# Patient Record
Sex: MALE | Race: WHITE | Employment: OTHER | URBAN - METROPOLITAN AREA
[De-identification: names, ages, dates, MRNs, and addresses within clinical notes are randomized per-mention and may not be internally consistent; named-entity substitution may affect disease eponyms.]

---

## 2017-01-03 ENCOUNTER — OFFICE VISIT (OUTPATIENT)
Dept: INTERNAL MEDICINE CLINIC | Age: 62
End: 2017-01-03

## 2017-01-03 VITALS
WEIGHT: 315 LBS | DIASTOLIC BLOOD PRESSURE: 70 MMHG | BODY MASS INDEX: 37.72 KG/M2 | OXYGEN SATURATION: 95 % | SYSTOLIC BLOOD PRESSURE: 118 MMHG | RESPIRATION RATE: 18 BRPM | HEART RATE: 77 BPM

## 2017-01-03 DIAGNOSIS — H35.3190 MACULAR DEGENERATION, DRY: ICD-10-CM

## 2017-01-03 DIAGNOSIS — J44.1 COPD EXACERBATION (HCC): Primary | ICD-10-CM

## 2017-01-03 PROCEDURE — 99213 OFFICE O/P EST LOW 20 MIN: CPT | Performed by: INTERNAL MEDICINE

## 2017-01-03 RX ORDER — PREDNISONE 10 MG/1
TABLET ORAL
Qty: 20 TABLET | Refills: 0 | Status: SHIPPED | OUTPATIENT
Start: 2017-01-03 | End: 2017-01-13

## 2017-01-03 RX ORDER — FLUTICASONE PROPIONATE 50 MCG
2 SPRAY, SUSPENSION (ML) NASAL DAILY
Qty: 1 BOTTLE | Refills: 5 | Status: SHIPPED | OUTPATIENT
Start: 2017-01-03 | End: 2017-06-13

## 2017-01-03 RX ORDER — AZITHROMYCIN 250 MG/1
TABLET, FILM COATED ORAL
Qty: 6 TABLET | Refills: 0 | Status: SHIPPED | OUTPATIENT
Start: 2017-01-03 | End: 2017-02-03

## 2017-01-06 ENCOUNTER — INITIAL CONSULT (OUTPATIENT)
Dept: CARDIOLOGY CLINIC | Age: 62
End: 2017-01-06

## 2017-01-06 ENCOUNTER — PROCEDURE VISIT (OUTPATIENT)
Dept: CARDIOLOGY CLINIC | Age: 62
End: 2017-01-06

## 2017-01-06 VITALS
DIASTOLIC BLOOD PRESSURE: 72 MMHG | SYSTOLIC BLOOD PRESSURE: 114 MMHG | HEIGHT: 78 IN | WEIGHT: 315 LBS | BODY MASS INDEX: 36.45 KG/M2 | HEART RATE: 80 BPM

## 2017-01-06 DIAGNOSIS — I10 ESSENTIAL HYPERTENSION: ICD-10-CM

## 2017-01-06 DIAGNOSIS — Z01.810 PRE-OPERATIVE CARDIOVASCULAR EXAMINATION: Primary | ICD-10-CM

## 2017-01-06 DIAGNOSIS — I10 ESSENTIAL HYPERTENSION: Primary | ICD-10-CM

## 2017-01-06 PROCEDURE — 93306 TTE W/DOPPLER COMPLETE: CPT | Performed by: INTERNAL MEDICINE

## 2017-01-06 PROCEDURE — 99203 OFFICE O/P NEW LOW 30 MIN: CPT | Performed by: INTERNAL MEDICINE

## 2017-01-06 PROCEDURE — 93000 ELECTROCARDIOGRAM COMPLETE: CPT | Performed by: INTERNAL MEDICINE

## 2017-01-10 ENCOUNTER — TELEPHONE (OUTPATIENT)
Dept: CARDIOLOGY CLINIC | Age: 62
End: 2017-01-10

## 2017-01-10 LAB
LEFT VENTRICULAR EJECTION FRACTION HIGH VALUE: NORMAL %
LEFT VENTRICULAR EJECTION FRACTION MODE: NORMAL
LV EF: NORMAL %

## 2017-01-11 ENCOUNTER — HOSPITAL ENCOUNTER (OUTPATIENT)
Dept: GENERAL RADIOLOGY | Age: 62
Discharge: HOME | End: 2017-01-11
Attending: INTERNAL MEDICINE | Admitting: INTERNAL MEDICINE

## 2017-01-11 ENCOUNTER — TELEPHONE (OUTPATIENT)
Dept: INTERNAL MEDICINE CLINIC | Age: 62
End: 2017-01-11

## 2017-01-11 ENCOUNTER — HOSPITAL ENCOUNTER (OUTPATIENT)
Dept: GENERAL RADIOLOGY | Age: 62
Discharge: HOME | End: 2017-01-11

## 2017-01-11 DIAGNOSIS — M25.532 WRIST PAIN, ACUTE, LEFT: Primary | ICD-10-CM

## 2017-01-11 DIAGNOSIS — M25.532 WRIST PAIN, ACUTE, LEFT: ICD-10-CM

## 2017-01-11 DIAGNOSIS — Z01.811 PRE-OP CHEST EXAM: ICD-10-CM

## 2017-01-11 LAB
ANION GAP SERPL CALCULATED.3IONS-SCNC: 14 MMOL/L (ref 4–16)
APTT: 29.2 SECONDS (ref 21.2–33)
BUN BLDV-MCNC: 12 MG/DL (ref 6–23)
CALCIUM SERPL-MCNC: 9.2 MG/DL (ref 8.3–10.6)
CHLORIDE BLD-SCNC: 93 MMOL/L (ref 99–110)
CO2: 29 MMOL/L (ref 21–32)
CREAT SERPL-MCNC: 0.6 MG/DL (ref 0.9–1.3)
GFR AFRICAN AMERICAN: >60 ML/MIN/1.73M2
GFR NON-AFRICAN AMERICAN: >60 ML/MIN/1.73M2
GLUCOSE BLD-MCNC: 99 MG/DL (ref 70–140)
HCT VFR BLD CALC: 54.6 % (ref 42–52)
HEMOGLOBIN: 18.9 GM/DL (ref 13.5–18)
INR BLD: 0.99 INDEX
MCH RBC QN AUTO: 32.9 PG (ref 27–31)
MCHC RBC AUTO-ENTMCNC: 34.6 % (ref 32–36)
MCV RBC AUTO: 95 FL (ref 78–100)
PDW BLD-RTO: 13.2 % (ref 11.7–14.9)
PLATELET # BLD: 218 K/CU MM (ref 140–440)
PMV BLD AUTO: 10.4 FL (ref 7.5–11.1)
POTASSIUM SERPL-SCNC: 4.8 MMOL/L (ref 3.5–5.1)
PROTHROMBIN TIME: 11.3 SECONDS (ref 9.12–12.5)
RBC # BLD: 5.75 M/CU MM (ref 4.6–6.2)
SODIUM BLD-SCNC: 136 MMOL/L (ref 135–145)
WBC # BLD: 17.2 K/CU MM (ref 4–10.5)

## 2017-01-13 DIAGNOSIS — M25.532 LEFT WRIST PAIN: Primary | ICD-10-CM

## 2017-01-13 PROBLEM — S63.502A LEFT WRIST SPRAIN: Status: ACTIVE | Noted: 2017-01-13

## 2017-01-25 ENCOUNTER — TELEPHONE (OUTPATIENT)
Dept: ORTHOPEDIC SURGERY | Age: 62
End: 2017-01-25

## 2017-01-30 ENCOUNTER — OFFICE VISIT (OUTPATIENT)
Dept: ORTHOPEDIC SURGERY | Age: 62
End: 2017-01-30

## 2017-01-30 VITALS — WEIGHT: 315 LBS | BODY MASS INDEX: 36.45 KG/M2 | HEIGHT: 78 IN | RESPIRATION RATE: 18 BRPM

## 2017-01-30 DIAGNOSIS — S63.502A WRIST SPRAIN, LEFT, INITIAL ENCOUNTER: ICD-10-CM

## 2017-01-30 DIAGNOSIS — S62.002A: ICD-10-CM

## 2017-01-30 DIAGNOSIS — M19.032 PRIMARY OSTEOARTHRITIS OF LEFT WRIST: Primary | ICD-10-CM

## 2017-01-30 PROCEDURE — 99244 OFF/OP CNSLTJ NEW/EST MOD 40: CPT | Performed by: ORTHOPAEDIC SURGERY

## 2017-01-30 ASSESSMENT — ENCOUNTER SYMPTOMS
COUGH: 1
GASTROINTESTINAL NEGATIVE: 1
EYE REDNESS: 1
BLURRED VISION: 1
WHEEZING: 1
SHORTNESS OF BREATH: 1
SPUTUM PRODUCTION: 1

## 2017-02-03 ENCOUNTER — OFFICE VISIT (OUTPATIENT)
Dept: CARDIOLOGY CLINIC | Age: 62
End: 2017-02-03

## 2017-02-03 VITALS
HEIGHT: 78 IN | BODY MASS INDEX: 36.45 KG/M2 | DIASTOLIC BLOOD PRESSURE: 60 MMHG | HEART RATE: 64 BPM | WEIGHT: 315 LBS | SYSTOLIC BLOOD PRESSURE: 104 MMHG

## 2017-02-03 DIAGNOSIS — I73.9 PVD (PERIPHERAL VASCULAR DISEASE) (HCC): Primary | ICD-10-CM

## 2017-02-03 PROCEDURE — 99214 OFFICE O/P EST MOD 30 MIN: CPT | Performed by: INTERNAL MEDICINE

## 2017-02-03 RX ORDER — CILOSTAZOL 50 MG/1
50 TABLET ORAL 2 TIMES DAILY
Qty: 180 TABLET | Refills: 3 | Status: SHIPPED | OUTPATIENT
Start: 2017-02-03 | End: 2018-11-28 | Stop reason: SDUPTHER

## 2017-02-08 ENCOUNTER — HOSPITAL ENCOUNTER (OUTPATIENT)
Dept: MRI IMAGING | Age: 62
Discharge: HOME | End: 2017-02-08
Attending: ORTHOPAEDIC SURGERY | Admitting: ORTHOPAEDIC SURGERY

## 2017-02-08 DIAGNOSIS — S62.002A: ICD-10-CM

## 2017-02-08 DIAGNOSIS — S62.002A CLOSED FRACTURE OF NAVICULAR BONE OF LEFT WRIST: ICD-10-CM

## 2017-02-13 ENCOUNTER — OFFICE VISIT (OUTPATIENT)
Dept: INTERNAL MEDICINE CLINIC | Age: 62
End: 2017-02-13

## 2017-02-13 VITALS
HEART RATE: 80 BPM | DIASTOLIC BLOOD PRESSURE: 78 MMHG | SYSTOLIC BLOOD PRESSURE: 122 MMHG | WEIGHT: 315 LBS | RESPIRATION RATE: 18 BRPM | BODY MASS INDEX: 38.37 KG/M2

## 2017-02-13 DIAGNOSIS — M25.562 CHRONIC PAIN OF BOTH KNEES: ICD-10-CM

## 2017-02-13 DIAGNOSIS — G89.29 HIP PAIN, CHRONIC, RIGHT: ICD-10-CM

## 2017-02-13 DIAGNOSIS — M25.50 ARTHRALGIA, UNSPECIFIED JOINT: ICD-10-CM

## 2017-02-13 DIAGNOSIS — M25.551 HIP PAIN, CHRONIC, RIGHT: ICD-10-CM

## 2017-02-13 DIAGNOSIS — M25.50 ARTHRALGIA, UNSPECIFIED JOINT: Primary | ICD-10-CM

## 2017-02-13 DIAGNOSIS — G89.29 CHRONIC PAIN OF BOTH KNEES: ICD-10-CM

## 2017-02-13 DIAGNOSIS — M25.552 CHRONIC HIP PAIN, LEFT: ICD-10-CM

## 2017-02-13 DIAGNOSIS — M25.511 ACUTE PAIN OF RIGHT SHOULDER: ICD-10-CM

## 2017-02-13 DIAGNOSIS — G89.29 CHRONIC HIP PAIN, LEFT: ICD-10-CM

## 2017-02-13 DIAGNOSIS — M25.561 CHRONIC PAIN OF BOTH KNEES: ICD-10-CM

## 2017-02-13 PROCEDURE — 20610 DRAIN/INJ JOINT/BURSA W/O US: CPT | Performed by: INTERNAL MEDICINE

## 2017-02-13 PROCEDURE — 99213 OFFICE O/P EST LOW 20 MIN: CPT | Performed by: INTERNAL MEDICINE

## 2017-02-13 RX ORDER — METHYLPREDNISOLONE ACETATE 80 MG/ML
80 INJECTION, SUSPENSION INTRA-ARTICULAR; INTRALESIONAL; INTRAMUSCULAR; SOFT TISSUE ONCE
Status: COMPLETED | OUTPATIENT
Start: 2017-02-13 | End: 2017-02-13

## 2017-02-13 RX ADMIN — METHYLPREDNISOLONE ACETATE 80 MG: 80 INJECTION, SUSPENSION INTRA-ARTICULAR; INTRALESIONAL; INTRAMUSCULAR; SOFT TISSUE at 11:26

## 2017-02-13 ASSESSMENT — PATIENT HEALTH QUESTIONNAIRE - PHQ9
2. FEELING DOWN, DEPRESSED OR HOPELESS: 0
1. LITTLE INTEREST OR PLEASURE IN DOING THINGS: 0
SUM OF ALL RESPONSES TO PHQ QUESTIONS 1-9: 0
SUM OF ALL RESPONSES TO PHQ9 QUESTIONS 1 & 2: 0

## 2017-02-14 LAB
ANA INTERPRETATION: NORMAL
ANTI-NUCLEAR ANTIBODY (ANA): NEGATIVE
C-REACTIVE PROTEIN: 18 MG/L (ref 0–5.1)
RHEUMATOID FACTOR: <10 IU/ML
SEDIMENTATION RATE, ERYTHROCYTE: 16 MM/HR (ref 0–20)

## 2017-03-10 ENCOUNTER — OFFICE VISIT (OUTPATIENT)
Dept: INTERNAL MEDICINE CLINIC | Age: 62
End: 2017-03-10

## 2017-03-10 VITALS
SYSTOLIC BLOOD PRESSURE: 118 MMHG | HEART RATE: 86 BPM | OXYGEN SATURATION: 94 % | DIASTOLIC BLOOD PRESSURE: 72 MMHG | RESPIRATION RATE: 16 BRPM

## 2017-03-10 DIAGNOSIS — J41.0 SIMPLE CHRONIC BRONCHITIS (HCC): ICD-10-CM

## 2017-03-10 DIAGNOSIS — M25.551 PAIN OF BOTH HIP JOINTS: Primary | ICD-10-CM

## 2017-03-10 DIAGNOSIS — M25.561 CHRONIC PAIN OF BOTH KNEES: ICD-10-CM

## 2017-03-10 DIAGNOSIS — M25.552 PAIN OF BOTH HIP JOINTS: Primary | ICD-10-CM

## 2017-03-10 DIAGNOSIS — M25.562 CHRONIC PAIN OF BOTH KNEES: ICD-10-CM

## 2017-03-10 DIAGNOSIS — G89.29 CHRONIC PAIN OF BOTH KNEES: ICD-10-CM

## 2017-03-10 PROCEDURE — 99213 OFFICE O/P EST LOW 20 MIN: CPT | Performed by: INTERNAL MEDICINE

## 2017-03-10 RX ORDER — DULOXETIN HYDROCHLORIDE 30 MG/1
30 CAPSULE, DELAYED RELEASE ORAL DAILY
Qty: 30 CAPSULE | Refills: 5 | Status: SHIPPED | OUTPATIENT
Start: 2017-03-10 | End: 2018-04-04 | Stop reason: ALTCHOICE

## 2017-03-10 RX ORDER — BUDESONIDE AND FORMOTEROL FUMARATE DIHYDRATE 160; 4.5 UG/1; UG/1
2 AEROSOL RESPIRATORY (INHALATION) 2 TIMES DAILY
Qty: 3 INHALER | Refills: 3 | Status: CANCELLED | OUTPATIENT
Start: 2017-03-10

## 2017-03-10 RX ORDER — ALBUTEROL SULFATE 90 UG/1
2 AEROSOL, METERED RESPIRATORY (INHALATION) EVERY 6 HOURS PRN
Qty: 3 INHALER | Refills: 3 | Status: SHIPPED | OUTPATIENT
Start: 2017-03-10 | End: 2017-05-08 | Stop reason: SDUPTHER

## 2017-05-08 ENCOUNTER — OFFICE VISIT (OUTPATIENT)
Dept: CARDIOLOGY CLINIC | Age: 62
End: 2017-05-08

## 2017-05-08 VITALS
BODY MASS INDEX: 36.45 KG/M2 | SYSTOLIC BLOOD PRESSURE: 118 MMHG | DIASTOLIC BLOOD PRESSURE: 62 MMHG | HEIGHT: 78 IN | WEIGHT: 315 LBS | HEART RATE: 82 BPM

## 2017-05-08 DIAGNOSIS — I73.9 PVD (PERIPHERAL VASCULAR DISEASE) (HCC): Primary | ICD-10-CM

## 2017-05-08 DIAGNOSIS — J41.0 SIMPLE CHRONIC BRONCHITIS (HCC): ICD-10-CM

## 2017-05-08 PROCEDURE — 99214 OFFICE O/P EST MOD 30 MIN: CPT | Performed by: INTERNAL MEDICINE

## 2017-05-08 RX ORDER — ALBUTEROL SULFATE 90 UG/1
2 AEROSOL, METERED RESPIRATORY (INHALATION) EVERY 6 HOURS PRN
Qty: 3 INHALER | Refills: 3 | Status: SHIPPED | OUTPATIENT
Start: 2017-05-08 | End: 2018-07-17 | Stop reason: SDUPTHER

## 2017-05-08 RX ORDER — BUDESONIDE AND FORMOTEROL FUMARATE DIHYDRATE 160; 4.5 UG/1; UG/1
2 AEROSOL RESPIRATORY (INHALATION) 2 TIMES DAILY
Qty: 3 INHALER | Refills: 3 | Status: SHIPPED | OUTPATIENT
Start: 2017-05-08 | End: 2018-04-16

## 2017-06-13 ENCOUNTER — OFFICE VISIT (OUTPATIENT)
Dept: INTERNAL MEDICINE CLINIC | Age: 62
End: 2017-06-13

## 2017-06-13 VITALS
BODY MASS INDEX: 37.3 KG/M2 | OXYGEN SATURATION: 91 % | SYSTOLIC BLOOD PRESSURE: 100 MMHG | HEART RATE: 66 BPM | DIASTOLIC BLOOD PRESSURE: 58 MMHG | RESPIRATION RATE: 18 BRPM | WEIGHT: 315 LBS

## 2017-06-13 DIAGNOSIS — I10 BENIGN ESSENTIAL HTN: ICD-10-CM

## 2017-06-13 DIAGNOSIS — Z72.0 TOBACCO USE: ICD-10-CM

## 2017-06-13 DIAGNOSIS — J41.0 SIMPLE CHRONIC BRONCHITIS (HCC): ICD-10-CM

## 2017-06-13 DIAGNOSIS — E78.00 HYPERCHOLESTEREMIA: ICD-10-CM

## 2017-06-13 PROCEDURE — 99214 OFFICE O/P EST MOD 30 MIN: CPT | Performed by: INTERNAL MEDICINE

## 2017-06-13 RX ORDER — ALBUTEROL SULFATE 2.5 MG/3ML
2.5 SOLUTION RESPIRATORY (INHALATION) EVERY 4 HOURS PRN
Qty: 120 VIAL | Refills: 5 | Status: SHIPPED | OUTPATIENT
Start: 2017-06-13 | End: 2018-04-16 | Stop reason: SDUPTHER

## 2017-07-28 ENCOUNTER — HOSPITAL ENCOUNTER (OUTPATIENT)
Dept: PULMONOLOGY | Age: 62
Discharge: HOME | End: 2017-07-28
Attending: INTERNAL MEDICINE | Admitting: INTERNAL MEDICINE

## 2017-09-12 ENCOUNTER — OFFICE VISIT (OUTPATIENT)
Dept: INTERNAL MEDICINE CLINIC | Age: 62
End: 2017-09-12

## 2017-09-12 VITALS
BODY MASS INDEX: 36.75 KG/M2 | HEART RATE: 70 BPM | SYSTOLIC BLOOD PRESSURE: 110 MMHG | DIASTOLIC BLOOD PRESSURE: 78 MMHG | WEIGHT: 315 LBS | RESPIRATION RATE: 16 BRPM

## 2017-09-12 DIAGNOSIS — E78.00 HYPERCHOLESTEREMIA: ICD-10-CM

## 2017-09-12 DIAGNOSIS — I10 BENIGN ESSENTIAL HTN: ICD-10-CM

## 2017-09-12 DIAGNOSIS — R42 DISEQUILIBRIUM: Primary | ICD-10-CM

## 2017-09-12 DIAGNOSIS — J41.0 SIMPLE CHRONIC BRONCHITIS (HCC): ICD-10-CM

## 2017-09-12 PROCEDURE — 99214 OFFICE O/P EST MOD 30 MIN: CPT | Performed by: INTERNAL MEDICINE

## 2017-09-12 PROCEDURE — 90688 IIV4 VACCINE SPLT 0.5 ML IM: CPT | Performed by: INTERNAL MEDICINE

## 2017-09-12 PROCEDURE — 90471 IMMUNIZATION ADMIN: CPT | Performed by: INTERNAL MEDICINE

## 2018-01-01 PROBLEM — J18.9 PNEUMONIA: Status: ACTIVE | Noted: 2018-01-01

## 2018-01-02 PROBLEM — J44.1 COPD EXACERBATION (HCC): Status: ACTIVE | Noted: 2018-01-02

## 2018-01-02 PROBLEM — J11.1 INFLUENZA: Status: ACTIVE | Noted: 2018-01-02

## 2018-01-03 PROBLEM — J96.02 ACUTE RESPIRATORY FAILURE WITH HYPERCAPNIA (HCC): Status: ACTIVE | Noted: 2018-01-03

## 2018-01-10 ENCOUNTER — TELEPHONE (OUTPATIENT)
Dept: INTERNAL MEDICINE CLINIC | Age: 63
End: 2018-01-10

## 2018-01-10 NOTE — TELEPHONE ENCOUNTER
Transitional Care Management Service-  Initial Post-discharge Communication    Date of discharge: 1/8/2018  Facility: [address] [place] [address]  Non-face-to-face services provided:  · Patient scheduled to see Dr. Ayaan Belcher 1/12/18 at 3pm. Patient does not need any medication, equipment or anything else at this time.

## 2018-01-16 ENCOUNTER — OFFICE VISIT (OUTPATIENT)
Dept: INTERNAL MEDICINE CLINIC | Age: 63
End: 2018-01-16

## 2018-01-16 VITALS
BODY MASS INDEX: 36.29 KG/M2 | DIASTOLIC BLOOD PRESSURE: 78 MMHG | WEIGHT: 314 LBS | SYSTOLIC BLOOD PRESSURE: 118 MMHG | HEART RATE: 80 BPM | RESPIRATION RATE: 20 BRPM | OXYGEN SATURATION: 96 %

## 2018-01-16 DIAGNOSIS — I48.19 PERSISTENT ATRIAL FIBRILLATION (HCC): ICD-10-CM

## 2018-01-16 DIAGNOSIS — J18.9 PNEUMONIA DUE TO INFECTIOUS ORGANISM, UNSPECIFIED LATERALITY, UNSPECIFIED PART OF LUNG: ICD-10-CM

## 2018-01-16 DIAGNOSIS — J96.02 ACUTE RESPIRATORY FAILURE WITH HYPERCAPNIA (HCC): Primary | ICD-10-CM

## 2018-01-16 DIAGNOSIS — J44.1 COPD EXACERBATION (HCC): ICD-10-CM

## 2018-01-16 DIAGNOSIS — J11.1 INFLUENZA: ICD-10-CM

## 2018-01-16 PROCEDURE — 99214 OFFICE O/P EST MOD 30 MIN: CPT | Performed by: INTERNAL MEDICINE

## 2018-01-16 NOTE — PROGRESS NOTES
follow up appointment on 1/10. The 30th day from the day of discharge is 2/7. I have reviewed all radiology and laboratory tests, as well as consult notes, progress notes, history and physical, and discharge summary. I reviewed appropriate follow up appointments with the patient. Medical decision making was moderately complex. We have reviewed all of the patient's medications today. Diagnoses and all orders for this visit:    Acute respiratory failure with hypercapnia ([facility] - much improved from the influenza/pneuymonia/resp failure. He is now on home O2. He did have a fib in the hospital and this has persisted. Pt is on anticoagulation and will follow with Dr Shweta Cohen next month - may benefit from Bipin Carroll then. Type 2 diabetes, uncontrolled, with neuropathy (HCC) - decrease metformin  -     metFORMIN (GLUCOPHAGE) 500 MG tablet;  Take 1 tablet by mouth daily (with breakfast)    Influenza    Pneumonia due to infectious organism, unspecified laterality, unspecified part of lung    COPD exacerbation ([facility]

## 2018-02-16 ENCOUNTER — OFFICE VISIT (OUTPATIENT)
Dept: CARDIOLOGY CLINIC | Age: 63
End: 2018-02-16

## 2018-02-16 VITALS
HEART RATE: 80 BPM | HEIGHT: 78 IN | WEIGHT: 315 LBS | DIASTOLIC BLOOD PRESSURE: 52 MMHG | BODY MASS INDEX: 36.45 KG/M2 | SYSTOLIC BLOOD PRESSURE: 80 MMHG

## 2018-02-16 DIAGNOSIS — I10 BENIGN ESSENTIAL HTN: ICD-10-CM

## 2018-02-16 DIAGNOSIS — I10 ESSENTIAL HYPERTENSION: ICD-10-CM

## 2018-02-16 DIAGNOSIS — E78.00 HYPERCHOLESTEREMIA: ICD-10-CM

## 2018-02-16 DIAGNOSIS — M79.89 SWELLING OF LOWER EXTREMITY: ICD-10-CM

## 2018-02-16 DIAGNOSIS — I73.9 PVD (PERIPHERAL VASCULAR DISEASE) (HCC): Primary | ICD-10-CM

## 2018-02-16 PROCEDURE — 3017F COLORECTAL CA SCREEN DOC REV: CPT | Performed by: INTERNAL MEDICINE

## 2018-02-16 PROCEDURE — 93000 ELECTROCARDIOGRAM COMPLETE: CPT | Performed by: INTERNAL MEDICINE

## 2018-02-16 PROCEDURE — G8484 FLU IMMUNIZE NO ADMIN: HCPCS | Performed by: INTERNAL MEDICINE

## 2018-02-16 PROCEDURE — 99214 OFFICE O/P EST MOD 30 MIN: CPT | Performed by: INTERNAL MEDICINE

## 2018-02-16 PROCEDURE — 4004F PT TOBACCO SCREEN RCVD TLK: CPT | Performed by: INTERNAL MEDICINE

## 2018-02-16 PROCEDURE — G8417 CALC BMI ABV UP PARAM F/U: HCPCS | Performed by: INTERNAL MEDICINE

## 2018-02-16 PROCEDURE — G8427 DOCREV CUR MEDS BY ELIG CLIN: HCPCS | Performed by: INTERNAL MEDICINE

## 2018-02-16 RX ORDER — ATORVASTATIN CALCIUM 40 MG/1
40 TABLET, FILM COATED ORAL DAILY
Qty: 90 TABLET | Refills: 3 | Status: SHIPPED | OUTPATIENT
Start: 2018-02-16 | End: 2018-11-28 | Stop reason: SDUPTHER

## 2018-02-16 RX ORDER — LISINOPRIL 10 MG/1
5 TABLET ORAL DAILY
Qty: 90 TABLET | Refills: 3 | Status: SHIPPED
Start: 2018-02-16 | End: 2018-10-23

## 2018-02-16 RX ORDER — LISINOPRIL 10 MG/1
10 TABLET ORAL DAILY
Qty: 90 TABLET | Refills: 3 | Status: SHIPPED | OUTPATIENT
Start: 2018-02-16 | End: 2018-02-16 | Stop reason: DRUGHIGH

## 2018-02-16 NOTE — PROGRESS NOTES
CARDIOLOGY NOTE      2/16/2018    RE: Corrina Lehman  (1955)                               TO:  Dr. Warner Meckel, MD      Thank you for involving me in taking care of your  patient Corrina Lehman, who is a  63y.o. year old      male with past medical  history of  DM, PVD, HTN  is  seen today Patient  during this  visit has mild SOB, and Swelling LLE. .  Was in hospital with pneumonia, had A Fib with RVR.     Vitals:    02/16/18 0946   BP: (!) 80/52   Pulse: 80       Current Outpatient Prescriptions   Medication Sig Dispense Refill    OXYGEN Inhale 3 L into the lungs continuous      metFORMIN (GLUCOPHAGE) 500 MG tablet Take 1 tablet by mouth daily (with breakfast) 90 tablet 3    apixaban (ELIQUIS) 5 MG TABS tablet Take 1 tablet by mouth 2 times daily 60 tablet 3    diltiazem (CARDIZEM CD) 120 MG extended release capsule Take 1 capsule by mouth daily 30 capsule 3    albuterol (PROVENTIL) (2.5 MG/3ML) 0.083% nebulizer solution Take 3 mLs by nebulization every 4 hours as needed for Wheezing 120 vial 3    Nebulizers (AIRIAL COMPRESS PED NEBULIZER) MISC Use 4 times daily 1 each 0    Nebulizers (COMPRESSOR/NEBULIZER) MISC Use qid 1 each 3    albuterol (PROVENTIL) (2.5 MG/3ML) 0.083% nebulizer solution Take 3 mLs by nebulization every 4 hours as needed for Wheezing 120 vial 5    ipratropium (ATROVENT) 0.02 % nebulizer solution Take 2.5 mLs by nebulization every 4 hours as needed for Wheezing 300 mL 5    albuterol sulfate  (90 BASE) MCG/ACT inhaler Inhale 2 puffs into the lungs every 6 hours as needed for Wheezing 3 Inhaler 3    cilostazol (PLETAL) 50 MG tablet Take 1 tablet by mouth 2 times daily 180 tablet 3    atorvastatin (LIPITOR) 40 MG tablet Take 1 tablet by mouth daily 90 tablet 3    lisinopril (PRINIVIL;ZESTRIL) 10 MG tablet Take 1 tablet by mouth daily 90 tablet 3    aspirin 81 MG EC tablet Take 1 tablet by mouth daily 90 tablet 3    fluocinonide (LIDEX) 0.05 % cream Apply topically 2 times daily. 60 g 1    budesonide-formoterol (SYMBICORT) 160-4.5 MCG/ACT AERO Inhale 2 puffs into the lungs 2 times daily 3 Inhaler 3    DULoxetine (CYMBALTA) 30 MG extended release capsule Take 1 capsule by mouth daily 30 capsule 5     No current facility-administered medications for this visit. Allergies: Metoprolol  Past Medical History:   Diagnosis Date    COPD (chronic obstructive pulmonary disease) [facility]     Erectile dysfunction     H/O echocardiogram 01/06/2016    EF60% mild MR, TR, pulm HTN    Hx of colonoscopy     7/11 C-scope - benign polyp x1    Hypertension     Obesity     PAD [facility]peripheral artery disease) [facility])     10/10 FANI mild PAD left superficial femoral s/p left fem-pop bypass    Secondary erythrocytosis     Type II or unspecified type diabetes mellitus without mention of complication, not stated as uncontrolled      Past Surgical History:   Procedure Laterality Date    APPENDECTOMY  2003    HERNIA REPAIR  1849    umbilical    IR FEMORAL POPLITEAL BYPASS GRAFT  01/2011    [place]     Family History   Problem Relation Age of Onset    Other Father      suicide    Other Mother      murder     Social History   Substance Use Topics    Smoking status: Current Some Day Smoker     Packs/day: 1.00     Years: 25.00     Types: Cigarettes    Smokeless tobacco: Never Used    Alcohol use No          Review of systems:  HEENT: Neg  Card:SOB   GI;Neg  : Neg  Neuro: Neg  Psych: Neg  Derm: Neg  MS; Neg  All: Documented  Constitutional: Neg    Objective:      Physical Exam:  BP (!) 80/52   Pulse 80   Ht 6' 6\" (1.981 m)   Wt (!) 324 lb (147 kg)   BMI 37.44 kg/m²   Wt Readings from Last 3 Encounters:   02/16/18 (!) 324 lb (147 kg)   01/16/18 (!) 314 lb (142.4 kg)   01/08/18 (!) 359 lb 0.3 oz (162.8 kg)     Body mass index is 37.44 kg/m². GENERAL - Alert, oriented, pleasant, in no apparent distress.     Head unremarkable  Eyes  Not injected conjunctiva  ENT  normal mucosa  Neck - Supple. No jugular venous distention noted. No carotid bruits. Cardiovascular  Normal S1 and S2 without obvious murmur or gallop. Extremities - No cyanosis, clubbing,  +1 LLE edema. Pulmonary  No respiratory distress. No wheezes or rales. Pulses: Bilateral radial and pedal pulses normal  Abdomen  no tenderness  Musculoskeletal  normal strength  Neurologic    There are  no gross focal neurologic abnormalities. Skin-  No rash  Affect; normal mood    DATA:  Lab Results   Component Value Date    CKTOTAL 15 01/03/2018     BNP:  No results found for: BNP  PT/INR:  No results found for: PTINR  Lab Results   Component Value Date    LABA1C 5.2 01/03/2018    LABA1C 6.0 09/02/2016     Lab Results   Component Value Date    CHOL 179 09/02/2016    TRIG 189 (H) 09/02/2016    HDL 24 (L) 09/02/2016    LDLCALC 117 (H) 09/02/2016     Lab Results   Component Value Date    ALT 62 (H) 01/08/2018    AST 25 01/08/2018     TSH:  No results found for: TSH      QUALITY MEASURES:  CAD:  No   CHOL LOWERING:  No- if No Why  ANTIPLATELET:  No - if No why  BETA BLOCKER    no  IF  NO WHY  SMOKING HISTORY no COUNSELLED no  ATRIAL FIBRILLATIONyes,  ANTICOAG: yes,     Assessment/ Plan:     Patient seen , interviewed and examined      - A fib on anticoag,  DEUCE and DCCV next week    -   DIABETES MELLITUS: Available pertinent lab data reviewed   and  patient was given dietary advice . Advised to check blood glucose level on a regular basis.       -   Changes  in medicines made: No           - COPD  stable    - PVD  Check LLE venous doppler  - Hypotension decrease ACE I

## 2018-02-16 NOTE — LETTER
Giovanna Interiano     Transesophageal Echocardiogram with Cardioversion       Patient Name: Jorge Alberto Yanez   : 1955   MRN# [id number]        Date of Procedure: 2018 Time: 8 AM Arrival Time: 6 AM       Hospital: [facility])       Call to [facility] at [phone] 2 days before your procedure        X Please do not have anything by mouth after midnight prior to or 8 hours before the procedure. X You may take your medication with a sip of water unless advised otherwise below. If having a Cardioversion: Hold digoxin (Lanoxin) the day prior to the procedure and the day of the procedure. X Please continue to take Eliquis (apixaban) as directed. If you are taking Lasix (furosemide) please do not take it the morning before your procedure. If you take insulin take ½ the dose on the morning of the procedure or the night before. Do not take regular insulin dose the morning of the procedure. Patient Signature:____________________________         Staff Signature: Linda Muhammad  What is cardioversion? Cardioversion helps your heart return to a normal rhythm. It treats problems like atrial fibrillation. It is also sometimes used in emergencies. It can correct a fast heartbeat that causes low blood pressure, chest pain, or heart failure. Your doctor may ask you to take medicines before the treatment. These help prevent blood clots. Your doctor will watch you closely to make sure that there are no problems. Electrical cardioversion: The electrical procedure is done in a hospital. You will get medicine to help you relax and control the pain. Your doctor will put paddles or patches on your chest and back. These send an electric current to your heart. This resets your heart rhythm.  The electrical part ? Use of pathology  ? I authorize [facility] ([facility]) to dispose of tissues, specimens or organs when pathology is complete. ? Use of radiology  ? A contrast agent may be required for radiology procedures. My practitioner has advised me of the risks of using, risks of not using, benefits, side effects, and alternatives. ? Observers or use of photography, video/audio recording, or televising of the procedure(s). This is for medical, scientific, or educational purposes. This includes appropriate portions of my body. My identity will not be revealed. ? I consent to release of my social security number and other identifying information to [employer] (FDA), and the supplier/, if I receive tissue, a device, or implant. This is to track the tissue, device, or implant for defect, recall, infection, etc.     ? Use of blood and/or blood products, if needed, through my hospital stay. My practitioner has advised me of the risks of using, risks of not using, benefits, side effects, and alternatives. ___ I do NOT want Blood or Blood products given. (Complete separate  refusal form)            Code Status (barney one):    ___ I do NOT HAVE a DNR order. I am a Full-code.   I will receive CPR, intubation,  chest compressions, medications, and/or other life saving measures if I have a  cardiac or respiratory arrest.    ___ I have a Do Not Resuscitate (DNR)order.   (barney one below)  ___  I rescind my DNR for surgery and immediate post-operative period through Phase 2 recovery. This means, for that time period, I will be a Full-code and receive CPR, intubation, chest compressions, medications, and/or other life saving measures, if I have a cardiac or respiratory arrest.    ___ I WANT to keep my DNR in effect during my procedure(s) and immediate post-operative recovery period through Phase 2 recovery.   (Complete separate refusal form) This form has been fully explained to me. I understand its contents. Patients Signature: ___________________________Date: ________  Time: ________    If patient unable to sign, has engaged the [address] [place] [address], is a minor, or has a court-appointed Guardian:  [facility] Representative Name (Print):  ____________________________________      Relationship ([place] one):    Guardian   Parent    Spouse    HCPOA   Child   Sibling  Next-of-Kin Friend    Patients Representative Signature: _______________________________________              Date: ______________  Time: __________    An  was used.  name/ID: _________________________________      [facility] ([facility] Witness________________________  Date: ________   Time: _________    Physician/Practitioner _______________________  Date: ________   Time: _________           Revision 2017                                        Rosario Campbell       PROCEDURE TO SCHEDULE:    Transesophageal Echocardiogram with Cardioversion    Patient Name: Refsuraj Lehman   : 1955   MRN# [id number]    Home Phone Number: [phone]   Weight:    Wt Readings from Last 3 Encounters:   18 (!) 324 lb (147 kg)   18 (!) 314 lb (142.4 kg)   18 (!) 359 lb 0.3 oz (162.8 kg)        Insurance: Payor: Lupe Kelley / Plan: MEDICARE PART A AND B / Product Type: *No Product type* /     Date of Procedure: 2018 Time: 8 AM Arrival Time: 6 AM    Diagnosis:  Atrial Fibrillation  Allergies:    Allergies   Allergen Reactions    Metoprolol Other (See Comments)     Chest pain and burning in the chest           1) Call [facility] scheduling ([phone]) or [address] [facility]     PHONE OR   INSTANT MESSAGE  2) PREAUTHORIZATION NUMBER:    Spoke to:      From date:     expiration date:          Arti Rose

## 2018-02-20 ENCOUNTER — PROCEDURE VISIT (OUTPATIENT)
Dept: CARDIOLOGY CLINIC | Age: 63
End: 2018-02-20

## 2018-02-20 DIAGNOSIS — E78.00 HYPERCHOLESTEREMIA: ICD-10-CM

## 2018-02-20 DIAGNOSIS — I10 BENIGN ESSENTIAL HTN: ICD-10-CM

## 2018-02-20 DIAGNOSIS — I10 ESSENTIAL HYPERTENSION: ICD-10-CM

## 2018-02-20 DIAGNOSIS — I73.9 PVD (PERIPHERAL VASCULAR DISEASE) (HCC): ICD-10-CM

## 2018-02-20 DIAGNOSIS — M79.89 SWELLING OF LOWER EXTREMITY: Primary | ICD-10-CM

## 2018-02-20 PROCEDURE — 93971 EXTREMITY STUDY: CPT | Performed by: INTERNAL MEDICINE

## 2018-02-21 ENCOUNTER — TELEPHONE (OUTPATIENT)
Dept: CARDIOLOGY CLINIC | Age: 63
End: 2018-02-21

## 2018-02-22 ENCOUNTER — HOSPITAL ENCOUNTER (OUTPATIENT)
Dept: CARDIOLOGY | Age: 63
Discharge: HOME | End: 2018-02-22
Attending: INTERNAL MEDICINE | Admitting: INTERNAL MEDICINE

## 2018-02-22 VITALS
SYSTOLIC BLOOD PRESSURE: 110 MMHG | OXYGEN SATURATION: 97 % | RESPIRATION RATE: 15 BRPM | DIASTOLIC BLOOD PRESSURE: 78 MMHG | HEART RATE: 81 BPM

## 2018-02-22 LAB
ANION GAP SERPL CALCULATED.3IONS-SCNC: 9 MMOL/L (ref 4–16)
BUN BLDV-MCNC: 22 MG/DL (ref 6–23)
CALCIUM SERPL-MCNC: 8.9 MG/DL (ref 8.3–10.6)
CHLORIDE BLD-SCNC: 98 MMOL/L (ref 99–110)
CO2: 31 MMOL/L (ref 21–32)
CREAT SERPL-MCNC: 0.6 MG/DL (ref 0.9–1.3)
EKG ATRIAL RATE: 214 BPM
EKG ATRIAL RATE: 75 BPM
EKG DIAGNOSIS: NORMAL
EKG DIAGNOSIS: NORMAL
EKG P AXIS: 92 DEGREES
EKG P-R INTERVAL: 208 MS
EKG Q-T INTERVAL: 360 MS
EKG Q-T INTERVAL: 376 MS
EKG QRS DURATION: 82 MS
EKG QRS DURATION: 82 MS
EKG QTC CALCULATION (BAZETT): 419 MS
EKG QTC CALCULATION (BAZETT): 423 MS
EKG R AXIS: -54 DEGREES
EKG R AXIS: -76 DEGREES
EKG T AXIS: 46 DEGREES
EKG T AXIS: 60 DEGREES
EKG VENTRICULAR RATE: 75 BPM
EKG VENTRICULAR RATE: 83 BPM
GFR AFRICAN AMERICAN: >60 ML/MIN/1.73M2
GFR NON-AFRICAN AMERICAN: >60 ML/MIN/1.73M2
GLUCOSE BLD-MCNC: 115 MG/DL (ref 70–99)
INR BLD: 1.08 INDEX
LV EF: 53 %
LVEF MODALITY: NORMAL
POTASSIUM SERPL-SCNC: 4.6 MMOL/L (ref 3.5–5.1)
PROTHROMBIN TIME: 12.5 SECONDS (ref 9.12–12.5)
SODIUM BLD-SCNC: 138 MMOL/L (ref 135–145)

## 2018-02-22 PROCEDURE — 92960 CARDIOVERSION ELECTRIC EXT: CPT | Performed by: INTERNAL MEDICINE

## 2018-02-22 PROCEDURE — 93312 ECHO TRANSESOPHAGEAL: CPT | Performed by: INTERNAL MEDICINE

## 2018-02-22 PROCEDURE — 93325 DOPPLER ECHO COLOR FLOW MAPG: CPT | Performed by: INTERNAL MEDICINE

## 2018-02-22 RX ORDER — ALBUTEROL SULFATE 2.5 MG/3ML
2.5 SOLUTION RESPIRATORY (INHALATION) ONCE
Status: COMPLETED | OUTPATIENT
Start: 2018-02-22 | End: 2018-02-22

## 2018-02-22 RX ORDER — DILTIAZEM HYDROCHLORIDE 120 MG/1
240 CAPSULE, COATED, EXTENDED RELEASE ORAL DAILY
Qty: 30 CAPSULE | Refills: 3 | Status: SHIPPED | OUTPATIENT
Start: 2018-02-22 | End: 2018-05-14 | Stop reason: ALTCHOICE

## 2018-02-22 RX ADMIN — ALBUTEROL SULFATE 2.5 MG: 2.5 SOLUTION RESPIRATORY (INHALATION) at 07:44

## 2018-02-22 ASSESSMENT — COPD QUESTIONNAIRES: CAT_SEVERITY: MODERATE

## 2018-02-22 NOTE — ANESTHESIA PRE-OP
(CYMBALTA) 30 MG extended release capsule Take 1 capsule by mouth daily 3/10/17   Alison Orozco MD   cilostazol (PLETAL) 50 MG tablet Take 1 tablet by mouth 2 times daily 2/3/17   Stella Padilla MD   aspirin 81 MG EC tablet Take 1 tablet by mouth daily 12/9/16   Alison Orozco MD   fluocinonide (LIDEX) 0.05 % cream Apply topically 2 times daily.  12/9/16   Alison Orozco MD       Current medications:    Current Outpatient Prescriptions   Medication Sig Dispense Refill    atorvastatin (LIPITOR) 40 MG tablet Take 1 tablet by mouth daily 90 tablet 3    lisinopril (PRINIVIL;ZESTRIL) 10 MG tablet Take 0.5 tablets by mouth daily 90 tablet 3    OXYGEN Inhale 3 L into the lungs continuous      metFORMIN (GLUCOPHAGE) 500 MG tablet Take 1 tablet by mouth daily (with breakfast) 90 tablet 3    apixaban (ELIQUIS) 5 MG TABS tablet Take 1 tablet by mouth 2 times daily 60 tablet 3    diltiazem (CARDIZEM CD) 120 MG extended release capsule Take 1 capsule by mouth daily 30 capsule 3    albuterol (PROVENTIL) (2.5 MG/3ML) 0.083% nebulizer solution Take 3 mLs by nebulization every 4 hours as needed for Wheezing 120 vial 3    Nebulizers (AIRIAL COMPRESS PED NEBULIZER) MISC Use 4 times daily 1 each 0    Nebulizers (COMPRESSOR/NEBULIZER) MISC Use qid 1 each 3    albuterol (PROVENTIL) (2.5 MG/3ML) 0.083% nebulizer solution Take 3 mLs by nebulization every 4 hours as needed for Wheezing 120 vial 5    ipratropium (ATROVENT) 0.02 % nebulizer solution Take 2.5 mLs by nebulization every 4 hours as needed for Wheezing 300 mL 5    albuterol sulfate  (90 BASE) MCG/ACT inhaler Inhale 2 puffs into the lungs every 6 hours as needed for Wheezing 3 Inhaler 3    budesonide-formoterol (SYMBICORT) 160-4.5 MCG/ACT AERO Inhale 2 puffs into the lungs 2 times daily 3 Inhaler 3    DULoxetine (CYMBALTA) 30 MG extended release capsule Take 1 capsule by mouth daily 30 capsule 5    cilostazol (PLETAL) 50 MG tablet Take 1 tablet by mouth 2 times daily 180 tablet 3    aspirin 81 MG EC tablet Take 1 tablet by mouth daily 90 tablet 3    fluocinonide (LIDEX) 0.05 % cream Apply topically 2 times daily. 60 g 1     No current facility-administered medications for this encounter. Allergies: Allergies   Allergen Reactions    Metoprolol Other (See Comments)     Chest pain and burning in the chest        Problem List:    Patient Active Problem List   Diagnosis Code    Hypertension I10    Hypercholesteremia E78.00    Tobacco use Z72.0    Anxiety F41.9    Type 2 diabetes, uncontrolled, with neuropathy ([facility] E11.40, E11.65    Benign essential HTN I10    Simple chronic bronchitis ([facility]) J41.0    Macular degeneration, dry H35.3190    PVD (peripheral vascular disease) ([facility] I73.9    VT (ventricular tachycardia) ([facility]) I47.2    Left wrist sprain S63.502A    Hypoxia R09.02    PAD (peripheral artery disease) ([facility]) I73.9    Class 3 obesity due to excess calories without serious comorbidity with body mass index (BMI) of 40.0 to 44.9 in adult ([facility] E66.09, Z68.41    Persistent atrial fibrillation ([facility]) I48.1    Swelling of lower extremity M79.89       Past Medical History:        Diagnosis Date    COPD (chronic obstructive pulmonary disease) ([facility]     Erectile dysfunction     H/O echocardiogram 01/06/2016    EF60% mild MR, TR, pulm HTN    Hx of colonoscopy     7/11 C-scope - benign polyp x1    Hx of Doppler ultrasound 02/20/2018    Lower extremity doppler  Normal study.     Hypertension     Obesity     PAD (peripheral artery disease) ([facility])     10/10 FANI mild PAD left superficial femoral s/p left fem-pop bypass    Secondary erythrocytosis     Type II or unspecified type diabetes mellitus without mention of complication, not stated as uncontrolled        Past Surgical History:        Procedure Laterality Date    APPENDECTOMY  2003    HERNIA REPAIR  1247    umbilical    IR FEMORAL POPLITEAL BYPASS GRAFT  01/2011

## 2018-04-04 ENCOUNTER — OFFICE VISIT (OUTPATIENT)
Dept: CARDIOLOGY CLINIC | Age: 63
End: 2018-04-04

## 2018-04-04 VITALS
BODY MASS INDEX: 36.45 KG/M2 | WEIGHT: 315 LBS | SYSTOLIC BLOOD PRESSURE: 118 MMHG | HEIGHT: 78 IN | HEART RATE: 78 BPM | DIASTOLIC BLOOD PRESSURE: 68 MMHG

## 2018-04-04 DIAGNOSIS — I10 ESSENTIAL HYPERTENSION: ICD-10-CM

## 2018-04-04 DIAGNOSIS — I48.19 PERSISTENT ATRIAL FIBRILLATION (HCC): Primary | ICD-10-CM

## 2018-04-04 PROCEDURE — 99214 OFFICE O/P EST MOD 30 MIN: CPT | Performed by: INTERNAL MEDICINE

## 2018-04-04 PROCEDURE — 93000 ELECTROCARDIOGRAM COMPLETE: CPT | Performed by: INTERNAL MEDICINE

## 2018-04-04 PROCEDURE — G8417 CALC BMI ABV UP PARAM F/U: HCPCS | Performed by: INTERNAL MEDICINE

## 2018-04-04 PROCEDURE — G8427 DOCREV CUR MEDS BY ELIG CLIN: HCPCS | Performed by: INTERNAL MEDICINE

## 2018-04-04 PROCEDURE — 3017F COLORECTAL CA SCREEN DOC REV: CPT | Performed by: INTERNAL MEDICINE

## 2018-04-04 PROCEDURE — 4004F PT TOBACCO SCREEN RCVD TLK: CPT | Performed by: INTERNAL MEDICINE

## 2018-04-04 RX ORDER — CILOSTAZOL 50 MG/1
50 TABLET ORAL 2 TIMES DAILY
Qty: 180 TABLET | Refills: 3 | Status: CANCELLED | OUTPATIENT
Start: 2018-04-04

## 2018-04-16 ENCOUNTER — OFFICE VISIT (OUTPATIENT)
Dept: INTERNAL MEDICINE CLINIC | Age: 63
End: 2018-04-16

## 2018-04-16 VITALS
WEIGHT: 315 LBS | OXYGEN SATURATION: 86 % | DIASTOLIC BLOOD PRESSURE: 68 MMHG | BODY MASS INDEX: 38.18 KG/M2 | SYSTOLIC BLOOD PRESSURE: 126 MMHG | RESPIRATION RATE: 18 BRPM | HEART RATE: 88 BPM

## 2018-04-16 DIAGNOSIS — J41.0 SIMPLE CHRONIC BRONCHITIS (HCC): Primary | ICD-10-CM

## 2018-04-16 DIAGNOSIS — E78.00 HYPERCHOLESTEREMIA: ICD-10-CM

## 2018-04-16 DIAGNOSIS — I48.19 PERSISTENT ATRIAL FIBRILLATION (HCC): ICD-10-CM

## 2018-04-16 DIAGNOSIS — I10 BENIGN ESSENTIAL HTN: ICD-10-CM

## 2018-04-16 PROCEDURE — 3017F COLORECTAL CA SCREEN DOC REV: CPT | Performed by: INTERNAL MEDICINE

## 2018-04-16 PROCEDURE — 2022F DILAT RTA XM EVC RTNOPTHY: CPT | Performed by: INTERNAL MEDICINE

## 2018-04-16 PROCEDURE — 3023F SPIROM DOC REV: CPT | Performed by: INTERNAL MEDICINE

## 2018-04-16 PROCEDURE — 3044F HG A1C LEVEL LT 7.0%: CPT | Performed by: INTERNAL MEDICINE

## 2018-04-16 PROCEDURE — G8926 SPIRO NO PERF OR DOC: HCPCS | Performed by: INTERNAL MEDICINE

## 2018-04-16 PROCEDURE — G8417 CALC BMI ABV UP PARAM F/U: HCPCS | Performed by: INTERNAL MEDICINE

## 2018-04-16 PROCEDURE — G8427 DOCREV CUR MEDS BY ELIG CLIN: HCPCS | Performed by: INTERNAL MEDICINE

## 2018-04-16 PROCEDURE — 99214 OFFICE O/P EST MOD 30 MIN: CPT | Performed by: INTERNAL MEDICINE

## 2018-04-16 PROCEDURE — 4004F PT TOBACCO SCREEN RCVD TLK: CPT | Performed by: INTERNAL MEDICINE

## 2018-04-16 RX ORDER — ALBUTEROL SULFATE 2.5 MG/3ML
2.5 SOLUTION RESPIRATORY (INHALATION) EVERY 4 HOURS PRN
Qty: 120 VIAL | Refills: 5 | Status: ON HOLD | OUTPATIENT
Start: 2018-04-16 | End: 2020-04-21 | Stop reason: SDUPTHER

## 2018-04-16 ASSESSMENT — PATIENT HEALTH QUESTIONNAIRE - PHQ9
2. FEELING DOWN, DEPRESSED OR HOPELESS: 1
SUM OF ALL RESPONSES TO PHQ9 QUESTIONS 1 & 2: 2
1. LITTLE INTEREST OR PLEASURE IN DOING THINGS: 1
SUM OF ALL RESPONSES TO PHQ QUESTIONS 1-9: 2

## 2018-04-28 ENCOUNTER — OFFICE VISIT (OUTPATIENT)
Dept: CARDIOLOGY CLINIC | Age: 63
End: 2018-04-28

## 2018-04-28 VITALS
HEIGHT: 78 IN | DIASTOLIC BLOOD PRESSURE: 70 MMHG | RESPIRATION RATE: 15 BRPM | SYSTOLIC BLOOD PRESSURE: 120 MMHG | WEIGHT: 315 LBS | BODY MASS INDEX: 36.45 KG/M2 | HEART RATE: 86 BPM | TEMPERATURE: 97.6 F | OXYGEN SATURATION: 96 %

## 2018-04-28 DIAGNOSIS — I48.19 PERSISTENT ATRIAL FIBRILLATION (HCC): Primary | ICD-10-CM

## 2018-04-28 PROCEDURE — 3017F COLORECTAL CA SCREEN DOC REV: CPT | Performed by: INTERNAL MEDICINE

## 2018-04-28 PROCEDURE — 99354 PR PROLONGED SVC OUTPATIENT SETTING 1ST HOUR: CPT | Performed by: INTERNAL MEDICINE

## 2018-04-28 PROCEDURE — G8428 CUR MEDS NOT DOCUMENT: HCPCS | Performed by: INTERNAL MEDICINE

## 2018-04-28 PROCEDURE — G8417 CALC BMI ABV UP PARAM F/U: HCPCS | Performed by: INTERNAL MEDICINE

## 2018-04-28 PROCEDURE — 99204 OFFICE O/P NEW MOD 45 MIN: CPT | Performed by: INTERNAL MEDICINE

## 2018-05-07 ASSESSMENT — ENCOUNTER SYMPTOMS
VOMITING: 0
NAUSEA: 0
COUGH: 0
PHOTOPHOBIA: 0
COLOR CHANGE: 0
CONSTIPATION: 0
BACK PAIN: 0
SHORTNESS OF BREATH: 1
ABDOMINAL PAIN: 0
WHEEZING: 0
DIARRHEA: 0
CHEST TIGHTNESS: 0
BLOOD IN STOOL: 0
EYE PAIN: 0

## 2018-05-09 DIAGNOSIS — I10 BENIGN ESSENTIAL HTN: ICD-10-CM

## 2018-05-09 RX ORDER — ASPIRIN 81 MG
TABLET, DELAYED RELEASE (ENTERIC COATED) ORAL
Qty: 90 TABLET | Refills: 3 | Status: ON HOLD | OUTPATIENT
Start: 2018-05-09 | End: 2020-04-21 | Stop reason: SDUPTHER

## 2018-05-14 ENCOUNTER — TELEPHONE (OUTPATIENT)
Dept: CARDIOLOGY CLINIC | Age: 63
End: 2018-05-14

## 2018-05-14 DIAGNOSIS — I48.19 PERSISTENT ATRIAL FIBRILLATION (HCC): Primary | ICD-10-CM

## 2018-05-14 RX ORDER — DILTIAZEM HYDROCHLORIDE 240 MG/1
240 CAPSULE, COATED, EXTENDED RELEASE ORAL DAILY
COMMUNITY
End: 2018-11-16 | Stop reason: SDUPTHER

## 2018-05-16 ENCOUNTER — HOSPITAL ENCOUNTER (OUTPATIENT)
Dept: CARDIOLOGY | Age: 63
Discharge: HOME | End: 2018-05-16
Attending: INTERNAL MEDICINE | Admitting: INTERNAL MEDICINE

## 2018-05-16 DIAGNOSIS — I48.19 PERSISTENT ATRIAL FIBRILLATION (HCC): ICD-10-CM

## 2018-05-16 LAB
GFR AFRICAN AMERICAN: >60 ML/MIN/1.73M2
GFR NON-AFRICAN AMERICAN: >60 ML/MIN/1.73M2
POC CREATININE: 0.9 MG/DL (ref 0.9–1.3)

## 2018-05-16 RX ORDER — 0.9 % SODIUM CHLORIDE 0.9 %
10 VIAL (ML) INJECTION
Status: COMPLETED | OUTPATIENT
Start: 2018-05-16 | End: 2018-05-16

## 2018-05-16 RX ADMIN — Medication 10 ML: at 08:37

## 2018-05-21 ENCOUNTER — HOSPITAL ENCOUNTER (OUTPATIENT)
Dept: GENERAL RADIOLOGY | Age: 63
Discharge: HOME | End: 2018-05-21
Attending: INTERNAL MEDICINE | Admitting: INTERNAL MEDICINE

## 2018-05-21 DIAGNOSIS — Z01.811 PRE-OP CHEST EXAM: ICD-10-CM

## 2018-05-21 LAB
ANION GAP SERPL CALCULATED.3IONS-SCNC: 11 MMOL/L (ref 4–16)
APTT: 36.3 SECONDS (ref 21.2–33)
BUN BLDV-MCNC: 9 MG/DL (ref 6–23)
CALCIUM SERPL-MCNC: 9.4 MG/DL (ref 8.3–10.6)
CHLORIDE BLD-SCNC: 95 MMOL/L (ref 99–110)
CO2: 36 MMOL/L (ref 21–32)
CREAT SERPL-MCNC: 0.8 MG/DL (ref 0.9–1.3)
GFR AFRICAN AMERICAN: >60 ML/MIN/1.73M2
GFR NON-AFRICAN AMERICAN: >60 ML/MIN/1.73M2
GLUCOSE BLD-MCNC: 86 MG/DL (ref 70–99)
HCT VFR BLD CALC: 55.2 % (ref 42–52)
HEMOGLOBIN: 18.4 GM/DL (ref 13.5–18)
INR BLD: 1.16 INDEX
MAGNESIUM: 2.1 MG/DL (ref 1.8–2.4)
MCH RBC QN AUTO: 32.2 PG (ref 27–31)
MCHC RBC AUTO-ENTMCNC: 33.3 % (ref 32–36)
MCV RBC AUTO: 96.5 FL (ref 78–100)
PDW BLD-RTO: 12.9 % (ref 11.7–14.9)
PHOSPHORUS: 4.8 MG/DL (ref 2.5–4.9)
PLATELET # BLD: 156 K/CU MM (ref 140–440)
PMV BLD AUTO: 10.3 FL (ref 7.5–11.1)
POTASSIUM SERPL-SCNC: 4.6 MMOL/L (ref 3.5–5.1)
PROTHROMBIN TIME: 13.4 SECONDS (ref 9.12–12.5)
RBC # BLD: 5.72 M/CU MM (ref 4.6–6.2)
SODIUM BLD-SCNC: 142 MMOL/L (ref 135–145)
WBC # BLD: 10.5 K/CU MM (ref 4–10.5)

## 2018-05-22 ENCOUNTER — TELEPHONE (OUTPATIENT)
Dept: CARDIOLOGY CLINIC | Age: 63
End: 2018-05-22

## 2018-05-23 PROBLEM — Z98.890 S/P ABLATION OF ATRIAL FIBRILLATION: Status: ACTIVE | Noted: 2018-05-23

## 2018-05-23 PROBLEM — Z86.79 S/P ABLATION OF ATRIAL FIBRILLATION: Status: ACTIVE | Noted: 2018-05-23

## 2018-05-25 ENCOUNTER — TELEPHONE (OUTPATIENT)
Dept: INTERNAL MEDICINE CLINIC | Age: 63
End: 2018-05-25

## 2018-05-25 ENCOUNTER — CARE COORDINATION (OUTPATIENT)
Dept: CASE MANAGEMENT | Age: 63
End: 2018-05-25

## 2018-05-30 ENCOUNTER — OFFICE VISIT (OUTPATIENT)
Dept: INTERNAL MEDICINE CLINIC | Age: 63
End: 2018-05-30

## 2018-05-30 VITALS
RESPIRATION RATE: 18 BRPM | SYSTOLIC BLOOD PRESSURE: 114 MMHG | HEART RATE: 85 BPM | OXYGEN SATURATION: 91 % | WEIGHT: 315 LBS | DIASTOLIC BLOOD PRESSURE: 68 MMHG | BODY MASS INDEX: 38.02 KG/M2

## 2018-05-30 DIAGNOSIS — J41.0 SIMPLE CHRONIC BRONCHITIS (HCC): ICD-10-CM

## 2018-05-30 DIAGNOSIS — I48.19 PERSISTENT ATRIAL FIBRILLATION (HCC): Primary | ICD-10-CM

## 2018-05-30 DIAGNOSIS — J34.89 RHINORRHEA: ICD-10-CM

## 2018-05-30 DIAGNOSIS — Z98.890 S/P ABLATION OF ATRIAL FIBRILLATION: ICD-10-CM

## 2018-05-30 DIAGNOSIS — Z86.79 S/P ABLATION OF ATRIAL FIBRILLATION: ICD-10-CM

## 2018-05-30 PROCEDURE — 2022F DILAT RTA XM EVC RTNOPTHY: CPT | Performed by: INTERNAL MEDICINE

## 2018-05-30 PROCEDURE — G8427 DOCREV CUR MEDS BY ELIG CLIN: HCPCS | Performed by: INTERNAL MEDICINE

## 2018-05-30 PROCEDURE — 99214 OFFICE O/P EST MOD 30 MIN: CPT | Performed by: INTERNAL MEDICINE

## 2018-05-30 PROCEDURE — 3023F SPIROM DOC REV: CPT | Performed by: INTERNAL MEDICINE

## 2018-05-30 PROCEDURE — 4004F PT TOBACCO SCREEN RCVD TLK: CPT | Performed by: INTERNAL MEDICINE

## 2018-05-30 PROCEDURE — G8926 SPIRO NO PERF OR DOC: HCPCS | Performed by: INTERNAL MEDICINE

## 2018-05-30 PROCEDURE — G8417 CALC BMI ABV UP PARAM F/U: HCPCS | Performed by: INTERNAL MEDICINE

## 2018-05-30 PROCEDURE — 3017F COLORECTAL CA SCREEN DOC REV: CPT | Performed by: INTERNAL MEDICINE

## 2018-05-30 PROCEDURE — 3044F HG A1C LEVEL LT 7.0%: CPT | Performed by: INTERNAL MEDICINE

## 2018-05-30 RX ORDER — IPRATROPIUM BROMIDE 21 UG/1
2 SPRAY, METERED NASAL 3 TIMES DAILY
Qty: 1 BOTTLE | Refills: 3 | Status: ON HOLD | OUTPATIENT
Start: 2018-05-30 | End: 2020-04-21

## 2018-06-05 ENCOUNTER — CARE COORDINATION (OUTPATIENT)
Dept: CASE MANAGEMENT | Age: 63
End: 2018-06-05

## 2018-06-13 ENCOUNTER — CARE COORDINATION (OUTPATIENT)
Dept: CASE MANAGEMENT | Age: 63
End: 2018-06-13

## 2018-06-19 ENCOUNTER — CARE COORDINATION (OUTPATIENT)
Dept: CASE MANAGEMENT | Age: 63
End: 2018-06-19

## 2018-07-17 ENCOUNTER — OFFICE VISIT (OUTPATIENT)
Dept: INTERNAL MEDICINE CLINIC | Age: 63
End: 2018-07-17

## 2018-07-17 VITALS
HEART RATE: 90 BPM | SYSTOLIC BLOOD PRESSURE: 112 MMHG | BODY MASS INDEX: 36.45 KG/M2 | WEIGHT: 315 LBS | DIASTOLIC BLOOD PRESSURE: 68 MMHG | HEIGHT: 78 IN | OXYGEN SATURATION: 92 % | RESPIRATION RATE: 20 BRPM

## 2018-07-17 DIAGNOSIS — I10 BENIGN ESSENTIAL HTN: ICD-10-CM

## 2018-07-17 DIAGNOSIS — I48.19 PERSISTENT ATRIAL FIBRILLATION (HCC): ICD-10-CM

## 2018-07-17 DIAGNOSIS — E78.00 HYPERCHOLESTEREMIA: ICD-10-CM

## 2018-07-17 DIAGNOSIS — Z23 NEED FOR DIPHTHERIA-TETANUS-PERTUSSIS (TDAP) VACCINE: ICD-10-CM

## 2018-07-17 DIAGNOSIS — I10 BENIGN ESSENTIAL HTN: Primary | ICD-10-CM

## 2018-07-17 DIAGNOSIS — J41.0 SIMPLE CHRONIC BRONCHITIS (HCC): ICD-10-CM

## 2018-07-17 LAB
A/G RATIO: 1.6 (ref 1.1–2.2)
ALBUMIN SERPL-MCNC: 4.1 G/DL (ref 3.4–5)
ALP BLD-CCNC: 94 U/L (ref 40–129)
ALT SERPL-CCNC: 18 U/L (ref 10–40)
ANION GAP SERPL CALCULATED.3IONS-SCNC: 13 MMOL/L (ref 3–16)
AST SERPL-CCNC: 13 U/L (ref 15–37)
BASOPHILS ABSOLUTE: 0 K/UL (ref 0–0.2)
BASOPHILS RELATIVE PERCENT: 0.5 %
BILIRUB SERPL-MCNC: 0.5 MG/DL (ref 0–1)
BUN BLDV-MCNC: 10 MG/DL (ref 7–20)
CALCIUM SERPL-MCNC: 9.3 MG/DL (ref 8.3–10.6)
CHLORIDE BLD-SCNC: 94 MMOL/L (ref 99–110)
CHOLESTEROL, TOTAL: 112 MG/DL (ref 0–199)
CO2: 35 MMOL/L (ref 21–32)
CREAT SERPL-MCNC: 0.6 MG/DL (ref 0.8–1.3)
EOSINOPHILS ABSOLUTE: 0.3 K/UL (ref 0–0.6)
EOSINOPHILS RELATIVE PERCENT: 2.7 %
GFR AFRICAN AMERICAN: >60
GFR NON-AFRICAN AMERICAN: >60
GLOBULIN: 2.5 G/DL
GLUCOSE BLD-MCNC: 92 MG/DL (ref 70–99)
HCT VFR BLD CALC: 48.8 % (ref 40.5–52.5)
HDLC SERPL-MCNC: 38 MG/DL (ref 40–60)
HEMOGLOBIN: 17.1 G/DL (ref 13.5–17.5)
LDL CHOLESTEROL CALCULATED: 55 MG/DL
LYMPHOCYTES ABSOLUTE: 1.9 K/UL (ref 1–5.1)
LYMPHOCYTES RELATIVE PERCENT: 18 %
MCH RBC QN AUTO: 33.7 PG (ref 26–34)
MCHC RBC AUTO-ENTMCNC: 35.1 G/DL (ref 31–36)
MCV RBC AUTO: 96 FL (ref 80–100)
MONOCYTES ABSOLUTE: 0.8 K/UL (ref 0–1.3)
MONOCYTES RELATIVE PERCENT: 7.6 %
NEUTROPHILS ABSOLUTE: 7.6 K/UL (ref 1.7–7.7)
NEUTROPHILS RELATIVE PERCENT: 71.2 %
PDW BLD-RTO: 14.5 % (ref 12.4–15.4)
PLATELET # BLD: 159 K/UL (ref 135–450)
PMV BLD AUTO: 8.4 FL (ref 5–10.5)
POTASSIUM SERPL-SCNC: 4.7 MMOL/L (ref 3.5–5.1)
RBC # BLD: 5.08 M/UL (ref 4.2–5.9)
SODIUM BLD-SCNC: 142 MMOL/L (ref 136–145)
TOTAL PROTEIN: 6.6 G/DL (ref 6.4–8.2)
TRIGL SERPL-MCNC: 97 MG/DL (ref 0–150)
TSH REFLEX: 2.33 UIU/ML (ref 0.27–4.2)
VLDLC SERPL CALC-MCNC: 19 MG/DL
WBC # BLD: 10.6 K/UL (ref 4–11)

## 2018-07-17 PROCEDURE — 2022F DILAT RTA XM EVC RTNOPTHY: CPT | Performed by: INTERNAL MEDICINE

## 2018-07-17 PROCEDURE — G8926 SPIRO NO PERF OR DOC: HCPCS | Performed by: INTERNAL MEDICINE

## 2018-07-17 PROCEDURE — 99214 OFFICE O/P EST MOD 30 MIN: CPT | Performed by: INTERNAL MEDICINE

## 2018-07-17 PROCEDURE — G8427 DOCREV CUR MEDS BY ELIG CLIN: HCPCS | Performed by: INTERNAL MEDICINE

## 2018-07-17 PROCEDURE — 90471 IMMUNIZATION ADMIN: CPT | Performed by: INTERNAL MEDICINE

## 2018-07-17 PROCEDURE — 4004F PT TOBACCO SCREEN RCVD TLK: CPT | Performed by: INTERNAL MEDICINE

## 2018-07-17 PROCEDURE — 3044F HG A1C LEVEL LT 7.0%: CPT | Performed by: INTERNAL MEDICINE

## 2018-07-17 PROCEDURE — G8417 CALC BMI ABV UP PARAM F/U: HCPCS | Performed by: INTERNAL MEDICINE

## 2018-07-17 PROCEDURE — 3017F COLORECTAL CA SCREEN DOC REV: CPT | Performed by: INTERNAL MEDICINE

## 2018-07-17 PROCEDURE — 90715 TDAP VACCINE 7 YRS/> IM: CPT | Performed by: INTERNAL MEDICINE

## 2018-07-17 PROCEDURE — 3023F SPIROM DOC REV: CPT | Performed by: INTERNAL MEDICINE

## 2018-07-17 RX ORDER — ALBUTEROL SULFATE 90 UG/1
2 AEROSOL, METERED RESPIRATORY (INHALATION) EVERY 6 HOURS PRN
Qty: 3 INHALER | Refills: 3 | Status: ON HOLD | OUTPATIENT
Start: 2018-07-17 | End: 2020-04-21

## 2018-07-17 NOTE — PROGRESS NOTES
Divine Beltrán  1955 07/17/18    SUBJECTIVE:    Pt has not yet followed with cardiology for the a fib. Pt intermittently has had tremors, mildred on days when he is not feeling well. These tend to occur when he is holding an object. These do not occur at rest.    Patient compliant with medications for diabetes. Patient has had no episodes of significant hypoglycemia. Patient denies any chest pain, myalgias. Patient is tolerating cholesterol medications without difficulty. OBJECTIVE:    /68   Pulse 90   Resp 20   Ht 6' 6\" (1.981 m)   Wt (!) 337 lb (152.9 kg)   SpO2 92%   BMI 38.94 kg/m²     Physical Exam   Constitutional: He is oriented to person, place, and time. He appears well-developed and well-nourished. Eyes: Conjunctivae are normal. No scleral icterus. Neck: Neck supple. No JVD present. No tracheal deviation present. No thyromegaly present. Cardiovascular: Normal rate, regular rhythm, normal heart sounds and intact distal pulses. Pulses:       Dorsalis pedis pulses are 1+ on the right side, and 1+ on the left side. Posterior tibial pulses are 1+ on the right side, and 1+ on the left side. Pulmonary/Chest: Effort normal. No respiratory distress. He has rhonchi (scattered). Abdominal: Soft. He exhibits no distension and no mass. There is no hepatosplenomegaly. There is no tenderness. There is no rebound, no guarding and no CVA tenderness. Musculoskeletal: He exhibits no edema. Lymphadenopathy:     He has no cervical adenopathy. Neurological: He is alert and oriented to person, place, and time. No sensory deficit (to monofilament). Nonfocal   Skin: Skin is warm, dry and intact. No cyanosis. Nails show no clubbing. No foot ulcerations   Psychiatric: He has a normal mood and affect. His behavior is normal. Judgment normal.       ASSESSMENT:    1. Benign essential HTN    2. Simple chronic bronchitis ([address]    3.  Type 2 diabetes, uncontrolled, with neuropathy

## 2018-07-18 LAB
ESTIMATED AVERAGE GLUCOSE: 119.8 MG/DL
HBA1C MFR BLD: 5.8 %

## 2018-08-15 ENCOUNTER — TELEPHONE (OUTPATIENT)
Dept: CARDIOLOGY CLINIC | Age: 63
End: 2018-08-15

## 2018-08-17 ENCOUNTER — OFFICE VISIT (OUTPATIENT)
Dept: CARDIOLOGY CLINIC | Age: 63
End: 2018-08-17

## 2018-08-17 VITALS
SYSTOLIC BLOOD PRESSURE: 100 MMHG | WEIGHT: 315 LBS | BODY MASS INDEX: 36.45 KG/M2 | HEIGHT: 78 IN | HEART RATE: 87 BPM | DIASTOLIC BLOOD PRESSURE: 80 MMHG

## 2018-08-17 DIAGNOSIS — I48.19 PERSISTENT ATRIAL FIBRILLATION (HCC): ICD-10-CM

## 2018-08-17 DIAGNOSIS — Z98.890 S/P ABLATION OF ATRIAL FIBRILLATION: Primary | ICD-10-CM

## 2018-08-17 DIAGNOSIS — Z86.79 S/P ABLATION OF ATRIAL FIBRILLATION: Primary | ICD-10-CM

## 2018-08-17 PROCEDURE — G8427 DOCREV CUR MEDS BY ELIG CLIN: HCPCS | Performed by: INTERNAL MEDICINE

## 2018-08-17 PROCEDURE — 93000 ELECTROCARDIOGRAM COMPLETE: CPT | Performed by: INTERNAL MEDICINE

## 2018-08-17 PROCEDURE — 4004F PT TOBACCO SCREEN RCVD TLK: CPT | Performed by: INTERNAL MEDICINE

## 2018-08-17 PROCEDURE — G8417 CALC BMI ABV UP PARAM F/U: HCPCS | Performed by: INTERNAL MEDICINE

## 2018-08-17 PROCEDURE — 3017F COLORECTAL CA SCREEN DOC REV: CPT | Performed by: INTERNAL MEDICINE

## 2018-08-17 PROCEDURE — 99212 OFFICE O/P EST SF 10 MIN: CPT | Performed by: INTERNAL MEDICINE

## 2018-08-17 ASSESSMENT — ENCOUNTER SYMPTOMS
BLOOD IN STOOL: 0
NAUSEA: 0
CHEST TIGHTNESS: 0
ABDOMINAL PAIN: 0
EYE PAIN: 0
PHOTOPHOBIA: 0
COUGH: 0
VOMITING: 0
COLOR CHANGE: 0
WHEEZING: 0
DIARRHEA: 0
BACK PAIN: 0
SHORTNESS OF BREATH: 1
CONSTIPATION: 0

## 2018-08-22 ENCOUNTER — OFFICE VISIT (OUTPATIENT)
Dept: CARDIOLOGY CLINIC | Age: 63
End: 2018-08-22

## 2018-08-22 VITALS
DIASTOLIC BLOOD PRESSURE: 80 MMHG | WEIGHT: 315 LBS | SYSTOLIC BLOOD PRESSURE: 110 MMHG | HEART RATE: 90 BPM | HEIGHT: 78 IN | BODY MASS INDEX: 36.45 KG/M2

## 2018-08-22 DIAGNOSIS — I73.9 PVD (PERIPHERAL VASCULAR DISEASE) (HCC): Primary | ICD-10-CM

## 2018-08-22 PROCEDURE — G8417 CALC BMI ABV UP PARAM F/U: HCPCS | Performed by: INTERNAL MEDICINE

## 2018-08-22 PROCEDURE — 99214 OFFICE O/P EST MOD 30 MIN: CPT | Performed by: INTERNAL MEDICINE

## 2018-08-22 PROCEDURE — G8427 DOCREV CUR MEDS BY ELIG CLIN: HCPCS | Performed by: INTERNAL MEDICINE

## 2018-08-22 PROCEDURE — 3017F COLORECTAL CA SCREEN DOC REV: CPT | Performed by: INTERNAL MEDICINE

## 2018-08-22 PROCEDURE — 4004F PT TOBACCO SCREEN RCVD TLK: CPT | Performed by: INTERNAL MEDICINE

## 2018-08-22 NOTE — PROGRESS NOTES
[id number] Score for Atrial Fibrillation Stroke Risk   Risk   Factors  Component Value   C CHF No 0   H HTN Yes 1   A2 Age >= 76 No,  (59 y.o.) 0   D DM Yes 1   S2 Prior Stroke/TIA No 0   V Vascular Disease No 0   A Age 74-69 No,  (59 y.o.) 0   Sc Sex male 0    [id number]  Score  2   Score last updated 3/78/29 9:28 PM    Click here for a link to the UpToDate guideline \"Atrial Fibrillation: Anticoagulation therapy to prevent embolization    Disclaimer: Risk Score calculation is dependent on accuracy of patient problem list and past encounter diagnosis.

## 2018-08-22 NOTE — PROGRESS NOTES
CARDIOLOGY NOTE      8/22/2018    RE: Quinn Kitchen  (1955)                               TO:  Dr. Jess Maxwell MD      Thank you for involving me in taking care of your  patient Quinn Kitchen, who is a  63y.o. year old      male with past medical  history of  A Fib,  SP  DCCV, HTN, DM, PVD , hyperlipidimea is  seen today Patient  during this  visit SP ablation feels better  C/o LLE pain 0n walking getting worse    Vitals:    08/22/18 1506   BP: 110/80   Pulse: 90       Current Outpatient Prescriptions   Medication Sig Dispense Refill    albuterol sulfate  (90 Base) MCG/ACT inhaler Inhale 2 puffs into the lungs every 6 hours as needed for Wheezing 3 Inhaler 3    UNABLE TO FIND Please administer two SHINGRIX vaccines 2-6 months apart 2 Units 0    ipratropium (ATROVENT) 0.03 % nasal spray 2 sprays by Nasal route 3 times daily 1 Bottle 3    amiodarone (CORDARONE) 200 MG tablet 200mg bid for 10 days and then 200mg once daily 45 tablet 1    diltiazem (CARTIA XT) 240 MG extended release capsule Take 240 mg by mouth daily      ASPIRIN LOW DOSE 81 MG EC tablet take 1 tablet by mouth once daily 90 tablet 3    albuterol (PROVENTIL) (2.5 MG/3ML) 0.083% nebulizer solution Take 3 mLs by nebulization every 4 hours as needed for Wheezing 120 vial 5    ipratropium (ATROVENT) 0.02 % nebulizer solution Take 2.5 mLs by nebulization every 4 hours as needed for Wheezing 300 mL 5    apixaban (ELIQUIS) 5 MG TABS tablet Take 1 tablet by mouth 2 times daily 56 tablet 0    atorvastatin (LIPITOR) 40 MG tablet Take 1 tablet by mouth daily 90 tablet 3    lisinopril (PRINIVIL;ZESTRIL) 10 MG tablet Take 0.5 tablets by mouth daily 90 tablet 3    OXYGEN Inhale 2 L into the lungs continuous       metFORMIN (GLUCOPHAGE) 500 MG tablet Take 1 tablet by mouth daily (with breakfast) 90 tablet 3    Nebulizers (COMPRESSOR/NEBULIZER) MISC Use qid 1 each 3    cilostazol (PLETAL) 50 MG tablet Take 1

## 2018-09-05 ENCOUNTER — PROCEDURE VISIT (OUTPATIENT)
Dept: CARDIOLOGY CLINIC | Age: 63
End: 2018-09-05

## 2018-09-05 DIAGNOSIS — M79.605 PAIN OF LEFT LOWER EXTREMITY: Primary | ICD-10-CM

## 2018-09-05 DIAGNOSIS — I73.9 PVD (PERIPHERAL VASCULAR DISEASE) (HCC): ICD-10-CM

## 2018-09-05 PROCEDURE — 93926 LOWER EXTREMITY STUDY: CPT | Performed by: INTERNAL MEDICINE

## 2018-09-05 PROCEDURE — 93922 UPR/L XTREMITY ART 2 LEVELS: CPT | Performed by: INTERNAL MEDICINE

## 2018-09-07 ENCOUNTER — TELEPHONE (OUTPATIENT)
Dept: CARDIOLOGY CLINIC | Age: 63
End: 2018-09-07

## 2018-09-07 NOTE — TELEPHONE ENCOUNTER
The left femoral-popliteal bypass is non visualized.    The left mid and distal Femoral artery is occluded with monophasic waveforms    throughout the remainder of the left lower extremity.    The Left FANI shows Mild to moderate peripheral arterial disease.        Recommendations        OV TO DISCUSS RESULTS     Results to patient, ov scheduled

## 2018-09-11 ENCOUNTER — TELEPHONE (OUTPATIENT)
Dept: CARDIOLOGY CLINIC | Age: 63
End: 2018-09-11

## 2018-09-11 ENCOUNTER — OFFICE VISIT (OUTPATIENT)
Dept: CARDIOLOGY CLINIC | Age: 63
End: 2018-09-11

## 2018-09-11 VITALS
WEIGHT: 315 LBS | BODY MASS INDEX: 36.45 KG/M2 | HEART RATE: 86 BPM | HEIGHT: 78 IN | DIASTOLIC BLOOD PRESSURE: 72 MMHG | SYSTOLIC BLOOD PRESSURE: 112 MMHG

## 2018-09-11 DIAGNOSIS — I73.9 PVD (PERIPHERAL VASCULAR DISEASE) WITH CLAUDICATION (HCC): ICD-10-CM

## 2018-09-11 DIAGNOSIS — I70.413 ATHEROSCLEROSIS OF AUTOLOGOUS VEIN BYPASS GRAFT OF BOTH LOWER EXTREMITIES WITH INTERMITTENT CLAUDICATION (HCC): ICD-10-CM

## 2018-09-11 DIAGNOSIS — I73.9 PVD (PERIPHERAL VASCULAR DISEASE) (HCC): Primary | ICD-10-CM

## 2018-09-11 DIAGNOSIS — I48.19 PERSISTENT ATRIAL FIBRILLATION (HCC): ICD-10-CM

## 2018-09-11 DIAGNOSIS — I10 HYPERTENSION, UNSPECIFIED TYPE: Primary | ICD-10-CM

## 2018-09-11 PROCEDURE — G8417 CALC BMI ABV UP PARAM F/U: HCPCS | Performed by: INTERNAL MEDICINE

## 2018-09-11 PROCEDURE — 4004F PT TOBACCO SCREEN RCVD TLK: CPT | Performed by: INTERNAL MEDICINE

## 2018-09-11 PROCEDURE — 3017F COLORECTAL CA SCREEN DOC REV: CPT | Performed by: INTERNAL MEDICINE

## 2018-09-11 PROCEDURE — G8598 ASA/ANTIPLAT THER USED: HCPCS | Performed by: INTERNAL MEDICINE

## 2018-09-11 PROCEDURE — G8427 DOCREV CUR MEDS BY ELIG CLIN: HCPCS | Performed by: INTERNAL MEDICINE

## 2018-09-11 PROCEDURE — 99213 OFFICE O/P EST LOW 20 MIN: CPT | Performed by: INTERNAL MEDICINE

## 2018-09-11 NOTE — PROGRESS NOTES
CARDIOLOGY NOTE      9/11/2018    RE: Jaclyn Marie  (1955)                               TO:  Dr. Mary Baker MD      Thank you for involving me in taking care of your  patient Jaclyn Marie, who is a  66y.o. year old      male with past medical  history of  A Fib,  SP  DCCV, HTN, DM, PVD , hyperlipidimea is  seen today Patient   Here to FU on art doppler has LLE numbness and occasional pain for sometime.   Has concerns about wt gain  Vitals:    09/11/18 1543   BP: 112/72   Pulse: 86       Current Outpatient Prescriptions   Medication Sig Dispense Refill    albuterol sulfate  (90 Base) MCG/ACT inhaler Inhale 2 puffs into the lungs every 6 hours as needed for Wheezing 3 Inhaler 3    UNABLE TO FIND Please administer two SHINGRIX vaccines 2-6 months apart 2 Units 0    ipratropium (ATROVENT) 0.03 % nasal spray 2 sprays by Nasal route 3 times daily 1 Bottle 3    amiodarone (CORDARONE) 200 MG tablet 200mg bid for 10 days and then 200mg once daily 45 tablet 1    diltiazem (CARTIA XT) 240 MG extended release capsule Take 240 mg by mouth daily      ASPIRIN LOW DOSE 81 MG EC tablet take 1 tablet by mouth once daily 90 tablet 3    albuterol (PROVENTIL) (2.5 MG/3ML) 0.083% nebulizer solution Take 3 mLs by nebulization every 4 hours as needed for Wheezing 120 vial 5    ipratropium (ATROVENT) 0.02 % nebulizer solution Take 2.5 mLs by nebulization every 4 hours as needed for Wheezing 300 mL 5    apixaban (ELIQUIS) 5 MG TABS tablet Take 1 tablet by mouth 2 times daily 56 tablet 0    atorvastatin (LIPITOR) 40 MG tablet Take 1 tablet by mouth daily 90 tablet 3    lisinopril (PRINIVIL;ZESTRIL) 10 MG tablet Take 0.5 tablets by mouth daily 90 tablet 3    OXYGEN Inhale 2 L into the lungs continuous       metFORMIN (GLUCOPHAGE) 500 MG tablet Take 1 tablet by mouth daily (with breakfast) 90 tablet 3    Nebulizers (COMPRESSOR/NEBULIZER) MISC Use qid 1 each 3    cilostazol (PLETAL) 50

## 2018-10-23 ENCOUNTER — OFFICE VISIT (OUTPATIENT)
Dept: INTERNAL MEDICINE CLINIC | Age: 63
End: 2018-10-23
Payer: MEDICARE

## 2018-10-23 ENCOUNTER — TELEPHONE (OUTPATIENT)
Dept: CARDIOLOGY CLINIC | Age: 63
End: 2018-10-23

## 2018-10-23 VITALS
WEIGHT: 315 LBS | OXYGEN SATURATION: 86 % | SYSTOLIC BLOOD PRESSURE: 100 MMHG | BODY MASS INDEX: 38.67 KG/M2 | DIASTOLIC BLOOD PRESSURE: 68 MMHG | HEART RATE: 90 BPM | RESPIRATION RATE: 20 BRPM

## 2018-10-23 DIAGNOSIS — J41.0 SIMPLE CHRONIC BRONCHITIS (HCC): ICD-10-CM

## 2018-10-23 DIAGNOSIS — I10 ESSENTIAL HYPERTENSION: ICD-10-CM

## 2018-10-23 DIAGNOSIS — E78.00 HYPERCHOLESTEREMIA: ICD-10-CM

## 2018-10-23 DIAGNOSIS — I73.9 PVD (PERIPHERAL VASCULAR DISEASE) (HCC): ICD-10-CM

## 2018-10-23 LAB
A/G RATIO: 1.7 (ref 1.1–2.2)
ALBUMIN SERPL-MCNC: 4.4 G/DL (ref 3.4–5)
ALP BLD-CCNC: 96 U/L (ref 40–129)
ALT SERPL-CCNC: 20 U/L (ref 10–40)
ANION GAP SERPL CALCULATED.3IONS-SCNC: 10 MMOL/L (ref 3–16)
AST SERPL-CCNC: 14 U/L (ref 15–37)
BASOPHILS ABSOLUTE: 0 K/UL (ref 0–0.2)
BASOPHILS RELATIVE PERCENT: 0.5 %
BILIRUB SERPL-MCNC: 0.5 MG/DL (ref 0–1)
BUN BLDV-MCNC: 12 MG/DL (ref 7–20)
CALCIUM SERPL-MCNC: 9.4 MG/DL (ref 8.3–10.6)
CHLORIDE BLD-SCNC: 93 MMOL/L (ref 99–110)
CO2: 38 MMOL/L (ref 21–32)
CREAT SERPL-MCNC: 0.6 MG/DL (ref 0.8–1.3)
EOSINOPHILS ABSOLUTE: 0.3 K/UL (ref 0–0.6)
EOSINOPHILS RELATIVE PERCENT: 3.4 %
GFR AFRICAN AMERICAN: >60
GFR NON-AFRICAN AMERICAN: >60
GLOBULIN: 2.6 G/DL
GLUCOSE BLD-MCNC: 97 MG/DL (ref 70–99)
HCT VFR BLD CALC: 52.6 % (ref 40.5–52.5)
HEMOGLOBIN: 17.7 G/DL (ref 13.5–17.5)
LYMPHOCYTES ABSOLUTE: 1.9 K/UL (ref 1–5.1)
LYMPHOCYTES RELATIVE PERCENT: 19.5 %
MCH RBC QN AUTO: 32.4 PG (ref 26–34)
MCHC RBC AUTO-ENTMCNC: 33.6 G/DL (ref 31–36)
MCV RBC AUTO: 96.5 FL (ref 80–100)
MONOCYTES ABSOLUTE: 0.7 K/UL (ref 0–1.3)
MONOCYTES RELATIVE PERCENT: 6.9 %
NEUTROPHILS ABSOLUTE: 7 K/UL (ref 1.7–7.7)
NEUTROPHILS RELATIVE PERCENT: 69.7 %
PDW BLD-RTO: 13.6 % (ref 12.4–15.4)
PLATELET # BLD: 168 K/UL (ref 135–450)
PMV BLD AUTO: 9.1 FL (ref 5–10.5)
POTASSIUM SERPL-SCNC: 5 MMOL/L (ref 3.5–5.1)
RBC # BLD: 5.45 M/UL (ref 4.2–5.9)
SODIUM BLD-SCNC: 141 MMOL/L (ref 136–145)
TOTAL PROTEIN: 7 G/DL (ref 6.4–8.2)
WBC # BLD: 10 K/UL (ref 4–11)

## 2018-10-23 PROCEDURE — 90688 IIV4 VACCINE SPLT 0.5 ML IM: CPT | Performed by: INTERNAL MEDICINE

## 2018-10-23 PROCEDURE — 3017F COLORECTAL CA SCREEN DOC REV: CPT | Performed by: INTERNAL MEDICINE

## 2018-10-23 PROCEDURE — G8482 FLU IMMUNIZE ORDER/ADMIN: HCPCS | Performed by: INTERNAL MEDICINE

## 2018-10-23 PROCEDURE — 99214 OFFICE O/P EST MOD 30 MIN: CPT | Performed by: INTERNAL MEDICINE

## 2018-10-23 PROCEDURE — 3023F SPIROM DOC REV: CPT | Performed by: INTERNAL MEDICINE

## 2018-10-23 PROCEDURE — 2022F DILAT RTA XM EVC RTNOPTHY: CPT | Performed by: INTERNAL MEDICINE

## 2018-10-23 PROCEDURE — G8427 DOCREV CUR MEDS BY ELIG CLIN: HCPCS | Performed by: INTERNAL MEDICINE

## 2018-10-23 PROCEDURE — 3044F HG A1C LEVEL LT 7.0%: CPT | Performed by: INTERNAL MEDICINE

## 2018-10-23 PROCEDURE — G8926 SPIRO NO PERF OR DOC: HCPCS | Performed by: INTERNAL MEDICINE

## 2018-10-23 PROCEDURE — G8417 CALC BMI ABV UP PARAM F/U: HCPCS | Performed by: INTERNAL MEDICINE

## 2018-10-23 PROCEDURE — G0008 ADMIN INFLUENZA VIRUS VAC: HCPCS | Performed by: INTERNAL MEDICINE

## 2018-10-23 PROCEDURE — G8598 ASA/ANTIPLAT THER USED: HCPCS | Performed by: INTERNAL MEDICINE

## 2018-10-23 PROCEDURE — 4004F PT TOBACCO SCREEN RCVD TLK: CPT | Performed by: INTERNAL MEDICINE

## 2018-10-23 NOTE — PROGRESS NOTES
Ginny Mancia  1955  10/23/18    SUBJECTIVE:    Pt is having more difficulties with the fem-pop bypass on the left leg. He has been having  Numbness in the leg with ambulation of 40-50 steps. This worsened fairly suddenly. He does continue to smoke. Patient compliant with medications for diabetes. Patient has had no episodes of significant hypoglycemia. The patient is taking hypertensive medications compliantly without side effects. Denies chest pain, dyspnea, edema, or TIA's. Patient denies any chest pain, shortness of breath, myalgias, Patient is tolerating cholesterol medications without difficulty. COPD symptoms are doing OK except with high and low temp, and high humidity. He is wearing O2     OBJECTIVE:    /68   Pulse 90   Resp 20   Wt (!) 334 lb 9.6 oz (151.8 kg)   SpO2 (!) 86%   BMI 38.67 kg/m²     Physical Exam   Constitutional: He is oriented to person, place, and time. He appears well-developed and well-nourished. Eyes: Conjunctivae are normal. No scleral icterus. Neck: Neck supple. No JVD present. No tracheal deviation present. No thyromegaly present. Cardiovascular: Normal rate, regular rhythm, normal heart sounds and intact distal pulses. Pulmonary/Chest: Effort normal and breath sounds normal. No respiratory distress. Abdominal: Soft. He exhibits no distension and no mass. There is no hepatosplenomegaly. There is no tenderness. There is no rebound, no guarding and no CVA tenderness. Musculoskeletal: He exhibits no edema. Lymphadenopathy:     He has no cervical adenopathy. Neurological: He is alert and oriented to person, place, and time. Nonfocal   Skin: No cyanosis. Nails show no clubbing. Psychiatric: He has a normal mood and affect. His behavior is normal. Judgment normal.       ASSESSMENT:    1. Type 2 diabetes, uncontrolled, with neuropathy ([address]    2. PVD (peripheral vascular disease) ([address]    3. Simple chronic bronchitis ([address]    4.

## 2018-10-24 ENCOUNTER — HOSPITAL ENCOUNTER (OUTPATIENT)
Dept: CT IMAGING | Age: 63
Discharge: HOME | End: 2018-10-24
Payer: MEDICARE

## 2018-10-24 DIAGNOSIS — I70.413 ATHEROSCLEROSIS OF AUTOLOGOUS VEIN BYPASS GRAFT OF BOTH LOWER EXTREMITIES WITH INTERMITTENT CLAUDICATION (HCC): ICD-10-CM

## 2018-10-24 DIAGNOSIS — I73.9 PVD (PERIPHERAL VASCULAR DISEASE) WITH CLAUDICATION (HCC): ICD-10-CM

## 2018-10-24 DIAGNOSIS — I73.9 PVD (PERIPHERAL VASCULAR DISEASE) (HCC): ICD-10-CM

## 2018-10-24 LAB
ESTIMATED AVERAGE GLUCOSE: 99.7 MG/DL
HBA1C MFR BLD: 5.1 %

## 2018-10-24 PROCEDURE — 6360000004 HC RX CONTRAST MEDICATION: Performed by: INTERNAL MEDICINE

## 2018-10-24 PROCEDURE — 75635 CT ANGIO ABDOMINAL ARTERIES: CPT

## 2018-10-24 RX ADMIN — IOPAMIDOL 100 ML: 755 INJECTION, SOLUTION INTRAVENOUS at 12:27

## 2018-10-25 ENCOUNTER — TELEPHONE (OUTPATIENT)
Dept: CARDIOLOGY CLINIC | Age: 63
End: 2018-10-25

## 2018-11-05 ENCOUNTER — HOSPITAL ENCOUNTER (OUTPATIENT)
Age: 63
Discharge: HOME | DRG: 987 | End: 2018-11-05
Payer: MEDICARE

## 2018-11-05 ENCOUNTER — TELEPHONE (OUTPATIENT)
Dept: CARDIOLOGY CLINIC | Age: 63
End: 2018-11-05

## 2018-11-05 ENCOUNTER — HOSPITAL ENCOUNTER (OUTPATIENT)
Dept: GENERAL RADIOLOGY | Age: 63
Discharge: HOME | DRG: 987 | End: 2018-11-05
Payer: MEDICARE

## 2018-11-05 DIAGNOSIS — Z01.818 PRE-PROCEDURAL EXAMINATION: ICD-10-CM

## 2018-11-05 LAB
ANION GAP SERPL CALCULATED.3IONS-SCNC: 13 MMOL/L (ref 4–16)
APTT: 33.6 SECONDS (ref 21.2–33)
BASOPHILS ABSOLUTE: 0.1 K/CU MM
BASOPHILS RELATIVE PERCENT: 0.7 % (ref 0–1)
BUN BLDV-MCNC: 12 MG/DL (ref 6–23)
CALCIUM SERPL-MCNC: 9.1 MG/DL (ref 8.3–10.6)
CHLORIDE BLD-SCNC: 91 MMOL/L (ref 99–110)
CO2: 32 MMOL/L (ref 21–32)
CREAT SERPL-MCNC: 0.6 MG/DL (ref 0.9–1.3)
DIFFERENTIAL TYPE: ABNORMAL
EOSINOPHILS ABSOLUTE: 0.3 K/CU MM
EOSINOPHILS RELATIVE PERCENT: 2.7 % (ref 0–3)
GFR AFRICAN AMERICAN: >60 ML/MIN/1.73M2
GFR NON-AFRICAN AMERICAN: >60 ML/MIN/1.73M2
GLUCOSE BLD-MCNC: 77 MG/DL (ref 70–99)
HCT VFR BLD CALC: 53.2 % (ref 42–52)
HEMOGLOBIN: 17.7 GM/DL (ref 13.5–18)
IMMATURE NEUTROPHIL %: 0.3 % (ref 0–0.43)
INR BLD: 1.04 INDEX
LYMPHOCYTES ABSOLUTE: 2 K/CU MM
LYMPHOCYTES RELATIVE PERCENT: 18.2 % (ref 24–44)
MCH RBC QN AUTO: 32.9 PG (ref 27–31)
MCHC RBC AUTO-ENTMCNC: 33.3 % (ref 32–36)
MCV RBC AUTO: 98.9 FL (ref 78–100)
MONOCYTES ABSOLUTE: 0.7 K/CU MM
MONOCYTES RELATIVE PERCENT: 6.3 % (ref 0–4)
NUCLEATED RBC %: 0 %
PDW BLD-RTO: 13.2 % (ref 11.7–14.9)
PLATELET # BLD: 190 K/CU MM (ref 140–440)
PMV BLD AUTO: 10.4 FL (ref 7.5–11.1)
POTASSIUM SERPL-SCNC: 4.9 MMOL/L (ref 3.5–5.1)
PROTHROMBIN TIME: 11.8 SECONDS (ref 9.12–12.5)
RBC # BLD: 5.38 M/CU MM (ref 4.6–6.2)
SEGMENTED NEUTROPHILS ABSOLUTE COUNT: 7.7 K/CU MM
SEGMENTED NEUTROPHILS RELATIVE PERCENT: 71.8 % (ref 36–66)
SODIUM BLD-SCNC: 136 MMOL/L (ref 135–145)
TOTAL IMMATURE NEUTOROPHIL: 0.03 K/CU MM
TOTAL NUCLEATED RBC: 0 K/CU MM
WBC # BLD: 10.7 K/CU MM (ref 4–10.5)

## 2018-11-05 PROCEDURE — 86901 BLOOD TYPING SEROLOGIC RH(D): CPT

## 2018-11-05 PROCEDURE — 85025 COMPLETE CBC W/AUTO DIFF WBC: CPT

## 2018-11-05 PROCEDURE — 85610 PROTHROMBIN TIME: CPT

## 2018-11-05 PROCEDURE — 80048 BASIC METABOLIC PNL TOTAL CA: CPT

## 2018-11-05 PROCEDURE — 36415 COLL VENOUS BLD VENIPUNCTURE: CPT

## 2018-11-05 PROCEDURE — 86900 BLOOD TYPING SEROLOGIC ABO: CPT

## 2018-11-05 PROCEDURE — 85730 THROMBOPLASTIN TIME PARTIAL: CPT

## 2018-11-05 PROCEDURE — 71046 X-RAY EXAM CHEST 2 VIEWS: CPT

## 2018-11-05 PROCEDURE — 86850 RBC ANTIBODY SCREEN: CPT

## 2018-11-06 ENCOUNTER — APPOINTMENT (OUTPATIENT)
Dept: ULTRASOUND IMAGING | Age: 63
DRG: 987 | End: 2018-11-06
Payer: MEDICARE

## 2018-11-06 ENCOUNTER — APPOINTMENT (OUTPATIENT)
Dept: CT IMAGING | Age: 63
DRG: 987 | End: 2018-11-06
Payer: MEDICARE

## 2018-11-06 ENCOUNTER — HOSPITAL ENCOUNTER (INPATIENT)
Age: 63
LOS: 6 days | Discharge: HOME | DRG: 987 | End: 2018-11-13
Attending: EMERGENCY MEDICINE | Admitting: INTERNAL MEDICINE
Payer: MEDICARE

## 2018-11-06 DIAGNOSIS — K81.9 CHOLECYSTITIS: Primary | ICD-10-CM

## 2018-11-06 DIAGNOSIS — R11.2 NON-INTRACTABLE VOMITING WITH NAUSEA, UNSPECIFIED VOMITING TYPE: ICD-10-CM

## 2018-11-06 DIAGNOSIS — R10.9 LEFT SIDED ABDOMINAL PAIN: ICD-10-CM

## 2018-11-06 DIAGNOSIS — R00.0 TACHYCARDIA: ICD-10-CM

## 2018-11-06 LAB
ALBUMIN SERPL-MCNC: 4.1 GM/DL (ref 3.4–5)
ALP BLD-CCNC: 144 IU/L (ref 40–129)
ALT SERPL-CCNC: 65 U/L (ref 10–40)
ANION GAP SERPL CALCULATED.3IONS-SCNC: 10 MMOL/L (ref 4–16)
APTT: 32.1 SECONDS (ref 21.2–33)
AST SERPL-CCNC: 93 IU/L (ref 15–37)
BACTERIA: NEGATIVE /HPF
BASE EXCESS MIXED: 6.1 (ref 0–1.2)
BASE EXCESS MIXED: 6.3 (ref 0–1.2)
BASOPHILS ABSOLUTE: 0.1 K/CU MM
BASOPHILS RELATIVE PERCENT: 0.6 % (ref 0–1)
BILIRUB SERPL-MCNC: 1.2 MG/DL (ref 0–1)
BILIRUBIN URINE: NEGATIVE MG/DL
BLOOD, URINE: NEGATIVE
BUN BLDV-MCNC: 13 MG/DL (ref 6–23)
CALCIUM SERPL-MCNC: 9.2 MG/DL (ref 8.3–10.6)
CARBON MONOXIDE, BLOOD: 5.6 % (ref 0–5)
CARBON MONOXIDE, BLOOD: 6.3 % (ref 0–5)
CHLORIDE BLD-SCNC: 91 MMOL/L (ref 99–110)
CLARITY: CLEAR
CO2 CONTENT: 37.9 MMOL/L (ref 19–24)
CO2 CONTENT: 40 MMOL/L (ref 19–24)
CO2: 36 MMOL/L (ref 21–32)
COLOR: ABNORMAL
CREAT SERPL-MCNC: 0.6 MG/DL (ref 0.9–1.3)
DIFFERENTIAL TYPE: ABNORMAL
EKG ATRIAL RATE: 109 BPM
EKG ATRIAL RATE: 65 BPM
EKG DIAGNOSIS: NORMAL
EKG DIAGNOSIS: NORMAL
EKG P AXIS: 72 DEGREES
EKG P-R INTERVAL: 186 MS
EKG Q-T INTERVAL: 320 MS
EKG Q-T INTERVAL: 346 MS
EKG QRS DURATION: 82 MS
EKG QRS DURATION: 88 MS
EKG QTC CALCULATION (BAZETT): 446 MS
EKG QTC CALCULATION (BAZETT): 465 MS
EKG R AXIS: -66 DEGREES
EKG R AXIS: -74 DEGREES
EKG T AXIS: 44 DEGREES
EKG T AXIS: 63 DEGREES
EKG VENTRICULAR RATE: 109 BPM
EKG VENTRICULAR RATE: 117 BPM
EOSINOPHILS ABSOLUTE: 0.3 K/CU MM
EOSINOPHILS RELATIVE PERCENT: 2.5 % (ref 0–3)
ESTIMATED AVERAGE GLUCOSE: 105 MG/DL
GFR AFRICAN AMERICAN: >60 ML/MIN/1.73M2
GFR NON-AFRICAN AMERICAN: >60 ML/MIN/1.73M2
GLUCOSE BLD-MCNC: 107 MG/DL (ref 70–99)
GLUCOSE BLD-MCNC: 123 MG/DL (ref 70–99)
GLUCOSE BLD-MCNC: 152 MG/DL (ref 70–99)
GLUCOSE BLD-MCNC: 170 MG/DL (ref 70–99)
GLUCOSE BLD-MCNC: 184 MG/DL (ref 70–99)
GLUCOSE, URINE: NEGATIVE MG/DL
HBA1C MFR BLD: 5.3 % (ref 4.2–6.3)
HCO3 ARTERIAL: 35.6 MMOL/L (ref 18–23)
HCO3 ARTERIAL: 37.2 MMOL/L (ref 18–23)
HCT VFR BLD CALC: 54.3 % (ref 42–52)
HEMOGLOBIN: 18.2 GM/DL (ref 13.5–18)
HYALINE CASTS: 2 /LPF
IMMATURE NEUTROPHIL %: 0.2 % (ref 0–0.43)
INR BLD: 1 INDEX
KETONES, URINE: NEGATIVE MG/DL
LACTATE: 1.6 MMOL/L (ref 0.4–2)
LEUKOCYTE ESTERASE, URINE: NEGATIVE
LIPASE: 33 IU/L (ref 13–60)
LYMPHOCYTES ABSOLUTE: 1.3 K/CU MM
LYMPHOCYTES RELATIVE PERCENT: 10.8 % (ref 24–44)
MCH RBC QN AUTO: 32.7 PG (ref 27–31)
MCHC RBC AUTO-ENTMCNC: 33.5 % (ref 32–36)
MCV RBC AUTO: 97.7 FL (ref 78–100)
METHEMOGLOBIN ARTERIAL: 1.1 %
METHEMOGLOBIN ARTERIAL: 1.3 %
MONOCYTES ABSOLUTE: 0.5 K/CU MM
MONOCYTES RELATIVE PERCENT: 3.7 % (ref 0–4)
NITRITE URINE, QUANTITATIVE: NEGATIVE
NUCLEATED RBC %: 0 %
O2 SATURATION: 88.9 % (ref 96–97)
O2 SATURATION: 90.4 % (ref 96–97)
PCO2 ARTERIAL: 74 MMHG (ref 32–45)
PCO2 ARTERIAL: 91 MMHG (ref 32–45)
PDW BLD-RTO: 12.8 % (ref 11.7–14.9)
PH BLOOD: 7.22 (ref 7.34–7.45)
PH BLOOD: 7.29 (ref 7.34–7.45)
PH, URINE: 5 (ref 5–8)
PLATELET # BLD: 163 K/CU MM (ref 140–440)
PMV BLD AUTO: 9.9 FL (ref 7.5–11.1)
PO2 ARTERIAL: 71 MMHG (ref 75–100)
PO2 ARTERIAL: 73 MMHG (ref 75–100)
POTASSIUM SERPL-SCNC: 4.2 MMOL/L (ref 3.5–5.1)
PROTEIN UA: NEGATIVE MG/DL
PROTHROMBIN TIME: 11.4 SECONDS (ref 9.12–12.5)
RBC # BLD: 5.56 M/CU MM (ref 4.6–6.2)
RBC URINE: ABNORMAL /HPF (ref 0–3)
SEGMENTED NEUTROPHILS ABSOLUTE COUNT: 9.9 K/CU MM
SEGMENTED NEUTROPHILS RELATIVE PERCENT: 82.2 % (ref 36–66)
SODIUM BLD-SCNC: 137 MMOL/L (ref 135–145)
SPECIFIC GRAVITY UA: 1.04 (ref 1–1.03)
SQUAMOUS EPITHELIAL: <1 /HPF
TOTAL IMMATURE NEUTOROPHIL: 0.02 K/CU MM
TOTAL NUCLEATED RBC: 0 K/CU MM
TOTAL PROTEIN: 7.4 GM/DL (ref 6.4–8.2)
TOTAL RETICULOCYTE COUNT: 0.09 K/CU MM
TRICHOMONAS: ABNORMAL /HPF
TROPONIN T: <0.01 NG/ML
UROBILINOGEN, URINE: 2 MG/DL (ref 0.2–1)
WBC # BLD: 12.1 K/CU MM (ref 4–10.5)
WBC UA: ABNORMAL /HPF (ref 0–2)

## 2018-11-06 PROCEDURE — 86900 BLOOD TYPING SEROLOGIC ABO: CPT

## 2018-11-06 PROCEDURE — 94761 N-INVAS EAR/PLS OXIMETRY MLT: CPT

## 2018-11-06 PROCEDURE — 96375 TX/PRO/DX INJ NEW DRUG ADDON: CPT

## 2018-11-06 PROCEDURE — 2580000003 HC RX 258: Performed by: INTERNAL MEDICINE

## 2018-11-06 PROCEDURE — 6370000000 HC RX 637 (ALT 250 FOR IP): Performed by: INTERNAL MEDICINE

## 2018-11-06 PROCEDURE — 6360000002 HC RX W HCPCS: Performed by: EMERGENCY MEDICINE

## 2018-11-06 PROCEDURE — 83690 ASSAY OF LIPASE: CPT

## 2018-11-06 PROCEDURE — 76705 ECHO EXAM OF ABDOMEN: CPT

## 2018-11-06 PROCEDURE — 2700000000 HC OXYGEN THERAPY PER DAY

## 2018-11-06 PROCEDURE — 85730 THROMBOPLASTIN TIME PARTIAL: CPT

## 2018-11-06 PROCEDURE — 94660 CPAP INITIATION&MGMT: CPT

## 2018-11-06 PROCEDURE — 5A09457 ASSISTANCE WITH RESPIRATORY VENTILATION, 24-96 CONSECUTIVE HOURS, CONTINUOUS POSITIVE AIRWAY PRESSURE: ICD-10-PCS | Performed by: INTERNAL MEDICINE

## 2018-11-06 PROCEDURE — 85610 PROTHROMBIN TIME: CPT

## 2018-11-06 PROCEDURE — 2500000003 HC RX 250 WO HCPCS: Performed by: INTERNAL MEDICINE

## 2018-11-06 PROCEDURE — 84484 ASSAY OF TROPONIN QUANT: CPT

## 2018-11-06 PROCEDURE — 96372 THER/PROPH/DIAG INJ SC/IM: CPT

## 2018-11-06 PROCEDURE — 74177 CT ABD & PELVIS W/CONTRAST: CPT

## 2018-11-06 PROCEDURE — 96365 THER/PROPH/DIAG IV INF INIT: CPT

## 2018-11-06 PROCEDURE — 83605 ASSAY OF LACTIC ACID: CPT

## 2018-11-06 PROCEDURE — G0378 HOSPITAL OBSERVATION PER HR: HCPCS

## 2018-11-06 PROCEDURE — 82962 GLUCOSE BLOOD TEST: CPT

## 2018-11-06 PROCEDURE — 86850 RBC ANTIBODY SCREEN: CPT

## 2018-11-06 PROCEDURE — 96374 THER/PROPH/DIAG INJ IV PUSH: CPT

## 2018-11-06 PROCEDURE — 80053 COMPREHEN METABOLIC PANEL: CPT

## 2018-11-06 PROCEDURE — 96376 TX/PRO/DX INJ SAME DRUG ADON: CPT

## 2018-11-06 PROCEDURE — 99221 1ST HOSP IP/OBS SF/LOW 40: CPT | Performed by: SURGERY

## 2018-11-06 PROCEDURE — 93005 ELECTROCARDIOGRAM TRACING: CPT | Performed by: EMERGENCY MEDICINE

## 2018-11-06 PROCEDURE — 2580000003 HC RX 258: Performed by: EMERGENCY MEDICINE

## 2018-11-06 PROCEDURE — 86901 BLOOD TYPING SEROLOGIC RH(D): CPT

## 2018-11-06 PROCEDURE — 96366 THER/PROPH/DIAG IV INF ADDON: CPT

## 2018-11-06 PROCEDURE — 83036 HEMOGLOBIN GLYCOSYLATED A1C: CPT

## 2018-11-06 PROCEDURE — 82803 BLOOD GASES ANY COMBINATION: CPT

## 2018-11-06 PROCEDURE — 85025 COMPLETE CBC W/AUTO DIFF WBC: CPT

## 2018-11-06 PROCEDURE — 6360000004 HC RX CONTRAST MEDICATION: Performed by: EMERGENCY MEDICINE

## 2018-11-06 PROCEDURE — 93010 ELECTROCARDIOGRAM REPORT: CPT | Performed by: INTERNAL MEDICINE

## 2018-11-06 PROCEDURE — 81001 URINALYSIS AUTO W/SCOPE: CPT

## 2018-11-06 PROCEDURE — 99285 EMERGENCY DEPT VISIT HI MDM: CPT

## 2018-11-06 PROCEDURE — 36415 COLL VENOUS BLD VENIPUNCTURE: CPT

## 2018-11-06 PROCEDURE — 96361 HYDRATE IV INFUSION ADD-ON: CPT

## 2018-11-06 PROCEDURE — 36600 WITHDRAWAL OF ARTERIAL BLOOD: CPT

## 2018-11-06 PROCEDURE — 2060000000 HC ICU INTERMEDIATE R&B

## 2018-11-06 PROCEDURE — 94640 AIRWAY INHALATION TREATMENT: CPT

## 2018-11-06 PROCEDURE — 6370000000 HC RX 637 (ALT 250 FOR IP): Performed by: EMERGENCY MEDICINE

## 2018-11-06 PROCEDURE — 96367 TX/PROPH/DG ADDL SEQ IV INF: CPT

## 2018-11-06 PROCEDURE — 6360000002 HC RX W HCPCS: Performed by: INTERNAL MEDICINE

## 2018-11-06 PROCEDURE — 99223 1ST HOSP IP/OBS HIGH 75: CPT | Performed by: INTERNAL MEDICINE

## 2018-11-06 RX ORDER — NICOTINE POLACRILEX 4 MG
15 LOZENGE BUCCAL PRN
Status: DISCONTINUED | OUTPATIENT
Start: 2018-11-06 | End: 2018-11-13 | Stop reason: HOSPADM

## 2018-11-06 RX ORDER — ONDANSETRON 2 MG/ML
4 INJECTION INTRAMUSCULAR; INTRAVENOUS EVERY 6 HOURS PRN
Status: DISCONTINUED | OUTPATIENT
Start: 2018-11-06 | End: 2018-11-13 | Stop reason: HOSPADM

## 2018-11-06 RX ORDER — AMIODARONE HYDROCHLORIDE 200 MG/1
200 TABLET ORAL DAILY
Status: DISCONTINUED | OUTPATIENT
Start: 2018-11-06 | End: 2018-11-13 | Stop reason: HOSPADM

## 2018-11-06 RX ORDER — SODIUM CHLORIDE 9 MG/ML
INJECTION, SOLUTION INTRAVENOUS CONTINUOUS
Status: DISCONTINUED | OUTPATIENT
Start: 2018-11-06 | End: 2018-11-13

## 2018-11-06 RX ORDER — 0.9 % SODIUM CHLORIDE 0.9 %
1000 INTRAVENOUS SOLUTION INTRAVENOUS ONCE
Status: COMPLETED | OUTPATIENT
Start: 2018-11-06 | End: 2018-11-06

## 2018-11-06 RX ORDER — IPRATROPIUM BROMIDE AND ALBUTEROL SULFATE 2.5; .5 MG/3ML; MG/3ML
1 SOLUTION RESPIRATORY (INHALATION)
Status: DISCONTINUED | OUTPATIENT
Start: 2018-11-06 | End: 2018-11-10

## 2018-11-06 RX ORDER — ATORVASTATIN CALCIUM 40 MG/1
40 TABLET, FILM COATED ORAL DAILY
Status: DISCONTINUED | OUTPATIENT
Start: 2018-11-06 | End: 2018-11-13 | Stop reason: HOSPADM

## 2018-11-06 RX ORDER — DEXTROSE MONOHYDRATE 25 G/50ML
12.5 INJECTION, SOLUTION INTRAVENOUS PRN
Status: DISCONTINUED | OUTPATIENT
Start: 2018-11-06 | End: 2018-11-13 | Stop reason: HOSPADM

## 2018-11-06 RX ORDER — SODIUM CHLORIDE 9 MG/ML
INJECTION, SOLUTION INTRAVENOUS CONTINUOUS
Status: DISCONTINUED | OUTPATIENT
Start: 2018-11-06 | End: 2018-11-06 | Stop reason: ALTCHOICE

## 2018-11-06 RX ORDER — CIPROFLOXACIN 2 MG/ML
400 INJECTION, SOLUTION INTRAVENOUS EVERY 12 HOURS
Status: DISCONTINUED | OUTPATIENT
Start: 2018-11-06 | End: 2018-11-13 | Stop reason: HOSPADM

## 2018-11-06 RX ORDER — SODIUM CHLORIDE 0.9 % (FLUSH) 0.9 %
10 SYRINGE (ML) INJECTION EVERY 12 HOURS SCHEDULED
Status: DISCONTINUED | OUTPATIENT
Start: 2018-11-06 | End: 2018-11-13 | Stop reason: HOSPADM

## 2018-11-06 RX ORDER — MORPHINE SULFATE 4 MG/ML
4 INJECTION, SOLUTION INTRAMUSCULAR; INTRAVENOUS ONCE
Status: COMPLETED | OUTPATIENT
Start: 2018-11-06 | End: 2018-11-06

## 2018-11-06 RX ORDER — CILOSTAZOL 50 MG/1
50 TABLET ORAL 2 TIMES DAILY
Status: DISCONTINUED | OUTPATIENT
Start: 2018-11-06 | End: 2018-11-13 | Stop reason: HOSPADM

## 2018-11-06 RX ORDER — SODIUM CHLORIDE 0.9 % (FLUSH) 0.9 %
10 SYRINGE (ML) INJECTION PRN
Status: DISCONTINUED | OUTPATIENT
Start: 2018-11-06 | End: 2018-11-13 | Stop reason: HOSPADM

## 2018-11-06 RX ORDER — AMIODARONE HYDROCHLORIDE 200 MG/1
200 TABLET ORAL ONCE
Status: COMPLETED | OUTPATIENT
Start: 2018-11-06 | End: 2018-11-06

## 2018-11-06 RX ORDER — ALBUTEROL SULFATE 2.5 MG/3ML
2.5 SOLUTION RESPIRATORY (INHALATION) EVERY 4 HOURS PRN
Status: DISCONTINUED | OUTPATIENT
Start: 2018-11-06 | End: 2018-11-13 | Stop reason: HOSPADM

## 2018-11-06 RX ORDER — DEXTROSE MONOHYDRATE 50 MG/ML
100 INJECTION, SOLUTION INTRAVENOUS PRN
Status: DISCONTINUED | OUTPATIENT
Start: 2018-11-06 | End: 2018-11-13 | Stop reason: HOSPADM

## 2018-11-06 RX ORDER — DILTIAZEM HYDROCHLORIDE 120 MG/1
240 CAPSULE, COATED, EXTENDED RELEASE ORAL DAILY
Status: DISCONTINUED | OUTPATIENT
Start: 2018-11-06 | End: 2018-11-13 | Stop reason: HOSPADM

## 2018-11-06 RX ORDER — ONDANSETRON 2 MG/ML
4 INJECTION INTRAMUSCULAR; INTRAVENOUS EVERY 6 HOURS PRN
Status: DISCONTINUED | OUTPATIENT
Start: 2018-11-06 | End: 2018-11-06 | Stop reason: SDUPTHER

## 2018-11-06 RX ORDER — DILTIAZEM HYDROCHLORIDE 240 MG/1
240 CAPSULE, COATED, EXTENDED RELEASE ORAL DAILY
Status: DISCONTINUED | OUTPATIENT
Start: 2018-11-06 | End: 2018-11-06 | Stop reason: SDUPTHER

## 2018-11-06 RX ORDER — ASPIRIN 81 MG/1
81 TABLET ORAL DAILY
Status: DISCONTINUED | OUTPATIENT
Start: 2018-11-06 | End: 2018-11-13 | Stop reason: HOSPADM

## 2018-11-06 RX ADMIN — SODIUM CHLORIDE: 900 INJECTION INTRAVENOUS at 02:36

## 2018-11-06 RX ADMIN — MORPHINE SULFATE 4 MG: 4 INJECTION INTRAVENOUS at 00:26

## 2018-11-06 RX ADMIN — SODIUM CHLORIDE 1000 ML: 900 INJECTION INTRAVENOUS at 00:26

## 2018-11-06 RX ADMIN — ENOXAPARIN SODIUM 40 MG: 40 INJECTION SUBCUTANEOUS at 11:50

## 2018-11-06 RX ADMIN — METRONIDAZOLE 500 MG: 500 INJECTION, SOLUTION INTRAVENOUS at 17:48

## 2018-11-06 RX ADMIN — SODIUM CHLORIDE, PRESERVATIVE FREE 10 ML: 5 INJECTION INTRAVENOUS at 11:42

## 2018-11-06 RX ADMIN — CIPROFLOXACIN 400 MG: 2 INJECTION, SOLUTION INTRAVENOUS at 09:15

## 2018-11-06 RX ADMIN — SODIUM CHLORIDE: 9 INJECTION, SOLUTION INTRAVENOUS at 08:07

## 2018-11-06 RX ADMIN — ONDANSETRON 4 MG: 2 INJECTION, SOLUTION INTRAMUSCULAR; INTRAVENOUS at 00:26

## 2018-11-06 RX ADMIN — CIPROFLOXACIN 400 MG: 2 INJECTION, SOLUTION INTRAVENOUS at 20:59

## 2018-11-06 RX ADMIN — MORPHINE SULFATE 4 MG: 4 INJECTION INTRAVENOUS at 01:07

## 2018-11-06 RX ADMIN — METRONIDAZOLE 500 MG: 500 INJECTION, SOLUTION INTRAVENOUS at 11:42

## 2018-11-06 RX ADMIN — AMIODARONE HYDROCHLORIDE 200 MG: 200 TABLET ORAL at 03:07

## 2018-11-06 RX ADMIN — SODIUM CHLORIDE: 9 INJECTION, SOLUTION INTRAVENOUS at 17:57

## 2018-11-06 RX ADMIN — DILTIAZEM HYDROCHLORIDE 240 MG: 240 CAPSULE, COATED, EXTENDED RELEASE ORAL at 03:07

## 2018-11-06 RX ADMIN — IPRATROPIUM BROMIDE AND ALBUTEROL SULFATE 1 AMPULE: .5; 3 SOLUTION RESPIRATORY (INHALATION) at 22:48

## 2018-11-06 RX ADMIN — SODIUM CHLORIDE: 9 INJECTION, SOLUTION INTRAVENOUS at 04:09

## 2018-11-06 RX ADMIN — CILOSTAZOL 50 MG: 50 TABLET ORAL at 21:01

## 2018-11-06 RX ADMIN — SODIUM CHLORIDE, PRESERVATIVE FREE 10 ML: 5 INJECTION INTRAVENOUS at 21:05

## 2018-11-06 RX ADMIN — IOPAMIDOL 75 ML: 755 INJECTION, SOLUTION INTRAVENOUS at 01:21

## 2018-11-06 ASSESSMENT — PAIN SCALES - GENERAL
PAINLEVEL_OUTOF10: 10
PAINLEVEL_OUTOF10: 0
PAINLEVEL_OUTOF10: 8
PAINLEVEL_OUTOF10: 10
PAINLEVEL_OUTOF10: 1
PAINLEVEL_OUTOF10: 0
PAINLEVEL_OUTOF10: 0
PAINLEVEL_OUTOF10: 8
PAINLEVEL_OUTOF10: 0
PAINLEVEL_OUTOF10: 0

## 2018-11-06 ASSESSMENT — PAIN DESCRIPTION - ONSET
ONSET: ON-GOING
ONSET: ON-GOING

## 2018-11-06 ASSESSMENT — PAIN DESCRIPTION - FREQUENCY
FREQUENCY: CONTINUOUS
FREQUENCY: CONTINUOUS

## 2018-11-06 ASSESSMENT — PAIN DESCRIPTION - PROGRESSION
CLINICAL_PROGRESSION: GRADUALLY IMPROVING
CLINICAL_PROGRESSION: NOT CHANGED

## 2018-11-06 ASSESSMENT — PAIN DESCRIPTION - PAIN TYPE
TYPE: ACUTE PAIN

## 2018-11-06 ASSESSMENT — PAIN DESCRIPTION - LOCATION
LOCATION: ABDOMEN

## 2018-11-06 ASSESSMENT — PAIN DESCRIPTION - DESCRIPTORS
DESCRIPTORS: NAGGING
DESCRIPTORS: NAGGING

## 2018-11-06 ASSESSMENT — PAIN DESCRIPTION - ORIENTATION
ORIENTATION: LEFT
ORIENTATION: LEFT

## 2018-11-06 NOTE — PROGRESS NOTES
Patient transferred to room 2024 in bed, accompanied by Shelley PONCE. Vitals and assessment complete. Patient oriented but very lethargic. Dr Danna faye served with blood gases. Requested orders for Bipap and possible ICU transfer.

## 2018-11-06 NOTE — CONSULTS
Mela Vergara MD   Nebulizers (COMPRESSOR/NEBULIZER) MISC Use qid 6/13/17   Linda Esteves MD   cilostazol (PLETAL) 50 MG tablet Take 1 tablet by mouth 2 times daily 2/3/17   Deneen Ji MD        [facility]:    Current Facility-Administered Medications   Medication Dose Route Frequency Provider Last Rate Last Dose    albuterol (PROVENTIL) nebulizer solution 2.5 mg  2.5 mg Nebulization Q4H PRN Linda Esteves MD        amiodarone (CORDARONE) tablet 200 mg  200 mg Oral Daily Linda Esteves MD        aspirin EC tablet 81 mg  81 mg Oral Daily Linda Esteves MD        atorvastatin (LIPITOR) tablet 40 mg  40 mg Oral Daily Linda Esteves MD        cilostazol (PLETAL) tablet 50 mg  50 mg Oral BID Linda Esteves MD        diltiazem (CARDIZEM CD) extended release capsule 240 mg  240 mg Oral Daily Linda Esteves MD        ipratropium (ATROVENT) 0.02 % nebulizer solution 0.5 mg  0.5 mg Nebulization Q4H PRN Linda Esteves MD        0.9 % sodium chloride infusion   Intravenous Continuous Linda Esteves  mL/hr at 11/06/18 8118      sodium chloride flush 0.9 % injection 10 mL  10 mL Intravenous 2 times per day Linda Esteves MD        sodium chloride flush 0.9 % injection 10 mL  10 mL Intravenous PRN Linda Esteves MD        ondansetron [facility]) injection 4 mg  4 mg Intravenous Q6H PRN Linda Esteves MD        enoxaparin (LOVENOX) injection 40 mg  40 mg Subcutaneous Daily Linda Esteves MD        glucose (GLUTOSE) 40 % oral gel 15 g  15 g Oral PRN Linda Esteves MD        dextrose 50 % solution 12.5 g  12.5 g Intravenous PRN Linda Esteves MD        glucagon (rDNA) injection 1 mg  1 mg Intramuscular PRN Linda Esteves MD        dextrose 5 % solution  100 mL/hr Intravenous PRN Linda Esteves MD        insulin lispro (HUMALOG) injection vial 0-6 Units  0-6 Units Subcutaneous TID WC Royal Mobley MD        insulin lispro (HUMALOG) injection vial 0-3 Units  0-3 Units Subcutaneous Nightly Royal Mobley MD        ciprofloxacin (CIPRO) IVPB 400 mg  400 mg Intravenous Q12H Royal Mobley MD        metronidazole (FLAGYL) 500 mg in NaCl 100 mL IVPB premix  500 mg Intravenous Q8H Royal Mobley MD          sodium chloride 125 mL/hr at 11/06/18 0807    dextrose         Social History / Family History:     Social History     Social History    Marital status:      Spouse name: N/A    Number of children: N/A    Years of education: N/A     Occupational History          Social History Main Topics    Smoking status: Current Some Day Smoker     Packs/day: 1.00     Years: 25.00     Types: Cigars    Smokeless tobacco: Never Used    Alcohol use No    Drug use: No    Sexual activity: No     Other Topics Concern    None     Social History Narrative    Diet unrestricted    Exercise none    Seat belt always              Family History   Problem Relation Age of Onset    Other Father         suicide    Other Mother         murder    otherwise irrelevant to this surgical issue. Review of Systems:  Constitutional: Negative for fever, chills, diaphoresis, appetite change and fatigue. HENT: Negative for sore throat, trouble swallowing and voice change. Respiratory: Negative for cough, positive for shortness of breath no wheezing. Cardiovascular: Negative for chest pain positive for SOB with one flight of stairs exertion, no pitting LE edema. Gastrointestinal: Positive apparently for Nausea and vomiting yesterday, NO abdominal distention, no constipation, positive diarrhea yesterday, no blood in stool, anal bleeding or rectal pain. Musculoskeletal: Negative for joint swelling and arthralgias. Skin: Warm and dry, well perfused. Neurological: Negative for seizures, syncope, speech difficulty and weakness.  BUT 44.9 in adult    Persistent atrial fibrillation (HCC)    Swelling of lower extremity    S/P ablation of atrial fibrillation    PVD (peripheral vascular disease) with claudication (HCC)    Abdominal pain       Assessment & plan:  Daniel Henson is a very pleasant 66 y.o. male presenting with   tenderness in the RUQ is concerning, doubt though that it is an ischemia, AND lactic acid WAS NOT elevated, will follow closely. Gall bladder pathology not completely excluded, but NOT highly suspected at this point, will follow closely. Has a large incarcerated umbilical hernia, and bilateral inguinal hernias, NOT acute, chronic. Continue NPO/Bowel rest, IV Fluids, Pain control, Nausea control, treat the lung compromise. Thank you doctor Jeovanny Maldonado MD very much for your consultation and for the opportunity to take care of Mr Daniel Henson with you, I'll follow along with you and I'll update you on any new events in his care.  ____________________________________________    Leandro Carlson MD, FACS, [id number]    11/6/2018  8:08 AM      Patient was seen with total face to face time of 45 minutes. More than 50% of this visit was counseling and education as above in my assessment and plan section of my note.

## 2018-11-06 NOTE — ED NOTES
Patient removed upper and lower dentures. Vomiting into emesis bag. Clear, yellow vomitus. Patient reports left sided abdominal pain. Dr. Pamela Pereira at bedside.      Jessica Francisco RN  11/06/18 6159

## 2018-11-06 NOTE — ED PROVIDER NOTES
Current Outpatient Prescriptions   Medication Sig Dispense Refill    albuterol sulfate  (90 Base) MCG/ACT inhaler Inhale 2 puffs into the lungs every 6 hours as needed for Wheezing 3 Inhaler 3    UNABLE TO FIND Please administer two SHINGRIX vaccines 2-6 months apart 2 Units 0    ipratropium (ATROVENT) 0.03 % nasal spray 2 sprays by Nasal route 3 times daily 1 Bottle 3    amiodarone (CORDARONE) 200 MG tablet 200mg bid for 10 days and then 200mg once daily 45 tablet 1    diltiazem (CARTIA XT) 240 MG extended release capsule Take 240 mg by mouth daily      ASPIRIN LOW DOSE 81 MG EC tablet take 1 tablet by mouth once daily 90 tablet 3    albuterol (PROVENTIL) (2.5 MG/3ML) 0.083% nebulizer solution Take 3 mLs by nebulization every 4 hours as needed for Wheezing 120 vial 5    ipratropium (ATROVENT) 0.02 % nebulizer solution Take 2.5 mLs by nebulization every 4 hours as needed for Wheezing 300 mL 5    apixaban (ELIQUIS) 5 MG TABS tablet Take 1 tablet by mouth 2 times daily 56 tablet 0    atorvastatin (LIPITOR) 40 MG tablet Take 1 tablet by mouth daily 90 tablet 3    OXYGEN Inhale 2 L into the lungs continuous       metFORMIN (GLUCOPHAGE) 500 MG tablet Take 1 tablet by mouth daily (with breakfast) 90 tablet 3    Nebulizers (COMPRESSOR/NEBULIZER) MISC Use qid 1 each 3    cilostazol (PLETAL) 50 MG tablet Take 1 tablet by mouth 2 times daily 180 tablet 3     Allergies   Allergen Reactions    Metoprolol Other (See Comments)     Chest pain and burning in the chest        Nursing Notes Reviewed    Physical Exam:  ED Triage Vitals [11/06/18 0003]   Enc Vitals Group      BP (!) 143/80      Pulse 117      Resp 22      Temp 98 °F (36.7 °C)      Temp Source Oral      SpO2 90 %      Weight (!) 329 lb (149.2 kg)      Height 6' 6\" (1.981 m)      Head Circumference       Peak Flow       Pain Score       Pain Loc       Pain Edu? Excl. in [address]?         My pulse ox interpretation is - normal    General appearance: Moderate acute distress. Intermittently vomiting. Very pleasant. Skin:  Warm. Dry. Eye:  Extraocular movements intact. Ears, nose, mouth and throat:  Oral mucosa moist   Neck:  Trachea midline. Extremity:  No swelling. Normal ROM     Heart:  Slightly tachycardic but regular, normal S1 & S2, no extra heart sounds. Perfusion:  intact  Respiratory:  Lungs clear to auscultation bilaterally. Respirations nonlabored. Speaking clearly in full sentences. Abdominal:  Normal bowel sounds. Soft. Moderate left sided abdominal tenderness with localized guarding. Markedly elevated BMI limiting exam. Umbilical hernia that is soft. Back:  No CVA tenderness to palpation     Neurological:  Alert and oriented times 3. No focal neuro deficits.              Psychiatric:  Appropriate    I have reviewed and interpreted all of the currently available lab results from this visit (if applicable):  Results for orders placed or performed during the hospital encounter of 11/06/18   CBC auto diff   Result Value Ref Range    WBC 12.1 (H) 4.0 - 10.5 K/CU MM    RBC 5.56 4.6 - 6.2 M/CU MM    Hemoglobin 18.2 (H) 13.5 - 18.0 GM/DL    Hematocrit 54.3 (H) 42 - 52 %    MCV 97.7 78 - 100 FL    MCH 32.7 (H) 27 - 31 PG    MCHC 33.5 32.0 - 36.0 %    RDW 12.8 11.7 - 14.9 %    Platelets 498 394 - 189 K/CU MM    MPV 9.9 7.5 - 11.1 FL    Differential Type AUTOMATED DIFFERENTIAL     Segs Relative 82.2 (H) 36 - 66 %    Lymphocytes % 10.8 (L) 24 - 44 %    Monocytes % 3.7 0 - 4 %    Eosinophils % 2.5 0 - 3 %    Basophils % 0.6 0 - 1 %    Segs Absolute 9.9 K/CU MM    Lymphocytes # 1.3 K/CU MM    Monocytes # 0.5 K/CU MM    Eosinophils # 0.3 K/CU MM    Basophils # 0.1 K/CU MM    Nucleated RBC % 0.0 %    Total Nucleated RBC 0.0 K/CU MM    TRC 0.0940 K/CU MM    Total Immature Neutrophil 0.02 K/CU MM    Immature Neutrophil % 0.2 0 - 0.43 %   CMP   Result Value Ref Range    Sodium 137 135 - 145 MMOL/L    Potassium 4.2 3.5 - 5.1 MMOL/L autologous vein bypass graft of both lower extremities with intermittent claudication (HCC) FINDINGS: Vasculature: Moderate atherosclerosis in the abdominal aorta. The visceral and renal arteries appear patent, although visualization is limited. Moderate diffuse atherosclerosis in the iliac arteries, without focal stenosis. Moderate stenosis in the mid right superficial femoral artery on axial image 239. A high-grade stenosis is present in the distal right superficial femoral artery such as on axial image 290. Patent right popliteal artery, with three-vessel runoff to the right foot. Occluded left superficial femoral artery. Thready flow reconstitutes proximally in the left popliteal artery, with two-vessel runoff to the left foot via the peroneal and posterior tibial arteries. Remainder of exam: The lung bases are without acute process. The liver, kidneys, adrenals, pancreas, bile ducts, stomach, and spleen are without acute process. Subcentimeter renal and hepatic hypodensities are too small to characterize. The ureters are nondilated. The bladder is decompressed. No acute abnormality in the bowel where visualized. The appendix is not visualized. Unremarkable prostate. No lymphadenopathy. No free air or free fluid. Fat containing periumbilical hernia. No acute abnormality in the bones or soft tissues. 1. Focal high-grade stenosis in the distal right superficial femoral artery. Focal moderate stenosis in the mid right superficial femoral artery. 2. Three-vessel runoff to the right foot. 3. Occluded native left superficial femoral artery. Known left fem-pop bypass graft is not well visualized, consistent with chronic occlusion. Collaterals reconstitute flow in the proximal left popliteal artery, with two-vessel runoff to the left foot via the peroneal and posterior tibial arteries. MDM:  Pt presents as above. Emergent conditions considered.   Presentation prompted initial immediate to persistent tachycardia and concern for possible mesenteric ischemia. Telemetry monitoring, multiple reassessments, multiple rounds of IV narcotic analgesics, and extensive chart review including recent vascular workup was performed to address this. Total critical care time is AT LEAST 45 minutes. This includes vital sign monitoring, pulse oximetry monitoring, telemetry monitoring, clinical response to the IV medications, reviewing the nursing notes, consultation time, dictation/documentation time, and interpretation of the lab work. This time excludes time spent performing procedures and separately billable procedures and family discussion time. Clinical Impression:  1. Left sided abdominal pain    2. Non-intractable vomiting with nausea, unspecified vomiting type    3. Tachycardia      Disposition referral (if applicable):  No follow-up provider specified. Disposition medications (if applicable):  New Prescriptions    No medications on file       Comment: Please note this report has been produced using speech recognition software and may contain errors related to that system including errors in grammar, punctuation, and spelling, as well as words and phrases that may be inappropriate. If there are any questions or concerns please feel free to contact the dictating provider for clarification.        Ramy Woodson MD  11/06/18 6790       Ramy Woodson MD  11/16/18 3314

## 2018-11-07 ENCOUNTER — APPOINTMENT (OUTPATIENT)
Dept: NUCLEAR MEDICINE | Age: 63
DRG: 987 | End: 2018-11-07
Payer: MEDICARE

## 2018-11-07 ENCOUNTER — APPOINTMENT (OUTPATIENT)
Dept: MRI IMAGING | Age: 63
DRG: 987 | End: 2018-11-07
Payer: MEDICARE

## 2018-11-07 ENCOUNTER — APPOINTMENT (OUTPATIENT)
Dept: ULTRASOUND IMAGING | Age: 63
DRG: 987 | End: 2018-11-07
Payer: MEDICARE

## 2018-11-07 PROBLEM — R10.11 RIGHT UPPER QUADRANT ABDOMINAL PAIN: Status: ACTIVE | Noted: 2018-11-07

## 2018-11-07 PROBLEM — R79.89 LFT ELEVATION: Status: ACTIVE | Noted: 2018-11-07

## 2018-11-07 LAB
ALBUMIN SERPL-MCNC: 3.2 GM/DL (ref 3.4–5)
ALBUMIN SERPL-MCNC: 3.3 GM/DL (ref 3.4–5)
ALP BLD-CCNC: 125 IU/L (ref 40–129)
ALP BLD-CCNC: 136 IU/L (ref 40–128)
ALT SERPL-CCNC: 199 U/L (ref 10–40)
ALT SERPL-CCNC: 256 U/L (ref 10–40)
AMMONIA: 40 UMOL/L (ref 16–60)
ANION GAP SERPL CALCULATED.3IONS-SCNC: 7 MMOL/L (ref 4–16)
AST SERPL-CCNC: 167 IU/L (ref 15–37)
AST SERPL-CCNC: 96 IU/L (ref 15–37)
BACTERIA: NORMAL /HPF
BANDED NEUTROPHILS ABSOLUTE COUNT: 0.58 K/CU MM
BANDED NEUTROPHILS RELATIVE PERCENT: 4 % (ref 5–11)
BASE EXCESS MIXED: 8.3 (ref 0–1.2)
BILIRUB SERPL-MCNC: 5.9 MG/DL (ref 0–1)
BILIRUB SERPL-MCNC: 7.7 MG/DL (ref 0–1)
BILIRUBIN DIRECT: 5.5 MG/DL (ref 0–0.3)
BILIRUBIN URINE: NORMAL MG/DL
BILIRUBIN, INDIRECT: 0.4 MG/DL (ref 0–0.7)
BLOOD, URINE: NEGATIVE
BUN BLDV-MCNC: 16 MG/DL (ref 6–23)
CALCIUM SERPL-MCNC: 8.1 MG/DL (ref 8.3–10.6)
CARBON MONOXIDE, BLOOD: 3.6 % (ref 0–5)
CHLORIDE BLD-SCNC: 95 MMOL/L (ref 99–110)
CLARITY: CLEAR
CO2 CONTENT: 38.8 MMOL/L (ref 19–24)
CO2: 36 MMOL/L (ref 21–32)
COLOR: NORMAL
CREAT SERPL-MCNC: 0.8 MG/DL (ref 0.9–1.3)
DIFFERENTIAL TYPE: ABNORMAL
FERRITIN: 3698 NG/ML (ref 30–400)
GFR AFRICAN AMERICAN: >60 ML/MIN/1.73M2
GFR NON-AFRICAN AMERICAN: >60 ML/MIN/1.73M2
GLUCOSE BLD-MCNC: 100 MG/DL (ref 70–99)
GLUCOSE BLD-MCNC: 117 MG/DL (ref 70–99)
GLUCOSE BLD-MCNC: 85 MG/DL (ref 70–99)
GLUCOSE BLD-MCNC: 91 MG/DL (ref 70–99)
GLUCOSE, URINE: NEGATIVE MG/DL
HAV IGM SER IA-ACNC: NON REACTIVE
HCO3 ARTERIAL: 36.7 MMOL/L (ref 18–23)
HCT VFR BLD CALC: 47.6 % (ref 42–52)
HEMOGLOBIN: 15.3 GM/DL (ref 13.5–18)
HEPATITIS B CORE IGM ANTIBODY: NON REACTIVE
HEPATITIS B SURFACE ANTIGEN: NON REACTIVE
HEPATITIS C ANTIBODY: NON REACTIVE
ICTOTEST: POSITIVE
IGG,SERUM: 642 MG/DL (ref 723–1685)
INR BLD: 1.6 INDEX
KETONES, URINE: NEGATIVE MG/DL
LACTATE: 1.5 MMOL/L (ref 0.4–2)
LEUKOCYTE ESTERASE, URINE: NEGATIVE
LYMPHOCYTES ABSOLUTE: 0.1 K/CU MM
LYMPHOCYTES RELATIVE PERCENT: 1 % (ref 24–44)
MCH RBC QN AUTO: 32.7 PG (ref 27–31)
MCHC RBC AUTO-ENTMCNC: 32.1 % (ref 32–36)
MCV RBC AUTO: 101.7 FL (ref 78–100)
METHEMOGLOBIN ARTERIAL: 1.3 %
MONOCYTES ABSOLUTE: 0.3 K/CU MM
MONOCYTES RELATIVE PERCENT: 2 % (ref 0–4)
MUCUS: NORMAL HPF
NITRITE URINE, QUANTITATIVE: NEGATIVE
O2 SATURATION: 77.3 % (ref 96–97)
PCO2 ARTERIAL: 68 MMHG (ref 32–45)
PDW BLD-RTO: 13.2 % (ref 11.7–14.9)
PH BLOOD: 7.34 (ref 7.34–7.45)
PH, URINE: 7
PLATELET # BLD: 154 K/CU MM (ref 140–440)
PMV BLD AUTO: 10.4 FL (ref 7.5–11.1)
PO2 ARTERIAL: 43 MMHG (ref 75–100)
POTASSIUM SERPL-SCNC: 4.3 MMOL/L (ref 3.5–5.1)
PROTEIN UA: 30 MG/DL
PROTHROMBIN TIME: 18.2 SECONDS (ref 9.12–12.5)
RBC # BLD: 4.68 M/CU MM (ref 4.6–6.2)
RBC URINE: 1 /HPF
SEGMENTED NEUTROPHILS ABSOLUTE COUNT: 13.6 K/CU MM
SEGMENTED NEUTROPHILS RELATIVE PERCENT: 93 % (ref 36–66)
SODIUM BLD-SCNC: 138 MMOL/L (ref 135–145)
SPECIFIC GRAVITY UA: 1.02
SQUAMOUS EPITHELIAL: <1 /HPF
TOTAL PROTEIN: 5 GM/DL (ref 6.4–8.2)
TOTAL PROTEIN: 5.1 GM/DL (ref 6.4–8.2)
TRICHOMONAS: NORMAL /HPF
UROBILINOGEN, URINE: 2 MG/DL
WBC # BLD: 14.6 K/CU MM (ref 4–10.5)
WBC UA: 4 /HPF

## 2018-11-07 PROCEDURE — 81001 URINALYSIS AUTO W/SCOPE: CPT

## 2018-11-07 PROCEDURE — 87040 BLOOD CULTURE FOR BACTERIA: CPT

## 2018-11-07 PROCEDURE — 82784 ASSAY IGA/IGD/IGG/IGM EACH: CPT

## 2018-11-07 PROCEDURE — 82390 ASSAY OF CERULOPLASMIN: CPT

## 2018-11-07 PROCEDURE — 6360000002 HC RX W HCPCS: Performed by: INTERNAL MEDICINE

## 2018-11-07 PROCEDURE — 86663 EPSTEIN-BARR ANTIBODY: CPT

## 2018-11-07 PROCEDURE — 82140 ASSAY OF AMMONIA: CPT

## 2018-11-07 PROCEDURE — 82962 GLUCOSE BLOOD TEST: CPT

## 2018-11-07 PROCEDURE — 99233 SBSQ HOSP IP/OBS HIGH 50: CPT | Performed by: INTERNAL MEDICINE

## 2018-11-07 PROCEDURE — 94761 N-INVAS EAR/PLS OXIMETRY MLT: CPT

## 2018-11-07 PROCEDURE — 74181 MRI ABDOMEN W/O CONTRAST: CPT

## 2018-11-07 PROCEDURE — 80076 HEPATIC FUNCTION PANEL: CPT

## 2018-11-07 PROCEDURE — 86664 EPSTEIN-BARR NUCLEAR ANTIGEN: CPT

## 2018-11-07 PROCEDURE — 83516 IMMUNOASSAY NONANTIBODY: CPT

## 2018-11-07 PROCEDURE — 87798 DETECT AGENT NOS DNA AMP: CPT

## 2018-11-07 PROCEDURE — 2060000000 HC ICU INTERMEDIATE R&B

## 2018-11-07 PROCEDURE — 93975 VASCULAR STUDY: CPT

## 2018-11-07 PROCEDURE — 2580000003 HC RX 258: Performed by: INTERNAL MEDICINE

## 2018-11-07 PROCEDURE — 86665 EPSTEIN-BARR CAPSID VCA: CPT

## 2018-11-07 PROCEDURE — 86790 VIRUS ANTIBODY NOS: CPT

## 2018-11-07 PROCEDURE — 96366 THER/PROPH/DIAG IV INF ADDON: CPT

## 2018-11-07 PROCEDURE — 86704 HEP B CORE ANTIBODY TOTAL: CPT

## 2018-11-07 PROCEDURE — 2500000003 HC RX 250 WO HCPCS: Performed by: INTERNAL MEDICINE

## 2018-11-07 PROCEDURE — 87496 CYTOMEG DNA AMP PROBE: CPT

## 2018-11-07 PROCEDURE — 86694 HERPES SIMPLEX NES ANTBDY: CPT

## 2018-11-07 PROCEDURE — 36415 COLL VENOUS BLD VENIPUNCTURE: CPT

## 2018-11-07 PROCEDURE — 86038 ANTINUCLEAR ANTIBODIES: CPT

## 2018-11-07 PROCEDURE — 94660 CPAP INITIATION&MGMT: CPT

## 2018-11-07 PROCEDURE — 85027 COMPLETE CBC AUTOMATED: CPT

## 2018-11-07 PROCEDURE — 80074 ACUTE HEPATITIS PANEL: CPT

## 2018-11-07 PROCEDURE — 80053 COMPREHEN METABOLIC PANEL: CPT

## 2018-11-07 PROCEDURE — 2700000000 HC OXYGEN THERAPY PER DAY

## 2018-11-07 PROCEDURE — 85610 PROTHROMBIN TIME: CPT

## 2018-11-07 PROCEDURE — A9537 TC99M MEBROFENIN: HCPCS | Performed by: SURGERY

## 2018-11-07 PROCEDURE — 3430000000 HC RX DIAGNOSTIC RADIOPHARMACEUTICAL: Performed by: SURGERY

## 2018-11-07 PROCEDURE — 85007 BL SMEAR W/DIFF WBC COUNT: CPT

## 2018-11-07 PROCEDURE — 6370000000 HC RX 637 (ALT 250 FOR IP): Performed by: INTERNAL MEDICINE

## 2018-11-07 PROCEDURE — 94640 AIRWAY INHALATION TREATMENT: CPT

## 2018-11-07 PROCEDURE — 99233 SBSQ HOSP IP/OBS HIGH 50: CPT | Performed by: SURGERY

## 2018-11-07 PROCEDURE — 82803 BLOOD GASES ANY COMBINATION: CPT

## 2018-11-07 PROCEDURE — 82728 ASSAY OF FERRITIN: CPT

## 2018-11-07 PROCEDURE — 83605 ASSAY OF LACTIC ACID: CPT

## 2018-11-07 PROCEDURE — 99222 1ST HOSP IP/OBS MODERATE 55: CPT | Performed by: INTERNAL MEDICINE

## 2018-11-07 RX ADMIN — IPRATROPIUM BROMIDE AND ALBUTEROL SULFATE 1 AMPULE: .5; 3 SOLUTION RESPIRATORY (INHALATION) at 07:29

## 2018-11-07 RX ADMIN — SODIUM CHLORIDE, PRESERVATIVE FREE 10 ML: 5 INJECTION INTRAVENOUS at 20:33

## 2018-11-07 RX ADMIN — IPRATROPIUM BROMIDE AND ALBUTEROL SULFATE 1 AMPULE: .5; 3 SOLUTION RESPIRATORY (INHALATION) at 15:27

## 2018-11-07 RX ADMIN — SODIUM CHLORIDE: 9 INJECTION, SOLUTION INTRAVENOUS at 17:30

## 2018-11-07 RX ADMIN — IPRATROPIUM BROMIDE AND ALBUTEROL SULFATE 1 AMPULE: .5; 3 SOLUTION RESPIRATORY (INHALATION) at 12:33

## 2018-11-07 RX ADMIN — METRONIDAZOLE 500 MG: 500 INJECTION, SOLUTION INTRAVENOUS at 21:48

## 2018-11-07 RX ADMIN — METRONIDAZOLE 500 MG: 500 INJECTION, SOLUTION INTRAVENOUS at 00:24

## 2018-11-07 RX ADMIN — CIPROFLOXACIN 400 MG: 2 INJECTION, SOLUTION INTRAVENOUS at 20:33

## 2018-11-07 RX ADMIN — METRONIDAZOLE 500 MG: 500 INJECTION, SOLUTION INTRAVENOUS at 13:19

## 2018-11-07 RX ADMIN — CIPROFLOXACIN 400 MG: 2 INJECTION, SOLUTION INTRAVENOUS at 12:18

## 2018-11-07 RX ADMIN — SODIUM CHLORIDE: 9 INJECTION, SOLUTION INTRAVENOUS at 05:15

## 2018-11-07 RX ADMIN — MEBROFENIN 6 MILLICURIE: 45 INJECTION, POWDER, LYOPHILIZED, FOR SOLUTION INTRAVENOUS at 10:25

## 2018-11-07 ASSESSMENT — ENCOUNTER SYMPTOMS
ABDOMINAL PAIN: 1
STRIDOR: 0
APNEA: 0
RECTAL PAIN: 0
SORE THROAT: 0
EYE ITCHING: 0
COLOR CHANGE: 0
PHOTOPHOBIA: 0
EYE REDNESS: 0
CONSTIPATION: 0
CHOKING: 0
ANAL BLEEDING: 0
BACK PAIN: 0

## 2018-11-07 ASSESSMENT — PAIN SCALES - GENERAL
PAINLEVEL_OUTOF10: 0
PAINLEVEL_OUTOF10: 0

## 2018-11-07 NOTE — PROGRESS NOTES
resolved hospital problems.  *      Plan:     abd pain, elevated LFTS - still with pain today, but LFTs markedly elevated - cholecystitis (now less likely) vs cholangitis vs hep A (can explain abd pain, fevers, lfts, diarrhea)   - check acute hepatitis panel   - check HIDA   - consult GI   - cont abx for now    Acute hypercapneic resp failure - on bipap    DM - very mild, no change in [place] MD

## 2018-11-07 NOTE — CONSULTS
completed with acute hepatitis picture, therefore I would check a test to rule out autoimmune hepatitis and Shar disease. 2.  Mental status change had improved. It might be related to COPD as the patient has hypoxia. The patient right now is on BiPAP. The patient had been followed with a pulmonologist.    3.  Leukocytosis. We first want to rule out ascending cholangitis, therefore I ordered MRCP. The patient right now is on Cipro and Flagyl. I will continue this two antibiotics temporarily. I will also order the blood culture. 4. Nausea and vomiting might be due to gallbladder problems or hepatitis. We will treat him symptomatically.      Thank you very much for referring this interesting case. Tigist Storm MD PhD [facility]  [address] [facility] [address]  0ffice: [phone]  Fax: [phone]

## 2018-11-07 NOTE — PROGRESS NOTES
11/07/18 0323   NIV Type   Equipment Type v60   Mode BIPAP   Mask Type Full face mask   Mask Size Medium   Bonnet size Medium   Settings/Measurements   Comfort Level Good   Using Accessory Muscles No   IPAP 15 cmH20   EPAP 5 cmH2O   Rate Ordered 12   Resp 22   SpO2 92   FiO2  30 %   I Time/ I Time % 1.25 s   Vt Exhaled 676 mL   Mask Leak (lpm) 0 lpm   Patient Observation   Observations pt sleeping   Alarm Settings   Alarms On Y   Press Low Alarm 3 cmH2O   High Pressure Alarm 25 cmH2O   Delay Alarm 20 sec(s)   Apnea (secs) 20 secs   Resp Rate Low Alarm 13   High Respiratory Rate 40 br/min   Oxygen Therapy/Pulse Ox   SpO2 92 %

## 2018-11-07 NOTE — PROGRESS NOTES
pulmonary      SUBJECTIVE: he is more alert     OBJECTIVE    VITALS:  /64   Pulse 105   Temp 98.1 °F (36.7 °C) (Axillary)   Resp 12   Ht 6' 6\" (1.981 m)   Wt (!) 313 lb 15 oz (142.4 kg)   SpO2 93%   BMI 36.28 kg/m²   HEAD AND FACE EXAM:  No throat injection, no active exudate,no thrush  NECK EXAM;No JVD, no masses, symmetrical  CHEST EXAM; Expansion equal and symmetrical, no masses  LUNG EXAM; Good breath sounds bilaterally. There are expiratory wheezes both lungs, there are crackles at both lung bases  CARDIOVASCULAR EXAM: Positive S1 and S2, no S3 or S4, no clicks ,no murmurs  RIGHT AND LEFT LOWER EXTRIMITY EXAM: No edema, no swelling, no inflamation  CNS EXAM: Alert and oriented X3          LABS   Lab Results   Component Value Date    WBC 14.6 (H) 11/07/2018    HGB 15.3 11/07/2018    HCT 47.6 11/07/2018    .7 (H) 11/07/2018     11/07/2018     Lab Results   Component Value Date    CREATININE 0.8 (L) 11/07/2018    BUN 16 11/07/2018     11/07/2018    K 4.3 11/07/2018    CL 95 (L) 11/07/2018    CO2 36 (H) 11/07/2018     Lab Results   Component Value Date    INR 1.00 11/06/2018    PROTIME 11.4 11/06/2018          Lab Results   Component Value Date    PHOS 5.2 05/24/2018    PHOS 4.8 05/21/2018        Recent Labs      11/06/18   0730  11/06/18   1045  11/07/18   0800   PH  7.22*  7.29*  7.34   PO2ART  71*  73*  43*   [id number]  91.0*  74.0*  68.0*   O2SAT  88.9*  90.4*  77.3*         Wt Readings from Last 3 Encounters:   11/07/18 (!) 313 lb 15 oz (142.4 kg)   10/23/18 (!) 334 lb 9.6 oz (151.8 kg)   09/11/18 (!) 339 lb (153.8 kg)               ASSESMENT  Ac on ch hypercap and hypoxic resp failure  Ac copd        PLAN  1. He is more alert today. I have advised him to fu with me as outpt  2. He probably will need bipap at home before dc  3.  Cont bd rx    11/7/2018  Risa Correa M.D.

## 2018-11-07 NOTE — CONSULTS
or Afluria 5 yrs and older) 09/09/2016, 09/12/2017, 10/23/2018    Pneumococcal Polysaccharide ([id number]) 09/12/2011    Tdap (Boostrix, Adacel) 07/17/2018         Objective:   PHYSICAL EXAM:      VITALS:    Vitals:    11/07/18 0515 11/07/18 0813 11/07/18 0816 11/07/18 1209   BP:   124/79 112/64   Pulse: 108 100 99 105   Resp: 23  25 12   Temp:   98.1 °F (36.7 °C) 98.1 °F (36.7 °C)   TempSrc:   Oral Axillary   SpO2: 91%  96% 93%   Weight:       Height:             NECK:  Supple, symmetrical, trachea midline, no adenopathy, thyroid symmetric, not enlarged and no tenderness  CHEST: Chest expansion equal and symmetrical, no intercostal retraction. LUNGS:  no increased work of breathing, has expiratory wheezes both lungs, no crackles. CARDIOVASCULAR: S1 and S2, no edema and no JVD  ABDOMEN:  normal bowel sounds, non-distended and no masses palpated, and no tenderness to palpation. No hepatospleenomegaly  LYMPHADENOPATHY:  no axillary or supraclavicular adenopathy. No cervical adnenopathy    MUSCULOSKELETAL: No obvious misalignment or effusion of the joints. No clubbing, cyanosis of the digits. RIGHT AND LEFT LOWER EXTREMITIES: No edema, no inflammation, no tenderness. SKIN:  normal skin color, texture, turgor and no redness, warmth, or swelling. No palpable nodules    DATA:           EXAMINATION:   TWO VIEWS OF THE CHEST       11/5/2018 3:07 pm       COMPARISON:   05/23/2018.       HISTORY:   ORDERING SYSTEM PROVIDED HISTORY: Pre-procedural examination   TECHNOLOGIST PROVIDED HISTORY:   Ordering Physician Provided Reason for Exam: Periperal angiogram with   possible percutaneous transluminal angioplasty. Low oxygen intake.  Patient   became very short of breath and had difficulty breathing with standing for   exam       FINDINGS:   The cardiomediastinal silhouette is stable.  Prominent calcified nodes in the   left hilar region consistent with old granulomatous disease.  No acute   infiltrate, pleural fluid or

## 2018-11-08 ENCOUNTER — APPOINTMENT (OUTPATIENT)
Dept: NUCLEAR MEDICINE | Age: 63
DRG: 987 | End: 2018-11-08
Payer: MEDICARE

## 2018-11-08 LAB
ALBUMIN SERPL-MCNC: 3 GM/DL (ref 3.4–5)
ALBUMIN SERPL-MCNC: 3 GM/DL (ref 3.4–5)
ALP BLD-CCNC: 113 IU/L (ref 40–128)
ALP BLD-CCNC: 113 IU/L (ref 40–129)
ALT SERPL-CCNC: 150 U/L (ref 10–40)
ALT SERPL-CCNC: 150 U/L (ref 10–40)
ANION GAP SERPL CALCULATED.3IONS-SCNC: 7 MMOL/L (ref 4–16)
AST SERPL-CCNC: 62 IU/L (ref 15–37)
AST SERPL-CCNC: 62 IU/L (ref 15–37)
BILIRUB SERPL-MCNC: 3.8 MG/DL (ref 0–1)
BILIRUB SERPL-MCNC: 3.8 MG/DL (ref 0–1)
BILIRUBIN DIRECT: 3.4 MG/DL (ref 0–0.3)
BILIRUBIN, INDIRECT: 0.4 MG/DL (ref 0–0.7)
BUN BLDV-MCNC: 12 MG/DL (ref 6–23)
CALCIUM SERPL-MCNC: 8 MG/DL (ref 8.3–10.6)
CHLORIDE BLD-SCNC: 95 MMOL/L (ref 99–110)
CO2: 33 MMOL/L (ref 21–32)
CREAT SERPL-MCNC: 0.8 MG/DL (ref 0.9–1.3)
GFR AFRICAN AMERICAN: >60 ML/MIN/1.73M2
GFR NON-AFRICAN AMERICAN: >60 ML/MIN/1.73M2
GLUCOSE BLD-MCNC: 132 MG/DL (ref 70–99)
GLUCOSE BLD-MCNC: 133 MG/DL (ref 70–99)
GLUCOSE BLD-MCNC: 161 MG/DL (ref 70–99)
GLUCOSE BLD-MCNC: 84 MG/DL (ref 70–99)
GLUCOSE BLD-MCNC: 88 MG/DL (ref 70–99)
HCT VFR BLD CALC: 46.1 % (ref 42–52)
HEMOGLOBIN: 14.8 GM/DL (ref 13.5–18)
INR BLD: 1.45 INDEX
MCH RBC QN AUTO: 32.2 PG (ref 27–31)
MCHC RBC AUTO-ENTMCNC: 32.1 % (ref 32–36)
MCV RBC AUTO: 100.4 FL (ref 78–100)
PDW BLD-RTO: 13.4 % (ref 11.7–14.9)
PLATELET # BLD: 140 K/CU MM (ref 140–440)
PLATELET # BLD: ABNORMAL 10*3/UL
PMV BLD AUTO: 10.4 FL (ref 7.5–11.1)
POTASSIUM SERPL-SCNC: 3.7 MMOL/L (ref 3.5–5.1)
PROTHROMBIN TIME: 16.5 SECONDS (ref 9.12–12.5)
RBC # BLD: 4.59 M/CU MM (ref 4.6–6.2)
SODIUM BLD-SCNC: 135 MMOL/L (ref 135–145)
TOTAL PROTEIN: 4.6 GM/DL (ref 6.4–8.2)
TOTAL PROTEIN: 4.6 GM/DL (ref 6.4–8.2)
WBC # BLD: 8.4 K/CU MM (ref 4–10.5)

## 2018-11-08 PROCEDURE — 6370000000 HC RX 637 (ALT 250 FOR IP): Performed by: INTERNAL MEDICINE

## 2018-11-08 PROCEDURE — 80053 COMPREHEN METABOLIC PANEL: CPT

## 2018-11-08 PROCEDURE — 94761 N-INVAS EAR/PLS OXIMETRY MLT: CPT

## 2018-11-08 PROCEDURE — 82962 GLUCOSE BLOOD TEST: CPT

## 2018-11-08 PROCEDURE — 99233 SBSQ HOSP IP/OBS HIGH 50: CPT | Performed by: INTERNAL MEDICINE

## 2018-11-08 PROCEDURE — 2580000003 HC RX 258: Performed by: INTERNAL MEDICINE

## 2018-11-08 PROCEDURE — 85610 PROTHROMBIN TIME: CPT

## 2018-11-08 PROCEDURE — 78226 HEPATOBILIARY SYSTEM IMAGING: CPT

## 2018-11-08 PROCEDURE — 94640 AIRWAY INHALATION TREATMENT: CPT

## 2018-11-08 PROCEDURE — 6370000000 HC RX 637 (ALT 250 FOR IP): Performed by: HOSPITALIST

## 2018-11-08 PROCEDURE — 2500000003 HC RX 250 WO HCPCS: Performed by: INTERNAL MEDICINE

## 2018-11-08 PROCEDURE — 94660 CPAP INITIATION&MGMT: CPT

## 2018-11-08 PROCEDURE — 2060000000 HC ICU INTERMEDIATE R&B

## 2018-11-08 PROCEDURE — 82248 BILIRUBIN DIRECT: CPT

## 2018-11-08 PROCEDURE — 99233 SBSQ HOSP IP/OBS HIGH 50: CPT | Performed by: SURGERY

## 2018-11-08 PROCEDURE — 6360000002 HC RX W HCPCS: Performed by: INTERNAL MEDICINE

## 2018-11-08 PROCEDURE — 36415 COLL VENOUS BLD VENIPUNCTURE: CPT

## 2018-11-08 PROCEDURE — 2700000000 HC OXYGEN THERAPY PER DAY

## 2018-11-08 PROCEDURE — 85027 COMPLETE CBC AUTOMATED: CPT

## 2018-11-08 RX ORDER — IBUPROFEN 400 MG/1
400 TABLET ORAL EVERY 6 HOURS PRN
Status: DISCONTINUED | OUTPATIENT
Start: 2018-11-08 | End: 2018-11-13 | Stop reason: HOSPADM

## 2018-11-08 RX ADMIN — CIPROFLOXACIN 400 MG: 2 INJECTION, SOLUTION INTRAVENOUS at 20:00

## 2018-11-08 RX ADMIN — SODIUM CHLORIDE: 9 INJECTION, SOLUTION INTRAVENOUS at 16:37

## 2018-11-08 RX ADMIN — METRONIDAZOLE 500 MG: 500 INJECTION, SOLUTION INTRAVENOUS at 04:40

## 2018-11-08 RX ADMIN — IPRATROPIUM BROMIDE AND ALBUTEROL SULFATE 1 AMPULE: .5; 3 SOLUTION RESPIRATORY (INHALATION) at 16:42

## 2018-11-08 RX ADMIN — IPRATROPIUM BROMIDE AND ALBUTEROL SULFATE 1 AMPULE: .5; 3 SOLUTION RESPIRATORY (INHALATION) at 00:08

## 2018-11-08 RX ADMIN — AMIODARONE HYDROCHLORIDE 200 MG: 200 TABLET ORAL at 08:16

## 2018-11-08 RX ADMIN — ASPIRIN 81 MG: 81 TABLET, COATED ORAL at 08:16

## 2018-11-08 RX ADMIN — ONDANSETRON HYDROCHLORIDE 4 MG: 2 INJECTION, SOLUTION INTRAMUSCULAR; INTRAVENOUS at 16:38

## 2018-11-08 RX ADMIN — SODIUM CHLORIDE: 9 INJECTION, SOLUTION INTRAVENOUS at 04:08

## 2018-11-08 RX ADMIN — DILTIAZEM HYDROCHLORIDE 240 MG: 120 CAPSULE, COATED, EXTENDED RELEASE ORAL at 08:15

## 2018-11-08 RX ADMIN — CILOSTAZOL 50 MG: 50 TABLET ORAL at 20:05

## 2018-11-08 RX ADMIN — METRONIDAZOLE 500 MG: 500 INJECTION, SOLUTION INTRAVENOUS at 21:03

## 2018-11-08 RX ADMIN — CILOSTAZOL 50 MG: 50 TABLET ORAL at 08:15

## 2018-11-08 RX ADMIN — IBUPROFEN 400 MG: 400 TABLET ORAL at 22:10

## 2018-11-08 RX ADMIN — METRONIDAZOLE 500 MG: 500 INJECTION, SOLUTION INTRAVENOUS at 13:24

## 2018-11-08 RX ADMIN — INSULIN LISPRO 1 UNITS: 100 INJECTION, SOLUTION INTRAVENOUS; SUBCUTANEOUS at 21:06

## 2018-11-08 RX ADMIN — CIPROFLOXACIN 400 MG: 2 INJECTION, SOLUTION INTRAVENOUS at 08:15

## 2018-11-08 RX ADMIN — ATORVASTATIN CALCIUM 40 MG: 40 TABLET, FILM COATED ORAL at 08:16

## 2018-11-08 RX ADMIN — SODIUM CHLORIDE, PRESERVATIVE FREE 10 ML: 5 INJECTION INTRAVENOUS at 21:00

## 2018-11-08 RX ADMIN — IPRATROPIUM BROMIDE AND ALBUTEROL SULFATE 1 AMPULE: .5; 3 SOLUTION RESPIRATORY (INHALATION) at 21:09

## 2018-11-08 ASSESSMENT — ENCOUNTER SYMPTOMS
BACK PAIN: 0
ANAL BLEEDING: 0
STRIDOR: 0
CONSTIPATION: 0
SORE THROAT: 0
ABDOMINAL PAIN: 1
APNEA: 0
CHOKING: 0
COLOR CHANGE: 0
EYE ITCHING: 0
EYE REDNESS: 0
RECTAL PAIN: 0
PHOTOPHOBIA: 0

## 2018-11-08 ASSESSMENT — PAIN SCALES - GENERAL
PAINLEVEL_OUTOF10: 0
PAINLEVEL_OUTOF10: 6
PAINLEVEL_OUTOF10: 4

## 2018-11-08 ASSESSMENT — PAIN DESCRIPTION - PAIN TYPE: TYPE: ACUTE PAIN

## 2018-11-08 NOTE — PROGRESS NOTES
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 Favor granuloma. Can not exclude malignancy given smoking history   · Dyspnea, cough and wheezes   · Pulmonary emphysema   · 42 pack years smoking       Plan:       · PFTs and 6MW  · CT chest without contrast in 6 months   · Albuterol 2 puffs Q4-6 hrs PRN  · Smoking cessation counseling. Patient was advised to visit [email or web address] go[employer] and call [employer] for assistance.  Will refer to [id number] [employer] smoking cessation program.        Reason for Visit: Elevation of transaminase, total bilirubin and right upper quadrant abdominal pain for three days. HPI: Mr. Rolf Grande is a 66 y.o. male who was seen and evaluated.  No fever or chills.  His pain in his right upper quadrant has improved.  His is currently having pain in his left upper quadrant.  He describes his pain as a 3/10 dull ache with no radiation to his back.  No nausea or vomiting.  No heartburn or acid reflux.  No dysphagia.  His appetite is good and weight is stable.  He is on clear liquids.  No excess belching or flatulence.  His last bowel movement was prior to admission.  No diarrhea.  No blood in his stools or melena.  His labs this morning revealed CMP with Sodium 135, Potassium 3.7, Chloride 95, BUN 12, Creatinine 0.8, Total Protein 4.6, Total Bilirubin 3.8, , AST 62, Alkaline Phophatase 113 and GFR >60.  CBC with WBC 8.4, RBC 4.59, Hgb 14.8, Hct 46.1, and Platelets 128.  His icotest in urine is positive. His HIDA scan revealed very delayed excretion of radiotracer from the liver suggesting underlying hepatic dysfunction, activity in the bowel establishes patency of the common bile duct, no activity seen within the gallbladder can be seen with cholecystitis.         PMH:     Past Medical History:   Diagnosis Date    COPD (chronic obstructive pulmonary disease) ([facility] [address]    Erectile dysfunction    H/O Doppler ultrasound 09/05/2018   LE arterial - left mid and distal femoral artery occluded, lt FANI shows mild-mod PVD    H/O echocardiogram 01/06/2016   EF60% mild MR, TR, pulm HTN    Hx of colonoscopy   7/11 C-scope - benign polyp x1    Hx of Doppler ultrasound 02/20/2018   Lower extremity doppler  Normal study.     Hypertension    Obesity    PAD (peripheral artery disease) (HCC)   10/10 FANI mild PAD left superficial femoral s/p left fem-pop bypass    Secondary erythrocytosis    Type II or unspecified type diabetes mellitus without mention of complication, not stated as were warm and dry, no clubbing, cyanosis, edema. Neuro: CN II-XII were intact grossly. Sensation was normal on all extremities and the muscle strength was normal and symmetry. Rectum:  There was no fistular, fissure, external hemorrhoid, tenderness, abscess, erythema or discharge     Labs:   CBC   WBC   Date Value Ref Range Status   11/08/2018 8.4 4.0 - 10.5 K/CU MM Final     Hemoglobin   Date Value Ref Range Status   11/08/2018 14.8 13.5 - 18.0 GM/DL Final     Hematocrit   Date Value Ref Range Status   11/08/2018 46.1 42 - 52 % Final     MCV   Date Value Ref Range Status   11/08/2018 100.4 (H) 78 - 100 FL Final         Glucose   Date Value Ref Range Status   11/08/2018 84 70 - 99 MG/DL Final     CO2   Date Value Ref Range Status   11/08/2018 33 (H) 21 - 32 MMOL/L Final     BUN   Date Value Ref Range Status   11/08/2018 12 6 - 23 MG/DL Final     Lab Results   Component Value Date    11/08/2018    11/08/2018   AST 62 11/08/2018   AST 62 11/08/2018     No results found for: AMYLASE   Lab Results   Component Value Date   LIPASE 33 11/06/2018     No results found for: ESR   No components found for: CREACTIVEPR   Lab Results   Component Value Date   CB Negative 02/13/2017    No components found for: Darrin    No results found for: CEA   No components found for: Cherry Magallanes, OCCULTBLOODS, OCCBLD1, OCCBLD2, OCCBLD3, OCCULTBLOOD   Lab Results   Component Value Date   FERRITIN 3,698 11/07/2018     No results found for: HAV     Assessment and Plan:     1.  Abdominal pain has moved from right upper quadrant to left upper quadrant associated with elevation of transaminase and total bilirubin likely was due to cholecystitis and passing stones.  No fever or chills.  The patient's mental status is normal and he is hemodynamically stable.  His MRI revealed fluid or inflammatory stranding adjacent to the moderately distended gallbladder, which contains biliary sludge without gallstones,

## 2018-11-08 NOTE — PROGRESS NOTES
dextrose      Labs/Imaging Results:   Lab Results   Component Value Date    WBC 8.4 11/08/2018    HGB 14.8 11/08/2018    HCT 46.1 11/08/2018    .4 (H) 11/08/2018    PLT  11/08/2018     CORRECTED ON 11/08 AT 0555: PREVIOUSLY REPORTED AS: 96     11/08/2018     Lab Results   Component Value Date     11/08/2018    K 3.7 11/08/2018    CL 95 (L) 11/08/2018    CO2 33 (H) 11/08/2018    BUN 12 11/08/2018    CREATININE 0.8 (L) 11/08/2018    GLUCOSE 84 11/08/2018    CALCIUM 8.0 (L) 11/08/2018    PROT 4.6 (L) 11/08/2018    PROT 4.6 (L) 11/08/2018    LABALBU 3.0 (L) 11/08/2018    LABALBU 3.0 (L) 11/08/2018    BILITOT 3.8 (H) 11/08/2018    BILITOT 3.8 (H) 11/08/2018    ALKPHOS 113 11/08/2018    ALKPHOS 113 11/08/2018    AST 62 (H) 11/08/2018    AST 62 (H) 11/08/2018     (H) 11/08/2018     (H) 11/08/2018    LABGLOM >60 11/08/2018    GFRAA >60 11/08/2018    AGRATIO 1.7 10/23/2018    GLOB 2.6 10/23/2018       Assessment:     Patient Active Problem List:     Hypertension     Hypercholesteremia     Tobacco use     Anxiety     Type 2 diabetes, uncontrolled, with neuropathy (HCC)     Benign essential HTN     Simple chronic bronchitis (HCC)     Macular degeneration, dry     PVD (peripheral vascular disease) (HCC)     VT (ventricular tachycardia) (HCC)     Left wrist sprain     Hypoxia     PAD (peripheral artery disease) (HCC)     Class 3 obesity due to excess calories without serious comorbidity with body mass index (BMI) of 40.0 to 44.9 in adult     Persistent atrial fibrillation (HCC)     Swelling of lower extremity     S/P ablation of atrial fibrillation     PVD (peripheral vascular disease) with claudication (HCC)     Abdominal pain     Left sided abdominal pain     LFT elevation     Right upper quadrant abdominal pain    Plan:     Pt is feeling better  LFT are improving  Will allow diet and cont supportive care  cmv and EBV are still pending    Roland Castle MD

## 2018-11-08 NOTE — PLAN OF CARE
Problem: Pain:  Goal: Control of acute pain  Control of acute pain   Outcome: Ongoing    Goal: Control of chronic pain  Control of chronic pain   Outcome: Ongoing      Problem: Breathing Pattern - Ineffective:  Goal: Ability to achieve and maintain a regular respiratory rate will improve  Ability to achieve and maintain a regular respiratory rate will improve   Outcome: Ongoing      Problem: Falls - Risk of:  Goal: Will remain free from falls  Will remain free from falls   Outcome: Ongoing    Goal: Absence of physical injury  Absence of physical injury   Outcome: Ongoing

## 2018-11-08 NOTE — PROGRESS NOTES
11/8/2018 12:15 PM  Patient Room #: 7397/1272-Z  Patient Name: Luz Maria Hoyt    (Step 1 Done by RN if possible otherwise call Pulmonary Diagnostics)  1. Place patient on room air at rest for at least 30 minutes. If patient falls below 88% before 30 minutes then you can record the level and stop. Record room air saturation level _93___ %. If patient is at 88% or below, they will qualify for home oxygen and you can stop. If level does not fall below 88%, fill in level above. If indicated continue to Step 2. Signature:__Ora Renee RN_____________________ Date: __11/8/2018__________  (Step 2&3 Done by KORI)  2. Ambulate patient on room air until saturation falls below 89%. Record level of room air saturation with ambulation____ %. Next, place patient back on ______lpm oxygen and ambulate, record level _____%. (Note:  this level must show improvement from room air level done with ambulation.)  If patients saturation on room air with ambulation is 88% or below AND patient shows improvement with oxygen during ambulation, they will qualify for home oxygen and you can stop. If patient does not drop below 89%, then patient should have an overnight oximetry trending on room air to see if level falls below 88%. Complete level in Step 3 below. 3. Room air overnight oximetry level 88 % for__________  cumulative minutes. If patients room air oxygen level is < 89% for at least 5 cumulative minutes, patient will qualify for home oxygen and you can stop. (Attach Night Trending Report)    Complete order below: Diagnosis:______________________________________  Home oxygen at:  Length of Need: ? Lifetime ?  3 Months     _____lpm or _____%   via  [] nasal cannula  []mask  [] other:________________         []continuous []  with activity  []  Nocturnal   [] Portable Tanks []  Concentrator        Therapist Signature:  _____________________________     Date:  _______________  Physician

## 2018-11-09 LAB
ALBUMIN SERPL-MCNC: 2.7 GM/DL (ref 3.4–5)
ALBUMIN SERPL-MCNC: 2.7 GM/DL (ref 3.4–5)
ALP BLD-CCNC: 109 IU/L (ref 40–129)
ALP BLD-CCNC: 109 IU/L (ref 40–129)
ALT SERPL-CCNC: 100 U/L (ref 10–40)
ALT SERPL-CCNC: 100 U/L (ref 10–40)
ANION GAP SERPL CALCULATED.3IONS-SCNC: 7 MMOL/L (ref 4–16)
AST SERPL-CCNC: 39 IU/L (ref 15–37)
AST SERPL-CCNC: 39 IU/L (ref 15–37)
BILIRUB SERPL-MCNC: 1.7 MG/DL (ref 0–1)
BILIRUB SERPL-MCNC: 1.7 MG/DL (ref 0–1)
BILIRUBIN DIRECT: 0.7 MG/DL (ref 0–0.3)
BILIRUBIN, INDIRECT: 1 MG/DL (ref 0–0.7)
BUN BLDV-MCNC: 8 MG/DL (ref 6–23)
CALCIUM SERPL-MCNC: 7.6 MG/DL (ref 8.3–10.6)
CERULOPLASMIN: 25
CHLORIDE BLD-SCNC: 99 MMOL/L (ref 99–110)
CO2: 30 MMOL/L (ref 21–32)
CREAT SERPL-MCNC: 0.5 MG/DL (ref 0.9–1.3)
F-ACTIN AB, IGG: 31
GFR AFRICAN AMERICAN: >60 ML/MIN/1.73M2
GFR NON-AFRICAN AMERICAN: >60 ML/MIN/1.73M2
GLUCOSE BLD-MCNC: 122 MG/DL (ref 70–99)
GLUCOSE BLD-MCNC: 138 MG/DL (ref 70–99)
GLUCOSE BLD-MCNC: 140 MG/DL (ref 70–99)
GLUCOSE BLD-MCNC: 86 MG/DL (ref 70–99)
GLUCOSE BLD-MCNC: 97 MG/DL (ref 70–99)
HCT VFR BLD CALC: 45 % (ref 42–52)
HEMOGLOBIN: 14.4 GM/DL (ref 13.5–18)
HEPATITIS B CORE TOTAL ANTIBODY: NEGATIVE
INR BLD: 1.17 INDEX
MCH RBC QN AUTO: 32 PG (ref 27–31)
MCHC RBC AUTO-ENTMCNC: 32 % (ref 32–36)
MCV RBC AUTO: 100 FL (ref 78–100)
PDW BLD-RTO: 13.3 % (ref 11.7–14.9)
PLATELET # BLD: 111 K/CU MM (ref 140–440)
PMV BLD AUTO: 11.5 FL (ref 7.5–11.1)
POTASSIUM SERPL-SCNC: 4.1 MMOL/L (ref 3.5–5.1)
PROTHROMBIN TIME: 13.5 SECONDS (ref 9.12–12.5)
RBC # BLD: 4.5 M/CU MM (ref 4.6–6.2)
SODIUM BLD-SCNC: 136 MMOL/L (ref 135–145)
TOTAL PROTEIN: 4.9 GM/DL (ref 6.4–8.2)
TOTAL PROTEIN: 4.9 GM/DL (ref 6.4–8.2)
WBC # BLD: 5.3 K/CU MM (ref 4–10.5)

## 2018-11-09 PROCEDURE — 1200000000 HC SEMI PRIVATE

## 2018-11-09 PROCEDURE — 6360000002 HC RX W HCPCS: Performed by: INTERNAL MEDICINE

## 2018-11-09 PROCEDURE — 6370000000 HC RX 637 (ALT 250 FOR IP): Performed by: INTERNAL MEDICINE

## 2018-11-09 PROCEDURE — 85610 PROTHROMBIN TIME: CPT

## 2018-11-09 PROCEDURE — 36415 COLL VENOUS BLD VENIPUNCTURE: CPT

## 2018-11-09 PROCEDURE — 2500000003 HC RX 250 WO HCPCS: Performed by: INTERNAL MEDICINE

## 2018-11-09 PROCEDURE — 85027 COMPLETE CBC AUTOMATED: CPT

## 2018-11-09 PROCEDURE — 82248 BILIRUBIN DIRECT: CPT

## 2018-11-09 PROCEDURE — 2580000003 HC RX 258: Performed by: INTERNAL MEDICINE

## 2018-11-09 PROCEDURE — 94761 N-INVAS EAR/PLS OXIMETRY MLT: CPT

## 2018-11-09 PROCEDURE — 2700000000 HC OXYGEN THERAPY PER DAY

## 2018-11-09 PROCEDURE — 82962 GLUCOSE BLOOD TEST: CPT

## 2018-11-09 PROCEDURE — 94762 N-INVAS EAR/PLS OXIMTRY CONT: CPT

## 2018-11-09 PROCEDURE — 99233 SBSQ HOSP IP/OBS HIGH 50: CPT | Performed by: INTERNAL MEDICINE

## 2018-11-09 PROCEDURE — 94680 O2 UPTK RST&XERS DIR SIMPLE: CPT

## 2018-11-09 PROCEDURE — 94660 CPAP INITIATION&MGMT: CPT

## 2018-11-09 PROCEDURE — 99232 SBSQ HOSP IP/OBS MODERATE 35: CPT | Performed by: SURGERY

## 2018-11-09 PROCEDURE — 94640 AIRWAY INHALATION TREATMENT: CPT

## 2018-11-09 PROCEDURE — 80053 COMPREHEN METABOLIC PANEL: CPT

## 2018-11-09 RX ADMIN — SODIUM CHLORIDE: 9 INJECTION, SOLUTION INTRAVENOUS at 18:21

## 2018-11-09 RX ADMIN — CIPROFLOXACIN 400 MG: 2 INJECTION, SOLUTION INTRAVENOUS at 20:37

## 2018-11-09 RX ADMIN — IPRATROPIUM BROMIDE AND ALBUTEROL SULFATE 1 AMPULE: .5; 3 SOLUTION RESPIRATORY (INHALATION) at 16:20

## 2018-11-09 RX ADMIN — SODIUM CHLORIDE, PRESERVATIVE FREE 10 ML: 5 INJECTION INTRAVENOUS at 20:37

## 2018-11-09 RX ADMIN — ENOXAPARIN SODIUM 40 MG: 40 INJECTION SUBCUTANEOUS at 09:21

## 2018-11-09 RX ADMIN — SODIUM CHLORIDE, PRESERVATIVE FREE 10 ML: 5 INJECTION INTRAVENOUS at 20:36

## 2018-11-09 RX ADMIN — AMIODARONE HYDROCHLORIDE 200 MG: 200 TABLET ORAL at 09:24

## 2018-11-09 RX ADMIN — DILTIAZEM HYDROCHLORIDE 240 MG: 120 CAPSULE, COATED, EXTENDED RELEASE ORAL at 09:20

## 2018-11-09 RX ADMIN — ATORVASTATIN CALCIUM 40 MG: 40 TABLET, FILM COATED ORAL at 09:20

## 2018-11-09 RX ADMIN — CILOSTAZOL 50 MG: 50 TABLET ORAL at 09:21

## 2018-11-09 RX ADMIN — ASPIRIN 81 MG: 81 TABLET, COATED ORAL at 09:20

## 2018-11-09 RX ADMIN — SODIUM CHLORIDE: 9 INJECTION, SOLUTION INTRAVENOUS at 02:40

## 2018-11-09 RX ADMIN — METRONIDAZOLE 500 MG: 500 INJECTION, SOLUTION INTRAVENOUS at 21:48

## 2018-11-09 RX ADMIN — IPRATROPIUM BROMIDE AND ALBUTEROL SULFATE 1 AMPULE: .5; 3 SOLUTION RESPIRATORY (INHALATION) at 21:55

## 2018-11-09 RX ADMIN — CIPROFLOXACIN 400 MG: 2 INJECTION, SOLUTION INTRAVENOUS at 09:20

## 2018-11-09 RX ADMIN — INSULIN LISPRO 1 UNITS: 100 INJECTION, SOLUTION INTRAVENOUS; SUBCUTANEOUS at 21:40

## 2018-11-09 RX ADMIN — IPRATROPIUM BROMIDE AND ALBUTEROL SULFATE 1 AMPULE: .5; 3 SOLUTION RESPIRATORY (INHALATION) at 08:10

## 2018-11-09 RX ADMIN — SODIUM CHLORIDE, PRESERVATIVE FREE 10 ML: 5 INJECTION INTRAVENOUS at 09:22

## 2018-11-09 RX ADMIN — CILOSTAZOL 50 MG: 50 TABLET ORAL at 21:39

## 2018-11-09 RX ADMIN — METRONIDAZOLE 500 MG: 500 INJECTION, SOLUTION INTRAVENOUS at 05:20

## 2018-11-09 RX ADMIN — IPRATROPIUM BROMIDE AND ALBUTEROL SULFATE 1 AMPULE: .5; 3 SOLUTION RESPIRATORY (INHALATION) at 11:45

## 2018-11-09 RX ADMIN — METRONIDAZOLE 500 MG: 500 INJECTION, SOLUTION INTRAVENOUS at 13:34

## 2018-11-09 ASSESSMENT — PAIN DESCRIPTION - PROGRESSION
CLINICAL_PROGRESSION: NOT CHANGED

## 2018-11-09 ASSESSMENT — PAIN DESCRIPTION - PAIN TYPE
TYPE: ACUTE PAIN
TYPE: ACUTE PAIN

## 2018-11-09 ASSESSMENT — PAIN DESCRIPTION - LOCATION
LOCATION: ABDOMEN
LOCATION: ABDOMEN

## 2018-11-09 ASSESSMENT — PAIN DESCRIPTION - DESCRIPTORS
DESCRIPTORS: DULL;ACHING
DESCRIPTORS: TENDER;NAGGING

## 2018-11-09 ASSESSMENT — PAIN SCALES - GENERAL
PAINLEVEL_OUTOF10: 0
PAINLEVEL_OUTOF10: 3
PAINLEVEL_OUTOF10: 0
PAINLEVEL_OUTOF10: 4
PAINLEVEL_OUTOF10: 0

## 2018-11-09 ASSESSMENT — PAIN DESCRIPTION - ORIENTATION: ORIENTATION: RIGHT

## 2018-11-09 ASSESSMENT — PAIN DESCRIPTION - FREQUENCY: FREQUENCY: CONTINUOUS

## 2018-11-09 ASSESSMENT — PAIN DESCRIPTION - ONSET: ONSET: ON-GOING

## 2018-11-09 NOTE — PROGRESS NOTES
The software license for the profox oximeter has . I am unable to restart the license. I can't run the home oxygen on profox tonight.

## 2018-11-09 NOTE — PROGRESS NOTES
PROGRESS NOTE  MD Sheila Alvarezprosper Griffinelif    Admit Date: 11/6/2018      Subjective:     Chief Complaint: abd pain    Started on BiPAP on 11/6 after CO2 narcosis - cognition much improved; now on O2 and breathing comfortably    Abd pain improved but still present    Denies diarrhea; F/C      Scheduled Meds:   amiodarone  200 mg Oral Daily    aspirin  81 mg Oral Daily    atorvastatin  40 mg Oral Daily    cilostazol  50 mg Oral BID    diltiazem  240 mg Oral Daily    sodium chloride flush  10 mL Intravenous 2 times per day    enoxaparin  40 mg Subcutaneous Daily    insulin lispro  0-6 Units Subcutaneous TID WC    insulin lispro  0-3 Units Subcutaneous Nightly    ciprofloxacin  400 mg Intravenous Q12H    metroNIDAZOLE  500 mg Intravenous Q8H    ipratropium-albuterol  1 ampule Inhalation Q4H WA     Continuous Infusions:   sodium chloride 50 mL/hr at 11/09/18 0919    dextrose       PRN Meds:ibuprofen, albuterol, ipratropium, sodium chloride flush, ondansetron, glucose, dextrose, glucagon (rDNA), dextrose      Objective:     Patient Vitals for the past 8 hrs:   BP Temp Temp src Pulse Resp SpO2   11/09/18 1620 - - - - 20 93 %   11/09/18 1359 (!) 100/55 97.5 °F (36.4 °C) Oral 82 19 92 %   11/09/18 1131 132/80 98.5 °F (36.9 °C) Oral 81 23 95 %   11/09/18 0854 135/85 98.5 °F (36.9 °C) Oral 74 19 93 %     I/O last 3 completed shifts: In: 0580 [P.O.:480; I.V.:2811]  Out: 2850 [Urine:2850]  No intake/output data recorded. GENERAL: alert and oriented times three; no apparent distress  HEENT: (+) scleral icterus; conjunctiva pink  PULM: Clear to auscultation bilaterally  CARDIAC: regular rate and rhythm, no murmurs  ABDOMEN: soft, non-distended. Diffusely tender, greatest RUQ. Bowel sounds normo-active. EXT: no cyanosis or edema  NEURO: alert and oriented.       Assessment:     Active Problems:    Abdominal pain    Left sided abdominal pain    LFT elevation    Right upper quadrant abdominal

## 2018-11-09 NOTE — PROGRESS NOTES
Reason for Visit: Elevation of transaminase, total bilirubin and right upper quadrant pain     HPI: Mr. Barbara Green is a 66 y.o. male who was seen and evaluated.  His abdominal pain is 3-4/10 to his right upper quadrant.  He describes as a dull ache.  No radiation to his back.  He has mild nausea this morning.  No emesis.  He had squirt of liquid brown bowel movement yesterday afternoon.  No blood in his stools or melena.  No fever or chills.  No heartburn or acid reflux.  He is tolerating clear liquids.  No excess belching or flatulence. His labs revealed CBC:  WBC 5.3, RBC 4.50, Hgb 14.4, Hct 45.0 and Platelets 129.  His liver panel Albumin 2.7, Total Bilirubin 1.7, Bilirubin direct 0.7, Indirect Bilirubin 1.0, Alkaline phosphatase 109, AST 39,  and Total Protein 4.9.  PT 13.5 and INR 1. 16.  His CMV and Navjot Barr labs are pending.  His second blood culture revealed no aerobic growth.       PMH:     Past Medical History:   Diagnosis Date    COPD [facility]chronic obstructive pulmonary disease) ([facility] [address]    Erectile dysfunction    H/O Doppler ultrasound 09/05/2018   LE arterial - left mid and distal femoral artery occluded, lt FANI shows mild-mod PVD    H/O echocardiogram 01/06/2016   EF60% mild MR, TR, pulm HTN    Hx of colonoscopy   7/11 C-scope - benign polyp x1    Hx of Doppler ultrasound 02/20/2018   Lower extremity doppler  Normal study.     Hypertension    Obesity    [facility]AD [facility]peripheral [facility] disease) ([facility]   10/10 FANI mild PAD left superficial femoral s/p left fem-pop bypass    Secondary erythrocytosis    Type II or unspecified type diabetes mellitus without mention of complication, not stated as uncontrolled       PSH:     Past Surgical History:   Procedure Laterality Date    APPENDECTOMY 2003    ATRIAL ABLATION SURGERY 05/23/2018   A-fib ablation    HERNIA REPAIR 5206   umbilical    IR FEMORAL POPLITEAL BYPASS GRAFT 01/2011   [place]       Medications:     Current Facility-Administered Medications icterus or jaundice. Skin: No rashes. Musculoskeletal: No trouble walking or standing, no joint pain, no muscle pain. Allergy/Immune System: No allergies, no frequent infections. Neurological: No memory difficulties, no temporary blindness, no difficulty speaking, no headaches, no numbness. Psychiatric: No depression, no suicidal ideation, no auditory hallucinations. Hematological/Lymphatic: No lymphadenopathy, no frequent nose bleeds, no easy bruising. Genitourinary: No penile discharge, no pain with urination, no trouble starting urinary stream, no hematuria. Objective     Physical Examination   Vital Signs: /80   Pulse 72   Temp 97.8 °F (36.6 °C) (Oral)   Resp 17   Ht 6' 6\" (1.981 m)   Wt (!) 343 lb 0.6 oz (155.6 kg)   SpO2 96%   BMI 39.64 kg/m²  Body mass index is 39.64 kg/m². General: The patient is a 66 y.o. male in no acute distress. EYE: EOMI, Gross visual field was normal. Pupils reactive, The conjunctive was normal, with no erythema. ENT: no lymphadenopathy, oropharynx is without erythema, edema, or exudates, and moist mucus membranes. The nasal mucosa, septum and turbinates were normal without inflammation or edema. Neck: There was no mass on palpitation, tracheal position was in the middle of the neck and there was no enlarged thyroid.  There was no JVD. Lungs: The respiratory was not in labor and the patient did not use accessory muscle. Clear to auscultation bilaterally, no wheeze/crackles. Cardiovascular: Regular rate and rhythm, normal S1 & S2, no murmurs, rubs or gallops appreciated. Peripheral pulses were normal and no tenderness. Abdomen: Soft, +tender, no rebound or guarding or peritoneal features, no masses, no hepatosplenomegaly. Extremities: upper and lower extremities were warm and dry, no clubbing, cyanosis, edema. Neuro: CN II-XII were intact grossly. Sensation was normal on all extremities and the muscle strength was normal and symmetry. Rectum: There was no fistular, fissure, external hemorrhoid, tenderness, abscess, erythema or discharge     Labs:   CBC   WBC   Date Value Ref Range Status   11/09/2018 5.3 4.0 - 10.5 K/CU MM Final     Hemoglobin   Date Value Ref Range Status   11/09/2018 14.4 13.5 - 18.0 GM/DL Final     Hematocrit   Date Value Ref Range Status   11/09/2018 45.0 42 - 52 % Final     MCV   Date Value Ref Range Status   11/09/2018 100.0 78 - 100 FL Final         Glucose   Date Value Ref Range Status   11/09/2018 86 70 - 99 MG/DL Final     CO2   Date Value Ref Range Status   11/09/2018 30 21 - 32 MMOL/L Final     BUN   Date Value Ref Range Status   11/09/2018 8 6 - 23 MG/DL Final     Lab Results   Component Value Date    11/09/2018    11/09/2018   AST 39 11/09/2018   AST 39 11/09/2018     No results found for: AMYLASE   Lab Results   Component Value Date   LIPASE 33 11/06/2018     No results found for: ESR   No components found for: CREACTIVEPR   Lab Results   Component Value Date   CB Negative 02/13/2017    No components found for: Areta Orts   No results found for: CEA   No components found for: Hammer Catrachita, OCCULTBLOODS, OCCBLD1, OCCBLD2, OCCBLD3, OCCULTBLOOD   Lab Results   Component Value Date   FERRITIN 3,698 11/07/2018     No results found for: HAV     Assessment and Plan:     1.  Abdominal pain to right upper quadrant associated with elevation of transaminase and total bilirubin likely due to cholecystitis and passing gallstones.  No fever or chills.  Surgeon has been consulted and will wait for the test results of the EBV, CMV and HSV before considering surgery.  Lab work is pending for Shar's disease and autoimmune hepatitis. I did not think that he had infectious hepatitis or other acute hepatitis because his liver chemistry has gone down very fast which does not fit to the pictures of hepatitis particular infectious hepatitis.   I though that his symptoms and abnormal liver chemistry were very likely due to cholecystitis and passing gallstones. Therefore, I highly recommended considering cholecystectomy before discharge. In addition, the MRCP does not show  Biliary duct obstruction or dilation and his bilirubin quickly went down to 1.7, I did not think that at this time she had biliary obstruction and likely does not need ERCP    2.  Altered mental status.  This has improved and might have been related to hypoxia from COPD.  Continue to follow with Pulmonologist.     3.  Leukocytosis improved.  He is on Cipro and Flagyl.  Blood cultures with no anaerobic growth.  Please continue to monitor CBC and continue supportive care. 4.  Nausea and vomiting.  Mild nausea improved with antiemetics might be secondary to gallbladder problems or hepatitis.  Please continue supportive care, IV fluids and treat symptomatically with antiemetics. Amrita Castellanos MD PhD [facility]   [address] [facility] [address]   0ffice: [phone]   Fax: [phone]

## 2018-11-10 LAB
ALBUMIN SERPL-MCNC: 3 GM/DL (ref 3.4–5)
ALBUMIN SERPL-MCNC: 3 GM/DL (ref 3.4–5)
ALP BLD-CCNC: 102 IU/L (ref 40–128)
ALP BLD-CCNC: 102 IU/L (ref 40–129)
ALT SERPL-CCNC: 77 U/L (ref 10–40)
ALT SERPL-CCNC: 77 U/L (ref 10–40)
ANION GAP SERPL CALCULATED.3IONS-SCNC: 7 MMOL/L (ref 4–16)
ANTI-NUCLEAR ANTIBODY (ANA): NORMAL
AST SERPL-CCNC: 34 IU/L (ref 15–37)
AST SERPL-CCNC: 34 IU/L (ref 15–37)
BILIRUB SERPL-MCNC: 1.2 MG/DL (ref 0–1)
BILIRUB SERPL-MCNC: 1.2 MG/DL (ref 0–1)
BILIRUBIN DIRECT: 0.8 MG/DL (ref 0–0.3)
BILIRUBIN, INDIRECT: 0.4 MG/DL (ref 0–0.7)
BUN BLDV-MCNC: 8 MG/DL (ref 6–23)
CALCIUM SERPL-MCNC: 7.5 MG/DL (ref 8.3–10.6)
CHLORIDE BLD-SCNC: 97 MMOL/L (ref 99–110)
CO2: 33 MMOL/L (ref 21–32)
CREAT SERPL-MCNC: 0.5 MG/DL (ref 0.9–1.3)
EBV EARLY ANTIGEN AB, IGG: 60.6
EBV NUCLEAR AG AB: 435
EPSTEIN-BARR VCA IGG: 562
EPSTEIN-BARR VCA IGM: <10
GFR AFRICAN AMERICAN: >60 ML/MIN/1.73M2
GFR NON-AFRICAN AMERICAN: >60 ML/MIN/1.73M2
GLUCOSE BLD-MCNC: 100 MG/DL (ref 70–99)
GLUCOSE BLD-MCNC: 102 MG/DL (ref 70–99)
GLUCOSE BLD-MCNC: 177 MG/DL (ref 70–99)
GLUCOSE BLD-MCNC: 84 MG/DL (ref 70–99)
GLUCOSE BLD-MCNC: 92 MG/DL (ref 70–99)
GLUCOSE BLD-MCNC: 97 MG/DL (ref 70–99)
HCT VFR BLD CALC: 45.6 % (ref 42–52)
HEMOGLOBIN: 14.9 GM/DL (ref 13.5–18)
HEPATITIS E IGM AB: NEGATIVE
HSVI/II COMB AB IGM: 0.29
INR BLD: 1.12 INDEX
MCH RBC QN AUTO: 32 PG (ref 27–31)
MCHC RBC AUTO-ENTMCNC: 32.7 % (ref 32–36)
MCV RBC AUTO: 98.1 FL (ref 78–100)
PDW BLD-RTO: 13.2 % (ref 11.7–14.9)
PLATELET # BLD: 126 K/CU MM (ref 140–440)
PMV BLD AUTO: 11 FL (ref 7.5–11.1)
POTASSIUM SERPL-SCNC: 3.7 MMOL/L (ref 3.5–5.1)
PROTHROMBIN TIME: 12.8 SECONDS (ref 9.12–12.5)
RBC # BLD: 4.65 M/CU MM (ref 4.6–6.2)
SMOOTH MUSCLE AB IGG TITER: NORMAL
SODIUM BLD-SCNC: 137 MMOL/L (ref 135–145)
TOTAL PROTEIN: 4.8 GM/DL (ref 6.4–8.2)
TOTAL PROTEIN: 4.8 GM/DL (ref 6.4–8.2)
WBC # BLD: 4.7 K/CU MM (ref 4–10.5)

## 2018-11-10 PROCEDURE — 2500000003 HC RX 250 WO HCPCS: Performed by: INTERNAL MEDICINE

## 2018-11-10 PROCEDURE — 99233 SBSQ HOSP IP/OBS HIGH 50: CPT | Performed by: INTERNAL MEDICINE

## 2018-11-10 PROCEDURE — 85027 COMPLETE CBC AUTOMATED: CPT

## 2018-11-10 PROCEDURE — 1200000000 HC SEMI PRIVATE

## 2018-11-10 PROCEDURE — 6360000002 HC RX W HCPCS: Performed by: INTERNAL MEDICINE

## 2018-11-10 PROCEDURE — 82248 BILIRUBIN DIRECT: CPT

## 2018-11-10 PROCEDURE — 6370000000 HC RX 637 (ALT 250 FOR IP): Performed by: HOSPITALIST

## 2018-11-10 PROCEDURE — 94761 N-INVAS EAR/PLS OXIMETRY MLT: CPT

## 2018-11-10 PROCEDURE — 80053 COMPREHEN METABOLIC PANEL: CPT

## 2018-11-10 PROCEDURE — 99232 SBSQ HOSP IP/OBS MODERATE 35: CPT | Performed by: INTERNAL MEDICINE

## 2018-11-10 PROCEDURE — 2580000003 HC RX 258: Performed by: INTERNAL MEDICINE

## 2018-11-10 PROCEDURE — 94150 VITAL CAPACITY TEST: CPT

## 2018-11-10 PROCEDURE — 2700000000 HC OXYGEN THERAPY PER DAY

## 2018-11-10 PROCEDURE — 85610 PROTHROMBIN TIME: CPT

## 2018-11-10 PROCEDURE — 6370000000 HC RX 637 (ALT 250 FOR IP): Performed by: INTERNAL MEDICINE

## 2018-11-10 PROCEDURE — 99232 SBSQ HOSP IP/OBS MODERATE 35: CPT | Performed by: SURGERY

## 2018-11-10 PROCEDURE — 36415 COLL VENOUS BLD VENIPUNCTURE: CPT

## 2018-11-10 PROCEDURE — 82962 GLUCOSE BLOOD TEST: CPT

## 2018-11-10 RX ORDER — IPRATROPIUM BROMIDE AND ALBUTEROL SULFATE 2.5; .5 MG/3ML; MG/3ML
1 SOLUTION RESPIRATORY (INHALATION) EVERY 4 HOURS PRN
Status: DISCONTINUED | OUTPATIENT
Start: 2018-11-10 | End: 2018-11-13 | Stop reason: HOSPADM

## 2018-11-10 RX ADMIN — INSULIN LISPRO 1 UNITS: 100 INJECTION, SOLUTION INTRAVENOUS; SUBCUTANEOUS at 13:48

## 2018-11-10 RX ADMIN — ASPIRIN 81 MG: 81 TABLET, COATED ORAL at 10:48

## 2018-11-10 RX ADMIN — METRONIDAZOLE 500 MG: 500 INJECTION, SOLUTION INTRAVENOUS at 12:25

## 2018-11-10 RX ADMIN — CILOSTAZOL 50 MG: 50 TABLET ORAL at 10:48

## 2018-11-10 RX ADMIN — CIPROFLOXACIN 400 MG: 2 INJECTION, SOLUTION INTRAVENOUS at 20:20

## 2018-11-10 RX ADMIN — ENOXAPARIN SODIUM 40 MG: 40 INJECTION SUBCUTANEOUS at 10:49

## 2018-11-10 RX ADMIN — AMIODARONE HYDROCHLORIDE 200 MG: 200 TABLET ORAL at 10:49

## 2018-11-10 RX ADMIN — ATORVASTATIN CALCIUM 40 MG: 40 TABLET, FILM COATED ORAL at 10:48

## 2018-11-10 RX ADMIN — IBUPROFEN 400 MG: 400 TABLET ORAL at 05:05

## 2018-11-10 RX ADMIN — CILOSTAZOL 50 MG: 50 TABLET ORAL at 20:20

## 2018-11-10 RX ADMIN — METRONIDAZOLE 500 MG: 500 INJECTION, SOLUTION INTRAVENOUS at 22:11

## 2018-11-10 RX ADMIN — SODIUM CHLORIDE: 9 INJECTION, SOLUTION INTRAVENOUS at 19:46

## 2018-11-10 RX ADMIN — CIPROFLOXACIN 400 MG: 2 INJECTION, SOLUTION INTRAVENOUS at 11:10

## 2018-11-10 RX ADMIN — METRONIDAZOLE 500 MG: 500 INJECTION, SOLUTION INTRAVENOUS at 05:02

## 2018-11-10 RX ADMIN — DILTIAZEM HYDROCHLORIDE 240 MG: 120 CAPSULE, COATED, EXTENDED RELEASE ORAL at 10:47

## 2018-11-10 ASSESSMENT — PAIN SCALES - GENERAL
PAINLEVEL_OUTOF10: 3

## 2018-11-10 ASSESSMENT — PAIN DESCRIPTION - FREQUENCY
FREQUENCY: CONTINUOUS
FREQUENCY: INTERMITTENT
FREQUENCY: CONTINUOUS

## 2018-11-10 ASSESSMENT — ENCOUNTER SYMPTOMS
APNEA: 0
BACK PAIN: 0
PHOTOPHOBIA: 0
CHOKING: 0
SORE THROAT: 0
EYE ITCHING: 0
ANAL BLEEDING: 0
RECTAL PAIN: 0
STRIDOR: 0
EYE REDNESS: 0
CONSTIPATION: 0
ABDOMINAL PAIN: 1
COLOR CHANGE: 0

## 2018-11-10 ASSESSMENT — PAIN DESCRIPTION - LOCATION
LOCATION: BACK;HIP
LOCATION: ABDOMEN
LOCATION: ABDOMEN

## 2018-11-10 ASSESSMENT — PAIN DESCRIPTION - ORIENTATION
ORIENTATION: MID
ORIENTATION: RIGHT

## 2018-11-10 ASSESSMENT — PAIN DESCRIPTION - ONSET: ONSET: GRADUAL

## 2018-11-10 ASSESSMENT — PAIN DESCRIPTION - PAIN TYPE
TYPE: ACUTE PAIN
TYPE: ACUTE PAIN

## 2018-11-10 ASSESSMENT — PAIN DESCRIPTION - DESCRIPTORS
DESCRIPTORS: ACHING
DESCRIPTORS: ACHING;CONSTANT
DESCRIPTORS: ACHING

## 2018-11-10 NOTE — PROGRESS NOTES
Reason for Visit: He had concerns including Abdominal Pain. HPI: He still complained constant RUQ abd pain (3/10) and he tolreate with regular diet. He had passed gas and denied vomiting. PMH:    Past Medical History:   Diagnosis Date    COPD [facility]chronic obstructive pulmonary disease) ([facility] [address]     Erectile dysfunction     H/O Doppler ultrasound 09/05/2018    LE arterial - left mid and distal femoral artery occluded, lt FANI shows mild-mod PVD    H/O echocardiogram 01/06/2016    EF60% mild MR, TR, pulm HTN    Hx of colonoscopy     7/11 C-scope - benign polyp x1    Hx of Doppler ultrasound 02/20/2018    Lower extremity doppler  Normal study.     Hypertension     Obesity     PAD [facility]peripheral artery disease) ([facility])     10/10 FANI mild PAD left superficial femoral s/p left fem-pop bypass    Secondary erythrocytosis     Type II or unspecified type diabetes mellitus without mention of complication, not stated as uncontrolled        PSH:    Past Surgical History:   Procedure Laterality Date    APPENDECTOMY  2003    ATRIAL ABLATION SURGERY  05/23/2018    A-fib ablation     HERNIA REPAIR  8907    umbilical    IR FEMORAL POPLITEAL BYPASS GRAFT  01/2011    [place]       Medications:    Current Facility-Administered Medications   Medication Dose Route Frequency Provider Last Rate Last Dose    ipratropium-albuterol (DUONEB) nebulizer solution 1 ampule  1 ampule Inhalation Q4H PRN Ruby Ireland MD        ibuprofen (ADVIL;MOTRIN) tablet 400 mg  400 mg Oral Q6H PRN Yaneth Hernandez MD   400 mg at 11/10/18 0505    albuterol (PROVENTIL) nebulizer solution 2.5 mg  2.5 mg Nebulization Q4H PRN Patrick Murphy MD        amiodarone (CORDARONE) tablet 200 mg  200 mg Oral Daily Patrick Murphy MD   200 mg at 11/10/18 1049    aspirin EC tablet 81 mg  81 mg Oral Daily Patrick Murphy MD   81 mg at 11/10/18 1048    atorvastatin (LIPITOR) tablet 40 mg  40 mg Oral Daily Patrick Murphy MD   40 ALT 77 11/10/2018    ALT 77 11/10/2018    AST 34 11/10/2018    AST 34 11/10/2018     No results found for: AMYLASE  Lab Results   Component Value Date    LIPASE 33 11/06/2018     No results found for: ESR  No components found for: CREACTIVEPR  Lab Results   Component Value Date    CB None Detected 11/07/2018    No components found for: ANTISMA, ANTIMITO  No results found for: CEA  No components found for: Reyes Spry, OCCBLD1, OCCBLD2, OCCBLD3, OCCULTBLOOD  Lab Results   Component Value Date    FERRITIN 3,698 11/07/2018     No results found for: HAV    Assessment     Assessment and Plan:      1.  Abdominal pain to right upper quadrant associated with elevation of transaminase and total bilirubin likely due to cholecystitis and passing gallstones. I recommended a gallbladder surgery before he is discharged form the hospital     I did not think that he had infectious hepatitis or other acute hepatitis because his liver chemistry has gone down very fast which does not fit to the pictures of hepatitis particular infectious hepatitis. I though that his symptoms and abnormal liver chemistry were very likely due to cholecystitis and passing gallstones. Therefore, I highly recommended considering cholecystectomy before discharge. In addition, the MRCP does not show  Biliary duct obstruction or dilation and his bilirubin quickly went down to 1.2, I did not think that at this time she had biliary obstruction and likely does not need ERCP    2.  Altered mental status.  This has improved and might have been related to hypoxia from COPD.  Continue to follow with Pulmonologist.     3. Leukocytosis has resolved.  He is on Cipro and Flagyl.  Blood cultures with no anaerobic growth.  Please continue to monitor CBC and continue supportive care.      4.  Nausea and vomiting.  Mild nausea improved with antiemetics might be secondary to gallbladder problems or hepatitis.  Please continue supportive

## 2018-11-10 NOTE — PROGRESS NOTES
Patient Room #: 9358/6073-D  Patient Name: Maria Negrete NIV Evaluation / Orders  1. Notify Pulmonary Diagnostics of order to evaluate for home NIV. 2. Perform the following tests at any point before discharge. [x] Perform an Overnight Oximetry while the patient is on at least                  2 lpm of oxygen. The saturation level must be <  88% for > 5                   cumulative minutes to qualify. [x] Arterial puncture (ABG) for blood gas analysis done while awake. ·    Qualifying result is a PaCO2 >   52 mmHg    3. [x]  I have documented in a progress note that LINA is not the primary cause of hypercapnia in this patient. CPAP will not effectively treat this patient hypercapnia. BIPA Therapy will benefit and more effectively treat the hypercapnia. Results Documentation    (Attach a copy of Reports)  1. ABG Results       Date 11/7/2018   PaCO2 _68  mmHg on 2 Lnc  lpm oxygen    2. Oximetry Level _88 % for 4.48 minutes on _2 lpm oxygen    3. [] Patient Qualifies      [x] Patient Does NOT qualify      Complete order below:  Diagnosis _________________________________________           Length of Need: [] Lifetime  Home NIV () at:      Inspiratory Setting __________ cm H2O         Expiratory Setting _________ cm H2O      Therapist Signature: __________________________________Date: _____________      Physician Signature:   Electronically Signed in EMR_______  Date: _____________      Physician Printed Name: ______________________________ NPI #: _________[id number]

## 2018-11-10 NOTE — PROGRESS NOTES
Component Value Date    WBC 4.7 11/10/2018    HGB 14.9 11/10/2018    HCT 45.6 11/10/2018    MCV 98.1 11/10/2018     (L) 11/10/2018     Lab Results   Component Value Date     11/10/2018    K 3.7 11/10/2018    CL 97 (L) 11/10/2018    CO2 33 (H) 11/10/2018    BUN 8 11/10/2018    CREATININE 0.5 (L) 11/10/2018    GLUCOSE 102 (H) 11/10/2018    CALCIUM 7.5 (L) 11/10/2018    PROT 4.8 (L) 11/10/2018    PROT 4.8 (L) 11/10/2018    LABALBU 3.0 (L) 11/10/2018    LABALBU 3.0 (L) 11/10/2018    BILITOT 1.2 (H) 11/10/2018    BILITOT 1.2 (H) 11/10/2018    ALKPHOS 102 11/10/2018    ALKPHOS 102 11/10/2018    AST 34 11/10/2018    AST 34 11/10/2018    ALT 77 (H) 11/10/2018    ALT 77 (H) 11/10/2018    LABGLOM >60 11/10/2018    GFRAA >60 11/10/2018    AGRATIO 1.7 10/23/2018    GLOB 2.6 10/23/2018       Assessment:     Patient Active Problem List:     Hypertension     Hypercholesteremia     Tobacco use     Anxiety     Type 2 diabetes, uncontrolled, with neuropathy (HCC)     Benign essential HTN     Simple chronic bronchitis (HCC)     Macular degeneration, dry     PVD (peripheral vascular disease) (HCC)     VT (ventricular tachycardia) (HCC)     Left wrist sprain     Hypoxia     PAD (peripheral artery disease) (HCC)     Class 3 obesity due to excess calories without serious comorbidity with body mass index (BMI) of 40.0 to 44.9 in adult     Persistent atrial fibrillation (HCC)     Swelling of lower extremity     S/P ablation of atrial fibrillation     PVD (peripheral vascular disease) with claudication (HCC)     Abdominal pain     Left sided abdominal pain     LFT elevation     Right upper quadrant abdominal pain    Plan:     Pt is feeling better  LFT cont to improve. Respiratory status is also better  Care discussed with Dr Santana Amanda and plan for lap delmer on this admission.   Will wait for EBV and CMV to result    Rosemary Singh MD

## 2018-11-11 LAB
ALBUMIN SERPL-MCNC: 3.1 GM/DL (ref 3.4–5)
ALBUMIN SERPL-MCNC: 3.1 GM/DL (ref 3.4–5)
ALP BLD-CCNC: 101 IU/L (ref 40–128)
ALP BLD-CCNC: 101 IU/L (ref 40–129)
ALT SERPL-CCNC: 70 U/L (ref 10–40)
ALT SERPL-CCNC: 70 U/L (ref 10–40)
ANION GAP SERPL CALCULATED.3IONS-SCNC: 7 MMOL/L (ref 4–16)
AST SERPL-CCNC: 40 IU/L (ref 15–37)
AST SERPL-CCNC: 40 IU/L (ref 15–37)
BILIRUB SERPL-MCNC: 1.1 MG/DL (ref 0–1)
BILIRUB SERPL-MCNC: 1.1 MG/DL (ref 0–1)
BILIRUBIN DIRECT: 0.5 MG/DL (ref 0–0.3)
BILIRUBIN, INDIRECT: 0.6 MG/DL (ref 0–0.7)
BUN BLDV-MCNC: 7 MG/DL (ref 6–23)
CALCIUM SERPL-MCNC: 7.8 MG/DL (ref 8.3–10.6)
CHLORIDE BLD-SCNC: 96 MMOL/L (ref 99–110)
CO2: 35 MMOL/L (ref 21–32)
CREAT SERPL-MCNC: 0.5 MG/DL (ref 0.9–1.3)
GFR AFRICAN AMERICAN: >60 ML/MIN/1.73M2
GFR NON-AFRICAN AMERICAN: >60 ML/MIN/1.73M2
GLUCOSE BLD-MCNC: 105 MG/DL (ref 70–99)
GLUCOSE BLD-MCNC: 110 MG/DL (ref 70–99)
GLUCOSE BLD-MCNC: 125 MG/DL (ref 70–99)
HCT VFR BLD CALC: 46.7 % (ref 42–52)
HEMOGLOBIN: 15 GM/DL (ref 13.5–18)
INR BLD: 1.1 INDEX
MCH RBC QN AUTO: 31.8 PG (ref 27–31)
MCHC RBC AUTO-ENTMCNC: 32.1 % (ref 32–36)
MCV RBC AUTO: 98.9 FL (ref 78–100)
PDW BLD-RTO: 13.2 % (ref 11.7–14.9)
PLATELET # BLD: 169 K/CU MM (ref 140–440)
PMV BLD AUTO: 10.9 FL (ref 7.5–11.1)
POTASSIUM SERPL-SCNC: 3.7 MMOL/L (ref 3.5–5.1)
PROTHROMBIN TIME: 12.8 SECONDS (ref 9.12–12.5)
RBC # BLD: 4.72 M/CU MM (ref 4.6–6.2)
SODIUM BLD-SCNC: 138 MMOL/L (ref 135–145)
TOTAL PROTEIN: 5.1 GM/DL (ref 6.4–8.2)
TOTAL PROTEIN: 5.1 GM/DL (ref 6.4–8.2)
WBC # BLD: 6.7 K/CU MM (ref 4–10.5)

## 2018-11-11 PROCEDURE — 82962 GLUCOSE BLOOD TEST: CPT

## 2018-11-11 PROCEDURE — 85610 PROTHROMBIN TIME: CPT

## 2018-11-11 PROCEDURE — 6360000002 HC RX W HCPCS: Performed by: INTERNAL MEDICINE

## 2018-11-11 PROCEDURE — 36415 COLL VENOUS BLD VENIPUNCTURE: CPT

## 2018-11-11 PROCEDURE — 80053 COMPREHEN METABOLIC PANEL: CPT

## 2018-11-11 PROCEDURE — 2500000003 HC RX 250 WO HCPCS: Performed by: INTERNAL MEDICINE

## 2018-11-11 PROCEDURE — 6370000000 HC RX 637 (ALT 250 FOR IP): Performed by: INTERNAL MEDICINE

## 2018-11-11 PROCEDURE — 1200000000 HC SEMI PRIVATE

## 2018-11-11 PROCEDURE — 99232 SBSQ HOSP IP/OBS MODERATE 35: CPT | Performed by: INTERNAL MEDICINE

## 2018-11-11 PROCEDURE — 2580000003 HC RX 258: Performed by: INTERNAL MEDICINE

## 2018-11-11 PROCEDURE — 82248 BILIRUBIN DIRECT: CPT

## 2018-11-11 PROCEDURE — 85027 COMPLETE CBC AUTOMATED: CPT

## 2018-11-11 PROCEDURE — 99233 SBSQ HOSP IP/OBS HIGH 50: CPT | Performed by: INTERNAL MEDICINE

## 2018-11-11 PROCEDURE — 99232 SBSQ HOSP IP/OBS MODERATE 35: CPT | Performed by: SURGERY

## 2018-11-11 RX ADMIN — CILOSTAZOL 50 MG: 50 TABLET ORAL at 20:12

## 2018-11-11 RX ADMIN — ENOXAPARIN SODIUM 40 MG: 40 INJECTION SUBCUTANEOUS at 09:59

## 2018-11-11 RX ADMIN — AMIODARONE HYDROCHLORIDE 200 MG: 200 TABLET ORAL at 09:56

## 2018-11-11 RX ADMIN — ONDANSETRON HYDROCHLORIDE 4 MG: 2 INJECTION, SOLUTION INTRAMUSCULAR; INTRAVENOUS at 01:57

## 2018-11-11 RX ADMIN — CIPROFLOXACIN 400 MG: 2 INJECTION, SOLUTION INTRAVENOUS at 10:05

## 2018-11-11 RX ADMIN — METRONIDAZOLE 500 MG: 500 INJECTION, SOLUTION INTRAVENOUS at 04:59

## 2018-11-11 RX ADMIN — CIPROFLOXACIN 400 MG: 2 INJECTION, SOLUTION INTRAVENOUS at 20:12

## 2018-11-11 RX ADMIN — DILTIAZEM HYDROCHLORIDE 240 MG: 120 CAPSULE, COATED, EXTENDED RELEASE ORAL at 09:53

## 2018-11-11 RX ADMIN — CILOSTAZOL 50 MG: 50 TABLET ORAL at 09:56

## 2018-11-11 RX ADMIN — SODIUM CHLORIDE: 9 INJECTION, SOLUTION INTRAVENOUS at 19:24

## 2018-11-11 RX ADMIN — METRONIDAZOLE 500 MG: 500 INJECTION, SOLUTION INTRAVENOUS at 13:06

## 2018-11-11 RX ADMIN — ATORVASTATIN CALCIUM 40 MG: 40 TABLET, FILM COATED ORAL at 09:54

## 2018-11-11 RX ADMIN — METRONIDAZOLE 500 MG: 500 INJECTION, SOLUTION INTRAVENOUS at 22:30

## 2018-11-11 RX ADMIN — ASPIRIN 81 MG: 81 TABLET, COATED ORAL at 09:52

## 2018-11-11 ASSESSMENT — PAIN DESCRIPTION - DESCRIPTORS
DESCRIPTORS: ACHING;CONSTANT;DULL
DESCRIPTORS: ACHING;CONSTANT
DESCRIPTORS: ACHING;CONSTANT;DULL

## 2018-11-11 ASSESSMENT — PAIN DESCRIPTION - ORIENTATION
ORIENTATION: RIGHT

## 2018-11-11 ASSESSMENT — PAIN DESCRIPTION - FREQUENCY
FREQUENCY: INTERMITTENT
FREQUENCY: CONTINUOUS

## 2018-11-11 ASSESSMENT — PAIN SCALES - GENERAL
PAINLEVEL_OUTOF10: 2
PAINLEVEL_OUTOF10: 3
PAINLEVEL_OUTOF10: 2
PAINLEVEL_OUTOF10: 2
PAINLEVEL_OUTOF10: 0
PAINLEVEL_OUTOF10: 0
PAINLEVEL_OUTOF10: 2
PAINLEVEL_OUTOF10: 2

## 2018-11-11 ASSESSMENT — ENCOUNTER SYMPTOMS
EYE REDNESS: 0
ANAL BLEEDING: 0
STRIDOR: 0
COLOR CHANGE: 0
APNEA: 0
CONSTIPATION: 0
EYE ITCHING: 0
BACK PAIN: 0
ABDOMINAL PAIN: 1
SORE THROAT: 0
PHOTOPHOBIA: 0
RECTAL PAIN: 0
CHOKING: 0

## 2018-11-11 ASSESSMENT — PAIN DESCRIPTION - LOCATION
LOCATION: ABDOMEN

## 2018-11-11 NOTE — PROGRESS NOTES
pain improving slightly to same, but still RUQ pain   -Respiratory status improved  -Continue low fat diet. NPO at midnight. -CMV and EBV PCR labs still pending today.            Ilan Hylton MD

## 2018-11-11 NOTE — PROGRESS NOTES
ALT 70 11/11/2018    ALT 70 11/11/2018    AST 40 11/11/2018    AST 40 11/11/2018     No results found for: AMYLASE  Lab Results   Component Value Date    LIPASE 33 11/06/2018     No results found for: ESR  No components found for: CREACTIVEPR  Lab Results   Component Value Date    CB None Detected 11/07/2018    No components found for: ANTISMA, ANTIMITO  No results found for: CEA  No components found for: Julee Olp, OCCBLD1, OCCBLD2, OCCBLD3, OCCULTBLOOD  Lab Results   Component Value Date    FERRITIN 3,698 11/07/2018     No results found for: HAV    Assessment     Assessment and Plan:    1.  Abdominal pain to right upper quadrant associated with elevation of transaminase and total bilirubin likely due to cholecystitis and passing gallstones. He will have a gallbladder surgery tomorrow       2.  Altered mental status.  This has improved and might have been related to hypoxia from COPD.  Continue to follow with Pulmonologist.     3. Leukocytosis has resolved.  He is on Cipro and Flagyl.  Blood cultures with no anaerobic growth.  Please continue to monitor CBC and continue supportive care. 4.  Nausea and vomiting.  Mild nausea improved with antiemetics might be secondary to gallbladder problems or hepatitis.  Please continue supportive care, IV fluids and treat symptomatically with antiemetics. 5. Elevation of LFT was likely due to pass gallstones and cholecystitis. His LFT continued improving. He estevan lhave a surgery tomorrow. We are going to sing off the case    Winnie Marquez MD PhD [facility]  [address] [place] [address] [address]  0ffice: [phone]  Fax: [phone]

## 2018-11-12 ENCOUNTER — ANESTHESIA EVENT (OUTPATIENT)
Dept: OPERATING ROOM | Age: 63
DRG: 987 | End: 2018-11-12
Payer: MEDICARE

## 2018-11-12 ENCOUNTER — ANESTHESIA (OUTPATIENT)
Dept: OPERATING ROOM | Age: 63
DRG: 987 | End: 2018-11-12
Payer: MEDICARE

## 2018-11-12 VITALS
SYSTOLIC BLOOD PRESSURE: 158 MMHG | OXYGEN SATURATION: 94 % | TEMPERATURE: 96.1 F | DIASTOLIC BLOOD PRESSURE: 77 MMHG | RESPIRATION RATE: 1 BRPM

## 2018-11-12 LAB
ALBUMIN SERPL-MCNC: 3.1 GM/DL (ref 3.4–5)
ALBUMIN SERPL-MCNC: 3.1 GM/DL (ref 3.4–5)
ALP BLD-CCNC: 90 IU/L (ref 40–128)
ALP BLD-CCNC: 90 IU/L (ref 40–129)
ALT SERPL-CCNC: 59 U/L (ref 10–40)
ALT SERPL-CCNC: 59 U/L (ref 10–40)
ANION GAP SERPL CALCULATED.3IONS-SCNC: 7 MMOL/L (ref 4–16)
AST SERPL-CCNC: 34 IU/L (ref 15–37)
AST SERPL-CCNC: 34 IU/L (ref 15–37)
BILIRUB SERPL-MCNC: 0.9 MG/DL (ref 0–1)
BILIRUB SERPL-MCNC: 0.9 MG/DL (ref 0–1)
BILIRUBIN DIRECT: 0.5 MG/DL (ref 0–0.3)
BILIRUBIN, INDIRECT: 0.4 MG/DL (ref 0–0.7)
BUN BLDV-MCNC: 12 MG/DL (ref 6–23)
CALCIUM SERPL-MCNC: 7.9 MG/DL (ref 8.3–10.6)
CHLORIDE BLD-SCNC: 99 MMOL/L (ref 99–110)
CMV SOURCE: NORMAL
CO2: 35 MMOL/L (ref 21–32)
CREAT SERPL-MCNC: 0.6 MG/DL (ref 0.9–1.3)
CULTURE: NORMAL
CULTURE: NORMAL
CYTOMEGALOVIRUS PCR DETECTION: NOT DETECTED
GFR AFRICAN AMERICAN: >60 ML/MIN/1.73M2
GFR NON-AFRICAN AMERICAN: >60 ML/MIN/1.73M2
GLUCOSE BLD-MCNC: 111 MG/DL (ref 70–99)
GLUCOSE BLD-MCNC: 269 MG/DL (ref 70–99)
GLUCOSE BLD-MCNC: 85 MG/DL (ref 70–99)
GLUCOSE BLD-MCNC: 87 MG/DL (ref 70–99)
GLUCOSE BLD-MCNC: 88 MG/DL (ref 70–99)
GLUCOSE BLD-MCNC: 93 MG/DL (ref 70–99)
HCT VFR BLD CALC: 45 % (ref 42–52)
HEMOGLOBIN: 14.6 GM/DL (ref 13.5–18)
INR BLD: 1.07 INDEX
Lab: NORMAL
Lab: NORMAL
MCH RBC QN AUTO: 31.9 PG (ref 27–31)
MCHC RBC AUTO-ENTMCNC: 32.4 % (ref 32–36)
MCV RBC AUTO: 98.5 FL (ref 78–100)
PDW BLD-RTO: 13.1 % (ref 11.7–14.9)
PLATELET # BLD: 169 K/CU MM (ref 140–440)
PMV BLD AUTO: 10.4 FL (ref 7.5–11.1)
POTASSIUM SERPL-SCNC: 4 MMOL/L (ref 3.5–5.1)
PROTHROMBIN TIME: 12.4 SECONDS (ref 9.12–12.5)
RBC # BLD: 4.57 M/CU MM (ref 4.6–6.2)
REPORT STATUS: NORMAL
REPORT STATUS: NORMAL
SODIUM BLD-SCNC: 141 MMOL/L (ref 135–145)
SPECIMEN: NORMAL
SPECIMEN: NORMAL
TOTAL PROTEIN: 5.1 GM/DL (ref 6.4–8.2)
TOTAL PROTEIN: 5.1 GM/DL (ref 6.4–8.2)
WBC # BLD: 7.6 K/CU MM (ref 4–10.5)

## 2018-11-12 PROCEDURE — 2500000003 HC RX 250 WO HCPCS: Performed by: NURSE ANESTHETIST, CERTIFIED REGISTERED

## 2018-11-12 PROCEDURE — 3600000004 HC SURGERY LEVEL 4 BASE: Performed by: SURGERY

## 2018-11-12 PROCEDURE — 1200000000 HC SEMI PRIVATE

## 2018-11-12 PROCEDURE — 6360000002 HC RX W HCPCS: Performed by: NURSE ANESTHETIST, CERTIFIED REGISTERED

## 2018-11-12 PROCEDURE — 85610 PROTHROMBIN TIME: CPT

## 2018-11-12 PROCEDURE — 85027 COMPLETE CBC AUTOMATED: CPT

## 2018-11-12 PROCEDURE — 2500000003 HC RX 250 WO HCPCS: Performed by: SURGERY

## 2018-11-12 PROCEDURE — 36415 COLL VENOUS BLD VENIPUNCTURE: CPT

## 2018-11-12 PROCEDURE — 2580000003 HC RX 258: Performed by: NURSE ANESTHETIST, CERTIFIED REGISTERED

## 2018-11-12 PROCEDURE — 47562 LAPAROSCOPIC CHOLECYSTECTOMY: CPT | Performed by: SURGERY

## 2018-11-12 PROCEDURE — 6370000000 HC RX 637 (ALT 250 FOR IP): Performed by: INTERNAL MEDICINE

## 2018-11-12 PROCEDURE — 0FT44ZZ RESECTION OF GALLBLADDER, PERCUTANEOUS ENDOSCOPIC APPROACH: ICD-10-PCS | Performed by: SURGERY

## 2018-11-12 PROCEDURE — 94761 N-INVAS EAR/PLS OXIMETRY MLT: CPT

## 2018-11-12 PROCEDURE — 2720000010 HC SURG SUPPLY STERILE: Performed by: SURGERY

## 2018-11-12 PROCEDURE — 82248 BILIRUBIN DIRECT: CPT

## 2018-11-12 PROCEDURE — C1773 RET DEV, INSERTABLE: HCPCS | Performed by: SURGERY

## 2018-11-12 PROCEDURE — 94640 AIRWAY INHALATION TREATMENT: CPT

## 2018-11-12 PROCEDURE — 3700000001 HC ADD 15 MINUTES (ANESTHESIA): Performed by: SURGERY

## 2018-11-12 PROCEDURE — 94660 CPAP INITIATION&MGMT: CPT

## 2018-11-12 PROCEDURE — 80053 COMPREHEN METABOLIC PANEL: CPT

## 2018-11-12 PROCEDURE — 6360000002 HC RX W HCPCS: Performed by: INTERNAL MEDICINE

## 2018-11-12 PROCEDURE — 2500000003 HC RX 250 WO HCPCS: Performed by: INTERNAL MEDICINE

## 2018-11-12 PROCEDURE — 2700000000 HC OXYGEN THERAPY PER DAY

## 2018-11-12 PROCEDURE — 3700000000 HC ANESTHESIA ATTENDED CARE: Performed by: SURGERY

## 2018-11-12 PROCEDURE — 7100000000 HC PACU RECOVERY - FIRST 15 MIN: Performed by: SURGERY

## 2018-11-12 PROCEDURE — 94150 VITAL CAPACITY TEST: CPT

## 2018-11-12 PROCEDURE — 2709999900 HC NON-CHARGEABLE SUPPLY: Performed by: SURGERY

## 2018-11-12 PROCEDURE — 7100000001 HC PACU RECOVERY - ADDTL 15 MIN: Performed by: SURGERY

## 2018-11-12 PROCEDURE — 6360000002 HC RX W HCPCS: Performed by: ANESTHESIOLOGY

## 2018-11-12 PROCEDURE — 99232 SBSQ HOSP IP/OBS MODERATE 35: CPT | Performed by: INTERNAL MEDICINE

## 2018-11-12 PROCEDURE — 6370000000 HC RX 637 (ALT 250 FOR IP): Performed by: SURGERY

## 2018-11-12 PROCEDURE — 82962 GLUCOSE BLOOD TEST: CPT

## 2018-11-12 PROCEDURE — 3600000014 HC SURGERY LEVEL 4 ADDTL 15MIN: Performed by: SURGERY

## 2018-11-12 PROCEDURE — 88304 TISSUE EXAM BY PATHOLOGIST: CPT

## 2018-11-12 RX ORDER — BUPIVACAINE HYDROCHLORIDE 5 MG/ML
INJECTION, SOLUTION EPIDURAL; INTRACAUDAL
Status: COMPLETED | OUTPATIENT
Start: 2018-11-12 | End: 2018-11-12

## 2018-11-12 RX ORDER — PROPOFOL 10 MG/ML
INJECTION, EMULSION INTRAVENOUS PRN
Status: DISCONTINUED | OUTPATIENT
Start: 2018-11-12 | End: 2018-11-12 | Stop reason: SDUPTHER

## 2018-11-12 RX ORDER — SODIUM CHLORIDE 9 MG/ML
INJECTION, SOLUTION INTRAVENOUS CONTINUOUS PRN
Status: DISCONTINUED | OUTPATIENT
Start: 2018-11-12 | End: 2018-11-12 | Stop reason: SDUPTHER

## 2018-11-12 RX ORDER — HYDROMORPHONE HCL 110MG/55ML
PATIENT CONTROLLED ANALGESIA SYRINGE INTRAVENOUS PRN
Status: DISCONTINUED | OUTPATIENT
Start: 2018-11-12 | End: 2018-11-12 | Stop reason: SDUPTHER

## 2018-11-12 RX ORDER — IPRATROPIUM BROMIDE AND ALBUTEROL SULFATE 2.5; .5 MG/3ML; MG/3ML
1 SOLUTION RESPIRATORY (INHALATION)
Status: DISCONTINUED | OUTPATIENT
Start: 2018-11-12 | End: 2018-11-13 | Stop reason: HOSPADM

## 2018-11-12 RX ORDER — DEXAMETHASONE SODIUM PHOSPHATE 4 MG/ML
INJECTION, SOLUTION INTRA-ARTICULAR; INTRALESIONAL; INTRAMUSCULAR; INTRAVENOUS; SOFT TISSUE PRN
Status: DISCONTINUED | OUTPATIENT
Start: 2018-11-12 | End: 2018-11-12 | Stop reason: SDUPTHER

## 2018-11-12 RX ORDER — FENTANYL CITRATE 50 UG/ML
25 INJECTION, SOLUTION INTRAMUSCULAR; INTRAVENOUS EVERY 5 MIN PRN
Status: DISCONTINUED | OUTPATIENT
Start: 2018-11-12 | End: 2018-11-12 | Stop reason: HOSPADM

## 2018-11-12 RX ORDER — ACETAMINOPHEN 10 MG/ML
1000 INJECTION, SOLUTION INTRAVENOUS ONCE
Status: COMPLETED | OUTPATIENT
Start: 2018-11-12 | End: 2018-11-12

## 2018-11-12 RX ORDER — FENTANYL CITRATE 50 UG/ML
INJECTION, SOLUTION INTRAMUSCULAR; INTRAVENOUS PRN
Status: DISCONTINUED | OUTPATIENT
Start: 2018-11-12 | End: 2018-11-12 | Stop reason: SDUPTHER

## 2018-11-12 RX ORDER — ROCURONIUM BROMIDE 10 MG/ML
INJECTION, SOLUTION INTRAVENOUS PRN
Status: DISCONTINUED | OUTPATIENT
Start: 2018-11-12 | End: 2018-11-12 | Stop reason: SDUPTHER

## 2018-11-12 RX ORDER — HYDROCODONE BITARTRATE AND ACETAMINOPHEN 7.5; 325 MG/1; MG/1
1 TABLET ORAL EVERY 6 HOURS PRN
Status: DISCONTINUED | OUTPATIENT
Start: 2018-11-12 | End: 2018-11-13 | Stop reason: HOSPADM

## 2018-11-12 RX ADMIN — CIPROFLOXACIN 400 MG: 2 INJECTION, SOLUTION INTRAVENOUS at 20:22

## 2018-11-12 RX ADMIN — PROPOFOL 200 MG: 10 INJECTION, EMULSION INTRAVENOUS at 16:35

## 2018-11-12 RX ADMIN — FENTANYL CITRATE 50 MCG: 50 INJECTION INTRAMUSCULAR; INTRAVENOUS at 17:09

## 2018-11-12 RX ADMIN — ATORVASTATIN CALCIUM 40 MG: 40 TABLET, FILM COATED ORAL at 08:59

## 2018-11-12 RX ADMIN — HYDROMORPHONE HYDROCHLORIDE 1 MG: 2 INJECTION INTRAMUSCULAR; INTRAVENOUS; SUBCUTANEOUS at 17:55

## 2018-11-12 RX ADMIN — METRONIDAZOLE 500 MG: 500 INJECTION, SOLUTION INTRAVENOUS at 11:43

## 2018-11-12 RX ADMIN — SODIUM CHLORIDE: 9 INJECTION, SOLUTION INTRAVENOUS at 16:30

## 2018-11-12 RX ADMIN — ROCURONIUM BROMIDE 40 MG: 50 INJECTION, SOLUTION INTRAVENOUS at 16:37

## 2018-11-12 RX ADMIN — CILOSTAZOL 50 MG: 50 TABLET ORAL at 20:23

## 2018-11-12 RX ADMIN — FENTANYL CITRATE 50 MCG: 50 INJECTION INTRAMUSCULAR; INTRAVENOUS at 17:25

## 2018-11-12 RX ADMIN — ONDANSETRON HYDROCHLORIDE 4 MG: 2 INJECTION, SOLUTION INTRAMUSCULAR; INTRAVENOUS at 16:40

## 2018-11-12 RX ADMIN — METRONIDAZOLE 500 MG: 500 INJECTION, SOLUTION INTRAVENOUS at 22:32

## 2018-11-12 RX ADMIN — ROCURONIUM BROMIDE 20 MG: 50 INJECTION, SOLUTION INTRAVENOUS at 17:18

## 2018-11-12 RX ADMIN — HYDROCODONE BITARTRATE AND ACETAMINOPHEN 1 TABLET: 7.5; 325 TABLET ORAL at 19:13

## 2018-11-12 RX ADMIN — LIDOCAINE HYDROCHLORIDE 250 MG: 20 INJECTION, SOLUTION INTRAVENOUS at 17:50

## 2018-11-12 RX ADMIN — LIDOCAINE HYDROCHLORIDE 50 MG: 20 INJECTION, SOLUTION INTRAVENOUS at 16:35

## 2018-11-12 RX ADMIN — FENTANYL CITRATE 25 MCG: 50 INJECTION, SOLUTION INTRAMUSCULAR; INTRAVENOUS at 18:30

## 2018-11-12 RX ADMIN — ONDANSETRON HYDROCHLORIDE 4 MG: 2 INJECTION, SOLUTION INTRAMUSCULAR; INTRAVENOUS at 19:13

## 2018-11-12 RX ADMIN — METRONIDAZOLE 500 MG: 500 INJECTION, SOLUTION INTRAVENOUS at 05:45

## 2018-11-12 RX ADMIN — IPRATROPIUM BROMIDE AND ALBUTEROL SULFATE 1 AMPULE: .5; 3 SOLUTION RESPIRATORY (INHALATION) at 21:22

## 2018-11-12 RX ADMIN — AMIODARONE HYDROCHLORIDE 200 MG: 200 TABLET ORAL at 08:59

## 2018-11-12 RX ADMIN — CIPROFLOXACIN 400 MG: 2 INJECTION, SOLUTION INTRAVENOUS at 08:29

## 2018-11-12 RX ADMIN — DILTIAZEM HYDROCHLORIDE 240 MG: 120 CAPSULE, COATED, EXTENDED RELEASE ORAL at 08:59

## 2018-11-12 RX ADMIN — FENTANYL CITRATE 100 MCG: 50 INJECTION INTRAMUSCULAR; INTRAVENOUS at 16:35

## 2018-11-12 RX ADMIN — ACETAMINOPHEN 1000 MG: 10 INJECTION, SOLUTION INTRAVENOUS at 17:01

## 2018-11-12 RX ADMIN — HYDROMORPHONE HYDROCHLORIDE 1 MG: 2 INJECTION INTRAMUSCULAR; INTRAVENOUS; SUBCUTANEOUS at 17:45

## 2018-11-12 RX ADMIN — DEXAMETHASONE SODIUM PHOSPHATE 8 MG: 4 INJECTION, SOLUTION INTRA-ARTICULAR; INTRALESIONAL; INTRAMUSCULAR; INTRAVENOUS; SOFT TISSUE at 16:40

## 2018-11-12 RX ADMIN — SUGAMMADEX 200 MG: 100 INJECTION, SOLUTION INTRAVENOUS at 17:49

## 2018-11-12 RX ADMIN — INSULIN LISPRO 2 UNITS: 100 INJECTION, SOLUTION INTRAVENOUS; SUBCUTANEOUS at 22:32

## 2018-11-12 ASSESSMENT — PULMONARY FUNCTION TESTS
PIF_VALUE: 3
PIF_VALUE: 29
PIF_VALUE: 30
PIF_VALUE: 0
PIF_VALUE: 30
PIF_VALUE: 33
PIF_VALUE: 30
PIF_VALUE: 28
PIF_VALUE: 30
PIF_VALUE: 30
PIF_VALUE: 19
PIF_VALUE: 30
PIF_VALUE: 2
PIF_VALUE: 30
PIF_VALUE: 30
PIF_VALUE: 2
PIF_VALUE: 30
PIF_VALUE: 4
PIF_VALUE: 30
PIF_VALUE: 1
PIF_VALUE: 19
PIF_VALUE: 30
PIF_VALUE: 1
PIF_VALUE: 33
PIF_VALUE: 30
PIF_VALUE: 30
PIF_VALUE: 31
PIF_VALUE: 1
PIF_VALUE: 35
PIF_VALUE: 3
PIF_VALUE: 30
PIF_VALUE: 30
PIF_VALUE: 2
PIF_VALUE: 30
PIF_VALUE: 19
PIF_VALUE: 19
PIF_VALUE: 0
PIF_VALUE: 34
PIF_VALUE: 30
PIF_VALUE: 19
PIF_VALUE: 0
PIF_VALUE: 30
PIF_VALUE: 32
PIF_VALUE: 30
PIF_VALUE: 30
PIF_VALUE: 0
PIF_VALUE: 19
PIF_VALUE: 30
PIF_VALUE: 30
PIF_VALUE: 19
PIF_VALUE: 31
PIF_VALUE: 30
PIF_VALUE: 19
PIF_VALUE: 30
PIF_VALUE: 1
PIF_VALUE: 30
PIF_VALUE: 29
PIF_VALUE: 4
PIF_VALUE: 30
PIF_VALUE: 30
PIF_VALUE: 19
PIF_VALUE: 30
PIF_VALUE: 1
PIF_VALUE: 2
PIF_VALUE: 30
PIF_VALUE: 33
PIF_VALUE: 29
PIF_VALUE: 5
PIF_VALUE: 1
PIF_VALUE: 30
PIF_VALUE: 30
PIF_VALUE: 19
PIF_VALUE: 2
PIF_VALUE: 29

## 2018-11-12 ASSESSMENT — PAIN SCALES - GENERAL
PAINLEVEL_OUTOF10: 2
PAINLEVEL_OUTOF10: 7
PAINLEVEL_OUTOF10: 10
PAINLEVEL_OUTOF10: 7

## 2018-11-12 ASSESSMENT — PAIN DESCRIPTION - PAIN TYPE: TYPE: ACUTE PAIN

## 2018-11-12 ASSESSMENT — PAIN DESCRIPTION - LOCATION
LOCATION: ABDOMEN
LOCATION: ABDOMEN

## 2018-11-12 ASSESSMENT — LIFESTYLE VARIABLES: SMOKING_STATUS: 1

## 2018-11-12 ASSESSMENT — PAIN DESCRIPTION - FREQUENCY: FREQUENCY: CONTINUOUS

## 2018-11-12 NOTE — PROGRESS NOTES
cholecystectomy today    Acute hypercapneic resp failure - was on bipap    DM - very mild, no change in mgmt    Dispo - likely home tomorrow      Josy Davey MD

## 2018-11-12 NOTE — PROGRESS NOTES
1805- Pt arrived from OR and placed on all PACU monitoring devices. Pt awakens to name and follows simple commands before easily returning to sleep. Abdominal dressings dry and intact. Abdomen soft. VSS    1845- Report called to Garrison Verdin RN, preparing pt for transfer.      200- PT transferred to [address] [place] [address] at bedside to obtain VS. RN notified of arrival. Call light in reach

## 2018-11-12 NOTE — ANESTHESIA PRE PROCEDURE
Department of Anesthesiology  Preprocedure Note       Name:  Nathanael Garcia   Age:  66 y.o.  :  1955                                          MRN:  [id number]         Date:  2018      Surgeon: Simone Joe):  Hilda Rivera MD    Procedure: CHOLECYSTECTOMY LAPAROSCOPIC (N/A Abdomen)    Medications prior to admission:   Prior to Admission medications    Medication Sig Start Date End Date Taking?  Authorizing Provider   albuterol sulfate  (90 Base) MCG/ACT inhaler Inhale 2 puffs into the lungs every 6 hours as needed for Wheezing 18  Allison Colon MD   UNABLE TO FIND Please administer two [address] vaccines 2-6 months apart 18   Allison Colon MD   ipratropium (ATROVENT) 0.03 % nasal spray 2 sprays by Nasal route 3 times daily 18  Allison Colon MD   amiodarone (CORDARONE) 200 MG tablet 200mg bid for 10 days and then 200mg once daily 18   Nilson Arreola MD   diltiazem (CARTIA XT) 240 MG extended release capsule Take 240 mg by mouth daily    Historical Provider, MD   ASPIRIN LOW DOSE 81 MG EC tablet take 1 tablet by mouth once daily 18   Allison Colon MD   albuterol (PROVENTIL) (2.5 MG/3ML) 0.083% nebulizer solution Take 3 mLs by nebulization every 4 hours as needed for Wheezing 18   Allison Colon MD   ipratropium (ATROVENT) 0.02 % nebulizer solution Take 2.5 mLs by nebulization every 4 hours as needed for Wheezing 18   [place], MD   apixaban (ELIQUIS) 5 MG TABS tablet Take 1 tablet by mouth 2 times daily 18   Jean-Paul Oleary MD   atorvastatin (LIPITOR) 40 MG tablet Take 1 tablet by mouth daily 18   Jean-Paul Oleary MD   OXYGEN Inhale 2 L into the lungs continuous     Historical Provider, MD   metFORMIN (GLUCOPHAGE) 500 MG tablet Take 1 tablet by mouth daily (with breakfast) 18   [place], MD   Nebulizers (COMPRESSOR/NEBULIZER) MISC Use qid 17   Nancy Gramajo MD Ryley   cilostazol (PLETAL) 50 MG tablet Take 1 tablet by mouth 2 times daily 2/3/17   Basia Flores MD       Current medications:    Current Facility-Administered Medications   Medication Dose Route Frequency Provider Last Rate Last Dose    ipratropium-albuterol (DUONEB) nebulizer solution 1 ampule  1 ampule Inhalation Q4H PRN Kelly Goddard MD        ibuprofen (ADVIL;MOTRIN) tablet 400 mg  400 mg Oral Q6H PRN Lelia Villarreal MD   400 mg at 11/10/18 0505    albuterol (PROVENTIL) nebulizer solution 2.5 mg  2.5 mg Nebulization Q4H PRN Radu Hastings MD        amiodarone (CORDARONE) tablet 200 mg  200 mg Oral Daily Radu Hastings MD   200 mg at 11/11/18 0956    aspirin EC tablet 81 mg  81 mg Oral Daily Radu Hastings MD   81 mg at 11/11/18 4070    atorvastatin (LIPITOR) tablet 40 mg  40 mg Oral Daily Radu Hastings MD   40 mg at 11/11/18 1598    cilostazol (PLETAL) tablet 50 mg  50 mg Oral BID Radu Hastings MD   50 mg at 11/11/18 2012    diltiazem (CARDIZEM CD) extended release capsule 240 mg  240 mg Oral Daily Radu Hastings MD   240 mg at 11/11/18 0953    0.9 % sodium chloride infusion   Intravenous Continuous Radu Hastings MD 50 mL/hr at 11/11/18 1924      sodium chloride flush 0.9 % injection 10 mL  10 mL Intravenous 2 times per day Radu Hastings MD   Stopped at 11/10/18 1108    sodium chloride flush 0.9 % injection 10 mL  10 mL Intravenous PRN Radu Hastings MD   10 mL at 11/09/18 2036    ondansetron (ZOFRAN) injection 4 mg  4 mg Intravenous Q6H PRN Radu Hastings MD   4 mg at 11/11/18 0157    enoxaparin (LOVENOX) injection 40 mg  40 mg Subcutaneous Daily Radu Hastings MD   40 mg at 11/11/18 0959    glucose (GLUTOSE) 40 % oral gel 15 g  15 g Oral PRN Radu Hastings MD        dextrose 50 % solution 12.5 g  12.5 g Intravenous PRN Radu Hastings MD        glucagon (rDNA) injection 1 mg  1 mg Intramuscular PRN Royal Mobley MD        dextrose 5 % solution  100 mL/hr Intravenous PRN Royal Mobley MD        insulin lispro (HUMALOG) injection vial 0-6 Units  0-6 Units Subcutaneous TID WC Royal Mobley MD   1 Units at 11/10/18 1348    insulin lispro (HUMALOG) injection vial 0-3 Units  0-3 Units Subcutaneous Nightly Royal Mobley MD   1 Units at 11/09/18 2140    ciprofloxacin (CIPRO) IVPB 400 mg  400 mg Intravenous Q12H Royal Mobley  mL/hr at 11/12/18 0829 400 mg at 11/12/18 0829    metronidazole (FLAGYL) 500 mg in NaCl 100 mL IVPB premix  500 mg Intravenous Q8H Royal Mobley MD   Stopped at 11/12/18 8074       Allergies:     Allergies   Allergen Reactions    Metoprolol Other (See Comments)     Chest pain and burning in the chest        Problem List:    Patient Active Problem List   Diagnosis Code    Hypertension I10    Hypercholesteremia E78.00    Tobacco use Z72.0    Anxiety F41.9    Type 2 diabetes, uncontrolled, with neuropathy ([facility] E11.40, E11.65    Benign essential HTN I10    Simple chronic bronchitis ([facility]) J41.0    Macular degeneration, dry H35.3190    [facility] I73.9    VT (ventricular tachycardia) ([facility]) I47.2    Left wrist sprain S63.502A    Hypoxia R09.02    PAD (peripheral artery disease) ([facility]) I73.9    Class 3 obesity due to excess calories without serious comorbidity with body mass index (BMI) of 40.0 to 44.9 in adult [id number]    Persistent atrial fibrillation ([facility]) I48.1    Swelling of lower extremity M79.89    S/P ablation of atrial fibrillation Z98.890, Z86.79    PVD (peripheral vascular disease) with claudication ([facility]) I73.9    Abdominal pain R10.9    Left sided abdominal pain R10.9    LFT elevation R94.5    Right upper quadrant abdominal pain R10.11       Past Medical History:        Diagnosis Date    COPD (chronic obstructive pulmonary disease) [facility])     Erectile dysfunction     H/O Doppler ultrasound 09/05/2018    LE arterial - left mid and distal femoral artery occluded, lt FANI shows mild-mod PVD    H/O echocardiogram 01/06/2016    EF60% mild MR, TR, pulm HTN    Hx of colonoscopy     7/11 C-scope - benign polyp x1    Hx of Doppler ultrasound 02/20/2018    Lower extremity doppler  Normal study.     Hypertension     Obesity     PAD (peripheral artery disease) (HCC)     10/10 FANI mild PAD left superficial femoral s/p left fem-pop bypass    Secondary erythrocytosis     Type II or unspecified type diabetes mellitus without mention of complication, not stated as uncontrolled        Past Surgical History:        Procedure Laterality Date    APPENDECTOMY  2003    ATRIAL ABLATION SURGERY  05/23/2018    A-fib ablation     HERNIA REPAIR  5631    umbilical    IR FEMORAL POPLITEAL BYPASS GRAFT  01/2011    Angelicevsalazar       Social History:    Social History   Substance Use Topics    Smoking status: Current Some Day Smoker     Packs/day: 1.00     Years: 25.00     Types: Cigars    Smokeless tobacco: Never Used    Alcohol use No                                Ready to quit: Not Answered  Counseling given: Not Answered      Vital Signs (Current):   Vitals:    11/11/18 1215 11/11/18 1614 11/11/18 2300 11/12/18 0530   BP: 112/64 (!) 114/58 121/60 119/68   Pulse: 80 80 82 81   Resp: 18 18 17 18   Temp: 36.5 °C (97.7 °F) 36.6 °C (97.8 °F) 36.9 °C (98.4 °F) 36.6 °C (97.8 °F)   TempSrc: Oral Oral Oral Oral   SpO2: 95% 95% 90% 91%   Weight:    (!) 359 lb 8 oz (163.1 kg)   Height:                                                  BP Readings from Last 3 Encounters:   11/12/18 119/68   10/23/18 100/68   09/11/18 112/72       NPO Status: Time of last liquid consumption: 2330                        Time of last solid consumption: 2000                        Date of last liquid consumption: 11/11/18                        Date of last solid food consumption:

## 2018-11-12 NOTE — OP NOTE
Operative Note    Patient ID:  Katina Anderson  [id number]  68 y.o.  1955      Indications: This patient presents with a symptomatic gallbladder disease and will undergo laparoscopic cholecystecomy. Pre-operative Diagnosis: Acalculous Cholecystitis     Post-operative Diagnosis:  Same    Procedure:  Laparoscopic Cholecystectomy    Surgeon: Ragini Luciano MD    Anesthesia: General endotracheal anesthesia    ASA Class: 3    Findings: Cholecystitis without Cholelithiasis    Estimated Blood Loss:  Minimal           Drains: none           Total IV Fluids: 500 ml           Specimens: Gallbladder             Complications:  None; patient tolerated the procedure well. Disposition: PACU - hemodynamically stable. Condition: stable        Procedure Details   The patient was seen again in the Holding Room. The risks, benefits, complications, treatment options, and expected outcomes were discussed with the patient. The possibilities of reaction to medication, pulmonary aspiration, perforation of viscus, bleeding, recurrent infection, finding a normal gallbladder, the need for additional procedures, failure to diagnose a condition, the possible need to convert to an open procedure, and creating a complication requiring transfusion or operation were discussed with the patient. The patient and/or family concurred with the proposed plan, giving informed consent. The site of surgery properly noted/marked. The patient was taken to Operating Room, identified as Katina Anderson and the procedure verified as Laparoscopic Cholecystectomy with possible Intraoperative Cholangiograms. A Time Out was held and the above information confirmed. Prior to the induction of general anesthesia,  antibiotic prophylaxis was administered. General endotracheal anesthesia was then administered and tolerated well. After the induction, the abdomen was prepped in the usual sterile fashion.  The patient was positioned in the

## 2018-11-12 NOTE — PROGRESS NOTES
Physical Therapy  Attempted PT/OT evaluation this PM.  Patient off the unit at this time. Will re-attempt as able and appropriate.     Maykel Shah PT, DPT  License #: [id number]

## 2018-11-13 ENCOUNTER — CARE COORDINATION (OUTPATIENT)
Dept: CARE COORDINATION | Age: 63
End: 2018-11-13

## 2018-11-13 VITALS
HEART RATE: 96 BPM | RESPIRATION RATE: 19 BRPM | OXYGEN SATURATION: 93 % | BODY MASS INDEX: 36.45 KG/M2 | TEMPERATURE: 97.9 F | HEIGHT: 78 IN | WEIGHT: 315 LBS | SYSTOLIC BLOOD PRESSURE: 121 MMHG | DIASTOLIC BLOOD PRESSURE: 63 MMHG

## 2018-11-13 PROBLEM — R10.9 ABDOMINAL PAIN: Status: RESOLVED | Noted: 2018-11-06 | Resolved: 2018-11-13

## 2018-11-13 PROBLEM — R10.11 RIGHT UPPER QUADRANT ABDOMINAL PAIN: Status: RESOLVED | Noted: 2018-11-07 | Resolved: 2018-11-13

## 2018-11-13 LAB
ALBUMIN SERPL-MCNC: 3.3 GM/DL (ref 3.4–5)
ALBUMIN SERPL-MCNC: 3.3 GM/DL (ref 3.4–5)
ALP BLD-CCNC: 89 IU/L (ref 40–128)
ALP BLD-CCNC: 89 IU/L (ref 40–129)
ALT SERPL-CCNC: 63 U/L (ref 10–40)
ALT SERPL-CCNC: 63 U/L (ref 10–40)
ANION GAP SERPL CALCULATED.3IONS-SCNC: 7 MMOL/L (ref 4–16)
AST SERPL-CCNC: 43 IU/L (ref 15–37)
AST SERPL-CCNC: 43 IU/L (ref 15–37)
BILIRUB SERPL-MCNC: 0.9 MG/DL (ref 0–1)
BILIRUB SERPL-MCNC: 0.9 MG/DL (ref 0–1)
BILIRUBIN DIRECT: 0.5 MG/DL (ref 0–0.3)
BILIRUBIN, INDIRECT: 0.4 MG/DL (ref 0–0.7)
BUN BLDV-MCNC: 13 MG/DL (ref 6–23)
CALCIUM SERPL-MCNC: 8.2 MG/DL (ref 8.3–10.6)
CHLORIDE BLD-SCNC: 95 MMOL/L (ref 99–110)
CO2: 34 MMOL/L (ref 21–32)
CREAT SERPL-MCNC: 0.6 MG/DL (ref 0.9–1.3)
EBV DNA, PCR: NORMAL
EBV DNA, PCR: NOT DETECTED
EBV SOURCE: NORMAL
GFR AFRICAN AMERICAN: >60 ML/MIN/1.73M2
GFR NON-AFRICAN AMERICAN: >60 ML/MIN/1.73M2
GLUCOSE BLD-MCNC: 157 MG/DL (ref 70–99)
GLUCOSE BLD-MCNC: 225 MG/DL (ref 70–99)
GLUCOSE BLD-MCNC: 261 MG/DL (ref 70–99)
HCT VFR BLD CALC: 46.1 % (ref 42–52)
HEMOGLOBIN: 14.8 GM/DL (ref 13.5–18)
INR BLD: 1.07 INDEX
MCH RBC QN AUTO: 31.4 PG (ref 27–31)
MCHC RBC AUTO-ENTMCNC: 32.1 % (ref 32–36)
MCV RBC AUTO: 97.9 FL (ref 78–100)
PDW BLD-RTO: 12.8 % (ref 11.7–14.9)
PLATELET # BLD: 140 K/CU MM (ref 140–440)
PMV BLD AUTO: 10.2 FL (ref 7.5–11.1)
POTASSIUM SERPL-SCNC: 4.9 MMOL/L (ref 3.5–5.1)
PROTHROMBIN TIME: 12.4 SECONDS (ref 9.12–12.5)
RBC # BLD: 4.71 M/CU MM (ref 4.6–6.2)
SODIUM BLD-SCNC: 136 MMOL/L (ref 135–145)
TOTAL PROTEIN: 5.4 GM/DL (ref 6.4–8.2)
TOTAL PROTEIN: 5.4 GM/DL (ref 6.4–8.2)
WBC # BLD: 8.1 K/CU MM (ref 4–10.5)

## 2018-11-13 PROCEDURE — 6370000000 HC RX 637 (ALT 250 FOR IP): Performed by: INTERNAL MEDICINE

## 2018-11-13 PROCEDURE — G8989 SELF CARE D/C STATUS: HCPCS

## 2018-11-13 PROCEDURE — 2500000003 HC RX 250 WO HCPCS: Performed by: INTERNAL MEDICINE

## 2018-11-13 PROCEDURE — G8987 SELF CARE CURRENT STATUS: HCPCS

## 2018-11-13 PROCEDURE — 82248 BILIRUBIN DIRECT: CPT

## 2018-11-13 PROCEDURE — 82962 GLUCOSE BLOOD TEST: CPT

## 2018-11-13 PROCEDURE — 97530 THERAPEUTIC ACTIVITIES: CPT

## 2018-11-13 PROCEDURE — 97162 PT EVAL MOD COMPLEX 30 MIN: CPT

## 2018-11-13 PROCEDURE — 85027 COMPLETE CBC AUTOMATED: CPT

## 2018-11-13 PROCEDURE — 94150 VITAL CAPACITY TEST: CPT

## 2018-11-13 PROCEDURE — 6360000002 HC RX W HCPCS: Performed by: INTERNAL MEDICINE

## 2018-11-13 PROCEDURE — G8979 MOBILITY GOAL STATUS: HCPCS

## 2018-11-13 PROCEDURE — 36415 COLL VENOUS BLD VENIPUNCTURE: CPT

## 2018-11-13 PROCEDURE — 99239 HOSP IP/OBS DSCHRG MGMT >30: CPT | Performed by: INTERNAL MEDICINE

## 2018-11-13 PROCEDURE — 80053 COMPREHEN METABOLIC PANEL: CPT

## 2018-11-13 PROCEDURE — G8988 SELF CARE GOAL STATUS: HCPCS

## 2018-11-13 PROCEDURE — 2580000003 HC RX 258: Performed by: INTERNAL MEDICINE

## 2018-11-13 PROCEDURE — 94640 AIRWAY INHALATION TREATMENT: CPT

## 2018-11-13 PROCEDURE — G8978 MOBILITY CURRENT STATUS: HCPCS

## 2018-11-13 PROCEDURE — 85610 PROTHROMBIN TIME: CPT

## 2018-11-13 PROCEDURE — 97166 OT EVAL MOD COMPLEX 45 MIN: CPT

## 2018-11-13 PROCEDURE — 2700000000 HC OXYGEN THERAPY PER DAY

## 2018-11-13 RX ORDER — CIPROFLOXACIN 500 MG/1
500 TABLET, FILM COATED ORAL 2 TIMES DAILY
Qty: 14 TABLET | Refills: 0 | Status: CANCELLED | OUTPATIENT
Start: 2018-11-13 | End: 2018-11-20

## 2018-11-13 RX ORDER — HYDROCODONE BITARTRATE AND ACETAMINOPHEN 7.5; 325 MG/1; MG/1
1 TABLET ORAL EVERY 6 HOURS PRN
Qty: 20 TABLET | Refills: 0 | Status: SHIPPED | OUTPATIENT
Start: 2018-11-13 | End: 2018-11-16

## 2018-11-13 RX ORDER — METRONIDAZOLE 500 MG/1
500 TABLET ORAL 3 TIMES DAILY
Qty: 21 TABLET | Refills: 0 | Status: CANCELLED | OUTPATIENT
Start: 2018-11-13 | End: 2018-11-20

## 2018-11-13 RX ADMIN — ASPIRIN 81 MG: 81 TABLET, COATED ORAL at 08:01

## 2018-11-13 RX ADMIN — INSULIN LISPRO 2 UNITS: 100 INJECTION, SOLUTION INTRAVENOUS; SUBCUTANEOUS at 09:07

## 2018-11-13 RX ADMIN — CILOSTAZOL 50 MG: 50 TABLET ORAL at 08:01

## 2018-11-13 RX ADMIN — IPRATROPIUM BROMIDE AND ALBUTEROL SULFATE 1 AMPULE: .5; 3 SOLUTION RESPIRATORY (INHALATION) at 11:28

## 2018-11-13 RX ADMIN — ENOXAPARIN SODIUM 40 MG: 40 INJECTION SUBCUTANEOUS at 08:01

## 2018-11-13 RX ADMIN — AMIODARONE HYDROCHLORIDE 200 MG: 200 TABLET ORAL at 08:01

## 2018-11-13 RX ADMIN — INSULIN LISPRO 1 UNITS: 100 INJECTION, SOLUTION INTRAVENOUS; SUBCUTANEOUS at 13:59

## 2018-11-13 RX ADMIN — SODIUM CHLORIDE, PRESERVATIVE FREE 10 ML: 5 INJECTION INTRAVENOUS at 08:02

## 2018-11-13 RX ADMIN — METRONIDAZOLE 500 MG: 500 INJECTION, SOLUTION INTRAVENOUS at 05:38

## 2018-11-13 RX ADMIN — IPRATROPIUM BROMIDE AND ALBUTEROL SULFATE 1 AMPULE: .5; 3 SOLUTION RESPIRATORY (INHALATION) at 07:17

## 2018-11-13 RX ADMIN — ATORVASTATIN CALCIUM 40 MG: 40 TABLET, FILM COATED ORAL at 08:01

## 2018-11-13 RX ADMIN — DILTIAZEM HYDROCHLORIDE 240 MG: 120 CAPSULE, COATED, EXTENDED RELEASE ORAL at 08:01

## 2018-11-13 RX ADMIN — METRONIDAZOLE 500 MG: 500 INJECTION, SOLUTION INTRAVENOUS at 13:58

## 2018-11-13 RX ADMIN — CIPROFLOXACIN 400 MG: 2 INJECTION, SOLUTION INTRAVENOUS at 08:01

## 2018-11-13 ASSESSMENT — PAIN SCALES - GENERAL
PAINLEVEL_OUTOF10: 3
PAINLEVEL_OUTOF10: 4
PAINLEVEL_OUTOF10: 0

## 2018-11-13 ASSESSMENT — PAIN DESCRIPTION - PAIN TYPE
TYPE: SURGICAL PAIN
TYPE: SURGICAL PAIN

## 2018-11-13 ASSESSMENT — PAIN DESCRIPTION - DESCRIPTORS: DESCRIPTORS: ACHING

## 2018-11-13 ASSESSMENT — PAIN DESCRIPTION - LOCATION
LOCATION: ABDOMEN
LOCATION: ABDOMEN

## 2018-11-13 ASSESSMENT — PAIN DESCRIPTION - ORIENTATION: ORIENTATION: RIGHT

## 2018-11-13 ASSESSMENT — PAIN DESCRIPTION - FREQUENCY: FREQUENCY: CONTINUOUS

## 2018-11-13 NOTE — PROGRESS NOTES
CLINICAL PHARMACY NOTE: MEDS TO [address] Select Patient?: Yes  Total # of Prescriptions Filled: 1   The following medications were delivered to the patient:  · Hydrocodone/apap 7.5/325mg  Total # of Interventions Completed: 0  Time Spent (min): 15    Additional Documentation:

## 2018-11-13 NOTE — PROGRESS NOTES
Physical Therapy    Facility/Department: [address] [place] [address]  Initial Assessment    NAME: Katina Anderson  : 1955  MRN: [id number]    Date of Service: 2018    Discharge Recommendations:  Home with Home health PT        Patient Diagnosis(es): The primary encounter diagnosis was Cholecystitis. Diagnoses of Left sided abdominal pain, Non-intractable vomiting with nausea, unspecified vomiting type, and Tachycardia were also pertinent to this visit. has a past medical history of COPD (chronic obstructive pulmonary disease) ([facility] [address] Erectile dysfunction; H/O Doppler ultrasound; H/O echocardiogram; Hx of colonoscopy; Hx of Doppler ultrasound; Hypertension; Obesity; PAD (peripheral artery disease) ([facility] [facility] Secondary erythrocytosis; and Type II or unspecified type diabetes mellitus without mention of complication, not stated as uncontrolled. has a past surgical history that includes hernia repair (); Appendectomy (); IR Femoral Popliteal Bypass Graft (2011); and Atrial ablation surgery (2018). Restrictions  Restrictions/Precautions  Restrictions/Precautions: General Precautions, Fall Risk  Position Activity Restriction  Other position/activity restrictions: Cleared by RNAnastasia, for OT/PT evals. O2 at 2L. PIV.    Vision/Hearing  Vision: Impaired  Vision Exceptions: Wears glasses for reading (macular degeneration, R eye better than L)  Hearing: Within functional limits     Subjective  General  Chart Reviewed: Yes  Patient assessed for rehabilitation services?: Yes  Family / Caregiver Present: No  Follows Commands: Within Functional Limits  Pain Screening  Patient Currently in Pain: Yes  Pain Assessment  Pain Level: 0  Pain Type: Surgical pain  Pain Location: Abdomen  Pain Orientation: Right  Vital Signs  Patient Currently in Pain: Yes       Orientation  Orientation  Overall Orientation Status: Within Normal Limits  Social/Functional History  Social/Functional History  Lives With: Alone  Type of Home: inc effort w/ some SOB, no LOB      Balance  Posture: Good  Sitting - Static: Good  Sitting - Dynamic: Good  Standing - Static: Good  Standing - Dynamic: Good        Assessment   Body structures, Functions, Activity limitations: Decreased functional mobility ; Decreased strength;Decreased safe awareness;Decreased sensation;Decreased endurance  Assessment: Pt is a 66year old male admitted with abdominal pain s/p cholecystectomy. Recommend home with [facility] once medically stable. Prior to admission pt indep with mobility, driving, and house care without AD. Pt reported he was struggling being able to keep up his home needs lately due to his health. Sister near by but very busy with her own family needs, unable to assist much. Currently, pt is Uri needing inc time and effort to complete mobility tasks. Would benefit from [facility] for gains in strength and endurance as well as improved safety with home set up. Treatment Diagnosis: cholecystectomy   Prognosis: Good  Decision Making: Medium Complexity  Patient Education: POC, role of PT   REQUIRES PT FOLLOW UP: Yes  Activity Tolerance  Activity Tolerance: Patient limited by endurance; Patient limited by pain; Patient limited by fatigue;Patient Tolerated treatment well         Plan   Plan  Times per week: 2  Times per day: Daily  Plan weeks: 1  Current Treatment Recommendations: Strengthening, Functional Mobility Training, Transfer Training, Endurance Training, Gait Training, Home Exercise Program, Safety Education & Training, Patient/Caregiver Education & Training, Equipment Evaluation, Education, & procurement  Safety Devices  Type of devices: All fall risk precautions in place, Call light within reach, Chair alarm in place, Gait belt    G-Code  PT G-Codes  Functional Limitation: Mobility: Walking and moving around  Mobility: Walking and Moving Around Current Status ():  At least 1 percent but less than 20 percent impaired, limited or restricted  Mobility: Walking and Moving

## 2018-11-13 NOTE — DISCHARGE SUMMARY
(2.5 MG/3ML) 0.083% nebulizer solution  Take 3 mLs by nebulization every 4 hours as needed for Wheezing             albuterol sulfate  (90 Base) MCG/ACT inhaler  Inhale 2 puffs into the lungs every 6 hours as needed for Wheezing             amiodarone (CORDARONE) 200 MG tablet  200mg bid for 10 days and then 200mg once daily             apixaban (ELIQUIS) 5 MG TABS tablet  Take 1 tablet by mouth 2 times daily             ASPIRIN LOW DOSE 81 MG EC tablet  take 1 tablet by mouth once daily             atorvastatin (LIPITOR) 40 MG tablet  Take 1 tablet by mouth daily             cilostazol (PLETAL) 50 MG tablet  Take 1 tablet by mouth 2 times daily             diltiazem (CARTIA XT) 240 MG extended release capsule  Take 240 mg by mouth daily             HYDROcodone-acetaminophen (NORCO) 7.5-325 MG per tablet  Take 1 tablet by mouth every 6 hours as needed for Pain for up to 7 days. Felicitas Horner Date: 11/13/18             ipratropium (ATROVENT) 0.02 % nebulizer solution  Take 2.5 mLs by nebulization every 4 hours as needed for Wheezing             ipratropium (ATROVENT) 0.03 % nasal spray  2 sprays by Nasal route 3 times daily             metFORMIN (GLUCOPHAGE) 500 MG tablet  Take 1 tablet by mouth daily (with breakfast)             Nebulizers (COMPRESSOR/NEBULIZER) MISC  Use qid             OXYGEN  Inhale 2 L into the lungs continuous              UNABLE TO FIND  Please administer two [place] vaccines 2-6 months apart                Activity: as tolerated  Diet: CCD    Time Spent on discharge is more than 45 minutes discussing plan of care and discharge medications with patient and nursing staff    Signed:  Long Peres   11/13/2018

## 2018-11-13 NOTE — PROGRESS NOTES
Pt up to ambulate in [place] this a.m. Tolerated well. Spoke with family member Leeanne Hawley this a.m. Who is picking patient up at time of discharge. Per patient request updated her and will add number to chart. Pt resting in chair with no needs at this time.   Eddi Severino, BSN, RN

## 2018-11-13 NOTE — PROGRESS NOTES
Dr. Ronna Ordaz aware pt will not have bipap machine until tomorrow night, stated okay for d/c. Pt going home with sister and sister in law.   Srinivas Moody, BSN, RN

## 2018-11-13 NOTE — DISCHARGE INSTR - COC
Status Date: ***    Readmission Risk Assessment Score:  Readmission Risk              Risk of Unplanned Readmission:        11           Discharging to Facility/ Agency   · Name:   · Address:  · Phone:  · Fax:    Dialysis Facility (if applicable)   · Name:  · Address:  · Dialysis Schedule:  · Phone:  · Fax:    / signature: {Esignature:[id number]}    PHYSICIAN SECTION    Prognosis: {Prognosis:3276916724}    Condition at Discharge: [address] [place] [address] Patient Condition:[id number]}    Rehab Potential (if transferring to Rehab): {Prognosis:5419631935}    Recommended Labs or Other Treatments After Discharge: ***    Physician Certification: I certify the above information and transfer of Bobby Gómez  is necessary for the continuing treatment of the diagnosis listed and that he requires {Admit to Appropriate Level of Care:58447} for {GREATER/LESS:650656568} 30 days.      Update Admission H&P: {CHP DME Changes in PDBHP:788282314}    PHYSICIAN SIGNATURE:  {Esignature:[id number]}

## 2018-11-13 NOTE — PLAN OF CARE
Problem: Pain:  Goal: Pain level will decrease  Pain level will decrease   Outcome: Ongoing    Goal: Control of acute pain  Control of acute pain   Outcome: Ongoing    Goal: Control of chronic pain  Control of chronic pain   Outcome: Ongoing      Problem: Breathing Pattern - Ineffective:  Goal: Ability to achieve and maintain a regular respiratory rate will improve  Ability to achieve and maintain a regular respiratory rate will improve   Outcome: Ongoing      Problem: Falls - Risk of:  Goal: Will remain free from falls  Will remain free from falls   Outcome: Ongoing    Goal: Absence of physical injury  Absence of physical injury   Outcome: Ongoing

## 2018-11-15 ENCOUNTER — TELEPHONE (OUTPATIENT)
Dept: INTERNAL MEDICINE CLINIC | Age: 63
End: 2018-11-15

## 2018-11-16 ENCOUNTER — OFFICE VISIT (OUTPATIENT)
Dept: INTERNAL MEDICINE CLINIC | Age: 63
End: 2018-11-16
Payer: MEDICARE

## 2018-11-16 VITALS
WEIGHT: 315 LBS | OXYGEN SATURATION: 92 % | RESPIRATION RATE: 18 BRPM | SYSTOLIC BLOOD PRESSURE: 113 MMHG | HEART RATE: 92 BPM | BODY MASS INDEX: 38.92 KG/M2 | DIASTOLIC BLOOD PRESSURE: 70 MMHG

## 2018-11-16 DIAGNOSIS — J96.12 CHRONIC RESPIRATORY FAILURE WITH HYPERCAPNIA (HCC): Primary | ICD-10-CM

## 2018-11-16 DIAGNOSIS — R79.89 LFT ELEVATION: ICD-10-CM

## 2018-11-16 DIAGNOSIS — K81.9 CHOLECYSTITIS: ICD-10-CM

## 2018-11-16 PROCEDURE — 1111F DSCHRG MED/CURRENT MED MERGE: CPT | Performed by: INTERNAL MEDICINE

## 2018-11-16 PROCEDURE — 99496 TRANSJ CARE MGMT HIGH F2F 7D: CPT | Performed by: INTERNAL MEDICINE

## 2018-11-16 RX ORDER — AMIODARONE HYDROCHLORIDE 200 MG/1
200 TABLET ORAL DAILY
Qty: 30 TABLET | Refills: 1 | Status: SHIPPED | OUTPATIENT
Start: 2018-11-16 | End: 2018-11-28 | Stop reason: SDUPTHER

## 2018-11-16 RX ORDER — DILTIAZEM HYDROCHLORIDE 240 MG/1
240 CAPSULE, COATED, EXTENDED RELEASE ORAL DAILY
Qty: 30 CAPSULE | Refills: 5 | Status: ON HOLD | OUTPATIENT
Start: 2018-11-16 | End: 2020-04-21 | Stop reason: SDUPTHER

## 2018-11-16 NOTE — PROGRESS NOTES
Post-Discharge Transitional Care Management Services or Hospital Follow Up      Tamiko Cruz   YOB: 1955    Date of Office Visit:  11/16/2018  Date of Hospital Admission: 11/6/18  Date of Hospital Discharge: 11/13/18  Risk of hospital readmission (high >=14%.  Medium >=10%) :Readmission Risk Score: 11    Care management risk score Rising risk (score 2-5) and Complex Care (Scores >=6): 10     Non face to face  following discharge, date last encounter closed (first attempt may have been earlier): *No documented post hospital discharge outreach found in the last 14 days    Call initiated 2 business days of discharge: *No response recorded in the last 14 days    Patient Active Problem List   Diagnosis    Hypertension    Hypercholesteremia    Tobacco use    Anxiety    Controlled type 2 diabetes mellitus without complication, without long-term current use of insulin (HCC)    Benign essential HTN    Simple chronic bronchitis ([address]    Macular degeneration, dry    PVD (peripheral vascular disease) ([address]    VT (ventricular tachycardia) ([address]    Left wrist sprain    Acute respiratory failure with hypercapnia ([address]    Hypoxia    PAD (peripheral artery disease) ([address]    Morbid obesity ([address]    Persistent atrial fibrillation (HCC)    Swelling of lower extremity    S/P ablation of atrial fibrillation    PVD (peripheral vascular disease) with claudication ([address]    LFT elevation    Cholecystitis       Allergies   Allergen Reactions    Metoprolol Other (See Comments)     Chest pain and burning in the chest        Medications listed as ordered at the time of discharge from [facility]   Home Medication Instructions BEATRICE:    Printed on:11/16/18 7660   Medication Information                      albuterol (PROVENTIL) (2.5 MG/3ML) 0.083% nebulizer solution  Take 3 mLs by nebulization every 4 hours as needed for Wheezing             albuterol sulfate  (90 Base) MCG/ACT been taking amio, cardizem - encourage him to resume    Medical Decision Making: high complexity

## 2018-11-27 ENCOUNTER — TELEPHONE (OUTPATIENT)
Dept: INTERNAL MEDICINE CLINIC | Age: 63
End: 2018-11-27

## 2018-11-27 NOTE — TELEPHONE ENCOUNTER
Jeannie Sessions an area agency called in regard to getting a request approved for passport services. Jeannie Sessions stated the patient would like home delivered meals and life alert. If this is ok she needs the patients problem list, med list, and the patients low sodium diet. Please advise !

## 2018-11-28 ENCOUNTER — OFFICE VISIT (OUTPATIENT)
Dept: CARDIOLOGY CLINIC | Age: 63
End: 2018-11-28
Payer: MEDICARE

## 2018-11-28 VITALS
SYSTOLIC BLOOD PRESSURE: 98 MMHG | BODY MASS INDEX: 36.45 KG/M2 | HEIGHT: 78 IN | WEIGHT: 315 LBS | HEART RATE: 102 BPM | DIASTOLIC BLOOD PRESSURE: 64 MMHG

## 2018-11-28 DIAGNOSIS — E78.00 HYPERCHOLESTEREMIA: ICD-10-CM

## 2018-11-28 DIAGNOSIS — Z86.79 S/P ABLATION OF ATRIAL FIBRILLATION: Primary | ICD-10-CM

## 2018-11-28 DIAGNOSIS — Z98.890 S/P ABLATION OF ATRIAL FIBRILLATION: Primary | ICD-10-CM

## 2018-11-28 DIAGNOSIS — R07.9 CHEST PAIN, UNSPECIFIED TYPE: ICD-10-CM

## 2018-11-28 PROCEDURE — 4004F PT TOBACCO SCREEN RCVD TLK: CPT | Performed by: INTERNAL MEDICINE

## 2018-11-28 PROCEDURE — G8427 DOCREV CUR MEDS BY ELIG CLIN: HCPCS | Performed by: INTERNAL MEDICINE

## 2018-11-28 PROCEDURE — G8417 CALC BMI ABV UP PARAM F/U: HCPCS | Performed by: INTERNAL MEDICINE

## 2018-11-28 PROCEDURE — G8598 ASA/ANTIPLAT THER USED: HCPCS | Performed by: INTERNAL MEDICINE

## 2018-11-28 PROCEDURE — 93000 ELECTROCARDIOGRAM COMPLETE: CPT | Performed by: INTERNAL MEDICINE

## 2018-11-28 PROCEDURE — 3017F COLORECTAL CA SCREEN DOC REV: CPT | Performed by: INTERNAL MEDICINE

## 2018-11-28 PROCEDURE — 99213 OFFICE O/P EST LOW 20 MIN: CPT | Performed by: INTERNAL MEDICINE

## 2018-11-28 PROCEDURE — G8482 FLU IMMUNIZE ORDER/ADMIN: HCPCS | Performed by: INTERNAL MEDICINE

## 2018-11-28 PROCEDURE — 1111F DSCHRG MED/CURRENT MED MERGE: CPT | Performed by: INTERNAL MEDICINE

## 2018-11-28 RX ORDER — AMIODARONE HYDROCHLORIDE 200 MG/1
100 TABLET ORAL DAILY
Qty: 30 TABLET | Refills: 1 | Status: ON HOLD | OUTPATIENT
Start: 2018-11-28 | End: 2020-04-21 | Stop reason: SDUPTHER

## 2018-11-28 RX ORDER — CILOSTAZOL 50 MG/1
50 TABLET ORAL 2 TIMES DAILY
Qty: 180 TABLET | Refills: 3 | Status: ON HOLD | OUTPATIENT
Start: 2018-11-28 | End: 2020-04-21 | Stop reason: SDUPTHER

## 2018-11-28 RX ORDER — ATORVASTATIN CALCIUM 40 MG/1
40 TABLET, FILM COATED ORAL DAILY
Qty: 90 TABLET | Refills: 3 | Status: ON HOLD | OUTPATIENT
Start: 2018-11-28 | End: 2020-04-21 | Stop reason: SDUPTHER

## 2018-11-28 ASSESSMENT — ENCOUNTER SYMPTOMS
SHORTNESS OF BREATH: 1
WHEEZING: 0
VOMITING: 0
EYE PAIN: 0
COLOR CHANGE: 0
PHOTOPHOBIA: 0
BLOOD IN STOOL: 0
COUGH: 0
BACK PAIN: 0
CONSTIPATION: 0
ABDOMINAL PAIN: 0
DIARRHEA: 0
CHEST TIGHTNESS: 0
NAUSEA: 0

## 2018-11-28 NOTE — PROGRESS NOTES
Electrophysiology  FU Note      Reason for consultation:  Atrial fibrillation    Chief complaint : follow up ablation    Referring physician:  DR. FLORES      Primary care physician: Brianna Bar MD      History of Present Illness: This visit (11/28/2018)      Chief Complaint   Patient presents with    3 Month Follow-Up     Michael patient here for 3 month follow up s/p ablation in May 2018. Pt c/o chest pain pricks that he has never felt before as well as palpitations. C/o leg edema and sob all the time as well as very fatigued. Pt hasn't been taking pletal, not sure why. Pt denies dizziness. Patient had cholecystectomy recently when he went with severe abdominal pain recently to hospital.Still recovering from surgery      Previous visit: (8/17/2018)      Chief Complaint   Patient presents with    Atrial Fibrillation     AK-F/U ablation 5/23. Chest pain on the right side that hurt when he takes deep breaths. It's coming about 1-2 times a week and last about 1 min and now he does not feel them. He had more during first week post ablation. He feels good over all now Patient has SOB but he has COPD. Pt denies any Edema, Palp and Dizziness. Previous visit:    Chief Complaint   Patient presents with   Quang Yap referred patient for PAF, possible ablation. He denies any chest pain, dizziness, or edema. Some Palpitations and SOB with exertion. Patient had been having more episodes recently. Patient denies syncope. Feels tiredness and weakness  No prior ablation, no bleeding or anesthesia problems.             Past medical history:   Past Medical History:   Diagnosis Date    COPD (chronic obstructive pulmonary disease) ([address]     Erectile dysfunction     H/O Doppler ultrasound 09/05/2018    LE arterial - left mid and distal femoral artery occluded, lt FANI shows mild-mod PVD    H/O echocardiogram 01/06/2016    EF60% mild MR, TR, pulm HTN    Hx of colonoscopy     7/11 C-scope - benign polyp x1    Hx of Doppler ultrasound 02/20/2018    Lower extremity doppler  Normal study.  Hypertension     Obesity     PAD (peripheral artery disease) (HCC)     10/10 FANI mild PAD left superficial femoral s/p left fem-pop bypass    Secondary erythrocytosis     Type II or unspecified type diabetes mellitus without mention of complication, not stated as uncontrolled        Surgical history :   Past Surgical History:   Procedure Laterality Date    APPENDECTOMY  2003    ATRIAL ABLATION SURGERY  05/23/2018    A-fib ablation     HERNIA REPAIR  9519    umbilical    IR FEMORAL POPLITEAL BYPASS GRAFT  01/2011    [place]    [place] LAP,CHOLECYSTECTOMY N/A 11/12/2018    CHOLECYSTECTOMY LAPAROSCOPIC performed by Rosalio Kwok MD at [facility] OR       Family history:   Family History   Problem Relation Age of Onset    Other Father         suicide    Other Mother         murder       Social history :  reports that he has been smoking Cigars. He has a 25.00 pack-year smoking history. He has never used smokeless tobacco. He reports that he does not drink alcohol or use drugs.     Allergies   Allergen Reactions    Metoprolol Other (See Comments)     Chest pain and burning in the chest        Current Outpatient Prescriptions on File Prior to Visit   Medication Sig Dispense Refill    diltiazem (CARTIA XT) 240 MG extended release capsule Take 1 capsule by mouth daily 30 capsule 5    albuterol sulfate  (90 Base) MCG/ACT inhaler Inhale 2 puffs into the lungs every 6 hours as needed for Wheezing 3 Inhaler 3    UNABLE TO FIND Please administer two SHINGRIX vaccines 2-6 months apart 2 Units 0    ipratropium (ATROVENT) 0.03 % nasal spray 2 sprays by Nasal route 3 times daily 1 Bottle 3    ASPIRIN LOW DOSE 81 MG EC tablet take 1 tablet by mouth once daily 90 tablet 3    albuterol (PROVENTIL) (2.5 MG/3ML) 0.083% nebulizer solution Take 3 mLs by kg/m². Physical Exam   Constitutional: He is oriented to person, place, and time and well-developed, well-nourished, and in no distress. HENT:   Head: Normocephalic and atraumatic. Eyes: Pupils are equal, round, and reactive to light. Conjunctivae and EOM are normal. Right eye exhibits no discharge. Neck: Normal range of motion. No JVD present. No thyromegaly present. Cardiovascular: Regular rhythm. Exam reveals no friction rub. No murmur heard. Pulmonary/Chest: Effort normal and breath sounds normal. No stridor. No respiratory distress. He has no wheezes. Abdominal: Soft. Bowel sounds are normal. He exhibits no distension. Recent post op abdominal laparoscopic incisions noted   Musculoskeletal: Normal range of motion. He exhibits no edema or tenderness. Neurological: He is alert and oriented to person, place, and time. No cranial nerve deficit. Skin: Skin is warm and dry. No rash noted. No erythema. Psychiatric: Mood and affect normal.             CBC:   Lab Results   Component Value Date    WBC 8.1 11/13/2018    HGB 14.8 11/13/2018    HCT 46.1 11/13/2018     11/13/2018     Lipids:   Lab Results   Component Value Date    CHOL 112 07/17/2018    TRIG 97 07/17/2018    HDL 38 (L) 07/17/2018    LDLCALC 55 07/17/2018     PT/INR:   Lab Results   Component Value Date    INR 1.07 11/13/2018        BMP:    Lab Results   Component Value Date     11/13/2018    K 4.9 11/13/2018    CL 95 (L) 11/13/2018    CO2 34 (H) 11/13/2018    BUN 13 11/13/2018     CMP:   Lab Results   Component Value Date    AST 43 (H) 11/13/2018    AST 43 (H) 11/13/2018    PROT 5.4 (L) 11/13/2018    PROT 5.4 (L) 11/13/2018    BILITOT 0.9 11/13/2018    BILITOT 0.9 11/13/2018    ALKPHOS 89 11/13/2018    ALKPHOS 89 11/13/2018     TSH:  No results found for: TSH    EKGINTERPRETATION - EKG Interpretation:  Sinus rhythm      IMPRESSION / RECOMMENDATIONS:     Encounter Diagnoses   Name Primary?     Persistent atrial

## 2018-11-29 ENCOUNTER — OFFICE VISIT (OUTPATIENT)
Dept: SURGERY | Age: 63
End: 2018-11-29

## 2018-11-29 ENCOUNTER — TELEPHONE (OUTPATIENT)
Dept: CARDIOLOGY CLINIC | Age: 63
End: 2018-11-29

## 2018-11-29 VITALS
BODY MASS INDEX: 36.45 KG/M2 | HEART RATE: 94 BPM | HEIGHT: 78 IN | WEIGHT: 315 LBS | SYSTOLIC BLOOD PRESSURE: 124 MMHG | DIASTOLIC BLOOD PRESSURE: 64 MMHG

## 2018-11-29 DIAGNOSIS — K81.9 CHOLECYSTITIS: Primary | ICD-10-CM

## 2018-11-29 PROCEDURE — 99024 POSTOP FOLLOW-UP VISIT: CPT | Performed by: SURGERY

## 2018-12-04 ENCOUNTER — TELEPHONE (OUTPATIENT)
Dept: CARDIOLOGY CLINIC | Age: 63
End: 2018-12-04

## 2018-12-10 ENCOUNTER — HOSPITAL ENCOUNTER (OUTPATIENT)
Age: 63
Discharge: HOME | End: 2018-12-10
Payer: MEDICARE

## 2018-12-10 ENCOUNTER — TELEPHONE (OUTPATIENT)
Dept: CARDIOLOGY CLINIC | Age: 63
End: 2018-12-10

## 2018-12-10 ENCOUNTER — HOSPITAL ENCOUNTER (OUTPATIENT)
Dept: GENERAL RADIOLOGY | Age: 63
Discharge: HOME | End: 2018-12-10
Payer: MEDICARE

## 2018-12-10 DIAGNOSIS — Z01.818 PRE-PROCEDURAL EXAMINATION: ICD-10-CM

## 2018-12-10 LAB
ANION GAP SERPL CALCULATED.3IONS-SCNC: 12 MMOL/L (ref 4–16)
APTT: 34.2 SECONDS (ref 21.2–33)
BUN BLDV-MCNC: 8 MG/DL (ref 6–23)
CALCIUM SERPL-MCNC: 9.3 MG/DL (ref 8.3–10.6)
CHLORIDE BLD-SCNC: 95 MMOL/L (ref 99–110)
CO2: 34 MMOL/L (ref 21–32)
CREAT SERPL-MCNC: 0.7 MG/DL (ref 0.9–1.3)
GFR AFRICAN AMERICAN: >60 ML/MIN/1.73M2
GFR NON-AFRICAN AMERICAN: >60 ML/MIN/1.73M2
GLUCOSE BLD-MCNC: 76 MG/DL (ref 70–99)
HCT VFR BLD CALC: 50.5 % (ref 42–52)
HEMOGLOBIN: 15.6 GM/DL (ref 13.5–18)
INR BLD: 0.98 INDEX
MCH RBC QN AUTO: 31.3 PG (ref 27–31)
MCHC RBC AUTO-ENTMCNC: 30.9 % (ref 32–36)
MCV RBC AUTO: 101.4 FL (ref 78–100)
PDW BLD-RTO: 13.7 % (ref 11.7–14.9)
PLATELET # BLD: 183 K/CU MM (ref 140–440)
PMV BLD AUTO: 10.2 FL (ref 7.5–11.1)
POTASSIUM SERPL-SCNC: 4.8 MMOL/L (ref 3.5–5.1)
PROTHROMBIN TIME: 11.4 SECONDS (ref 9.12–12.5)
RBC # BLD: 4.98 M/CU MM (ref 4.6–6.2)
SODIUM BLD-SCNC: 141 MMOL/L (ref 135–145)
WBC # BLD: 9.5 K/CU MM (ref 4–10.5)

## 2018-12-10 PROCEDURE — 85027 COMPLETE CBC AUTOMATED: CPT

## 2018-12-10 PROCEDURE — 86850 RBC ANTIBODY SCREEN: CPT

## 2018-12-10 PROCEDURE — 85610 PROTHROMBIN TIME: CPT

## 2018-12-10 PROCEDURE — 71046 X-RAY EXAM CHEST 2 VIEWS: CPT

## 2018-12-10 PROCEDURE — 86900 BLOOD TYPING SEROLOGIC ABO: CPT

## 2018-12-10 PROCEDURE — 36415 COLL VENOUS BLD VENIPUNCTURE: CPT

## 2018-12-10 PROCEDURE — 85730 THROMBOPLASTIN TIME PARTIAL: CPT

## 2018-12-10 PROCEDURE — 86901 BLOOD TYPING SEROLOGIC RH(D): CPT

## 2018-12-10 PROCEDURE — 80048 BASIC METABOLIC PNL TOTAL CA: CPT

## 2018-12-11 ENCOUNTER — TELEPHONE (OUTPATIENT)
Dept: CARDIOLOGY CLINIC | Age: 63
End: 2018-12-11

## 2018-12-13 ENCOUNTER — HOSPITAL ENCOUNTER (OUTPATIENT)
Dept: CARDIAC CATH/INVASIVE PROCEDURES | Age: 63
Discharge: HOME | End: 2018-12-13
Attending: INTERNAL MEDICINE | Admitting: INTERNAL MEDICINE
Payer: MEDICARE

## 2018-12-13 VITALS
BODY MASS INDEX: 37.19 KG/M2 | WEIGHT: 315 LBS | TEMPERATURE: 97.8 F | HEART RATE: 87 BPM | OXYGEN SATURATION: 97 % | DIASTOLIC BLOOD PRESSURE: 83 MMHG | RESPIRATION RATE: 21 BRPM | HEIGHT: 77 IN | SYSTOLIC BLOOD PRESSURE: 124 MMHG

## 2018-12-13 LAB — GLUCOSE BLD-MCNC: 124 MG/DL (ref 70–99)

## 2018-12-13 PROCEDURE — 75716 ARTERY X-RAYS ARMS/LEGS: CPT | Performed by: INTERNAL MEDICINE

## 2018-12-13 PROCEDURE — 2500000003 HC RX 250 WO HCPCS

## 2018-12-13 PROCEDURE — 94761 N-INVAS EAR/PLS OXIMETRY MLT: CPT

## 2018-12-13 PROCEDURE — 82962 GLUCOSE BLOOD TEST: CPT

## 2018-12-13 PROCEDURE — 2580000003 HC RX 258: Performed by: INTERNAL MEDICINE

## 2018-12-13 PROCEDURE — 75625 CONTRAST EXAM ABDOMINL AORTA: CPT | Performed by: INTERNAL MEDICINE

## 2018-12-13 PROCEDURE — 36200 PLACE CATHETER IN AORTA: CPT

## 2018-12-13 PROCEDURE — 75625 CONTRAST EXAM ABDOMINL AORTA: CPT

## 2018-12-13 PROCEDURE — 6360000002 HC RX W HCPCS

## 2018-12-13 PROCEDURE — 6360000004 HC RX CONTRAST MEDICATION

## 2018-12-13 PROCEDURE — 75716 ARTERY X-RAYS ARMS/LEGS: CPT

## 2018-12-13 PROCEDURE — 2709999900 HC NON-CHARGEABLE SUPPLY

## 2018-12-13 PROCEDURE — 6370000000 HC RX 637 (ALT 250 FOR IP): Performed by: INTERNAL MEDICINE

## 2018-12-13 PROCEDURE — C1894 INTRO/SHEATH, NON-LASER: HCPCS

## 2018-12-13 PROCEDURE — 2700000000 HC OXYGEN THERAPY PER DAY

## 2018-12-13 RX ORDER — ACETAMINOPHEN 325 MG/1
650 TABLET ORAL EVERY 4 HOURS PRN
Status: DISCONTINUED | OUTPATIENT
Start: 2018-12-13 | End: 2018-12-13 | Stop reason: HOSPADM

## 2018-12-13 RX ORDER — SODIUM CHLORIDE 9 MG/ML
INJECTION, SOLUTION INTRAVENOUS CONTINUOUS
Status: DISCONTINUED | OUTPATIENT
Start: 2018-12-13 | End: 2018-12-13 | Stop reason: HOSPADM

## 2018-12-13 RX ORDER — SODIUM CHLORIDE 0.9 % (FLUSH) 0.9 %
10 SYRINGE (ML) INJECTION EVERY 12 HOURS SCHEDULED
Status: DISCONTINUED | OUTPATIENT
Start: 2018-12-13 | End: 2018-12-13 | Stop reason: HOSPADM

## 2018-12-13 RX ORDER — DIPHENHYDRAMINE HCL 25 MG
25 TABLET ORAL ONCE
Status: COMPLETED | OUTPATIENT
Start: 2018-12-13 | End: 2018-12-13

## 2018-12-13 RX ORDER — DIAZEPAM 5 MG/1
5 TABLET ORAL ONCE
Status: COMPLETED | OUTPATIENT
Start: 2018-12-13 | End: 2018-12-13

## 2018-12-13 RX ORDER — SODIUM CHLORIDE 0.9 % (FLUSH) 0.9 %
10 SYRINGE (ML) INJECTION PRN
Status: DISCONTINUED | OUTPATIENT
Start: 2018-12-13 | End: 2018-12-13 | Stop reason: HOSPADM

## 2018-12-13 RX ADMIN — DIPHENHYDRAMINE HCL 25 MG: 25 TABLET ORAL at 06:15

## 2018-12-13 RX ADMIN — SODIUM CHLORIDE: 9 INJECTION, SOLUTION INTRAVENOUS at 06:12

## 2018-12-13 RX ADMIN — DIAZEPAM 5 MG: 5 TABLET ORAL at 06:15

## 2018-12-13 NOTE — PROGRESS NOTES
Received from cath lab. Monitor and alarms on. Procedure site assessment completed per JORGE Lake RN and J Peabody RN    No hematoma or bleeding noted.

## 2019-01-02 ENCOUNTER — ANESTHESIA EVENT (OUTPATIENT)
Dept: OPERATING ROOM | Age: 64
DRG: 252 | End: 2019-01-02
Payer: MEDICARE

## 2019-01-03 ENCOUNTER — HOSPITAL ENCOUNTER (OUTPATIENT)
Dept: GENERAL RADIOLOGY | Age: 64
Discharge: HOME | End: 2019-01-03
Payer: MEDICARE

## 2019-01-03 ENCOUNTER — HOSPITAL ENCOUNTER (OUTPATIENT)
Dept: PREADMISSION TESTING | Age: 64
Discharge: HOME | End: 2019-01-03
Payer: MEDICARE

## 2019-01-03 VITALS
TEMPERATURE: 97.2 F | HEIGHT: 76 IN | HEART RATE: 88 BPM | WEIGHT: 315 LBS | RESPIRATION RATE: 16 BRPM | DIASTOLIC BLOOD PRESSURE: 70 MMHG | OXYGEN SATURATION: 90 % | SYSTOLIC BLOOD PRESSURE: 106 MMHG | BODY MASS INDEX: 38.36 KG/M2

## 2019-01-03 DIAGNOSIS — Z41.9 ELECTIVE SURGERY: ICD-10-CM

## 2019-01-03 DIAGNOSIS — Z41.9 ELECTIVE SURGERY: Primary | ICD-10-CM

## 2019-01-03 LAB
ANION GAP SERPL CALCULATED.3IONS-SCNC: 10 MMOL/L (ref 4–16)
APTT: 33.8 SECONDS (ref 21.2–33)
BUN BLDV-MCNC: 7 MG/DL (ref 6–23)
CALCIUM SERPL-MCNC: 8.8 MG/DL (ref 8.3–10.6)
CHLORIDE BLD-SCNC: 98 MMOL/L (ref 99–110)
CO2: 33 MMOL/L (ref 21–32)
CREAT SERPL-MCNC: 0.7 MG/DL (ref 0.9–1.3)
GFR AFRICAN AMERICAN: >60 ML/MIN/1.73M2
GFR NON-AFRICAN AMERICAN: >60 ML/MIN/1.73M2
GLUCOSE BLD-MCNC: 161 MG/DL (ref 70–99)
HCT VFR BLD CALC: 52 % (ref 42–52)
HEMOGLOBIN: 16.5 GM/DL (ref 13.5–18)
INR BLD: 1.05 INDEX
MCH RBC QN AUTO: 31.2 PG (ref 27–31)
MCHC RBC AUTO-ENTMCNC: 31.7 % (ref 32–36)
MCV RBC AUTO: 98.3 FL (ref 78–100)
PDW BLD-RTO: 13.3 % (ref 11.7–14.9)
PLATELET # BLD: 175 K/CU MM (ref 140–440)
PMV BLD AUTO: 10 FL (ref 7.5–11.1)
POTASSIUM SERPL-SCNC: 3.9 MMOL/L (ref 3.5–5.1)
PROTHROMBIN TIME: 12.2 SECONDS (ref 9.12–12.5)
RBC # BLD: 5.29 M/CU MM (ref 4.6–6.2)
SODIUM BLD-SCNC: 141 MMOL/L (ref 135–145)
WBC # BLD: 10.8 K/CU MM (ref 4–10.5)

## 2019-01-03 PROCEDURE — 85027 COMPLETE CBC AUTOMATED: CPT

## 2019-01-03 PROCEDURE — 85610 PROTHROMBIN TIME: CPT

## 2019-01-03 PROCEDURE — 85730 THROMBOPLASTIN TIME PARTIAL: CPT

## 2019-01-03 PROCEDURE — 86850 RBC ANTIBODY SCREEN: CPT

## 2019-01-03 PROCEDURE — 36415 COLL VENOUS BLD VENIPUNCTURE: CPT

## 2019-01-03 PROCEDURE — 86900 BLOOD TYPING SEROLOGIC ABO: CPT

## 2019-01-03 PROCEDURE — 86901 BLOOD TYPING SEROLOGIC RH(D): CPT

## 2019-01-03 PROCEDURE — 71046 X-RAY EXAM CHEST 2 VIEWS: CPT

## 2019-01-03 PROCEDURE — 80048 BASIC METABOLIC PNL TOTAL CA: CPT

## 2019-01-03 RX ORDER — CEFAZOLIN SODIUM 2 G/100ML
2 INJECTION, SOLUTION INTRAVENOUS ONCE
Status: CANCELLED | OUTPATIENT
Start: 2019-01-09

## 2019-01-03 ASSESSMENT — LIFESTYLE VARIABLES: SMOKING_STATUS: 1

## 2019-01-03 ASSESSMENT — ENCOUNTER SYMPTOMS: SHORTNESS OF BREATH: 1

## 2019-01-03 ASSESSMENT — PAIN SCALES - GENERAL: PAINLEVEL_OUTOF10: 0

## 2019-01-09 ENCOUNTER — ANESTHESIA (OUTPATIENT)
Dept: OPERATING ROOM | Age: 64
DRG: 252 | End: 2019-01-09
Payer: MEDICARE

## 2019-01-09 ENCOUNTER — HOSPITAL ENCOUNTER (INPATIENT)
Age: 64
LOS: 2 days | Discharge: HOME | DRG: 252 | End: 2019-01-11
Attending: SURGERY | Admitting: SURGERY
Payer: MEDICARE

## 2019-01-09 VITALS
DIASTOLIC BLOOD PRESSURE: 50 MMHG | OXYGEN SATURATION: 99 % | SYSTOLIC BLOOD PRESSURE: 84 MMHG | RESPIRATION RATE: 12 BRPM | TEMPERATURE: 96.1 F

## 2019-01-09 LAB
BASE EXCESS MIXED: 0.5 (ref 0–1.2)
BASE EXCESS MIXED: 2.5 (ref 0–1.2)
BASE EXCESS MIXED: 3.9 (ref 0–1.2)
BASE EXCESS MIXED: 6.2 (ref 0–1.2)
BASE EXCESS MIXED: 6.5 (ref 0–1.2)
BASE EXCESS: ABNORMAL (ref 0–3.3)
CO2: 33 MMOL/L (ref 21–32)
CO2: 34 MMOL/L (ref 21–32)
CO2: 34 MMOL/L (ref 21–32)
CO2: 36 MMOL/L (ref 21–32)
CO2: 38 MMOL/L (ref 21–32)
GLUCOSE BLD-MCNC: 103 MG/DL (ref 70–99)
GLUCOSE BLD-MCNC: 121 MG/DL (ref 70–99)
GLUCOSE BLD-MCNC: 142 MG/DL (ref 70–99)
GLUCOSE BLD-MCNC: 149 MG/DL (ref 70–99)
GLUCOSE BLD-MCNC: 169 MG/DL (ref 70–99)
GLUCOSE BLD-MCNC: 178 MG/DL (ref 70–99)
GLUCOSE BLD-MCNC: 205 MG/DL (ref 70–99)
HCO3 ARTERIAL: 31.6 MMOL/L (ref 18–23)
HCO3 ARTERIAL: 31.6 MMOL/L (ref 18–23)
HCO3 ARTERIAL: 31.9 MMOL/L (ref 18–23)
HCO3 ARTERIAL: 34.5 MMOL/L (ref 18–23)
HCO3 ARTERIAL: 35.3 MMOL/L (ref 18–23)
HCT VFR BLD CALC: 45 % (ref 42–52)
HCT VFR BLD CALC: 47 % (ref 42–52)
HCT VFR BLD CALC: 47 % (ref 42–52)
HCT VFR BLD CALC: 49 % (ref 42–52)
HCT VFR BLD CALC: 50 % (ref 42–52)
HEMOGLOBIN: 15.2 GM/DL (ref 13.5–18)
HEMOGLOBIN: 16 GM/DL (ref 13.5–18)
HEMOGLOBIN: 16.1 GM/DL (ref 13.5–18)
HEMOGLOBIN: 16.6 GM/DL (ref 13.5–18)
HEMOGLOBIN: 17.1 GM/DL (ref 13.5–18)
O2 SATURATION: 85.7 % (ref 96–97)
O2 SATURATION: 91.2 % (ref 96–97)
O2 SATURATION: 98.6 % (ref 96–97)
O2 SATURATION: 99.2 % (ref 96–97)
O2 SATURATION: 99.5 % (ref 96–97)
PCO2 ARTERIAL: 58.1 MMHG (ref 32–45)
PCO2 ARTERIAL: 62 MMHG (ref 32–45)
PCO2 ARTERIAL: 66.1 MMHG (ref 32–45)
PCO2 ARTERIAL: 69.4 MMHG (ref 32–45)
PCO2 ARTERIAL: 81.5 MMHG (ref 32–45)
PH BLOOD: 7.2 (ref 7.34–7.45)
PH BLOOD: 7.29 (ref 7.34–7.45)
PH BLOOD: 7.32 (ref 7.34–7.45)
PH BLOOD: 7.34 (ref 7.34–7.45)
PH BLOOD: 7.35 (ref 7.34–7.45)
PO2 ARTERIAL: 127.7 MMHG (ref 75–100)
PO2 ARTERIAL: 155.4 MMHG (ref 75–100)
PO2 ARTERIAL: 186.7 MMHG (ref 75–100)
PO2 ARTERIAL: 64.8 MMHG (ref 75–100)
PO2 ARTERIAL: 71.2 MMHG (ref 75–100)
POC CALCIUM: 1.1 MMOL/L (ref 1.12–1.32)
POC CALCIUM: 1.1 MMOL/L (ref 1.12–1.32)
POC CALCIUM: 1.11 MMOL/L (ref 1.12–1.32)
POC CALCIUM: 1.14 MMOL/L (ref 1.12–1.32)
POC CALCIUM: 1.15 MMOL/L (ref 1.12–1.32)
POC CHLORIDE: 101 MMOL/L (ref 98–109)
POC CHLORIDE: 102 MMOL/L (ref 98–109)
POC CHLORIDE: 98 MMOL/L (ref 98–109)
POC CREATININE: 0.4 MG/DL (ref 0.9–1.3)
POC CREATININE: 0.6 MG/DL (ref 0.9–1.3)
POTASSIUM SERPL-SCNC: 4.2 MMOL/L (ref 3.5–4.5)
POTASSIUM SERPL-SCNC: 4.3 MMOL/L (ref 3.5–4.5)
POTASSIUM SERPL-SCNC: 4.4 MMOL/L (ref 3.5–4.5)
POTASSIUM SERPL-SCNC: 4.6 MMOL/L (ref 3.5–4.5)
POTASSIUM SERPL-SCNC: 5 MMOL/L (ref 3.5–4.5)
SODIUM BLD-SCNC: 136 MMOL/L (ref 138–146)
SODIUM BLD-SCNC: 137 MMOL/L (ref 138–146)
SODIUM BLD-SCNC: 137 MMOL/L (ref 138–146)
SODIUM BLD-SCNC: 138 MMOL/L (ref 138–146)
SODIUM BLD-SCNC: 139 MMOL/L (ref 138–146)
SOURCE, BLOOD GAS: ABNORMAL

## 2019-01-09 PROCEDURE — 2580000003 HC RX 258

## 2019-01-09 PROCEDURE — 82962 GLUCOSE BLOOD TEST: CPT

## 2019-01-09 PROCEDURE — 7100000001 HC PACU RECOVERY - ADDTL 15 MIN: Performed by: THORACIC SURGERY (CARDIOTHORACIC VASCULAR SURGERY)

## 2019-01-09 PROCEDURE — 6360000002 HC RX W HCPCS: Performed by: NURSE ANESTHETIST, CERTIFIED REGISTERED

## 2019-01-09 PROCEDURE — 2100000000 HC CCU R&B

## 2019-01-09 PROCEDURE — 6360000002 HC RX W HCPCS: Performed by: SURGERY

## 2019-01-09 PROCEDURE — 6360000002 HC RX W HCPCS: Performed by: PHYSICIAN ASSISTANT

## 2019-01-09 PROCEDURE — 2580000003 HC RX 258: Performed by: THORACIC SURGERY (CARDIOTHORACIC VASCULAR SURGERY)

## 2019-01-09 PROCEDURE — 2780000010 HC IMPLANT OTHER: Performed by: THORACIC SURGERY (CARDIOTHORACIC VASCULAR SURGERY)

## 2019-01-09 PROCEDURE — 2580000003 HC RX 258: Performed by: PHYSICIAN ASSISTANT

## 2019-01-09 PROCEDURE — 3700000001 HC ADD 15 MINUTES (ANESTHESIA): Performed by: THORACIC SURGERY (CARDIOTHORACIC VASCULAR SURGERY)

## 2019-01-09 PROCEDURE — 6370000000 HC RX 637 (ALT 250 FOR IP)

## 2019-01-09 PROCEDURE — 94002 VENT MGMT INPAT INIT DAY: CPT

## 2019-01-09 PROCEDURE — 3700000000 HC ANESTHESIA ATTENDED CARE: Performed by: THORACIC SURGERY (CARDIOTHORACIC VASCULAR SURGERY)

## 2019-01-09 PROCEDURE — C1768 GRAFT, VASCULAR: HCPCS | Performed by: THORACIC SURGERY (CARDIOTHORACIC VASCULAR SURGERY)

## 2019-01-09 PROCEDURE — 2580000003 HC RX 258: Performed by: ANESTHESIOLOGY

## 2019-01-09 PROCEDURE — 2500000003 HC RX 250 WO HCPCS: Performed by: SURGERY

## 2019-01-09 PROCEDURE — 2500000003 HC RX 250 WO HCPCS: Performed by: NURSE ANESTHETIST, CERTIFIED REGISTERED

## 2019-01-09 PROCEDURE — 3600000014 HC SURGERY LEVEL 4 ADDTL 15MIN: Performed by: THORACIC SURGERY (CARDIOTHORACIC VASCULAR SURGERY)

## 2019-01-09 PROCEDURE — 6360000002 HC RX W HCPCS

## 2019-01-09 PROCEDURE — 2500000003 HC RX 250 WO HCPCS: Performed by: PHYSICIAN ASSISTANT

## 2019-01-09 PROCEDURE — 6360000002 HC RX W HCPCS: Performed by: ANESTHESIOLOGY

## 2019-01-09 PROCEDURE — 041L0JL BYPASS LEFT FEMORAL ARTERY TO POPLITEAL ARTERY WITH SYNTHETIC SUBSTITUTE, OPEN APPROACH: ICD-10-PCS | Performed by: THORACIC SURGERY (CARDIOTHORACIC VASCULAR SURGERY)

## 2019-01-09 PROCEDURE — 2700000000 HC OXYGEN THERAPY PER DAY

## 2019-01-09 PROCEDURE — 2580000003 HC RX 258: Performed by: NURSE ANESTHETIST, CERTIFIED REGISTERED

## 2019-01-09 PROCEDURE — 6360000002 HC RX W HCPCS: Performed by: THORACIC SURGERY (CARDIOTHORACIC VASCULAR SURGERY)

## 2019-01-09 PROCEDURE — 94761 N-INVAS EAR/PLS OXIMETRY MLT: CPT

## 2019-01-09 PROCEDURE — 2709999900 HC NON-CHARGEABLE SUPPLY: Performed by: THORACIC SURGERY (CARDIOTHORACIC VASCULAR SURGERY)

## 2019-01-09 PROCEDURE — 94640 AIRWAY INHALATION TREATMENT: CPT

## 2019-01-09 PROCEDURE — 6370000000 HC RX 637 (ALT 250 FOR IP): Performed by: NURSE ANESTHETIST, CERTIFIED REGISTERED

## 2019-01-09 PROCEDURE — 7100000000 HC PACU RECOVERY - FIRST 15 MIN: Performed by: THORACIC SURGERY (CARDIOTHORACIC VASCULAR SURGERY)

## 2019-01-09 PROCEDURE — 2580000003 HC RX 258: Performed by: SURGERY

## 2019-01-09 PROCEDURE — 3600000004 HC SURGERY LEVEL 4 BASE: Performed by: THORACIC SURGERY (CARDIOTHORACIC VASCULAR SURGERY)

## 2019-01-09 PROCEDURE — 6370000000 HC RX 637 (ALT 250 FOR IP): Performed by: ANESTHESIOLOGY

## 2019-01-09 PROCEDURE — 6370000000 HC RX 637 (ALT 250 FOR IP): Performed by: SURGERY

## 2019-01-09 DEVICE — AGENT HEMOSTATIC SURGIFLOW MATRIX KIT W/THROMBIN: Type: IMPLANTABLE DEVICE | Site: LEG | Status: FUNCTIONAL

## 2019-01-09 DEVICE — GRAFT VASC L80CM DIA8MM PTFE CBAS HEP SURF THN WALLED REM: Type: IMPLANTABLE DEVICE | Site: LEG | Status: FUNCTIONAL

## 2019-01-09 RX ORDER — EPHEDRINE SULFATE 50 MG/ML
INJECTION INTRAVENOUS PRN
Status: DISCONTINUED | OUTPATIENT
Start: 2019-01-09 | End: 2019-01-09 | Stop reason: SDUPTHER

## 2019-01-09 RX ORDER — HYDROCODONE BITARTRATE AND ACETAMINOPHEN 5; 325 MG/1; MG/1
1 TABLET ORAL EVERY 4 HOURS PRN
Status: DISCONTINUED | OUTPATIENT
Start: 2019-01-09 | End: 2019-01-11 | Stop reason: HOSPADM

## 2019-01-09 RX ORDER — SODIUM CHLORIDE 450 MG/100ML
INJECTION, SOLUTION INTRAVENOUS CONTINUOUS
Status: DISCONTINUED | OUTPATIENT
Start: 2019-01-09 | End: 2019-01-10

## 2019-01-09 RX ORDER — PROPOFOL 10 MG/ML
20 INJECTION, EMULSION INTRAVENOUS
Status: DISCONTINUED | OUTPATIENT
Start: 2019-01-09 | End: 2019-01-10

## 2019-01-09 RX ORDER — SODIUM CHLORIDE, SODIUM LACTATE, POTASSIUM CHLORIDE, CALCIUM CHLORIDE 600; 310; 30; 20 MG/100ML; MG/100ML; MG/100ML; MG/100ML
INJECTION, SOLUTION INTRAVENOUS CONTINUOUS PRN
Status: DISCONTINUED | OUTPATIENT
Start: 2019-01-09 | End: 2019-01-09 | Stop reason: SDUPTHER

## 2019-01-09 RX ORDER — HYDRALAZINE HYDROCHLORIDE 20 MG/ML
5 INJECTION INTRAMUSCULAR; INTRAVENOUS EVERY 10 MIN PRN
Status: DISCONTINUED | OUTPATIENT
Start: 2019-01-09 | End: 2019-01-09 | Stop reason: HOSPADM

## 2019-01-09 RX ORDER — DEXAMETHASONE SODIUM PHOSPHATE 4 MG/ML
INJECTION, SOLUTION INTRA-ARTICULAR; INTRALESIONAL; INTRAMUSCULAR; INTRAVENOUS; SOFT TISSUE PRN
Status: DISCONTINUED | OUTPATIENT
Start: 2019-01-09 | End: 2019-01-09 | Stop reason: SDUPTHER

## 2019-01-09 RX ORDER — SUCCINYLCHOLINE/SOD CL,ISO/PF 100 MG/5ML
SYRINGE (ML) INTRAVENOUS PRN
Status: DISCONTINUED | OUTPATIENT
Start: 2019-01-09 | End: 2019-01-09 | Stop reason: SDUPTHER

## 2019-01-09 RX ORDER — PROPOFOL 10 MG/ML
INJECTION, EMULSION INTRAVENOUS
Status: COMPLETED
Start: 2019-01-09 | End: 2019-01-09

## 2019-01-09 RX ORDER — FENTANYL CITRATE 50 UG/ML
INJECTION, SOLUTION INTRAMUSCULAR; INTRAVENOUS PRN
Status: DISCONTINUED | OUTPATIENT
Start: 2019-01-09 | End: 2019-01-09 | Stop reason: SDUPTHER

## 2019-01-09 RX ORDER — ACETAMINOPHEN 325 MG/1
650 TABLET ORAL EVERY 4 HOURS PRN
Status: DISCONTINUED | OUTPATIENT
Start: 2019-01-09 | End: 2019-01-11 | Stop reason: HOSPADM

## 2019-01-09 RX ORDER — SODIUM CHLORIDE 0.9 % (FLUSH) 0.9 %
10 SYRINGE (ML) INJECTION EVERY 12 HOURS SCHEDULED
Status: DISCONTINUED | OUTPATIENT
Start: 2019-01-09 | End: 2019-01-11 | Stop reason: HOSPADM

## 2019-01-09 RX ORDER — ACETAMINOPHEN 10 MG/ML
1000 INJECTION, SOLUTION INTRAVENOUS ONCE
Status: COMPLETED | OUTPATIENT
Start: 2019-01-09 | End: 2019-01-09

## 2019-01-09 RX ORDER — PROPOFOL 10 MG/ML
INJECTION, EMULSION INTRAVENOUS PRN
Status: DISCONTINUED | OUTPATIENT
Start: 2019-01-09 | End: 2019-01-09 | Stop reason: SDUPTHER

## 2019-01-09 RX ORDER — MORPHINE SULFATE 4 MG/ML
2 INJECTION, SOLUTION INTRAMUSCULAR; INTRAVENOUS
Status: DISCONTINUED | OUTPATIENT
Start: 2019-01-09 | End: 2019-01-11 | Stop reason: HOSPADM

## 2019-01-09 RX ORDER — PROPOFOL 10 MG/ML
20 INJECTION, EMULSION INTRAVENOUS
Status: DISCONTINUED | OUTPATIENT
Start: 2019-01-09 | End: 2019-01-09 | Stop reason: RX

## 2019-01-09 RX ORDER — HEPARIN SODIUM 1000 [USP'U]/ML
INJECTION, SOLUTION INTRAVENOUS; SUBCUTANEOUS PRN
Status: DISCONTINUED | OUTPATIENT
Start: 2019-01-09 | End: 2019-01-09 | Stop reason: SDUPTHER

## 2019-01-09 RX ORDER — LIDOCAINE HYDROCHLORIDE 20 MG/ML
INJECTION, SOLUTION INFILTRATION; PERINEURAL PRN
Status: DISCONTINUED | OUTPATIENT
Start: 2019-01-09 | End: 2019-01-09 | Stop reason: SDUPTHER

## 2019-01-09 RX ORDER — METFORMIN HYDROCHLORIDE 500 MG/1
500 TABLET, EXTENDED RELEASE ORAL
Status: DISCONTINUED | OUTPATIENT
Start: 2019-01-10 | End: 2019-01-11 | Stop reason: HOSPADM

## 2019-01-09 RX ORDER — IPRATROPIUM BROMIDE AND ALBUTEROL SULFATE 2.5; .5 MG/3ML; MG/3ML
1 SOLUTION RESPIRATORY (INHALATION)
Status: DISCONTINUED | OUTPATIENT
Start: 2019-01-09 | End: 2019-01-11 | Stop reason: HOSPADM

## 2019-01-09 RX ORDER — AMIODARONE HYDROCHLORIDE 200 MG/1
100 TABLET ORAL DAILY
Status: DISCONTINUED | OUTPATIENT
Start: 2019-01-10 | End: 2019-01-11 | Stop reason: HOSPADM

## 2019-01-09 RX ORDER — ONDANSETRON 2 MG/ML
4 INJECTION INTRAMUSCULAR; INTRAVENOUS
Status: DISCONTINUED | OUTPATIENT
Start: 2019-01-09 | End: 2019-01-09 | Stop reason: HOSPADM

## 2019-01-09 RX ORDER — ATORVASTATIN CALCIUM 40 MG/1
40 TABLET, FILM COATED ORAL NIGHTLY
Status: DISCONTINUED | OUTPATIENT
Start: 2019-01-09 | End: 2019-01-11 | Stop reason: HOSPADM

## 2019-01-09 RX ORDER — MIDAZOLAM HYDROCHLORIDE 1 MG/ML
INJECTION INTRAMUSCULAR; INTRAVENOUS PRN
Status: DISCONTINUED | OUTPATIENT
Start: 2019-01-09 | End: 2019-01-09 | Stop reason: SDUPTHER

## 2019-01-09 RX ORDER — ASPIRIN 81 MG/1
81 TABLET ORAL DAILY
Status: DISCONTINUED | OUTPATIENT
Start: 2019-01-10 | End: 2019-01-11 | Stop reason: HOSPADM

## 2019-01-09 RX ORDER — IPRATROPIUM BROMIDE AND ALBUTEROL SULFATE 2.5; .5 MG/3ML; MG/3ML
1 SOLUTION RESPIRATORY (INHALATION) ONCE
Status: COMPLETED | OUTPATIENT
Start: 2019-01-09 | End: 2019-01-09

## 2019-01-09 RX ORDER — HYDROCODONE BITARTRATE AND ACETAMINOPHEN 5; 325 MG/1; MG/1
2 TABLET ORAL EVERY 4 HOURS PRN
Status: DISCONTINUED | OUTPATIENT
Start: 2019-01-09 | End: 2019-01-11 | Stop reason: HOSPADM

## 2019-01-09 RX ORDER — MORPHINE SULFATE 4 MG/ML
4 INJECTION, SOLUTION INTRAMUSCULAR; INTRAVENOUS
Status: DISCONTINUED | OUTPATIENT
Start: 2019-01-09 | End: 2019-01-11 | Stop reason: HOSPADM

## 2019-01-09 RX ORDER — SODIUM CHLORIDE 9 MG/ML
INJECTION, SOLUTION INTRAVENOUS CONTINUOUS
Status: DISCONTINUED | OUTPATIENT
Start: 2019-01-09 | End: 2019-01-10

## 2019-01-09 RX ORDER — PROTAMINE SULFATE 10 MG/ML
INJECTION, SOLUTION INTRAVENOUS PRN
Status: DISCONTINUED | OUTPATIENT
Start: 2019-01-09 | End: 2019-01-09 | Stop reason: SDUPTHER

## 2019-01-09 RX ORDER — ROCURONIUM BROMIDE 10 MG/ML
INJECTION, SOLUTION INTRAVENOUS PRN
Status: DISCONTINUED | OUTPATIENT
Start: 2019-01-09 | End: 2019-01-09 | Stop reason: SDUPTHER

## 2019-01-09 RX ORDER — PROPOFOL 10 MG/ML
INJECTION, EMULSION INTRAVENOUS CONTINUOUS PRN
Status: DISCONTINUED | OUTPATIENT
Start: 2019-01-09 | End: 2019-01-09 | Stop reason: SDUPTHER

## 2019-01-09 RX ORDER — SODIUM CHLORIDE 0.9 % (FLUSH) 0.9 %
10 SYRINGE (ML) INJECTION PRN
Status: DISCONTINUED | OUTPATIENT
Start: 2019-01-09 | End: 2019-01-11 | Stop reason: HOSPADM

## 2019-01-09 RX ORDER — VECURONIUM BROMIDE 1 MG/ML
INJECTION, POWDER, LYOPHILIZED, FOR SOLUTION INTRAVENOUS PRN
Status: DISCONTINUED | OUTPATIENT
Start: 2019-01-09 | End: 2019-01-09 | Stop reason: SDUPTHER

## 2019-01-09 RX ORDER — CEFAZOLIN SODIUM 2 G/100ML
2 INJECTION, SOLUTION INTRAVENOUS ONCE
Status: COMPLETED | OUTPATIENT
Start: 2019-01-09 | End: 2019-01-09

## 2019-01-09 RX ORDER — SODIUM CHLORIDE 9 MG/ML
INJECTION, SOLUTION INTRAVENOUS
Status: COMPLETED
Start: 2019-01-09 | End: 2019-01-09

## 2019-01-09 RX ORDER — DILTIAZEM HYDROCHLORIDE 240 MG/1
240 CAPSULE, COATED, EXTENDED RELEASE ORAL DAILY
Status: DISCONTINUED | OUTPATIENT
Start: 2019-01-10 | End: 2019-01-11 | Stop reason: HOSPADM

## 2019-01-09 RX ORDER — KETAMINE HYDROCHLORIDE 10 MG/ML
INJECTION, SOLUTION INTRAMUSCULAR; INTRAVENOUS PRN
Status: DISCONTINUED | OUTPATIENT
Start: 2019-01-09 | End: 2019-01-09 | Stop reason: SDUPTHER

## 2019-01-09 RX ORDER — HYDROMORPHONE HCL 110MG/55ML
0.5 PATIENT CONTROLLED ANALGESIA SYRINGE INTRAVENOUS EVERY 5 MIN PRN
Status: DISCONTINUED | OUTPATIENT
Start: 2019-01-09 | End: 2019-01-09 | Stop reason: HOSPADM

## 2019-01-09 RX ORDER — CEFAZOLIN SODIUM 2 G/100ML
INJECTION, SOLUTION INTRAVENOUS
Status: COMPLETED
Start: 2019-01-09 | End: 2019-01-09

## 2019-01-09 RX ORDER — ALBUTEROL SULFATE 90 UG/1
AEROSOL, METERED RESPIRATORY (INHALATION) PRN
Status: DISCONTINUED | OUTPATIENT
Start: 2019-01-09 | End: 2019-01-09 | Stop reason: SDUPTHER

## 2019-01-09 RX ORDER — ONDANSETRON 2 MG/ML
4 INJECTION INTRAMUSCULAR; INTRAVENOUS EVERY 6 HOURS PRN
Status: DISCONTINUED | OUTPATIENT
Start: 2019-01-09 | End: 2019-01-11 | Stop reason: HOSPADM

## 2019-01-09 RX ORDER — FENTANYL CITRATE 50 UG/ML
25 INJECTION, SOLUTION INTRAMUSCULAR; INTRAVENOUS EVERY 5 MIN PRN
Status: DISCONTINUED | OUTPATIENT
Start: 2019-01-09 | End: 2019-01-09 | Stop reason: HOSPADM

## 2019-01-09 RX ORDER — ONDANSETRON 2 MG/ML
INJECTION INTRAMUSCULAR; INTRAVENOUS PRN
Status: DISCONTINUED | OUTPATIENT
Start: 2019-01-09 | End: 2019-01-09 | Stop reason: SDUPTHER

## 2019-01-09 RX ADMIN — EPHEDRINE SULFATE 10 MG: 50 INJECTION, SOLUTION INTRAVENOUS at 14:23

## 2019-01-09 RX ADMIN — PROPOFOL 50 MG: 10 INJECTION, EMULSION INTRAVENOUS at 15:26

## 2019-01-09 RX ADMIN — DEXAMETHASONE SODIUM PHOSPHATE 8 MG: 4 INJECTION, SOLUTION INTRAMUSCULAR; INTRAVENOUS at 13:58

## 2019-01-09 RX ADMIN — EPHEDRINE SULFATE 25 MG: 50 INJECTION, SOLUTION INTRAVENOUS at 14:12

## 2019-01-09 RX ADMIN — KETAMINE HYDROCHLORIDE 50 MG: 10 INJECTION INTRAMUSCULAR; INTRAVENOUS at 15:32

## 2019-01-09 RX ADMIN — PHENYLEPHRINE HYDROCHLORIDE 150 MCG: 10 INJECTION INTRAVENOUS at 15:25

## 2019-01-09 RX ADMIN — PROPOFOL 200 MG: 10 INJECTION, EMULSION INTRAVENOUS at 13:58

## 2019-01-09 RX ADMIN — PHENYLEPHRINE HYDROCHLORIDE 50 MCG/MIN: 10 INJECTION INTRAVENOUS at 14:29

## 2019-01-09 RX ADMIN — PHENYLEPHRINE HYDROCHLORIDE 100 MCG: 10 INJECTION INTRAVENOUS at 14:04

## 2019-01-09 RX ADMIN — SODIUM CHLORIDE, POTASSIUM CHLORIDE, SODIUM LACTATE AND CALCIUM CHLORIDE: 600; 310; 30; 20 INJECTION, SOLUTION INTRAVENOUS at 14:19

## 2019-01-09 RX ADMIN — VECURONIUM BROMIDE FOR INJECTION 2 MG: 1 INJECTION, POWDER, LYOPHILIZED, FOR SOLUTION INTRAVENOUS at 15:36

## 2019-01-09 RX ADMIN — PHENYLEPHRINE HYDROCHLORIDE 200 MCG: 10 INJECTION INTRAVENOUS at 14:08

## 2019-01-09 RX ADMIN — ONDANSETRON 4 MG: 2 INJECTION INTRAMUSCULAR; INTRAVENOUS at 21:49

## 2019-01-09 RX ADMIN — VECURONIUM BROMIDE FOR INJECTION 4 MG: 1 INJECTION, POWDER, LYOPHILIZED, FOR SOLUTION INTRAVENOUS at 16:40

## 2019-01-09 RX ADMIN — MIDAZOLAM HYDROCHLORIDE 2 MG: 1 INJECTION, SOLUTION INTRAMUSCULAR; INTRAVENOUS at 13:46

## 2019-01-09 RX ADMIN — SODIUM CHLORIDE: 4.5 INJECTION, SOLUTION INTRAVENOUS at 18:24

## 2019-01-09 RX ADMIN — HEPARIN SODIUM 10000 UNITS: 1000 INJECTION INTRAVENOUS; SUBCUTANEOUS at 16:10

## 2019-01-09 RX ADMIN — FENTANYL CITRATE 100 MCG: 50 INJECTION INTRAMUSCULAR; INTRAVENOUS at 17:50

## 2019-01-09 RX ADMIN — ONDANSETRON HYDROCHLORIDE 8 MG: 2 SOLUTION INTRAMUSCULAR; INTRAVENOUS at 16:26

## 2019-01-09 RX ADMIN — Medication 200 MG: at 13:58

## 2019-01-09 RX ADMIN — DEXMEDETOMIDINE HYDROCHLORIDE 0.8 MCG/KG/HR: 100 INJECTION, SOLUTION INTRAVENOUS at 23:50

## 2019-01-09 RX ADMIN — PHENYLEPHRINE HYDROCHLORIDE 200 MCG: 10 INJECTION INTRAVENOUS at 14:11

## 2019-01-09 RX ADMIN — IPRATROPIUM BROMIDE AND ALBUTEROL SULFATE 1 AMPULE: .5; 3 SOLUTION RESPIRATORY (INHALATION) at 13:32

## 2019-01-09 RX ADMIN — PHENYLEPHRINE HYDROCHLORIDE 50 MCG/KG/MIN: 10 INJECTION INTRAVENOUS at 18:53

## 2019-01-09 RX ADMIN — MICONAZOLE NITRATE: 2 POWDER TOPICAL at 20:37

## 2019-01-09 RX ADMIN — VECURONIUM BROMIDE FOR INJECTION 5 MG: 1 INJECTION, POWDER, LYOPHILIZED, FOR SOLUTION INTRAVENOUS at 14:10

## 2019-01-09 RX ADMIN — ALBUTEROL SULFATE 10 PUFF: 90 AEROSOL, METERED RESPIRATORY (INHALATION) at 14:53

## 2019-01-09 RX ADMIN — PROPOFOL 50 MCG/KG/MIN: 10 INJECTION, EMULSION INTRAVENOUS at 19:05

## 2019-01-09 RX ADMIN — LIDOCAINE HYDROCHLORIDE 100 MG: 20 INJECTION, SOLUTION INFILTRATION; PERINEURAL at 13:58

## 2019-01-09 RX ADMIN — SODIUM CHLORIDE: 9 INJECTION, SOLUTION INTRAVENOUS at 14:50

## 2019-01-09 RX ADMIN — ALBUTEROL SULFATE 10 PUFF: 90 AEROSOL, METERED RESPIRATORY (INHALATION) at 14:36

## 2019-01-09 RX ADMIN — SUGAMMADEX 200 MG: 100 INJECTION, SOLUTION INTRAVENOUS at 17:51

## 2019-01-09 RX ADMIN — PHENYLEPHRINE HYDROCHLORIDE 200 MCG: 10 INJECTION INTRAVENOUS at 14:03

## 2019-01-09 RX ADMIN — FENTANYL CITRATE 100 MCG: 50 INJECTION INTRAMUSCULAR; INTRAVENOUS at 16:40

## 2019-01-09 RX ADMIN — ALBUTEROL SULFATE 10 PUFF: 90 AEROSOL, METERED RESPIRATORY (INHALATION) at 14:02

## 2019-01-09 RX ADMIN — INSULIN LISPRO 2 UNITS: 100 INJECTION, SOLUTION INTRAVENOUS; SUBCUTANEOUS at 23:49

## 2019-01-09 RX ADMIN — PROPOFOL 50 MG: 10 INJECTION, EMULSION INTRAVENOUS at 15:25

## 2019-01-09 RX ADMIN — PHENYLEPHRINE HYDROCHLORIDE 200 MCG: 10 INJECTION INTRAVENOUS at 14:29

## 2019-01-09 RX ADMIN — EPHEDRINE SULFATE 15 MG: 50 INJECTION, SOLUTION INTRAVENOUS at 14:29

## 2019-01-09 RX ADMIN — VECURONIUM BROMIDE FOR INJECTION 4 MG: 1 INJECTION, POWDER, LYOPHILIZED, FOR SOLUTION INTRAVENOUS at 15:27

## 2019-01-09 RX ADMIN — ALBUTEROL SULFATE 10 PUFF: 90 AEROSOL, METERED RESPIRATORY (INHALATION) at 16:21

## 2019-01-09 RX ADMIN — MORPHINE SULFATE 2 MG: 4 INJECTION INTRAVENOUS at 22:55

## 2019-01-09 RX ADMIN — FENTANYL CITRATE 100 MCG: 50 INJECTION INTRAMUSCULAR; INTRAVENOUS at 17:38

## 2019-01-09 RX ADMIN — KETAMINE HYDROCHLORIDE 30 MG: 10 INJECTION INTRAMUSCULAR; INTRAVENOUS at 15:42

## 2019-01-09 RX ADMIN — KETAMINE HYDROCHLORIDE 20 MG: 10 INJECTION INTRAMUSCULAR; INTRAVENOUS at 16:21

## 2019-01-09 RX ADMIN — FENTANYL CITRATE 100 MCG: 50 INJECTION INTRAMUSCULAR; INTRAVENOUS at 13:58

## 2019-01-09 RX ADMIN — VECURONIUM BROMIDE FOR INJECTION 5 MG: 1 INJECTION, POWDER, LYOPHILIZED, FOR SOLUTION INTRAVENOUS at 14:35

## 2019-01-09 RX ADMIN — SODIUM CHLORIDE: 9 INJECTION, SOLUTION INTRAVENOUS at 12:01

## 2019-01-09 RX ADMIN — ALBUTEROL SULFATE 10 PUFF: 90 AEROSOL, METERED RESPIRATORY (INHALATION) at 14:06

## 2019-01-09 RX ADMIN — ACETAMINOPHEN 1000 MG: 10 INJECTION, SOLUTION INTRAVENOUS at 16:25

## 2019-01-09 RX ADMIN — ALBUTEROL SULFATE 10 PUFF: 90 AEROSOL, METERED RESPIRATORY (INHALATION) at 15:25

## 2019-01-09 RX ADMIN — ROCURONIUM BROMIDE 50 MG: 50 INJECTION, SOLUTION INTRAVENOUS at 17:05

## 2019-01-09 RX ADMIN — DEXMEDETOMIDINE HYDROCHLORIDE 0.2 MCG/KG/HR: 100 INJECTION, SOLUTION INTRAVENOUS at 20:34

## 2019-01-09 RX ADMIN — CEFAZOLIN 3 G: 1 INJECTION, POWDER, FOR SOLUTION INTRAMUSCULAR; INTRAVENOUS at 22:01

## 2019-01-09 RX ADMIN — PHENYLEPHRINE HYDROCHLORIDE 100 MCG: 10 INJECTION INTRAVENOUS at 16:14

## 2019-01-09 RX ADMIN — PHENYLEPHRINE HYDROCHLORIDE 200 MCG: 10 INJECTION INTRAVENOUS at 14:23

## 2019-01-09 RX ADMIN — PROTAMINE SULFATE 50 MG: 10 INJECTION, SOLUTION INTRAVENOUS at 17:14

## 2019-01-09 RX ADMIN — CEFAZOLIN SODIUM 3 G: 2 INJECTION, SOLUTION INTRAVENOUS at 13:55

## 2019-01-09 RX ADMIN — PROPOFOL 50 MCG/KG/MIN: 10 INJECTION, EMULSION INTRAVENOUS at 17:24

## 2019-01-09 ASSESSMENT — PULMONARY FUNCTION TESTS
PIF_VALUE: 36
PIF_VALUE: 39
PIF_VALUE: 20
PIF_VALUE: 3
PIF_VALUE: 33
PIF_VALUE: 35
PIF_VALUE: 34
PIF_VALUE: 39
PIF_VALUE: 35
PIF_VALUE: 13
PIF_VALUE: 39
PIF_VALUE: 36
PIF_VALUE: 36
PIF_VALUE: 35
PIF_VALUE: 39
PIF_VALUE: 39
PIF_VALUE: 38
PIF_VALUE: 35
PIF_VALUE: 39
PIF_VALUE: 12
PIF_VALUE: 34
PIF_VALUE: 43
PIF_VALUE: 39
PIF_VALUE: 34
PIF_VALUE: 35
PIF_VALUE: 34
PIF_VALUE: 39
PIF_VALUE: 36
PIF_VALUE: 38
PIF_VALUE: 32
PIF_VALUE: 36
PIF_VALUE: 36
PIF_VALUE: 33
PIF_VALUE: 39
PIF_VALUE: 32
PIF_VALUE: 39
PIF_VALUE: 34
PIF_VALUE: 4
PIF_VALUE: 41
PIF_VALUE: 39
PIF_VALUE: 0
PIF_VALUE: 36
PIF_VALUE: 35
PIF_VALUE: 34
PIF_VALUE: 41
PIF_VALUE: 40
PIF_VALUE: 44
PIF_VALUE: 39
PIF_VALUE: 1
PIF_VALUE: 33
PIF_VALUE: 14
PIF_VALUE: 35
PIF_VALUE: 38
PIF_VALUE: 35
PIF_VALUE: 33
PIF_VALUE: 21
PIF_VALUE: 35
PIF_VALUE: 35
PIF_VALUE: 39
PIF_VALUE: 36
PIF_VALUE: 38
PIF_VALUE: 35
PIF_VALUE: 31
PIF_VALUE: 39
PIF_VALUE: 39
PIF_VALUE: 33
PIF_VALUE: 0
PIF_VALUE: 33
PIF_VALUE: 35
PIF_VALUE: 32
PIF_VALUE: 45
PIF_VALUE: 39
PIF_VALUE: 33
PIF_VALUE: 38
PIF_VALUE: 45
PIF_VALUE: 28
PIF_VALUE: 17
PIF_VALUE: 38
PIF_VALUE: 33
PIF_VALUE: 8
PIF_VALUE: 36
PIF_VALUE: 37
PIF_VALUE: 32
PIF_VALUE: 36
PIF_VALUE: 34
PIF_VALUE: 31
PIF_VALUE: 35
PIF_VALUE: 32
PIF_VALUE: 5
PIF_VALUE: 36
PIF_VALUE: 45
PIF_VALUE: 18
PIF_VALUE: 35
PIF_VALUE: 35
PIF_VALUE: 20
PIF_VALUE: 39
PIF_VALUE: 39
PIF_VALUE: 35
PIF_VALUE: 36
PIF_VALUE: 35
PIF_VALUE: 40
PIF_VALUE: 34
PIF_VALUE: 33
PIF_VALUE: 36
PIF_VALUE: 45
PIF_VALUE: 16
PIF_VALUE: 36
PIF_VALUE: 33
PIF_VALUE: 35
PIF_VALUE: 18
PIF_VALUE: 31
PIF_VALUE: 36
PIF_VALUE: 39
PIF_VALUE: 45
PIF_VALUE: 17
PIF_VALUE: 39
PIF_VALUE: 32
PIF_VALUE: 38
PIF_VALUE: 35
PIF_VALUE: 39
PIF_VALUE: 16
PIF_VALUE: 33
PIF_VALUE: 33
PIF_VALUE: 3
PIF_VALUE: 34
PIF_VALUE: 36
PIF_VALUE: 39
PIF_VALUE: 38
PIF_VALUE: 41
PIF_VALUE: 40
PIF_VALUE: 31
PIF_VALUE: 35
PIF_VALUE: 2
PIF_VALUE: 35
PIF_VALUE: 17
PIF_VALUE: 38
PIF_VALUE: 31
PIF_VALUE: 15
PIF_VALUE: 39
PIF_VALUE: 32
PIF_VALUE: 35
PIF_VALUE: 34
PIF_VALUE: 35
PIF_VALUE: 35
PIF_VALUE: 39
PIF_VALUE: 26
PIF_VALUE: 1
PIF_VALUE: 37
PIF_VALUE: 43
PIF_VALUE: 39
PIF_VALUE: 38
PIF_VALUE: 40
PIF_VALUE: 14
PIF_VALUE: 34
PIF_VALUE: 38
PIF_VALUE: 31
PIF_VALUE: 42
PIF_VALUE: 35
PIF_VALUE: 39
PIF_VALUE: 20
PIF_VALUE: 36
PIF_VALUE: 35
PIF_VALUE: 31
PIF_VALUE: 35
PIF_VALUE: 34
PIF_VALUE: 40
PIF_VALUE: 1
PIF_VALUE: 0
PIF_VALUE: 38
PIF_VALUE: 4
PIF_VALUE: 40
PIF_VALUE: 38
PIF_VALUE: 40
PIF_VALUE: 30
PIF_VALUE: 35
PIF_VALUE: 39
PIF_VALUE: 40
PIF_VALUE: 35
PIF_VALUE: 39
PIF_VALUE: 36
PIF_VALUE: 33
PIF_VALUE: 35
PIF_VALUE: 33
PIF_VALUE: 35
PIF_VALUE: 5
PIF_VALUE: 35
PIF_VALUE: 35
PIF_VALUE: 38
PIF_VALUE: 38
PIF_VALUE: 36
PIF_VALUE: 39
PIF_VALUE: 17
PIF_VALUE: 39
PIF_VALUE: 35
PIF_VALUE: 36
PIF_VALUE: 38
PIF_VALUE: 18
PIF_VALUE: 39
PIF_VALUE: 34
PIF_VALUE: 36
PIF_VALUE: 34
PIF_VALUE: 32
PIF_VALUE: 36
PIF_VALUE: 36
PIF_VALUE: 37
PIF_VALUE: 37
PIF_VALUE: 39
PIF_VALUE: 35
PIF_VALUE: 37
PIF_VALUE: 39
PIF_VALUE: 20
PIF_VALUE: 38
PIF_VALUE: 39
PIF_VALUE: 39
PIF_VALUE: 35
PIF_VALUE: 39
PIF_VALUE: 2
PIF_VALUE: 32
PIF_VALUE: 39
PIF_VALUE: 35
PIF_VALUE: 45
PIF_VALUE: 35
PIF_VALUE: 20
PIF_VALUE: 38
PIF_VALUE: 32
PIF_VALUE: 40
PIF_VALUE: 34
PIF_VALUE: 35
PIF_VALUE: 36
PIF_VALUE: 36
PIF_VALUE: 33
PIF_VALUE: 39
PIF_VALUE: 37
PIF_VALUE: 37
PIF_VALUE: 45
PIF_VALUE: 39
PIF_VALUE: 39
PIF_VALUE: 37
PIF_VALUE: 37
PIF_VALUE: 34
PIF_VALUE: 35
PIF_VALUE: 25
PIF_VALUE: 33
PIF_VALUE: 38
PIF_VALUE: 35
PIF_VALUE: 31
PIF_VALUE: 2
PIF_VALUE: 35
PIF_VALUE: 35
PIF_VALUE: 38
PIF_VALUE: 33
PIF_VALUE: 35
PIF_VALUE: 34
PIF_VALUE: 39
PIF_VALUE: 1
PIF_VALUE: 39
PIF_VALUE: 18
PIF_VALUE: 44
PIF_VALUE: 39
PIF_VALUE: 35
PIF_VALUE: 36
PIF_VALUE: 40

## 2019-01-09 ASSESSMENT — PAIN SCALES - GENERAL
PAINLEVEL_OUTOF10: 6
PAINLEVEL_OUTOF10: 0

## 2019-01-10 LAB
ANION GAP SERPL CALCULATED.3IONS-SCNC: 9 MMOL/L (ref 4–16)
BANDED NEUTROPHILS ABSOLUTE COUNT: 0.15 K/CU MM
BANDED NEUTROPHILS RELATIVE PERCENT: 1 % (ref 5–11)
BASE EXCESS MIXED: 3 (ref 0–1.2)
BASE EXCESS MIXED: 4.2 (ref 0–1.2)
BASE EXCESS MIXED: 4.2 (ref 0–1.2)
BASE EXCESS MIXED: 4.9 (ref 0–1.2)
BASE EXCESS MIXED: 5 (ref 0–1.2)
BASE EXCESS MIXED: 7.4 (ref 0–1.2)
BASE EXCESS: ABNORMAL (ref 0–3.3)
BUN BLDV-MCNC: 10 MG/DL (ref 6–23)
CALCIUM SERPL-MCNC: 8.3 MG/DL (ref 8.3–10.6)
CARBON MONOXIDE, BLOOD: 2.9 % (ref 0–5)
CARBON MONOXIDE, BLOOD: 3.7 % (ref 0–5)
CHLORIDE BLD-SCNC: 97 MMOL/L (ref 99–110)
CO2 CONTENT: 31.8 MMOL/L (ref 19–24)
CO2 CONTENT: 36.5 MMOL/L (ref 19–24)
CO2: 33 MMOL/L (ref 21–32)
CO2: 34 MMOL/L (ref 21–32)
CO2: 34 MMOL/L (ref 21–32)
CO2: 35 MMOL/L (ref 21–32)
CO2: 37 MMOL/L (ref 21–32)
CREAT SERPL-MCNC: 0.6 MG/DL (ref 0.9–1.3)
DIFFERENTIAL TYPE: ABNORMAL
GFR AFRICAN AMERICAN: >60 ML/MIN/1.73M2
GFR NON-AFRICAN AMERICAN: >60 ML/MIN/1.73M2
GLUCOSE BLD-MCNC: 109 MG/DL (ref 70–99)
GLUCOSE BLD-MCNC: 113 MG/DL (ref 70–99)
GLUCOSE BLD-MCNC: 131 MG/DL (ref 70–99)
GLUCOSE BLD-MCNC: 138 MG/DL (ref 70–99)
GLUCOSE BLD-MCNC: 162 MG/DL (ref 70–99)
GLUCOSE BLD-MCNC: 171 MG/DL (ref 70–99)
GLUCOSE BLD-MCNC: 181 MG/DL (ref 70–99)
GLUCOSE BLD-MCNC: 186 MG/DL (ref 70–99)
GLUCOSE BLD-MCNC: 207 MG/DL (ref 70–99)
HCO3 ARTERIAL: 30.3 MMOL/L (ref 18–23)
HCO3 ARTERIAL: 31.8 MMOL/L (ref 18–23)
HCO3 ARTERIAL: 32.3 MMOL/L (ref 18–23)
HCO3 ARTERIAL: 33.4 MMOL/L (ref 18–23)
HCO3 ARTERIAL: 34.5 MMOL/L (ref 18–23)
HCO3 ARTERIAL: 34.7 MMOL/L (ref 18–23)
HCT VFR BLD CALC: 45.2 % (ref 42–52)
HCT VFR BLD CALC: 47 % (ref 42–52)
HCT VFR BLD CALC: 48 % (ref 42–52)
HCT VFR BLD CALC: 48 % (ref 42–52)
HCT VFR BLD CALC: 50 % (ref 42–52)
HEMOGLOBIN: 14.8 GM/DL (ref 13.5–18)
HEMOGLOBIN: 16 GM/DL (ref 13.5–18)
HEMOGLOBIN: 16.4 GM/DL (ref 13.5–18)
HEMOGLOBIN: 16.4 GM/DL (ref 13.5–18)
HEMOGLOBIN: 17.2 GM/DL (ref 13.5–18)
LYMPHOCYTES ABSOLUTE: 0.8 K/CU MM
LYMPHOCYTES RELATIVE PERCENT: 5 % (ref 24–44)
MCH RBC QN AUTO: 31.5 PG (ref 27–31)
MCHC RBC AUTO-ENTMCNC: 32.7 % (ref 32–36)
MCV RBC AUTO: 96.2 FL (ref 78–100)
METHEMOGLOBIN ARTERIAL: 0.2 %
METHEMOGLOBIN ARTERIAL: 0.9 %
MONOCYTES ABSOLUTE: 0.5 K/CU MM
MONOCYTES RELATIVE PERCENT: 3 % (ref 0–4)
O2 SATURATION: 88.6 % (ref 96–97)
O2 SATURATION: 89.7 % (ref 96–97)
O2 SATURATION: 92.3 % (ref 96–97)
O2 SATURATION: 93.1 % (ref 96–97)
O2 SATURATION: 93.3 % (ref 96–97)
O2 SATURATION: 94.8 % (ref 96–97)
PCO2 ARTERIAL: 50 MMHG (ref 32–45)
PCO2 ARTERIAL: 60 MMHG (ref 32–45)
PCO2 ARTERIAL: 60.4 MMHG (ref 32–45)
PCO2 ARTERIAL: 63.7 MMHG (ref 32–45)
PCO2 ARTERIAL: 64.7 MMHG (ref 32–45)
PCO2 ARTERIAL: 69.4 MMHG (ref 32–45)
PDW BLD-RTO: 13 % (ref 11.7–14.9)
PH BLOOD: 7.3 (ref 7.34–7.45)
PH BLOOD: 7.31 (ref 7.34–7.45)
PH BLOOD: 7.33 (ref 7.34–7.45)
PH BLOOD: 7.34 (ref 7.34–7.45)
PH BLOOD: 7.37 (ref 7.34–7.45)
PH BLOOD: 7.39 (ref 7.34–7.45)
PLATELET # BLD: 168 K/CU MM (ref 140–440)
PMV BLD AUTO: 9.8 FL (ref 7.5–11.1)
PO2 ARTERIAL: 63.7 MMHG (ref 75–100)
PO2 ARTERIAL: 64.4 MMHG (ref 75–100)
PO2 ARTERIAL: 67 MMHG (ref 75–100)
PO2 ARTERIAL: 73 MMHG (ref 75–100)
PO2 ARTERIAL: 73.9 MMHG (ref 75–100)
PO2 ARTERIAL: 77.9 MMHG (ref 75–100)
POC CALCIUM: 1.1 MMOL/L (ref 1.12–1.32)
POC CALCIUM: 1.13 MMOL/L (ref 1.12–1.32)
POC CALCIUM: 1.14 MMOL/L (ref 1.12–1.32)
POC CALCIUM: 1.14 MMOL/L (ref 1.12–1.32)
POC CHLORIDE: 99 MMOL/L (ref 98–109)
POC CREATININE: 0.4 MG/DL (ref 0.9–1.3)
POC CREATININE: 0.6 MG/DL (ref 0.9–1.3)
POTASSIUM SERPL-SCNC: 4.2 MMOL/L (ref 3.5–5.1)
POTASSIUM SERPL-SCNC: 4.9 MMOL/L (ref 3.5–4.5)
POTASSIUM SERPL-SCNC: 4.9 MMOL/L (ref 3.5–4.5)
POTASSIUM SERPL-SCNC: 5 MMOL/L (ref 3.5–4.5)
POTASSIUM SERPL-SCNC: 5.2 MMOL/L (ref 3.5–4.5)
POTASSIUM SERPL-SCNC: 5.2 MMOL/L (ref 3.5–5.1)
RBC # BLD: 4.7 M/CU MM (ref 4.6–6.2)
REASON FOR REJECTION: NORMAL
REJECTED TEST: NORMAL
SEGMENTED NEUTROPHILS ABSOLUTE COUNT: 13.9 K/CU MM
SEGMENTED NEUTROPHILS RELATIVE PERCENT: 91 % (ref 36–66)
SODIUM BLD-SCNC: 136 MMOL/L (ref 138–146)
SODIUM BLD-SCNC: 137 MMOL/L (ref 138–146)
SODIUM BLD-SCNC: 139 MMOL/L (ref 135–145)
SOURCE, BLOOD GAS: ABNORMAL
SOURCE: NORMAL
WBC # BLD: 15.3 K/CU MM (ref 4–10.5)

## 2019-01-10 PROCEDURE — 80048 BASIC METABOLIC PNL TOTAL CA: CPT

## 2019-01-10 PROCEDURE — 6360000002 HC RX W HCPCS: Performed by: PHYSICIAN ASSISTANT

## 2019-01-10 PROCEDURE — 94640 AIRWAY INHALATION TREATMENT: CPT

## 2019-01-10 PROCEDURE — 94660 CPAP INITIATION&MGMT: CPT

## 2019-01-10 PROCEDURE — 82803 BLOOD GASES ANY COMBINATION: CPT

## 2019-01-10 PROCEDURE — 84132 ASSAY OF SERUM POTASSIUM: CPT

## 2019-01-10 PROCEDURE — 2580000003 HC RX 258: Performed by: PHYSICIAN ASSISTANT

## 2019-01-10 PROCEDURE — 2700000000 HC OXYGEN THERAPY PER DAY

## 2019-01-10 PROCEDURE — 6370000000 HC RX 637 (ALT 250 FOR IP): Performed by: PHYSICIAN ASSISTANT

## 2019-01-10 PROCEDURE — 85007 BL SMEAR W/DIFF WBC COUNT: CPT

## 2019-01-10 PROCEDURE — 6370000000 HC RX 637 (ALT 250 FOR IP): Performed by: ANESTHESIOLOGY

## 2019-01-10 PROCEDURE — 82962 GLUCOSE BLOOD TEST: CPT

## 2019-01-10 PROCEDURE — 85027 COMPLETE CBC AUTOMATED: CPT

## 2019-01-10 PROCEDURE — 2100000000 HC CCU R&B

## 2019-01-10 PROCEDURE — 94003 VENT MGMT INPAT SUBQ DAY: CPT

## 2019-01-10 PROCEDURE — 94761 N-INVAS EAR/PLS OXIMETRY MLT: CPT

## 2019-01-10 RX ORDER — FUROSEMIDE 10 MG/ML
20 INJECTION INTRAMUSCULAR; INTRAVENOUS ONCE
Status: COMPLETED | OUTPATIENT
Start: 2019-01-10 | End: 2019-01-10

## 2019-01-10 RX ADMIN — IPRATROPIUM BROMIDE AND ALBUTEROL SULFATE 1 AMPULE: .5; 3 SOLUTION RESPIRATORY (INHALATION) at 17:02

## 2019-01-10 RX ADMIN — FUROSEMIDE 20 MG: 10 INJECTION, SOLUTION INTRAVENOUS at 09:50

## 2019-01-10 RX ADMIN — SODIUM CHLORIDE, PRESERVATIVE FREE 10 ML: 5 INJECTION INTRAVENOUS at 08:45

## 2019-01-10 RX ADMIN — ASPIRIN 81 MG: 81 TABLET, COATED ORAL at 08:45

## 2019-01-10 RX ADMIN — CEFAZOLIN 3 G: 1 INJECTION, POWDER, FOR SOLUTION INTRAMUSCULAR; INTRAVENOUS at 06:47

## 2019-01-10 RX ADMIN — SODIUM CHLORIDE: 4.5 INJECTION, SOLUTION INTRAVENOUS at 08:49

## 2019-01-10 RX ADMIN — MICONAZOLE NITRATE: 2 POWDER TOPICAL at 20:34

## 2019-01-10 RX ADMIN — METFORMIN HYDROCHLORIDE 500 MG: 500 TABLET, EXTENDED RELEASE ORAL at 08:45

## 2019-01-10 RX ADMIN — IPRATROPIUM BROMIDE AND ALBUTEROL SULFATE 1 AMPULE: .5; 3 SOLUTION RESPIRATORY (INHALATION) at 02:35

## 2019-01-10 RX ADMIN — ATORVASTATIN CALCIUM 40 MG: 40 TABLET, FILM COATED ORAL at 20:34

## 2019-01-10 RX ADMIN — ACETAMINOPHEN 650 MG: 325 TABLET ORAL at 20:35

## 2019-01-10 RX ADMIN — SODIUM CHLORIDE, PRESERVATIVE FREE 10 ML: 5 INJECTION INTRAVENOUS at 20:35

## 2019-01-10 RX ADMIN — ACETAMINOPHEN 650 MG: 325 TABLET ORAL at 13:06

## 2019-01-10 RX ADMIN — IPRATROPIUM BROMIDE AND ALBUTEROL SULFATE 1 AMPULE: .5; 3 SOLUTION RESPIRATORY (INHALATION) at 20:53

## 2019-01-10 RX ADMIN — IPRATROPIUM BROMIDE AND ALBUTEROL SULFATE 1 AMPULE: .5; 3 SOLUTION RESPIRATORY (INHALATION) at 13:21

## 2019-01-10 RX ADMIN — MICONAZOLE NITRATE: 2 POWDER TOPICAL at 08:46

## 2019-01-10 RX ADMIN — IPRATROPIUM BROMIDE AND ALBUTEROL SULFATE 1 AMPULE: .5; 3 SOLUTION RESPIRATORY (INHALATION) at 08:52

## 2019-01-10 RX ADMIN — AMIODARONE HYDROCHLORIDE 100 MG: 200 TABLET ORAL at 08:45

## 2019-01-10 ASSESSMENT — PAIN SCALES - GENERAL
PAINLEVEL_OUTOF10: 0
PAINLEVEL_OUTOF10: 5
PAINLEVEL_OUTOF10: 0
PAINLEVEL_OUTOF10: 3

## 2019-01-10 ASSESSMENT — PAIN DESCRIPTION - ORIENTATION
ORIENTATION: LEFT
ORIENTATION: LEFT

## 2019-01-10 ASSESSMENT — PULMONARY FUNCTION TESTS
PIF_VALUE: 16
PIF_VALUE: 10

## 2019-01-10 ASSESSMENT — PAIN DESCRIPTION - PAIN TYPE
TYPE: ACUTE PAIN;SURGICAL PAIN
TYPE: SURGICAL PAIN

## 2019-01-10 ASSESSMENT — PAIN DESCRIPTION - LOCATION
LOCATION: LEG
LOCATION: GROIN;LEG

## 2019-01-11 ENCOUNTER — APPOINTMENT (OUTPATIENT)
Dept: GENERAL RADIOLOGY | Age: 64
DRG: 252 | End: 2019-01-11
Attending: SURGERY
Payer: MEDICARE

## 2019-01-11 VITALS
HEIGHT: 76 IN | RESPIRATION RATE: 16 BRPM | BODY MASS INDEX: 38.36 KG/M2 | TEMPERATURE: 98 F | DIASTOLIC BLOOD PRESSURE: 57 MMHG | WEIGHT: 315 LBS | HEART RATE: 90 BPM | SYSTOLIC BLOOD PRESSURE: 109 MMHG | OXYGEN SATURATION: 90 %

## 2019-01-11 LAB
ANION GAP SERPL CALCULATED.3IONS-SCNC: 8 MMOL/L (ref 4–16)
BUN BLDV-MCNC: 13 MG/DL (ref 6–23)
CALCIUM SERPL-MCNC: 8.7 MG/DL (ref 8.3–10.6)
CHLORIDE BLD-SCNC: 96 MMOL/L (ref 99–110)
CO2: 37 MMOL/L (ref 21–32)
CREAT SERPL-MCNC: 0.8 MG/DL (ref 0.9–1.3)
GFR AFRICAN AMERICAN: >60 ML/MIN/1.73M2
GFR NON-AFRICAN AMERICAN: >60 ML/MIN/1.73M2
GLUCOSE BLD-MCNC: 98 MG/DL (ref 70–99)
GLUCOSE BLD-MCNC: 99 MG/DL (ref 70–99)
HCT VFR BLD CALC: 46.4 % (ref 42–52)
HEMOGLOBIN: 14.5 GM/DL (ref 13.5–18)
MCH RBC QN AUTO: 31.5 PG (ref 27–31)
MCHC RBC AUTO-ENTMCNC: 31.3 % (ref 32–36)
MCV RBC AUTO: 100.7 FL (ref 78–100)
PDW BLD-RTO: 13.6 % (ref 11.7–14.9)
PLATELET # BLD: 152 K/CU MM (ref 140–440)
PMV BLD AUTO: 10.1 FL (ref 7.5–11.1)
POTASSIUM SERPL-SCNC: 4.2 MMOL/L (ref 3.5–5.1)
RBC # BLD: 4.61 M/CU MM (ref 4.6–6.2)
SODIUM BLD-SCNC: 141 MMOL/L (ref 135–145)
WBC # BLD: 11.3 K/CU MM (ref 4–10.5)

## 2019-01-11 PROCEDURE — 80048 BASIC METABOLIC PNL TOTAL CA: CPT

## 2019-01-11 PROCEDURE — 94640 AIRWAY INHALATION TREATMENT: CPT

## 2019-01-11 PROCEDURE — 82962 GLUCOSE BLOOD TEST: CPT

## 2019-01-11 PROCEDURE — 6370000000 HC RX 637 (ALT 250 FOR IP): Performed by: ANESTHESIOLOGY

## 2019-01-11 PROCEDURE — 85027 COMPLETE CBC AUTOMATED: CPT

## 2019-01-11 PROCEDURE — 2580000003 HC RX 258: Performed by: PHYSICIAN ASSISTANT

## 2019-01-11 PROCEDURE — 6370000000 HC RX 637 (ALT 250 FOR IP): Performed by: PHYSICIAN ASSISTANT

## 2019-01-11 PROCEDURE — 71045 X-RAY EXAM CHEST 1 VIEW: CPT

## 2019-01-11 PROCEDURE — 94761 N-INVAS EAR/PLS OXIMETRY MLT: CPT

## 2019-01-11 RX ADMIN — AMIODARONE HYDROCHLORIDE 100 MG: 200 TABLET ORAL at 07:48

## 2019-01-11 RX ADMIN — METFORMIN HYDROCHLORIDE 500 MG: 500 TABLET, EXTENDED RELEASE ORAL at 07:49

## 2019-01-11 RX ADMIN — ASPIRIN 81 MG: 81 TABLET, COATED ORAL at 07:48

## 2019-01-11 RX ADMIN — SODIUM CHLORIDE, PRESERVATIVE FREE 10 ML: 5 INJECTION INTRAVENOUS at 07:51

## 2019-01-11 RX ADMIN — IPRATROPIUM BROMIDE AND ALBUTEROL SULFATE 1 AMPULE: .5; 3 SOLUTION RESPIRATORY (INHALATION) at 08:23

## 2019-01-11 RX ADMIN — DILTIAZEM HYDROCHLORIDE 240 MG: 240 CAPSULE, COATED, EXTENDED RELEASE ORAL at 07:51

## 2019-01-12 ENCOUNTER — CARE COORDINATION (OUTPATIENT)
Dept: CASE MANAGEMENT | Age: 64
End: 2019-01-12

## 2019-01-16 ENCOUNTER — CARE COORDINATION (OUTPATIENT)
Dept: CASE MANAGEMENT | Age: 64
End: 2019-01-16

## 2019-01-18 ENCOUNTER — OFFICE VISIT (OUTPATIENT)
Dept: INTERNAL MEDICINE CLINIC | Age: 64
End: 2019-01-18
Payer: MEDICARE

## 2019-01-18 VITALS
DIASTOLIC BLOOD PRESSURE: 60 MMHG | OXYGEN SATURATION: 92 % | SYSTOLIC BLOOD PRESSURE: 104 MMHG | RESPIRATION RATE: 18 BRPM | HEART RATE: 95 BPM | WEIGHT: 315 LBS | BODY MASS INDEX: 41.4 KG/M2

## 2019-01-18 DIAGNOSIS — I73.9 PVD (PERIPHERAL VASCULAR DISEASE) (HCC): Primary | ICD-10-CM

## 2019-01-18 DIAGNOSIS — I48.19 PERSISTENT ATRIAL FIBRILLATION (HCC): ICD-10-CM

## 2019-01-18 DIAGNOSIS — E11.9 CONTROLLED TYPE 2 DIABETES MELLITUS WITHOUT COMPLICATION, WITHOUT LONG-TERM CURRENT USE OF INSULIN (HCC): ICD-10-CM

## 2019-01-18 DIAGNOSIS — J41.0 SIMPLE CHRONIC BRONCHITIS (HCC): ICD-10-CM

## 2019-01-18 PROBLEM — J96.02 ACUTE RESPIRATORY FAILURE WITH HYPERCAPNIA (HCC): Status: RESOLVED | Noted: 2018-01-03 | Resolved: 2019-01-18

## 2019-01-18 PROCEDURE — 99215 OFFICE O/P EST HI 40 MIN: CPT | Performed by: INTERNAL MEDICINE

## 2019-01-18 PROCEDURE — 3017F COLORECTAL CA SCREEN DOC REV: CPT | Performed by: INTERNAL MEDICINE

## 2019-01-18 PROCEDURE — 4004F PT TOBACCO SCREEN RCVD TLK: CPT | Performed by: INTERNAL MEDICINE

## 2019-01-18 PROCEDURE — 1111F DSCHRG MED/CURRENT MED MERGE: CPT | Performed by: INTERNAL MEDICINE

## 2019-01-18 PROCEDURE — G8482 FLU IMMUNIZE ORDER/ADMIN: HCPCS | Performed by: INTERNAL MEDICINE

## 2019-01-18 PROCEDURE — G8926 SPIRO NO PERF OR DOC: HCPCS | Performed by: INTERNAL MEDICINE

## 2019-01-18 PROCEDURE — 3046F HEMOGLOBIN A1C LEVEL >9.0%: CPT | Performed by: INTERNAL MEDICINE

## 2019-01-18 PROCEDURE — 2022F DILAT RTA XM EVC RTNOPTHY: CPT | Performed by: INTERNAL MEDICINE

## 2019-01-18 PROCEDURE — G8427 DOCREV CUR MEDS BY ELIG CLIN: HCPCS | Performed by: INTERNAL MEDICINE

## 2019-01-18 PROCEDURE — 3023F SPIROM DOC REV: CPT | Performed by: INTERNAL MEDICINE

## 2019-01-18 PROCEDURE — G8417 CALC BMI ABV UP PARAM F/U: HCPCS | Performed by: INTERNAL MEDICINE

## 2019-01-21 ENCOUNTER — CARE COORDINATION (OUTPATIENT)
Dept: CARE COORDINATION | Age: 64
End: 2019-01-21

## 2019-01-23 ENCOUNTER — CARE COORDINATION (OUTPATIENT)
Dept: CASE MANAGEMENT | Age: 64
End: 2019-01-23

## 2019-01-30 ENCOUNTER — CARE COORDINATION (OUTPATIENT)
Dept: CASE MANAGEMENT | Age: 64
End: 2019-01-30

## 2019-02-05 ENCOUNTER — CARE COORDINATION (OUTPATIENT)
Dept: CASE MANAGEMENT | Age: 64
End: 2019-02-05

## 2019-02-22 ENCOUNTER — CARE COORDINATION (OUTPATIENT)
Dept: CARE COORDINATION | Age: 64
End: 2019-02-22

## 2019-02-22 ASSESSMENT — ENCOUNTER SYMPTOMS: DYSPNEA ASSOCIATED WITH: EXERTION

## 2019-03-06 ENCOUNTER — OFFICE VISIT (OUTPATIENT)
Dept: INTERNAL MEDICINE CLINIC | Age: 64
End: 2019-03-06
Payer: MEDICARE

## 2019-03-06 VITALS
DIASTOLIC BLOOD PRESSURE: 58 MMHG | SYSTOLIC BLOOD PRESSURE: 92 MMHG | OXYGEN SATURATION: 89 % | HEART RATE: 84 BPM | WEIGHT: 315 LBS | BODY MASS INDEX: 43.08 KG/M2

## 2019-03-06 DIAGNOSIS — I48.19 PERSISTENT ATRIAL FIBRILLATION (HCC): ICD-10-CM

## 2019-03-06 DIAGNOSIS — E11.9 CONTROLLED TYPE 2 DIABETES MELLITUS WITHOUT COMPLICATION, WITHOUT LONG-TERM CURRENT USE OF INSULIN (HCC): ICD-10-CM

## 2019-03-06 DIAGNOSIS — E78.00 HYPERCHOLESTEREMIA: ICD-10-CM

## 2019-03-06 DIAGNOSIS — E66.01 MORBID OBESITY (HCC): ICD-10-CM

## 2019-03-06 DIAGNOSIS — J41.0 SIMPLE CHRONIC BRONCHITIS (HCC): Primary | ICD-10-CM

## 2019-03-06 LAB
A/G RATIO: 1.9 (ref 1.1–2.2)
ALBUMIN SERPL-MCNC: 4.1 G/DL (ref 3.4–5)
ALP BLD-CCNC: 81 U/L (ref 40–129)
ALT SERPL-CCNC: 20 U/L (ref 10–40)
ANION GAP SERPL CALCULATED.3IONS-SCNC: 13 MMOL/L (ref 3–16)
AST SERPL-CCNC: 10 U/L (ref 15–37)
BASOPHILS ABSOLUTE: 0.1 K/UL (ref 0–0.2)
BASOPHILS RELATIVE PERCENT: 0.7 %
BILIRUB SERPL-MCNC: 0.3 MG/DL (ref 0–1)
BUN BLDV-MCNC: 15 MG/DL (ref 7–20)
CALCIUM SERPL-MCNC: 9.2 MG/DL (ref 8.3–10.6)
CHLORIDE BLD-SCNC: 96 MMOL/L (ref 99–110)
CHOLESTEROL, TOTAL: 97 MG/DL (ref 0–199)
CO2: 29 MMOL/L (ref 21–32)
CREAT SERPL-MCNC: 0.6 MG/DL (ref 0.8–1.3)
CREATININE URINE: 34.6 MG/DL (ref 39–259)
EOSINOPHILS ABSOLUTE: 0.2 K/UL (ref 0–0.6)
EOSINOPHILS RELATIVE PERCENT: 2.5 %
GFR AFRICAN AMERICAN: >60
GFR NON-AFRICAN AMERICAN: >60
GLOBULIN: 2.2 G/DL
GLUCOSE BLD-MCNC: 98 MG/DL (ref 70–99)
HCT VFR BLD CALC: 47.3 % (ref 40.5–52.5)
HDLC SERPL-MCNC: 33 MG/DL (ref 40–60)
HEMOGLOBIN: 15.5 G/DL (ref 13.5–17.5)
LDL CHOLESTEROL CALCULATED: 47 MG/DL
LYMPHOCYTES ABSOLUTE: 1.9 K/UL (ref 1–5.1)
LYMPHOCYTES RELATIVE PERCENT: 21 %
MCH RBC QN AUTO: 32.1 PG (ref 26–34)
MCHC RBC AUTO-ENTMCNC: 32.8 G/DL (ref 31–36)
MCV RBC AUTO: 97.6 FL (ref 80–100)
MICROALBUMIN UR-MCNC: <1.2 MG/DL
MICROALBUMIN/CREAT UR-RTO: ABNORMAL MG/G (ref 0–30)
MONOCYTES ABSOLUTE: 0.7 K/UL (ref 0–1.3)
MONOCYTES RELATIVE PERCENT: 8.2 %
NEUTROPHILS ABSOLUTE: 6.1 K/UL (ref 1.7–7.7)
NEUTROPHILS RELATIVE PERCENT: 67.6 %
PDW BLD-RTO: 14.1 % (ref 12.4–15.4)
PLATELET # BLD: 152 K/UL (ref 135–450)
PMV BLD AUTO: 8.7 FL (ref 5–10.5)
POTASSIUM SERPL-SCNC: 4.9 MMOL/L (ref 3.5–5.1)
RBC # BLD: 4.85 M/UL (ref 4.2–5.9)
SODIUM BLD-SCNC: 138 MMOL/L (ref 136–145)
TOTAL PROTEIN: 6.3 G/DL (ref 6.4–8.2)
TRIGL SERPL-MCNC: 85 MG/DL (ref 0–150)
TSH SERPL DL<=0.05 MIU/L-ACNC: 2.15 UIU/ML (ref 0.27–4.2)
VLDLC SERPL CALC-MCNC: 17 MG/DL
WBC # BLD: 9.1 K/UL (ref 4–11)

## 2019-03-06 PROCEDURE — 2022F DILAT RTA XM EVC RTNOPTHY: CPT | Performed by: INTERNAL MEDICINE

## 2019-03-06 PROCEDURE — G8417 CALC BMI ABV UP PARAM F/U: HCPCS | Performed by: INTERNAL MEDICINE

## 2019-03-06 PROCEDURE — 3046F HEMOGLOBIN A1C LEVEL >9.0%: CPT | Performed by: INTERNAL MEDICINE

## 2019-03-06 PROCEDURE — 4004F PT TOBACCO SCREEN RCVD TLK: CPT | Performed by: INTERNAL MEDICINE

## 2019-03-06 PROCEDURE — 3023F SPIROM DOC REV: CPT | Performed by: INTERNAL MEDICINE

## 2019-03-06 PROCEDURE — 99214 OFFICE O/P EST MOD 30 MIN: CPT | Performed by: INTERNAL MEDICINE

## 2019-03-06 PROCEDURE — G8427 DOCREV CUR MEDS BY ELIG CLIN: HCPCS | Performed by: INTERNAL MEDICINE

## 2019-03-06 PROCEDURE — G0444 DEPRESSION SCREEN ANNUAL: HCPCS | Performed by: INTERNAL MEDICINE

## 2019-03-06 PROCEDURE — 3017F COLORECTAL CA SCREEN DOC REV: CPT | Performed by: INTERNAL MEDICINE

## 2019-03-06 PROCEDURE — G8926 SPIRO NO PERF OR DOC: HCPCS | Performed by: INTERNAL MEDICINE

## 2019-03-06 PROCEDURE — G8482 FLU IMMUNIZE ORDER/ADMIN: HCPCS | Performed by: INTERNAL MEDICINE

## 2019-03-06 ASSESSMENT — PATIENT HEALTH QUESTIONNAIRE - PHQ9
7. TROUBLE CONCENTRATING ON THINGS, SUCH AS READING THE NEWSPAPER OR WATCHING TELEVISION: 3
6. FEELING BAD ABOUT YOURSELF - OR THAT YOU ARE A FAILURE OR HAVE LET YOURSELF OR YOUR FAMILY DOWN: 1
2. FEELING DOWN, DEPRESSED OR HOPELESS: 3
3. TROUBLE FALLING OR STAYING ASLEEP: 2
SUM OF ALL RESPONSES TO PHQ9 QUESTIONS 1 & 2: 5
SUM OF ALL RESPONSES TO PHQ QUESTIONS 1-9: 18
1. LITTLE INTEREST OR PLEASURE IN DOING THINGS: 2
5. POOR APPETITE OR OVEREATING: 3
10. IF YOU CHECKED OFF ANY PROBLEMS, HOW DIFFICULT HAVE THESE PROBLEMS MADE IT FOR YOU TO DO YOUR WORK, TAKE CARE OF THINGS AT HOME, OR GET ALONG WITH OTHER PEOPLE: 2
8. MOVING OR SPEAKING SO SLOWLY THAT OTHER PEOPLE COULD HAVE NOTICED. OR THE OPPOSITE, BEING SO FIGETY OR RESTLESS THAT YOU HAVE BEEN MOVING AROUND A LOT MORE THAN USUAL: 1
9. THOUGHTS THAT YOU WOULD BE BETTER OFF DEAD, OR OF HURTING YOURSELF: 0
SUM OF ALL RESPONSES TO PHQ QUESTIONS 1-9: 18
4. FEELING TIRED OR HAVING LITTLE ENERGY: 3

## 2019-03-07 LAB
ESTIMATED AVERAGE GLUCOSE: 111.2 MG/DL
HBA1C MFR BLD: 5.5 %

## 2019-03-11 ENCOUNTER — OFFICE VISIT (OUTPATIENT)
Dept: CARDIOLOGY CLINIC | Age: 64
End: 2019-03-11
Payer: MEDICARE

## 2019-03-11 VITALS
WEIGHT: 315 LBS | HEART RATE: 90 BPM | HEIGHT: 76 IN | DIASTOLIC BLOOD PRESSURE: 70 MMHG | SYSTOLIC BLOOD PRESSURE: 110 MMHG | BODY MASS INDEX: 38.36 KG/M2

## 2019-03-11 DIAGNOSIS — I10 ESSENTIAL HYPERTENSION: Primary | ICD-10-CM

## 2019-03-11 DIAGNOSIS — I48.91 ATRIAL FIBRILLATION, UNSPECIFIED TYPE (HCC): ICD-10-CM

## 2019-03-11 PROCEDURE — G8417 CALC BMI ABV UP PARAM F/U: HCPCS | Performed by: INTERNAL MEDICINE

## 2019-03-11 PROCEDURE — 4004F PT TOBACCO SCREEN RCVD TLK: CPT | Performed by: INTERNAL MEDICINE

## 2019-03-11 PROCEDURE — G8427 DOCREV CUR MEDS BY ELIG CLIN: HCPCS | Performed by: INTERNAL MEDICINE

## 2019-03-11 PROCEDURE — 99214 OFFICE O/P EST MOD 30 MIN: CPT | Performed by: INTERNAL MEDICINE

## 2019-03-11 PROCEDURE — 3017F COLORECTAL CA SCREEN DOC REV: CPT | Performed by: INTERNAL MEDICINE

## 2019-03-11 PROCEDURE — G8482 FLU IMMUNIZE ORDER/ADMIN: HCPCS | Performed by: INTERNAL MEDICINE

## 2019-07-22 ENCOUNTER — CARE COORDINATION (OUTPATIENT)
Dept: CARE COORDINATION | Age: 64
End: 2019-07-22

## 2019-08-31 ENCOUNTER — APPOINTMENT (OUTPATIENT)
Dept: GENERAL RADIOLOGY | Age: 64
End: 2019-08-31
Payer: MEDICARE

## 2019-08-31 ENCOUNTER — HOSPITAL ENCOUNTER (EMERGENCY)
Age: 64
Discharge: HOME | End: 2019-09-01
Attending: FAMILY MEDICINE
Payer: MEDICARE

## 2019-08-31 DIAGNOSIS — R09.1 PLEURISY: Primary | ICD-10-CM

## 2019-08-31 LAB
ALBUMIN SERPL-MCNC: 3.8 GM/DL (ref 3.4–5)
ALP BLD-CCNC: 89 IU/L (ref 40–128)
ALT SERPL-CCNC: 21 U/L (ref 10–40)
ANION GAP SERPL CALCULATED.3IONS-SCNC: 8 MMOL/L (ref 4–16)
AST SERPL-CCNC: 12 IU/L (ref 15–37)
BASOPHILS ABSOLUTE: 0.1 K/CU MM
BASOPHILS RELATIVE PERCENT: 0.6 % (ref 0–1)
BILIRUB SERPL-MCNC: 0.4 MG/DL (ref 0–1)
BUN BLDV-MCNC: 17 MG/DL (ref 6–23)
CALCIUM SERPL-MCNC: 9.4 MG/DL (ref 8.3–10.6)
CHLORIDE BLD-SCNC: 94 MMOL/L (ref 99–110)
CO2: 35 MMOL/L (ref 21–32)
CREAT SERPL-MCNC: 0.9 MG/DL (ref 0.9–1.3)
DIFFERENTIAL TYPE: ABNORMAL
EOSINOPHILS ABSOLUTE: 0.3 K/CU MM
EOSINOPHILS RELATIVE PERCENT: 2 % (ref 0–3)
GFR AFRICAN AMERICAN: >60 ML/MIN/1.73M2
GFR NON-AFRICAN AMERICAN: >60 ML/MIN/1.73M2
GLUCOSE BLD-MCNC: 113 MG/DL (ref 70–99)
HCT VFR BLD CALC: 48.6 % (ref 42–52)
HEMOGLOBIN: 15.9 GM/DL (ref 13.5–18)
IMMATURE NEUTROPHIL %: 0.2 % (ref 0–0.43)
INR BLD: 1.12 INDEX
LIPASE: 18 IU/L (ref 13–60)
LYMPHOCYTES ABSOLUTE: 1.7 K/CU MM
LYMPHOCYTES RELATIVE PERCENT: 13.8 % (ref 24–44)
MCH RBC QN AUTO: 31.7 PG (ref 27–31)
MCHC RBC AUTO-ENTMCNC: 32.7 % (ref 32–36)
MCV RBC AUTO: 97 FL (ref 78–100)
MONOCYTES ABSOLUTE: 1.2 K/CU MM
MONOCYTES RELATIVE PERCENT: 9.4 % (ref 0–4)
NUCLEATED RBC %: 0 %
PDW BLD-RTO: 12.6 % (ref 11.7–14.9)
PLATELET # BLD: 158 K/CU MM (ref 140–440)
PMV BLD AUTO: 9.8 FL (ref 7.5–11.1)
POTASSIUM SERPL-SCNC: 4 MMOL/L (ref 3.5–5.1)
PRO-BNP: 46.7 PG/ML
PROTHROMBIN TIME: 12.7 SECONDS (ref 9.12–12.5)
RBC # BLD: 5.01 M/CU MM (ref 4.6–6.2)
SEGMENTED NEUTROPHILS ABSOLUTE COUNT: 9.1 K/CU MM
SEGMENTED NEUTROPHILS RELATIVE PERCENT: 74 % (ref 36–66)
SODIUM BLD-SCNC: 137 MMOL/L (ref 135–145)
TOTAL IMMATURE NEUTOROPHIL: 0.03 K/CU MM
TOTAL NUCLEATED RBC: 0 K/CU MM
TOTAL PROTEIN: 6.9 GM/DL (ref 6.4–8.2)
TROPONIN T: <0.01 NG/ML
WBC # BLD: 12.3 K/CU MM (ref 4–10.5)

## 2019-08-31 PROCEDURE — 84484 ASSAY OF TROPONIN QUANT: CPT

## 2019-08-31 PROCEDURE — 6370000000 HC RX 637 (ALT 250 FOR IP): Performed by: FAMILY MEDICINE

## 2019-08-31 PROCEDURE — 71045 X-RAY EXAM CHEST 1 VIEW: CPT

## 2019-08-31 PROCEDURE — 85025 COMPLETE CBC W/AUTO DIFF WBC: CPT

## 2019-08-31 PROCEDURE — 2580000003 HC RX 258: Performed by: FAMILY MEDICINE

## 2019-08-31 PROCEDURE — 83880 ASSAY OF NATRIURETIC PEPTIDE: CPT

## 2019-08-31 PROCEDURE — 83690 ASSAY OF LIPASE: CPT

## 2019-08-31 PROCEDURE — 93005 ELECTROCARDIOGRAM TRACING: CPT | Performed by: FAMILY MEDICINE

## 2019-08-31 PROCEDURE — 85610 PROTHROMBIN TIME: CPT

## 2019-08-31 PROCEDURE — 96361 HYDRATE IV INFUSION ADD-ON: CPT

## 2019-08-31 PROCEDURE — 94640 AIRWAY INHALATION TREATMENT: CPT

## 2019-08-31 PROCEDURE — 99284 EMERGENCY DEPT VISIT MOD MDM: CPT

## 2019-08-31 PROCEDURE — 80053 COMPREHEN METABOLIC PANEL: CPT

## 2019-08-31 RX ORDER — 0.9 % SODIUM CHLORIDE 0.9 %
1000 INTRAVENOUS SOLUTION INTRAVENOUS ONCE
Status: COMPLETED | OUTPATIENT
Start: 2019-08-31 | End: 2019-09-01

## 2019-08-31 RX ORDER — IPRATROPIUM BROMIDE AND ALBUTEROL SULFATE 2.5; .5 MG/3ML; MG/3ML
1 SOLUTION RESPIRATORY (INHALATION) ONCE
Status: COMPLETED | OUTPATIENT
Start: 2019-08-31 | End: 2019-08-31

## 2019-08-31 RX ORDER — NITROGLYCERIN 0.4 MG/1
0.4 TABLET SUBLINGUAL EVERY 5 MIN PRN
Status: DISCONTINUED | OUTPATIENT
Start: 2019-08-31 | End: 2019-09-01 | Stop reason: HOSPADM

## 2019-08-31 RX ORDER — KETOROLAC TROMETHAMINE 30 MG/ML
30 INJECTION, SOLUTION INTRAMUSCULAR; INTRAVENOUS ONCE
Status: COMPLETED | OUTPATIENT
Start: 2019-08-31 | End: 2019-09-01

## 2019-08-31 RX ADMIN — IPRATROPIUM BROMIDE AND ALBUTEROL SULFATE 1 AMPULE: .5; 3 SOLUTION RESPIRATORY (INHALATION) at 23:49

## 2019-08-31 RX ADMIN — SODIUM CHLORIDE 1000 ML: 9 INJECTION, SOLUTION INTRAVENOUS at 22:47

## 2019-08-31 RX ADMIN — NITROGLYCERIN 0.4 MG: 0.4 TABLET, ORALLY DISINTEGRATING SUBLINGUAL at 22:47

## 2019-08-31 ASSESSMENT — PAIN DESCRIPTION - DESCRIPTORS: DESCRIPTORS: SHARP

## 2019-08-31 ASSESSMENT — PAIN DESCRIPTION - PAIN TYPE: TYPE: ACUTE PAIN

## 2019-08-31 ASSESSMENT — PAIN DESCRIPTION - ORIENTATION: ORIENTATION: MID

## 2019-08-31 ASSESSMENT — PAIN SCALES - GENERAL: PAINLEVEL_OUTOF10: 8

## 2019-08-31 ASSESSMENT — PAIN DESCRIPTION - LOCATION: LOCATION: CHEST

## 2019-09-01 ENCOUNTER — APPOINTMENT (OUTPATIENT)
Dept: CT IMAGING | Age: 64
End: 2019-09-01
Payer: MEDICARE

## 2019-09-01 VITALS
DIASTOLIC BLOOD PRESSURE: 59 MMHG | RESPIRATION RATE: 20 BRPM | TEMPERATURE: 98.5 F | WEIGHT: 315 LBS | HEIGHT: 78 IN | BODY MASS INDEX: 36.45 KG/M2 | HEART RATE: 75 BPM | SYSTOLIC BLOOD PRESSURE: 106 MMHG | OXYGEN SATURATION: 93 %

## 2019-09-01 PROCEDURE — 93010 ELECTROCARDIOGRAM REPORT: CPT | Performed by: INTERNAL MEDICINE

## 2019-09-01 PROCEDURE — 6360000002 HC RX W HCPCS: Performed by: FAMILY MEDICINE

## 2019-09-01 PROCEDURE — 96374 THER/PROPH/DIAG INJ IV PUSH: CPT

## 2019-09-01 PROCEDURE — 71275 CT ANGIOGRAPHY CHEST: CPT

## 2019-09-01 PROCEDURE — 6360000004 HC RX CONTRAST MEDICATION: Performed by: FAMILY MEDICINE

## 2019-09-01 RX ORDER — PREDNISONE 50 MG/1
50 TABLET ORAL DAILY
Qty: 5 TABLET | Refills: 0 | Status: SHIPPED | OUTPATIENT
Start: 2019-09-01 | End: 2019-09-06

## 2019-09-01 RX ORDER — NAPROXEN 250 MG/1
500 TABLET ORAL ONCE
Status: DISCONTINUED | OUTPATIENT
Start: 2019-09-01 | End: 2019-09-01 | Stop reason: HOSPADM

## 2019-09-01 RX ORDER — NAPROXEN 500 MG/1
500 TABLET ORAL 2 TIMES DAILY PRN
Qty: 20 TABLET | Refills: 0 | Status: ON HOLD | OUTPATIENT
Start: 2019-09-01 | End: 2020-04-21 | Stop reason: HOSPADM

## 2019-09-01 RX ORDER — DEXAMETHASONE 4 MG/1
8 TABLET ORAL ONCE
Status: COMPLETED | OUTPATIENT
Start: 2019-09-01 | End: 2019-09-01

## 2019-09-01 RX ORDER — IPRATROPIUM BROMIDE AND ALBUTEROL SULFATE 2.5; .5 MG/3ML; MG/3ML
2 SOLUTION RESPIRATORY (INHALATION) ONCE
Status: DISCONTINUED | OUTPATIENT
Start: 2019-09-01 | End: 2019-09-01 | Stop reason: HOSPADM

## 2019-09-01 RX ADMIN — DEXAMETHASONE 8 MG: 4 TABLET ORAL at 02:50

## 2019-09-01 RX ADMIN — KETOROLAC TROMETHAMINE 30 MG: 30 INJECTION, SOLUTION INTRAMUSCULAR at 00:03

## 2019-09-01 RX ADMIN — IOPAMIDOL 100 ML: 755 INJECTION, SOLUTION INTRAVENOUS at 01:39

## 2019-09-01 ASSESSMENT — PAIN SCALES - GENERAL: PAINLEVEL_OUTOF10: 7

## 2019-09-01 NOTE — ED PROVIDER NOTES
Triage Chief Complaint:   Chest Pain (CP since 10 AM, midsternal with radiation into LUE. ) and Shortness of Breath    [place]:  Dominguez Laura is a 60 y.o. male that presents with constant chest pain that is been going on since 10:00 this morning described as midsternal with radiation to the left upper extremity. Patient also feels like he cannot get a full breath. No fevers chills nausea vomiting. No diaphoresis. He has not been sick with coryza, runny nose, allergic type symptoms. He does have a history of COPD. He does not have a cardiac history nor does he have a history of DVT, PE, or congestive heart failure. Nothing at home is relieve the symptoms. Is described as constant with significant pleuritic component and when it is at its worst is 10 out of 10.   He has required home oxygen in the past    ROS:  General:  No fevers, no chills, no weakness  Eyes:  No recent vison changes, no discharge  ENT:  No sore throat, no nasal congestion, no hearing changes  Cardiovascular: As above  Respiratory: As above  Gastrointestinal:  No pain, no nausea, no vomiting, no diarrhea  Musculoskeletal:  No muscle pain, no joint pain  Skin:  No rash, no pruritis, no easy bruising  Neurologic:  No speech problems, no headache, no extremity numbness, no extremity tingling, no extremity weakness  Psychiatric:  No anxiety, no hallucinations or delusions, no suicidal or homicidal ideation  Genitourinary:  No dysuria, no hematuria  Endocrine:  No unexpected weight gain, no unexpected weight loss  Extremities:  no edema, no pain    Past Medical History:   Diagnosis Date    [facility]     Anxiety     Arthritis     \"Hips, Knees, Elbows And Fingers\"    COPD (chronic obstructive pulmonary disease) ([facility])     Sees Dr. Juan Carlos Echeverria    Depression     Diabetes mellitus ([facility] Dx 1's    Full dentures     H/O angiography 12/13/2018    peripheral angio - LFA occluded, filling collaterals, RSFA 90% stenosis-vasc surg consult    H/O Doppler ultrasound 09/05/2018    LE arterial - left mid and distal femoral artery occluded, lt FANI shows mild-mod PVD    H/O echocardiogram 01/06/2016    EF60% mild MR, TR, pulm HTN    Hx of colonoscopy     7/11 C-scope - benign polyp x1    Hx of Doppler ultrasound 02/20/2018    Lower extremity doppler  Normal study.     Hyperlipidemia     Hypertension     Macular degeneration     Obesity     On home oxygen therapy     Continuous At 2 Liters Per Nasal Cannula    PAD (peripheral artery disease) (HCC)     10/10 FANI mild PAD left superficial femoral s/p left fem-pop bypass    Panic attacks     Pneumonia Last Episode In 2018    PTSD (post-traumatic stress disorder)     Restless leg     Shortness of breath     Sleep apnea     Uses BiPap    Wears glasses     To Read     Past Surgical History:   Procedure Laterality Date    APPENDECTOMY  2003    ATRIAL ABLATION SURGERY  05/23/2018    A-fib ablation    [employer] CARDIAC SURGERY  05/2018    \"Heart Ablation Done Twice\"    CHOLECYSTECTOMY, LAPAROSCOPIC  11/12/2018    COLONOSCOPY  07/2011    Polyp Removed    DENTAL SURGERY      All Teeth Extracted In Past    EYE SURGERY Left 2017    \"For Macular Degeneration\"    FEMORAL BYPASS Left 01/2011    FEMORAL BYPASS Left 1/9/2019    FEMORAL POPLITEAL BYPASS LEFT, REDO performed by Gabriela Braun MD at [address] Left 1980's    Broken Left Wrist , No Hardware    FRACTURE SURGERY Right 2000's    Broken Right Wrist With Hardware, Hardware Later Removed    HERNIA REPAIR  5262    Umbilical Hernia    [place] LAP,CHOLECYSTECTOMY N/A 11/12/2018    CHOLECYSTECTOMY LAPAROSCOPIC performed by Ike Rosenthal MD at [address] [id number]     Family History   Problem Relation Age of Onset    Early Death Father         In His 29's, Suicide    Early Death Mother         In Her 29's, She Was Murdered By Alessandro Wei Early Death Brother 24        Automobile Accident    Arthritis Sister      Social History Take 1 tablet by mouth 2 times daily 180 tablet 3    BiPAP Machine MIS nightly      atorvastatin (LIPITOR) 40 MG tablet Take 1 tablet by mouth daily (Patient taking differently: Take 40 mg by mouth every morning ) 90 tablet 3    cilostazol (PLETAL) 50 MG tablet Take 1 tablet by mouth 2 times daily 180 tablet 3    amiodarone (CORDARONE) 200 MG tablet Take 0.5 tablets by mouth daily 30 tablet 1    diltiazem (CARTIA XT) 240 MG extended release capsule Take 1 capsule by mouth daily 30 capsule 5    albuterol sulfate  (90 Base) MCG/ACT inhaler Inhale 2 puffs into the lungs every 6 hours as needed for Wheezing 3 Inhaler 3    UNABLE TO FIND Please administer two SHINGRIX vaccines 2-6 months apart 2 Units 0    ipratropium (ATROVENT) 0.03 % nasal spray 2 sprays by Nasal route 3 times daily 1 Bottle 3    ASPIRIN LOW DOSE 81 MG EC tablet take 1 tablet by mouth once daily 90 tablet 3    albuterol (PROVENTIL) (2.5 MG/3ML) 0.083% nebulizer solution Take 3 mLs by nebulization every 4 hours as needed for Wheezing 120 vial 5    ipratropium (ATROVENT) 0.02 % nebulizer solution Take 2.5 mLs by nebulization every 4 hours as needed for Wheezing 300 mL 5    OXYGEN Inhale 2 L into the lungs continuous       Nebulizers (COMPRESSOR/NEBULIZER) MISC Use qid 1 each 3     Allergies   Allergen Reactions    Metoprolol      \"Chest Pain And Burning In The Chest\"       Nursing Notes Reviewed    Physical Exam:  ED Triage Vitals   Enc Vitals Group      BP 08/31/19 2240 117/69      Pulse 08/31/19 2238 103      Resp 08/31/19 2238 21      Temp 08/31/19 2240 98.5 °F (36.9 °C)      Temp src --       SpO2 08/31/19 2240 90 %      Weight 08/31/19 2238 (!) 350 lb (158.8 kg)      Height 08/31/19 2238 6' 6\" (1.981 m)      Head Circumference --       Peak Flow --       Pain Score --       Pain Loc --       Pain Edu? --       Excl. in [address]? --        My pulse ox interpretation is - toxic on room air    General appearance:  No acute distress. Sitting comfortably in bed  Skin:  Warm. Dry. No petechiae or purpura. Eye:  Extraocular movements intact. PERRLA  Ears, nose, mouth and throat:  Oral mucosa moist, no trismus. Tympanic membranes bilaterally normal.  Oropharynx with no exudate or erythema. Neck:  Trachea midline. Supple. No cervical lymphadenopathy  Extremity:  No swelling. Normal ROM. No calf pain or asymmetric swelling. No lower extremity edema  Heart:  Regular rate and rhythm, normal S1 & S2, no extra heart sounds. Perfusion:  Intact, capillary refill less than 2 seconds  Respiratory: Patient's inspiratory effort limited by pleuritic chest pain. No obvious wheezing although limited expiratory and expiratory expression. Pulse ox 90 on room air which is about normal for the patient  Abdominal:  Normal bowel sounds. Soft. No peritoneal signs. No hepatosplenomegaly. Back:  No CVA tenderness to palpation. No bruising. No CTL tenderness to palpation or step-off  Neurological:  Alert and oriented times 3. No focal neuro deficits. Cranial nerves II through XII are grossly intact.           Psychiatric:  Appropriate    I have reviewed and interpreted all of the currently available lab results from this visit (if applicable):  Results for orders placed or performed during the hospital encounter of 08/31/19   CBC Auto Differential   Result Value Ref Range    WBC 12.3 (H) 4.0 - 10.5 K/CU MM    RBC 5.01 4.6 - 6.2 M/CU MM    Hemoglobin 15.9 13.5 - 18.0 GM/DL    Hematocrit 48.6 42 - 52 %    MCV 97.0 78 - 100 FL    MCH 31.7 (H) 27 - 31 PG    MCHC 32.7 32.0 - 36.0 %    RDW 12.6 11.7 - 14.9 %    Platelets 101 758 - 758 K/CU MM    MPV 9.8 7.5 - 11.1 FL    Differential Type AUTOMATED DIFFERENTIAL     Segs Relative 74.0 (H) 36 - 66 %    Lymphocytes % 13.8 (L) 24 - 44 %    Monocytes % 9.4 (H) 0 - 4 %    Eosinophils % 2.0 0 - 3 %    Basophils % 0.6 0 - 1 %    Segs Absolute 9.1 K/CU MM    Lymphocytes Absolute 1.7 K/CU MM    Monocytes Absolute

## 2019-09-05 LAB
EKG ATRIAL RATE: 102 BPM
EKG DIAGNOSIS: NORMAL
EKG P AXIS: 83 DEGREES
EKG P-R INTERVAL: 192 MS
EKG Q-T INTERVAL: 352 MS
EKG QRS DURATION: 96 MS
EKG QTC CALCULATION (BAZETT): 458 MS
EKG R AXIS: -71 DEGREES
EKG T AXIS: 75 DEGREES
EKG VENTRICULAR RATE: 102 BPM

## 2019-09-17 ENCOUNTER — TELEPHONE (OUTPATIENT)
Dept: CARDIOLOGY CLINIC | Age: 64
End: 2019-09-17

## 2020-04-19 ENCOUNTER — APPOINTMENT (OUTPATIENT)
Dept: GENERAL RADIOLOGY | Age: 65
End: 2020-04-19
Payer: MEDICARE

## 2020-04-19 ENCOUNTER — APPOINTMENT (OUTPATIENT)
Dept: CT IMAGING | Age: 65
End: 2020-04-19
Payer: MEDICARE

## 2020-04-19 ENCOUNTER — HOSPITAL ENCOUNTER (OUTPATIENT)
Age: 65
Setting detail: OBSERVATION
Discharge: HOME | End: 2020-04-21
Attending: INTERNAL MEDICINE | Admitting: INTERNAL MEDICINE
Payer: MEDICARE

## 2020-04-19 LAB
ALBUMIN SERPL-MCNC: 3.8 GM/DL (ref 3.4–5)
ALP BLD-CCNC: 83 IU/L (ref 40–129)
ALT SERPL-CCNC: 24 U/L (ref 10–40)
ANION GAP SERPL CALCULATED.3IONS-SCNC: 8 MMOL/L (ref 4–16)
AST SERPL-CCNC: 17 IU/L (ref 15–37)
BASE EXCESS MIXED: ABNORMAL (ref 0–1.2)
BASOPHILS ABSOLUTE: 0.1 K/CU MM
BASOPHILS RELATIVE PERCENT: 0.6 % (ref 0–1)
BILIRUB SERPL-MCNC: 0.3 MG/DL (ref 0–1)
BUN BLDV-MCNC: 13 MG/DL (ref 6–23)
CALCIUM SERPL-MCNC: 9.2 MG/DL (ref 8.3–10.6)
CHLORIDE BLD-SCNC: 94 MMOL/L (ref 99–110)
CO2: 35 MMOL/L (ref 21–32)
COMMENT: ABNORMAL
CREAT SERPL-MCNC: 0.6 MG/DL (ref 0.9–1.3)
DIFFERENTIAL TYPE: ABNORMAL
EOSINOPHILS ABSOLUTE: 0.4 K/CU MM
EOSINOPHILS RELATIVE PERCENT: 3.6 % (ref 0–3)
GFR AFRICAN AMERICAN: >60 ML/MIN/1.73M2
GFR NON-AFRICAN AMERICAN: >60 ML/MIN/1.73M2
GLUCOSE BLD-MCNC: 152 MG/DL (ref 70–99)
HCO3 VENOUS: 38.6 MMOL/L (ref 19–25)
HCT VFR BLD CALC: 49 % (ref 42–52)
HEMOGLOBIN: 16.1 GM/DL (ref 13.5–18)
IMMATURE NEUTROPHIL %: 0.4 % (ref 0–0.43)
LYMPHOCYTES ABSOLUTE: 2.1 K/CU MM
LYMPHOCYTES RELATIVE PERCENT: 17.9 % (ref 24–44)
MCH RBC QN AUTO: 31.9 PG (ref 27–31)
MCHC RBC AUTO-ENTMCNC: 32.9 % (ref 32–36)
MCV RBC AUTO: 97 FL (ref 78–100)
MONOCYTES ABSOLUTE: 0.8 K/CU MM
MONOCYTES RELATIVE PERCENT: 7.3 % (ref 0–4)
NUCLEATED RBC %: 0 %
O2 SAT, VEN: 90.6 % (ref 50–70)
PCO2, VEN: 70 MMHG (ref 38–52)
PDW BLD-RTO: 13.2 % (ref 11.7–14.9)
PH VENOUS: 7.35 (ref 7.32–7.42)
PLATELET # BLD: 180 K/CU MM (ref 140–440)
PMV BLD AUTO: 10.6 FL (ref 7.5–11.1)
PO2, VEN: 157 MMHG (ref 28–48)
POTASSIUM SERPL-SCNC: 4.2 MMOL/L (ref 3.5–5.1)
PRO-BNP: 71.38 PG/ML
RBC # BLD: 5.05 M/CU MM (ref 4.6–6.2)
SEGMENTED NEUTROPHILS ABSOLUTE COUNT: 8 K/CU MM
SEGMENTED NEUTROPHILS RELATIVE PERCENT: 70.2 % (ref 36–66)
SODIUM BLD-SCNC: 137 MMOL/L (ref 135–145)
TOTAL IMMATURE NEUTOROPHIL: 0.05 K/CU MM
TOTAL NUCLEATED RBC: 0 K/CU MM
TOTAL PROTEIN: 6.7 GM/DL (ref 6.4–8.2)
TROPONIN T: <0.01 NG/ML
WBC # BLD: 11.5 K/CU MM (ref 4–10.5)

## 2020-04-19 PROCEDURE — 71275 CT ANGIOGRAPHY CHEST: CPT

## 2020-04-19 PROCEDURE — 84484 ASSAY OF TROPONIN QUANT: CPT

## 2020-04-19 PROCEDURE — 83880 ASSAY OF NATRIURETIC PEPTIDE: CPT

## 2020-04-19 PROCEDURE — 99285 EMERGENCY DEPT VISIT HI MDM: CPT

## 2020-04-19 PROCEDURE — 93005 ELECTROCARDIOGRAM TRACING: CPT | Performed by: PHYSICIAN ASSISTANT

## 2020-04-19 PROCEDURE — 82805 BLOOD GASES W/O2 SATURATION: CPT

## 2020-04-19 PROCEDURE — 71045 X-RAY EXAM CHEST 1 VIEW: CPT

## 2020-04-19 PROCEDURE — 85025 COMPLETE CBC W/AUTO DIFF WBC: CPT

## 2020-04-19 PROCEDURE — 2580000003 HC RX 258: Performed by: PHYSICIAN ASSISTANT

## 2020-04-19 PROCEDURE — 6370000000 HC RX 637 (ALT 250 FOR IP): Performed by: PHYSICIAN ASSISTANT

## 2020-04-19 PROCEDURE — 80053 COMPREHEN METABOLIC PANEL: CPT

## 2020-04-19 PROCEDURE — 6360000004 HC RX CONTRAST MEDICATION: Performed by: PHYSICIAN ASSISTANT

## 2020-04-19 RX ORDER — SODIUM CHLORIDE 0.9 % (FLUSH) 0.9 %
10 SYRINGE (ML) INJECTION 2 TIMES DAILY
Status: DISCONTINUED | OUTPATIENT
Start: 2020-04-19 | End: 2020-04-20 | Stop reason: SDUPTHER

## 2020-04-19 RX ORDER — PREDNISONE 20 MG/1
60 TABLET ORAL ONCE
Status: COMPLETED | OUTPATIENT
Start: 2020-04-19 | End: 2020-04-19

## 2020-04-19 RX ADMIN — Medication 10 ML: at 22:13

## 2020-04-19 RX ADMIN — PREDNISONE 60 MG: 20 TABLET ORAL at 21:32

## 2020-04-19 RX ADMIN — IOPAMIDOL 80 ML: 755 INJECTION, SOLUTION INTRAVENOUS at 22:12

## 2020-04-20 PROBLEM — J44.1 COPD WITH ACUTE EXACERBATION (HCC): Status: ACTIVE | Noted: 2020-04-20

## 2020-04-20 LAB
GLUCOSE BLD-MCNC: 164 MG/DL (ref 70–99)
LV EF: 58 %
LVEF MODALITY: NORMAL

## 2020-04-20 PROCEDURE — G0378 HOSPITAL OBSERVATION PER HR: HCPCS

## 2020-04-20 PROCEDURE — 94761 N-INVAS EAR/PLS OXIMETRY MLT: CPT

## 2020-04-20 PROCEDURE — 2700000000 HC OXYGEN THERAPY PER DAY

## 2020-04-20 PROCEDURE — C8929 TTE W OR WO FOL WCON,DOPPLER: HCPCS

## 2020-04-20 PROCEDURE — 94640 AIRWAY INHALATION TREATMENT: CPT

## 2020-04-20 PROCEDURE — 6360000004 HC RX CONTRAST MEDICATION

## 2020-04-20 PROCEDURE — 93010 ELECTROCARDIOGRAM REPORT: CPT | Performed by: INTERNAL MEDICINE

## 2020-04-20 PROCEDURE — 6370000000 HC RX 637 (ALT 250 FOR IP): Performed by: INTERNAL MEDICINE

## 2020-04-20 PROCEDURE — 82962 GLUCOSE BLOOD TEST: CPT

## 2020-04-20 PROCEDURE — 2580000003 HC RX 258: Performed by: INTERNAL MEDICINE

## 2020-04-20 PROCEDURE — 94664 DEMO&/EVAL PT USE INHALER: CPT

## 2020-04-20 RX ORDER — AMIODARONE HYDROCHLORIDE 200 MG/1
100 TABLET ORAL DAILY
Status: DISCONTINUED | OUTPATIENT
Start: 2020-04-20 | End: 2020-04-21 | Stop reason: HOSPADM

## 2020-04-20 RX ORDER — PREDNISONE 20 MG/1
40 TABLET ORAL DAILY
Status: DISCONTINUED | OUTPATIENT
Start: 2020-04-20 | End: 2020-04-21 | Stop reason: HOSPADM

## 2020-04-20 RX ORDER — ACETAMINOPHEN 325 MG/1
650 TABLET ORAL EVERY 6 HOURS PRN
Status: DISCONTINUED | OUTPATIENT
Start: 2020-04-20 | End: 2020-04-21 | Stop reason: HOSPADM

## 2020-04-20 RX ORDER — CILOSTAZOL 100 MG/1
50 TABLET ORAL 2 TIMES DAILY
Status: DISCONTINUED | OUTPATIENT
Start: 2020-04-20 | End: 2020-04-21 | Stop reason: HOSPADM

## 2020-04-20 RX ORDER — POLYETHYLENE GLYCOL 3350 17 G/17G
17 POWDER, FOR SOLUTION ORAL DAILY PRN
Status: DISCONTINUED | OUTPATIENT
Start: 2020-04-20 | End: 2020-04-21 | Stop reason: HOSPADM

## 2020-04-20 RX ORDER — PROMETHAZINE HYDROCHLORIDE 25 MG/1
12.5 TABLET ORAL EVERY 6 HOURS PRN
Status: DISCONTINUED | OUTPATIENT
Start: 2020-04-20 | End: 2020-04-21 | Stop reason: HOSPADM

## 2020-04-20 RX ORDER — SODIUM CHLORIDE 0.9 % (FLUSH) 0.9 %
10 SYRINGE (ML) INJECTION EVERY 12 HOURS SCHEDULED
Status: DISCONTINUED | OUTPATIENT
Start: 2020-04-20 | End: 2020-04-21 | Stop reason: HOSPADM

## 2020-04-20 RX ORDER — ACETAMINOPHEN 650 MG/1
650 SUPPOSITORY RECTAL EVERY 6 HOURS PRN
Status: DISCONTINUED | OUTPATIENT
Start: 2020-04-20 | End: 2020-04-21 | Stop reason: HOSPADM

## 2020-04-20 RX ORDER — DILTIAZEM HYDROCHLORIDE 240 MG/1
240 CAPSULE, COATED, EXTENDED RELEASE ORAL DAILY
Status: DISCONTINUED | OUTPATIENT
Start: 2020-04-20 | End: 2020-04-21 | Stop reason: HOSPADM

## 2020-04-20 RX ORDER — AZITHROMYCIN 250 MG/1
500 TABLET, FILM COATED ORAL DAILY
Status: DISCONTINUED | OUTPATIENT
Start: 2020-04-20 | End: 2020-04-21 | Stop reason: HOSPADM

## 2020-04-20 RX ORDER — ONDANSETRON 2 MG/ML
4 INJECTION INTRAMUSCULAR; INTRAVENOUS EVERY 6 HOURS PRN
Status: DISCONTINUED | OUTPATIENT
Start: 2020-04-20 | End: 2020-04-21 | Stop reason: HOSPADM

## 2020-04-20 RX ORDER — ASPIRIN 81 MG/1
81 TABLET ORAL DAILY
Status: DISCONTINUED | OUTPATIENT
Start: 2020-04-20 | End: 2020-04-21 | Stop reason: HOSPADM

## 2020-04-20 RX ORDER — IPRATROPIUM BROMIDE AND ALBUTEROL SULFATE 2.5; .5 MG/3ML; MG/3ML
1 SOLUTION RESPIRATORY (INHALATION)
Status: DISCONTINUED | OUTPATIENT
Start: 2020-04-20 | End: 2020-04-21 | Stop reason: HOSPADM

## 2020-04-20 RX ORDER — SODIUM CHLORIDE 0.9 % (FLUSH) 0.9 %
10 SYRINGE (ML) INJECTION PRN
Status: DISCONTINUED | OUTPATIENT
Start: 2020-04-20 | End: 2020-04-21 | Stop reason: HOSPADM

## 2020-04-20 RX ORDER — ATORVASTATIN CALCIUM 40 MG/1
40 TABLET, FILM COATED ORAL EVERY MORNING
Status: DISCONTINUED | OUTPATIENT
Start: 2020-04-20 | End: 2020-04-21 | Stop reason: HOSPADM

## 2020-04-20 RX ADMIN — ASPIRIN 81 MG: 81 TABLET, COATED ORAL at 10:04

## 2020-04-20 RX ADMIN — IPRATROPIUM BROMIDE AND ALBUTEROL SULFATE 1 AMPULE: .5; 3 SOLUTION RESPIRATORY (INHALATION) at 07:18

## 2020-04-20 RX ADMIN — ATORVASTATIN CALCIUM 40 MG: 40 TABLET, FILM COATED ORAL at 10:04

## 2020-04-20 RX ADMIN — PERFLUTREN 2.2 MG: 6.52 INJECTION, SUSPENSION INTRAVENOUS at 10:39

## 2020-04-20 RX ADMIN — IPRATROPIUM BROMIDE AND ALBUTEROL SULFATE 1 AMPULE: .5; 3 SOLUTION RESPIRATORY (INHALATION) at 15:36

## 2020-04-20 RX ADMIN — APIXABAN 5 MG: 5 TABLET, FILM COATED ORAL at 10:04

## 2020-04-20 RX ADMIN — SODIUM CHLORIDE, PRESERVATIVE FREE 10 ML: 5 INJECTION INTRAVENOUS at 10:04

## 2020-04-20 RX ADMIN — CILOSTAZOL 50 MG: 100 TABLET ORAL at 10:05

## 2020-04-20 RX ADMIN — APIXABAN 5 MG: 5 TABLET, FILM COATED ORAL at 20:42

## 2020-04-20 RX ADMIN — IPRATROPIUM BROMIDE AND ALBUTEROL SULFATE 1 AMPULE: .5; 3 SOLUTION RESPIRATORY (INHALATION) at 20:51

## 2020-04-20 RX ADMIN — DILTIAZEM HYDROCHLORIDE 240 MG: 240 CAPSULE, COATED, EXTENDED RELEASE ORAL at 10:04

## 2020-04-20 RX ADMIN — SODIUM CHLORIDE, PRESERVATIVE FREE 10 ML: 5 INJECTION INTRAVENOUS at 20:42

## 2020-04-20 RX ADMIN — AMIODARONE HYDROCHLORIDE 100 MG: 200 TABLET ORAL at 10:05

## 2020-04-20 RX ADMIN — AZITHROMYCIN 500 MG: 250 TABLET, FILM COATED ORAL at 10:04

## 2020-04-20 RX ADMIN — PREDNISONE 40 MG: 20 TABLET ORAL at 10:07

## 2020-04-20 RX ADMIN — CILOSTAZOL 50 MG: 100 TABLET ORAL at 20:42

## 2020-04-20 RX ADMIN — IPRATROPIUM BROMIDE AND ALBUTEROL SULFATE 1 AMPULE: .5; 3 SOLUTION RESPIRATORY (INHALATION) at 11:25

## 2020-04-20 NOTE — ED PROVIDER NOTES
therapy     Continuous At 2 Liters Per Nasal Cannula    PAD (peripheral artery disease) ([address]     10/10 FANI mild PAD left superficial femoral s/p left fem-pop bypass    Panic attacks     Pneumonia Last Episode In 2018    PTSD (post-traumatic stress disorder)     Restless leg     Shortness of breath     Sleep apnea     Uses BiPap    Wears glasses     To Read     Past Surgical History:   Procedure Laterality Date    APPENDECTOMY  2003    ATRIAL ABLATION SURGERY  05/23/2018    A-fib ablation    [facility] CARDIAC SURGERY  05/2018    \"Heart Ablation Done Twice\"    CHOLECYSTECTOMY, LAPAROSCOPIC  11/12/2018    COLONOSCOPY  07/2011    Polyp Removed    DENTAL SURGERY      All Teeth Extracted In Past    EYE SURGERY Left 2017    \"For Macular Degeneration\"    FEMORAL BYPASS Left 01/2011    FEMORAL BYPASS Left 1/9/2019    FEMORAL POPLITEAL BYPASS LEFT, REDO performed by Osbaldo Javier MD at [address] [facility] Left 1980's    Broken Left Wrist , No Hardware    FRACTURE SURGERY Right 2000's    Broken Right Wrist With Hardware, Hardware Later Removed    HERNIA REPAIR  0213    Umbilical Hernia    [place] LAP,CHOLECYSTECTOMY N/A 11/12/2018    CHOLECYSTECTOMY LAPAROSCOPIC performed by Bernie Crump MD at [facility]  1960's       CURRENT MEDICATIONS    Current Outpatient Rx   Medication Sig Dispense Refill    naproxen (NAPROSYN) 500 MG tablet Take 1 tablet by mouth 2 times daily as needed for Pain 20 tablet 0    apixaban (ELIQUIS) 5 MG TABS tablet Take 1 tablet by mouth 2 times daily 180 tablet 3    BiPAP Machine MISC nightly      atorvastatin (LIPITOR) 40 MG tablet Take 1 tablet by mouth daily (Patient taking differently: Take 40 mg by mouth every morning ) 90 tablet 3    cilostazol (PLETAL) 50 MG tablet Take 1 tablet by mouth 2 times daily 180 tablet 3    amiodarone (CORDARONE) 200 MG tablet Take 0.5 tablets by mouth daily 30 tablet 1    diltiazem (CARTIA XT) 240 MG extended release capsule Take 1

## 2020-04-21 ENCOUNTER — TELEPHONE (OUTPATIENT)
Dept: INTERNAL MEDICINE CLINIC | Age: 65
End: 2020-04-21

## 2020-04-21 VITALS
HEART RATE: 82 BPM | WEIGHT: 315 LBS | TEMPERATURE: 97.5 F | HEIGHT: 78 IN | RESPIRATION RATE: 16 BRPM | BODY MASS INDEX: 36.45 KG/M2 | OXYGEN SATURATION: 92 % | DIASTOLIC BLOOD PRESSURE: 70 MMHG | SYSTOLIC BLOOD PRESSURE: 108 MMHG

## 2020-04-21 PROCEDURE — G0378 HOSPITAL OBSERVATION PER HR: HCPCS

## 2020-04-21 PROCEDURE — 6370000000 HC RX 637 (ALT 250 FOR IP): Performed by: INTERNAL MEDICINE

## 2020-04-21 PROCEDURE — 2580000003 HC RX 258: Performed by: INTERNAL MEDICINE

## 2020-04-21 PROCEDURE — 2700000000 HC OXYGEN THERAPY PER DAY

## 2020-04-21 PROCEDURE — 94761 N-INVAS EAR/PLS OXIMETRY MLT: CPT

## 2020-04-21 PROCEDURE — 94640 AIRWAY INHALATION TREATMENT: CPT

## 2020-04-21 RX ORDER — AMIODARONE HYDROCHLORIDE 200 MG/1
100 TABLET ORAL DAILY
Qty: 30 TABLET | Refills: 1 | Status: SHIPPED | OUTPATIENT
Start: 2020-04-21 | End: 2020-04-27 | Stop reason: SDUPTHER

## 2020-04-21 RX ORDER — PREDNISONE 20 MG/1
TABLET ORAL
Qty: 11 TABLET | Refills: 0 | Status: SHIPPED | OUTPATIENT
Start: 2020-04-21 | End: 2020-04-27

## 2020-04-21 RX ORDER — ASPIRIN 81 MG/1
81 TABLET ORAL DAILY
Qty: 90 TABLET | Refills: 3 | Status: SHIPPED | OUTPATIENT
Start: 2020-04-21 | End: 2020-04-27 | Stop reason: SDUPTHER

## 2020-04-21 RX ORDER — AZITHROMYCIN 500 MG/1
250 TABLET, FILM COATED ORAL DAILY
Qty: 2 TABLET | Refills: 0 | Status: SHIPPED | OUTPATIENT
Start: 2020-04-21 | End: 2020-04-25

## 2020-04-21 RX ORDER — ALBUTEROL SULFATE 2.5 MG/3ML
2.5 SOLUTION RESPIRATORY (INHALATION) EVERY 4 HOURS PRN
Qty: 120 VIAL | Refills: 5 | Status: SHIPPED | OUTPATIENT
Start: 2020-04-21 | End: 2020-04-27 | Stop reason: SDUPTHER

## 2020-04-21 RX ORDER — ALBUTEROL SULFATE 90 UG/1
2 AEROSOL, METERED RESPIRATORY (INHALATION) EVERY 6 HOURS PRN
Qty: 3 INHALER | Refills: 3 | Status: SHIPPED | OUTPATIENT
Start: 2020-04-21 | End: 2021-04-05 | Stop reason: SDUPTHER

## 2020-04-21 RX ORDER — IPRATROPIUM BROMIDE 21 UG/1
2 SPRAY, METERED NASAL 3 TIMES DAILY
Qty: 1 BOTTLE | Refills: 3 | Status: SHIPPED | OUTPATIENT
Start: 2020-04-21 | End: 2020-11-02 | Stop reason: SDUPTHER

## 2020-04-21 RX ORDER — DILTIAZEM HYDROCHLORIDE 240 MG/1
240 CAPSULE, COATED, EXTENDED RELEASE ORAL DAILY
Qty: 30 CAPSULE | Refills: 5 | Status: SHIPPED | OUTPATIENT
Start: 2020-04-21 | End: 2020-04-27 | Stop reason: SDUPTHER

## 2020-04-21 RX ORDER — CILOSTAZOL 50 MG/1
50 TABLET ORAL 2 TIMES DAILY
Qty: 180 TABLET | Refills: 3 | Status: SHIPPED | OUTPATIENT
Start: 2020-04-21 | End: 2020-04-27 | Stop reason: SDUPTHER

## 2020-04-21 RX ORDER — ATORVASTATIN CALCIUM 40 MG/1
40 TABLET, FILM COATED ORAL DAILY
Qty: 90 TABLET | Refills: 3 | Status: SHIPPED | OUTPATIENT
Start: 2020-04-21 | End: 2020-04-27 | Stop reason: SDUPTHER

## 2020-04-21 RX ADMIN — AZITHROMYCIN 500 MG: 250 TABLET, FILM COATED ORAL at 10:27

## 2020-04-21 RX ADMIN — IPRATROPIUM BROMIDE AND ALBUTEROL SULFATE 1 AMPULE: .5; 3 SOLUTION RESPIRATORY (INHALATION) at 07:08

## 2020-04-21 RX ADMIN — ATORVASTATIN CALCIUM 40 MG: 40 TABLET, FILM COATED ORAL at 10:26

## 2020-04-21 RX ADMIN — ASPIRIN 81 MG: 81 TABLET, COATED ORAL at 10:27

## 2020-04-21 RX ADMIN — AMIODARONE HYDROCHLORIDE 100 MG: 200 TABLET ORAL at 10:26

## 2020-04-21 RX ADMIN — APIXABAN 5 MG: 5 TABLET, FILM COATED ORAL at 10:26

## 2020-04-21 RX ADMIN — SODIUM CHLORIDE, PRESERVATIVE FREE 10 ML: 5 INJECTION INTRAVENOUS at 10:27

## 2020-04-21 RX ADMIN — CILOSTAZOL 50 MG: 100 TABLET ORAL at 10:26

## 2020-04-21 RX ADMIN — PREDNISONE 40 MG: 20 TABLET ORAL at 10:27

## 2020-04-21 RX ADMIN — DILTIAZEM HYDROCHLORIDE 240 MG: 240 CAPSULE, COATED, EXTENDED RELEASE ORAL at 10:26

## 2020-04-21 RX ADMIN — IPRATROPIUM BROMIDE AND ALBUTEROL SULFATE 1 AMPULE: .5; 3 SOLUTION RESPIRATORY (INHALATION) at 11:04

## 2020-04-21 NOTE — DISCHARGE SUMMARY
Discharge Summary    Name:  Ashwini Ring /Age/Sex: 1955  (60 y.o. male)   MRN & CSN:  [id number] & [id number] Admission Date/Time: 2020  7:52 PM   Attending:  Jeanine Agarwal MD Discharging Physician: Jeanine Agarwal MD     Hospital Course:   Ashwini Ring is a 60 y.o.  male  who presents with COPD with acute exacerbation [facility])    He was admitted to the hospital because of shortness of breath. Started 3 weeks ago, progressively worsening now associated with productive cough. At the ED his vital signs were stable. Work-up showed  WBC 11.5 chest x-ray with no new focal lung consolidation,, CT angiogram pulmonary with contrast with no central or lobar pulmonary embolus. He was managed as a case of COPD exacerbation. He was started on antibiotic and prednisone. His symptoms improved. He was discharged stable. The patient expressed appropriate understanding of and agreement with the discharge recommendations, medications, and plan.      Consults this admission:  IP CONSULT TO HOSPITALIST  IP CONSULT TO CASE MANAGEMENT  IP CONSULT TO CASE MANAGEMENT    Discharge Instruction:   Follow up appointments:   Primary care physician:  within 2 weeks    Diet:  cardiac diet   Activity: activity as tolerated  Disposition: Discharged to:   [x]Home, []C, []SNF, []Acute Rehab, []Hospice   Condition on discharge: Stable    Discharge Medications:      Thera Horde \"Moe\"   Home Medication Instructions BEATRICE:[id number]    Printed on:20 1017   Medication Information                      albuterol (PROVENTIL) (2.5 MG/3ML) 0.083% nebulizer solution  Take 3 mLs by nebulization every 4 hours as needed for Wheezing             albuterol sulfate  (90 Base) MCG/ACT inhaler  Inhale 2 puffs into the lungs every 6 hours as needed for Wheezing             amiodarone (CORDARONE) 200 MG tablet  Take 0.5 tablets by mouth daily             apixaban (ELIQUIS) 5 MG TABS tablet  Take 1 tablet by mouth 2 times daily             aspirin (ASPIRIN LOW DOSE) 81 MG EC tablet  Take 1 tablet by mouth daily             atorvastatin (LIPITOR) 40 MG tablet  Take 1 tablet by mouth daily             azithromycin (ZITHROMAX) 500 MG tablet  Take 0.5 tablets by mouth daily for 4 days             BiPAP Machine MISC  nightly             cilostazol (PLETAL) 50 MG tablet  Take 1 tablet by mouth 2 times daily             dilTIAZem (CARTIA XT) 240 MG extended release capsule  Take 1 capsule by mouth daily             ipratropium (ATROVENT) 0.02 % nebulizer solution  Take 2.5 mLs by nebulization every 4 hours as needed for Wheezing             ipratropium (ATROVENT) 0.03 % nasal spray  2 sprays by Nasal route 3 times daily             Nebulizers (COMPRESSOR/NEBULIZER) MISC  Use qid             OXYGEN  Inhale 2 L into the lungs continuous              predniSONE (DELTASONE) 20 MG tablet  Take 2 tablets by mouth daily for 3 days, THEN 1 tablet daily for 3 days, THEN 0.5 tablets daily for 3 days. UNABLE TO FIND  Please administer two SHINGRIX vaccines 2-6 months apart                 Objective Findings at Discharge:   /70   Pulse 82   Temp 97.5 °F (36.4 °C) (Oral)   Resp 16   Ht 6' 6\" (1.981 m)   Wt (!) 350 lb (158.8 kg)   SpO2 94%   BMI 40.45 kg/m²            PHYSICAL EXAM   GEN Awake male, sitting upright in bed in no apparent distress. Appears given age. EYES Pupils are equally round. No scleral erythema, discharge, or conjunctivitis. HENT Mucous membranes are moist. Oral pharynx without exudates, no evidence of thrush. NECK Supple, no apparent thyromegaly or masses. RESP Clear to auscultation, no wheezes, rales or rhonchi. Symmetric chest movement while on room air. CARDIO/VASC S1/S2 auscultated. Regular rate without appreciable murmurs, rubs, or gallops. No JVD or carotid bruits. Peripheral pulses equal bilaterally and palpable. No peripheral edema.   GI Abdomen is soft without significant tenderness, masses, or guarding. Bowel sounds are normoactive. Rectal exam deferred. MSK No gross joint deformities. SKIN Normal coloration, warm, dry. NEURO Cranial nerves appear grossly intact, normal speech, no lateralizing weakness. PSYCH Awake, alert, oriented x 4. Affect appropriate. BMP/CBC  Recent Labs     04/19/20 2004      K 4.2   CL 94*   CO2 35*   BUN 13   CREATININE 0.6*   WBC 11.5*   HCT 49.0          IMAGING:  Xr Chest Portable    Result Date: 4/20/2020  EXAMINATION: ONE XRAY VIEW OF THE CHEST 4/19/2020 8:28 pm COMPARISON: 08/31/2019 HISTORY: ORDERING SYSTEM PROVIDED HISTORY: shortness of breath TECHNOLOGIST PROVIDED HISTORY: Reason for exam:->shortness of breath Reason for Exam: shortness of breath Acuity: Unknown Type of Exam: Initial Additional signs and symptoms: none Relevant Medical/Surgical History: COPD, A-FIB FINDINGS: The heart is stable in size. No definite pleural effusion or pneumothorax. Similar-appearing streaky opacities in the lung bases and left mid lung, compatible with atelectasis and/or scarring. No new focal lung consolidation is evident. Calcified hilar/mediastinal lymph nodes are seen. No new focal lung consolidation identified. Streaky atelectasis and/or scarring in the lung bases and left mid lung. Cta Pulmonary W Contrast    Result Date: 4/21/2020  EXAMINATION: CTA OF THE CHEST 4/19/2020 10:29 pm TECHNIQUE: CTA of the chest was performed after the administration of intravenous contrast.  Multiplanar reformatted images are provided for review. MIP images are provided for review. Dose modulation, iterative reconstruction, and/or weight based adjustment of the mA/kV was utilized to reduce the radiation dose to as low as reasonably achievable.  COMPARISON: None HISTORY: ORDERING SYSTEM PROVIDED HISTORY: sob TECHNOLOGIST PROVIDED HISTORY: Reason for exam:->sob Reason for Exam: sob Acuity: Acute Type of Exam: Initial Additional signs and

## 2020-04-21 NOTE — PROGRESS NOTES
CLINICAL PHARMACY NOTE: MEDS TO [address] Select Patient?: No  Total # of Prescriptions Filled: 3   The following medications were delivered to the patient:  · Prednisone 20mg  · Eliquis 5mg  · Azithromycin 250mg  Total # of Interventions Completed: 2  Time Spent (min): 30    Additional Documentation:  We used a 1 time  coupon to pay for the eliquis today. We changed the azithromycin 500mg 1/2 daily to the 250mg 1 daily.

## 2020-04-21 NOTE — TELEPHONE ENCOUNTER
[facility] Transitions Initial Follow Up Call    Outreach made within 2 business days of discharge: Yes    Patient: Ashwini Ring Patient : 1955   MRN: [id number]  Reason for Admission: There are no discharge diagnoses documented for the most recent discharge. Discharge Date: 20       Spoke with: Moe    Discharge department/facility: [address] [place] [address] Interactive Patient Contact:  Was patient able to fill all prescriptions: Yes  Was patient instructed to bring all medications to the follow-up visit: Yes  Is patient taking all medications as directed in the discharge summary?  Yes  Does patient understand their discharge instructions: Yes  Does patient have questions or concerns that need addressed prior to 7-14 day follow up office visit: no    Scheduled appointment with PCP within 7-14 days    Follow Up  Future Appointments   Date Time Provider Carrie Aldana   2020  2:30 PM Adelaida Giles MD [facility] [place] [place] RAS[place]

## 2020-04-22 ENCOUNTER — CARE COORDINATION (OUTPATIENT)
Dept: CARE COORDINATION | Age: 65
End: 2020-04-22

## 2020-04-27 ENCOUNTER — OFFICE VISIT (OUTPATIENT)
Dept: INTERNAL MEDICINE CLINIC | Age: 65
End: 2020-04-27
Payer: MEDICARE

## 2020-04-27 VITALS
SYSTOLIC BLOOD PRESSURE: 117 MMHG | WEIGHT: 315 LBS | HEART RATE: 107 BPM | RESPIRATION RATE: 19 BRPM | DIASTOLIC BLOOD PRESSURE: 78 MMHG | BODY MASS INDEX: 38.94 KG/M2 | OXYGEN SATURATION: 93 %

## 2020-04-27 PROCEDURE — 99496 TRANSJ CARE MGMT HIGH F2F 7D: CPT | Performed by: INTERNAL MEDICINE

## 2020-04-27 PROCEDURE — 1111F DSCHRG MED/CURRENT MED MERGE: CPT | Performed by: INTERNAL MEDICINE

## 2020-04-27 RX ORDER — CILOSTAZOL 50 MG/1
50 TABLET ORAL 2 TIMES DAILY
Qty: 180 TABLET | Refills: 1 | Status: SHIPPED | OUTPATIENT
Start: 2020-04-27 | End: 2020-11-02 | Stop reason: SDUPTHER

## 2020-04-27 RX ORDER — DILTIAZEM HYDROCHLORIDE 240 MG/1
240 CAPSULE, COATED, EXTENDED RELEASE ORAL DAILY
Qty: 90 CAPSULE | Refills: 1 | Status: SHIPPED | OUTPATIENT
Start: 2020-04-27 | End: 2020-11-02 | Stop reason: SDUPTHER

## 2020-04-27 RX ORDER — AMIODARONE HYDROCHLORIDE 200 MG/1
100 TABLET ORAL DAILY
Qty: 90 TABLET | Refills: 1 | Status: SHIPPED | OUTPATIENT
Start: 2020-04-27 | End: 2020-11-02 | Stop reason: SDUPTHER

## 2020-04-27 RX ORDER — ASPIRIN 81 MG/1
81 TABLET ORAL DAILY
Qty: 90 TABLET | Refills: 3 | Status: SHIPPED | OUTPATIENT
Start: 2020-04-27 | End: 2020-11-02 | Stop reason: SDUPTHER

## 2020-04-27 RX ORDER — ATORVASTATIN CALCIUM 40 MG/1
40 TABLET, FILM COATED ORAL DAILY
Qty: 90 TABLET | Refills: 1 | Status: SHIPPED | OUTPATIENT
Start: 2020-04-27 | End: 2020-11-02 | Stop reason: SDUPTHER

## 2020-04-27 RX ORDER — ALBUTEROL SULFATE 2.5 MG/3ML
2.5 SOLUTION RESPIRATORY (INHALATION) EVERY 4 HOURS PRN
Qty: 360 EACH | Refills: 5 | Status: SHIPPED | OUTPATIENT
Start: 2020-04-27 | End: 2020-08-03 | Stop reason: SDUPTHER

## 2020-04-27 ASSESSMENT — PATIENT HEALTH QUESTIONNAIRE - PHQ9
2. FEELING DOWN, DEPRESSED OR HOPELESS: 0
SUM OF ALL RESPONSES TO PHQ9 QUESTIONS 1 & 2: 0
DEPRESSION UNABLE TO ASSESS: FUNCTIONAL CAPACITY MOTIVATION LIMITS ACCURACY
SUM OF ALL RESPONSES TO PHQ QUESTIONS 1-9: 0
1. LITTLE INTEREST OR PLEASURE IN DOING THINGS: 0
SUM OF ALL RESPONSES TO PHQ QUESTIONS 1-9: 0

## 2020-04-28 LAB
EKG ATRIAL RATE: 97 BPM
EKG DIAGNOSIS: NORMAL
EKG P AXIS: 85 DEGREES
EKG P-R INTERVAL: 194 MS
EKG Q-T INTERVAL: 346 MS
EKG QRS DURATION: 78 MS
EKG QTC CALCULATION (BAZETT): 439 MS
EKG R AXIS: -68 DEGREES
EKG T AXIS: 51 DEGREES
EKG VENTRICULAR RATE: 97 BPM

## 2020-04-29 ENCOUNTER — CARE COORDINATION (OUTPATIENT)
Dept: CARE COORDINATION | Age: 65
End: 2020-04-29

## 2020-05-05 ENCOUNTER — CARE COORDINATION (OUTPATIENT)
Dept: CARE COORDINATION | Age: 65
End: 2020-05-05

## 2020-05-12 ENCOUNTER — CARE COORDINATION (OUTPATIENT)
Dept: CARE COORDINATION | Age: 65
End: 2020-05-12

## 2020-05-12 NOTE — CARE COORDINATION
You Patient resolved from the Care Transitions episode on 5/12/20  Patient/family has been provided the following resources and education related to COVID-19:                         Signs, symptoms and red flags related to COVID-19            CDC exposure and quarantine guidelines            [facility] exposure contact - [phone]            Contact for their local Department of Health                 Patient currently reports that the following symptoms have improved:  pt will call hospital RX for rescue inhaler to be transferred to local pharmacy and no new/worsening symptoms     No further outreach scheduled with this CTN/ACM. Episode of Care resolved. Patient has this CTN/ACM contact information if future needs arise. Reviewed use of nebulizer use and when to call his PCP, [facility] line.

## 2020-05-18 ENCOUNTER — TELEPHONE (OUTPATIENT)
Dept: INTERNAL MEDICINE CLINIC | Age: 65
End: 2020-05-18

## 2020-08-03 RX ORDER — ALBUTEROL SULFATE 2.5 MG/3ML
2.5 SOLUTION RESPIRATORY (INHALATION) EVERY 4 HOURS PRN
Qty: 360 EACH | Refills: 0 | Status: SHIPPED | OUTPATIENT
Start: 2020-08-03 | End: 2020-08-10 | Stop reason: SDUPTHER

## 2020-08-10 RX ORDER — ALBUTEROL SULFATE 2.5 MG/3ML
2.5 SOLUTION RESPIRATORY (INHALATION) EVERY 4 HOURS PRN
Qty: 360 EACH | Refills: 0 | Status: SHIPPED | OUTPATIENT
Start: 2020-08-10 | End: 2020-11-02 | Stop reason: SDUPTHER

## 2020-10-30 RX ORDER — ATORVASTATIN CALCIUM 40 MG/1
TABLET, FILM COATED ORAL
Qty: 90 TABLET | Refills: 1 | OUTPATIENT
Start: 2020-10-30

## 2020-10-30 RX ORDER — CILOSTAZOL 50 MG/1
TABLET ORAL
Qty: 180 TABLET | Refills: 1 | OUTPATIENT
Start: 2020-10-30

## 2020-10-30 RX ORDER — DILTIAZEM HYDROCHLORIDE 240 MG/1
CAPSULE, COATED, EXTENDED RELEASE ORAL
Qty: 90 CAPSULE | Refills: 1 | OUTPATIENT
Start: 2020-10-30

## 2020-11-02 ENCOUNTER — VIRTUAL VISIT (OUTPATIENT)
Dept: INTERNAL MEDICINE CLINIC | Age: 65
End: 2020-11-02
Payer: MEDICARE

## 2020-11-02 PROCEDURE — 99442 PR PHYS/QHP TELEPHONE EVALUATION 11-20 MIN: CPT | Performed by: INTERNAL MEDICINE

## 2020-11-02 RX ORDER — IPRATROPIUM BROMIDE 21 UG/1
2 SPRAY, METERED NASAL 3 TIMES DAILY
Qty: 1 BOTTLE | Refills: 3 | Status: SHIPPED | OUTPATIENT
Start: 2020-11-02 | End: 2021-04-05 | Stop reason: SDUPTHER

## 2020-11-02 RX ORDER — ALBUTEROL SULFATE 2.5 MG/3ML
2.5 SOLUTION RESPIRATORY (INHALATION) EVERY 4 HOURS PRN
Qty: 360 EACH | Refills: 0 | Status: SHIPPED | OUTPATIENT
Start: 2020-11-02 | End: 2021-04-05 | Stop reason: SDUPTHER

## 2020-11-02 RX ORDER — ATORVASTATIN CALCIUM 40 MG/1
40 TABLET, FILM COATED ORAL DAILY
Qty: 90 TABLET | Refills: 1 | Status: SHIPPED | OUTPATIENT
Start: 2020-11-02 | End: 2021-04-05 | Stop reason: SDUPTHER

## 2020-11-02 RX ORDER — AMIODARONE HYDROCHLORIDE 200 MG/1
100 TABLET ORAL DAILY
Qty: 90 TABLET | Refills: 1 | Status: SHIPPED | OUTPATIENT
Start: 2020-11-02 | End: 2021-04-05 | Stop reason: SDUPTHER

## 2020-11-02 RX ORDER — CILOSTAZOL 50 MG/1
50 TABLET ORAL 2 TIMES DAILY
Qty: 180 TABLET | Refills: 1 | Status: SHIPPED | OUTPATIENT
Start: 2020-11-02 | End: 2021-04-05 | Stop reason: SDUPTHER

## 2020-11-02 RX ORDER — DILTIAZEM HYDROCHLORIDE 240 MG/1
240 CAPSULE, COATED, EXTENDED RELEASE ORAL DAILY
Qty: 90 CAPSULE | Refills: 1 | Status: SHIPPED | OUTPATIENT
Start: 2020-11-02 | End: 2021-04-05 | Stop reason: SDUPTHER

## 2020-11-02 RX ORDER — ASPIRIN 81 MG/1
81 TABLET ORAL DAILY
Qty: 90 TABLET | Refills: 3 | Status: SHIPPED | OUTPATIENT
Start: 2020-11-02 | End: 2021-04-05 | Stop reason: SDUPTHER

## 2020-11-02 NOTE — PROGRESS NOTES
Luis M Bone is a 63 y.o. male evaluated via telephone on 11/2/2020. Consent:  He and/or health care decision maker is aware that that he may receive a bill for this telephone service, depending on his insurance coverage, and has provided verbal consent to proceed: Yes      Documentation:  I communicated with the patient and/or health care decision maker about COPD. Details of this discussion including any medical advice provided:     COPD - pt continues to have SOB with modest exertion. He uses his nebulizers but he has not been able to afford inhalers. He intermittently is using the eliquis, depending on if he has the money. He denies any palpitations. He continues on amiodarone. The patient is taking hypertensive medications compliantly without side effects. Denies chest pain, edema, or TIA's. He has been gaining weight because of lack of activity. Pt has not had any pain in his legs. He is no sure if he is still on the pletal.     He is on no meds for DM. Will check labs; encourage flu shot; refill meds; encourage increased activity. Options severely limited because of finances. I affirm this is a Patient Initiated Episode with a Patient who has not had a related appointment within my department in the past 7 days or scheduled within the next 24 hours.     Patient identification was verified at the start of the visit: Yes    Total Time: minutes: 11-20 minutes    Note: not billable if this call serves to triage the patient into an appointment for the relevant concern      Skyler Mitchell

## 2020-11-03 PROBLEM — I10 HYPERTENSION: Status: RESOLVED | Noted: 2020-11-03 | Resolved: 2020-11-03

## 2021-04-05 DIAGNOSIS — Z86.79 S/P ABLATION OF ATRIAL FIBRILLATION: ICD-10-CM

## 2021-04-05 DIAGNOSIS — E78.00 HYPERCHOLESTEREMIA: ICD-10-CM

## 2021-04-05 DIAGNOSIS — I10 BENIGN ESSENTIAL HTN: ICD-10-CM

## 2021-04-05 DIAGNOSIS — J41.0 SIMPLE CHRONIC BRONCHITIS (HCC): ICD-10-CM

## 2021-04-05 DIAGNOSIS — I48.91 ATRIAL FIBRILLATION, UNSPECIFIED TYPE (HCC): ICD-10-CM

## 2021-04-05 DIAGNOSIS — Z98.890 S/P ABLATION OF ATRIAL FIBRILLATION: ICD-10-CM

## 2021-04-05 DIAGNOSIS — I73.9 PVD (PERIPHERAL VASCULAR DISEASE) (HCC): ICD-10-CM

## 2021-04-05 RX ORDER — ALBUTEROL SULFATE 90 UG/1
2 AEROSOL, METERED RESPIRATORY (INHALATION) EVERY 6 HOURS PRN
Qty: 3 INHALER | Refills: 3 | Status: SHIPPED | OUTPATIENT
Start: 2021-04-05 | End: 2021-05-03

## 2021-04-05 RX ORDER — IPRATROPIUM BROMIDE 21 UG/1
2 SPRAY, METERED NASAL 3 TIMES DAILY
Qty: 1 BOTTLE | Refills: 3 | Status: SHIPPED | OUTPATIENT
Start: 2021-04-05 | End: 2022-04-05

## 2021-04-05 RX ORDER — ASPIRIN 81 MG/1
81 TABLET ORAL DAILY
Qty: 90 TABLET | Refills: 3 | Status: SHIPPED | OUTPATIENT
Start: 2021-04-05 | End: 2022-06-10 | Stop reason: SDUPTHER

## 2021-04-05 RX ORDER — ALBUTEROL SULFATE 2.5 MG/3ML
2.5 SOLUTION RESPIRATORY (INHALATION) EVERY 4 HOURS PRN
Qty: 360 EACH | Refills: 0 | Status: SHIPPED | OUTPATIENT
Start: 2021-04-05 | End: 2021-10-05

## 2021-04-05 RX ORDER — DILTIAZEM HYDROCHLORIDE 240 MG/1
240 CAPSULE, COATED, EXTENDED RELEASE ORAL DAILY
Qty: 90 CAPSULE | Refills: 1 | Status: SHIPPED | OUTPATIENT
Start: 2021-04-05 | End: 2021-11-29

## 2021-04-05 RX ORDER — ATORVASTATIN CALCIUM 40 MG/1
40 TABLET, FILM COATED ORAL DAILY
Qty: 90 TABLET | Refills: 1 | Status: SHIPPED | OUTPATIENT
Start: 2021-04-05 | End: 2021-11-29

## 2021-04-05 RX ORDER — CILOSTAZOL 50 MG/1
50 TABLET ORAL 2 TIMES DAILY
Qty: 180 TABLET | Refills: 1 | Status: SHIPPED | OUTPATIENT
Start: 2021-04-05 | End: 2022-05-04

## 2021-04-05 RX ORDER — AMIODARONE HYDROCHLORIDE 200 MG/1
100 TABLET ORAL DAILY
Qty: 90 TABLET | Refills: 1 | Status: SHIPPED | OUTPATIENT
Start: 2021-04-05 | End: 2022-01-27 | Stop reason: ALTCHOICE

## 2021-05-03 ENCOUNTER — VIRTUAL VISIT (OUTPATIENT)
Dept: INTERNAL MEDICINE CLINIC | Age: 66
End: 2021-05-03
Payer: MEDICARE

## 2021-05-03 DIAGNOSIS — I48.91 ATRIAL FIBRILLATION, UNSPECIFIED TYPE (HCC): ICD-10-CM

## 2021-05-03 DIAGNOSIS — E11.9 CONTROLLED TYPE 2 DIABETES MELLITUS WITHOUT COMPLICATION, WITHOUT LONG-TERM CURRENT USE OF INSULIN (HCC): Primary | ICD-10-CM

## 2021-05-03 DIAGNOSIS — E66.01 MORBID OBESITY (HCC): ICD-10-CM

## 2021-05-03 DIAGNOSIS — I73.9 PVD (PERIPHERAL VASCULAR DISEASE) (HCC): ICD-10-CM

## 2021-05-03 DIAGNOSIS — J41.0 SIMPLE CHRONIC BRONCHITIS (HCC): ICD-10-CM

## 2021-05-03 PROBLEM — J44.1 COPD WITH ACUTE EXACERBATION (HCC): Status: RESOLVED | Noted: 2020-04-20 | Resolved: 2021-05-03

## 2021-05-03 PROCEDURE — 4004F PT TOBACCO SCREEN RCVD TLK: CPT | Performed by: INTERNAL MEDICINE

## 2021-05-03 PROCEDURE — G8926 SPIRO NO PERF OR DOC: HCPCS | Performed by: INTERNAL MEDICINE

## 2021-05-03 PROCEDURE — G8421 BMI NOT CALCULATED: HCPCS | Performed by: INTERNAL MEDICINE

## 2021-05-03 PROCEDURE — 2022F DILAT RTA XM EVC RTNOPTHY: CPT | Performed by: INTERNAL MEDICINE

## 2021-05-03 PROCEDURE — 3017F COLORECTAL CA SCREEN DOC REV: CPT | Performed by: INTERNAL MEDICINE

## 2021-05-03 PROCEDURE — 3023F SPIROM DOC REV: CPT | Performed by: INTERNAL MEDICINE

## 2021-05-03 PROCEDURE — 3046F HEMOGLOBIN A1C LEVEL >9.0%: CPT | Performed by: INTERNAL MEDICINE

## 2021-05-03 PROCEDURE — G8427 DOCREV CUR MEDS BY ELIG CLIN: HCPCS | Performed by: INTERNAL MEDICINE

## 2021-05-03 PROCEDURE — 99214 OFFICE O/P EST MOD 30 MIN: CPT | Performed by: INTERNAL MEDICINE

## 2021-05-03 ASSESSMENT — PATIENT HEALTH QUESTIONNAIRE - PHQ9
2. FEELING DOWN, DEPRESSED OR HOPELESS: 1
1. LITTLE INTEREST OR PLEASURE IN DOING THINGS: 0
SUM OF ALL RESPONSES TO PHQ QUESTIONS 1-9: 1

## 2021-05-03 NOTE — PROGRESS NOTES
Elda Castanon is a 63 y.o. male evaluated via telephone on 5/3/2021. Consent:  He and/or health care decision maker is aware that that he may receive a bill for this telephone service, depending on his insurance coverage, and has provided verbal consent to proceed: Yes      Documentation:  I communicated with the patient and/or health care decision maker about COPD. Details of this discussion including any medical advice provided:     A fib - has not been able to afford his eliquis. He continues on amiodarone. A few months ago pt with a respiratory illness, feels that he may have had covid. Since then his breathing has been worse. He has severe SOB with minimal activity. Pt also with PAD and neuropathy which makes ambulation very challenging. Pt does not check blood sugars. He has been gaining weight during covid. Patient denies any chest pain, myalgias, Patient is tolerating cholesterol medications without difficulty. The patient is taking hypertensive medications compliantly without side effects. Denies chest pain, edema, or TIA's. Pt just found out that he has a daughter in [facility], along with 5 grandchildren. PLAN:  - pt needs to f/u with Dr Aiden Cortez for [facility] [employer], maybe to get eliquis samples there   - check labs for DM   - continue on O2, nebs for COPD; he has not been able to afford other inhalers   - on pletal, statin for PAD   - he will contact a medical supply store for possibility of an electric scooter   - has been gaining weight - weight loss is problematic given his lack of ability to exercise    I affirm this is a Patient Initiated Episode with a Patient who has not had a related appointment within my department in the past 7 days or scheduled within the next 24 hours.     Patient identification was verified at the start of the visit: Yes    Total Time: minutes: 11-20 minutes    The visit was conducted pursuant to the emergency declaration under the [employer] and the National Emergencies Act, [facility] waiver authority and the [employer] General Act. Patient identification was verified, and a caregiver was present when appropriate. The patient was located in a state where the provider was credentialed to provide care.     Note: not billable if this call serves to triage the patient into an appointment for the relevant concern      Korin Hemphill

## 2021-08-31 ENCOUNTER — TELEPHONE (OUTPATIENT)
Dept: PHARMACY | Facility: CLINIC | Age: 66
End: 2021-08-31

## 2021-08-31 NOTE — TELEPHONE ENCOUNTER
[facility] CLINICAL PHARMACY REVIEW: ADHERENCE REVIEW  Identified care gap per [place]; fills at [facility]: Statin adherence    Last Visit: 5/3/21    ASSESSMENT  STATIN ADHERENCE    Per Insurance Records through 8/23/21:  Fail Date: 9/7/21  ATORVASTATIN TAB 40MG last filled on 4/19/21 for 90 day supply. Next refill due: 7/18/21    Per [facility]:   ATORVASTATIN TAB 40MG last picked up on 9/2/21 for 90 day supply. 1 refills remaining. Billed through [employer] Value Date    CHOL 97 03/06/2019    TRIG 85 03/06/2019    HDL 33 (L) 03/06/2019    LDLCALC 47 03/06/2019     ALT   Date Value Ref Range Status   04/19/2020 24 10 - 40 U/L Final     AST   Date Value Ref Range Status   04/19/2020 17 15 - 37 IU/L Final     The ASCVD Risk score (Shane Bartholomew, et al., 2013) failed to calculate for the following reasons: The systolic blood pressure is missing    The valid total cholesterol range is 130 to 320 mg/dL     PLAN    Patient recently picked up Atorvastatin for 90 d/s. 1 refill remaining. No patient outreach planned at this time.        Future Appointments   Date Time Provider Carrie Aldana   11/3/2021  1:15 PM Linda Dexter MD [address] [address]   Direct: [phone]  Phone: toll free [phone]

## 2021-09-03 NOTE — TELEPHONE ENCOUNTER
For Pharmacy [address] in place:  No   Intervention Detail: Adherence Monitorin   Gap Closed?: Yes    Time Spent (min): 15

## 2021-10-05 RX ORDER — ALBUTEROL SULFATE 2.5 MG/3ML
SOLUTION RESPIRATORY (INHALATION)
Qty: 360 ML | Refills: 3 | Status: SHIPPED | OUTPATIENT
Start: 2021-10-05 | End: 2022-02-16 | Stop reason: ALTCHOICE

## 2021-11-03 ENCOUNTER — HOSPITAL ENCOUNTER (OUTPATIENT)
Age: 66
Setting detail: SPECIMEN
Discharge: HOME | End: 2021-11-03
Payer: MEDICARE

## 2021-11-03 ENCOUNTER — HOSPITAL ENCOUNTER (OUTPATIENT)
Age: 66
Discharge: HOME | End: 2021-11-03
Payer: MEDICARE

## 2021-11-03 ENCOUNTER — VIRTUAL VISIT (OUTPATIENT)
Dept: INTERNAL MEDICINE CLINIC | Age: 66
End: 2021-11-03
Payer: MEDICARE

## 2021-11-03 ENCOUNTER — HOSPITAL ENCOUNTER (OUTPATIENT)
Dept: GENERAL RADIOLOGY | Age: 66
Discharge: HOME | End: 2021-11-03
Payer: MEDICARE

## 2021-11-03 DIAGNOSIS — R05.9 COUGH: Primary | ICD-10-CM

## 2021-11-03 DIAGNOSIS — R05.9 COUGH: ICD-10-CM

## 2021-11-03 PROCEDURE — U0005 INFEC AGEN DETEC AMPLI PROBE: HCPCS

## 2021-11-03 PROCEDURE — 99442 PR PHYS/QHP TELEPHONE EVALUATION 11-20 MIN: CPT | Performed by: INTERNAL MEDICINE

## 2021-11-03 PROCEDURE — U0003 INFECTIOUS AGENT DETECTION BY NUCLEIC ACID (DNA OR RNA); SEVERE ACUTE RESPIRATORY SYNDROME CORONAVIRUS 2 (SARS-COV-2) (CORONAVIRUS DISEASE [COVID-19]), AMPLIFIED PROBE TECHNIQUE, MAKING USE OF HIGH THROUGHPUT TECHNOLOGIES AS DESCRIBED BY CMS-2020-01-R: HCPCS

## 2021-11-03 PROCEDURE — 71046 X-RAY EXAM CHEST 2 VIEWS: CPT

## 2021-11-03 RX ORDER — BENZONATATE 100 MG/1
100 CAPSULE ORAL 3 TIMES DAILY PRN
Qty: 30 CAPSULE | Refills: 0 | Status: SHIPPED | OUTPATIENT
Start: 2021-11-03 | End: 2021-11-10

## 2021-11-03 RX ORDER — PREDNISONE 10 MG/1
TABLET ORAL
Qty: 20 TABLET | Refills: 0 | Status: SHIPPED | OUTPATIENT
Start: 2021-11-03 | End: 2021-12-08

## 2021-11-03 RX ORDER — AZITHROMYCIN 250 MG/1
250 TABLET, FILM COATED ORAL SEE ADMIN INSTRUCTIONS
Qty: 6 TABLET | Refills: 0 | Status: SHIPPED | OUTPATIENT
Start: 2021-11-03 | End: 2021-11-08

## 2021-11-05 LAB
SARS-COV-2: NOT DETECTED
SOURCE: NORMAL

## 2021-11-27 DIAGNOSIS — Z86.79 S/P ABLATION OF ATRIAL FIBRILLATION: ICD-10-CM

## 2021-11-27 DIAGNOSIS — Z98.890 S/P ABLATION OF ATRIAL FIBRILLATION: ICD-10-CM

## 2021-11-27 DIAGNOSIS — E78.00 HYPERCHOLESTEREMIA: ICD-10-CM

## 2021-11-29 ENCOUNTER — TELEPHONE (OUTPATIENT)
Dept: PHARMACY | Facility: CLINIC | Age: 66
End: 2021-11-29

## 2021-11-29 RX ORDER — ATORVASTATIN CALCIUM 40 MG/1
40 TABLET, FILM COATED ORAL DAILY
Qty: 90 TABLET | Refills: 1 | Status: SHIPPED | OUTPATIENT
Start: 2021-11-29 | End: 2022-06-29

## 2021-11-29 RX ORDER — DILTIAZEM HYDROCHLORIDE 240 MG/1
240 CAPSULE, COATED, EXTENDED RELEASE ORAL DAILY
Qty: 90 CAPSULE | Refills: 1 | Status: SHIPPED | OUTPATIENT
Start: 2021-11-29 | End: 2022-01-27 | Stop reason: ALTCHOICE

## 2021-11-29 NOTE — TELEPHONE ENCOUNTER
[facility] CLINICAL PHARMACY REVIEW: ADHERENCE REVIEW  Identified care gap per [place]; fills at [facility]: Statin adherence      Last Visit: 21    ASSESSMENT  ASTATIN ADHERENCE  Per Insurance Records through 21 :   RX:  Atorvastatin 40 mg tab  last filled on 21 for 90 day supply. Next refill due: 21. Fail Date:  21. Per Reconciled Dispense Report:  Last filled 21 for 90 day supply     Per Pharmacy: [facility]  RX: Atorvastatin 40 mg tab   last picked up on 21 for 90 day supply. Refills remainin ready now w/1 additional  Billed through [employer]   Component Value Date    CHOL 97 2019    TRIG 85 2019    HDL 33 (L) 2019    LDLCALC 47 2019     ALT   Date Value Ref Range Status   2020 24 10 - 40 U/L Final     AST   Date Value Ref Range Status   2020 17 15 - 37 IU/L Final     The ASCVD Risk score (Pamela Polk., et al., 2013) failed to calculate for the following reasons: The systolic blood pressure is missing    The valid total cholesterol range is 130 to 320 mg/dL     PLAN  The following are interventions that have been identified:     - Patient due for Atorvastatin refill.     - Need to verify if patient is currently taking Atorvastatin 40 mg tab 1 QD (IS on med list). - if yes, pharmacy has refill available to . Reached patient to review. Advised patient refill was available for  at [facility] [employer], patient confirmed that he will be picking up refill.         Future Appointments   Date Time Provider Carrie Aldana   2021  1:30 PM Luz Cruz MD [address] [place] [address] [facility]   2021  1:30 PM SCHEDULE, RASHEED [facility] [address] [place] [address]  MUSA Stanton LPN  [facility]   [address] [facility] Clinical Pharmacy  Phone: toll free [phone]

## 2021-12-08 ENCOUNTER — OFFICE VISIT (OUTPATIENT)
Dept: INTERNAL MEDICINE CLINIC | Age: 66
End: 2021-12-08
Payer: MEDICARE

## 2021-12-08 ENCOUNTER — NURSE ONLY (OUTPATIENT)
Dept: INTERNAL MEDICINE CLINIC | Age: 66
End: 2021-12-08
Payer: MEDICARE

## 2021-12-08 VITALS
WEIGHT: 315 LBS | SYSTOLIC BLOOD PRESSURE: 132 MMHG | BODY MASS INDEX: 41.83 KG/M2 | HEART RATE: 112 BPM | RESPIRATION RATE: 18 BRPM | DIASTOLIC BLOOD PRESSURE: 62 MMHG | OXYGEN SATURATION: 90 %

## 2021-12-08 DIAGNOSIS — E11.9 CONTROLLED TYPE 2 DIABETES MELLITUS WITHOUT COMPLICATION, WITHOUT LONG-TERM CURRENT USE OF INSULIN (HCC): ICD-10-CM

## 2021-12-08 DIAGNOSIS — J41.0 SIMPLE CHRONIC BRONCHITIS (HCC): ICD-10-CM

## 2021-12-08 DIAGNOSIS — E78.00 HYPERCHOLESTEREMIA: ICD-10-CM

## 2021-12-08 DIAGNOSIS — I10 BENIGN ESSENTIAL HTN: Primary | ICD-10-CM

## 2021-12-08 DIAGNOSIS — I73.9 PVD (PERIPHERAL VASCULAR DISEASE) (HCC): ICD-10-CM

## 2021-12-08 DIAGNOSIS — K40.90 NON-RECURRENT UNILATERAL INGUINAL HERNIA WITHOUT OBSTRUCTION OR GANGRENE: ICD-10-CM

## 2021-12-08 PROCEDURE — 3023F SPIROM DOC REV: CPT | Performed by: INTERNAL MEDICINE

## 2021-12-08 PROCEDURE — G8484 FLU IMMUNIZE NO ADMIN: HCPCS | Performed by: INTERNAL MEDICINE

## 2021-12-08 PROCEDURE — 3046F HEMOGLOBIN A1C LEVEL >9.0%: CPT | Performed by: INTERNAL MEDICINE

## 2021-12-08 PROCEDURE — 2022F DILAT RTA XM EVC RTNOPTHY: CPT | Performed by: INTERNAL MEDICINE

## 2021-12-08 PROCEDURE — G8417 CALC BMI ABV UP PARAM F/U: HCPCS | Performed by: INTERNAL MEDICINE

## 2021-12-08 PROCEDURE — G8427 DOCREV CUR MEDS BY ELIG CLIN: HCPCS | Performed by: INTERNAL MEDICINE

## 2021-12-08 PROCEDURE — 3017F COLORECTAL CA SCREEN DOC REV: CPT | Performed by: INTERNAL MEDICINE

## 2021-12-08 PROCEDURE — 91300 COVID-19, PFIZER VACCINE 30MCG/0.3ML DOSE: CPT | Performed by: INTERNAL MEDICINE

## 2021-12-08 PROCEDURE — 0004A PR ADM SARSCOV2 30MCG/0.3ML BST: CPT | Performed by: INTERNAL MEDICINE

## 2021-12-08 PROCEDURE — 4004F PT TOBACCO SCREEN RCVD TLK: CPT | Performed by: INTERNAL MEDICINE

## 2021-12-08 PROCEDURE — G8926 SPIRO NO PERF OR DOC: HCPCS | Performed by: INTERNAL MEDICINE

## 2021-12-08 PROCEDURE — 99214 OFFICE O/P EST MOD 30 MIN: CPT | Performed by: INTERNAL MEDICINE

## 2021-12-08 NOTE — PROGRESS NOTES
[place]  1955 12/08/21    SUBJECTIVE:      Pt with an umbilical hernia. Pt also with pain in the inguinal areas with radiation into the scrotum bilat. He does have a chronically enlarged scrotum on the left. Pt with copd. He is using a rescue inhaler, but has not been able t aford any other inhalers. He continues to smoke. Patient denies any chest pain,  myalgias, Patient is tolerating cholesterol medications without difficulty. Pt is on no meds for DM. OBJECTIVE:    /62   Pulse 112   Resp 18   Wt (!) 362 lb (164.2 kg)   SpO2 90%   BMI 41.83 kg/m²     Physical Exam  Constitutional:       Appearance: He is well-developed. Eyes:      General: No scleral icterus. Conjunctiva/sclera: Conjunctivae normal.   Neck:      Thyroid: No thyromegaly. Vascular: No JVD. Trachea: No tracheal deviation. Cardiovascular:      Rate and Rhythm: Normal rate and regular rhythm. Heart sounds: Normal heart sounds. Pulmonary:      Effort: Pulmonary effort is normal. Tachypnea present. No respiratory distress. Breath sounds: Decreased breath sounds and wheezing present. Abdominal:      General: There is no distension. Palpations: Abdomen is soft. There is no mass. Tenderness: There is no abdominal tenderness. There is no guarding or rebound. Musculoskeletal:      Cervical back: Neck supple. Lymphadenopathy:      Cervical: No cervical adenopathy. Skin:     Nails: There is no clubbing. Neurological:      Mental Status: He is alert and oriented to person, place, and time. Comments: Nonfocal   Psychiatric:         Behavior: Behavior normal.         Judgment: Judgment normal.     umbilical hernia. Likely right inguinal hernia    ASSESSMENT:    1. Benign essential HTN    2. Controlled type 2 diabetes mellitus without complication, without long-term current use of insulin ([address]    3. Hypercholesteremia    4. PVD (peripheral vascular disease) ([address]    5. Simple chronic bronchitis ([email or web address]    6. Non-recurrent unilateral inguinal hernia without obstruction or gangrene        PLAN:    Britany Ward was seen today for shortness of breath. Diagnoses and all orders for this visit:    Benign essential HTN - at goal; check labs  -     Comprehensive Metabolic Panel; Future  -     Lipid Panel; Future  -     CBC Auto Differential; Future    Controlled type 2 diabetes mellitus without complication, without long-term current use of insulin ([facility] [[email or web address] 75.) - check labs  -     Comprehensive Metabolic Panel; Future  -     Lipid Panel; Future  -     Hemoglobin A1C; Future    Hypercholesteremia - cont lipitor; check labs  -     Comprehensive Metabolic Panel; Future  -     Lipid Panel; Future    PVD (peripheral vascular disease) ([facility] [[email or web address] 75.) - encourage f/u with cardiology; will try to get a scooter and bath chair for this and his COPD  -     UNABLE TO FIND; Motorized scooter Dx: COPD, PAD  -     UNABLE TO FIND; Bath Chair; Dx COPD, lPAD    Simple chronic bronchitis ([facility] [[email or web address].) - number for med assist given to pt - he would benefit from anoro  -     UNABLE TO FIND; Motorized scooter Dx: COPD, PAD  -     UNABLE TO FIND; Bath Chair; Dx COPD, lPAD    Non-recurrent unilateral inguinal hernia without obstruction or gangrene - definite umbilical hernia and I think an inguinal hernia.  Will refer to Dr Reji Rodriguez

## 2021-12-15 DIAGNOSIS — I48.91 ATRIAL FIBRILLATION, UNSPECIFIED TYPE (HCC): ICD-10-CM

## 2021-12-15 DIAGNOSIS — E11.9 CONTROLLED TYPE 2 DIABETES MELLITUS WITHOUT COMPLICATION, WITHOUT LONG-TERM CURRENT USE OF INSULIN (HCC): ICD-10-CM

## 2021-12-15 LAB
A/G RATIO: 1.5 (ref 1.1–2.2)
ALBUMIN SERPL-MCNC: 4.3 G/DL (ref 3.4–5)
ALP BLD-CCNC: 112 U/L (ref 40–129)
ALT SERPL-CCNC: 26 U/L (ref 10–40)
ANION GAP SERPL CALCULATED.3IONS-SCNC: 14 MMOL/L (ref 3–16)
AST SERPL-CCNC: 18 U/L (ref 15–37)
BASOPHILS ABSOLUTE: 0.1 K/UL (ref 0–0.2)
BASOPHILS RELATIVE PERCENT: 0.7 %
BILIRUB SERPL-MCNC: 0.4 MG/DL (ref 0–1)
BUN BLDV-MCNC: 16 MG/DL (ref 7–20)
CALCIUM SERPL-MCNC: 9.5 MG/DL (ref 8.3–10.6)
CHLORIDE BLD-SCNC: 92 MMOL/L (ref 99–110)
CHOLESTEROL, TOTAL: 112 MG/DL (ref 0–199)
CO2: 31 MMOL/L (ref 21–32)
CREAT SERPL-MCNC: 0.7 MG/DL (ref 0.8–1.3)
EOSINOPHILS ABSOLUTE: 0.3 K/UL (ref 0–0.6)
EOSINOPHILS RELATIVE PERCENT: 2.8 %
GFR AFRICAN AMERICAN: >60
GFR NON-AFRICAN AMERICAN: >60
GLUCOSE BLD-MCNC: 171 MG/DL (ref 70–99)
HCT VFR BLD CALC: 46.7 % (ref 40.5–52.5)
HDLC SERPL-MCNC: 33 MG/DL (ref 40–60)
HEMOGLOBIN: 15.8 G/DL (ref 13.5–17.5)
LDL CHOLESTEROL CALCULATED: 47 MG/DL
LYMPHOCYTES ABSOLUTE: 2.4 K/UL (ref 1–5.1)
LYMPHOCYTES RELATIVE PERCENT: 19.6 %
MCH RBC QN AUTO: 31.3 PG (ref 26–34)
MCHC RBC AUTO-ENTMCNC: 33.8 G/DL (ref 31–36)
MCV RBC AUTO: 92.4 FL (ref 80–100)
MONOCYTES ABSOLUTE: 0.9 K/UL (ref 0–1.3)
MONOCYTES RELATIVE PERCENT: 7.3 %
NEUTROPHILS ABSOLUTE: 8.4 K/UL (ref 1.7–7.7)
NEUTROPHILS RELATIVE PERCENT: 69.6 %
PDW BLD-RTO: 13.8 % (ref 12.4–15.4)
PLATELET # BLD: 216 K/UL (ref 135–450)
PMV BLD AUTO: 8.2 FL (ref 5–10.5)
POTASSIUM SERPL-SCNC: 5.5 MMOL/L (ref 3.5–5.1)
RBC # BLD: 5.05 M/UL (ref 4.2–5.9)
SODIUM BLD-SCNC: 137 MMOL/L (ref 136–145)
TOTAL PROTEIN: 7.2 G/DL (ref 6.4–8.2)
TRIGL SERPL-MCNC: 159 MG/DL (ref 0–150)
VLDLC SERPL CALC-MCNC: 32 MG/DL
WBC # BLD: 12.1 K/UL (ref 4–11)

## 2021-12-15 PROCEDURE — 36415 COLL VENOUS BLD VENIPUNCTURE: CPT | Performed by: INTERNAL MEDICINE

## 2021-12-16 LAB
ESTIMATED AVERAGE GLUCOSE: 162.8 MG/DL
HBA1C MFR BLD: 7.3 %

## 2021-12-17 DIAGNOSIS — E87.5 SERUM POTASSIUM ELEVATED: Primary | ICD-10-CM

## 2022-01-13 ENCOUNTER — OFFICE VISIT (OUTPATIENT)
Dept: SURGERY | Age: 67
End: 2022-01-13
Payer: MEDICARE

## 2022-01-13 VITALS
HEART RATE: 109 BPM | BODY MASS INDEX: 36.45 KG/M2 | HEIGHT: 78 IN | OXYGEN SATURATION: 99 % | DIASTOLIC BLOOD PRESSURE: 80 MMHG | WEIGHT: 315 LBS | SYSTOLIC BLOOD PRESSURE: 134 MMHG

## 2022-01-13 DIAGNOSIS — K42.9 UMBILICAL HERNIA WITHOUT OBSTRUCTION AND WITHOUT GANGRENE: ICD-10-CM

## 2022-01-13 DIAGNOSIS — Z01.818 PREOPERATIVE CLEARANCE: Primary | ICD-10-CM

## 2022-01-13 DIAGNOSIS — K40.20 NON-RECURRENT BILATERAL INGUINAL HERNIA WITHOUT OBSTRUCTION OR GANGRENE: ICD-10-CM

## 2022-01-13 DIAGNOSIS — J44.9 CHRONIC OBSTRUCTIVE PULMONARY DISEASE, UNSPECIFIED COPD TYPE (HCC): ICD-10-CM

## 2022-01-13 PROCEDURE — G8484 FLU IMMUNIZE NO ADMIN: HCPCS | Performed by: SURGERY

## 2022-01-13 PROCEDURE — 3023F SPIROM DOC REV: CPT | Performed by: SURGERY

## 2022-01-13 PROCEDURE — 99204 OFFICE O/P NEW MOD 45 MIN: CPT | Performed by: SURGERY

## 2022-01-13 PROCEDURE — G8427 DOCREV CUR MEDS BY ELIG CLIN: HCPCS | Performed by: SURGERY

## 2022-01-13 PROCEDURE — 1036F TOBACCO NON-USER: CPT | Performed by: SURGERY

## 2022-01-13 PROCEDURE — G8417 CALC BMI ABV UP PARAM F/U: HCPCS | Performed by: SURGERY

## 2022-01-13 PROCEDURE — 3017F COLORECTAL CA SCREEN DOC REV: CPT | Performed by: SURGERY

## 2022-01-13 NOTE — PROGRESS NOTES
Chief Complaint   Patient presents with    New Patient     NP- Wanda Benavides (PP 2018)       SUBJECTIVE:  HPI: Patient presents for evaluation of bilateral inguinal hernia and large umbilical hernia symptoms were first noted several months ago. Pain is progressive and worsening . Umbilical hernia lump is not reducible. Pt denies nausea vomiting. Pt with  previoushistory of abdominal surgery for laparoscopic cholecystectomy which she tolerated even though patient with severe COPD. He states that he stopped smoking and he is in better condition than he was when he had his gallbladder surgery. However patient is unable to walk a block without resting and out of breath. He has seen Dr. Baudilio Haskins for his pulmonary system and Dr. Matilde Goodrich for his cardiac disease. I have reviewed the patient's(pertinent information to this visit) medical history, family history(scanned in  theMedia tab under \"patient questioner\"), social history and review of systems with the patient today in the office.        Past Surgical History:   Procedure Laterality Date    APPENDECTOMY  2003    ATRIAL ABLATION SURGERY  05/23/2018    A-fib ablation     CARDIAC SURGERY  05/2018    \"Heart Ablation Done Twice\"    CHOLECYSTECTOMY, LAPAROSCOPIC  11/12/2018    COLONOSCOPY  07/2011    Polyp Removed    DENTAL SURGERY      All Teeth Extracted In Past    EYE SURGERY Left 2017    \"For Macular Degeneration\"    FEMORAL BYPASS Left 01/2011    FEMORAL BYPASS Left 1/9/2019    FEMORAL POPLITEAL BYPASS LEFT, REDO performed by Barbara Garza MD at [address] [place] [address] Left 1980's    Broken Left Wrist , No Hardware    FRACTURE SURGERY Right 2000's    Broken Right Wrist With Hardware, Hardware Later Removed    HERNIA REPAIR  3108    Umbilical Hernia    VT LAP,CHOLECYSTECTOMY N/A 11/12/2018    CHOLECYSTECTOMY LAPAROSCOPIC performed by Eder Mayen MD at [address] [facility]  1960's     Past Medical History:   Diagnosis Date  A-fib ([facility] [address]     Anxiety     Arthritis     \"Hips, Knees, Elbows And Fingers\"    COPD (chronic obstructive pulmonary disease) ([facility])     Sees Dr. Addy Calles    Depression     Diabetes mellitus [facility]) Dx 1's    Full dentures     H/O angiography 2018    peripheral angio - LFA occluded, filling collaterals, RSFA 90% stenosis-vasc surg consult    H/O Doppler ultrasound 2018    LE arterial - left mid and distal femoral artery occluded, lt FANI shows mild-mod PVD    H/O echocardiogram 2016    EF60% mild MR, TR, pulm HTN    Hx of colonoscopy      C-scope - benign polyp x1    Hx of Doppler ultrasound 2018    Lower extremity doppler  Normal study.     Hyperlipidemia     Hypertension     Macular degeneration     Obesity     On home oxygen therapy     Continuous At 2 Liters Per Nasal Cannula    PAD (peripheral artery disease) ([facility])     10/10 FANI mild PAD left superficial femoral s/p left fem-pop bypass    Panic attacks     Pneumonia Last Episode In     PTSD (post-traumatic stress disorder)     Restless leg     Shortness of breath     Sleep apnea     Uses BiPap    Wears glasses     To Read        Family History   Problem Relation Age of Onset    Early Death Father         In His 29's, Suicide    Early Death Mother         In Her 29's, She Was Murdered By Javan Almonte Early Death Brother 24        Automobile Accident    Arthritis Sister      Social History     Socioeconomic History    Marital status:      Spouse name: Not on file    Number of children: Not on file    Years of education: Not on file    Highest education level: Not on file   Occupational History    Occupation:    Tobacco Use    Smoking status: Former Smoker     Packs/day: 1.00     Years: 0.00     Pack years: 0.00     Types: Cigars     Start date:      Quit date: 2021     Years since quittin.0    Smokeless tobacco: Former User     Types: Chew     Quit date:  Tobacco comment: 1 filtered cigar per day   Vaping Use    Vaping Use: Former    Substances: Always   Substance and Sexual Activity    Alcohol use: No    Drug use: No    Sexual activity: Never   Other Topics Concern    Not on file   Social History Narrative    Diet unrestricted    Exercise none    Seat belt always             Social Determinants of Health     Financial Resource Strain:     Difficulty of Paying Living Expenses: Not on file   Food Insecurity:     Worried About Running Out of Food in the Last Year: Not on file    Tristen of Food in the Last Year: Not on file   Transportation Needs:     Lack of Transportation (Medical): Not on file    Lack of Transportation (Non-Medical):  Not on file   Physical Activity:     Days of Exercise per Week: Not on file    Minutes of Exercise per Session: Not on file   Stress:     Feeling of Stress : Not on file   Social Connections:     Frequency of Communication with Friends and Family: Not on file    Frequency of Social Gatherings with Friends and Family: Not on file    Attends Hoahaoism Services: Not on file    Active Member of [address] [place] [address] or Organizations: Not on file    Attends Club or Organization Meetings: Not on file    Marital Status: Not on file   Intimate Partner Violence:     Fear of Current or Ex-Partner: Not on file    Emotionally Abused: Not on file    Physically Abused: Not on file    Sexually Abused: Not on file   Housing Stability:     Unable to Pay for Housing in the Last Year: Not on file    Number of [place] in the Last Year: Not on file    Unstable Housing in the Last Year: Not on file       Current Outpatient Medications   Medication Sig Dispense Refill    UNABLE TO FIND Motorized scooter Dx: COPD, PAD 1 Units 0    UNABLE TO FIND Bath Chair; Dx COPD, lPAD 1 Units 0    atorvastatin (LIPITOR) 40 MG tablet TAKE 1 TABLET BY MOUTH DAILY 90 tablet 1    dilTIAZem (CARDIZEM CD) 240 MG extended release capsule TAKE 1 CAPSULE BY MOUTH DAILY 90 capsule 1    albuterol (PROVENTIL) (2.5 MG/3ML) 0.083% nebulizer solution INHALE THE CONTENTS OF 1 VIAL VIA NEBULIZER EVERY 4 HOURS AS NEEDED FOR WHEEZING 360 mL 3    amiodarone (CORDARONE) 200 MG tablet Take 0.5 tablets by mouth daily 90 tablet 1    aspirin (ASPIRIN LOW DOSE) 81 MG EC tablet Take 1 tablet by mouth daily 90 tablet 3    cilostazol (PLETAL) 50 MG tablet Take 1 tablet by mouth 2 times daily 180 tablet 1    ipratropium (ATROVENT) 0.03 % nasal spray 2 sprays by Nasal route 3 times daily 1 Bottle 3    ipratropium (ATROVENT) 0.02 % nebulizer solution Take 2.5 mLs by nebulization every 4 hours as needed for Wheezing 750 mL 3    UNABLE TO FIND Please administer two SHINGRIX vaccines 2-6 months apart 2 Units 0    OXYGEN Inhale 2 L into the lungs continuous       Nebulizers (COMPRESSOR/NEBULIZER) MISC Use qid 1 each 3     No current facility-administered medications for this visit. Allergies   Allergen Reactions    Metoprolol      \"Chest Pain And Burning In The Chest\"       Review of Systems:         Review of Systems   Constitutional: Negative for chills and fever. HENT: Negative for ear pain, mouth sores, sore throat and tinnitus. Eyes: Negative for photophobia, redness and itching. Respiratory: Positive for shortness of breath. Negative for apnea, choking and stridor. Cardiovascular: Negative for chest pain and palpitations. Gastrointestinal: Positive for abdominal pain. Negative for anal bleeding, constipation and rectal pain. Endocrine: Negative for polydipsia. Genitourinary: Negative for enuresis, flank pain and hematuria. Musculoskeletal: Negative for back pain, joint swelling and myalgias. Skin: Negative for color change and pallor. Allergic/Immunologic: Negative for environmental allergies. Neurological: Negative for syncope and speech difficulty. Psychiatric/Behavioral: Negative for confusion and hallucinations.          OBJECTIVE:  Physical Exam: /80 (Site: Right Upper Arm, Position: Sitting, Cuff Size: Large Adult)   Pulse 109   Ht 6' 6\" (1.981 m)   Wt (!) 368 lb 11.2 oz (167.2 kg)   SpO2 99%   BMI 42.61 kg/m²      Physical Exam  Constitutional:       Appearance: He is well-developed. HENT:      Head: Normocephalic. Eyes:      Pupils: Pupils are equal, round, and reactive to light. Cardiovascular:      Rate and Rhythm: Normal rate. Pulmonary:      Effort: Pulmonary effort is normal.   Abdominal:      General: There is no distension. Palpations: Abdomen is soft. There is no mass. Tenderness: There is abdominal tenderness. There is no guarding or rebound. Hernia: A hernia (umbilical and bilateral inguinal hernias) is present. Musculoskeletal:         General: Normal range of motion. Cervical back: Normal range of motion and neck supple. Skin:     General: Skin is warm. Neurological:      Mental Status: He is alert and oriented to person, place, and time. ASSESSMENT:  1. Preoperative clearance    2. Chronic obstructive pulmonary disease, unspecified COPD type ([address]    3. Umbilical hernia without obstruction and without gangrene    4. Non-recurrent bilateral inguinal hernia without obstruction or gangrene          PLAN:  Treatment: Patient is a high risk for surgery due to his cardiac and pulmonary cold babies. We will send patient to cardiac specialist for evaluation. I will discuss care with Dr. Low Diane as well. Patient is to follow-up with me after seen by specialist.  Patient counseled on risks, benefits, and alternatives of treatment plan at length. Patient states anunderstanding and willingness to proceed with plan. Orders Placed This Encounter   Procedures   Meryl Ingram MD, Cardiology, [facility] ([facility]) - Juany Davenport MD, Pulmonlogy, [place]        No orders of the defined types were placed in this encounter.          Follow Up:  No follow-ups on file.      Vladimir Pierce MD

## 2022-01-16 ASSESSMENT — ENCOUNTER SYMPTOMS
CHOKING: 0
COLOR CHANGE: 0
PHOTOPHOBIA: 0
ABDOMINAL PAIN: 1
ANAL BLEEDING: 0
CONSTIPATION: 0
EYE REDNESS: 0
BACK PAIN: 0
SHORTNESS OF BREATH: 1
SORE THROAT: 0
APNEA: 0
RECTAL PAIN: 0
EYE ITCHING: 0
STRIDOR: 0

## 2022-01-25 ENCOUNTER — TELEPHONE (OUTPATIENT)
Dept: INTERNAL MEDICINE CLINIC | Age: 67
End: 2022-01-25

## 2022-01-25 NOTE — TELEPHONE ENCOUNTER
[employer], [address] [place] [address] did a Wellness Exam (house call) with patient. His A1c was 6.8. Patient is not currently taking anything for DM. His next appt is not until April. Please advise.  Thank you

## 2022-01-27 ENCOUNTER — OFFICE VISIT (OUTPATIENT)
Dept: CARDIOLOGY CLINIC | Age: 67
End: 2022-01-27
Payer: MEDICARE

## 2022-01-27 VITALS
DIASTOLIC BLOOD PRESSURE: 70 MMHG | SYSTOLIC BLOOD PRESSURE: 118 MMHG | HEIGHT: 78 IN | BODY MASS INDEX: 36.45 KG/M2 | WEIGHT: 315 LBS | HEART RATE: 103 BPM

## 2022-01-27 DIAGNOSIS — I73.9 PVD (PERIPHERAL VASCULAR DISEASE) (HCC): Primary | ICD-10-CM

## 2022-01-27 DIAGNOSIS — Z98.890 S/P ABLATION OF ATRIAL FIBRILLATION: ICD-10-CM

## 2022-01-27 DIAGNOSIS — Z01.810 PRE-OPERATIVE CARDIOVASCULAR EXAMINATION: ICD-10-CM

## 2022-01-27 DIAGNOSIS — I10 BENIGN ESSENTIAL HTN: ICD-10-CM

## 2022-01-27 DIAGNOSIS — Z86.79 S/P ABLATION OF ATRIAL FIBRILLATION: ICD-10-CM

## 2022-01-27 DIAGNOSIS — E78.00 HYPERCHOLESTEREMIA: ICD-10-CM

## 2022-01-27 DIAGNOSIS — E66.01 MORBID OBESITY (HCC): ICD-10-CM

## 2022-01-27 DIAGNOSIS — I48.91 ATRIAL FIBRILLATION, UNSPECIFIED TYPE (HCC): ICD-10-CM

## 2022-01-27 PROCEDURE — 1036F TOBACCO NON-USER: CPT | Performed by: NURSE PRACTITIONER

## 2022-01-27 PROCEDURE — 93000 ELECTROCARDIOGRAM COMPLETE: CPT | Performed by: NURSE PRACTITIONER

## 2022-01-27 PROCEDURE — G8427 DOCREV CUR MEDS BY ELIG CLIN: HCPCS | Performed by: NURSE PRACTITIONER

## 2022-01-27 PROCEDURE — G8417 CALC BMI ABV UP PARAM F/U: HCPCS | Performed by: NURSE PRACTITIONER

## 2022-01-27 PROCEDURE — G8484 FLU IMMUNIZE NO ADMIN: HCPCS | Performed by: NURSE PRACTITIONER

## 2022-01-27 PROCEDURE — 3017F COLORECTAL CA SCREEN DOC REV: CPT | Performed by: NURSE PRACTITIONER

## 2022-01-27 PROCEDURE — 99214 OFFICE O/P EST MOD 30 MIN: CPT | Performed by: NURSE PRACTITIONER

## 2022-01-27 RX ORDER — DILTIAZEM HYDROCHLORIDE 300 MG/1
300 CAPSULE, COATED, EXTENDED RELEASE ORAL DAILY
Qty: 90 CAPSULE | Refills: 3 | Status: SHIPPED | OUTPATIENT
Start: 2022-01-27

## 2022-01-27 ASSESSMENT — ENCOUNTER SYMPTOMS
SHORTNESS OF BREATH: 1
ABDOMINAL PAIN: 1
COUGH: 1
BACK PAIN: 1
ORTHOPNEA: 0

## 2022-01-27 NOTE — LETTER
Patty Babinski Dr. Donice Dire     Shaun Hartau  1955  [id number]    Have you had any Chest Pain that is not new? - No     DO EKG IF: Patient has a Heart Rate above 100 or below 40     CAD (Coronary Artery Disease) patient should have one on file every 6 months        Have you had any Shortness of Breath - Yes, due to hernia and COPD. If Yes - When at rest and on exertion    Have you had any dizziness - No     Sitting wait 5 minutes do supine (laying down) wait 5 minutes then do standing - log each in \"vitals\" area in Epic   Be sure to ask what symptoms they are having if they get dizzy while completing ortho stats such as: room spinning, nausea, etc.    Have you had any palpitations that are not new?  - No     Is the patient on any of the following medications - Amiodarone (Cordarone  If Yes DO EKG - Needs done every 6 months    Do you have a surgery or procedure scheduled in the near future - Yes  If Yes- DO EKG  If Yes - Who is the surgery with?   Phone number of physician [phone]  Fax number of physician ???  Type of surgery Hernia Repair

## 2022-01-27 NOTE — PROGRESS NOTES
1/27/2022  Primary cardiologist: Dr. Colette Mejias  is an established 61 y.o.  male here for follow-up on preop clearance for inguinal hernia and umbilical hernia  History of atrial fibrillation, hypertension, hyperlipidemia, PVD      SUBJECTIVE/OBJECTIVE:    HPI : Jaqueline Moy \" Moe\" is a 81-year-old gent with a history of peripheral vascular disease s/p bypass, atrial fibrillation s/p ablation, hypertension, hyperlipidemia, diabetes mellitus, obesity and COPD. Alexa Maya underwent diagnostic angiogram in December 2018 showing LFA occluded filling from collaterals with R SFA 90% mid stenosis. He underwent a redo left femoral-popliteal bypass. Jaqueline Moy reports he is feeling fair. He has stopped smoking  about 1 month ago (congratulations to patient). He has ongoing shortness of breath. He notes dyspnea with going up steps. Notes when he gets to the top of a flight of stairs he needs to sit and rest. He also notes leg fatigue with going up a flight of stairs. He denies orthopnea or PND. He denies chest pain or palpitations. He is planning on a bilateral inguinal hernia and umbilical hernia repair with Dr Earl Rush. Review of Systems   Constitutional: Negative for diaphoresis and malaise/fatigue. Cardiovascular: Positive for claudication. Negative for chest pain, dyspnea on exertion, irregular heartbeat, leg swelling, near-syncope, orthopnea, palpitations and paroxysmal nocturnal dyspnea. Respiratory: Positive for cough and shortness of breath. Musculoskeletal: Positive for back pain. Gastrointestinal: Positive for abdominal pain. Neurological: Negative for dizziness and light-headedness. Vitals:    01/27/22 1420   BP: 118/70   Site: Right Upper Arm   Position: Sitting   Cuff Size: Large Adult   Pulse: 103   Weight: (!) 371 lb 9.6 oz (168.6 kg)   Height: 6' 6\" (1.981 m)     No flowsheet data found.   Wt Readings from Last 3 Encounters:   01/27/22 (!) 371 lb 9.6 oz (168.6 kg)   01/13/22 (!) 368 lb 11.2 oz (167.2 kg)   12/08/21 (!) 362 lb (164.2 kg)     Body mass index is 42.94 kg/m². Physical Exam  Constitutional:       Appearance: He is obese. Neck:      Vascular: No carotid bruit. Cardiovascular:      Rate and Rhythm: Normal rate and regular rhythm. Pulses: Normal pulses. Heart sounds: Normal heart sounds. Pulmonary:      Effort: Pulmonary effort is normal.      Breath sounds: Decreased breath sounds present. No wheezing or rales. Abdominal:      General: Abdomen is protuberant. Palpations: Abdomen is soft. Hernia: A hernia is present. Skin:     General: Skin is warm and dry. Comments: Lipoma noted to left lower back below the scalp   Neurological:      Mental Status: He is alert.                 Current Outpatient Medications   Medication Sig Dispense Refill    UNABLE TO FIND Motorized scooter Dx: COPD, PAD 1 Units 0    UNABLE TO FIND Bath Chair; Dx COPD, lPAD 1 Units 0    atorvastatin (LIPITOR) 40 MG tablet TAKE 1 TABLET BY MOUTH DAILY 90 tablet 1    dilTIAZem (CARDIZEM CD) 240 MG extended release capsule TAKE 1 CAPSULE BY MOUTH DAILY 90 capsule 1    albuterol (PROVENTIL) (2.5 MG/3ML) 0.083% nebulizer solution INHALE THE CONTENTS OF 1 VIAL VIA NEBULIZER EVERY 4 HOURS AS NEEDED FOR WHEEZING 360 mL 3    amiodarone (CORDARONE) 200 MG tablet Take 0.5 tablets by mouth daily 90 tablet 1    aspirin (ASPIRIN LOW DOSE) 81 MG EC tablet Take 1 tablet by mouth daily 90 tablet 3    cilostazol (PLETAL) 50 MG tablet Take 1 tablet by mouth 2 times daily 180 tablet 1    ipratropium (ATROVENT) 0.02 % nebulizer solution Take 2.5 mLs by nebulization every 4 hours as needed for Wheezing 750 mL 3    UNABLE TO FIND Please administer two SHINGRIX vaccines 2-6 months apart 2 Units 0    OXYGEN Inhale 2 L into the lungs continuous       Nebulizers (COMPRESSOR/NEBULIZER) MISC Use qid 1 each 3    ipratropium (ATROVENT) 0.03 % nasal spray 2 sprays by Nasal route 3 times daily (Patient not taking: Reported on 1/27/2022) 1 Bottle 3     No current facility-administered medications for this visit. All pertinent data reviewed and discussed with patient  Echocardiogram April 2020  Limited study due to patients body habitus. Definity was used. Left ventricular function is normal, EF is estimated at 55-60%. Mild left ventricular hypertrophy. No evidence of diastolic dysfunction. No significant valvular disease noted. No evidence of pericardial effusion. ASSESSMENT/PLAN:    Peripheral vascular disease  Notes leg fatigue with going up stairs however I believe this is related to his morbid obesity. Denies claudication symptoms with walking from room to room. Continue with Pletal and atorvastatin    Atrial fibrillation  EKG today sinus tachycardia. Due to his pulmonary status with severe COPD and use of supplemental oxygen will discontinue amiodarone as it may not be the best choice for him. Increase Cardizem for better rate control  Continue with aspirin      Hypertension   Blood pressure is Controlled  continue with Cardizem  Encouraged a low sodium diet    Dyslipidemia   Results for Verenice Shaw" (MRN [id number])    Ref.  Range 12/15/2021 13:35   Cholesterol, Total Latest Ref Range: 0 - 199 mg/dL 112   HDL Cholesterol Latest Ref Range: 40 - 60 mg/dL 33 (L)   LDL Calculated Latest Ref Range: <100 mg/dL 47   Triglycerides Latest Ref Range: 0 - 150 mg/dL 159 (H)   VLDL Cholesterol Calculated Latest Ref Range: Not Established mg/dL 32     Lipids are near goal  HDL is low at 33  encouraged healthy eating habits including low fat -low cholesterol diet: Weight loss is highly encouraged  Continue with atorvastatin    Diabetes mellitus  Blood sugar is controlled  Hemoglobin A1c 7.3  On     Revised Cardiac Risk Index:   High risk type of surgery no   H/O ischemic heart disease  (h/o MI, or a positive stress test, current complaint of chest pain considered to be secondary to

## 2022-01-27 NOTE — PATIENT INSTRUCTIONS
**It is YOUR responsibilty to bring medication bottles and/or updated medication list to [address] [place] [address]. This will allow us to better serve you and all your healthcare needs**  Please be informed that if you contact our office outside of normal business hours the physician on call cannot help with any scheduling or rescheduling issues, procedure instruction questions or any type of medication issue. We advise you for any urgent/emergency that you go to the nearest emergency room!     PLEASE CALL OUR OFFICE DURING NORMAL BUSINESS HOURS    Monday - Friday   8 am to 5 pm    [place]: [address]: [phone]    [place]:  [phone]

## 2022-02-11 ENCOUNTER — HOSPITAL ENCOUNTER (OUTPATIENT)
Age: 67
Discharge: HOME | End: 2022-02-11
Payer: MEDICARE

## 2022-02-11 ENCOUNTER — HOSPITAL ENCOUNTER (OUTPATIENT)
Dept: GENERAL RADIOLOGY | Age: 67
Discharge: HOME | End: 2022-02-11
Payer: MEDICARE

## 2022-02-11 DIAGNOSIS — R06.02 SHORTNESS OF BREATH: ICD-10-CM

## 2022-02-11 PROCEDURE — 71046 X-RAY EXAM CHEST 2 VIEWS: CPT

## 2022-02-21 ENCOUNTER — HOSPITAL ENCOUNTER (OUTPATIENT)
Dept: LAB | Age: 67
Discharge: HOME | End: 2022-02-21
Payer: MEDICARE

## 2022-02-21 PROCEDURE — U0003 INFECTIOUS AGENT DETECTION BY NUCLEIC ACID (DNA OR RNA); SEVERE ACUTE RESPIRATORY SYNDROME CORONAVIRUS 2 (SARS-COV-2) (CORONAVIRUS DISEASE [COVID-19]), AMPLIFIED PROBE TECHNIQUE, MAKING USE OF HIGH THROUGHPUT TECHNOLOGIES AS DESCRIBED BY CMS-2020-01-R: HCPCS

## 2022-02-21 PROCEDURE — U0005 INFEC AGEN DETEC AMPLI PROBE: HCPCS

## 2022-02-22 LAB
SARS-COV-2: NOT DETECTED
SOURCE: NORMAL

## 2022-02-25 ENCOUNTER — HOSPITAL ENCOUNTER (OUTPATIENT)
Dept: PULMONOLOGY | Age: 67
Discharge: HOME | End: 2022-02-25
Payer: MEDICARE

## 2022-02-25 PROCEDURE — 94729 DIFFUSING CAPACITY: CPT

## 2022-02-25 PROCEDURE — 94060 EVALUATION OF WHEEZING: CPT

## 2022-02-25 PROCEDURE — 94727 GAS DIL/WSHOT DETER LNG VOL: CPT

## 2022-03-07 PROBLEM — J44.9 COPD, SEVERE (HCC): Status: ACTIVE | Noted: 2022-03-07

## 2022-03-17 ENCOUNTER — OFFICE VISIT (OUTPATIENT)
Dept: SURGERY | Age: 67
End: 2022-03-17
Payer: MEDICARE

## 2022-03-17 VITALS
WEIGHT: 315 LBS | SYSTOLIC BLOOD PRESSURE: 128 MMHG | HEIGHT: 78 IN | OXYGEN SATURATION: 91 % | DIASTOLIC BLOOD PRESSURE: 74 MMHG | HEART RATE: 109 BPM | BODY MASS INDEX: 36.45 KG/M2

## 2022-03-17 DIAGNOSIS — E66.01 MORBID OBESITY (HCC): Primary | ICD-10-CM

## 2022-03-17 DIAGNOSIS — K42.9 UMBILICAL HERNIA WITHOUT OBSTRUCTION AND WITHOUT GANGRENE: ICD-10-CM

## 2022-03-17 DIAGNOSIS — K40.40: Primary | ICD-10-CM

## 2022-03-17 DIAGNOSIS — K40.20 NON-RECURRENT BILATERAL INGUINAL HERNIA WITHOUT OBSTRUCTION OR GANGRENE: ICD-10-CM

## 2022-03-17 PROCEDURE — 99214 OFFICE O/P EST MOD 30 MIN: CPT | Performed by: SURGERY

## 2022-03-17 PROCEDURE — G8417 CALC BMI ABV UP PARAM F/U: HCPCS | Performed by: SURGERY

## 2022-03-17 PROCEDURE — 3017F COLORECTAL CA SCREEN DOC REV: CPT | Performed by: SURGERY

## 2022-03-17 PROCEDURE — G8484 FLU IMMUNIZE NO ADMIN: HCPCS | Performed by: SURGERY

## 2022-03-17 PROCEDURE — 1036F TOBACCO NON-USER: CPT | Performed by: SURGERY

## 2022-03-17 PROCEDURE — G8427 DOCREV CUR MEDS BY ELIG CLIN: HCPCS | Performed by: SURGERY

## 2022-03-17 ASSESSMENT — PATIENT HEALTH QUESTIONNAIRE - PHQ9
SUM OF ALL RESPONSES TO PHQ QUESTIONS 1-9: 0
2. FEELING DOWN, DEPRESSED OR HOPELESS: 0
SUM OF ALL RESPONSES TO PHQ QUESTIONS 1-9: 0
1. LITTLE INTEREST OR PLEASURE IN DOING THINGS: 0
SUM OF ALL RESPONSES TO PHQ9 QUESTIONS 1 & 2: 0

## 2022-03-20 ASSESSMENT — ENCOUNTER SYMPTOMS
STRIDOR: 0
ANAL BLEEDING: 0
COLOR CHANGE: 0
SORE THROAT: 0
EYE REDNESS: 0
PHOTOPHOBIA: 0
SHORTNESS OF BREATH: 1
APNEA: 0
CONSTIPATION: 0
CHOKING: 0
ABDOMINAL PAIN: 1
BACK PAIN: 0
EYE ITCHING: 0
RECTAL PAIN: 0

## 2022-03-20 NOTE — PROGRESS NOTES
Chief Complaint   Patient presents with    Follow-up     PRIYANK ING HERNIA, LARGE UMB HERNIA - (PULM AND CC COMPLETE)       SUBJECTIVE:  HPI: Patient presents for evaluation of bilateral inguinal hernia and large umbilical hernia symptoms were first noted several months ago. Pain is progressive and worsening . Umbilical hernia lump is not reducible. Pt denies nausea vomiting. Pt with  previoushistory of abdominal surgery for laparoscopic cholecystectomy which she tolerated even though patient with severe COPD. He states that he stopped smoking and he is in better condition than he was when he had his gallbladder surgery. However patient is unable to walk a block without resting and out of breath. He has seen Dr. Amerioc Trujillo for his pulmonary system and Dr. Randal Littlejohn for his cardiac disease. Patient was seen by Dr Randal Littlejohn and Dr Americo Trujillo and was cleared for surgery. Will need CT abd for further evaluate his hernia  I have reviewed the patient's(pertinent information to this visit) medical history, family history(scanned in  theMedia tab under \"patient questioner\"), social history and review of systems with the patient today in the office.        Past Surgical History:   Procedure Laterality Date    APPENDECTOMY  2003    ATRIAL ABLATION SURGERY  05/23/2018    A-fib ablation     CARDIAC SURGERY  05/2018    \"Heart Ablation Done Twice\"    CHOLECYSTECTOMY, LAPAROSCOPIC  11/12/2018    COLONOSCOPY  07/2011    Polyp Removed    DENTAL SURGERY      All Teeth Extracted In Past    EYE SURGERY Left 2017    \"For Macular Degeneration\"    FEMORAL BYPASS Left 01/2011    FEMORAL BYPASS Left 1/9/2019    FEMORAL POPLITEAL BYPASS LEFT, REDO performed by Carlene Back MD at [address] Left 1980's    Broken Left Wrist , No Hardware    FRACTURE SURGERY Right 2000's    Broken Right Wrist With Hardware, Hardware Later Removed    HERNIA REPAIR  4705    Umbilical Hernia    [place] LAP,CHOLECYSTECTOMY N/A 11/12/2018 CHOLECYSTECTOMY LAPAROSCOPIC performed by Charmaine Mihcaud MD at [address] [facility]  0760'C     Past Medical History:   Diagnosis Date    A-fib ([address]     Anxiety     Arthritis     \"Hips, Knees, Elbows And Fingers\"    COPD (chronic obstructive pulmonary disease) ([facility])     Sees Dr. Addy Calles    Depression     Diabetes mellitus [facility]) Dx 1's    Full dentures     H/O angiography 12/13/2018    peripheral angio - LFA occluded, filling collaterals, RSFA 90% stenosis-vasc surg consult    H/O Doppler ultrasound 09/05/2018    LE arterial - left mid and distal femoral artery occluded, lt FANI shows mild-mod PVD    H/O echocardiogram 01/06/2016    EF60% mild MR, TR, pulm HTN    Hx of colonoscopy     7/11 C-scope - benign polyp x1    Hx of Doppler ultrasound 02/20/2018    Lower extremity doppler  Normal study.     Hyperlipidemia     Hypertension     Macular degeneration     Obesity     On home oxygen therapy     Continuous At 2 Liters Per Nasal Cannula    PAD (peripheral artery disease) ([facility])     10/10 FANI mild PAD left superficial femoral s/p left fem-pop bypass    Panic attacks     Pneumonia Last Episode In 2018    PTSD (post-traumatic stress disorder)     Restless leg     Shortness of breath     Sleep apnea     Uses BiPap    Wears glasses     To Read        Family History   Problem Relation Age of Onset    Early Death Father         In His 29's, Suicide    Early Death Mother         In Her 29's, She Was Murdered By Javan Almonte Early Death Brother 24        Automobile Accident    Arthritis Sister      Social History     Socioeconomic History    Marital status:      Spouse name: Not on file    Number of children: Not on file    Years of education: Not on file    Highest education level: Not on file   Occupational History    Occupation:    Tobacco Use    Smoking status: Former Smoker     Packs/day: 1.00     Years: 0.00     Pack years: 0.00     Types: Cigars     Start date: 82     Quit date: 2021     Years since quittin.2    Smokeless tobacco: Former User     Types: Chew     Quit date:     Tobacco comment: 1 filtered cigar per day   Vaping Use    Vaping Use: Former   Substance and Sexual Activity    Alcohol use: No    Drug use: No    Sexual activity: Never   Other Topics Concern    Not on file   Social History Narrative    Diet unrestricted    Exercise none    Seat belt always             Social Determinants of Health     Financial Resource Strain:     Difficulty of Paying Living Expenses: Not on file   Food Insecurity:     Worried About Running Out of Food in the Last Year: Not on file    Tristen of Food in the Last Year: Not on file   Transportation Needs:     Lack of Transportation (Medical): Not on file    Lack of Transportation (Non-Medical):  Not on file   Physical Activity:     Days of Exercise per Week: Not on file    Minutes of Exercise per Session: Not on file   Stress:     Feeling of Stress : Not on file   Social Connections:     Frequency of Communication with Friends and Family: Not on file    Frequency of Social Gatherings with Friends and Family: Not on file    Attends Sabianism Services: Not on file    Active Member of [address] [place] [address] [employer] or Organizations: Not on file    Attends Club or Organization Meetings: Not on file    Marital Status: Not on file   Intimate Partner Violence:     Fear of Current or Ex-Partner: Not on file    Emotionally Abused: Not on file    Physically Abused: Not on file    Sexually Abused: Not on file   Housing Stability:     Unable to Pay for Housing in the Last Year: Not on file    Number of [place] in the Last Year: Not on file    Unstable Housing in the Last Year: Not on file       Current Outpatient Medications   Medication Sig Dispense Refill    SYMBICORT 160-4.5 MCG/ACT AERO Inhale 2 puffs into the lungs 2 times daily 3 each 3    SPIRIVA RESPIMAT 2.5 MCG/ACT AERS inhaler Inhale 2 puffs into the lungs daily 3 each 3    ipratropium-albuterol (DUONEB) 0.5-2.5 (3) MG/3ML SOLN nebulizer solution Inhale 1 vial into the lungs every 4 hours      dilTIAZem (CARDIZEM CD) 300 MG extended release capsule Take 1 capsule by mouth daily 90 capsule 3    UNABLE TO FIND Motorized scooter Dx: COPD, PAD 1 Units 0    UNABLE TO FIND Bath Chair; Dx COPD, lPAD 1 Units 0    atorvastatin (LIPITOR) 40 MG tablet TAKE 1 TABLET BY MOUTH DAILY 90 tablet 1    aspirin (ASPIRIN LOW DOSE) 81 MG EC tablet Take 1 tablet by mouth daily 90 tablet 3    cilostazol (PLETAL) 50 MG tablet Take 1 tablet by mouth 2 times daily 180 tablet 1    ipratropium (ATROVENT) 0.03 % nasal spray 2 sprays by Nasal route 3 times daily 1 Bottle 3    UNABLE TO FIND Please administer two SHINGRIX vaccines 2-6 months apart 2 Units 0    OXYGEN Inhale 2 L into the lungs continuous       Nebulizers (COMPRESSOR/NEBULIZER) MISC Use qid 1 each 3     No current facility-administered medications for this visit. Allergies   Allergen Reactions    Metoprolol      \"Chest Pain And Burning In The Chest\"       Review of Systems:         Review of Systems   Constitutional: Negative for chills and fever. HENT: Negative for ear pain, mouth sores, sore throat and tinnitus. Eyes: Negative for photophobia, redness and itching. Respiratory: Positive for shortness of breath. Negative for apnea, choking and stridor. Cardiovascular: Negative for chest pain and palpitations. Gastrointestinal: Positive for abdominal pain. Negative for anal bleeding, constipation and rectal pain. Endocrine: Negative for polydipsia. Genitourinary: Negative for enuresis, flank pain and hematuria. Musculoskeletal: Negative for back pain, joint swelling and myalgias. Skin: Negative for color change and pallor. Allergic/Immunologic: Negative for environmental allergies. Neurological: Negative for syncope and speech difficulty.    Psychiatric/Behavioral: Negative for confusion and hallucinations. OBJECTIVE:  Physical Exam:    /74 (Site: Right Upper Arm, Position: Sitting, Cuff Size: Medium Adult)   Pulse 109   Ht 6' 6\" (1.981 m)   Wt (!) 371 lb 4.8 oz (168.4 kg)   SpO2 91%   BMI 42.91 kg/m²      Physical Exam  Constitutional:       Appearance: He is well-developed. HENT:      Head: Normocephalic. Eyes:      Pupils: Pupils are equal, round, and reactive to light. Cardiovascular:      Rate and Rhythm: Normal rate. Pulmonary:      Effort: Pulmonary effort is normal.   Abdominal:      General: There is no distension. Palpations: Abdomen is soft. There is no mass. Tenderness: There is abdominal tenderness. There is no guarding or rebound. Hernia: A hernia (umbilical and bilateral inguinal hernias) is present. Musculoskeletal:         General: Normal range of motion. Cervical back: Normal range of motion and neck supple. Skin:     General: Skin is warm. Neurological:      Mental Status: He is alert and oriented to person, place, and time. ASSESSMENT:  No diagnosis found. PLAN:  Treatment: Patient is has cardiac and pulmonary clearance. Will obtain CT of the abdomen. Patient is to follow-up with me after seen by specialist.  Patient counseled on risks, benefits, and alternatives of treatment plan at length. Patient states anunderstanding and willingness to proceed with plan. No orders of the defined types were placed in this encounter. No orders of the defined types were placed in this encounter. Follow Up:  No follow-ups on file.       Spring Stephenson MD

## 2022-03-21 ENCOUNTER — TELEPHONE (OUTPATIENT)
Dept: SURGERY | Age: 67
End: 2022-03-21

## 2022-03-21 NOTE — TELEPHONE ENCOUNTER
Sunitha Porter Mar 21, 2022 03:33:39 PM EDT  [facility] [employer] [facility]  [address] , [place], [address] [facility]  IK:[id number]  :Sep 01, 1955Sex:M  Referring Provider:  Libra Dobbs  [address], [place], [address]  FDU:[id number]  Kiran 6970:  Vanessa [address]  [address] [facility], [place], [address] [facility]  VO:[phone]  KZ  CSD:[id number]  Servicing Provider:  Libra Dobbs  [address] [facility], [facility], [address] [facility]  RP:[phone]  MQV:[id number]  KSS:[id number]  Category:  Health Services Review    Service:  Surgical    [facility]    Certification:  Initial    Requested Dates:  2022 - 2022    Diagnosis codes:  1. Z79.4    Umbilical hernia without obstruction or gangrene    2.  K40.21    Bilateral inguinal hernia, without obstruction or gangrene, recurrent    Requested Services:  1.  [id number]:    REPAIR OF HERNIA USING AN ENDOSCOPE  Status: [facility] Steve Cr; [id number]; [place], [address]    1    2.  84522:    [address] [facility] 9 USING AN ENDOSCOPE  Status: [address] [place], [place] [address]anirudh Cr; TYL:[id number] Fry, [address]    1    Status:  Miki Hays 5334 #:  [id number]    Message:  Payment is based on member eligibility, medical necessity review, where applicable and [facility] provider contractual agreement. Authorization does not guarantee payment.

## 2022-03-21 NOTE — TELEPHONE ENCOUNTER
SPOKE [address]  (PROCEDURE NAME) SCHEDULED @ [facility].  NOTIFIED OF DATES, TIMES AND LOCATION    PHONE ASSESSMENT   SURGERY - 4/8/22 @ 130  P/O - 4/18/22 @ 3197    NPO AFTER MIDNIGHT  HOLD BLOOD THINNERS - 81 MG ASA DO NOT HOLD

## 2022-03-28 ENCOUNTER — HOSPITAL ENCOUNTER (OUTPATIENT)
Dept: CT IMAGING | Age: 67
Discharge: HOME | End: 2022-03-28
Payer: MEDICARE

## 2022-03-28 DIAGNOSIS — K40.40: ICD-10-CM

## 2022-03-28 LAB
GFR AFRICAN AMERICAN: >60 ML/MIN/1.73M2
GFR NON-AFRICAN AMERICAN: >60 ML/MIN/1.73M2
POC CREATININE: 0.8 MG/DL (ref 0.9–1.3)

## 2022-03-28 PROCEDURE — 74177 CT ABD & PELVIS W/CONTRAST: CPT

## 2022-03-28 PROCEDURE — 2580000003 HC RX 258: Performed by: SURGERY

## 2022-03-28 PROCEDURE — 6360000004 HC RX CONTRAST MEDICATION: Performed by: SURGERY

## 2022-03-28 RX ORDER — 0.9 % SODIUM CHLORIDE 0.9 %
10 VIAL (ML) INJECTION
Status: COMPLETED | OUTPATIENT
Start: 2022-03-28 | End: 2022-03-28

## 2022-03-28 RX ADMIN — SODIUM CHLORIDE, PRESERVATIVE FREE 10 ML: 5 INJECTION INTRAVENOUS at 12:34

## 2022-03-28 RX ADMIN — IOPAMIDOL 75 ML: 755 INJECTION, SOLUTION INTRAVENOUS at 12:34

## 2022-04-05 NOTE — PROGRESS NOTES
Patient unable to get transportation for pre-surgery labs & EKG. Will have to be done day of surgery. Surgery @ [facility] on 4/8/22 at 1100, is arriving at 0800 for labs & EKG               1. Do not eat or drink anything after midnight - unless instructed by your doctor prior to surgery. This includes                   no water, chewing gum or mints. 2. Follow your directions as prescribed by the doctor for your procedure and medications. Take a breathing treatment and Cardizem in am of surgery. Bring your inhalers with you. 3. Check with your Doctor regarding stopping vitamins, supplements, blood thinners (Plavix, Coumadin, Lovenox, Effient, Pradaxa, Xarelto, Fragmin or                   other blood thinners) and follow their instructions. Patient to continue taking aspirin but stop Pletal as of today per Dr. Wilson Trujillo office. 4. Do not smoke, and do not drink any alcoholic beverages 24 hours prior to surgery. This includes NA Beer. 5. You may brush your teeth and gargle the morning of surgery. DO NOT SWALLOW WATER   6. You MUST make arrangements for a responsible adult to take you home after your surgery and be able to check on you every couple                   hours for the day. You will not be allowed to leave alone or drive yourself home. It is strongly suggested someone stay with you the first 24                   hrs. Your surgery will be cancelled if you do not have a ride home. 7. Please wear simple, loose fitting clothing to the hospital.  Anton Lea not bring valuables (money, credit cards, checkbooks, etc.) Do not wear any                   makeup (including no eye makeup) or nail polish on your fingers or toes. 8. DO NOT wear any jewelry or piercings on day of surgery. All body piercing jewelry must be removed. 9. If you have dentures, they will be removed before going to the OR; we will provide you a container.   If you wear contact lenses or glasses, they will be removed; please bring a case for them. 10. If you  have a Living Will and Durable Power of  for Healthcare, please bring in a copy. 11. Please bring picture ID,  insurance card, paperwork from the doctors office    (H & P, Consent, & card for implantable devices). 12. Take a shower the morning of your procedure with Hibiclens or an anti-bacterial soap. Do not apply any deodorant, lotion, oil or powder.

## 2022-04-05 NOTE — PROGRESS NOTES
4/5/22 - Dr. Juanito Castleman office called, do they want patient to hold the Pletal?  Per [facility], hold the Pletal but continue aspirin. Patient updated on new instructions.

## 2022-04-06 ENCOUNTER — ANESTHESIA EVENT (OUTPATIENT)
Dept: OPERATING ROOM | Age: 67
End: 2022-04-06

## 2022-04-06 NOTE — ANESTHESIA PRE PROCEDURE
Department of Anesthesiology  Preprocedure Note       Name:  Larissa Alejandro   Age:  61 y.o.  :  1955                                          MRN:  [id number]         Date:  2022      Surgeon: Chelo Muse):  Li Fragoso MD    Procedure: Procedure(s): HERNIA UMBILICAL REPAIR LAPAROSCOPIC ROBOTIC  BILATERAL HERNIA INGUINAL REPAIR LAPAROSCOPIC ROBOTIC    Medications prior to admission:   Prior to Admission medications    Medication Sig Start Date End Date Taking? Authorizing Provider   SYMBICORT 160-4.5 MCG/ACT AERO Inhale 2 puffs into the lungs 2 times daily 3/8/22   Lelia Carreno MD   [facility] RESPIMAT 2.5 MCG/ACT AERS inhaler Inhale 2 puffs into the lungs daily 3/8/22   Lelia Carreno MD   ipratropium-albuterol (DUONEB) 0.5-2.5 (3) MG/3ML SOLN nebulizer solution Inhale 1 vial into the lungs every 4 hours    Historical Provider, MD   dilTIAZem (CARDIZEM CD) 300 MG extended release capsule Take 1 capsule by mouth daily 22   BRENNAN Baker - CNP   UNABLE TO FIND Motorized scooter Dx: COPD, PAD 21   Cande Rojas MD   UNABLE TO [facility] Chair; Dx COPD, lPAD 21   Cande Rojas MD   atorvastatin (LIPITOR) 40 MG tablet TAKE 1 TABLET BY MOUTH DAILY 21   Cande Rojas MD   aspirin (ASPIRIN LOW DOSE) 81 MG EC tablet Take 1 tablet by mouth daily 21   Cande Rojas MD   cilostazol (PLETAL) 50 MG tablet Take 1 tablet by mouth 2 times daily 21   Cande Rojas MD   UNABLE TO FIND Please administer two [facility] vaccines 2-6 months apart 18   Cande Rojas MD   OXYGEN Inhale 2 L into the lungs continuous     Historical Provider, MD   Nebulizers (COMPRESSOR/NEBULIZER) MISC Use qid 17   Cande Rojas MD       Current medications:    No current facility-administered medications for this encounter.      Current Outpatient Medications   Medication Sig Dispense Refill    SYMBICORT 160-4.5 MCG/ACT AERO Inhale 2 puffs into the lungs 2 times daily 3 each 3    SPIRIVA RESPIMAT 2.5 MCG/ACT AERS inhaler Inhale 2 puffs into the lungs daily 3 each 3    ipratropium-albuterol (DUONEB) 0.5-2.5 (3) MG/3ML SOLN nebulizer solution Inhale 1 vial into the lungs every 4 hours      dilTIAZem (CARDIZEM CD) 300 MG extended release capsule Take 1 capsule by mouth daily 90 capsule 3    UNABLE TO FIND Motorized scooter Dx: COPD, PAD 1 Units 0    UNABLE TO FIND Bath Chair; Dx COPD, lPAD 1 Units 0    atorvastatin (LIPITOR) 40 MG tablet TAKE 1 TABLET BY MOUTH DAILY 90 tablet 1    aspirin (ASPIRIN LOW DOSE) 81 MG EC tablet Take 1 tablet by mouth daily 90 tablet 3    cilostazol (PLETAL) 50 MG tablet Take 1 tablet by mouth 2 times daily 180 tablet 1    UNABLE TO FIND Please administer two SHINGRIX vaccines 2-6 months apart 2 Units 0    OXYGEN Inhale 2 L into the lungs continuous       Nebulizers (COMPRESSOR/NEBULIZER) MISC Use qid 1 each 3       Allergies:     Allergies   Allergen Reactions    Metoprolol      \"Chest Pain And Burning In The Chest\"       Problem List:    Patient Active Problem List   Diagnosis Code    Hypercholesteremia E78.00    Tobacco use Z72.0    Anxiety F41.9    Controlled type 2 diabetes mellitus without complication, without long-term current use of insulin ([facility]) E11.9    Benign essential HTN I10    Simple chronic bronchitis ([facility]) J41.0    Macular degeneration, dry H35.3190    PVD (peripheral vascular disease) ([facility]) I73.9    VT (ventricular tachycardia) ([facility]) I47.2    Left wrist sprain S63.502A    Hypoxia R09.02    Morbid obesity ([facility]) E66.01    History of atrial fibrillation Z86.79    Swelling of lower extremity M79.89    S/P ablation of atrial fibrillation Z98.890, Z86.79    LFT elevation R79.89    Cholecystitis K81.9    COPD, severe ([facility]) J44.9       Past Medical History:        Diagnosis Date    A-fib [facility])     Resolved after ablation 2018    Anxiety     Arthritis     \"Hips, Knees, Elbows And Fingers\"    COPD (chronic obstructive pulmonary disease) ([facility])     Sees Dr. Carolyn Reyes    Depression     Diabetes mellitus [facility]) Dx 2786'N    Full dentures     H/O angiography 12/13/2018    peripheral angio - LFA occluded, filling collaterals, RSFA 90% stenosis-vasc surg consult    H/O Doppler ultrasound 09/05/2018    LE arterial - left mid and distal femoral artery occluded, lt FANI shows mild-mod PVD    H/O echocardiogram 01/06/2016    EF60% mild MR, TR, pulm HTN    Hx of colonoscopy     7/11 C-scope - benign polyp x1    Hx of Doppler ultrasound 02/20/2018    Lower extremity doppler  Normal study.     Hyperlipidemia     Hypertension     Macular degeneration     States is legally blind    Obesity     On home oxygen therapy     Continuous At 2 Liters Per Nasal Cannula    PAD (peripheral artery disease) ([facility])     10/10 FANI mild PAD left superficial femoral s/p left fem-pop bypass    Panic attacks     Pneumonia Last Episode In 2018    PTSD (post-traumatic stress disorder)     Restless leg     Shortness of breath     Sleep apnea     Uses BiPap - stopped using 4/2020    Wears glasses     To Read       Past Surgical History:        Procedure Laterality Date    APPENDECTOMY  2003    ATRIAL ABLATION SURGERY  05/23/2018    A-fib ablation     CARDIAC SURGERY  05/2018    \"Heart Ablation Done Twice\"    CHOLECYSTECTOMY, LAPAROSCOPIC  11/12/2018    COLONOSCOPY  07/2011    Polyp Removed    DENTAL SURGERY      All Teeth Extracted In Past    EYE SURGERY Left 2017    \"For Macular Degeneration\"    FEMORAL BYPASS Left 01/2011    FEMORAL BYPASS Left 1/9/2019    FEMORAL POPLITEAL BYPASS LEFT, REDO performed by Jerrica Zhou MD at [address] Left 1980's    Broken Left Wrist , No Hardware    FRACTURE SURGERY Right 2000's    Broken Right Wrist With Hardware, Hardware Later Removed    HERNIA REPAIR  1237    Umbilical Hernia    IN LAP,CHOLECYSTECTOMY N/A 11/12/2018    CHOLECYSTECTOMY LAPAROSCOPIC performed by Alva Diaz MD at [place] [address]'       Social History:    Social History     Tobacco Use    Smoking status: Former Smoker     Packs/day: 1.00     Years: 0.00     Pack years: 0.00     Types: Cigars     Start date:      Quit date: 2021     Years since quittin.2    Smokeless tobacco: Former User     Types: Chew     Quit date:     Tobacco comment: 1 filtered cigar per day   Substance Use Topics    Alcohol use: No                                Counseling given: Not Answered  Comment: 1 filtered cigar per day      Vital Signs (Current): There were no vitals filed for this visit. BP Readings from Last 3 Encounters:   22 128/74   22 118/70   22 134/80       NPO Status:                                                                                 BMI:   Wt Readings from Last 3 Encounters:   22 (!) 371 lb 4.8 oz (168.4 kg)   22 (!) 370 lb (167.8 kg)   22 (!) 370 lb (167.8 kg)     There is no height or weight on file to calculate BMI.    CBC:   Lab Results   Component Value Date    WBC 12.1 12/15/2021    RBC 5.05 12/15/2021    HGB 15.8 12/15/2021    HCT 46.7 12/15/2021    MCV 92.4 12/15/2021    RDW 13.8 12/15/2021     12/15/2021       CMP:   Lab Results   Component Value Date     12/15/2021    K 5.5 12/15/2021    CL 92 12/15/2021    CO2 31 12/15/2021    BUN 16 12/15/2021    CREATININE 0.8 2022    CREATININE 0.7 12/15/2021    GFRAA >60 2022    AGRATIO 1.5 12/15/2021    LABGLOM >60 2022    GLUCOSE 171 12/15/2021    PROT 7.2 12/15/2021    CALCIUM 9.5 12/15/2021    BILITOT 0.4 12/15/2021    ALKPHOS 112 12/15/2021    AST 18 12/15/2021    ALT 26 12/15/2021       POC Tests: No results for input(s): POCGLU, POCNA, POCK, POCCL, POCBUN, POCHEMO, POCHCT in the last 72 hours.     Coags:   Lab Results   Component Value Date    PROTIME 12.7 2019    PROTIME 22.7 12/27/2010    INR 1.12 08/31/2019    APTT 33.8 01/03/2019       HCG (If Applicable): No results found for: PREGTESTUR, PREGSERUM, HCG, HCGQUANT     ABGs:   Lab Results   Component Value Date    PO2ART 73 01/10/2019    XAQ8IYC 50.0 01/10/2019    OXT0NYT 30.3 01/10/2019        Type & Screen (If Applicable):  No results found for: LABABO, LABRH    Drug/Infectious Status (If Applicable):  Lab Results   Component Value Date    HEPCAB NON REACTIVE 11/07/2018       COVID-19 Screening (If Applicable):   Lab Results   Component Value Date    COVID19 NOT DETECTED 02/21/2022           Anesthesia Evaluation    Airway:         Dental:    (+) upper dentures and lower dentures      Pulmonary:   (+) COPD:  sleep apnea:                             Cardiovascular:    (+) hypertension:, dysrhythmias (s/p ablation): atrial fibrillation, hyperlipidemia                  Neuro/Psych:   (+) neuromuscular disease (RLS):, psychiatric history (PTSD):depression/anxiety             GI/Hepatic/Renal:   (+) morbid obesity          Endo/Other:    (+) DiabetesType II DM, , : arthritis: OA., .                 Abdominal:             Vascular:   + PVD, aortic or cerebral, . Other Findings:             Anesthesia Plan      general     ASA 4       Induction: intravenous. Pre Anesthesia Assessment complete. Chart reviewed on 4/6/2022. This is a chart review only.

## 2022-04-08 ENCOUNTER — HOSPITAL ENCOUNTER (OUTPATIENT)
Age: 67
Setting detail: OUTPATIENT SURGERY
Discharge: HOME | End: 2022-04-08
Attending: SURGERY | Admitting: SURGERY
Payer: MEDICARE

## 2022-04-08 ENCOUNTER — ANESTHESIA (OUTPATIENT)
Dept: OPERATING ROOM | Age: 67
End: 2022-04-08

## 2022-04-08 VITALS
BODY MASS INDEX: 36.45 KG/M2 | SYSTOLIC BLOOD PRESSURE: 132 MMHG | WEIGHT: 315 LBS | TEMPERATURE: 97.1 F | HEIGHT: 78 IN | RESPIRATION RATE: 16 BRPM | HEART RATE: 90 BPM | DIASTOLIC BLOOD PRESSURE: 83 MMHG | OXYGEN SATURATION: 94 %

## 2022-04-08 LAB
ANION GAP SERPL CALCULATED.3IONS-SCNC: 13 MMOL/L (ref 4–16)
BASOPHILS ABSOLUTE: 0.1 K/CU MM
BASOPHILS RELATIVE PERCENT: 0.7 % (ref 0–1)
BUN BLDV-MCNC: 11 MG/DL (ref 6–23)
CALCIUM SERPL-MCNC: 9.1 MG/DL (ref 8.3–10.6)
CHLORIDE BLD-SCNC: 93 MMOL/L (ref 99–110)
CO2: 25 MMOL/L (ref 21–32)
CREAT SERPL-MCNC: 0.7 MG/DL (ref 0.9–1.3)
DIFFERENTIAL TYPE: ABNORMAL
EKG ATRIAL RATE: 90 BPM
EKG DIAGNOSIS: NORMAL
EKG P AXIS: 76 DEGREES
EKG P-R INTERVAL: 192 MS
EKG Q-T INTERVAL: 368 MS
EKG QRS DURATION: 104 MS
EKG QTC CALCULATION (BAZETT): 450 MS
EKG R AXIS: -60 DEGREES
EKG T AXIS: 50 DEGREES
EKG VENTRICULAR RATE: 90 BPM
EOSINOPHILS ABSOLUTE: 0.2 K/CU MM
EOSINOPHILS RELATIVE PERCENT: 2 % (ref 0–3)
GFR AFRICAN AMERICAN: >60 ML/MIN/1.73M2
GFR NON-AFRICAN AMERICAN: >60 ML/MIN/1.73M2
GLUCOSE BLD-MCNC: 379 MG/DL (ref 70–99)
HCT VFR BLD CALC: 46.5 % (ref 42–52)
HEMOGLOBIN: 15.8 GM/DL (ref 13.5–18)
IMMATURE NEUTROPHIL %: 0.7 % (ref 0–0.43)
LYMPHOCYTES ABSOLUTE: 1.7 K/CU MM
LYMPHOCYTES RELATIVE PERCENT: 15.1 % (ref 24–44)
MCH RBC QN AUTO: 31.3 PG (ref 27–31)
MCHC RBC AUTO-ENTMCNC: 34 % (ref 32–36)
MCV RBC AUTO: 92.1 FL (ref 78–100)
MONOCYTES ABSOLUTE: 1 K/CU MM
MONOCYTES RELATIVE PERCENT: 8.9 % (ref 0–4)
NUCLEATED RBC %: 0 %
PDW BLD-RTO: 13.2 % (ref 11.7–14.9)
PLATELET # BLD: 203 K/CU MM (ref 140–440)
PMV BLD AUTO: 9.3 FL (ref 7.5–11.1)
POTASSIUM SERPL-SCNC: 4.3 MMOL/L (ref 3.5–5.1)
RBC # BLD: 5.05 M/CU MM (ref 4.6–6.2)
SEGMENTED NEUTROPHILS ABSOLUTE COUNT: 8.4 K/CU MM
SEGMENTED NEUTROPHILS RELATIVE PERCENT: 72.6 % (ref 36–66)
SODIUM BLD-SCNC: 131 MMOL/L (ref 135–145)
TOTAL IMMATURE NEUTOROPHIL: 0.08 K/CU MM
TOTAL NUCLEATED RBC: 0 K/CU MM
WBC # BLD: 11.6 K/CU MM (ref 4–10.5)

## 2022-04-08 PROCEDURE — 85025 COMPLETE CBC W/AUTO DIFF WBC: CPT

## 2022-04-08 PROCEDURE — 80048 BASIC METABOLIC PNL TOTAL CA: CPT

## 2022-04-08 PROCEDURE — 93005 ELECTROCARDIOGRAM TRACING: CPT | Performed by: ANESTHESIOLOGY

## 2022-04-08 PROCEDURE — 93010 ELECTROCARDIOGRAM REPORT: CPT | Performed by: INTERNAL MEDICINE

## 2022-04-08 PROCEDURE — 2580000003 HC RX 258: Performed by: ANESTHESIOLOGY

## 2022-04-08 RX ORDER — SODIUM CHLORIDE 0.9 % (FLUSH) 0.9 %
5-40 SYRINGE (ML) INJECTION PRN
Status: DISCONTINUED | OUTPATIENT
Start: 2022-04-08 | End: 2022-04-08 | Stop reason: HOSPADM

## 2022-04-08 RX ORDER — SODIUM CHLORIDE 0.9 % (FLUSH) 0.9 %
5-40 SYRINGE (ML) INJECTION EVERY 12 HOURS SCHEDULED
Status: DISCONTINUED | OUTPATIENT
Start: 2022-04-08 | End: 2022-04-08 | Stop reason: HOSPADM

## 2022-04-08 RX ORDER — SODIUM CHLORIDE 9 MG/ML
25 INJECTION, SOLUTION INTRAVENOUS PRN
Status: DISCONTINUED | OUTPATIENT
Start: 2022-04-08 | End: 2022-04-08 | Stop reason: HOSPADM

## 2022-04-08 RX ORDER — SODIUM CHLORIDE, SODIUM LACTATE, POTASSIUM CHLORIDE, CALCIUM CHLORIDE 600; 310; 30; 20 MG/100ML; MG/100ML; MG/100ML; MG/100ML
INJECTION, SOLUTION INTRAVENOUS CONTINUOUS
Status: DISCONTINUED | OUTPATIENT
Start: 2022-04-08 | End: 2022-04-08 | Stop reason: HOSPADM

## 2022-04-08 RX ADMIN — SODIUM CHLORIDE, POTASSIUM CHLORIDE, SODIUM LACTATE AND CALCIUM CHLORIDE: 600; 310; 30; 20 INJECTION, SOLUTION INTRAVENOUS at 09:20

## 2022-04-08 ASSESSMENT — PAIN - FUNCTIONAL ASSESSMENT: PAIN_FUNCTIONAL_ASSESSMENT: 0-10

## 2022-04-08 NOTE — PROGRESS NOTES
65 Dr Jay Pickens in to talk with pt about cancelling surgery due to elevated bld sugar of 379.  1055 Trying to get patient in to see his PCP Dr Cassandra Earl today for follow-up about bld sugar  1100  Pt to see Dr Cassandra Earl for follow-up in office on Tuesday @ 0915 per Dr Cassandra Earl. Pt admitted to eating 3 chocolate bars yesterday. 1115 IV dc'd and pt getting dressed to go home. 1125  Pt to car at exit per wheelchair.

## 2022-04-08 NOTE — PROGRESS NOTES
Chastity progress note:    Glucose today 379 on chemistry lab draw.  Discussed with Dr Jay Pickens, case to be rescheudled    RN Staff Will attempt to schedule pt in PCP office today, otherwise rec: admit to ED

## 2022-04-12 ENCOUNTER — OFFICE VISIT (OUTPATIENT)
Dept: INTERNAL MEDICINE CLINIC | Age: 67
End: 2022-04-12
Payer: MEDICARE

## 2022-04-12 VITALS
RESPIRATION RATE: 20 BRPM | OXYGEN SATURATION: 91 % | WEIGHT: 315 LBS | SYSTOLIC BLOOD PRESSURE: 118 MMHG | BODY MASS INDEX: 42.21 KG/M2 | HEART RATE: 107 BPM | DIASTOLIC BLOOD PRESSURE: 68 MMHG

## 2022-04-12 DIAGNOSIS — E11.9 CONTROLLED TYPE 2 DIABETES MELLITUS WITHOUT COMPLICATION, WITHOUT LONG-TERM CURRENT USE OF INSULIN (HCC): Primary | ICD-10-CM

## 2022-04-12 DIAGNOSIS — E11.9 CONTROLLED TYPE 2 DIABETES MELLITUS WITHOUT COMPLICATION, WITHOUT LONG-TERM CURRENT USE OF INSULIN (HCC): ICD-10-CM

## 2022-04-12 DIAGNOSIS — J44.9 COPD, SEVERE (HCC): ICD-10-CM

## 2022-04-12 PROCEDURE — 2022F DILAT RTA XM EVC RTNOPTHY: CPT | Performed by: INTERNAL MEDICINE

## 2022-04-12 PROCEDURE — 3017F COLORECTAL CA SCREEN DOC REV: CPT | Performed by: INTERNAL MEDICINE

## 2022-04-12 PROCEDURE — 3046F HEMOGLOBIN A1C LEVEL >9.0%: CPT | Performed by: INTERNAL MEDICINE

## 2022-04-12 PROCEDURE — 1036F TOBACCO NON-USER: CPT | Performed by: INTERNAL MEDICINE

## 2022-04-12 PROCEDURE — G8427 DOCREV CUR MEDS BY ELIG CLIN: HCPCS | Performed by: INTERNAL MEDICINE

## 2022-04-12 PROCEDURE — 3023F SPIROM DOC REV: CPT | Performed by: INTERNAL MEDICINE

## 2022-04-12 PROCEDURE — 36415 COLL VENOUS BLD VENIPUNCTURE: CPT | Performed by: INTERNAL MEDICINE

## 2022-04-12 PROCEDURE — 99213 OFFICE O/P EST LOW 20 MIN: CPT | Performed by: INTERNAL MEDICINE

## 2022-04-12 PROCEDURE — G8417 CALC BMI ABV UP PARAM F/U: HCPCS | Performed by: INTERNAL MEDICINE

## 2022-04-12 RX ORDER — GLUCOSAMINE HCL/CHONDROITIN SU 500-400 MG
CAPSULE ORAL
Qty: 50 STRIP | Refills: 0 | Status: SHIPPED | OUTPATIENT
Start: 2022-04-12 | End: 2022-04-27 | Stop reason: SDUPTHER

## 2022-04-12 NOTE — PROGRESS NOTES
Endy Garcia  1955 04/12/22    SUBJECTIVE:      Pt had been binging on candy bars prior to his planned surgery, and at the time of surgery his sugar was 379. \"I'm eating everything in damn sight. \" since the cancelled surgery he has not been eating as much, has lost 6 pounds. He does have COPD, on home O2. He is on symbicort and spiriva. He is no longer smoking. Pt with a cyst on the upper lip from which he is able to express white material, present for years. OBJECTIVE:    /68   Pulse 107   Resp 20   Wt (!) 365 lb 4.8 oz (165.7 kg)   SpO2 91%   BMI 42.21 kg/m²     Physical Exam    ASSESSMENT:    1. Controlled type 2 diabetes mellitus without complication, without long-term current use of insulin ([address]    2. COPD, severe ([address]        PLAN:    Yves Garza was seen today for blood sugar problem, weight gain and sinus problem. Diagnoses and all orders for this visit:    Controlled type 2 diabetes mellitus without complication, without long-term current use of insulin ([facility]) - p will check sugars at home; check a1c. Last a1c was 7.3, but he has not been eating appropriately and this may have significantly worsened. Given very elevated sugars at surgery and today will start metformin  -     blood glucose monitor strips; Test 1 times a day  -     blood glucose monitor kit and supplies; Test once daily  -     Hemoglobin A1C; Future    COPD, severe (HCC)    Other orders  -     metFORMIN (GLUCOPHAGE) 500 MG tablet;  Take 1 tablet by mouth 2 times daily (with meals)

## 2022-04-13 ENCOUNTER — CARE COORDINATION (OUTPATIENT)
Dept: CARE COORDINATION | Age: 67
End: 2022-04-13

## 2022-04-13 LAB
ESTIMATED AVERAGE GLUCOSE: 254.7 MG/DL
HBA1C MFR BLD: 10.5 %

## 2022-04-13 SDOH — ECONOMIC STABILITY: FOOD INSECURITY: WITHIN THE PAST 12 MONTHS, YOU WORRIED THAT YOUR FOOD WOULD RUN OUT BEFORE YOU GOT MONEY TO BUY MORE.: SOMETIMES TRUE

## 2022-04-13 SDOH — HEALTH STABILITY: MENTAL HEALTH: HOW OFTEN DO YOU HAVE A DRINK CONTAINING ALCOHOL?: NEVER

## 2022-04-13 SDOH — ECONOMIC STABILITY: FOOD INSECURITY: WITHIN THE PAST 12 MONTHS, THE FOOD YOU BOUGHT JUST DIDN'T LAST AND YOU DIDN'T HAVE MONEY TO GET MORE.: SOMETIMES TRUE

## 2022-04-13 SDOH — SOCIAL STABILITY: SOCIAL NETWORK
DO YOU BELONG TO ANY CLUBS OR ORGANIZATIONS SUCH AS CHURCH GROUPS UNIONS, FRATERNAL OR ATHLETIC GROUPS, OR SCHOOL GROUPS?: NO

## 2022-04-13 SDOH — ECONOMIC STABILITY: TRANSPORTATION INSECURITY
IN THE PAST 12 MONTHS, HAS THE LACK OF TRANSPORTATION KEPT YOU FROM MEDICAL APPOINTMENTS OR FROM GETTING MEDICATIONS?: NO

## 2022-04-13 SDOH — ECONOMIC STABILITY: HOUSING INSECURITY
IN THE LAST 12 MONTHS, WAS THERE A TIME WHEN YOU DID NOT HAVE A STEADY PLACE TO SLEEP OR SLEPT IN A SHELTER (INCLUDING NOW)?: NO

## 2022-04-13 SDOH — SOCIAL STABILITY: SOCIAL NETWORK: HOW OFTEN DO YOU ATTEND CHURCH OR RELIGIOUS SERVICES?: NEVER

## 2022-04-13 SDOH — HEALTH STABILITY: MENTAL HEALTH
STRESS IS WHEN SOMEONE FEELS TENSE, NERVOUS, ANXIOUS, OR CAN'T SLEEP AT NIGHT BECAUSE THEIR MIND IS TROUBLED. HOW STRESSED ARE YOU?: ONLY A LITTLE

## 2022-04-13 SDOH — SOCIAL STABILITY: SOCIAL NETWORK: HOW OFTEN DO YOU GET TOGETHER WITH FRIENDS OR RELATIVES?: NEVER

## 2022-04-13 SDOH — ECONOMIC STABILITY: INCOME INSECURITY: HOW HARD IS IT FOR YOU TO PAY FOR THE VERY BASICS LIKE FOOD, HOUSING, MEDICAL CARE, AND HEATING?: SOMEWHAT HARD

## 2022-04-13 SDOH — SOCIAL STABILITY: SOCIAL NETWORK: IN A TYPICAL WEEK, HOW MANY TIMES DO YOU TALK ON THE PHONE WITH FAMILY, FRIENDS, OR NEIGHBORS?: NEVER

## 2022-04-13 SDOH — SOCIAL STABILITY: SOCIAL NETWORK: ARE YOU MARRIED, WIDOWED, DIVORCED, SEPARATED, NEVER MARRIED, OR LIVING WITH A PARTNER?: DIVORCED

## 2022-04-13 SDOH — ECONOMIC STABILITY: INCOME INSECURITY: IN THE LAST 12 MONTHS, WAS THERE A TIME WHEN YOU WERE NOT ABLE TO PAY THE MORTGAGE OR RENT ON TIME?: NO

## 2022-04-13 SDOH — SOCIAL STABILITY: SOCIAL NETWORK: HOW OFTEN DO YOU ATTENT MEETINGS OF THE CLUB OR ORGANIZATION YOU BELONG TO?: NEVER

## 2022-04-13 SDOH — ECONOMIC STABILITY: TRANSPORTATION INSECURITY
IN THE PAST 12 MONTHS, HAS LACK OF TRANSPORTATION KEPT YOU FROM MEETINGS, WORK, OR FROM GETTING THINGS NEEDED FOR DAILY LIVING?: NO

## 2022-04-13 SDOH — ECONOMIC STABILITY: HOUSING INSECURITY: IN THE LAST 12 MONTHS, HOW MANY PLACES HAVE YOU LIVED?: 1

## 2022-04-13 NOTE — CARE COORDINATION
Ambulatory Care Coordination Note  4/13/2022  CM Risk Score: 6  Charlson 10 Year Mortality Risk Score: 100%     ACC: Lashay Pack RN    Summary Note:   Spoke with patient for ACM follow up; Provider referral for enrollment. Oriented to the role of ACM. Patient consents to [employer] enrollment. Medications:  Medication reconciliation completed. Patient reports that he is managing his own medications. Concerns for barriers with vision; discussed pre-packed medications and patient declines. Patient reports that he is taking his medications as directed. Patient does struggle with the cost of co-pays. Referral to be made to Med Assist for support. DM:  Patient reports that he has not picked up glucometer yet from Pharmacy. Instructed on routine BS monitoring; log for Provider review. Instructed on low sugar/ low carb/ portion control diet. Patient reports that he is interested in losing weight that he has gained when he quit smoking. Agreeable to referral to SANDR[facility]ARCIA zone management tool sent to patient for reference. COPD:  Patient has home oxygen and is using at 2L /NC. Reports that he has a BiPap which he has not used for a while. Patient is using inhaler/ med neb as directed. Reviewed COPD zone management tool and s/s to report to MD.  Copy of zone tool sent to patient via My Chart. Resource needs:  Patient reports that his cousin provides transportation and assists with housekeeping. Reports that he does struggle with the cost of utilities and groceries. Agreeable to referral for LSW support. Patient denies any questions or concerns at this time. ACM contact information provided. Encouraged patient to return call if needs arise. Plan for next outreach: Address support needs, care gaps, Med Assist, LSW follow up. Referral made to Amalia/ Mannie Hobson.    Note routed to DEWEY/ LIBERTY with referrals.          Ambulatory Care Coordination Assessment    Care Coordination Protocol  Program Enrollment: Complex Care  Referral from Primary Care Provider: Yes  Week 1 - Initial Assessment     Do you have all of your prescriptions and are they filled?: Yes  Are you able to afford your medications?: With Assistance  Medication Assistance Program: [facility] Meds/Vouchers  How often do you have trouble taking your medications the way you have been told to take them?: I always take them as prescribed. Do you have Home O2 Therapy?: Yes   Oxygen Regimen: Continuous Flow - Enter rate/FIO2: 2   Method of Delivery: Nasal Cannula   CPAP Use: BiPAP      Ability to seek help/take action for Emergent Urgent situations i.e. fire, crime, inclement weather or health crisis. : Independent  Ability to ambulate to restroom: Independent  Ability handle personal hygeine needs (bathing/dressing/grooming): Independent  Ability to manage Medications: Independent  Ability to prepare Food Preparation: Independent  Ability to maintain home (clean home, laundry): Needs Assistance  Ability to drive and/or has transportation: Independent  Ability to do shopping: Needs Assistance  Ability to manage finances:  Independent  Is patient able to live independently?: Yes     Current Housing: Apartment        Per the Fall Risk Screening, did the patient have 2 or more falls or 1 fall with injury in the past year?: No     Frequent urination at night?: No  Do you use rails/bars?: No  Do you have a non-slip tub mat?: No     Are you experiencing loss of meaning?: No  Are you experiencing loss of hope and peace?: No     Thinking about your patient's physical health needs, are there any symptoms or problems (risk indicators) you are unsure about that require further investigation?: Mild vague physical symptoms or problems; but do not impact on daily life or are not of concern to patient   Are the patients physical health problems impacting on their mental well-being?: No identified areas of concern   Are there any problems with your patients lifestyle behaviors (alcohol, drugs, diet, exercise) that are impacting on physical or mental well-being?: No identified areas of concern   Do you have any other concerns about your patients mental well-being? How would you rate their severity and impact on the patient?: No identified areas of concern   How would you rate their home environment in terms of safety and stability (including domestic violence, insecure housing, neighbor harassment)?: Consistently safe, supportive, stable, no identified problems   How do daily activities impact on the patient's well-being? (include current or anticipated unemployment, work, caregiving, access to transportation or other): No identified problems or perceived positive benefits   How would you rate their social network (family, work, friends)?: Restricted participation with some degree of social isolation   How would you rate their financial resources (including ability to afford all required medical care)?: [address] insecure, some resource challenges   How wells does the patient now understand their health and well-being (symptoms, signs or risk factors) and what they need to do to manage their health?: Reasonable to good understanding and already engages in managing health or is willing to undertake better management   How well do you think your patient can engage in healthcare discussions? (Barriers include language, deafness, aphasia, alcohol or drug problems, learning difficulties, concentration): Clear and open communication, no identified barriers   Do other services need to be involved to help this patient?: Other care/services not in place and required   Are current services involved with this patient well-coordinated? (Include coordination with other services you are now recommendation): Required care/services missing and/or fragmented   Suggested Interventions and Community Resources  Fall Risk Prevention:  In Process Medication Assistance Program: In Process Registered Dietician: In Process   Social Work: In Process   Zone Management Tools: In Process         Set up/Review an Education Plan, Set up/Review Goals              Prior to Admission medications    Medication Sig Start Date End Date Taking?  Authorizing Provider   blood glucose monitor strips Test 1 times a day 4/12/22  Yes Rody Ramey MD   blood glucose monitor kit and supplies Test once daily 4/12/22  Yes Rody Ramey MD   metFORMIN (GLUCOPHAGE) 500 MG tablet Take 1 tablet by mouth 2 times daily (with meals) 4/12/22  Yes Rody Ramey MD   SYMBICORT 160-4.5 MCG/ACT AERO Inhale 2 puffs into the lungs 2 times daily 3/8/22  Yes Suze Best MD   [facility] 2.5 MCG/ACT AERS inhaler Inhale 2 puffs into the lungs daily 3/8/22  Yes Suze Best MD   ipratropium-albuterol (DUONEB) 0.5-2.5 (3) MG/3ML SOLN nebulizer solution Inhale 1 vial into the lungs every 4 hours   Yes Historical Provider, MD   dilTIAZem (CARDIZEM CD) 300 MG extended release capsule Take 1 capsule by mouth daily 1/27/22  Yes BRENNAN Gordon CNP   atorvastatin (LIPITOR) 40 MG tablet TAKE 1 TABLET BY MOUTH DAILY 11/29/21  Yes Rody Ramey MD   aspirin (ASPIRIN LOW DOSE) 81 MG EC tablet Take 1 tablet by mouth daily 4/5/21  Yes Rody Ramey MD   cilostazol (PLETAL) 50 MG tablet Take 1 tablet by mouth 2 times daily 4/5/21  Yes Rody Ramey MD   OXYGEN Inhale 2 L into the lungs continuous    Yes Historical Provider, MD   Nebulizers (COMPRESSOR/NEBULIZER) MISC Use qid 6/13/17  Yes Rody Ramey MD       Future Appointments   Date Time Provider Carrie Aldana   4/18/2022 11:15 AM Josue Ramachandran [address] [facility]   4/27/2022  2:00 PM BRENNAN Gordon CNP [facility]   7/11/2022  1:45 PM Suze Best MD [facility]     ,   Diabetes Assessment    Medic Alert ID: No  Meal Planning: Avoidance of concentrated sweets, Calorie counting   How often do you test your blood sugar?: Daily   Do you have barriers with adherence to non-pharmacologic self-management interventions?  (Nutrition/Exercise/Self-Monitoring): No   Have you ever had to go to the ED for symptoms of low blood sugar?: No       Do you have hyperglycemia symptoms?: No   Do you have hypoglycemia symptoms?: No   Blood Sugar Monitoring Regimen: Not Testing      ,   COPD Assessment    Does the patient understand envrionmental exposure?: Yes  Is the patient able to verbalize Rescue vs. Long Acting medications?: Yes  Does the patient have a nebulizer?: Yes  Does the patient use a space with inhaled medications?: No            Symptoms:     Symptom course: no change  Change in chronic cough?: No/At Baseline  Change in sputum?: No/At Baseline  Self Monitoring - SaO2: No  Have you had a recent diagnosis of pneumonia either by PCP or at a hospital?: No      and   General Assessment    Do you have any symptoms that are causing concern?: No

## 2022-04-15 ENCOUNTER — CARE COORDINATION (OUTPATIENT)
Dept: CARE COORDINATION | Age: 67
End: 2022-04-15

## 2022-04-15 NOTE — CARE COORDINATION
[facility]  April 15, 2022    Initial Referral Reason: Diabetes and Weight Loss Tips    Patient Care Team:  Ashley Aaron MD as PCP - General  Ashley Aaron MD as PCP - [facility] Provider  Flor Medel RN as [address] [place] [address], RD, LD as Dietitian (Dietitian)    Past Medical History:    Current Outpatient Medications   Medication Sig Dispense Refill    blood glucose monitor strips Test 1 times a day 50 strip 0    blood glucose monitor kit and supplies Test once daily 1 kit 0    metFORMIN (GLUCOPHAGE) 500 MG tablet Take 1 tablet by mouth 2 times daily (with meals) 180 tablet 1    SYMBICORT 160-4.5 MCG/ACT AERO Inhale 2 puffs into the lungs 2 times daily 3 each 3    SPIRIVA RESPIMAT 2.5 MCG/ACT AERS inhaler Inhale 2 puffs into the lungs daily 3 each 3    ipratropium-albuterol (DUONEB) 0.5-2.5 (3) MG/3ML SOLN nebulizer solution Inhale 1 vial into the lungs every 4 hours      dilTIAZem (CARDIZEM CD) 300 MG extended release capsule Take 1 capsule by mouth daily 90 capsule 3    atorvastatin (LIPITOR) 40 MG tablet TAKE 1 TABLET BY MOUTH DAILY 90 tablet 1    aspirin (ASPIRIN LOW DOSE) 81 MG EC tablet Take 1 tablet by mouth daily 90 tablet 3    cilostazol (PLETAL) 50 MG tablet Take 1 tablet by mouth 2 times daily 180 tablet 1    OXYGEN Inhale 2 L into the lungs continuous       Nebulizers (COMPRESSOR/NEBULIZER) MISC Use qid 1 each 3     No current facility-administered medications for this visit.        Biochemical Data, Medical Tests and Procedures:    Lab Results   Component Value Date    LABA1C 10.5 04/12/2022     Lab Results   Component Value Date    .7 04/12/2022       Lab Results   Component Value Date    CHOL 112 12/15/2021    CHOL 97 03/06/2019    CHOL 112 07/17/2018     Lab Results   Component Value Date    TRIG 159 (H) 12/15/2021    TRIG 85 03/06/2019    TRIG 97 07/17/2018     Lab Results   Component Value Date    HDL 33 (L) 12/15/2021    HDL 33 (L) 03/06/2019    HDL 38 (L) 07/17/2018     Lab Results   Component Value Date    LDLCALC 47 12/15/2021    LDLCALC 47 03/06/2019    LDLCALC 55 07/17/2018     Lab Results   Component Value Date    LABVLDL 32 12/15/2021    LABVLDL 17 03/06/2019    LABVLDL 19 07/17/2018     Anthropometric Measurements:    Height: 78 inches (198.12 cm)  Weight: 365 lb (165.7 kg)  BMI: 42.21 (obesity class III)  IBW: 214 lb (97.3 kg) +-10%  %IBW: 170.6%  AdBW: 274 lb (124.5%)    Physical Exam Findings:  Deferred    Nutrition Interview: RD called pt, explained reason for call and role in care. Pt states appetite is \"okay\"- patient states he is trying to cut down on his portions and he typically eats 1-2 meals/day. See food recall below. RD explained the importance of having a consistent carbohydrate intake throughout the day to help keep blood sugars steady, avoiding spikes and dips. Reviewed which foods contain carbohydrates and which foods do not contain carbohydrates. Reviewed protein sources. Explained carbohydrates are the main source of fuel for our bodies and the importance of choosing healthy sources such as whole grains, fruits and vegetables. Explained carbohydrates in food raise blood glucose and the importance of having balanced meals/snacks to help keep blood sugar steady. Discussed how eating a carbohydrate alone such as a banana versus a banana with peanut butter will affect blood sugar differently. Explained the components of a balanced meal using the MyPlate [id number]-7/8 plate fruits and/or vegetables, 1/4 plate protein and 1/4 plate starchy carbohydrates with 8 oz glass of low fat milk if desired. RD used pt's dinner example to explain which components are complete and which components are incomplete. Discussed starchy vegetables versus non starchy vegetables and explained incorporating only one starchy vegetable at a meal. Pt verbalizes understanding.  Explained the importance of not skipping meals and discussed eating balanced meals/snacks consistently to help manage BS. Discussed watching portion sizes. Discussed choosing fruits, vegetables, whole grains, lean protein and low fat dairy. Reviewed the importance of checking BS daily and taking medicine as directed. Pt states BS is running between 340-360 mg/dL. Discussed making small changes and the big impact they will have with time. Pt has no nutrition related questions at this time. RD offered to mail educational handouts to pt to reinforce concepts discussed during phone conversation, pt accepted. RD verified address. 24-Hour Diet Recall  Breakfast  Consumed: none    AM Snack  Consumed: none    Lunch  Consumed: no example provided per pt    Dinner  Consumed: BBQ chicken, mashed potatoes with gravy and corn    Beverages: water    Exercise: none    Blood sugar checks: pt is checking BS daily    Nutrition Diagnosis:  #1 Problem Altered nutrition-related lab values: A1C, eAG       Etiology related to uncontrolled type 2 DM       Signs/Symptoms as evidenced by A1C 10.5% and eAG 254.7 mg/dL as of 4/12/22    #2 Problem Food and nutrition-related knowledge deficit       Etiology related to lack of prior nutrition related education        Signs/Symptoms as evidenced by inappropriate intake of CHO (food recall), elevated A1C and referral for DM education     Nutrition Intervention:     Estimated Needs  diabetic diet providing 2600 kcals to promote wt loss (Mariia Burris based on AdBW for obesity class III). Estimated daily CHO Needs: 350 g (based on 45-65% total calorie intake)  Estimated daily Protein Needs: 100-125 g (based on 0.8-1.0 g/kg based on AdBW)  Estimated daily Fluid Needs: 64 oz. #1 Nutrition Information: Provided patient with Meal Planner DM, Checking BS and Sample Menu handouts. For reinforcement of concepts discussed during nutrition interview.      #2 Nutrition Counseling: Used open-ended questions to assess patients perceived susceptibility and severity of disease state. Discussed potential impact of health threat on patient's lifestyle. Used open-ended questions to assess patient's perception regarding benefits of and barriers to implementation of nutrition therapy. #3 Nutrition Education: Clearly defined the benefits of nutrition therapy. Summarized and affirmed positive aspects of current nutrition patterns. Provided education regarding value of adherence to diabetic diet. Discussed ways to establish applying concepts of alternatives and choices regarding implementation of diet. Explored ideas for small, incremental goals to initiate behavior change. Monitoring and Evaluation:    Indicator/Goal Criteria   #1 Eat balanced meals consistently throughout the day. Do not skip meals. #1 Focus on eating 3 meals/day and make these meals balanced using the MyPlate [id number]-3/5 plate fruits and/or vegetables, 1/4 plate protein and 1/4 plate starchy carbohydrates with 8 oz glass of low fat milk if desired. #2 Monitor BS daily and keep a log. #2 Check BS daily and take medicine as directed by doctor. Follow Up: RD will call pt in 3 weeks to follow up and make sure pt received handouts in mail. RD will answer any nutrition related questions at this time.      [address] [facility], [address] [facility]

## 2022-04-20 ENCOUNTER — CARE COORDINATION (OUTPATIENT)
Dept: CARE COORDINATION | Age: 67
End: 2022-04-20

## 2022-04-20 NOTE — CARE COORDINATION
HC contacted patient in regards to a referral from [facility] for possible food and utility assistance. Patient denies the need for assistance with his utilities but states that once all of his bills are paid he is unable to afford enough food. Pt states that he is currently utilizing [employer] who delivers food to patient every 2 weeks but he states that it is not enough food. Pt states that he cannot utilize food pantries at this time due to his health. Pt refuses the possibility of using [employer] because he does not like the quality of their food choices. Advised patient that other resources would be researched and patient agreed to a follow up call in a few days to go over any options that may be available to him. Patient confirms contact information and is agreeable to mailed resources. Patient denies any other needs at this time. Care Plan  Will research other food assistance options and follow up with patient in a few days to discuss them.

## 2022-04-22 ENCOUNTER — CARE COORDINATION (OUTPATIENT)
Dept: CARE COORDINATION | Age: 67
End: 2022-04-22

## 2022-04-22 NOTE — CARE COORDINATION
HC contacted patient to follow up regarding home delivered meal programs. Discussed with patient several different options that were gathered and provided all program contact information and process for getting started. Patient was agreeable to programs and states that he will be calling to set one up that he chooses. Pt agreeable to follow up call to discuss program that he chooses and any further needs or concerns at that time. Pt states that he does not currently have a mattress and is saving money to buy one but would like any possible resources available to obtain one. Pt has no other needs or concerns at this time. Pt confirms that he has [facility] [employer] contact information and is advised to call if needed. Will research possible resources for a mattress and will follow up with patient in a week.

## 2022-04-27 ENCOUNTER — OFFICE VISIT (OUTPATIENT)
Dept: CARDIOLOGY CLINIC | Age: 67
End: 2022-04-27
Payer: MEDICARE

## 2022-04-27 ENCOUNTER — CARE COORDINATION (OUTPATIENT)
Dept: CARE COORDINATION | Age: 67
End: 2022-04-27

## 2022-04-27 VITALS
HEART RATE: 64 BPM | SYSTOLIC BLOOD PRESSURE: 110 MMHG | HEIGHT: 78 IN | DIASTOLIC BLOOD PRESSURE: 76 MMHG | WEIGHT: 315 LBS | BODY MASS INDEX: 36.45 KG/M2

## 2022-04-27 DIAGNOSIS — I73.9 PVD (PERIPHERAL VASCULAR DISEASE) (HCC): Primary | ICD-10-CM

## 2022-04-27 DIAGNOSIS — E78.00 HYPERCHOLESTEREMIA: ICD-10-CM

## 2022-04-27 DIAGNOSIS — E11.65 TYPE 2 DIABETES MELLITUS WITH HYPERGLYCEMIA, WITHOUT LONG-TERM CURRENT USE OF INSULIN (HCC): ICD-10-CM

## 2022-04-27 DIAGNOSIS — E11.9 CONTROLLED TYPE 2 DIABETES MELLITUS WITHOUT COMPLICATION, WITHOUT LONG-TERM CURRENT USE OF INSULIN (HCC): ICD-10-CM

## 2022-04-27 DIAGNOSIS — I10 BENIGN ESSENTIAL HTN: ICD-10-CM

## 2022-04-27 PROCEDURE — 1036F TOBACCO NON-USER: CPT | Performed by: NURSE PRACTITIONER

## 2022-04-27 PROCEDURE — 99214 OFFICE O/P EST MOD 30 MIN: CPT | Performed by: NURSE PRACTITIONER

## 2022-04-27 PROCEDURE — 2022F DILAT RTA XM EVC RTNOPTHY: CPT | Performed by: NURSE PRACTITIONER

## 2022-04-27 PROCEDURE — 3046F HEMOGLOBIN A1C LEVEL >9.0%: CPT | Performed by: NURSE PRACTITIONER

## 2022-04-27 PROCEDURE — G8417 CALC BMI ABV UP PARAM F/U: HCPCS | Performed by: NURSE PRACTITIONER

## 2022-04-27 PROCEDURE — G8427 DOCREV CUR MEDS BY ELIG CLIN: HCPCS | Performed by: NURSE PRACTITIONER

## 2022-04-27 PROCEDURE — 3017F COLORECTAL CA SCREEN DOC REV: CPT | Performed by: NURSE PRACTITIONER

## 2022-04-27 RX ORDER — GLUCOSAMINE HCL/CHONDROITIN SU 500-400 MG
CAPSULE ORAL
Qty: 100 STRIP | Refills: 0 | Status: SHIPPED | OUTPATIENT
Start: 2022-04-27 | End: 2022-06-10 | Stop reason: SDUPTHER

## 2022-04-27 ASSESSMENT — ENCOUNTER SYMPTOMS
BACK PAIN: 1
ORTHOPNEA: 0
SHORTNESS OF BREATH: 1

## 2022-04-27 NOTE — PROGRESS NOTES
4/27/2022  Primary cardiologist: Dr. Louann Barney:   Yves Garza  is an established 61 y.o.  male here for a 3 month follow up on PVD, atrial fib, HTN HLD      SUBJECTIVE/OBJECTIVE:  Yves Garza is a 61 y.o. male with a history of peripheral vascular disease s/p bypass, atrial fibrillation s/p ablation, hypertension, hyperlipidemia, diabetes mellitus, obesity and COPD. Fco Sheppard underwent diagnostic angiogram in December 2018 showing LFA occluded filling from collaterals with R SFA 90% mid stenosis. He underwent a redo left femoral-popliteal bypass. HPI :   Yves Garza reports overall he is feeling fair- he has numbness and tingling to legs with a heat and tingling sensation. He can walk about 50 yards before legs ache. He has RAE secondary to COPD. He is on home oxygen. He stopped smoking in December. He notes dizziness with position change that is transient in nature lasting about 3 seconds. Denies falls or syncope. He reports his blood sugar has not been well controlled with reported blood sugar 300 at home. Hernia surgery was placed on hold due to hyperglycemia. Review of Systems   Constitutional: Negative for diaphoresis and malaise/fatigue. Cardiovascular: Negative for chest pain, claudication, dyspnea on exertion, irregular heartbeat, leg swelling, near-syncope, orthopnea, palpitations and paroxysmal nocturnal dyspnea. Respiratory: Positive for shortness of breath. Musculoskeletal: Positive for back pain. Neurological: Positive for paresthesias. Negative for dizziness and light-headedness. Vitals:    04/27/22 1348 04/27/22 1400 04/27/22 1401   BP: 128/78 118/74 110/76   Site: Left Upper Arm Left Upper Arm Left Upper Arm   Position: Sitting Supine Standing   Cuff Size: Large Adult Large Adult Large Adult   Pulse: 64     Weight: (!) 352 lb 12.8 oz (160 kg)     Height: 6' 6\" (1.981 m)       No flowsheet data found.   Wt Readings from Last 3 Encounters:   04/27/22 (!) 352 lb 12.8 oz (160 kg) 04/12/22 (!) 365 lb 4.8 oz (165.7 kg)   04/08/22 (!) 371 lb (168.3 kg)     Body mass index is 40.77 kg/m². Physical Exam  Constitutional:       Appearance: He is obese. HENT:      Head: Normocephalic. Eyes:      Pupils: Pupils are equal, round, and reactive to light. Neck:      Vascular: No carotid bruit. Cardiovascular:      Rate and Rhythm: Normal rate and regular rhythm. Pulses:           Dorsalis pedis pulses are 1+ on the right side and 1+ on the left side. Posterior tibial pulses are 1+ on the right side and 1+ on the left side. Heart sounds: Normal heart sounds. Pulmonary:      Effort: Pulmonary effort is normal.      Breath sounds: Normal breath sounds. Abdominal:      Tenderness: There is no abdominal tenderness. Skin:     General: Skin is warm and dry. Capillary Refill: Capillary refill takes less than 2 seconds. Neurological:      General: No focal deficit present. Mental Status: He is alert.                 Current Outpatient Medications   Medication Sig Dispense Refill    blood glucose monitor strips Test 1 times a day 50 strip 0    blood glucose monitor kit and supplies Test once daily 1 kit 0    metFORMIN (GLUCOPHAGE) 500 MG tablet Take 1 tablet by mouth 2 times daily (with meals) 180 tablet 1    SYMBICORT 160-4.5 MCG/ACT AERO Inhale 2 puffs into the lungs 2 times daily 3 each 3    SPIRIVA RESPIMAT 2.5 MCG/ACT AERS inhaler Inhale 2 puffs into the lungs daily 3 each 3    ipratropium-albuterol (DUONEB) 0.5-2.5 (3) MG/3ML SOLN nebulizer solution Inhale 1 vial into the lungs every 4 hours      dilTIAZem (CARDIZEM CD) 300 MG extended release capsule Take 1 capsule by mouth daily 90 capsule 3    atorvastatin (LIPITOR) 40 MG tablet TAKE 1 TABLET BY MOUTH DAILY 90 tablet 1    aspirin (ASPIRIN LOW DOSE) 81 MG EC tablet Take 1 tablet by mouth daily 90 tablet 3    cilostazol (PLETAL) 50 MG tablet Take 1 tablet by mouth 2 times daily 180 tablet 1    OXYGEN Inhale 2 L into the lungs continuous       Nebulizers (COMPRESSOR/NEBULIZER) MISC Use qid 1 each 3     No current facility-administered medications for this visit. All pertinent data reviewed and discussed with patient       ASSESSMENT/PLAN:    Atrial fib   H/o ablation of atrial fib  In regular rate regular rhythm- on Cardizem will continue  Off amiodarone secondary to advanced COPD  He stopped Eliquis due to cost- on ASA        PVD  Notes pain numbness and tingling with walking-   Component of neuropathy d/t diabetes  Pedal pulse palpable /feet are warm dry  encouraged walking program  Continue with pletal - asa and atorvastatin    Hypertension   Blood pressure is Controlled  He is on cardizem. No reported adverse events or reported side effects from medication(s). He is  stable on current dose. Encouraged a low sodium diet      Dyslipidemia   Lipids noted from 12/2021  Lipids are at or near goal: HDL is low  He is on atorvastatin. No reported adverse events or reported side effects from medication(s) and is stable on current dose. encouraged healthy eating habits including low fat -low /cholesterol diet      DM  Not well controlled  Recommend checking blood sugar bid    Medications reviewed and confirmed with patient         Tests ordered:  none  Follow-up  6 months     Signed:  BRENNAN Hussein CNP, 4/27/2022, 2:18 PM    An electronic signature was used to authenticate this note. Please note this report has been partially produced using speech recognition software and may contain errors related to that system including errors in grammar, punctuation, and spelling, as well as words and phrases that may be inappropriate. If there are any questions or concerns please feel free to contact the dictating provider for clarification.

## 2022-04-27 NOTE — PATIENT INSTRUCTIONS
Please be informed that if you contact our office outside of normal business hours the physician on call cannot help with any scheduling or rescheduling issues, procedure instruction questions or any type of medication issue. We advise you for any urgent/emergency that you go to the nearest emergency room! PLEASE CALL OUR OFFICE DURING NORMAL BUSINESS HOURS    Monday - Friday   8 am to 5 pm    [place]: [place]: [phone]    [place]:  [phone]    **It is YOUR responsibilty to bring medication bottles and/or updated medication list to [address] [place] [address].  This will allow us to better serve you and all your healthcare needs**

## 2022-04-27 NOTE — LETTER
Read Jack Pérez  1955  [id number]    Have you had any Chest Pain that is not new? - No  Have you had any Shortness of Breath - Yes  If Yes - When COPD  Have you had any dizziness - Yes  If Yes DO ORTHOSTATIC BP - when do you feel dizzy with position change   How long does it last .3  seconds      Sitting wait 5 minutes do supine (laying down) wait 5 minutes then do standing - log each in \"vitals\" area in Epic   Be sure to ask what symptoms they are having if they get dizzy while completing ortho stats such as: room spinning, nausea, etc.    Have you had any palpitations that are not new? - No  Do you have any edema - swelling in ankles, legs and feet bilat    If Yes - CHECK TO SEE IF THE EDEMA IS PITTING  How long have they been having edema - Years  If Yes - Have they worn compression stockings used too  If they have worn compression stockings      Vein \"LEG PROBLEM Questionnaire\"  1. Do you have prominent leg veins? Yes   2. Do you have any skin discoloration? Yes  3. Do you have any healed or active sores? No  4. Do you have swelling of the legs? Yes  5. Do you have a family history of varicose veins? No  6. Does your profession involve pro-longed        standing or heavy lifting? retired  9. Have you been fighting overweight problems? Yes  8. Do you have restless legs? Yes  9. Do you have any night time cramps? Yes  10. Do you have any of the following in your legs:        Aching     When did you have your last labs drawn 04/22   Where did you have them done [address] [place] [address]  What doctor ordered Dr. Claudene Providence    If we do not have these labs you are retrieve these labs for these providers! Do you have a surgery or procedure scheduled in the near future - No  If Yes- DO EKG  If Yes - Who is the surgery with?    Phone number of physician   Fax number of physician   Type of surgery   GIVE THIS INFORMATION TO MICHEL QURESHI     Ask patient if they want to sign up for Jake if they are not already signed up     Check to see if we have an E-MAIL on file for the patient     Check medication list thoroughly!!! AND RECONCILE OUTSIDE MEDICATIONS  If dose has changed change the entire order not just the MG  BE SURE TO ASK PATIENT IF THEY NEED MEDICATION REFILLS     At check out add to every patient's \"wrap up\" the following dot phrase AFTERHOURSEDUCATION and ensure we explain this to our patients

## 2022-04-29 ENCOUNTER — CARE COORDINATION (OUTPATIENT)
Dept: CARE COORDINATION | Age: 67
End: 2022-04-29

## 2022-05-04 ENCOUNTER — CARE COORDINATION (OUTPATIENT)
Dept: CARE COORDINATION | Age: 67
End: 2022-05-04

## 2022-05-04 DIAGNOSIS — I73.9 PVD (PERIPHERAL VASCULAR DISEASE) (HCC): ICD-10-CM

## 2022-05-04 RX ORDER — CILOSTAZOL 50 MG/1
TABLET ORAL
Qty: 180 TABLET | Refills: 1 | Status: SHIPPED | OUTPATIENT
Start: 2022-05-04 | End: 2022-11-04

## 2022-05-04 NOTE — CARE COORDINATION
Ambulatory Care Coordination Note  5/4/2022  CM Risk Score: 6  Charlson 10 Year Mortality Risk Score: 100%     ACC: Lexus Naqvi RN    Summary Note:  Spoke with patient for ACM follow up. Patient reports that he is tired; but is doing well. DM:   Last A1C  4/12/22  10.5. Reports that he is following diet as directed and has lost 8 lbs. BS running 200-240. Encouraged routine BS monitoring; log for Provider review. Instructed on the importance of compliance with diet, drinking plenty of water and getting exercise. Encouraged low sugar/ low carb/ portion control diet. Reviewed DM zone management tool and s/s to report to MD.     COPD:  Patient reports that his breathing is at baseline. Denies increased cough, SOB, CP or wheezing. Encouraged compliance with medications. Instructed on the importance of avoiding potential triggers. Reviewed COPD zone management tool and s/s to report to MD.    ACP:  Discussed ACP support. Patient confirms his brother as HCDM. Patient is agreeable to have ACP documents sent to his home. Verbalized his plan to update ACM if ACP support is needed. Addressed care gaps. Patient denies any questions. ACM contact information provided should needs arise. Plan for next outreach:  Confirm ACP documents; assess need for support to ensure completion. Care Coordination Interventions    Program Enrollment: Complex Care  Referral from Primary Care Provider: Yes  Suggested Interventions and Community Resources  Fall Risk Prevention: In Process  Medication Assistance Program: In Process  Registered Dietician: In Process  Social Work: In Process  Zone Management Tools: In Process         Goals Addressed                 This Visit's Progress     Community Resource Goal   On track     I will complete referral to community agency Medication Assistance for assistance.      Barriers: lack of motivation, lack of support, and stress  Plan for overcoming my barriers:  Patient will complete Med Assist application as directed. Confidence: 8/10  Anticipated Goal Completion Date: 7/13/22 4/13/22:  Referral made to Med Assist with request for support. Referral made for LSW support. Prior to Admission medications    Medication Sig Start Date End Date Taking?  Authorizing Provider   cilostazol (PLETAL) 50 MG tablet TAKE 1 TABLET BY MOUTH TWICE DAILY 5/4/22   Rody Ramey MD   blood glucose monitor strips Twice a day testing d/t elevated blood sugar 4/27/22   BRENNAN Gordon CNP   blood glucose monitor kit and supplies Test once daily 4/12/22   Rody Ramey MD   metFORMIN (GLUCOPHAGE) 500 MG tablet Take 1 tablet by mouth 2 times daily (with meals) 4/12/22   Rody Ramey MD   Osmar Limb 160-4.5 MCG/ACT AERO Inhale 2 puffs into the lungs 2 times daily 3/8/22   Suze Best MD   [facility] RESPIMAT 2.5 MCG/ACT AERS inhaler Inhale 2 puffs into the lungs daily 3/8/22   Suze Best MD   ipratropium-albuterol (DUONEB) 0.5-2.5 (3) MG/3ML SOLN nebulizer solution Inhale 1 vial into the lungs every 4 hours    Historical Provider, MD   dilTIAZem (CARDIZEM CD) 300 MG extended release capsule Take 1 capsule by mouth daily 1/27/22   BRENNAN Gordon CNP   atorvastatin (LIPITOR) 40 MG tablet TAKE 1 TABLET BY MOUTH DAILY 11/29/21   Rody Ramey MD   aspirin (ASPIRIN LOW DOSE) 81 MG EC tablet Take 1 tablet by mouth daily 4/5/21   Rody Ramey MD   OXYGEN Inhale 2 L into the lungs continuous     Historical Provider, MD   Nebulizers (COMPRESSOR/NEBULIZER) MISC Use qid 6/13/17   Rody Ramey MD       Future Appointments   Date Time Provider Carrie Aldana   5/6/2022  1:00 PM Rody Ramey MD [facility]   7/11/2022  1:45 PM Suze Best MD [facility]   11/4/2022 12:10 PM Beatriz Jang MD [facility]     ,   Diabetes Assessment    Medic Alert ID: No  Meal Planning: Avoidance of concentrated sweets, Calorie counting   How often do you test your blood sugar?: Daily   Do you have barriers with adherence to non-pharmacologic self-management interventions?  (Nutrition/Exercise/Self-Monitoring): No   Have you ever had to go to the ED for symptoms of low blood sugar?: No       Do you have hyperglycemia symptoms?: No   Do you have hypoglycemia symptoms?: No   Last Blood Sugar Value: 200   Blood Sugar Monitoring Regimen: Before Meals   Blood Sugar Trends: No Change      ,   COPD Assessment    Does the patient understand envrionmental exposure?: Yes  Is the patient able to verbalize Rescue vs. Long Acting medications?: Yes  Does the patient have a nebulizer?: Yes  Does the patient use a space with inhaled medications?: No     No patient-reported symptoms         Symptoms:     Have you had a recent diagnosis of pneumonia either by PCP or at a hospital?: No      and   General Assessment

## 2022-05-05 ENCOUNTER — CARE COORDINATION (OUTPATIENT)
Dept: CARE COORDINATION | Age: 67
End: 2022-05-05

## 2022-05-05 NOTE — CARE COORDINATION
Larissa Alejandro  5/5/2022    Registered Dietitian Progress Note for Care Coordination    Assessment: Suki Gaitan is a 61 y.o. male. RD referred for Diabetes and Weight Loss Tips. RD spoke with patient for initial nutrition assessment on 4/15. RD called to follow up with pt today 5/5. Patient states he did not receive the educational handouts in the mail, RD verified address and will resend handouts. RD discussed previous goals with pt. Patient states he is trying to eat more consistently throughout the day. RD reiterated the importance of not skipping meals and making meals/snacks balanced. Patient states he has been eating fruit in the morning for breakfast. Discussed incorporating a protein source at every meal and snack. Reviewed protein sources. Provided examples of balanced snacks: greek yogurt with fruit, fruit and hard boiled egg, banana with peanut butter, etc. Discussed supplementing with Glucerna as needed for a meal replacement instead of skipping meals completely. RD offered to mail coupons, pt accepted and very appreciative. Patient states he is hooked on diet coke and is trying to cut back on intake- RD suggested carbonated water. Patient asked specifically about zero sugar Gatorade- discussed this would be a good option. Patient states he has lost 8 lbs- RD acknowledged this and encouraged patient to continue the awesome work. Patient is checking BS daily. Per ACM note, pt's BS is running between 200-240 mg/dL. Reiterated the importance of taking medicine as directed and checking BS daily. Pt verbalizes understanding. RD reiterated the importance of making small changes and the big impact they have with time. Pt has no nutrition related questions at this time. Barriers to meeting goals: overwhelmed by complexity of regimen      Nutrition Monitoring and Evaluation  Indicator/Goal Criteria Progress   #1 Eat balanced meals consistently throughout the day. Do not skip meals.  #1 Focus on eating 3 meals/day and make these meals balanced using the MyPlate [id number]-7/1 plate fruits and/or vegetables, 1/4 plate protein and 1/4 plate starchy carbohydrates with 8 oz glass of low fat milk if desired. #1 Pt is eating more consistently. Pt will focus on making meals and snacks more balanced by incorporating each component of the plate. #2  Monitor BS daily and keep a log. #2 Check BS daily and take medicine as directed by doctor. #2 Pt is checking BS daily. #3  Supplement with Glucerna as needed. #3 Utilize coupons to buy Glucerna. Supplement with Glucerna for a meal replacement, to supplement a meal or as a snack. #3 New goal set today 5/5/22. Plan of Care:  RD encouraged pt to keep working toward goals set. RD will mail Glucerna coupons. RD will follow up with pt to ensure coupons were received, discuss any questions pt has and check the progress toward goals. Follow Up:    RD will call pt in 3 weeks to follow up and answer any nutrition related questions at that time.      [address] [facility], [facility]

## 2022-05-06 ENCOUNTER — CARE COORDINATION (OUTPATIENT)
Dept: CARE COORDINATION | Age: 67
End: 2022-05-06

## 2022-05-06 ENCOUNTER — OFFICE VISIT (OUTPATIENT)
Dept: INTERNAL MEDICINE CLINIC | Age: 67
End: 2022-05-06
Payer: MEDICARE

## 2022-05-06 VITALS
OXYGEN SATURATION: 93 % | DIASTOLIC BLOOD PRESSURE: 76 MMHG | SYSTOLIC BLOOD PRESSURE: 136 MMHG | HEART RATE: 84 BPM | RESPIRATION RATE: 18 BRPM | BODY MASS INDEX: 30.58 KG/M2 | WEIGHT: 264.6 LBS

## 2022-05-06 DIAGNOSIS — E11.65 TYPE 2 DIABETES MELLITUS WITH HYPERGLYCEMIA, WITHOUT LONG-TERM CURRENT USE OF INSULIN (HCC): ICD-10-CM

## 2022-05-06 DIAGNOSIS — H35.3190 NONEXUDATIVE AGE-RELATED MACULAR DEGENERATION, UNSPECIFIED LATERALITY, UNSPECIFIED STAGE: Primary | ICD-10-CM

## 2022-05-06 PROCEDURE — 1036F TOBACCO NON-USER: CPT | Performed by: INTERNAL MEDICINE

## 2022-05-06 PROCEDURE — 3046F HEMOGLOBIN A1C LEVEL >9.0%: CPT | Performed by: INTERNAL MEDICINE

## 2022-05-06 PROCEDURE — G8417 CALC BMI ABV UP PARAM F/U: HCPCS | Performed by: INTERNAL MEDICINE

## 2022-05-06 PROCEDURE — 3017F COLORECTAL CA SCREEN DOC REV: CPT | Performed by: INTERNAL MEDICINE

## 2022-05-06 PROCEDURE — 2022F DILAT RTA XM EVC RTNOPTHY: CPT | Performed by: INTERNAL MEDICINE

## 2022-05-06 PROCEDURE — G8427 DOCREV CUR MEDS BY ELIG CLIN: HCPCS | Performed by: INTERNAL MEDICINE

## 2022-05-06 PROCEDURE — 99213 OFFICE O/P EST LOW 20 MIN: CPT | Performed by: INTERNAL MEDICINE

## 2022-05-06 RX ORDER — GLIPIZIDE 5 MG/1
5 TABLET ORAL 2 TIMES DAILY
Qty: 60 TABLET | Refills: 3 | Status: SHIPPED | OUTPATIENT
Start: 2022-05-06 | End: 2022-05-06

## 2022-05-06 RX ORDER — GLIPIZIDE 5 MG/1
TABLET ORAL
Qty: 180 TABLET | Refills: 1 | Status: SHIPPED | OUTPATIENT
Start: 2022-05-06 | End: 2022-06-10

## 2022-05-06 NOTE — PROGRESS NOTES
Tj Denson  1955 05/06/22    SUBJECTIVE:      Patient compliant with medications for diabetes. Blood sugars have averaged around 250, with pt checking blood sugar 1 time daily. Patient has had no episodes of significant hypoglycemia. Pt with known macular degeneration, has not had eval for a few years. OBJECTIVE:    /76   Pulse 84   Resp 18   Wt 264 lb 9.6 oz (120 kg)   SpO2 93%   BMI 30.58 kg/m²     Physical Exam    ASSESSMENT:    1. Nonexudative age-related macular degeneration, unspecified laterality, unspecified stage    2. Type 2 diabetes mellitus with hyperglycemia, without long-term current use of insulin ([address]        PLAN:    Nanci Durand was seen today for 2 week follow-up and referral - general.    Diagnoses and all orders for this visit:    Nonexudative age-related macular degeneration, unspecified laterality, unspecified stage - refer to ophtho  -     Amb External Referral To Ophthalmology    Type 2 diabetes mellitus with hyperglycemia, without long-term current use of insulin ([address] - will add glipizide; invokana probably better choice but pt is severely limited by finances  -     glipiZIDE (GLUCOTROL) 5 MG tablet;  Take 1 tablet by mouth 2 times daily

## 2022-05-06 NOTE — CARE COORDINATION
HC contacted patient to follow up for any possible social needs. Patient states that he is doing well and just got home from his doctors visit. Patient states that his doctor prescribed him a new medication to help keep his blood sugar under control and he will be starting that. Patient denies any other social needs at this time and is appreciative of follow up call. Patient is agreeable to follow up call in 2 weeks and if no other needs at that time [facility] will sign off of patients care team. [facility]. confirms patient has contact information and is advised to call with any further needs or concerns. Will follow up with patient in 2 weeks.

## 2022-05-10 ENCOUNTER — TELEMEDICINE (OUTPATIENT)
Dept: INTERNAL MEDICINE CLINIC | Age: 67
End: 2022-05-10
Payer: MEDICARE

## 2022-05-10 DIAGNOSIS — Z00.00 INITIAL MEDICARE ANNUAL WELLNESS VISIT: Primary | ICD-10-CM

## 2022-05-10 PROCEDURE — 3017F COLORECTAL CA SCREEN DOC REV: CPT | Performed by: INTERNAL MEDICINE

## 2022-05-10 PROCEDURE — G0438 PPPS, INITIAL VISIT: HCPCS | Performed by: INTERNAL MEDICINE

## 2022-05-10 ASSESSMENT — LIFESTYLE VARIABLES: HOW OFTEN DO YOU HAVE A DRINK CONTAINING ALCOHOL: NEVER

## 2022-05-10 ASSESSMENT — PATIENT HEALTH QUESTIONNAIRE - PHQ9
SUM OF ALL RESPONSES TO PHQ QUESTIONS 1-9: 2
SUM OF ALL RESPONSES TO PHQ QUESTIONS 1-9: 2
SUM OF ALL RESPONSES TO PHQ9 QUESTIONS 1 & 2: 2
SUM OF ALL RESPONSES TO PHQ QUESTIONS 1-9: 2
2. FEELING DOWN, DEPRESSED OR HOPELESS: 2
1. LITTLE INTEREST OR PLEASURE IN DOING THINGS: 0
SUM OF ALL RESPONSES TO PHQ QUESTIONS 1-9: 2

## 2022-05-10 NOTE — PROGRESS NOTES
Medicare Annual Wellness Visit    Flex Tom is here for Medicare AWV    Assessment & Plan   Initial Medicare annual wellness visit      Recommendations for Preventive Services Due: see orders and patient instructions/AVS.  Recommended screening schedule for the next 5-10 years is provided to the patient in written form: see Patient Instructions/AVS.     No follow-ups on file. Subjective       Patient's complete Health Risk Assessment and screening values have been reviewed and are found in Flowsheets. The following problems were reviewed today and where indicated follow up appointments were made and/or referrals ordered. Positive Risk Factor Screenings with Interventions:    Fall Risk:  Do you feel unsteady or are you worried about falling? : (!) yes (neuropathy in legs)  2 or more falls in past year?: no  Fall with injury in past year?: no     Fall Risk Interventions:    · Patient advised to follow-up in this office for further evaluation and treatment within 1 month(s)   · Patient saw PCP 5/6/22 and is scheduled for a follow up visit on 6/10/22. Cognitive:   Words recalled: 2 Words Recalled  Total Score Interpretation: Abnormal Mini-Cog  Did the patient refuse to take the cognition test?: No    Cognitive Impairment Interventions:  · Patient declines any further evaluation/treatment for cognitive impairment  · Patient did not sound as if he was cognitively impaired           General Health and ACP:  General  In general, how would you say your health is?: (!) Poor (lungs and legs)  In the past 7 days, have you experienced any of the following: New or Increased Pain, New or Increased Fatigue, Loneliness, Social Isolation, Stress or Anger?: (!) Yes  Select all that apply: (!) Loneliness,Social Isolation,Anger (lives alone; administration)  Do you get the social and emotional support that you need?: Yes  Do you have a Living Will?: (!) No    Advance Directives     Power of [employer] Will ACP-Advance Directive ACP-Power of     Not on File Not on File Not on File Not on File      General Health Risk Interventions:  · Poor self-assessment of health status: patient states he feels his health is poor due to his lungs and legs  · Loneliness: patient's comments regarding inadequate social support: states he lives alone and will get lonely at times  · Social isolation: patient's comments regarding inadequate social support: states he does feel isolated due to not getting out much  · Anger: patient's comments regarding reasons for stress and/or anger: patient states anger at our current government administration  · No Living Will: Advance Care Planning addressed with patient today    Health Habits/Nutrition:     Physical Activity: Inactive    Days of Exercise per Week: 0 days    Minutes of Exercise per Session: 0 min     Have you lost any weight without trying in the past 3 months?: No     Have you seen the dentist within the past year?: N/A - wear dentures    Health Habits/Nutrition Interventions:  · Inadequate physical activity:  patient is not ready to increase his/her physical activity level at this time  · Nutritional issues:  patient is not ready to address his/her nutritional/weight issues at this time    Hearing/Vision:  Do you or your family notice any trouble with your hearing that hasn't been managed with hearing aids?: No  Do you have difficulty driving, watching TV, or doing any of your daily activities because of your eyesight?: (!) Yes (macular degeneration that is worsening)  Have you had an eye exam within the past year?: Yes  No exam data present    Hearing/Vision Interventions:  · Vision concerns:  patient states he has worsening macular degeneration, has had the injections, states he was told there was nothing more they could do    Safety:  Do you have working smoke detectors?: Yes  Do you have any tripping hazards - loose or unsecured carpets or rugs?: No  Do you have any tripping hazards - clutter in doorways, halls, or stairs?: No  Do you have either shower bars, grab bars, non-slip mats or non-slip surfaces in your shower or bathtub?: (!) No  Do all of your stairways have a railing or banister?: Not Applicable  Do you always fasten your seatbelt when you are in a car?: Yes    Safety Interventions:  · Home safety tips provided verbally    ADLs:  In the past 7 days, did you need help from others to perform any of the following everyday activities: Eating, dressing, grooming, bathing, toileting, or walking/balance?: No  In the past 7 days, did you need help from others to take care of any of the following: Laundry, housekeeping, banking/finances, shopping, telephone use, food preparation, transportation, or taking medications?: (!) Yes  Select all that apply: (!) Food Preparation,Transportation (cousin helps; uses dial a ride)    ADL Interventions:  · Patient declines any further evaluation/treatment for this issue  · patient states his cousin lives down the [address] from him and will come and help him with ADLs. · Patient states he uses \"dial-a-ride\" for transportation. Objective      Patient-Reported Vitals  No data recorded     Unable to obtain 3 vital signs due to patient not having equipment to take blood pressure/temperature. Allergies   Allergen Reactions    Metoprolol      \"Chest Pain And Burning In The Chest\"     Prior to Visit Medications    Medication Sig Taking?  Authorizing Provider   glipiZIDE (GLUCOTROL) 5 MG tablet TAKE 1 TABLET BY MOUTH TWICE DAILY Yes Abigail Ordaz MD   cilostazol (PLETAL) 50 MG tablet TAKE 1 TABLET BY MOUTH TWICE DAILY Yes Abigail Ordaz MD   blood glucose monitor strips Twice a day testing d/t elevated blood sugar Yes Luisa Ganser, APRN - CNP   blood glucose monitor kit and supplies Test once daily Yes Abigail Ordaz MD   metFORMIN (GLUCOPHAGE) 500 MG tablet Take 1 tablet by mouth 2 times daily (with meals) Yes Zoie Garcia MD   SYMBICORT 160-4.5 MCG/ACT AERO Inhale 2 puffs into the lungs 2 times daily Yes Robert Khoury MD   SPIRIVA RESPIMAT 2.5 MCG/ACT AERS inhaler Inhale 2 puffs into the lungs daily Yes Robert Khoury MD   ipratropium-albuterol (DUONEB) 0.5-2.5 (3) MG/3ML SOLN nebulizer solution Inhale 1 vial into the lungs every 4 hours Yes Historical Provider, MD   dilTIAZem (CARDIZEM CD) 300 MG extended release capsule Take 1 capsule by mouth daily Yes BRENNAN Sanchez - CNP   atorvastatin (LIPITOR) 40 MG tablet TAKE 1 TABLET BY MOUTH DAILY Yes Zoie Garcia MD   aspirin (ASPIRIN LOW DOSE) 81 MG EC tablet Take 1 tablet by mouth daily Yes Zoie Garcia MD   OXYGEN Inhale 2 L into the lungs continuous  Yes Historical Provider, MD   Nebulizers (COMPRESSOR/NEBULIZER) MISC Use qid Yes Zoie Garcia MD       CareTeam (Including outside providers/suppliers regularly involved in providing care):   Patient Care Team:  Zoie Garcia MD as PCP - General  Zoie Garcia MD as PCP - [facility] Empaneled Provider  Ben Foster RN as [address] [place], [place] [address]DEWEY flower, LD as Dietitian (Dietitian)  Rafia Jasso as Health     Reviewed and updated this visit:  Tobacco  Allergies  Meds  Med Hx  Surg Hx  Soc Hx  Fam Hx           Acie Basurto, was evaluated through a synchronous (real-time) audio-video encounter. The patient (or guardian if applicable) is aware that this is a billable service, which includes applicable co-pays. This Virtual Visit was conducted with patient's (and/or legal guardian's) consent. The visit was conducted pursuant to the emergency declaration under the [facility], [address] [place] [address] authority and the [employer] General Act. Patient identification was verified, and a caregiver was present when appropriate.  The patient was located at home in a state where the provider was licensed to provide care. Adolfo Hansen LPN, 8/46/[birth year], performed the documented evaluation under the direct supervision of the attending physician. Luis M Bone, was evaluated through a synchronous (real-time) audio encounter. The patient (or guardian if applicable) is aware that this is a billable service, which includes applicable co-pays. This Virtual Visit was conducted with patient's (and/or legal guardian's) consent. The visit was conducted pursuant to the emergency declaration under the [address] [place] [address], [address] [place] [address] authority and the [employer] General Act. Patient identification was verified, and a caregiver was present when appropriate. The patient was located at home in a state where the provider was licensed to provide care. Total time spent for this encounter: Not billed by time    --Dyan Del Angel LPN on 4/00/4827 at 6:57 AM    An electronic signature was used to authenticate this note. This encounter was performed under Skyler morgan MDs, direct supervision, 5/10/2022.

## 2022-05-10 NOTE — PATIENT INSTRUCTIONS
Personalized Preventive Plan for Bart Crouch - 5/10/2022  Medicare offers a range of preventive health benefits. Some of the tests and screenings are paid in full while other may be subject to a deductible, co-insurance, and/or copay. Some of these benefits include a comprehensive review of your medical history including lifestyle, illnesses that may run in your family, and various assessments and screenings as appropriate. After reviewing your medical record and screening and assessments performed today your provider may have ordered immunizations, labs, imaging, and/or referrals for you. A list of these orders (if applicable) as well as your Preventive Care list are included within your After Visit Summary for your review. Other Preventive Recommendations:    · A preventive eye exam performed by an eye specialist is recommended every 1-2 years to screen for glaucoma; cataracts, macular degeneration, and other eye disorders. · A preventive dental visit is recommended every 6 months. · Try to get at least 150 minutes of exercise per week or 10,000 steps per day on a pedometer . · Order or download the FREE \"Exercise & Physical Activity: Your Everyday Guide\" from [employer]. Call 7-981.909.7292 or search [employer] online. · You need 0151-9480 mg of calcium and 4466-4203 IU of vitamin D per day. It is possible to meet your calcium requirement with diet alone, but a vitamin D supplement is usually necessary to meet this goal.  · When exposed to the sun, use a sunscreen that protects against both UVA and UVB radiation with an SPF of 30 or greater. Reapply every 2 to 3 hours or after sweating, drying off with a towel, or swimming. · Always wear a seat belt when traveling in a car. Always wear a helmet when riding a bicycle or motorcycle.

## 2022-05-11 ENCOUNTER — CARE COORDINATION (OUTPATIENT)
Dept: CARE COORDINATION | Age: 67
End: 2022-05-11

## 2022-05-11 NOTE — CARE COORDINATION
Ambulatory Care Coordination Note  5/11/2022  CM Risk Score: 6  Charlson 10 Year Mortality Risk Score: 100%     ACC: Bernardino Newell RN    Summary Note:   Spoke with patient for ACM follow up. Patient reports that he is feeling ok. Reports that he is tired. Patient states that his thoughts are clear and he is sleeping at night. Denies increased SOB, CP, wheezing or cough. Patient reports that he is in the [facility]. Encouraged compliance with medications; routine Provider follow up. Instructed patient to avoid potential disease triggers such as humidity; pollution, dust, fumes or cigarette smoke. Reviewed COPD zone management and s/s to report to MD.    Confirmed that patient is following with LSW to address support needs. Patient denies any questions or concerns at this time. ACM contact information provided. Encouraged patient to reach out if needs arise. Plan for next outreach:  ACP support. Care Coordination Interventions    Program Enrollment: Complex Care  Referral from Primary Care Provider: Yes  Suggested Interventions and Community Resources  Fall Risk Prevention: In Process  Medication Assistance Program: In Process  Registered Dietician: In Process  Social Work: In Process  Zone Management Tools: In Process  Other Services or Interventions: ACP support         Goals Addressed                 This Visit's Progress     Community Resource Goal   On track     I will complete referral to community agency Medication Assistance for assistance. Barriers: lack of motivation, lack of support, and stress  Plan for overcoming my barriers:  Patient will complete Med Assist application as directed. Confidence: 8/10  Anticipated Goal Completion Date: 7/13/22 4/13/22:  Referral made to Med Assist with request for support. Referral made for LSW support. Prior to Admission medications    Medication Sig Start Date End Date Taking?  Authorizing Provider   glipiZIDE (GLUCOTROL) 5 MG tablet TAKE 1 TABLET BY MOUTH TWICE DAILY 5/6/22   Oralia Ferguson MD   cilostazol (PLETAL) 50 MG tablet TAKE 1 TABLET BY MOUTH TWICE DAILY 5/4/22   Oralia Ferguson MD   blood glucose monitor strips Twice a day testing d/t elevated blood sugar 4/27/22   BRENNAN Fischer CNP   blood glucose monitor kit and supplies Test once daily 4/12/22   Oralia Ferguson MD   metFORMIN (GLUCOPHAGE) 500 MG tablet Take 1 tablet by mouth 2 times daily (with meals) 4/12/22   Oralia Ferguson MD   SYMBICORT 160-4.5 MCG/ACT AERO Inhale 2 puffs into the lungs 2 times daily 3/8/22   Karen Dykes MD   [facility] RESPIMAT 2.5 MCG/ACT AERS inhaler Inhale 2 puffs into the lungs daily 3/8/22   Karen Dykes MD   ipratropium-albuterol (DUONEB) 0.5-2.5 (3) MG/3ML SOLN nebulizer solution Inhale 1 vial into the lungs every 4 hours    Historical Provider, MD   dilTIAZem (CARDIZEM CD) 300 MG extended release capsule Take 1 capsule by mouth daily 1/27/22   BRENNAN Fischer CNP   atorvastatin (LIPITOR) 40 MG tablet TAKE 1 TABLET BY MOUTH DAILY 11/29/21   Oralia Ferguson MD   aspirin (ASPIRIN LOW DOSE) 81 MG EC tablet Take 1 tablet by mouth daily 4/5/21   Oralia Ferguson MD   OXYGEN Inhale 2 L into the lungs continuous     Historical Provider, MD   Nebulizers (COMPRESSOR/NEBULIZER) MISC Use qid 6/13/17   Oralia Ferguson MD       Future Appointments   Date Time Provider Carrie Aldana   6/10/2022  1:00 PM Oralia Ferguson MD [address] [place] [facility]   7/11/2022  1:45 PM Karen Dykes MD [facility]   11/4/2022 12:10 PM Tommie Durand MD [facility]   5/30/2023  9:30 AM SCHEDULE, [address] AWV LPN [address] [facility]     ,   COPD Assessment    Does the patient understand envrionmental exposure?: Yes  Is the patient able to verbalize Rescue vs. Long Acting medications?: Yes  Does the patient have a nebulizer?: Yes  Does the patient use a space with inhaled medications?: No            Symptoms:     Symptom course: no change  Change in chronic cough?: No/At Baseline  Change in sputum?: No/At Baseline      and   General Assessment    Do you have any symptoms that are causing concern?: No

## 2022-05-20 ENCOUNTER — CARE COORDINATION (OUTPATIENT)
Dept: CARE COORDINATION | Age: 67
End: 2022-05-20

## 2022-05-25 ENCOUNTER — CARE COORDINATION (OUTPATIENT)
Dept: CARE COORDINATION | Age: 67
End: 2022-05-25

## 2022-05-25 NOTE — CARE COORDINATION
Ambulatory Care Coordination Note  5/25/2022  CM Risk Score: 6  Charlson 10 Year Mortality Risk Score: 100%     ACC: Karuna Webster RN    Summary Note:   Spoke with patient for ACM follow up. Patient reports that he is doing about the same. DM:  Patient reports that he is checking his BS once daily; encouraged log for Provider review. Reports that it has been fluctuating as he has not been following his diet. Patient reports that he has been eating more sweets since he quit smoking. Instructed on the importance of low sugar/ low carb/ portion control diet. Encouraged patient to try sugar free candy or gum to help with urges. Reviewed DM zone management tool; encouraged exercise, hydration. Instructed on worsening s/s to report to MD.    COPD:  Instructed on the importance of compliance with medication as directed. Encouraged routine Provider follow up. Instructed patient to avoid potential triggers such as humidity, pollution, dust or cigarette smoke. Reviewed COPD zone management tool. Discussed worsening s/s to report to MD.    Discussed care gaps. Patient denies any questions. Patient is agreeable to have ACP documents mailed to his home for review. No questions or concerns noted. ACM contact information provided should needs arise. Plan for next outreach:  Confirm/ assess ACP support needs    Lab Results     None          Care Coordination Interventions    Program Enrollment: Complex Care  Referral from Primary Care Provider: Yes  Suggested Interventions and Community Resources  Fall Risk Prevention: In Process  Home Health Services: Declined  Medication Assistance Program: In Process  Medi Set or Pill Pack: Declined  Registered Dietician: In Process  Senior Services: [facility]  Social Work: In Process  Zone Management Tools:  In Process  Other Services or Interventions: ACP support         Goals Addressed                 This Visit's Progress     Community Resource Goal   No change I will complete referral to community agency Medication Assistance for assistance. Barriers: lack of motivation, lack of support, and stress  Plan for overcoming my barriers:  Patient will complete Med Assist application as directed. Confidence: 8/10  Anticipated Goal Completion Date: 7/13/22 4/13/22:  Referral made to Med Assist with request for support. Referral made for LSW support. Prior to Admission medications    Medication Sig Start Date End Date Taking?  Authorizing Provider   glipiZIDE (GLUCOTROL) 5 MG tablet TAKE 1 TABLET BY MOUTH TWICE DAILY 5/6/22   José Miguel Patel MD   cilostazol (PLETAL) 50 MG tablet TAKE 1 TABLET BY MOUTH TWICE DAILY 5/4/22   José Miguel Patel MD   blood glucose monitor strips Twice a day testing d/t elevated blood sugar 4/27/22   BRENNAN Mistry CNP   blood glucose monitor kit and supplies Test once daily 4/12/22   José Miguel Patel MD   metFORMIN (GLUCOPHAGE) 500 MG tablet Take 1 tablet by mouth 2 times daily (with meals) 4/12/22   José Miguel Patel MD   [facility] 160-4.5 MCG/ACT AERO Inhale 2 puffs into the lungs 2 times daily 3/8/22   Geovanny Choudhary MD   [facility] RESPIMAT 2.5 MCG/ACT AERS inhaler Inhale 2 puffs into the lungs daily 3/8/22   Geovanny Choudhary MD   ipratropium-albuterol (DUONEB) 0.5-2.5 (3) MG/3ML SOLN nebulizer solution Inhale 1 vial into the lungs every 4 hours    Historical Provider, MD   dilTIAZem (CARDIZEM CD) 300 MG extended release capsule Take 1 capsule by mouth daily 1/27/22   BRENNAN Mistry CNP   atorvastatin (LIPITOR) 40 MG tablet TAKE 1 TABLET BY MOUTH DAILY 11/29/21   José Miguel Patel MD   aspirin (ASPIRIN LOW DOSE) 81 MG EC tablet Take 1 tablet by mouth daily 4/5/21   José Miguel Patel MD   OXYGEN Inhale 2 L into the lungs continuous     Historical Provider, MD   Nebulizers (COMPRESSOR/NEBULIZER) MISC Use qid 6/13/17   José Miguel Patel MD       Future Appointments Date Time Provider Carrie Aldana   6/10/2022  1:00 PM Omar Cummins MD [address] [facility]   7/11/2022  1:45 PM Bryce Carter MD [facility] [place]   11/4/2022 12:10 PM Ishan Zambrano MD [facility]   5/30/2023  9:30 AM SCHEDULE, [address] [place] AWV LPN [facility]     ,   Diabetes Assessment    Medic Alert ID: No  Meal Planning: Avoidance of concentrated sweets, Calorie counting   How often do you test your blood sugar?: Daily   Do you have barriers with adherence to non-pharmacologic self-management interventions?  (Nutrition/Exercise/Self-Monitoring): No   Have you ever had to go to the ED for symptoms of low blood sugar?: No       No patient-reported symptoms   Do you have hyperglycemia symptoms?: No   Do you have hypoglycemia symptoms?: No      ,   COPD Assessment    Does the patient understand envrionmental exposure?: Yes  Is the patient able to verbalize Rescue vs. Long Acting medications?: Yes  Does the patient have a nebulizer?: Yes  Does the patient use a space with inhaled medications?: No     No patient-reported symptoms         Symptoms:     Change in chronic cough?: No/At Baseline  Change in sputum?: No/At Baseline      and   General Assessment    Do you have any symptoms that are causing concern?: No

## 2022-05-26 ENCOUNTER — CARE COORDINATION (OUTPATIENT)
Dept: CARE COORDINATION | Age: 67
End: 2022-05-26

## 2022-05-26 NOTE — CARE COORDINATION
Luis M Bone  5/26/2022    Registered Dietitian Progress Note for Care Coordination    Assessment: Massimo Dunham is a 61 y.o. male. RD referred for Diabetes and Weight Loss Tips. RD spoke with patient for initial nutrition assessment on 4/15 and has been following up with patient. RD called to follow up with pt today 5/26. Pt states he received the educational handouts in the mail but has not yet reviewed the informaiton. RD discussed previous goals with pt. Patient states he is trying to eat more consistently. Reiterated the importance of not skipping meals. Patient states he is drinking Glucerna in the morning for breakfast and eating dinner daily. Discussed eating a balanced snack for lunch or drinking a Glucerna as a meal replacement for lunch instead of skipping the meal completely. Discussed incorporating more fruits, vegetable, whole grains and lean protein. Pt states tonight for dinner he is making brats and sauerkraut. Discussed the importance of watching sodium intake. Reviewed the importance of checking BS daily- pt states this AM  mg/dL. Reiterated the importance of taking medicine as directed and eating balanced meals/snacks consistently to help manage BS. Pt has no nutrition related questions at this time. Per chart review, patient has a PCP appointment on 6/10/22.     Barriers to meeting goals: overwhelmed by complexity of regimen        Nutrition Monitoring and Evaluation  Indicator/Goal Criteria Progress   #1 Eat balanced meals consistently throughout the day. Do not skip meals. #1 Focus on eating 3 meals/day and make these meals balanced using the MyPlate [id number]-0/8 plate fruits and/or vegetables, 1/4 plate protein and 1/4 plate starchy carbohydrates with 8 oz glass of low fat milk if desired. #1 Pt is eating 1 meal/day. Pt will focus on making meals and snacks more balanced by incorporating each component of the plate. #2  Monitor BS daily and keep a log.  #2 Check BS daily and take medicine as directed by doctor. #2 Pt is checking BS daily. Pt states this AM  mg/dL. #3  Supplement with Glucerna as needed. #3 Utilize coupons to buy Glucerna. Supplement with Glucerna for a meal replacement, to supplement a meal or as a snack. #3 Pt is drinking Glucerna in the morning as a meal replacement for breakfast. Pt will drink one for lunch or eat a balanced snack.          Plan of Care:  RD encouraged pt to keep working toward goals set. RD will follow up with pt to discuss any questions pt has and check the progress toward goals. Follow Up:    RD will call pt in 3 weeks to follow up and answer any nutrition related questions at that time.      [facility], [address] [place] [address]

## 2022-05-27 ENCOUNTER — CARE COORDINATION (OUTPATIENT)
Dept: CARE COORDINATION | Age: 67
End: 2022-05-27

## 2022-05-27 NOTE — CARE COORDINATION
Phone call to Moe to follow up regarding ACP. Moe confirmed he received the ACP documents in the mail on 5/26/22. Fariba Tk reported he is going to ask a friend to assist him in completing the forms as he has trouble with vision. VIELKA reminded Fariba Tk that he will need to unrelated individuals to sign as witnesses or he will need to have the documents notarized. Asked Moe to contact grant Arzola with any questions. He stated he has her number and will call with any questions.

## 2022-06-10 ENCOUNTER — OFFICE VISIT (OUTPATIENT)
Dept: INTERNAL MEDICINE CLINIC | Age: 67
End: 2022-06-10
Payer: MEDICARE

## 2022-06-10 VITALS
DIASTOLIC BLOOD PRESSURE: 78 MMHG | HEART RATE: 96 BPM | RESPIRATION RATE: 16 BRPM | WEIGHT: 315 LBS | BODY MASS INDEX: 42.67 KG/M2 | SYSTOLIC BLOOD PRESSURE: 118 MMHG | OXYGEN SATURATION: 92 %

## 2022-06-10 DIAGNOSIS — I10 BENIGN ESSENTIAL HTN: ICD-10-CM

## 2022-06-10 DIAGNOSIS — J41.0 SIMPLE CHRONIC BRONCHITIS (HCC): Primary | ICD-10-CM

## 2022-06-10 DIAGNOSIS — E11.65 TYPE 2 DIABETES MELLITUS WITH HYPERGLYCEMIA, WITHOUT LONG-TERM CURRENT USE OF INSULIN (HCC): ICD-10-CM

## 2022-06-10 DIAGNOSIS — E11.9 CONTROLLED TYPE 2 DIABETES MELLITUS WITHOUT COMPLICATION, WITHOUT LONG-TERM CURRENT USE OF INSULIN (HCC): ICD-10-CM

## 2022-06-10 PROCEDURE — 3017F COLORECTAL CA SCREEN DOC REV: CPT | Performed by: INTERNAL MEDICINE

## 2022-06-10 PROCEDURE — 3023F SPIROM DOC REV: CPT | Performed by: INTERNAL MEDICINE

## 2022-06-10 PROCEDURE — 1036F TOBACCO NON-USER: CPT | Performed by: INTERNAL MEDICINE

## 2022-06-10 PROCEDURE — 3046F HEMOGLOBIN A1C LEVEL >9.0%: CPT | Performed by: INTERNAL MEDICINE

## 2022-06-10 PROCEDURE — G8427 DOCREV CUR MEDS BY ELIG CLIN: HCPCS | Performed by: INTERNAL MEDICINE

## 2022-06-10 PROCEDURE — 2022F DILAT RTA XM EVC RTNOPTHY: CPT | Performed by: INTERNAL MEDICINE

## 2022-06-10 PROCEDURE — 99213 OFFICE O/P EST LOW 20 MIN: CPT | Performed by: INTERNAL MEDICINE

## 2022-06-10 PROCEDURE — G8417 CALC BMI ABV UP PARAM F/U: HCPCS | Performed by: INTERNAL MEDICINE

## 2022-06-10 RX ORDER — ASPIRIN 81 MG/1
81 TABLET ORAL DAILY
Qty: 90 TABLET | Refills: 3 | Status: SHIPPED | OUTPATIENT
Start: 2022-06-10

## 2022-06-10 RX ORDER — GLIPIZIDE 10 MG/1
TABLET ORAL
Qty: 180 TABLET | Refills: 3 | Status: SHIPPED | OUTPATIENT
Start: 2022-06-10

## 2022-06-10 RX ORDER — GLUCOSAMINE HCL/CHONDROITIN SU 500-400 MG
CAPSULE ORAL
Qty: 100 STRIP | Refills: 5 | Status: SHIPPED | OUTPATIENT
Start: 2022-06-10

## 2022-06-10 NOTE — PROGRESS NOTES
Rocky Foxedinson  1955  06/10/22    SUBJECTIVE:    Patient compliant with medications for diabetes. Blood sugars have averaged around 150-170, with pt checking blood sugar 1 time daily. Patient has had no episodes of significant hypoglycemia. He is not able to afford his inhalers. He will look into the [facility] for meds. OBJECTIVE:    /78   Pulse 96   Resp 16   Wt (!) 369 lb 3.2 oz (167.5 kg)   SpO2 92%   BMI 42.67 kg/m²     Physical Exam    ASSESSMENT:    1. Simple chronic bronchitis ([address]    2. Benign essential HTN    3. Type 2 diabetes mellitus with hyperglycemia, without long-term current use of insulin ([address]    4. Controlled type 2 diabetes mellitus without complication, without long-term current use of insulin ([address]        PLAN:    Daisha Wilson was seen today for 1 month follow-up and medication refill. Diagnoses and all orders for this visit:    Simple chronic bronchitis ([address] - strongly encourage pt to look into the [facility] - the may be able to provide for meds and possibly other needs    Benign essential HTN - at goal  -     aspirin (ASPIRIN LOW DOSE) 81 MG EC tablet; Take 1 tablet by mouth daily    Type 2 diabetes mellitus with hyperglycemia, without long-term current use of insulin ([facility]) - increase glipizide to 10mg BID  -     glipiZIDE (GLUCOTROL) 10 MG tablet; TAKE 1 TABLET BY MOUTH TWICE DAILY    Controlled type 2 diabetes mellitus without complication, without long-term current use of insulin ([facility])  -     blood glucose monitor strips;  Twice a day testing d/t elevated blood sugar

## 2022-06-15 ENCOUNTER — CARE COORDINATION (OUTPATIENT)
Dept: CARE COORDINATION | Age: 67
End: 2022-06-15

## 2022-06-15 NOTE — CARE COORDINATION
Attempted to reach patient for ACM follow up. No answer to phone. Message left with ACM contact information and request for call back. Return call from patient. Reports that he is in the \" middle of something\" and defers ACM assessment. Patient reports that he has asked his cousin to be present with him to assist with ACP and is agreeable to [facility] referral to ACP Specialist to assist with completion at that time. Patient reports that his cousin is on vacation for a few weeks. Patient is agreeable to [facility] outreach in 2 weeks to check on status. ACM contact information provided. Encouraged patient to reach out if needs arise.

## 2022-06-16 ENCOUNTER — CARE COORDINATION (OUTPATIENT)
Dept: CARE COORDINATION | Age: 67
End: 2022-06-16

## 2022-06-16 NOTE — CARE COORDINATION
Yeny Kirk  6/16/2022    Registered Dietitian Progress Note for Care Coordination    Assessment: John Nieves is a 61 y.o. male. RD referred for Diabetes and Weight Loss Tips. RD spoke with patient for initial nutrition assessment on 4/15 and has been following up with patient. RD called to follow up with pt today 6/16/22. Pt states things are \"good. \" RD discussed previous goals with pt. Patient is trying to eat consistently throughout the day- reiterated the importance of not skipping meals. Patient is drinking a Glucerna for breakfast daily and eating dinner daily. Discussed focusing on making dinner more balanced by incorporating each component of the plate method using MyPlate. Discussed eating a balanced snack or drinking a Glucerna for lunch instead of skipping the meal completely. Reviewed the importance of taking medicine as directed and checking BS daily. Pt states BS is coming down and is running in the 130 mg/dL range. Per chart review, patient had OV with PCP on 6/10/22. Patient has no nutrition related questions or concerns at this time.        Nutrition Monitoring and Evaluation  Indicator/Goal Criteria Progress   #1 Eat balanced meals consistently throughout the day. Do not skip meals. #1 Focus on eating 3 meals/day and make these meals balanced using the MyPlate AVTMNRCRN-2/7 plate fruits and/or vegetables, 1/4 plate protein and 1/4 plate starchy carbohydrates with 8 oz glass of low fat milk if desired. #1 Pt is eating dinner daily. Pt will focus on eating a balanced snack for lunch. #2  Monitor BS daily and keep a log. #2 Check BS daily and take medicine as directed by doctor. #2 Pt is checking BS daily. Pt states BS is running in the 130 mg/dL range. #3  Supplement with Glucerna as needed.  #3 Utilize coupons to buy Glucerna. Supplement with Glucerna for a meal replacement, to supplement a meal or as a snack.  #3 Pt is drinking Glucerna for breakfast daily.         Plan of Care:  RD encouraged pt to keep working toward goals set. RD explained to pt this is final follow up call and provided contact information to pt. Encouraged pt to call RD in future with any nutrition related questions or concerns. Follow Up:    Final follow up call today 6/16/22. RD will continue to follow/assist with patient return call.         [facility], [address] [place] [address]

## 2022-06-29 DIAGNOSIS — I48.91 ATRIAL FIBRILLATION, UNSPECIFIED TYPE (HCC): ICD-10-CM

## 2022-06-29 DIAGNOSIS — Z98.890 S/P ABLATION OF ATRIAL FIBRILLATION: ICD-10-CM

## 2022-06-29 DIAGNOSIS — E78.00 HYPERCHOLESTEREMIA: ICD-10-CM

## 2022-06-29 DIAGNOSIS — Z86.79 S/P ABLATION OF ATRIAL FIBRILLATION: ICD-10-CM

## 2022-06-29 RX ORDER — AMIODARONE HYDROCHLORIDE 200 MG/1
TABLET ORAL
Qty: 90 TABLET | Refills: 1 | OUTPATIENT
Start: 2022-06-29

## 2022-06-29 RX ORDER — ATORVASTATIN CALCIUM 40 MG/1
40 TABLET, FILM COATED ORAL DAILY
Qty: 90 TABLET | Refills: 1 | Status: SHIPPED | OUTPATIENT
Start: 2022-06-29

## 2022-07-06 ENCOUNTER — CARE COORDINATION (OUTPATIENT)
Dept: CARE COORDINATION | Age: 67
End: 2022-07-06

## 2022-07-06 NOTE — CARE COORDINATION
In Process  Other Services or Interventions: ACP support         Goals Addressed                 This Visit's Progress     Community Resource Goal   No change     I will complete referral to community agency Medication Assistance for assistance. Barriers: lack of motivation, lack of support, and stress  Plan for overcoming my barriers:  Patient will complete Med Assist application as directed. Confidence: 8/10  Anticipated Goal Completion Date: 7/13/22 4/13/22:  Referral made to Med Assist with request for support. Referral made for LSW support. Prior to Admission medications    Medication Sig Start Date End Date Taking?  Authorizing Provider   atorvastatin (LIPITOR) 40 MG tablet TAKE 1 TABLET BY MOUTH DAILY 6/29/22   Arun Khalil MD   aspirin (ASPIRIN LOW DOSE) 81 MG EC tablet Take 1 tablet by mouth daily 6/10/22   Arun Khalil MD   glipiZIDE (GLUCOTROL) 10 MG tablet TAKE 1 TABLET BY MOUTH TWICE DAILY 6/10/22   Arun Khalil MD   blood glucose monitor strips Twice a day testing d/t elevated blood sugar 6/10/22   Arun Khalil MD   cilostazol (PLETAL) 50 MG tablet TAKE 1 TABLET BY MOUTH TWICE DAILY 5/4/22   Arun Khalil MD   blood glucose monitor kit and supplies Test once daily 4/12/22   Arun Khalil MD   metFORMIN (GLUCOPHAGE) 500 MG tablet Take 1 tablet by mouth 2 times daily (with meals) 4/12/22   Arun Khalil MD   [facility] 160-4.5 MCG/ACT AERO Inhale 2 puffs into the lungs 2 times daily 3/8/22   Carla Kasper MD   [facility] 2.5 MCG/ACT AERS inhaler Inhale 2 puffs into the lungs daily 3/8/22   Carla Kasper MD   ipratropium-albuterol (DUONEB) 0.5-2.5 (3) MG/3ML SOLN nebulizer solution Inhale 1 vial into the lungs every 4 hours    Historical Provider, MD   dilTIAZem (CARDIZEM CD) 300 MG extended release capsule Take 1 capsule by mouth daily 1/27/22   Dimitrius Ethan, APRN - CNP   OXYGEN Inhale 2 L into the lungs continuous     Historical Provider, MD   Nebulizers (COMPRESSOR/NEBULIZER) MISC Use qid 6/13/17   Pilar Olivares MD       Future Appointments   Date Time Provider Carrie Katya   7/11/2022  1:45 PM Paresh Martinez MD [facility]   8/16/2022  1:15 PM Pilar Olivares MD [address] [place] [facility]   11/4/2022 12:10 PM Maureen Kawasaki, MD [facility]   5/30/2023  9:30 AM SCHEDULE, [address] [place] [facility] LPN [address] [place] [facility]     ,   Diabetes Assessment    Medic Alert ID: No  Meal Planning: Avoidance of concentrated sweets, Calorie counting   How often do you test your blood sugar?: Daily   Do you have barriers with adherence to non-pharmacologic self-management interventions?  (Nutrition/Exercise/Self-Monitoring): No   Have you ever had to go to the ED for symptoms of low blood sugar?: No          ,   COPD Assessment    Does the patient understand envrionmental exposure?: Yes  Is the patient able to verbalize Rescue vs. Long Acting medications?: Yes  Does the patient have a nebulizer?: Yes  Does the patient use a space with inhaled medications?: No            Symptoms:         and   General Assessment    Do you have any symptoms that are causing concern?: No

## 2022-08-04 ENCOUNTER — CARE COORDINATION (OUTPATIENT)
Dept: CARE COORDINATION | Age: 67
End: 2022-08-04

## 2022-08-11 ENCOUNTER — CARE COORDINATION (OUTPATIENT)
Dept: CARE COORDINATION | Age: 67
End: 2022-08-11

## 2022-08-11 NOTE — CARE COORDINATION
Ambulatory Care Coordination Note  8/11/2022    ACC: Luly Arthur RN    Summary Note:   Spoke with patient for ACM follow up. Patient reports that he is doing ok. DM:  BS is fluctuating. Reports that it is running around 200. Instructed on low concentrated sugar/ low carb/ portion control diet. Patient reports that he is on a fixed income and relies on pantries for support. Instructed on RD/ DE support. Patient declines. Reports that he \" knows what he should eat\"; but \" it is too expensive. Patient declines further LSW support. Discussed care gaps. Patient denies any questions. Discussed support/ resource needs. Patient declines USS. Reports that he has applied for AAA in the past and is not interested in this program.  Patient reports that his neighbors are helpful. Instructed on the recommendation for routine Provider follow up. Confirmed appointment 8/18/22. Confirmed that patient has ACP documents. Encouraged follow through with LSW to ensure completion. Patient denies any questions or concerns at this time. Denies the need for ongoing ACM outreach. Agreeable to progress toward graduation. ACM contact information provided. Encouraged return call if needs arise. No further outreach planned. Lab Results       None            Care Coordination Interventions    Program Enrollment: Complex Care  Referral from Primary Care Provider: Yes  Suggested Interventions and Community Resources  Fall Risk Prevention: In Process  Home Health Services: Declined  Medication Assistance Program: In Process  Medi Set or Pill Pack: Declined  Registered Dietician: In Process  Senior Services: [facility]  Social Work: In Process  Zone Management Tools:  In Process  Other Services or Interventions: ACP support          Goals Addressed                   This Visit's Progress     Community Resource Goal   On track     I will complete referral to community agency Medication Assistance for assistance. Barriers: lack of motivation, lack of support, and stress  Plan for overcoming my barriers:  Patient will complete Med Assist application as directed. Confidence: 8/10  Anticipated Goal Completion Date: 7/13/22 4/13/22:  Referral made to Med Assist with request for support. Referral made for LSW support. Prior to Admission medications    Medication Sig Start Date End Date Taking?  Authorizing Provider   ipratropium-albuterol (DUONEB) 0.5-2.5 (3) MG/3ML SOLN nebulizer solution Inhale 3 mLs into the lungs every 4 hours 7/11/22   Oziel Fernando MD   atorvastatin (LIPITOR) 40 MG tablet TAKE 1 TABLET BY MOUTH DAILY 6/29/22   Tabitha Vergara MD   aspirin (ASPIRIN LOW DOSE) 81 MG EC tablet Take 1 tablet by mouth daily 6/10/22   Tabitha Vergara MD   glipiZIDE (GLUCOTROL) 10 MG tablet TAKE 1 TABLET BY MOUTH TWICE DAILY 6/10/22   Tabitha Vergara MD   blood glucose monitor strips Twice a day testing d/t elevated blood sugar 6/10/22   Tabitha Vergara MD   cilostazol (PLETAL) 50 MG tablet TAKE 1 TABLET BY MOUTH TWICE DAILY 5/4/22   Tabitha Vergara MD   blood glucose monitor kit and supplies Test once daily 4/12/22   Tabitha Vergara MD   metFORMIN (GLUCOPHAGE) 500 MG tablet Take 1 tablet by mouth 2 times daily (with meals) 4/12/22   Tabitha Vergara MD   [facility][1] 160-4.5 MCG/ACT AERO Inhale 2 puffs into the lungs 2 times daily 3/8/22   Oziel Fernando MD   [facility] RESP[facility] 2.5 MCG/ACT AERS inhaler Inhale 2 puffs into the lungs daily 3/8/22   Oziel Fernando MD   ipratropium-albuterol (DUONEB) 0.5-2.5 (3) MG/3ML SOLN nebulizer solution Inhale 1 vial into the lungs every 4 hours    Historical Provider, MD   dilTIAZem (CARDIZEM CD) 300 MG extended release capsule Take 1 capsule by mouth daily 1/27/22   Shelle Mortimer, APRN - CNP   OXYGEN Inhale 2 L into the lungs continuous     Historical Provider, MD   Nebulizers (COMPRESSOR/NEBULIZER) MISC Use qid 6/13/17   Danisha Gonsalves MD       Future Appointments   Date Time Provider Carrie Farrari   8/16/2022  1:15 PM Danisha Gonsalves MD [facility] IM [facility]   11/4/2022 12:10 PM Miguelina Epperson MD [facility]   11/7/2022  2:00 PM Bran Gamble MD [facility]   5/30/2023  9:30 AM SCHEDULE, [facility] LPN [facility]   ,   Diabetes Assessment    Medic Alert ID: No  Meal Planning: Avoidance of concentrated sweets, Calorie counting   How often do you test your blood sugar?: Daily   Do you have barriers with adherence to non-pharmacologic self-management interventions?  (Nutrition/Exercise/Self-Monitoring): No   Have you ever had to go to the ED for symptoms of low blood sugar?: No       No patient-reported symptoms   Do you have hyperglycemia symptoms?: No   Do you have hypoglycemia symptoms?: No   Last Blood Sugar Value: 200   Blood Sugar Monitoring Regimen: Before Meals   Blood Sugar Trends: No Change        ,   COPD Assessment    Does the patient understand envrionmental exposure?: Yes  Is the patient able to verbalize Rescue vs. Long Acting medications?: Yes  Does the patient have a nebulizer?: Yes  Does the patient use a space with inhaled medications?: No            Symptoms:          , and   General Assessment    Do you have any symptoms that are causing concern?: No

## 2022-09-27 DIAGNOSIS — Z98.890 S/P ABLATION OF ATRIAL FIBRILLATION: ICD-10-CM

## 2022-09-27 DIAGNOSIS — Z86.79 S/P ABLATION OF ATRIAL FIBRILLATION: ICD-10-CM

## 2022-09-27 RX ORDER — DILTIAZEM HYDROCHLORIDE 240 MG/1
240 CAPSULE, COATED, EXTENDED RELEASE ORAL DAILY
Qty: 90 CAPSULE | Refills: 1 | OUTPATIENT
Start: 2022-09-27

## 2022-11-02 DIAGNOSIS — Z98.890 S/P ABLATION OF ATRIAL FIBRILLATION: ICD-10-CM

## 2022-11-02 DIAGNOSIS — Z86.79 S/P ABLATION OF ATRIAL FIBRILLATION: ICD-10-CM

## 2022-11-02 RX ORDER — DILTIAZEM HYDROCHLORIDE 240 MG/1
240 CAPSULE, COATED, EXTENDED RELEASE ORAL DAILY
Qty: 90 CAPSULE | Refills: 1 | OUTPATIENT
Start: 2022-11-02

## 2022-11-04 DIAGNOSIS — I73.9 PVD (PERIPHERAL VASCULAR DISEASE) (HCC): ICD-10-CM

## 2022-11-04 RX ORDER — CILOSTAZOL 50 MG/1
TABLET ORAL
Qty: 180 TABLET | Refills: 0 | Status: SHIPPED | OUTPATIENT
Start: 2022-11-04

## 2022-11-07 RX ORDER — DILTIAZEM HYDROCHLORIDE 300 MG/1
300 CAPSULE, COATED, EXTENDED RELEASE ORAL DAILY
Qty: 90 CAPSULE | Refills: 0 | Status: SHIPPED | OUTPATIENT
Start: 2022-11-07

## 2022-11-14 ENCOUNTER — COMMUNITY OUTREACH (OUTPATIENT)
Dept: INTERNAL MEDICINE CLINIC | Age: 67
End: 2022-11-14

## 2022-12-26 DIAGNOSIS — E78.00 HYPERCHOLESTEREMIA: ICD-10-CM

## 2022-12-27 DIAGNOSIS — E78.00 HYPERCHOLESTEREMIA: ICD-10-CM

## 2022-12-27 RX ORDER — ATORVASTATIN CALCIUM 40 MG/1
40 TABLET, FILM COATED ORAL DAILY
Qty: 90 TABLET | Refills: 1 | Status: SHIPPED | OUTPATIENT
Start: 2022-12-27

## 2022-12-27 RX ORDER — ATORVASTATIN CALCIUM 40 MG/1
40 TABLET, FILM COATED ORAL DAILY
Qty: 90 TABLET | Refills: 1 | OUTPATIENT
Start: 2022-12-27

## 2023-02-07 DIAGNOSIS — E11.9 CONTROLLED TYPE 2 DIABETES MELLITUS WITHOUT COMPLICATION, WITHOUT LONG-TERM CURRENT USE OF INSULIN (HCC): ICD-10-CM

## 2023-02-07 NOTE — TELEPHONE ENCOUNTER
The patient called in for a new blood sugar meter he said his is no longer working . He would like this sent to [facility] .

## 2023-02-28 ENCOUNTER — APPOINTMENT (OUTPATIENT)
Dept: CT IMAGING | Age: 68
DRG: 177 | End: 2023-02-28
Payer: MEDICARE

## 2023-02-28 ENCOUNTER — HOSPITAL ENCOUNTER (INPATIENT)
Age: 68
LOS: 6 days | Discharge: HOME | DRG: 177 | End: 2023-03-06
Attending: EMERGENCY MEDICINE | Admitting: STUDENT IN AN ORGANIZED HEALTH CARE EDUCATION/TRAINING PROGRAM
Payer: MEDICARE

## 2023-02-28 ENCOUNTER — APPOINTMENT (OUTPATIENT)
Dept: GENERAL RADIOLOGY | Age: 68
DRG: 177 | End: 2023-02-28
Payer: MEDICARE

## 2023-02-28 DIAGNOSIS — R09.02 HYPOXEMIA: ICD-10-CM

## 2023-02-28 DIAGNOSIS — E78.00 HYPERCHOLESTEREMIA: ICD-10-CM

## 2023-02-28 DIAGNOSIS — U07.1 COVID-19: ICD-10-CM

## 2023-02-28 DIAGNOSIS — J44.1 COPD EXACERBATION (HCC): Primary | ICD-10-CM

## 2023-02-28 LAB
ANION GAP SERPL CALCULATED.3IONS-SCNC: 11 MMOL/L (ref 4–16)
BASE EXCESS MIXED: 6.3 (ref 0–1.2)
BASOPHILS ABSOLUTE: 0 K/CU MM
BASOPHILS RELATIVE PERCENT: 0.4 % (ref 0–1)
BUN SERPL-MCNC: 9 MG/DL (ref 6–23)
CALCIUM SERPL-MCNC: 8.7 MG/DL (ref 8.3–10.6)
CHLORIDE BLD-SCNC: 91 MMOL/L (ref 99–110)
CO2: 28 MMOL/L (ref 21–32)
COMMENT: ABNORMAL
CREAT SERPL-MCNC: 0.6 MG/DL (ref 0.9–1.3)
CRP SERPL HS-MCNC: 59.2 MG/L
D DIMER: <200 NG/ML(DDU)
DIFFERENTIAL TYPE: ABNORMAL
EKG ATRIAL RATE: 91 BPM
EKG ATRIAL RATE: 97 BPM
EKG DIAGNOSIS: NORMAL
EKG DIAGNOSIS: NORMAL
EKG P AXIS: 117 DEGREES
EKG P-R INTERVAL: 174 MS
EKG P-R INTERVAL: 194 MS
EKG Q-T INTERVAL: 330 MS
EKG Q-T INTERVAL: 368 MS
EKG QRS DURATION: 102 MS
EKG QRS DURATION: 86 MS
EKG QTC CALCULATION (BAZETT): 419 MS
EKG QTC CALCULATION (BAZETT): 452 MS
EKG R AXIS: 237 DEGREES
EKG R AXIS: 246 DEGREES
EKG T AXIS: 107 DEGREES
EKG T AXIS: 115 DEGREES
EKG VENTRICULAR RATE: 91 BPM
EKG VENTRICULAR RATE: 97 BPM
EOSINOPHILS ABSOLUTE: 0.1 K/CU MM
EOSINOPHILS RELATIVE PERCENT: 1 % (ref 0–3)
ESTIMATED AVERAGE GLUCOSE: 220 MG/DL
FIBRINOGEN LEVEL: 616 MG/DL (ref 196.9–442.1)
GFR SERPL CREATININE-BSD FRML MDRD: >60 ML/MIN/1.73M2
GLUCOSE BLD-MCNC: 180 MG/DL
GLUCOSE BLD-MCNC: 180 MG/DL (ref 70–99)
GLUCOSE BLD-MCNC: 325 MG/DL
GLUCOSE BLD-MCNC: 325 MG/DL (ref 70–99)
GLUCOSE BLD-MCNC: 353 MG/DL (ref 70–99)
GLUCOSE BLD-MCNC: 448 MG/DL (ref 70–99)
GLUCOSE SERPL-MCNC: 200 MG/DL (ref 70–99)
HBA1C MFR BLD: 9.3 % (ref 4.2–6.3)
HCO3 VENOUS: 33.1 MMOL/L (ref 19–25)
HCT VFR BLD CALC: 44.8 % (ref 42–52)
HEMOGLOBIN: 15 GM/DL (ref 13.5–18)
IMMATURE NEUTROPHIL %: 0.2 % (ref 0–0.43)
IRON: 51 UG/DL (ref 59–158)
LACTATE DEHYDROGENASE: 118 IU/L (ref 120–246)
LACTATE: 2.4 MMOL/L (ref 0.5–1.9)
LV EF: 50 %
LVEF MODALITY: NORMAL
LYMPHOCYTES ABSOLUTE: 1 K/CU MM
LYMPHOCYTES RELATIVE PERCENT: 12.9 % (ref 24–44)
MAGNESIUM: 1.7 MG/DL (ref 1.8–2.4)
MCH RBC QN AUTO: 30.5 PG (ref 27–31)
MCHC RBC AUTO-ENTMCNC: 33.5 % (ref 32–36)
MCV RBC AUTO: 91.1 FL (ref 78–100)
MONOCYTES ABSOLUTE: 0.8 K/CU MM
MONOCYTES RELATIVE PERCENT: 9.7 % (ref 0–4)
NUCLEATED RBC %: 0 %
O2 SAT, VEN: 74.8 % (ref 50–70)
PCO2, VEN: 56 MMHG (ref 38–52)
PCT TRANSFERRIN: 25 % (ref 10–44)
PDW BLD-RTO: 13.8 % (ref 11.7–14.9)
PH VENOUS: 7.38 (ref 7.32–7.42)
PLATELET # BLD: 144 K/CU MM (ref 140–440)
PMV BLD AUTO: 9.4 FL (ref 7.5–11.1)
PO2, VEN: 44 MMHG (ref 28–48)
POTASSIUM SERPL-SCNC: 4.1 MMOL/L (ref 3.5–5.1)
PRO-BNP: 104.3 PG/ML
RAPID INFLUENZA  B AGN: NEGATIVE
RAPID INFLUENZA A AGN: NEGATIVE
RBC # BLD: 4.92 M/CU MM (ref 4.6–6.2)
SARS-COV-2 RDRP RESP QL NAA+PROBE: DETECTED
SEGMENTED NEUTROPHILS ABSOLUTE COUNT: 6.1 K/CU MM
SEGMENTED NEUTROPHILS RELATIVE PERCENT: 75.8 % (ref 36–66)
SODIUM BLD-SCNC: 130 MMOL/L (ref 135–145)
SOURCE: ABNORMAL
T4 FREE SERPL-MCNC: 1.06 NG/DL (ref 0.9–1.8)
TOTAL IMMATURE NEUTOROPHIL: 0.02 K/CU MM
TOTAL IRON BINDING CAPACITY: 204 UG/DL (ref 250–450)
TOTAL NUCLEATED RBC: 0 K/CU MM
TROPONIN T: <0.01 NG/ML
TSH SERPL DL<=0.005 MIU/L-ACNC: 2.95 UIU/ML (ref 0.27–4.2)
UNSATURATED IRON BINDING CAPACITY: 153 UG/DL (ref 110–370)
WBC # BLD: 8.1 K/CU MM (ref 4–10.5)

## 2023-02-28 PROCEDURE — 83615 LACTATE (LD) (LDH) ENZYME: CPT

## 2023-02-28 PROCEDURE — 80048 BASIC METABOLIC PNL TOTAL CA: CPT

## 2023-02-28 PROCEDURE — 93005 ELECTROCARDIOGRAM TRACING: CPT | Performed by: NURSE PRACTITIONER

## 2023-02-28 PROCEDURE — 6370000000 HC RX 637 (ALT 250 FOR IP): Performed by: NURSE PRACTITIONER

## 2023-02-28 PROCEDURE — 84484 ASSAY OF TROPONIN QUANT: CPT

## 2023-02-28 PROCEDURE — 93306 TTE W/DOPPLER COMPLETE: CPT

## 2023-02-28 PROCEDURE — 83880 ASSAY OF NATRIURETIC PEPTIDE: CPT

## 2023-02-28 PROCEDURE — 83540 ASSAY OF IRON: CPT

## 2023-02-28 PROCEDURE — 89220 SPUTUM SPECIMEN COLLECTION: CPT

## 2023-02-28 PROCEDURE — 84439 ASSAY OF FREE THYROXINE: CPT

## 2023-02-28 PROCEDURE — 6370000000 HC RX 637 (ALT 250 FOR IP): Performed by: EMERGENCY MEDICINE

## 2023-02-28 PROCEDURE — 94664 DEMO&/EVAL PT USE INHALER: CPT

## 2023-02-28 PROCEDURE — 82805 BLOOD GASES W/O2 SATURATION: CPT

## 2023-02-28 PROCEDURE — 6360000004 HC RX CONTRAST MEDICATION: Performed by: INTERNAL MEDICINE

## 2023-02-28 PROCEDURE — 86140 C-REACTIVE PROTEIN: CPT

## 2023-02-28 PROCEDURE — 2580000003 HC RX 258: Performed by: NURSE PRACTITIONER

## 2023-02-28 PROCEDURE — 83605 ASSAY OF LACTIC ACID: CPT

## 2023-02-28 PROCEDURE — 6360000002 HC RX W HCPCS: Performed by: NURSE PRACTITIONER

## 2023-02-28 PROCEDURE — 71275 CT ANGIOGRAPHY CHEST: CPT

## 2023-02-28 PROCEDURE — 83735 ASSAY OF MAGNESIUM: CPT

## 2023-02-28 PROCEDURE — 99223 1ST HOSP IP/OBS HIGH 75: CPT | Performed by: INTERNAL MEDICINE

## 2023-02-28 PROCEDURE — 96374 THER/PROPH/DIAG INJ IV PUSH: CPT

## 2023-02-28 PROCEDURE — 6360000002 HC RX W HCPCS: Performed by: EMERGENCY MEDICINE

## 2023-02-28 PROCEDURE — 87804 INFLUENZA ASSAY W/OPTIC: CPT

## 2023-02-28 PROCEDURE — 36415 COLL VENOUS BLD VENIPUNCTURE: CPT

## 2023-02-28 PROCEDURE — 94640 AIRWAY INHALATION TREATMENT: CPT

## 2023-02-28 PROCEDURE — 93010 ELECTROCARDIOGRAM REPORT: CPT | Performed by: INTERNAL MEDICINE

## 2023-02-28 PROCEDURE — 71046 X-RAY EXAM CHEST 2 VIEWS: CPT

## 2023-02-28 PROCEDURE — XW0DXM6 INTRODUCTION OF BARICITINIB INTO MOUTH AND PHARYNX, EXTERNAL APPROACH, NEW TECHNOLOGY GROUP 6: ICD-10-PCS | Performed by: STUDENT IN AN ORGANIZED HEALTH CARE EDUCATION/TRAINING PROGRAM

## 2023-02-28 PROCEDURE — 99285 EMERGENCY DEPT VISIT HI MDM: CPT

## 2023-02-28 PROCEDURE — 82962 GLUCOSE BLOOD TEST: CPT

## 2023-02-28 PROCEDURE — 83036 HEMOGLOBIN GLYCOSYLATED A1C: CPT

## 2023-02-28 PROCEDURE — 83550 IRON BINDING TEST: CPT

## 2023-02-28 PROCEDURE — 1200000000 HC SEMI PRIVATE

## 2023-02-28 PROCEDURE — 94761 N-INVAS EAR/PLS OXIMETRY MLT: CPT

## 2023-02-28 PROCEDURE — 85025 COMPLETE CBC W/AUTO DIFF WBC: CPT

## 2023-02-28 PROCEDURE — 2700000000 HC OXYGEN THERAPY PER DAY

## 2023-02-28 PROCEDURE — 93005 ELECTROCARDIOGRAM TRACING: CPT | Performed by: EMERGENCY MEDICINE

## 2023-02-28 PROCEDURE — 85384 FIBRINOGEN ACTIVITY: CPT

## 2023-02-28 PROCEDURE — 85379 FIBRIN DEGRADATION QUANT: CPT

## 2023-02-28 PROCEDURE — 87635 SARS-COV-2 COVID-19 AMP PRB: CPT

## 2023-02-28 PROCEDURE — 6370000000 HC RX 637 (ALT 250 FOR IP): Performed by: INTERNAL MEDICINE

## 2023-02-28 PROCEDURE — 84443 ASSAY THYROID STIM HORMONE: CPT

## 2023-02-28 RX ORDER — INSULIN LISPRO 100 [IU]/ML
0-4 INJECTION, SOLUTION INTRAVENOUS; SUBCUTANEOUS
Status: DISCONTINUED | OUTPATIENT
Start: 2023-02-28 | End: 2023-03-01

## 2023-02-28 RX ORDER — INSULIN LISPRO 100 [IU]/ML
0-4 INJECTION, SOLUTION INTRAVENOUS; SUBCUTANEOUS
Status: DISCONTINUED | OUTPATIENT
Start: 2023-02-28 | End: 2023-02-28

## 2023-02-28 RX ORDER — SODIUM CHLORIDE 9 MG/ML
INJECTION, SOLUTION INTRAVENOUS PRN
Status: DISCONTINUED | OUTPATIENT
Start: 2023-02-28 | End: 2023-03-06 | Stop reason: HOSPADM

## 2023-02-28 RX ORDER — POLYETHYLENE GLYCOL 3350 17 G/17G
17 POWDER, FOR SOLUTION ORAL DAILY PRN
Status: DISCONTINUED | OUTPATIENT
Start: 2023-02-28 | End: 2023-03-06 | Stop reason: HOSPADM

## 2023-02-28 RX ORDER — MAGNESIUM SULFATE IN WATER 40 MG/ML
2000 INJECTION, SOLUTION INTRAVENOUS PRN
Status: DISCONTINUED | OUTPATIENT
Start: 2023-02-28 | End: 2023-03-06 | Stop reason: HOSPADM

## 2023-02-28 RX ORDER — ATORVASTATIN CALCIUM 40 MG/1
40 TABLET, FILM COATED ORAL DAILY
Status: DISCONTINUED | OUTPATIENT
Start: 2023-02-28 | End: 2023-03-06 | Stop reason: HOSPADM

## 2023-02-28 RX ORDER — METHYLPREDNISOLONE SODIUM SUCCINATE 125 MG/2ML
125 INJECTION, POWDER, LYOPHILIZED, FOR SOLUTION INTRAMUSCULAR; INTRAVENOUS ONCE
Status: COMPLETED | OUTPATIENT
Start: 2023-02-28 | End: 2023-02-28

## 2023-02-28 RX ORDER — DEXTROSE MONOHYDRATE 100 MG/ML
INJECTION, SOLUTION INTRAVENOUS CONTINUOUS PRN
Status: DISCONTINUED | OUTPATIENT
Start: 2023-02-28 | End: 2023-03-06 | Stop reason: HOSPADM

## 2023-02-28 RX ORDER — AMIODARONE HYDROCHLORIDE 200 MG/1
100 TABLET ORAL DAILY
Status: ON HOLD | COMMUNITY
End: 2023-03-06 | Stop reason: HOSPADM

## 2023-02-28 RX ORDER — ACETAMINOPHEN 650 MG/1
650 SUPPOSITORY RECTAL EVERY 6 HOURS PRN
Status: DISCONTINUED | OUTPATIENT
Start: 2023-02-28 | End: 2023-03-06 | Stop reason: HOSPADM

## 2023-02-28 RX ORDER — ONDANSETRON 4 MG/1
4 TABLET, ORALLY DISINTEGRATING ORAL EVERY 8 HOURS PRN
Status: DISCONTINUED | OUTPATIENT
Start: 2023-02-28 | End: 2023-03-06 | Stop reason: HOSPADM

## 2023-02-28 RX ORDER — MAGNESIUM SULFATE 1 G/100ML
1000 INJECTION INTRAVENOUS ONCE
Status: COMPLETED | OUTPATIENT
Start: 2023-02-28 | End: 2023-02-28

## 2023-02-28 RX ORDER — SODIUM CHLORIDE 0.9 % (FLUSH) 0.9 %
5-40 SYRINGE (ML) INJECTION EVERY 12 HOURS SCHEDULED
Status: DISCONTINUED | OUTPATIENT
Start: 2023-02-28 | End: 2023-03-06 | Stop reason: HOSPADM

## 2023-02-28 RX ORDER — INSULIN LISPRO 100 [IU]/ML
0-4 INJECTION, SOLUTION INTRAVENOUS; SUBCUTANEOUS NIGHTLY
Status: DISCONTINUED | OUTPATIENT
Start: 2023-02-28 | End: 2023-03-01

## 2023-02-28 RX ORDER — ENOXAPARIN SODIUM 100 MG/ML
1 INJECTION SUBCUTANEOUS 2 TIMES DAILY
Status: DISCONTINUED | OUTPATIENT
Start: 2023-03-01 | End: 2023-03-06 | Stop reason: HOSPADM

## 2023-02-28 RX ORDER — IPRATROPIUM BROMIDE AND ALBUTEROL SULFATE 2.5; .5 MG/3ML; MG/3ML
1 SOLUTION RESPIRATORY (INHALATION) ONCE
Status: COMPLETED | OUTPATIENT
Start: 2023-02-28 | End: 2023-02-28

## 2023-02-28 RX ORDER — ENOXAPARIN SODIUM 100 MG/ML
1 INJECTION SUBCUTANEOUS 2 TIMES DAILY
Status: DISCONTINUED | OUTPATIENT
Start: 2023-02-28 | End: 2023-02-28 | Stop reason: SDUPTHER

## 2023-02-28 RX ORDER — POTASSIUM CHLORIDE 7.45 MG/ML
10 INJECTION INTRAVENOUS PRN
Status: DISCONTINUED | OUTPATIENT
Start: 2023-02-28 | End: 2023-03-06 | Stop reason: HOSPADM

## 2023-02-28 RX ORDER — INSULIN GLARGINE 100 [IU]/ML
10 INJECTION, SOLUTION SUBCUTANEOUS NIGHTLY
Status: DISCONTINUED | OUTPATIENT
Start: 2023-02-28 | End: 2023-03-02

## 2023-02-28 RX ORDER — BUDESONIDE AND FORMOTEROL FUMARATE DIHYDRATE 160; 4.5 UG/1; UG/1
2 AEROSOL RESPIRATORY (INHALATION) 2 TIMES DAILY
Status: DISCONTINUED | OUTPATIENT
Start: 2023-02-28 | End: 2023-03-06 | Stop reason: HOSPADM

## 2023-02-28 RX ORDER — ACETAMINOPHEN 325 MG/1
650 TABLET ORAL EVERY 6 HOURS PRN
Status: DISCONTINUED | OUTPATIENT
Start: 2023-02-28 | End: 2023-03-06 | Stop reason: HOSPADM

## 2023-02-28 RX ORDER — ONDANSETRON 2 MG/ML
4 INJECTION INTRAMUSCULAR; INTRAVENOUS EVERY 6 HOURS PRN
Status: DISCONTINUED | OUTPATIENT
Start: 2023-02-28 | End: 2023-03-06 | Stop reason: HOSPADM

## 2023-02-28 RX ORDER — SODIUM CHLORIDE 0.9 % (FLUSH) 0.9 %
5-40 SYRINGE (ML) INJECTION PRN
Status: DISCONTINUED | OUTPATIENT
Start: 2023-02-28 | End: 2023-03-06 | Stop reason: HOSPADM

## 2023-02-28 RX ORDER — FUROSEMIDE 10 MG/ML
40 INJECTION INTRAMUSCULAR; INTRAVENOUS 2 TIMES DAILY
Status: DISCONTINUED | OUTPATIENT
Start: 2023-02-28 | End: 2023-02-28

## 2023-02-28 RX ORDER — POTASSIUM CHLORIDE 20 MEQ/1
40 TABLET, EXTENDED RELEASE ORAL PRN
Status: DISCONTINUED | OUTPATIENT
Start: 2023-02-28 | End: 2023-03-06 | Stop reason: HOSPADM

## 2023-02-28 RX ORDER — DEXAMETHASONE SODIUM PHOSPHATE 10 MG/ML
6 INJECTION, SOLUTION INTRAMUSCULAR; INTRAVENOUS EVERY 24 HOURS
Status: DISCONTINUED | OUTPATIENT
Start: 2023-02-28 | End: 2023-03-06 | Stop reason: HOSPADM

## 2023-02-28 RX ORDER — ENOXAPARIN SODIUM 100 MG/ML
1 INJECTION SUBCUTANEOUS 2 TIMES DAILY
Status: DISCONTINUED | OUTPATIENT
Start: 2023-02-28 | End: 2023-02-28

## 2023-02-28 RX ORDER — AMIODARONE HYDROCHLORIDE 200 MG/1
100 TABLET ORAL DAILY
Status: DISCONTINUED | OUTPATIENT
Start: 2023-02-28 | End: 2023-03-01

## 2023-02-28 RX ORDER — ALBUTEROL SULFATE 90 UG/1
2 AEROSOL, METERED RESPIRATORY (INHALATION) 4 TIMES DAILY
Status: DISCONTINUED | OUTPATIENT
Start: 2023-02-28 | End: 2023-03-02

## 2023-02-28 RX ORDER — ASPIRIN 81 MG/1
81 TABLET ORAL DAILY
Status: DISCONTINUED | OUTPATIENT
Start: 2023-02-28 | End: 2023-03-06 | Stop reason: HOSPADM

## 2023-02-28 RX ORDER — DILTIAZEM HYDROCHLORIDE 120 MG/1
120 CAPSULE, COATED, EXTENDED RELEASE ORAL DAILY
Status: DISCONTINUED | OUTPATIENT
Start: 2023-02-28 | End: 2023-03-06 | Stop reason: HOSPADM

## 2023-02-28 RX ADMIN — MAGNESIUM SULFATE IN DEXTROSE 1000 MG: 10 INJECTION, SOLUTION INTRAVENOUS at 11:22

## 2023-02-28 RX ADMIN — DEXAMETHASONE SODIUM PHOSPHATE 6 MG: 10 INJECTION INTRAMUSCULAR; INTRAVENOUS at 14:37

## 2023-02-28 RX ADMIN — IPRATROPIUM BROMIDE 2 PUFF: 17 AEROSOL, METERED RESPIRATORY (INHALATION) at 15:21

## 2023-02-28 RX ADMIN — SODIUM CHLORIDE, PRESERVATIVE FREE 10 ML: 5 INJECTION INTRAVENOUS at 22:46

## 2023-02-28 RX ADMIN — AMIODARONE HYDROCHLORIDE 100 MG: 200 TABLET ORAL at 18:28

## 2023-02-28 RX ADMIN — ENOXAPARIN SODIUM 160 MG: 100 INJECTION SUBCUTANEOUS at 14:44

## 2023-02-28 RX ADMIN — INSULIN LISPRO 12 UNITS: 100 INJECTION, SOLUTION INTRAVENOUS; SUBCUTANEOUS at 18:30

## 2023-02-28 RX ADMIN — INSULIN LISPRO 4 UNITS: 100 INJECTION, SOLUTION INTRAVENOUS; SUBCUTANEOUS at 22:45

## 2023-02-28 RX ADMIN — BUDESONIDE AND FORMOTEROL FUMARATE DIHYDRATE 2 PUFF: 160; 4.5 AEROSOL RESPIRATORY (INHALATION) at 19:07

## 2023-02-28 RX ADMIN — IPRATROPIUM BROMIDE 2 PUFF: 17 AEROSOL, METERED RESPIRATORY (INHALATION) at 19:07

## 2023-02-28 RX ADMIN — ALBUTEROL SULFATE 2 PUFF: 90 AEROSOL, METERED RESPIRATORY (INHALATION) at 15:21

## 2023-02-28 RX ADMIN — IPRATROPIUM BROMIDE AND ALBUTEROL SULFATE 1 AMPULE: 2.5; .5 SOLUTION RESPIRATORY (INHALATION) at 11:42

## 2023-02-28 RX ADMIN — ATORVASTATIN CALCIUM 40 MG: 40 TABLET, FILM COATED ORAL at 14:26

## 2023-02-28 RX ADMIN — BARICITINIB 4 MG: 2 TABLET, FILM COATED ORAL at 18:27

## 2023-02-28 RX ADMIN — DILTIAZEM HYDROCHLORIDE 120 MG: 120 CAPSULE, COATED, EXTENDED RELEASE ORAL at 18:28

## 2023-02-28 RX ADMIN — FUROSEMIDE 40 MG: 10 INJECTION, SOLUTION INTRAVENOUS at 14:38

## 2023-02-28 RX ADMIN — ASPIRIN 81 MG: 81 TABLET, COATED ORAL at 14:26

## 2023-02-28 RX ADMIN — ALBUTEROL SULFATE 2 PUFF: 90 AEROSOL, METERED RESPIRATORY (INHALATION) at 19:07

## 2023-02-28 RX ADMIN — INSULIN GLARGINE 10 UNITS: 100 INJECTION, SOLUTION SUBCUTANEOUS at 22:45

## 2023-02-28 RX ADMIN — IOPAMIDOL 75 ML: 755 INJECTION, SOLUTION INTRAVENOUS at 13:24

## 2023-02-28 RX ADMIN — INSULIN LISPRO 3 UNITS: 100 INJECTION, SOLUTION INTRAVENOUS; SUBCUTANEOUS at 14:39

## 2023-02-28 RX ADMIN — METHYLPREDNISOLONE SODIUM SUCCINATE 125 MG: 125 INJECTION, POWDER, FOR SOLUTION INTRAMUSCULAR; INTRAVENOUS at 11:17

## 2023-02-28 ASSESSMENT — ENCOUNTER SYMPTOMS
EYES NEGATIVE: 1
APNEA: 0
TROUBLE SWALLOWING: 0
CHEST TIGHTNESS: 0
WHEEZING: 1
SHORTNESS OF BREATH: 1
DIARRHEA: 1
ABDOMINAL PAIN: 0
ABDOMINAL DISTENTION: 1
VOMITING: 0
CONSTIPATION: 0
COUGH: 1
NAUSEA: 1
RHINORRHEA: 1

## 2023-02-28 NOTE — ED PROVIDER NOTES
[address] ENCOUNTER      Pt Name: Maureen Wright  MRN: [id number]  Gee 1955  Date of evaluation: 2/28/2023  Provider: Santy Serrano MD  Insurance:  Payor: Holly Newman / Plan: [place] / Product Type: *No Product type* /   Current Code Status   Code Status: Prior     CHIEF COMPLAINT       Chief Complaint   Patient presents with    Shortness of Breath     X 5 days. Baseline on 2L via NC at home. Given breathing tx in medics         HISTORY OF PRESENT ILLNESS    HPI    Nursing Notes were reviewed. This is a 74 y.o. male who presents to the emergency department with increasing shortness of breath over the past week. The patient says he has been feeling generally ill with worsening malaise, and shortness of breath over the past 5 to 7 days. He denies recent fevers. He describes nausea but no vomiting. He denies diarrhea. He denies substernal chest pain. Patient says that today his breathing got worse than usual despite his home use of his albuterol inhaler. EMS reports that they found the patient mildly hypoxic and provided him with a breathing treatment.     PAST MEDICAL HISTORY     Past Medical History:   Diagnosis Date    A-fib [facility])     Resolved after ablation 2018    Anxiety     Arthritis     \"Hips, Knees, Elbows And Fingers\"    COPD (chronic obstructive pulmonary disease) ([facility])     Sees Dr. Bety Lama    Depression     Diabetes mellitus [facility]) Dx 7069'[id number]    Full dentures     H/O angiography 12/13/2018    peripheral angio - LFA occluded, filling collaterals, RSFA 90% stenosis-vasc surg consult    H/O Doppler ultrasound 09/05/2018    LE arterial - left mid and distal femoral artery occluded, lt FANI shows mild-mod PVD    H/O echocardiogram 01/06/2016    EF60% mild MR, TR, pulm HTN    Hx of colonoscopy     7/11 C-scope - benign polyp x1    Hx of Doppler ultrasound 02/20/2018    Lower extremity doppler Normal study.     Hyperlipidemia     Hypertension     Macular degeneration     States is legally blind    Obesity     On home oxygen therapy     Continuous At 2 Liters Per Nasal Cannula    PAD (peripheral artery disease) ([address]     10/10 FANI mild PAD left superficial femoral s/p left fem-pop bypass    Panic attacks     Pneumonia Last Episode In 2018    PTSD (post-traumatic stress disorder)     Restless leg     Shortness of breath     Sleep apnea     Uses BiPap - stopped using 4/2020    Wears glasses     To Read         SURGICAL HISTORY       Past Surgical History:   Procedure Laterality Date    APPENDECTOMY  2003    ATRIAL ABLATION SURGERY  05/23/2018    A-fib ablation     CARDIAC SURGERY  05/2018    \"Heart Ablation Done Twice\"    CHOLECYSTECTOMY, LAPAROSCOPIC  11/12/2018    COLONOSCOPY  07/2011    Polyp Removed    DENTAL SURGERY      All Teeth Extracted In Past    EYE SURGERY Left 2017    \"For Macular Degeneration\"    FEMORAL BYPASS Left 01/2011    FEMORAL BYPASS Left 1/9/2019    FEMORAL POPLITEAL BYPASS LEFT, REDO performed by Helder Baird MD at [facility] Left 1980's    Broken Left Wrist , No Hardware    FRACTURE SURGERY Right 2000's    Broken Right Wrist With Hardware, Hardware Later Removed    HERNIA REPAIR  9199    Umbilical Hernia    [place] LAP,CHOLECYSTECTOMY N/A 11/12/2018    CHOLECYSTECTOMY LAPAROSCOPIC performed by Nuria Ludwig MD at [facility]  1960's         CURRENT MEDICATIONS       Previous Medications    ALBUTEROL SULFATE HFA (PROAIR HFA) 108 (90 BASE) MCG/ACT INHALER    Inhale 2 puffs into the lungs every 6 hours as needed for Wheezing    ASPIRIN (ASPIRIN LOW DOSE) 81 MG EC TABLET    Take 1 tablet by mouth daily    ATORVASTATIN (LIPITOR) 40 MG TABLET    Take 1 tablet by mouth daily    BLOOD GLUCOSE MONITOR KIT AND SUPPLIES    Test once daily    BLOOD GLUCOSE MONITOR STRIPS    Twice a day testing d/t elevated blood sugar    CILOSTAZOL (PLETAL) 50 MG TABLET    TAKE 1 TABLET BY MOUTH TWICE DAILY    DILTIAZEM (CARDIZEM CD) 300 MG EXTENDED RELEASE CAPSULE    Take 1 capsule by mouth daily    GLIPIZIDE (GLUCOTROL) 10 MG TABLET    TAKE 1 TABLET BY MOUTH TWICE DAILY    IPRATROPIUM-ALBUTEROL (DUONEB) 0.5-2.5 (3) MG/3ML SOLN NEBULIZER SOLUTION    Inhale 1 vial into the lungs every 4 hours    IPRATROPIUM-ALBUTEROL (DUONEB) 0.5-2.5 (3) MG/3ML SOLN NEBULIZER SOLUTION    Inhale 3 mLs into the lungs every 4 hours    METFORMIN (GLUCOPHAGE) 500 MG TABLET    TAKE 1 TABLET BY MOUTH TWICE DAILY WITH MEALS    NEBULIZERS (COMPRESSOR/NEBULIZER) MISC    Use qid    OXYGEN    Inhale 2 L into the lungs continuous     SPIRIVA RESPIMAT 2.5 MCG/ACT AERS INHALER    Inhale 2 puffs into the lungs daily    SYMBICORT 160-4.5 MCG/ACT AERO    Inhale 2 puffs into the lungs 2 times daily       ALLERGIES     Metoprolol    FAMILY HISTORY       Family History   Problem Relation Age of Onset    Early Death Father         In His 29's, Suicide    Early Death Mother         In Her 29's, She Was Murdered By     Early Death Brother 24        Automobile Accident    Allergy (Severe) Brother     Arthritis Sister     Mental Illness Sister     Diabetes Sister     Obesity Brother           SOCIAL HISTORY       Social History     Socioeconomic History    Marital status:    Occupational History    Occupation:    Tobacco Use    Smoking status: Former     Packs/day: 1.00     Years: 48.00     Pack years: 48.00     Types: Cigars, Cigarettes     Start date:      Quit date: 2021     Years since quittin.1    Smokeless tobacco: Former     Types: Chew     Quit date:     Tobacco comments:     1 filtered cigar per day   Vaping Use    Vaping Use: Former    Substances: Nicotine    Devices: Pre-filled or refillable cartridge   Substance and Sexual Activity    Alcohol use: No    Drug use: No    Sexual activity: Never   Social History Narrative    Diet unrestricted    Exercise none    Seat belt always Social Determinants of Health     Financial Resource Strain: Medium Risk    Difficulty of Paying Living Expenses: Somewhat hard   Food Insecurity: Food Insecurity Present    Worried About [employer] [employer] in the Last Year: Sometimes true    Ran Out of Food in the Last Year: Sometimes true   Transportation Needs: No Transportation Needs    Lack of Transportation (Medical): No    Lack of Transportation (Non-Medical): No   Physical Activity: Inactive    Days of Exercise per Week: 0 days    Minutes of Exercise per Session: 0 min   Stress: No Stress Concern Present    Feeling of Stress : Only a little   Social Connections: Socially Isolated    Frequency of Communication with Friends and Family: Never    Frequency of Social Gatherings with Friends and Family: Never    Attends [employer] Services: Never    Active Member of Clubs or Organizations: No    Attends Club or Organization Meetings: Never    Marital Status:    Housing Stability: Low Risk     Unable to Pay for Housing in the Last Year: No    Number of [place] in the Last Year: 1    Unstable Housing in the Last Year: No       PHYSICAL EXAM       ED Triage Vitals   BP Temp Temp src Heart Rate Resp SpO2 Height Weight   02/28/23 1025 02/28/23 1021 -- 02/28/23 1021 02/28/23 1021 02/28/23 1012 -- --   128/73 97.7 °F (36.5 °C)  97 28 97 %         Physical Exam  Constitutional:       Appearance: He is well-developed. HENT:      Head: Normocephalic and atraumatic. Eyes:      Extraocular Movements: Extraocular movements intact. Cardiovascular:      Rate and Rhythm: Tachycardia present. Rhythm irregular. Pulmonary:      Effort: Tachypnea, accessory muscle usage and respiratory distress present. Breath sounds: Examination of the right-middle field reveals wheezing. Examination of the left-middle field reveals wheezing. Examination of the right-lower field reveals decreased breath sounds.  Examination of the left-lower field reveals decreased breath sounds and wheezing. Decreased breath sounds and wheezing present. Musculoskeletal:         General: Normal range of motion. Skin:     General: Skin is warm and dry. Neurological:      Mental Status: He is alert and oriented to person, place, and time. Vitals:    Vitals:    02/28/23 1049 02/28/23 1052 02/28/23 1056 02/28/23 1103   BP: 131/68 116/66 116/67 122/65   Pulse: (!) 102 (!) 102 (!) 101 (!) 102   Resp:       Temp:       SpO2: (!) 89% (!) 89% (!) 89% 90%       DIAGNOSTIC RESULTS     EKG: All EKG's are interpreted by the Emergency Department Physician who either signs or Co-signs this chart in the absence of a cardiologist.    Sinus rhythm. Ventricular of 97. NC is 174. QRS is 86. QTc is 419. There are no abnormal ST elevations or depressions. There is no ventricular ectopy. There is no significant change from prior EKG from April 2022. RADIOLOGY:   Non-plain film images such as CT, Ultrasound and MRI are read by the radiologist. Plain radiographic images are visualized and preliminarily interpreted by the emergency physician with the below findings:    Interpretation per the Radiologist below, if available at the time of this note:    XR CHEST (2 VW)   Preliminary Result   1. Persistent but improved pulmonary edema superimposed on chronic lung   changes.              LABS:  Labs Reviewed   COVID-19, RAPID - Abnormal; Notable for the following components:       Result Value    SARS-CoV-2, NAAT DETECTED (*)     All other components within normal limits   CBC WITH AUTO DIFFERENTIAL - Abnormal; Notable for the following components:    Segs Relative 75.8 (*)     Lymphocytes % 12.9 (*)     Monocytes % 9.7 (*)     All other components within normal limits   BASIC METABOLIC PANEL - Abnormal; Notable for the following components:    Sodium 130 (*)     Chloride 91 (*)     Creatinine 0.6 (*)     Glucose 200 (*)     All other components within normal limits   MAGNESIUM - Abnormal; Notable for the following components:    Magnesium 1.7 (*)     All other components within normal limits   POCT GLUCOSE - Abnormal; Notable for the following components:    POC Glucose 180 (*)     All other components within normal limits   POCT GLUCOSE - Normal   RAPID INFLUENZA A/B ANTIGENS   TROPONIN       All other labs were within normal range or not returned as of this dictation.    MEDICAL DECISION MAKING     Medications   ipratropium-albuterol (DUONEB) nebulizer solution 1 ampule (has no administration in time range)   methylPREDNISolone sodium (SOLU-MEDROL) injection 125 mg (has no administration in time range)   magnesium sulfate 1000 mg in dextrose 5% 100 mL IVPB (has no administration in time range)             [place] Coma Scale  Eye Opening: Spontaneous  Best Verbal Response: Oriented  Best Motor Response: Obeys commands  Virgilio Coma Scale Score: 15                     CIWA Assessment  BP: 122/65  Heart Rate: (!) 102                 MDM  This is a 67 y.o. male who presents to the emergency department with increasing shortness of breath over the past week.  The patient says he has been feeling generally ill with worsening malaise, and shortness of breath over the past 5 to 7 days.    He denies recent fevers.  He describes nausea but no vomiting.  He denies diarrhea.  He denies substernal chest pain.    Patient says that today his breathing got worse than usual despite his home use of his albuterol inhaler.  EMS reports that they found the patient mildly hypoxic and provided him with a breathing treatment.    Patient has a moderate hyponatremia of 130.    BUN and creatinine are normal indicating a low likelihood of acute kidney injury or renal failure.      Hemoglobin hematocrit are normal indicating no evidence of significant anemia.    ECG shows no ST elevations and cardiac enzymes are normal indicating a low likelihood of STEMI or NSTEMI    Chest x-ray was completed and shows no evidence of pneumonia,  pneumothorax, and a low likelihood of aortic disease. There is some pulmonary edema of unclear significance. COVID-19 testing is positive in this patient. While talking, the patient has an oxygen saturation drops to 86% on his baseline 2 L via nasal cannula. This indicates a new oxygen requirement for this patient. As a result, he is appropriate for admission and evaluation by medicine and pulmonology. Clinical Diagnoses Addressed  1. COPD exacerbation ([facility]    2. COVID-19    3. Hypoxemia            CONSULTS:  None    PROCEDURES:  Unless otherwise noted below, none     Procedures      FINAL IMPRESSION      1. COPD exacerbation ([facility]    2. COVID-19    3. Hypoxemia          DISPOSITION/PLAN   DISPOSITION Decision To Admit 02/28/2023 11:04:46 AM      PATIENT REFERRED TO:  No follow-up provider specified. DISCHARGE MEDICATIONS:  New Prescriptions    No medications on file     Controlled Substances Monitoring:     No flowsheet data found.     Mi Mendes MD (electronically signed)  Attending Emergency Physician            Mi Mendes MD  02/28/23 [id number]

## 2023-02-28 NOTE — CONSULTS
Pharmacy Consult for Baricitinib Initiation per BRENNAN Gilbert-CNP    Criteria per [address] P[employer] Committee  **All criteria need to be met to receive   Baricitinib for COVID-19 patients**   Age    >5years old     Yes   Laboratory Results    Confirmed positive COVID-19     PLUS    ANY elevation in biomarkers   (CRP, D-dimer, LDH, ferritin)       Yes - CRP     Concomitant Therapy    Receiving systemic steroids for treatment of COVID 19     Yes - Dexamethasone   Oxygen Status        Requiring supplemental oxygen   (>1 L NC, HFNC, Heated Vapotherm, biPAP, mechanical ventilation)        Yes   Special Considerations    Pregnancy  Severe Hepatic Impairment  Chronic/Recurrent Infections   Hemoglobin <8         Clarify risk vs. benefit with provider if criteria met     Contraindications    1. Invasive active mycobacterial or fungal infection  2. Lymphocyte count <200  3. Neutrophil count, ANC <500   4. Significant immunosuppression    ? None     Dosing Recommendations:    Baricitinib 4 mg PO daily x 14 days (or until hospital discharge)    Renal dose adjustment:  -eGFR > 60: no adjustment necessary       Thank you for the consult,  Trace Velazco.  Candi Dyson, [address] [place] [address]  2/28/2023 @ 3:02 PM

## 2023-02-28 NOTE — H&P
V2.0  History and Physical      Name:  Rupali Dennison /Age/Sex: 1955  (74 y.o. male)   MRN & CSN:  [id number] & [id number] Encounter Date/Time: 2023 12:02 PM EST   Location:  [place] [address] PCP: Corrina Kaminski MD       Hospital Day: 1    Assessment and Plan:   Rupali Dennison is a 74 y.o. male with a pmh of COPD, CAD who presents with Filomena Kim 647 Problems             Last Modified POA    * (Principal) COVID-19 2023 Yes       COVID-19  COPD exacerbation secondary to COVID-19  Acute on chronic respiratory failure with hypoxia and hypercapnia    Admit to inpatient , COVID 19 unit    Chest x-ray with pulmonary edema superimposed on chronic lung disease   Start Decadron 6 mg every 12 hours for COPD exacerbation/underlying COVID-19   Obtain CTA pulmonary with contrast due to history of atrial fibrillation, new onset COVID-19,   Patient currently on 4 L nasal cannula, on home O2 at baseline, wean oxygen as tolerated   Scheduled breathing treatments 4 times a day   Telemetry and pulse oximetry   Droplet plus precaution   Check LDH, D-dimer, ferritin, CRP for evidence of inflammatory biomarkers and consideration of initiating baricitinib.   Consult pharmacy to initiate baricitinib   -Start Decadron, continue to azithromycin and Rocephin as initiated in the ED   White count 8.1, heart rate 90s,   Hold off on empiric antibiotics pending CTA results due to COPD exacerbation likely secondary to COVID-19/pulmonary edema from CHF exacerbation, see below   --Guaifenesin cough syrup   Follows with Dr. Moody montez, consulted, appreciate assistance   Continue home Symbicort and Spiriva for COPD      Acute on chronic diastolic heart failure   Coronary artery disease status post CABG  History of atrial fibrillation status post ablation  Acute  diastolic heart failure  Hypervolemic hyponatremia     2/2 fluid overload/acutely decompensated  NT Pro BNP wnl in the setting of obesity , abdominal distention, positive JVD on exam  Last echocardiogram 4/20/2020 EF 55 to 60% with left ventricular hypertrophy, repeat echocardiogram ordered and pending   Troponin less than 0.010, trend x3  EKG reviewed, sinus rhythm with marked sinus arrhythmia, rate 97 no acute ST elevation or depression. Appears Afib on monitor. Will repeat EKG and consult cardiology   Will anticoagulate with weight based lovenox as benefit outweighs risk         Consult cardiology, Dr Giselle Lucas on call          Lasix IV 40 mg IV BID        Strict I&Os, Daily weights, Chem 7 qam   Consult to dietician for diet education, 2 GM NA 2 L FR diet           Noninsulin-dependent type 2 diabetes mellitus with hyperglycemia  Sliding scale insulin plus hypoglycemia protocol, hold home oral antihyperglycemics, check hemoglobin A1c    Continue current home medications for chronic medical conditions including but not limited to    Peripheral vascular disease  Peripheral arterial disease  Essential hypertension  Mixed hyperlipidemia  Depression  Anxiety  Obstructive sleep apnea                  Current Living situation: Home  Expected Disposition: Likely same    Estimated D/C: 4 days    Diet ADULT DIET; Regular; 5 carb choices (75 gm/meal); No Added Salt (3-4 gm); 2000 ml   DVT Prophylaxis [x] Lovenox, []  Heparin, [] SCDs, [] Ambulation,  [] Eliquis, [] Xarelto, [] Coumadin   Code Status Prior   Surrogate Decision Maker/ POA      History from:     patient, electronic medical record    History of Present Illness:     Chief Complaint: Shortness of breath, malaise    Jacinto Meehan is a 74 y.o. male with pmh of COPD with home O2, CAD status post CABG, who presents to the ED with complaints of increasing shortness of breath with exertion, malaise, fatigue over the past week. Onset of symptoms 5 to 7 days ago. Denies any known fevers but endorses chills.   Endorses upper respiratory congestion, cough productive with thick green/yellow sputum, difficulty breathing when laying flat. Endorses nausea, denies vomiting. Endorses intermittent diarrhea, poor appetite. Denies any substernal chest pain. States today his breathing got worse despite his usual home dose of his albuterol inhaler prompting him to call EMS. EMS found him hypoxic at 88% on his home oxygen of 2 L on arrival, gave him a breathing treatment and brought him to the ED for evaluation. Patient was found to be COVID-19 positive on admission. Patient endorses receiving 2 initial COVID vaccinations but did not receive any boosters. Additional ED work-up reveals sodium of 130, glucose 200, magnesium 1.7. Patient endorses prior history of atrial fibrillation with ablation. Denies any palpitations or current use of anticoagulation. Denies any prior history of DVT or PE. Endorses feelings of abdominal fullness, lower extremity edema, difficulty breathing when laying flat and bending over. States his abdomen feels much more swollen than normal and his pants are much tighter than normal.  Additional review of symptoms as noted below     Review of Systems: Need 10 Elements   Review of Systems   Constitutional:  Positive for activity change, appetite change, chills and fatigue. Negative for fever and unexpected weight change. HENT:  Positive for congestion and rhinorrhea. Negative for trouble swallowing. Eyes: Negative. Respiratory:  Positive for cough, shortness of breath and wheezing. Negative for apnea and chest tightness. Cardiovascular:  Negative for chest pain and leg swelling. Gastrointestinal:  Positive for abdominal distention, diarrhea and nausea. Negative for abdominal pain, constipation and vomiting. Abdominal swelling/ fullness   Endocrine: Negative. Genitourinary:  Positive for scrotal swelling. Negative for dysuria and hematuria. Musculoskeletal:  Positive for arthralgias and myalgias. Negative for neck pain. Skin:  Negative for wound.    Neurological:  Negative for dizziness and light-headedness. Psychiatric/Behavioral:  Negative for agitation and confusion. All other systems reviewed and are negative. Objective:   No intake or output data in the 24 hours ending 02/28/23 1243   Vitals:   Vitals:    02/28/23 1134 02/28/23 1143 02/28/23 1145 02/28/23 1208   BP:   124/71 113/60   Pulse: 99 (!) 101 97 98   Resp: 24 26 22 (!) 32   Temp:       SpO2: 91% 90% (!) 89% 91%       Medications Prior to Admission     Prior to Admission medications    Medication Sig Start Date End Date Taking?  Authorizing Provider   blood glucose monitor kit and supplies Test once daily 2/7/23   Marcus Diaz MD   ipratropium-albuterol (DUONEB) 0.5-2.5 (3) MG/3ML SOLN nebulizer solution Inhale 3 mLs into the lungs every 4 hours 2/6/23   Jorge Guerrero MD   [facility] 160-4.5 MCG/ACT AERO Inhale 2 puffs into the lungs 2 times daily 2/6/23   Jorge Guerrero MD   albuterol sulfate HFA (PROAIR HFA) 108 (90 Base) MCG/ACT inhaler Inhale 2 puffs into the lungs every 6 hours as needed for Wheezing 2/6/23   Jorge Guerrero MD   atorvastatin (LIPITOR) 40 MG tablet Take 1 tablet by mouth daily 12/27/22   Marcus Diaz MD   dilTIAZem (CARDIZEM CD) 300 MG extended release capsule Take 1 capsule by mouth daily 11/7/22   Marcus Diaz MD   cilostazol (PLETAL) 50 MG tablet TAKE 1 TABLET BY MOUTH TWICE DAILY 11/4/22   Marcus Diaz MD   metFORMIN (GLUCOPHAGE) 500 MG tablet TAKE 1 TABLET BY MOUTH TWICE DAILY WITH MEALS 11/2/22   Marcus Diaz MD   aspirin (ASPIRIN LOW DOSE) 81 MG EC tablet Take 1 tablet by mouth daily 6/10/22   Marcus Diaz MD   glipiZIDE (GLUCOTROL) 10 MG tablet TAKE 1 TABLET BY MOUTH TWICE DAILY 6/10/22   Marcus Diaz MD   blood glucose monitor strips Twice a day testing d/t elevated blood sugar 6/10/22   Marcus Diaz MD   SPIRIVA RESPIMAT 2.5 MCG/ACT AERS inhaler Inhale 2 puffs into the lungs daily 3/8/22   [facility] Idolina Prader, MD   ipratropium-albuterol (DUONEB) 0.5-2.5 (3) MG/3ML SOLN nebulizer solution Inhale 1 vial into the lungs every 4 hours    Historical Provider, MD   OXYGEN Inhale 2 L into the lungs continuous     Historical Provider, MD   Nebulizers (COMPRESSOR/NEBULIZER) MISC Use qid 6/13/17   Jumana Saenz MD       Physical Exam: Need 8 Elements   Physical Exam  Vitals and nursing note reviewed. Constitutional:       General: He is not in acute distress. Appearance: He is morbidly obese. He is ill-appearing. Interventions: Nasal cannula in place. HENT:      Head: Normocephalic. Nose: Congestion present. Mouth/Throat:      Mouth: Mucous membranes are moist.      Pharynx: Oropharynx is clear. Eyes:      Pupils: Pupils are equal, round, and reactive to light. Cardiovascular:      Rate and Rhythm: Normal rate. Rhythm irregular. Pulses: Normal pulses. Pulmonary:      Effort: Pulmonary effort is normal. No respiratory distress. Breath sounds: Wheezing, rhonchi and rales present. Abdominal:      General: Abdomen is flat. Bowel sounds are normal. There is no distension. Musculoskeletal:         General: Normal range of motion. Cervical back: Normal range of motion. Skin:     General: Skin is warm and dry. Capillary Refill: Capillary refill takes less than 2 seconds. Neurological:      General: No focal deficit present. Mental Status: He is alert and oriented to person, place, and time. Psychiatric:         Mood and Affect: Mood normal.         Behavior: Behavior is cooperative.           Past Medical History:   PMHx   Past Medical History:   Diagnosis Date    A-fib [facility])     Resolved after ablation 2018    Anxiety     Arthritis     \"Hips, Knees, Elbows And Fingers\"    COPD (chronic obstructive pulmonary disease) ([facility])     Sees Dr. Idolina Prader    Depression     Diabetes mellitus [facility]) Dx 1990's    Full dentures     H/O angiography 12/13/2018    peripheral angio - LFA occluded, filling collaterals, RSFA 90% stenosis-vasc surg consult    H/O Doppler ultrasound 09/05/2018    LE arterial - left mid and distal femoral artery occluded, lt FANI shows mild-mod PVD    H/O echocardiogram 01/06/2016    EF60% mild MR, TR, pulm HTN    Hx of colonoscopy     7/11 C-scope - benign polyp x1    Hx of Doppler ultrasound 02/20/2018    Lower extremity doppler  Normal study. Hyperlipidemia     Hypertension     Macular degeneration     States is legally blind    Obesity     On home oxygen therapy     Continuous At 2 Liters Per Nasal Cannula    PAD (peripheral artery disease) ([facility])     10/10 FANI mild PAD left superficial femoral s/p left fem-pop bypass    Panic attacks     Pneumonia Last Episode In 2018    PTSD (post-traumatic stress disorder)     Restless leg     Shortness of breath     Sleep apnea     Uses BiPap - stopped using 4/2020    Wears glasses     To Read     PSHX:  has a past surgical history that includes Atrial ablation surgery (05/23/2018); pr laparoscopy surg cholecystectomy (N/A, 11/12/2018); Colonoscopy (07/2011); eye surgery (Left, 2017); Dental surgery; Cardiac surgery (05/2018); Cholecystectomy, laparoscopic (11/12/2018); Appendectomy (2003); hernia repair (2003); femoral bypass (Left, 01/2011); Tonsillectomy (1960's); fracture surgery (Left, 1980's); fracture surgery (Right, 2000's); and femoral bypass (Left, 1/9/2019). Allergies: Allergies   Allergen Reactions    Metoprolol      \"Chest Pain And Burning In The Chest\"     Fam HX:  family history includes Allergy (Severe) in his brother; Arthritis in his sister; Diabetes in his sister; Early Death in his father and mother; Early Death (age of onset: 24) in his brother; Mental Illness in his sister; Obesity in his brother.   Soc HX:   Social History     Socioeconomic History    Marital status:    Occupational History    Occupation:    Tobacco Use    Smoking status: Former     Packs/day: 1.00     Years: 48.00     Pack years: 48.00     Types: Cigars, Cigarettes     Start date:      Quit date: 2021     Years since quittin.1    Smokeless tobacco: Former     Types: Chew     Quit date:     Tobacco comments:     1 filtered cigar per day   Vaping Use    Vaping Use: Former    Substances: Nicotine    Devices: Pre-filled or refillable cartridge   Substance and Sexual Activity    Alcohol use: No    Drug use: No    Sexual activity: Never   Social History Narrative    Diet unrestricted    Exercise none    Seat belt always             Social Determinants of Health     Financial Resource Strain: Medium Risk    Difficulty of Paying Living Expenses: Somewhat hard   Food Insecurity: Food Insecurity Present    Worried About [address] [place] [address] in the Last Year: Sometimes true    Ran Out of Food in the Last Year: Sometimes true   Transportation Needs: No Transportation Needs    Lack of Transportation (Medical): No    Lack of Transportation (Non-Medical): No   Physical Activity: Inactive    Days of Exercise per Week: 0 days    Minutes of Exercise per Session: 0 min   Stress: No Stress Concern Present    Feeling of Stress :  Only a little   Social Connections: Socially Isolated    Frequency of Communication with Friends and Family: Never    Frequency of Social Gatherings with Friends and Family: Never    Attends Sabianist Services: Never    Active Member of Clubs or Organizations: No    Attends Club or Organization Meetings: Never    Marital Status:    Housing Stability: Low Risk     Unable to Pay for Housing in the Last Year: No    Number of [place] in the Last Year: 1    Unstable Housing in the Last Year: No       Medications:   Medications:    dexamethasone  6 mg IntraVENous Q24H    furosemide  40 mg IntraVENous BID    enoxaparin  1 mg/kg (Order-Specific) SubCUTAneous BID      Infusions:   PRN Meds:      Labs      CBC:   Recent Labs     23  1024   WBC 8.1   HGB 15.0        BMP:    Recent Labs     02/28/23  1024 02/28/23  1028   *  --    K 4.1  --    CL 91*  --    CO2 28  --    BUN 9  --    CREATININE 0.6*  --    GLUCOSE 200* 180     Hepatic: No results for input(s): AST, ALT, ALB, BILITOT, ALKPHOS in the last 72 hours. Lipids:   Lab Results   Component Value Date/Time    CHOL 112 12/15/2021 01:35 PM    HDL 33 12/15/2021 01:35 PM    TRIG 159 12/15/2021 01:35 PM     Hemoglobin A1C:   Lab Results   Component Value Date/Time    LABA1C 10.5 04/12/2022 09:37 AM     TSH:   Lab Results   Component Value Date/Time    TSH 2.15 03/06/2019 10:22 AM     Troponin:   Lab Results   Component Value Date/Time    TROPONINT <0.010 02/28/2023 10:24 AM    TROPONINT <0.010 04/19/2020 08:04 PM    TROPONINT <0.010 08/31/2019 10:54 PM     Lactic Acid: No results for input(s): LACTA in the last 72 hours.   BNP:   Recent Labs     02/28/23  1024   PROBNP 104.3     UA:  Lab Results   Component Value Date/Time    NITRU NEGATIVE 11/07/2018 04:30 PM    COLORU CHARLEE 11/07/2018 04:30 PM    WBCUA 4 11/07/2018 04:30 PM    RBCUA 1 11/07/2018 04:30 PM    MUCUS RARE 11/07/2018 04:30 PM    TRICHOMONAS NONE SEEN 11/07/2018 04:30 PM    BACTERIA RARE 11/07/2018 04:30 PM    CLARITYU CLEAR 11/07/2018 04:30 PM    SPECGRAV 1.019 11/07/2018 04:30 PM    LEUKOCYTESUR NEGATIVE 11/07/2018 04:30 PM    UROBILINOGEN 2.0 11/07/2018 04:30 PM    BILIRUBINUR MODERATE 11/07/2018 04:30 PM    BLOODU NEGATIVE 11/07/2018 04:30 PM    Josiephine Palmetto NEGATIVE 11/07/2018 04:30 PM     Urine Cultures: No results found for: LABURIN  Blood Cultures: No results found for: BC  No results found for: BLOODCULT2  Organism: No results found for: ORG    Imaging/Diagnostics Last 24 Hours   XR CHEST (2 VW)    Result Date: 2/28/2023  EXAMINATION: TWO X-RAY VIEWS OF THE CHEST 2/28/2023 10:33 am COMPARISON: February 11, 2022 HISTORY: ORDERING SYSTEM PROVIDED HISTORY:  Dyspnea TECHNOLOGIST PROVIDED HISTORY: Reason for Exam:  Dyspnea Additional signs and symptoms:  Dyspnea Relevant Medical/Surgical History:  Dyspnea FINDINGS: No lines or tubes. Cardiomediastinal silhouette is stable. The lungs show improved pulmonary edema since February 11, 2022. No significant pleural effusions. Findings are superimposed on chronic lung changes. No pneumothorax. No acute or aggressive osseous lesion. 1. Persistent but improved pulmonary edema superimposed on chronic lung changes.        Personally reviewed Lab Studies, Imaging, and discussed case with Dr Prem Werner     Electronically signed by BRENNAN Loredo CNP on 2/28/2023 at 12:43 PM

## 2023-02-28 NOTE — CONSULTS
CARDIOLOGY CONSULT NOTE   Reason for consultation:      Referring physician:  Bernardo Good MD     Primary care physician: Son Anaya MD      Dear  Dr. Bernardo Good MD   Thanks for the consult. Chief Complaints :  Chief Complaint   Patient presents with    Shortness of Breath     X 5 days. Baseline on 2L via NC at home. Given breathing tx in medics        History of present illness:Armando is a 74 y. o.year old who presents with shortness of breath tiredness he says he has difficulty ambulating ongoing for last 7 days or so initially started out with cough in the emergency department noted to be hypoxic with sats in 88% in spite of being on 2 L of oxygen he is found to be positive for COVID cardiology asked to evaluate him due to concerns for heart failure he has history of atrial fibrillation and peripheral vascular disease s/p bypass of the lower extremity on chronic anticoagulation also has history of congestive heart failure with admission BNP is normal,  Chest x-ray concerning for pulmonary edema? Versus pneumonitis      Past medical history:    has a past medical history of A-fib ([address] Anxiety, Arthritis, COPD (chronic obstructive pulmonary disease) ([address] Depression, Diabetes mellitus ([address] Full dentures, H/O angiography, H/O Doppler ultrasound, H/O echocardiogram, Hx of colonoscopy, Hx of Doppler ultrasound, Hyperlipidemia, Hypertension, Macular degeneration, Obesity, On home oxygen therapy, PAD (peripheral artery disease) ([address] Panic attacks, Pneumonia, PTSD (post-traumatic stress disorder), Restless leg, Shortness of breath, Sleep apnea, and Wears glasses. Past surgical history:   has a past surgical history that includes Atrial ablation surgery (05/23/2018); pr laparoscopy surg cholecystectomy (N/A, 11/12/2018); Colonoscopy (07/2011); eye surgery (Left, 2017); Dental surgery; Cardiac surgery (05/2018); Cholecystectomy, laparoscopic (11/12/2018);  Appendectomy (2003); hernia repair (2003); femoral bypass (Left, 01/2011); Tonsillectomy (1960's); fracture surgery (Left, 1980's); fracture surgery (Right, 2000's); and femoral bypass (Left, 1/9/2019). Social History:   reports that he quit smoking about 14 months ago. His smoking use included cigars. He started smoking about 50 years ago. He has a 48.00 pack-year smoking history. He quit smokeless tobacco use about 48 years ago. His smokeless tobacco use included chew. He reports that he does not drink alcohol and does not use drugs.   Family history:   no family history of CAD, STROKE of DM at early age    Allergies   Allergen Reactions    Metoprolol      \"Chest Pain And Burning In The Chest\"       acetaminophen (TYLENOL) tablet 650 mg, Q6H PRN   Or  acetaminophen (TYLENOL) suppository 650 mg, Q6H PRN  glucose chewable tablet 16 g, PRN  dextrose bolus 10% 125 mL, PRN   Or  dextrose bolus 10% 250 mL, PRN  glucagon (rDNA) injection 1 mg, PRN  dextrose 10 % infusion, Continuous PRN  sodium chloride flush 0.9 % injection 5-40 mL, 2 times per day  sodium chloride flush 0.9 % injection 5-40 mL, PRN  0.9 % sodium chloride infusion, PRN  ondansetron (ZOFRAN-ODT) disintegrating tablet 4 mg, Q8H PRN   Or  ondansetron (ZOFRAN) injection 4 mg, Q6H PRN  polyethylene glycol (GLYCOLAX) packet 17 g, Daily PRN  potassium chloride (KLOR-CON M) extended release tablet 40 mEq, PRN   Or  potassium bicarb-citric acid (EFFER-K) effervescent tablet 40 mEq, PRN   Or  potassium chloride 10 mEq/100 mL IVPB (Peripheral Line), PRN  magnesium sulfate 2000 mg in 50 mL IVPB premix, PRN  ondansetron (ZOFRAN-ODT) disintegrating tablet 4 mg, Q8H PRN   Or  ondansetron (ZOFRAN) injection 4 mg, Q6H PRN  dexamethasone (PF) (DECADRON) injection 6 mg, Q24H  insulin lispro (HUMALOG) injection vial 0-4 Units, TID WC  insulin lispro (HUMALOG) injection vial 0-4 Units, Nightly  furosemide (LASIX) injection 40 mg, BID  albuterol sulfate HFA (PROVENTIL;VENTOLIN;PROAIR) 108 (90 Base) MCG/ACT inhaler 2 puff, 4x daily   And  ipratropium (ATROVENT HFA) 17 MCG/ACT inhaler 2 puff, 4x daily  enoxaparin (LOVENOX) injection 160 mg, BID  aspirin EC tablet 81 mg, Daily  atorvastatin (LIPITOR) tablet 40 mg, Daily  [START ON 3/1/2023] tiotropium (SPIRIVA RESPIMAT) 2.5 MCG/ACT inhaler 2 puff, Daily  budesonide-formoterol (SYMBICORT) 160-4.5 MCG/ACT inhaler 2 puff, BID  baricitinib (OLUMIANT) tablet 4 mg, Daily      Current Facility-Administered Medications   Medication Dose Route Frequency Provider Last Rate Last Admin    acetaminophen (TYLENOL) tablet 650 mg  650 mg Oral Q6H PRN Randa I CHRISTOPHER GrubbsN - CNP        Or    acetaminophen (TYLENOL) suppository 650 mg  650 mg Rectal Q6H PRN Randa I BRENNAN Grubbs - CNP        glucose chewable tablet 16 g  4 tablet Oral PRN Randa I CHRISTOPHER GrubbsN - CNP        dextrose bolus 10% 125 mL  125 mL IntraVENous PRN Randa I CHRISTOPHER GrubbsN - CNP        Or    dextrose bolus 10% 250 mL  250 mL IntraVENous PRN Randa I CHRISTOPHER GrubbsN - CNP        glucagon (rDNA) injection 1 mg  1 mg SubCUTAneous PRN Randa I CHRISTOPHER GrubbsN - CNP        dextrose 10 % infusion   IntraVENous Continuous PRN Randa I CHRISTOPHER GrubbsN - CNP        sodium chloride flush 0.9 % injection 5-40 mL  5-40 mL IntraVENous 2 times per day Randa Grubbs APRN - CNP        sodium chloride flush 0.9 % injection 5-40 mL  5-40 mL IntraVENous PRN Randa I CHRISTOPHER GrubbsN - CNP        0.9 % sodium chloride infusion   IntraVENous PRN Randa I BRENNAN Grubbs - LORE        ondansetron (ZOFRAN-ODT) disintegrating tablet 4 mg  4 mg Oral Q8H PRN Randa I BRENNAN Grubbs - CNP        Or    ondansetron (ZOFRAN) injection 4 mg  4 mg IntraVENous Q6H PRN Randa I Romie, APRN - CNP        polyethylene glycol (GLYCOLAX) packet 17 g  17 g Oral Daily PRN BRENNAN Merino - CNP        potassium chloride (KLOR-CON M) extended release tablet 40 mEq  40 mEq Oral PRN BRENNAN Merino - LORE        Or    potassium bicarb-citric acid (EFFER-K) effervescent tablet 40 mEq  40 mEq Oral PRN BRENNAN Merino CNP        Or    potassium chloride 10 mEq/100 mL IVPB (Peripheral Line)  10 mEq IntraVENous PRN BRENNAN Merino - CNP        magnesium sulfate 2000 mg in 50 mL IVPB premix  2,000 mg IntraVENous PRN BRENNAN Merino CNP        ondansetron (ZOFRAN-ODT) disintegrating tablet 4 mg  4 mg Oral Q8H PRN BRENNAN Merino CNP        Or    ondansetron (ZOFRAN) injection 4 mg  4 mg IntraVENous Q6H PRN BRENNAN Merino CNP        dexamethasone (PF) (DECADRON) injection 6 mg  6 mg IntraVENous Q24H BRENNAN Merino - CNP   6 mg at 02/28/23 1437    insulin lispro (HUMALOG) injection vial 0-4 Units  0-4 Units SubCUTAneous TID WC BRENNAN Merino - CNP   3 Units at 02/28/23 1439    insulin lispro (HUMALOG) injection vial 0-4 Units  0-4 Units SubCUTAneous Nightly BRENNAN Merino CNP        furosemide (LASIX) injection 40 mg  40 mg IntraVENous BID BRENNAN Merino - CNP   40 mg at 02/28/23 1438    albuterol sulfate HFA (PROVENTIL;VENTOLIN;PROAIR) 108 (90 Base) MCG/ACT inhaler 2 puff  2 puff Inhalation 4x daily BRENNAN Merino - CNP   2 puff at 02/28/23 1521    And    ipratropium (ATROVENT HFA) 17 MCG/ACT inhaler 2 puff  2 puff Inhalation 4x daily BRENNAN Merino - CNP   2 puff at 02/28/23 1521    enoxaparin (LOVENOX) injection 160 mg  1 mg/kg (Order-Specific) SubCUTAneous BID BRENNAN Ramirez - CNP   160 mg at 02/28/23 1444    aspirin EC tablet 81 mg  81 mg Oral Daily BRENNAN Merino CNP   81 mg at 02/28/23 1426    atorvastatin (LIPITOR) tablet 40 mg  40 mg Oral Daily BRENNAN Merino CNP   40 mg at 02/28/23 1426    [START ON 3/1/2023] tiotropium (SPIRIVA RESPIMAT) 2.5 MCG/ACT inhaler 2 puff  2 puff Inhalation Daily Randa US Romie, APRN - CNP        budesonide-formoterol (SYMBICORT) 160-4.5 MCG/ACT inhaler 2 puff  2 puff Inhalation BID BRENNAN Merino CNP        baricitinib (OLUMIANT) tablet 4 mg  4 mg Oral Daily BRENNAN Merino CNP         Review of Systems:   Constitutional: No Fever or Weight Loss   Eyes: No Decreased Vision  ENT: No Headaches, Hearing Loss or Vertigo  Cardiovascular: As per HPI  Respiratory: As per HPI  Gastrointestinal: No abdominal pain, appetite loss, blood in stools, constipation, diarrhea or heartburn  Genitourinary: No dysuria, trouble voiding, or hematuria  Musculoskeletal:  No gait disturbance, weakness or joint complaints  Integumentary: No rash or pruritis  Neurological: No TIA or stroke symptoms  Psychiatric: No anxiety or depression  Endocrine: No malaise, fatigue or temperature intolerance  Hematologic/Lymphatic: No bleeding problems, blood clots or swollen lymph nodes  Allergic/Immunologic: No nasal congestion or hives  All systems negative except as marked. Physical Examination:    Vitals:    02/28/23 1504 02/28/23 1507 02/28/23 1521 02/28/23 1523   BP:  (!) 153/72  139/66   Pulse: 100 98 (!) 106 (!) 104   Resp: 23 28 20 21   Temp:    97.2 °F (36.2 °C)   SpO2: 90%  90% 92%   Weight:       Height:    6' 6\" (1.981 m)       General Appearance:  No distress, conversant    Constitutional:  Well developed, Well nourished, No acute distress, Non-toxic appearance. HENT:  Normocephalic, Atraumatic, Bilateral external ears normal, Oropharynx moist, No oral exudates, Nose normal. Neck- Normal range of motion, No tenderness, Supple, No stridor,no apical-carotid delay  Lymphatics : no palpable lymph nodes  Eyes:  PERRL, EOMI, Conjunctiva normal, No discharge. Respiratory:  Normal breath sounds, No respiratory distress, No wheezing, No chest tenderness. ,no use of accessory muscles, crackles Absent   Cardiovascular: (PMI) apex non displaced,no lifts no thrills, ankle swelling Present , 1+, s1 and s2 audible,Murmur. Present, JVD not noted    Abdomen /GI:  Bowel sounds normal, Soft, No tenderness, No masses, No gross visceromegaly   :  No costovertebral angle tenderness   Musculoskeletal:  No edema, no tenderness, no deformities. Back- no tenderness  Integument:  Well hydrated, no rash   Lymphatic:  No lymphadenopathy noted   Neurologic:  Alert & oriented x 3, CN 2-12 normal, normal motor function, normal sensory function, no focal deficits noted           Medical decision making and Data review:    Lab Review   Recent Labs     02/28/23  1024   WBC 8.1   HGB 15.0   HCT 44.8         Recent Labs     02/28/23  1024   *   K 4.1   CL 91*   CO2 28   BUN 9   CREATININE 0.6*     No results for input(s): AST, ALT, ALB, BILIDIR, BILITOT, ALKPHOS in the last 72 hours. Recent Labs     02/28/23  1024   TROPONINT <0.010       Recent Labs     02/28/23  1024   PROBNP 104.3     Lab Results   Component Value Date    INR 1.12 08/31/2019    PROTIME 12.7 (H) 08/31/2019       EKG: (reviewed by myself)    ECHO:(reviewed by myself)    Chest Xray:(reviewed by myself)  XR CHEST (2 VW)    Result Date: 2/28/2023  EXAMINATION: TWO X-RAY VIEWS OF THE CHEST 2/28/2023 10:33 am COMPARISON: February 11, 2022 HISTORY: ORDERING SYSTEM PROVIDED HISTORY:  Dyspnea TECHNOLOGIST PROVIDED HISTORY: Reason for Exam:  Dyspnea Additional signs and symptoms:  Dyspnea Relevant Medical/Surgical History:  Dyspnea FINDINGS: No lines or tubes. Cardiomediastinal silhouette is stable. The lungs show improved pulmonary edema since February 11, 2022. No significant pleural effusions. Findings are superimposed on chronic lung changes. No pneumothorax. No acute or aggressive osseous lesion. 1. Persistent but improved pulmonary edema superimposed on chronic lung changes.      CTA PULMONARY W CONTRAST    Result Date: 2/28/2023  EXAMINATION: CTA OF THE CHEST 2/28/2023 1:24 pm TECHNIQUE: CTA of the chest was performed after the administration of intravenous contrast.  Multiplanar reformatted images are provided for review. MIP images are provided for review. Automated exposure control, iterative reconstruction, and/or weight based adjustment of the mA/kV was utilized to reduce the radiation dose to as low as reasonably achievable. COMPARISON: April 19, 2020 HISTORY: ORDERING SYSTEM PROVIDED HISTORY: copd exacerbation with covid 19 and history of afib TECHNOLOGIST PROVIDED HISTORY: Reason for exam:->copd exacerbation with covid 19 and history of afib Reason for Exam: copd exacerbation with covid 19 and history of afib FINDINGS: Pulmonary Arteries: Pulmonary arteries are adequately opacified for evaluation. No evidence of intraluminal filling defect to suggest pulmonary embolism. Main pulmonary artery is normal in caliber. Mediastinum: No evidence of mediastinal lymphadenopathy. The heart and pericardium demonstrate no acute abnormality. There is no acute abnormality of the thoracic aorta. Lungs/pleura: The lungs are without acute process. No focal consolidation or pulmonary edema. No evidence of pleural effusion or pneumothorax. Moderate centrilobular emphysema, upper lobe predominant, with stable bullous changes at the right apex. There is stable scarring present within the lingula. The central airways are patent. Upper Abdomen: Limited images of the upper abdomen are unremarkable. Soft Tissues/Bones: No acute bone or soft tissue abnormality. No evidence of pulmonary embolism or acute pulmonary abnormality. Moderate emphysema. All labs, medications and tests reviewed by myself including data  from outside source , patient and available family . Continue all other medications of all above medical condition listed as is. Impression:  Principal Problem:    COVID-19  Resolved Problems:    * No resolved hospital problems. *      Assessment: 74 y. o.year old with PMH of  has a past medical history of A-fib ([address] Anxiety, Arthritis, COPD (chronic obstructive pulmonary disease) ([facility] Depression, Diabetes mellitus ([address] Full dentures, H/O angiography, H/O Doppler ultrasound, H/O echocardiogram, Hx of colonoscopy, Hx of Doppler ultrasound, Hyperlipidemia, Hypertension, Macular degeneration, Obesity, On home oxygen therapy, PAD (peripheral artery disease) ([address] Panic attacks, Pneumonia, PTSD (post-traumatic stress disorder), Restless leg, Shortness of breath, Sleep apnea, and Wears glasses. Plan and Recommendations:    Acute COVID infection causing respiratory distress but does not appear to be in heart failure BNP is normal chest x-ray probably abnormal secondary to COVID  No known history of coronary artery disease but high risk for CAD currently stable troponins are normal  Cut Down on Lasix to once a day  EF of  50%  History of atrial fibrillation post pulmonary vein isolation A-fib ablation in 2018 on Eliquis switch to Lovenox on admission  He can be switched back to Eliquis early in sinus rhythm with frequent PACs , related to him metoprolol we will start Cardizem 120 mg daily  History of peripheral arterial disease on cilostazol which is currently on hold  Supportive care for COVID as per primary team  DVT prophylaxis if no contraindication  6. Dyslipidemia: continue statins           Thank you  much for consult and giving us the opportunity in contributing in the care of this patient. Please feel free to call me for any questions.        Rocco Casas MD, 2/28/2023 5:17 PM

## 2023-02-28 NOTE — ED NOTES
26 notified Dr Winter Doss on in patient consult. In ED to evaluate pt. Meryle Flight  02/28/23 [id number]

## 2023-02-28 NOTE — ED NOTES
ED TO INPATIENT SBAR HANDOFF    Patient Name: Eric Moran   :  1955  74 y.o. MRN:  [id number]  Preferred Name  Hernan Carrizales  ED Room #:  TR04/04TR-04  Family/Caregiver Present no   Restraints no   Sitter no   Sepsis Risk Score Sepsis Risk Score: 3.34    Situation  Code Status: Full Code No additional code details. Allergies: Metoprolol  Weight: Patient Vitals for the past 96 hrs (Last 3 readings):   Weight   23 1244 (!) 350 lb 1.5 oz (158.8 kg)     Arrived from: home  Chief Complaint:   Chief Complaint   Patient presents with    Shortness of Breath     X 5 days. Baseline on 2L via NC at home. Given breathing tx in [facility] Problem/Diagnosis:  Principal Problem:    COVID-19  Resolved Problems:    * No resolved hospital problems. *    Imaging:   XR CHEST (2 VW)   Preliminary Result   1. Persistent but improved pulmonary edema superimposed on chronic lung   changes.          CTA PULMONARY W CONTRAST    (Results Pending)     Abnormal labs:   Abnormal Labs Reviewed   COVID-19, RAPID - Abnormal; Notable for the following components:       Result Value    SARS-CoV-2, NAAT DETECTED (*)     All other components within normal limits   CBC WITH AUTO DIFFERENTIAL - Abnormal; Notable for the following components:    Segs Relative 75.8 (*)     Lymphocytes % 12.9 (*)     Monocytes % 9.7 (*)     All other components within normal limits   BASIC METABOLIC PANEL - Abnormal; Notable for the following components:    Sodium 130 (*)     Chloride 91 (*)     Creatinine 0.6 (*)     Glucose 200 (*)     All other components within normal limits   MAGNESIUM - Abnormal; Notable for the following components:    Magnesium 1.7 (*)     All other components within normal limits   LACTATE DEHYDROGENASE - Abnormal; Notable for the following components:     (*)     All other components within normal limits   C-REACTIVE PROTEIN - Abnormal; Notable for the following components:    CRP High Sensitivity 59.2 (*)     All other components within normal limits   FIBRINOGEN - Abnormal; Notable for the following components:    Fibrinogen 616 (*)     All other components within normal limits   BLOOD GAS, VENOUS - Abnormal; Notable for the following components:    pCO2, [place] 56 (*)     Base Exc, Mixed 6.3 (*)     HCO3, Venous 33.1 (*)     O2 Sat, [place] 74.8 (*)     All other components within normal limits   IRON AND TIBC - Abnormal; Notable for the following components:    Iron 51 (*)     TIBC 204 (*)     All other components within normal limits   POCT GLUCOSE - Abnormal; Notable for the following components:    POC Glucose 180 (*)     All other components within normal limits     Critical values: yes     Abnormal Assessment Findings: Sodium 130    Background  History:   Past Medical History:   Diagnosis Date    A-fib ([address]     Resolved after ablation 2018    Anxiety     Arthritis     \"Hips, Knees, Elbows And Fingers\"    COPD (chronic obstructive pulmonary disease) ([facility])     Sees Dr. Anel Ramirez    Depression     Diabetes mellitus [facility]) Dx 0960'U    Full dentures     H/O angiography 12/13/2018    peripheral angio - LFA occluded, filling collaterals, RSFA 90% stenosis-vasc surg consult    H/O Doppler ultrasound 09/05/2018    LE arterial - left mid and distal femoral artery occluded, lt FANI shows mild-mod PVD    H/O echocardiogram 01/06/2016    EF60% mild MR, TR, pulm HTN    Hx of colonoscopy     7/11 C-scope - benign polyp x1    Hx of Doppler ultrasound 02/20/2018    Lower extremity doppler  Normal study.     Hyperlipidemia     Hypertension     Macular degeneration     States is legally blind    Obesity     On home oxygen therapy     Continuous At 2 Liters Per Nasal Cannula    PAD (peripheral artery disease) ([facility])     10/10 FANI mild PAD left superficial femoral s/p left fem-pop bypass    Panic attacks     Pneumonia Last Episode In 2018    PTSD (post-traumatic stress disorder)     Restless leg     Shortness of breath     Sleep apnea     Uses BiPap - stopped using 4/2020    Wears glasses     To Read       Assessment    Vitals/MEWS:        Vitals:    02/28/23 1335 02/28/23 1336 02/28/23 1337 02/28/23 1342   BP:   128/69 133/68   Pulse: 93 91  90   Resp: 21 17     Temp:       SpO2: 94% 91% 93% 91%   Weight:         FiO2 (%): nasal cannula for respiratory distress  O2 Flow Rate: O2 Device: Nasal cannula O2 Flow Rate (L/min): 4 L/min (2L baseline at home)  Cardiac Rhythm: Suspect arm lead reversal, interpretation assumes no reversal   Sinus rhythm with marked sinus arrhythmia with premature atrial complexes with aberrant conduction   Pain Assessment:  [] Verbal [] Priscila Camper Scale  Pain Scale:    Last documented pain score (0-10 scale)    Last documented pain medication administered:   Mental Status: alert  NIH Score: NIH     C-SSRS: Risk of Suicide: No Risk  Bedside swallow:    [place] Coma Scale (GCS): [place] Coma Scale  Eye Opening: Spontaneous  Best Verbal Response: Oriented  Best Motor Response: Obeys commands  [place] Coma Scale Score: 15  Active LDA's:    PO Status: Regular  Pertinent or High Risk Medications/Drips:    If Yes, please provide details:   Pending Blood Product Administration: no     You may also review the ED PT Care Timeline found under the Summary Nursing Index tab. Recommendation    Pending orders na  Plan for Discharge (if known): Additional Comments: pt.on 3 L NC.    If any further questions, please call Sending RN at [phone]    Electronically signed by: Electronically signed by Marino Shaffer RN on 2/28/2023 at 2:19 PM      Herb Mitchell RN  02/28/23 [address], RN  02/28/23 2038

## 2023-02-28 NOTE — ED NOTES
Pt.SpO2  88-90% with 2 L  NC,  increased to 3 L at this time. Pt.Covid positive.      Martha Lopez, RN  02/28/23 [id number]

## 2023-02-28 NOTE — RT PROTOCOL NOTE
RT Inhaler-Nebulizer Bronchodilator Protocol Note    There is a bronchodilator order in the chart from a provider indicating to follow the RT Bronchodilator Protocol and there is an Initiate RT Inhaler-Nebulizer Bronchodilator Protocol order as well (see protocol at bottom of note). CXR Findings:  No results found. The findings from the last RT Protocol Assessment were as follows:   History Pulmonary Disease: Chronic pulmonary disease  Respiratory Pattern: Dyspnea on exertion or RR 21-25 bpm  Breath Sounds: Slightly diminished and/or crackles  Cough: Strong, spontaneous, non-productive  Indication for Bronchodilator Therapy:    Bronchodilator Assessment Score: 6    Aerosolized bronchodilator medication orders have been revised according to the RT Inhaler-Nebulizer Bronchodilator Protocol below. Respiratory Therapist to perform RT Therapy Protocol Assessment initially then follow the protocol. Repeat RT Therapy Protocol Assessment PRN for score 0-3 or on second treatment, BID, and PRN for scores above 3. No Indications - adjust the frequency to every 6 hours PRN wheezing or bronchospasm, if no treatments needed after 48 hours then discontinue using Per Protocol order mode. If indication present, adjust the RT bronchodilator orders based on the Bronchodilator Assessment Score as indicated below. Use Inhaler orders unless patient has one or more of the following: on home nebulizer, not able to hold breath for 10 seconds, is not alert and oriented, cannot activate and use MDI correctly, or respiratory rate 25 breaths per minute or more, then use the equivalent nebulizer order(s) with same Frequency and PRN reasons based on the score. If a patient is on this medication at home then do not decrease Frequency below that used at home.     0-3 - enter or revise RT bronchodilator order(s) to equivalent RT Bronchodilator order with Frequency of every 4 hours PRN for wheezing or increased work of breathing using Per Protocol order mode. 4-6 - enter or revise RT Bronchodilator order(s) to two equivalent RT bronchodilator orders with one order with BID Frequency and one order with Frequency of every 4 hours PRN wheezing or increased work of breathing using Per Protocol order mode. 7-10 - enter or revise RT Bronchodilator order(s) to two equivalent RT bronchodilator orders with one order with TID Frequency and one order with Frequency of every 4 hours PRN wheezing or increased work of breathing using Per Protocol order mode. 11-13 - enter or revise RT Bronchodilator order(s) to one equivalent RT bronchodilator order with QID Frequency and an Albuterol order with Frequency of every 4 hours PRN wheezing or increased work of breathing using Per Protocol order mode. Greater than 13 - enter or revise RT Bronchodilator order(s) to one equivalent RT bronchodilator order with every 4 hours Frequency and an Albuterol order with Frequency of every 2 hours PRN wheezing or increased work of breathing using Per Protocol order mode. RT to enter RT Home Evaluation for COPD & MDI Assessment order using Per Protocol order mode.     Electronically signed by Melissa Alcocer RCP on 2/28/2023 at 11:35 AM

## 2023-03-01 LAB
25(OH)D3 SERPL-MCNC: 11.53 NG/ML
ALBUMIN SERPL-MCNC: 4.1 GM/DL (ref 3.4–5)
ALP BLD-CCNC: 80 IU/L (ref 40–129)
ALT SERPL-CCNC: 29 U/L (ref 10–40)
ANION GAP SERPL CALCULATED.3IONS-SCNC: 12 MMOL/L (ref 4–16)
AST SERPL-CCNC: 18 IU/L (ref 15–37)
BASOPHILS ABSOLUTE: 0 K/CU MM
BASOPHILS RELATIVE PERCENT: 0 % (ref 0–1)
BILIRUB SERPL-MCNC: 0.5 MG/DL (ref 0–1)
BILIRUBIN DIRECT: 0.2 MG/DL (ref 0–0.3)
BILIRUBIN, INDIRECT: 0.3 MG/DL (ref 0–0.7)
BUN SERPL-MCNC: 18 MG/DL (ref 6–23)
CALCIUM SERPL-MCNC: 8.9 MG/DL (ref 8.3–10.6)
CHLORIDE BLD-SCNC: 92 MMOL/L (ref 99–110)
CHOLEST SERPL-MCNC: 159 MG/DL
CO2: 29 MMOL/L (ref 21–32)
CREAT SERPL-MCNC: 0.8 MG/DL (ref 0.9–1.3)
DIFFERENTIAL TYPE: ABNORMAL
EOSINOPHILS ABSOLUTE: 0 K/CU MM
EOSINOPHILS RELATIVE PERCENT: 0 % (ref 0–3)
FIBRINOGEN LEVEL: 634 MG/DL (ref 196.9–442.1)
GFR SERPL CREATININE-BSD FRML MDRD: >60 ML/MIN/1.73M2
GLUCOSE BLD-MCNC: 296 MG/DL (ref 70–99)
GLUCOSE BLD-MCNC: 333 MG/DL (ref 70–99)
GLUCOSE BLD-MCNC: 350 MG/DL (ref 70–99)
GLUCOSE BLD-MCNC: 366 MG/DL (ref 70–99)
GLUCOSE SERPL-MCNC: 358 MG/DL (ref 70–99)
HCT VFR BLD CALC: 44.3 % (ref 42–52)
HDLC SERPL-MCNC: 29 MG/DL
HEMOGLOBIN: 14.5 GM/DL (ref 13.5–18)
IMMATURE NEUTROPHIL %: 0.3 % (ref 0–0.43)
LDLC SERPL CALC-MCNC: 94 MG/DL
LYMPHOCYTES ABSOLUTE: 0.9 K/CU MM
LYMPHOCYTES RELATIVE PERCENT: 13.9 % (ref 24–44)
MAGNESIUM: 2.1 MG/DL (ref 1.8–2.4)
MCH RBC QN AUTO: 29.8 PG (ref 27–31)
MCHC RBC AUTO-ENTMCNC: 32.7 % (ref 32–36)
MCV RBC AUTO: 91 FL (ref 78–100)
MONOCYTES ABSOLUTE: 0.6 K/CU MM
MONOCYTES RELATIVE PERCENT: 8.5 % (ref 0–4)
NUCLEATED RBC %: 0 %
PDW BLD-RTO: 13.4 % (ref 11.7–14.9)
PLATELET # BLD: 172 K/CU MM (ref 140–440)
PMV BLD AUTO: 9.8 FL (ref 7.5–11.1)
POTASSIUM SERPL-SCNC: 5 MMOL/L (ref 3.5–5.1)
RBC # BLD: 4.87 M/CU MM (ref 4.6–6.2)
SEGMENTED NEUTROPHILS ABSOLUTE COUNT: 5.2 K/CU MM
SEGMENTED NEUTROPHILS RELATIVE PERCENT: 77.3 % (ref 36–66)
SODIUM BLD-SCNC: 133 MMOL/L (ref 135–145)
TOTAL IMMATURE NEUTOROPHIL: 0.02 K/CU MM
TOTAL NUCLEATED RBC: 0 K/CU MM
TOTAL PROTEIN: 6.5 GM/DL (ref 6.4–8.2)
TRIGL SERPL-MCNC: 182 MG/DL
WBC # BLD: 6.7 K/CU MM (ref 4–10.5)

## 2023-03-01 PROCEDURE — 94640 AIRWAY INHALATION TREATMENT: CPT

## 2023-03-01 PROCEDURE — 36415 COLL VENOUS BLD VENIPUNCTURE: CPT

## 2023-03-01 PROCEDURE — 85384 FIBRINOGEN ACTIVITY: CPT

## 2023-03-01 PROCEDURE — 82306 VITAMIN D 25 HYDROXY: CPT

## 2023-03-01 PROCEDURE — 85025 COMPLETE CBC W/AUTO DIFF WBC: CPT

## 2023-03-01 PROCEDURE — 99232 SBSQ HOSP IP/OBS MODERATE 35: CPT | Performed by: INTERNAL MEDICINE

## 2023-03-01 PROCEDURE — 82248 BILIRUBIN DIRECT: CPT

## 2023-03-01 PROCEDURE — 6360000002 HC RX W HCPCS: Performed by: NURSE PRACTITIONER

## 2023-03-01 PROCEDURE — 6370000000 HC RX 637 (ALT 250 FOR IP): Performed by: INTERNAL MEDICINE

## 2023-03-01 PROCEDURE — 80053 COMPREHEN METABOLIC PANEL: CPT

## 2023-03-01 PROCEDURE — 2700000000 HC OXYGEN THERAPY PER DAY

## 2023-03-01 PROCEDURE — 2580000003 HC RX 258: Performed by: INTERNAL MEDICINE

## 2023-03-01 PROCEDURE — 6370000000 HC RX 637 (ALT 250 FOR IP): Performed by: NURSE PRACTITIONER

## 2023-03-01 PROCEDURE — 82962 GLUCOSE BLOOD TEST: CPT

## 2023-03-01 PROCEDURE — 6360000002 HC RX W HCPCS: Performed by: INTERNAL MEDICINE

## 2023-03-01 PROCEDURE — 94761 N-INVAS EAR/PLS OXIMETRY MLT: CPT

## 2023-03-01 PROCEDURE — 83735 ASSAY OF MAGNESIUM: CPT

## 2023-03-01 PROCEDURE — 80061 LIPID PANEL: CPT

## 2023-03-01 PROCEDURE — 1200000000 HC SEMI PRIVATE

## 2023-03-01 PROCEDURE — 2580000003 HC RX 258: Performed by: NURSE PRACTITIONER

## 2023-03-01 PROCEDURE — 94660 CPAP INITIATION&MGMT: CPT

## 2023-03-01 PROCEDURE — APPNB60 APP NON BILLABLE TIME 46-60 MINS: Performed by: NURSE PRACTITIONER

## 2023-03-01 RX ORDER — INSULIN LISPRO 100 [IU]/ML
0-4 INJECTION, SOLUTION INTRAVENOUS; SUBCUTANEOUS NIGHTLY
Status: DISCONTINUED | OUTPATIENT
Start: 2023-03-01 | End: 2023-03-03

## 2023-03-01 RX ORDER — INSULIN LISPRO 100 [IU]/ML
0-16 INJECTION, SOLUTION INTRAVENOUS; SUBCUTANEOUS
Status: DISCONTINUED | OUTPATIENT
Start: 2023-03-01 | End: 2023-03-03

## 2023-03-01 RX ORDER — INSULIN LISPRO 100 [IU]/ML
0-4 INJECTION, SOLUTION INTRAVENOUS; SUBCUTANEOUS NIGHTLY
Status: DISCONTINUED | OUTPATIENT
Start: 2023-03-01 | End: 2023-03-01

## 2023-03-01 RX ORDER — FLUTICASONE PROPIONATE 50 MCG
1 SPRAY, SUSPENSION (ML) NASAL DAILY
Status: DISCONTINUED | OUTPATIENT
Start: 2023-03-01 | End: 2023-03-06 | Stop reason: HOSPADM

## 2023-03-01 RX ORDER — INSULIN LISPRO 100 [IU]/ML
0-8 INJECTION, SOLUTION INTRAVENOUS; SUBCUTANEOUS
Status: DISCONTINUED | OUTPATIENT
Start: 2023-03-01 | End: 2023-03-01

## 2023-03-01 RX ORDER — ERGOCALCIFEROL 1.25 MG/1
50000 CAPSULE ORAL WEEKLY
Status: DISCONTINUED | OUTPATIENT
Start: 2023-03-01 | End: 2023-03-06 | Stop reason: HOSPADM

## 2023-03-01 RX ORDER — GUAIFENESIN 600 MG/1
600 TABLET, EXTENDED RELEASE ORAL 2 TIMES DAILY
Status: DISCONTINUED | OUTPATIENT
Start: 2023-03-01 | End: 2023-03-06 | Stop reason: HOSPADM

## 2023-03-01 RX ADMIN — ENOXAPARIN SODIUM 160 MG: 100 INJECTION SUBCUTANEOUS at 00:43

## 2023-03-01 RX ADMIN — INSULIN LISPRO 6 UNITS: 100 INJECTION, SOLUTION INTRAVENOUS; SUBCUTANEOUS at 11:52

## 2023-03-01 RX ADMIN — INSULIN GLARGINE 10 UNITS: 100 INJECTION, SOLUTION SUBCUTANEOUS at 21:00

## 2023-03-01 RX ADMIN — INSULIN LISPRO 8 UNITS: 100 INJECTION, SOLUTION INTRAVENOUS; SUBCUTANEOUS at 16:14

## 2023-03-01 RX ADMIN — DILTIAZEM HYDROCHLORIDE 120 MG: 120 CAPSULE, COATED, EXTENDED RELEASE ORAL at 07:53

## 2023-03-01 RX ADMIN — ENOXAPARIN SODIUM 160 MG: 100 INJECTION SUBCUTANEOUS at 12:03

## 2023-03-01 RX ADMIN — AMIODARONE HYDROCHLORIDE 100 MG: 200 TABLET ORAL at 07:53

## 2023-03-01 RX ADMIN — IPRATROPIUM BROMIDE 2 PUFF: 17 AEROSOL, METERED RESPIRATORY (INHALATION) at 11:47

## 2023-03-01 RX ADMIN — SODIUM CHLORIDE, PRESERVATIVE FREE 10 ML: 5 INJECTION INTRAVENOUS at 07:55

## 2023-03-01 RX ADMIN — BUDESONIDE AND FORMOTEROL FUMARATE DIHYDRATE 2 PUFF: 160; 4.5 AEROSOL RESPIRATORY (INHALATION) at 07:48

## 2023-03-01 RX ADMIN — IPRATROPIUM BROMIDE 2 PUFF: 17 AEROSOL, METERED RESPIRATORY (INHALATION) at 15:17

## 2023-03-01 RX ADMIN — GUAIFENESIN 600 MG: 600 TABLET, EXTENDED RELEASE ORAL at 16:14

## 2023-03-01 RX ADMIN — IPRATROPIUM BROMIDE 2 PUFF: 17 AEROSOL, METERED RESPIRATORY (INHALATION) at 19:35

## 2023-03-01 RX ADMIN — ALBUTEROL SULFATE 2 PUFF: 90 AEROSOL, METERED RESPIRATORY (INHALATION) at 15:16

## 2023-03-01 RX ADMIN — GUAIFENESIN 600 MG: 600 TABLET, EXTENDED RELEASE ORAL at 20:58

## 2023-03-01 RX ADMIN — INSULIN LISPRO 4 UNITS: 100 INJECTION, SOLUTION INTRAVENOUS; SUBCUTANEOUS at 20:59

## 2023-03-01 RX ADMIN — ERGOCALCIFEROL 50000 UNITS: 1.25 CAPSULE ORAL at 16:14

## 2023-03-01 RX ADMIN — FLUTICASONE PROPIONATE 1 SPRAY: 50 SPRAY, METERED NASAL at 16:14

## 2023-03-01 RX ADMIN — ATORVASTATIN CALCIUM 40 MG: 40 TABLET, FILM COATED ORAL at 11:57

## 2023-03-01 RX ADMIN — ASPIRIN 81 MG: 81 TABLET, COATED ORAL at 07:53

## 2023-03-01 RX ADMIN — ALBUTEROL SULFATE 2 PUFF: 90 AEROSOL, METERED RESPIRATORY (INHALATION) at 19:35

## 2023-03-01 RX ADMIN — AZITHROMYCIN MONOHYDRATE 500 MG: 500 INJECTION, POWDER, LYOPHILIZED, FOR SOLUTION INTRAVENOUS at 12:02

## 2023-03-01 RX ADMIN — ALBUTEROL SULFATE 2 PUFF: 90 AEROSOL, METERED RESPIRATORY (INHALATION) at 07:48

## 2023-03-01 RX ADMIN — SODIUM CHLORIDE, PRESERVATIVE FREE 10 ML: 5 INJECTION INTRAVENOUS at 20:59

## 2023-03-01 RX ADMIN — DEXAMETHASONE SODIUM PHOSPHATE 6 MG: 10 INJECTION INTRAMUSCULAR; INTRAVENOUS at 11:52

## 2023-03-01 RX ADMIN — BARICITINIB 4 MG: 2 TABLET, FILM COATED ORAL at 07:53

## 2023-03-01 RX ADMIN — IPRATROPIUM BROMIDE 2 PUFF: 17 AEROSOL, METERED RESPIRATORY (INHALATION) at 07:48

## 2023-03-01 RX ADMIN — INSULIN LISPRO 4 UNITS: 100 INJECTION, SOLUTION INTRAVENOUS; SUBCUTANEOUS at 07:52

## 2023-03-01 RX ADMIN — ALBUTEROL SULFATE 2 PUFF: 90 AEROSOL, METERED RESPIRATORY (INHALATION) at 11:47

## 2023-03-01 RX ADMIN — BUDESONIDE AND FORMOTEROL FUMARATE DIHYDRATE 2 PUFF: 160; 4.5 AEROSOL RESPIRATORY (INHALATION) at 19:35

## 2023-03-01 NOTE — PROGRESS NOTES
Cardiology Progress Note     Admit Date:  2/28/2023    Consult reason/ Seen today for :       Subjective and  Overnight Events : Says breathing is better no fever overnight      Chief complain on admission : 74 y. o.year old who is admitted for  Chief Complaint   Patient presents with    Shortness of Breath     X 5 days. Baseline on 2L via NC at home. Given breathing tx in medics      Assessment / Plan:  Acute COVID infection causing respiratory distress but does not appear to be in heart failure BNP is normal chest x-ray probably abnormal secondary to COVID  No known history of coronary artery disease but high risk for CAD currently stable troponins are normal  Cut Down on Lasix to once a day  EF of  50%  History of atrial fibrillation post pulmonary vein isolation A-fib ablation in 2018 on Eliquis switch to Lovenox on admission start Eliquis and stop Lovenox when appropriate  He can be switched back to Eliquis  in sinus rhythm with frequent PACs , rCardizem 120 mg daily  History of peripheral arterial disease on cilostazol which is currently on hold  Stop amiodarone which was stopped as outpatient did not continue during hospitalization  Supportive care for COVID as per primary team  DVT prophylaxis if no contraindication  6.    Dyslipidemia: continue statins   We will see as needed basis call with questions we will sign off    Past medical history:    has a past medical history of A-fib ([address] Anxiety, Arthritis, COPD (chronic obstructive pulmonary disease) ([address] Depression, Diabetes mellitus ([address] Full dentures, H/O angiography, H/O Doppler ultrasound, H/O echocardiogram, Hx of colonoscopy, Hx of Doppler ultrasound, Hyperlipidemia, Hypertension, Macular degeneration, Obesity, On home oxygen therapy, PAD (peripheral artery disease) ([address] Panic attacks, Pneumonia, PTSD (post-traumatic stress disorder), Restless leg, Shortness of breath, Sleep apnea, and Wears glasses. Past surgical history:   has a past surgical history that includes Atrial ablation surgery (05/23/2018); pr laparoscopy surg cholecystectomy (N/A, 11/12/2018); Colonoscopy (07/2011); eye surgery (Left, 2017); Dental surgery; Cardiac surgery (05/2018); Cholecystectomy, laparoscopic (11/12/2018); Appendectomy (2003); hernia repair (2003); femoral bypass (Left, 01/2011); Tonsillectomy (1960's); fracture surgery (Left, 1980's); fracture surgery (Right, 2000's); and femoral bypass (Left, 1/9/2019). Social History:   reports that he quit smoking about 14 months ago. His smoking use included cigars. He started smoking about 50 years ago. He has a 48.00 pack-year smoking history. He quit smokeless tobacco use about 48 years ago. His smokeless tobacco use included chew. He reports that he does not drink alcohol and does not use drugs. Family history:  family history includes Allergy (Severe) in his brother; Arthritis in his sister; Diabetes in his sister; Early Death in his father and mother; Early Death (age of onset: 24) in his brother; Mental Illness in his sister; Obesity in his brother. Allergies   Allergen Reactions    Metoprolol      \"Chest Pain And Burning In The Chest\"       Review of Systems:    All 14 systems were reviewed and are negative  Except for the positive findings  which as documented     /82   Pulse 76   Temp 97.9 °F (36.6 °C) (Oral)   Resp 20   Ht 6' 6\" (1.981 m)   Wt (!) 350 lb 1.5 oz (158.8 kg)   SpO2 91%   BMI 40.46 kg/m²     Intake/Output Summary (Last 24 hours) at 3/1/2023 1455  Last data filed at 3/1/2023 1451  Gross per 24 hour   Intake 610.52 ml   Output 2200 ml   Net -1589.48 ml     Physical Exam:  Constitutional:  Well developed, Well nourished, No acute distress, Non-toxic appearance.    HENT:  Normocephalic, Atraumatic, Bilateral external ears normal, Oropharynx moist, No oral exudates, Nose normal. Neck- Normal range of motion, No tenderness, Supple, No stridor. Eyes:  PERRL, EOMI, Conjunctiva normal, No discharge. Respiratory:  Normal breath sounds, No respiratory distress, No wheezing, No chest tenderness. Cardiovascular:  Normal heart rate, Normal rhythm, No murmurs, No rubs, No gallops, JVP not elevated  Abdomen/GI:  Bowel sounds normal, Soft, No tenderness, No masses, No pulsatile masses. Musculoskeletal:  Intact distal pulses, No edema, No tenderness, No cyanosis, No clubbing. Good range of motion in all major joints. No tenderness to palpation or major deformities noted. Back- No tenderness. Integument:  Warm, Dry, No erythema, No rash. Lymphatic:  No lymphadenopathy noted. Neurologic:  Alert & oriented x 3, Normal motor function, Normal sensory function, No focal deficits noted.    Psychiatric:  Affect  and  Mood :no change    Medications:    insulin lispro  0-8 Units SubCUTAneous TID WC    insulin lispro  0-4 Units SubCUTAneous Nightly    azithromycin  500 mg IntraVENous Q24H    vitamin D  50,000 Units Oral Weekly    fluticasone  1 spray Each Nostril Daily    guaiFENesin  600 mg Oral BID    sodium chloride flush  5-40 mL IntraVENous 2 times per day    dexamethasone  6 mg IntraVENous Q24H    albuterol sulfate HFA  2 puff Inhalation 4x daily    And    ipratropium  2 puff Inhalation 4x daily    aspirin  81 mg Oral Daily    atorvastatin  40 mg Oral Daily    budesonide-formoterol  2 puff Inhalation BID    baricitinib  4 mg Oral Daily    dilTIAZem  120 mg Oral Daily    insulin glargine  10 Units SubCUTAneous Nightly    enoxaparin  1 mg/kg (Order-Specific) SubCUTAneous BID      dextrose      sodium chloride       acetaminophen **OR** acetaminophen, glucose, dextrose bolus **OR** dextrose bolus, glucagon (rDNA), dextrose, sodium chloride flush, sodium chloride, ondansetron **OR** ondansetron, polyethylene glycol, potassium chloride **OR** potassium alternative oral replacement **OR** potassium chloride, magnesium sulfate, ondansetron **OR** ondansetron    Lab Data:  CBC:   Recent Labs     02/28/23  1024 03/01/23  0834   WBC 8.1 6.7   HGB 15.0 14.5   HCT 44.8 44.3   MCV 91.1 91.0    172     BMP:   Recent Labs     02/28/23  1024 03/01/23  0834   * 133*   K 4.1 5.0   CL 91* 92*   CO2 28 29   BUN 9 18   CREATININE 0.6* 0.8*     PT/INR: No results for input(s): PROTIME, INR in the last 72 hours. BNP:    Recent Labs     02/28/23  1024   PROBNP 104.3     TROPONIN:   Recent Labs     02/28/23  1024   TROPONINT <0.010        ECHO :   echocardiogram    Assessment:  74 y. o.year old who is admitted for  Chief Complaint   Patient presents with    Shortness of Breath     X 5 days. Baseline on 2L via NC at home. Given breathing tx in medics    , active issues as noted below:  Impression:  Principal Problem:    COVID-19  Resolved Problems:    * No resolved hospital problems. *            All labs, medications and tests reviewed by myself , continue all other medications of all above medical condition listed as is except for changes mentioned above. Thank you very much for consult , please call with questions.     Dorinda Leiva MD, MD 3/1/2023 2:55 PM

## 2023-03-01 NOTE — PROGRESS NOTES
Pt arrived to unit. Pt refused to sit back in bed for an appropriate skin assessment or bed weight. Stated he was comfortable and would do it later. This nurse assured him it would only take a moment, but he insisted on staying in sitting position. Pt given call light and assessment complete.  made aware of arrival. No concerns at this time.

## 2023-03-01 NOTE — PROGRESS NOTES
Comprehensive Nutrition Assessment    Type and Reason for Visit:  Initial, Consult, Patient Education (heart failure guidelines)    Nutrition Recommendations/Plan:   Continue current diet  Start low rosa isela/high protein oral supp BID  Obtain measured weight  Please encourage and document po intakes  Monitor wts, glucose, po intakes     Malnutrition Assessment:  Malnutrition Status:  Insufficient data (need measured wt) (03/01/23 0920)      Nutrition Assessment:    Pt admitted w/ hypoxemia, COPD exacerbation, and COVID-19, PMH of DM2, PVD, HTN, macular degeneration, COPD. Pt reports decreased po intakes x1 week due to being sick, SOB; ate 75% of breakfast this morning. Pt states he suspects wt loss over this past week but says he has not been weighed yet during admission. Pt agreeable to low rosa isela/high protein oral supp BID; encouraged to eat small, frequent meals with protein source. Pt states he is not feeling well, briefly discussed Heart Healthy Consistent Carbohydrate nutrition therapy with pt, gave handout and contact info for questions; pt may need further diabetes education at a more appropriate time as he states this is a new diagnosis. Will follow at high nutrition risk. Nutrition Related Findings:    glucose 296-448, HgbA1C 9.3%, CRP 59.2 Wound Type: None       Current Nutrition Intake & Therapies:    Average Meal Intake: 51-75%  Average Supplements Intake: None Ordered  ADULT DIET; Regular; 5 carb choices (75 gm/meal); No Added Salt (3-4 gm); 2000 ml    Anthropometric Measures:  Height: 6' 6\" (198.1 cm)  Ideal Body Weight (IBW): 214 lbs (97 kg)    Admission Body Weight: 350 lb 1.5 oz (158.8 kg)  Current Body Weight: 350 lb 1.5 oz (158.8 kg),   IBW.  Weight Source: Not Specified  Current BMI (kg/m2): 40.5  Usual Body Weight: 350 lb (158.8 kg) (per pt; 350# 7/11/22, 370# 3/7/22)  % Weight Change (Calculated): 0  Weight Adjustment For: No Adjustment                 BMI Categories: Obese Class 3 (BMI 40.0 or greater)    Estimated Daily Nutrient Needs:  Energy Requirements Based On: Formula  Weight Used for Energy Requirements: Current  Energy (kcal/day): 9553-8409 (MSJ)  Weight Used for Protein Requirements: Ideal  Protein (g/day):  (1.0-1.2g/kg IBW)  Method Used for Fluid Requirements: 1 ml/kcal  Fluid (ml/day): 2900 or per MD discretion    Nutrition Diagnosis:   Predicted inadequate energy intake related to catabolic illness, impaired respiratory function as evidenced by poor intake prior to admission    Nutrition Interventions:   Food and/or Nutrient Delivery: Continue Current Diet, Start Oral Nutrition Supplement  Nutrition Education/Counseling: Survival skills/brief education completed  Coordination of Nutrition Care: Continue to monitor while inpatient, Coordination of Care       Goals:     Goals: by next RD assessment, PO intake 75% or greater       Nutrition Monitoring and Evaluation:   Behavioral-Environmental Outcomes: None Identified  Food/Nutrient Intake Outcomes: Food and Nutrient Intake, Supplement Intake  Physical Signs/Symptoms Outcomes: Biochemical Data, Weight, Meal Time Behavior    Discharge Planning:    Continue current diet, Recommend pursue outpatient diabetes education     Jonnathan Chandler, [address]: [id number]

## 2023-03-01 NOTE — PROGRESS NOTES
V2.0  [facility] Hospitalist Progress Note      Name:  Maureen Wright /Age/Sex: 1955  (74 y.o. male)   MRN & CSN:  [id number] & [id number] Encounter Date/Time: 3/1/2023 12:01 PM EST    Location:  -A PCP: Prosper Nicholson MD       Hospital Day: 2    Assessment and Plan:   Maureen Wright is a 74 y.o. male with pmh of COPD, CHF, atrial fibrillation, CAD who presents with COVID-19    COVID-19 infection  Continue with p.o. dexamethasone  Pulm has been consulted, appreciate recs    Acute on chronic hypoxic respiratory failure  Due to above  Continue supportive care and breathing treatment. Wean oxygen as tolerated  CTPA reviewed. No evidence of bacterial pneumonia. Continue to watch off antibiotics. History of HFpEF  Does not appear to be in exacerbation    History of PAF s/p ablation  Continue Eliquis 5 mg twice daily    History of CAD s/p CABG  History of PAD. Continue Pletal.    Non-insulin-dependent DM  On metformin and glipizide at home. Continue basal and bolus dose insulin  Currently hyperglycemic likely due to dexamethasone use  Continue to titrate insulin based on POCT    Diet ADULT DIET; Regular; 5 carb choices (75 gm/meal); No Added Salt (3-4 gm); 2000 ml  ADULT ORAL NUTRITION SUPPLEMENT; Breakfast, Dinner; Low Calorie/High Protein Oral Supplement   DVT Prophylaxis [] Lovenox, []  Heparin, [] SCDs, [] Ambulation,  [] Eliquis, [] Xarelto  [] Coumadin   Code Status Full Code   Disposition From:   Expected Disposition:   Estimated Date of Discharge:   Patient requires continued admission due to respiratory failure   Surrogate Decision Maker/ POA      Subjective:     Chief Complaint: Shortness of Breath (X 5 days. Baseline on 2L via NC at home. Given breathing tx in medics)       Maureen Wright is a 74 y.o. male who presents with viral URI symptoms and hypoxia. Seen and examined in the morning. Reports feeling slightly better. Still feeling congested.          Review of Systems:    Review of Systems    As above     Objective: Intake/Output Summary (Last 24 hours) at 3/1/2023 1201  Last data filed at 3/1/2023 1024  Gross per 24 hour   Intake 120 ml   Output 1500 ml   Net -1380 ml        Vitals:   Vitals:    03/01/23 1149   BP:    Pulse: 76   Resp: 20   Temp:    SpO2: 91%       Physical Exam:     General: NAD  Eyes: EOMI  ENT: neck supple  Cardiovascular: Regular rate. Respiratory: b/l decreased air entry, mild wheezing   Gastrointestinal: Soft, non tender  Genitourinary: no suprapubic tenderness  Musculoskeletal: No edema  Skin: warm, dry  Neuro: Alert. Psych: Mood appropriate.      Medications:   Medications:    insulin lispro  0-8 Units SubCUTAneous TID WC    insulin lispro  0-4 Units SubCUTAneous Nightly    azithromycin  500 mg IntraVENous Q24H    sodium chloride flush  5-40 mL IntraVENous 2 times per day    dexamethasone  6 mg IntraVENous Q24H    albuterol sulfate HFA  2 puff Inhalation 4x daily    And    ipratropium  2 puff Inhalation 4x daily    aspirin  81 mg Oral Daily    atorvastatin  40 mg Oral Daily    budesonide-formoterol  2 puff Inhalation BID    baricitinib  4 mg Oral Daily    dilTIAZem  120 mg Oral Daily    insulin glargine  10 Units SubCUTAneous Nightly    enoxaparin  1 mg/kg (Order-Specific) SubCUTAneous BID      Infusions:    dextrose      sodium chloride       PRN Meds: acetaminophen, 650 mg, Q6H PRN   Or  acetaminophen, 650 mg, Q6H PRN  glucose, 4 tablet, PRN  dextrose bolus, 125 mL, PRN   Or  dextrose bolus, 250 mL, PRN  glucagon (rDNA), 1 mg, PRN  dextrose, , Continuous PRN  sodium chloride flush, 5-40 mL, PRN  sodium chloride, , PRN  ondansetron, 4 mg, Q8H PRN   Or  ondansetron, 4 mg, Q6H PRN  polyethylene glycol, 17 g, Daily PRN  potassium chloride, 40 mEq, PRN   Or  potassium alternative oral replacement, 40 mEq, PRN   Or  potassium chloride, 10 mEq, PRN  magnesium sulfate, 2,000 mg, PRN  ondansetron, 4 mg, Q8H PRN   Or  ondansetron, 4 mg, Q6H PRN        Labs           Imaging/Diagnostics Last 24 Hours   XR CHEST (2 VW)    Result Date: 2/28/2023  EXAMINATION: TWO X-RAY VIEWS OF THE CHEST 2/28/2023 10:33 am COMPARISON: February 11, 2022 HISTORY: ORDERING SYSTEM PROVIDED HISTORY:  Dyspnea TECHNOLOGIST PROVIDED HISTORY: Reason for Exam:  Dyspnea Additional signs and symptoms:  Dyspnea Relevant Medical/Surgical History:  Dyspnea FINDINGS: No lines or tubes. Cardiomediastinal silhouette is stable. The lungs show improved pulmonary edema since February 11, 2022. No significant pleural effusions. Findings are superimposed on chronic lung changes. No pneumothorax. No acute or aggressive osseous lesion. 1. Persistent but improved pulmonary edema superimposed on chronic lung changes. CTA PULMONARY W CONTRAST    Result Date: 2/28/2023  EXAMINATION: CTA OF THE CHEST 2/28/2023 1:24 pm TECHNIQUE: CTA of the chest was performed after the administration of intravenous contrast.  Multiplanar reformatted images are provided for review. MIP images are provided for review. Automated exposure control, iterative reconstruction, and/or weight based adjustment of the mA/kV was utilized to reduce the radiation dose to as low as reasonably achievable. COMPARISON: April 19, 2020 HISTORY: ORDERING SYSTEM PROVIDED HISTORY: copd exacerbation with covid 19 and history of afib TECHNOLOGIST PROVIDED HISTORY: Reason for exam:->copd exacerbation with covid 19 and history of afib Reason for Exam: copd exacerbation with covid 19 and history of afib FINDINGS: Pulmonary Arteries: Pulmonary arteries are adequately opacified for evaluation. No evidence of intraluminal filling defect to suggest pulmonary embolism. Main pulmonary artery is normal in caliber. Mediastinum: No evidence of mediastinal lymphadenopathy. The heart and pericardium demonstrate no acute abnormality. There is no acute abnormality of the thoracic aorta. Lungs/pleura: The lungs are without acute process.   No focal consolidation or pulmonary edema. No evidence of pleural effusion or pneumothorax. Moderate centrilobular emphysema, upper lobe predominant, with stable bullous changes at the right apex. There is stable scarring present within the lingula. The central airways are patent. Upper Abdomen: Limited images of the upper abdomen are unremarkable. Soft Tissues/Bones: No acute bone or soft tissue abnormality. No evidence of pulmonary embolism or acute pulmonary abnormality. Moderate emphysema.        Electronically signed by Anastacia Galaviz MD on 3/1/2023 at 12:01 PM

## 2023-03-01 NOTE — PROGRESS NOTES
Pt still will not allow this nurse to do skin assessment or sit back in bed for weight. Will notify night shift to do so when pt is comfortable with laying down to go to bed.

## 2023-03-01 NOTE — CONSULTS
[address], [address] [facility]                                  CONSULTATION    PATIENT NAME: Jen Contreras                   :        1955  MED REC NO:   [id number]                          ROOM:       2006  ACCOUNT NO:   [de-identified]                           ADMIT DATE: 2023  PROVIDER:     Vinny Tran MD    CONSULT DATE:  2023    HISTORY OF PRESENT ILLNESS:  The patient is a 44-year-old gentleman with  multiple medical problems including COPD, diabetes, obstructive sleep  apnea, who was on BiPAP, but stopped using it, hypertension and  hyperlipidemia, who was admitted through the emergency room with  complaints of increasing shortness of breath, cough productive of  whitish to yellow phlegm. He was also complaining of generalized  fatigue and malaise. He is also complaining of chills. He denied any  hemoptysis. He denied any hematemesis. He denied any nausea or  vomiting. In the emergency room, he tested positive for COVID-19. He  had a CAT scan of the chest, which showed no evidence of PE and no acute  infiltrate. He was subsequently admitted to the hospital.    PAST MEDICAL HISTORY:  Significant for COPD; atrial fibrillation;  depression; diabetes; hypertension; hyperlipidemia; chronic respiratory  failure, on home oxygen; obstructive sleep apnea, not on BiPAP. PAST SURGICAL HISTORY:  Remarkable for appendectomy, atrial ablation  surgery, cholecystectomy, colonoscopy, hernia repair, cholecystectomy. FAMILY HISTORY:  Reveals that his sister has arthritis. His mother and  father  at 45. SOCIAL HISTORY:  Reveals that he quit smoking in , but used to smoke  a pack per day for 48 years prior to that. No history of alcohol or  drug abuse. MEDICATIONS:  Reviewed. ALLERGIES:  He is allergic to METOPROLOL.     REVIEW OF SYSTEMS:  A 10-to-14-point review of systems is reviewed and  is negative except for what is mentioned in the history of present  illness. PHYSICAL EXAMINATION:  GENERAL:  The patient is alert, oriented x3, in no acute respiratory  distress. VITAL SIGNS:  His blood pressure is 120/82 mmHg, pulse of 73 per minute,  and respiratory rate of 16 per minute. He is afebrile. His saturation  is 95% on 5 liters nasal cannula. HEENT:  Exam is essentially unremarkable. There is no JVD. No  lymphadenopathy. NECK:  Supple. LUNGS:  Exam revealed diminished breath sounds and occasional rhonchi. HEART:  Exam showed normal S1, S2.  ABDOMEN:  Exam is benign. There is no evidence of any organomegaly. The bowel sounds are present. NEUROLOGIC:  Exam reveals that he is awake and responsive, answering  questions appropriately and moving his extremities. DIAGNOSTIC DATA:  His CAT scan of the chest as mentioned in the history  of present illness. His venous blood gases showed a pH of 7.38, pCO2 of  56, pO2 of 44 with 74% saturation. D-dimer is less than 200. Influenza  A and B were negative. His rapid COVID test was positive. His CBC  showed a white count of 8.1, hemoglobin 15.0, hematocrit 44.8. Lactate  was 2.4. IMPRESSIONS:  1. Acute-on-chronic respiratory failure secondary to acute exacerbation  of COPD. 2.  Acute exacerbation of COPD. 3.  COVID-19 infection. 4.  Possible superadded bacterial infection. 5.  Obstructive sleep apnea. PLAN:  1. Continue present bronchodilator regimen. 2.  We will start Zithromax. 3.  Decadron 6 mg every day. 4.  The patient is on anticoagulation. 5.  The patient has been started on baricitinib. 6.  Check mag and phos. 7.  As per orders.         Eran Pereira MD    D: 03/01/2023 10:35:47       T: 03/01/2023 10:39:39     [email or web address]  Job#: [id number]     Doc#: [id number]    CC:

## 2023-03-01 NOTE — PROGRESS NOTES
Patient states that he is feeling a little better. He continues to have audible wheezing. He is not sure his medications he brought in he can not see well. He is not sure what medication he is on. Plan:   PAF sp Ablation: stop amiodarone. Bottle brought in is from 3/2022 and [facility]. Continue eliquis 5 mg BID. CAD: stable denies chest pain  COVID: per respiratory. Recommend keeping him on telemetry while in hospital as he could go back into afib from viral infection and respiratory burden. PAD: continue pletal. Will further discuss as out patient as he reports that he can only walk short distance without pain. Feet are warm and pale.

## 2023-03-02 LAB
ALBUMIN SERPL-MCNC: 4.2 GM/DL (ref 3.4–5)
ALBUMIN SERPL-MCNC: 4.2 GM/DL (ref 3.4–5)
ALP BLD-CCNC: 78 IU/L (ref 40–128)
ALP BLD-CCNC: 78 IU/L (ref 40–129)
ALT SERPL-CCNC: 29 U/L (ref 10–40)
ALT SERPL-CCNC: 29 U/L (ref 10–40)
ANION GAP SERPL CALCULATED.3IONS-SCNC: 15 MMOL/L (ref 4–16)
AST SERPL-CCNC: 21 IU/L (ref 15–37)
AST SERPL-CCNC: 21 IU/L (ref 15–37)
BASOPHILS ABSOLUTE: 0 K/CU MM
BASOPHILS RELATIVE PERCENT: 0.1 % (ref 0–1)
BILIRUB SERPL-MCNC: 0.5 MG/DL (ref 0–1)
BILIRUB SERPL-MCNC: 0.5 MG/DL (ref 0–1)
BILIRUBIN DIRECT: 0.2 MG/DL (ref 0–0.3)
BILIRUBIN, INDIRECT: 0.3 MG/DL (ref 0–0.7)
BUN SERPL-MCNC: 20 MG/DL (ref 6–23)
CALCIUM SERPL-MCNC: 9 MG/DL (ref 8.3–10.6)
CHLORIDE BLD-SCNC: 89 MMOL/L (ref 99–110)
CO2: 27 MMOL/L (ref 21–32)
CREAT SERPL-MCNC: 0.7 MG/DL (ref 0.9–1.3)
DIFFERENTIAL TYPE: ABNORMAL
EOSINOPHILS ABSOLUTE: 0 K/CU MM
EOSINOPHILS RELATIVE PERCENT: 0 % (ref 0–3)
GFR SERPL CREATININE-BSD FRML MDRD: >60 ML/MIN/1.73M2
GLUCOSE BLD-MCNC: 273 MG/DL (ref 70–99)
GLUCOSE BLD-MCNC: 328 MG/DL (ref 70–99)
GLUCOSE BLD-MCNC: 365 MG/DL (ref 70–99)
GLUCOSE BLD-MCNC: 408 MG/DL (ref 70–99)
GLUCOSE SERPL-MCNC: 352 MG/DL (ref 70–99)
HCT VFR BLD CALC: 45.1 % (ref 42–52)
HEMOGLOBIN: 14.8 GM/DL (ref 13.5–18)
IMMATURE NEUTROPHIL %: 0.3 % (ref 0–0.43)
LYMPHOCYTES ABSOLUTE: 0.7 K/CU MM
LYMPHOCYTES RELATIVE PERCENT: 6.2 % (ref 24–44)
MAGNESIUM: 2.2 MG/DL (ref 1.8–2.4)
MCH RBC QN AUTO: 30.6 PG (ref 27–31)
MCHC RBC AUTO-ENTMCNC: 32.8 % (ref 32–36)
MCV RBC AUTO: 93.4 FL (ref 78–100)
MONOCYTES ABSOLUTE: 0.8 K/CU MM
MONOCYTES RELATIVE PERCENT: 7 % (ref 0–4)
NUCLEATED RBC %: 0 %
PDW BLD-RTO: 13.4 % (ref 11.7–14.9)
PHOSPHORUS: 3.9 MG/DL (ref 2.5–4.9)
PLATELET # BLD: 185 K/CU MM (ref 140–440)
PMV BLD AUTO: 9.9 FL (ref 7.5–11.1)
POTASSIUM SERPL-SCNC: 5.1 MMOL/L (ref 3.5–5.1)
PROCALCITONIN SERPL-MCNC: 0.03 NG/ML
RBC # BLD: 4.83 M/CU MM (ref 4.6–6.2)
SEGMENTED NEUTROPHILS ABSOLUTE COUNT: 9.3 K/CU MM
SEGMENTED NEUTROPHILS RELATIVE PERCENT: 86.4 % (ref 36–66)
SODIUM BLD-SCNC: 131 MMOL/L (ref 135–145)
TOTAL IMMATURE NEUTOROPHIL: 0.03 K/CU MM
TOTAL NUCLEATED RBC: 0 K/CU MM
TOTAL PROTEIN: 6.5 GM/DL (ref 6.4–8.2)
TOTAL PROTEIN: 6.5 GM/DL (ref 6.4–8.2)
WBC # BLD: 10.7 K/CU MM (ref 4–10.5)

## 2023-03-02 PROCEDURE — 6360000002 HC RX W HCPCS: Performed by: NURSE PRACTITIONER

## 2023-03-02 PROCEDURE — 97116 GAIT TRAINING THERAPY: CPT

## 2023-03-02 PROCEDURE — 6370000000 HC RX 637 (ALT 250 FOR IP): Performed by: INTERNAL MEDICINE

## 2023-03-02 PROCEDURE — 6360000002 HC RX W HCPCS: Performed by: INTERNAL MEDICINE

## 2023-03-02 PROCEDURE — 84100 ASSAY OF PHOSPHORUS: CPT

## 2023-03-02 PROCEDURE — 80053 COMPREHEN METABOLIC PANEL: CPT

## 2023-03-02 PROCEDURE — 80076 HEPATIC FUNCTION PANEL: CPT

## 2023-03-02 PROCEDURE — 36415 COLL VENOUS BLD VENIPUNCTURE: CPT

## 2023-03-02 PROCEDURE — 97530 THERAPEUTIC ACTIVITIES: CPT

## 2023-03-02 PROCEDURE — 97166 OT EVAL MOD COMPLEX 45 MIN: CPT

## 2023-03-02 PROCEDURE — 82962 GLUCOSE BLOOD TEST: CPT

## 2023-03-02 PROCEDURE — 94640 AIRWAY INHALATION TREATMENT: CPT

## 2023-03-02 PROCEDURE — 94761 N-INVAS EAR/PLS OXIMETRY MLT: CPT

## 2023-03-02 PROCEDURE — 2700000000 HC OXYGEN THERAPY PER DAY

## 2023-03-02 PROCEDURE — 84145 PROCALCITONIN (PCT): CPT

## 2023-03-02 PROCEDURE — 1200000000 HC SEMI PRIVATE

## 2023-03-02 PROCEDURE — 2580000003 HC RX 258: Performed by: INTERNAL MEDICINE

## 2023-03-02 PROCEDURE — 6370000000 HC RX 637 (ALT 250 FOR IP): Performed by: NURSE PRACTITIONER

## 2023-03-02 PROCEDURE — 85025 COMPLETE CBC W/AUTO DIFF WBC: CPT

## 2023-03-02 PROCEDURE — 83735 ASSAY OF MAGNESIUM: CPT

## 2023-03-02 PROCEDURE — 94660 CPAP INITIATION&MGMT: CPT

## 2023-03-02 PROCEDURE — 2580000003 HC RX 258: Performed by: NURSE PRACTITIONER

## 2023-03-02 PROCEDURE — 97162 PT EVAL MOD COMPLEX 30 MIN: CPT

## 2023-03-02 RX ORDER — ALBUTEROL SULFATE 90 UG/1
2 AEROSOL, METERED RESPIRATORY (INHALATION) 2 TIMES DAILY
Status: DISCONTINUED | OUTPATIENT
Start: 2023-03-02 | End: 2023-03-06 | Stop reason: HOSPADM

## 2023-03-02 RX ORDER — INSULIN GLARGINE 100 [IU]/ML
15 INJECTION, SOLUTION SUBCUTANEOUS NIGHTLY
Status: DISCONTINUED | OUTPATIENT
Start: 2023-03-02 | End: 2023-03-03

## 2023-03-02 RX ORDER — ALBUTEROL SULFATE 90 UG/1
2 AEROSOL, METERED RESPIRATORY (INHALATION) EVERY 4 HOURS PRN
Status: DISCONTINUED | OUTPATIENT
Start: 2023-03-02 | End: 2023-03-06 | Stop reason: HOSPADM

## 2023-03-02 RX ORDER — INSULIN LISPRO 100 [IU]/ML
5 INJECTION, SOLUTION INTRAVENOUS; SUBCUTANEOUS
Status: DISCONTINUED | OUTPATIENT
Start: 2023-03-02 | End: 2023-03-03

## 2023-03-02 RX ADMIN — BUDESONIDE AND FORMOTEROL FUMARATE DIHYDRATE 2 PUFF: 160; 4.5 AEROSOL RESPIRATORY (INHALATION) at 07:26

## 2023-03-02 RX ADMIN — IPRATROPIUM BROMIDE 2 PUFF: 17 AEROSOL, METERED RESPIRATORY (INHALATION) at 12:14

## 2023-03-02 RX ADMIN — INSULIN LISPRO 5 UNITS: 100 INJECTION, SOLUTION INTRAVENOUS; SUBCUTANEOUS at 09:01

## 2023-03-02 RX ADMIN — INSULIN LISPRO 4 UNITS: 100 INJECTION, SOLUTION INTRAVENOUS; SUBCUTANEOUS at 13:33

## 2023-03-02 RX ADMIN — ENOXAPARIN SODIUM 160 MG: 100 INJECTION SUBCUTANEOUS at 00:18

## 2023-03-02 RX ADMIN — ATORVASTATIN CALCIUM 40 MG: 40 TABLET, FILM COATED ORAL at 08:59

## 2023-03-02 RX ADMIN — INSULIN LISPRO 12 UNITS: 100 INJECTION, SOLUTION INTRAVENOUS; SUBCUTANEOUS at 09:00

## 2023-03-02 RX ADMIN — ENOXAPARIN SODIUM 160 MG: 100 INJECTION SUBCUTANEOUS at 13:44

## 2023-03-02 RX ADMIN — INSULIN LISPRO 5 UNITS: 100 INJECTION, SOLUTION INTRAVENOUS; SUBCUTANEOUS at 17:57

## 2023-03-02 RX ADMIN — SODIUM CHLORIDE, PRESERVATIVE FREE 10 ML: 5 INJECTION INTRAVENOUS at 21:30

## 2023-03-02 RX ADMIN — GUAIFENESIN 600 MG: 600 TABLET, EXTENDED RELEASE ORAL at 21:25

## 2023-03-02 RX ADMIN — DEXAMETHASONE SODIUM PHOSPHATE 6 MG: 10 INJECTION INTRAMUSCULAR; INTRAVENOUS at 13:34

## 2023-03-02 RX ADMIN — AZITHROMYCIN MONOHYDRATE 500 MG: 500 INJECTION, POWDER, LYOPHILIZED, FOR SOLUTION INTRAVENOUS at 13:41

## 2023-03-02 RX ADMIN — SODIUM CHLORIDE, PRESERVATIVE FREE 10 ML: 5 INJECTION INTRAVENOUS at 09:00

## 2023-03-02 RX ADMIN — INSULIN LISPRO 16 UNITS: 100 INJECTION, SOLUTION INTRAVENOUS; SUBCUTANEOUS at 17:57

## 2023-03-02 RX ADMIN — INSULIN LISPRO 5 UNITS: 100 INJECTION, SOLUTION INTRAVENOUS; SUBCUTANEOUS at 13:32

## 2023-03-02 RX ADMIN — ALBUTEROL SULFATE 2 PUFF: 90 AEROSOL, METERED RESPIRATORY (INHALATION) at 12:15

## 2023-03-02 RX ADMIN — ASPIRIN 81 MG: 81 TABLET, COATED ORAL at 08:59

## 2023-03-02 RX ADMIN — ALBUTEROL SULFATE 2 PUFF: 90 AEROSOL, METERED RESPIRATORY (INHALATION) at 07:26

## 2023-03-02 RX ADMIN — IPRATROPIUM BROMIDE 2 PUFF: 17 AEROSOL, METERED RESPIRATORY (INHALATION) at 07:26

## 2023-03-02 RX ADMIN — DILTIAZEM HYDROCHLORIDE 120 MG: 120 CAPSULE, COATED, EXTENDED RELEASE ORAL at 08:59

## 2023-03-02 RX ADMIN — INSULIN GLARGINE 15 UNITS: 100 INJECTION, SOLUTION SUBCUTANEOUS at 21:26

## 2023-03-02 RX ADMIN — INSULIN LISPRO 4 UNITS: 100 INJECTION, SOLUTION INTRAVENOUS; SUBCUTANEOUS at 21:27

## 2023-03-02 RX ADMIN — FLUTICASONE PROPIONATE 1 SPRAY: 50 SPRAY, METERED NASAL at 09:03

## 2023-03-02 RX ADMIN — ALBUTEROL SULFATE 2 PUFF: 90 AEROSOL, METERED RESPIRATORY (INHALATION) at 19:39

## 2023-03-02 RX ADMIN — BARICITINIB 4 MG: 2 TABLET, FILM COATED ORAL at 08:59

## 2023-03-02 RX ADMIN — BUDESONIDE AND FORMOTEROL FUMARATE DIHYDRATE 2 PUFF: 160; 4.5 AEROSOL RESPIRATORY (INHALATION) at 19:41

## 2023-03-02 RX ADMIN — GUAIFENESIN 600 MG: 600 TABLET, EXTENDED RELEASE ORAL at 08:59

## 2023-03-02 NOTE — PROGRESS NOTES
Physical Therapy  [facility] ACUTE CARE PHYSICAL THERAPY EVALUATION  Serjio Kay, 1955, 2006/2006-A, 3/2/2023    History  [place]:  The primary encounter diagnosis was COPD exacerbation ([address] Diagnoses of COVID-19 and Hypoxemia were also pertinent to this visit. Patient  has a past medical history of A-fib ([address] Anxiety, Arthritis, COPD (chronic obstructive pulmonary disease) ([address] Depression, Diabetes mellitus ([address] Full dentures, H/O angiography, H/O Doppler ultrasound, H/O echocardiogram, Hx of colonoscopy, Hx of Doppler ultrasound, Hyperlipidemia, Hypertension, Macular degeneration, Obesity, On home oxygen therapy, PAD (peripheral artery disease) ([address] Panic attacks, Pneumonia, PTSD (post-traumatic stress disorder), Restless leg, Shortness of breath, Sleep apnea, and Wears glasses. Patient  has a past surgical history that includes Atrial ablation surgery (05/23/2018); pr laparoscopy surg cholecystectomy (N/A, 11/12/2018); Colonoscopy (07/2011); eye surgery (Left, 2017); Dental surgery; Cardiac surgery (05/2018); Cholecystectomy, laparoscopic (11/12/2018); Appendectomy (2003); hernia repair (2003); femoral bypass (Left, 01/2011); Tonsillectomy (1960's); fracture surgery (Left, 1980's); fracture surgery (Right, 2000's); and femoral bypass (Left, 1/9/2019). Assessment:  Pt presents 2 days s/p admission for SOB, CVOID + w/ s/s. Pt is from home alone, in 1 level apt, w/ elevator to reach apt level, ind mobility w/o AD, receives assist for home mgmt. Pt is primarily limited by endurance and safety awareness, additional deficits include balance, sensation, cognition. He demonstrates poor pacing and understanding of need for cont O2 during OOB, endurance limiting distance, limited therapy receptiveness. Recommending SNF vs swing bed ; sats during mobility are concerning for returning home - has support, but not 24/7 available.     Complexity: Moderate  Prognosis: Fair +, endurance, comorbid conditions,   Plan 2-3+/week, 1 week  Equipment: none anticipated ; educated on use of pulse ox    Recommendations for NURSING mobility: amb to/from BR    Subjective:  Patient states:  \"I'll be going home tomorrow\"    Pain:  denies .    Communication with other providers:  Handoff to RN  Restrictions: fall risk; general precautions; droplet +    Home Setup/Prior level of function  Social/Functional History  Lives With: Alone  Type of Home: House  Bathroom Shower/Tub: Tub/Shower unit  Bathroom Toilet: Standard  Bathroom Accessibility: Accessible  Has the patient had two or more falls in the past year or any fall with injury in the past year?: No  ADL Assistance: Independent  Homemaking Assistance: Needs assistance  Ambulation Assistance: Independent  Transfer Assistance: Independent  Active : No    Examination of body systems (includes body structures/functions, activity/participation limitations):  Observation:  Seated in recliner, awake, agreeable. He is pleasant and participatory ; endurance limiting function. Tele, BP cuff, pulse ox, IV, 2 L of nc O2 in place upon arrival  Posture: good  Vision:  WFL  Hearing:  WFL  Cardiopulmonary:  WFL ; desat into on 4 L ; on 2 L at baseline  Cognition: A&O x 4 ; alert, follows commands w/ repetition, attention intact, memory appears intact, fair - safety awareness (poor receptiveness to education, lacking understanding of pacing, doffs O2 for mobility), no cues needed for sequencing/setup, min cues needed for initiation, good insight into deficits    Musculoskeletal  ROM R/L: AROM WFL.    Strength R/L:  grossly >3/5, as observed in function and ROM.    Sensation: partial sensation deficits in BLE 2/2 neuropathy (L worse than R)  Tone: normotonic  Coordination: WFL  Proprioception: WFL    Mobility:  Supine to sit:  NT ; received in recliner  Transfers: SBA  Sitting balance:  good.    Standing balance:  fair +.    Gait: 30 ft + 50 ft using no AD at sup level ; endurance  and safety greatly impacting mobility - desats into mid 80's on 4 L of nc o2 ; waddling quality, grossly steady becoming more wobbly w/ time on task, reciprocal    AMPAC 6 Clicks Inpatient Mobility:  AM-PAC Inpatient Mobility Raw Score : 19    Goals:  Pt Goals: return home  Short Term Goals  Time Frame for Short Term Goals: 1 week  Short Term Goal 1: pt will complete bed mobility at mod ind  Short Term Goal 2: pt will complete transfers at mod ind  Short Term Goal 3: pt will ambulate 100 ft using LRAD at mod ind  Short Term Goal 4: pt will demonstrate fair + pacing w/ min cues to maintain sats above 88% t/o OOB performance       Treatment plan:  Bed mobility, functional mobility, transfers, balance, gait, TA, TX, strength activities, neuro    Treatment:  Gait Training:  Cues were given for safety, sequence, device management, balance, posture, awareness, path.       Positioned for comfort and pressure relief  Safety: patient left in chair, call light within reach, RN notified, gait belt used, all needs met    Time:   Time in: 1433  Time out: 1500  Timed treatment minutes: 17  Total time + eval: 27    Electronically signed by:    Kalyn Miller, PT, DPT  3/2/2023, 3:09 PM

## 2023-03-02 NOTE — PROGRESS NOTES
pulmonary      SUBJECTIVE:  still tachypneic and on 5 litres o2     OBJECTIVE    VITALS:  /76   Pulse 68   Temp 97.5 °F (36.4 °C) (Oral)   Resp 19   Ht 6' 6\" (1.981 m)   Wt (!) 350 lb 1.5 oz (158.8 kg)   SpO2 98%   BMI 40.46 kg/m²   HEAD AND FACE EXAM:  No throat injection, no active exudate,no thrush  NECK EXAM;No JVD, no masses, symmetrical  CHEST EXAM; Expansion equal and symmetrical, no masses  LUNG EXAM; Good breath sounds bilaterally. There are expiratory wheezes both lungs, there are crackles at both lung bases  CARDIOVASCULAR EXAM: Positive S1 and S2, no S3 or S4, no clicks ,no murmurs  RIGHT AND LEFT LOWER EXTRIMITY EXAM: No edema, no swelling, no inflamation  CNS EXAM: Alert and oriented X3          LABS   Lab Results   Component Value Date    WBC 10.7 (H) 03/02/2023    HGB 14.8 03/02/2023    HCT 45.1 03/02/2023    MCV 93.4 03/02/2023     03/02/2023     Lab Results   Component Value Date    CREATININE 0.7 (L) 03/02/2023    BUN 20 03/02/2023     (L) 03/02/2023    K 5.1 03/02/2023    CL 89 (L) 03/02/2023    CO2 27 03/02/2023     Lab Results   Component Value Date    INR 1.12 08/31/2019    PROTIME 12.7 (H) 08/31/2019          Lab Results   Component Value Date/Time    PHOS 3.9 03/02/2023 01:23 AM    PHOS 5.2 05/24/2018 06:14 AM    PHOS 4.8 05/21/2018 12:23 PM      No results for input(s): PH, PO2ART, YYA9EFY, HCO3, BEART, O2SAT in the last 72 hours.       Wt Readings from Last 3 Encounters:   02/28/23 (!) 350 lb 1.5 oz (158.8 kg)   07/11/22 (!) 350 lb (158.8 kg)   06/10/22 (!) 369 lb 3.2 oz (167.5 kg)               ASSESMENT  Ac opn ch resp failure  Ac copd severe  Covid 19 infection        PLAN  On decadron and baricitinab  Bd rx  Continue o2 and try to wean    3/2/2023  Lydia Epps MD, M.D.

## 2023-03-02 NOTE — PROGRESS NOTES
03/01/23 2000   NIV Type   NIV Started/Stopped On   Equipment Type v60   Mode Bilevel   Mask Type Full face mask   Mask Size Medium   Bonnet size Medium   Settings/Measurements   PIP Observed 17 cm H20   IPAP 15 cmH20   CPAP/EPAP 5 cmH2O   Vt (Measured) 772 mL   Rate Ordered 12   Resp 30   Insp Rise Time (%) 2 %   FiO2  50 %   I Time/ I Time % 0.95 s   Minute Volume (L/min) 22.2 Liters   Mask Leak (lpm) 0 lpm   Humidity 3   Comfort Level Good   Using Accessory Muscles No   SpO2 97   Patient's Home Machine No   Alarm Settings   Alarms On Y   Low Pressure (cmH2O) 5 cmH2O   High Pressure (cmH2O) 20 cmH2O   Delay Alarm 20 sec(s)   Apnea (secs) 20 secs   RR Low (bpm) 13   RR High (bpm) 40 br/min

## 2023-03-02 NOTE — PROGRESS NOTES
Heart failure education referral received. Per cardiology not on 2/28 : Acute COVID infection causing respiratory distress but does not appear to be in heart failure BNP is normal chest x-ray probably abnormal secondary to COVID  Patient not seen for heart failure education.

## 2023-03-02 NOTE — RT PROTOCOL NOTE
RT Inhaler-Nebulizer Bronchodilator Protocol Note    There is a bronchodilator order in the chart from a provider indicating to follow the RT Bronchodilator Protocol and there is an Initiate RT Inhaler-Nebulizer Bronchodilator Protocol order as well (see protocol at bottom of note). CXR Findings:  No results found. The findings from the last RT Protocol Assessment were as follows:   History Pulmonary Disease: Chronic pulmonary disease  Respiratory Pattern: Dyspnea on exertion or RR 21-25 bpm  Breath Sounds: Slightly diminished and/or crackles  Cough: Strong, spontaneous, non-productive  Indication for Bronchodilator Therapy:    Bronchodilator Assessment Score: 6    Aerosolized bronchodilator medication orders have been revised according to the RT Inhaler-Nebulizer Bronchodilator Protocol below. Respiratory Therapist to perform RT Therapy Protocol Assessment initially then follow the protocol. Repeat RT Therapy Protocol Assessment PRN for score 0-3 or on second treatment, BID, and PRN for scores above 3. No Indications - adjust the frequency to every 6 hours PRN wheezing or bronchospasm, if no treatments needed after 48 hours then discontinue using Per Protocol order mode. If indication present, adjust the RT bronchodilator orders based on the Bronchodilator Assessment Score as indicated below. Use Inhaler orders unless patient has one or more of the following: on home nebulizer, not able to hold breath for 10 seconds, is not alert and oriented, cannot activate and use MDI correctly, or respiratory rate 25 breaths per minute or more, then use the equivalent nebulizer order(s) with same Frequency and PRN reasons based on the score. If a patient is on this medication at home then do not decrease Frequency below that used at home.     0-3 - enter or revise RT bronchodilator order(s) to equivalent RT Bronchodilator order with Frequency of every 4 hours PRN for wheezing or increased work of breathing using Per Protocol order mode. 4-6 - enter or revise RT Bronchodilator order(s) to two equivalent RT bronchodilator orders with one order with BID Frequency and one order with Frequency of every 4 hours PRN wheezing or increased work of breathing using Per Protocol order mode. 7-10 - enter or revise RT Bronchodilator order(s) to two equivalent RT bronchodilator orders with one order with TID Frequency and one order with Frequency of every 4 hours PRN wheezing or increased work of breathing using Per Protocol order mode. 11-13 - enter or revise RT Bronchodilator order(s) to one equivalent RT bronchodilator order with QID Frequency and an Albuterol order with Frequency of every 4 hours PRN wheezing or increased work of breathing using Per Protocol order mode. Greater than 13 - enter or revise RT Bronchodilator order(s) to one equivalent RT bronchodilator order with every 4 hours Frequency and an Albuterol order with Frequency of every 2 hours PRN wheezing or increased work of breathing using Per Protocol order mode. RT to enter RT Home Evaluation for COPD & MDI Assessment order using Per Protocol order mode.     Electronically signed by Folrence Cassidy RCP on 3/2/2023 at 12:55 PM

## 2023-03-02 NOTE — PLAN OF CARE
Problem: Pain  Goal: Verbalizes/displays adequate comfort level or baseline comfort level  Outcome: Progressing  Flowsheets (Taken 3/2/2023 0805)  Verbalizes/displays adequate comfort level or baseline comfort level:   Encourage patient to monitor pain and request assistance   Assess pain using appropriate pain scale   Administer analgesics based on type and severity of pain and evaluate response   Implement non-pharmacological measures as appropriate and evaluate response   Consider cultural and social influences on pain and pain management   Notify Licensed Independent Practitioner if interventions unsuccessful or patient reports new pain     Problem: Safety - Adult  Goal: Free from fall injury  Outcome: Progressing     Problem: Nutrition Deficit:  Goal: Optimize nutritional status  Outcome: Progressing     Problem: Skin/Tissue Integrity  Goal: Absence of new skin breakdown  Description: 1. Monitor for areas of redness and/or skin breakdown  2. Assess vascular access sites hourly  3. Every 4-6 hours minimum:  Change oxygen saturation probe site  4. Every 4-6 hours:  If on nasal continuous positive airway pressure, respiratory therapy assess nares and determine need for appliance change or resting period.   Outcome: Progressing

## 2023-03-02 NOTE — PROGRESS NOTES
Occupational Therapy    [facility] ACUTE CARE OCCUPATIONAL THERAPY EVALUATION  Brissa Sarah, 1955, 2006/2006-A, 3/2/2023    History  [place]:  The primary encounter diagnosis was COPD exacerbation ([address] Diagnoses of COVID-19 and Hypoxemia were also pertinent to this visit. Patient  has a past medical history of A-fib ([address] Anxiety, Arthritis, COPD (chronic obstructive pulmonary disease) ([address] Depression, Diabetes mellitus ([address] Full dentures, H/O angiography, H/O Doppler ultrasound, H/O echocardiogram, Hx of colonoscopy, Hx of Doppler ultrasound, Hyperlipidemia, Hypertension, Macular degeneration, Obesity, On home oxygen therapy, PAD (peripheral artery disease) ([address] Panic attacks, Pneumonia, PTSD (post-traumatic stress disorder), Restless leg, Shortness of breath, Sleep apnea, and Wears glasses. Patient  has a past surgical history that includes Atrial ablation surgery (05/23/2018); pr laparoscopy surg cholecystectomy (N/A, 11/12/2018); Colonoscopy (07/2011); eye surgery (Left, 2017); Dental surgery; Cardiac surgery (05/2018); Cholecystectomy, laparoscopic (11/12/2018); Appendectomy (2003); hernia repair (2003); femoral bypass (Left, 01/2011); Tonsillectomy (1960's); fracture surgery (Left, 1980's); fracture surgery (Right, 2000's); and femoral bypass (Left, 1/9/2019). Subjective:  Patient states:  \"I've been getting up and walking to the bathroom\".     Pain:  No.    Communication with other providers:  Handoff to RN  Restrictions: Droplet Plus, General Precautions, Fall Risk    Home Setup/Prior level of function  Social/Functional History  Lives With: Alone  Type of Home: House  Bathroom Shower/Tub: Tub/Shower unit  Bathroom Toilet: Standard  Bathroom Accessibility: Accessible  Has the patient had two or more falls in the past year or any fall with injury in the past year?: No  ADL Assistance: [address] [place] [address] Avenue: Needs assistance  Ambulation Assistance: Independent  Transfer Assistance: Independent  Active : No    Examination of body systems (includes body structures/functions, activity/participation limitations):  Observation:  Supine in bed upon arrival, agreeable to therapy   [facility]  Hearing:  [facility] PEMKARLIE  Cardiopulmonary: On 4L of 02; 02 saturation decreased ~85% during increased mobility, increased ~90% sitting upright in chair w/ instruction in pursed lip breathing ~1 minute      Body Systems and functions:  ROM R/L:  WFL. Strength R/L:  5/5,   Sensation: WFL  Tone: Normal  Coordination: WFL  Perception: WNL    Activities of Daily Living (ADLs):  Feeding: Independent  Grooming: Independent (washing hands/face w/ warm washcloth)  UB bathing: Independent  LB bathing: Supervision  UB dressing: Independent  LB dressing: Supervision (donning shoes sitting EOB)  Toileting: Supervision    Cognitive and Psychosocial Functioning:  Overall cognitive status: WNL  Affect: Normal        Mobility:  Supine to sit: Independent  Transfers: Supervision  Sitting balance:  Independent. Standing balance:  Supervision. Functional Mobility Supervision  Toilet/Shower Transfers: DNT                  Treatment:  Therapeutic Activity Training:   Therapeutic activity training was instructed today. Cues were given for safety, sequence, UE/LE placement, awareness, and balance. Activities performed today included bed mobility training, sup-sit, sit-stand, functional mobility, stand to sit    Safety: patient left in chair with chair alarm, call light within reach, RN notified, gait belt used. Assessment:  Pt is a 75 yo male admitted from home for COVID-19. Pt at baseline is Independent for ADLs needs assistance for high level IADLs and is Independent for functional transfers/mobility.  Pt currently presents w/ deficits in ADL and high level IADL independence, functional activity tolerance, dynamic sitting and standing balance and tolerance and functional transfers and SOB. Pt would benefit from continued acute care OT services w/ discharge to home w/ home health OT S1 w/ assist PRN. If SOB does not improve in time here pt will need SNF however at this time and w/ expected increased functional activity tolerance and decreased SOB pt's discharge plan will be home w/ home health OT S1. Complexity: Moderate  Prognosis: Good, no significant barriers to participation at this time. Occupational Therapy Plan  Times Per Week: 2x+  Times Per Day:  Once a day  Current Treatment Recommendations: Strengthening, ROM, Balance training, Functional mobility training, Endurance training, Patient/Caregiver education & training, Self-Care / ADL, Safety education & training, Pain management, Equipment evaluation, education, & procurement, Positioning, Home management training     Equipment: defer    Goals:  Pt goal: go home  Time Frame for STGs: discharge  Goal 1: Pt will perform UE ADLs Independent  Goal 2: Pt will perform LE ADLs Independent  Goal 3: Pt will perform toileting Independent  Goal 4: Pt will perform functional transfer Independent  Goal 5: Pt will perform functional mobility Independent  Goal 6: Pt will perform therex/theract in order to increase functional activity tolerance and dynamic standing balance    Treatment plan:  Pt will perform functional task in stand reaching in all 3 planes in order to increase dynamic standing balance and functional activity tolerance    Recommendations for NURSING activity: Up to chair for all 3 meals and up to standard commode for all toileting needs    Time:   Time in: 1320  Time out: 1335  Timed treatment minutes: 10 minutes  Total time: 15 minutes    Electronically signed by:    Anna RANGEL/NATAN [id number]  2:03 PM,3/2/2023

## 2023-03-02 NOTE — PROGRESS NOTES
V2.0  [facility] Hospitalist Progress Note      Name:  Jacinto Meehan /Age/Sex: 1955  (74 y.o. male)   MRN & CSN:  [id number] & [id number] Encounter Date/Time: 3/2/2023 12:01 PM EST    Location:  -A PCP: Dylon Diaz MD       Hospital Day: 3    Assessment and Plan:   Jacinto Meehan is a 74 y.o. male with pmh of COPD, CHF, atrial fibrillation, CAD who presents with COVID-19    COVID-19 infection  Continue with p.o. dexamethasone and baricitinib  Pulm has been consulted, appreciate recs  PT/OT and discharge planning    Acute on chronic hypoxic respiratory failure  Due to above  Continue supportive care and breathing treatment. Wean oxygen as tolerated  CTPA reviewed. No evidence of bacterial pneumonia. Continue to watch off antibiotics. History of HFpEF  Does not appear to be in exacerbation    History of PAF s/p ablation  Continue Eliquis 5 mg twice daily    History of CAD s/p CABG  History of PAD. Continue Pletal.    Non-insulin-dependent DM  On metformin and glipizide at home. Continue basal and bolus dose insulin  Currently hyperglycemic likely due to dexamethasone use  Premeal insulin added and Lantus increased    Diet ADULT DIET; Regular; 5 carb choices (75 gm/meal); No Added Salt (3-4 gm); 2000 ml  ADULT ORAL NUTRITION SUPPLEMENT; Breakfast, Dinner; Low Calorie/High Protein Oral Supplement   DVT Prophylaxis [] Lovenox, []  Heparin, [] SCDs, [] Ambulation,  [] Eliquis, [] Xarelto  [] Coumadin   Code Status Full Code   Disposition From:   Expected Disposition:   Estimated Date of Discharge:   Patient requires continued admission due to respiratory failure   Surrogate Decision Maker/ POA      Subjective:     Chief Complaint: Shortness of Breath (X 5 days. Baseline on 2L via NC at home. Given breathing tx in medics)       Jacinto Meehan is a 74 y.o. male who presents with viral URI symptoms and hypoxia. Seen and examined in the morning.   Reports feeling much better Review of Systems:    Review of Systems    As above     Objective: Intake/Output Summary (Last 24 hours) at 3/2/2023 0854  Last data filed at 3/2/2023 0200  Gross per 24 hour   Intake 850.52 ml   Output 2900 ml   Net -2049.48 ml          Vitals:   Vitals:    03/02/23 0805   BP: 91/80   Pulse: 73   Resp: 19   Temp: 97.7 °F (36.5 °C)   SpO2: 96%       Physical Exam:     General: NAD  Eyes: EOMI  ENT: neck supple  Cardiovascular: Regular rate. Respiratory: b/l decreased air entry, mild wheezing   Gastrointestinal: Soft, non tender  Genitourinary: no suprapubic tenderness  Musculoskeletal: No edema  Skin: warm, dry  Neuro: Alert. Psych: Mood appropriate.      Medications:   Medications:    insulin glargine  15 Units SubCUTAneous Nightly    insulin lispro  5 Units SubCUTAneous TID WC    azithromycin  500 mg IntraVENous Q24H    vitamin D  50,000 Units Oral Weekly    fluticasone  1 spray Each Nostril Daily    guaiFENesin  600 mg Oral BID    insulin lispro  0-16 Units SubCUTAneous TID WC    insulin lispro  0-4 Units SubCUTAneous Nightly    sodium chloride flush  5-40 mL IntraVENous 2 times per day    dexamethasone  6 mg IntraVENous Q24H    albuterol sulfate HFA  2 puff Inhalation 4x daily    And    ipratropium  2 puff Inhalation 4x daily    aspirin  81 mg Oral Daily    atorvastatin  40 mg Oral Daily    budesonide-formoterol  2 puff Inhalation BID    baricitinib  4 mg Oral Daily    dilTIAZem  120 mg Oral Daily    enoxaparin  1 mg/kg (Order-Specific) SubCUTAneous BID      Infusions:    dextrose      sodium chloride       PRN Meds: acetaminophen, 650 mg, Q6H PRN   Or  acetaminophen, 650 mg, Q6H PRN  glucose, 4 tablet, PRN  dextrose bolus, 125 mL, PRN   Or  dextrose bolus, 250 mL, PRN  glucagon (rDNA), 1 mg, PRN  dextrose, , Continuous PRN  sodium chloride flush, 5-40 mL, PRN  sodium chloride, , PRN  ondansetron, 4 mg, Q8H PRN   Or  ondansetron, 4 mg, Q6H PRN  polyethylene glycol, 17 g, Daily PRN  potassium chloride, 40 mEq, PRN   Or  potassium alternative oral replacement, 40 mEq, PRN   Or  potassium chloride, 10 mEq, PRN  magnesium sulfate, 2,000 mg, PRN  ondansetron, 4 mg, Q8H PRN   Or  ondansetron, 4 mg, Q6H PRN      Labs           Imaging/Diagnostics Last 24 Hours   XR CHEST (2 VW)    Result Date: 2/28/2023  EXAMINATION: TWO X-RAY VIEWS OF THE CHEST 2/28/2023 10:33 am COMPARISON: February 11, 2022 HISTORY: ORDERING SYSTEM PROVIDED HISTORY:  Dyspnea TECHNOLOGIST PROVIDED HISTORY: Reason for Exam:  Dyspnea Additional signs and symptoms:  Dyspnea Relevant Medical/Surgical History:  Dyspnea FINDINGS: No lines or tubes. Cardiomediastinal silhouette is stable. The lungs show improved pulmonary edema since February 11, 2022. No significant pleural effusions. Findings are superimposed on chronic lung changes. No pneumothorax. No acute or aggressive osseous lesion. 1. Persistent but improved pulmonary edema superimposed on chronic lung changes. CTA PULMONARY W CONTRAST    Result Date: 2/28/2023  EXAMINATION: CTA OF THE CHEST 2/28/2023 1:24 pm TECHNIQUE: CTA of the chest was performed after the administration of intravenous contrast.  Multiplanar reformatted images are provided for review. MIP images are provided for review. Automated exposure control, iterative reconstruction, and/or weight based adjustment of the mA/kV was utilized to reduce the radiation dose to as low as reasonably achievable. COMPARISON: April 19, 2020 HISTORY: ORDERING SYSTEM PROVIDED HISTORY: copd exacerbation with covid 19 and history of afib TECHNOLOGIST PROVIDED HISTORY: Reason for exam:->copd exacerbation with covid 19 and history of afib Reason for Exam: copd exacerbation with covid 19 and history of afib FINDINGS: Pulmonary Arteries: Pulmonary arteries are adequately opacified for evaluation. No evidence of intraluminal filling defect to suggest pulmonary embolism. Main pulmonary artery is normal in caliber.  Mediastinum: No evidence of mediastinal lymphadenopathy. The heart and pericardium demonstrate no acute abnormality. There is no acute abnormality of the thoracic aorta. Lungs/pleura: The lungs are without acute process. No focal consolidation or pulmonary edema. No evidence of pleural effusion or pneumothorax. Moderate centrilobular emphysema, upper lobe predominant, with stable bullous changes at the right apex. There is stable scarring present within the lingula. The central airways are patent. Upper Abdomen: Limited images of the upper abdomen are unremarkable. Soft Tissues/Bones: No acute bone or soft tissue abnormality. No evidence of pulmonary embolism or acute pulmonary abnormality. Moderate emphysema.        Electronically signed by Manuel Payne MD on 3/2/2023 at 8:54 AM

## 2023-03-02 NOTE — CARE COORDINATION
Case Management Assessment  Initial Evaluation    Date/Time of Evaluation: 3/2/2023 1:58 PM  Assessment Completed by: Elen Crooks RN    If patient is discharged prior to next notation, then this note serves as note for discharge by case management. Patient Name: Tawana Wolfe                   YOB: 1955  Diagnosis: Hypoxemia [R09.02]  COPD exacerbation ([facility] [address] [J44.1]  COVID-19 [U07.1]                   Date / Time: 2/28/2023 10:09 AM    Patient Admission Status: Inpatient   Readmission Risk (Low < 19, Mod (19-27), High > 27): Readmission Risk Score: 10.1    Current PCP: Iram Schmid MD  PCP verified by CM? Yes    Chart Reviewed: Yes      History Provided by: Patient  Patient Orientation: Alert and Oriented, Person, Place, Situation    Patient Cognition: Alert    Hospitalization in the last 30 days (Readmission):  No    If yes, Readmission Assessment in  Navigator will be completed. Advance Directives:      Code Status: Full Code   Patient's Primary Decision Maker is: Legal Next of Kin    Primary Decision Maker: Yolanda Bearden - Brother/Sister - [phone]    Discharge Planning:    Patient lives with: Alone Type of Home: Apartment  Primary Care Giver: Self  Patient Support Systems include: Family Members   Current Financial resources:    Current community resources:    Current services prior to admission: None            Current DME:              Type of Home Care services:  None    ADLS  Prior functional level: Independent in ADLs/IADLs  Current functional level: Independent in ADLs/IADLs    PT AM-PAC:   /24  OT AM-PAC:   /24    Family can provide assistance at DC: Yes (pt has a brother that will support pt with needs. helps with transportation. )  Would you like Case Management to discuss the discharge plan with any other family members/significant others, and if so, who?     Plans to Return to Present Housing: Yes  Other Identified Issues/Barriers to RETURNING to current housing: yes  Potential Assistance needed at discharge: N/A            Potential DME:    Patient expects to discharge to: [address] [place] [address] for transportation at discharge:      Financial    Payor: Wilton Kennedy Davidson / Plan: Shanae 1976 / Product Type: *No Product type* /     Does insurance require precert for SNF: Yes    Potential assistance Purchasing Medications: Yes  Meds-to-Beds request: Yes      [place] [id number] [address], [address] [phone] [place] [address] [address]  Phone: [phone] Fax: [phone]      Notes:  CM into see pt to initiate a safe discharge plan. Cm  introduced self and explained role of CM. Pt is kind, alert and oriented. Pt lives alone in a one floor apartment. Apartment has elevator access. Pt  has Home )2 per [facility] DME. Pt has a c pap. Pt DME includes shower chr. Pt has a support brother and helps with transportation. Pt has groceries delivered. Pt uses [employer] for transportation. Pt has support from others in the apartment building. CM offered home care and pt declined. Pt has a PCP. Pt has insurance and able to obtain her prescriptions. Discharge plan is for pt to return home alone. PCP/Insurance. Pt has home O2.and C pap. Brother will provide transportation.   CM provided card and encouraged to call for any needs or concern. CM is available if any needs arise. Factors facilitating achievement of predicted outcomes: Family support, Motivated, Cooperative, and Pleasant    Barriers to discharge: none    Additional Case Management Notes: The Plan for Transition of Care is related to the following treatment goals of Hypoxemia [R09.02]  COPD exacerbation ([facility] [address] [J44.1]  COVID-19 [C19.9]    IF APPLICABLE: The Patient and/or patient representative Mer Castellon and his family were provided with a choice of provider and agrees with the discharge plan.  Freedom of choice list with basic dialogue that supports the patient's individualized plan of care/goals and shares the quality data associated with the providers was provided to:     Patient Representative Name:       The Patient and/or Patient Representative Agree with the Discharge Plan?       Odilia yAers RN  Case Management Department  Ph: [phone]

## 2023-03-03 ENCOUNTER — TELEPHONE (OUTPATIENT)
Dept: INTERNAL MEDICINE CLINIC | Age: 68
End: 2023-03-03

## 2023-03-03 LAB
ALBUMIN SERPL-MCNC: 3.9 GM/DL (ref 3.4–5)
ALP BLD-CCNC: 75 IU/L (ref 40–129)
ALT SERPL-CCNC: 27 U/L (ref 10–40)
ANION GAP SERPL CALCULATED.3IONS-SCNC: 10 MMOL/L (ref 4–16)
AST SERPL-CCNC: 14 IU/L (ref 15–37)
BASOPHILS ABSOLUTE: 0 K/CU MM
BASOPHILS RELATIVE PERCENT: 0 % (ref 0–1)
BILIRUB SERPL-MCNC: 0.5 MG/DL (ref 0–1)
BILIRUBIN DIRECT: 0.2 MG/DL (ref 0–0.3)
BILIRUBIN, INDIRECT: 0.3 MG/DL (ref 0–0.7)
BUN SERPL-MCNC: 17 MG/DL (ref 6–23)
CALCIUM SERPL-MCNC: 8.8 MG/DL (ref 8.3–10.6)
CHLORIDE BLD-SCNC: 82 MMOL/L (ref 99–110)
CO2: 34 MMOL/L (ref 21–32)
CREAT SERPL-MCNC: 0.6 MG/DL (ref 0.9–1.3)
DIFFERENTIAL TYPE: ABNORMAL
EOSINOPHILS ABSOLUTE: 0 K/CU MM
EOSINOPHILS RELATIVE PERCENT: 0 % (ref 0–3)
GFR SERPL CREATININE-BSD FRML MDRD: >60 ML/MIN/1.73M2
GLUCOSE BLD-MCNC: 251 MG/DL (ref 70–99)
GLUCOSE BLD-MCNC: 269 MG/DL (ref 70–99)
GLUCOSE BLD-MCNC: 316 MG/DL (ref 70–99)
GLUCOSE BLD-MCNC: 346 MG/DL (ref 70–99)
GLUCOSE SERPL-MCNC: 291 MG/DL (ref 70–99)
HCT VFR BLD CALC: 42.2 % (ref 42–52)
HEMOGLOBIN: 13.7 GM/DL (ref 13.5–18)
IMMATURE NEUTROPHIL %: 0.4 % (ref 0–0.43)
LYMPHOCYTES ABSOLUTE: 0.6 K/CU MM
LYMPHOCYTES RELATIVE PERCENT: 6.1 % (ref 24–44)
MAGNESIUM: 2.1 MG/DL (ref 1.8–2.4)
MCH RBC QN AUTO: 29.7 PG (ref 27–31)
MCHC RBC AUTO-ENTMCNC: 32.5 % (ref 32–36)
MCV RBC AUTO: 91.3 FL (ref 78–100)
MONOCYTES ABSOLUTE: 0.7 K/CU MM
MONOCYTES RELATIVE PERCENT: 7.1 % (ref 0–4)
NUCLEATED RBC %: 0 %
PDW BLD-RTO: 13.2 % (ref 11.7–14.9)
PLATELET # BLD: 187 K/CU MM (ref 140–440)
PMV BLD AUTO: 9.6 FL (ref 7.5–11.1)
POTASSIUM SERPL-SCNC: 4.2 MMOL/L (ref 3.5–5.1)
PRO-BNP: 180.9 PG/ML
RBC # BLD: 4.62 M/CU MM (ref 4.6–6.2)
SEGMENTED NEUTROPHILS ABSOLUTE COUNT: 8.6 K/CU MM
SEGMENTED NEUTROPHILS RELATIVE PERCENT: 86.4 % (ref 36–66)
SODIUM BLD-SCNC: 126 MMOL/L (ref 135–145)
TOTAL IMMATURE NEUTOROPHIL: 0.04 K/CU MM
TOTAL NUCLEATED RBC: 0 K/CU MM
TOTAL PROTEIN: 6.2 GM/DL (ref 6.4–8.2)
WBC # BLD: 10 K/CU MM (ref 4–10.5)

## 2023-03-03 PROCEDURE — 82248 BILIRUBIN DIRECT: CPT

## 2023-03-03 PROCEDURE — 6360000002 HC RX W HCPCS: Performed by: NURSE PRACTITIONER

## 2023-03-03 PROCEDURE — 6370000000 HC RX 637 (ALT 250 FOR IP): Performed by: INTERNAL MEDICINE

## 2023-03-03 PROCEDURE — 6360000002 HC RX W HCPCS: Performed by: INTERNAL MEDICINE

## 2023-03-03 PROCEDURE — 2700000000 HC OXYGEN THERAPY PER DAY

## 2023-03-03 PROCEDURE — 94640 AIRWAY INHALATION TREATMENT: CPT

## 2023-03-03 PROCEDURE — 94664 DEMO&/EVAL PT USE INHALER: CPT

## 2023-03-03 PROCEDURE — 94761 N-INVAS EAR/PLS OXIMETRY MLT: CPT

## 2023-03-03 PROCEDURE — 36415 COLL VENOUS BLD VENIPUNCTURE: CPT

## 2023-03-03 PROCEDURE — 82962 GLUCOSE BLOOD TEST: CPT

## 2023-03-03 PROCEDURE — 83880 ASSAY OF NATRIURETIC PEPTIDE: CPT

## 2023-03-03 PROCEDURE — 1200000000 HC SEMI PRIVATE

## 2023-03-03 PROCEDURE — 85025 COMPLETE CBC W/AUTO DIFF WBC: CPT

## 2023-03-03 PROCEDURE — 80053 COMPREHEN METABOLIC PANEL: CPT

## 2023-03-03 PROCEDURE — 2580000003 HC RX 258: Performed by: NURSE PRACTITIONER

## 2023-03-03 PROCEDURE — 6370000000 HC RX 637 (ALT 250 FOR IP): Performed by: NURSE PRACTITIONER

## 2023-03-03 PROCEDURE — 83735 ASSAY OF MAGNESIUM: CPT

## 2023-03-03 PROCEDURE — 2580000003 HC RX 258: Performed by: INTERNAL MEDICINE

## 2023-03-03 RX ORDER — INSULIN LISPRO 100 [IU]/ML
0-8 INJECTION, SOLUTION INTRAVENOUS; SUBCUTANEOUS
Status: DISCONTINUED | OUTPATIENT
Start: 2023-03-03 | End: 2023-03-05

## 2023-03-03 RX ORDER — INSULIN LISPRO 100 [IU]/ML
15 INJECTION, SOLUTION INTRAVENOUS; SUBCUTANEOUS
Status: DISCONTINUED | OUTPATIENT
Start: 2023-03-03 | End: 2023-03-04

## 2023-03-03 RX ORDER — INSULIN GLARGINE 100 [IU]/ML
40 INJECTION, SOLUTION SUBCUTANEOUS NIGHTLY
Status: DISCONTINUED | OUTPATIENT
Start: 2023-03-03 | End: 2023-03-05

## 2023-03-03 RX ADMIN — BUDESONIDE AND FORMOTEROL FUMARATE DIHYDRATE 2 PUFF: 160; 4.5 AEROSOL RESPIRATORY (INHALATION) at 07:58

## 2023-03-03 RX ADMIN — DEXAMETHASONE SODIUM PHOSPHATE 6 MG: 10 INJECTION INTRAMUSCULAR; INTRAVENOUS at 12:30

## 2023-03-03 RX ADMIN — ALBUTEROL SULFATE 2 PUFF: 90 AEROSOL, METERED RESPIRATORY (INHALATION) at 19:20

## 2023-03-03 RX ADMIN — INSULIN LISPRO 4 UNITS: 100 INJECTION, SOLUTION INTRAVENOUS; SUBCUTANEOUS at 16:55

## 2023-03-03 RX ADMIN — INSULIN LISPRO 8 UNITS: 100 INJECTION, SOLUTION INTRAVENOUS; SUBCUTANEOUS at 12:33

## 2023-03-03 RX ADMIN — GUAIFENESIN 600 MG: 600 TABLET, EXTENDED RELEASE ORAL at 09:41

## 2023-03-03 RX ADMIN — ENOXAPARIN SODIUM 160 MG: 100 INJECTION SUBCUTANEOUS at 12:30

## 2023-03-03 RX ADMIN — POLYETHYLENE GLYCOL 3350 17 G: 17 POWDER, FOR SOLUTION ORAL at 14:53

## 2023-03-03 RX ADMIN — ASPIRIN 81 MG: 81 TABLET, COATED ORAL at 09:41

## 2023-03-03 RX ADMIN — INSULIN LISPRO 5 UNITS: 100 INJECTION, SOLUTION INTRAVENOUS; SUBCUTANEOUS at 12:31

## 2023-03-03 RX ADMIN — FLUTICASONE PROPIONATE 1 SPRAY: 50 SPRAY, METERED NASAL at 09:45

## 2023-03-03 RX ADMIN — GUAIFENESIN 600 MG: 600 TABLET, EXTENDED RELEASE ORAL at 20:46

## 2023-03-03 RX ADMIN — SODIUM CHLORIDE, PRESERVATIVE FREE 10 ML: 5 INJECTION INTRAVENOUS at 20:51

## 2023-03-03 RX ADMIN — INSULIN LISPRO 5 UNITS: 100 INJECTION, SOLUTION INTRAVENOUS; SUBCUTANEOUS at 09:51

## 2023-03-03 RX ADMIN — INSULIN LISPRO 15 UNITS: 100 INJECTION, SOLUTION INTRAVENOUS; SUBCUTANEOUS at 16:55

## 2023-03-03 RX ADMIN — AZITHROMYCIN MONOHYDRATE 500 MG: 500 INJECTION, POWDER, LYOPHILIZED, FOR SOLUTION INTRAVENOUS at 12:28

## 2023-03-03 RX ADMIN — DILTIAZEM HYDROCHLORIDE 120 MG: 120 CAPSULE, COATED, EXTENDED RELEASE ORAL at 09:41

## 2023-03-03 RX ADMIN — INSULIN GLARGINE 40 UNITS: 100 INJECTION, SOLUTION SUBCUTANEOUS at 20:47

## 2023-03-03 RX ADMIN — INSULIN LISPRO 12 UNITS: 100 INJECTION, SOLUTION INTRAVENOUS; SUBCUTANEOUS at 09:51

## 2023-03-03 RX ADMIN — INSULIN LISPRO 6 UNITS: 100 INJECTION, SOLUTION INTRAVENOUS; SUBCUTANEOUS at 20:48

## 2023-03-03 RX ADMIN — ALBUTEROL SULFATE 2 PUFF: 90 AEROSOL, METERED RESPIRATORY (INHALATION) at 07:56

## 2023-03-03 RX ADMIN — SODIUM CHLORIDE, PRESERVATIVE FREE 5 ML: 5 INJECTION INTRAVENOUS at 09:42

## 2023-03-03 RX ADMIN — ATORVASTATIN CALCIUM 40 MG: 40 TABLET, FILM COATED ORAL at 09:41

## 2023-03-03 RX ADMIN — BARICITINIB 4 MG: 2 TABLET, FILM COATED ORAL at 09:41

## 2023-03-03 RX ADMIN — ENOXAPARIN SODIUM 160 MG: 100 INJECTION SUBCUTANEOUS at 01:05

## 2023-03-03 RX ADMIN — BUDESONIDE AND FORMOTEROL FUMARATE DIHYDRATE 2 PUFF: 160; 4.5 AEROSOL RESPIRATORY (INHALATION) at 19:20

## 2023-03-03 NOTE — PROGRESS NOTES
pulmonary      SUBJECTIVE:  seen early am and sleeping     OBJECTIVE    VITALS:  /63   Pulse 64   Temp 98.5 °F (36.9 °C) (Oral)   Resp 18   Ht 6' 6\" (1.981 m)   Wt (!) 350 lb 1.5 oz (158.8 kg)   SpO2 90%   BMI 40.46 kg/m²   HEAD AND FACE EXAM:  No throat injection, no active exudate,no thrush  NECK EXAM;No JVD, no masses, symmetrical  CHEST EXAM; Expansion equal and symmetrical, no masses  LUNG EXAM; Good breath sounds bilaterally. There are expiratory wheezes both lungs, there are crackles at both lung bases  CARDIOVASCULAR EXAM: Positive S1 and S2, no S3 or S4, no clicks ,no murmurs  RIGHT AND LEFT LOWER EXTRIMITY EXAM: No edema, no swelling,           LABS   Lab Results   Component Value Date    WBC 10.0 03/03/2023    HGB 13.7 03/03/2023    HCT 42.2 03/03/2023    MCV 91.3 03/03/2023     03/03/2023     Lab Results   Component Value Date    CREATININE 0.6 (L) 03/03/2023    BUN 17 03/03/2023     (L) 03/03/2023    K 4.2 03/03/2023    CL 82 (L) 03/03/2023    CO2 34 (H) 03/03/2023     Lab Results   Component Value Date    INR 1.12 08/31/2019    PROTIME 12.7 (H) 08/31/2019          Lab Results   Component Value Date/Time    PHOS 3.9 03/02/2023 01:23 AM    PHOS 5.2 05/24/2018 06:14 AM    PHOS 4.8 05/21/2018 12:23 PM      No results for input(s): PH, PO2ART, JYT0GAR, HCO3, BEART, O2SAT in the last 72 hours.       Wt Readings from Last 3 Encounters:   02/28/23 (!) 350 lb 1.5 oz (158.8 kg)   07/11/22 (!) 350 lb (158.8 kg)   06/10/22 (!) 369 lb 3.2 oz (167.5 kg)               ASSESMENT  Ac on ch resp failure  Ac copd  Positive covid 19        PLAN  On decadron and baricitinab  Cont o2 and try to wean as tolerated  On zithromax    3/3/2023  Rickey Maurer MD, M.ZOILA.

## 2023-03-03 NOTE — DISCHARGE INSTR - COC
Continuity of Care Form    Patient Name: Rupali Dennison   :  1955  MRN:  [id number]    Admit date:  2023  Discharge date:  ***    Code Status Order: Full Code   Advance Directives:     Admitting Physician:  No admitting provider for patient encounter. PCP: Corrina Kaminski MD    Discharging Nurse: [facility] Unit/Room#: -A  Discharging Unit Phone Number: ***    Emergency Contact:   Extended Emergency Contact Information  Primary Emergency Contact: Oziel Booth  Home Phone: [phone]  Mobile Phone: [phone]  Relation: Brother/Sister   needed?  No    Past Surgical History:  Past Surgical History:   Procedure Laterality Date    APPENDECTOMY      ATRIAL ABLATION SURGERY  2018    A-fib ablation     CARDIAC SURGERY  2018    \"Heart Ablation Done Twice\"    CHOLECYSTECTOMY, LAPAROSCOPIC  2018    COLONOSCOPY  2011    Polyp Removed    DENTAL SURGERY      All Teeth Extracted In Past    EYE SURGERY Left     \"For Macular Degeneration\"    FEMORAL BYPASS Left 2011    FEMORAL BYPASS Left 2019    FEMORAL POPLITEAL BYPASS LEFT, REDO performed by Radha Aguayo MD at [facility] Left 's    Broken Left Wrist , No Hardware    FRACTURE SURGERY Right 's    Broken Right Wrist With Hardware, Hardware Later Removed    HERNIA REPAIR  9633    Umbilical Hernia    PA LAPAROSCOPY SURG CHOLECYSTECTOMY N/A 2018    CHOLECYSTECTOMY LAPAROSCOPIC performed by Sue Almeida MD at [facility]  1960's       Immunization History:   Immunization History   Administered Date(s) Administered    COVID-19, PFIZER PURPLE top, DILUTE for use, (age 15 y+), 30mcg/0.3mL 2021, 2021, 2021    Influenza Virus Vaccine 10/08/2014    Influenza, AFLURIA (age 1 yrs+), FLUZONE, (age 10 mo+), MDV, 0.5mL 2016, 2017, 10/23/2018    Pneumococcal Polysaccharide ([id number]) 2011    Tdap (Boostrix, Adacel) 07/17/2018       Active Problems:  Patient Active Problem List   Diagnosis Code    Hypercholesteremia E78.00    Tobacco use Z72.0    Anxiety F41.9    Controlled type 2 diabetes mellitus without complication, without long-term current use of insulin ([facility]) E11.9    Benign essential HTN I10    Simple chronic bronchitis ([facility]) J41.0    Macular degeneration, dry H35.3190    PVD (peripheral vascular disease) ([facility]) I73.9    VT (ventricular tachycardia) I47.20    Left wrist sprain S63.502A    Hypoxia R09.02    Morbid obesity ([facility]) E66.01    History of atrial fibrillation Z86.79    Swelling of lower extremity M79.89    S/P ablation of atrial fibrillation Z98.890, Z86.79    LFT elevation R79.89    Cholecystitis K81.9    COPD, severe ([facility]) J44.9    Type 2 diabetes mellitus with hyperglycemia E11.65    COVID-19 U07.1       Isolation/Infection:   Isolation            Droplet Plus  Droplet Plus          Patient Infection Status       Infection Onset Added Last Indicated Last Indicated By Review Planned Expiration Resolved Resolved By    COVID-19 02/28/23 02/28/23 02/28/23 COVID-19, Rapid 03/10/23 03/14/23      Resolved    COVID-19 (Rule Out) 02/28/23 02/28/23 02/28/23 COVID-19, Rapid (Ordered)   02/28/23 Rule-Out Test Resulted    COVID-19 (Rule Out) 02/21/22 02/21/22 02/21/22 COVID-19 (Ordered)   02/22/22 Rule-Out Test Resulted    COVID-19 (Rule Out) 11/03/21 11/03/21 11/03/21 Covid-19 Ambulatory (Ordered)   11/05/21 Rule-Out Test Resulted            Nurse Assessment:  Last Vital Signs: /68   Pulse 73   Temp 98.4 °F (36.9 °C) (Oral)   Resp 18   Ht 6' 6\" (1.981 m)   Wt (!) 350 lb 1.5 oz (158.8 kg)   SpO2 90%   BMI 40.46 kg/m²     Last documented pain score (0-10 scale):    Last Weight:   Wt Readings from Last 1 Encounters:   02/28/23 (!) 350 lb 1.5 oz (158.8 kg)     Mental Status:  {IP PT MENTAL STATUS:20030}    IV Access:  {[facility] IV ACCESS:205493429}    Nursing Mobility/ADLs:  Walking   {[facility] DME ADLs:933049192}  Transfer  {[facility] DME ADLs:057298105}  Bathing  {[facility] DME ADLs:900197848}  Dressing  {[facility] DME ADLs:469529127}  Toileting  {[facility] DME ADLs:068359131}  Feeding  {[facility] DME ADLs:066617619}  Med Admin  {[facility] DME ADLs:702257536}  Med Delivery   { DANNY MED Delivery:645577131}    Wound Care Documentation and Therapy:        Elimination:  Continence:   Bowel: {YES / NO:19727}  Bladder: {YES / NO:19727}  Urinary Catheter: {Urinary Catheter:062742763}   Colostomy/Ileostomy/Ileal Conduit: {YES / NO:19727}       Date of Last BM: ***    Intake/Output Summary (Last 24 hours) at 3/3/2023 1043  Last data filed at 3/3/2023 0945  Gross per 24 hour   Intake --   Output 4700 ml   Net -4700 ml     I/O last 3 completed shifts:  In: -   Out: 6300 [Urine:6300]    Safety Concerns:     { DANNY Safety Concerns:595713659}    Impairments/Disabilities:      { DANNY Impairments/Disabilities:784429264}        Patient's personal belongings (please select all that are sent with patient):  {CHP DME Belongings:737342787}    RN SIGNATURE:  {Esignature:760417394}    CASE MANAGEMENT/SOCIAL WORK SECTION    Inpatient Status Date: 2/28/2023    Readmission Risk Assessment Score:  Readmission Risk              Risk of Unplanned Readmission:  15           Discharging to Facility/ Agency   Name:   Address:  Phone:  Fax:    Dialysis Facility (if applicable)   Name:  Address:  Dialysis Schedule:  Phone:  Fax:    / signature: {Esignature:127031305}    PHYSICIAN SECTION    Nutrition Therapy:  Current Nutrition Therapy:   { DANNY Diet List:878619693}    Routes of Feeding: {CHP DME Other Feedings:964209444}  Liquids: {Slp liquid thickness:74880}  Daily Fluid Restriction: {CHP DME Yes amt example:539514927}  Last Modified Barium Swallow with Video (Video Swallowing Test): {Done Not Done Date:304088012}    Treatments at the Time of Hospital Discharge:   Respiratory Treatments: ***  Oxygen Therapy:  {Therapy; copd  oxygen:71569}  Ventilator:    { CC Vent TNBR:978427638}    Rehab Therapies: {THERAPEUTIC INTERVENTION:7124896250}  Weight Bearing Status/Restrictions: { CC Weight Bearin}  Other Medical Equipment (for information only, NOT a DME order):  {EQUIPMENT:208307749}  Other Treatments: ***    Prognosis: {Prognosis:9839569419}    Condition at Discharge: [address] [place] [address] Patient Condition:497811422}    Rehab Potential (if transferring to Rehab): {Prognosis:3628743740}    Recommended Labs or Other Treatments After Discharge: ***    Physician Certification: I certify the above information and transfer of Dayton Cornejo  is necessary for the continuing treatment of the diagnosis listed and that he requires {Admit to Appropriate Level of Care:53281} for {GREATER/LESS:506189817} 30 days.      Update Admission H&P: {CHP DME Changes in CJUE}    PHYSICIAN SIGNATURE:  {Esignature:307954605}

## 2023-03-03 NOTE — PROGRESS NOTES
V2.0  [facility] Hospitalist Progress Note      Name:  Maximilian Thomas /Age/Sex: 1955  (74 y.o. male)   MRN & CSN:  [id number] & [id number] Encounter Date/Time: 3/3/2023 12:21 PM EST    Location:  -A PCP: Madyson Benjamin MD       Hospital Day: 4    Assessment and Plan:   Maximilian Thomas is a 74 y.o. male with pmh of COPD, CHF, atrial fibrillation, CAD who presents with COVID-19      Plan:  COVID-19 infection:  -Continue with p.o. dexamethasone and baricitinib  -Pulm consulted, will appreciate recs  -PT/OT-SNF versus laying in bed at discharge per PT/home with OT Per OT  Acute on chronic hypoxemic respiratory failure:  -Due to the above  -O2 as needed, keep oxygen saturation above 92%  -Wean oxygen as tolerated  -CTPA with no evidence of bacterial pneumonia, currently watching off antibiotics  History of HFpEF:  -Does not appear in acute exacerbation  History of PAF status post ablation:  -Patient started on weight-based Lovenox  History of CAD status post CABG:  History of PAD:  -Continue Pletal  Non-insulin-dependent diabetes mellitus:  -On metformin and glipizide at home, hold OHA  -Continue basal and bolus dose insulin  -Currently hyperglycemic due to dexamethasone use  -Premeal insulin added and Lantus increased to 16 units  -Fingerstick glucose 200-300's  -Endocrine consult      Diet ADULT DIET; Regular; 5 carb choices (75 gm/meal); No Added Salt (3-4 gm); 2000 ml  ADULT ORAL NUTRITION SUPPLEMENT; Breakfast, Dinner;  Low Calorie/High Protein Oral Supplement   DVT Prophylaxis [x] Lovenox, []  Heparin, [] SCDs, [] Ambulation,  [] Eliquis, [] Xarelto  [] Coumadin   Code Status Full Code   Disposition From: Home  Expected Disposition: SNF   Estimated Date of Discharge: 2-3 days  Patient requires continued admission due to hypoxemic respiratory failure requiring O2 levels higher than baseline   Surrogate Decision Maker/ POA Self     Subjective:     Chief Complaint: Shortness of Breath (X 5 days. Baseline on 2L via NC at home. Given breathing tx in medics)       Jens Franklin is a 74 y.o. male who presents with respiratory failure      Patient denies any significant symptoms at the time of examination    Review of Systems:    Review of Systems    Review of Systems:   Constitutional: Negative. Negative for activity change, appetite change, chills, diaphoresis, fatigue, fever and unexpected weight change. HENT: Negative. Negative for congestion, dental problem, drooling, ear discharge, ear pain, facial swelling, hearing loss, mouth sores, nosebleeds, postnasal drip, rhinorrhea, sinus pressure, sinus pain, sneezing, sore throat, tinnitus, trouble swallowing and voice change. Eyes: Negative. Negative for photophobia, pain, discharge, redness, itching and visual disturbance. Respiratory: Negative. Negative for apnea, cough, choking, chest tightness, shortness of breath, wheezing and stridor. Cardiovascular: Negative. Negative for chest pain and palpitations. Gastrointestinal: Negative. Negative for abdominal distention, abdominal pain, anal bleeding, blood in stool, constipation, diarrhea, nausea, rectal pain and vomiting. Endocrine: Negative. Negative for cold intolerance, heat intolerance, polydipsia, polyphagia and polyuria. Genitourinary: Negative. Negative for decreased urine volume, difficulty urinating, dysuria, enuresis, flank pain, frequency, genital sores, hematuria, penile discharge, penile pain, penile swelling, scrotal swelling, testicular pain and urgency. Musculoskeletal: Negative. Negative for arthralgias, back pain, gait problem, joint swelling, myalgias, neck pain and neck stiffness. Skin: Negative. Negative for color change, pallor, rash and wound. Allergic/Immunologic: Negative for environmental allergies, food allergies and immunocompromised state. Neurological: Negative.   Negative for dizziness, tremors, seizures, syncope, facial asymmetry, speech difficulty, weakness, light-headedness, numbness and headaches. Hematological: Negative. Negative for adenopathy. Does not bruise/bleed easily. Psychiatric/Behavioral: Negative. Negative for agitation, behavioral problems, confusion, decreased concentration, dysphoric mood, hallucinations, self-injury, sleep disturbance and suicidal ideas. The patient is not nervous/anxious and is not hyperactive. Objective: Intake/Output Summary (Last 24 hours) at 3/3/2023 1221  Last data filed at 3/3/2023 0945  Gross per 24 hour   Intake --   Output 4100 ml   Net -4100 ml        Vitals:   Vitals:    03/03/23 0756   BP:    Pulse:    Resp:    Temp:    SpO2: 90%       Physical Exam:     General: NAD  Eyes: EOMI  ENT: neck supple  Cardiovascular: Regular rate. Respiratory: Mild bilateral wheezing  Gastrointestinal: Soft, non tender  Genitourinary: no suprapubic tenderness  Musculoskeletal: No edema  Skin: warm, dry  Neuro: Alert. Psych: Mood appropriate.      Medications:   Medications:    insulin glargine  15 Units SubCUTAneous Nightly    insulin lispro  5 Units SubCUTAneous TID WC    albuterol sulfate HFA  2 puff Inhalation BID    And    ipratropium  2 puff Inhalation BID    azithromycin  500 mg IntraVENous Q24H    vitamin D  50,000 Units Oral Weekly    fluticasone  1 spray Each Nostril Daily    guaiFENesin  600 mg Oral BID    insulin lispro  0-16 Units SubCUTAneous TID WC    insulin lispro  0-4 Units SubCUTAneous Nightly    sodium chloride flush  5-40 mL IntraVENous 2 times per day    dexamethasone  6 mg IntraVENous Q24H    aspirin  81 mg Oral Daily    atorvastatin  40 mg Oral Daily    budesonide-formoterol  2 puff Inhalation BID    baricitinib  4 mg Oral Daily    dilTIAZem  120 mg Oral Daily    enoxaparin  1 mg/kg (Order-Specific) SubCUTAneous BID      Infusions:    dextrose      sodium chloride       PRN Meds: albuterol sulfate HFA, 2 puff, Q4H PRN  acetaminophen, 650 mg, Q6H PRN   Or  acetaminophen, 650 mg, Q6H PRN  glucose, 4 tablet, PRN  dextrose bolus, 125 mL, PRN   Or  dextrose bolus, 250 mL, PRN  glucagon (rDNA), 1 mg, PRN  dextrose, , Continuous PRN  sodium chloride flush, 5-40 mL, PRN  sodium chloride, , PRN  ondansetron, 4 mg, Q8H PRN   Or  ondansetron, 4 mg, Q6H PRN  polyethylene glycol, 17 g, Daily PRN  potassium chloride, 40 mEq, PRN   Or  potassium alternative oral replacement, 40 mEq, PRN   Or  potassium chloride, 10 mEq, PRN  magnesium sulfate, 2,000 mg, PRN  ondansetron, 4 mg, Q8H PRN   Or  ondansetron, 4 mg, Q6H PRN        Labs      Recent Results (from the past 24 hour(s))   POCT Glucose    Collection Time: 03/02/23 12:56 PM   Result Value Ref Range    POC Glucose 273 (H) 70 - 99 MG/DL   POCT Glucose    Collection Time: 03/02/23  5:29 PM   Result Value Ref Range    POC Glucose 408 (H) 70 - 99 MG/DL   POCT Glucose    Collection Time: 03/02/23  7:31 PM   Result Value Ref Range    POC Glucose 365 (H) 70 - 99 MG/DL   Basic Metabolic Panel    Collection Time: 03/03/23 12:34 AM   Result Value Ref Range    Sodium 126 (L) 135 - 145 MMOL/L    Potassium 4.2 3.5 - 5.1 MMOL/L    Chloride 82 (L) 99 - 110 mMol/L    CO2 34 (H) 21 - 32 MMOL/L    Anion Gap 10 4 - 16    BUN 17 6 - 23 MG/DL    Creatinine 0.6 (L) 0.9 - 1.3 MG/DL    Est, Glom Filt Rate >60 >60 mL/min/1.73m2    Glucose 291 (H) 70 - 99 MG/DL    Calcium 8.8 8.3 - 10.6 MG/DL   Magnesium    Collection Time: 03/03/23 12:34 AM   Result Value Ref Range    Magnesium 2.1 1.8 - 2.4 mg/dl   Brain Natriuretic Peptide    Collection Time: 03/03/23 12:34 AM   Result Value Ref Range    Pro-.9 <300 PG/ML   Hepatic Function Panel    Collection Time: 03/03/23 12:34 AM   Result Value Ref Range    Albumin 3.9 3.4 - 5.0 GM/DL    Total Bilirubin 0.5 0.0 - 1.0 MG/DL    Bilirubin, Direct 0.2 0.0 - 0.3 MG/DL    Bilirubin, Indirect 0.3 0 - 0.7 MG/DL    Alkaline Phosphatase 75 40 - 129 IU/L    AST 14 (L) 15 - 37 IU/L    ALT 27 10 - 40 U/L    Total Protein 6.2 (L) 6.4 - 8.2 GM/DL CBC with Auto Differential    Collection Time: 03/03/23 12:34 AM   Result Value Ref Range    WBC 10.0 4.0 - 10.5 K/CU MM    RBC 4.62 4.6 - 6.2 M/CU MM    Hemoglobin 13.7 13.5 - 18.0 GM/DL    Hematocrit 42.2 42 - 52 %    MCV 91.3 78 - 100 FL    MCH 29.7 27 - 31 PG    MCHC 32.5 32.0 - 36.0 %    RDW 13.2 11.7 - 14.9 %    Platelets 248 069 - 931 K/CU MM    MPV 9.6 7.5 - 11.1 FL    Differential Type AUTOMATED DIFFERENTIAL     Segs Relative 86.4 (H) 36 - 66 %    Lymphocytes % 6.1 (L) 24 - 44 %    Monocytes % 7.1 (H) 0 - 4 %    Eosinophils % 0.0 0 - 3 %    Basophils % 0.0 0 - 1 %    Segs Absolute 8.6 K/CU MM    Lymphocytes Absolute 0.6 K/CU MM    Monocytes Absolute 0.7 K/CU MM    Eosinophils Absolute 0.0 K/CU MM    Basophils Absolute 0.0 K/CU MM    Nucleated RBC % 0.0 %    Total Nucleated RBC 0.0 K/CU MM    Total Immature Neutrophil 0.04 K/CU MM    Immature Neutrophil % 0.4 0 - 0.43 %   POCT Glucose    Collection Time: 03/03/23  7:54 AM   Result Value Ref Range    POC Glucose 346 (H) 70 - 99 MG/DL   POCT Glucose    Collection Time: 03/03/23 11:14 AM   Result Value Ref Range    POC Glucose 269 (H) 70 - 99 MG/DL        Imaging/Diagnostics Last 24 Hours   No results found.     Electronically signed by Carol Ann Bella MD on 3/3/2023 at 12:21 PM

## 2023-03-03 NOTE — TELEPHONE ENCOUNTER
----- Message from Guerrero Hough sent at 3/3/2023  2:25 PM EST -----  Subject: Message to Provider    QUESTIONS  Information for Provider? pt wanted Dr. Tigist Ramirez to know that he has been   in the hosp since 2/28 in the morning with Covid. Pt is at [facility] in room # 206 and will be transitioning into the nursing   home [facility]. Pt will should be going into   nursing home today 3/3.   ---------------------------------------------------------------------------  --------------  [address] [facility]  [phone]; Do not leave any message, patient will call back for answer  ---------------------------------------------------------------------------  --------------  SCRIPT ANSWERS  Relationship to Patient?  Self

## 2023-03-03 NOTE — PROGRESS NOTES
Comprehensive Nutrition Assessment    Type and Reason for Visit:  Reassess    Nutrition Recommendations/Plan:   Continue current diet per order-carb controlled with no added salt and fluid restriction as needed  Will continue to offer oral nutrition supplement during stay  Encourage consistent meal and supplement intake  Will continue to follow up during stay      Malnutrition Assessment:  Malnutrition Status:  Insufficient data (need measured wt) (03/01/23 0920)      Nutrition Assessment:    Remains on carb controlled diet with no added salt, 2000 ml fluid restriction. Receiving low calorie/high protein oral nutrition supplement. Recent meal intake per available data % with reasonable meal orders. Noted patient followed at home by RD care coordinator for DM education. Will follow at moderate nutrition risk at this time. Nutrition Related Findings:    no new weights during stay, asleep on attempted visits, glucose elevated with dexamethasone and hx DM Wound Type: None       Current Nutrition Intake & Therapies:    Average Meal Intake: %  Average Supplements Intake: Unable to assess  ADULT DIET; Regular; 5 carb choices (75 gm/meal); No Added Salt (3-4 gm); 2000 ml  ADULT ORAL NUTRITION SUPPLEMENT; Breakfast, Dinner; Low Calorie/High Protein Oral Supplement    Anthropometric Measures:  Height: 6' 6\" (198.1 cm)  Ideal Body Weight (IBW): 214 lbs (97 kg)    Admission Body Weight: 350 lb 1.5 oz (158.8 kg)  Current Body Weight: 350 lb 1.5 oz (158.8 kg),   IBW.  Weight Source: Not Specified  Current BMI (kg/m2): 40.5  Usual Body Weight: 350 lb (158.8 kg) (per pt; 350# 7/11/22, 370# 3/7/22)  % Weight Change (Calculated): 0  Weight Adjustment For: No Adjustment                 BMI Categories: Obese Class 3 (BMI 40.0 or greater)    Estimated Daily Nutrient Needs:  Energy Requirements Based On: Formula  Weight Used for Energy Requirements: Current  Energy (kcal/day): 5660-1730 (MSJ)  Weight Used for Protein Requirements: Ideal  Protein (g/day):  (1.0-1.2g/kg IBW)  Method Used for Fluid Requirements: 1 ml/kcal  Fluid (ml/day): 2900 or per MD discretion    Nutrition Diagnosis:   Predicted inadequate energy intake related to catabolic illness, impaired respiratory function as evidenced by poor intake prior to admission    Nutrition Interventions:   Food and/or Nutrient Delivery: Continue Current Diet, Continue Oral Nutrition Supplement  Nutrition Education/Counseling: Survival skills/brief education completed  Coordination of Nutrition Care: Continue to monitor while inpatient       Goals:  Previous Goal Met: Progressing toward Goal(s)  Goals: by next RD assessment, PO intake 75% or greater       Nutrition Monitoring and Evaluation:   Behavioral-Environmental Outcomes: None Identified  Food/Nutrient Intake Outcomes: Food and Nutrient Intake, Supplement Intake  Physical Signs/Symptoms Outcomes: Biochemical Data, Meal Time Behavior, Skin, Weight    Discharge Planning:    Recommend pursue outpatient diabetes education, Continue current diet     Arlin Zamorano RD, LD  Contact: [phone]

## 2023-03-03 NOTE — CARE COORDINATION
CM reviewed notes for SNF. Pt does not want to go to SNF. Pt only wants home. CM discussed safety concerns. CM suggested if pt is to go home would he reconsider home care. Pt is agreeable. Options discussed. Pt did not have a preference. MINH Mchugh made a referral to [facility] with [facility] to follow. [facility] spoke with Mary and informed that pt informed her , pt no longer wants to go home but would like a facility. 1036 CM called room and spoke with pt again. Pt shared that he now wants to go to a facility. Pt does not have a preference. CM called and made referral to [facility]. 1041 CM placed a PS to Dr Jane Arenas regarding when prob discharge. Due to pt having covid beds are limited. [facility]  9181 CM spoke with Shelley. Precert is started today and if returned tomorrow would be good through Monday.  CM placed on the weekend follow up list. [facility]

## 2023-03-03 NOTE — CONSULTS
Endocrinology   Consult Note  2/28/2023 10:09 AM     Primary Care provider: Jing Umaña MD     Referring physician:  No admitting provider for patient encounter. Dear Doctor Bella Crenshaw    Thank You for the Consult     Pt. Was Admitted for : This of breath generalized malaise    Reason for Consult: Better control of blood glucose      History Obtained From:  Patient/ EMR       HISTORY OF PRESENT ILLNESS:                The patient is a 74 y.o. male with significant past medical history of COPD on home oxygen, has sleep apnea on CPAP CAD, status post CABG, depression, diabetes mellitus,, hypertension, hyperlipidemia, has macular degeneration with legal blindness comes in complaining of shortness of breath and generalized malaise. Patient was found to have COVID-positive infection. Patient started on routine treatment of COVID along with steroid high-dose. His blood glucose is running high. I was  consulted for better control of blood glucose. ROS:   Pt's ROS done in detail. Abnormal ROS are noted in Medical and Surgical History Section below: Other Medical History:        Diagnosis Date    A-fib [facility])     Resolved after ablation 2018    Anxiety     Arthritis     \"Hips, Knees, Elbows And Fingers\"    COPD (chronic obstructive pulmonary disease) (HCC)     Sees Dr. Sonja Whitley    Depression     Diabetes mellitus [facility]) Dx [id number]'[id number]    Full dentures     H/O angiography 12/13/2018    peripheral angio - LFA occluded, filling collaterals, RSFA 90% stenosis-vasc surg consult    H/O Doppler ultrasound 09/05/2018    LE arterial - left mid and distal femoral artery occluded, lt FANI shows mild-mod PVD    H/O echocardiogram 01/06/2016    EF60% mild MR, TR, pulm HTN    Hx of colonoscopy     7/11 C-scope - benign polyp x1    Hx of Doppler ultrasound 02/20/2018    Lower extremity doppler  Normal study.     Hyperlipidemia     Hypertension     Macular degeneration     States is legally blind    Obesity     On home oxygen therapy     Continuous At 2 Liters Per Nasal Cannula    PAD (peripheral artery disease) ([address]     10/10 FANI mild PAD left superficial femoral s/p left fem-pop bypass    Panic attacks     Pneumonia Last Episode In 2018    PTSD (post-traumatic stress disorder)     Restless leg     Shortness of breath     Sleep apnea     Uses BiPap - stopped using 4/2020    Wears glasses     To Read     Surgical History:        Procedure Laterality Date    APPENDECTOMY  2003    ATRIAL ABLATION SURGERY  05/23/2018    A-fib ablation     CARDIAC SURGERY  05/2018    \"Heart Ablation Done Twice\"    CHOLECYSTECTOMY, LAPAROSCOPIC  11/12/2018    COLONOSCOPY  07/2011    Polyp Removed    DENTAL SURGERY      All Teeth Extracted In Past    EYE SURGERY Left 2017    \"For Macular Degeneration\"    FEMORAL BYPASS Left 01/2011    FEMORAL BYPASS Left 1/9/2019    FEMORAL POPLITEAL BYPASS LEFT, REDO performed by Anai Ventura MD at [facility] Left 1980's    Broken Left Wrist , No Hardware    FRACTURE SURGERY Right 2000's    Broken Right Wrist With Hardware, Hardware Later Removed    HERNIA REPAIR  1153    Umbilical Hernia    CA LAPAROSCOPY SURG CHOLECYSTECTOMY N/A 11/12/2018    CHOLECYSTECTOMY LAPAROSCOPIC performed by Tutu Isidro MD at [facility]  1960's       Allergies:  Metoprolol    Family History:       Problem Relation Age of Onset    Early Death Father         In His 29's, Suicide    Early Death Mother         In Her 29's, She Was Murdered By     Early Death Brother 24        Automobile Accident    Allergy (Severe) Brother     Arthritis Sister     Mental Illness Sister     Diabetes Sister     Obesity Brother      REVIEW OF SYSTEMS:  Review of System Done as noted above     PHYSICAL EXAM:      Vitals:    /68   Pulse 72   Temp 98.4 °F (36.9 °C) (Oral)   Resp 18   Ht 6' 6\" (1.981 m)   Wt (!) 350 lb 1.5 oz (158.8 kg)   SpO2 90%   BMI 40.46 kg/m²     CONSTITUTIONAL:  awake, alert, cooperative, appears stated age   EYES:  vision intact Fundoscopic Exam not performed has macular degeneration legally blind in both eyes  ENT:Normal  NECK:  Supple, No JVD. Thyroid Exam:Normal   LUNGS:  Has Vesicular Breath Sounds, has some wheezing and some rales  CARDIOVASCULAR:  Normal apical impulse, regular rate and rhythm, normal S1 and S2, no S3 or S4, and has no  murmur   ABDOMEN:  No scars, normal bowel sounds, soft, non-distended, non-tender, no masses palpated, no hepatolienomegaly  Musculoskeletal: Normal  Extremities: Normal, peripheral pulses normal, , has no edema   NEUROLOGIC:  Awake, alert, oriented to name, place and time. Cranial nerves II-XII are grossly intact. Motor is  intact. Sensory is intact.  ,  and gait is normal.    DATA:    CBC:   Recent Labs     03/01/23 0834 03/02/23 0123 03/03/23  0034   WBC 6.7 10.7* 10.0   HGB 14.5 14.8 13.7    185 187    CMP:  Recent Labs     03/01/23 0834 03/02/23 0123 03/03/23  0034   * 131* 126*   K 5.0 5.1 4.2   CL 92* 89* 82*   CO2 29 27 34*   BUN 18 20 17   CREATININE 0.8* 0.7* 0.6*   CALCIUM 8.9 9.0 8.8   PROT 6.5 6.5  6.5 6.2*   LABALBU 4.1 4.2  4.2 3.9   BILITOT 0.5 0.5  0.5 0.5   ALKPHOS 80 78  78 75   AST 18 21  21 14*   ALT 29 29  29 27     Lipids:   Lab Results   Component Value Date/Time    CHOL 159 03/01/2023 08:34 AM    CHOL 112 12/15/2021 01:35 PM    HDL 29 03/01/2023 08:34 AM    TRIG 182 03/01/2023 08:34 AM     Glucose:   Recent Labs     03/02/23  1931 03/03/23  0754 03/03/23  1114   POCGLU 365* 346* 269*     Hemoglobin A1C:   Lab Results   Component Value Date/Time    LABA1C 9.3 02/28/2023 10:24 AM     Free T4:   Lab Results   Component Value Date/Time    T4FREE 1.06 02/28/2023 10:24 AM     Free T3: No results found for: FT3  TSH High Sensitivity:   Lab Results   Component Value Date/Time    TSH 2.950 02/28/2023 10:24 AM       CTA PULMONARY W CONTRAST   Final Result   No evidence of pulmonary embolism or acute pulmonary abnormality. Moderate emphysema. XR CHEST (2 VW)   Final Result   1. Persistent but improved pulmonary edema superimposed on chronic lung   changes. Scheduled Medicines   Medications:    insulin lispro  15 Units SubCUTAneous TID WC    insulin glargine  40 Units SubCUTAneous Nightly    insulin lispro  0-8 Units SubCUTAneous TID WC    insulin lispro  0-8 Units SubCUTAneous 2 times per day    albuterol sulfate HFA  2 puff Inhalation BID    And    ipratropium  2 puff Inhalation BID    azithromycin  500 mg IntraVENous Q24H    vitamin D  50,000 Units Oral Weekly    fluticasone  1 spray Each Nostril Daily    guaiFENesin  600 mg Oral BID    sodium chloride flush  5-40 mL IntraVENous 2 times per day    dexamethasone  6 mg IntraVENous Q24H    aspirin  81 mg Oral Daily    atorvastatin  40 mg Oral Daily    budesonide-formoterol  2 puff Inhalation BID    baricitinib  4 mg Oral Daily    dilTIAZem  120 mg Oral Daily    enoxaparin  1 mg/kg (Order-Specific) SubCUTAneous BID      Infusions:    dextrose      sodium chloride           IMPRESSION    Patient Active Problem List   Diagnosis    Hypercholesteremia    Tobacco use    Anxiety    Controlled type 2 diabetes mellitus without complication, without long-term current use of insulin (HCC)    Benign essential HTN    Simple chronic bronchitis (HCC)    Macular degeneration, dry    PVD (peripheral vascular disease) ([facility])    VT (ventricular tachycardia)    Left wrist sprain    Hypoxia    Morbid obesity (HCC)    History of atrial fibrillation    Swelling of lower extremity    S/P ablation of atrial fibrillation    LFT elevation    Cholecystitis    COPD, severe ([address]    Type 2 diabetes mellitus with hyperglycemia    COVID-19         RECOMMENDATIONS:      Reviewed POC blood glucose .  Labs and X ray results   Reviewed Home and Current Medicines   Will Start On meal/ Correction bolus Humalog/ Lantus Insulin regime /  Monitor Blood glucose frequently   Modify  the dose of Insulin as needed        Will follow with you  Again thank you for sharing pt's care with me.      Truly yours,       Maye Huntley MD

## 2023-03-04 LAB
ALBUMIN SERPL-MCNC: 4 GM/DL (ref 3.4–5)
ALP BLD-CCNC: 75 IU/L (ref 40–129)
ALT SERPL-CCNC: 28 U/L (ref 10–40)
ANION GAP SERPL CALCULATED.3IONS-SCNC: 6 MMOL/L (ref 4–16)
AST SERPL-CCNC: 15 IU/L (ref 15–37)
BASOPHILS ABSOLUTE: 0 K/CU MM
BASOPHILS RELATIVE PERCENT: 0.1 % (ref 0–1)
BILIRUB SERPL-MCNC: 0.3 MG/DL (ref 0–1)
BILIRUBIN DIRECT: 0.2 MG/DL (ref 0–0.3)
BILIRUBIN, INDIRECT: 0.1 MG/DL (ref 0–0.7)
BUN SERPL-MCNC: 16 MG/DL (ref 6–23)
CALCIUM SERPL-MCNC: 8.9 MG/DL (ref 8.3–10.6)
CHLORIDE BLD-SCNC: 92 MMOL/L (ref 99–110)
CO2: 36 MMOL/L (ref 21–32)
CREAT SERPL-MCNC: 0.6 MG/DL (ref 0.9–1.3)
CRP SERPL HS-MCNC: 32.5 MG/L
DIFFERENTIAL TYPE: ABNORMAL
EOSINOPHILS ABSOLUTE: 0 K/CU MM
EOSINOPHILS RELATIVE PERCENT: 0 % (ref 0–3)
GFR SERPL CREATININE-BSD FRML MDRD: >60 ML/MIN/1.73M2
GLUCOSE BLD-MCNC: 238 MG/DL (ref 70–99)
GLUCOSE BLD-MCNC: 276 MG/DL (ref 70–99)
GLUCOSE BLD-MCNC: 280 MG/DL (ref 70–99)
GLUCOSE BLD-MCNC: 315 MG/DL (ref 70–99)
GLUCOSE BLD-MCNC: 340 MG/DL (ref 70–99)
GLUCOSE SERPL-MCNC: 290 MG/DL (ref 70–99)
HCT VFR BLD CALC: 44.9 % (ref 42–52)
HEMOGLOBIN: 14.8 GM/DL (ref 13.5–18)
IMMATURE NEUTROPHIL %: 1.1 % (ref 0–0.43)
LYMPHOCYTES ABSOLUTE: 0.8 K/CU MM
LYMPHOCYTES RELATIVE PERCENT: 10.5 % (ref 24–44)
MAGNESIUM: 2.2 MG/DL (ref 1.8–2.4)
MCH RBC QN AUTO: 30 PG (ref 27–31)
MCHC RBC AUTO-ENTMCNC: 33 % (ref 32–36)
MCV RBC AUTO: 91.1 FL (ref 78–100)
MONOCYTES ABSOLUTE: 0.5 K/CU MM
MONOCYTES RELATIVE PERCENT: 7.1 % (ref 0–4)
NUCLEATED RBC %: 0 %
PDW BLD-RTO: 13.1 % (ref 11.7–14.9)
PLATELET # BLD: 202 K/CU MM (ref 140–440)
PMV BLD AUTO: 9.5 FL (ref 7.5–11.1)
POTASSIUM SERPL-SCNC: 4.7 MMOL/L (ref 3.5–5.1)
RBC # BLD: 4.93 M/CU MM (ref 4.6–6.2)
SEGMENTED NEUTROPHILS ABSOLUTE COUNT: 6.1 K/CU MM
SEGMENTED NEUTROPHILS RELATIVE PERCENT: 81.2 % (ref 36–66)
SODIUM BLD-SCNC: 134 MMOL/L (ref 135–145)
TOTAL IMMATURE NEUTOROPHIL: 0.08 K/CU MM
TOTAL NUCLEATED RBC: 0 K/CU MM
TOTAL PROTEIN: 6.3 GM/DL (ref 6.4–8.2)
WBC # BLD: 7.5 K/CU MM (ref 4–10.5)

## 2023-03-04 PROCEDURE — 6360000002 HC RX W HCPCS: Performed by: NURSE PRACTITIONER

## 2023-03-04 PROCEDURE — 86140 C-REACTIVE PROTEIN: CPT

## 2023-03-04 PROCEDURE — 94761 N-INVAS EAR/PLS OXIMETRY MLT: CPT

## 2023-03-04 PROCEDURE — 82962 GLUCOSE BLOOD TEST: CPT

## 2023-03-04 PROCEDURE — 83735 ASSAY OF MAGNESIUM: CPT

## 2023-03-04 PROCEDURE — 80053 COMPREHEN METABOLIC PANEL: CPT

## 2023-03-04 PROCEDURE — 2580000003 HC RX 258: Performed by: NURSE PRACTITIONER

## 2023-03-04 PROCEDURE — 1200000000 HC SEMI PRIVATE

## 2023-03-04 PROCEDURE — 82248 BILIRUBIN DIRECT: CPT

## 2023-03-04 PROCEDURE — 2580000003 HC RX 258: Performed by: INTERNAL MEDICINE

## 2023-03-04 PROCEDURE — 6360000002 HC RX W HCPCS: Performed by: INTERNAL MEDICINE

## 2023-03-04 PROCEDURE — 94640 AIRWAY INHALATION TREATMENT: CPT

## 2023-03-04 PROCEDURE — 6370000000 HC RX 637 (ALT 250 FOR IP): Performed by: INTERNAL MEDICINE

## 2023-03-04 PROCEDURE — 85025 COMPLETE CBC W/AUTO DIFF WBC: CPT

## 2023-03-04 PROCEDURE — 2700000000 HC OXYGEN THERAPY PER DAY

## 2023-03-04 PROCEDURE — 36415 COLL VENOUS BLD VENIPUNCTURE: CPT

## 2023-03-04 PROCEDURE — 2060000000 HC ICU INTERMEDIATE R&B

## 2023-03-04 PROCEDURE — 6370000000 HC RX 637 (ALT 250 FOR IP): Performed by: NURSE PRACTITIONER

## 2023-03-04 RX ORDER — INSULIN LISPRO 100 [IU]/ML
20 INJECTION, SOLUTION INTRAVENOUS; SUBCUTANEOUS
Status: DISCONTINUED | OUTPATIENT
Start: 2023-03-04 | End: 2023-03-06 | Stop reason: HOSPADM

## 2023-03-04 RX ADMIN — BUDESONIDE AND FORMOTEROL FUMARATE DIHYDRATE 2 PUFF: 160; 4.5 AEROSOL RESPIRATORY (INHALATION) at 08:05

## 2023-03-04 RX ADMIN — ALBUTEROL SULFATE 2 PUFF: 90 AEROSOL, METERED RESPIRATORY (INHALATION) at 20:03

## 2023-03-04 RX ADMIN — AZITHROMYCIN MONOHYDRATE 500 MG: 500 INJECTION, POWDER, LYOPHILIZED, FOR SOLUTION INTRAVENOUS at 12:15

## 2023-03-04 RX ADMIN — INSULIN LISPRO 6 UNITS: 100 INJECTION, SOLUTION INTRAVENOUS; SUBCUTANEOUS at 20:30

## 2023-03-04 RX ADMIN — SODIUM CHLORIDE, PRESERVATIVE FREE 10 ML: 5 INJECTION INTRAVENOUS at 08:09

## 2023-03-04 RX ADMIN — INSULIN GLARGINE 40 UNITS: 100 INJECTION, SOLUTION SUBCUTANEOUS at 20:30

## 2023-03-04 RX ADMIN — INSULIN LISPRO 6 UNITS: 100 INJECTION, SOLUTION INTRAVENOUS; SUBCUTANEOUS at 00:50

## 2023-03-04 RX ADMIN — INSULIN LISPRO 4 UNITS: 100 INJECTION, SOLUTION INTRAVENOUS; SUBCUTANEOUS at 08:14

## 2023-03-04 RX ADMIN — ASPIRIN 81 MG: 81 TABLET, COATED ORAL at 08:09

## 2023-03-04 RX ADMIN — ENOXAPARIN SODIUM 160 MG: 100 INJECTION SUBCUTANEOUS at 12:11

## 2023-03-04 RX ADMIN — INSULIN LISPRO 20 UNITS: 100 INJECTION, SOLUTION INTRAVENOUS; SUBCUTANEOUS at 17:11

## 2023-03-04 RX ADMIN — INSULIN LISPRO 20 UNITS: 100 INJECTION, SOLUTION INTRAVENOUS; SUBCUTANEOUS at 12:12

## 2023-03-04 RX ADMIN — SODIUM CHLORIDE, PRESERVATIVE FREE 10 ML: 5 INJECTION INTRAVENOUS at 23:13

## 2023-03-04 RX ADMIN — ENOXAPARIN SODIUM 160 MG: 100 INJECTION SUBCUTANEOUS at 00:45

## 2023-03-04 RX ADMIN — METFORMIN HYDROCHLORIDE 850 MG: 850 TABLET ORAL at 17:11

## 2023-03-04 RX ADMIN — BARICITINIB 4 MG: 2 TABLET, FILM COATED ORAL at 08:09

## 2023-03-04 RX ADMIN — ALBUTEROL SULFATE 2 PUFF: 90 AEROSOL, METERED RESPIRATORY (INHALATION) at 08:05

## 2023-03-04 RX ADMIN — BUDESONIDE AND FORMOTEROL FUMARATE DIHYDRATE 2 PUFF: 160; 4.5 AEROSOL RESPIRATORY (INHALATION) at 20:04

## 2023-03-04 RX ADMIN — INSULIN LISPRO 20 UNITS: 100 INJECTION, SOLUTION INTRAVENOUS; SUBCUTANEOUS at 08:09

## 2023-03-04 RX ADMIN — INSULIN LISPRO 4 UNITS: 100 INJECTION, SOLUTION INTRAVENOUS; SUBCUTANEOUS at 12:13

## 2023-03-04 RX ADMIN — GUAIFENESIN 600 MG: 600 TABLET, EXTENDED RELEASE ORAL at 20:27

## 2023-03-04 RX ADMIN — METFORMIN HYDROCHLORIDE 850 MG: 850 TABLET ORAL at 08:18

## 2023-03-04 RX ADMIN — ATORVASTATIN CALCIUM 40 MG: 40 TABLET, FILM COATED ORAL at 08:09

## 2023-03-04 RX ADMIN — DILTIAZEM HYDROCHLORIDE 120 MG: 120 CAPSULE, COATED, EXTENDED RELEASE ORAL at 08:08

## 2023-03-04 RX ADMIN — GUAIFENESIN 600 MG: 600 TABLET, EXTENDED RELEASE ORAL at 08:09

## 2023-03-04 RX ADMIN — DEXAMETHASONE SODIUM PHOSPHATE 6 MG: 10 INJECTION INTRAMUSCULAR; INTRAVENOUS at 12:11

## 2023-03-04 RX ADMIN — INSULIN LISPRO 2 UNITS: 100 INJECTION, SOLUTION INTRAVENOUS; SUBCUTANEOUS at 17:12

## 2023-03-04 ASSESSMENT — PAIN SCALES - GENERAL
PAINLEVEL_OUTOF10: 0

## 2023-03-04 NOTE — PROGRESS NOTES
Pulmonary and Critical Care  Progress Note    Subjective: The patient is comfortable in bed. On 4 L N/C. Shortness of breath has improved  Chest pain none  Addressing respiratory complaints Patient is negative for  hemoptysis and cyanosis  CONSTITUTIONAL:  negative for fevers and chills      Past Medical History:     has a past medical history of A-fib ([facility] [address] Anxiety, Arthritis, COPD (chronic obstructive pulmonary disease) ([facility] [address] Depression, Diabetes mellitus ([facility] [address] Full dentures, H/O angiography, H/O Doppler ultrasound, H/O echocardiogram, Hx of colonoscopy, Hx of Doppler ultrasound, Hyperlipidemia, Hypertension, Macular degeneration, Obesity, On home oxygen therapy, PAD (peripheral artery disease) ([facility] [address] Panic attacks, Pneumonia, PTSD (post-traumatic stress disorder), Restless leg, Shortness of breath, Sleep apnea, and Wears glasses. has a past surgical history that includes Atrial ablation surgery (05/23/2018); pr laparoscopy surg cholecystectomy (N/A, 11/12/2018); Colonoscopy (07/2011); eye surgery (Left, 2017); Dental surgery; Cardiac surgery (05/2018); Cholecystectomy, laparoscopic (11/12/2018); Appendectomy (2003); hernia repair (2003); femoral bypass (Left, 01/2011); Tonsillectomy (1960's); fracture surgery (Left, 1980's); fracture surgery (Right, 2000's); and femoral bypass (Left, 1/9/2019). reports that he quit smoking about 14 months ago. His smoking use included cigars. He started smoking about 50 years ago. He has a 48.00 pack-year smoking history. He quit smokeless tobacco use about 48 years ago. His smokeless tobacco use included chew. He reports that he does not drink alcohol and does not use drugs. Family history:  family history includes Allergy (Severe) in his brother; Arthritis in his sister; Diabetes in his sister; Early Death in his father and mother; Early Death (age of onset: 24) in his brother; Mental Illness in his sister; Obesity in his brother.     Allergies   Allergen Reactions Metoprolol      \"Chest Pain And Burning In The Chest\"     Social History:    Reviewed; no changes    Objective:   PHYSICAL EXAM:        VITALS:  /63   Pulse 79   Temp 98.2 °F (36.8 °C) (Oral)   Resp 17   Ht 6' 6\" (1.981 m)   Wt (!) 365 lb (165.6 kg)   SpO2 92%   BMI 42.18 kg/m²     24HR INTAKE/OUTPUT:    Intake/Output Summary (Last 24 hours) at 3/4/2023 1148  Last data filed at 3/4/2023 0456  Gross per 24 hour   Intake 1150 ml   Output 3350 ml   Net -2200 ml       CONSTITUTIONAL:  awake, alert, cooperative, no apparent distress, and appears stated age  LUNGS:  decreased breath sounds, basilar crackles. CARDIOVASCULAR:  normal S1 and S2 and negative JVD  ABD:Abdomen soft, non-tender. BS normal. No masses,  No organomegaly  NEURO:Alert. DATA:    CBC:  Recent Labs     03/02/23 0123 03/03/23 0034 03/04/23 0432   WBC 10.7* 10.0 7.5   RBC 4.83 4.62 4.93   HGB 14.8 13.7 14.8   HCT 45.1 42.2 44.9    187 202   MCV 93.4 91.3 91.1   MCH 30.6 29.7 30.0   MCHC 32.8 32.5 33.0   RDW 13.4 13.2 13.1   SEGSPCT 86.4* 86.4* 81.2*      BMP:  Recent Labs     03/02/23 0123 03/03/23 0034 03/04/23 0432   * 126* 134*   K 5.1 4.2 4.7   CL 89* 82* 92*   CO2 27 34* 36*   BUN 20 17 16   CREATININE 0.7* 0.6* 0.6*   CALCIUM 9.0 8.8 8.9   GLUCOSE 352* 291* 290*      ABG:  No results for input(s): PH, PO2ART, OFZ6NVY, HCO3, BEART, O2SAT in the last 72 hours. Lab Results   Component Value Date    PROBNP 180.9 03/03/2023    PROBNP 104.3 02/28/2023    PROBNP 71.38 04/19/2020     No results found for: [facility]    Radiology Review:  Pertinent images / reports were reviewed as a part of this visit.     Assessment:     Patient Active Problem List   Diagnosis    Hypercholesteremia    Tobacco use    Anxiety    Controlled type 2 diabetes mellitus without complication, without long-term current use of insulin (HCC)    Benign essential HTN    Simple chronic bronchitis (HCC)    Macular degeneration, dry    PVD (peripheral vascular disease) ([facility] [address]    VT (ventricular tachycardia)    Left wrist sprain    Hypoxia    Morbid obesity (HCC)    History of atrial fibrillation    Swelling of lower extremity    S/P ablation of atrial fibrillation    LFT elevation    Cholecystitis    COPD, severe ([address]    Type 2 diabetes mellitus with hyperglycemia    COVID-19       Plan:   1. Overall the patient has improved. 2. [address] Activity.     Ruthy Burr MD   3/4/2023  11:48 AM

## 2023-03-04 NOTE — PROGRESS NOTES
V2.0  [facility] Hospitalist Progress Note      Name:  Andrew Humphrey /Age/Sex: 1955  (74 y.o. male)   MRN & CSN:  [id number] & [id number] Encounter Date/Time: 3/4/2023 12:21 PM EST    Location:  -A PCP: Lesia Watt MD       Hospital Day: 5    Assessment and Plan:   Andrew Humphrey is a 74 y.o. male with pmh of COPD, CHF, atrial fibrillation, CAD who presents with COVID-19      Plan:  COVID-19 infection:  -Continue with p.o. dexamethasone and baricitinib  -Pulm consulted, will appreciate recs  -PT/OT-SNF versus laying in bed at discharge per PT/home with OT Per OT  Acute on chronic hypoxemic respiratory failure:  -Due to the above  -O2 as needed, keep oxygen saturation above 92%  -Wean oxygen as tolerated  -CTPA with no evidence of bacterial pneumonia, currently watching off antibiotics  History of HFpEF:  -Does not appear in acute exacerbation  History of PAF status post ablation:  -Patient started on weight-based Lovenox  History of CAD status post CABG:  History of PAD:  -Continue Pletal  Non-insulin-dependent diabetes mellitus:  -On metformin and glipizide at home, hold OHA  -Continue basal and bolus dose insulin  -Currently hyperglycemic due to dexamethasone use  -Premeal insulin added and Lantus increased to 16 units  -Fingerstick glucose 200-300's  -Endocrine consult      Diet ADULT DIET; Regular; 5 carb choices (75 gm/meal); No Added Salt (3-4 gm); 2000 ml  ADULT ORAL NUTRITION SUPPLEMENT; Breakfast, Dinner;  Low Calorie/High Protein Oral Supplement   DVT Prophylaxis [x] Lovenox, []  Heparin, [] SCDs, [] Ambulation,  [] Eliquis, [] Xarelto  [] Coumadin   Code Status Full Code   Disposition From: Home  Expected Disposition: SNF   Estimated Date of Discharge: 2-3 days  Patient requires continued admission due to hypoxemic respiratory failure requiring O2 levels higher than baseline   Surrogate Decision Maker/ POA Self     Subjective:     Chief Complaint: Shortness of Breath (X 5 days. Baseline on 2L via NC at home. Given breathing tx in medics)       Ayleen Lepe is a 74 y.o. male who presents with respiratory failure      3/4/23:Patient denies any significant symptoms at the time of examination, O2 weaned to 3LPm from 4L , BL 2LPM at home. Labs grossly unremarkable. Will try to wean to BL 2LPM and plan for dispo     Review of Systems:    Review of Systems    Review of Systems:   Constitutional: Negative. Negative for activity change, appetite change, chills, diaphoresis, fatigue, fever and unexpected weight change. HENT: Negative. Negative for congestion, dental problem, drooling, ear discharge, ear pain, facial swelling, hearing loss, mouth sores, nosebleeds, postnasal drip, rhinorrhea, sinus pressure, sinus pain, sneezing, sore throat, tinnitus, trouble swallowing and voice change. Eyes: Negative. Negative for photophobia, pain, discharge, redness, itching and visual disturbance. Respiratory: Negative. Negative for apnea, cough, choking, chest tightness, shortness of breath, wheezing and stridor. Cardiovascular: Negative. Negative for chest pain and palpitations. Gastrointestinal: Negative. Negative for abdominal distention, abdominal pain, anal bleeding, blood in stool, constipation, diarrhea, nausea, rectal pain and vomiting. Endocrine: Negative. Negative for cold intolerance, heat intolerance, polydipsia, polyphagia and polyuria. Genitourinary: Negative. Negative for decreased urine volume, difficulty urinating, dysuria, enuresis, flank pain, frequency, genital sores, hematuria, penile discharge, penile pain, penile swelling, scrotal swelling, testicular pain and urgency. Musculoskeletal: Negative. Negative for arthralgias, back pain, gait problem, joint swelling, myalgias, neck pain and neck stiffness. Skin: Negative. Negative for color change, pallor, rash and wound.    Allergic/Immunologic: Negative for environmental allergies, food allergies and immunocompromised state. Neurological: Negative. Negative for dizziness, tremors, seizures, syncope, facial asymmetry, speech difficulty, weakness, light-headedness, numbness and headaches. Hematological: Negative. Negative for adenopathy. Does not bruise/bleed easily. Psychiatric/Behavioral: Negative. Negative for agitation, behavioral problems, confusion, decreased concentration, dysphoric mood, hallucinations, self-injury, sleep disturbance and suicidal ideas. The patient is not nervous/anxious and is not hyperactive. Objective: Intake/Output Summary (Last 24 hours) at 3/4/2023 0947  Last data filed at 3/4/2023 0456  Gross per 24 hour   Intake 1150 ml   Output 3350 ml   Net -2200 ml          Vitals:   Vitals:    03/04/23 0807   BP: 130/63   Pulse: 79   Resp: 17   Temp: 98.2 °F (36.8 °C)   SpO2: 92%       Physical Exam:     General: NAD  Eyes: EOMI  ENT: neck supple  Cardiovascular: Regular rate. Respiratory: Mild bilateral wheezing  Gastrointestinal: Soft, non tender  Genitourinary: no suprapubic tenderness  Musculoskeletal: No edema  Skin: warm, dry  Neuro: Alert. Psych: Mood appropriate.      Medications:   Medications:    insulin lispro  20 Units SubCUTAneous TID WC    metFORMIN  850 mg Oral BID WC    insulin glargine  40 Units SubCUTAneous Nightly    insulin lispro  0-8 Units SubCUTAneous TID WC    insulin lispro  0-8 Units SubCUTAneous 2 times per day    albuterol sulfate HFA  2 puff Inhalation BID    And    ipratropium  2 puff Inhalation BID    azithromycin  500 mg IntraVENous Q24H    vitamin D  50,000 Units Oral Weekly    fluticasone  1 spray Each Nostril Daily    guaiFENesin  600 mg Oral BID    sodium chloride flush  5-40 mL IntraVENous 2 times per day    dexamethasone  6 mg IntraVENous Q24H    aspirin  81 mg Oral Daily    atorvastatin  40 mg Oral Daily    budesonide-formoterol  2 puff Inhalation BID    baricitinib  4 mg Oral Daily    dilTIAZem  120 mg Oral Daily    enoxaparin  1 mg/kg (Order-Specific) SubCUTAneous BID      Infusions:    dextrose      sodium chloride       PRN Meds: albuterol sulfate HFA, 2 puff, Q4H PRN  acetaminophen, 650 mg, Q6H PRN   Or  acetaminophen, 650 mg, Q6H PRN  glucose, 4 tablet, PRN  dextrose bolus, 125 mL, PRN   Or  dextrose bolus, 250 mL, PRN  glucagon (rDNA), 1 mg, PRN  dextrose, , Continuous PRN  sodium chloride flush, 5-40 mL, PRN  sodium chloride, , PRN  ondansetron, 4 mg, Q8H PRN   Or  ondansetron, 4 mg, Q6H PRN  polyethylene glycol, 17 g, Daily PRN  potassium chloride, 40 mEq, PRN   Or  potassium alternative oral replacement, 40 mEq, PRN   Or  potassium chloride, 10 mEq, PRN  magnesium sulfate, 2,000 mg, PRN  ondansetron, 4 mg, Q8H PRN   Or  ondansetron, 4 mg, Q6H PRN      Labs      Recent Results (from the past 24 hour(s))   POCT Glucose    Collection Time: 03/03/23 11:14 AM   Result Value Ref Range    POC Glucose 269 (H) 70 - 99 MG/DL   POCT Glucose    Collection Time: 03/03/23  3:59 PM   Result Value Ref Range    POC Glucose 251 (H) 70 - 99 MG/DL   POCT Glucose    Collection Time: 03/03/23  8:35 PM   Result Value Ref Range    POC Glucose 316 (H) 70 - 99 MG/DL   POCT Glucose    Collection Time: 03/04/23 12:40 AM   Result Value Ref Range    POC Glucose 340 (H) 70 - 99 MG/DL   Basic Metabolic Panel    Collection Time: 03/04/23  4:32 AM   Result Value Ref Range    Sodium 134 (L) 135 - 145 MMOL/L    Potassium 4.7 3.5 - 5.1 MMOL/L    Chloride 92 (L) 99 - 110 mMol/L    CO2 36 (H) 21 - 32 MMOL/L    Anion Gap 6 4 - 16    BUN 16 6 - 23 MG/DL    Creatinine 0.6 (L) 0.9 - 1.3 MG/DL    Est, Glom Filt Rate >60 >60 mL/min/1.73m2    Glucose 290 (H) 70 - 99 MG/DL    Calcium 8.9 8.3 - 10.6 MG/DL   Magnesium    Collection Time: 03/04/23  4:32 AM   Result Value Ref Range    Magnesium 2.2 1.8 - 2.4 mg/dl   Hepatic Function Panel    Collection Time: 03/04/23  4:32 AM   Result Value Ref Range    Albumin 4.0 3.4 - 5.0 GM/DL    Total Bilirubin 0.3 0.0 - 1.0 MG/DL    Bilirubin, Direct 0.2 0.0 - 0.3 MG/DL    Bilirubin, Indirect 0.1 0 - 0.7 MG/DL    Alkaline Phosphatase 75 40 - 129 IU/L    AST 15 15 - 37 IU/L    ALT 28 10 - 40 U/L    Total Protein 6.3 (L) 6.4 - 8.2 GM/DL   CBC with Auto Differential    Collection Time: 03/04/23  4:32 AM   Result Value Ref Range    WBC 7.5 4.0 - 10.5 K/CU MM    RBC 4.93 4.6 - 6.2 M/CU MM    Hemoglobin 14.8 13.5 - 18.0 GM/DL    Hematocrit 44.9 42 - 52 %    MCV 91.1 78 - 100 FL    MCH 30.0 27 - 31 PG    MCHC 33.0 32.0 - 36.0 %    RDW 13.1 11.7 - 14.9 %    Platelets 061 719 - 198 K/CU MM    MPV 9.5 7.5 - 11.1 FL    Differential Type AUTOMATED DIFFERENTIAL     Segs Relative 81.2 (H) 36 - 66 %    Lymphocytes % 10.5 (L) 24 - 44 %    Monocytes % 7.1 (H) 0 - 4 %    Eosinophils % 0.0 0 - 3 %    Basophils % 0.1 0 - 1 %    Segs Absolute 6.1 K/CU MM    Lymphocytes Absolute 0.8 K/CU MM    Monocytes Absolute 0.5 K/CU MM    Eosinophils Absolute 0.0 K/CU MM    Basophils Absolute 0.0 K/CU MM    Nucleated RBC % 0.0 %    Total Nucleated RBC 0.0 K/CU MM    Total Immature Neutrophil 0.08 K/CU MM    Immature Neutrophil % 1.1 (H) 0 - 0.43 %   POCT Glucose    Collection Time: 03/04/23  7:33 AM   Result Value Ref Range    POC Glucose 276 (H) 70 - 99 MG/DL        Imaging/Diagnostics Last 24 Hours   No results found.     Electronically signed by Deanna Monroy MD on 3/4/2023 at 9:47 AM

## 2023-03-04 NOTE — PROGRESS NOTES
03/03/23 2330 03/04/23 0004   NIV Type   NIV Started/Stopped On Off  (Pt wanted Bipap off. Pt unable to tolerate)   Equipment Type V60  --    Mode Bilevel  --    Mask Type Full face mask  --    Mask Size Medium  --    Bonnet size Medium  --    Settings/Measurements   PIP Observed 16 cm H20  --    IPAP 15 cmH20  --    CPAP/EPAP 5 cmH2O  --    Vt (Measured) 528 mL  --    Rate Ordered 12  --    Resp 24  --    Insp Rise Time (%) 2 %  --    FiO2  35 %  --    I Time/ I Time % 0.95 s  --    Minute Volume (L/min) 19.5 Liters  --    Mask Leak (lpm) 4 lpm  --    Comfort Level Good  --    Using Accessory Muscles No  --    SpO2 97  --    Patient's Home Machine No  --    Alarm Settings   Alarms On Y  --    Low Pressure (cmH2O) 5 cmH2O  --    High Pressure (cmH2O) 20 cmH2O  --    Delay Alarm 20 sec(s)  --    Apnea (secs) 20 secs  --    RR Low (bpm) 13  --    RR High (bpm) 40 br/min  --

## 2023-03-04 NOTE — PROGRESS NOTES
Progress Note( Dr. Nuñez)  3/4/2023  Subjective:   Admit Date: 2/28/2023  PCP: TALYA SMITH MD    Admitted For : Shortness of breath generalized malaise    Consulted For:  Better control of blood glucose    Interval History: Though feels somewhat better but not completely normal yet  Blood glucose still fluctuating    Denies any chest pains,   Yes  SOB .   Denies nausea or vomiting.   No new bowel or bladder symptoms.       Intake/Output Summary (Last 24 hours) at 3/4/2023 1102  Last data filed at 3/4/2023 0456  Gross per 24 hour   Intake 1150 ml   Output 3350 ml   Net -2200 ml       DATA    CBC:   Recent Labs     03/02/23 0123 03/03/23 0034 03/04/23 0432   WBC 10.7* 10.0 7.5   HGB 14.8 13.7 14.8    187 202    CMP:  Recent Labs     03/02/23 0123 03/03/23 0034 03/04/23 0432   * 126* 134*   K 5.1 4.2 4.7   CL 89* 82* 92*   CO2 27 34* 36*   BUN 20 17 16   CREATININE 0.7* 0.6* 0.6*   CALCIUM 9.0 8.8 8.9   PROT 6.5  6.5 6.2* 6.3*   LABALBU 4.2  4.2 3.9 4.0   BILITOT 0.5  0.5 0.5 0.3   ALKPHOS 78  78 75 75   AST 21  21 14* 15   ALT 29  29 27 28     Lipids:   Lab Results   Component Value Date/Time    CHOL 159 03/01/2023 08:34 AM    CHOL 112 12/15/2021 01:35 PM    HDL 29 03/01/2023 08:34 AM    TRIG 182 03/01/2023 08:34 AM     Glucose:  Recent Labs     03/03/23 2035 03/04/23  0040 03/04/23  0733   POCGLU 316* 340* 276*     [id number]:  Lab Results   Component Value Date/Time    LABA1C 9.3 02/28/2023 10:24 AM     High Sensitivity TSH:   Lab Results   Component Value Date/Time    TSHHS 2.950 02/28/2023 10:24 AM     Free T3: No results found for: FT3  Free T4:  Lab Results   Component Value Date/Time    T4FREE 1.06 02/28/2023 10:24 AM       CTA PULMONARY W CONTRAST   Final Result   No evidence of pulmonary embolism or acute pulmonary abnormality.      Moderate emphysema.         XR CHEST (2 VW)   Final Result   1. Persistent but improved pulmonary edema superimposed on chronic lung  changes. Scheduled Medicines   Medications:    insulin lispro  20 Units SubCUTAneous TID WC    metFORMIN  850 mg Oral BID WC    insulin glargine  40 Units SubCUTAneous Nightly    insulin lispro  0-8 Units SubCUTAneous TID WC    insulin lispro  0-8 Units SubCUTAneous 2 times per day    albuterol sulfate HFA  2 puff Inhalation BID    And    ipratropium  2 puff Inhalation BID    azithromycin  500 mg IntraVENous Q24H    vitamin D  50,000 Units Oral Weekly    fluticasone  1 spray Each Nostril Daily    guaiFENesin  600 mg Oral BID    sodium chloride flush  5-40 mL IntraVENous 2 times per day    dexamethasone  6 mg IntraVENous Q24H    aspirin  81 mg Oral Daily    atorvastatin  40 mg Oral Daily    budesonide-formoterol  2 puff Inhalation BID    baricitinib  4 mg Oral Daily    dilTIAZem  120 mg Oral Daily    enoxaparin  1 mg/kg (Order-Specific) SubCUTAneous BID      Infusions:    dextrose      sodium chloride           Objective:   Vitals: /63   Pulse 79   Temp 98.2 °F (36.8 °C) (Oral)   Resp 17   Ht 6' 6\" (1.981 m)   Wt (!) 365 lb (165.6 kg)   SpO2 92%   BMI 42.18 kg/m²   General appearance: alert and cooperative with exam  Neck: no JVD or bruit  Thyroid : Normal lobes   Lungs: Has Vesicular Breath sounds diminished breath sounds and some wheezing and some rales bilaterally  Heart:  regular rate and rhythm  Abdomen: soft, non-tender; bowel sounds normal; no masses,  no organomegaly  Musculoskeletal: Normal  Extremities: extremities normal, , no edema  Neurologic:  Awake, alert, oriented to name, place and time. Cranial nerves II-XII are grossly intact. Motor is  intact. Sensory is intact. ,  and gait is normal.    Assessment:     Patient Active Problem List:     Hypercholesteremia     Tobacco use     Anxiety     Controlled type 2 diabetes mellitus without complication, without long-term current use of insulin (HCC)     Benign essential HTN     Simple chronic bronchitis ([address]     Macular degeneration, dry     PVD (peripheral vascular disease) (HCC)     VT (ventricular tachycardia)     Left wrist sprain     Hypoxia     Morbid obesity (HCC)     History of atrial fibrillation     Swelling of lower extremity     S/P ablation of atrial fibrillation     LFT elevation     Cholecystitis     COPD, severe (HCC)     Type 2 diabetes mellitus with hyperglycemia     COVID-19      Plan:     Reviewed POC blood glucose . Labs and X ray results   Reviewed Current Medicines   On meal/ Correction bolus Humalog/ Basal Lantus Insulin regime / and Oral Hypoglycemic drugs   Monitor Blood glucose frequently   Modified  the dose of Insulin/ other medicines as needed   Will follow     .      Duncan Lindquist MD, MD

## 2023-03-04 NOTE — CARE COORDINATION
Cm reviewed chart. Precert pending to [place]. Pt is not medically ready for d/c per Dr. Samina Umaña note, estimated date 2-3 days per Dr. Samina Umaña.   CM will con't to follow minimal assist, teach one side; mother does other, staff holds

## 2023-03-05 ENCOUNTER — APPOINTMENT (OUTPATIENT)
Dept: GENERAL RADIOLOGY | Age: 68
DRG: 177 | End: 2023-03-05
Payer: MEDICARE

## 2023-03-05 LAB
ALBUMIN SERPL-MCNC: 3.6 GM/DL (ref 3.4–5)
ALP BLD-CCNC: 64 IU/L (ref 40–129)
ALT SERPL-CCNC: 31 U/L (ref 10–40)
ANION GAP SERPL CALCULATED.3IONS-SCNC: 10 MMOL/L (ref 4–16)
AST SERPL-CCNC: 18 IU/L (ref 15–37)
BASOPHILS ABSOLUTE: 0 K/CU MM
BASOPHILS RELATIVE PERCENT: 0.2 % (ref 0–1)
BILIRUB SERPL-MCNC: 0.3 MG/DL (ref 0–1)
BILIRUBIN DIRECT: 0.2 MG/DL (ref 0–0.3)
BILIRUBIN, INDIRECT: 0.1 MG/DL (ref 0–0.7)
BUN SERPL-MCNC: 17 MG/DL (ref 6–23)
CALCIUM SERPL-MCNC: 8.5 MG/DL (ref 8.3–10.6)
CHLORIDE BLD-SCNC: 94 MMOL/L (ref 99–110)
CO2: 31 MMOL/L (ref 21–32)
CREAT SERPL-MCNC: 0.6 MG/DL (ref 0.9–1.3)
DIFFERENTIAL TYPE: ABNORMAL
EOSINOPHILS ABSOLUTE: 0 K/CU MM
EOSINOPHILS RELATIVE PERCENT: 0 % (ref 0–3)
GFR SERPL CREATININE-BSD FRML MDRD: >60 ML/MIN/1.73M2
GLUCOSE BLD-MCNC: 151 MG/DL (ref 70–99)
GLUCOSE BLD-MCNC: 202 MG/DL (ref 70–99)
GLUCOSE BLD-MCNC: 233 MG/DL (ref 70–99)
GLUCOSE BLD-MCNC: 247 MG/DL (ref 70–99)
GLUCOSE BLD-MCNC: 260 MG/DL (ref 70–99)
GLUCOSE SERPL-MCNC: 290 MG/DL (ref 70–99)
HCT VFR BLD CALC: 42.5 % (ref 42–52)
HEMOGLOBIN: 14.1 GM/DL (ref 13.5–18)
IMMATURE NEUTROPHIL %: 1.7 % (ref 0–0.43)
LYMPHOCYTES ABSOLUTE: 0.6 K/CU MM
LYMPHOCYTES RELATIVE PERCENT: 9.4 % (ref 24–44)
MAGNESIUM: 1.9 MG/DL (ref 1.8–2.4)
MCH RBC QN AUTO: 30.5 PG (ref 27–31)
MCHC RBC AUTO-ENTMCNC: 33.2 % (ref 32–36)
MCV RBC AUTO: 91.8 FL (ref 78–100)
MONOCYTES ABSOLUTE: 0.4 K/CU MM
MONOCYTES RELATIVE PERCENT: 6.4 % (ref 0–4)
NUCLEATED RBC %: 0 %
PDW BLD-RTO: 13 % (ref 11.7–14.9)
PLATELET # BLD: 187 K/CU MM (ref 140–440)
PMV BLD AUTO: 9.6 FL (ref 7.5–11.1)
POTASSIUM SERPL-SCNC: 4.5 MMOL/L (ref 3.5–5.1)
RBC # BLD: 4.63 M/CU MM (ref 4.6–6.2)
SEGMENTED NEUTROPHILS ABSOLUTE COUNT: 5.2 K/CU MM
SEGMENTED NEUTROPHILS RELATIVE PERCENT: 82.3 % (ref 36–66)
SODIUM BLD-SCNC: 135 MMOL/L (ref 135–145)
TOTAL IMMATURE NEUTOROPHIL: 0.11 K/CU MM
TOTAL NUCLEATED RBC: 0 K/CU MM
TOTAL PROTEIN: 5.7 GM/DL (ref 6.4–8.2)
WBC # BLD: 6.4 K/CU MM (ref 4–10.5)

## 2023-03-05 PROCEDURE — 6370000000 HC RX 637 (ALT 250 FOR IP): Performed by: INTERNAL MEDICINE

## 2023-03-05 PROCEDURE — 2580000003 HC RX 258: Performed by: NURSE PRACTITIONER

## 2023-03-05 PROCEDURE — 2060000000 HC ICU INTERMEDIATE R&B

## 2023-03-05 PROCEDURE — 83735 ASSAY OF MAGNESIUM: CPT

## 2023-03-05 PROCEDURE — 94761 N-INVAS EAR/PLS OXIMETRY MLT: CPT

## 2023-03-05 PROCEDURE — 6360000002 HC RX W HCPCS: Performed by: INTERNAL MEDICINE

## 2023-03-05 PROCEDURE — 36415 COLL VENOUS BLD VENIPUNCTURE: CPT

## 2023-03-05 PROCEDURE — 82248 BILIRUBIN DIRECT: CPT

## 2023-03-05 PROCEDURE — 6370000000 HC RX 637 (ALT 250 FOR IP): Performed by: STUDENT IN AN ORGANIZED HEALTH CARE EDUCATION/TRAINING PROGRAM

## 2023-03-05 PROCEDURE — 6360000002 HC RX W HCPCS: Performed by: NURSE PRACTITIONER

## 2023-03-05 PROCEDURE — 6370000000 HC RX 637 (ALT 250 FOR IP): Performed by: NURSE PRACTITIONER

## 2023-03-05 PROCEDURE — 80053 COMPREHEN METABOLIC PANEL: CPT

## 2023-03-05 PROCEDURE — 71045 X-RAY EXAM CHEST 1 VIEW: CPT

## 2023-03-05 PROCEDURE — 94640 AIRWAY INHALATION TREATMENT: CPT

## 2023-03-05 PROCEDURE — 85025 COMPLETE CBC W/AUTO DIFF WBC: CPT

## 2023-03-05 PROCEDURE — 94660 CPAP INITIATION&MGMT: CPT

## 2023-03-05 PROCEDURE — 82962 GLUCOSE BLOOD TEST: CPT

## 2023-03-05 PROCEDURE — 2700000000 HC OXYGEN THERAPY PER DAY

## 2023-03-05 PROCEDURE — 2580000003 HC RX 258: Performed by: INTERNAL MEDICINE

## 2023-03-05 RX ORDER — INSULIN LISPRO 100 [IU]/ML
0-16 INJECTION, SOLUTION INTRAVENOUS; SUBCUTANEOUS
Status: DISCONTINUED | OUTPATIENT
Start: 2023-03-05 | End: 2023-03-06 | Stop reason: HOSPADM

## 2023-03-05 RX ORDER — INSULIN LISPRO 100 [IU]/ML
0-12 INJECTION, SOLUTION INTRAVENOUS; SUBCUTANEOUS
Status: DISCONTINUED | OUTPATIENT
Start: 2023-03-05 | End: 2023-03-06 | Stop reason: HOSPADM

## 2023-03-05 RX ORDER — INSULIN GLARGINE 100 [IU]/ML
50 INJECTION, SOLUTION SUBCUTANEOUS NIGHTLY
Status: DISCONTINUED | OUTPATIENT
Start: 2023-03-05 | End: 2023-03-06 | Stop reason: HOSPADM

## 2023-03-05 RX ORDER — MENTHOL 1.4 %
ADHESIVE PATCH, MEDICATED TOPICAL 3 TIMES DAILY PRN
Status: DISCONTINUED | OUTPATIENT
Start: 2023-03-05 | End: 2023-03-06 | Stop reason: HOSPADM

## 2023-03-05 RX ADMIN — GUAIFENESIN 600 MG: 600 TABLET, EXTENDED RELEASE ORAL at 09:45

## 2023-03-05 RX ADMIN — AZITHROMYCIN MONOHYDRATE 500 MG: 500 INJECTION, POWDER, LYOPHILIZED, FOR SOLUTION INTRAVENOUS at 13:18

## 2023-03-05 RX ADMIN — POLYETHYLENE GLYCOL 3350 17 G: 17 POWDER, FOR SOLUTION ORAL at 18:24

## 2023-03-05 RX ADMIN — ATORVASTATIN CALCIUM 40 MG: 40 TABLET, FILM COATED ORAL at 09:44

## 2023-03-05 RX ADMIN — DILTIAZEM HYDROCHLORIDE 120 MG: 120 CAPSULE, COATED, EXTENDED RELEASE ORAL at 09:44

## 2023-03-05 RX ADMIN — SODIUM CHLORIDE, PRESERVATIVE FREE 10 ML: 5 INJECTION INTRAVENOUS at 22:59

## 2023-03-05 RX ADMIN — ALBUTEROL SULFATE 2 PUFF: 90 AEROSOL, METERED RESPIRATORY (INHALATION) at 20:30

## 2023-03-05 RX ADMIN — INSULIN LISPRO 20 UNITS: 100 INJECTION, SOLUTION INTRAVENOUS; SUBCUTANEOUS at 13:10

## 2023-03-05 RX ADMIN — INSULIN LISPRO 4 UNITS: 100 INJECTION, SOLUTION INTRAVENOUS; SUBCUTANEOUS at 01:30

## 2023-03-05 RX ADMIN — ENOXAPARIN SODIUM 160 MG: 100 INJECTION SUBCUTANEOUS at 23:37

## 2023-03-05 RX ADMIN — ENOXAPARIN SODIUM 160 MG: 100 INJECTION SUBCUTANEOUS at 00:29

## 2023-03-05 RX ADMIN — INSULIN LISPRO 20 UNITS: 100 INJECTION, SOLUTION INTRAVENOUS; SUBCUTANEOUS at 17:44

## 2023-03-05 RX ADMIN — SODIUM CHLORIDE, PRESERVATIVE FREE 10 ML: 5 INJECTION INTRAVENOUS at 09:45

## 2023-03-05 RX ADMIN — INSULIN LISPRO 4 UNITS: 100 INJECTION, SOLUTION INTRAVENOUS; SUBCUTANEOUS at 17:43

## 2023-03-05 RX ADMIN — ACETAMINOPHEN 650 MG: 325 TABLET ORAL at 14:29

## 2023-03-05 RX ADMIN — BUDESONIDE AND FORMOTEROL FUMARATE DIHYDRATE 2 PUFF: 160; 4.5 AEROSOL RESPIRATORY (INHALATION) at 20:30

## 2023-03-05 RX ADMIN — DEXAMETHASONE SODIUM PHOSPHATE 6 MG: 10 INJECTION INTRAMUSCULAR; INTRAVENOUS at 13:10

## 2023-03-05 RX ADMIN — BUDESONIDE AND FORMOTEROL FUMARATE DIHYDRATE 2 PUFF: 160; 4.5 AEROSOL RESPIRATORY (INHALATION) at 08:33

## 2023-03-05 RX ADMIN — INSULIN LISPRO 4 UNITS: 100 INJECTION, SOLUTION INTRAVENOUS; SUBCUTANEOUS at 13:11

## 2023-03-05 RX ADMIN — INSULIN LISPRO 20 UNITS: 100 INJECTION, SOLUTION INTRAVENOUS; SUBCUTANEOUS at 09:46

## 2023-03-05 RX ADMIN — GUAIFENESIN 600 MG: 600 TABLET, EXTENDED RELEASE ORAL at 21:02

## 2023-03-05 RX ADMIN — INSULIN GLARGINE 50 UNITS: 100 INJECTION, SOLUTION SUBCUTANEOUS at 21:02

## 2023-03-05 RX ADMIN — ALBUTEROL SULFATE 2 PUFF: 90 AEROSOL, METERED RESPIRATORY (INHALATION) at 08:32

## 2023-03-05 RX ADMIN — MENTHOL, METHYL SALICYLATE: 10; 15 CREAM TOPICAL at 16:26

## 2023-03-05 RX ADMIN — INSULIN HUMAN 15 UNITS: 100 INJECTION, SUSPENSION SUBCUTANEOUS at 09:47

## 2023-03-05 RX ADMIN — BARICITINIB 4 MG: 2 TABLET, FILM COATED ORAL at 09:44

## 2023-03-05 RX ADMIN — METFORMIN HYDROCHLORIDE 1000 MG: 500 TABLET, FILM COATED ORAL at 17:42

## 2023-03-05 RX ADMIN — ASPIRIN 81 MG: 81 TABLET, COATED ORAL at 09:44

## 2023-03-05 RX ADMIN — METFORMIN HYDROCHLORIDE 1000 MG: 500 TABLET, FILM COATED ORAL at 09:53

## 2023-03-05 RX ADMIN — ENOXAPARIN SODIUM 160 MG: 100 INJECTION SUBCUTANEOUS at 13:09

## 2023-03-05 ASSESSMENT — PAIN DESCRIPTION - LOCATION: LOCATION: KNEE

## 2023-03-05 ASSESSMENT — PAIN DESCRIPTION - DESCRIPTORS: DESCRIPTORS: ACHING

## 2023-03-05 ASSESSMENT — PAIN DESCRIPTION - ORIENTATION: ORIENTATION: LEFT;RIGHT

## 2023-03-05 ASSESSMENT — PAIN SCALES - GENERAL: PAINLEVEL_OUTOF10: 6

## 2023-03-05 ASSESSMENT — PAIN - FUNCTIONAL ASSESSMENT: PAIN_FUNCTIONAL_ASSESSMENT: PREVENTS OR INTERFERES WITH MANY ACTIVE NOT PASSIVE ACTIVITIES

## 2023-03-05 NOTE — PROGRESS NOTES
Progress Note( Dr. Beba Blackman)  3/5/2023  Subjective:   Admit Date: 2/28/2023  PCP: Marnie Cole MD    Admitted For : Shortness of breath generalized malaise    Consulted For:  Better control of blood glucose    Interval History: feels somewhat better but not completely normal yet  Blood glucose still fluctuating    Denies any chest pains,   Yes  SOB . Oxygen requirements have gone down less than 5 L mostly 2- 3 L  Denies nausea or vomiting. No new bowel or bladder symptoms. Intake/Output Summary (Last 24 hours) at 3/5/2023 0937  Last data filed at 3/5/2023 0530  Gross per 24 hour   Intake 850 ml   Output 1950 ml   Net -1100 ml         DATA    CBC:   Recent Labs     03/03/23 0034 03/04/23 0432 03/05/23 0052   WBC 10.0 7.5 6.4   HGB 13.7 14.8 14.1    202 187      CMP:  Recent Labs     03/03/23 0034 03/04/23 0432 03/05/23 0052   * 134* 135   K 4.2 4.7 4.5   CL 82* 92* 94*   CO2 34* 36* 31   BUN 17 16 17   CREATININE 0.6* 0.6* 0.6*   CALCIUM 8.8 8.9 8.5   PROT 6.2* 6.3* 5.7*   LABALBU 3.9 4.0 3.6   BILITOT 0.5 0.3 0.3   ALKPHOS 75 75 64   AST 14* 15 18   ALT 27 28 31       Lipids:   Lab Results   Component Value Date/Time    CHOL 159 03/01/2023 08:34 AM    CHOL 112 12/15/2021 01:35 PM    HDL 29 03/01/2023 08:34 AM    TRIG 182 03/01/2023 08:34 AM     Glucose:  Recent Labs     03/04/23 2026 03/05/23  0057 03/05/23  0647   POCGLU 315* 260* 247*       [id number]:  Lab Results   Component Value Date/Time    LABA1C 9.3 02/28/2023 10:24 AM     High Sensitivity TSH:   Lab Results   Component Value Date/Time    TSHHS 2.950 02/28/2023 10:24 AM     Free T3: No results found for: FT3  Free T4:  Lab Results   Component Value Date/Time    T4FREE 1.06 02/28/2023 10:24 AM       CTA PULMONARY W CONTRAST   Final Result   No evidence of pulmonary embolism or acute pulmonary abnormality. Moderate emphysema. XR CHEST (2 VW)   Final Result   1.  Persistent but improved pulmonary edema superimposed on chronic lung   changes. XR CHEST PORTABLE    (Results Pending)        Scheduled Medicines   Medications:    metFORMIN  1,000 mg Oral BID WC    insulin NPH  15 Units SubCUTAneous QAM    insulin glargine  50 Units SubCUTAneous Nightly    insulin lispro  0-16 Units SubCUTAneous TID WC    insulin lispro  0-12 Units SubCUTAneous 2 times per day    insulin lispro  20 Units SubCUTAneous TID WC    albuterol sulfate HFA  2 puff Inhalation BID    And    ipratropium  2 puff Inhalation BID    azithromycin  500 mg IntraVENous Q24H    vitamin D  50,000 Units Oral Weekly    fluticasone  1 spray Each Nostril Daily    guaiFENesin  600 mg Oral BID    sodium chloride flush  5-40 mL IntraVENous 2 times per day    dexamethasone  6 mg IntraVENous Q24H    aspirin  81 mg Oral Daily    atorvastatin  40 mg Oral Daily    budesonide-formoterol  2 puff Inhalation BID    baricitinib  4 mg Oral Daily    dilTIAZem  120 mg Oral Daily    enoxaparin  1 mg/kg (Order-Specific) SubCUTAneous BID      Infusions:    dextrose      sodium chloride           Objective:   Vitals: /87   Pulse 67   Temp 97.9 °F (36.6 °C) (Oral)   Resp 18   Ht 6' 6\" (1.981 m)   Wt (!) 365 lb (165.6 kg)   SpO2 93%   BMI 42.18 kg/m²   General appearance: alert and cooperative with exam  Neck: no JVD or bruit  Thyroid : Normal lobes   Lungs: Has Vesicular Breath sounds diminished breath sounds and some wheezing and some rales bilaterally  Heart:  regular rate and rhythm  Abdomen: soft, non-tender; bowel sounds normal; no masses,  no organomegaly  Musculoskeletal: Normal  Extremities: extremities normal, , no edema  Neurologic:  Awake, alert, oriented to name, place and time. Cranial nerves II-XII are grossly intact. Motor is  intact. Sensory is intact. ,  and gait is normal.    Assessment:     Patient Active Problem List:     Hypercholesteremia     Tobacco use     Anxiety     Controlled type 2 diabetes mellitus without complication, without long-term current use of insulin (HCC)     Benign essential HTN     Simple chronic bronchitis (HCC)     Macular degeneration, dry     PVD (peripheral vascular disease) (HCC)     VT (ventricular tachycardia)     Left wrist sprain     Hypoxia     Morbid obesity (HCC)     History of atrial fibrillation     Swelling of lower extremity     S/P ablation of atrial fibrillation     LFT elevation     Cholecystitis     COPD, severe ([address]     Type 2 diabetes mellitus with hyperglycemia     COVID-19      Plan:     Reviewed POC blood glucose . Labs and X ray results   Reviewed Current Medicines   On meal/ Correction bolus Humalog/ Basal Lantus Insulin regime / and Oral Hypoglycemic drugs   Monitor Blood glucose frequently   Modified  the dose of Insulin/ other medicines as needed   Will follow     .      Claribel Huerta MD, MD

## 2023-03-05 NOTE — PROGRESS NOTES
Pulmonary and Critical Care  Progress Note    Subjective: The patient is doing well. On 2 L N/C. Shortness of breath has improved  Chest pain none  Addressing respiratory complaints Patient is negative for  hemoptysis and cyanosis  CONSTITUTIONAL:  negative for fevers and chills      Past Medical History:     has a past medical history of A-fib ([facility] [address] Anxiety, Arthritis, COPD (chronic obstructive pulmonary disease) ([facility] [address] Depression, Diabetes mellitus ([facility] [address] Full dentures, H/O angiography, H/O Doppler ultrasound, H/O echocardiogram, Hx of colonoscopy, Hx of Doppler ultrasound, Hyperlipidemia, Hypertension, Macular degeneration, Obesity, On home oxygen therapy, PAD (peripheral artery disease) ([facility] [address] Panic attacks, Pneumonia, PTSD (post-traumatic stress disorder), Restless leg, Shortness of breath, Sleep apnea, and Wears glasses. has a past surgical history that includes Atrial ablation surgery (05/23/2018); pr laparoscopy surg cholecystectomy (N/A, 11/12/2018); Colonoscopy (07/2011); eye surgery (Left, 2017); Dental surgery; Cardiac surgery (05/2018); Cholecystectomy, laparoscopic (11/12/2018); Appendectomy (2003); hernia repair (2003); femoral bypass (Left, 01/2011); Tonsillectomy (1960's); fracture surgery (Left, 1980's); fracture surgery (Right, 2000's); and femoral bypass (Left, 1/9/2019). reports that he quit smoking about 14 months ago. His smoking use included cigars. He started smoking about 50 years ago. He has a 48.00 pack-year smoking history. He quit smokeless tobacco use about 48 years ago. His smokeless tobacco use included chew. He reports that he does not drink alcohol and does not use drugs. Family history:  family history includes Allergy (Severe) in his brother; Arthritis in his sister; Diabetes in his sister; Early Death in his father and mother; Early Death (age of onset: 24) in his brother; Mental Illness in his sister; Obesity in his brother.     Allergies   Allergen Reactions Metoprolol      \"Chest Pain And Burning In The Chest\"     Social History:    Reviewed; no changes    Objective:   PHYSICAL EXAM:        VITALS:  /87   Pulse 67   Temp 97.9 °F (36.6 °C) (Oral)   Resp 18   Ht 6' 6\" (1.981 m)   Wt (!) 365 lb (165.6 kg)   SpO2 93%   BMI 42.18 kg/m²     24HR INTAKE/OUTPUT:    Intake/Output Summary (Last 24 hours) at 3/5/2023 1127  Last data filed at 3/5/2023 0530  Gross per 24 hour   Intake 850 ml   Output 1950 ml   Net -1100 ml       CONSTITUTIONAL:  awake, alert, cooperative, no apparent distress, and appears stated age  LUNGS:  decreased breath sounds, basilar crackles. CARDIOVASCULAR:  normal S1 and S2 and negative JVD  ABD:Abdomen soft, non-tender. BS normal. No masses,  No organomegaly  NEURO:Alert. DATA:    CBC:  Recent Labs     03/03/23 0034 03/04/23 0432 03/05/23 0052   WBC 10.0 7.5 6.4   RBC 4.62 4.93 4.63   HGB 13.7 14.8 14.1   HCT 42.2 44.9 42.5    202 187   MCV 91.3 91.1 91.8   MCH 29.7 30.0 30.5   MCHC 32.5 33.0 33.2   RDW 13.2 13.1 13.0   SEGSPCT 86.4* 81.2* 82.3*      BMP:  Recent Labs     03/03/23 0034 03/04/23 0432 03/05/23 0052   * 134* 135   K 4.2 4.7 4.5   CL 82* 92* 94*   CO2 34* 36* 31   BUN 17 16 17   CREATININE 0.6* 0.6* 0.6*   CALCIUM 8.8 8.9 8.5   GLUCOSE 291* 290* 290*      ABG:  No results for input(s): PH, PO2ART, XHF9HMK, HCO3, BEART, O2SAT in the last 72 hours. Lab Results   Component Value Date    PROBNP 180.9 03/03/2023    PROBNP 104.3 02/28/2023    PROBNP 71.38 04/19/2020     No results found for: [address] [facility]    Radiology Review:  Pertinent images / reports were reviewed as a part of this visit.     Assessment:     Patient Active Problem List   Diagnosis    Hypercholesteremia    Tobacco use    Anxiety    Controlled type 2 diabetes mellitus without complication, without long-term current use of insulin (HCC)    Benign essential HTN    Simple chronic bronchitis (HCC)    Macular degeneration, dry    PVD (peripheral vascular disease) ([facility] [address]    VT (ventricular tachycardia)    Left wrist sprain    Hypoxia    Morbid obesity (HCC)    History of atrial fibrillation    Swelling of lower extremity    S/P ablation of atrial fibrillation    LFT elevation    Cholecystitis    COPD, severe (HCC)    Type 2 diabetes mellitus with hyperglycemia    COVID-19       Plan:   1. Overall the patient is better. 2. [address] Activity.     Neil Gold MD   3/5/2023  11:27 AM

## 2023-03-05 NOTE — PLAN OF CARE
Problem: Pain  Goal: Verbalizes/displays adequate comfort level or baseline comfort level  Outcome: Progressing     Problem: Safety - Adult  Goal: Free from fall injury  Outcome: Progressing     Problem: Nutrition Deficit:  Goal: Optimize nutritional status  Outcome: Progressing     Problem: Skin/Tissue Integrity  Goal: Absence of new skin breakdown  Description: 1. Monitor for areas of redness and/or skin breakdown  2. Assess vascular access sites hourly  3. Every 4-6 hours minimum:  Change oxygen saturation probe site  4. Every 4-6 hours:  If on nasal continuous positive airway pressure, respiratory therapy assess nares and determine need for appliance change or resting period.   Outcome: Progressing

## 2023-03-05 NOTE — PROGRESS NOTES
V2.0  [facility] [place] Hospitalist Progress Note      Name:  Jacinto Meehan /Age/Sex: 1955  (74 y.o. male)   MRN & CSN:  [id number] & [id number] Encounter Date/Time: 3/5/2023 12:21 PM EST    Location:  -A PCP: Dylon Diaz MD       Hospital Day: 6    Assessment and Plan:   Jacinto Meehan is a 74 y.o. male with pmh of COPD, CHF, atrial fibrillation, CAD who presents with COVID-19      Plan:  COVID-19 infection:  -Continue with p.o. dexamethasone and baricitinib  -Pulm consulted, will appreciate recs  -PT/OT-SNF versus laying in bed at discharge per PT/home with OT Per OT  Acute on chronic hypoxemic respiratory failure:  -Due to the above  -O2 as needed, keep oxygen saturation above 92%  -Wean oxygen as tolerated  -CTPA with no evidence of bacterial pneumonia, currently watching off antibiotics  History of HFpEF:  -Does not appear in acute exacerbation  History of PAF status post ablation:  -Patient started on weight-based Lovenox  History of CAD status post CABG:  History of PAD:  -Continue Pletal  Non-insulin-dependent diabetes mellitus:  -On metformin and glipizide at home, hold OHA  -Continue basal and bolus dose insulin  -Currently hyperglycemic due to dexamethasone use  -Premeal insulin added and Lantus increased to 16 units  -Fingerstick glucose 200-300's  -Endocrine consult      Diet ADULT DIET; Regular; 5 carb choices (75 gm/meal); No Added Salt (3-4 gm); 2000 ml  ADULT ORAL NUTRITION SUPPLEMENT; Breakfast, Dinner;  Low Calorie/High Protein Oral Supplement   DVT Prophylaxis [x] Lovenox, []  Heparin, [] SCDs, [] Ambulation,  [] Eliquis, [] Xarelto  [] Coumadin   Code Status Full Code   Disposition From: Home  Expected Disposition: SNF   Estimated Date of Discharge: 2-3 days  Patient requires continued admission due to hypoxemic respiratory failure requiring O2 levels higher than baseline   Surrogate Decision Maker/ POA Self     Subjective:     Chief Complaint: Shortness of Breath (X 5 days. Baseline on 2L via NC at home. Given breathing tx in medics)       Savanna Hardin is a 74 y.o. male who presents with respiratory failure      3/4/23:Patient denies any significant symptoms at the time of examination, O2 weaned to 3LPm from 4L , BL 2LPM at home. Labs grossly unremarkable. Will try to wean to BL 2LPM and plan for dispo   3/5/23:Pt seen & examined , VS stable, saturating well on 2LPM, plan for starting dispo process to SNF as patient appears improving , Day 06 Dexamethasone/ Baricitinib therapy, D05/5 Azithromycin therapy. Will f/u on CXR     Review of Systems:    Review of Systems    Review of Systems:   Constitutional: Negative. Negative for activity change, appetite change, chills, diaphoresis, fatigue, fever and unexpected weight change. HENT: Negative. Negative for congestion, dental problem, drooling, ear discharge, ear pain, facial swelling, hearing loss, mouth sores, nosebleeds, postnasal drip, rhinorrhea, sinus pressure, sinus pain, sneezing, sore throat, tinnitus, trouble swallowing and voice change. Eyes: Negative. Negative for photophobia, pain, discharge, redness, itching and visual disturbance. Respiratory: Negative. Negative for apnea, cough, choking, chest tightness, shortness of breath, wheezing and stridor. Cardiovascular: Negative. Negative for chest pain and palpitations. Gastrointestinal: Negative. Negative for abdominal distention, abdominal pain, anal bleeding, blood in stool, constipation, diarrhea, nausea, rectal pain and vomiting. Endocrine: Negative. Negative for cold intolerance, heat intolerance, polydipsia, polyphagia and polyuria. Genitourinary: Negative. Negative for decreased urine volume, difficulty urinating, dysuria, enuresis, flank pain, frequency, genital sores, hematuria, penile discharge, penile pain, penile swelling, scrotal swelling, testicular pain and urgency. Musculoskeletal: Negative.   Negative for arthralgias, back pain, gait problem, joint swelling, myalgias, neck pain and neck stiffness. Skin: Negative. Negative for color change, pallor, rash and wound. Allergic/Immunologic: Negative for environmental allergies, food allergies and immunocompromised state. Neurological: Negative. Negative for dizziness, tremors, seizures, syncope, facial asymmetry, speech difficulty, weakness, light-headedness, numbness and headaches. Hematological: Negative. Negative for adenopathy. Does not bruise/bleed easily. Psychiatric/Behavioral: Negative. Negative for agitation, behavioral problems, confusion, decreased concentration, dysphoric mood, hallucinations, self-injury, sleep disturbance and suicidal ideas. The patient is not nervous/anxious and is not hyperactive. Objective: Intake/Output Summary (Last 24 hours) at 3/5/2023 0924  Last data filed at 3/5/2023 0530  Gross per 24 hour   Intake 850 ml   Output 1950 ml   Net -1100 ml          Vitals:   Vitals:    03/05/23 0833   Pulse: 67   Resp: 18   Temp:    SpO2: 93%       Physical Exam:     General: NAD  Eyes: EOMI  ENT: neck supple  Cardiovascular: Regular rate. Respiratory: Mild bilateral wheezing  Gastrointestinal: Soft, non tender  Genitourinary: no suprapubic tenderness  Musculoskeletal: No edema  Skin: warm, dry  Neuro: Alert. Psych: Mood appropriate.      Medications:   Medications:    metFORMIN  1,000 mg Oral BID WC    insulin NPH  15 Units SubCUTAneous QAM    insulin glargine  50 Units SubCUTAneous Nightly    insulin lispro  20 Units SubCUTAneous TID WC    insulin lispro  0-8 Units SubCUTAneous TID WC    insulin lispro  0-8 Units SubCUTAneous 2 times per day    albuterol sulfate HFA  2 puff Inhalation BID    And    ipratropium  2 puff Inhalation BID    azithromycin  500 mg IntraVENous Q24H    vitamin D  50,000 Units Oral Weekly    fluticasone  1 spray Each Nostril Daily    guaiFENesin  600 mg Oral BID    sodium chloride flush  5-40 mL IntraVENous 2 times per day dexamethasone  6 mg IntraVENous Q24H    aspirin  81 mg Oral Daily    atorvastatin  40 mg Oral Daily    budesonide-formoterol  2 puff Inhalation BID    baricitinib  4 mg Oral Daily    dilTIAZem  120 mg Oral Daily    enoxaparin  1 mg/kg (Order-Specific) SubCUTAneous BID      Infusions:    dextrose      sodium chloride       PRN Meds: albuterol sulfate HFA, 2 puff, Q4H PRN  acetaminophen, 650 mg, Q6H PRN   Or  acetaminophen, 650 mg, Q6H PRN  glucose, 4 tablet, PRN  dextrose bolus, 125 mL, PRN   Or  dextrose bolus, 250 mL, PRN  glucagon (rDNA), 1 mg, PRN  dextrose, , Continuous PRN  sodium chloride flush, 5-40 mL, PRN  sodium chloride, , PRN  ondansetron, 4 mg, Q8H PRN   Or  ondansetron, 4 mg, Q6H PRN  polyethylene glycol, 17 g, Daily PRN  potassium chloride, 40 mEq, PRN   Or  potassium alternative oral replacement, 40 mEq, PRN   Or  potassium chloride, 10 mEq, PRN  magnesium sulfate, 2,000 mg, PRN  ondansetron, 4 mg, Q8H PRN   Or  ondansetron, 4 mg, Q6H PRN      Labs      Recent Results (from the past 24 hour(s))   POCT Glucose    Collection Time: 03/04/23 11:26 AM   Result Value Ref Range    POC Glucose 280 (H) 70 - 99 MG/DL   POCT Glucose    Collection Time: 03/04/23  4:05 PM   Result Value Ref Range    POC Glucose 238 (H) 70 - 99 MG/DL   POCT Glucose    Collection Time: 03/04/23  8:26 PM   Result Value Ref Range    POC Glucose 315 (H) 70 - 99 MG/DL   Basic Metabolic Panel    Collection Time: 03/05/23 12:52 AM   Result Value Ref Range    Sodium 135 135 - 145 MMOL/L    Potassium 4.5 3.5 - 5.1 MMOL/L    Chloride 94 (L) 99 - 110 mMol/L    CO2 31 21 - 32 MMOL/L    Anion Gap 10 4 - 16    BUN 17 6 - 23 MG/DL    Creatinine 0.6 (L) 0.9 - 1.3 MG/DL    Est, Glom Filt Rate >60 >60 mL/min/1.73m2    Glucose 290 (H) 70 - 99 MG/DL    Calcium 8.5 8.3 - 10.6 MG/DL   Magnesium    Collection Time: 03/05/23 12:52 AM   Result Value Ref Range    Magnesium 1.9 1.8 - 2.4 mg/dl   Hepatic Function Panel    Collection Time: 03/05/23 12:52 AM Result Value Ref Range    Albumin 3.6 3.4 - 5.0 GM/DL    Total Bilirubin 0.3 0.0 - 1.0 MG/DL    Bilirubin, Direct 0.2 0.0 - 0.3 MG/DL    Bilirubin, Indirect 0.1 0 - 0.7 MG/DL    Alkaline Phosphatase 64 40 - 129 IU/L    AST 18 15 - 37 IU/L    ALT 31 10 - 40 U/L    Total Protein 5.7 (L) 6.4 - 8.2 GM/DL   CBC with Auto Differential    Collection Time: 03/05/23 12:52 AM   Result Value Ref Range    WBC 6.4 4.0 - 10.5 K/CU MM    RBC 4.63 4.6 - 6.2 M/CU MM    Hemoglobin 14.1 13.5 - 18.0 GM/DL    Hematocrit 42.5 42 - 52 %    MCV 91.8 78 - 100 FL    MCH 30.5 27 - 31 PG    MCHC 33.2 32.0 - 36.0 %    RDW 13.0 11.7 - 14.9 %    Platelets 767 413 - 097 K/CU MM    MPV 9.6 7.5 - 11.1 FL    Differential Type AUTOMATED DIFFERENTIAL     Segs Relative 82.3 (H) 36 - 66 %    Lymphocytes % 9.4 (L) 24 - 44 %    Monocytes % 6.4 (H) 0 - 4 %    Eosinophils % 0.0 0 - 3 %    Basophils % 0.2 0 - 1 %    Segs Absolute 5.2 K/CU MM    Lymphocytes Absolute 0.6 K/CU MM    Monocytes Absolute 0.4 K/CU MM    Eosinophils Absolute 0.0 K/CU MM    Basophils Absolute 0.0 K/CU MM    Nucleated RBC % 0.0 %    Total Nucleated RBC 0.0 K/CU MM    Total Immature Neutrophil 0.11 K/CU MM    Immature Neutrophil % 1.7 (H) 0 - 0.43 %   POCT Glucose    Collection Time: 03/05/23 12:57 AM   Result Value Ref Range    POC Glucose 260 (H) 70 - 99 MG/DL   POCT Glucose    Collection Time: 03/05/23  6:47 AM   Result Value Ref Range    POC Glucose 247 (H) 70 - 99 MG/DL        Imaging/Diagnostics Last 24 Hours   No results found.     Electronically signed by Christi Monroy MD on 3/5/2023 at 9:24 AM

## 2023-03-06 VITALS
HEART RATE: 73 BPM | WEIGHT: 315 LBS | HEIGHT: 78 IN | SYSTOLIC BLOOD PRESSURE: 124 MMHG | OXYGEN SATURATION: 93 % | DIASTOLIC BLOOD PRESSURE: 77 MMHG | BODY MASS INDEX: 36.45 KG/M2 | TEMPERATURE: 97.7 F | RESPIRATION RATE: 24 BRPM

## 2023-03-06 LAB
ALBUMIN SERPL-MCNC: 3.7 GM/DL (ref 3.4–5)
ALP BLD-CCNC: 67 IU/L (ref 40–129)
ALT SERPL-CCNC: 44 U/L (ref 10–40)
AST SERPL-CCNC: 26 IU/L (ref 15–37)
BASOPHILS ABSOLUTE: 0 K/CU MM
BASOPHILS RELATIVE PERCENT: 0.1 % (ref 0–1)
BILIRUB SERPL-MCNC: 0.3 MG/DL (ref 0–1)
BILIRUBIN DIRECT: 0.2 MG/DL (ref 0–0.3)
BILIRUBIN, INDIRECT: 0.1 MG/DL (ref 0–0.7)
DIFFERENTIAL TYPE: ABNORMAL
EOSINOPHILS ABSOLUTE: 0 K/CU MM
EOSINOPHILS RELATIVE PERCENT: 0.1 % (ref 0–3)
GLUCOSE BLD-MCNC: 156 MG/DL (ref 70–99)
GLUCOSE BLD-MCNC: 203 MG/DL (ref 70–99)
GLUCOSE BLD-MCNC: 267 MG/DL (ref 70–99)
HCT VFR BLD CALC: 46.2 % (ref 42–52)
HEMOGLOBIN: 15.3 GM/DL (ref 13.5–18)
IMMATURE NEUTROPHIL %: 1.8 % (ref 0–0.43)
LYMPHOCYTES ABSOLUTE: 1 K/CU MM
LYMPHOCYTES RELATIVE PERCENT: 10.1 % (ref 24–44)
MCH RBC QN AUTO: 29.9 PG (ref 27–31)
MCHC RBC AUTO-ENTMCNC: 33.1 % (ref 32–36)
MCV RBC AUTO: 90.4 FL (ref 78–100)
MONOCYTES ABSOLUTE: 0.6 K/CU MM
MONOCYTES RELATIVE PERCENT: 5.5 % (ref 0–4)
NUCLEATED RBC %: 0 %
PDW BLD-RTO: 12.9 % (ref 11.7–14.9)
PLATELET # BLD: 241 K/CU MM (ref 140–440)
PMV BLD AUTO: 9.4 FL (ref 7.5–11.1)
PRO-BNP: 263.5 PG/ML
RBC # BLD: 5.11 M/CU MM (ref 4.6–6.2)
SEGMENTED NEUTROPHILS ABSOLUTE COUNT: 8.3 K/CU MM
SEGMENTED NEUTROPHILS RELATIVE PERCENT: 82.4 % (ref 36–66)
TOTAL IMMATURE NEUTOROPHIL: 0.18 K/CU MM
TOTAL NUCLEATED RBC: 0 K/CU MM
TOTAL PROTEIN: 5.9 GM/DL (ref 6.4–8.2)
WBC # BLD: 10.1 K/CU MM (ref 4–10.5)

## 2023-03-06 PROCEDURE — 2580000003 HC RX 258: Performed by: NURSE PRACTITIONER

## 2023-03-06 PROCEDURE — 94640 AIRWAY INHALATION TREATMENT: CPT

## 2023-03-06 PROCEDURE — 80076 HEPATIC FUNCTION PANEL: CPT

## 2023-03-06 PROCEDURE — 6370000000 HC RX 637 (ALT 250 FOR IP): Performed by: INTERNAL MEDICINE

## 2023-03-06 PROCEDURE — 36415 COLL VENOUS BLD VENIPUNCTURE: CPT

## 2023-03-06 PROCEDURE — 82962 GLUCOSE BLOOD TEST: CPT

## 2023-03-06 PROCEDURE — 83880 ASSAY OF NATRIURETIC PEPTIDE: CPT

## 2023-03-06 PROCEDURE — 97535 SELF CARE MNGMENT TRAINING: CPT

## 2023-03-06 PROCEDURE — 85025 COMPLETE CBC W/AUTO DIFF WBC: CPT

## 2023-03-06 PROCEDURE — 2700000000 HC OXYGEN THERAPY PER DAY

## 2023-03-06 PROCEDURE — 94761 N-INVAS EAR/PLS OXIMETRY MLT: CPT

## 2023-03-06 PROCEDURE — 6370000000 HC RX 637 (ALT 250 FOR IP): Performed by: NURSE PRACTITIONER

## 2023-03-06 PROCEDURE — 6360000002 HC RX W HCPCS: Performed by: NURSE PRACTITIONER

## 2023-03-06 RX ORDER — FLUTICASONE PROPIONATE 50 MCG
1 SPRAY, SUSPENSION (ML) NASAL DAILY
Qty: 16 G | Refills: 3 | Status: SHIPPED | OUTPATIENT
Start: 2023-03-07

## 2023-03-06 RX ORDER — ALBUTEROL SULFATE 90 UG/1
2 AEROSOL, METERED RESPIRATORY (INHALATION) 2 TIMES DAILY
Qty: 18 G | Refills: 3 | Status: SHIPPED | OUTPATIENT
Start: 2023-03-06

## 2023-03-06 RX ORDER — ALBUTEROL SULFATE 90 UG/1
2 AEROSOL, METERED RESPIRATORY (INHALATION) EVERY 6 HOURS PRN
Qty: 1 EACH | Refills: 1 | Status: SHIPPED | OUTPATIENT
Start: 2023-03-06

## 2023-03-06 RX ORDER — ERGOCALCIFEROL 1.25 MG/1
50000 CAPSULE ORAL WEEKLY
Qty: 5 CAPSULE | Refills: 0 | Status: SHIPPED | OUTPATIENT
Start: 2023-03-08

## 2023-03-06 RX ORDER — ATORVASTATIN CALCIUM 40 MG/1
40 TABLET, FILM COATED ORAL DAILY
Qty: 90 TABLET | Refills: 1 | Status: SHIPPED | OUTPATIENT
Start: 2023-03-06

## 2023-03-06 RX ORDER — METHYLPREDNISOLONE 4 MG/1
TABLET ORAL
Qty: 1 KIT | Refills: 0 | Status: SHIPPED | OUTPATIENT
Start: 2023-03-06 | End: 2023-03-12

## 2023-03-06 RX ORDER — DILTIAZEM HYDROCHLORIDE 120 MG/1
120 CAPSULE, COATED, EXTENDED RELEASE ORAL DAILY
Qty: 30 CAPSULE | Refills: 3 | Status: SHIPPED | OUTPATIENT
Start: 2023-03-07

## 2023-03-06 RX ORDER — GUAIFENESIN 600 MG/1
600 TABLET, EXTENDED RELEASE ORAL 2 TIMES DAILY
Qty: 10 TABLET | Refills: 0 | Status: SHIPPED | OUTPATIENT
Start: 2023-03-06 | End: 2023-03-11

## 2023-03-06 RX ADMIN — INSULIN LISPRO 20 UNITS: 100 INJECTION, SOLUTION INTRAVENOUS; SUBCUTANEOUS at 07:56

## 2023-03-06 RX ADMIN — GUAIFENESIN 600 MG: 600 TABLET, EXTENDED RELEASE ORAL at 07:54

## 2023-03-06 RX ADMIN — BARICITINIB 4 MG: 2 TABLET, FILM COATED ORAL at 07:55

## 2023-03-06 RX ADMIN — DILTIAZEM HYDROCHLORIDE 120 MG: 120 CAPSULE, COATED, EXTENDED RELEASE ORAL at 07:54

## 2023-03-06 RX ADMIN — INSULIN LISPRO 4 UNITS: 100 INJECTION, SOLUTION INTRAVENOUS; SUBCUTANEOUS at 07:57

## 2023-03-06 RX ADMIN — ALBUTEROL SULFATE 2 PUFF: 90 AEROSOL, METERED RESPIRATORY (INHALATION) at 07:31

## 2023-03-06 RX ADMIN — INSULIN LISPRO 6 UNITS: 100 INJECTION, SOLUTION INTRAVENOUS; SUBCUTANEOUS at 02:44

## 2023-03-06 RX ADMIN — METFORMIN HYDROCHLORIDE 1000 MG: 500 TABLET, FILM COATED ORAL at 07:55

## 2023-03-06 RX ADMIN — ENOXAPARIN SODIUM 160 MG: 100 INJECTION SUBCUTANEOUS at 11:02

## 2023-03-06 RX ADMIN — INSULIN HUMAN 15 UNITS: 100 INJECTION, SUSPENSION SUBCUTANEOUS at 07:57

## 2023-03-06 RX ADMIN — SODIUM CHLORIDE, PRESERVATIVE FREE 10 ML: 5 INJECTION INTRAVENOUS at 07:58

## 2023-03-06 RX ADMIN — ASPIRIN 81 MG: 81 TABLET, COATED ORAL at 07:55

## 2023-03-06 RX ADMIN — DEXAMETHASONE SODIUM PHOSPHATE 6 MG: 10 INJECTION INTRAMUSCULAR; INTRAVENOUS at 11:02

## 2023-03-06 RX ADMIN — INSULIN LISPRO 20 UNITS: 100 INJECTION, SOLUTION INTRAVENOUS; SUBCUTANEOUS at 11:01

## 2023-03-06 RX ADMIN — ATORVASTATIN CALCIUM 40 MG: 40 TABLET, FILM COATED ORAL at 07:54

## 2023-03-06 NOTE — DISCHARGE SUMMARY
V2.0  Discharge Summary    Name:  Parish Porter /Age/Sex: 1955 (74 y.o. male)   Admit Date: 2023  Discharge Date: 3/6/23    MRN & CSN:  [id number] & [id number] Encounter Date and Time 3/6/23 11:47 AM EST    Attending:  Peter Albert MD Discharging Provider: Peter Albert MD       Hospital Course:     Brief HPI: Parish Porter is a 74 y.o. male with pmh of COPD, CHF, atrial fibrillation, CAD who presents with COVID-19 infection and COPD exacerbation. Pt presented w cough and worsening shortness of breath, CTPA with no evidence of bacterial pneumonia monitored off ntibiotics on dexamethasone 6mg IV QD and baricitinib x7d with improvement in Hypoxemia from  4LPM on admission to 2LPM at baseline. ANF was recommended by rehab and pt's precert was denied, appeal was also denied based on patient able to ambulate and needing intermittent PT. Pt hyperglycemic while inpatient likely iso steroids, with Endocrinoloogist onboard. Patient improved clinically and back to BL O2 requirements of 2LPM, repeat CXR 3/5 with no acute process. Pt cleared per pulmonologist for discharge home with outpatient follow up. Brief Problem Based Course:   Covid-19 Infection-s/p Dex/ Baricitinib  COPD exacerbation- s/p Dexamethasone x7d, will send home on medro dosepack tapering steroids with outpatient pulm follow up  HFpEF  PAF  CAD s/p CABG  PAD  NIDDM    The patient expressed appropriate understanding of, and agreement with the discharge recommendations, medications, and plan.      Consults this admission:  IP CONSULT TO PULMONOLOGY  IP CONSULT TO CARDIOLOGY  IP CONSULT TO PHARMACY  IP CONSULT TO HEART FAILURE NURSE/COORDINATOR  IP CONSULT TO DIETITIAN  IP CONSULT TO ENDOCRINOLOGY    Discharge Diagnosis:   COVID-19      Discharge Instruction:   Follow up appointments: PCP/ Pulmonologist/ Endocrinologist as outpatient  Primary care physician: Son Anaya MD within 2 weeks  Diet: cardiac diet and diabetic diet Activity: activity as tolerated  Disposition: Discharged to:   [x]Home, []HHC, []SNF, []Acute Rehab, []Hospice   Condition on discharge: Stable  Labs and Tests to be Followed up as an outpatient by PCP or Specialist: CBC/ CMP/ Mag/ Phos as outpatient    Discharge Medications:        Medication List        CONTINUE taking these medications      blood glucose monitor kit and supplies  Test once daily     Compressor/Nebulizer Misc  Use qid            ASK your doctor about these medications      amiodarone 200 MG tablet  Commonly known as: CORDARONE     aspirin 81 MG EC tablet  Commonly known as: ASPIRIN LOW DOSE  Take 1 tablet by mouth daily     blood glucose test strips  Twice a day testing d/t elevated blood sugar     cilostazol 50 MG tablet  Commonly known as: PLETAL  TAKE 1 TABLET BY MOUTH TWICE DAILY     glipiZIDE 10 MG tablet  Commonly known as: GLUCOTROL  TAKE 1 TABLET BY MOUTH TWICE DAILY     metFORMIN 500 MG tablet  Commonly known as: GLUCOPHAGE  TAKE 1 TABLET BY MOUTH TWICE DAILY WITH MEALS     OXYGEN     Symbicort 160-4.5 MCG/ACT Aero  Generic drug: budesonide-formoterol  Inhale 2 puffs into the lungs 2 times daily             Objective Findings at Discharge:   /82   Pulse 85   Temp 98.2 °F (36.8 °C) (Oral)   Resp 22   Ht 6' 6\" (1.981 m)   Wt (!) 365 lb (165.6 kg)   SpO2 95%   BMI 42.18 kg/m²       Physical Exam:   General: NAD  Eyes: EOMI  ENT: neck supple  Cardiovascular: Regular rate. Respiratory: Clear to auscultation  Gastrointestinal: Soft, non tender  Genitourinary: no suprapubic tenderness  Musculoskeletal: No edema  Skin: warm, dry  Neuro: Alert. Psych: Mood appropriate.          Labs and Imaging   XR CHEST (2 VW)    Result Date: 2/28/2023  EXAMINATION: TWO X-RAY VIEWS OF THE CHEST 2/28/2023 10:33 am COMPARISON: February 11, 2022 HISTORY: ORDERING SYSTEM PROVIDED HISTORY:  Dyspnea TECHNOLOGIST PROVIDED HISTORY: Reason for Exam:  Dyspnea Additional signs and symptoms:  Dyspnea Relevant Medical/Surgical History:  Dyspnea FINDINGS: No lines or tubes. Cardiomediastinal silhouette is stable. The lungs show improved pulmonary edema since February 11, 2022. No significant pleural effusions. Findings are superimposed on chronic lung changes. No pneumothorax. No acute or aggressive osseous lesion. 1. Persistent but improved pulmonary edema superimposed on chronic lung changes. XR CHEST PORTABLE    Result Date: 3/5/2023  EXAMINATION: ONE XRAY VIEW OF THE CHEST 3/5/2023 11:58 am COMPARISON: Chest CTA 02/28/2023, chest radiograph 02/28/2023 HISTORY: ORDERING SYSTEM PROVIDED HISTORY: follow up CXR TECHNOLOGIST PROVIDED HISTORY: Reason for exam:->follow up CXR Reason for Exam: follow up CXR FINDINGS: Overlying heart monitor leads and tubing. Slight rotation to the left. Unchanged linear opacity consistent with scarring in the lateral mid to basilar left lung. Otherwise clear lungs. No definite findings of pneumothorax or pleural effusion with evaluation for left pleural effusion partially limited by incomplete inclusion of the left lateral costophrenic angle. Prominent left epicardial fat with otherwise normal mediastinal and cardiac contours. No acute cardiopulmonary process. CTA PULMONARY W CONTRAST    Result Date: 2/28/2023  EXAMINATION: CTA OF THE CHEST 2/28/2023 1:24 pm TECHNIQUE: CTA of the chest was performed after the administration of intravenous contrast.  Multiplanar reformatted images are provided for review. MIP images are provided for review. Automated exposure control, iterative reconstruction, and/or weight based adjustment of the mA/kV was utilized to reduce the radiation dose to as low as reasonably achievable.  COMPARISON: April 19, 2020 HISTORY: ORDERING SYSTEM PROVIDED HISTORY: copd exacerbation with covid 19 and history of afib TECHNOLOGIST PROVIDED HISTORY: Reason for exam:->copd exacerbation with covid 19 and history of afib Reason for Exam: copd exacerbation with covid 19 and history of afib FINDINGS: Pulmonary Arteries: Pulmonary arteries are adequately opacified for evaluation.  No evidence of intraluminal filling defect to suggest pulmonary embolism.  Main pulmonary artery is normal in caliber. Mediastinum: No evidence of mediastinal lymphadenopathy.  The heart and pericardium demonstrate no acute abnormality.  There is no acute abnormality of the thoracic aorta. Lungs/pleura: The lungs are without acute process.  No focal consolidation or pulmonary edema.  No evidence of pleural effusion or pneumothorax. Moderate centrilobular emphysema, upper lobe predominant, with stable bullous changes at the right apex. There is stable scarring present within the lingula. The central airways are patent. Upper Abdomen: Limited images of the upper abdomen are unremarkable. Soft Tissues/Bones: No acute bone or soft tissue abnormality.     No evidence of pulmonary embolism or acute pulmonary abnormality. Moderate emphysema.       CBC:   Recent Labs     03/04/23 0432 03/05/23 0052 03/06/23 0521   WBC 7.5 6.4 10.1   HGB 14.8 14.1 15.3    187 241     BMP:    Recent Labs     03/04/23 0432 03/05/23 0052   * 135   K 4.7 4.5   CL 92* 94*   CO2 36* 31   BUN 16 17   CREATININE 0.6* 0.6*   GLUCOSE 290* 290*     Hepatic:   Recent Labs     03/04/23 0432 03/05/23 0052 03/06/23 0521   AST 15 18 26   ALT 28 31 44*   BILITOT 0.3 0.3 0.3   ALKPHOS 75 64 67     Lipids:   Lab Results   Component Value Date/Time    CHOL 159 03/01/2023 08:34 AM    CHOL 112 12/15/2021 01:35 PM    HDL 29 03/01/2023 08:34 AM    TRIG 182 03/01/2023 08:34 AM     Hemoglobin A1C:   Lab Results   Component Value Date/Time    LABA1C 9.3 02/28/2023 10:24 AM     TSH:   Lab Results   Component Value Date/Time    TSH 2.15 03/06/2019 10:22 AM     Troponin:   Lab Results   Component Value Date/Time    TROPONINT <0.010 02/28/2023 10:24 AM    TROPONINT <0.010 04/19/2020 08:04 PM    TROPONINT <0.010  08/31/2019 10:54 PM     Lactic Acid: No results for input(s): LACTA in the last 72 hours.   BNP:   Recent Labs     03/06/23  0521   PROBNP 263.5     UA:  Lab Results   Component Value Date/Time    NITRU NEGATIVE 11/07/2018 04:30 PM    COLORU CHARLEE 11/07/2018 04:30 PM    WBCUA 4 11/07/2018 04:30 PM    RBCUA 1 11/07/2018 04:30 PM    MUCUS RARE 11/07/2018 04:30 PM    TRICHOMONAS NONE SEEN 11/07/2018 04:30 PM    BACTERIA RARE 11/07/2018 04:30 PM    CLARITYU CLEAR 11/07/2018 04:30 PM    SPECGRAV 1.019 11/07/2018 04:30 PM    LEUKOCYTESUR NEGATIVE 11/07/2018 04:30 PM    UROBILINOGEN 2.0 11/07/2018 04:30 PM    BILIRUBINUR MODERATE 11/07/2018 04:30 PM    BLOODU NEGATIVE 11/07/2018 04:30 PM    Mamadou Mustard NEGATIVE 11/07/2018 04:30 PM     Urine Cultures: No results found for: LABURIN  Blood Cultures: No results found for: BC  No results found for: BLOODCULT2  Organism: No results found for: ORG    Time Spent Discharging patient 35 minutes    Electronically signed by Omega Gonzalez MD on 3/6/2023 at 11:47 AM

## 2023-03-06 NOTE — PROGRESS NOTES
Occupational Therapy      Occupational Therapy Treatment Note    Name: Alex Mcdonald MRN: [id number] :   1955   Date:  3/6/2023   Admission Date: 2023 Room:  -A     Primary Problem:  COVID-19    Restrictions/Precautions:          General Precautions, Fall Risk, Droplet Plus    Communication with other providers: Nursing handoff    Subjective:  Patient states:  \"I've been walking to the bathroom\"  Pain:   Location, Type, Intensity (0/10 to 10/10):  No    Objective:    Observation: Pt received supine in bed, agreeable to therapy   Objective Measures:  2L of  saturation decreased ~82% during functional mobility to/from bathroom, return to EOB increased ~90% w/ 1 minute of seated pursed lip breathing    Treatment, including education:  Therapeutic Activity Training:   Therapeutic activity training was instructed today. Cues were given for safety, sequence, UE/LE placement, awareness, and balance. Activities performed today included bed mobility training, sup-sit, sit-stand, functional mobility, stand to sit, sit to supine, pursed lip breathing    Self Care Training:   Cues were given for safety, sequence, UE/LE placement, visual cues, and balance. Activities performed today included grooming, LB bathing/dressing, toileting, toilet transfer    Supine to sit SBA, sitting EOB SBA, STS from EOB up to stand SBA, functional mobility to/from bathroom SBA, LB dressing threading underwear in stand SBA, toilet transfer SBA, toileting SBA, grooming washing hands and face in stand at sink SBA, return to EOB SBA, stand to sit SBA, sit to supine SBA, scooting to HOB SBA.     Poor safety awareness, difficulty following safety direction       Assessment / Impression:    Patient's tolerance of treatment: Well  Adverse Reaction: None  Significant change in status and impact: Improved from initial evaluation  Barriers to improvement: None noted      Plan for Next Session:    Continue OT POC    Time in: 1008  Time out:  1020  Timed treatment minutes:  12 minutes  Total treatment time:  12 minutes      Electronically signed by:    Phillip RANGEL/L [id number]  11:35 AM,3/6/2023

## 2023-03-06 NOTE — DISCHARGE INSTRUCTIONS
-Please take your medications and follow up soon with your primary care physician, cardiologist, pulmonologist and Endocrinologist  -In case of any worsening, please reach out to nearest facility

## 2023-03-06 NOTE — CARE COORDINATION
YOVANAW reviewed pt chart. Call to [facility] at [place]. Pt pre-cert is still pending. W informed [facility] that this LSW is the discharge planner for this Pt and provided the LSW contact number. ADDENDUM: (10:33 am )    Call from [facility] at [place]. Pt insurance denied the pre-cert. There is an option to do a P2P. The number is [phone]. Physician will need the pt name, , and the pt ID [id number] this needs to be completed by 1 pm today. PS sent to hospital physician.

## 2023-03-06 NOTE — PROGRESS NOTES
V2.0  [facility] [place] Hospitalist Progress Note      Name:  Senthil Gomez /Age/Sex: 1955  (74 y.o. male)   MRN & CSN:  [id number] & [id number] Encounter Date/Time: 3/6/2023 12:21 PM EST    Location:  -A PCP: Frederic Polo MD       Hospital Day: 7    Assessment and Plan:   Senthil Gomez is a 74 y.o. male with pmh of COPD, CHF, atrial fibrillation, CAD who presents with COVID-19      Plan:  COVID-19 infection:  -Continue with p.o. dexamethasone and baricitinib  -Pulm consulted, will appreciate recs  -PT/OT-SNF versus laying in bed at discharge per PT/home with OT Per OT  Acute on chronic hypoxemic respiratory failure:  -Due to the above  -O2 as needed, keep oxygen saturation above 92%  -Wean oxygen as tolerated  -CTPA with no evidence of bacterial pneumonia, currently watching off antibiotics  History of HFpEF:  -Does not appear in acute exacerbation  History of PAF status post ablation:  -Patient started on weight-based Lovenox  History of CAD status post CABG:  History of PAD:  -Continue Pletal  Non-insulin-dependent diabetes mellitus:  -On metformin and glipizide at home, hold OHA  -Continue basal and bolus dose insulin  -Currently hyperglycemic due to dexamethasone use  -Premeal insulin added and Lantus increased to 50 units, w Premeal Lispro 20 U TiD( Hold if FSG < 120 or <50% meal consumed) and NPH 15U QAM( to be held if FSG < 200mg)  -Fingerstick glucose 150-300's of late  -Endocrine consult    Diet ADULT DIET; Regular; 5 carb choices (75 gm/meal); No Added Salt (3-4 gm); 2000 ml  ADULT ORAL NUTRITION SUPPLEMENT; Breakfast, Dinner;  Low Calorie/High Protein Oral Supplement   DVT Prophylaxis [x] Lovenox, []  Heparin, [] SCDs, [] Ambulation,  [] Eliquis, [] Xarelto  [] Coumadin   Code Status Full Code   Disposition From: Home  Expected Disposition: SNF   Estimated Date of Discharge: 2-3 days  Patient requires continued admission due to hypoxemic respiratory failure requiring O2 levels higher than baseline   Surrogate Decision Maker/ POA Self     Subjective:     Chief Complaint: Shortness of Breath (X 5 days. Baseline on 2L via NC at home. Given breathing tx in medics)       Senthil Gomez is a 74 y.o. male who presents with respiratory failure      3/4/23:Patient denies any significant symptoms at the time of examination, O2 weaned to 3LPm from 4L , BL 2LPM at home. Labs grossly unremarkable. Will try to wean to BL 2LPM and plan for dispo   3/5/23:Pt seen & examined , VS stable, saturating well on 2LPM, plan for starting dispo process to SNF as patient appears improving , Day 06 Dexamethasone/ Baricitinib therapy, D05/5 Azithromycin therapy. CXR w no acute process  3/6/23:Pt seen & examined, VSS on 2LPM at baseline, awaiting dispo workup    Review of Systems:    Review of Systems    Review of Systems:   Constitutional: Negative. Negative for activity change, appetite change, chills, diaphoresis, fatigue, fever and unexpected weight change. HENT: Negative. Negative for congestion, dental problem, drooling, ear discharge, ear pain, facial swelling, hearing loss, mouth sores, nosebleeds, postnasal drip, rhinorrhea, sinus pressure, sinus pain, sneezing, sore throat, tinnitus, trouble swallowing and voice change. Eyes: Negative. Negative for photophobia, pain, discharge, redness, itching and visual disturbance. Respiratory: Negative. Negative for apnea, cough, choking, chest tightness, shortness of breath, wheezing and stridor. Cardiovascular: Negative. Negative for chest pain and palpitations. Gastrointestinal: Negative. Negative for abdominal distention, abdominal pain, anal bleeding, blood in stool, constipation, diarrhea, nausea, rectal pain and vomiting. Endocrine: Negative. Negative for cold intolerance, heat intolerance, polydipsia, polyphagia and polyuria. Genitourinary: Negative.   Negative for decreased urine volume, difficulty urinating, dysuria, enuresis, flank pain, frequency, genital sores, hematuria, penile discharge, penile pain, penile swelling, scrotal swelling, testicular pain and urgency. Musculoskeletal: Negative. Negative for arthralgias, back pain, gait problem, joint swelling, myalgias, neck pain and neck stiffness. Skin: Negative. Negative for color change, pallor, rash and wound. Allergic/Immunologic: Negative for environmental allergies, food allergies and immunocompromised state. Neurological: Negative. Negative for dizziness, tremors, seizures, syncope, facial asymmetry, speech difficulty, weakness, light-headedness, numbness and headaches. Hematological: Negative. Negative for adenopathy. Does not bruise/bleed easily. Psychiatric/Behavioral: Negative. Negative for agitation, behavioral problems, confusion, decreased concentration, dysphoric mood, hallucinations, self-injury, sleep disturbance and suicidal ideas. The patient is not nervous/anxious and is not hyperactive. Objective: Intake/Output Summary (Last 24 hours) at 3/6/2023 0840  Last data filed at 3/6/2023 0100  Gross per 24 hour   Intake --   Output 2450 ml   Net -2450 ml          Vitals:   Vitals:    03/06/23 0745   BP: 136/82   Pulse:    Resp:    Temp: 98.2 °F (36.8 °C)   SpO2:        Physical Exam:     General: NAD  Eyes: EOMI  ENT: neck supple  Cardiovascular: Regular rate. Respiratory: mostly clear to auscultation today  Gastrointestinal: Soft, non tender  Genitourinary: no suprapubic tenderness  Musculoskeletal: No edema  Skin: warm, dry  Neuro: Alert. Psych: Mood appropriate.      Medications:   Medications:    metFORMIN  1,000 mg Oral BID WC    insulin NPH  15 Units SubCUTAneous QAM    insulin glargine  50 Units SubCUTAneous Nightly    insulin lispro  0-16 Units SubCUTAneous TID WC    insulin lispro  0-12 Units SubCUTAneous 2 times per day    insulin lispro  20 Units SubCUTAneous TID WC    albuterol sulfate HFA  2 puff Inhalation BID    And    ipratropium  2 puff Inhalation BID    vitamin D  50,000 Units Oral Weekly    fluticasone  1 spray Each Nostril Daily    guaiFENesin  600 mg Oral BID    sodium chloride flush  5-40 mL IntraVENous 2 times per day    dexamethasone  6 mg IntraVENous Q24H    aspirin  81 mg Oral Daily    atorvastatin  40 mg Oral Daily    budesonide-formoterol  2 puff Inhalation BID    baricitinib  4 mg Oral Daily    dilTIAZem  120 mg Oral Daily    enoxaparin  1 mg/kg (Order-Specific) SubCUTAneous BID      Infusions:    dextrose      sodium chloride       PRN Meds: methyl salicylate-menthol, , TID PRN  albuterol sulfate HFA, 2 puff, Q4H PRN  acetaminophen, 650 mg, Q6H PRN   Or  acetaminophen, 650 mg, Q6H PRN  glucose, 4 tablet, PRN  dextrose bolus, 125 mL, PRN   Or  dextrose bolus, 250 mL, PRN  glucagon (rDNA), 1 mg, PRN  dextrose, , Continuous PRN  sodium chloride flush, 5-40 mL, PRN  sodium chloride, , PRN  ondansetron, 4 mg, Q8H PRN   Or  ondansetron, 4 mg, Q6H PRN  polyethylene glycol, 17 g, Daily PRN  potassium chloride, 40 mEq, PRN   Or  potassium alternative oral replacement, 40 mEq, PRN   Or  potassium chloride, 10 mEq, PRN  magnesium sulfate, 2,000 mg, PRN  ondansetron, 4 mg, Q8H PRN   Or  ondansetron, 4 mg, Q6H PRN      Labs      Recent Results (from the past 24 hour(s))   POCT Glucose    Collection Time: 03/05/23 12:14 PM   Result Value Ref Range    POC Glucose 233 (H) 70 - 99 MG/DL   POCT Glucose    Collection Time: 03/05/23  4:33 PM   Result Value Ref Range    POC Glucose 202 (H) 70 - 99 MG/DL   POCT Glucose    Collection Time: 03/05/23  9:00 PM   Result Value Ref Range    POC Glucose 151 (H) 70 - 99 MG/DL   POCT Glucose    Collection Time: 03/06/23  2:41 AM   Result Value Ref Range    POC Glucose 267 (H) 70 - 99 MG/DL   Brain Natriuretic Peptide    Collection Time: 03/06/23  5:21 AM   Result Value Ref Range    Pro-.5 <300 PG/ML   Hepatic Function Panel    Collection Time: 03/06/23  5:21 AM   Result Value Ref Range    Albumin 3.7 3.4 - 5.0 GM/DL    Total Bilirubin 0.3 0.0 - 1.0 MG/DL    Bilirubin, Direct 0.2 0.0 - 0.3 MG/DL    Bilirubin, Indirect 0.1 0 - 0.7 MG/DL    Alkaline Phosphatase 67 40 - 129 IU/L    AST 26 15 - 37 IU/L    ALT 44 (H) 10 - 40 U/L    Total Protein 5.9 (L) 6.4 - 8.2 GM/DL   CBC with Auto Differential    Collection Time: 03/06/23  5:21 AM   Result Value Ref Range    WBC 10.1 4.0 - 10.5 K/CU MM    RBC 5.11 4.6 - 6.2 M/CU MM    Hemoglobin 15.3 13.5 - 18.0 GM/DL    Hematocrit 46.2 42 - 52 %    MCV 90.4 78 - 100 FL    MCH 29.9 27 - 31 PG    MCHC 33.1 32.0 - 36.0 %    RDW 12.9 11.7 - 14.9 %    Platelets 478 494 - 669 K/CU MM    MPV 9.4 7.5 - 11.1 FL    Differential Type AUTOMATED DIFFERENTIAL     Segs Relative 82.4 (H) 36 - 66 %    Lymphocytes % 10.1 (L) 24 - 44 %    Monocytes % 5.5 (H) 0 - 4 %    Eosinophils % 0.1 0 - 3 %    Basophils % 0.1 0 - 1 %    Segs Absolute 8.3 K/CU MM    Lymphocytes Absolute 1.0 K/CU MM    Monocytes Absolute 0.6 K/CU MM    Eosinophils Absolute 0.0 K/CU MM    Basophils Absolute 0.0 K/CU MM    Nucleated RBC % 0.0 %    Total Nucleated RBC 0.0 K/CU MM    Total Immature Neutrophil 0.18 K/CU MM    Immature Neutrophil % 1.8 (H) 0 - 0.43 %   POCT Glucose    Collection Time: 03/06/23  6:29 AM   Result Value Ref Range    POC Glucose 203 (H) 70 - 99 MG/DL        Imaging/Diagnostics Last 24 Hours   No results found.     Electronically signed by Paige Zarate MD on 3/6/2023 at 8:40 AM

## 2023-03-06 NOTE — PROGRESS NOTES
Progress Note( Dr. Cora Baker)  3/6/2023  Subjective:   Admit Date: 2/28/2023  PCP: Deshawn Leiva MD    Admitted For : Shortness of breath generalized malaise    Consulted For:  Better control of blood glucose    Interval History: feels somewhat better but not completely normal yet  Blood glucose still fluctuating    Denies any chest pains,   Yes  SOB . Oxygen requirements have gone down less than 5 L mostly 2- 3 L  Denies nausea or vomiting. No new bowel or bladder symptoms. Intake/Output Summary (Last 24 hours) at 3/6/2023 1241  Last data filed at 3/6/2023 0100  Gross per 24 hour   Intake --   Output 2100 ml   Net -2100 ml         DATA    CBC:   Recent Labs     03/04/23 0432 03/05/23 0052 03/06/23 0521   WBC 7.5 6.4 10.1   HGB 14.8 14.1 15.3    187 241      CMP:  Recent Labs     03/04/23 0432 03/05/23 0052 03/06/23 0521   * 135  --    K 4.7 4.5  --    CL 92* 94*  --    CO2 36* 31  --    BUN 16 17  --    CREATININE 0.6* 0.6*  --    CALCIUM 8.9 8.5  --    PROT 6.3* 5.7* 5.9*   LABALBU 4.0 3.6 3.7   BILITOT 0.3 0.3 0.3   ALKPHOS 75 64 67   AST 15 18 26   ALT 28 31 44*       Lipids:   Lab Results   Component Value Date/Time    CHOL 159 03/01/2023 08:34 AM    CHOL 112 12/15/2021 01:35 PM    HDL 29 03/01/2023 08:34 AM    TRIG 182 03/01/2023 08:34 AM     Glucose:  Recent Labs     03/06/23  0241 03/06/23  0629 03/06/23  1054   POCGLU 267* 203* 156*       [id number]:  Lab Results   Component Value Date/Time    LABA1C 9.3 02/28/2023 10:24 AM     High Sensitivity TSH:   Lab Results   Component Value Date/Time    TSHHS 2.950 02/28/2023 10:24 AM     Free T3: No results found for: FT3  Free T4:  Lab Results   Component Value Date/Time    T4FREE 1.06 02/28/2023 10:24 AM       XR CHEST PORTABLE   Final Result   No acute cardiopulmonary process. CTA PULMONARY W CONTRAST   Final Result   No evidence of pulmonary embolism or acute pulmonary abnormality. Moderate emphysema. XR CHEST (2 VW)   Final Result   1. Persistent but improved pulmonary edema superimposed on chronic lung   changes. Scheduled Medicines   Medications:    metFORMIN  1,000 mg Oral BID WC    insulin NPH  15 Units SubCUTAneous QAM    insulin glargine  50 Units SubCUTAneous Nightly    insulin lispro  0-16 Units SubCUTAneous TID WC    insulin lispro  0-12 Units SubCUTAneous 2 times per day    insulin lispro  20 Units SubCUTAneous TID WC    albuterol sulfate HFA  2 puff Inhalation BID    And    ipratropium  2 puff Inhalation BID    vitamin D  50,000 Units Oral Weekly    fluticasone  1 spray Each Nostril Daily    guaiFENesin  600 mg Oral BID    sodium chloride flush  5-40 mL IntraVENous 2 times per day    dexamethasone  6 mg IntraVENous Q24H    aspirin  81 mg Oral Daily    atorvastatin  40 mg Oral Daily    budesonide-formoterol  2 puff Inhalation BID    baricitinib  4 mg Oral Daily    dilTIAZem  120 mg Oral Daily    enoxaparin  1 mg/kg (Order-Specific) SubCUTAneous BID      Infusions:    dextrose      sodium chloride           Objective:   Vitals: /77   Pulse 73   Temp 97.7 °F (36.5 °C) (Oral)   Resp 24   Ht 6' 6\" (1.981 m)   Wt (!) 365 lb (165.6 kg)   SpO2 93%   BMI 42.18 kg/m²   General appearance: alert and cooperative with exam  Neck: no JVD or bruit  Thyroid : Normal lobes   Lungs: Has Vesicular Breath sounds diminished breath sounds and some wheezing and some rales bilaterally  Heart:  regular rate and rhythm  Abdomen: soft, non-tender; bowel sounds normal; no masses,  no organomegaly  Musculoskeletal: Normal  Extremities: extremities normal, , no edema  Neurologic:  Awake, alert, oriented to name, place and time. Cranial nerves II-XII are grossly intact. Motor is  intact. Sensory is intact. ,  and gait is normal.    Assessment:     Patient Active Problem List:     Hypercholesteremia     Tobacco use     Anxiety     Controlled type 2 diabetes mellitus without complication, without long-term current use of insulin (HCC)     Benign essential HTN     Simple chronic bronchitis (HCC)     Macular degeneration, dry     PVD (peripheral vascular disease) (HCC)     VT (ventricular tachycardia)     Left wrist sprain     Hypoxia     Morbid obesity (HCC)     History of atrial fibrillation     Swelling of lower extremity     S/P ablation of atrial fibrillation     LFT elevation     Cholecystitis     COPD, severe ([address]     Type 2 diabetes mellitus with hyperglycemia     COVID-19      Plan:     Reviewed POC blood glucose . Labs and X ray results   Reviewed Current Medicines   On meal/ Correction bolus Humalog/ Basal Lantus Insulin regime / and Oral Hypoglycemic drugs   Monitor Blood glucose frequently   Modified  the dose of Insulin/ other medicines as needed   Will follow     .      Corey Paulson MD, MD

## 2023-03-06 NOTE — PROGRESS NOTES
pulmonary      SUBJECTIVE:  he feels better     OBJECTIVE    VITALS:  /84   Pulse 85   Temp 98.6 °F (37 °C) (Oral)   Resp 22   Ht 6' 6\" (1.981 m)   Wt (!) 365 lb (165.6 kg)   SpO2 90%   BMI 42.18 kg/m²   HEAD AND FACE EXAM:  No throat injection, no active exudate,no thrush  NECK EXAM;No JVD, no masses, symmetrical  CHEST EXAM; Expansion equal and symmetrical, no masses  LUNG EXAM; Good breath sounds bilaterally. There are expiratory wheezes both lungs, there are crackles at both lung bases  CARDIOVASCULAR EXAM: Positive S1 and S2, no S3 or S4, no clicks ,no murmurs  RIGHT AND LEFT LOWER EXTRIMITY EXAM: No edema, no swelling, no inflamation  CNS EXAM: Alert and oriented X3          LABS   Lab Results   Component Value Date    WBC 10.1 03/06/2023    HGB 15.3 03/06/2023    HCT 46.2 03/06/2023    MCV 90.4 03/06/2023     03/06/2023     Lab Results   Component Value Date    CREATININE 0.6 (L) 03/05/2023    BUN 17 03/05/2023     03/05/2023    K 4.5 03/05/2023    CL 94 (L) 03/05/2023    CO2 31 03/05/2023     Lab Results   Component Value Date    INR 1.12 08/31/2019    PROTIME 12.7 (H) 08/31/2019          Lab Results   Component Value Date/Time    PHOS 3.9 03/02/2023 01:23 AM    PHOS 5.2 05/24/2018 06:14 AM    PHOS 4.8 05/21/2018 12:23 PM      No results for input(s): PH, PO2ART, RUA4NGE, HCO3, BEART, O2SAT in the last 72 hours.       Wt Readings from Last 3 Encounters:   03/04/23 (!) 365 lb (165.6 kg)   07/11/22 (!) 350 lb (158.8 kg)   06/10/22 (!) 369 lb 3.2 oz (167.5 kg)               ASSESMENT  Ac copd  Ac on ch resp failure  Covid positive        PLAN  Cpm  Increase activity  Cont o2    3/6/2023  Alejandro Leo MD, MSTEVEN.

## 2023-03-07 ENCOUNTER — CARE COORDINATION (OUTPATIENT)
Dept: CASE MANAGEMENT | Age: 68
End: 2023-03-07

## 2023-03-07 ENCOUNTER — TELEPHONE (OUTPATIENT)
Dept: CARDIOLOGY CLINIC | Age: 68
End: 2023-03-07

## 2023-03-07 ENCOUNTER — TELEMEDICINE (OUTPATIENT)
Dept: INTERNAL MEDICINE CLINIC | Age: 68
End: 2023-03-07

## 2023-03-07 DIAGNOSIS — Z09 HOSPITAL DISCHARGE FOLLOW-UP: ICD-10-CM

## 2023-03-07 DIAGNOSIS — U07.1 COVID-19: Primary | ICD-10-CM

## 2023-03-07 DIAGNOSIS — E11.65 TYPE 2 DIABETES MELLITUS WITH HYPERGLYCEMIA, WITHOUT LONG-TERM CURRENT USE OF INSULIN (HCC): ICD-10-CM

## 2023-03-07 DIAGNOSIS — J41.0 SIMPLE CHRONIC BRONCHITIS (HCC): ICD-10-CM

## 2023-03-07 DIAGNOSIS — I48.91 ATRIAL FIBRILLATION, UNSPECIFIED TYPE (HCC): ICD-10-CM

## 2023-03-07 DIAGNOSIS — J96.21 ACUTE AND CHRONIC RESPIRATORY FAILURE WITH HYPOXIA (HCC): ICD-10-CM

## 2023-03-07 DIAGNOSIS — I73.9 PVD (PERIPHERAL VASCULAR DISEASE) (HCC): ICD-10-CM

## 2023-03-07 PROCEDURE — 1111F DSCHRG MED/CURRENT MED MERGE: CPT | Performed by: INTERNAL MEDICINE

## 2023-03-07 RX ORDER — CILOSTAZOL 50 MG/1
TABLET ORAL
Qty: 180 TABLET | Refills: 0
Start: 2023-03-07

## 2023-03-07 ASSESSMENT — PATIENT HEALTH QUESTIONNAIRE - PHQ9
1. LITTLE INTEREST OR PLEASURE IN DOING THINGS: 1
SUM OF ALL RESPONSES TO PHQ QUESTIONS 1-9: 2
SUM OF ALL RESPONSES TO PHQ QUESTIONS 1-9: 2
2. FEELING DOWN, DEPRESSED OR HOPELESS: 1
SUM OF ALL RESPONSES TO PHQ QUESTIONS 1-9: 2
SUM OF ALL RESPONSES TO PHQ9 QUESTIONS 1 & 2: 2
SUM OF ALL RESPONSES TO PHQ QUESTIONS 1-9: 2

## 2023-03-07 NOTE — CARE COORDINATION
[facility] Care Transitions Initial Follow Up Call    Call within 2 business days of discharge: Yes    Patient Current Location:  Home: [address] [place] [address] Transition Nurse contacted the patient by telephone to perform post hospital discharge assessment. Verified name and  with patient as identifiers. Provided introduction to self, and explanation of the Care Transition Nurse role. Patient: Parish Porter Patient : 1955   MRN: [id number]  Reason for Admission: sob/Covid-19+  Discharge Date: 3/6/23 RARS: Readmission Risk Score: 8.7      Last Discharge  [address]       Date Complaint Diagnosis Description Type Department Provider    23 Shortness of Breath COPD exacerbation ([facility] . .. ED to Hosp-Admission (Discharged) (ADMITTED) Bekah Eng MD; Rocky Gracia. .. Was this an external facility discharge? No Discharge Facility:     Challenges to be reviewed by the provider   Additional needs identified to be addressed with provider: Yes  Needs 7 day HFU VV scheduled w/ PCP               Method of communication with provider: phone. CTN call to [facility] Her today and he says he is feeling so tired. Denies fever, chills, cough, chest tightness, dizziness, inc. Sob, wheezing, n/v/d, lightheadedness, swelling. O2 on 2l/min cont nc  Does not have pO2 meter, wants to purchase one. Meds reviewed and are correct. Waiting for [facility] delivery today. + for loss of taste & smell. Eating, drinking & sleeping ok. Not checking BLOOD SUGAR at home. PCP office called to schedule HFU and he requests a VV. The office will check w/ PCP and let him know by eod. Denies problems w/ urination/bowels. Expresses  disappointment re: not being able to go to the SNF upon discharge. No other concerns voiced at this time. CTN # given to pt.  [facility] Care Transitions [phone]      Care Transition Nurse reviewed discharge instructions, medical action plan, and red flags with patient who verbalized understanding. The patient was given an opportunity to ask questions and does not have any further questions or concerns at this time. Were discharge instructions available to patient? Yes. Reviewed appropriate site of care based on symptoms and resources available to patient including: PCP  When to call 911. The patient agrees to contact the PCP office for questions related to their healthcare. Advance Care Planning:   Does patient have an Advance Directive: not on file. Medication reconciliation was performed with patient, who verbalizes understanding of administration of home medications. Medications reviewed, 1111F entered: yes    Was patient discharged with a pulse oximeter? no    Non-face-to-face services provided:  Scheduled appointment with PCP-DAVE VV?   Obtained and reviewed discharge summary and/or continuity of care documents  Education of patient/family/caregiver/guardian to support self-management-use of pulse oximeter/protocol if he purchases this    Offered patient enrollment in the Remote Patient Monitoring (RPM) program for in-home monitoring: Patient is not eligible for RPM program.    Care Transitions 24 Hour Call    Schedule Follow Up Appointment with PCP: Completed  Do you have a copy of your discharge instructions?: Yes  Do you have all of your prescriptions and are they filled?: Yes  Have you been contacted by a [facility] Pharmacist?: No  Have you scheduled your follow up appointment?: No (Comment: needs VV w/ PCP-message left w/ office)  Post Acute Services: Home Health (Comment: [address] [place] [address] Rd)  Patient DME: Straight cane, Shower chair  Patient Home Equipment: Oxygen, BiPAP  Do you have support at home?: Roommate/Housemate  Do you feel like you have everything you need to keep you well at home?: Yes  Are you an active caregiver in your home?: No  Care Transitions Interventions   Home Care Waiver: Declined  Other Services: Completed Transportation Support: Declined      DME Assistance: Declined    Medication Assistance Program: Completed     Disease Association: Completed               Discussed follow-up appointments. If no appointment was previously scheduled, appointment scheduling offered: Yes. Is follow up appointment scheduled within 7 days of discharge? TBD. Follow Up  Future Appointments   Date Time Provider Carrie Aldana   5/4/2023  1:00 PM Gisele Muhammad MD [facility]   5/30/2023  9:30 AM [facility] [address] [facility] Carly CARBAJAL [facility]       Care Transition Nurse provided contact information. Plan for follow-up call in 1-2 days based on severity of symptoms and risk factors. Plan for next call: symptom management-sob, pO2%?, BLOOD SUGAR?, taking meds?  follow-up appointment-PCP VV?     Ruben Sheehan, RN

## 2023-03-07 NOTE — PROGRESS NOTES
Post-Discharge Transitional Care Follow Up      Chriss Escobedo   YOB: 1955    Date of Office Visit:  3/7/2023  Date of Hospital Admission: 2/28/23  Date of Hospital Discharge: 3/6/23  Readmission Risk Score (high >=14%. Medium >=10%):Readmission Risk Score: 8.7      Care management risk score Rising risk (score 2-5) and Complex Care (Scores >=6): No Risk Score On File     Non face to face  following discharge, date last encounter closed (first attempt may have been earlier): *No documented post hospital discharge outreach found in the last 14 days     Call initiated 2 business days of discharge: *No response recorded in the last 14 days     COVID-19 - improved but still has SOB and weakness beyond his baseline. Cont inhalers  -     [facility]    PVD (peripheral vascular disease) ([facility] [address] - resume pletal  -     cilostazol (PLETAL) 50 MG tablet; TAKE 1 TABLET BY MOUTH TWICE DAILY, Disp-180 tablet, R-0NO PRINT    Type 2 diabetes mellitus with hyperglycemia, without long-term current use of insulin (HCC) - improved now that he is off high dose steroids; no change in mgmt    Atrial fibrillation, unspecified type ([address] - cont cardizem, eliquis    Acute and chronic respiratory failure with hypoxia ([address] - back on 2L; chck labs  -     [facility]     Simple chronic bronchitis ([facility] [address] - cont inhalers    Hospital discharge follow-up - will arrange [address]; also discussed need for a motorized scooter. He likely will need eval by PT    Medical Decision Making: high complexity  Return in about 1 week (around 3/14/2023). On this date 3/7/2023 I have spent 35 minutes reviewing previous notes, test results and face to face with the patient discussing the diagnosis and importance of compliance with the treatment plan as well as documenting on the day of the visit. Subjective:   HPI    Inpatient course: Discharge summary reviewed- see chart.     Interval history/Current status: Pt was admitted with SOB, found to be hypoxemic. He was found to be covid(+), was started on dexamethasone and baricitinib and eventually O2 was titrated from 4l back to home 2l. He had an echo that was essentially WNL. Glucose was elevated throughout the admission, and pt was seen by endo and started on insulin. Pt continues on eliquis for the a fib. He was seen by cardiology. He was started on lipitor and cardizem. He has resumed the metformin and glipizide. Sugars have been 130s since returning home. He continues to feel very weak. He continues to have SOB with walking. Patient Active Problem List   Diagnosis    Hypercholesteremia    Tobacco use    Anxiety    Controlled type 2 diabetes mellitus without complication, without long-term current use of insulin (HCC)    Benign essential HTN    Simple chronic bronchitis (HCC)    Macular degeneration, dry    PVD (peripheral vascular disease) ([facility])    VT (ventricular tachycardia)    Left wrist sprain    Hypoxia    Morbid obesity ([address]    History of atrial fibrillation    Swelling of lower extremity    S/P ablation of atrial fibrillation    LFT elevation    Cholecystitis    COPD, severe ([address]    Type 2 diabetes mellitus with hyperglycemia    COVID-19       Medications listed as ordered at the time of discharge from hospital     Medication List            Accurate as of March 7, 2023  4:45 PM. If you have any questions, ask your nurse or doctor.                 START taking these medications      cilostazol 50 MG tablet  Commonly known as: PLETAL  TAKE 1 TABLET BY MOUTH TWICE DAILY  Started by: Madyson Benjamin MD            CONTINUE taking these medications      * albuterol sulfate  (90 Base) MCG/ACT inhaler  Commonly known as: ProAir HFA  Inhale 2 puffs into the lungs every 6 hours as needed for Wheezing     * albuterol sulfate  (90 Base) MCG/ACT inhaler  Commonly known as: PROVENTIL;VENTOLIN;PROAIR  Inhale 2 puffs into the lungs 2 times daily     apixaban 5 MG Tabs tablet  Commonly known as: Eliquis  Take 1 tablet by mouth 2 times daily     aspirin 81 MG EC tablet  Commonly known as: ASPIRIN LOW DOSE  Take 1 tablet by mouth daily     atorvastatin 40 MG tablet  Commonly known as: LIPITOR  Take 1 tablet by mouth daily     blood glucose monitor kit and supplies  Test once daily     Compressor/Nebulizer Misc  Use qid     dilTIAZem 120 MG extended release capsule  Commonly known as: CARDIZEM CD  Take 1 capsule by mouth daily     fluticasone 50 MCG/ACT nasal spray  Commonly known as: FLONASE  1 spray by Each Nostril route daily     glipiZIDE 10 MG tablet  Commonly known as: GLUCOTROL  TAKE 1 TABLET BY MOUTH TWICE DAILY     guaiFENesin 600 MG extended release tablet  Commonly known as: MUCINEX  Take 1 tablet by mouth 2 times daily for 5 days     ipratropium 17 MCG/ACT inhaler  Commonly known as: ATROVENT HFA  Inhale 2 puffs into the lungs in the morning and 2 puffs in the evening. metFORMIN 500 MG tablet  Commonly known as: GLUCOPHAGE  TAKE 1 TABLET BY MOUTH TWICE DAILY WITH MEALS     methylPREDNISolone 4 MG tablet  Commonly known as: MEDROL DOSEPACK  Take by mouth. OXYGEN     Symbicort 160-4.5 MCG/ACT Aero  Generic drug: budesonide-formoterol  Inhale 2 puffs into the lungs 2 times daily     Vitamin D (Ergocalciferol) 57896 units Caps  Take 50,000 Units by mouth once a week  Start taking on: March 8, 2023           * This list has 2 medication(s) that are the same as other medications prescribed for you. Read the directions carefully, and ask your doctor or other care provider to review them with you.                    Where to Get Your Medications        Information about where to get these medications is not yet available    Ask your nurse or doctor about these medications  cilostazol 50 MG tablet          Medications marked \"taking\" at this time  Outpatient Medications Marked as Taking for the 3/7/23 encounter (Telemedicine) with Leslie Guevara MD Ryley   Medication Sig Dispense Refill    cilostazol (PLETAL) 50 MG tablet TAKE 1 TABLET BY MOUTH TWICE DAILY 180 tablet 0    albuterol sulfate HFA (PROAIR HFA) 108 (90 Base) MCG/ACT inhaler Inhale 2 puffs into the lungs every 6 hours as needed for Wheezing 1 each 1    albuterol sulfate HFA (PROVENTIL;VENTOLIN;PROAIR) 108 (90 Base) MCG/ACT inhaler Inhale 2 puffs into the lungs 2 times daily 18 g 3    ipratropium (ATROVENT HFA) 17 MCG/ACT inhaler Inhale 2 puffs into the lungs in the morning and 2 puffs in the evening. 1 each 2    atorvastatin (LIPITOR) 40 MG tablet Take 1 tablet by mouth daily 90 tablet 1    dilTIAZem (CARDIZEM CD) 120 MG extended release capsule Take 1 capsule by mouth daily 30 capsule 3    fluticasone (FLONASE) 50 MCG/ACT nasal spray 1 spray by Each Nostril route daily 16 g 3    apixaban (ELIQUIS) 5 MG TABS tablet Take 1 tablet by mouth 2 times daily 60 tablet 2    blood glucose monitor kit and supplies Test once daily 1 kit 0    SYMBICORT 160-4.5 MCG/ACT AERO Inhale 2 puffs into the lungs 2 times daily 1 each 1    metFORMIN (GLUCOPHAGE) 500 MG tablet TAKE 1 TABLET BY MOUTH TWICE DAILY WITH MEALS 180 tablet 1    aspirin (ASPIRIN LOW DOSE) 81 MG EC tablet Take 1 tablet by mouth daily 90 tablet 3    glipiZIDE (GLUCOTROL) 10 MG tablet TAKE 1 TABLET BY MOUTH TWICE DAILY 180 tablet 3    OXYGEN Inhale 2 L into the lungs continuous       Nebulizers (COMPRESSOR/NEBULIZER) MISC Use qid 1 each 3        Medications patient taking as of now reconciled against medications ordered at time of hospital discharge: Yes    Review of Systems    Objective: There were no vitals taken for this visit. Physical Exam    An electronic signature was used to authenticate this note.   --Osbaldo Valenzuela MD

## 2023-03-14 ENCOUNTER — CARE COORDINATION (OUTPATIENT)
Dept: CASE MANAGEMENT | Age: 68
End: 2023-03-14

## 2023-03-14 NOTE — CARE COORDINATION
[facility] Care Transitions Follow Up Call    Patient Current Location:  Home: [address] [place] [address] Transition Nurse contacted the patient by telephone to follow up after admission on 23. Verified name and  with patient as identifiers. Patient: Horacio Montilla  Patient : 1955   MRN: [id number]  Reason for Admission: Covid+  Discharge Date: 3/6/23 RARS: Readmission Risk Score: 8.7      Needs to be reviewed by the provider   Additional needs identified to be addressed with provider: No  none             Method of communication with provider: none. CTN call to [facility] today and he says he is about the same as last call. IS DYSPNEA ON EXERTION, O2 on 2l/min cont nc pO2=>90%  Occasional productive cough. Denies fever, chills, chest pain/tightness, wheezing, dizziness, swelling, fatigue. PCP VV 3/7/23-> ordered [address] [place] [address] Rd and they have been out to see him last week. Cardio HFU 3/21/23 has transportation. Appetite better this week, had Big Mac/fries for lunch today. Drinking a lot of water. Denies problems w/ urination/bowels. No other concerns voiced at this time. Addressed changes since last contact:  home health care-[facility]  Discussed follow-up appointments. If no appointment was previously scheduled, appointment scheduling offered: Yes. Is follow up appointment scheduled within 7 days of discharge? Yes. Follow Up  Future Appointments   Date Time Provider Carrie Aldana   3/21/2023  2:20 PM BRENNAN Harris - CNP [facility]   2023  1:00 PM Tanmay Burrows MD [facility]   2023  9:30 AM [facility] [address] [facility]     [facility] follow up appointment(s):     Care Transition Nurse reviewed discharge instructions, medical action plan, and red flags with patient and discussed any barriers to care and/or understanding of plan of care after discharge.  Discussed appropriate site of care based on symptoms and resources available to patient including: PCP  Specialist  Home health  When to call 911. The patient agrees to contact the PCP office for questions related to their healthcare. Advance Care Planning:   not on file. Patients top risk factors for readmission: functional physical ability, lack of knowledge about disease, medical condition-Covid+, COPD, afib, DM, HTN, mac degeneration, and polypharmacy  Interventions to address risk factors: Scheduled appointment with Specialist-3/21/23 cardio    Offered patient enrollment in the Remote Patient Monitoring (RPM) program for in-home monitoring: Patient is not eligible for RPM program.     Care Transitions Subsequent and Final Call    Schedule Follow Up Appointment with PCP: Completed  Subsequent and Final Calls  Do you have any ongoing symptoms?: Yes  Onset of Patient-reported symptoms: In the past 7 days  Patient-reported symptoms: Shortness of Breath  Interventions for patient-reported symptoms: Other  Have your medications changed?: No  Do you have any questions related to your medications?: No  Do you currently have any active services?: Yes  Are you currently active with any services?: Home Health  Do you have any needs or concerns that I can assist you with?: No  Identified Barriers: Lack of Education, Lack of Support  Care Transitions Interventions   Home Care Waiver: Completed  Other Services: Completed      Transportation Support: Declined      DME Assistance: Declined    Medication Assistance Program: Completed     Disease Association: Completed      Other Interventions:             Care Transition Nurse provided contact information for future needs. Plan for follow-up call in 7-10 days based on severity of symptoms and risk factors.   Plan for next call: symptom management-sob, cough, wheezing, appetite, BM, pO2%  follow-up appointment-cardio 3/21/23-> review    Carlitos Sierra RN

## 2023-03-21 ENCOUNTER — OFFICE VISIT (OUTPATIENT)
Dept: CARDIOLOGY CLINIC | Age: 68
End: 2023-03-21

## 2023-03-21 ENCOUNTER — CARE COORDINATION (OUTPATIENT)
Dept: CASE MANAGEMENT | Age: 68
End: 2023-03-21

## 2023-03-21 VITALS
DIASTOLIC BLOOD PRESSURE: 84 MMHG | WEIGHT: 315 LBS | BODY MASS INDEX: 36.45 KG/M2 | SYSTOLIC BLOOD PRESSURE: 138 MMHG | HEART RATE: 100 BPM | HEIGHT: 78 IN

## 2023-03-21 DIAGNOSIS — E66.01 OBESITY, CLASS III, BMI 40-49.9 (MORBID OBESITY) (HCC): ICD-10-CM

## 2023-03-21 DIAGNOSIS — I73.9 PVD (PERIPHERAL VASCULAR DISEASE) (HCC): ICD-10-CM

## 2023-03-21 DIAGNOSIS — I47.20 VT (VENTRICULAR TACHYCARDIA) (HCC): ICD-10-CM

## 2023-03-21 DIAGNOSIS — Z09 HOSPITAL DISCHARGE FOLLOW-UP: ICD-10-CM

## 2023-03-21 DIAGNOSIS — I10 BENIGN ESSENTIAL HTN: Primary | ICD-10-CM

## 2023-03-21 DIAGNOSIS — I50.31 NYHA CLASS 2 AND ACC/AHA STAGE C ACUTE DIASTOLIC CONGESTIVE HEART FAILURE (HCC): ICD-10-CM

## 2023-03-21 RX ORDER — FUROSEMIDE 40 MG/1
40 TABLET ORAL DAILY
Qty: 90 TABLET | Refills: 1 | Status: SHIPPED | OUTPATIENT
Start: 2023-03-21 | End: 2023-03-21

## 2023-03-21 RX ORDER — FUROSEMIDE 40 MG/1
40 TABLET ORAL 2 TIMES DAILY
Qty: 180 TABLET | Refills: 1 | Status: SHIPPED | OUTPATIENT
Start: 2023-03-21 | End: 2023-03-30 | Stop reason: SDUPTHER

## 2023-03-21 ASSESSMENT — ENCOUNTER SYMPTOMS
ORTHOPNEA: 0
SHORTNESS OF BREATH: 1
BACK PAIN: 1

## 2023-03-21 NOTE — CARE COORDINATION
of plan of care after discharge. Discussed appropriate site of care based on symptoms and resources available to patient including: PCP  Specialist  Urgent care clinics  Home health  [employer]. Agrees to contact PCP office for questions related to healthcare. Patients top risk factors for readmission: financial, level of motivation, medical condition-Covid19, CHF, COPD, and medication management  Interventions to address risk factors:  Patient politely declined CTN offers of assistance      Care Transitions Subsequent and Final Call    Schedule Follow Up Appointment with PCP: Declined  Subsequent and Final Calls  Do you have any ongoing symptoms?: Yes  Onset of Patient-reported symptoms: In the past 7 days  Patient-reported symptoms: Cough, Shortness of Breath, Congestion  Interventions for patient-reported symptoms: Notified PCP/Physician  Have your medications changed?: Yes  Patient Reports: 3/21/23 + Lasix 40mg bid  Do you have any questions related to your medications?: No  Do you currently have any active services?: Yes  Are you currently active with any services?: Home Health  Do you have any needs or concerns that I can assist you with?: No (Comment: denies)  Identified Barriers: Other, Financial, Stress  Care Transitions Interventions   Home Care Waiver: Completed  Other Services: Completed      Transportation Support: Declined      DME Assistance: Declined    Medication Assistance Program: Completed       Social Work: Declined    Disease Association: Completed      Other Interventions:             Care Transition Nurse provided contact information for future needs. Plan for follow-up call in 1-2 days based on severity of symptoms and risk factors. Plan for next call: symptom management-sob/edema/crackles/wheezing?  self management-wt?, adhering to low na diet and 2 L fluid restriction  follow-up appointment-offer pcp/pulm appts  medication management-Lasix? ???  referrals-needs financial

## 2023-03-21 NOTE — PROGRESS NOTES
months     Signed:  BRENNAN Lao - CNP, 3/21/2023, 2:43 PM    An electronic signature was used to authenticate this note. Please note this report has been partially produced using speech recognition software and may contain errors related to that system including errors in grammar, punctuation, and spelling, as well as words and phrases that may be inappropriate. If there are any questions or concerns please feel free to contact the dictating provider for clarification.

## 2023-03-21 NOTE — PATIENT INSTRUCTIONS
**It is YOUR responsibilty to bring medication bottles and/or updated medication list to [address] [place] [address]. This will allow us to better serve you and all your healthcare needs**    Please be informed that if you contact our office outside of normal business hours the physician on call cannot help with any scheduling or rescheduling issues, procedure instruction questions or any type of medication issue. We advise you for any urgent/emergency that you go to the nearest emergency room! PLEASE CALL OUR OFFICE DURING NORMAL BUSINESS HOURS    Monday - Friday   8 am to 5 pm    [place]: [place] [address]: [phone]    [place]:  [phone]    Thank you for allowing us to care for you today! We want to ensure we can follow your treatment plan and we strive to give you the best outcomes and experience possible. If you ever have a life threatening emergency and call 911 - for an ambulance (EMS)   Our providers can only care for you at:   [facility] or [facility]. Even if you have someone take you or you drive yourself we can only care for you in a [facility] facility. Our providers are not setup at the other healthcare locations!

## 2023-03-22 ENCOUNTER — CARE COORDINATION (OUTPATIENT)
Dept: CASE MANAGEMENT | Age: 68
End: 2023-03-22

## 2023-03-22 DIAGNOSIS — Z59.9 FINANCIAL DIFFICULTY: Primary | ICD-10-CM

## 2023-03-22 SDOH — ECONOMIC STABILITY - INCOME SECURITY: PROBLEM RELATED TO HOUSING AND ECONOMIC CIRCUMSTANCES, UNSPECIFIED: Z59.9

## 2023-03-22 NOTE — CARE COORDINATION
[facility] Care Transitions Follow Up Call    Patient Current Location:  Home: [address] [place] [address] 14. Coordinator contacted the patient by telephone to follow up after admission on -3/6. Verified name and  with patient as identifiers. Patient: Baron Downey  Patient : 1955   MRN: [id number]  Reason for Admission: COVID  Discharge Date: 3/6/23 RARS: Readmission Risk Score: 8.7      Needs to be reviewed by the provider   Additional needs identified to be addressed with provider: No  none             Method of communication with provider: none. LPN CC spoke with patient. States he is doing ok. Has SOB, cough, edema. Was prescribed Lasix, but is unable to afford at this time. States his bank is overdrawn and he has no money coming in until April 3. Patient reports some hopelessness and concern over his physical and financial health. Patient agreeable to [facility] referral. LPN CC to also request samples through cardiology for Lasix. Weight 382. HC active. Does not wear compression stockings. Has been elevating extremities. Not up to scheduling appts at this time d/t overwhelm. Khanh Fields LPN CC  Care Transitions  [phone]    Addressed changes since last contact:  none  Discussed follow-up appointments. If no appointment was previously scheduled, appointment scheduling offered: Yes. Is follow up appointment scheduled within 7 days of discharge? Yes. Follow Up  Future Appointments   Date Time Provider Carrie Aldana   3/30/2023  1:00 PM Bo Woody, APRN - CNP [id number] [facility]   2023  1:00 PM Lu Galeas MD [facility] Heart [facility]   2023  9:30 AM [facility] [place] [facility] Lpn [address] [facility] E S IM [facility]     [facility] follow up appointment(s): KAE    LPN Care Coordinator reviewed medical action plan and red flags with patient and discussed any barriers to care and/or understanding of plan of care after discharge.  Discussed appropriate site of care based on

## 2023-03-23 ENCOUNTER — CARE COORDINATION (OUTPATIENT)
Dept: CARE COORDINATION | Age: 68
End: 2023-03-23

## 2023-03-23 NOTE — CARE COORDINATION
Pt want to complete ACP documents  Next Steps:   1) SW will follow up to confirm Pt received financial assistance application - support with completing and sending back  2) SW will follow up to confirm Pt received utility assistance application - support with completing and submitting. 3) SW will follow up with Pt to confirm he has heard from [address] [place] [address] Road regarding medicaid application for passport. 4) SW will ask Pt if he would like referral to OOD regarding his low vision  5) SW will ask Pt if he is interested in completing ACP documents. Method of Communication With Provider (if appropriate): Chart Routing       Goals Addressed                      This Visit's Progress      Patient Stated (pt-stated)         Pt reports he needs HHA assistance. Pt will complete medicaid assessment / passport assessment to determine eligibility. Barriers: financial, lack of support, overwhelmed by complexity of regimen, stress, and time constraints  Plan for overcoming my barriers: VIELKA will provide support through the [facility] / passport assessment process. Confidence: 8/10  Anticipated Goal Completion Date: 6/23/2023        Patient Stated (pt-stated)         Pt states he has financial concerns and difficulty with paying medical bills. Pt will apply for utility assistance and [employer] assistance to alleviate financial stress.      Barriers: financial, lack of support, overwhelmed by complexity of regimen, stress, and time constraints  Plan for overcoming my barriers: VIELKA will support Pt through application process for financial assistance   Confidence: 7/10  Anticipated Goal Completion Date: 6/23/2023

## 2023-03-24 ENCOUNTER — APPOINTMENT (OUTPATIENT)
Dept: GENERAL RADIOLOGY | Age: 68
DRG: 190 | End: 2023-03-24
Payer: MEDICARE

## 2023-03-24 ENCOUNTER — CARE COORDINATION (OUTPATIENT)
Dept: CASE MANAGEMENT | Age: 68
End: 2023-03-24

## 2023-03-24 ENCOUNTER — HOSPITAL ENCOUNTER (INPATIENT)
Age: 68
LOS: 3 days | Discharge: HOME | DRG: 190 | End: 2023-03-28
Attending: EMERGENCY MEDICINE | Admitting: STUDENT IN AN ORGANIZED HEALTH CARE EDUCATION/TRAINING PROGRAM
Payer: MEDICARE

## 2023-03-24 ENCOUNTER — APPOINTMENT (OUTPATIENT)
Dept: CT IMAGING | Age: 68
DRG: 190 | End: 2023-03-24
Payer: MEDICARE

## 2023-03-24 DIAGNOSIS — J44.1 COPD EXACERBATION (HCC): Primary | ICD-10-CM

## 2023-03-24 DIAGNOSIS — R09.02 HYPOXIA: ICD-10-CM

## 2023-03-24 DIAGNOSIS — E11.9 CONTROLLED TYPE 2 DIABETES MELLITUS WITHOUT COMPLICATION, WITHOUT LONG-TERM CURRENT USE OF INSULIN (HCC): ICD-10-CM

## 2023-03-24 LAB
ALBUMIN SERPL-MCNC: 3.8 GM/DL (ref 3.4–5)
ALP BLD-CCNC: 75 IU/L (ref 40–129)
ALT SERPL-CCNC: 43 U/L (ref 10–40)
ANION GAP SERPL CALCULATED.3IONS-SCNC: 11 MMOL/L (ref 4–16)
AST SERPL-CCNC: 22 IU/L (ref 15–37)
BASE EXCESS MIXED: 10.9 (ref 0–1.2)
BASOPHILS ABSOLUTE: 0 K/CU MM
BASOPHILS RELATIVE PERCENT: 0.3 % (ref 0–1)
BILIRUB SERPL-MCNC: 0.3 MG/DL (ref 0–1)
BUN SERPL-MCNC: 12 MG/DL (ref 6–23)
CALCIUM SERPL-MCNC: 10 MG/DL (ref 8.3–10.6)
CHLORIDE BLD-SCNC: 94 MMOL/L (ref 99–110)
CO2: 34 MMOL/L (ref 21–32)
CREAT SERPL-MCNC: 0.7 MG/DL (ref 0.9–1.3)
DIFFERENTIAL TYPE: ABNORMAL
EOSINOPHILS ABSOLUTE: 0.3 K/CU MM
EOSINOPHILS RELATIVE PERCENT: 3.7 % (ref 0–3)
GFR SERPL CREATININE-BSD FRML MDRD: >60 ML/MIN/1.73M2
GLUCOSE SERPL-MCNC: 210 MG/DL (ref 70–99)
HCO3 VENOUS: 38 MMOL/L (ref 19–25)
HCT VFR BLD CALC: 42.5 % (ref 42–52)
HEMOGLOBIN: 14.2 GM/DL (ref 13.5–18)
IMMATURE NEUTROPHIL %: 0.4 % (ref 0–0.43)
LACTATE: 2.4 MMOL/L (ref 0.5–1.9)
LYMPHOCYTES ABSOLUTE: 1.3 K/CU MM
LYMPHOCYTES RELATIVE PERCENT: 19 % (ref 24–44)
MCH RBC QN AUTO: 31.1 PG (ref 27–31)
MCHC RBC AUTO-ENTMCNC: 33.4 % (ref 32–36)
MCV RBC AUTO: 93 FL (ref 78–100)
MONOCYTES ABSOLUTE: 0.5 K/CU MM
MONOCYTES RELATIVE PERCENT: 7.2 % (ref 0–4)
NUCLEATED RBC %: 0 %
O2 SAT, VEN: 94.2 % (ref 50–70)
PCO2, VEN: 60 MMHG (ref 38–52)
PDW BLD-RTO: 14 % (ref 11.7–14.9)
PH VENOUS: 7.41 (ref 7.32–7.42)
PLATELET # BLD: 117 K/CU MM (ref 140–440)
PMV BLD AUTO: 9.8 FL (ref 7.5–11.1)
PO2, VEN: 161 MMHG (ref 28–48)
POTASSIUM SERPL-SCNC: 4.6 MMOL/L (ref 3.5–5.1)
PRO-BNP: 138.4 PG/ML
RBC # BLD: 4.57 M/CU MM (ref 4.6–6.2)
SEGMENTED NEUTROPHILS ABSOLUTE COUNT: 4.9 K/CU MM
SEGMENTED NEUTROPHILS RELATIVE PERCENT: 69.4 % (ref 36–66)
SODIUM BLD-SCNC: 139 MMOL/L (ref 135–145)
TOTAL IMMATURE NEUTOROPHIL: 0.03 K/CU MM
TOTAL NUCLEATED RBC: 0 K/CU MM
TOTAL PROTEIN: 6.4 GM/DL (ref 6.4–8.2)
TROPONIN T: <0.01 NG/ML
WBC # BLD: 7.1 K/CU MM (ref 4–10.5)

## 2023-03-24 PROCEDURE — 6370000000 HC RX 637 (ALT 250 FOR IP): Performed by: EMERGENCY MEDICINE

## 2023-03-24 PROCEDURE — 80053 COMPREHEN METABOLIC PANEL: CPT

## 2023-03-24 PROCEDURE — 85025 COMPLETE CBC W/AUTO DIFF WBC: CPT

## 2023-03-24 PROCEDURE — 6360000002 HC RX W HCPCS: Performed by: EMERGENCY MEDICINE

## 2023-03-24 PROCEDURE — 2700000000 HC OXYGEN THERAPY PER DAY

## 2023-03-24 PROCEDURE — 6360000004 HC RX CONTRAST MEDICATION: Performed by: EMERGENCY MEDICINE

## 2023-03-24 PROCEDURE — 71045 X-RAY EXAM CHEST 1 VIEW: CPT

## 2023-03-24 PROCEDURE — 83880 ASSAY OF NATRIURETIC PEPTIDE: CPT

## 2023-03-24 PROCEDURE — 84484 ASSAY OF TROPONIN QUANT: CPT

## 2023-03-24 PROCEDURE — 96374 THER/PROPH/DIAG INJ IV PUSH: CPT

## 2023-03-24 PROCEDURE — 82805 BLOOD GASES W/O2 SATURATION: CPT

## 2023-03-24 PROCEDURE — 71275 CT ANGIOGRAPHY CHEST: CPT

## 2023-03-24 PROCEDURE — 93005 ELECTROCARDIOGRAM TRACING: CPT | Performed by: EMERGENCY MEDICINE

## 2023-03-24 PROCEDURE — 94640 AIRWAY INHALATION TREATMENT: CPT

## 2023-03-24 PROCEDURE — 83605 ASSAY OF LACTIC ACID: CPT

## 2023-03-24 PROCEDURE — 99285 EMERGENCY DEPT VISIT HI MDM: CPT

## 2023-03-24 RX ORDER — IPRATROPIUM BROMIDE AND ALBUTEROL SULFATE 2.5; .5 MG/3ML; MG/3ML
1 SOLUTION RESPIRATORY (INHALATION) ONCE
Status: COMPLETED | OUTPATIENT
Start: 2023-03-24 | End: 2023-03-24

## 2023-03-24 RX ORDER — SODIUM CHLORIDE 0.9 % (FLUSH) 0.9 %
5-40 SYRINGE (ML) INJECTION 2 TIMES DAILY
Status: DISCONTINUED | OUTPATIENT
Start: 2023-03-24 | End: 2023-03-28 | Stop reason: HOSPADM

## 2023-03-24 RX ORDER — METHYLPREDNISOLONE SODIUM SUCCINATE 125 MG/2ML
125 INJECTION, POWDER, LYOPHILIZED, FOR SOLUTION INTRAMUSCULAR; INTRAVENOUS ONCE
Status: COMPLETED | OUTPATIENT
Start: 2023-03-24 | End: 2023-03-24

## 2023-03-24 RX ADMIN — METHYLPREDNISOLONE SODIUM SUCCINATE 125 MG: 125 INJECTION, POWDER, FOR SOLUTION INTRAMUSCULAR; INTRAVENOUS at 22:32

## 2023-03-24 RX ADMIN — IOPAMIDOL 75 ML: 755 INJECTION, SOLUTION INTRAVENOUS at 23:21

## 2023-03-24 RX ADMIN — IPRATROPIUM BROMIDE AND ALBUTEROL SULFATE 1 AMPULE: .5; 2.5 SOLUTION RESPIRATORY (INHALATION) at 22:33

## 2023-03-24 ASSESSMENT — ENCOUNTER SYMPTOMS
COUGH: 0
VOMITING: 0
WHEEZING: 0
NAUSEA: 0
SHORTNESS OF BREATH: 1
ABDOMINAL PAIN: 0
DIARRHEA: 0
SINUS PRESSURE: 0
RHINORRHEA: 0
CONSTIPATION: 0
SORE THROAT: 0

## 2023-03-24 ASSESSMENT — LIFESTYLE VARIABLES
HOW OFTEN DO YOU HAVE A DRINK CONTAINING ALCOHOL: NEVER
HOW MANY STANDARD DRINKS CONTAINING ALCOHOL DO YOU HAVE ON A TYPICAL DAY: PATIENT DOES NOT DRINK

## 2023-03-24 NOTE — CARE COORDINATION
day, LPN CC to make contact with provider's office for RX transfer. LPN CC spoke with patient. States he called the pharmacy for medication transfer. States he is considering calling ambulance as he is having trouble doing any ADLs without becoming very SOB, quickly. States he is struggling to use the bathroom, make meals, ambulate, get dressed d/t SOB. Is unable to have anyone p/u his medications over the weekend. LPN CC advised that if patient is becoming that SOB with ADLs that he should go to ED. Patient states he will shower & call ambulance. LPN CC advised calling ambulance before showering & offered assistance. Patient declined. Will f/u.   Brad Mancilla  [facility]  Care Transitions  [phone]

## 2023-03-25 PROBLEM — J44.1 COPD EXACERBATION (HCC): Status: ACTIVE | Noted: 2023-03-25

## 2023-03-25 LAB
GLUCOSE BLD-MCNC: 327 MG/DL (ref 70–99)
GLUCOSE BLD-MCNC: 334 MG/DL (ref 70–99)
GLUCOSE BLD-MCNC: 336 MG/DL (ref 70–99)
GLUCOSE BLD-MCNC: 362 MG/DL (ref 70–99)
GLUCOSE BLD-MCNC: 390 MG/DL (ref 70–99)
LACTATE: 1.9 MMOL/L (ref 0.5–1.9)

## 2023-03-25 PROCEDURE — 6370000000 HC RX 637 (ALT 250 FOR IP): Performed by: INTERNAL MEDICINE

## 2023-03-25 PROCEDURE — 94640 AIRWAY INHALATION TREATMENT: CPT

## 2023-03-25 PROCEDURE — 2580000003 HC RX 258: Performed by: STUDENT IN AN ORGANIZED HEALTH CARE EDUCATION/TRAINING PROGRAM

## 2023-03-25 PROCEDURE — 36415 COLL VENOUS BLD VENIPUNCTURE: CPT

## 2023-03-25 PROCEDURE — 82962 GLUCOSE BLOOD TEST: CPT

## 2023-03-25 PROCEDURE — 6370000000 HC RX 637 (ALT 250 FOR IP): Performed by: STUDENT IN AN ORGANIZED HEALTH CARE EDUCATION/TRAINING PROGRAM

## 2023-03-25 PROCEDURE — 6360000002 HC RX W HCPCS: Performed by: STUDENT IN AN ORGANIZED HEALTH CARE EDUCATION/TRAINING PROGRAM

## 2023-03-25 PROCEDURE — 94761 N-INVAS EAR/PLS OXIMETRY MLT: CPT

## 2023-03-25 PROCEDURE — 2700000000 HC OXYGEN THERAPY PER DAY

## 2023-03-25 PROCEDURE — 83605 ASSAY OF LACTIC ACID: CPT

## 2023-03-25 PROCEDURE — 2060000000 HC ICU INTERMEDIATE R&B

## 2023-03-25 PROCEDURE — 2580000003 HC RX 258: Performed by: EMERGENCY MEDICINE

## 2023-03-25 PROCEDURE — 6360000002 HC RX W HCPCS: Performed by: INTERNAL MEDICINE

## 2023-03-25 RX ORDER — FUROSEMIDE 40 MG/1
40 TABLET ORAL 2 TIMES DAILY
Status: DISCONTINUED | OUTPATIENT
Start: 2023-03-25 | End: 2023-03-28 | Stop reason: HOSPADM

## 2023-03-25 RX ORDER — INSULIN LISPRO 100 [IU]/ML
0-8 INJECTION, SOLUTION INTRAVENOUS; SUBCUTANEOUS
Status: DISCONTINUED | OUTPATIENT
Start: 2023-03-25 | End: 2023-03-26

## 2023-03-25 RX ORDER — BUDESONIDE AND FORMOTEROL FUMARATE DIHYDRATE 160; 4.5 UG/1; UG/1
2 AEROSOL RESPIRATORY (INHALATION) 2 TIMES DAILY
Status: DISCONTINUED | OUTPATIENT
Start: 2023-03-25 | End: 2023-03-28 | Stop reason: HOSPADM

## 2023-03-25 RX ORDER — DILTIAZEM HYDROCHLORIDE 120 MG/1
120 CAPSULE, COATED, EXTENDED RELEASE ORAL DAILY
Status: DISCONTINUED | OUTPATIENT
Start: 2023-03-25 | End: 2023-03-28 | Stop reason: HOSPADM

## 2023-03-25 RX ORDER — ONDANSETRON 4 MG/1
4 TABLET, ORALLY DISINTEGRATING ORAL EVERY 8 HOURS PRN
Status: DISCONTINUED | OUTPATIENT
Start: 2023-03-25 | End: 2023-03-28 | Stop reason: HOSPADM

## 2023-03-25 RX ORDER — MAGNESIUM SULFATE IN WATER 40 MG/ML
2000 INJECTION, SOLUTION INTRAVENOUS PRN
Status: DISCONTINUED | OUTPATIENT
Start: 2023-03-25 | End: 2023-03-28 | Stop reason: HOSPADM

## 2023-03-25 RX ORDER — METHYLPREDNISOLONE SODIUM SUCCINATE 40 MG/ML
40 INJECTION, POWDER, LYOPHILIZED, FOR SOLUTION INTRAMUSCULAR; INTRAVENOUS EVERY 12 HOURS
Status: DISCONTINUED | OUTPATIENT
Start: 2023-03-25 | End: 2023-03-26

## 2023-03-25 RX ORDER — INSULIN LISPRO 100 [IU]/ML
15 INJECTION, SOLUTION INTRAVENOUS; SUBCUTANEOUS
Status: DISCONTINUED | OUTPATIENT
Start: 2023-03-25 | End: 2023-03-25

## 2023-03-25 RX ORDER — POTASSIUM CHLORIDE 7.45 MG/ML
10 INJECTION INTRAVENOUS PRN
Status: DISCONTINUED | OUTPATIENT
Start: 2023-03-25 | End: 2023-03-28 | Stop reason: HOSPADM

## 2023-03-25 RX ORDER — ACETAMINOPHEN 650 MG/1
650 SUPPOSITORY RECTAL EVERY 6 HOURS PRN
Status: DISCONTINUED | OUTPATIENT
Start: 2023-03-25 | End: 2023-03-28 | Stop reason: HOSPADM

## 2023-03-25 RX ORDER — NICOTINE 21 MG/24HR
1 PATCH, TRANSDERMAL 24 HOURS TRANSDERMAL DAILY PRN
Status: DISCONTINUED | OUTPATIENT
Start: 2023-03-25 | End: 2023-03-28 | Stop reason: HOSPADM

## 2023-03-25 RX ORDER — ONDANSETRON 2 MG/ML
4 INJECTION INTRAMUSCULAR; INTRAVENOUS EVERY 6 HOURS PRN
Status: DISCONTINUED | OUTPATIENT
Start: 2023-03-25 | End: 2023-03-28 | Stop reason: HOSPADM

## 2023-03-25 RX ORDER — BENZONATATE 100 MG/1
100 CAPSULE ORAL 3 TIMES DAILY PRN
Status: DISCONTINUED | OUTPATIENT
Start: 2023-03-25 | End: 2023-03-28 | Stop reason: HOSPADM

## 2023-03-25 RX ORDER — GUAIFENESIN 600 MG/1
600 TABLET, EXTENDED RELEASE ORAL 2 TIMES DAILY PRN
Status: DISCONTINUED | OUTPATIENT
Start: 2023-03-25 | End: 2023-03-28 | Stop reason: HOSPADM

## 2023-03-25 RX ORDER — ACETAMINOPHEN 325 MG/1
650 TABLET ORAL EVERY 6 HOURS PRN
Status: DISCONTINUED | OUTPATIENT
Start: 2023-03-25 | End: 2023-03-28 | Stop reason: HOSPADM

## 2023-03-25 RX ORDER — SODIUM CHLORIDE 0.9 % (FLUSH) 0.9 %
5-40 SYRINGE (ML) INJECTION PRN
Status: DISCONTINUED | OUTPATIENT
Start: 2023-03-25 | End: 2023-03-28 | Stop reason: HOSPADM

## 2023-03-25 RX ORDER — ATORVASTATIN CALCIUM 40 MG/1
40 TABLET, FILM COATED ORAL DAILY
Status: DISCONTINUED | OUTPATIENT
Start: 2023-03-25 | End: 2023-03-28 | Stop reason: HOSPADM

## 2023-03-25 RX ORDER — IPRATROPIUM BROMIDE AND ALBUTEROL SULFATE 2.5; .5 MG/3ML; MG/3ML
1 SOLUTION RESPIRATORY (INHALATION) 4 TIMES DAILY
Status: DISCONTINUED | OUTPATIENT
Start: 2023-03-25 | End: 2023-03-27

## 2023-03-25 RX ORDER — DEXTROSE MONOHYDRATE 100 MG/ML
INJECTION, SOLUTION INTRAVENOUS CONTINUOUS PRN
Status: DISCONTINUED | OUTPATIENT
Start: 2023-03-25 | End: 2023-03-28 | Stop reason: HOSPADM

## 2023-03-25 RX ORDER — SODIUM CHLORIDE 0.9 % (FLUSH) 0.9 %
5-40 SYRINGE (ML) INJECTION EVERY 12 HOURS SCHEDULED
Status: DISCONTINUED | OUTPATIENT
Start: 2023-03-25 | End: 2023-03-28 | Stop reason: HOSPADM

## 2023-03-25 RX ORDER — INSULIN LISPRO 100 [IU]/ML
0-4 INJECTION, SOLUTION INTRAVENOUS; SUBCUTANEOUS EVERY EVENING
Status: DISCONTINUED | OUTPATIENT
Start: 2023-03-25 | End: 2023-03-26

## 2023-03-25 RX ORDER — ASPIRIN 81 MG/1
81 TABLET ORAL DAILY
Status: DISCONTINUED | OUTPATIENT
Start: 2023-03-25 | End: 2023-03-28 | Stop reason: HOSPADM

## 2023-03-25 RX ORDER — INSULIN GLARGINE 100 [IU]/ML
30 INJECTION, SOLUTION SUBCUTANEOUS NIGHTLY
Status: DISCONTINUED | OUTPATIENT
Start: 2023-03-25 | End: 2023-03-25

## 2023-03-25 RX ORDER — SODIUM CHLORIDE 9 MG/ML
INJECTION, SOLUTION INTRAVENOUS PRN
Status: DISCONTINUED | OUTPATIENT
Start: 2023-03-25 | End: 2023-03-28 | Stop reason: HOSPADM

## 2023-03-25 RX ORDER — METHYLPREDNISOLONE SODIUM SUCCINATE 40 MG/ML
40 INJECTION, POWDER, LYOPHILIZED, FOR SOLUTION INTRAMUSCULAR; INTRAVENOUS EVERY 6 HOURS
Status: DISCONTINUED | OUTPATIENT
Start: 2023-03-25 | End: 2023-03-25

## 2023-03-25 RX ORDER — INSULIN LISPRO 100 [IU]/ML
15 INJECTION, SOLUTION INTRAVENOUS; SUBCUTANEOUS
Status: DISCONTINUED | OUTPATIENT
Start: 2023-03-25 | End: 2023-03-26

## 2023-03-25 RX ORDER — POLYETHYLENE GLYCOL 3350 17 G/17G
17 POWDER, FOR SOLUTION ORAL DAILY
Status: DISCONTINUED | OUTPATIENT
Start: 2023-03-25 | End: 2023-03-28 | Stop reason: HOSPADM

## 2023-03-25 RX ORDER — INSULIN GLARGINE 100 [IU]/ML
34 INJECTION, SOLUTION SUBCUTANEOUS NIGHTLY
Status: DISCONTINUED | OUTPATIENT
Start: 2023-03-25 | End: 2023-03-26

## 2023-03-25 RX ORDER — INSULIN GLARGINE 100 [IU]/ML
45 INJECTION, SOLUTION SUBCUTANEOUS NIGHTLY
Status: DISCONTINUED | OUTPATIENT
Start: 2023-03-25 | End: 2023-03-25

## 2023-03-25 RX ORDER — INSULIN LISPRO 100 [IU]/ML
10 INJECTION, SOLUTION INTRAVENOUS; SUBCUTANEOUS
Status: DISCONTINUED | OUTPATIENT
Start: 2023-03-25 | End: 2023-03-25

## 2023-03-25 RX ORDER — 0.9 % SODIUM CHLORIDE 0.9 %
500 INTRAVENOUS SOLUTION INTRAVENOUS ONCE
Status: COMPLETED | OUTPATIENT
Start: 2023-03-25 | End: 2023-03-25

## 2023-03-25 RX ADMIN — INSULIN GLARGINE 34 UNITS: 100 INJECTION, SOLUTION SUBCUTANEOUS at 20:07

## 2023-03-25 RX ADMIN — INSULIN LISPRO 10 UNITS: 100 INJECTION, SOLUTION INTRAVENOUS; SUBCUTANEOUS at 08:45

## 2023-03-25 RX ADMIN — METHYLPREDNISOLONE SODIUM SUCCINATE 40 MG: 40 INJECTION, POWDER, FOR SOLUTION INTRAMUSCULAR; INTRAVENOUS at 20:20

## 2023-03-25 RX ADMIN — ASPIRIN 81 MG: 81 TABLET, COATED ORAL at 08:49

## 2023-03-25 RX ADMIN — DILTIAZEM HYDROCHLORIDE 120 MG: 120 CAPSULE, COATED, EXTENDED RELEASE ORAL at 08:59

## 2023-03-25 RX ADMIN — INSULIN LISPRO 10 UNITS: 100 INJECTION, SOLUTION INTRAVENOUS; SUBCUTANEOUS at 12:33

## 2023-03-25 RX ADMIN — SODIUM CHLORIDE, PRESERVATIVE FREE 10 ML: 5 INJECTION INTRAVENOUS at 08:51

## 2023-03-25 RX ADMIN — INSULIN LISPRO 8 UNITS: 100 INJECTION, SOLUTION INTRAVENOUS; SUBCUTANEOUS at 08:45

## 2023-03-25 RX ADMIN — IPRATROPIUM BROMIDE AND ALBUTEROL SULFATE 1 AMPULE: 2.5; .5 SOLUTION RESPIRATORY (INHALATION) at 15:44

## 2023-03-25 RX ADMIN — IPRATROPIUM BROMIDE AND ALBUTEROL SULFATE 1 AMPULE: 2.5; .5 SOLUTION RESPIRATORY (INHALATION) at 11:23

## 2023-03-25 RX ADMIN — INSULIN LISPRO 4 UNITS: 100 INJECTION, SOLUTION INTRAVENOUS; SUBCUTANEOUS at 02:55

## 2023-03-25 RX ADMIN — SODIUM CHLORIDE, PRESERVATIVE FREE 10 ML: 5 INJECTION INTRAVENOUS at 20:14

## 2023-03-25 RX ADMIN — ATORVASTATIN CALCIUM 40 MG: 40 TABLET, FILM COATED ORAL at 08:49

## 2023-03-25 RX ADMIN — BUDESONIDE AND FORMOTEROL FUMARATE DIHYDRATE 2 PUFF: 160; 4.5 AEROSOL RESPIRATORY (INHALATION) at 22:16

## 2023-03-25 RX ADMIN — INSULIN LISPRO 6 UNITS: 100 INJECTION, SOLUTION INTRAVENOUS; SUBCUTANEOUS at 12:33

## 2023-03-25 RX ADMIN — IPRATROPIUM BROMIDE AND ALBUTEROL SULFATE 1 AMPULE: 2.5; .5 SOLUTION RESPIRATORY (INHALATION) at 22:11

## 2023-03-25 RX ADMIN — AZITHROMYCIN MONOHYDRATE 500 MG: 500 INJECTION, POWDER, LYOPHILIZED, FOR SOLUTION INTRAVENOUS at 02:55

## 2023-03-25 RX ADMIN — INSULIN LISPRO 15 UNITS: 100 INJECTION, SOLUTION INTRAVENOUS; SUBCUTANEOUS at 17:35

## 2023-03-25 RX ADMIN — SODIUM CHLORIDE 500 ML: 9 INJECTION, SOLUTION INTRAVENOUS at 00:26

## 2023-03-25 RX ADMIN — INSULIN LISPRO 4 UNITS: 100 INJECTION, SOLUTION INTRAVENOUS; SUBCUTANEOUS at 20:07

## 2023-03-25 RX ADMIN — APIXABAN 5 MG: 5 TABLET, FILM COATED ORAL at 20:07

## 2023-03-25 RX ADMIN — APIXABAN 5 MG: 5 TABLET, FILM COATED ORAL at 08:49

## 2023-03-25 RX ADMIN — FUROSEMIDE 40 MG: 40 TABLET ORAL at 08:49

## 2023-03-25 RX ADMIN — FUROSEMIDE 40 MG: 40 TABLET ORAL at 17:34

## 2023-03-25 RX ADMIN — INSULIN LISPRO 6 UNITS: 100 INJECTION, SOLUTION INTRAVENOUS; SUBCUTANEOUS at 17:35

## 2023-03-25 RX ADMIN — METHYLPREDNISOLONE SODIUM SUCCINATE 40 MG: 40 INJECTION, POWDER, FOR SOLUTION INTRAMUSCULAR; INTRAVENOUS at 08:50

## 2023-03-25 RX ADMIN — BUDESONIDE AND FORMOTEROL FUMARATE DIHYDRATE 2 PUFF: 160; 4.5 AEROSOL RESPIRATORY (INHALATION) at 11:24

## 2023-03-25 ASSESSMENT — PAIN SCALES - GENERAL: PAINLEVEL_OUTOF10: 0

## 2023-03-25 NOTE — ED PROVIDER NOTES
Depression     Diabetes mellitus ([facility] Dx 1990's    Full dentures     H/O angiography 12/13/2018    peripheral angio - LFA occluded, filling collaterals, RSFA 90% stenosis-vasc surg consult    H/O Doppler ultrasound 09/05/2018    LE arterial - left mid and distal femoral artery occluded, lt FANI shows mild-mod PVD    H/O echocardiogram 01/06/2016    EF60% mild MR, TR, pulm HTN    Hx of colonoscopy     7/11 C-scope - benign polyp x1    Hx of Doppler ultrasound 02/20/2018    Lower extremity doppler  Normal study.     Hyperlipidemia     Hypertension     Macular degeneration     States is legally blind    Obesity     On home oxygen therapy     Continuous At 2 Liters Per Nasal Cannula    PAD (peripheral artery disease) ([facility]     10/10 FANI mild PAD left superficial femoral s/p left fem-pop bypass    Panic attacks     Pneumonia Last Episode In 2018    PTSD (post-traumatic stress disorder)     Restless leg     Shortness of breath     Sleep apnea     Uses BiPap - stopped using 4/2020    Wears glasses     To Read     Past Surgical History:   Procedure Laterality Date    APPENDECTOMY  2003    ATRIAL ABLATION SURGERY  05/23/2018    A-fib ablation     CARDIAC SURGERY  05/2018    \"Heart Ablation Done Twice\"    CHOLECYSTECTOMY, LAPAROSCOPIC  11/12/2018    COLONOSCOPY  07/2011    Polyp Removed    DENTAL SURGERY      All Teeth Extracted In Past    EYE SURGERY Left 2017    \"For Macular Degeneration\"    FEMORAL BYPASS Left 01/2011    FEMORAL BYPASS Left 1/9/2019    FEMORAL POPLITEAL BYPASS LEFT, REDO performed by Aleta Uribe MD at [facility] Left 1980's    Broken Left Wrist , No Hardware    FRACTURE SURGERY Right 2000's    Broken Right Wrist With Hardware, Hardware Later Removed    HERNIA REPAIR  1022    Umbilical Hernia    NM LAPAROSCOPY SURG CHOLECYSTECTOMY N/A 11/12/2018    CHOLECYSTECTOMY LAPAROSCOPIC performed by Stephen Wu MD at [address] [id number]     Family History   Problem Relation Age of

## 2023-03-25 NOTE — ED NOTES
2027 ready bed      Kaity Mercado  03/25/23 0142
values: no     Abnormal Assessment Findings: Pt alert and oriented and usually ambulatory. Pt uses urinal. Lung sounds diminished. Pt requiring 5L instead of his baseline 2L to maintain above 92%    Background  History:   Past Medical History:   Diagnosis Date    A-fib [facility])     Resolved after ablation 2018    Anxiety     Arthritis     \"Hips, Knees, Elbows And Fingers\"    COPD (chronic obstructive pulmonary disease) ([facility])     Sees Dr. Feliciano Garcia    Depression     Diabetes mellitus [facility]) Dx 1's    Full dentures     H/O angiography 12/13/2018    peripheral angio - LFA occluded, filling collaterals, RSFA 90% stenosis-vasc surg consult    H/O Doppler ultrasound 09/05/2018    LE arterial - left mid and distal femoral artery occluded, lt FANI shows mild-mod PVD    H/O echocardiogram 01/06/2016    EF60% mild MR, TR, pulm HTN    Hx of colonoscopy     7/11 C-scope - benign polyp x1    Hx of Doppler ultrasound 02/20/2018    Lower extremity doppler  Normal study.     Hyperlipidemia     Hypertension     Macular degeneration     States is legally blind    Obesity     On home oxygen therapy     Continuous At 2 Liters Per Nasal Cannula    PAD (peripheral artery disease) ([facility])     10/10 FANI mild PAD left superficial femoral s/p left fem-pop bypass    Panic attacks     Pneumonia Last Episode In 2018    PTSD (post-traumatic stress disorder)     Restless leg     Shortness of breath     Sleep apnea     Uses BiPap - stopped using 4/2020    Wears glasses     To Read       Assessment    Vitals/MEWS: MEWS Score: 2  Level of Consciousness: Alert (0)   Vitals:    03/24/23 2333 03/25/23 0002 03/25/23 0103 03/25/23 0132   BP: (!) 148/75 (!) 152/64 (!) 148/68 (!) 151/71   Pulse: 84 87 88 84   Resp: 22 24 19 21   Temp:    98 °F (36.7 °C)   TempSrc:       SpO2: 97% 95% 94% 96%   Weight:       Height:         FiO2 (%):    O2 Flow Rate: O2 Device: Nasal cannula O2 Flow Rate (L/min): 5 L/min  Cardiac Rhythm: NSR  Pain

## 2023-03-25 NOTE — H&P
Years of education: None    Highest education level: None   Occupational History    Occupation:    Tobacco Use    Smoking status: Former     Packs/day: 1.00     Years: 48.00     Pack years: 48.00     Types: Cigars, Cigarettes     Start date:      Quit date: 2021     Years since quittin.2    Smokeless tobacco: Former     Types: Chew     Quit date:     Tobacco comments:     1 filtered cigar per day   Vaping Use    Vaping Use: Former    Substances: Nicotine    Devices: Pre-filled or refillable cartridge   Substance and Sexual Activity    Alcohol use: No    Drug use: No    Sexual activity: Never   Social History Narrative    Diet unrestricted    Exercise none    Seat belt always             Social Determinants of Health     Financial Resource Strain: Medium Risk    Difficulty of Paying Living Expenses: Somewhat hard   Food Insecurity: Food Insecurity Present    Worried About [address] [employer] in the Last Year: Sometimes true    Ran Out of Food in the Last Year: Sometimes true   Transportation Needs: No Transportation Needs    Lack of Transportation (Medical): No    Lack of Transportation (Non-Medical): No   Physical Activity: Inactive    Days of Exercise per Week: 0 days    Minutes of Exercise per Session: 0 min   Stress: No Stress Concern Present    Feeling of Stress : Only a little   Social Connections: Socially Isolated    Frequency of Communication with Friends and Family: Never    Frequency of Social Gatherings with Friends and Family: Never    Attends [employer]ist Services: Never    Active Member of Clubs or Organizations: No    Attends Club or Organization Meetings: Never    Marital Status:    Housing Stability: Low Risk     Unable to Pay for Housing in the Last Year: No    Number of [place] in the Last Year: 1    Unstable Housing in the Last Year: No       Medications Prior to Admission     Prior to Admission medications    Medication Sig Start Date End Date Taking?

## 2023-03-26 LAB
ANION GAP SERPL CALCULATED.3IONS-SCNC: 12 MMOL/L (ref 4–16)
BASOPHILS ABSOLUTE: 0 K/CU MM
BASOPHILS RELATIVE PERCENT: 0 % (ref 0–1)
BUN SERPL-MCNC: 20 MG/DL (ref 6–23)
CALCIUM SERPL-MCNC: 8.3 MG/DL (ref 8.3–10.6)
CHLORIDE BLD-SCNC: 90 MMOL/L (ref 99–110)
CO2: 33 MMOL/L (ref 21–32)
CREAT SERPL-MCNC: 0.6 MG/DL (ref 0.9–1.3)
DIFFERENTIAL TYPE: ABNORMAL
EOSINOPHILS ABSOLUTE: 0 K/CU MM
EOSINOPHILS RELATIVE PERCENT: 0 % (ref 0–3)
GFR SERPL CREATININE-BSD FRML MDRD: >60 ML/MIN/1.73M2
GLUCOSE BLD-MCNC: 273 MG/DL (ref 70–99)
GLUCOSE BLD-MCNC: 354 MG/DL (ref 70–99)
GLUCOSE BLD-MCNC: 362 MG/DL (ref 70–99)
GLUCOSE BLD-MCNC: 365 MG/DL (ref 70–99)
GLUCOSE BLD-MCNC: 374 MG/DL (ref 70–99)
GLUCOSE SERPL-MCNC: 393 MG/DL (ref 70–99)
HCT VFR BLD CALC: 40.4 % (ref 42–52)
HEMOGLOBIN: 13.5 GM/DL (ref 13.5–18)
IMMATURE NEUTROPHIL %: 0.5 % (ref 0–0.43)
LYMPHOCYTES ABSOLUTE: 0.5 K/CU MM
LYMPHOCYTES RELATIVE PERCENT: 6.8 % (ref 24–44)
MCH RBC QN AUTO: 30.5 PG (ref 27–31)
MCHC RBC AUTO-ENTMCNC: 33.4 % (ref 32–36)
MCV RBC AUTO: 91.4 FL (ref 78–100)
MONOCYTES ABSOLUTE: 0.4 K/CU MM
MONOCYTES RELATIVE PERCENT: 4.4 % (ref 0–4)
NUCLEATED RBC %: 0 %
PDW BLD-RTO: 13.5 % (ref 11.7–14.9)
PLATELET # BLD: 114 K/CU MM (ref 140–440)
PMV BLD AUTO: 10.2 FL (ref 7.5–11.1)
POTASSIUM SERPL-SCNC: 4.4 MMOL/L (ref 3.5–5.1)
RBC # BLD: 4.42 M/CU MM (ref 4.6–6.2)
SEGMENTED NEUTROPHILS ABSOLUTE COUNT: 7.1 K/CU MM
SEGMENTED NEUTROPHILS RELATIVE PERCENT: 88.3 % (ref 36–66)
SODIUM BLD-SCNC: 135 MMOL/L (ref 135–145)
TOTAL IMMATURE NEUTOROPHIL: 0.04 K/CU MM
TOTAL NUCLEATED RBC: 0 K/CU MM
WBC # BLD: 8 K/CU MM (ref 4–10.5)

## 2023-03-26 PROCEDURE — 80048 BASIC METABOLIC PNL TOTAL CA: CPT

## 2023-03-26 PROCEDURE — 82962 GLUCOSE BLOOD TEST: CPT

## 2023-03-26 PROCEDURE — 6370000000 HC RX 637 (ALT 250 FOR IP): Performed by: INTERNAL MEDICINE

## 2023-03-26 PROCEDURE — 6360000002 HC RX W HCPCS: Performed by: INTERNAL MEDICINE

## 2023-03-26 PROCEDURE — 36415 COLL VENOUS BLD VENIPUNCTURE: CPT

## 2023-03-26 PROCEDURE — 6370000000 HC RX 637 (ALT 250 FOR IP): Performed by: STUDENT IN AN ORGANIZED HEALTH CARE EDUCATION/TRAINING PROGRAM

## 2023-03-26 PROCEDURE — 85025 COMPLETE CBC W/AUTO DIFF WBC: CPT

## 2023-03-26 PROCEDURE — 2060000000 HC ICU INTERMEDIATE R&B

## 2023-03-26 PROCEDURE — 94761 N-INVAS EAR/PLS OXIMETRY MLT: CPT

## 2023-03-26 PROCEDURE — 2580000003 HC RX 258: Performed by: STUDENT IN AN ORGANIZED HEALTH CARE EDUCATION/TRAINING PROGRAM

## 2023-03-26 PROCEDURE — 94640 AIRWAY INHALATION TREATMENT: CPT

## 2023-03-26 PROCEDURE — 2580000003 HC RX 258: Performed by: EMERGENCY MEDICINE

## 2023-03-26 PROCEDURE — 6360000002 HC RX W HCPCS: Performed by: STUDENT IN AN ORGANIZED HEALTH CARE EDUCATION/TRAINING PROGRAM

## 2023-03-26 PROCEDURE — 2700000000 HC OXYGEN THERAPY PER DAY

## 2023-03-26 RX ORDER — INSULIN LISPRO 100 [IU]/ML
0-16 INJECTION, SOLUTION INTRAVENOUS; SUBCUTANEOUS
Status: DISCONTINUED | OUTPATIENT
Start: 2023-03-26 | End: 2023-03-28

## 2023-03-26 RX ORDER — INSULIN LISPRO 100 [IU]/ML
15 INJECTION, SOLUTION INTRAVENOUS; SUBCUTANEOUS
Status: DISCONTINUED | OUTPATIENT
Start: 2023-03-26 | End: 2023-03-27

## 2023-03-26 RX ORDER — PREDNISONE 10 MG/1
40 TABLET ORAL DAILY
Status: DISCONTINUED | OUTPATIENT
Start: 2023-03-27 | End: 2023-03-28

## 2023-03-26 RX ORDER — INSULIN GLARGINE 100 [IU]/ML
50 INJECTION, SOLUTION SUBCUTANEOUS NIGHTLY
Status: DISCONTINUED | OUTPATIENT
Start: 2023-03-26 | End: 2023-03-28

## 2023-03-26 RX ADMIN — AZITHROMYCIN MONOHYDRATE 500 MG: 500 INJECTION, POWDER, LYOPHILIZED, FOR SOLUTION INTRAVENOUS at 03:31

## 2023-03-26 RX ADMIN — ASPIRIN 81 MG: 81 TABLET, COATED ORAL at 09:04

## 2023-03-26 RX ADMIN — INSULIN LISPRO 15 UNITS: 100 INJECTION, SOLUTION INTRAVENOUS; SUBCUTANEOUS at 16:35

## 2023-03-26 RX ADMIN — INSULIN LISPRO 15 UNITS: 100 INJECTION, SOLUTION INTRAVENOUS; SUBCUTANEOUS at 12:56

## 2023-03-26 RX ADMIN — INSULIN LISPRO 16 UNITS: 100 INJECTION, SOLUTION INTRAVENOUS; SUBCUTANEOUS at 16:34

## 2023-03-26 RX ADMIN — METFORMIN HYDROCHLORIDE 850 MG: 850 TABLET ORAL at 16:40

## 2023-03-26 RX ADMIN — INSULIN HUMAN 30 UNITS: 100 INJECTION, SUSPENSION SUBCUTANEOUS at 11:19

## 2023-03-26 RX ADMIN — INSULIN LISPRO 8 UNITS: 100 INJECTION, SOLUTION INTRAVENOUS; SUBCUTANEOUS at 21:15

## 2023-03-26 RX ADMIN — APIXABAN 5 MG: 5 TABLET, FILM COATED ORAL at 09:04

## 2023-03-26 RX ADMIN — IPRATROPIUM BROMIDE AND ALBUTEROL SULFATE 1 AMPULE: 2.5; .5 SOLUTION RESPIRATORY (INHALATION) at 16:15

## 2023-03-26 RX ADMIN — ATORVASTATIN CALCIUM 40 MG: 40 TABLET, FILM COATED ORAL at 09:03

## 2023-03-26 RX ADMIN — APIXABAN 5 MG: 5 TABLET, FILM COATED ORAL at 21:15

## 2023-03-26 RX ADMIN — SODIUM CHLORIDE, PRESERVATIVE FREE 10 ML: 5 INJECTION INTRAVENOUS at 21:00

## 2023-03-26 RX ADMIN — INSULIN LISPRO 8 UNITS: 100 INJECTION, SOLUTION INTRAVENOUS; SUBCUTANEOUS at 09:01

## 2023-03-26 RX ADMIN — FUROSEMIDE 40 MG: 40 TABLET ORAL at 16:40

## 2023-03-26 RX ADMIN — METFORMIN HYDROCHLORIDE 850 MG: 850 TABLET ORAL at 11:17

## 2023-03-26 RX ADMIN — DILTIAZEM HYDROCHLORIDE 120 MG: 120 CAPSULE, COATED, EXTENDED RELEASE ORAL at 09:04

## 2023-03-26 RX ADMIN — METHYLPREDNISOLONE SODIUM SUCCINATE 40 MG: 40 INJECTION, POWDER, FOR SOLUTION INTRAMUSCULAR; INTRAVENOUS at 09:05

## 2023-03-26 RX ADMIN — BUDESONIDE AND FORMOTEROL FUMARATE DIHYDRATE 2 PUFF: 160; 4.5 AEROSOL RESPIRATORY (INHALATION) at 21:01

## 2023-03-26 RX ADMIN — FUROSEMIDE 40 MG: 40 TABLET ORAL at 09:04

## 2023-03-26 RX ADMIN — INSULIN GLARGINE 50 UNITS: 100 INJECTION, SOLUTION SUBCUTANEOUS at 21:18

## 2023-03-26 RX ADMIN — IPRATROPIUM BROMIDE AND ALBUTEROL SULFATE 1 AMPULE: 2.5; .5 SOLUTION RESPIRATORY (INHALATION) at 12:58

## 2023-03-26 RX ADMIN — SODIUM CHLORIDE, PRESERVATIVE FREE 10 ML: 5 INJECTION INTRAVENOUS at 09:06

## 2023-03-26 RX ADMIN — BUDESONIDE AND FORMOTEROL FUMARATE DIHYDRATE 2 PUFF: 160; 4.5 AEROSOL RESPIRATORY (INHALATION) at 08:32

## 2023-03-26 RX ADMIN — INSULIN LISPRO 16 UNITS: 100 INJECTION, SOLUTION INTRAVENOUS; SUBCUTANEOUS at 12:56

## 2023-03-26 RX ADMIN — IPRATROPIUM BROMIDE AND ALBUTEROL SULFATE 1 AMPULE: 2.5; .5 SOLUTION RESPIRATORY (INHALATION) at 08:30

## 2023-03-26 RX ADMIN — INSULIN LISPRO 15 UNITS: 100 INJECTION, SOLUTION INTRAVENOUS; SUBCUTANEOUS at 09:01

## 2023-03-26 RX ADMIN — IPRATROPIUM BROMIDE AND ALBUTEROL SULFATE 1 AMPULE: 2.5; .5 SOLUTION RESPIRATORY (INHALATION) at 21:01

## 2023-03-26 ASSESSMENT — PAIN SCALES - GENERAL
PAINLEVEL_OUTOF10: 5
PAINLEVEL_OUTOF10: 0

## 2023-03-26 ASSESSMENT — PAIN DESCRIPTION - LOCATION: LOCATION: SHOULDER

## 2023-03-26 NOTE — CONSULTS
Endocrinology   Consult Note  3/24/2023  9:12 PM     Primary Care provider: Marnie Cole MD     Referring physician:  Jennifer Cassidy MD     Dear Doctor Vandana Wadsworth for the Consult    Pt. Was Admitted for : Severe shortness of breath with exacerbation of COPD    Reason for Consult: Better control of blood glucose      History Obtained From:  Patient/ EMR       HISTORY OF PRESENT ILLNESS:                The patient is a 74 y.o. male with significant past medical history of atrial fibrillation status post ablation, congestive heart failure, severe COPD on home oxygen, peripheral vascular disease, type 2 diabetes mellitus obesity, hypertension, hyperlipidemia, mild macular degeneration, sleep apnea but not on CPAP admitted to hospital for severe shortness of breath with exacerbation of COPD. Patient started on high-dose of Solu-Medrol resulting in high blood glucose levels. Has been at home metformin and glipizide only. I was  consulted for better control of blood glucose. ROS:   Pt's ROS done in detail. Abnormal ROS are noted in Medical and Surgical History Section below: Other Medical History:        Diagnosis Date    A-fib [facility])     Resolved after ablation 2018    Anxiety     Arthritis     \"Hips, Knees, Elbows And Fingers\"    COPD (chronic obstructive pulmonary disease) ([facility])     Sees Dr. Valentino Lean    Depression     Diabetes mellitus [facility]) Dx 0326'U    Full dentures     H/O angiography 12/13/2018    peripheral angio - LFA occluded, filling collaterals, RSFA 90% stenosis-vasc surg consult    H/O Doppler ultrasound 09/05/2018    LE arterial - left mid and distal femoral artery occluded, lt FANI shows mild-mod PVD    H/O echocardiogram 01/06/2016    EF60% mild MR, TR, pulm HTN    Hx of colonoscopy     7/11 C-scope - benign polyp x1    Hx of Doppler ultrasound 02/20/2018    Lower extremity doppler  Normal study.     Hyperlipidemia     Hypertension     Macular degeneration     States is

## 2023-03-27 LAB
EKG ATRIAL RATE: 80 BPM
EKG ATRIAL RATE: 92 BPM
EKG DIAGNOSIS: NORMAL
EKG DIAGNOSIS: NORMAL
EKG P AXIS: 46 DEGREES
EKG P AXIS: 69 DEGREES
EKG P-R INTERVAL: 182 MS
EKG P-R INTERVAL: 196 MS
EKG Q-T INTERVAL: 364 MS
EKG Q-T INTERVAL: 390 MS
EKG QRS DURATION: 100 MS
EKG QRS DURATION: 100 MS
EKG QTC CALCULATION (BAZETT): 449 MS
EKG QTC CALCULATION (BAZETT): 450 MS
EKG R AXIS: -62 DEGREES
EKG R AXIS: -71 DEGREES
EKG T AXIS: 24 DEGREES
EKG T AXIS: 87 DEGREES
EKG VENTRICULAR RATE: 80 BPM
EKG VENTRICULAR RATE: 92 BPM
GLUCOSE BLD-MCNC: 150 MG/DL (ref 70–99)
GLUCOSE BLD-MCNC: 162 MG/DL (ref 70–99)
GLUCOSE BLD-MCNC: 218 MG/DL (ref 70–99)
GLUCOSE BLD-MCNC: 222 MG/DL (ref 70–99)
GLUCOSE BLD-MCNC: 228 MG/DL (ref 70–99)
GLUCOSE BLD-MCNC: 289 MG/DL (ref 70–99)

## 2023-03-27 PROCEDURE — 94640 AIRWAY INHALATION TREATMENT: CPT

## 2023-03-27 PROCEDURE — 6370000000 HC RX 637 (ALT 250 FOR IP): Performed by: INTERNAL MEDICINE

## 2023-03-27 PROCEDURE — 94761 N-INVAS EAR/PLS OXIMETRY MLT: CPT

## 2023-03-27 PROCEDURE — 2580000003 HC RX 258: Performed by: STUDENT IN AN ORGANIZED HEALTH CARE EDUCATION/TRAINING PROGRAM

## 2023-03-27 PROCEDURE — 6370000000 HC RX 637 (ALT 250 FOR IP): Performed by: STUDENT IN AN ORGANIZED HEALTH CARE EDUCATION/TRAINING PROGRAM

## 2023-03-27 PROCEDURE — 93005 ELECTROCARDIOGRAM TRACING: CPT | Performed by: INTERNAL MEDICINE

## 2023-03-27 PROCEDURE — 93010 ELECTROCARDIOGRAM REPORT: CPT | Performed by: INTERNAL MEDICINE

## 2023-03-27 PROCEDURE — 2060000000 HC ICU INTERMEDIATE R&B

## 2023-03-27 PROCEDURE — 6360000002 HC RX W HCPCS: Performed by: STUDENT IN AN ORGANIZED HEALTH CARE EDUCATION/TRAINING PROGRAM

## 2023-03-27 PROCEDURE — 82962 GLUCOSE BLOOD TEST: CPT

## 2023-03-27 PROCEDURE — 2700000000 HC OXYGEN THERAPY PER DAY

## 2023-03-27 RX ORDER — IPRATROPIUM BROMIDE AND ALBUTEROL SULFATE 2.5; .5 MG/3ML; MG/3ML
1 SOLUTION RESPIRATORY (INHALATION) 2 TIMES DAILY
Status: DISCONTINUED | OUTPATIENT
Start: 2023-03-27 | End: 2023-03-28 | Stop reason: HOSPADM

## 2023-03-27 RX ORDER — INSULIN LISPRO 100 [IU]/ML
10 INJECTION, SOLUTION INTRAVENOUS; SUBCUTANEOUS ONCE
Status: COMPLETED | OUTPATIENT
Start: 2023-03-27 | End: 2023-03-27

## 2023-03-27 RX ORDER — GLIPIZIDE 5 MG/1
10 TABLET ORAL
Status: DISCONTINUED | OUTPATIENT
Start: 2023-03-27 | End: 2023-03-28 | Stop reason: HOSPADM

## 2023-03-27 RX ORDER — ALBUTEROL SULFATE 90 UG/1
2 AEROSOL, METERED RESPIRATORY (INHALATION) EVERY 4 HOURS PRN
Status: DISCONTINUED | OUTPATIENT
Start: 2023-03-27 | End: 2023-03-28 | Stop reason: HOSPADM

## 2023-03-27 RX ORDER — INSULIN LISPRO 100 [IU]/ML
20 INJECTION, SOLUTION INTRAVENOUS; SUBCUTANEOUS
Status: DISCONTINUED | OUTPATIENT
Start: 2023-03-27 | End: 2023-03-28

## 2023-03-27 RX ADMIN — ACETAMINOPHEN 650 MG: 325 TABLET ORAL at 03:34

## 2023-03-27 RX ADMIN — APIXABAN 5 MG: 5 TABLET, FILM COATED ORAL at 08:40

## 2023-03-27 RX ADMIN — METFORMIN HYDROCHLORIDE 1000 MG: 500 TABLET, FILM COATED ORAL at 17:31

## 2023-03-27 RX ADMIN — INSULIN LISPRO 4 UNITS: 100 INJECTION, SOLUTION INTRAVENOUS; SUBCUTANEOUS at 08:45

## 2023-03-27 RX ADMIN — ATORVASTATIN CALCIUM 40 MG: 40 TABLET, FILM COATED ORAL at 08:40

## 2023-03-27 RX ADMIN — FUROSEMIDE 40 MG: 40 TABLET ORAL at 17:31

## 2023-03-27 RX ADMIN — FUROSEMIDE 40 MG: 40 TABLET ORAL at 08:39

## 2023-03-27 RX ADMIN — INSULIN LISPRO 4 UNITS: 100 INJECTION, SOLUTION INTRAVENOUS; SUBCUTANEOUS at 17:35

## 2023-03-27 RX ADMIN — AZITHROMYCIN MONOHYDRATE 500 MG: 500 INJECTION, POWDER, LYOPHILIZED, FOR SOLUTION INTRAVENOUS at 03:43

## 2023-03-27 RX ADMIN — DILTIAZEM HYDROCHLORIDE 120 MG: 120 CAPSULE, COATED, EXTENDED RELEASE ORAL at 08:40

## 2023-03-27 RX ADMIN — POLYETHYLENE GLYCOL 3350 17 G: 17 POWDER, FOR SOLUTION ORAL at 08:39

## 2023-03-27 RX ADMIN — GLIPIZIDE 10 MG: 5 TABLET ORAL at 17:31

## 2023-03-27 RX ADMIN — BUDESONIDE AND FORMOTEROL FUMARATE DIHYDRATE 2 PUFF: 160; 4.5 AEROSOL RESPIRATORY (INHALATION) at 20:01

## 2023-03-27 RX ADMIN — BUDESONIDE AND FORMOTEROL FUMARATE DIHYDRATE 2 PUFF: 160; 4.5 AEROSOL RESPIRATORY (INHALATION) at 08:06

## 2023-03-27 RX ADMIN — ASPIRIN 81 MG: 81 TABLET, COATED ORAL at 08:39

## 2023-03-27 RX ADMIN — IPRATROPIUM BROMIDE AND ALBUTEROL SULFATE 1 AMPULE: 2.5; .5 SOLUTION RESPIRATORY (INHALATION) at 12:11

## 2023-03-27 RX ADMIN — IPRATROPIUM BROMIDE AND ALBUTEROL SULFATE 1 AMPULE: 2.5; .5 SOLUTION RESPIRATORY (INHALATION) at 08:06

## 2023-03-27 RX ADMIN — PREDNISONE 40 MG: 10 TABLET ORAL at 08:39

## 2023-03-27 RX ADMIN — INSULIN LISPRO 20 UNITS: 100 INJECTION, SOLUTION INTRAVENOUS; SUBCUTANEOUS at 17:36

## 2023-03-27 RX ADMIN — APIXABAN 5 MG: 5 TABLET, FILM COATED ORAL at 20:28

## 2023-03-27 RX ADMIN — INSULIN LISPRO 8 UNITS: 100 INJECTION, SOLUTION INTRAVENOUS; SUBCUTANEOUS at 03:35

## 2023-03-27 RX ADMIN — ACETAMINOPHEN 650 MG: 325 TABLET ORAL at 20:27

## 2023-03-27 RX ADMIN — GLIPIZIDE 10 MG: 5 TABLET ORAL at 08:50

## 2023-03-27 RX ADMIN — INSULIN LISPRO 10 UNITS: 100 INJECTION, SOLUTION INTRAVENOUS; SUBCUTANEOUS at 12:55

## 2023-03-27 RX ADMIN — INSULIN LISPRO 20 UNITS: 100 INJECTION, SOLUTION INTRAVENOUS; SUBCUTANEOUS at 08:44

## 2023-03-27 RX ADMIN — IPRATROPIUM BROMIDE AND ALBUTEROL SULFATE 1 AMPULE: 2.5; .5 SOLUTION RESPIRATORY (INHALATION) at 20:01

## 2023-03-27 RX ADMIN — INSULIN GLARGINE 50 UNITS: 100 INJECTION, SOLUTION SUBCUTANEOUS at 20:29

## 2023-03-27 RX ADMIN — METFORMIN HYDROCHLORIDE 1000 MG: 500 TABLET, FILM COATED ORAL at 08:40

## 2023-03-27 RX ADMIN — SODIUM CHLORIDE, PRESERVATIVE FREE 10 ML: 5 INJECTION INTRAVENOUS at 20:28

## 2023-03-27 ASSESSMENT — PAIN DESCRIPTION - LOCATION: LOCATION: SHOULDER

## 2023-03-27 ASSESSMENT — PAIN SCALES - GENERAL: PAINLEVEL_OUTOF10: 5

## 2023-03-27 ASSESSMENT — PAIN DESCRIPTION - DESCRIPTORS: DESCRIPTORS: ACHING

## 2023-03-27 ASSESSMENT — PAIN DESCRIPTION - ORIENTATION: ORIENTATION: RIGHT;LEFT

## 2023-03-27 NOTE — RT PROTOCOL NOTE
RT Inhaler-Nebulizer Bronchodilator Protocol Note    There is a bronchodilator order in the chart from a provider indicating to follow the RT Bronchodilator Protocol and there is an Initiate RT Inhaler-Nebulizer Bronchodilator Protocol order as well (see protocol at bottom of note). CXR Findings:  No results found. The findings from the last RT Protocol Assessment were as follows:   History Pulmonary Disease: Chronic pulmonary disease  Respiratory Pattern: Regular pattern and RR 12-20 bpm  Breath Sounds: Slightly diminished and/or crackles  Cough: Strong, spontaneous, non-productive  Indication for Bronchodilator Therapy:    Bronchodilator Assessment Score: 4    Aerosolized bronchodilator medication orders have been revised according to the RT Inhaler-Nebulizer Bronchodilator Protocol below. Respiratory Therapist to perform RT Therapy Protocol Assessment initially then follow the protocol. Repeat RT Therapy Protocol Assessment PRN for score 0-3 or on second treatment, BID, and PRN for scores above 3. No Indications - adjust the frequency to every 6 hours PRN wheezing or bronchospasm, if no treatments needed after 48 hours then discontinue using Per Protocol order mode. If indication present, adjust the RT bronchodilator orders based on the Bronchodilator Assessment Score as indicated below. Use Inhaler orders unless patient has one or more of the following: on home nebulizer, not able to hold breath for 10 seconds, is not alert and oriented, cannot activate and use MDI correctly, or respiratory rate 25 breaths per minute or more, then use the equivalent nebulizer order(s) with same Frequency and PRN reasons based on the score. If a patient is on this medication at home then do not decrease Frequency below that used at home.     0-3 - enter or revise RT bronchodilator order(s) to equivalent RT Bronchodilator order with Frequency of every 4 hours PRN for wheezing or increased work of breathing

## 2023-03-28 ENCOUNTER — CARE COORDINATION (OUTPATIENT)
Dept: CARE COORDINATION | Age: 68
End: 2023-03-28

## 2023-03-28 VITALS
BODY MASS INDEX: 36.45 KG/M2 | TEMPERATURE: 97.6 F | DIASTOLIC BLOOD PRESSURE: 64 MMHG | SYSTOLIC BLOOD PRESSURE: 123 MMHG | WEIGHT: 315 LBS | RESPIRATION RATE: 15 BRPM | OXYGEN SATURATION: 93 % | HEIGHT: 78 IN | HEART RATE: 79 BPM

## 2023-03-28 LAB
ANION GAP SERPL CALCULATED.3IONS-SCNC: 10 MMOL/L (ref 4–16)
BUN SERPL-MCNC: 19 MG/DL (ref 6–23)
CALCIUM SERPL-MCNC: 9.1 MG/DL (ref 8.3–10.6)
CHLORIDE BLD-SCNC: 93 MMOL/L (ref 99–110)
CO2: 37 MMOL/L (ref 21–32)
CREAT SERPL-MCNC: 0.7 MG/DL (ref 0.9–1.3)
GFR SERPL CREATININE-BSD FRML MDRD: >60 ML/MIN/1.73M2
GLUCOSE BLD-MCNC: 131 MG/DL (ref 70–99)
GLUCOSE BLD-MCNC: 173 MG/DL (ref 70–99)
GLUCOSE SERPL-MCNC: 154 MG/DL (ref 70–99)
POTASSIUM SERPL-SCNC: 3.8 MMOL/L (ref 3.5–5.1)
SODIUM BLD-SCNC: 140 MMOL/L (ref 135–145)

## 2023-03-28 PROCEDURE — 6370000000 HC RX 637 (ALT 250 FOR IP): Performed by: INTERNAL MEDICINE

## 2023-03-28 PROCEDURE — 6370000000 HC RX 637 (ALT 250 FOR IP): Performed by: STUDENT IN AN ORGANIZED HEALTH CARE EDUCATION/TRAINING PROGRAM

## 2023-03-28 PROCEDURE — 36415 COLL VENOUS BLD VENIPUNCTURE: CPT

## 2023-03-28 PROCEDURE — 80048 BASIC METABOLIC PNL TOTAL CA: CPT

## 2023-03-28 PROCEDURE — 94640 AIRWAY INHALATION TREATMENT: CPT

## 2023-03-28 PROCEDURE — 2700000000 HC OXYGEN THERAPY PER DAY

## 2023-03-28 PROCEDURE — 82962 GLUCOSE BLOOD TEST: CPT

## 2023-03-28 PROCEDURE — 2580000003 HC RX 258: Performed by: STUDENT IN AN ORGANIZED HEALTH CARE EDUCATION/TRAINING PROGRAM

## 2023-03-28 PROCEDURE — 94761 N-INVAS EAR/PLS OXIMETRY MLT: CPT

## 2023-03-28 RX ORDER — INSULIN GLARGINE 100 [IU]/ML
40 INJECTION, SOLUTION SUBCUTANEOUS NIGHTLY
Status: DISCONTINUED | OUTPATIENT
Start: 2023-03-28 | End: 2023-03-28

## 2023-03-28 RX ORDER — PREDNISONE 10 MG/1
40 TABLET ORAL DAILY
Status: DISCONTINUED | OUTPATIENT
Start: 2023-03-29 | End: 2023-03-28 | Stop reason: HOSPADM

## 2023-03-28 RX ORDER — INSULIN LISPRO 100 [IU]/ML
0-4 INJECTION, SOLUTION INTRAVENOUS; SUBCUTANEOUS NIGHTLY
Status: DISCONTINUED | OUTPATIENT
Start: 2023-03-28 | End: 2023-03-28 | Stop reason: HOSPADM

## 2023-03-28 RX ORDER — INSULIN LISPRO 100 [IU]/ML
10 INJECTION, SOLUTION INTRAVENOUS; SUBCUTANEOUS
Status: DISCONTINUED | OUTPATIENT
Start: 2023-03-28 | End: 2023-03-28

## 2023-03-28 RX ORDER — INSULIN GLARGINE 100 [IU]/ML
25 INJECTION, SOLUTION SUBCUTANEOUS NIGHTLY
Status: DISCONTINUED | OUTPATIENT
Start: 2023-03-28 | End: 2023-03-28 | Stop reason: HOSPADM

## 2023-03-28 RX ORDER — INSULIN LISPRO 100 [IU]/ML
0-8 INJECTION, SOLUTION INTRAVENOUS; SUBCUTANEOUS
Status: DISCONTINUED | OUTPATIENT
Start: 2023-03-28 | End: 2023-03-28 | Stop reason: HOSPADM

## 2023-03-28 RX ORDER — INSULIN DETEMIR 100 [IU]/ML
25 INJECTION, SOLUTION SUBCUTANEOUS NIGHTLY
Qty: 3 EACH | Refills: 3 | Status: SHIPPED | OUTPATIENT
Start: 2023-03-28

## 2023-03-28 RX ORDER — PREDNISONE 20 MG/1
40 TABLET ORAL DAILY
Qty: 2 TABLET | Refills: 0 | Status: SHIPPED | OUTPATIENT
Start: 2023-03-29 | End: 2023-03-30

## 2023-03-28 RX ADMIN — ATORVASTATIN CALCIUM 40 MG: 40 TABLET, FILM COATED ORAL at 09:08

## 2023-03-28 RX ADMIN — IPRATROPIUM BROMIDE AND ALBUTEROL SULFATE 1 AMPULE: 2.5; .5 SOLUTION RESPIRATORY (INHALATION) at 07:21

## 2023-03-28 RX ADMIN — FUROSEMIDE 40 MG: 40 TABLET ORAL at 09:07

## 2023-03-28 RX ADMIN — ASPIRIN 81 MG: 81 TABLET, COATED ORAL at 09:08

## 2023-03-28 RX ADMIN — APIXABAN 5 MG: 5 TABLET, FILM COATED ORAL at 09:08

## 2023-03-28 RX ADMIN — SODIUM CHLORIDE, PRESERVATIVE FREE 10 ML: 5 INJECTION INTRAVENOUS at 09:10

## 2023-03-28 RX ADMIN — GLIPIZIDE 10 MG: 5 TABLET ORAL at 06:29

## 2023-03-28 RX ADMIN — PREDNISONE 40 MG: 10 TABLET ORAL at 09:09

## 2023-03-28 RX ADMIN — DILTIAZEM HYDROCHLORIDE 120 MG: 120 CAPSULE, COATED, EXTENDED RELEASE ORAL at 09:08

## 2023-03-28 RX ADMIN — BUDESONIDE AND FORMOTEROL FUMARATE DIHYDRATE 2 PUFF: 160; 4.5 AEROSOL RESPIRATORY (INHALATION) at 07:21

## 2023-03-28 NOTE — ACP (ADVANCE CARE PLANNING)
Advance Care Planning     Advance Care Planning Inpatient Note  [facility] Department    Today's Date: 3/28/2023  Unit: [address] [facility] ICU STEPDOWN    Received request from [id number] Michael Ibrahim Provider. Upon review of chart and communication with care team, patient's decision making abilities are not in question. . Patient was/were present in the room during visit. Goals of ACP Conversation:  Discuss advance care planning documents  Facilitate a discussion related to patient's goals of care as they align with the patient's values and beliefs. Health Care Decision Makers:       Primary Decision Maker: Duke Cotton - Brother/Sister - [phone]    Secondary Decision Maker: Geoffanastacia Pak - Brother/Sister - [phone]    Supplemental (Other) Decision Maker: Shamika Cherry - [facility] - [phone]  Summary:  Completed [address] that ACP/Healthcare POA documents are present, complete, witnessed & notarized as forms require. Then verify accuracy of Healthcare Decision Maker(s) contact information.  Last, ensure Decision Maker(s) is designated Primary, Secondary or Supplemental.  Advance Care Planning Documents (Patient Wishes):  Healthcare Power of /Advance Directive Appointment of Health Care Agent  Living Will/Advance Directive     Assessment:  In the assessment narrative, address the way holistic dimensions influence ACP decisions - medical, psychological, psychosocial, family systems, ethical, cultural, societal and spiritual.    Interventions:  Provided education on documents for clarity and greater understanding  Discussed and provided education on state decision maker hierarchy  Assisted in the completion of documents according to patient's wishes at this time  Encouraged ongoing ACP conversation with future decision makers and loved ones    Care Preferences Communicated:   No    Outcomes/Plan:  New advance directive

## 2023-03-28 NOTE — CARE COORDINATION
SW completed chart review. Pt is IP in [facility].       VIELKA plan of care:  SW Will follow up with Pt upon d/c from hospital.

## 2023-03-28 NOTE — DISCHARGE SUMMARY
distress. Appearance: Normal appearance. He is obese. HENT:      Head: Normocephalic and atraumatic. Nose: Nose normal.      Mouth/Throat:      Mouth: Mucous membranes are moist.      Pharynx: Oropharynx is clear. Eyes:      Extraocular Movements: Extraocular movements intact. Conjunctiva/sclera: Conjunctivae normal.      Pupils: Pupils are equal, round, and reactive to light. Cardiovascular:      Rate and Rhythm: Normal rate and regular rhythm. Pulses: Normal pulses. Heart sounds: Normal heart sounds. Pulmonary:      Effort: Pulmonary effort is normal.      Breath sounds: Decreased air movement present. Abdominal:      General: Abdomen is flat. Bowel sounds are normal.      Palpations: Abdomen is soft. Musculoskeletal:         General: Normal range of motion. Cervical back: Normal range of motion and neck supple. Skin:     General: Skin is warm and dry. Neurological:      General: No focal deficit present. Mental Status: He is alert and oriented to person, place, and time. Mental status is at baseline. Psychiatric:         Mood and Affect: Mood normal.         Behavior: Behavior normal.         Thought Content: Thought content normal.              Labs and Imaging   XR CHEST PORTABLE    Result Date: 3/24/2023  EXAMINATION: ONE XRAY VIEW OF THE CHEST 3/24/2023 9:30 pm COMPARISON: 03/05/2023 HISTORY: ORDERING SYSTEM PROVIDED HISTORY: SOB TECHNOLOGIST PROVIDED HISTORY: Reason for exam:->SOB Reason for Exam: SOB Additional signs and symptoms: na Relevant Medical/Surgical History: diabetes, COPD FINDINGS: There is moderate apical predominant emphysema. There is a band of chronic atelectasis/scarring in the lingula. No acute airspace disease, pneumothorax or pleural effusion. Heart size is unchanged. Calcified lymph node in the AP window from remote granulomatous disease. Stable chest x-ray. Apical predominant emphysema with no evidence of acute disease.

## 2023-03-28 NOTE — PLAN OF CARE
Problem: Discharge Planning  Goal: Discharge to home or other facility with appropriate resources  Outcome: Progressing  Flowsheets (Taken 3/26/2023 2130 by Denver Jensen, RN)  Discharge to home or other facility with appropriate resources: Identify barriers to discharge with patient and caregiver     Problem: Safety - Adult  Goal: Free from fall injury  Outcome: Progressing     Problem: Pain  Goal: Verbalizes/displays adequate comfort level or baseline comfort level  Outcome: Progressing     Problem: Cardiovascular - Adult  Goal: Maintains optimal cardiac output and hemodynamic stability  Outcome: Progressing  Goal: Absence of cardiac dysrhythmias or at baseline  Outcome: Progressing     Problem: Gastrointestinal - Adult  Goal: Minimal or absence of nausea and vomiting  Outcome: Progressing  Goal: Maintains or returns to baseline bowel function  Outcome: Progressing  Goal: Maintains adequate nutritional intake  Outcome: Progressing  Goal: Establish and maintain optimal ostomy function  Outcome: Progressing
Problem: Safety - Adult  Goal: Free from fall injury  Outcome: Progressing     Problem: Pain  Goal: Verbalizes/displays adequate comfort level or baseline comfort level  Outcome: Progressing
Problem: Safety - Adult  Goal: Free from fall injury  Outcome: Progressing     Problem: Pain  Goal: Verbalizes/displays adequate comfort level or baseline comfort level  Outcome: Progressing     Problem: Cardiovascular - Adult  Goal: Maintains optimal cardiac output and hemodynamic stability  Outcome: Progressing  Flowsheets (Taken 3/27/2023 2015 by Geoff Calderón RN)  Maintains optimal cardiac output and hemodynamic stability:   Monitor blood pressure and heart rate   Monitor urine output and notify Licensed Independent Practitioner for values outside of normal range   Assess for signs of decreased cardiac output   Administer fluid and/or volume expanders as ordered   Administer vasoactive medications as ordered  Goal: Absence of cardiac dysrhythmias or at baseline  Outcome: Progressing  Flowsheets (Taken 3/27/2023 2015 by Geoff Calderón RN)  Absence of cardiac dysrhythmias or at baseline:   Monitor cardiac rate and rhythm   Assess for signs of decreased cardiac output   Administer antiarrhythmia medication and electrolyte replacement as ordered     Problem: Gastrointestinal - Adult  Goal: Maintains adequate nutritional intake  Outcome: Progressing  Flowsheets (Taken 3/27/2023 2015 by Geoff Calderón RN)  Maintains adequate nutritional intake:   Monitor percentage of each meal consumed   Identify factors contributing to decreased intake, treat as appropriate   Assist with meals as needed   Monitor intake and output, weight and lab values   Obtain nutritional consult as needed  Goal: Establish and maintain optimal ostomy function  Outcome: Progressing  Flowsheets (Taken 3/27/2023 2015 by Geoff Calderón RN)  Establish and maintain optimal ostomy function:   Monitor output from ostomies   Administer IV fluids and TPN as ordered   Nutrition consult   Introduce and advance enteral feedings as ordered
Progressing  Flowsheets (Taken 3/27/2023 2015 by Geoff Calderón RN)  Maintains adequate nutritional intake:   Monitor percentage of each meal consumed   Identify factors contributing to decreased intake, treat as appropriate   Assist with meals as needed   Monitor intake and output, weight and lab values   Obtain nutritional consult as needed  Goal: Establish and maintain optimal ostomy function  3/28/2023 1144 by Melanie Fenton RN  Outcome: Completed  3/28/2023 0934 by Melanie Fenton RN  Outcome: Progressing  Flowsheets (Taken 3/27/2023 2015 by Geoff Calderón RN)  Establish and maintain optimal ostomy function:   Monitor output from ostomies   Administer IV fluids and TPN as ordered   Nutrition consult   Introduce and advance enteral feedings as ordered     Problem: Chronic Conditions and Co-morbidities  Goal: Patient's chronic conditions and co-morbidity symptoms are monitored and maintained or improved  Outcome: Completed

## 2023-03-28 NOTE — CARE COORDINATION
I received a call on Friday, 3/24 from [facility] regarding help with patients lasix. He had been unable to fill it due to unexpected issues with his finances. We have received several other referrals for this patient and have mailed him numerous applications, none of which he has completed and returned. I did agree to help 1x. He's rx had been sent to [place], however I informed LPN that we do not work with that pharmacy and give her the names of the two near patients home that we could use. Due to patient not always having transportation it was decided to use [facility] on [address] as they can deliver. I called [facility] and asked about delivery and due to the time of day the soonest they would be able to do it would be Monday (3/27). I went ahead and faxed the 1x voucher to them and called Arianna back to let her know. She was going to call patient to let him know he would need to call [facility] and request the prescription (lasix) be transferred to [facility] and that he could then ask to arrange delivery if he was unable to  himself. Another application was mailed out to him with a checklist of required documents and our phone number should he have questions or need help with the application. He was then placed on the do not help list pending application.
Received call from [address]. [facility] but did not have any money for the copays. I had already issued patient a voucher last week per request from his PCP as he had not gotten medications filled from the last discharge. I explained that we have helped him in the past and mailed him several application to which he has never returned. I agreed to mail another one which went out on Friday. After talking to [facility] I agreed to cover the insulin pens and needles. But patient will need a paper rx for his glucose meter and supplies to take to his regular pharmacy who is able to bill his Medicare and he should not have any copays.       Patient has been placed on our do not help list until he completes all required paperwork to determine eligibility for our program.
with the discharge plan. Freedom of choice list with basic dialogue that supports the patient's individualized plan of care/goals and shares the quality data associated with the providers was provided to:     Patient Representative Name:       The Patient and/or Patient Representative Agree with the Discharge Plan?      Sarah Velez RN  Case Management Department  Ph: [phone]  Fax: [phone]

## 2023-03-28 NOTE — DISCHARGE INSTRUCTIONS
Please finish up your steroids tomorrow. Please make sure to check your blood sugar and follow-up with your primary care as we have initiated you on daily insulin. If you have similar shortness of breath again in the future please return to the emergency room.

## 2023-03-29 ENCOUNTER — CARE COORDINATION (OUTPATIENT)
Dept: CASE MANAGEMENT | Age: 68
End: 2023-03-29

## 2023-03-29 ENCOUNTER — TELEPHONE (OUTPATIENT)
Dept: CARDIOLOGY CLINIC | Age: 68
End: 2023-03-29

## 2023-03-29 ENCOUNTER — TELEPHONE (OUTPATIENT)
Dept: INTERNAL MEDICINE CLINIC | Age: 68
End: 2023-03-29

## 2023-03-29 NOTE — TELEPHONE ENCOUNTER
Rob with [facility] called in stating that the pt need an order for a wheelchair. Pt also is due for a hospital follow up and I tried to get that scheduled and he said he does not have any transportation until after April 4th . Is this ok to push this appointment out or is it ok to maybe do a virtual appt ? Please advise !

## 2023-03-29 NOTE — CARE COORDINATION
current. Medication reconciliation was performed with patient, who verbalizes understanding of administration of home medications. Medications reviewed, 1111F entered: yes    Was patient discharged with a pulse oximeter? no    Non-face-to-face services provided:  Scheduled appointment with Specialist-has HF clinic appt on 4/6/23  Obtained and reviewed discharge summary and/or continuity of care documents  Education of patient/family/caregiver/guardian to support self-management-when to contact providers    Offered patient enrollment in the Remote Patient Monitoring (RPM) program for in-home monitoring: NA.    Care Transitions 24 Hour Call    Do you have a copy of your discharge instructions?: Yes  Do you have all of your prescriptions and are they filled?: No  Have you been contacted by a [address]?: No  Have you scheduled your follow up appointment?: Yes  How are you going to get to your appointment?: Other (Comment: [facility] [employer] [facility])  Post Acute Services: Home Health (Comment: [address] [place] [address] Rd)  Patient DME: Straight cane, Shower chair  Patient Home Equipment: Oxygen, BiPAP  Do you have support at home?: Roommate/Housemate  Do you feel like you have everything you need to keep you well at home?: No  Are you an active caregiver in your home?: No  Care Transitions Interventions   Home Care Waiver: Completed  Other Services: Completed      Transportation Support: Declined      DME Assistance: Declined    Medication Assistance Program: Completed       Social Work: Declined    Disease Association: Completed               Discussed follow-up appointments. If no appointment was previously scheduled, appointment scheduling offered: Yes. Is follow up appointment scheduled within 7 days of discharge? No.    Follow Up  Future Appointments   Date Time Provider Carrie Aldana   4/6/2023  2:00 PM BRENNAN Brush - CNP HRTFAILCT[facility]   5/8/2023  1:40 PM Zane Alejandro MD [facility] Heart [facility]   5/30/2023  9:30 AM Dakotah Padilla

## 2023-03-29 NOTE — TELEPHONE ENCOUNTER
Placed call to patient for 3/30 appointment reminder. Patient discharged from hospital yesterday. Patient was also called yesterday by M. A. - stated he did not have transportation and did not have enough time to schedule with [employer]. Reminded patient of [facility] needing only 24 hour notice. States he had not called them. States he has not picked up his prescriptions from the hospital. Per multiple chart notes from  case management social workers,  [facility] Assist is being used and a voucher is available at [employer]. Patient states he will call the pharmacy.   Appointment rescheduled per patient request.

## 2023-03-30 ENCOUNTER — CARE COORDINATION (OUTPATIENT)
Dept: CARE COORDINATION | Age: 68
End: 2023-03-30

## 2023-03-30 ENCOUNTER — OFFICE VISIT (OUTPATIENT)
Dept: CARDIOLOGY CLINIC | Age: 68
End: 2023-03-30
Payer: MEDICARE

## 2023-03-30 VITALS
DIASTOLIC BLOOD PRESSURE: 70 MMHG | SYSTOLIC BLOOD PRESSURE: 128 MMHG | HEART RATE: 88 BPM | RESPIRATION RATE: 20 BRPM | OXYGEN SATURATION: 88 % | WEIGHT: 315 LBS | HEIGHT: 78 IN | BODY MASS INDEX: 36.45 KG/M2

## 2023-03-30 DIAGNOSIS — Z91.199 NON-COMPLIANCE: ICD-10-CM

## 2023-03-30 DIAGNOSIS — J44.1 COPD EXACERBATION (HCC): Primary | ICD-10-CM

## 2023-03-30 PROCEDURE — G8427 DOCREV CUR MEDS BY ELIG CLIN: HCPCS | Performed by: NURSE PRACTITIONER

## 2023-03-30 PROCEDURE — G8417 CALC BMI ABV UP PARAM F/U: HCPCS | Performed by: NURSE PRACTITIONER

## 2023-03-30 PROCEDURE — 99204 OFFICE O/P NEW MOD 45 MIN: CPT | Performed by: NURSE PRACTITIONER

## 2023-03-30 PROCEDURE — 3017F COLORECTAL CA SCREEN DOC REV: CPT | Performed by: NURSE PRACTITIONER

## 2023-03-30 PROCEDURE — 3023F SPIROM DOC REV: CPT | Performed by: NURSE PRACTITIONER

## 2023-03-30 PROCEDURE — G8484 FLU IMMUNIZE NO ADMIN: HCPCS | Performed by: NURSE PRACTITIONER

## 2023-03-30 PROCEDURE — 1123F ACP DISCUSS/DSCN MKR DOCD: CPT | Performed by: NURSE PRACTITIONER

## 2023-03-30 PROCEDURE — 4004F PT TOBACCO SCREEN RCVD TLK: CPT | Performed by: NURSE PRACTITIONER

## 2023-03-30 PROCEDURE — 1111F DSCHRG MED/CURRENT MED MERGE: CPT | Performed by: NURSE PRACTITIONER

## 2023-03-30 PROCEDURE — 3074F SYST BP LT 130 MM HG: CPT | Performed by: NURSE PRACTITIONER

## 2023-03-30 PROCEDURE — 3078F DIAST BP <80 MM HG: CPT | Performed by: NURSE PRACTITIONER

## 2023-03-30 RX ORDER — FUROSEMIDE 40 MG/1
40 TABLET ORAL 2 TIMES DAILY
Qty: 180 TABLET | Refills: 1 | Status: SHIPPED | OUTPATIENT
Start: 2023-03-30

## 2023-03-30 ASSESSMENT — ENCOUNTER SYMPTOMS: SHORTNESS OF BREATH: 1

## 2023-03-30 NOTE — CARE COORDINATION
Patient Current Location: Home: [address]     Phone call to Pt to follow up regarding mercy financial assistance, utility assistance and medicaid applications. Pt reported he has not yet received the documents, stating it takes a while for him to receive mail at times. Pt reported he will complete the applications as they arrive with help from his neighbor or brothers. Secure email sent to coworker, Fernanda Lake requesting she sends Pt medicaid application. SW plan of care:  SW will follow up with Pt regarding mercy financial assistance, utility assistance and medicaid applications on 4/7. SW will ask Pt if he is in agreement with referral to [facility] for low vision. MD assess

## 2023-03-30 NOTE — CARE COORDINATION
RD received referral from Teena BARONE:  Pt was at [facility] from 3/24-3/28 for COPD exacerbation. A1C noted to be 9.3 while in the hospital.  He is on Glipizide and Metformin. He was started on insulin while in the hospital.  He was discharged on Levemir 25 units. He informed me that his blood sugar this AM was 150. He should be on a diabetic diet. He states he will try to change and monitor his diet. He is agreeable to have you reach out to him. Please let me know if you have any questions. RD contacted Parish Porter regarding Dietitian referral. Pt answered, RD explained reason for call and role in care. Pt requested RD call back in a few days. Will contact pt within one week and follow up as appropriate.          [address] [place] [address],   [phone]

## 2023-03-30 NOTE — CARE COORDINATION
Marcio mailed out HEAP/[employer] application, [place] Medicaid application and [employer] aakash to pt at [place] request. She will follow up.

## 2023-03-30 NOTE — PROGRESS NOTES
CHF CLINICAL STAFF DOCUMENTATION    Have you had any Chest Pain? - Yes  on and off at rest     Do you had any Shortness of Breath - Yes  If Yes - When at rest and on exertion    Have you had any dizziness - Yes     1 week ago had a fall  When do you feel dizzy with position change   How long does it last .20  seconds     Do you have any edema -  Yes  swelling in feet ankles legs      Are you on fluid restrictions - No    Amount - 64 oz   Are you on sodium restrictions - No   Amount - 2 gm    Do you feel fatigued - Yes    Do you have a cough - Yes    Lung sounds -    Normal - No   Abnormal - diminished with scattered rhonchi    Do you have abdominal bloating - Yes    How is your appetite - fair    Do you have difficulty sleeping - Yes  Able to lie flat? - No     Do you have a history of sleep apnea - Yes  CPAP  has the machine but does not wear    6 min.  Walk:  in a wheelchair- SOB with minimal exertion   Pre heart rate   Time walked  6 minutes   Distance    Post heart rate        Have you had Flu Vaccine - No    Have you had Pneumonia Vaccine - No
48-64 oz/day  Sodium and fluid restriction compliance: yes      Past Medical History:   Diagnosis Date    A-fib [facility])     Resolved after ablation 2018    Anxiety     Arthritis     \"Hips, Knees, Elbows And Fingers\"    COPD (chronic obstructive pulmonary disease) ([facility])     Sees Dr. William Martínez    Depression     Diabetes mellitus [facility]) Dx 7839'G    Full dentures     H/O angiography 12/13/2018    peripheral angio - LFA occluded, filling collaterals, RSFA 90% stenosis-vasc surg consult    H/O Doppler ultrasound 09/05/2018    LE arterial - left mid and distal femoral artery occluded, lt FANI shows mild-mod PVD    H/O echocardiogram 01/06/2016    EF60% mild MR, TR, pulm HTN    Hx of colonoscopy     7/11 C-scope - benign polyp x1    Hx of Doppler ultrasound 02/20/2018    Lower extremity doppler  Normal study.     Hyperlipidemia     Hypertension     Macular degeneration     States is legally blind    Obesity     On home oxygen therapy     Continuous At 2 Liters Per Nasal Cannula    PAD (peripheral artery disease) ([address]     10/10 FANI mild PAD left superficial femoral s/p left fem-pop bypass    Panic attacks     Pneumonia Last Episode In 2018    PTSD (post-traumatic stress disorder)     Restless leg     Shortness of breath     Sleep apnea     Uses BiPap - stopped using 4/2020    Wears glasses     To Read     Past Surgical History:   Procedure Laterality Date    APPENDECTOMY  2003    ATRIAL ABLATION SURGERY  05/23/2018    A-fib ablation     CARDIAC SURGERY  05/2018    \"Heart Ablation Done Twice\"    CHOLECYSTECTOMY, LAPAROSCOPIC  11/12/2018    COLONOSCOPY  07/2011    Polyp Removed    DENTAL SURGERY      All Teeth Extracted In Past    EYE SURGERY Left 2017    \"For Macular Degeneration\"    FEMORAL BYPASS Left 01/2011    FEMORAL BYPASS Left 1/9/2019    FEMORAL POPLITEAL BYPASS LEFT, REDO performed by Maryam Cooper MD at [address] [facility] Left 1980's    Broken Left Wrist , No Hardware    FRACTURE SURGERY Right 2000's    Broken

## 2023-04-03 ENCOUNTER — CARE COORDINATION (OUTPATIENT)
Dept: CASE MANAGEMENT | Age: 68
End: 2023-04-03

## 2023-04-03 NOTE — CARE COORDINATION
[address] Care Transitions Follow Up Call    Patient Current Location:  Home: [address] [place] [address] Transition Nurse contacted the patient by telephone to follow up after admission on 3/24/23. Verified name and  with patient as identifiers. Patient: Bubba Dawson  Patient : 1955   MRN: [id number]  Reason for Admission: COPD exacerbation  Discharge Date: 3/28/23 RARS: Readmission Risk Score: 18.5      Needs to be reviewed by the provider   Additional needs identified to be addressed with provider: No  none             Method of communication with provider: none. Contacted patient for care transition follow up. Satish Carranza states that he is doing much better than last week. Reports breathing has been \"more relaxed\". Continues to use his oxygen. He is using 2-3 L of oxygen. SpO2 remaining between 92-95% with oxygen. He continues to become short of breath with exertion especially when walking distances. He is trying not to overexert himself. Denies having any c/o chest pain/discomfort, pressure, tightness currently although he admits to having chest tightness when he is overexerting himself. He is taking rest periods as needed. He confirmed he received the medication assistance application and his friend helped him with the application today. States his friend put it in the mail today. He has a follow up with PCP on 4/10/23. Briefly discussed when to contact providers. He verbalized understanding. No questions or needs at this time. Addressed changes since last contact:  none  Discussed follow-up appointments. If no appointment was previously scheduled, appointment scheduling offered: Yes. Is follow up appointment scheduled within 7 days of discharge? Yes. Follow Up  Future Appointments   Date Time Provider Carrie Aldana   4/10/2023  3:00 PM Zahira Lares MD [facility]   2023  2:00 PM Bo Hart, APRN - CNP

## 2023-04-04 ENCOUNTER — CARE COORDINATION (OUTPATIENT)
Dept: CARE COORDINATION | Age: 68
End: 2023-04-04

## 2023-04-04 NOTE — CARE COORDINATION
of having balanced meals/snacks to help keep blood sugar steady. Discussed how eating a carbohydrate alone such as a banana versus a banana with peanut butter will affect blood sugar differently. Explained the components of a balanced meal using the MyPlate MBPGMOVYB-2/9 plate fruits and/or vegetables, 1/4 plate protein and 1/4 plate starchy carbohydrates with 8 oz glass of low fat milk if desired. Explained the importance of not skipping meals completely- discussed eating a balanced snack or supplementing with Glucerna as needed. RD will mail Glucerna coupons. RD reviewed the importance of checking BS daily and taking medicine as directed. Patient states his BS has been running around 130 mg/dL. RD noted patient's current A1C is 9.3% as of 2/28/23. Patient has no nutrition related questions or concerns at this time. RD offered to mail educational handouts to pt to reinforce concepts discussed during phone conversation, pt accepted and RD verified address. 24-Hour Diet Recall  Breakfast  Consumed: none    AM Snack  Consumed: none    Lunch  Consumed: spaghettios     PM Snack  Consumed: none    Dinner  Consumed: no example provided per pt    Nutrition Diagnosis:  #1 Problem Altered nutrition-related lab values: A1C       Etiology related to uncontrolled type 2 DM       Signs/Symptoms as evidenced by A1C 9.3% as of 2/28/23    #2 Problem Food and nutrition-related knowledge deficit       Etiology related to lack of prior nutrition related education e       Signs/Symptoms as evidenced by elevated A1C, inappropriate intake of CHO and referral for DM education     Nutrition Intervention:     Estimated Needs  cardiac diet and diabetic diet providing 2900 kcals ([address] based on AdBW for obesity class III).     Estimated daily CHO Needs: 330 g (based on 45-65% total calorie intake)  Estimated daily Protein Needs: 100-126 g (based on 0.8-1.0 g/kg based on AdBW)  Estimated daily Fluid Needs: 2000 ml per chart review

## 2023-04-06 ENCOUNTER — CARE COORDINATION (OUTPATIENT)
Dept: CARE COORDINATION | Age: 68
End: 2023-04-06

## 2023-04-06 NOTE — CARE COORDINATION
I was able to mail the patient zone sheets requested by Himanshu Arreguin and ken for Glucerna.           [facility]   Medication Assistance  [facility], and [employer]    R) [phone] (k) [phone]

## 2023-04-07 ENCOUNTER — CARE COORDINATION (OUTPATIENT)
Dept: CARE COORDINATION | Age: 68
End: 2023-04-07

## 2023-04-07 NOTE — CARE COORDINATION
Patient Current Location: Home: [address]     Phone call to Pt who was in a hurry, stating his phone is going to die. SW asked if he received the documents sent in the mail. Pt reported he hasn't checked the mail today. SW reported he should have received the forms a little while ago. Pt reported due to his legs, he hasn't checked his mail recently, stating he will have his neighbor check. Asked if this SW should call back today to confirm or another day. Pt requested this SW call back another day. VIELKA plan of care:  VIELKA will follow up with Pt to confirm he received documents sent for utility assistance, medicaid aakash and mercy financial assistance on 4/18.

## 2023-04-17 ENCOUNTER — CARE COORDINATION (OUTPATIENT)
Dept: CASE MANAGEMENT | Age: 68
End: 2023-04-17

## 2023-04-17 NOTE — CARE COORDINATION
[address] Care Transitions Follow Up Call    Patient Current Location:  Home: [address] [place] [address] Transition Nurse contacted the patient by telephone to follow up after admission on 3/24/23. Verified name and  with patient as identifiers. Patient: Mikayla Madera  Patient : 1955   MRN: [id number]  Reason for Admission: COPD exacerbation  Discharge Date: 3/28/23 RARS: Readmission Risk Score: 18.5      Needs to be reviewed by the provider   Additional needs identified to be addressed with provider: No  none             Method of communication with provider: none. Contacted patient for care transition follow up. Spoke very briefly with patient who was working with therapy. Kevin Neri states that his breathing still is labored. Reports that he continues to become winded very easily with exertion. States he is discussing issue with SOB with the therapist right now. CTN will call him back at a later time. Care Transition Nurse provided contact information for future needs. Plan for follow-up call in 1-2 days based on severity of symptoms and risk factors.   Plan for next call: symptom management-SOB, SpO2, chest tightness, any new or worsening symptoms    Kristal Henriquez RN

## 2023-04-18 ENCOUNTER — CARE COORDINATION (OUTPATIENT)
Dept: CARE COORDINATION | Age: 68
End: 2023-04-18

## 2023-04-18 NOTE — CARE COORDINATION
Patient Current Location: Home:   [address]     Phone call to Pt to follow up regarding financial assistance applications. Pt was alert and oriented, pleasant. Pt reported he did receive the utility assistance and medicaid application stating he sat down with his landlord who assisted him in completing the forms. Pt believes he sent in the utility assistance form and will have to ask the landlord for an update as he states he is forgetful. SW discussed plan to follow up with Pt in one week regarding applications and if he hasn't heard from [facility], this SW will assist pt in calling to schedule phone interview. Pt was in agreement. Pt reported his insurance is covering his medical bill, states he no longer needs to complete [employer] form. VIELKA plan of care:  VIELKA will call Pt in one week 4/25 to follow up regarding utility assistance and medicaid applications.

## 2023-04-19 ENCOUNTER — CARE COORDINATION (OUTPATIENT)
Dept: CASE MANAGEMENT | Age: 68
End: 2023-04-19

## 2023-04-19 NOTE — CARE COORDINATION
on symptoms and resources available to patient including: PCP  Specialist  Home health. The patient agrees to contact the PCP office for questions related to their healthcare. Patients top risk factors for readmission: financial, functional physical ability, lack of knowledge about disease, medical condition-COPD exacerbation, and polypharmacy  Interventions to address risk factors: Education of patient/family/caregiver/guardian to support self-management-when to contact providers    Offered patient enrollment in the Remote Patient Monitoring (RPM) program for in-home monitoring: NA.     Care Transitions Subsequent and Final Call    Subsequent and Final Calls  Do you have any ongoing symptoms?: Yes  Patient-reported symptoms: Shortness of Breath, Other  Have your medications changed?: No  Do you have any questions related to your medications?: No  Do you currently have any active services?: Yes  Are you currently active with any services?: Home Health  Do you have any needs or concerns that I can assist you with?: No  Care Transitions Interventions   Home Care Waiver: Completed  Other Services: Completed      Transportation Support: Declined      DME Assistance: Declined    Medication Assistance Program: Completed       Social Work: Declined    Disease Association: Completed      Other Interventions:             Care Transition Nurse provided contact information for future needs. Plan for follow-up call in 10-14 days based on severity of symptoms and risk factors.   Plan for next call: symptom management-any new or worsening symptoms, next call final  medication management-ensure he has all of his medications and taking as prescribed    Carlos Arnold RN

## 2023-04-25 ENCOUNTER — CARE COORDINATION (OUTPATIENT)
Dept: CARE COORDINATION | Age: 68
End: 2023-04-25

## 2023-04-25 RX ORDER — ALBUTEROL SULFATE 90 UG/1
2 AEROSOL, METERED RESPIRATORY (INHALATION) 2 TIMES DAILY
Qty: 18 G | Refills: 3 | Status: SHIPPED | OUTPATIENT
Start: 2023-04-25

## 2023-04-25 NOTE — CARE COORDINATION
Ferny Ortega  4/25/2023    Registered Dietitian Progress Note for Care Coordination    Assessment: Scout Patterson is a 74 y.o. male. RD referred for Diabetes. RD spoke with patient for initial nutrition assessment on 4/4/23. RD called to follow up with pt today 4/25/23. RD discussed previous goals with pt. Patient states he is not sure if he has received the handouts and coupons in the mail- RD verified address. RD reviewed the importance of eating balanced meals/snacks consistently and not skipping meals. Patient is eating lunch and dinner. RD discussed eating a balanced snack (protein and carbohydrate) or drinking a Glucerna as a meal replacement instead of skipping meals completely. Reviewed the importance of taking medicine as directed and checking BS daily. Patient states his BS is running around 150 mg/dL. Per chart review, patient has OV with PCP on 5/9/23. Patient has no nutrition related questions or concerns at this time. Barriers to meeting goals: lack of motivation, overwhelmed by complexity of regimen, and lack of education      Nutrition Monitoring and Evaluation  Indicator/Goal Criteria Progress   #1 Eat balanced meals consistently throughout the day. #1 Aim for 3 meals/day. Make meals more balanced using MyPlate. #1 Patient is eating 2 meals/day. Pt will focus on making meals more balanced using MyPlate. #2  Do not skip meals. #2 Supplement with Glucerna for breakfast instead of skipping the meal completely. #2 Patient is skipping breakfast. Pt will focus on eating a balanced snack or drinking Glucerna as a meal replacement. #3  Check BS and keep a log. #3 Check BS daily and take medicine as directed. #3 Patient states his BS is running around 150 mg/dL. Plan of Care:  RD encouraged pt to keep working toward goals set. RD mailed educational handouts, pt has not yet received them.  RD will follow up with pt to ensure handouts were received, discuss any questions pt has and check the
Statement Selected

## 2023-04-26 ENCOUNTER — CARE COORDINATION (OUTPATIENT)
Dept: CARE COORDINATION | Age: 68
End: 2023-04-26

## 2023-04-26 NOTE — CARE COORDINATION
Patient Current Location: Home:   [address]     Phone call to Pt to follow up regarding medicaid application and utility assistance application process. Pt reported he was approved for Passport and stated it is a relief that medicaid will cover the cost of his medications, which will save him several hundred dollars / month. Pt reported he was declined assistance through [facility] for utility assistance as his income is too high. SW asked what services he would receive through [employer]port. Pt reported he declined home delivered meals and     Pt reported he stopped receiving a food box from the food bank. Pt reported he called and there is no food bank in [facility]. SW discussed there is [facility]. Pt stated that is the name and he has the number and will call to try to get the food delivery set up again. VIELKA plan of care:  SW will follow up with Pt in 2 weeks on May 10 to discuss if Pt would like support with completing ACP and if he would like referral [facility] independent living for individuals with low vision.

## 2023-04-28 ENCOUNTER — CARE COORDINATION (OUTPATIENT)
Dept: CASE MANAGEMENT | Age: 68
End: 2023-04-28

## 2023-04-28 NOTE — CARE COORDINATION
[facility] Care Transitions Follow Up Call    Patient Current Location:  Home: [address] [place] [address] Transition Nurse contacted the patient by telephone to follow up after admission on 3/24/23. Verified name and  with patient as identifiers. Patient: Alton Harper  Patient : 1955   MRN: [id number]  Reason for Admission: COPD exacerbation  Discharge Date: 3/28/23 RARS: Readmission Risk Score: 18.5      Needs to be reviewed by the provider   Additional needs identified to be addressed with provider: Yes  Per patient, having swelling and pain in left groin area. Reports symptoms started approximately one week ago. Reports calling PCP office and was told to go to ED to be evaluated. Method of communication with provider: chart routing. Contacted patient for care transition follow up. Rick Casas states he has a hernia. Reports that he is having swelling and pain in left groin area. Reports he called his PCP office and was told to go to the ED to be evaluated. Reports pain radiates from his left groin to his spine. Symptoms started approximately one week ago. He informed CTN that he plans to go to the ED but just doesn't know when he will go. We discussed the importance of being evaluated. He verbalized understanding. As far as his breathing, he continues to become short of breath with exertion as well as when his pain increases. Denies distress. Continues to use his oxygen. Denies having any c/o chest pain/discomfort, pressure, tightness, nausea/vomiting, fever. Admits to having chills at times. CTN will follow up on Monday, 23. Addressed changes since last contact:   Swelling and pain in left groin area, symptoms started approximately one week ago. Discussed follow-up appointments. If no appointment was previously scheduled, appointment scheduling offered: Yes. Is follow up appointment scheduled within 7 days of discharge?

## 2023-05-01 ENCOUNTER — CARE COORDINATION (OUTPATIENT)
Dept: CASE MANAGEMENT | Age: 68
End: 2023-05-01

## 2023-05-01 NOTE — CARE COORDINATION
[facility] Care Transitions Follow Up Call    Patient Current Location:  Home: [address] [place] [address] Transition Nurse contacted the patient by telephone to follow up after admission on 3/24/23. Verified name and  with patient as identifiers. Patient: Bernard Whatley  Patient : 1955   MRN: [id number]  Reason for Admission: COPD exacerbation  Discharge Date: 3/28/23 RARS: Readmission Risk Score: 18.5      Needs to be reviewed by the provider   Additional needs identified to be addressed with provider: No  none             Method of communication with provider: none. Contacted patient for care transition follow up. Spoke briefly with patient. Zuhair Medina states that he is okay. Very abrupt with answers. Reports he continues to have left groin swelling and \"some tenderness\". States he did not feel like going to the ED over the weekend. Discussed importance of being evaluated. He verbalized understanding. CTN informed him that PCP aware because message was sent to provider last week. He states PCP office did call him and encouraged him to go to the ED as well. He informed CTN that he may go today or tomorrow. He denies having any c/o chest pain/discomfort, increased shortness of breath. Reports breathing at baseline when the oxygen is on. He does not have any questions or needs at this time. Addressed changes since last contact:   left groin swelling and tenderness  Discussed follow-up appointments. If no appointment was previously scheduled, appointment scheduling offered: Yes. Is follow up appointment scheduled within 7 days of discharge? Yes.     Follow Up  Future Appointments   Date Time Provider Carrie Aldana   2023  1:40 PM Laurie Montiel MD [facility]   2023  1:00 PM Hilda Lr MD [facility]       Care Transition Nurse reviewed red flags with patient and discussed any barriers to care and/or understanding of plan of

## 2023-05-03 ENCOUNTER — CARE COORDINATION (OUTPATIENT)
Dept: CASE MANAGEMENT | Age: 68
End: 2023-05-03

## 2023-05-03 NOTE — CARE COORDINATION
discharge? Yes. Follow Up  Future Appointments   Date Time Provider Carrie Aldana   5/8/2023  1:40 PM Billie Peter MD [facility]   5/9/2023  1:00 PM Ike Shelton MD [facility]       Care Transition Nurse reviewed red flags with patient and discussed any barriers to care and/or understanding of plan of care after discharge. Discussed appropriate site of care based on symptoms and resources available to patient including: PCP  Specialist  When to call 911. The patient agrees to contact the PCP office for questions related to their healthcare. Patients top risk factors for readmission: financial, functional physical ability, lack of knowledge about disease, medical condition-COPD exacerbation, and polypharmacy  Interventions to address risk factors: Education of patient/family/caregiver/guardian to support self-management-s/s and when to contact providers as well as when to report to the ED    Offered patient enrollment in the Remote Patient Monitoring (RPM) program for in-home monitoring: NA.     Care Transitions Subsequent and Final Call    Subsequent and Final Calls  Do you have any ongoing symptoms?: Yes  Patient-reported symptoms: Shortness of Breath, Other  Have your medications changed?: No  Do you have any questions related to your medications?: No  Do you currently have any active services?: No  Are you currently active with any services?: Home Health  Do you have any needs or concerns that I can assist you with?: No  Care Transitions Interventions   Home Care Waiver: Completed  Other Services: Completed      Transportation Support: Declined      DME Assistance: Declined    Medication Assistance Program: Completed       Social Work: Declined    Disease Association: Completed      Other Interventions:             Care Transition Nurse provided contact information for future needs. No further follow-up call indicated. CTN sending referral to ACM.       Thomas Moscoso RN

## 2023-05-04 ENCOUNTER — HOSPITAL ENCOUNTER (EMERGENCY)
Age: 68
Discharge: HOME | End: 2023-05-04
Payer: MEDICARE

## 2023-05-04 ENCOUNTER — APPOINTMENT (OUTPATIENT)
Dept: CT IMAGING | Age: 68
End: 2023-05-04
Payer: MEDICARE

## 2023-05-04 VITALS
OXYGEN SATURATION: 97 % | WEIGHT: 315 LBS | HEART RATE: 82 BPM | RESPIRATION RATE: 15 BRPM | DIASTOLIC BLOOD PRESSURE: 66 MMHG | BODY MASS INDEX: 36.45 KG/M2 | HEIGHT: 78 IN | TEMPERATURE: 98 F | SYSTOLIC BLOOD PRESSURE: 128 MMHG

## 2023-05-04 DIAGNOSIS — K43.9 VENTRAL HERNIA WITHOUT OBSTRUCTION OR GANGRENE: ICD-10-CM

## 2023-05-04 DIAGNOSIS — R73.9 HYPERGLYCEMIA: ICD-10-CM

## 2023-05-04 DIAGNOSIS — K42.9 UMBILICAL HERNIA WITHOUT OBSTRUCTION AND WITHOUT GANGRENE: ICD-10-CM

## 2023-05-04 DIAGNOSIS — R10.32 ABDOMINAL PAIN, LEFT LOWER QUADRANT: Primary | ICD-10-CM

## 2023-05-04 LAB
ALBUMIN SERPL-MCNC: 3.9 GM/DL (ref 3.4–5)
ALP BLD-CCNC: 79 IU/L (ref 40–129)
ALT SERPL-CCNC: 25 U/L (ref 10–40)
ANION GAP SERPL CALCULATED.3IONS-SCNC: 9 MMOL/L (ref 4–16)
AST SERPL-CCNC: 16 IU/L (ref 15–37)
BASOPHILS ABSOLUTE: 0.1 K/CU MM
BASOPHILS RELATIVE PERCENT: 0.5 % (ref 0–1)
BILIRUB SERPL-MCNC: 0.5 MG/DL (ref 0–1)
BILIRUBIN URINE: NEGATIVE MG/DL
BLOOD, URINE: NEGATIVE
BUN SERPL-MCNC: 12 MG/DL (ref 6–23)
CALCIUM SERPL-MCNC: 9.2 MG/DL (ref 8.3–10.6)
CHLORIDE BLD-SCNC: 94 MMOL/L (ref 99–110)
CLARITY: CLEAR
CO2: 32 MMOL/L (ref 21–32)
COLOR: YELLOW
COMMENT UA: ABNORMAL
CREAT SERPL-MCNC: 0.5 MG/DL (ref 0.9–1.3)
DIFFERENTIAL TYPE: ABNORMAL
EOSINOPHILS ABSOLUTE: 0.1 K/CU MM
EOSINOPHILS RELATIVE PERCENT: 1.4 % (ref 0–3)
GFR SERPL CREATININE-BSD FRML MDRD: >60 ML/MIN/1.73M2
GLUCOSE SERPL-MCNC: 287 MG/DL (ref 70–99)
GLUCOSE, URINE: 500 MG/DL
HCT VFR BLD CALC: 40.8 % (ref 42–52)
HEMOGLOBIN: 13.9 GM/DL (ref 13.5–18)
IMMATURE NEUTROPHIL %: 0.4 % (ref 0–0.43)
KETONES, URINE: NEGATIVE MG/DL
LACTATE: 2.1 MMOL/L (ref 0.5–1.9)
LEUKOCYTE ESTERASE, URINE: NEGATIVE
LYMPHOCYTES ABSOLUTE: 1.3 K/CU MM
LYMPHOCYTES RELATIVE PERCENT: 12.4 % (ref 24–44)
MCH RBC QN AUTO: 31.4 PG (ref 27–31)
MCHC RBC AUTO-ENTMCNC: 34.1 % (ref 32–36)
MCV RBC AUTO: 92.3 FL (ref 78–100)
MONOCYTES ABSOLUTE: 0.7 K/CU MM
MONOCYTES RELATIVE PERCENT: 7.3 % (ref 0–4)
NITRITE URINE, QUANTITATIVE: NEGATIVE
NUCLEATED RBC %: 0 %
PDW BLD-RTO: 14.4 % (ref 11.7–14.9)
PH, URINE: 5.5 (ref 5–8)
PLATELET # BLD: 162 K/CU MM (ref 140–440)
PMV BLD AUTO: 9.7 FL (ref 7.5–11.1)
POTASSIUM SERPL-SCNC: 4.3 MMOL/L (ref 3.5–5.1)
PROTEIN UA: NEGATIVE MG/DL
RBC # BLD: 4.42 M/CU MM (ref 4.6–6.2)
SEGMENTED NEUTROPHILS ABSOLUTE COUNT: 7.9 K/CU MM
SEGMENTED NEUTROPHILS RELATIVE PERCENT: 78 % (ref 36–66)
SODIUM BLD-SCNC: 135 MMOL/L (ref 135–145)
SPECIFIC GRAVITY UA: 1.02 (ref 1–1.03)
TOTAL IMMATURE NEUTOROPHIL: 0.04 K/CU MM
TOTAL NUCLEATED RBC: 0 K/CU MM
TOTAL PROTEIN: 6.9 GM/DL (ref 6.4–8.2)
UROBILINOGEN, URINE: 1 MG/DL (ref 0.2–1)
WBC # BLD: 10.1 K/CU MM (ref 4–10.5)

## 2023-05-04 PROCEDURE — 96374 THER/PROPH/DIAG INJ IV PUSH: CPT

## 2023-05-04 PROCEDURE — 99285 EMERGENCY DEPT VISIT HI MDM: CPT

## 2023-05-04 PROCEDURE — 2580000003 HC RX 258: Performed by: PHYSICIAN ASSISTANT

## 2023-05-04 PROCEDURE — 96375 TX/PRO/DX INJ NEW DRUG ADDON: CPT

## 2023-05-04 PROCEDURE — 83605 ASSAY OF LACTIC ACID: CPT

## 2023-05-04 PROCEDURE — 6360000002 HC RX W HCPCS: Performed by: PHYSICIAN ASSISTANT

## 2023-05-04 PROCEDURE — 81003 URINALYSIS AUTO W/O SCOPE: CPT

## 2023-05-04 PROCEDURE — 80053 COMPREHEN METABOLIC PANEL: CPT

## 2023-05-04 PROCEDURE — 6360000004 HC RX CONTRAST MEDICATION: Performed by: PHYSICIAN ASSISTANT

## 2023-05-04 PROCEDURE — 85025 COMPLETE CBC W/AUTO DIFF WBC: CPT

## 2023-05-04 PROCEDURE — 74177 CT ABD & PELVIS W/CONTRAST: CPT

## 2023-05-04 RX ORDER — 0.9 % SODIUM CHLORIDE 0.9 %
1000 INTRAVENOUS SOLUTION INTRAVENOUS ONCE
Status: COMPLETED | OUTPATIENT
Start: 2023-05-04 | End: 2023-05-04

## 2023-05-04 RX ORDER — ONDANSETRON 2 MG/ML
4 INJECTION INTRAMUSCULAR; INTRAVENOUS ONCE
Status: COMPLETED | OUTPATIENT
Start: 2023-05-04 | End: 2023-05-04

## 2023-05-04 RX ORDER — MORPHINE SULFATE 4 MG/ML
4 INJECTION, SOLUTION INTRAMUSCULAR; INTRAVENOUS
Status: COMPLETED | OUTPATIENT
Start: 2023-05-04 | End: 2023-05-04

## 2023-05-04 RX ORDER — DICYCLOMINE HYDROCHLORIDE 10 MG/1
10 CAPSULE ORAL 3 TIMES DAILY PRN
Qty: 30 CAPSULE | Refills: 0 | Status: SHIPPED | OUTPATIENT
Start: 2023-05-04

## 2023-05-04 RX ADMIN — ONDANSETRON 4 MG: 2 INJECTION INTRAMUSCULAR; INTRAVENOUS at 12:01

## 2023-05-04 RX ADMIN — MORPHINE SULFATE 4 MG: 4 INJECTION, SOLUTION INTRAMUSCULAR; INTRAVENOUS at 12:01

## 2023-05-04 RX ADMIN — IOPAMIDOL 80 ML: 755 INJECTION, SOLUTION INTRAVENOUS at 12:35

## 2023-05-04 RX ADMIN — SODIUM CHLORIDE 1000 ML: 9 INJECTION, SOLUTION INTRAVENOUS at 12:40

## 2023-05-04 ASSESSMENT — PAIN DESCRIPTION - LOCATION: LOCATION: GROIN

## 2023-05-04 ASSESSMENT — PAIN DESCRIPTION - FREQUENCY: FREQUENCY: CONTINUOUS

## 2023-05-04 ASSESSMENT — PAIN SCALES - GENERAL
PAINLEVEL_OUTOF10: 7
PAINLEVEL_OUTOF10: 5
PAINLEVEL_OUTOF10: 7

## 2023-05-04 ASSESSMENT — PAIN DESCRIPTION - ONSET: ONSET: GRADUAL

## 2023-05-04 ASSESSMENT — PAIN DESCRIPTION - DESCRIPTORS: DESCRIPTORS: ACHING;STABBING

## 2023-05-04 ASSESSMENT — PAIN DESCRIPTION - PAIN TYPE: TYPE: ACUTE PAIN

## 2023-05-04 ASSESSMENT — PAIN DESCRIPTION - ORIENTATION: ORIENTATION: LEFT

## 2023-05-04 ASSESSMENT — PAIN - FUNCTIONAL ASSESSMENT: PAIN_FUNCTIONAL_ASSESSMENT: 0-10

## 2023-05-04 NOTE — ED PROVIDER NOTES
[address] ENCOUNTER        Pt Name: Evetta Lundborg  MRN: [id number]  [place] 1955  Date of evaluation: 5/4/2023  Provider: Julio Casper PA-C  PCP: Raul Duggan MD  Note Started: 11:00 AM EDT 5/4/23      TYRONE. I have evaluated this patient. My supervising physician was available for consultation. CHIEF COMPLAINT       Chief Complaint   Patient presents with    Groin Swelling       HISTORY OF PRESENT ILLNESS: 1 or more Elements     History From: patient    Evetta Lundborg is a 74 y.o. male with past medical history of hypertension, hyperlipidemia, diabetes, umbilical hernia who presents complaining of abdominal pain x2 weeks. Patient reports abdominal pain and swelling in the left lower quadrant area for 2 weeks, worse times the past 4 days. Patient reports of a hernia that has been chronically in the umbilical and ventral area, thinks this is causing his pain. Last bowel movement was this morning, last flatus was just a few minutes ago. He has nausea, denies vomiting. He has had prior cholecystectomy, hernia repair. Nursing Notes were all reviewed and agreed with or any disagreements were addressed in the HPI. REVIEW OF SYSTEMS :      Review of Systems   All other systems reviewed and are negative. Positives and Pertinent negatives as per HPI.        PAST MEDICAL HISTORY    has a past medical history of A-fib ([address] Anxiety, Arthritis, COPD (chronic obstructive pulmonary disease) ([address] Depression, Diabetes mellitus ([address] (Dx 1990's), Full dentures, H/O angiography (12/13/2018), H/O Doppler ultrasound (09/05/2018), H/O echocardiogram (01/06/2016), colonoscopy, Doppler ultrasound (02/20/2018), Hyperlipidemia, Hypertension, Macular degeneration, Obesity, On home oxygen therapy, PAD (peripheral artery disease) ([address] Panic attacks, Pneumonia (Last Episode In 2018), PTSD (post-traumatic

## 2023-05-10 ENCOUNTER — CARE COORDINATION (OUTPATIENT)
Dept: CARE COORDINATION | Age: 68
End: 2023-05-10

## 2023-05-10 ASSESSMENT — SOCIAL DETERMINANTS OF HEALTH (SDOH)
WITHIN THE LAST YEAR, HAVE YOU BEEN HUMILIATED OR EMOTIONALLY ABUSED IN OTHER WAYS BY YOUR PARTNER OR EX-PARTNER?: NO
WITHIN THE LAST YEAR, HAVE YOU BEEN AFRAID OF YOUR PARTNER OR EX-PARTNER?: NO
WITHIN THE LAST YEAR, HAVE TO BEEN RAPED OR FORCED TO HAVE ANY KIND OF SEXUAL ACTIVITY BY YOUR PARTNER OR EX-PARTNER?: NO
HOW HARD IS IT FOR YOU TO PAY FOR THE VERY BASICS LIKE FOOD, HOUSING, MEDICAL CARE, AND HEATING?: SOMEWHAT HARD
WITHIN THE LAST YEAR, HAVE YOU BEEN KICKED, HIT, SLAPPED, OR OTHERWISE PHYSICALLY HURT BY YOUR PARTNER OR EX-PARTNER?: NO

## 2023-05-10 NOTE — CARE COORDINATION
Patient Current Location: Home:   [address]     Phone call to Pt to follow up regarding [employer]. Pt reported he now has medicaid, but does have past bills. Pt reported he believes he completed this document about a week or so ago with his friend. SW reported that if he does not hear back soon, this SW could assist him in calling to confirm they received his referral.     Pt asked if medicaid will cover dentures. Offered to merge call to [address] [place] [address] to ask and Pt was in agreement. Merged call to [address] [place] [address] and they stated this SW would need to call [facility]. Pt and SW got disconnected. Phone call back to Pt who reported he would contact medicaid to verify. Discussed with Pt that this sw did look it up online and it does state he can obtain dentures through medicaid. Pt reported he will contact his dentist who provided his last pair of dentures, which are now broken. SW advised Pt to ask if he accepts medicaid. Pt stated understanding. Discussed with Pt if he would like a referral to [facility] for low vision services and Pt was in agreement. Pt did state his last vision exam was about 2 years ago or more. Discussed with Pt that he may need a more recent exam and Pt stated he would be in agreement with having an exam.  Discussed plans to make a referral to [facility] and Pt provided verbal consent for this SW to make a referral.    Sent secure email to [facility] to make referral for low vision services. VIELKA plan of care:  SW will follow up with Pt in 2 weeks on May 24 regarding [facility] financial assistance, [facility] referral and dentures.

## 2023-05-12 ENCOUNTER — CARE COORDINATION (OUTPATIENT)
Dept: CARE COORDINATION | Age: 68
End: 2023-05-12

## 2023-05-12 SDOH — SOCIAL STABILITY: SOCIAL NETWORK: ARE YOU MARRIED, WIDOWED, DIVORCED, SEPARATED, NEVER MARRIED, OR LIVING WITH A PARTNER?: DIVORCED

## 2023-05-12 SDOH — HEALTH STABILITY: MENTAL HEALTH: HOW MANY STANDARD DRINKS CONTAINING ALCOHOL DO YOU HAVE ON A TYPICAL DAY?: PATIENT DOES NOT DRINK

## 2023-05-12 SDOH — ECONOMIC STABILITY: HOUSING INSECURITY: IN THE LAST 12 MONTHS, HOW MANY PLACES HAVE YOU LIVED?: 1

## 2023-05-12 SDOH — SOCIAL STABILITY: SOCIAL NETWORK: IN A TYPICAL WEEK, HOW MANY TIMES DO YOU TALK ON THE PHONE WITH FAMILY, FRIENDS, OR NEIGHBORS?: TWICE A WEEK

## 2023-05-12 SDOH — HEALTH STABILITY: PHYSICAL HEALTH: ON AVERAGE, HOW MANY MINUTES DO YOU ENGAGE IN EXERCISE AT THIS LEVEL?: 10 MIN

## 2023-05-12 SDOH — ECONOMIC STABILITY: INCOME INSECURITY: HOW HARD IS IT FOR YOU TO PAY FOR THE VERY BASICS LIKE FOOD, HOUSING, MEDICAL CARE, AND HEATING?: NOT VERY HARD

## 2023-05-12 SDOH — SOCIAL STABILITY: SOCIAL NETWORK: HOW OFTEN DO YOU ATTENT MEETINGS OF THE CLUB OR ORGANIZATION YOU BELONG TO?: NEVER

## 2023-05-12 SDOH — SOCIAL STABILITY: SOCIAL NETWORK: HOW OFTEN DO YOU ATTEND CHURCH OR RELIGIOUS SERVICES?: 1 TO 4 TIMES PER YEAR

## 2023-05-12 SDOH — SOCIAL STABILITY: SOCIAL NETWORK: HOW OFTEN DO YOU GET TOGETHER WITH FRIENDS OR RELATIVES?: NEVER

## 2023-05-12 SDOH — HEALTH STABILITY: PHYSICAL HEALTH: ON AVERAGE, HOW MANY DAYS PER WEEK DO YOU ENGAGE IN MODERATE TO STRENUOUS EXERCISE (LIKE A BRISK WALK)?: 2 DAYS

## 2023-05-12 SDOH — ECONOMIC STABILITY: INCOME INSECURITY: IN THE LAST 12 MONTHS, WAS THERE A TIME WHEN YOU WERE NOT ABLE TO PAY THE MORTGAGE OR RENT ON TIME?: NO

## 2023-05-12 SDOH — HEALTH STABILITY: MENTAL HEALTH: HOW OFTEN DO YOU HAVE A DRINK CONTAINING ALCOHOL?: NEVER

## 2023-05-12 SDOH — ECONOMIC STABILITY: FOOD INSECURITY: WITHIN THE PAST 12 MONTHS, YOU WORRIED THAT YOUR FOOD WOULD RUN OUT BEFORE YOU GOT MONEY TO BUY MORE.: NEVER TRUE

## 2023-05-12 NOTE — CARE COORDINATION
health and well-being (symptoms, signs or risk factors) and what they need to do to manage their health?: Reasonable to good understanding and already engages in managing health or is willing to undertake better management   How well do you think your patient can engage in healthcare discussions? (Barriers include language, deafness, aphasia, alcohol or drug problems, learning difficulties, concentration): Clear and open communication, no identified barriers   Do other services need to be involved to help this patient?: Other care/services not required at this time   Are current services involved with this patient well-coordinated? (Include coordination with other services you are now recommendation): All required care/services in place and well-coordinated   Suggested Interventions and Community Resources  Fall Risk Prevention: In Process Home Health Services: Completed   Medication Assistance Program: Completed   Registered Dietician: In Process   Social Work: In Process   Transportation Services: In Process   Zone Management Tools:  In Process         Set up/Review an Education Plan, Set up/Review Goals              Future Appointments   Date Time Provider Carrie Aldana   5/17/2023  1:30 PM Lanie Vu MD [address] [facility] C[facility]   5/25/2023 11:30 AM Kitty Rojas MD [facility]     ,   COPD Assessment    Does the patient understand envrionmental exposure?: Yes  Is the patient able to verbalize Rescue vs. Long Acting medications?: Yes  Does the patient have a nebulizer?: Yes  Does the patient use a space with inhaled medications?: No     No patient-reported symptoms         Symptoms:     Change in chronic cough?: No/At Baseline  Change in sputum?: No/At Baseline     , and   General Assessment    Do you have any symptoms that are causing concern?: No

## 2023-05-19 ENCOUNTER — CARE COORDINATION (OUTPATIENT)
Dept: CARE COORDINATION | Age: 68
End: 2023-05-19

## 2023-05-19 NOTE — CARE COORDINATION
Spoke with patient briefly. Patient reports that he is fine; but is currently resting. Patient requests to defer ACM assessment to another time. ACM contact information provided should questions arise.

## 2023-05-23 ENCOUNTER — CARE COORDINATION (OUTPATIENT)
Dept: CARE COORDINATION | Age: 68
End: 2023-05-23

## 2023-05-23 NOTE — CARE COORDINATION
Yakov Kent  5/23/2023    Registered Dietitian Progress Note for Care Coordination    Assessment: Kathy Cobos is a 74 y.o. male. RD referred for Diabetes. RD spoke with patient for initial nutrition assessment on 4/4/23 and has been following up with patient. RD called to follow up with pt today 5/23/23. RD discussed previous goals with pt. RD reiterated the importance of eating 3 meals/day, taking medicine as directed and checking BS daily. Patient states he has an aide coming over 4 times a week and they cook for him- RD noted patient is active with [employer]. Patient is eating 3 meals/day and not skipping meals anymore. Patient is not checking BS daily, no BS reading provided per pt. RD reiterated the importance of checking BS at least once daily and keeping a log. Patient has no nutrition related questions or concerns at this time. Per chart review, patient has OV with PCP on 5/31/23. Nutrition Monitoring and Evaluation  Indicator/Goal Criteria Progress   #1 Eat balanced meals consistently throughout the day. #1 Aim for 3 meals/day. Make meals more balanced using MyPlate. #1 Patient is eating 3 meals/day and making meals balanced using MyPlate. #2  Do not skip meals. #2 Supplement with Glucerna for breakfast instead of skipping the meal completely. #2 Patient is not skipping breakfast. Patient is active with [employer]. #3  Check BS and keep a log. #3 Check BS daily and take medicine as directed. #3 No BS reading provided per pt. Plan of Care:  RD encouraged pt to keep working toward goals set. RD explained to pt this is final follow up call and provided contact information to pt. Encouraged pt to call RD in future with any nutrition related questions or concerns. Follow Up:    Final follow up call today 5/23/23. RD will continue to follow/assist with patient return call.         [address] [place] [address],   [phone]

## 2023-05-24 ENCOUNTER — CARE COORDINATION (OUTPATIENT)
Dept: CARE COORDINATION | Age: 68
End: 2023-05-24

## 2023-05-24 NOTE — CARE COORDINATION
Attempted phone call to [facility] to make referral for Independent Living low vision program.  Left message requesting return call to make referral.    Patient [facility] [address] with Pt that they did not receive this SW referral via email and that this SW did call today to make referral, left message for someone to call this SW back. Pt reported his ophthalmologist referred him for a low vision exam, but they never answered his calls so he didn't have the exam.     Food delivery - [address] - brought food box Monday    [facility] financial assistance - Pt reported he was not approved due to income    Pt did report he has an aide coming to the home 4 days / week for 2 hours / day which has been helpful. SW plan of care:  SW will await return call from [facility] to make referral for low vision services.   SW will follow up with Pt in 2 weeks regarding [facility] referral.

## 2023-05-25 ENCOUNTER — OFFICE VISIT (OUTPATIENT)
Dept: SURGERY | Age: 68
End: 2023-05-25
Payer: MEDICARE

## 2023-05-25 VITALS
BODY MASS INDEX: 36.45 KG/M2 | HEART RATE: 80 BPM | SYSTOLIC BLOOD PRESSURE: 140 MMHG | HEIGHT: 78 IN | WEIGHT: 315 LBS | DIASTOLIC BLOOD PRESSURE: 90 MMHG | OXYGEN SATURATION: 100 %

## 2023-05-25 DIAGNOSIS — K40.90 NON-RECURRENT UNILATERAL INGUINAL HERNIA WITHOUT OBSTRUCTION OR GANGRENE: ICD-10-CM

## 2023-05-25 DIAGNOSIS — Z01.818 PREOPERATIVE CLEARANCE: Primary | ICD-10-CM

## 2023-05-25 DIAGNOSIS — K43.2 INCISIONAL HERNIA, WITHOUT OBSTRUCTION OR GANGRENE: ICD-10-CM

## 2023-05-25 PROCEDURE — 4004F PT TOBACCO SCREEN RCVD TLK: CPT | Performed by: SURGERY

## 2023-05-25 PROCEDURE — G8417 CALC BMI ABV UP PARAM F/U: HCPCS | Performed by: SURGERY

## 2023-05-25 PROCEDURE — 3017F COLORECTAL CA SCREEN DOC REV: CPT | Performed by: SURGERY

## 2023-05-25 PROCEDURE — 99214 OFFICE O/P EST MOD 30 MIN: CPT | Performed by: SURGERY

## 2023-05-25 PROCEDURE — G8427 DOCREV CUR MEDS BY ELIG CLIN: HCPCS | Performed by: SURGERY

## 2023-05-25 PROCEDURE — 3078F DIAST BP <80 MM HG: CPT | Performed by: SURGERY

## 2023-05-25 PROCEDURE — 1123F ACP DISCUSS/DSCN MKR DOCD: CPT | Performed by: SURGERY

## 2023-05-25 PROCEDURE — 3074F SYST BP LT 130 MM HG: CPT | Performed by: SURGERY

## 2023-05-26 ENCOUNTER — CARE COORDINATION (OUTPATIENT)
Dept: CARE COORDINATION | Age: 68
End: 2023-05-26

## 2023-05-26 NOTE — CARE COORDINATION
Ambulatory Care Coordination Note  2023    Patient Current Location:  Home: [address] [place] [address] contacted the patient by telephone. Verified name and  with patient as identifiers. Provided introduction to self, and explanation of the MILTON HARE role. Challenges to be reviewed by the provider   Additional needs identified to be addressed with provider: No  none               Method of communication with provider: none. I spoke with the patient for continued Care Coordination follow up and education. Patient states he just woke up. Patient did not seem to be in a mood to talk. States he is doing okay. LIBERTY Mina has been in contact with the patient. Encouraged to use PCP office versus ED treatment for sx's of chronic conditions. Patient voiced understanding. I advised patient to contact PCP office if needed. No further needs at this time. Lab Results       None            Care Coordination Interventions    Referral from Primary Care Provider: No  Suggested Interventions and Community Resources  Fall Risk Prevention: In Process  Home Health Services: Completed  Medication Assistance Program: Completed  Registered Dietician: In Process  Social Work: In Process  Transportation Support: In Process  Zone Management Tools:  In Process          Goals Addressed    None         Future Appointments   Date Time Provider Carrie Aldana   2023  2:15 PM Verna Ochoa MD [address] [facility] Carly [facility]   2023  1:15 PM MD Jelena Vidal [facility]

## 2023-06-05 ENCOUNTER — TELEPHONE (OUTPATIENT)
Dept: CARDIOLOGY CLINIC | Age: 68
End: 2023-06-05

## 2023-06-07 ENCOUNTER — CARE COORDINATION (OUTPATIENT)
Dept: CARE COORDINATION | Age: 68
End: 2023-06-07

## 2023-06-07 ENCOUNTER — TELEPHONE (OUTPATIENT)
Dept: SURGERY | Age: 68
End: 2023-06-07

## 2023-06-07 NOTE — CARE COORDINATION
Attempted phone call to [facility] to make referral for low vision services. No answer, generic voicemail message left requesting return call to make a referral for services, contact number provided. VIELKA plan of care:  SW will follow up with [facility] on 6/8 if no response.

## 2023-06-07 NOTE — TELEPHONE ENCOUNTER
LVM FOR PT. APPT ON 7/6 WAS CANCELED DUE TO PT NOT WANTING TO SEE PULM FOR CLEARANCE.  ADVISED PT TO CALL BACK IF HE CHANGED HIS MIND ABOUT MOVING FORWARD

## 2023-06-08 ENCOUNTER — CARE COORDINATION (OUTPATIENT)
Dept: CARE COORDINATION | Age: 68
End: 2023-06-08

## 2023-06-08 NOTE — CARE COORDINATION
Attempted phone call to Dianelys Patiño with [facility] to make referral for [place] program. No answer, her voicemail stated she is out of the office on Thursdays and Fridays. Left message requesting return call, contact number provided. Received return call from 2025 Dianelys George who took referral information and stated she would reach out to Pt by the end of next week and will notify him he will need an eye report which will then need sent to his assigned counselor to review. SW follow up - SW will follow up with Pt and OOD in 3 weeks on 6/29 regarding progress with referral for [place] services regarding low vision.

## 2023-06-09 ENCOUNTER — CARE COORDINATION (OUTPATIENT)
Dept: CARE COORDINATION | Age: 68
End: 2023-06-09

## 2023-06-09 NOTE — CARE COORDINATION
care.  I will follow my Zone Management tool to seek urgent or emergent care. I will notify my provider of any symptoms that indicate a worsening of my condition. Barriers: overwhelmed by complexity of regimen  Plan for overcoming my barriers: Patient will follow with [facility] protocol. Confidence: 8/10  Anticipated Goal Completion Date: 8/12/23                No future appointments. ,   Diabetes Assessment    Medic Alert ID: No  Meal Planning: Avoidance of concentrated sweets, Calorie counting   How often do you test your blood sugar?: Daily   Do you have barriers with adherence to non-pharmacologic self-management interventions?  (Nutrition/Exercise/Self-Monitoring): No   Have you ever had to go to the ED for symptoms of low blood sugar?: No            ,   COPD Assessment    Does the patient understand envrionmental exposure?: Yes  Is the patient able to verbalize Rescue vs. Long Acting medications?: Yes  Does the patient have a nebulizer?: Yes  Does the patient use a space with inhaled medications?: No            Symptoms:          , and   General Assessment    Do you have any symptoms that are causing concern?: No

## 2023-06-22 ENCOUNTER — CARE COORDINATION (OUTPATIENT)
Dept: CARE COORDINATION | Age: 68
End: 2023-06-22

## 2023-06-22 NOTE — CARE COORDINATION
Ambulatory Care Coordination Note  2023    Patient Current Location:  Home: [address] [place] [address],  contacted the patient by telephone. Verified name and  with patient as identifiers. Provided introduction to self, and explanation of the MILTON HARE role. Challenges to be reviewed by the provider   Additional needs identified to be addressed with provider: No  none               Method of communication with provider: none. I spoke with the patient for continued Care Coordination follow up and education. Patient states he is doing okay. Breathing is at baseline. Admits to SOB on exertion. Denies cough, wheezing or chest pain. We discussed Zone management tools for chronic disease(s) and patient denies current questions re: s/sx at this time. Patients admits he has not checked BS's. Patient denies s/s hypo/ hyperglycemia. Patient reports that he continues receiving HHA support several times a week. Reports that he declined Lif[employer] and HDM. Educated on how to identify sx's that are worse than the baseline and the importance of early symptom recognition and reporting to prevent exacerbation which may lead to ED visits and hospital admissions. I advised patient to contact PCP office if needed. No further needs at this time. Lab Results       None            Care Coordination Interventions    Referral from Primary Care Provider: No  Suggested Interventions and Community Resources  Fall Risk Prevention: In Process  Home Health Services: Completed  Medication Assistance Program: Completed  Registered Dietician: In Process  Social Work: In Process  Transportation Support: In Process  Zone Management Tools: In Process          Goals Addressed    None         No future appointments.

## 2023-06-29 ENCOUNTER — CARE COORDINATION (OUTPATIENT)
Dept: CARE COORDINATION | Age: 68
End: 2023-06-29

## 2023-07-03 ENCOUNTER — CARE COORDINATION (OUTPATIENT)
Dept: CARE COORDINATION | Age: 68
End: 2023-07-03

## 2023-07-03 ASSESSMENT — ENCOUNTER SYMPTOMS
STRIDOR: 0
ABDOMINAL PAIN: 1
EYE ITCHING: 0
CHOKING: 0
SORE THROAT: 0
PHOTOPHOBIA: 0
RECTAL PAIN: 0
BACK PAIN: 0
COLOR CHANGE: 0
APNEA: 0
EYE REDNESS: 0
CONSTIPATION: 0
ANAL BLEEDING: 0

## 2023-07-03 NOTE — CARE COORDINATION
Attempted phone call to Shelley with [facility] to follow up regarding referral for low vision services. No answer, not able to leave message.      VIELKA plan of care: VIELKA will make next outreach attempt on 7/6 to Shelley with OOD to follow up regarding referral

## 2023-07-06 ENCOUNTER — CARE COORDINATION (OUTPATIENT)
Dept: CARE COORDINATION | Age: 68
End: 2023-07-06

## 2023-07-06 NOTE — CARE COORDINATION
Phone call to Margarita Haskins with [facility] to follow up regarding referral.  Staff reported Margarita Haskins is no longer with the agency and forwarded the call to Brisa Nascimento. No answer, voicemail message left requesting return call, contact number provided. VIELKA plan of care: SW will await return call from [facility] and follow up with [facility] on 7/11 regarding low vision services if no response.

## 2023-07-06 NOTE — CARE COORDINATION
Ambulatory Care Coordination Note  2023    Patient Current Location:  Home: [address] [place] [address]     ACM contacted the patient by telephone. Verified name and  with patient as identifiers. Provided introduction to self, and explanation of the ACM role. Challenges to be reviewed by the provider   Additional needs identified to be addressed with provider: No  none               Method of communication with provider: none. ACM: Jae Garza RN        Patient reports that he is doing alright. Patient is pleasant; but is not engaged. COPD:  Patient reports that he is interested in obtaining smaller portable tanks. Discussed possible Pulmonology appt to schedule and patient declines. Reports that he has family that assist with appointments. Patient reports that he does home have home oxygen ; but has an \" oxygen machine\" and tanks which he does not use. However ; patient reports that oxygen is on 2L/ NC. Patient reports SOB on exertion. Denies increased cough, SOB, CP, wheezing or increased fatigue. Reviewed COPD zone tool and s/s to report to MD.     Discussed support needs:  Patient is receiving 2 hours HHA support 3 x week. Patient states that he is well supported at this time. Discussed care gaps: Instructed on the recommendation for routine Provider follow up. Instructed on same day sick, virtual visit and [facility] for urgent care needs. Discussed Right Care, Right Place, Right Time. Offered patient enrollment in the Remote Patient Monitoring (RPM) program for in-home monitoring: NA.    Plan for next outreach:  zone review    Lab Results       None            Care Coordination Interventions    Referral from Primary Care Provider: No  Suggested Interventions and Community Resources  Fall Risk Prevention: In Process  Home Health Services: Completed  Medication Assistance Program: Completed  Registered Dietician: In Process  Social Work:  In

## 2023-07-11 ENCOUNTER — CARE COORDINATION (OUTPATIENT)
Dept: CARE COORDINATION | Age: 68
End: 2023-07-11

## 2023-07-11 NOTE — CARE COORDINATION
Phone call to Karle Dancer with [facility] who confirmed she did receive referral for low vision / independent living services. VIELKA did notify Iftikhar Farah that Pt does not have a vision screening that is recent and would be willing to obtain one if needed. Iftikhar Farah reported she is not able to do anything until she receives that report, but discussed plans to reach out to him.      VIELKA plan of care: VIELKA will follow up with Pt regarding low vision services on 7/27

## 2023-07-13 DIAGNOSIS — E11.65 TYPE 2 DIABETES MELLITUS WITH HYPERGLYCEMIA, WITHOUT LONG-TERM CURRENT USE OF INSULIN (HCC): ICD-10-CM

## 2023-07-14 RX ORDER — GLIPIZIDE 10 MG/1
TABLET ORAL
Qty: 180 TABLET | Refills: 0 | Status: ON HOLD | OUTPATIENT
Start: 2023-07-14 | End: 2023-08-03 | Stop reason: HOSPADM

## 2023-07-20 ENCOUNTER — CARE COORDINATION (OUTPATIENT)
Dept: CARE COORDINATION | Age: 68
End: 2023-07-20

## 2023-07-20 NOTE — CARE COORDINATION
Attempted to reach patient for continued Care Coordination follow up and education. Patient was unavailable at the time of my call, and a generic voicemail message was left asking patient to return my call at [phone].

## 2023-07-27 ENCOUNTER — CARE COORDINATION (OUTPATIENT)
Dept: CARE COORDINATION | Age: 68
End: 2023-07-27

## 2023-07-27 NOTE — CARE COORDINATION
Patient Current Location: Home: [address]     Phone call to Pt to follow up regarding referral to [facility] services. Pt reported he has not heard from RAAD with [facility] yet. Discussed with Pt that he will need an appointment with ophthalmologist. Pt reported it has been several years since he's had a eye exam so he is due. Pt can't remember who he saw previously. VIELKA discussed plans to contact [facility] with [facility] in about one week to follow up and obtain recommendations for provider for vision screening. VIELKA plan of care: VIELKA will follow up with Pt and [facility] with [employer] [facility] in one week on 8/3 to determine if Pt has heard from [facility] and to reach out for recommendations for provider.

## 2023-07-28 ENCOUNTER — CARE COORDINATION (OUTPATIENT)
Dept: CARE COORDINATION | Age: 68
End: 2023-07-28

## 2023-07-28 NOTE — CARE COORDINATION
worsening of my condition. Barriers: overwhelmed by complexity of regimen  Plan for overcoming my barriers: Patient will follow with zone protocol. Confidence: 8/10  Anticipated Goal Completion Date: 8/12/23                No future appointments. ,   Diabetes Assessment    Medic Alert ID: No  Meal Planning: Avoidance of concentrated sweets, Calorie counting   How often do you test your blood sugar?: Daily   Do you have barriers with adherence to non-pharmacologic self-management interventions?  (Nutrition/Exercise/Self-Monitoring): No   Have you ever had to go to the ED for symptoms of low blood sugar?: No            , and   COPD Assessment    Does the patient understand envrionmental exposure?: Yes  Is the patient able to verbalize Rescue vs. Long Acting medications?: Yes  Does the patient have a nebulizer?: Yes  Does the patient use a space with inhaled medications?: No            Symptoms:

## 2023-07-31 ENCOUNTER — HOSPITAL ENCOUNTER (INPATIENT)
Age: 68
LOS: 3 days | Discharge: HOME HEALTH | DRG: 190 | End: 2023-08-03
Attending: EMERGENCY MEDICINE | Admitting: STUDENT IN AN ORGANIZED HEALTH CARE EDUCATION/TRAINING PROGRAM
Payer: MEDICARE

## 2023-07-31 ENCOUNTER — APPOINTMENT (OUTPATIENT)
Dept: GENERAL RADIOLOGY | Age: 68
DRG: 190 | End: 2023-07-31
Payer: MEDICARE

## 2023-07-31 DIAGNOSIS — J44.1 COPD EXACERBATION (HCC): Primary | ICD-10-CM

## 2023-07-31 LAB
ALBUMIN SERPL-MCNC: 4.2 GM/DL (ref 3.4–5)
ALP BLD-CCNC: 101 IU/L (ref 40–129)
ALT SERPL-CCNC: 34 U/L (ref 10–40)
ANION GAP SERPL CALCULATED.3IONS-SCNC: 11 MMOL/L (ref 4–16)
AST SERPL-CCNC: 21 IU/L (ref 15–37)
BASE EXCESS MIXED: 11.5 (ref 0–1.2)
BASOPHILS ABSOLUTE: 0.1 K/CU MM
BASOPHILS RELATIVE PERCENT: 0.5 % (ref 0–1)
BILIRUB SERPL-MCNC: 0.5 MG/DL (ref 0–1)
BUN SERPL-MCNC: 13 MG/DL (ref 6–23)
CALCIUM SERPL-MCNC: 8.8 MG/DL (ref 8.3–10.6)
CHLORIDE BLD-SCNC: 95 MMOL/L (ref 99–110)
CO2: 33 MMOL/L (ref 21–32)
COMMENT: ABNORMAL
CREAT SERPL-MCNC: 0.7 MG/DL (ref 0.9–1.3)
DIFFERENTIAL TYPE: ABNORMAL
EOSINOPHILS ABSOLUTE: 0.2 K/CU MM
EOSINOPHILS RELATIVE PERCENT: 2.1 % (ref 0–3)
ESTIMATED AVERAGE GLUCOSE: 232 MG/DL
GFR SERPL CREATININE-BSD FRML MDRD: >60 ML/MIN/1.73M2
GLUCOSE BLD-MCNC: 387 MG/DL (ref 70–99)
GLUCOSE SERPL-MCNC: 190 MG/DL (ref 70–99)
HBA1C MFR BLD: 9.7 % (ref 4.2–6.3)
HCO3 VENOUS: 40.1 MMOL/L (ref 19–25)
HCT VFR BLD CALC: 47.2 % (ref 42–52)
HEMOGLOBIN: 15.4 GM/DL (ref 13.5–18)
IMMATURE NEUTROPHIL %: 0.4 % (ref 0–0.43)
INFLUENZA A ANTIGEN: NOT DETECTED
INFLUENZA B ANTIGEN: NOT DETECTED
LYMPHOCYTES ABSOLUTE: 1.3 K/CU MM
LYMPHOCYTES RELATIVE PERCENT: 12.4 % (ref 24–44)
MCH RBC QN AUTO: 29.8 PG (ref 27–31)
MCHC RBC AUTO-ENTMCNC: 32.6 % (ref 32–36)
MCV RBC AUTO: 91.5 FL (ref 78–100)
MONOCYTES ABSOLUTE: 0.9 K/CU MM
MONOCYTES RELATIVE PERCENT: 8.3 % (ref 0–4)
NUCLEATED RBC %: 0 %
O2 SAT, VEN: 93.6 % (ref 50–70)
PCO2, VEN: 71 MMHG (ref 38–52)
PDW BLD-RTO: 13.1 % (ref 11.7–14.9)
PH VENOUS: 7.36 (ref 7.32–7.42)
PLATELET # BLD: 186 K/CU MM (ref 140–440)
PMV BLD AUTO: 9.4 FL (ref 7.5–11.1)
PO2, VEN: 114 MMHG (ref 28–48)
POTASSIUM SERPL-SCNC: 4.1 MMOL/L (ref 3.5–5.1)
PRO-BNP: 118.4 PG/ML
RBC # BLD: 5.16 M/CU MM (ref 4.6–6.2)
SARS-COV-2 RDRP RESP QL NAA+PROBE: NOT DETECTED
SEGMENTED NEUTROPHILS ABSOLUTE COUNT: 7.9 K/CU MM
SEGMENTED NEUTROPHILS RELATIVE PERCENT: 76.3 % (ref 36–66)
SODIUM BLD-SCNC: 139 MMOL/L (ref 135–145)
SOURCE: NORMAL
TOTAL IMMATURE NEUTOROPHIL: 0.04 K/CU MM
TOTAL NUCLEATED RBC: 0 K/CU MM
TOTAL PROTEIN: 6.3 GM/DL (ref 6.4–8.2)
TROPONIN T: <0.01 NG/ML
WBC # BLD: 10.3 K/CU MM (ref 4–10.5)

## 2023-07-31 PROCEDURE — 96365 THER/PROPH/DIAG IV INF INIT: CPT

## 2023-07-31 PROCEDURE — 83036 HEMOGLOBIN GLYCOSYLATED A1C: CPT

## 2023-07-31 PROCEDURE — 93005 ELECTROCARDIOGRAM TRACING: CPT | Performed by: EMERGENCY MEDICINE

## 2023-07-31 PROCEDURE — 6370000000 HC RX 637 (ALT 250 FOR IP): Performed by: FAMILY MEDICINE

## 2023-07-31 PROCEDURE — 6370000000 HC RX 637 (ALT 250 FOR IP): Performed by: STUDENT IN AN ORGANIZED HEALTH CARE EDUCATION/TRAINING PROGRAM

## 2023-07-31 PROCEDURE — 80053 COMPREHEN METABOLIC PANEL: CPT

## 2023-07-31 PROCEDURE — 87502 INFLUENZA DNA AMP PROBE: CPT

## 2023-07-31 PROCEDURE — 6360000002 HC RX W HCPCS: Performed by: EMERGENCY MEDICINE

## 2023-07-31 PROCEDURE — 82962 GLUCOSE BLOOD TEST: CPT

## 2023-07-31 PROCEDURE — 85025 COMPLETE CBC W/AUTO DIFF WBC: CPT

## 2023-07-31 PROCEDURE — 83880 ASSAY OF NATRIURETIC PEPTIDE: CPT

## 2023-07-31 PROCEDURE — 94761 N-INVAS EAR/PLS OXIMETRY MLT: CPT

## 2023-07-31 PROCEDURE — 84484 ASSAY OF TROPONIN QUANT: CPT

## 2023-07-31 PROCEDURE — 71045 X-RAY EXAM CHEST 1 VIEW: CPT

## 2023-07-31 PROCEDURE — 82805 BLOOD GASES W/O2 SATURATION: CPT

## 2023-07-31 PROCEDURE — 99285 EMERGENCY DEPT VISIT HI MDM: CPT

## 2023-07-31 PROCEDURE — 96366 THER/PROPH/DIAG IV INF ADDON: CPT

## 2023-07-31 PROCEDURE — 6370000000 HC RX 637 (ALT 250 FOR IP): Performed by: EMERGENCY MEDICINE

## 2023-07-31 PROCEDURE — 1200000000 HC SEMI PRIVATE

## 2023-07-31 PROCEDURE — 96375 TX/PRO/DX INJ NEW DRUG ADDON: CPT

## 2023-07-31 PROCEDURE — 87070 CULTURE OTHR SPECIMN AEROBIC: CPT

## 2023-07-31 PROCEDURE — 94640 AIRWAY INHALATION TREATMENT: CPT

## 2023-07-31 PROCEDURE — 2580000003 HC RX 258: Performed by: EMERGENCY MEDICINE

## 2023-07-31 PROCEDURE — 87635 SARS-COV-2 COVID-19 AMP PRB: CPT

## 2023-07-31 PROCEDURE — 2700000000 HC OXYGEN THERAPY PER DAY

## 2023-07-31 PROCEDURE — 87205 SMEAR GRAM STAIN: CPT

## 2023-07-31 RX ORDER — ERGOCALCIFEROL 1.25 MG/1
50000 CAPSULE ORAL WEEKLY
Status: DISCONTINUED | OUTPATIENT
Start: 2023-08-05 | End: 2023-08-03 | Stop reason: HOSPADM

## 2023-07-31 RX ORDER — ALBUTEROL SULFATE 90 UG/1
2 AEROSOL, METERED RESPIRATORY (INHALATION) EVERY 6 HOURS PRN
Status: DISCONTINUED | OUTPATIENT
Start: 2023-07-31 | End: 2023-08-03 | Stop reason: HOSPADM

## 2023-07-31 RX ORDER — CETIRIZINE HYDROCHLORIDE 10 MG/1
10 TABLET ORAL ONCE
Status: COMPLETED | OUTPATIENT
Start: 2023-07-31 | End: 2023-07-31

## 2023-07-31 RX ORDER — DEXTROSE MONOHYDRATE 100 MG/ML
INJECTION, SOLUTION INTRAVENOUS CONTINUOUS PRN
Status: DISCONTINUED | OUTPATIENT
Start: 2023-07-31 | End: 2023-08-03 | Stop reason: HOSPADM

## 2023-07-31 RX ORDER — ATORVASTATIN CALCIUM 40 MG/1
40 TABLET, FILM COATED ORAL DAILY
Status: DISCONTINUED | OUTPATIENT
Start: 2023-07-31 | End: 2023-08-03 | Stop reason: HOSPADM

## 2023-07-31 RX ORDER — INSULIN LISPRO 100 [IU]/ML
0-8 INJECTION, SOLUTION INTRAVENOUS; SUBCUTANEOUS
Status: DISCONTINUED | OUTPATIENT
Start: 2023-07-31 | End: 2023-08-03 | Stop reason: HOSPADM

## 2023-07-31 RX ORDER — PREDNISONE 20 MG/1
40 TABLET ORAL DAILY
Status: DISCONTINUED | OUTPATIENT
Start: 2023-08-01 | End: 2023-08-03 | Stop reason: HOSPADM

## 2023-07-31 RX ORDER — AZITHROMYCIN 250 MG/1
250 TABLET, FILM COATED ORAL DAILY
Status: DISCONTINUED | OUTPATIENT
Start: 2023-08-01 | End: 2023-08-03 | Stop reason: HOSPADM

## 2023-07-31 RX ORDER — ASPIRIN 81 MG/1
81 TABLET, CHEWABLE ORAL DAILY
Status: DISCONTINUED | OUTPATIENT
Start: 2023-07-31 | End: 2023-08-03 | Stop reason: HOSPADM

## 2023-07-31 RX ORDER — IPRATROPIUM BROMIDE AND ALBUTEROL SULFATE 2.5; .5 MG/3ML; MG/3ML
1 SOLUTION RESPIRATORY (INHALATION) ONCE
Status: COMPLETED | OUTPATIENT
Start: 2023-07-31 | End: 2023-07-31

## 2023-07-31 RX ORDER — INSULIN LISPRO 100 [IU]/ML
8 INJECTION, SOLUTION INTRAVENOUS; SUBCUTANEOUS ONCE
Status: COMPLETED | OUTPATIENT
Start: 2023-07-31 | End: 2023-07-31

## 2023-07-31 RX ORDER — DILTIAZEM HYDROCHLORIDE 120 MG/1
120 CAPSULE, COATED, EXTENDED RELEASE ORAL DAILY
Status: DISCONTINUED | OUTPATIENT
Start: 2023-07-31 | End: 2023-08-03 | Stop reason: HOSPADM

## 2023-07-31 RX ORDER — DICYCLOMINE HYDROCHLORIDE 10 MG/1
10 CAPSULE ORAL 3 TIMES DAILY PRN
Status: DISCONTINUED | OUTPATIENT
Start: 2023-07-31 | End: 2023-08-03 | Stop reason: HOSPADM

## 2023-07-31 RX ORDER — ALBUTEROL SULFATE 90 UG/1
2 AEROSOL, METERED RESPIRATORY (INHALATION) ONCE
Status: COMPLETED | OUTPATIENT
Start: 2023-07-31 | End: 2023-07-31

## 2023-07-31 RX ORDER — METHYLPREDNISOLONE SODIUM SUCCINATE 125 MG/2ML
125 INJECTION, POWDER, LYOPHILIZED, FOR SOLUTION INTRAMUSCULAR; INTRAVENOUS ONCE
Status: COMPLETED | OUTPATIENT
Start: 2023-07-31 | End: 2023-07-31

## 2023-07-31 RX ORDER — BUDESONIDE AND FORMOTEROL FUMARATE DIHYDRATE 160; 4.5 UG/1; UG/1
2 AEROSOL RESPIRATORY (INHALATION) 2 TIMES DAILY
Status: DISCONTINUED | OUTPATIENT
Start: 2023-07-31 | End: 2023-08-03 | Stop reason: HOSPADM

## 2023-07-31 RX ORDER — IPRATROPIUM BROMIDE AND ALBUTEROL SULFATE 2.5; .5 MG/3ML; MG/3ML
1 SOLUTION RESPIRATORY (INHALATION)
Status: DISCONTINUED | OUTPATIENT
Start: 2023-07-31 | End: 2023-08-03 | Stop reason: HOSPADM

## 2023-07-31 RX ORDER — METHYLPREDNISOLONE SODIUM SUCCINATE 40 MG/ML
40 INJECTION, POWDER, LYOPHILIZED, FOR SOLUTION INTRAMUSCULAR; INTRAVENOUS EVERY 12 HOURS
Status: DISCONTINUED | OUTPATIENT
Start: 2023-08-01 | End: 2023-07-31

## 2023-07-31 RX ORDER — INSULIN LISPRO 100 [IU]/ML
0-4 INJECTION, SOLUTION INTRAVENOUS; SUBCUTANEOUS NIGHTLY
Status: DISCONTINUED | OUTPATIENT
Start: 2023-07-31 | End: 2023-08-02

## 2023-07-31 RX ORDER — GLUCAGON 1 MG/ML
1 KIT INJECTION PRN
Status: DISCONTINUED | OUTPATIENT
Start: 2023-07-31 | End: 2023-08-03 | Stop reason: HOSPADM

## 2023-07-31 RX ADMIN — APIXABAN 5 MG: 5 TABLET, FILM COATED ORAL at 23:13

## 2023-07-31 RX ADMIN — ALBUTEROL SULFATE 2 PUFF: 90 AEROSOL, METERED RESPIRATORY (INHALATION) at 16:28

## 2023-07-31 RX ADMIN — IPRATROPIUM BROMIDE AND ALBUTEROL SULFATE 1 DOSE: .5; 2.5 SOLUTION RESPIRATORY (INHALATION) at 18:32

## 2023-07-31 RX ADMIN — AZITHROMYCIN MONOHYDRATE 500 MG: 500 INJECTION, POWDER, LYOPHILIZED, FOR SOLUTION INTRAVENOUS at 17:14

## 2023-07-31 RX ADMIN — INSULIN LISPRO 8 UNITS: 100 INJECTION, SOLUTION INTRAVENOUS; SUBCUTANEOUS at 23:19

## 2023-07-31 RX ADMIN — BUDESONIDE AND FORMOTEROL FUMARATE DIHYDRATE 2 PUFF: 160; 4.5 AEROSOL RESPIRATORY (INHALATION) at 23:54

## 2023-07-31 RX ADMIN — CETIRIZINE HYDROCHLORIDE 10 MG: 10 TABLET, FILM COATED ORAL at 23:13

## 2023-07-31 RX ADMIN — IPRATROPIUM BROMIDE AND ALBUTEROL SULFATE 1 DOSE: .5; 3 SOLUTION RESPIRATORY (INHALATION) at 23:53

## 2023-07-31 RX ADMIN — METHYLPREDNISOLONE SODIUM SUCCINATE 125 MG: 125 INJECTION INTRAMUSCULAR; INTRAVENOUS at 17:14

## 2023-07-31 RX ADMIN — ATORVASTATIN CALCIUM 40 MG: 40 TABLET, FILM COATED ORAL at 20:05

## 2023-07-31 RX ADMIN — ASPIRIN 81 MG 81 MG: 81 TABLET ORAL at 20:05

## 2023-07-31 RX ADMIN — DILTIAZEM HYDROCHLORIDE 120 MG: 120 CAPSULE, COATED, EXTENDED RELEASE ORAL at 23:13

## 2023-07-31 NOTE — H&P
V2.0  History and Physical      Name:  Marshall Smiley /Age/Sex: 1955  (74 y.o. male)   MRN & CSN:  [id number] & [id number] Encounter Date/Time: 2023 7:44 PM EDT   Location:  ED19/ED-19 PCP: Khalif Sunshine MD       Hospital Day: 1    Assessment and Plan:   Marshall Smiley is a 74 y.o. male with a pmh of HFpEF, A-fib, type 2 diabetes, PAD, COPD who presents with COPD exacerbation [facility]    Hospital Problems             Last Modified POA    * (Principal) COPD exacerbation ([address]) 2023 Yes       # Acute on chronic hypoxemic respiratory failure secondary to exacerbation of severe COPD  - Presented with worsening SOB with worsening  cough 3 days. On 2-3L NC at baseline. Non Compliant with home inhalers, - Requiring 5-6 L NC in the ED on arrival improved to 3 L during my encounter Decrease air entry on exam. CXR without acute infiltrates. - Continue steroids, Azithro, Symbicort, DuoNebs      # HFpEF  - Clinically euvolemic. # PAF s/p ablation in 2018  - Continue Eliquis and Diltiazem. # T2DM  - Last A1c 9.3% in 2023  - Held home Metformin and Glipizide. - Start MDSSI and hypoglycemia protocol      # PAD   - Continue ASA and Lipitor. # Class III obesity  - BMI 44.1.  - Advised on importance of lifestyle modifications. Discussed with ED provider regarding Edith Walker and agree with hospitalisation. EKG interpreted personally and results are normal sinus rhythm without acute ST-T wave changes. Imaging that was interpreted personally includes x-ray chest and results are as above     Drugs that require monitoring for toxicity include Eliquis in the way of monitoring his daily CBC      Comment: Please note this report has been produced using speech recognition software and may contain errors related to that system including errors in grammar, punctuation, and spelling, as well as words and phrases that may be inappropriate.  If there are any questions or concerns please feel

## 2023-07-31 NOTE — ED PROVIDER NOTES
Triage Chief Complaint:   Shortness of Breath (Pt has hx of afib and copd. SOB x3 days trying to use inhalers at home. EMS states 90% room air and with 4L NC up to 94%)    [place]:  Shun Benavides is a 74 y.o. male that presents with shortness of breath. Patient was in baseline state of health until 3 days ago when the above started. Shortness of breath is worse with exertion. Patient has noted some increased wheezing as well as cough productive of thick green phlegm. No fevers. No chest pain. Patient does report issues with COPD and that he has no breathing treatments at home. No home steroid use. Patient is on 2 L nasal cannula at home per her baseline. Patient reports adherence to his home medications including his Eliquis and his Lasix. Of note, patient's neighbor was recently sick with cough and cold. Patient is a former smoker quitting 4 to 5 months ago.     ROS:  General:  No fevers, no chills, no weakness  Eyes:  No recent vison changes, no discharge  ENT:  No sore throat, no nasal congestion, no hearing changes  Cardiovascular:  No chest pain, no palpitations  Respiratory:  + shortness of breath, + cough, + wheezing  Gastrointestinal:  No pain, no nausea, no vomiting, no diarrhea  Musculoskeletal:  No muscle pain, no joint pain  Skin:  No rash, no pruritis, no easy bruising  Neurologic:  No speech problems, no headache, no extremity numbness, no extremity tingling, no extremity weakness  Psychiatric:  No anxiety  Genitourinary:  No dysuria, no hematuria  Endocrine:  No unexpected weight gain, no unexpected weight loss  Extremities:  no edema, no pain    Past Medical History:   Diagnosis Date    A-fib [facility])     Resolved after ablation 2018    Anxiety     Arthritis     \"Hips, Knees, Elbows And Fingers\"    COPD (chronic obstructive pulmonary disease) ([facility])     Sees Dr. Ollie Palmer    Depression     Diabetes mellitus [facility]) Dx 9072'A    Full dentures     H/O angiography 12/13/2018    peripheral angio -

## 2023-08-01 ENCOUNTER — CARE COORDINATION (OUTPATIENT)
Dept: CARE COORDINATION | Age: 68
End: 2023-08-01

## 2023-08-01 LAB
GLUCOSE BLD-MCNC: 259 MG/DL (ref 70–99)
GLUCOSE BLD-MCNC: 321 MG/DL (ref 70–99)
GLUCOSE BLD-MCNC: 337 MG/DL (ref 70–99)
GLUCOSE BLD-MCNC: 419 MG/DL (ref 70–99)

## 2023-08-01 PROCEDURE — 6370000000 HC RX 637 (ALT 250 FOR IP): Performed by: STUDENT IN AN ORGANIZED HEALTH CARE EDUCATION/TRAINING PROGRAM

## 2023-08-01 PROCEDURE — 94761 N-INVAS EAR/PLS OXIMETRY MLT: CPT

## 2023-08-01 PROCEDURE — 82962 GLUCOSE BLOOD TEST: CPT

## 2023-08-01 PROCEDURE — 1200000000 HC SEMI PRIVATE

## 2023-08-01 PROCEDURE — 2700000000 HC OXYGEN THERAPY PER DAY

## 2023-08-01 PROCEDURE — 94640 AIRWAY INHALATION TREATMENT: CPT

## 2023-08-01 PROCEDURE — 2500000003 HC RX 250 WO HCPCS: Performed by: FAMILY MEDICINE

## 2023-08-01 RX ORDER — INSULIN GLARGINE 100 [IU]/ML
10 INJECTION, SOLUTION SUBCUTANEOUS NIGHTLY
Status: DISCONTINUED | OUTPATIENT
Start: 2023-08-01 | End: 2023-08-02

## 2023-08-01 RX ORDER — INSULIN LISPRO 100 [IU]/ML
4 INJECTION, SOLUTION INTRAVENOUS; SUBCUTANEOUS
Status: DISCONTINUED | OUTPATIENT
Start: 2023-08-01 | End: 2023-08-02

## 2023-08-01 RX ORDER — INSULIN LISPRO 100 [IU]/ML
4 INJECTION, SOLUTION INTRAVENOUS; SUBCUTANEOUS ONCE
Status: COMPLETED | OUTPATIENT
Start: 2023-08-01 | End: 2023-08-01

## 2023-08-01 RX ADMIN — INSULIN LISPRO 6 UNITS: 100 INJECTION, SOLUTION INTRAVENOUS; SUBCUTANEOUS at 16:56

## 2023-08-01 RX ADMIN — PREDNISONE 40 MG: 20 TABLET ORAL at 09:45

## 2023-08-01 RX ADMIN — INSULIN LISPRO 4 UNITS: 100 INJECTION, SOLUTION INTRAVENOUS; SUBCUTANEOUS at 16:57

## 2023-08-01 RX ADMIN — APIXABAN 5 MG: 5 TABLET, FILM COATED ORAL at 09:45

## 2023-08-01 RX ADMIN — IPRATROPIUM BROMIDE AND ALBUTEROL SULFATE 1 DOSE: .5; 3 SOLUTION RESPIRATORY (INHALATION) at 19:39

## 2023-08-01 RX ADMIN — MICONAZOLE NITRATE: 2 POWDER TOPICAL at 21:14

## 2023-08-01 RX ADMIN — AZITHROMYCIN DIHYDRATE 250 MG: 250 TABLET ORAL at 09:45

## 2023-08-01 RX ADMIN — DILTIAZEM HYDROCHLORIDE 120 MG: 120 CAPSULE, COATED, EXTENDED RELEASE ORAL at 09:46

## 2023-08-01 RX ADMIN — IPRATROPIUM BROMIDE AND ALBUTEROL SULFATE 1 DOSE: .5; 3 SOLUTION RESPIRATORY (INHALATION) at 11:25

## 2023-08-01 RX ADMIN — ASPIRIN 81 MG 81 MG: 81 TABLET ORAL at 09:47

## 2023-08-01 RX ADMIN — APIXABAN 5 MG: 5 TABLET, FILM COATED ORAL at 21:11

## 2023-08-01 RX ADMIN — MICONAZOLE NITRATE: 2 POWDER TOPICAL at 07:07

## 2023-08-01 RX ADMIN — INSULIN GLARGINE 10 UNITS: 100 INJECTION, SOLUTION SUBCUTANEOUS at 21:11

## 2023-08-01 RX ADMIN — BUDESONIDE AND FORMOTEROL FUMARATE DIHYDRATE 2 PUFF: 160; 4.5 AEROSOL RESPIRATORY (INHALATION) at 11:26

## 2023-08-01 RX ADMIN — IPRATROPIUM BROMIDE AND ALBUTEROL SULFATE 1 DOSE: .5; 3 SOLUTION RESPIRATORY (INHALATION) at 15:31

## 2023-08-01 RX ADMIN — INSULIN LISPRO 4 UNITS: 100 INJECTION, SOLUTION INTRAVENOUS; SUBCUTANEOUS at 21:10

## 2023-08-01 RX ADMIN — INSULIN LISPRO 8 UNITS: 100 INJECTION, SOLUTION INTRAVENOUS; SUBCUTANEOUS at 09:43

## 2023-08-01 RX ADMIN — INSULIN LISPRO 4 UNITS: 100 INJECTION, SOLUTION INTRAVENOUS; SUBCUTANEOUS at 09:43

## 2023-08-01 RX ADMIN — BUDESONIDE AND FORMOTEROL FUMARATE DIHYDRATE 2 PUFF: 160; 4.5 AEROSOL RESPIRATORY (INHALATION) at 19:39

## 2023-08-01 RX ADMIN — ATORVASTATIN CALCIUM 40 MG: 40 TABLET, FILM COATED ORAL at 09:45

## 2023-08-01 RX ADMIN — INSULIN LISPRO 4 UNITS: 100 INJECTION, SOLUTION INTRAVENOUS; SUBCUTANEOUS at 12:30

## 2023-08-01 NOTE — CARE COORDINATION
08/01/23 1619   Service Assessment   Patient Orientation Alert and Oriented   Cognition Alert   History Provided By Patient   Primary [address] Family Members   Patient's Healthcare Decision Maker is: Named in [address]  (Brother Iva Sanderson)   PCP Verified by CM Yes   Last Visit to PCP Within last 6 months   Prior Functional Level Independent in ADLs/IADLs   Current Functional Level Independent in ADLs/IADLs  (States getting harder and harder)   Can patient return to prior living arrangement Yes   Ability to make needs known: Good   Family able to assist with home care needs: No   Would you like for me to discuss the discharge plan with any other family members/significant others, and if so, who? No   Financial Resources Medicaid; Medicare   Social/Functional History   Lives With Alone   Type of Home Apartment   Home Equipment Oxygen  [facility])   Condition of Participation: Discharge Planning   The Patient and/Or Patient Representative agree with the Discharge Plan? Yes     Reviewed chart, discussed in IDR and spoke with pt about discharge needs/plans. Pt lives alone, he is independent with ADL's but getting harder and harder to take care of himself. He has home O2 through [facility] ,  no other DME. He has a PCP and insurance that covers medications. He states he has Passport , an aid 2 hrs 4 days /week but her fired her. So he has no help at this time. We discussed possible need for AL apt. Gave him Senior services info with AL assistance highlighted. Plan at this time is home with possible HC if he will agree to therapy /nursing.    CM will revisit in am.

## 2023-08-01 NOTE — PLAN OF CARE
Problem: Discharge Planning  Goal: Discharge to home or other facility with appropriate resources  Outcome: Progressing  Flowsheets (Taken 8/1/2023 0915)  Discharge to home or other facility with appropriate resources: Identify barriers to discharge with patient and caregiver     Problem: Safety - Adult  Goal: Free from fall injury  Outcome: Progressing     Problem: Skin/Tissue Integrity  Goal: Absence of new skin breakdown  Description: 1. Monitor for areas of redness and/or skin breakdown  2. Assess vascular access sites hourly  3. Every 4-6 hours minimum:  Change oxygen saturation probe site  4. Every 4-6 hours:  If on nasal continuous positive airway pressure, respiratory therapy assess nares and determine need for appliance change or resting period.   Outcome: Progressing     Problem: Chronic Conditions and Co-morbidities  Goal: Patient's chronic conditions and co-morbidity symptoms are monitored and maintained or improved  Outcome: Progressing  Flowsheets (Taken 8/1/2023 0915)  Care Plan - Patient's Chronic Conditions and Co-Morbidity Symptoms are Monitored and Maintained or Improved: Monitor and assess patient's chronic conditions and comorbid symptoms for stability, deterioration, or improvement

## 2023-08-01 NOTE — ED NOTES
4102 ready bed      Kaity Esqueda  07/31/23 2014
Meal tray ordered per patient request     Crys Payne RN  07/31/23 9767
Pt 88% on 2 Lpm O2 via NC, pt increased to 3 Lpm O2 via [address] Cabrera RN  07/31/23 5493
Report given to [facility] and care transferred at this time     [place] RN  07/31/23 3027
Full dentures     H/O angiography 12/13/2018    peripheral angio - LFA occluded, filling collaterals, RSFA 90% stenosis-vasc surg consult    H/O Doppler ultrasound 09/05/2018    LE arterial - left mid and distal femoral artery occluded, lt FANI shows mild-mod PVD    H/O echocardiogram 01/06/2016    EF60% mild MR, TR, pulm HTN    Hx of colonoscopy     7/11 C-scope - benign polyp x1    Hx of Doppler ultrasound 02/20/2018    Lower extremity doppler  Normal study. Hyperlipidemia     Hypertension     Macular degeneration     States is legally blind    Obesity     On home oxygen therapy     Continuous At 2 Liters Per Nasal Cannula    PAD (peripheral artery disease) ([facility])     10/10 FANI mild PAD left superficial femoral s/p left fem-pop bypass    Panic attacks     Pneumonia Last Episode In 2018    PTSD (post-traumatic stress disorder)     Restless leg     Shortness of breath     Sleep apnea     Uses BiPap - stopped using 4/2020    Wears glasses     To Read       Assessment    Vitals/MEWS: MEWS Score: 0  Level of Consciousness: Alert (0)   Vitals:    07/31/23 1851 07/31/23 1900 07/31/23 1930 07/31/23 2000   BP: (!) 143/87 134/64 127/86 139/69   Pulse: 94 91 97 96   Resp: 24 23 27 22   Temp:       TempSrc:       SpO2: 91% 92% 92% 92%   Weight:       Height:         PO Status: Regular  O2 Flow Rate: O2 Device: Nasal cannula O2 Flow Rate (L/min): 3 L/min  Cardiac Rhythm: NSR with PAC's  Last documented pain medication administered: none  NIH Score: NIH     Active LDA's:   Peripheral IV 07/31/23 Left;Ventral Forearm (Active)   Site Assessment Clean, dry & intact 07/31/23 1654   Line Status Blood return noted; Flushed;Normal saline locked;Specimen collected 07/31/23 1654   Phlebitis Assessment No symptoms 07/31/23 1654   Infiltration Assessment 0 07/31/23 1654   Dressing Status New dressing applied 07/31/23 1654   Dressing Type Transparent 07/31/23 1654   Dressing Intervention New 07/31/23 1654       Pertinent or High Risk

## 2023-08-02 LAB
ALBUMIN SERPL-MCNC: 3.4 GM/DL (ref 3.4–5)
ALP BLD-CCNC: 77 IU/L (ref 40–128)
ALT SERPL-CCNC: 25 U/L (ref 10–40)
ANION GAP SERPL CALCULATED.3IONS-SCNC: 10 MMOL/L (ref 4–16)
AST SERPL-CCNC: 14 IU/L (ref 15–37)
BASOPHILS ABSOLUTE: 0 K/CU MM
BASOPHILS RELATIVE PERCENT: 0.2 % (ref 0–1)
BILIRUB SERPL-MCNC: 0.2 MG/DL (ref 0–1)
BUN SERPL-MCNC: 12 MG/DL (ref 6–23)
CALCIUM SERPL-MCNC: 9 MG/DL (ref 8.3–10.6)
CHLORIDE BLD-SCNC: 94 MMOL/L (ref 99–110)
CO2: 33 MMOL/L (ref 21–32)
CREAT SERPL-MCNC: 0.8 MG/DL (ref 0.9–1.3)
CULTURE: NORMAL
DIFFERENTIAL TYPE: ABNORMAL
EOSINOPHILS ABSOLUTE: 0 K/CU MM
EOSINOPHILS RELATIVE PERCENT: 0.2 % (ref 0–3)
GFR SERPL CREATININE-BSD FRML MDRD: >60 ML/MIN/1.73M2
GLUCOSE BLD-MCNC: 220 MG/DL (ref 70–99)
GLUCOSE BLD-MCNC: 268 MG/DL (ref 70–99)
GLUCOSE BLD-MCNC: 298 MG/DL (ref 70–99)
GLUCOSE BLD-MCNC: 320 MG/DL (ref 70–99)
GLUCOSE SERPL-MCNC: 303 MG/DL (ref 70–99)
HCT VFR BLD CALC: 41 % (ref 42–52)
HEMOGLOBIN: 13.2 GM/DL (ref 13.5–18)
IMMATURE NEUTROPHIL %: 0.8 % (ref 0–0.43)
LYMPHOCYTES ABSOLUTE: 1.2 K/CU MM
LYMPHOCYTES RELATIVE PERCENT: 12.3 % (ref 24–44)
Lab: NORMAL
MCH RBC QN AUTO: 29.9 PG (ref 27–31)
MCHC RBC AUTO-ENTMCNC: 32.2 % (ref 32–36)
MCV RBC AUTO: 92.8 FL (ref 78–100)
MONOCYTES ABSOLUTE: 0.8 K/CU MM
MONOCYTES RELATIVE PERCENT: 7.6 % (ref 0–4)
NUCLEATED RBC %: 0 %
PDW BLD-RTO: 12.9 % (ref 11.7–14.9)
PLATELET # BLD: 166 K/CU MM (ref 140–440)
PMV BLD AUTO: 9.7 FL (ref 7.5–11.1)
POTASSIUM SERPL-SCNC: 4.9 MMOL/L (ref 3.5–5.1)
PROCALCITONIN SERPL-MCNC: 0.03 NG/ML
RBC # BLD: 4.42 M/CU MM (ref 4.6–6.2)
SEGMENTED NEUTROPHILS ABSOLUTE COUNT: 7.8 K/CU MM
SEGMENTED NEUTROPHILS RELATIVE PERCENT: 78.9 % (ref 36–66)
SODIUM BLD-SCNC: 137 MMOL/L (ref 135–145)
SPECIMEN: NORMAL
TOTAL IMMATURE NEUTOROPHIL: 0.08 K/CU MM
TOTAL NUCLEATED RBC: 0 K/CU MM
TOTAL PROTEIN: 5.6 GM/DL (ref 6.4–8.2)
WBC # BLD: 9.9 K/CU MM (ref 4–10.5)

## 2023-08-02 PROCEDURE — 82962 GLUCOSE BLOOD TEST: CPT

## 2023-08-02 PROCEDURE — 94761 N-INVAS EAR/PLS OXIMETRY MLT: CPT

## 2023-08-02 PROCEDURE — 6370000000 HC RX 637 (ALT 250 FOR IP): Performed by: STUDENT IN AN ORGANIZED HEALTH CARE EDUCATION/TRAINING PROGRAM

## 2023-08-02 PROCEDURE — 6370000000 HC RX 637 (ALT 250 FOR IP): Performed by: INTERNAL MEDICINE

## 2023-08-02 PROCEDURE — 36415 COLL VENOUS BLD VENIPUNCTURE: CPT

## 2023-08-02 PROCEDURE — 94664 DEMO&/EVAL PT USE INHALER: CPT

## 2023-08-02 PROCEDURE — 84145 PROCALCITONIN (PCT): CPT

## 2023-08-02 PROCEDURE — 2700000000 HC OXYGEN THERAPY PER DAY

## 2023-08-02 PROCEDURE — 6370000000 HC RX 637 (ALT 250 FOR IP): Performed by: FAMILY MEDICINE

## 2023-08-02 PROCEDURE — 85025 COMPLETE CBC W/AUTO DIFF WBC: CPT

## 2023-08-02 PROCEDURE — 80053 COMPREHEN METABOLIC PANEL: CPT

## 2023-08-02 PROCEDURE — 1200000000 HC SEMI PRIVATE

## 2023-08-02 PROCEDURE — 94640 AIRWAY INHALATION TREATMENT: CPT

## 2023-08-02 RX ORDER — INSULIN GLARGINE 100 [IU]/ML
40 INJECTION, SOLUTION SUBCUTANEOUS NIGHTLY
Status: DISCONTINUED | OUTPATIENT
Start: 2023-08-02 | End: 2023-08-03

## 2023-08-02 RX ORDER — INSULIN GLARGINE 100 [IU]/ML
15 INJECTION, SOLUTION SUBCUTANEOUS NIGHTLY
Status: DISCONTINUED | OUTPATIENT
Start: 2023-08-02 | End: 2023-08-02

## 2023-08-02 RX ORDER — INSULIN GLARGINE 100 [IU]/ML
30 INJECTION, SOLUTION SUBCUTANEOUS NIGHTLY
Status: DISCONTINUED | OUTPATIENT
Start: 2023-08-02 | End: 2023-08-02

## 2023-08-02 RX ORDER — INSULIN LISPRO 100 [IU]/ML
0-8 INJECTION, SOLUTION INTRAVENOUS; SUBCUTANEOUS
Status: DISCONTINUED | OUTPATIENT
Start: 2023-08-02 | End: 2023-08-02

## 2023-08-02 RX ORDER — MENTHOL 1.4 %
ADHESIVE PATCH, MEDICATED TOPICAL 4 TIMES DAILY PRN
Status: DISCONTINUED | OUTPATIENT
Start: 2023-08-02 | End: 2023-08-03 | Stop reason: HOSPADM

## 2023-08-02 RX ORDER — INSULIN LISPRO 100 [IU]/ML
15 INJECTION, SOLUTION INTRAVENOUS; SUBCUTANEOUS
Qty: 15 ML | Refills: 0 | Status: CANCELLED | OUTPATIENT
Start: 2023-08-02 | End: 2023-09-04

## 2023-08-02 RX ORDER — INSULIN LISPRO 100 [IU]/ML
5 INJECTION, SOLUTION INTRAVENOUS; SUBCUTANEOUS ONCE
Status: COMPLETED | OUTPATIENT
Start: 2023-08-02 | End: 2023-08-02

## 2023-08-02 RX ORDER — INSULIN LISPRO 100 [IU]/ML
15 INJECTION, SOLUTION INTRAVENOUS; SUBCUTANEOUS
Status: DISCONTINUED | OUTPATIENT
Start: 2023-08-02 | End: 2023-08-03

## 2023-08-02 RX ORDER — INSULIN DETEMIR 100 [IU]/ML
30 INJECTION, SOLUTION SUBCUTANEOUS NIGHTLY
Qty: 9 ML | Refills: 0 | Status: CANCELLED | OUTPATIENT
Start: 2023-08-02 | End: 2023-09-01

## 2023-08-02 RX ORDER — INSULIN LISPRO 100 [IU]/ML
5 INJECTION, SOLUTION INTRAVENOUS; SUBCUTANEOUS
Status: DISCONTINUED | OUTPATIENT
Start: 2023-08-02 | End: 2023-08-02

## 2023-08-02 RX ORDER — INSULIN LISPRO 100 [IU]/ML
0-8 INJECTION, SOLUTION INTRAVENOUS; SUBCUTANEOUS
Status: DISCONTINUED | OUTPATIENT
Start: 2023-08-02 | End: 2023-08-03 | Stop reason: HOSPADM

## 2023-08-02 RX ADMIN — HUMAN INSULIN 10 UNITS: 100 INJECTION, SUSPENSION SUBCUTANEOUS at 10:36

## 2023-08-02 RX ADMIN — DILTIAZEM HYDROCHLORIDE 120 MG: 120 CAPSULE, COATED, EXTENDED RELEASE ORAL at 10:06

## 2023-08-02 RX ADMIN — INSULIN LISPRO 4 UNITS: 100 INJECTION, SOLUTION INTRAVENOUS; SUBCUTANEOUS at 10:05

## 2023-08-02 RX ADMIN — ATORVASTATIN CALCIUM 40 MG: 40 TABLET, FILM COATED ORAL at 10:06

## 2023-08-02 RX ADMIN — MICONAZOLE NITRATE: 2 POWDER TOPICAL at 21:15

## 2023-08-02 RX ADMIN — ASPIRIN 81 MG 81 MG: 81 TABLET ORAL at 10:06

## 2023-08-02 RX ADMIN — BUDESONIDE AND FORMOTEROL FUMARATE DIHYDRATE 2 PUFF: 160; 4.5 AEROSOL RESPIRATORY (INHALATION) at 19:55

## 2023-08-02 RX ADMIN — INSULIN LISPRO 4 UNITS: 100 INJECTION, SOLUTION INTRAVENOUS; SUBCUTANEOUS at 21:12

## 2023-08-02 RX ADMIN — INSULIN LISPRO 2 UNITS: 100 INJECTION, SOLUTION INTRAVENOUS; SUBCUTANEOUS at 12:21

## 2023-08-02 RX ADMIN — IPRATROPIUM BROMIDE AND ALBUTEROL SULFATE 1 DOSE: .5; 3 SOLUTION RESPIRATORY (INHALATION) at 16:18

## 2023-08-02 RX ADMIN — IPRATROPIUM BROMIDE AND ALBUTEROL SULFATE 1 DOSE: .5; 3 SOLUTION RESPIRATORY (INHALATION) at 12:04

## 2023-08-02 RX ADMIN — INSULIN LISPRO 5 UNITS: 100 INJECTION, SOLUTION INTRAVENOUS; SUBCUTANEOUS at 10:05

## 2023-08-02 RX ADMIN — AZITHROMYCIN DIHYDRATE 250 MG: 250 TABLET ORAL at 10:06

## 2023-08-02 RX ADMIN — MICONAZOLE NITRATE: 2 POWDER TOPICAL at 10:10

## 2023-08-02 RX ADMIN — INSULIN GLARGINE 40 UNITS: 100 INJECTION, SOLUTION SUBCUTANEOUS at 21:14

## 2023-08-02 RX ADMIN — METFORMIN HYDROCHLORIDE 500 MG: 500 TABLET, FILM COATED ORAL at 17:16

## 2023-08-02 RX ADMIN — IPRATROPIUM BROMIDE AND ALBUTEROL SULFATE 1 DOSE: .5; 3 SOLUTION RESPIRATORY (INHALATION) at 19:50

## 2023-08-02 RX ADMIN — BUDESONIDE AND FORMOTEROL FUMARATE DIHYDRATE 2 PUFF: 160; 4.5 AEROSOL RESPIRATORY (INHALATION) at 08:21

## 2023-08-02 RX ADMIN — MENTHOL, METHYL SALICYLATE: 10; 15 CREAM TOPICAL at 22:01

## 2023-08-02 RX ADMIN — METFORMIN HYDROCHLORIDE 500 MG: 500 TABLET, FILM COATED ORAL at 10:06

## 2023-08-02 RX ADMIN — INSULIN LISPRO 15 UNITS: 100 INJECTION, SOLUTION INTRAVENOUS; SUBCUTANEOUS at 17:16

## 2023-08-02 RX ADMIN — INSULIN LISPRO 15 UNITS: 100 INJECTION, SOLUTION INTRAVENOUS; SUBCUTANEOUS at 12:21

## 2023-08-02 RX ADMIN — IPRATROPIUM BROMIDE AND ALBUTEROL SULFATE 1 DOSE: .5; 3 SOLUTION RESPIRATORY (INHALATION) at 08:23

## 2023-08-02 RX ADMIN — APIXABAN 5 MG: 5 TABLET, FILM COATED ORAL at 21:14

## 2023-08-02 RX ADMIN — APIXABAN 5 MG: 5 TABLET, FILM COATED ORAL at 10:06

## 2023-08-02 RX ADMIN — METFORMIN HYDROCHLORIDE 500 MG: 500 TABLET ORAL at 21:14

## 2023-08-02 RX ADMIN — PREDNISONE 40 MG: 20 TABLET ORAL at 10:06

## 2023-08-02 RX ADMIN — INSULIN LISPRO 6 UNITS: 100 INJECTION, SOLUTION INTRAVENOUS; SUBCUTANEOUS at 17:15

## 2023-08-02 ASSESSMENT — PAIN DESCRIPTION - DESCRIPTORS: DESCRIPTORS: ACHING

## 2023-08-02 ASSESSMENT — PAIN DESCRIPTION - LOCATION: LOCATION: KNEE

## 2023-08-02 ASSESSMENT — PAIN SCALES - GENERAL: PAINLEVEL_OUTOF10: 7

## 2023-08-02 ASSESSMENT — PAIN DESCRIPTION - ORIENTATION: ORIENTATION: LEFT

## 2023-08-02 NOTE — PLAN OF CARE
Problem: Discharge Planning  Goal: Discharge to home or other facility with appropriate resources  Outcome: Progressing  Flowsheets (Taken 8/2/2023 1006)  Discharge to home or other facility with appropriate resources: Identify barriers to discharge with patient and caregiver     Problem: Safety - Adult  Goal: Free from fall injury  Outcome: Progressing     Problem: Skin/Tissue Integrity  Goal: Absence of new skin breakdown  Description: 1. Monitor for areas of redness and/or skin breakdown  2. Assess vascular access sites hourly  3. Every 4-6 hours minimum:  Change oxygen saturation probe site  4. Every 4-6 hours:  If on nasal continuous positive airway pressure, respiratory therapy assess nares and determine need for appliance change or resting period.   Outcome: Progressing     Problem: Chronic Conditions and Co-morbidities  Goal: Patient's chronic conditions and co-morbidity symptoms are monitored and maintained or improved  Outcome: Progressing  Flowsheets (Taken 8/2/2023 1006)  Care Plan - Patient's Chronic Conditions and Co-Morbidity Symptoms are Monitored and Maintained or Improved: Monitor and assess patient's chronic conditions and comorbid symptoms for stability, deterioration, or improvement

## 2023-08-02 NOTE — CARE COORDINATION
Reviewed chart, discussed in IDR and spoke with pt. Plan is home tomorrow if resp status stable. Pt would like home care and does not have a preference.  [facility]

## 2023-08-02 NOTE — PLAN OF CARE
Problem: Discharge Planning  Goal: Discharge to home or other facility with appropriate resources  8/1/2023 2241 by Jan Nascimento RN  Outcome: Progressing  8/1/2023 1501 by Geovanny Wheatley RN  Outcome: Progressing  Flowsheets (Taken 8/1/2023 0915)  Discharge to home or other facility with appropriate resources: Identify barriers to discharge with patient and caregiver     Problem: Safety - Adult  Goal: Free from fall injury  8/1/2023 2241 by Jan Nascimento RN  Outcome: Progressing  8/1/2023 1501 by Geovanny Wheatley RN  Outcome: Progressing     Problem: Skin/Tissue Integrity  Goal: Absence of new skin breakdown  Description: 1. Monitor for areas of redness and/or skin breakdown  2. Assess vascular access sites hourly  3. Every 4-6 hours minimum:  Change oxygen saturation probe site  4. Every 4-6 hours:  If on nasal continuous positive airway pressure, respiratory therapy assess nares and determine need for appliance change or resting period.   8/1/2023 2241 by Jan Nascimento RN  Outcome: Progressing  8/1/2023 1501 by Geovanny Wheatley RN  Outcome: Progressing     Problem: Chronic Conditions and Co-morbidities  Goal: Patient's chronic conditions and co-morbidity symptoms are monitored and maintained or improved  8/1/2023 2241 by Jan Nascimento RN  Outcome: Progressing  8/1/2023 1501 by Geovanny Wheatley RN  Outcome: Progressing  Flowsheets (Taken 8/1/2023 0915)  Care Plan - Patient's Chronic Conditions and Co-Morbidity Symptoms are Monitored and Maintained or Improved: Monitor and assess patient's chronic conditions and comorbid symptoms for stability, deterioration, or improvement

## 2023-08-02 NOTE — CONSULTS
Endocrinology   Consult Note  7/31/2023  3:22 PM     Primary Care provider: Toni Bill MD     Referring physician:  Geovanna Luo MD     Dear Doctor Nika Kumar     Thank You for the Consult     Pt. Was Admitted for :shortness of breath exacerbation of COPD    Reason for Consult:   Better control of blood glucose      History Obtained From:  Patient/ EMR       HISTORY OF PRESENT ILLNESS:                The patient is a 74 y.o. male with significant past medical history of atrial fibrillation, had cardiac ablation x2, congestive heart failure peripheral artery disease had left-sided femoropopliteal bypass,. COPD , has been on home oxygen diabetes mellitus ,, hypertension, hyperlipidemia. Had femoropopliteal bypass left side obesity blood admitted to hospital for severe shortness of breath exacerbation of COPD. He has been running high blood glucose levels. Patient was started on high-dose steroids also. I was  consulted for better control of blood glucose. ROS:   Pt's ROS done in detail. Abnormal ROS are noted in Medical and Surgical History Section below: Other Medical History:        Diagnosis Date    A-fib [facility])     Resolved after ablation 2018    Anxiety     Arthritis     \"Hips, Knees, Elbows And Fingers\"    COPD (chronic obstructive pulmonary disease) ([facility])     Sees Dr. Yosi Ward    Depression     Diabetes mellitus [facility]) Dx 1510'W    Full dentures     H/O angiography 12/13/2018    peripheral angio - LFA occluded, filling collaterals, RSFA 90% stenosis-vasc surg consult    H/O Doppler ultrasound 09/05/2018    LE arterial - left mid and distal femoral artery occluded, lt FANI shows mild-mod PVD    H/O echocardiogram 01/06/2016    EF60% mild MR, TR, pulm HTN    Hx of colonoscopy     7/11 C-scope - benign polyp x1    Hx of Doppler ultrasound 02/20/2018    Lower extremity doppler  Normal study.     Hyperlipidemia     Hypertension     Macular degeneration     States is legally blind    Obesity     On 98.2

## 2023-08-03 ENCOUNTER — CARE COORDINATION (OUTPATIENT)
Dept: CARE COORDINATION | Age: 68
End: 2023-08-03

## 2023-08-03 VITALS
SYSTOLIC BLOOD PRESSURE: 126 MMHG | RESPIRATION RATE: 22 BRPM | TEMPERATURE: 97.7 F | HEART RATE: 77 BPM | OXYGEN SATURATION: 93 % | WEIGHT: 315 LBS | BODY MASS INDEX: 36.45 KG/M2 | HEIGHT: 78 IN | DIASTOLIC BLOOD PRESSURE: 74 MMHG

## 2023-08-03 LAB
ANION GAP SERPL CALCULATED.3IONS-SCNC: 9 MMOL/L (ref 4–16)
BASOPHILS ABSOLUTE: 0 K/CU MM
BASOPHILS RELATIVE PERCENT: 0.1 % (ref 0–1)
BUN SERPL-MCNC: 13 MG/DL (ref 6–23)
CALCIUM SERPL-MCNC: 8.7 MG/DL (ref 8.3–10.6)
CHLORIDE BLD-SCNC: 92 MMOL/L (ref 99–110)
CO2: 32 MMOL/L (ref 21–32)
CREAT SERPL-MCNC: 0.7 MG/DL (ref 0.9–1.3)
DIFFERENTIAL TYPE: ABNORMAL
EKG ATRIAL RATE: 93 BPM
EKG DIAGNOSIS: NORMAL
EKG P AXIS: 20 DEGREES
EKG P-R INTERVAL: 202 MS
EKG Q-T INTERVAL: 358 MS
EKG QRS DURATION: 86 MS
EKG QTC CALCULATION (BAZETT): 445 MS
EKG R AXIS: -61 DEGREES
EKG T AXIS: 48 DEGREES
EKG VENTRICULAR RATE: 93 BPM
EOSINOPHILS ABSOLUTE: 0 K/CU MM
EOSINOPHILS RELATIVE PERCENT: 0.2 % (ref 0–3)
GFR SERPL CREATININE-BSD FRML MDRD: >60 ML/MIN/1.73M2
GLUCOSE BLD-MCNC: 104 MG/DL (ref 70–99)
GLUCOSE BLD-MCNC: 181 MG/DL (ref 70–99)
GLUCOSE BLD-MCNC: 268 MG/DL (ref 70–99)
GLUCOSE SERPL-MCNC: 288 MG/DL (ref 70–99)
HCT VFR BLD CALC: 40.8 % (ref 42–52)
HEMOGLOBIN: 13.4 GM/DL (ref 13.5–18)
IMMATURE NEUTROPHIL %: 0.7 % (ref 0–0.43)
LYMPHOCYTES ABSOLUTE: 1.1 K/CU MM
LYMPHOCYTES RELATIVE PERCENT: 13.4 % (ref 24–44)
MCH RBC QN AUTO: 30.3 PG (ref 27–31)
MCHC RBC AUTO-ENTMCNC: 32.8 % (ref 32–36)
MCV RBC AUTO: 92.3 FL (ref 78–100)
MONOCYTES ABSOLUTE: 0.5 K/CU MM
MONOCYTES RELATIVE PERCENT: 6.2 % (ref 0–4)
NUCLEATED RBC %: 0 %
PDW BLD-RTO: 13 % (ref 11.7–14.9)
PLATELET # BLD: 164 K/CU MM (ref 140–440)
PMV BLD AUTO: 9.5 FL (ref 7.5–11.1)
POTASSIUM SERPL-SCNC: 4.1 MMOL/L (ref 3.5–5.1)
RBC # BLD: 4.42 M/CU MM (ref 4.6–6.2)
SEGMENTED NEUTROPHILS ABSOLUTE COUNT: 6.7 K/CU MM
SEGMENTED NEUTROPHILS RELATIVE PERCENT: 79.4 % (ref 36–66)
SODIUM BLD-SCNC: 133 MMOL/L (ref 135–145)
TOTAL IMMATURE NEUTOROPHIL: 0.06 K/CU MM
TOTAL NUCLEATED RBC: 0 K/CU MM
WBC # BLD: 8.4 K/CU MM (ref 4–10.5)

## 2023-08-03 PROCEDURE — 6370000000 HC RX 637 (ALT 250 FOR IP): Performed by: STUDENT IN AN ORGANIZED HEALTH CARE EDUCATION/TRAINING PROGRAM

## 2023-08-03 PROCEDURE — 85025 COMPLETE CBC W/AUTO DIFF WBC: CPT

## 2023-08-03 PROCEDURE — 36415 COLL VENOUS BLD VENIPUNCTURE: CPT

## 2023-08-03 PROCEDURE — 2700000000 HC OXYGEN THERAPY PER DAY

## 2023-08-03 PROCEDURE — 82962 GLUCOSE BLOOD TEST: CPT

## 2023-08-03 PROCEDURE — 6370000000 HC RX 637 (ALT 250 FOR IP): Performed by: INTERNAL MEDICINE

## 2023-08-03 PROCEDURE — 94640 AIRWAY INHALATION TREATMENT: CPT

## 2023-08-03 PROCEDURE — 80048 BASIC METABOLIC PNL TOTAL CA: CPT

## 2023-08-03 PROCEDURE — 94761 N-INVAS EAR/PLS OXIMETRY MLT: CPT

## 2023-08-03 PROCEDURE — 93010 ELECTROCARDIOGRAM REPORT: CPT | Performed by: INTERNAL MEDICINE

## 2023-08-03 RX ORDER — INSULIN LISPRO 100 [IU]/ML
20 INJECTION, SOLUTION INTRAVENOUS; SUBCUTANEOUS
Status: DISCONTINUED | OUTPATIENT
Start: 2023-08-03 | End: 2023-08-03 | Stop reason: HOSPADM

## 2023-08-03 RX ORDER — INSULIN DETEMIR 100 [IU]/ML
50 INJECTION, SOLUTION SUBCUTANEOUS NIGHTLY
Qty: 15 ML | Refills: 3 | Status: SHIPPED | OUTPATIENT
Start: 2023-08-03

## 2023-08-03 RX ORDER — INSULIN GLARGINE 100 [IU]/ML
50 INJECTION, SOLUTION SUBCUTANEOUS NIGHTLY
Status: DISCONTINUED | OUTPATIENT
Start: 2023-08-03 | End: 2023-08-03 | Stop reason: HOSPADM

## 2023-08-03 RX ORDER — PREDNISONE 20 MG/1
40 TABLET ORAL DAILY
Qty: 6 TABLET | Refills: 0 | Status: SHIPPED | OUTPATIENT
Start: 2023-08-03 | End: 2023-08-06

## 2023-08-03 RX ORDER — AZITHROMYCIN 250 MG/1
250 TABLET, FILM COATED ORAL DAILY
Qty: 2 TABLET | Refills: 0 | Status: SHIPPED | OUTPATIENT
Start: 2023-08-03 | End: 2023-08-05

## 2023-08-03 RX ORDER — INSULIN LISPRO 100 [IU]/ML
20 INJECTION, SOLUTION INTRAVENOUS; SUBCUTANEOUS
Qty: 20 ML | Refills: 0 | Status: SHIPPED | OUTPATIENT
Start: 2023-08-03 | End: 2023-09-05

## 2023-08-03 RX ORDER — KETOCONAZOLE 20 MG/G
CREAM TOPICAL
Qty: 30 G | Refills: 1 | Status: SHIPPED | OUTPATIENT
Start: 2023-08-03

## 2023-08-03 RX ADMIN — ASPIRIN 81 MG 81 MG: 81 TABLET ORAL at 09:16

## 2023-08-03 RX ADMIN — PREDNISONE 40 MG: 20 TABLET ORAL at 09:15

## 2023-08-03 RX ADMIN — ATORVASTATIN CALCIUM 40 MG: 40 TABLET, FILM COATED ORAL at 09:16

## 2023-08-03 RX ADMIN — MICONAZOLE NITRATE: 2 POWDER TOPICAL at 09:17

## 2023-08-03 RX ADMIN — BUDESONIDE AND FORMOTEROL FUMARATE DIHYDRATE 2 PUFF: 160; 4.5 AEROSOL RESPIRATORY (INHALATION) at 08:22

## 2023-08-03 RX ADMIN — DILTIAZEM HYDROCHLORIDE 120 MG: 120 CAPSULE, COATED, EXTENDED RELEASE ORAL at 09:16

## 2023-08-03 RX ADMIN — AZITHROMYCIN DIHYDRATE 250 MG: 250 TABLET ORAL at 09:16

## 2023-08-03 RX ADMIN — METFORMIN HYDROCHLORIDE 1000 MG: 500 TABLET, FILM COATED ORAL at 09:16

## 2023-08-03 RX ADMIN — APIXABAN 5 MG: 5 TABLET, FILM COATED ORAL at 09:16

## 2023-08-03 RX ADMIN — IPRATROPIUM BROMIDE AND ALBUTEROL SULFATE 1 DOSE: .5; 3 SOLUTION RESPIRATORY (INHALATION) at 08:22

## 2023-08-03 RX ADMIN — INSULIN LISPRO 20 UNITS: 100 INJECTION, SOLUTION INTRAVENOUS; SUBCUTANEOUS at 09:17

## 2023-08-03 RX ADMIN — INSULIN LISPRO 4 UNITS: 100 INJECTION, SOLUTION INTRAVENOUS; SUBCUTANEOUS at 02:09

## 2023-08-03 ASSESSMENT — PAIN SCALES - GENERAL
PAINLEVEL_OUTOF10: 3
PAINLEVEL_OUTOF10: 3

## 2023-08-03 NOTE — PROGRESS NOTES
4 Eyes Skin Assessment     NAME:  Mukesh Tobias  YOB: 1955  MEDICAL RECORD NUMBER:  [id number]    The patient is being assess for  Admission    I agree that 2 RN's have performed a thorough Head to Toe Skin Assessment on the patient. ALL assessment sites listed below have been assessed. Areas assessed by both nurses:    Head, Face, Ears, Shoulders, Back, Chest, Arms, Elbows, Hands, Sacrum. Buttock, Coccyx, Ischium, Legs. Feet and Heels, and Under Medical Devices         Does the Patient have a Wound?  No noted wound(s)      Hyperpigmentation BLE,Rash on chest; redness on groin/abd folds    Hector Prevention initiated:  Yes   Wound Care Orders initiated:  No    Pressure Injury (Stage 3,4, Unstageable, DTI, NWPT, and Complex wounds) if present place consult order under [de-identified] No    New and Established Ostomies if present place consult order under : No      Nurse 1 eSignature: Electronically signed by Pablito Corona RN on 7/31/23 at 10:39 PM EDT    **SHARE this note so that the co-signing nurse is able to place an eSignature**    Nurse 2 eSignature: Electronically signed by Christy Dial RN on 7/31/23 at 11:28 PM EDT
Outpatient Pharmacy Progress Note for Meds-to-Beds    Total number of Prescriptions Filled: 9  The following medications were dispensed to the patient during the discharge process:  Azithromycin  Humolog  Ketoconazole   Levemir  Metformin  Miconazole  Prednisone  Insulin pens needles  Alcohol prep wipe    Additional Documentation:  Medication(s) were delivered to the patient's room prior to discharge  The following prescription(s) were refilled to soon per insurance (patient has some at home): Metformin       Thank you for letting us serve your patients.   [address]    [address] [place] [address], [address] [place] [address]    Phone: [phone]    Fax: [phone]
V2.0    [facility] [place]      Name:  Marshall Smiley /Age/Sex: 1955  (74 y.o. male)   MRN & CSN:  [id number] & [id number] Encounter Date/Time: 2023 7:44 AM EDT   [facility] PCP: Khalif Sunshine MD     Attending:Jose Pemberton MD       Hospital Day: 2    Assessment and Recommendations   Marshall Smiley is a 74 y.o. male with pmh of HFpEF, A-fib, type 2 diabetes, PAD, COPD  who presents with COPD exacerbation ([address])      # Acute on chronic hypoxemic respiratory failure secondary to exacerbation of severe COPD  - Presented with worsening SOB with worsening  cough 3 days. On 2-3L NC at baseline. Non Compliant with home inhalers, - Requiring 5-6 L NC in the ED on arrival improved to 3 L during my encounter Decrease air entry on exam. CXR without acute infiltrates. - Continue steroids, Azithro, Symbicort, DuoNebs        # T2DM with hyperglycemia 2/2 to  steroid use. - Last A1c 9.3% in 2023  - Held home Metformin and Glipizide. - Started on MDSSI and hypoglycemia protocol      # HFpEF  - Clinically euvolemic. # PAF s/p ablation in 2018  - Continue Eliquis and Diltiazem. # PAD   - Continue ASA and Lipitor. # Class III obesity  - BMI 44.1.  - Advised on importance of lifestyle modifications. Comment: Please note this report has been produced using speech recognition software and may contain errors related to that system including errors in grammar, punctuation, and spelling, as well as words and phrases that may be inappropriate. If there are any questions or concerns please feel free to contact the dictating provider for clarification. Diet ADULT DIET; Regular; 4 carb choices (60 gm/meal);  Low Fat/Low Chol/High Fiber/SUE   DVT Prophylaxis [] Lovenox, []  Heparin, [] SCDs, [] Ambulation,  [] Eliquis, [] Xarelto  [] Coumadin   Code Status Prior   Disposition From: Home  Expected Disposition: Home  Estimated Date of Discharge: TBD  Patient requires
V2.0    [facility] [place]      Name:  Beba Boswell /Age/Sex: 1955  (74 y.o. male)   MRN & CSN:  [id number] & [id number] Encounter Date/Time: 2023 7:44 AM EDT   [facility]  [facility] PCP: Gerogina Melgar MD     Attending:Jose Peterson MD       Hospital Day: 3    Assessment and Recommendations   Beba Boswell is a 74 y.o. male with pmh of HFpEF, A-fib, type 2 diabetes, PAD, COPD  who presents with COPD exacerbation ([address])      # Acute on chronic hypoxemic respiratory failure secondary to exacerbation of severe COPD  - Presented with worsening SOB with worsening  cough 3 days. On 2-3L NC at baseline. Non Compliant with home inhalers, - Requiring 5-6 L NC in the ED on arrival improved to 3 L during my encounter Decrease air entry on exam. CXR without acute infiltrates. - Continue steroids, Azithro, Symbicort, DuoNebs        # T2DM with hyperglycemia 2/2 to  steroid use. - Last A1c 9.3% in 2023 and 9.7% on   - Held home Metformin and Glipizide. - Started on Lantus, lispro, MDSSI and hypoglycemia protocol and diabetic diet and also consulted endocrinology. # HFpEF  - Clinically euvolemic. # PAF s/p ablation in 2018  - Continue Eliquis and Diltiazem. # PAD   - Continue ASA and Lipitor. # Class III obesity  - BMI 44.1.  - Advised on importance of lifestyle modifications. Comment: Please note this report has been produced using speech recognition software and may contain errors related to that system including errors in grammar, punctuation, and spelling, as well as words and phrases that may be inappropriate. If there are any questions or concerns please feel free to contact the dictating provider for clarification. Diet ADULT DIET; Regular; 4 carb choices (60 gm/meal);  Low Fat/Low Chol/High Fiber/SUE   DVT Prophylaxis [] Lovenox, []  Heparin, [] SCDs, [] Ambulation,  [] Eliquis, [] Xarelto  [] Coumadin   Code Status Prior   Disposition From:
results   Reviewed Current Medicines   On meal/ Correction bolus Humalog/ Basal Lantus Insulin regime / and Oral Hypoglycemic drugs   Monitor Blood glucose frequently   Modified  the dose of Insulin/ other medicines as needed   . Discussed with hospitalist okay to discharge home  Will follow in the office    .      Beth Bernard MD, MD

## 2023-08-03 NOTE — CARE COORDINATION
[facility] Liaison spoke with pt and is aware of discharge & will initiate [address]. Verified address, pcp and number. Called pcp & received order from Beatris Almaraz from [place] office.

## 2023-08-03 NOTE — PLAN OF CARE
Problem: Discharge Planning  Goal: Discharge to home or other facility with appropriate resources  8/2/2023 2232 by Jan Nascimento RN  Outcome: Progressing  8/2/2023 1349 by Geovanny Wheatley RN  Outcome: Progressing  Flowsheets (Taken 8/2/2023 1006)  Discharge to home or other facility with appropriate resources: Identify barriers to discharge with patient and caregiver     Problem: Safety - Adult  Goal: Free from fall injury  8/2/2023 2232 by Jan Nascimento RN  Outcome: Progressing  8/2/2023 1349 by Geovanny Wheatley RN  Outcome: Progressing     Problem: Skin/Tissue Integrity  Goal: Absence of new skin breakdown  Description: 1. Monitor for areas of redness and/or skin breakdown  2. Assess vascular access sites hourly  3. Every 4-6 hours minimum:  Change oxygen saturation probe site  4. Every 4-6 hours:  If on nasal continuous positive airway pressure, respiratory therapy assess nares and determine need for appliance change or resting period.   8/2/2023 2232 by Jan Nascimento RN  Outcome: Progressing  8/2/2023 1349 by Geovanny Wheatley RN  Outcome: Progressing     Problem: Chronic Conditions and Co-morbidities  Goal: Patient's chronic conditions and co-morbidity symptoms are monitored and maintained or improved  8/2/2023 2232 by Jan Nascimento RN  Outcome: Progressing  8/2/2023 1349 by Geovanny Wheatley RN  Outcome: Progressing  Flowsheets (Taken 8/2/2023 1006)  Care Plan - Patient's Chronic Conditions and Co-Morbidity Symptoms are Monitored and Maintained or Improved: Monitor and assess patient's chronic conditions and comorbid symptoms for stability, deterioration, or improvement     Problem: Pain  Goal: Verbalizes/displays adequate comfort level or baseline comfort level  Outcome: Progressing

## 2023-08-03 NOTE — CARE COORDINATION
Attempted phone call to Melissa Castillo with [employer] to determine if she has a list of providers for evaluation of vision that she could provide for Pt. No answer, voicemail message left requesting a return call to discuss, contact number provided. VIELKA plan of care: VIELKA will follow up with Pt regarding vision services on 8/9.

## 2023-08-03 NOTE — PLAN OF CARE
Problem: Discharge Planning  Goal: Discharge to home or other facility with appropriate resources  8/3/2023 1157 by Daksha Etienne RN  Outcome: Completed  8/2/2023 2232 by Jo Hernandez RN  Outcome: Progressing     Problem: Safety - Adult  Goal: Free from fall injury  8/3/2023 1157 by Daksha Etienne RN  Outcome: Completed  8/2/2023 2232 by Jo Hernandez RN  Outcome: Progressing     Problem: Skin/Tissue Integrity  Goal: Absence of new skin breakdown  Description: 1. Monitor for areas of redness and/or skin breakdown  2. Assess vascular access sites hourly  3. Every 4-6 hours minimum:  Change oxygen saturation probe site  4. Every 4-6 hours:  If on nasal continuous positive airway pressure, respiratory therapy assess nares and determine need for appliance change or resting period.   8/3/2023 1157 by Daksha Etienne RN  Outcome: Completed  8/2/2023 2232 by Jo Hernandez RN  Outcome: Progressing     Problem: Chronic Conditions and Co-morbidities  Goal: Patient's chronic conditions and co-morbidity symptoms are monitored and maintained or improved  8/3/2023 1157 by Daksha Etienne RN  Outcome: Completed  8/2/2023 2232 by Jo Hernandez RN  Outcome: Progressing     Problem: Pain  Goal: Verbalizes/displays adequate comfort level or baseline comfort level  8/3/2023 1157 by Daksha Etienne RN  Outcome: Completed  8/2/2023 2232 by Jo Hernandez RN  Outcome: Progressing

## 2023-08-04 ENCOUNTER — CARE COORDINATION (OUTPATIENT)
Dept: CASE MANAGEMENT | Age: 68
End: 2023-08-04

## 2023-08-04 ENCOUNTER — CARE COORDINATION (OUTPATIENT)
Dept: CARE COORDINATION | Age: 68
End: 2023-08-04

## 2023-08-04 DIAGNOSIS — J44.1 COPD EXACERBATION (HCC): Primary | ICD-10-CM

## 2023-08-04 PROCEDURE — 1111F DSCHRG MED/CURRENT MED MERGE: CPT | Performed by: INTERNAL MEDICINE

## 2023-08-04 NOTE — CARE COORDINATION
[facility] Care Transitions Initial Follow Up Call    Call within 2 business days of discharge: Yes    Patient Current Location:  Home: [address] [place] [address] Transition Nurse contacted the patient by telephone to perform post hospital discharge assessment. Verified name and  with patient as identifiers. Provided introduction to self, and explanation of the Care Transition Nurse role. Patient: Krista Martinez Patient : 1955   MRN: [id number]  Reason for Admission: SOB, COPD exacerbation  Discharge Date: 8/3/23 RARS: Readmission Risk Score: 15.3      Last Discharge [address] [facility]       Date Complaint Diagnosis Description Type Department Provider    23 Shortness of Breath COPD exacerbation [facility]) ED to Hosp-Admission (Discharged) (ADMITTED) Hannah Murdock MD; Zehra Pino... Was this an external facility discharge? No Discharge Facility: [facility]     Challenges to be reviewed by the provider   Additional needs identified to be addressed with provider: No  none               Method of communication with provider: none. Contacted pt for initial care transition follow up. aBrt Landaverde states he is doing better this morning. Continues to become short of breath with minimal exertion. Denies any acute distress. Per pt, he is using 3 L of oxygen while sleeping and as needed. SpO2 90-94%. Discussed importance of maintaining O2 sats in the 90's. He is aware and verbalized understanding. He denies having any c/o chest pain/discomfort, pressure, tightness, cough, fever, chills. Continues to have wheezing at times d/t COPD but has not worsened. Pt is up and moving around but admits to not being able to walk very far. He has an electric scooter as well. He is eating and drinking fluids w/o issues. Per pt, FBS were running in the 250's at one time. Has not checked his FBS this morning d/t just waking up.   We discussed diet and benefits of

## 2023-08-04 NOTE — CARE COORDINATION
Message received from CTN to collaborate in r/t plan for 24/ hour hospital d/c outreach. ACM follow up deferred as per protocol.

## 2023-08-05 NOTE — DISCHARGE SUMMARY
V2.0  Discharge Summary    Name:  Tasneem Penn /Age/Sex: 1955 (74 y.o. male)   Admit Date: 2023  Discharge Date: 23    MRN & CSN:  [id number] & [id number] Encounter Date and Time 23 3:43 PM EDT    Attending:  No att. providers found Discharging Provider: Nancy Lucas MD       Hospital Course:     Brief HPI: Tasneem Penn is a 74 y.o. male who presented with  pmh of HFpEF, A-fib, type 2 diabetes, PAD, COPD  who presents with COPD exacerbation ([address])    Brief Problem Based Course:      # Acute on chronic hypoxemic respiratory failure secondary to exacerbation of severe COPD  - Presented with worsening SOB with worsening  cough 3 days. On 2-3L NC at baseline. Non Compliant with home inhalers, - Requiring 5-6 L NC in the ED on arrival improved to 3 L during my encounter Decrease air entry on exam. CXR without acute infiltrates. - Continue steroids, Azithro, Symbicort, DuoNebs discharge patient in a stable condition        # T2DM with hyperglycemia 2/2 to  steroid use. - Last A1c 9.3% in 2023 and 9.7% on   - Held home Metformin and Glipizide. - Started on Lantus, lispro, MDSSI and hypoglycemia protocol and diabetic diet and also consulted endocrinology. Recommended to continue insulin and metformin but discontinue glipizide and follow-up in office to adjust insulin. # HFpEF  - Clinically euvolemic. # PAF s/p ablation in 2018  - Continue Eliquis and Diltiazem. # PAD   - Continue ASA and Lipitor. # Class III obesity  - BMI 44.1.  - Advised on importance of lifestyle modifications. Comment: Please note this report has been produced using speech recognition software and may contain errors related to that system including errors in grammar, punctuation, and spelling, as well as words and phrases that may be inappropriate. If there are any questions or concerns please feel free to contact the dictating provider for clarification.        The patient expressed

## 2023-08-07 ENCOUNTER — TELEPHONE (OUTPATIENT)
Dept: INTERNAL MEDICINE CLINIC | Age: 68
End: 2023-08-07

## 2023-08-07 DIAGNOSIS — Z12.11 ENCOUNTER FOR SCREENING COLONOSCOPY: Primary | ICD-10-CM

## 2023-08-07 NOTE — TELEPHONE ENCOUNTER
----- Message from [facility] sent at 8/7/2023  2:33 PM EDT -----  Subject: Message to Provider    QUESTIONS  Information for Provider? patient would like to have order for colonocopy  ---------------------------------------------------------------------------  --------------  [address]  [phone]; OK to leave message on voicemail  ---------------------------------------------------------------------------  --------------  SCRIPT ANSWERS  Relationship to Patient?  Self

## 2023-08-07 NOTE — TELEPHONE ENCOUNTER
Spoke with patient. Referral faxed to Dr Governor Mazariegos. Office will contact patient with appt.

## 2023-08-08 ENCOUNTER — CARE COORDINATION (OUTPATIENT)
Dept: CASE MANAGEMENT | Age: 68
End: 2023-08-08

## 2023-08-08 LAB
CULTURE: NORMAL
GRAM SMEAR: NORMAL
Lab: NORMAL
SPECIMEN: NORMAL

## 2023-08-08 NOTE — CARE COORDINATION
Care Transitions Outreach Attempt-COPD exacerbation    Attempted to reach patient for transitions of care follow up. Unable to reach patient. Patient: Татьяна Alcaraz Patient : 1955 MRN: [id number]    Last Discharge [address]       Date Complaint Diagnosis Description Type Department Provider    23 Shortness of Breath COPD exacerbation [facility]) ED to Hosp-Admission (Discharged) (ADMITTED) Ingris Mathews MD; Lennie Gannon. .. Noted following upcoming appointments from discharge chart review:   [facility] follow up appointment(s):   Future Appointments   Date Time Provider [address]   2023  2:30 PM Jaime Ogden MD [facility]     Attempted to contact pt for care transition follow up. Unable to reach pt. Phone line rang multiple times with no answer and then disconnected. Will try again at a later time.       Bri Mitchell RN  Care Transition Nurse

## 2023-08-09 ENCOUNTER — CARE COORDINATION (OUTPATIENT)
Dept: CARE COORDINATION | Age: 68
End: 2023-08-09

## 2023-08-09 NOTE — CARE COORDINATION
VIELKA sent secure email to coworker, Evelia Guthrie requesting she sends out list of optometrists in [place] to Pt via postal mail. VIELKA plan of care: SW will follow up with Pt in 2 weeks to confirm he received list of optometrists and will review with Pt.

## 2023-08-10 ENCOUNTER — CARE COORDINATION (OUTPATIENT)
Dept: CASE MANAGEMENT | Age: 68
End: 2023-08-10

## 2023-08-10 NOTE — CARE COORDINATION
[facility] Care Transitions Follow Up Call    Patient Current Location:  Home: [address] [facility] Transition Nurse contacted the patient by telephone to follow up after admission on 23. Verified name and  with patient as identifiers. Patient: Anthony Gasca  Patient : 1955   MRN: [id number]  Reason for Admission: SOB, COPD exacerbation  Discharge Date: 8/3/23 RARS: Readmission Risk Score: 15.3      Needs to be reviewed by the provider   Additional needs identified to be addressed with provider: Yes  States glucometer that he received is not working therefore he has not been checking his blood sugars. Recommended he bring glucometer to his appt on 23. Method of communication with provider: none. Contacted pt for care transition follow up. Shemar Machado states \"doing a little better\". Denies any acute distress. Continues to become short of breath with exertion. Continues to use 3 L of continuous oxygen. SpO2 remaining in the 90's but does admit O2 sats may drop to mid 80's when up and moving around. Naeemzac Jeannette states he recovers once he sits down to rest and O2 sats will increase into the 90's. We discussed importance of maintaining levels in the 90's and when to report to the ED to get evaluated. He verbalized understanding and states he is aware of this. Denies having any c/o chest pain/discomfort, pressure, tightness. May have wheezing and cough every once in a while. He confirmed he is using his breathing tx as prescribed. He informed CTN that his Albuterol inhaler was delivered. He is eating and drinking fluids w/o issues. States \"eating was never an issue\" for him. Admits to not checking his blood sugars d/t the glucometer he received not working. Recommended he bring his glucometer to his appt tomorrow so MA can look at it to figure out what is wrong with it. He also plans to bring his medications to his appts.   He reports that

## 2023-08-11 ENCOUNTER — TELEPHONE (OUTPATIENT)
Dept: INTERNAL MEDICINE CLINIC | Age: 68
End: 2023-08-11

## 2023-08-11 ENCOUNTER — CARE COORDINATION (OUTPATIENT)
Dept: CARE COORDINATION | Age: 68
End: 2023-08-11

## 2023-08-11 ENCOUNTER — OFFICE VISIT (OUTPATIENT)
Dept: INTERNAL MEDICINE CLINIC | Age: 68
End: 2023-08-11

## 2023-08-11 VITALS
OXYGEN SATURATION: 90 % | DIASTOLIC BLOOD PRESSURE: 66 MMHG | SYSTOLIC BLOOD PRESSURE: 118 MMHG | HEART RATE: 61 BPM | RESPIRATION RATE: 18 BRPM

## 2023-08-11 DIAGNOSIS — E11.65 TYPE 2 DIABETES MELLITUS WITH HYPERGLYCEMIA, WITHOUT LONG-TERM CURRENT USE OF INSULIN (HCC): Primary | ICD-10-CM

## 2023-08-11 DIAGNOSIS — J96.21 ACUTE AND CHRONIC RESPIRATORY FAILURE WITH HYPOXIA (HCC): ICD-10-CM

## 2023-08-11 DIAGNOSIS — J41.0 SIMPLE CHRONIC BRONCHITIS (HCC): ICD-10-CM

## 2023-08-11 DIAGNOSIS — J44.1 COPD EXACERBATION (HCC): ICD-10-CM

## 2023-08-11 DIAGNOSIS — Z09 HOSPITAL DISCHARGE FOLLOW-UP: ICD-10-CM

## 2023-08-11 RX ORDER — GLUCOSAMINE HCL/CHONDROITIN SU 500-400 MG
CAPSULE ORAL
Qty: 100 STRIP | Refills: 3 | Status: SHIPPED | OUTPATIENT
Start: 2023-08-11

## 2023-08-11 SDOH — ECONOMIC STABILITY: FOOD INSECURITY: WITHIN THE PAST 12 MONTHS, THE FOOD YOU BOUGHT JUST DIDN'T LAST AND YOU DIDN'T HAVE MONEY TO GET MORE.: NEVER TRUE

## 2023-08-11 SDOH — ECONOMIC STABILITY: FOOD INSECURITY: WITHIN THE PAST 12 MONTHS, YOU WORRIED THAT YOUR FOOD WOULD RUN OUT BEFORE YOU GOT MONEY TO BUY MORE.: NEVER TRUE

## 2023-08-11 SDOH — ECONOMIC STABILITY: INCOME INSECURITY: HOW HARD IS IT FOR YOU TO PAY FOR THE VERY BASICS LIKE FOOD, HOUSING, MEDICAL CARE, AND HEATING?: NOT HARD AT ALL

## 2023-08-11 NOTE — TELEPHONE ENCOUNTER
Shellie Colorado called in to let you know that the pt is coming in for a hospital follow up today and stated that the pt need a new glucose meter and supplies. Also she said the pt seen Dr Chuckie Olszewski in the hospital and the pt was wanting to know if he will need to have any follow ups with Dr Chuckie Olszewski ?

## 2023-08-11 NOTE — CARE COORDINATION
Ambulatory Care Coordination Note  2023    Patient Current Location:  Home: [address] [place] [id number]     ACM contacted the patient by telephone. Verified name and  with patient as identifiers. Provided introduction to self, and explanation of the ACM role. Challenges to be reviewed by the provider   Additional needs identified to be addressed with provider: Yes  Glucometer               Method of communication with provider: phone. ACM: Lakia Medley RN        Patient reports that he is feeling ok. Rested well last night; thoughts are clear. Patient is taking his medications; using oxygen as directed;  3L. Reviewed s/s to report to MD.    DM:  patient has not been checking his BS d/t glucometer not working. Instructed on the importance of routine monitoring; log for Provider review. Encouraged compliance with low sugar/ low carb diet. Patient confirms that he is active with [facility]. ACM to reach out to check on status of medi-paks and to request RN/ DE visit. Reminded patient of the  availability of a [facility] RN on call for medical condition questions. Confirmed plan for PCP follow up today. ACM to contact office to confirm need for glucometer; confirm plan for [facility] DE/ RN f/u and request PCP advisement in r/t Endo. Offered patient enrollment in the Remote Patient Monitoring (RPM) program for in-home monitoring: NA.    Patient denies any questions or concerns at this time. ACM contact information provided should questions arise. Plan for next outreach:  COPD, DM zone review, Endo. Check on status on medi-paks    Spoke with Juany/ [address] [place] [address] [employer]. Confirmed plan for medi-paks. Requested DE/ RN follow up. [address] [place] [address] [employer] confirmed plan for follow through. Message left with nurse's station/ PCP office in r/t glucometer, plan for DE/ RN follow up and request for patient advisement in r/t Endo.      Lab Results       None            Care Coordination Interventions

## 2023-08-14 ENCOUNTER — HOSPITAL ENCOUNTER (INPATIENT)
Age: 68
LOS: 3 days | Discharge: HOME HEALTH | End: 2023-08-17
Attending: EMERGENCY MEDICINE | Admitting: INTERNAL MEDICINE
Payer: MEDICARE

## 2023-08-14 ENCOUNTER — APPOINTMENT (OUTPATIENT)
Dept: GENERAL RADIOLOGY | Age: 68
End: 2023-08-14
Payer: MEDICARE

## 2023-08-14 DIAGNOSIS — J44.1 COPD EXACERBATION (HCC): Primary | ICD-10-CM

## 2023-08-14 PROBLEM — J96.22 ACUTE ON CHRONIC RESPIRATORY FAILURE WITH HYPOXIA AND HYPERCAPNIA (HCC): Status: ACTIVE | Noted: 2023-08-14

## 2023-08-14 PROBLEM — J96.21 ACUTE ON CHRONIC RESPIRATORY FAILURE WITH HYPOXIA AND HYPERCAPNIA (HCC): Status: ACTIVE | Noted: 2023-08-14

## 2023-08-14 LAB
ALBUMIN SERPL-MCNC: 3.7 GM/DL (ref 3.4–5)
ALP BLD-CCNC: 83 IU/L (ref 40–129)
ALT SERPL-CCNC: 28 U/L (ref 10–40)
ANION GAP SERPL CALCULATED.3IONS-SCNC: 8 MMOL/L (ref 4–16)
AST SERPL-CCNC: 14 IU/L (ref 15–37)
B PARAP IS1001 DNA NPH QL NAA+NON-PROBE: NOT DETECTED
B PERT.PT PRMT NPH QL NAA+NON-PROBE: NOT DETECTED
BASE EXCESS MIXED: 11.7 (ref 0–1.2)
BASE EXCESS MIXED: 9 (ref 0–1.2)
BASOPHILS ABSOLUTE: 0.1 K/CU MM
BASOPHILS RELATIVE PERCENT: 0.6 % (ref 0–1)
BILIRUB SERPL-MCNC: 0.3 MG/DL (ref 0–1)
BUN SERPL-MCNC: 15 MG/DL (ref 6–23)
C PNEUM DNA NPH QL NAA+NON-PROBE: NOT DETECTED
CALCIUM SERPL-MCNC: 9.1 MG/DL (ref 8.3–10.6)
CHLORIDE BLD-SCNC: 93 MMOL/L (ref 99–110)
CO2: 33 MMOL/L (ref 21–32)
COMMENT: ABNORMAL
COMMENT: ABNORMAL
CREAT SERPL-MCNC: 0.6 MG/DL (ref 0.9–1.3)
DIFFERENTIAL TYPE: ABNORMAL
EOSINOPHILS ABSOLUTE: 0.2 K/CU MM
EOSINOPHILS RELATIVE PERCENT: 2 % (ref 0–3)
FLUAV H1 2009 PAN RNA NPH NAA+NON-PROBE: NOT DETECTED
FLUAV H1 RNA NPH QL NAA+NON-PROBE: NOT DETECTED
FLUAV H3 RNA NPH QL NAA+NON-PROBE: NOT DETECTED
FLUAV RNA NPH QL NAA+NON-PROBE: NOT DETECTED
FLUBV RNA NPH QL NAA+NON-PROBE: NOT DETECTED
GFR SERPL CREATININE-BSD FRML MDRD: >60 ML/MIN/1.73M2
GLUCOSE BLD-MCNC: 538 MG/DL (ref 70–99)
GLUCOSE SERPL-MCNC: 356 MG/DL (ref 70–99)
HADV DNA NPH QL NAA+NON-PROBE: NOT DETECTED
HCO3 VENOUS: 39.5 MMOL/L (ref 19–25)
HCO3 VENOUS: 41.1 MMOL/L (ref 19–25)
HCOV 229E RNA NPH QL NAA+NON-PROBE: NOT DETECTED
HCOV HKU1 RNA NPH QL NAA+NON-PROBE: NOT DETECTED
HCOV NL63 RNA NPH QL NAA+NON-PROBE: NOT DETECTED
HCOV OC43 RNA NPH QL NAA+NON-PROBE: NOT DETECTED
HCT VFR BLD CALC: 45.5 % (ref 42–52)
HEMOGLOBIN: 14.9 GM/DL (ref 13.5–18)
HMPV RNA NPH QL NAA+NON-PROBE: NOT DETECTED
HPIV1 RNA NPH QL NAA+NON-PROBE: NOT DETECTED
HPIV2 RNA NPH QL NAA+NON-PROBE: NOT DETECTED
HPIV3 RNA NPH QL NAA+NON-PROBE: NOT DETECTED
HPIV4 RNA NPH QL NAA+NON-PROBE: NOT DETECTED
IMMATURE NEUTROPHIL %: 0.5 % (ref 0–0.43)
LYMPHOCYTES ABSOLUTE: 1.4 K/CU MM
LYMPHOCYTES RELATIVE PERCENT: 12.1 % (ref 24–44)
M PNEUMO DNA NPH QL NAA+NON-PROBE: NOT DETECTED
MCH RBC QN AUTO: 30.5 PG (ref 27–31)
MCHC RBC AUTO-ENTMCNC: 32.7 % (ref 32–36)
MCV RBC AUTO: 93 FL (ref 78–100)
MONOCYTES ABSOLUTE: 0.8 K/CU MM
MONOCYTES RELATIVE PERCENT: 6.8 % (ref 0–4)
NUCLEATED RBC %: 0 %
O2 SAT, VEN: 90.8 % (ref 50–70)
O2 SAT, VEN: 92.6 % (ref 50–70)
PCO2, VEN: 78 MMHG (ref 38–52)
PCO2, VEN: 86 MMHG (ref 38–52)
PDW BLD-RTO: 13.7 % (ref 11.7–14.9)
PH VENOUS: 7.27 (ref 7.32–7.42)
PH VENOUS: 7.33 (ref 7.32–7.42)
PLATELET # BLD: 147 K/CU MM (ref 140–440)
PMV BLD AUTO: 9.9 FL (ref 7.5–11.1)
PO2, VEN: 69 MMHG (ref 28–48)
PO2, VEN: 87 MMHG (ref 28–48)
POTASSIUM SERPL-SCNC: 5 MMOL/L (ref 3.5–5.1)
PRO-BNP: 212 PG/ML
PROCALCITONIN SERPL-MCNC: 0.07 NG/ML
RBC # BLD: 4.89 M/CU MM (ref 4.6–6.2)
RSV RNA NPH QL NAA+NON-PROBE: NOT DETECTED
RV+EV RNA NPH QL NAA+NON-PROBE: NOT DETECTED
SARS-COV-2 RNA NPH QL NAA+NON-PROBE: NOT DETECTED
SEGMENTED NEUTROPHILS ABSOLUTE COUNT: 8.9 K/CU MM
SEGMENTED NEUTROPHILS RELATIVE PERCENT: 78 % (ref 36–66)
SODIUM BLD-SCNC: 134 MMOL/L (ref 135–145)
TOTAL IMMATURE NEUTOROPHIL: 0.06 K/CU MM
TOTAL NUCLEATED RBC: 0 K/CU MM
TOTAL PROTEIN: 6.2 GM/DL (ref 6.4–8.2)
TROPONIN T: <0.01 NG/ML
WBC # BLD: 11.5 K/CU MM (ref 4–10.5)

## 2023-08-14 PROCEDURE — 85025 COMPLETE CBC W/AUTO DIFF WBC: CPT

## 2023-08-14 PROCEDURE — 84484 ASSAY OF TROPONIN QUANT: CPT

## 2023-08-14 PROCEDURE — 6370000000 HC RX 637 (ALT 250 FOR IP): Performed by: EMERGENCY MEDICINE

## 2023-08-14 PROCEDURE — 84145 PROCALCITONIN (PCT): CPT

## 2023-08-14 PROCEDURE — 2140000000 HC CCU INTERMEDIATE R&B

## 2023-08-14 PROCEDURE — 99285 EMERGENCY DEPT VISIT HI MDM: CPT

## 2023-08-14 PROCEDURE — 2700000000 HC OXYGEN THERAPY PER DAY

## 2023-08-14 PROCEDURE — 96365 THER/PROPH/DIAG IV INF INIT: CPT

## 2023-08-14 PROCEDURE — 36415 COLL VENOUS BLD VENIPUNCTURE: CPT

## 2023-08-14 PROCEDURE — 94644 CONT INHLJ TX 1ST HOUR: CPT

## 2023-08-14 PROCEDURE — 0202U NFCT DS 22 TRGT SARS-COV-2: CPT

## 2023-08-14 PROCEDURE — 93005 ELECTROCARDIOGRAM TRACING: CPT | Performed by: EMERGENCY MEDICINE

## 2023-08-14 PROCEDURE — 94761 N-INVAS EAR/PLS OXIMETRY MLT: CPT

## 2023-08-14 PROCEDURE — 2580000003 HC RX 258: Performed by: EMERGENCY MEDICINE

## 2023-08-14 PROCEDURE — 82962 GLUCOSE BLOOD TEST: CPT

## 2023-08-14 PROCEDURE — 6370000000 HC RX 637 (ALT 250 FOR IP): Performed by: INTERNAL MEDICINE

## 2023-08-14 PROCEDURE — 71045 X-RAY EXAM CHEST 1 VIEW: CPT

## 2023-08-14 PROCEDURE — 2580000003 HC RX 258: Performed by: INTERNAL MEDICINE

## 2023-08-14 PROCEDURE — 80053 COMPREHEN METABOLIC PANEL: CPT

## 2023-08-14 PROCEDURE — 94660 CPAP INITIATION&MGMT: CPT

## 2023-08-14 PROCEDURE — 6360000002 HC RX W HCPCS: Performed by: INTERNAL MEDICINE

## 2023-08-14 PROCEDURE — 82805 BLOOD GASES W/O2 SATURATION: CPT

## 2023-08-14 PROCEDURE — 96375 TX/PRO/DX INJ NEW DRUG ADDON: CPT

## 2023-08-14 PROCEDURE — 6360000002 HC RX W HCPCS: Performed by: EMERGENCY MEDICINE

## 2023-08-14 PROCEDURE — 83880 ASSAY OF NATRIURETIC PEPTIDE: CPT

## 2023-08-14 RX ORDER — ATORVASTATIN CALCIUM 40 MG/1
40 TABLET, FILM COATED ORAL DAILY
Status: DISCONTINUED | OUTPATIENT
Start: 2023-08-14 | End: 2023-08-17 | Stop reason: HOSPADM

## 2023-08-14 RX ORDER — INSULIN GLARGINE 100 [IU]/ML
25 INJECTION, SOLUTION SUBCUTANEOUS NIGHTLY
Status: DISCONTINUED | OUTPATIENT
Start: 2023-08-14 | End: 2023-08-15

## 2023-08-14 RX ORDER — SODIUM CHLORIDE 0.9 % (FLUSH) 0.9 %
5-40 SYRINGE (ML) INJECTION PRN
Status: DISCONTINUED | OUTPATIENT
Start: 2023-08-14 | End: 2023-08-17 | Stop reason: HOSPADM

## 2023-08-14 RX ORDER — ONDANSETRON 4 MG/1
4 TABLET, ORALLY DISINTEGRATING ORAL EVERY 8 HOURS PRN
Status: DISCONTINUED | OUTPATIENT
Start: 2023-08-14 | End: 2023-08-17 | Stop reason: HOSPADM

## 2023-08-14 RX ORDER — SODIUM CHLORIDE 0.9 % (FLUSH) 0.9 %
5-40 SYRINGE (ML) INJECTION EVERY 12 HOURS SCHEDULED
Status: DISCONTINUED | OUTPATIENT
Start: 2023-08-14 | End: 2023-08-17 | Stop reason: HOSPADM

## 2023-08-14 RX ORDER — ONDANSETRON 2 MG/ML
4 INJECTION INTRAMUSCULAR; INTRAVENOUS EVERY 6 HOURS PRN
Status: DISCONTINUED | OUTPATIENT
Start: 2023-08-14 | End: 2023-08-17 | Stop reason: HOSPADM

## 2023-08-14 RX ORDER — BUDESONIDE AND FORMOTEROL FUMARATE DIHYDRATE 160; 4.5 UG/1; UG/1
2 AEROSOL RESPIRATORY (INHALATION) 2 TIMES DAILY
Status: DISCONTINUED | OUTPATIENT
Start: 2023-08-14 | End: 2023-08-17 | Stop reason: HOSPADM

## 2023-08-14 RX ORDER — INSULIN LISPRO 100 [IU]/ML
10 INJECTION, SOLUTION INTRAVENOUS; SUBCUTANEOUS
Status: DISCONTINUED | OUTPATIENT
Start: 2023-08-14 | End: 2023-08-15

## 2023-08-14 RX ORDER — ACETAMINOPHEN 650 MG/1
650 SUPPOSITORY RECTAL EVERY 6 HOURS PRN
Status: DISCONTINUED | OUTPATIENT
Start: 2023-08-14 | End: 2023-08-17 | Stop reason: HOSPADM

## 2023-08-14 RX ORDER — DILTIAZEM HYDROCHLORIDE 120 MG/1
120 CAPSULE, COATED, EXTENDED RELEASE ORAL DAILY
Status: DISCONTINUED | OUTPATIENT
Start: 2023-08-14 | End: 2023-08-17 | Stop reason: HOSPADM

## 2023-08-14 RX ORDER — METHYLPREDNISOLONE SODIUM SUCCINATE 125 MG/2ML
125 INJECTION, POWDER, LYOPHILIZED, FOR SOLUTION INTRAMUSCULAR; INTRAVENOUS ONCE
Status: COMPLETED | OUTPATIENT
Start: 2023-08-14 | End: 2023-08-14

## 2023-08-14 RX ORDER — ASPIRIN 81 MG/1
81 TABLET, CHEWABLE ORAL DAILY
Status: DISCONTINUED | OUTPATIENT
Start: 2023-08-14 | End: 2023-08-17 | Stop reason: HOSPADM

## 2023-08-14 RX ORDER — METHYLPREDNISOLONE SODIUM SUCCINATE 40 MG/ML
40 INJECTION, POWDER, LYOPHILIZED, FOR SOLUTION INTRAMUSCULAR; INTRAVENOUS EVERY 6 HOURS
Status: DISCONTINUED | OUTPATIENT
Start: 2023-08-14 | End: 2023-08-15

## 2023-08-14 RX ORDER — IPRATROPIUM BROMIDE AND ALBUTEROL SULFATE 2.5; .5 MG/3ML; MG/3ML
1 SOLUTION RESPIRATORY (INHALATION)
Status: DISCONTINUED | OUTPATIENT
Start: 2023-08-14 | End: 2023-08-17 | Stop reason: HOSPADM

## 2023-08-14 RX ORDER — SODIUM CHLORIDE 9 MG/ML
INJECTION, SOLUTION INTRAVENOUS CONTINUOUS
Status: DISCONTINUED | OUTPATIENT
Start: 2023-08-14 | End: 2023-08-15

## 2023-08-14 RX ORDER — ALBUTEROL SULFATE 90 UG/1
2 AEROSOL, METERED RESPIRATORY (INHALATION) 2 TIMES DAILY
Status: DISCONTINUED | OUTPATIENT
Start: 2023-08-14 | End: 2023-08-17 | Stop reason: HOSPADM

## 2023-08-14 RX ORDER — SODIUM CHLORIDE 9 MG/ML
INJECTION, SOLUTION INTRAVENOUS PRN
Status: DISCONTINUED | OUTPATIENT
Start: 2023-08-14 | End: 2023-08-17 | Stop reason: HOSPADM

## 2023-08-14 RX ORDER — POLYETHYLENE GLYCOL 3350 17 G/17G
17 POWDER, FOR SOLUTION ORAL DAILY PRN
Status: DISCONTINUED | OUTPATIENT
Start: 2023-08-14 | End: 2023-08-17 | Stop reason: HOSPADM

## 2023-08-14 RX ORDER — IPRATROPIUM BROMIDE AND ALBUTEROL SULFATE 2.5; .5 MG/3ML; MG/3ML
3 SOLUTION RESPIRATORY (INHALATION) ONCE
Status: COMPLETED | OUTPATIENT
Start: 2023-08-14 | End: 2023-08-14

## 2023-08-14 RX ORDER — ACETAMINOPHEN 325 MG/1
650 TABLET ORAL EVERY 6 HOURS PRN
Status: DISCONTINUED | OUTPATIENT
Start: 2023-08-14 | End: 2023-08-17 | Stop reason: HOSPADM

## 2023-08-14 RX ORDER — FUROSEMIDE 40 MG/1
40 TABLET ORAL DAILY
COMMUNITY
Start: 2023-08-11 | End: 2023-08-14 | Stop reason: ALTCHOICE

## 2023-08-14 RX ORDER — PREDNISONE 20 MG/1
40 TABLET ORAL DAILY
Status: DISCONTINUED | OUTPATIENT
Start: 2023-08-17 | End: 2023-08-15

## 2023-08-14 RX ORDER — INSULIN LISPRO 100 [IU]/ML
0-16 INJECTION, SOLUTION INTRAVENOUS; SUBCUTANEOUS EVERY 4 HOURS
Status: DISCONTINUED | OUTPATIENT
Start: 2023-08-14 | End: 2023-08-17 | Stop reason: HOSPADM

## 2023-08-14 RX ADMIN — METHYLPREDNISOLONE SODIUM SUCCINATE 40 MG: 40 INJECTION INTRAMUSCULAR; INTRAVENOUS at 21:15

## 2023-08-14 RX ADMIN — ASPIRIN 81 MG: 81 TABLET, CHEWABLE ORAL at 21:15

## 2023-08-14 RX ADMIN — APIXABAN 5 MG: 5 TABLET, FILM COATED ORAL at 21:57

## 2023-08-14 RX ADMIN — SODIUM CHLORIDE: 9 INJECTION, SOLUTION INTRAVENOUS at 21:24

## 2023-08-14 RX ADMIN — ATORVASTATIN CALCIUM 40 MG: 40 TABLET, FILM COATED ORAL at 21:15

## 2023-08-14 RX ADMIN — SODIUM CHLORIDE, PRESERVATIVE FREE 10 ML: 5 INJECTION INTRAVENOUS at 21:48

## 2023-08-14 RX ADMIN — INSULIN LISPRO 16 UNITS: 100 INJECTION, SOLUTION INTRAVENOUS; SUBCUTANEOUS at 23:30

## 2023-08-14 RX ADMIN — METHYLPREDNISOLONE SODIUM SUCCINATE 125 MG: 125 INJECTION INTRAMUSCULAR; INTRAVENOUS at 10:44

## 2023-08-14 RX ADMIN — AZITHROMYCIN MONOHYDRATE 500 MG: 500 INJECTION, POWDER, LYOPHILIZED, FOR SOLUTION INTRAVENOUS at 11:21

## 2023-08-14 RX ADMIN — DILTIAZEM HYDROCHLORIDE 120 MG: 120 CAPSULE, COATED, EXTENDED RELEASE ORAL at 21:15

## 2023-08-14 RX ADMIN — CEFTRIAXONE SODIUM 1000 MG: 1 INJECTION, POWDER, FOR SOLUTION INTRAMUSCULAR; INTRAVENOUS at 21:24

## 2023-08-14 RX ADMIN — IPRATROPIUM BROMIDE AND ALBUTEROL SULFATE 3 DOSE: 2.5; .5 SOLUTION RESPIRATORY (INHALATION) at 09:29

## 2023-08-14 RX ADMIN — INSULIN GLARGINE 25 UNITS: 100 INJECTION, SOLUTION SUBCUTANEOUS at 21:35

## 2023-08-14 ASSESSMENT — ENCOUNTER SYMPTOMS
SINUS PAIN: 0
COUGH: 0
WHEEZING: 1
VOMITING: 0
SHORTNESS OF BREATH: 1
EYE REDNESS: 0
CONSTIPATION: 0
CHEST TIGHTNESS: 0
SORE THROAT: 0
NAUSEA: 0
ABDOMINAL PAIN: 0
EYE PAIN: 0
EYE ITCHING: 0
BACK PAIN: 0
DIARRHEA: 0
SINUS PRESSURE: 0

## 2023-08-14 NOTE — H&P
History and Physical Exam    Patient:  Anthony Gasca 74 y.o. male MRN: [id number]     Date of Service: 8/14/2023    Hospital Day: 1      Chief complaint: had concerns including Shortness of Breath (Hx of COPD. Wears 3L at home and was at 80%). Assessment and Plan   Anthony Gasca, a 74 y.o. male, with a history of COPD, chronic respiratory failure on 3 to 4 L O2, insulin-dependent IVs mellitus, HFpEF, PAD, PAF s/p ablation (2018), class III obesity was admitted on 8/14/2023. They had concerns including Shortness of Breath (Hx of COPD. Wears 3L at home and was at 80%). Assessment  COPD exacerbation  Shortness of breath and wheezing for the past few days  Only on albuterol as needed inhaler  Was supposed to see Dr. Valentine Palacios on 8/15/2023 in the office but did not make it  Received steroid and breathing treatment in the ER    Acute on chronic hypoxic, hypoxic, hypercapnic respiratory failure  Chronically on 3-4 L O2 at home  Currently on BiPAP    Insulin-dependent diabetes with hyperglycemia  Takes Lantus 50 units at night and Humalog 25 units 3 times daily per patient  Presented with blood sugar of 356    Hyponatremia 2/2 likely pseudohyponatremia 2/2 hyperglycemia  Corrected sodium normal    Chronic HFpEF     H/O PAD     H/O PAF s/p ablation (2018)  Apparently has not been taking his Eliquis  On diltiazem at home     Class III Obesity       Plan  Admit to intermediate with telemetry  Continue BiPAP for now  Attempt to see if we can wean him off the BiPAP down to nasal cannula  If able to wean off BiPAP, advance diet as tolerated to a diabetic diet from n.p.o.  Start on ceftriaxone 1 g IV every 24 hours, continue azithromycin 500 mg IV every 24 hours  Continue breathing treatment  Resume Eliquis and Cardizem  Start on Lantus 25 units nightly, Humalog 10 units 3 times daily and low-dose sliding scale every 4 hours  Adjust of sliding scale once patient is able to eat.   Placed on Solu-Medrol 40 mg IV every 01:44 PM    INR 1.12 08/31/2019 10:54 PM     PTT:    Lab Results   Component Value Date/Time    APTT 33.8 01/03/2019 11:30 AM       Comprehensive Metabolic Profile:   Recent Labs     08/14/23  0930   *   K 5.0   CL 93*   CO2 33*   BUN 15   CREATININE 0.6*   GLUCOSE 356*   CALCIUM 9.1   PROT 6.2*   LABALBU 3.7   BILITOT 0.3   ALKPHOS 83   AST 14*   ALT 28      Magnesium:   Lab Results   Component Value Date/Time    MG 1.9 03/05/2023 12:52 AM     Phosphorus:   Lab Results   Component Value Date/Time    PHOS 3.9 03/02/2023 01:23 AM     Ionized Calcium: No results found for: CAION   Troponin: No results for input(s): TROPONINI in the last 72 hours. Lactic Acid: No results for input(s): LACTA in the last 72 hours. BNP:   Recent Labs     08/14/23  0930   PROBNP 212.0     Lipids:   Lab Results   Component Value Date/Time    CHOL 159 03/01/2023 08:34 AM    CHOL 112 12/15/2021 01:35 PM    HDL 29 03/01/2023 08:34 AM    TRIG 182 03/01/2023 08:34 AM     Hemoglobin A1C:   Lab Results   Component Value Date/Time    LABA1C 9.7 07/31/2023 04:45 PM     Urine Cultures: No results found for: LABURIN  Blood Cultures: No results found for: BC  No results found for: BLOODCULT2  Organism: No results found for: ORG  ABG: No results for input(s): PH, PCO2, PO2, HCO3, BE, O2SAT in the last 72 hours. Imaging Studies:    XR CHEST PORTABLE    Result Date: 8/14/2023  EXAMINATION: ONE XRAY VIEW OF THE CHEST 8/14/2023 9:50 am COMPARISON: 07/31/2023 HISTORY: ORDERING SYSTEM PROVIDED HISTORY: shortness of breath TECHNOLOGIST PROVIDED HISTORY: Reason for exam:->shortness of breath Reason for Exam: sob FINDINGS: No focal consolidations. No pleural effusion or pneumothorax. Heart size is within normal limits. No acute process. EKG: normal sinus rhythm.         Electronically signed by Heather Rico MD on 8/14/2023 at 1:24 PM

## 2023-08-14 NOTE — PROGRESS NOTES
Reported VBG to Dr Paul Coker and changed pt bipap settings from 15/5/40% to 20/5/30% per Dr Paul Coker order and VBG. Will continue to monitor pt.

## 2023-08-14 NOTE — ED NOTES
Medication History  [facility]    Patient Name: Kelly Boyer 1955     Medication history has been completed by: Maira Leija CPhT    Source(s) of information: patient and insurance claims     Primary Care Physician: Paul Patel MD     Pharmacy: [facility]    Allergies as of 08/14/2023 - Fully Reviewed 08/14/2023   Allergen Reaction Noted    Metoprolol  02/03/2017        Prior to Admission medications    Medication Sig Start Date End Date Taking? Authorizing Provider   blood glucose monitor strips Test once daily 8/11/23   Paul Patel MD   blood glucose monitor kit and supplies Check sugar daily 8/11/23   Paul Patel MD   ketoconazole (NIZORAL) 2 % cream Apply topically daily to the chest where there is a rash for a week. . 8/3/23   Orlando Coffman MD   insulin detemir (LEVEMIR) 100 UNIT/ML injection vial  Inject 25 Units into the skin nightly 8/3/23   Orlando Coffman MD   insulin lispro (HUMALOG) 100 UNIT/ML SOLN injection vial  Inject 25 Units into the skin 3 times daily (with meals) 8/3/23 9/5/23  Orlando Coffman MD   metFORMIN (GLUCOPHAGE) 1000 MG tablet Take 1 tablet by mouth 2 times daily (with meals) 8/3/23   Orlando Coffman MD   SYMBICORT 160-4.5 MCG/ACT AERO INHALE 2 PUFFS INTO THE LUNGS TWICE DAILY 5/26/23   Yahaira Conley MD   albuterol sulfate HFA (PROVENTIL;VENTOLIN;PROAIR) 108 (90 Base) MCG/ACT inhaler Inhale 2 puffs into the lungs 2 times daily 4/25/23   Paul Patel MD   ipratropium (ATROVENT HFA) 17 MCG/ACT inhaler Inhale 2 puffs into the lungs in the morning and 2 puffs in the evening.  4/25/23 7/24/23  Paul Patel MD   atorvastatin (LIPITOR) 40 MG tablet Take 1 tablet by mouth daily  Patient taking differently: Take 1 tablet by mouth nightly 3/6/23   Sidra Coronel MD   dilTIAZem (CARDIZEM CD) 120 MG extended release capsule Take 1 capsule by mouth daily 3/7/23   Sidra Coronel MD   apixaban

## 2023-08-14 NOTE — ED NOTES
ED TO INPATIENT SBAR HANDOFF    Patient Name: Keya Aparicio   :  1955  74 y.o. Preferred Name  Moe  Family/Caregiver Present no   Restraints no   C-SSRS: Risk of Suicide: No Risk  Sitter no   Sepsis Risk Score Sepsis Risk Score: 2.65      Situation  Chief Complaint   Patient presents with    Shortness of Breath     Hx of COPD. Wears 3L at home and was at 80%     Brief Description of Patient's Condition: increase in SOB. Pt on 3 LPM O2 via NC  at home and per EMS pt was in the 80's on their arrival with patient on 3 Lpm O2. Pt currently on BI PAP  Mental Status: oriented and alert  Arrived from: home    Imaging:   XR CHEST PORTABLE   Final Result   No acute process.            Abnormal labs:   Abnormal Labs Reviewed   CBC WITH AUTO DIFFERENTIAL - Abnormal; Notable for the following components:       Result Value    WBC 11.5 (*)     Segs Relative 78.0 (*)     Lymphocytes % 12.1 (*)     Monocytes % 6.8 (*)     Immature Neutrophil % 0.5 (*)     All other components within normal limits   COMPREHENSIVE METABOLIC PANEL - Abnormal; Notable for the following components:    Sodium 134 (*)     Chloride 93 (*)     CO2 33 (*)     Creatinine 0.6 (*)     Glucose 356 (*)     Total Protein 6.2 (*)     AST 14 (*)     All other components within normal limits   BLOOD GAS, VENOUS - Abnormal; Notable for the following components:    pH, Tylor 7.27 (*)     pCO2, Tylor 86 (*)     pO2, Tylor 87 (*)     Base Exc, Mixed 9 (*)     HCO3, Venous 39.5 (*)     O2 Sat, Tylor 92.6 (*)     All other components within normal limits   BLOOD GAS, VENOUS - Abnormal; Notable for the following components:    pCO2, Tylor 78 (*)     pO2, Tylor 69 (*)     Base Exc, Mixed 11.7 (*)     HCO3, Venous 41.1 (*)     O2 Sat, Tylor 90.8 (*)     All other components within normal limits       Background  History:   Past Medical History:   Diagnosis Date    A-fib ([address])     Resolved after ablation     Anxiety     Arthritis     \"Hips, Knees, Elbows And Fingers\"

## 2023-08-14 NOTE — ED PROVIDER NOTES
Triage Chief Complaint:    Shortness of Breath (Hx of COPD. Wears 3L at home and was at 80%)    CAROLYN Zamudio is a 74 y.o. male that presents for evaluation of shortness of breath. The patient normally wears 3 L of oxygen at home for COPD but felt significantly worse this morning. Reports medics had come in to evaluate him found to be hypoxic in the 80s despite increased oxygen via nasal cannula so they placed him on a nonrebreather. Patient reports that he felt improved after the nonrebreather but still felt extremely short of breath. Denies any chest pain. Has not noticed any abdominal pain nausea or vomiting but does report some distention of his abdomen. He also reports leg swelling but he states that he has that in the past as well. He is has been having increased urinary frequency. Has 90 change to bowel movements. Has not had any fevers or chills. No productive cough or congestion. History from : Patient and EMS    Limitations to history : None    ROS:  10 systems reviewed and negative except as above. Past Medical History:   Diagnosis Date    A-fib [facility])     Resolved after ablation 2018    Anxiety     Arthritis     \"Hips, Knees, Elbows And Fingers\"    COPD (chronic obstructive pulmonary disease) ([facility])     Sees Dr. Sandra Delcid    Depression     Diabetes mellitus [facility]) Dx [address]    Full dentures     H/O angiography 12/13/2018    peripheral angio - LFA occluded, filling collaterals, RSFA 90% stenosis-vasc surg consult    H/O Doppler ultrasound 09/05/2018    LE arterial - left mid and distal femoral artery occluded, lt FANI shows mild-mod PVD    H/O echocardiogram 01/06/2016    EF60% mild MR, TR, pulm HTN    Hx of colonoscopy     7/11 C-scope - benign polyp x1    Hx of Doppler ultrasound 02/20/2018    Lower extremity doppler  Normal study.     Hyperlipidemia     Hypertension     Macular degeneration     States is legally blind    Obesity     On home oxygen therapy     Continuous At 2 Liters

## 2023-08-15 ENCOUNTER — CARE COORDINATION (OUTPATIENT)
Dept: CARE COORDINATION | Age: 68
End: 2023-08-15

## 2023-08-15 LAB
ANION GAP SERPL CALCULATED.3IONS-SCNC: 8 MMOL/L (ref 4–16)
BASE EXCESS MIXED: 7 (ref 0–1.2)
BASE EXCESS MIXED: 7 (ref 0–1.2)
BASOPHILS ABSOLUTE: 0 K/CU MM
BASOPHILS RELATIVE PERCENT: 0.2 % (ref 0–1)
BUN SERPL-MCNC: 22 MG/DL (ref 6–23)
CALCIUM SERPL-MCNC: 9.1 MG/DL (ref 8.3–10.6)
CHLORIDE BLD-SCNC: 92 MMOL/L (ref 99–110)
CO2: 32 MMOL/L (ref 21–32)
COMMENT: ABNORMAL
CREAT SERPL-MCNC: 0.7 MG/DL (ref 0.9–1.3)
DIFFERENTIAL TYPE: ABNORMAL
EKG ATRIAL RATE: 95 BPM
EKG DIAGNOSIS: NORMAL
EKG P AXIS: 58 DEGREES
EKG P-R INTERVAL: 204 MS
EKG Q-T INTERVAL: 344 MS
EKG QRS DURATION: 80 MS
EKG QTC CALCULATION (BAZETT): 432 MS
EKG R AXIS: -67 DEGREES
EKG T AXIS: 52 DEGREES
EKG VENTRICULAR RATE: 95 BPM
EOSINOPHILS ABSOLUTE: 0 K/CU MM
EOSINOPHILS RELATIVE PERCENT: 0 % (ref 0–3)
GFR SERPL CREATININE-BSD FRML MDRD: >60 ML/MIN/1.73M2
GLUCOSE BLD-MCNC: 351 MG/DL (ref 70–99)
GLUCOSE BLD-MCNC: 356 MG/DL (ref 70–99)
GLUCOSE BLD-MCNC: 373 MG/DL (ref 70–99)
GLUCOSE BLD-MCNC: 400 MG/DL (ref 70–99)
GLUCOSE BLD-MCNC: 422 MG/DL (ref 70–99)
GLUCOSE SERPL-MCNC: 422 MG/DL (ref 70–99)
HCO3 VENOUS: 34.8 MMOL/L (ref 19–25)
HCO3 VENOUS: 34.8 MMOL/L (ref 19–25)
HCT VFR BLD CALC: 42.3 % (ref 42–52)
HEMOGLOBIN: 13.9 GM/DL (ref 13.5–18)
IMMATURE NEUTROPHIL %: 0.7 % (ref 0–0.43)
LYMPHOCYTES ABSOLUTE: 0.7 K/CU MM
LYMPHOCYTES RELATIVE PERCENT: 6.2 % (ref 24–44)
MAGNESIUM: 1.9 MG/DL (ref 1.8–2.4)
MCH RBC QN AUTO: 30.6 PG (ref 27–31)
MCHC RBC AUTO-ENTMCNC: 32.9 % (ref 32–36)
MCV RBC AUTO: 93.2 FL (ref 78–100)
MONOCYTES ABSOLUTE: 0.3 K/CU MM
MONOCYTES RELATIVE PERCENT: 2.9 % (ref 0–4)
NUCLEATED RBC %: 0 %
O2 SAT, VEN: 88 % (ref 50–70)
O2 SAT, VEN: 94.4 % (ref 50–70)
PCO2, VEN: 63 MMHG (ref 38–52)
PCO2, VEN: 63 MMHG (ref 38–52)
PDW BLD-RTO: 13.8 % (ref 11.7–14.9)
PH VENOUS: 7.35 (ref 7.32–7.42)
PH VENOUS: 7.35 (ref 7.32–7.42)
PHOSPHORUS: 5.1 MG/DL (ref 2.5–4.9)
PLATELET # BLD: 158 K/CU MM (ref 140–440)
PMV BLD AUTO: 10 FL (ref 7.5–11.1)
PO2, VEN: 155 MMHG (ref 28–48)
PO2, VEN: 63 MMHG (ref 28–48)
POTASSIUM SERPL-SCNC: 5.3 MMOL/L (ref 3.5–5.1)
RBC # BLD: 4.54 M/CU MM (ref 4.6–6.2)
SEGMENTED NEUTROPHILS ABSOLUTE COUNT: 10.6 K/CU MM
SEGMENTED NEUTROPHILS RELATIVE PERCENT: 90 % (ref 36–66)
SODIUM BLD-SCNC: 132 MMOL/L (ref 135–145)
TOTAL IMMATURE NEUTOROPHIL: 0.08 K/CU MM
TOTAL NUCLEATED RBC: 0 K/CU MM
WBC # BLD: 11.7 K/CU MM (ref 4–10.5)

## 2023-08-15 PROCEDURE — 6370000000 HC RX 637 (ALT 250 FOR IP): Performed by: INTERNAL MEDICINE

## 2023-08-15 PROCEDURE — 2140000000 HC CCU INTERMEDIATE R&B

## 2023-08-15 PROCEDURE — 2580000003 HC RX 258: Performed by: INTERNAL MEDICINE

## 2023-08-15 PROCEDURE — 93010 ELECTROCARDIOGRAM REPORT: CPT | Performed by: INTERNAL MEDICINE

## 2023-08-15 PROCEDURE — 82962 GLUCOSE BLOOD TEST: CPT

## 2023-08-15 PROCEDURE — 85025 COMPLETE CBC W/AUTO DIFF WBC: CPT

## 2023-08-15 PROCEDURE — 36415 COLL VENOUS BLD VENIPUNCTURE: CPT

## 2023-08-15 PROCEDURE — 94664 DEMO&/EVAL PT USE INHALER: CPT

## 2023-08-15 PROCEDURE — 80048 BASIC METABOLIC PNL TOTAL CA: CPT

## 2023-08-15 PROCEDURE — 6370000000 HC RX 637 (ALT 250 FOR IP): Performed by: FAMILY MEDICINE

## 2023-08-15 PROCEDURE — 84100 ASSAY OF PHOSPHORUS: CPT

## 2023-08-15 PROCEDURE — 6360000002 HC RX W HCPCS: Performed by: INTERNAL MEDICINE

## 2023-08-15 PROCEDURE — 94761 N-INVAS EAR/PLS OXIMETRY MLT: CPT

## 2023-08-15 PROCEDURE — 94640 AIRWAY INHALATION TREATMENT: CPT

## 2023-08-15 PROCEDURE — 82805 BLOOD GASES W/O2 SATURATION: CPT

## 2023-08-15 PROCEDURE — 2700000000 HC OXYGEN THERAPY PER DAY

## 2023-08-15 PROCEDURE — 83735 ASSAY OF MAGNESIUM: CPT

## 2023-08-15 RX ORDER — GLUCAGON 1 MG/ML
1 KIT INJECTION PRN
Status: DISCONTINUED | OUTPATIENT
Start: 2023-08-15 | End: 2023-08-17 | Stop reason: HOSPADM

## 2023-08-15 RX ORDER — INSULIN LISPRO 100 [IU]/ML
13 INJECTION, SOLUTION INTRAVENOUS; SUBCUTANEOUS
Status: DISCONTINUED | OUTPATIENT
Start: 2023-08-16 | End: 2023-08-16

## 2023-08-15 RX ORDER — INSULIN LISPRO 100 [IU]/ML
18 INJECTION, SOLUTION INTRAVENOUS; SUBCUTANEOUS ONCE
Status: COMPLETED | OUTPATIENT
Start: 2023-08-15 | End: 2023-08-15

## 2023-08-15 RX ORDER — PREDNISONE 20 MG/1
40 TABLET ORAL DAILY
Status: DISCONTINUED | OUTPATIENT
Start: 2023-08-15 | End: 2023-08-17 | Stop reason: HOSPADM

## 2023-08-15 RX ORDER — INSULIN GLARGINE 100 [IU]/ML
50 INJECTION, SOLUTION SUBCUTANEOUS NIGHTLY
Status: DISCONTINUED | OUTPATIENT
Start: 2023-08-15 | End: 2023-08-16

## 2023-08-15 RX ORDER — KETOCONAZOLE 20 MG/G
CREAM TOPICAL
Qty: 30 G | Refills: 1 | OUTPATIENT
Start: 2023-08-15

## 2023-08-15 RX ORDER — DEXTROSE MONOHYDRATE 100 MG/ML
INJECTION, SOLUTION INTRAVENOUS CONTINUOUS PRN
Status: DISCONTINUED | OUTPATIENT
Start: 2023-08-15 | End: 2023-08-17 | Stop reason: HOSPADM

## 2023-08-15 RX ADMIN — INSULIN LISPRO 18 UNITS: 100 INJECTION, SOLUTION INTRAVENOUS; SUBCUTANEOUS at 06:15

## 2023-08-15 RX ADMIN — IPRATROPIUM BROMIDE 2 PUFF: 17 AEROSOL, METERED RESPIRATORY (INHALATION) at 06:39

## 2023-08-15 RX ADMIN — INSULIN LISPRO 10 UNITS: 100 INJECTION, SOLUTION INTRAVENOUS; SUBCUTANEOUS at 17:35

## 2023-08-15 RX ADMIN — IPRATROPIUM BROMIDE AND ALBUTEROL SULFATE 1 DOSE: 2.5; .5 SOLUTION RESPIRATORY (INHALATION) at 15:08

## 2023-08-15 RX ADMIN — INSULIN LISPRO 10 UNITS: 100 INJECTION, SOLUTION INTRAVENOUS; SUBCUTANEOUS at 09:56

## 2023-08-15 RX ADMIN — ALBUTEROL SULFATE 2 PUFF: 90 AEROSOL, METERED RESPIRATORY (INHALATION) at 06:30

## 2023-08-15 RX ADMIN — APIXABAN 5 MG: 5 TABLET, FILM COATED ORAL at 09:56

## 2023-08-15 RX ADMIN — IPRATROPIUM BROMIDE AND ALBUTEROL SULFATE 1 DOSE: 2.5; .5 SOLUTION RESPIRATORY (INHALATION) at 21:29

## 2023-08-15 RX ADMIN — SODIUM CHLORIDE, PRESERVATIVE FREE 10 ML: 5 INJECTION INTRAVENOUS at 10:02

## 2023-08-15 RX ADMIN — INSULIN GLARGINE 50 UNITS: 100 INJECTION, SOLUTION SUBCUTANEOUS at 21:44

## 2023-08-15 RX ADMIN — BUDESONIDE AND FORMOTEROL FUMARATE DIHYDRATE 2 PUFF: 160; 4.5 AEROSOL RESPIRATORY (INHALATION) at 21:30

## 2023-08-15 RX ADMIN — SODIUM CHLORIDE, PRESERVATIVE FREE 10 ML: 5 INJECTION INTRAVENOUS at 21:44

## 2023-08-15 RX ADMIN — INSULIN LISPRO 10 UNITS: 100 INJECTION, SOLUTION INTRAVENOUS; SUBCUTANEOUS at 11:59

## 2023-08-15 RX ADMIN — INSULIN LISPRO 16 UNITS: 100 INJECTION, SOLUTION INTRAVENOUS; SUBCUTANEOUS at 02:00

## 2023-08-15 RX ADMIN — INSULIN LISPRO 16 UNITS: 100 INJECTION, SOLUTION INTRAVENOUS; SUBCUTANEOUS at 15:21

## 2023-08-15 RX ADMIN — ATORVASTATIN CALCIUM 40 MG: 40 TABLET, FILM COATED ORAL at 09:56

## 2023-08-15 RX ADMIN — DILTIAZEM HYDROCHLORIDE 120 MG: 120 CAPSULE, COATED, EXTENDED RELEASE ORAL at 09:56

## 2023-08-15 RX ADMIN — APIXABAN 5 MG: 5 TABLET, FILM COATED ORAL at 21:42

## 2023-08-15 RX ADMIN — INSULIN LISPRO 16 UNITS: 100 INJECTION, SOLUTION INTRAVENOUS; SUBCUTANEOUS at 21:00

## 2023-08-15 RX ADMIN — AZITHROMYCIN MONOHYDRATE 500 MG: 500 INJECTION, POWDER, LYOPHILIZED, FOR SOLUTION INTRAVENOUS at 12:02

## 2023-08-15 RX ADMIN — PREDNISONE 40 MG: 20 TABLET ORAL at 09:56

## 2023-08-15 RX ADMIN — ASPIRIN 81 MG: 81 TABLET, CHEWABLE ORAL at 09:56

## 2023-08-15 RX ADMIN — BUDESONIDE AND FORMOTEROL FUMARATE DIHYDRATE 2 PUFF: 160; 4.5 AEROSOL RESPIRATORY (INHALATION) at 06:30

## 2023-08-15 RX ADMIN — INSULIN LISPRO 16 UNITS: 100 INJECTION, SOLUTION INTRAVENOUS; SUBCUTANEOUS at 11:59

## 2023-08-15 RX ADMIN — METHYLPREDNISOLONE SODIUM SUCCINATE 40 MG: 40 INJECTION INTRAMUSCULAR; INTRAVENOUS at 02:00

## 2023-08-15 RX ADMIN — IPRATROPIUM BROMIDE AND ALBUTEROL SULFATE 1 DOSE: 2.5; .5 SOLUTION RESPIRATORY (INHALATION) at 11:06

## 2023-08-15 RX ADMIN — SODIUM ZIRCONIUM CYCLOSILICATE 10 G: 10 POWDER, FOR SUSPENSION ORAL at 09:59

## 2023-08-15 RX ADMIN — CEFTRIAXONE SODIUM 1000 MG: 1 INJECTION, POWDER, FOR SOLUTION INTRAMUSCULAR; INTRAVENOUS at 17:36

## 2023-08-15 ASSESSMENT — PAIN SCALES - GENERAL: PAINLEVEL_OUTOF10: 0

## 2023-08-15 NOTE — PROGRESS NOTES
This RN attempting to notify NP regarding pt glucose 538. Unable to get ahold for orders at this time. Will continue to try.

## 2023-08-15 NOTE — PROGRESS NOTES
Patient educated many times throughout the night regarding his blood sugar levels. Patient continues to ask for snacks regardless of education. Patient also continues to bend arm where PIV is located, IVF have been interrupted many times throughout the night. Orders to maintain IVF until morning labs result this morning.

## 2023-08-15 NOTE — PROGRESS NOTES
Lab notified at this time that q8 vbg's have not been drawn from 2000 pm last night and or 0400 am this morning. No answer, voice message left.

## 2023-08-15 NOTE — PROGRESS NOTES
This RN called lab again to let them know VBG's need done. They are aware and said they will be up here shortly.

## 2023-08-15 NOTE — PROGRESS NOTES
Hospitalist notified of patients blood sugar of 400 at this time, orders to give 16 units of insulin per MAR sliding scale orders.

## 2023-08-15 NOTE — CARE COORDINATION
Pt is from home alone. Pt has lived there for 8-9 years. Pt lives on first floor with a level entry. Pt states he has no DME. Pt is active with [facility]. Pt stated that he has not been able to work with PT/OT yet because he got sick again before he was able to start the therapy. Pt would like to return with [facility] at discharge. Pt stated that he cannot drive. He gets rides from either his brother or dial-a-ride. Pt states that he often has to cancel his rides to his PCP appointments because they can get too expensive after using those rides after a while. Pt states that he has not seen his PCP in years. Pt states that he is ready to go home. Plan is home alone with HC, brother to transport or will need a ride. No further CM needs id'd. CM is available if needed. 08/15/23 1530   Service Assessment   Patient Orientation Alert and Oriented   Cognition Alert   History Provided By Patient   Primary Caregiver Self   Support Systems Friends/Neighbors; Family Members;Home Care Staff   Patient's Healthcare Decision Maker is: Legal Next of [address] [facility]   PCP Verified by CM Yes   Last Visit to PCP   (pt stated its been a few years)   Prior Functional Level Independent in ADLs/IADLs   Current Functional Level Independent in ADLs/IADLs   Can patient return to prior living arrangement Yes   Ability to make needs known: Good   Family able to assist with home care needs: No   Would you like for me to discuss the discharge plan with any other family members/significant others, and if so, who? No   Financial Resources Medicaid; Medicare   Community Resources ECF/Home Care   Social/Functional History   Lives With Alone   Type of Home Apartment   Home Layout One level   Home Access Level entry   Bathroom Shower/Tub Tub/Shower unit   Bathroom Toilet Standard   Bathroom Equipment Tub transfer bench   Home Equipment None   Receives Help From Neighbor;Home health; Family   Active  No  (pt states he is going blind)

## 2023-08-16 LAB
ANION GAP SERPL CALCULATED.3IONS-SCNC: 18 MMOL/L (ref 4–16)
BASOPHILS ABSOLUTE: 0 K/CU MM
BASOPHILS RELATIVE PERCENT: 0.3 % (ref 0–1)
BUN SERPL-MCNC: 15 MG/DL (ref 6–23)
CALCIUM SERPL-MCNC: 9.2 MG/DL (ref 8.3–10.6)
CHLORIDE BLD-SCNC: 92 MMOL/L (ref 99–110)
CO2: 31 MMOL/L (ref 21–32)
CREAT SERPL-MCNC: 0.8 MG/DL (ref 0.9–1.3)
DIFFERENTIAL TYPE: ABNORMAL
EOSINOPHILS ABSOLUTE: 0.1 K/CU MM
EOSINOPHILS RELATIVE PERCENT: 0.5 % (ref 0–3)
GFR SERPL CREATININE-BSD FRML MDRD: >60 ML/MIN/1.73M2
GLUCOSE BLD-MCNC: 235 MG/DL (ref 70–99)
GLUCOSE BLD-MCNC: 250 MG/DL (ref 70–99)
GLUCOSE BLD-MCNC: 283 MG/DL (ref 70–99)
GLUCOSE BLD-MCNC: 290 MG/DL (ref 70–99)
GLUCOSE BLD-MCNC: 364 MG/DL (ref 70–99)
GLUCOSE BLD-MCNC: 403 MG/DL (ref 70–99)
GLUCOSE BLD-MCNC: 469 MG/DL (ref 70–99)
GLUCOSE SERPL-MCNC: 313 MG/DL (ref 70–99)
HCT VFR BLD CALC: 45.5 % (ref 42–52)
HEMOGLOBIN: 14.5 GM/DL (ref 13.5–18)
IMMATURE NEUTROPHIL %: 0.4 % (ref 0–0.43)
LYMPHOCYTES ABSOLUTE: 1.6 K/CU MM
LYMPHOCYTES RELATIVE PERCENT: 14 % (ref 24–44)
MAGNESIUM: 1.9 MG/DL (ref 1.8–2.4)
MCH RBC QN AUTO: 30.3 PG (ref 27–31)
MCHC RBC AUTO-ENTMCNC: 31.9 % (ref 32–36)
MCV RBC AUTO: 95 FL (ref 78–100)
MONOCYTES ABSOLUTE: 0.7 K/CU MM
MONOCYTES RELATIVE PERCENT: 5.9 % (ref 0–4)
NUCLEATED RBC %: 0 %
PDW BLD-RTO: 13.9 % (ref 11.7–14.9)
PHOSPHORUS: 4.4 MG/DL (ref 2.5–4.9)
PLATELET # BLD: 166 K/CU MM (ref 140–440)
PMV BLD AUTO: 9.7 FL (ref 7.5–11.1)
POTASSIUM SERPL-SCNC: 4.8 MMOL/L (ref 3.5–5.1)
PROCALCITONIN SERPL-MCNC: 0.03 NG/ML
RBC # BLD: 4.79 M/CU MM (ref 4.6–6.2)
SEGMENTED NEUTROPHILS ABSOLUTE COUNT: 8.8 K/CU MM
SEGMENTED NEUTROPHILS RELATIVE PERCENT: 78.9 % (ref 36–66)
SODIUM BLD-SCNC: 141 MMOL/L (ref 135–145)
TOTAL IMMATURE NEUTOROPHIL: 0.05 K/CU MM
TOTAL NUCLEATED RBC: 0 K/CU MM
TOTAL RETICULOCYTE COUNT: 0.11 K/CU MM
WBC # BLD: 11.2 K/CU MM (ref 4–10.5)

## 2023-08-16 PROCEDURE — 6370000000 HC RX 637 (ALT 250 FOR IP): Performed by: FAMILY MEDICINE

## 2023-08-16 PROCEDURE — 6370000000 HC RX 637 (ALT 250 FOR IP): Performed by: INTERNAL MEDICINE

## 2023-08-16 PROCEDURE — 84100 ASSAY OF PHOSPHORUS: CPT

## 2023-08-16 PROCEDURE — 94761 N-INVAS EAR/PLS OXIMETRY MLT: CPT

## 2023-08-16 PROCEDURE — 36415 COLL VENOUS BLD VENIPUNCTURE: CPT

## 2023-08-16 PROCEDURE — 2140000000 HC CCU INTERMEDIATE R&B

## 2023-08-16 PROCEDURE — 85025 COMPLETE CBC W/AUTO DIFF WBC: CPT

## 2023-08-16 PROCEDURE — 2580000003 HC RX 258: Performed by: INTERNAL MEDICINE

## 2023-08-16 PROCEDURE — 94660 CPAP INITIATION&MGMT: CPT

## 2023-08-16 PROCEDURE — 2700000000 HC OXYGEN THERAPY PER DAY

## 2023-08-16 PROCEDURE — 6360000002 HC RX W HCPCS: Performed by: INTERNAL MEDICINE

## 2023-08-16 PROCEDURE — 84145 PROCALCITONIN (PCT): CPT

## 2023-08-16 PROCEDURE — 80048 BASIC METABOLIC PNL TOTAL CA: CPT

## 2023-08-16 PROCEDURE — 94640 AIRWAY INHALATION TREATMENT: CPT

## 2023-08-16 PROCEDURE — 82962 GLUCOSE BLOOD TEST: CPT

## 2023-08-16 PROCEDURE — 5A09357 ASSISTANCE WITH RESPIRATORY VENTILATION, LESS THAN 24 CONSECUTIVE HOURS, CONTINUOUS POSITIVE AIRWAY PRESSURE: ICD-10-PCS | Performed by: FAMILY MEDICINE

## 2023-08-16 PROCEDURE — 83735 ASSAY OF MAGNESIUM: CPT

## 2023-08-16 RX ORDER — AZITHROMYCIN 250 MG/1
500 TABLET, FILM COATED ORAL DAILY
Status: COMPLETED | OUTPATIENT
Start: 2023-08-17 | End: 2023-08-17

## 2023-08-16 RX ORDER — INSULIN GLARGINE 100 [IU]/ML
55 INJECTION, SOLUTION SUBCUTANEOUS NIGHTLY
Status: DISCONTINUED | OUTPATIENT
Start: 2023-08-16 | End: 2023-08-16

## 2023-08-16 RX ORDER — INSULIN LISPRO 100 [IU]/ML
16 INJECTION, SOLUTION INTRAVENOUS; SUBCUTANEOUS
Status: DISCONTINUED | OUTPATIENT
Start: 2023-08-17 | End: 2023-08-17 | Stop reason: HOSPADM

## 2023-08-16 RX ORDER — INSULIN GLARGINE 100 [IU]/ML
60 INJECTION, SOLUTION SUBCUTANEOUS NIGHTLY
Status: DISCONTINUED | OUTPATIENT
Start: 2023-08-16 | End: 2023-08-17 | Stop reason: HOSPADM

## 2023-08-16 RX ADMIN — INSULIN LISPRO 16 UNITS: 100 INJECTION, SOLUTION INTRAVENOUS; SUBCUTANEOUS at 18:44

## 2023-08-16 RX ADMIN — ATORVASTATIN CALCIUM 40 MG: 40 TABLET, FILM COATED ORAL at 08:29

## 2023-08-16 RX ADMIN — PREDNISONE 40 MG: 20 TABLET ORAL at 08:29

## 2023-08-16 RX ADMIN — IPRATROPIUM BROMIDE 2 PUFF: 17 AEROSOL, METERED RESPIRATORY (INHALATION) at 19:48

## 2023-08-16 RX ADMIN — ALBUTEROL SULFATE 2 PUFF: 90 AEROSOL, METERED RESPIRATORY (INHALATION) at 19:48

## 2023-08-16 RX ADMIN — INSULIN GLARGINE 60 UNITS: 100 INJECTION, SOLUTION SUBCUTANEOUS at 22:13

## 2023-08-16 RX ADMIN — BUDESONIDE AND FORMOTEROL FUMARATE DIHYDRATE 2 PUFF: 160; 4.5 AEROSOL RESPIRATORY (INHALATION) at 19:48

## 2023-08-16 RX ADMIN — IPRATROPIUM BROMIDE AND ALBUTEROL SULFATE 1 DOSE: 2.5; .5 SOLUTION RESPIRATORY (INHALATION) at 15:34

## 2023-08-16 RX ADMIN — IPRATROPIUM BROMIDE AND ALBUTEROL SULFATE 1 DOSE: 2.5; .5 SOLUTION RESPIRATORY (INHALATION) at 07:19

## 2023-08-16 RX ADMIN — BUDESONIDE AND FORMOTEROL FUMARATE DIHYDRATE 2 PUFF: 160; 4.5 AEROSOL RESPIRATORY (INHALATION) at 07:19

## 2023-08-16 RX ADMIN — INSULIN LISPRO 16 UNITS: 100 INJECTION, SOLUTION INTRAVENOUS; SUBCUTANEOUS at 22:14

## 2023-08-16 RX ADMIN — INSULIN LISPRO 13 UNITS: 100 INJECTION, SOLUTION INTRAVENOUS; SUBCUTANEOUS at 08:28

## 2023-08-16 RX ADMIN — AZITHROMYCIN MONOHYDRATE 500 MG: 500 INJECTION, POWDER, LYOPHILIZED, FOR SOLUTION INTRAVENOUS at 13:07

## 2023-08-16 RX ADMIN — INSULIN LISPRO 4 UNITS: 100 INJECTION, SOLUTION INTRAVENOUS; SUBCUTANEOUS at 08:28

## 2023-08-16 RX ADMIN — INSULIN LISPRO 16 UNITS: 100 INJECTION, SOLUTION INTRAVENOUS; SUBCUTANEOUS at 13:02

## 2023-08-16 RX ADMIN — DILTIAZEM HYDROCHLORIDE 120 MG: 120 CAPSULE, COATED, EXTENDED RELEASE ORAL at 08:29

## 2023-08-16 RX ADMIN — ASPIRIN 81 MG: 81 TABLET, CHEWABLE ORAL at 08:29

## 2023-08-16 RX ADMIN — INSULIN LISPRO 13 UNITS: 100 INJECTION, SOLUTION INTRAVENOUS; SUBCUTANEOUS at 13:02

## 2023-08-16 RX ADMIN — APIXABAN 5 MG: 5 TABLET, FILM COATED ORAL at 22:15

## 2023-08-16 RX ADMIN — APIXABAN 5 MG: 5 TABLET, FILM COATED ORAL at 08:29

## 2023-08-16 RX ADMIN — BENZOCAINE 6 MG-MENTHOL 10 MG LOZENGES 1 LOZENGE: at 22:40

## 2023-08-16 RX ADMIN — INSULIN LISPRO 8 UNITS: 100 INJECTION, SOLUTION INTRAVENOUS; SUBCUTANEOUS at 02:23

## 2023-08-16 RX ADMIN — INSULIN LISPRO 8 UNITS: 100 INJECTION, SOLUTION INTRAVENOUS; SUBCUTANEOUS at 16:31

## 2023-08-16 ASSESSMENT — PAIN SCALES - GENERAL: PAINLEVEL_OUTOF10: 0

## 2023-08-16 NOTE — PROGRESS NOTES
Messaged MD pt bs 469. Tatiana Plants Tatiana Plants pt ate wendys and dinner here but states \"it was worth it\" pt not receptive to teaching from nurse. Pt requested the standing 13 units with the additional 16 for standing order. MD uncomfortable with this.  Pt received 16 units and night nurse given report to check sugar two hours after

## 2023-08-16 NOTE — PROGRESS NOTES
Message from nurse that patient noncompliant with diabetic diet and ate [employer]. Glucose up to 460s. Giving 16 units of humalog and adjusting basal insulin to 60 units nightly as well as prandial coverage to 16 units before meals.

## 2023-08-16 NOTE — PLAN OF CARE
Problem: Discharge Planning  Goal: Discharge to home or other facility with appropriate resources  8/16/2023 0146 by Aretha Vasques RN  Outcome: Progressing  8/16/2023 0146 by Aretha Vasques RN  Outcome: Progressing  Flowsheets (Taken 8/15/2023 2000)  Discharge to home or other facility with appropriate resources:   Arrange for needed discharge resources and transportation as appropriate   Identify barriers to discharge with patient and caregiver   Identify discharge learning needs (meds, wound care, etc)   Arrange for interpreters to assist at discharge as needed   Refer to discharge planning if patient needs post-hospital services based on physician order or complex needs related to functional status, cognitive ability or social support system

## 2023-08-16 NOTE — PROGRESS NOTES
Nutrition Note       During RD rounds over I/O Sheets, patient consumed % of breakfast on Carb Control 4 diet. Observed BS greater than 300-400 mg/dl within stay. Started on steroids with oxygen support. Pt c/o hungry, d/w pt over high BS and steroids may lead to increased hunger. Provided nutritional recommendations. Will adjust diet and send additional oral nutrition supplements. Will assess per protocol or upon consult.      Electronically signed by Alis Cornejo RD, LD on 8/16/23 at 12:37 PM EDT    Contact: [phone]

## 2023-08-16 NOTE — PLAN OF CARE
Problem: Discharge Planning  Goal: Discharge to home or other facility with appropriate resources  Outcome: Progressing  Flowsheets (Taken 8/15/2023 2000)  Discharge to home or other facility with appropriate resources:   Arrange for needed discharge resources and transportation as appropriate   Identify barriers to discharge with patient and caregiver   Identify discharge learning needs (meds, wound care, etc)   Arrange for interpreters to assist at discharge as needed   Refer to discharge planning if patient needs post-hospital services based on physician order or complex needs related to functional status, cognitive ability or social support system     Problem: Safety - Adult  Goal: Free from fall injury  Outcome: Progressing

## 2023-08-16 NOTE — CARE COORDINATION
D/c plan is home alone w/[facility]. Denies any other d/c needs. Notify CM if any d/c needs arise.   Julee Isbell NOTIFY [address] [facility] -[phone] AND FAX THE H&P, AVS, AND HHC ORDERS -[phone] WHEN PT IS DISCHARGED

## 2023-08-17 VITALS
TEMPERATURE: 97.8 F | WEIGHT: 315 LBS | HEART RATE: 80 BPM | BODY MASS INDEX: 36.45 KG/M2 | OXYGEN SATURATION: 96 % | HEIGHT: 78 IN | DIASTOLIC BLOOD PRESSURE: 66 MMHG | RESPIRATION RATE: 16 BRPM | SYSTOLIC BLOOD PRESSURE: 122 MMHG

## 2023-08-17 LAB
ANION GAP SERPL CALCULATED.3IONS-SCNC: 8 MMOL/L (ref 4–16)
BASOPHILS ABSOLUTE: 0 K/CU MM
BASOPHILS RELATIVE PERCENT: 0.2 % (ref 0–1)
BUN SERPL-MCNC: 20 MG/DL (ref 6–23)
CALCIUM SERPL-MCNC: 9 MG/DL (ref 8.3–10.6)
CHLORIDE BLD-SCNC: 94 MMOL/L (ref 99–110)
CO2: 35 MMOL/L (ref 21–32)
CREAT SERPL-MCNC: 0.8 MG/DL (ref 0.9–1.3)
DIFFERENTIAL TYPE: ABNORMAL
EOSINOPHILS ABSOLUTE: 0 K/CU MM
EOSINOPHILS RELATIVE PERCENT: 0.3 % (ref 0–3)
GFR SERPL CREATININE-BSD FRML MDRD: >60 ML/MIN/1.73M2
GLUCOSE BLD-MCNC: 186 MG/DL (ref 70–99)
GLUCOSE BLD-MCNC: 201 MG/DL (ref 70–99)
GLUCOSE BLD-MCNC: 214 MG/DL (ref 70–99)
GLUCOSE SERPL-MCNC: 212 MG/DL (ref 70–99)
HCT VFR BLD CALC: 44.7 % (ref 42–52)
HEMOGLOBIN: 14.1 GM/DL (ref 13.5–18)
IMMATURE NEUTROPHIL %: 0.5 % (ref 0–0.43)
LYMPHOCYTES ABSOLUTE: 1.5 K/CU MM
LYMPHOCYTES RELATIVE PERCENT: 13.2 % (ref 24–44)
MAGNESIUM: 2 MG/DL (ref 1.8–2.4)
MCH RBC QN AUTO: 29.7 PG (ref 27–31)
MCHC RBC AUTO-ENTMCNC: 31.5 % (ref 32–36)
MCV RBC AUTO: 94.1 FL (ref 78–100)
MONOCYTES ABSOLUTE: 0.8 K/CU MM
MONOCYTES RELATIVE PERCENT: 7.4 % (ref 0–4)
NUCLEATED RBC %: 0 %
PDW BLD-RTO: 13.9 % (ref 11.7–14.9)
PHOSPHORUS: 4.1 MG/DL (ref 2.5–4.9)
PLATELET # BLD: 155 K/CU MM (ref 140–440)
PMV BLD AUTO: 9.9 FL (ref 7.5–11.1)
POTASSIUM SERPL-SCNC: 5.2 MMOL/L (ref 3.5–5.1)
RBC # BLD: 4.75 M/CU MM (ref 4.6–6.2)
SEGMENTED NEUTROPHILS ABSOLUTE COUNT: 8.6 K/CU MM
SEGMENTED NEUTROPHILS RELATIVE PERCENT: 78.4 % (ref 36–66)
SODIUM BLD-SCNC: 137 MMOL/L (ref 135–145)
TOTAL IMMATURE NEUTOROPHIL: 0.05 K/CU MM
TOTAL NUCLEATED RBC: 0 K/CU MM
WBC # BLD: 11 K/CU MM (ref 4–10.5)

## 2023-08-17 PROCEDURE — 82962 GLUCOSE BLOOD TEST: CPT

## 2023-08-17 PROCEDURE — 6370000000 HC RX 637 (ALT 250 FOR IP): Performed by: FAMILY MEDICINE

## 2023-08-17 PROCEDURE — 2580000003 HC RX 258: Performed by: INTERNAL MEDICINE

## 2023-08-17 PROCEDURE — 2700000000 HC OXYGEN THERAPY PER DAY

## 2023-08-17 PROCEDURE — 6370000000 HC RX 637 (ALT 250 FOR IP): Performed by: INTERNAL MEDICINE

## 2023-08-17 PROCEDURE — 83735 ASSAY OF MAGNESIUM: CPT

## 2023-08-17 PROCEDURE — 94660 CPAP INITIATION&MGMT: CPT

## 2023-08-17 PROCEDURE — 84100 ASSAY OF PHOSPHORUS: CPT

## 2023-08-17 PROCEDURE — 85025 COMPLETE CBC W/AUTO DIFF WBC: CPT

## 2023-08-17 PROCEDURE — 36415 COLL VENOUS BLD VENIPUNCTURE: CPT

## 2023-08-17 PROCEDURE — 94640 AIRWAY INHALATION TREATMENT: CPT

## 2023-08-17 PROCEDURE — 94761 N-INVAS EAR/PLS OXIMETRY MLT: CPT

## 2023-08-17 PROCEDURE — 94664 DEMO&/EVAL PT USE INHALER: CPT

## 2023-08-17 PROCEDURE — 80048 BASIC METABOLIC PNL TOTAL CA: CPT

## 2023-08-17 RX ORDER — INSULIN DETEMIR 100 [IU]/ML
60 INJECTION, SOLUTION SUBCUTANEOUS NIGHTLY
Qty: 15 ML | Refills: 3 | Status: SHIPPED | OUTPATIENT
Start: 2023-08-17

## 2023-08-17 RX ORDER — PREDNISONE 10 MG/1
TABLET ORAL
Qty: 30 TABLET | Refills: 0 | Status: SHIPPED | OUTPATIENT
Start: 2023-08-17

## 2023-08-17 RX ADMIN — IPRATROPIUM BROMIDE AND ALBUTEROL SULFATE 1 DOSE: 2.5; .5 SOLUTION RESPIRATORY (INHALATION) at 11:12

## 2023-08-17 RX ADMIN — APIXABAN 5 MG: 5 TABLET, FILM COATED ORAL at 08:52

## 2023-08-17 RX ADMIN — INSULIN LISPRO 4 UNITS: 100 INJECTION, SOLUTION INTRAVENOUS; SUBCUTANEOUS at 07:44

## 2023-08-17 RX ADMIN — SODIUM ZIRCONIUM CYCLOSILICATE 10 G: 10 POWDER, FOR SUSPENSION ORAL at 09:24

## 2023-08-17 RX ADMIN — DILTIAZEM HYDROCHLORIDE 120 MG: 120 CAPSULE, COATED, EXTENDED RELEASE ORAL at 08:52

## 2023-08-17 RX ADMIN — SODIUM CHLORIDE, PRESERVATIVE FREE 10 ML: 5 INJECTION INTRAVENOUS at 08:53

## 2023-08-17 RX ADMIN — ATORVASTATIN CALCIUM 40 MG: 40 TABLET, FILM COATED ORAL at 08:52

## 2023-08-17 RX ADMIN — INSULIN LISPRO 4 UNITS: 100 INJECTION, SOLUTION INTRAVENOUS; SUBCUTANEOUS at 05:01

## 2023-08-17 RX ADMIN — PREDNISONE 40 MG: 20 TABLET ORAL at 08:52

## 2023-08-17 RX ADMIN — AZITHROMYCIN DIHYDRATE 500 MG: 250 TABLET ORAL at 08:52

## 2023-08-17 RX ADMIN — INSULIN LISPRO 16 UNITS: 100 INJECTION, SOLUTION INTRAVENOUS; SUBCUTANEOUS at 11:32

## 2023-08-17 RX ADMIN — INSULIN LISPRO 16 UNITS: 100 INJECTION, SOLUTION INTRAVENOUS; SUBCUTANEOUS at 07:44

## 2023-08-17 RX ADMIN — ASPIRIN 81 MG: 81 TABLET, CHEWABLE ORAL at 08:52

## 2023-08-17 NOTE — DISCHARGE SUMMARY
V2.0  Discharge Summary    Name:  Consuelo Almonte /Age/Sex: 1955 (74 y.o. male)   Admit Date: 2023  Discharge Date: 23    MRN & CSN:  [id number] & [id number] Encounter Date and Time 23 8:30 AM EDT    Attending:  Michael Freeman MD Discharging Provider: Michael Freeman MD       Hospital Course:     Consuelo Almonte is a 74 y.o. male with pmh of COPD with chronic respiratory failure, IDDM2, CHF, PAD, PAF who presents with Acute on chronic respiratory failure with hypoxia and hypercapnia ([address]) and suspected COPD exacerbation. Plan:   Acute COPD exacerbation: Suspect with progressive shortness of breath and wheezing. Improved. Converted IV steroids to p.o. 8/15 and plan for taper on DC. Initiated on IV Rocephin/azithromycin on admit but procal low. No purulence, fever, or leukocytosis. Stopped IV Rocephin  and completed short course of Azithromycin. Continue Symbicort and bronchodilators. Acute on chronic respiratory failure with hypoxia/hypercapnia: Patient states he is on 2 to 3 L at home and weaned to 3L NC on DC. Treated COPD as above. IDDM2 with hyperglycemia:  patient was started on an abbreviated dose of Lantus on admit and had significant hyperglycemia with glucose in the 300s to 400s range on account of being on steroids as well as poor diet. Lantus titrated up to 60 units nightly  with improvements. Resume home Humalog 20 units TID AC on DC. Continue home oral meds. Counseled on diet. Further titration per PCP on follow up. Hyponatremia: Sodium 132 8/15 and overall stable. Stopped further fluids as patient does have a history of congestive heart failure. resolved. Hyperkalemia: Mild and intermittent. Pt given lokelma x 2 during hospital stay last . Repeat BMP x 1 week. Paroxysmal atrial fibrillation: Per history, rate stable. Continued home Eliquis and calcium channel blocker.   Chronic diastolic heart failure: Per history, does not appear to be 8/14/2023 9:50 am COMPARISON: 07/31/2023 HISTORY: ORDERING SYSTEM PROVIDED HISTORY: shortness of breath TECHNOLOGIST PROVIDED HISTORY: Reason for exam:->shortness of breath Reason for Exam: sob FINDINGS: No focal consolidations. No pleural effusion or pneumothorax. Heart size is within normal limits. No acute process. CBC:   Recent Labs     08/15/23  0503 08/16/23  0907 08/17/23  0113   WBC 11.7* 11.2* 11.0*   HGB 13.9 14.5 14.1    166 155     BMP:    Recent Labs     08/15/23  0503 08/16/23  0907 08/17/23  0113   * 141 137   K 5.3* 4.8 5.2*   CL 92* 92* 94*   CO2 32 31 35*   BUN 22 15 20   CREATININE 0.7* 0.8* 0.8*   GLUCOSE 422* 313* 212*     Hepatic:   Recent Labs     08/14/23  0930   AST 14*   ALT 28   BILITOT 0.3   ALKPHOS 83     Lipids:   Lab Results   Component Value Date/Time    CHOL 159 03/01/2023 08:34 AM    CHOL 112 12/15/2021 01:35 PM    HDL 29 03/01/2023 08:34 AM    TRIG 182 03/01/2023 08:34 AM     Hemoglobin A1C:   Lab Results   Component Value Date/Time    LABA1C 9.7 07/31/2023 04:45 PM     TSH:   Lab Results   Component Value Date/Time    TSH 2.15 03/06/2019 10:22 AM     Troponin:   Lab Results   Component Value Date/Time    TROPONINT <0.010 08/14/2023 09:30 AM    TROPONINT <0.010 07/31/2023 04:45 PM    TROPONINT <0.010 03/24/2023 09:26 PM     Lactic Acid: No results for input(s): LACTA in the last 72 hours.   BNP:   Recent Labs     08/14/23  0930   PROBNP 212.0     UA:  Lab Results   Component Value Date/Time    NITRU NEGATIVE 05/04/2023 11:58 AM    COLORU YELLOW 05/04/2023 11:58 AM    WBCUA 4 11/07/2018 04:30 PM    RBCUA 1 11/07/2018 04:30 PM    MUCUS RARE 11/07/2018 04:30 PM    TRICHOMONAS NONE SEEN 11/07/2018 04:30 PM    BACTERIA RARE 11/07/2018 04:30 PM    CLARITYU CLEAR 05/04/2023 11:58 AM    SPECGRAV 1.020 05/04/2023 11:58 AM    LEUKOCYTESUR NEGATIVE 05/04/2023 11:58 AM    UROBILINOGEN 1.0 05/04/2023 11:58 AM    BILIRUBINUR NEGATIVE 05/04/2023 11:58 AM    BLOODU NEGATIVE

## 2023-08-17 NOTE — PROGRESS NOTES
08/16/23 3217   NIV Type   NIV Started/Stopped On   Equipment Type v60   Mode Bilevel   Mask Type Full face mask   Mask Size Medium   Bonnet size Medium   Assessment   Pulse 77   SpO2 97 %   Comfort Level Good   Using Accessory Muscles No   Mask Compliance Good   Settings/Measurements   PIP Observed 22 cm H20   IPAP 20 cmH20   CPAP/EPAP 5 cmH2O   Vt (Measured) 1150 mL   Rate Ordered 12   FiO2  30 %   I Time/ I Time % 1 s   Minute Volume (L/min) 28 Liters   Mask Leak (lpm) 8 lpm   Patient's Home Machine No   Alarm Settings   Alarms On Y   Low Pressure (cmH2O) 3 cmH2O   High Pressure (cmH2O) 25 cmH2O   Delay Alarm 20 sec(s)   Apnea (secs) 20 secs   RR Low (bpm) 13   RR High (bpm) 40 br/min

## 2023-08-17 NOTE — PROGRESS NOTES
Outpatient Pharmacy Progress Note for Meds-to-Beds    Total number of Prescriptions Filled: 1  The following medications were dispensed to the patient during the discharge process:  Prednisone taper    Additional Documentation:  Patient picked-up the medication(s) in the OP Pharmacy  The following prescription(s) were refilled to soon per insurance (patient has some at home): Vero Baltazar        Thank you for letting us serve your patients.   [address]    [address] [place] [address], [address] [place] [address]    Phone: [phone]    Fax: [phone]

## 2023-08-17 NOTE — PROGRESS NOTES
[facility] but does not have access to it. Gave pt IGO and pt will call Marly Singleton when he gets home. [facility]

## 2023-08-18 ENCOUNTER — CARE COORDINATION (OUTPATIENT)
Dept: CASE MANAGEMENT | Age: 68
End: 2023-08-18

## 2023-08-18 ENCOUNTER — TELEPHONE (OUTPATIENT)
Dept: INTERNAL MEDICINE CLINIC | Age: 68
End: 2023-08-18

## 2023-08-18 RX ORDER — DILTIAZEM HYDROCHLORIDE 120 MG/1
120 CAPSULE, COATED, EXTENDED RELEASE ORAL DAILY
Qty: 30 CAPSULE | Refills: 3 | Status: SHIPPED | OUTPATIENT
Start: 2023-08-18

## 2023-08-18 RX ORDER — ALBUTEROL SULFATE 90 UG/1
2 AEROSOL, METERED RESPIRATORY (INHALATION) 2 TIMES DAILY
Qty: 18 G | Refills: 3 | Status: SHIPPED | OUTPATIENT
Start: 2023-08-18

## 2023-08-18 RX ORDER — BUDESONIDE AND FORMOTEROL FUMARATE DIHYDRATE 160; 4.5 UG/1; UG/1
2 AEROSOL RESPIRATORY (INHALATION) 2 TIMES DAILY
Qty: 30.6 G | Refills: 0 | Status: SHIPPED | OUTPATIENT
Start: 2023-08-18

## 2023-08-18 NOTE — CARE COORDINATION
Care Transitions Outreach Attempt    Call within 2 business days of discharge: Yes     Attempted to reach patient for transitions of care follow up. Unable to reach patient. Patient: Leticia Smith Patient : 1955 MRN: [id number]    1st attempt to reach for  initial care Transitions call  unsuccessful. HIPAA compliant message left requesting call back to [phone]. CTN will try again. Last Discharge [address] [facility] [facility]       Date Complaint Diagnosis Description Type Department Provider    23 Shortness of Breath COPD exacerbation [facility]) ED to Hosp-Admission (Discharged) (ADMITTED) Alexandra Walker MD; Priscila Jernigan. ..           Noted following upcoming appointments from discharge chart review:   [facility] follow up appointment(s):   Future Appointments   Date Time Provider [address]   2023  2:30 PM MD Humble Escalante [facility]   11/10/2023  1:45 PM MD Humble Escalante [facility]         Sangeetha Barker RN [phone]

## 2023-08-18 NOTE — TELEPHONE ENCOUNTER
Care Transitions Initial Follow Up Call    Outreach made within 2 business days of discharge: Yes    Patient: Mark Wiggins Patient : 1955   MRN: [id number]  Reason for Admission: There are no discharge diagnoses documented for the most recent discharge. Discharge Date: 23       Spoke with: Moe    Discharge department/facility: [facility]    TCM Interactive Patient Contact:  Was patient able to fill all prescriptions: Yes  Was patient instructed to bring all medications to the follow-up visit: Yes  Is patient taking all medications as directed in the discharge summary?  Yes  Does patient understand their discharge instructions: Yes  Does patient have questions or concerns that need addressed prior to 7-14 day follow up office visit: no    Scheduled appointment with PCP within 7-14 days    Follow Up  Future Appointments   Date Time Provider [address] [place] [address]   2023  2:30 PM MD Humble Pendleton  [facility]   11/10/2023  1:45 PM MD Humble Pendleton [facility]       Meryl Owens

## 2023-08-21 ENCOUNTER — CARE COORDINATION (OUTPATIENT)
Dept: CASE MANAGEMENT | Age: 68
End: 2023-08-21

## 2023-08-21 NOTE — CARE COORDINATION
Were discharge instructions available to patient? Yes. Reviewed appropriate site of care based on symptoms and resources available to patient including: PCP  Specialist  Benefits related nurse triage line  Urgent care clinics  Home health  When to call Krishan Davidson. The patient agrees to contact the PCP office for questions related to their healthcare. Advance Care Planning:   Does patient have an Advance Directive: reviewed and current. Medication reconciliation was performed with patient, who verbalizes understanding of administration of home medications. Medications reviewed, 1111F entered: yes    Was patient discharged with a pulse oximeter? yes, discussed and confirmed pulse oximeter discharge instructions and when to notify provider or seek emergency care. Non-face-to-face services provided:  Obtained and reviewed discharge summary and/or continuity of care documents  Education of patient/family/caregiver/guardian to support self-management-COPD  Assessment and support for treatment adherence and medication management-med review    Offered patient enrollment in the Remote Patient Monitoring (RPM) program for in-home monitoring: Patient is not eligible for RPM program.    Care Transitions 24 Hour Call    Do you have all of your prescriptions and are they filled?: No (Comment: Does not have albuterol inhalers)  Post Acute Services: Home Health (Comment: [address] [facility])  Patient DME: Straight cane, Shower chair  Patient Home Equipment: Oxygen, BiPAP  Do you have support at home?: Roommate/Housemate  Are you an active caregiver in your home?: No  Care Transitions Interventions   Home Care Waiver: Completed  Other Services: Completed      Transportation Support: Declined     Registered Dietician: Declined DME Assistance: Declined    Medication Assistance Program: Completed       Social Work: Declined    Disease Association: Completed               Discussed follow-up appointments.  If no appointment was

## 2023-08-23 ENCOUNTER — CARE COORDINATION (OUTPATIENT)
Dept: CARE COORDINATION | Age: 68
End: 2023-08-23

## 2023-08-23 NOTE — CARE COORDINATION
Patient Current Location: Home: [address] [facility] [facility]     Phone call to Pt who stated he was just waking up. Pt denied receiving the information for optometrists in the area, but stated he doesn't check is mail daily. Pt reported he will have someone check later in the day. SW discussed plan to follow up with him in one week. Pt was in agreement.      VIELKA plan of care: SW will follow up with Pt regarding list of optometrists in one week 8/30

## 2023-08-30 ENCOUNTER — HOSPITAL ENCOUNTER (OUTPATIENT)
Age: 68
Setting detail: SPECIMEN
Discharge: HOME | End: 2023-08-30
Payer: MEDICARE

## 2023-08-30 ENCOUNTER — CARE COORDINATION (OUTPATIENT)
Dept: CARE COORDINATION | Age: 68
End: 2023-08-30

## 2023-08-30 DIAGNOSIS — E11.65 TYPE 2 DIABETES MELLITUS WITH HYPERGLYCEMIA, WITHOUT LONG-TERM CURRENT USE OF INSULIN (HCC): ICD-10-CM

## 2023-08-30 LAB
ANION GAP SERPL CALCULATED.3IONS-SCNC: 15 MMOL/L (ref 4–16)
BUN SERPL-MCNC: 13 MG/DL (ref 6–23)
CALCIUM SERPL-MCNC: 8.7 MG/DL (ref 8.3–10.6)
CHLORIDE BLD-SCNC: 88 MMOL/L (ref 99–110)
CO2: 30 MMOL/L (ref 21–32)
CREAT SERPL-MCNC: 0.7 MG/DL (ref 0.9–1.3)
GFR SERPL CREATININE-BSD FRML MDRD: >60 ML/MIN/1.73M2
GLUCOSE SERPL-MCNC: 355 MG/DL (ref 70–99)
POTASSIUM SERPL-SCNC: 4.5 MMOL/L (ref 3.5–5.1)
SODIUM BLD-SCNC: 133 MMOL/L (ref 135–145)

## 2023-08-30 PROCEDURE — 80048 BASIC METABOLIC PNL TOTAL CA: CPT

## 2023-08-30 RX ORDER — GLUCOSAMINE HCL/CHONDROITIN SU 500-400 MG
CAPSULE ORAL
Qty: 100 STRIP | Refills: 3 | Status: SHIPPED | OUTPATIENT
Start: 2023-08-30

## 2023-08-30 NOTE — CARE COORDINATION
Phone call to Pt deferred on this date as Pt is currently at [facility] lab. VIELKA plan of care: SW will make next outreach attempt to Pt on 9/6 regarding vision services.

## 2023-08-31 ENCOUNTER — CARE COORDINATION (OUTPATIENT)
Dept: CASE MANAGEMENT | Age: 68
End: 2023-08-31

## 2023-08-31 NOTE — CARE COORDINATION
Care Transitions Outreach Attempt    Patient: Renee Seymour Patient : 1955 MRN: [id number]    1st attempt to reach for  subsequent care transitions call  unsuccessful. HIPAA compliant message left requesting call back to [phone]. Collaborated with Noah BEJARANO, she will follow this pt as DARCI. CTN episode closed per protocol. Last Discharge [address] [facility]       Date Complaint Diagnosis Description Type Department Provider    23 Shortness of Breath COPD exacerbation [facility]) ED to Hosp-Admission (Discharged) (ADMITTED) Rosalina Jane MD; Sai Alexander. ..          Discharge Facility: [place]    Noted following upcoming appointments from discharge chart review:   [facility] follow up appointment(s):   Future Appointments   Date Time Provider [address]   11/10/2023  1:45 PM Gurpreet Dodd MD [facility] [address] [facility]     Non-[facility] follow up appointment(s): KAE Osman RN [phone]

## 2023-09-05 ENCOUNTER — CARE COORDINATION (OUTPATIENT)
Dept: CARE COORDINATION | Age: 68
End: 2023-09-05

## 2023-09-05 NOTE — CARE COORDINATION
Ambulatory Care Coordination Note  2023    Patient Current Location:  Home: [address] [place] [address]     ACM contacted the patient by telephone. Verified name and  with patient as identifiers. Provided introduction to self, and explanation of the ACM role. Challenges to be reviewed by the provider   Additional needs identified to be addressed with provider: No  none               Method of communication with provider: none. ACM: Dilcia Smith RN        Spoke with patient briefly for ACM follow up. Patient reports that he is doing the same. Instructed on the recommendation for PCP follow up; offered to schedule appointment; patient declined. Patient reports that \" nothing is really helping him\" and it is \" getting harder for him to leave the house\". Patient has transportation ; but reports that he needs a w/c has has no support. Patient declined PCP follow up. Inquired about [facility] and patient reports that he \" does not know who is coming\". Confirms that he has HHA support per [employer]. ACM to confirm [facility] plan for follow up. Discussed options for home based Primary Care d/t patient report of difficulty leaving the house. Patient reports that he is interested in this option. Referral to be made to [facility]. Patient denies needs and abruptly requested to end call. Offered patient enrollment in the Remote Patient Monitoring (RPM) program for in-home monitoring: NA.    Plan for next outreach;  PCP follow up    [facility] Confirmed services and plan for RN follow up. Referral made to [facility]. Request for return call to confirm eligibility. Lab Results       None                 Goals Addressed                   This Visit's Progress     Conditions and Symptoms   No change     I will schedule office visits, as directed by my provider. I will keep my appointment or reschedule if I have to cancel.   I will notify my provider of any barriers

## 2023-09-06 ENCOUNTER — CARE COORDINATION (OUTPATIENT)
Dept: CARE COORDINATION | Age: 68
End: 2023-09-06

## 2023-09-06 NOTE — CARE COORDINATION
Patient Current Location: Home: [address] [facility]     Phone call to Pt who reported he is not sure if he received the list of providers for a vision exam. Pt reported he will check with his friend who gets his mail and get back to this SW.     VIELKA plan of care: SW will request that coworker, Oralia Paul out another list of providers to Pt on 9/11 if no response by then.

## 2023-09-11 ENCOUNTER — CARE COORDINATION (OUTPATIENT)
Dept: CARE COORDINATION | Age: 68
End: 2023-09-11

## 2023-09-11 RX ORDER — INSULIN LISPRO 100 [IU]/ML
INJECTION, SOLUTION INTRAVENOUS; SUBCUTANEOUS
Qty: 15 ML | Refills: 10 | Status: SHIPPED | OUTPATIENT
Start: 2023-09-11

## 2023-09-11 NOTE — CARE COORDINATION
Patient Current Location: Home: [address] [place] [address]     Phone call to Pt to follow up regarding low vision screening. Pt is not sure if he received the letter from this SW, will ask his neighbor to go through his mail to check to see if he has received it.      VIELKA plan of care: VIELKA will make next outreach attempt to Pt regarding low vision screening on 9/18

## 2023-09-13 ENCOUNTER — HOSPITAL ENCOUNTER (INPATIENT)
Age: 68
LOS: 2 days | Discharge: HOME HEALTH | End: 2023-09-15
Attending: EMERGENCY MEDICINE | Admitting: FAMILY MEDICINE
Payer: MEDICARE

## 2023-09-13 ENCOUNTER — APPOINTMENT (OUTPATIENT)
Dept: GENERAL RADIOLOGY | Age: 68
End: 2023-09-13
Payer: MEDICARE

## 2023-09-13 DIAGNOSIS — J44.1 COPD EXACERBATION (HCC): ICD-10-CM

## 2023-09-13 DIAGNOSIS — E87.29 RESPIRATORY ACIDOSIS: Primary | ICD-10-CM

## 2023-09-13 DIAGNOSIS — R06.89 DYSPNEA AND RESPIRATORY ABNORMALITIES: ICD-10-CM

## 2023-09-13 DIAGNOSIS — R09.02 HYPOXIA: ICD-10-CM

## 2023-09-13 DIAGNOSIS — R06.00 DYSPNEA AND RESPIRATORY ABNORMALITIES: ICD-10-CM

## 2023-09-13 LAB
ALBUMIN SERPL-MCNC: 3.8 GM/DL (ref 3.4–5)
ALP BLD-CCNC: 91 IU/L (ref 40–129)
ALT SERPL-CCNC: 21 U/L (ref 10–40)
ANION GAP SERPL CALCULATED.3IONS-SCNC: 7 MMOL/L (ref 4–16)
APTT: 28.8 SECONDS (ref 25.1–37.1)
AST SERPL-CCNC: 12 IU/L (ref 15–37)
B PARAP IS1001 DNA NPH QL NAA+NON-PROBE: NOT DETECTED
B PERT.PT PRMT NPH QL NAA+NON-PROBE: NOT DETECTED
BACTERIA: NEGATIVE /HPF
BASE EXCESS MIXED: 8.6 (ref 0–1.2)
BASE EXCESS MIXED: 9 (ref 0–1.2)
BASOPHILS ABSOLUTE: 0.1 K/CU MM
BASOPHILS RELATIVE PERCENT: 0.7 % (ref 0–1)
BILIRUB SERPL-MCNC: 0.3 MG/DL (ref 0–1)
BILIRUBIN URINE: NEGATIVE MG/DL
BLOOD, URINE: NEGATIVE
BUN SERPL-MCNC: 11 MG/DL (ref 6–23)
C PNEUM DNA NPH QL NAA+NON-PROBE: NOT DETECTED
CALCIUM SERPL-MCNC: 9.1 MG/DL (ref 8.3–10.6)
CARBON MONOXIDE, BLOOD: 2.1 % (ref 0–5)
CHLORIDE BLD-SCNC: 94 MMOL/L (ref 99–110)
CLARITY: CLEAR
CO2 CONTENT: 41.6 MMOL/L (ref 19–24)
CO2: 34 MMOL/L (ref 21–32)
COLOR: YELLOW
COMMENT: ABNORMAL
COMMENT: ABNORMAL
CREAT SERPL-MCNC: 0.6 MG/DL (ref 0.9–1.3)
DIFFERENTIAL TYPE: ABNORMAL
EKG ATRIAL RATE: 96 BPM
EKG DIAGNOSIS: NORMAL
EKG P AXIS: 95 DEGREES
EKG P-R INTERVAL: 212 MS
EKG Q-T INTERVAL: 350 MS
EKG QRS DURATION: 88 MS
EKG QTC CALCULATION (BAZETT): 435 MS
EKG R AXIS: -68 DEGREES
EKG T AXIS: 64 DEGREES
EKG VENTRICULAR RATE: 93 BPM
EOSINOPHILS ABSOLUTE: 0.2 K/CU MM
EOSINOPHILS RELATIVE PERCENT: 2 % (ref 0–3)
FLUAV H1 2009 PAN RNA NPH NAA+NON-PROBE: NOT DETECTED
FLUAV H1 RNA NPH QL NAA+NON-PROBE: NOT DETECTED
FLUAV H3 RNA NPH QL NAA+NON-PROBE: NOT DETECTED
FLUAV RNA NPH QL NAA+NON-PROBE: NOT DETECTED
FLUBV RNA NPH QL NAA+NON-PROBE: NOT DETECTED
GFR SERPL CREATININE-BSD FRML MDRD: >60 ML/MIN/1.73M2
GLUCOSE BLD-MCNC: 423 MG/DL (ref 70–99)
GLUCOSE BLD-MCNC: 519 MG/DL (ref 70–99)
GLUCOSE BLD-MCNC: >600 MG/DL (ref 70–99)
GLUCOSE SERPL-MCNC: 406 MG/DL (ref 70–99)
GLUCOSE SERPL-MCNC: 722 MG/DL (ref 70–99)
GLUCOSE, URINE: >1000 MG/DL
HADV DNA NPH QL NAA+NON-PROBE: NOT DETECTED
HCO3 ARTERIAL: 39 MMOL/L (ref 18–23)
HCO3 VENOUS: 38.3 MMOL/L (ref 19–25)
HCOV 229E RNA NPH QL NAA+NON-PROBE: NOT DETECTED
HCOV HKU1 RNA NPH QL NAA+NON-PROBE: NOT DETECTED
HCOV NL63 RNA NPH QL NAA+NON-PROBE: NOT DETECTED
HCOV OC43 RNA NPH QL NAA+NON-PROBE: NOT DETECTED
HCT VFR BLD CALC: 44.7 % (ref 42–52)
HEMOGLOBIN: 14.3 GM/DL (ref 13.5–18)
HMPV RNA NPH QL NAA+NON-PROBE: NOT DETECTED
HPIV1 RNA NPH QL NAA+NON-PROBE: NOT DETECTED
HPIV2 RNA NPH QL NAA+NON-PROBE: NOT DETECTED
HPIV3 RNA NPH QL NAA+NON-PROBE: NOT DETECTED
HPIV4 RNA NPH QL NAA+NON-PROBE: NOT DETECTED
IMMATURE NEUTROPHIL %: 1.2 % (ref 0–0.43)
INR BLD: 0.9 INDEX
KETONES, URINE: NEGATIVE MG/DL
LACTATE: 1.5 MMOL/L (ref 0.5–1.9)
LEUKOCYTE ESTERASE, URINE: NEGATIVE
LIPASE: 23 IU/L (ref 13–60)
LYMPHOCYTES ABSOLUTE: 1.4 K/CU MM
LYMPHOCYTES RELATIVE PERCENT: 16.8 % (ref 24–44)
M PNEUMO DNA NPH QL NAA+NON-PROBE: NOT DETECTED
MAGNESIUM: 1.8 MG/DL (ref 1.8–2.4)
MCH RBC QN AUTO: 30.2 PG (ref 27–31)
MCHC RBC AUTO-ENTMCNC: 32 % (ref 32–36)
MCV RBC AUTO: 94.3 FL (ref 78–100)
METHEMOGLOBIN ARTERIAL: 1.1 %
MONOCYTES ABSOLUTE: 0.7 K/CU MM
MONOCYTES RELATIVE PERCENT: 8.2 % (ref 0–4)
NITRITE URINE, QUANTITATIVE: NEGATIVE
NUCLEATED RBC %: 0 %
O2 SAT, VEN: 93.4 % (ref 50–70)
O2 SATURATION: 94.8 % (ref 96–97)
PCO2 ARTERIAL: 85 MMHG (ref 32–45)
PCO2, VEN: 76 MMHG (ref 38–52)
PDW BLD-RTO: 15.5 % (ref 11.7–14.9)
PH BLOOD: 7.27 (ref 7.34–7.45)
PH VENOUS: 7.31 (ref 7.32–7.42)
PH, URINE: 5.5 (ref 5–8)
PLATELET # BLD: 158 K/CU MM (ref 140–440)
PMV BLD AUTO: 9.8 FL (ref 7.5–11.1)
PO2 ARTERIAL: 144 MMHG (ref 75–100)
PO2, VEN: 91 MMHG (ref 28–48)
POTASSIUM SERPL-SCNC: 4.5 MMOL/L (ref 3.5–5.1)
PRO-BNP: 334.8 PG/ML
PROCALCITONIN SERPL-MCNC: 0.07 NG/ML
PROTEIN UA: ABNORMAL MG/DL
PROTHROMBIN TIME: 12.7 SECONDS (ref 11.7–14.5)
RBC # BLD: 4.74 M/CU MM (ref 4.6–6.2)
RBC URINE: 0 /HPF (ref 0–3)
RSV RNA NPH QL NAA+NON-PROBE: NOT DETECTED
RV+EV RNA NPH QL NAA+NON-PROBE: NOT DETECTED
SARS-COV-2 RDRP RESP QL NAA+PROBE: NOT DETECTED
SARS-COV-2 RNA NPH QL NAA+NON-PROBE: NOT DETECTED
SEGMENTED NEUTROPHILS ABSOLUTE COUNT: 5.8 K/CU MM
SEGMENTED NEUTROPHILS RELATIVE PERCENT: 71.1 % (ref 36–66)
SODIUM BLD-SCNC: 135 MMOL/L (ref 135–145)
SOURCE: NORMAL
SPECIFIC GRAVITY UA: 1.02 (ref 1–1.03)
SQUAMOUS EPITHELIAL: <1 /HPF
TOTAL IMMATURE NEUTOROPHIL: 0.1 K/CU MM
TOTAL NUCLEATED RBC: 0 K/CU MM
TOTAL PROTEIN: 6.5 GM/DL (ref 6.4–8.2)
TRICHOMONAS: NORMAL /HPF
TROPONIN T: <0.01 NG/ML
TSH SERPL DL<=0.005 MIU/L-ACNC: 2.66 UIU/ML (ref 0.27–4.2)
UROBILINOGEN, URINE: 0.2 MG/DL (ref 0.2–1)
WBC # BLD: 8.2 K/CU MM (ref 4–10.5)
WBC UA: <1 /HPF (ref 0–2)

## 2023-09-13 PROCEDURE — 94761 N-INVAS EAR/PLS OXIMETRY MLT: CPT

## 2023-09-13 PROCEDURE — 87077 CULTURE AEROBIC IDENTIFY: CPT

## 2023-09-13 PROCEDURE — 84145 PROCALCITONIN (PCT): CPT

## 2023-09-13 PROCEDURE — 85025 COMPLETE CBC W/AUTO DIFF WBC: CPT

## 2023-09-13 PROCEDURE — 85610 PROTHROMBIN TIME: CPT

## 2023-09-13 PROCEDURE — 6360000002 HC RX W HCPCS

## 2023-09-13 PROCEDURE — 96374 THER/PROPH/DIAG INJ IV PUSH: CPT

## 2023-09-13 PROCEDURE — 6370000000 HC RX 637 (ALT 250 FOR IP): Performed by: FAMILY MEDICINE

## 2023-09-13 PROCEDURE — 94640 AIRWAY INHALATION TREATMENT: CPT

## 2023-09-13 PROCEDURE — 81001 URINALYSIS AUTO W/SCOPE: CPT

## 2023-09-13 PROCEDURE — 36415 COLL VENOUS BLD VENIPUNCTURE: CPT

## 2023-09-13 PROCEDURE — 82803 BLOOD GASES ANY COMBINATION: CPT

## 2023-09-13 PROCEDURE — 83880 ASSAY OF NATRIURETIC PEPTIDE: CPT

## 2023-09-13 PROCEDURE — 83605 ASSAY OF LACTIC ACID: CPT

## 2023-09-13 PROCEDURE — 82805 BLOOD GASES W/O2 SATURATION: CPT

## 2023-09-13 PROCEDURE — 96375 TX/PRO/DX INJ NEW DRUG ADDON: CPT

## 2023-09-13 PROCEDURE — 0202U NFCT DS 22 TRGT SARS-COV-2: CPT

## 2023-09-13 PROCEDURE — 83690 ASSAY OF LIPASE: CPT

## 2023-09-13 PROCEDURE — 84484 ASSAY OF TROPONIN QUANT: CPT

## 2023-09-13 PROCEDURE — 6370000000 HC RX 637 (ALT 250 FOR IP)

## 2023-09-13 PROCEDURE — 83735 ASSAY OF MAGNESIUM: CPT

## 2023-09-13 PROCEDURE — 71045 X-RAY EXAM CHEST 1 VIEW: CPT

## 2023-09-13 PROCEDURE — 93010 ELECTROCARDIOGRAM REPORT: CPT | Performed by: INTERNAL MEDICINE

## 2023-09-13 PROCEDURE — 2700000000 HC OXYGEN THERAPY PER DAY

## 2023-09-13 PROCEDURE — 5A09457 ASSISTANCE WITH RESPIRATORY VENTILATION, 24-96 CONSECUTIVE HOURS, CONTINUOUS POSITIVE AIRWAY PRESSURE: ICD-10-PCS | Performed by: FAMILY MEDICINE

## 2023-09-13 PROCEDURE — 6370000000 HC RX 637 (ALT 250 FOR IP): Performed by: EMERGENCY MEDICINE

## 2023-09-13 PROCEDURE — 36600 WITHDRAWAL OF ARTERIAL BLOOD: CPT

## 2023-09-13 PROCEDURE — 87635 SARS-COV-2 COVID-19 AMP PRB: CPT

## 2023-09-13 PROCEDURE — 82962 GLUCOSE BLOOD TEST: CPT

## 2023-09-13 PROCEDURE — 80053 COMPREHEN METABOLIC PANEL: CPT

## 2023-09-13 PROCEDURE — 84443 ASSAY THYROID STIM HORMONE: CPT

## 2023-09-13 PROCEDURE — 2060000000 HC ICU INTERMEDIATE R&B

## 2023-09-13 PROCEDURE — 93005 ELECTROCARDIOGRAM TRACING: CPT | Performed by: EMERGENCY MEDICINE

## 2023-09-13 PROCEDURE — 87040 BLOOD CULTURE FOR BACTERIA: CPT

## 2023-09-13 PROCEDURE — 87086 URINE CULTURE/COLONY COUNT: CPT

## 2023-09-13 PROCEDURE — 94660 CPAP INITIATION&MGMT: CPT

## 2023-09-13 PROCEDURE — 6360000002 HC RX W HCPCS: Performed by: EMERGENCY MEDICINE

## 2023-09-13 PROCEDURE — 2580000003 HC RX 258

## 2023-09-13 PROCEDURE — 99285 EMERGENCY DEPT VISIT HI MDM: CPT

## 2023-09-13 PROCEDURE — 85730 THROMBOPLASTIN TIME PARTIAL: CPT

## 2023-09-13 RX ORDER — GLUCAGON 1 MG/ML
1 KIT INJECTION PRN
Status: DISCONTINUED | OUTPATIENT
Start: 2023-09-13 | End: 2023-09-15 | Stop reason: HOSPADM

## 2023-09-13 RX ORDER — FUROSEMIDE 10 MG/ML
40 INJECTION INTRAMUSCULAR; INTRAVENOUS ONCE
Status: COMPLETED | OUTPATIENT
Start: 2023-09-13 | End: 2023-09-13

## 2023-09-13 RX ORDER — METHYLPREDNISOLONE SODIUM SUCCINATE 125 MG/2ML
125 INJECTION, POWDER, LYOPHILIZED, FOR SOLUTION INTRAMUSCULAR; INTRAVENOUS ONCE
Status: COMPLETED | OUTPATIENT
Start: 2023-09-13 | End: 2023-09-13

## 2023-09-13 RX ORDER — MORPHINE SULFATE 4 MG/ML
4 INJECTION, SOLUTION INTRAMUSCULAR; INTRAVENOUS EVERY 30 MIN PRN
Status: DISCONTINUED | OUTPATIENT
Start: 2023-09-13 | End: 2023-09-15 | Stop reason: HOSPADM

## 2023-09-13 RX ORDER — IPRATROPIUM BROMIDE AND ALBUTEROL SULFATE 2.5; .5 MG/3ML; MG/3ML
1 SOLUTION RESPIRATORY (INHALATION) ONCE
Status: COMPLETED | OUTPATIENT
Start: 2023-09-13 | End: 2023-09-13

## 2023-09-13 RX ORDER — DILTIAZEM HYDROCHLORIDE 120 MG/1
120 CAPSULE, COATED, EXTENDED RELEASE ORAL DAILY
Status: DISCONTINUED | OUTPATIENT
Start: 2023-09-13 | End: 2023-09-15 | Stop reason: HOSPADM

## 2023-09-13 RX ORDER — INSULIN GLARGINE 100 [IU]/ML
60 INJECTION, SOLUTION SUBCUTANEOUS NIGHTLY
Status: DISCONTINUED | OUTPATIENT
Start: 2023-09-13 | End: 2023-09-13

## 2023-09-13 RX ORDER — INSULIN LISPRO 100 [IU]/ML
0-4 INJECTION, SOLUTION INTRAVENOUS; SUBCUTANEOUS NIGHTLY
Status: DISCONTINUED | OUTPATIENT
Start: 2023-09-13 | End: 2023-09-15 | Stop reason: HOSPADM

## 2023-09-13 RX ORDER — IPRATROPIUM BROMIDE AND ALBUTEROL SULFATE 2.5; .5 MG/3ML; MG/3ML
1 SOLUTION RESPIRATORY (INHALATION) EVERY 4 HOURS PRN
Status: DISCONTINUED | OUTPATIENT
Start: 2023-09-13 | End: 2023-09-15 | Stop reason: HOSPADM

## 2023-09-13 RX ORDER — ONDANSETRON 2 MG/ML
4 INJECTION INTRAMUSCULAR; INTRAVENOUS EVERY 6 HOURS PRN
Status: DISCONTINUED | OUTPATIENT
Start: 2023-09-13 | End: 2023-09-15 | Stop reason: HOSPADM

## 2023-09-13 RX ORDER — ACETAMINOPHEN 325 MG/1
650 TABLET ORAL EVERY 6 HOURS PRN
Status: DISCONTINUED | OUTPATIENT
Start: 2023-09-13 | End: 2023-09-15 | Stop reason: HOSPADM

## 2023-09-13 RX ORDER — INSULIN LISPRO 100 [IU]/ML
20 INJECTION, SOLUTION INTRAVENOUS; SUBCUTANEOUS
Status: DISCONTINUED | OUTPATIENT
Start: 2023-09-13 | End: 2023-09-14 | Stop reason: SDUPTHER

## 2023-09-13 RX ORDER — SODIUM CHLORIDE 9 MG/ML
INJECTION, SOLUTION INTRAVENOUS PRN
Status: DISCONTINUED | OUTPATIENT
Start: 2023-09-13 | End: 2023-09-15 | Stop reason: HOSPADM

## 2023-09-13 RX ORDER — SODIUM CHLORIDE 0.9 % (FLUSH) 0.9 %
5-40 SYRINGE (ML) INJECTION PRN
Status: DISCONTINUED | OUTPATIENT
Start: 2023-09-13 | End: 2023-09-15 | Stop reason: HOSPADM

## 2023-09-13 RX ORDER — NITROGLYCERIN 0.4 MG/1
0.4 TABLET SUBLINGUAL EVERY 5 MIN PRN
Status: DISCONTINUED | OUTPATIENT
Start: 2023-09-13 | End: 2023-09-15 | Stop reason: HOSPADM

## 2023-09-13 RX ORDER — POLYETHYLENE GLYCOL 3350 17 G/17G
17 POWDER, FOR SOLUTION ORAL DAILY PRN
Status: DISCONTINUED | OUTPATIENT
Start: 2023-09-13 | End: 2023-09-15 | Stop reason: HOSPADM

## 2023-09-13 RX ORDER — ATORVASTATIN CALCIUM 40 MG/1
40 TABLET, FILM COATED ORAL NIGHTLY
Status: DISCONTINUED | OUTPATIENT
Start: 2023-09-13 | End: 2023-09-15 | Stop reason: HOSPADM

## 2023-09-13 RX ORDER — SODIUM CHLORIDE 0.9 % (FLUSH) 0.9 %
5-40 SYRINGE (ML) INJECTION EVERY 12 HOURS SCHEDULED
Status: DISCONTINUED | OUTPATIENT
Start: 2023-09-13 | End: 2023-09-15 | Stop reason: HOSPADM

## 2023-09-13 RX ORDER — INSULIN LISPRO 100 [IU]/ML
0-4 INJECTION, SOLUTION INTRAVENOUS; SUBCUTANEOUS
Status: DISCONTINUED | OUTPATIENT
Start: 2023-09-13 | End: 2023-09-15 | Stop reason: HOSPADM

## 2023-09-13 RX ORDER — INSULIN GLARGINE 100 [IU]/ML
60 INJECTION, SOLUTION SUBCUTANEOUS NIGHTLY
Status: DISCONTINUED | OUTPATIENT
Start: 2023-09-14 | End: 2023-09-15 | Stop reason: HOSPADM

## 2023-09-13 RX ORDER — ENOXAPARIN SODIUM 100 MG/ML
40 INJECTION SUBCUTANEOUS 2 TIMES DAILY
Status: DISCONTINUED | OUTPATIENT
Start: 2023-09-13 | End: 2023-09-13

## 2023-09-13 RX ORDER — BUDESONIDE AND FORMOTEROL FUMARATE DIHYDRATE 160; 4.5 UG/1; UG/1
2 AEROSOL RESPIRATORY (INHALATION) 2 TIMES DAILY
Status: DISCONTINUED | OUTPATIENT
Start: 2023-09-13 | End: 2023-09-15 | Stop reason: HOSPADM

## 2023-09-13 RX ORDER — IPRATROPIUM BROMIDE AND ALBUTEROL SULFATE 2.5; .5 MG/3ML; MG/3ML
1 SOLUTION RESPIRATORY (INHALATION)
Status: DISCONTINUED | OUTPATIENT
Start: 2023-09-13 | End: 2023-09-13

## 2023-09-13 RX ORDER — ONDANSETRON 4 MG/1
4 TABLET, ORALLY DISINTEGRATING ORAL EVERY 8 HOURS PRN
Status: DISCONTINUED | OUTPATIENT
Start: 2023-09-13 | End: 2023-09-15 | Stop reason: HOSPADM

## 2023-09-13 RX ORDER — ONDANSETRON 2 MG/ML
4 INJECTION INTRAMUSCULAR; INTRAVENOUS EVERY 30 MIN PRN
Status: DISCONTINUED | OUTPATIENT
Start: 2023-09-13 | End: 2023-09-15 | Stop reason: HOSPADM

## 2023-09-13 RX ORDER — ALBUTEROL SULFATE 90 UG/1
2 AEROSOL, METERED RESPIRATORY (INHALATION) 2 TIMES DAILY
Status: DISCONTINUED | OUTPATIENT
Start: 2023-09-13 | End: 2023-09-15 | Stop reason: HOSPADM

## 2023-09-13 RX ORDER — ACETAMINOPHEN 650 MG/1
650 SUPPOSITORY RECTAL EVERY 6 HOURS PRN
Status: DISCONTINUED | OUTPATIENT
Start: 2023-09-13 | End: 2023-09-15 | Stop reason: HOSPADM

## 2023-09-13 RX ORDER — METHYLPREDNISOLONE SODIUM SUCCINATE 40 MG/ML
40 INJECTION, POWDER, LYOPHILIZED, FOR SOLUTION INTRAMUSCULAR; INTRAVENOUS EVERY 8 HOURS
Status: DISCONTINUED | OUTPATIENT
Start: 2023-09-13 | End: 2023-09-15

## 2023-09-13 RX ORDER — INSULIN GLARGINE 100 [IU]/ML
60 INJECTION, SOLUTION SUBCUTANEOUS ONCE
Status: COMPLETED | OUTPATIENT
Start: 2023-09-13 | End: 2023-09-13

## 2023-09-13 RX ORDER — DEXTROSE MONOHYDRATE 100 MG/ML
INJECTION, SOLUTION INTRAVENOUS CONTINUOUS PRN
Status: DISCONTINUED | OUTPATIENT
Start: 2023-09-13 | End: 2023-09-15 | Stop reason: HOSPADM

## 2023-09-13 RX ORDER — LEVOFLOXACIN 5 MG/ML
750 INJECTION, SOLUTION INTRAVENOUS EVERY 24 HOURS
Status: DISCONTINUED | OUTPATIENT
Start: 2023-09-13 | End: 2023-09-15 | Stop reason: HOSPADM

## 2023-09-13 RX ORDER — GUAIFENESIN 600 MG/1
600 TABLET, EXTENDED RELEASE ORAL 2 TIMES DAILY
Status: DISCONTINUED | OUTPATIENT
Start: 2023-09-13 | End: 2023-09-15 | Stop reason: HOSPADM

## 2023-09-13 RX ADMIN — INSULIN LISPRO 4 UNITS: 100 INJECTION, SOLUTION INTRAVENOUS; SUBCUTANEOUS at 16:59

## 2023-09-13 RX ADMIN — APIXABAN 5 MG: 5 TABLET, FILM COATED ORAL at 21:36

## 2023-09-13 RX ADMIN — BUDESONIDE AND FORMOTEROL FUMARATE DIHYDRATE 2 PUFF: 160; 4.5 AEROSOL RESPIRATORY (INHALATION) at 19:58

## 2023-09-13 RX ADMIN — ATORVASTATIN CALCIUM 40 MG: 40 TABLET, FILM COATED ORAL at 21:36

## 2023-09-13 RX ADMIN — METHYLPREDNISOLONE SODIUM SUCCINATE 40 MG: 40 INJECTION, POWDER, LYOPHILIZED, FOR SOLUTION INTRAMUSCULAR; INTRAVENOUS at 21:40

## 2023-09-13 RX ADMIN — SODIUM CHLORIDE: 9 INJECTION, SOLUTION INTRAVENOUS at 12:28

## 2023-09-13 RX ADMIN — GUAIFENESIN 600 MG: 600 TABLET, EXTENDED RELEASE ORAL at 21:36

## 2023-09-13 RX ADMIN — IPRATROPIUM BROMIDE AND ALBUTEROL SULFATE 1 DOSE: 2.5; .5 SOLUTION RESPIRATORY (INHALATION) at 07:44

## 2023-09-13 RX ADMIN — LEVOFLOXACIN 750 MG: 5 INJECTION, SOLUTION INTRAVENOUS at 16:47

## 2023-09-13 RX ADMIN — AZITHROMYCIN MONOHYDRATE 500 MG: 500 INJECTION, POWDER, LYOPHILIZED, FOR SOLUTION INTRAVENOUS at 12:29

## 2023-09-13 RX ADMIN — IPRATROPIUM BROMIDE AND ALBUTEROL SULFATE 1 DOSE: 2.5; .5 SOLUTION RESPIRATORY (INHALATION) at 19:58

## 2023-09-13 RX ADMIN — INSULIN LISPRO 20 UNITS: 100 INJECTION, SOLUTION INTRAVENOUS; SUBCUTANEOUS at 16:58

## 2023-09-13 RX ADMIN — ENOXAPARIN SODIUM 40 MG: 100 INJECTION SUBCUTANEOUS at 12:29

## 2023-09-13 RX ADMIN — GUAIFENESIN 600 MG: 600 TABLET, EXTENDED RELEASE ORAL at 12:29

## 2023-09-13 RX ADMIN — DILTIAZEM HYDROCHLORIDE 120 MG: 120 CAPSULE, COATED, EXTENDED RELEASE ORAL at 16:47

## 2023-09-13 RX ADMIN — SODIUM CHLORIDE, PRESERVATIVE FREE 10 ML: 5 INJECTION INTRAVENOUS at 21:37

## 2023-09-13 RX ADMIN — IPRATROPIUM BROMIDE AND ALBUTEROL SULFATE 1 DOSE: .5; 3 SOLUTION RESPIRATORY (INHALATION) at 11:27

## 2023-09-13 RX ADMIN — IPRATROPIUM BROMIDE AND ALBUTEROL SULFATE 1 DOSE: .5; 3 SOLUTION RESPIRATORY (INHALATION) at 15:34

## 2023-09-13 RX ADMIN — IPRATROPIUM BROMIDE AND ALBUTEROL SULFATE 1 DOSE: 2.5; .5 SOLUTION RESPIRATORY (INHALATION) at 07:45

## 2023-09-13 RX ADMIN — INSULIN LISPRO 4 UNITS: 100 INJECTION, SOLUTION INTRAVENOUS; SUBCUTANEOUS at 21:37

## 2023-09-13 RX ADMIN — IPRATROPIUM BROMIDE AND ALBUTEROL SULFATE 1 DOSE: 2.5; .5 SOLUTION RESPIRATORY (INHALATION) at 07:43

## 2023-09-13 RX ADMIN — SODIUM CHLORIDE: 9 INJECTION, SOLUTION INTRAVENOUS at 16:45

## 2023-09-13 RX ADMIN — FUROSEMIDE 40 MG: 10 INJECTION, SOLUTION INTRAMUSCULAR; INTRAVENOUS at 07:52

## 2023-09-13 RX ADMIN — METHYLPREDNISOLONE SODIUM SUCCINATE 40 MG: 40 INJECTION, POWDER, LYOPHILIZED, FOR SOLUTION INTRAMUSCULAR; INTRAVENOUS at 12:25

## 2023-09-13 RX ADMIN — INSULIN GLARGINE 60 UNITS: 100 INJECTION, SOLUTION SUBCUTANEOUS at 19:06

## 2023-09-13 RX ADMIN — METHYLPREDNISOLONE SODIUM SUCCINATE 125 MG: 125 INJECTION INTRAMUSCULAR; INTRAVENOUS at 07:53

## 2023-09-13 RX ADMIN — SODIUM CHLORIDE, PRESERVATIVE FREE 10 ML: 5 INJECTION INTRAVENOUS at 12:25

## 2023-09-13 ASSESSMENT — PAIN SCALES - GENERAL: PAINLEVEL_OUTOF10: 7

## 2023-09-13 ASSESSMENT — ENCOUNTER SYMPTOMS
EYES NEGATIVE: 1
ALLERGIC/IMMUNOLOGIC NEGATIVE: 1
SHORTNESS OF BREATH: 1
GASTROINTESTINAL NEGATIVE: 1
COUGH: 1

## 2023-09-13 ASSESSMENT — PAIN DESCRIPTION - LOCATION: LOCATION: COCCYX

## 2023-09-13 NOTE — H&P
V2.0  History and Physical      Name:  Shivam Hastings /Age/Sex: 1955  (79 y.o. male)   MRN & CSN:  [id number] & [id number] Encounter Date/Time: 2023 9:16 AM EDT   Location:   PCP: Paulo Livingston MD       Hospital Day: 1    Assessment and Plan:   Shivam Hastings is a 79 y.o. male with a pmh of T2DM, paroxysmal atrial fibrillation, chronic diastolic heart failure, COPD morbid obesity who presents with Acute on chronic respiratory failure with hypoxia and hypercapnia [facility])    Hospital Problems             Last Modified POA    * (Principal) Acute on chronic respiratory failure with hypoxia and hypercapnia ([address]) 2023 Yes     Acute COPD exacerbation with acute respiratory failure hypoxia and hypercapnia suspected with shortness of breath and wheezing. Patient recently discharged on , and was admitted for similar presentation. -- Patient arrived to ED per medics and was found hypoxic SPO2 in 60s, and a run of V. tach per medics, no ectopy on telemetry in ED.  - EKG shows sinus rhythm, no ST abnormalities. Patient denies chest pain. - Continue Symbicort and bronchodilators, initiated on azithromycin, will change to Levaquin will obtain Pro-Caesar.  No fevers or leukocytosis. - Troponin negative x1  - Urinalysis negative for nitrites, WBCs  - ABG on admission pH 7.27, PCO2 85, PO2 144, HCO3 39, patient placed on BiPAP in ED, will continue and admit to stepdown ICU for further monitoring. --Continue IV solumedrol, change to P.O upon improvements      T2DM with hyperglycemia  -On admission blood glucose 400s. - Resume home Humalog 20 units 3 times daily AC, and Lantus 60 units nightly.   We will put on low-dose sliding scale insulin for now and hypoglycemic protocol    Paroxysmal atrial fibrillation  - Patient is sinus rhythm at this time, continue Eliquis and CCB    Chronic diastolic heart failure  - No crackles on exam  - Continue to monitor  --Bnp 334  --Patient received a activity: Never   Social History Narrative    Diet unrestricted    Exercise none    Seat belt always             Social Determinants of Health     Financial Resource Strain: Low Risk  (8/11/2023)    Overall Financial Resource Strain (CARDIA)     Difficulty of Paying Living Expenses: Not hard at all   Food Insecurity: No Food Insecurity (8/11/2023)    Hunger Vital Sign     Worried About Running Out of Food in the Last Year: Never true     Ran Out of Food in the Last Year: Never true   Transportation Needs: No Transportation Needs (8/11/2023)    PRAPARE - Transportation     Lack of Transportation (Medical): No     Lack of Transportation (Non-Medical): No   Physical Activity: Insufficiently Active (5/12/2023)    Exercise Vital Sign     Days of Exercise per Week: 2 days     Minutes of Exercise per Session: 10 min   Stress: No Stress Concern Present (5/12/2023)    [address] [facility]     Feeling of Stress :  Only a little   Social Connections: Socially Isolated (5/12/2023)    Social Connection and Isolation Panel [NHANES]     Frequency of Communication with Friends and Family: Twice a week     Frequency of Social Gatherings with Friends and Family: Never     Attends Jain Services: 1 to 4 times per year     Active Member of [employer] Group or Organizations: No     Attends Club or Organization Meetings: Never     Marital Status:    Intimate Partner Violence: Not At Risk (5/10/2023)    Humiliation, Afraid, Rape, and Kick questionnaire     Fear of Current or Ex-Partner: No     Emotionally Abused: No     Physically Abused: No     Sexually Abused: No   Housing Stability: Low Risk  (8/11/2023)    Housing Stability Vital Sign     Unable to Pay for Housing in the Last Year: No     Number of Places Lived in the Last Year: 1     Unstable Housing in the Last Year: No       Medications:   Medications:    Infusions:   PRN Meds: ondansetron, 4 mg, Q30 Min PRN  morphine, 4 mg,

## 2023-09-13 NOTE — PROGRESS NOTES
4 Eyes Skin Assessment     NAME:  Tasneem Penn  YOB: 1955  MEDICAL RECORD NUMBER:  [id number]    The patient is being assessed for  Admission    I agree that at least one RN has performed a thorough Head to Toe Skin Assessment on the patient. ALL assessment sites listed below have been assessed. Areas assessed by both nurses:    Head, Face, Ears, Shoulders, Back, Chest, Arms, Elbows, Hands, Sacrum. Buttock, Coccyx, Ischium, Legs. Feet and Heels, and Under Medical Devices         Does the Patient have a Wound?  No noted wound(s) No wounds but does have vasc sky of the lower ext, a cyst on the upper back and bruising on bue        Hector Prevention initiated by RN: No  Wound Care Orders initiated by RN: No    Pressure Injury (Stage 3,4, Unstageable, DTI, NWPT, and Complex wounds) if present, place Wound referral order by RN under : No    New Ostomies, if present place, Ostomy referral order under : No     Nurse 1 eSignature: Electronically signed by Angelique Romberg, RN on 9/13/23 at 11:37 AM EDT    **SHARE this note so that the co-signing nurse can place an eSignature**    Nurse 2 eSignature: Electronically signed by Liliya Rasheed RN on 9/13/23 at 1:49 PM EDT Patient is in sinus rhythm  He is on metoprolol and on a decreased dose of Tikosyn  This was confirmed with electrophysiologist   He will be seeing cardiologist   Patient is presently on Eliquis  He does have prior history of drug-eluting stent  Patient is compliant with CPAP    Follow-up with Cardiology as scheduled

## 2023-09-13 NOTE — ED NOTES
ED TO INPATIENT SBAR HANDOFF    Patient Name: Krista Martinez   :  1955  79 y.o. Preferred Name  Moe  Family/Caregiver Present no   Restraints no   C-SSRS: Risk of Suicide: No Risk  Sitter no   Sepsis Risk Score Sepsis Risk Score: 4.12      Situation  Chief Complaint   Patient presents with    Shortness of Breath     Brief Description of Patient's Condition: SOB  Mental Status: oriented and alert  Arrived from: home    Imaging:   XR CHEST PORTABLE   Final Result   No acute process.       Stable exam.           Abnormal labs:   Abnormal Labs Reviewed   CBC WITH AUTO DIFFERENTIAL - Abnormal; Notable for the following components:       Result Value    RDW 15.5 (*)     Segs Relative 71.1 (*)     Lymphocytes % 16.8 (*)     Monocytes % 8.2 (*)     Immature Neutrophil % 1.2 (*)     All other components within normal limits   COMPREHENSIVE METABOLIC PANEL - Abnormal; Notable for the following components:    Chloride 94 (*)     CO2 34 (*)     Creatinine 0.6 (*)     Glucose 406 (*)     AST 12 (*)     All other components within normal limits   URINALYSIS - Abnormal; Notable for the following components:    Glucose, Urine >1,000 (*)     Protein, UA TRACE (*)     All other components within normal limits   BRAIN NATRIURETIC PEPTIDE - Abnormal; Notable for the following components:    Pro-.8 (*)     All other components within normal limits   BLOOD GAS, ARTERIAL - Abnormal; Notable for the following components:    pH, Bld 7.27 (*)     pCO2, Arterial 85.0 (*)     pO2, Arterial 144 (*)     Base Exc, Mixed 8.6 (*)     HCO3, Arterial 39.0 (*)     CO2 Content 41.6 (*)     O2 Sat 94.8 (*)     All other components within normal limits       Background  History:   Past Medical History:   Diagnosis Date    A-fib ([address])     Resolved after ablation     Anxiety     Arthritis     \"Hips, Knees, Elbows And Fingers\"    COPD (chronic obstructive pulmonary disease) ([facility])     Sees Dr. Reena Taylor    Depression     Diabetes mellitus

## 2023-09-13 NOTE — ED TRIAGE NOTES
Pt to ED via EMS with c/o shortness of breath. Per medics, pt was satting 76% on room air on their arrival. Pt was placed on 6L NC and came back up to 93%. On arrival pt is 83% and placed on a non rebreather. Per medic, pt had a 5-6 second run of vtach.

## 2023-09-13 NOTE — ED PROVIDER NOTES
Family: Twice a week     Frequency of Social Gatherings with Friends and Family: Never     Attends Jew Services: 1 to 4 times per year     Active Member of [employer] Group or Organizations: No     Attends Club or Organization Meetings: Never     Marital Status:    Intimate Partner Violence: Not At Risk (5/10/2023)    Humiliation, Afraid, Rape, and Kick questionnaire     Fear of Current or Ex-Partner: No     Emotionally Abused: No     Physically Abused: No     Sexually Abused: No   Housing Stability: Low Risk  (8/11/2023)    Housing Stability Vital Sign     Unable to Pay for Housing in the Last Year: No     Number of Places Lived in the Last Year: 1     Unstable Housing in the Last Year: No     Current Facility-Administered Medications   Medication Dose Route Frequency Provider Last Rate Last Admin    ondansetron (ZOFRAN) injection 4 mg  4 mg IntraVENous Q30 Min PRN [employer], DO        morphine sulfate (PF) injection 4 mg  4 mg IntraVENous Q30 Min PRN [employer], DO        nitroGLYCERIN (NITROSTAT) SL tablet 0.4 mg  0.4 mg SubLINGual Q5 Min PRN [employer], DO         Current Outpatient Medications   Medication Sig Dispense Refill    HUMALOG KWIKPEN 100 UNIT/ML SOPN INJECT 20 UNITS SUBCUTANEOUSLY THREE TIMES A DAY WITH MEALS 15 mL 10    blood glucose monitor strips Test once daily 100 strip 3    blood glucose monitor kit and supplies Check sugar daily 1 kit 0    dilTIAZem (CARDIZEM CD) 120 MG extended release capsule Take 1 capsule by mouth daily 30 capsule 3    SYMBICORT 160-4.5 MCG/ACT AERO Inhale 2 puffs into the lungs 2 times daily 30.6 g 0    albuterol sulfate HFA (PROVENTIL;VENTOLIN;PROAIR) 108 (90 Base) MCG/ACT inhaler Inhale 2 puffs into the lungs 2 times daily 18 g 3    apixaban (ELIQUIS) 5 MG TABS tablet Take 1 tablet by mouth 2 times daily 60 tablet 2    insulin detemir (LEVEMIR) 100 UNIT/ML injection vial Inject 60 Units into the skin nightly 15 mL 3    OXYGEN Inhale 3 L into the lungs X-ray of his chest is normal EKG is normal lab work shows hyperglycemia he does have a history of diabetes. He was given pain medicine nausea medicine as needed. He was given breathing treatments, steroids, Lasix, nitro for hypertension shortness of breath possible CHF. Due to his symptoms of respiratory acidosis shortness of breath abnormal lab test and vital signs and on BiPAP will admit him to hospital medicine who I talked to otherwise patient stable but ill. He does have an umbilical hernia which he states is chronic that is not new its not bothering him he states. CLINICAL IMPRESSION:  Final diagnoses:   Dyspnea and respiratory abnormalities   COPD exacerbation (HCC)   Hypoxia   Respiratory acidosis       (Please note that portions of this note may have been completed with a voice recognition program. Efforts were made to edit the dictations but occasionally words aremis-transcribed.)    DISPOSITION REFERRAL (if applicable):  No follow-up provider specified.     DISPOSITION MEDICATIONS (if applicable):  New Prescriptions    No medications on file          Jorge Alberto Vanegas, [address] [facility],   09/13/23 4581

## 2023-09-13 NOTE — PROGRESS NOTES
09/13/23 0739   NIV Type   $NIV $Daily Charge   NIV Started/Stopped On   Equipment Type v60   Mode Bilevel   Mask Type Full face mask   Mask Size Large   Bonnet size Large   Assessment   Pulse 93   Respirations (!) 32   SpO2 94 %   Level of Consciousness 0   Comfort Level Good   Using Accessory Muscles No   Mask Compliance Good   Skin Assessment Clean, dry, & intact   Settings/Measurements   PIP Observed 17 cm H20   IPAP 15 cmH20   CPAP/EPAP 5 cmH2O   Vt (Measured) 679 mL   Rate Ordered 14   Insp Rise Time (%) 3 %   FiO2  50 %   I Time/ I Time % 1 s   Minute Volume (L/min) 21.8 Liters   Mask Leak (lpm) 25 lpm   Patient's Home Machine No   Alarm Settings   Alarms On Y   Low Pressure (cmH2O) 3 cmH2O   High Pressure (cmH2O) 30 cmH2O   Delay Alarm 20 sec(s)   RR Low (bpm) 12   RR High (bpm) 40 br/min   Patient Observation   Observations placed on BiPAP

## 2023-09-14 LAB
ALBUMIN SERPL-MCNC: 4.3 GM/DL (ref 3.4–5)
ALP BLD-CCNC: 87 IU/L (ref 40–128)
ALT SERPL-CCNC: 20 U/L (ref 10–40)
ANION GAP SERPL CALCULATED.3IONS-SCNC: 13 MMOL/L (ref 4–16)
AST SERPL-CCNC: 10 IU/L (ref 15–37)
BASOPHILS ABSOLUTE: 0 K/CU MM
BASOPHILS RELATIVE PERCENT: 0.2 % (ref 0–1)
BILIRUB SERPL-MCNC: 0.5 MG/DL (ref 0–1)
BUN SERPL-MCNC: 20 MG/DL (ref 6–23)
CALCIUM SERPL-MCNC: 8.4 MG/DL (ref 8.3–10.6)
CHLORIDE BLD-SCNC: 85 MMOL/L (ref 99–110)
CO2: 30 MMOL/L (ref 21–32)
CREAT SERPL-MCNC: 0.6 MG/DL (ref 0.9–1.3)
CULTURE: NORMAL
DIFFERENTIAL TYPE: ABNORMAL
EOSINOPHILS ABSOLUTE: 0 K/CU MM
EOSINOPHILS RELATIVE PERCENT: 0 % (ref 0–3)
GFR SERPL CREATININE-BSD FRML MDRD: >60 ML/MIN/1.73M2
GLUCOSE BLD-MCNC: 347 MG/DL (ref 70–99)
GLUCOSE BLD-MCNC: 357 MG/DL (ref 70–99)
GLUCOSE BLD-MCNC: 393 MG/DL (ref 70–99)
GLUCOSE BLD-MCNC: 401 MG/DL (ref 70–99)
GLUCOSE BLD-MCNC: 425 MG/DL (ref 70–99)
GLUCOSE SERPL-MCNC: 350 MG/DL (ref 70–99)
HCT VFR BLD CALC: 44.7 % (ref 42–52)
HEMOGLOBIN: 14.7 GM/DL (ref 13.5–18)
IMMATURE NEUTROPHIL %: 1.4 % (ref 0–0.43)
LYMPHOCYTES ABSOLUTE: 0.6 K/CU MM
LYMPHOCYTES RELATIVE PERCENT: 6.5 % (ref 24–44)
Lab: NORMAL
MCH RBC QN AUTO: 30.4 PG (ref 27–31)
MCHC RBC AUTO-ENTMCNC: 32.9 % (ref 32–36)
MCV RBC AUTO: 92.5 FL (ref 78–100)
MONOCYTES ABSOLUTE: 0.3 K/CU MM
MONOCYTES RELATIVE PERCENT: 3.6 % (ref 0–4)
NUCLEATED RBC %: 0 %
PDW BLD-RTO: 14.9 % (ref 11.7–14.9)
PLATELET # BLD: 185 K/CU MM (ref 140–440)
PMV BLD AUTO: 9.8 FL (ref 7.5–11.1)
POTASSIUM SERPL-SCNC: 4.6 MMOL/L (ref 3.5–5.1)
RBC # BLD: 4.83 M/CU MM (ref 4.6–6.2)
SEGMENTED NEUTROPHILS ABSOLUTE COUNT: 8.2 K/CU MM
SEGMENTED NEUTROPHILS RELATIVE PERCENT: 88.3 % (ref 36–66)
SODIUM BLD-SCNC: 128 MMOL/L (ref 135–145)
SPECIMEN: NORMAL
TOTAL IMMATURE NEUTOROPHIL: 0.13 K/CU MM
TOTAL NUCLEATED RBC: 0 K/CU MM
TOTAL PROTEIN: 5.9 GM/DL (ref 6.4–8.2)
WBC # BLD: 9.3 K/CU MM (ref 4–10.5)

## 2023-09-14 PROCEDURE — 94010 BREATHING CAPACITY TEST: CPT

## 2023-09-14 PROCEDURE — 6370000000 HC RX 637 (ALT 250 FOR IP)

## 2023-09-14 PROCEDURE — 2060000000 HC ICU INTERMEDIATE R&B

## 2023-09-14 PROCEDURE — 2700000000 HC OXYGEN THERAPY PER DAY

## 2023-09-14 PROCEDURE — 6370000000 HC RX 637 (ALT 250 FOR IP): Performed by: FAMILY MEDICINE

## 2023-09-14 PROCEDURE — 2580000003 HC RX 258

## 2023-09-14 PROCEDURE — 6370000000 HC RX 637 (ALT 250 FOR IP): Performed by: STUDENT IN AN ORGANIZED HEALTH CARE EDUCATION/TRAINING PROGRAM

## 2023-09-14 PROCEDURE — 85025 COMPLETE CBC W/AUTO DIFF WBC: CPT

## 2023-09-14 PROCEDURE — 80053 COMPREHEN METABOLIC PANEL: CPT

## 2023-09-14 PROCEDURE — 94660 CPAP INITIATION&MGMT: CPT

## 2023-09-14 PROCEDURE — 94761 N-INVAS EAR/PLS OXIMETRY MLT: CPT

## 2023-09-14 PROCEDURE — 6360000002 HC RX W HCPCS

## 2023-09-14 PROCEDURE — 94640 AIRWAY INHALATION TREATMENT: CPT

## 2023-09-14 PROCEDURE — 82962 GLUCOSE BLOOD TEST: CPT

## 2023-09-14 PROCEDURE — 36415 COLL VENOUS BLD VENIPUNCTURE: CPT

## 2023-09-14 RX ORDER — INSULIN LISPRO 100 [IU]/ML
10 INJECTION, SOLUTION INTRAVENOUS; SUBCUTANEOUS
Status: DISCONTINUED | OUTPATIENT
Start: 2023-09-14 | End: 2023-09-14

## 2023-09-14 RX ORDER — INSULIN LISPRO 100 [IU]/ML
25 INJECTION, SOLUTION INTRAVENOUS; SUBCUTANEOUS
Status: DISCONTINUED | OUTPATIENT
Start: 2023-09-14 | End: 2023-09-15 | Stop reason: HOSPADM

## 2023-09-14 RX ORDER — INSULIN LISPRO 100 [IU]/ML
10 INJECTION, SOLUTION INTRAVENOUS; SUBCUTANEOUS ONCE
Status: COMPLETED | OUTPATIENT
Start: 2023-09-14 | End: 2023-09-14

## 2023-09-14 RX ADMIN — LEVOFLOXACIN 750 MG: 5 INJECTION, SOLUTION INTRAVENOUS at 16:54

## 2023-09-14 RX ADMIN — INSULIN LISPRO 4 UNITS: 100 INJECTION, SOLUTION INTRAVENOUS; SUBCUTANEOUS at 21:06

## 2023-09-14 RX ADMIN — GUAIFENESIN 600 MG: 600 TABLET, EXTENDED RELEASE ORAL at 08:24

## 2023-09-14 RX ADMIN — INSULIN GLARGINE 60 UNITS: 100 INJECTION, SOLUTION SUBCUTANEOUS at 21:06

## 2023-09-14 RX ADMIN — BUDESONIDE AND FORMOTEROL FUMARATE DIHYDRATE 2 PUFF: 160; 4.5 AEROSOL RESPIRATORY (INHALATION) at 08:48

## 2023-09-14 RX ADMIN — METHYLPREDNISOLONE SODIUM SUCCINATE 40 MG: 40 INJECTION, POWDER, LYOPHILIZED, FOR SOLUTION INTRAMUSCULAR; INTRAVENOUS at 05:59

## 2023-09-14 RX ADMIN — INSULIN LISPRO 20 UNITS: 100 INJECTION, SOLUTION INTRAVENOUS; SUBCUTANEOUS at 08:23

## 2023-09-14 RX ADMIN — ALBUTEROL SULFATE 2 PUFF: 90 AEROSOL, METERED RESPIRATORY (INHALATION) at 19:37

## 2023-09-14 RX ADMIN — SODIUM CHLORIDE, PRESERVATIVE FREE 10 ML: 5 INJECTION INTRAVENOUS at 08:26

## 2023-09-14 RX ADMIN — METHYLPREDNISOLONE SODIUM SUCCINATE 40 MG: 40 INJECTION, POWDER, LYOPHILIZED, FOR SOLUTION INTRAMUSCULAR; INTRAVENOUS at 21:05

## 2023-09-14 RX ADMIN — INSULIN LISPRO 25 UNITS: 100 INJECTION, SOLUTION INTRAVENOUS; SUBCUTANEOUS at 16:54

## 2023-09-14 RX ADMIN — ALBUTEROL SULFATE 2 PUFF: 90 AEROSOL, METERED RESPIRATORY (INHALATION) at 08:47

## 2023-09-14 RX ADMIN — DILTIAZEM HYDROCHLORIDE 120 MG: 120 CAPSULE, COATED, EXTENDED RELEASE ORAL at 08:24

## 2023-09-14 RX ADMIN — INSULIN LISPRO 4 UNITS: 100 INJECTION, SOLUTION INTRAVENOUS; SUBCUTANEOUS at 08:22

## 2023-09-14 RX ADMIN — METHYLPREDNISOLONE SODIUM SUCCINATE 40 MG: 40 INJECTION, POWDER, LYOPHILIZED, FOR SOLUTION INTRAMUSCULAR; INTRAVENOUS at 15:17

## 2023-09-14 RX ADMIN — GUAIFENESIN 600 MG: 600 TABLET, EXTENDED RELEASE ORAL at 21:06

## 2023-09-14 RX ADMIN — INSULIN LISPRO 4 UNITS: 100 INJECTION, SOLUTION INTRAVENOUS; SUBCUTANEOUS at 11:59

## 2023-09-14 RX ADMIN — ATORVASTATIN CALCIUM 40 MG: 40 TABLET, FILM COATED ORAL at 21:06

## 2023-09-14 RX ADMIN — APIXABAN 5 MG: 5 TABLET, FILM COATED ORAL at 08:26

## 2023-09-14 RX ADMIN — APIXABAN 5 MG: 5 TABLET, FILM COATED ORAL at 21:06

## 2023-09-14 RX ADMIN — SODIUM CHLORIDE, PRESERVATIVE FREE 10 ML: 5 INJECTION INTRAVENOUS at 21:06

## 2023-09-14 RX ADMIN — INSULIN LISPRO 3 UNITS: 100 INJECTION, SOLUTION INTRAVENOUS; SUBCUTANEOUS at 16:55

## 2023-09-14 RX ADMIN — INSULIN LISPRO 10 UNITS: 100 INJECTION, SOLUTION INTRAVENOUS; SUBCUTANEOUS at 03:17

## 2023-09-14 RX ADMIN — INSULIN LISPRO 25 UNITS: 100 INJECTION, SOLUTION INTRAVENOUS; SUBCUTANEOUS at 12:00

## 2023-09-14 RX ADMIN — BUDESONIDE AND FORMOTEROL FUMARATE DIHYDRATE 2 PUFF: 160; 4.5 AEROSOL RESPIRATORY (INHALATION) at 19:38

## 2023-09-14 ASSESSMENT — COPD QUESTIONNAIRES
TOTAL_EXACERBATIONS_PASTYEAR: 5
QUESTION8_ENERGYLEVEL: 2
GOLD_GRADE: 3
QUESTION6_LEAVINGHOUSE: 1
QUESTION3_CHESTTIGHTNESS: 1
QUESTION2_CHESTPHLEGM: 1
GOLD_GROUP: GROUP E
QUESTION7_SLEEPQUALITY: 0
QUESTION4_WALKINCLINE: 3
QUESTION1_COUGHFREQUENCY: 0
QUESTION5_HOMEACTIVITIES: 2
CAT_TOTALSCORE: 10

## 2023-09-14 ASSESSMENT — PULMONARY FUNCTION TESTS
PIF_VALUE: 120
POST BRONCHODILATOR FEV1/FVC: 36
FEV1 (%PREDICTED): 38

## 2023-09-14 NOTE — PROGRESS NOTES
09/14/23 0026   NIV Type   $NIV $Daily Charge   NIV Started/Stopped On   Equipment Type v60   Mode Bilevel   Mask Type Full face mask   Mask Size Large   Assessment   Pulse 81   Respirations 22   SpO2 96 %   Comfort Level Good   Using Accessory Muscles No   Mask Compliance Good   Settings/Measurements   PIP Observed 16 cm H20   IPAP 15 cmH20   CPAP/EPAP 5 cmH2O   Vt (Measured) 1343 mL   Rate Ordered 14   Insp Rise Time (%) 3 %   FiO2  40 %   I Time/ I Time % 1 s   Minute Volume (L/min) 23.6 Liters   Mask Leak (lpm) 4 lpm   Patient's Home Machine No   Alarm Settings   Alarms On Y   Low Pressure (cmH2O) 3 cmH2O   High Pressure (cmH2O) 30 cmH2O   Delay Alarm 3 sec(s)   Apnea (secs) 20 secs   RR Low (bpm) 12   RR High (bpm) 40 br/min

## 2023-09-14 NOTE — PROGRESS NOTES
09/14/23 0814   Encounter Summary   Encounter Overview/Reason  Initial Encounter   Service Provided For: Patient   Referral/Consult From: [address] Family members;Friends/neighbors   Last Encounter  09/14/23  (Lenin Sands is hoping to go home today. He is a man of varinder. Has good support system and no needs at present time. Good discussion and blessings given.)   Complexity of Encounter Low   Begin Time 0812   End Time  0816   Total Time Calculated 4 min   Spiritual/Emotional needs   Type Spiritual Support   Assessment/Intervention/Outcome   Assessment Calm;Coping; Hopeful   Intervention Active listening;Nurtured Hope;Prayer (assurance of)/Bypro;Sustaining Presence/Ministry of presence   Outcome Comfort;Encouraged;Engaged in conversation;Expressed feelings, needs, and concerns;Expressed Gratitude   Plan and Referrals   Plan/Referrals Continue Support (comment)  (Patient informed how to contact )

## 2023-09-14 NOTE — PROGRESS NOTES
V2.0  [facility] Hospitalist Progress Note      Name:  Shun Benavides /Age/Sex: 1955  (79 y.o. male)   MRN & CSN:  [id number] & [id number] Encounter Date/Time: 2023 1:48 PM EDT    Location:  -A PCP: Yvan Gonzalez MD       Hospital Day: 2    Assessment and Plan:   Shun Benavides is a 79 y.o. male with a pmh of T2DM, paroxysmal atrial fibrillation, chronic diastolic heart failure, COPD morbid obesity who presents with Acute on chronic respiratory failure with hypoxia and hypercapnia [facility])     Hospital Problems               Last Modified POA     * (Principal) Acute on chronic respiratory failure with hypoxia and hypercapnia ([address]) 2023 Yes      Acute COPD exacerbation with acute respiratory failure hypoxia and hypercapnia suspected with shortness of breath and wheezing. Patient recently discharged on , and was admitted for similar presentation. -- Patient arrived to ED per medics and was found hypoxic SPO2 in 60s, and a run of V. tach per medics, no ectopy on telemetry in ED.  - EKG shows sinus rhythm, no ST abnormalities. Patient denies chest pain. - Continue Symbicort and bronchodilators, initiated on azithromycin, will change to Levaquin will obtain Pro-Caesar.  No fevers or leukocytosis. - Troponin negative x1  - Urinalysis negative for nitrites, WBCs  - ABG on admission pH 7.27, PCO2 85, PO2 144, HCO3 39, patient placed on BiPAP in ED, will continue and admit to stepdown ICU for further monitoring. --Continue IV solumedrol, change to P.O upon improvements       T2DM with hyperglycemia  -On admission blood glucose 400s. - Resume home Humalog 25 now units 3 times daily AC, and Lantus 60 units nightly.   We will put on low-dose sliding scale insulin for now and hypoglycemic protocol     Paroxysmal atrial fibrillation  - Patient is sinus rhythm at this time, continue Eliquis and CCB     Chronic diastolic heart failure  - No crackles on exam  - Continue to monitor  --Bnp

## 2023-09-14 NOTE — PROGRESS NOTES
09/14/23 1147   Spirometry Assessment   FEV1 (%PRED) 38   Post Bronchodilator FEV/FVC 36   COPD Exacerbations in last year 5    L/min   COPD Assessment (CAT Score)   Cough Assessment 0   Phlegm Assessment 1   Chest tightness 1   Walking on an incline 3   Home Activities 2   Confident Leaving The Home 1   Sleeping Soundly 0   Have Energy 2   Assessment Score 10   $RT COPD Assessment Yes   GOLD Staging   Gold Grade 3   Group Group E     Current GOLD classification for Dominique Gillette      GOLD Stage:  Gold ndGndrndanddndend:nd nd2nd Group: Group E  Recorded domestic exacerbations past 12 months: 5  Current recorded COPD Assessment Tool (CAT) score of 10  Current eosinophil count: 0     Inhaler Device   Acceptable for Use   Respimat  Not Breath Actuated Yes   MDI  Not Breath Actuated Yes           DPI  Observed PIF   using  In-Check Meter   Optimal PIF   Acceptable for Use   HANDIHALER 120 >30 YES   Pressair 120 >45 YES   NEOHALER 120 >50 YES   Diskus 120 >60 YES   ELLIPTA 120 >60 YES     Records show Dominique Gillette was not using maintenance therapy prior to admission.  Will request starting patient on maintenance COPD meds          LONG-ACTING (LABA)   Arformoterol (Brovana) NEBULIZER   Indacaterol (Arcapta) NEOHALER   Olodaterol (Striverdi) Respimat   Salmeterol (Serevent) MDI, DISKUS   LONG-ACTING (LAMA)   Aclidinium bromide (Tudorza) PRESSAIR   Glycopyrronium bromide Elda Bach) NEOHALER   Tiotropium (Spiriva) Respimat, HANDIHALER   Umeclidinium (Incruse) ELLIPTA   (LABA/LAMA)   Formoterol/glycopyrronium (Bevespi) MDI   Indacaterol/glycopyrronium (Utibron) NEOHALER   Vilanterol/umeclidinium (Anoro) ELLIPTA   Olodaterol/tiotropium (Stiolto) Respimat   (LABA/ICS)   Formoterol/budesomide (Symbicort) MDI   Formoterol/mometasone (Dulera) MDI   Salmeterol/fluticasone (Advair) MDI, DISKUS   Vilanterol/fluticasone (Breo) ELLIPTA   (LABA/LAMA/ICS)   Fluticasone/umeclidinium/vilanterol (Trelegy) ELLIPTA Budesonide/glycopyrrolate/formoterol fumarate Odilon Aguila aerosphere     Electronically signed by Terry Santana RCP on 9/14/23 at 1:16 PM EDT

## 2023-09-14 NOTE — CARE COORDINATION
09/14/23 1622   Service Assessment   Patient Orientation Alert and Oriented   Cognition Alert   History Provided By Patient   Primary [address] Family Members;Friends/Neighbors   PCP Verified by CM Yes   Last Visit to PCP   (a few years)   Prior Functional Level Independent in ADLs/IADLs   Current Functional Level Independent in ADLs/IADLs   Can patient return to prior living arrangement Yes   Ability to make needs known: Good   Family able to assist with home care needs: No   Would you like for me to discuss the discharge plan with any other family members/significant others, and if so, who? No   Financial Resources Medicaid; Medicare   Community Resources ECF/Home Care   Social/Functional History   Lives With Alone   Type of Home Apartment   Home Layout One level   Home Access Level entry   Bathroom Shower/Tub Tub/Shower unit   Bathroom Toilet Standard   Bathroom Equipment Tub transfer bench   Bathroom Accessibility Accessible   Receives Help From Family;Friend(s); Neighbor   Active  No  (Has poor eyesight)   Occupation On disability   Discharge Planning   Type of Residence Apartment   Living Arrangements Alone   Current Services Prior To Admission [address] [place] [address] Welch Medications No   Patient expects to be discharged to: [address] [facility]   [address] [facility]     Pt is from home alone. Pt has lived there for 8-9 years. Pt lives on first floor with a level entry. Pt states he has no DME. Pt is active with CM. Pt stated that he cannot drive due to poor eyesight. . He gets rides from either his brother or dial-a-ride. Pt states that he has not seen his PCP in years. Plan is home alone with [facility], brother to transport or will need transportation.

## 2023-09-14 NOTE — CARE COORDINATION
Patient is currently on the Med Assist DO NOT help list.  He has received numerous vouchers from us, most recent was 3/24 AND 3/28/23. He has been noncompliant with our application requirements. He does have Medicare/Medicaid so his medication copays should be very minimal if not zero. He is not eligible for any further help.

## 2023-09-14 NOTE — PROGRESS NOTES
09/14/23 0533   NIV Type   NIV Started/Stopped On   Equipment Type V60   Mode Bilevel   Mask Type Full face mask   Mask Size Large   Bonnet size Large   Assessment   Comfort Level Good   Using Accessory Muscles No   Mask Compliance Good   Settings/Measurements   PIP Observed 20 cm H20   IPAP 15 cmH20   CPAP/EPAP 5 cmH2O   Vt (Measured) 982 mL   Rate Ordered 14   Insp Rise Time (%) 3 %   FiO2  40 %   I Time/ I Time % 1 s   Minute Volume (L/min) 27.4 Liters   Mask Leak (lpm) 22 lpm   Patient's Home Machine No   Alarm Settings   Alarms On Y   Low Pressure (cmH2O) 3 cmH2O   High Pressure (cmH2O) 30 cmH2O   Delay Alarm 3 sec(s)   Apnea (secs) 20 secs   RR Low (bpm) 12   RR High (bpm) 40 br/min

## 2023-09-15 VITALS
HEART RATE: 92 BPM | TEMPERATURE: 97.8 F | BODY MASS INDEX: 36.45 KG/M2 | DIASTOLIC BLOOD PRESSURE: 83 MMHG | HEIGHT: 78 IN | WEIGHT: 315 LBS | SYSTOLIC BLOOD PRESSURE: 107 MMHG | OXYGEN SATURATION: 91 % | RESPIRATION RATE: 28 BRPM

## 2023-09-15 LAB
GLUCOSE BLD-MCNC: 408 MG/DL (ref 70–99)
GLUCOSE BLD-MCNC: 411 MG/DL (ref 70–99)
GLUCOSE BLD-MCNC: 558 MG/DL (ref 70–99)

## 2023-09-15 PROCEDURE — 82962 GLUCOSE BLOOD TEST: CPT

## 2023-09-15 PROCEDURE — 94761 N-INVAS EAR/PLS OXIMETRY MLT: CPT

## 2023-09-15 PROCEDURE — 2700000000 HC OXYGEN THERAPY PER DAY

## 2023-09-15 PROCEDURE — 6370000000 HC RX 637 (ALT 250 FOR IP): Performed by: STUDENT IN AN ORGANIZED HEALTH CARE EDUCATION/TRAINING PROGRAM

## 2023-09-15 PROCEDURE — 6360000002 HC RX W HCPCS

## 2023-09-15 PROCEDURE — 94640 AIRWAY INHALATION TREATMENT: CPT

## 2023-09-15 PROCEDURE — 6370000000 HC RX 637 (ALT 250 FOR IP): Performed by: FAMILY MEDICINE

## 2023-09-15 PROCEDURE — 6370000000 HC RX 637 (ALT 250 FOR IP)

## 2023-09-15 RX ORDER — INSULIN LISPRO 100 [IU]/ML
10 INJECTION, SOLUTION INTRAVENOUS; SUBCUTANEOUS ONCE
Status: DISCONTINUED | OUTPATIENT
Start: 2023-09-15 | End: 2023-09-15 | Stop reason: HOSPADM

## 2023-09-15 RX ORDER — METHYLPREDNISOLONE SODIUM SUCCINATE 40 MG/ML
40 INJECTION, POWDER, LYOPHILIZED, FOR SOLUTION INTRAMUSCULAR; INTRAVENOUS DAILY
Status: DISCONTINUED | OUTPATIENT
Start: 2023-09-16 | End: 2023-09-15 | Stop reason: HOSPADM

## 2023-09-15 RX ORDER — PREDNISONE 20 MG/1
40 TABLET ORAL 2 TIMES DAILY
Qty: 20 TABLET | Refills: 0 | Status: ON HOLD | OUTPATIENT
Start: 2023-09-15 | End: 2023-09-22 | Stop reason: HOSPADM

## 2023-09-15 RX ORDER — LEVOFLOXACIN 500 MG/1
500 TABLET, FILM COATED ORAL DAILY
Qty: 5 TABLET | Refills: 0 | Status: ON HOLD | OUTPATIENT
Start: 2023-09-15 | End: 2023-09-22 | Stop reason: HOSPADM

## 2023-09-15 RX ORDER — GUAIFENESIN 600 MG/1
600 TABLET, EXTENDED RELEASE ORAL 2 TIMES DAILY
Qty: 20 TABLET | Refills: 0 | Status: ON HOLD | OUTPATIENT
Start: 2023-09-15 | End: 2023-09-22 | Stop reason: HOSPADM

## 2023-09-15 RX ORDER — INSULIN LISPRO 100 [IU]/ML
10 INJECTION, SOLUTION INTRAVENOUS; SUBCUTANEOUS
Status: DISCONTINUED | OUTPATIENT
Start: 2023-09-15 | End: 2023-09-15

## 2023-09-15 RX ADMIN — INSULIN LISPRO 10 UNITS: 100 INJECTION, SOLUTION INTRAVENOUS; SUBCUTANEOUS at 11:47

## 2023-09-15 RX ADMIN — INSULIN LISPRO 4 UNITS: 100 INJECTION, SOLUTION INTRAVENOUS; SUBCUTANEOUS at 11:48

## 2023-09-15 RX ADMIN — GUAIFENESIN 600 MG: 600 TABLET, EXTENDED RELEASE ORAL at 08:51

## 2023-09-15 RX ADMIN — DILTIAZEM HYDROCHLORIDE 120 MG: 120 CAPSULE, COATED, EXTENDED RELEASE ORAL at 08:51

## 2023-09-15 RX ADMIN — BUDESONIDE AND FORMOTEROL FUMARATE DIHYDRATE 2 PUFF: 160; 4.5 AEROSOL RESPIRATORY (INHALATION) at 07:47

## 2023-09-15 RX ADMIN — INSULIN LISPRO 25 UNITS: 100 INJECTION, SOLUTION INTRAVENOUS; SUBCUTANEOUS at 11:48

## 2023-09-15 RX ADMIN — ALBUTEROL SULFATE 2 PUFF: 90 AEROSOL, METERED RESPIRATORY (INHALATION) at 07:47

## 2023-09-15 RX ADMIN — METHYLPREDNISOLONE SODIUM SUCCINATE 40 MG: 40 INJECTION, POWDER, LYOPHILIZED, FOR SOLUTION INTRAMUSCULAR; INTRAVENOUS at 05:35

## 2023-09-15 RX ADMIN — APIXABAN 5 MG: 5 TABLET, FILM COATED ORAL at 08:51

## 2023-09-15 RX ADMIN — INSULIN LISPRO 4 UNITS: 100 INJECTION, SOLUTION INTRAVENOUS; SUBCUTANEOUS at 08:51

## 2023-09-15 RX ADMIN — IPRATROPIUM BROMIDE AND ALBUTEROL SULFATE 1 DOSE: 2.5; .5 SOLUTION RESPIRATORY (INHALATION) at 04:00

## 2023-09-15 RX ADMIN — INSULIN LISPRO 25 UNITS: 100 INJECTION, SOLUTION INTRAVENOUS; SUBCUTANEOUS at 08:51

## 2023-09-15 NOTE — DISCHARGE INSTR - DIET

## 2023-09-15 NOTE — DISCHARGE SUMMARY
108 (90 Base) MCG/ACT inhaler  Commonly known as: PROVENTIL;VENTOLIN;PROAIR  Inhale 2 puffs into the lungs 2 times daily     apixaban 5 MG Tabs tablet  Commonly known as: Eliquis  Take 1 tablet by mouth 2 times daily     aspirin 81 MG EC tablet  Commonly known as: ASPIRIN LOW DOSE  Take 1 tablet by mouth daily     blood glucose monitor kit and supplies  Check sugar daily     blood glucose test strips  Test once daily     Compressor/Nebulizer Misc  Use qid     dilTIAZem 120 MG extended release capsule  Commonly known as: CARDIZEM CD  Take 1 capsule by mouth daily     * insulin lispro 100 UNIT/ML Soln injection vial  Commonly known as: HUMALOG  Inject 20 Units into the skin 3 times daily (with meals)     * HumaLOG KwikPen 100 UNIT/ML Sopn  Generic drug: insulin lispro (1 Unit Dial)  INJECT 20 UNITS SUBCUTANEOUSLY THREE TIMES A DAY WITH MEALS     ketoconazole 2 % cream  Commonly known as: NIZORAL  Apply topically daily to the chest where there is a rash for a week. .     Levemir 100 UNIT/ML injection vial  Generic drug: insulin detemir  Inject 60 Units into the skin nightly     metFORMIN 1000 MG tablet  Commonly known as: GLUCOPHAGE  Take 1 tablet by mouth 2 times daily (with meals)     OXYGEN  Inhale 3 L into the lungs continuous     Symbicort 160-4.5 MCG/ACT Aero  Generic drug: budesonide-formoterol  Inhale 2 puffs into the lungs 2 times daily           * This list has 2 medication(s) that are the same as other medications prescribed for you. Read the directions carefully, and ask your doctor or other care provider to review them with you.                    Where to Get Your Medications        These medications were sent to [facility], [address] [place] [address],[address]  [facility], [address] [address]      Phone: [phone]   guaiFENesin 600 MG extended release tablet  levoFLOXacin 500 MG tablet  predniSONE 20 MG tablet        Objective results found for: \"BLOODCULT2\"  Organism: No results found for: \"ORG\"    Time Spent Discharging patient 35 minutes    Electronically signed by Blessing Carreon MD on 9/15/2023 at 4:17 PM

## 2023-09-15 NOTE — CARE COORDINATION
[facility] Liaison aware of discharge & will initiate [facility] AT [facility].

## 2023-09-15 NOTE — PROGRESS NOTES
Outpatient Pharmacy Progress Note for Meds-to-Beds    Total number of Prescriptions Filled: 3  The following medications were dispensed to the patient during the discharge process:  guaiFENesin (MUCINEX)   levoFLOXacin (LEVAQUIN)   prednisone    Additional Documentation:  Patient picked-up the medication(s) in the OP Pharmacy      Thank you for letting us serve your patients.   [address]    [address] [place] [address], [address] [place] [address]    Phone: [phone]    Fax: [phone]

## 2023-09-15 NOTE — PROGRESS NOTES
Discharge paperwork reviewed with patient. Patient verbalized understanding of all education, follow up appointments and new medication administration. Patient educated on importance of checking blood glucose and administering insulin and risks for elevating blood sugars with PO steroids. IV removed, telemetry removed. Medication obtained from outpatient pharmacy and reviewed with patient. Transport set up with [facility] as patient has home oxygen but no family members to bring the oxygen tank & no ride home. Will continue to monitor.

## 2023-09-15 NOTE — PLAN OF CARE

## 2023-09-15 NOTE — PLAN OF CARE
Problem: Discharge Planning  Goal: Discharge to home or other facility with appropriate resources  9/14/2023 2001 by Melany Lombard, RN  Outcome: Progressing  9/14/2023 1310 by Kiley Maria RN  Outcome: Progressing     Problem: Pain  Goal: Verbalizes/displays adequate comfort level or baseline comfort level  9/14/2023 2001 by Melany Lombard, RN  Outcome: Progressing  9/14/2023 1310 by Kiley Maria RN  Outcome: Progressing     Problem: Chronic Conditions and Co-morbidities  Goal: Patient's chronic conditions and co-morbidity symptoms are monitored and maintained or improved  9/14/2023 2001 by Melany Lombard, RN  Outcome: Progressing  9/14/2023 1310 by Kiley Maria RN  Outcome: Progressing     Problem: Safety - Adult  Goal: Free from fall injury  9/14/2023 2001 by Melany Lombard, RN  Outcome: Progressing  9/14/2023 1310 by Kiley Maria RN  Outcome: Progressing     Problem: ABCDS Injury Assessment  Goal: Absence of physical injury  9/14/2023 2001 by Melany Lombard, RN  Outcome: Progressing  9/14/2023 1310 by Kiley Maria RN  Outcome: Progressing     Problem: Skin/Tissue Integrity  Goal: Absence of new skin breakdown  Description: 1. Monitor for areas of redness and/or skin breakdown  2. Assess vascular access sites hourly  3. Every 4-6 hours minimum:  Change oxygen saturation probe site  4. Every 4-6 hours:  If on nasal continuous positive airway pressure, respiratory therapy assess nares and determine need for appliance change or resting period.   Outcome: Progressing

## 2023-09-17 ENCOUNTER — HOSPITAL ENCOUNTER (INPATIENT)
Age: 68
LOS: 4 days | Discharge: HOME HEALTH | End: 2023-09-22
Attending: EMERGENCY MEDICINE | Admitting: STUDENT IN AN ORGANIZED HEALTH CARE EDUCATION/TRAINING PROGRAM
Payer: MEDICARE

## 2023-09-17 DIAGNOSIS — Z79.899 ON POTASSIUM WASTING DIURETIC THERAPY: ICD-10-CM

## 2023-09-17 DIAGNOSIS — I50.33 ACUTE ON CHRONIC DIASTOLIC (CONGESTIVE) HEART FAILURE (HCC): ICD-10-CM

## 2023-09-17 DIAGNOSIS — R73.9 HYPERGLYCEMIA: ICD-10-CM

## 2023-09-17 DIAGNOSIS — J44.1 COPD EXACERBATION (HCC): Primary | ICD-10-CM

## 2023-09-17 DIAGNOSIS — E87.5 HYPERKALEMIA: ICD-10-CM

## 2023-09-17 LAB
CULTURE: NORMAL
CULTURE: NORMAL
Lab: NORMAL
Lab: NORMAL
SPECIMEN: NORMAL
SPECIMEN: NORMAL

## 2023-09-17 PROCEDURE — 99285 EMERGENCY DEPT VISIT HI MDM: CPT

## 2023-09-17 PROCEDURE — 96374 THER/PROPH/DIAG INJ IV PUSH: CPT

## 2023-09-17 PROCEDURE — 94640 AIRWAY INHALATION TREATMENT: CPT

## 2023-09-17 PROCEDURE — 96375 TX/PRO/DX INJ NEW DRUG ADDON: CPT

## 2023-09-17 PROCEDURE — 6370000000 HC RX 637 (ALT 250 FOR IP): Performed by: EMERGENCY MEDICINE

## 2023-09-17 PROCEDURE — 2700000000 HC OXYGEN THERAPY PER DAY

## 2023-09-17 RX ORDER — IPRATROPIUM BROMIDE AND ALBUTEROL SULFATE 2.5; .5 MG/3ML; MG/3ML
1 SOLUTION RESPIRATORY (INHALATION) ONCE
Status: COMPLETED | OUTPATIENT
Start: 2023-09-17 | End: 2023-09-17

## 2023-09-17 RX ADMIN — IPRATROPIUM BROMIDE AND ALBUTEROL SULFATE 1 DOSE: .5; 3 SOLUTION RESPIRATORY (INHALATION) at 23:50

## 2023-09-18 ENCOUNTER — CARE COORDINATION (OUTPATIENT)
Dept: CARE COORDINATION | Age: 68
End: 2023-09-18

## 2023-09-18 ENCOUNTER — APPOINTMENT (OUTPATIENT)
Dept: GENERAL RADIOLOGY | Age: 68
End: 2023-09-18
Payer: MEDICARE

## 2023-09-18 PROBLEM — I50.33 ACUTE ON CHRONIC DIASTOLIC (CONGESTIVE) HEART FAILURE (HCC): Status: ACTIVE | Noted: 2023-09-18

## 2023-09-18 LAB
ALBUMIN SERPL-MCNC: 3.7 GM/DL (ref 3.4–5)
ALBUMIN SERPL-MCNC: 4 GM/DL (ref 3.4–5)
ALP BLD-CCNC: 80 IU/L (ref 40–128)
ALP BLD-CCNC: 91 IU/L (ref 40–128)
ALT SERPL-CCNC: 16 U/L (ref 10–40)
ALT SERPL-CCNC: 20 U/L (ref 10–40)
ANION GAP SERPL CALCULATED.3IONS-SCNC: 10 MMOL/L (ref 4–16)
ANION GAP SERPL CALCULATED.3IONS-SCNC: 13 MMOL/L (ref 4–16)
ANION GAP SERPL CALCULATED.3IONS-SCNC: 13 MMOL/L (ref 4–16)
AST SERPL-CCNC: 13 IU/L (ref 15–37)
AST SERPL-CCNC: 8 IU/L (ref 15–37)
BASE EXCESS MIXED: 15.4 (ref 0–1.2)
BASOPHILS ABSOLUTE: 0 K/CU MM
BASOPHILS ABSOLUTE: 0 K/CU MM
BASOPHILS RELATIVE PERCENT: 0.1 % (ref 0–1)
BASOPHILS RELATIVE PERCENT: 0.3 % (ref 0–1)
BILIRUB SERPL-MCNC: 0.2 MG/DL (ref 0–1)
BILIRUB SERPL-MCNC: 0.3 MG/DL (ref 0–1)
BUN SERPL-MCNC: 22 MG/DL (ref 6–23)
BUN SERPL-MCNC: 22 MG/DL (ref 6–23)
BUN SERPL-MCNC: 24 MG/DL (ref 6–23)
CALCIUM SERPL-MCNC: 8.9 MG/DL (ref 8.3–10.6)
CALCIUM SERPL-MCNC: 9 MG/DL (ref 8.3–10.6)
CALCIUM SERPL-MCNC: 9.8 MG/DL (ref 8.3–10.6)
CHLORIDE BLD-SCNC: 84 MMOL/L (ref 99–110)
CHLORIDE BLD-SCNC: 84 MMOL/L (ref 99–110)
CHLORIDE BLD-SCNC: 85 MMOL/L (ref 99–110)
CO2: 33 MMOL/L (ref 21–32)
CO2: 36 MMOL/L (ref 21–32)
CO2: 36 MMOL/L (ref 21–32)
COMMENT: ABNORMAL
CREAT SERPL-MCNC: 0.8 MG/DL (ref 0.9–1.3)
CULTURE: NORMAL
CULTURE: NORMAL
DIFFERENTIAL TYPE: ABNORMAL
DIFFERENTIAL TYPE: ABNORMAL
EKG ATRIAL RATE: 85 BPM
EKG DIAGNOSIS: NORMAL
EKG P AXIS: 79 DEGREES
EKG P-R INTERVAL: 180 MS
EKG Q-T INTERVAL: 366 MS
EKG QRS DURATION: 102 MS
EKG QTC CALCULATION (BAZETT): 435 MS
EKG R AXIS: -68 DEGREES
EKG T AXIS: 76 DEGREES
EKG VENTRICULAR RATE: 85 BPM
EOSINOPHILS ABSOLUTE: 0 K/CU MM
EOSINOPHILS ABSOLUTE: 0.1 K/CU MM
EOSINOPHILS RELATIVE PERCENT: 0.1 % (ref 0–3)
EOSINOPHILS RELATIVE PERCENT: 0.6 % (ref 0–3)
GFR SERPL CREATININE-BSD FRML MDRD: >60 ML/MIN/1.73M2
GLUCOSE BLD-MCNC: 304 MG/DL (ref 70–99)
GLUCOSE BLD-MCNC: 426 MG/DL (ref 70–99)
GLUCOSE BLD-MCNC: 450 MG/DL (ref 70–99)
GLUCOSE BLD-MCNC: 456 MG/DL (ref 70–99)
GLUCOSE BLD-MCNC: 464 MG/DL (ref 70–99)
GLUCOSE SERPL-MCNC: 420 MG/DL (ref 70–99)
GLUCOSE SERPL-MCNC: 425 MG/DL (ref 70–99)
GLUCOSE SERPL-MCNC: 590 MG/DL (ref 70–99)
HCO3 VENOUS: 44.4 MMOL/L (ref 19–25)
HCT VFR BLD CALC: 45.8 % (ref 42–52)
HCT VFR BLD CALC: 50.6 % (ref 42–52)
HEMOGLOBIN: 15.2 GM/DL (ref 13.5–18)
HEMOGLOBIN: 16.4 GM/DL (ref 13.5–18)
IMMATURE NEUTROPHIL %: 1.5 % (ref 0–0.43)
IMMATURE NEUTROPHIL %: 1.8 % (ref 0–0.43)
INR BLD: 1 INDEX
LYMPHOCYTES ABSOLUTE: 0.7 K/CU MM
LYMPHOCYTES ABSOLUTE: 1.4 K/CU MM
LYMPHOCYTES RELATIVE PERCENT: 14.7 % (ref 24–44)
LYMPHOCYTES RELATIVE PERCENT: 5.8 % (ref 24–44)
Lab: NORMAL
Lab: NORMAL
MAGNESIUM: 1.9 MG/DL (ref 1.8–2.4)
MCH RBC QN AUTO: 30.6 PG (ref 27–31)
MCH RBC QN AUTO: 30.6 PG (ref 27–31)
MCHC RBC AUTO-ENTMCNC: 32.4 % (ref 32–36)
MCHC RBC AUTO-ENTMCNC: 33.2 % (ref 32–36)
MCV RBC AUTO: 92.3 FL (ref 78–100)
MCV RBC AUTO: 94.4 FL (ref 78–100)
MONOCYTES ABSOLUTE: 0.6 K/CU MM
MONOCYTES ABSOLUTE: 0.8 K/CU MM
MONOCYTES RELATIVE PERCENT: 5 % (ref 0–4)
MONOCYTES RELATIVE PERCENT: 8.1 % (ref 0–4)
NUCLEATED RBC %: 0 %
NUCLEATED RBC %: 0.3 %
O2 SAT, VEN: 89 % (ref 50–70)
PCO2, VEN: 75 MMHG (ref 38–52)
PDW BLD-RTO: 14.7 % (ref 11.7–14.9)
PDW BLD-RTO: 14.9 % (ref 11.7–14.9)
PH VENOUS: 7.38 (ref 7.32–7.42)
PLATELET # BLD: 150 K/CU MM (ref 140–440)
PLATELET # BLD: 171 K/CU MM (ref 140–440)
PMV BLD AUTO: 10 FL (ref 7.5–11.1)
PMV BLD AUTO: 9.8 FL (ref 7.5–11.1)
PO2, VEN: 59 MMHG (ref 28–48)
POTASSIUM SERPL-SCNC: 3.7 MMOL/L (ref 3.5–5.1)
POTASSIUM SERPL-SCNC: 3.7 MMOL/L (ref 3.5–5.1)
POTASSIUM SERPL-SCNC: 5.7 MMOL/L (ref 3.5–5.1)
PRO-BNP: 402.9 PG/ML
PROTHROMBIN TIME: 13.6 SECONDS (ref 11.7–14.5)
RBC # BLD: 4.96 M/CU MM (ref 4.6–6.2)
RBC # BLD: 5.36 M/CU MM (ref 4.6–6.2)
SEGMENTED NEUTROPHILS ABSOLUTE COUNT: 7.3 K/CU MM
SEGMENTED NEUTROPHILS ABSOLUTE COUNT: 9.7 K/CU MM
SEGMENTED NEUTROPHILS RELATIVE PERCENT: 75 % (ref 36–66)
SEGMENTED NEUTROPHILS RELATIVE PERCENT: 87 % (ref 36–66)
SODIUM BLD-SCNC: 128 MMOL/L (ref 135–145)
SODIUM BLD-SCNC: 133 MMOL/L (ref 135–145)
SODIUM BLD-SCNC: 133 MMOL/L (ref 135–145)
SPECIMEN: NORMAL
SPECIMEN: NORMAL
TOTAL IMMATURE NEUTOROPHIL: 0.15 K/CU MM
TOTAL IMMATURE NEUTOROPHIL: 0.2 K/CU MM
TOTAL NUCLEATED RBC: 0 K/CU MM
TOTAL NUCLEATED RBC: 0 K/CU MM
TOTAL PROTEIN: 5.4 GM/DL (ref 6.4–8.2)
TOTAL PROTEIN: 6.7 GM/DL (ref 6.4–8.2)
TROPONIN T: <0.01 NG/ML
WBC # BLD: 11.1 K/CU MM (ref 4–10.5)
WBC # BLD: 9.8 K/CU MM (ref 4–10.5)

## 2023-09-18 PROCEDURE — 1200000000 HC SEMI PRIVATE

## 2023-09-18 PROCEDURE — 84484 ASSAY OF TROPONIN QUANT: CPT

## 2023-09-18 PROCEDURE — 82805 BLOOD GASES W/O2 SATURATION: CPT

## 2023-09-18 PROCEDURE — 6370000000 HC RX 637 (ALT 250 FOR IP): Performed by: STUDENT IN AN ORGANIZED HEALTH CARE EDUCATION/TRAINING PROGRAM

## 2023-09-18 PROCEDURE — 94640 AIRWAY INHALATION TREATMENT: CPT

## 2023-09-18 PROCEDURE — 80048 BASIC METABOLIC PNL TOTAL CA: CPT

## 2023-09-18 PROCEDURE — 85610 PROTHROMBIN TIME: CPT

## 2023-09-18 PROCEDURE — 6360000002 HC RX W HCPCS: Performed by: STUDENT IN AN ORGANIZED HEALTH CARE EDUCATION/TRAINING PROGRAM

## 2023-09-18 PROCEDURE — 83880 ASSAY OF NATRIURETIC PEPTIDE: CPT

## 2023-09-18 PROCEDURE — 6370000000 HC RX 637 (ALT 250 FOR IP): Performed by: EMERGENCY MEDICINE

## 2023-09-18 PROCEDURE — 85025 COMPLETE CBC W/AUTO DIFF WBC: CPT

## 2023-09-18 PROCEDURE — 71045 X-RAY EXAM CHEST 1 VIEW: CPT

## 2023-09-18 PROCEDURE — 2580000003 HC RX 258: Performed by: STUDENT IN AN ORGANIZED HEALTH CARE EDUCATION/TRAINING PROGRAM

## 2023-09-18 PROCEDURE — 94761 N-INVAS EAR/PLS OXIMETRY MLT: CPT

## 2023-09-18 PROCEDURE — 83735 ASSAY OF MAGNESIUM: CPT

## 2023-09-18 PROCEDURE — 36415 COLL VENOUS BLD VENIPUNCTURE: CPT

## 2023-09-18 PROCEDURE — 93010 ELECTROCARDIOGRAM REPORT: CPT | Performed by: INTERNAL MEDICINE

## 2023-09-18 PROCEDURE — 82962 GLUCOSE BLOOD TEST: CPT

## 2023-09-18 PROCEDURE — 2700000000 HC OXYGEN THERAPY PER DAY

## 2023-09-18 PROCEDURE — 6370000000 HC RX 637 (ALT 250 FOR IP): Performed by: FAMILY MEDICINE

## 2023-09-18 PROCEDURE — 93005 ELECTROCARDIOGRAM TRACING: CPT | Performed by: EMERGENCY MEDICINE

## 2023-09-18 PROCEDURE — 80053 COMPREHEN METABOLIC PANEL: CPT

## 2023-09-18 RX ORDER — SODIUM CHLORIDE 9 MG/ML
INJECTION, SOLUTION INTRAVENOUS PRN
Status: DISCONTINUED | OUTPATIENT
Start: 2023-09-18 | End: 2023-09-22 | Stop reason: HOSPADM

## 2023-09-18 RX ORDER — INSULIN GLARGINE 100 [IU]/ML
40 INJECTION, SOLUTION SUBCUTANEOUS NIGHTLY
Status: DISCONTINUED | OUTPATIENT
Start: 2023-09-18 | End: 2023-09-18

## 2023-09-18 RX ORDER — INSULIN LISPRO 100 [IU]/ML
10 INJECTION, SOLUTION INTRAVENOUS; SUBCUTANEOUS
Status: DISCONTINUED | OUTPATIENT
Start: 2023-09-18 | End: 2023-09-18

## 2023-09-18 RX ORDER — ACETAMINOPHEN 650 MG/1
650 SUPPOSITORY RECTAL EVERY 6 HOURS PRN
Status: DISCONTINUED | OUTPATIENT
Start: 2023-09-18 | End: 2023-09-22 | Stop reason: HOSPADM

## 2023-09-18 RX ORDER — GLUCAGON 1 MG/ML
1 KIT INJECTION PRN
Status: DISCONTINUED | OUTPATIENT
Start: 2023-09-18 | End: 2023-09-22 | Stop reason: HOSPADM

## 2023-09-18 RX ORDER — INSULIN LISPRO 100 [IU]/ML
0-4 INJECTION, SOLUTION INTRAVENOUS; SUBCUTANEOUS NIGHTLY
Status: DISCONTINUED | OUTPATIENT
Start: 2023-09-18 | End: 2023-09-18

## 2023-09-18 RX ORDER — POTASSIUM CHLORIDE 20 MEQ/1
40 TABLET, EXTENDED RELEASE ORAL PRN
Status: DISCONTINUED | OUTPATIENT
Start: 2023-09-18 | End: 2023-09-21

## 2023-09-18 RX ORDER — DILTIAZEM HYDROCHLORIDE 120 MG/1
120 CAPSULE, COATED, EXTENDED RELEASE ORAL DAILY
Status: DISCONTINUED | OUTPATIENT
Start: 2023-09-18 | End: 2023-09-21

## 2023-09-18 RX ORDER — INSULIN GLARGINE 100 [IU]/ML
60 INJECTION, SOLUTION SUBCUTANEOUS NIGHTLY
Status: DISCONTINUED | OUTPATIENT
Start: 2023-09-18 | End: 2023-09-22 | Stop reason: HOSPADM

## 2023-09-18 RX ORDER — BUDESONIDE AND FORMOTEROL FUMARATE DIHYDRATE 160; 4.5 UG/1; UG/1
2 AEROSOL RESPIRATORY (INHALATION) 2 TIMES DAILY
Status: DISCONTINUED | OUTPATIENT
Start: 2023-09-18 | End: 2023-09-22 | Stop reason: HOSPADM

## 2023-09-18 RX ORDER — FUROSEMIDE 10 MG/ML
20 INJECTION INTRAMUSCULAR; INTRAVENOUS 2 TIMES DAILY
Status: DISCONTINUED | OUTPATIENT
Start: 2023-09-18 | End: 2023-09-19

## 2023-09-18 RX ORDER — INSULIN LISPRO 100 [IU]/ML
0-4 INJECTION, SOLUTION INTRAVENOUS; SUBCUTANEOUS
Status: DISCONTINUED | OUTPATIENT
Start: 2023-09-18 | End: 2023-09-18

## 2023-09-18 RX ORDER — INSULIN GLARGINE 100 [IU]/ML
60 INJECTION, SOLUTION SUBCUTANEOUS NIGHTLY
Status: DISCONTINUED | OUTPATIENT
Start: 2023-09-19 | End: 2023-09-18

## 2023-09-18 RX ORDER — ACETAMINOPHEN 325 MG/1
650 TABLET ORAL EVERY 6 HOURS PRN
Status: DISCONTINUED | OUTPATIENT
Start: 2023-09-18 | End: 2023-09-22 | Stop reason: HOSPADM

## 2023-09-18 RX ORDER — DEXTROSE MONOHYDRATE 100 MG/ML
INJECTION, SOLUTION INTRAVENOUS CONTINUOUS PRN
Status: DISCONTINUED | OUTPATIENT
Start: 2023-09-18 | End: 2023-09-22 | Stop reason: HOSPADM

## 2023-09-18 RX ORDER — INSULIN LISPRO 100 [IU]/ML
5 INJECTION, SOLUTION INTRAVENOUS; SUBCUTANEOUS
Status: DISCONTINUED | OUTPATIENT
Start: 2023-09-18 | End: 2023-09-18

## 2023-09-18 RX ORDER — ONDANSETRON 2 MG/ML
4 INJECTION INTRAMUSCULAR; INTRAVENOUS EVERY 6 HOURS PRN
Status: DISCONTINUED | OUTPATIENT
Start: 2023-09-18 | End: 2023-09-22 | Stop reason: HOSPADM

## 2023-09-18 RX ORDER — INSULIN LISPRO 100 [IU]/ML
20 INJECTION, SOLUTION INTRAVENOUS; SUBCUTANEOUS
Status: DISCONTINUED | OUTPATIENT
Start: 2023-09-18 | End: 2023-09-22 | Stop reason: HOSPADM

## 2023-09-18 RX ORDER — FUROSEMIDE 10 MG/ML
40 INJECTION INTRAMUSCULAR; INTRAVENOUS ONCE
Status: COMPLETED | OUTPATIENT
Start: 2023-09-18 | End: 2023-09-18

## 2023-09-18 RX ORDER — ATORVASTATIN CALCIUM 40 MG/1
40 TABLET, FILM COATED ORAL NIGHTLY
Status: DISCONTINUED | OUTPATIENT
Start: 2023-09-18 | End: 2023-09-22 | Stop reason: HOSPADM

## 2023-09-18 RX ORDER — INSULIN GLARGINE 100 [IU]/ML
40 INJECTION, SOLUTION SUBCUTANEOUS DAILY
Status: DISCONTINUED | OUTPATIENT
Start: 2023-09-19 | End: 2023-09-18

## 2023-09-18 RX ORDER — ONDANSETRON 4 MG/1
4 TABLET, ORALLY DISINTEGRATING ORAL EVERY 8 HOURS PRN
Status: DISCONTINUED | OUTPATIENT
Start: 2023-09-18 | End: 2023-09-22 | Stop reason: HOSPADM

## 2023-09-18 RX ORDER — SODIUM CHLORIDE 0.9 % (FLUSH) 0.9 %
5-40 SYRINGE (ML) INJECTION PRN
Status: DISCONTINUED | OUTPATIENT
Start: 2023-09-18 | End: 2023-09-22 | Stop reason: HOSPADM

## 2023-09-18 RX ORDER — IPRATROPIUM BROMIDE AND ALBUTEROL SULFATE 2.5; .5 MG/3ML; MG/3ML
1 SOLUTION RESPIRATORY (INHALATION)
Status: DISCONTINUED | OUTPATIENT
Start: 2023-09-18 | End: 2023-09-22 | Stop reason: HOSPADM

## 2023-09-18 RX ORDER — INSULIN LISPRO 100 [IU]/ML
0-8 INJECTION, SOLUTION INTRAVENOUS; SUBCUTANEOUS
Status: DISCONTINUED | OUTPATIENT
Start: 2023-09-18 | End: 2023-09-19

## 2023-09-18 RX ORDER — POTASSIUM CHLORIDE 7.45 MG/ML
10 INJECTION INTRAVENOUS PRN
Status: DISCONTINUED | OUTPATIENT
Start: 2023-09-18 | End: 2023-09-21

## 2023-09-18 RX ORDER — POTASSIUM CHLORIDE 1.5 G/1.58G
40 POWDER, FOR SOLUTION ORAL PRN
Status: DISCONTINUED | OUTPATIENT
Start: 2023-09-18 | End: 2023-09-21

## 2023-09-18 RX ORDER — SODIUM CHLORIDE 0.9 % (FLUSH) 0.9 %
5-40 SYRINGE (ML) INJECTION EVERY 12 HOURS SCHEDULED
Status: DISCONTINUED | OUTPATIENT
Start: 2023-09-18 | End: 2023-09-22 | Stop reason: HOSPADM

## 2023-09-18 RX ORDER — MAGNESIUM SULFATE IN WATER 40 MG/ML
2000 INJECTION, SOLUTION INTRAVENOUS PRN
Status: DISCONTINUED | OUTPATIENT
Start: 2023-09-18 | End: 2023-09-21

## 2023-09-18 RX ORDER — POLYETHYLENE GLYCOL 3350 17 G/17G
17 POWDER, FOR SOLUTION ORAL DAILY PRN
Status: DISCONTINUED | OUTPATIENT
Start: 2023-09-18 | End: 2023-09-22 | Stop reason: HOSPADM

## 2023-09-18 RX ORDER — INSULIN GLARGINE 100 [IU]/ML
20 INJECTION, SOLUTION SUBCUTANEOUS DAILY
Status: DISCONTINUED | OUTPATIENT
Start: 2023-09-19 | End: 2023-09-22 | Stop reason: HOSPADM

## 2023-09-18 RX ORDER — INSULIN LISPRO 100 [IU]/ML
0-4 INJECTION, SOLUTION INTRAVENOUS; SUBCUTANEOUS NIGHTLY
Status: DISCONTINUED | OUTPATIENT
Start: 2023-09-18 | End: 2023-09-19

## 2023-09-18 RX ADMIN — INSULIN LISPRO 20 UNITS: 100 INJECTION, SOLUTION INTRAVENOUS; SUBCUTANEOUS at 17:34

## 2023-09-18 RX ADMIN — INSULIN LISPRO 4 UNITS: 100 INJECTION, SOLUTION INTRAVENOUS; SUBCUTANEOUS at 21:23

## 2023-09-18 RX ADMIN — FUROSEMIDE 20 MG: 20 INJECTION, SOLUTION INTRAMUSCULAR; INTRAVENOUS at 09:16

## 2023-09-18 RX ADMIN — INSULIN LISPRO 20 UNITS: 100 INJECTION, SOLUTION INTRAVENOUS; SUBCUTANEOUS at 09:15

## 2023-09-18 RX ADMIN — INSULIN LISPRO 20 UNITS: 100 INJECTION, SOLUTION INTRAVENOUS; SUBCUTANEOUS at 13:18

## 2023-09-18 RX ADMIN — APIXABAN 5 MG: 5 TABLET, FILM COATED ORAL at 09:14

## 2023-09-18 RX ADMIN — INSULIN LISPRO 8 UNITS: 100 INJECTION, SOLUTION INTRAVENOUS; SUBCUTANEOUS at 13:18

## 2023-09-18 RX ADMIN — Medication 10 UNITS: at 02:21

## 2023-09-18 RX ADMIN — INSULIN LISPRO 8 UNITS: 100 INJECTION, SOLUTION INTRAVENOUS; SUBCUTANEOUS at 09:14

## 2023-09-18 RX ADMIN — IPRATROPIUM BROMIDE AND ALBUTEROL SULFATE 1 DOSE: .5; 2.5 SOLUTION RESPIRATORY (INHALATION) at 11:05

## 2023-09-18 RX ADMIN — ATORVASTATIN CALCIUM 40 MG: 40 TABLET, FILM COATED ORAL at 21:23

## 2023-09-18 RX ADMIN — IPRATROPIUM BROMIDE AND ALBUTEROL SULFATE 1 DOSE: .5; 2.5 SOLUTION RESPIRATORY (INHALATION) at 15:27

## 2023-09-18 RX ADMIN — APIXABAN 5 MG: 5 TABLET, FILM COATED ORAL at 21:23

## 2023-09-18 RX ADMIN — SODIUM CHLORIDE, PRESERVATIVE FREE 5 ML: 5 INJECTION INTRAVENOUS at 21:43

## 2023-09-18 RX ADMIN — INSULIN GLARGINE 60 UNITS: 100 INJECTION, SOLUTION SUBCUTANEOUS at 21:43

## 2023-09-18 RX ADMIN — IPRATROPIUM BROMIDE AND ALBUTEROL SULFATE 1 DOSE: .5; 2.5 SOLUTION RESPIRATORY (INHALATION) at 20:49

## 2023-09-18 RX ADMIN — BUDESONIDE AND FORMOTEROL FUMARATE DIHYDRATE 2 PUFF: 160; 4.5 AEROSOL RESPIRATORY (INHALATION) at 20:49

## 2023-09-18 RX ADMIN — DILTIAZEM HYDROCHLORIDE 120 MG: 120 CAPSULE, COATED, EXTENDED RELEASE ORAL at 09:14

## 2023-09-18 RX ADMIN — FUROSEMIDE 40 MG: 10 INJECTION, SOLUTION INTRAMUSCULAR; INTRAVENOUS at 02:56

## 2023-09-18 RX ADMIN — INSULIN LISPRO 6 UNITS: 100 INJECTION, SOLUTION INTRAVENOUS; SUBCUTANEOUS at 17:34

## 2023-09-18 RX ADMIN — SODIUM CHLORIDE, PRESERVATIVE FREE 10 ML: 5 INJECTION INTRAVENOUS at 09:16

## 2023-09-18 RX ADMIN — BUDESONIDE AND FORMOTEROL FUMARATE DIHYDRATE 2 PUFF: 160; 4.5 AEROSOL RESPIRATORY (INHALATION) at 08:10

## 2023-09-18 RX ADMIN — INSULIN GLARGINE 40 UNITS: 100 INJECTION, SOLUTION SUBCUTANEOUS at 06:20

## 2023-09-18 ASSESSMENT — LIFESTYLE VARIABLES
HOW MANY STANDARD DRINKS CONTAINING ALCOHOL DO YOU HAVE ON A TYPICAL DAY: PATIENT DOES NOT DRINK
HOW OFTEN DO YOU HAVE A DRINK CONTAINING ALCOHOL: NEVER

## 2023-09-18 ASSESSMENT — PAIN - FUNCTIONAL ASSESSMENT
PAIN_FUNCTIONAL_ASSESSMENT: NONE - DENIES PAIN
PAIN_FUNCTIONAL_ASSESSMENT: ACTIVITIES ARE NOT PREVENTED

## 2023-09-18 ASSESSMENT — PAIN SCALES - GENERAL
PAINLEVEL_OUTOF10: 5
PAINLEVEL_OUTOF10: 4

## 2023-09-18 ASSESSMENT — PAIN DESCRIPTION - LOCATION: LOCATION: GENERALIZED

## 2023-09-18 NOTE — H&P
History and Physical      Name:  Tyree Wolfe /Age/Sex: 1955  (79 y.o. male)   MRN & CSN:  [id number] & [id number] Encounter Date/Time: 2023 6:10 AM EDT   Location:  [address] [place] [address] PCP: Zhane Mcelroy MD       Hospital Day: 2    Assessment and Plan:     Patient is a 79 y.o. male  who presented with SOB     Acute decompensated HFpEF  - Endorsed worsening SOB, RAE, LE edema PND and orthopnea over 5 days. Not on Lasix at home  - On presentation, hemodynamically stable. Physical exam and imagining consistent with hypervolemia. Pro-BNP >402k (patient obese)  -TTE on  LVEF of 50% with no WMA. - Started on IV diuresis  - Strict I/O's. - Daily weights. - Telemetry.   - Repeat TTE.  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 Estimated discharge: 4 day(s). Current living situation: home. Expected disposition: home. Spoke with ED provider who recommended admission for the patient and I agree with that plan. Personally reviewed lab studies and imaging. EKG interpreted personally and results as stated above. Imaging that was interpreted personally and results as stated above.     History of Present Illness:     Chief Complaint: SOB    Patient is a 79 y.o. male with a PMHx of HFpEF, Afib on elqiuis, T2DM who Stress Concern Present (5/12/2023)    [facility]     Feeling of Stress : Only a little   Social Connections: Socially Isolated (5/12/2023)    Social Connection and Isolation Panel [NHANES]     Frequency of Communication with Friends and Family: Twice a week     Frequency of Social Gatherings with Friends and Family: Never     Attends Confucianism Services: 1 to 4 times per year     Active Member of [employer] or Organizations: No     Attends Club or Organization Meetings: Never     Marital Status:    Intimate Partner Violence: Not At Risk (5/10/2023)    Humiliation, Afraid, Rape, and Kick questionnaire     Fear of Current or Ex-Partner: No     Emotionally Abused: No     Physically Abused: No     Sexually Abused: No   Housing Stability: Low Risk  (8/11/2023)    Housing Stability Vital Sign     Unable to Pay for Housing in the Last Year: No     Number of [address] in the Last Year: 1     Unstable Housing in the Last Year: No       Medications Prior to Admission     Prior to Admission medications    Medication Sig Start Date End Date Taking?  Authorizing Provider   guaiFENesin (MUCINEX) 600 MG extended release tablet Take 1 tablet by mouth 2 times daily for 10 days 9/15/23 9/25/23  See Garg MD   levoFLOXacin (LEVAQUIN) 500 MG tablet Take 1 tablet by mouth daily for 5 days 9/15/23 9/20/23  See Garg MD   predniSONE (DELTASONE) 20 MG tablet Take 2 tablets by mouth 2 times daily for 5 days 9/15/23 9/20/23  See Garg MD   HUMALOG KWIKPEN 100 UNIT/ML SOPN INJECT 20 UNITS SUBCUTANEOUSLY THREE TIMES A DAY WITH MEALS 9/11/23   Francine Eisenmenger, MD   blood glucose monitor strips Test once daily 8/30/23   Francine Eisenmenger, MD   blood glucose monitor kit and supplies Check sugar daily 8/30/23   Francine Eisenmenger, MD   dilTIAZem (CARDIZEM CD) 120 MG extended release capsule Take 1 capsule by mouth daily 8/18/23   Francine Eisenmenger,

## 2023-09-18 NOTE — CARE COORDINATION
From home, has PCP, ins, friends transport him, Home 02 through [address] has [address]. Denies other needs. 09/18/23 1147   Service Assessment   Patient Orientation Alert and Oriented   Cognition Alert   History Provided By Patient   Primary [address] Family Members;Friends/Neighbors   Patient's Healthcare Decision Maker is: Legal Next of Kin   PCP Verified by CM Yes   Last Visit to PCP Within last 3 months   Prior Functional Level Independent in ADLs/IADLs   Current Functional Level Independent in ADLs/IADLs   Can patient return to prior living arrangement Yes   Ability to make needs known: Good   Family able to assist with home care needs: No   Would you like for me to discuss the discharge plan with any other family members/significant others, and if so, who? No   Financial Resources  ([place])   Community Resources None   Social/Functional History   Lives With Alone   Type of Home Apartment   Home Layout One level   Home Access Level entry   Bathroom Shower/Tub Tub/Shower unit   Bathroom Toilet Standard   Bathroom Equipment Tub transfer bench   [address] Help From Family;Friend(s); Neighbor   ADL Assistance Independent   Homemaking Assistance Independent   Homemaking Responsibilities Yes   Ambulation Assistance Independent   Transfer Assistance Independent   Active  No   Patient's  Info friends   Occupation On disability   Discharge [address] Prior To Admission Oxygen Therapy  (2 liters from [employer])   [address]   DME Ordered? No   Type of Home Care Services Skilled Therapy; None   Services At/After Discharge   Transition of Care Consult (CM Consult) [facility] Health   Condition of Participation: Discharge Planning   The Patient and/or Patient Representative was provided with a Choice of Provider?  Patient Freedom of Choice list was provided with basic dialogue that supports the patient's individualized plan of care/goals, treatment preferences, and shares the quality data associated with the providers?   Yes

## 2023-09-18 NOTE — PROGRESS NOTES
PT AM , attending was notified via message on Perfect serve. Pt has an order to receive 20u HUMALOG along with a sliding scale of HUMALOG.

## 2023-09-18 NOTE — PROGRESS NOTES
4 Eyes Skin Assessment     NAME:  Dandy Rob  YOB: 1955  MEDICAL RECORD NUMBER:  [id number]    The patient is being assessed for  Admission    I agree that at least one RN has performed a thorough Head to Toe Skin Assessment on the patient. ALL assessment sites listed below have been assessed. Areas assessed by both nurses:    {Pressure Areas Assessed:88596}        Does the Patient have a Wound?  No noted wound(s)       Hector Prevention initiated by RN: Yes  Wound Care Orders initiated by RN: No    Pressure Injury (Stage 3,4, Unstageable, DTI, NWPT, and Complex wounds) if present, place Wound referral order by RN under : No    New Ostomies, if present place, Ostomy referral order under : No     Nurse 1 eSignature: Electronically signed by Cristian Sanchez RN on 9/18/23 at 6:27 AM EDT    **SHARE this note so that the co-signing nurse can place an eSignature**    Nurse 2 eSignature: {Esignature:[id number]}

## 2023-09-18 NOTE — ED PROVIDER NOTES
Triage Chief Complaint:   Shortness of Breath    [place]:  Feroz Knox is a 79 y.o. male that presents with shortness of breath. The patient was discharged 2 days ago for COPD exacerbation is currently on Levaquin and prednisone at home. He has had worsening shortness of breath especially with exertion today. Denies any cough, fever, chest pain, abdominal pain, nausea or vomiting. Patient uses 2 to 3 L nasal cannula oxygen at home. ROS:  At least 10 systems reviewed and otherwise acutely negative except as in the [address] [facility] [address] Past Medical History:   Diagnosis Date    A-fib [facility])     Resolved after ablation 2018    Anxiety     Arthritis     \"Hips, Knees, Elbows And Fingers\"    COPD (chronic obstructive pulmonary disease) ([facility])     Sees Dr. Aquiles Wilkerson    Depression     Diabetes mellitus [facility]) Dx 4149'R    Full dentures     H/O angiography 12/13/2018    peripheral angio - LFA occluded, filling collaterals, RSFA 90% stenosis-vasc surg consult    H/O Doppler ultrasound 09/05/2018    LE arterial - left mid and distal femoral artery occluded, lt FANI shows mild-mod PVD    H/O echocardiogram 01/06/2016    EF60% mild MR, TR, pulm HTN    Hx of colonoscopy     7/11 C-scope - benign polyp x1    Hx of Doppler ultrasound 02/20/2018    Lower extremity doppler  Normal study.     Hyperlipidemia     Hypertension     Macular degeneration     States is legally blind    Obesity     On home oxygen therapy     Continuous At 2 Liters Per Nasal Cannula    PAD (peripheral artery disease) ([facility])     10/10 FANI mild PAD left superficial femoral s/p left fem-pop bypass    Panic attacks     Pneumonia Last Episode In 2018    PTSD (post-traumatic stress disorder)     Restless leg     Shortness of breath     Sleep apnea     Uses BiPap - stopped using 4/2020    Wears glasses     To Read     Past Surgical History:   Procedure Laterality Date    APPENDECTOMY  2003    ATRIAL ABLATION SURGERY  05/23/2018    A-fib ablation     CARDIAC SURGERY  05/2018 R; NOVOLIN R) injection 10 Units  10 Units IntraVENous Once Toro Harvey MD         Current Outpatient Medications   Medication Sig Dispense Refill    guaiFENesin (MUCINEX) 600 MG extended release tablet Take 1 tablet by mouth 2 times daily for 10 days 20 tablet 0    levoFLOXacin (LEVAQUIN) 500 MG tablet Take 1 tablet by mouth daily for 5 days 5 tablet 0    predniSONE (DELTASONE) 20 MG tablet Take 2 tablets by mouth 2 times daily for 5 days 20 tablet 0    HUMALOG KWIKPEN 100 UNIT/ML SOPN INJECT 20 UNITS SUBCUTANEOUSLY THREE TIMES A DAY WITH MEALS 15 mL 10    blood glucose monitor strips Test once daily 100 strip 3    blood glucose monitor kit and supplies Check sugar daily 1 kit 0    dilTIAZem (CARDIZEM CD) 120 MG extended release capsule Take 1 capsule by mouth daily 30 capsule 3    SYMBICORT 160-4.5 MCG/ACT AERO Inhale 2 puffs into the lungs 2 times daily 30.6 g 0    albuterol sulfate HFA (PROVENTIL;VENTOLIN;PROAIR) 108 (90 Base) MCG/ACT inhaler Inhale 2 puffs into the lungs 2 times daily 18 g 3    apixaban (ELIQUIS) 5 MG TABS tablet Take 1 tablet by mouth 2 times daily 60 tablet 2    insulin detemir (LEVEMIR) 100 UNIT/ML injection vial Inject 60 Units into the skin nightly 15 mL 3    OXYGEN Inhale 3 L into the lungs continuous 1 Units 0    ketoconazole (NIZORAL) 2 % cream Apply topically daily to the chest where there is a rash for a week. . 30 g 1    insulin lispro (HUMALOG) 100 UNIT/ML SOLN injection vial Inject 20 Units into the skin 3 times daily (with meals) 20 mL 0    metFORMIN (GLUCOPHAGE) 1000 MG tablet Take 1 tablet by mouth 2 times daily (with meals) 60 tablet 3    atorvastatin (LIPITOR) 40 MG tablet Take 1 tablet by mouth daily (Patient taking differently: Take 1 tablet by mouth nightly) 90 tablet 1    aspirin (ASPIRIN LOW DOSE) 81 MG EC tablet Take 1 tablet by mouth daily 90 tablet 3    Nebulizers (COMPRESSOR/NEBULIZER) MISC Use qid 1 each 3     Allergies   Allergen Reactions    Metoprolol      \"Chest

## 2023-09-18 NOTE — CARE COORDINATION
Inpatient admission noted; respiratory acidosis; COPD exacerbation. ACM outreach deferred as per protocol.

## 2023-09-18 NOTE — PROGRESS NOTES
Patient admitted earlier this morning by my partner. History and physical reviewed. Agree with history and physical.  Continue with plan of care. Continue current measures for now. Patient seen and examined at bedside. Pt states he feels better. Good urine output after lasix but not recorded. Need to do strict intake/outut. Continue current lasix regimen. Pt's diabetes is very uncontrolled. Prior admissions with similar high glucose numbers despite increasing insulin. Glucose 400's this morning, received Lantus 40U 6AM, will continue home regimen but add Lantus 20U daily starting tomorrow. Need to monitor glucose closely.      Jacqueline Vasquez MD

## 2023-09-18 NOTE — PROGRESS NOTES
Physician Progress Note      PATIENT:               Amy Petty  CSN #:                  [id number]  :                       1955  ADMIT DATE:       2023 11:08 PM  [facility] DATE:  RESPONDING  PROVIDER #:        Radu Carpio MD          QUERY TEXT:    Patient admitted with acute on chronic HFpEF. Noted documentation of COPD   exacerbation in ED notes dated  by Dr. Osmani Thompson, and COPD, no wheezing, in   H&P dated  by Dr. Bárbara Purdy. If possible, please document in progress notes and discharge summary if you   are evaluating and /or treating any of the following: The medical record reflects the following:  Risk Factors: pmh COPD  Clinical Indicators: Pt has had worsening shortness of breath especially with   exertion today. Denies cough. BNP is 402. 9. CXR showed persistent but   improved pulmonary edema. Pt reported peripheral edema and orthopnea. No edema   documented. Lungs CTA bilat per H&P. Pt had recent admission for COPD   exacerbation and sent home on Levaquin. Per Dr. Shorty Wright, pt showing improvement   with Lasix, no I/O recorded accurately to reference. Treatment: Lasix IV, labs, imaging    Thank you,  Abran Jamison, RN  [phone]  Options provided:  -- COPD exacerbation confirmed and COPD, not in exacerbation ruled out  -- COPD, not in exacerbation confirmed and COPD exacerbation ruled out  -- Other - I will add my own diagnosis  -- Disagree - Not applicable / Not valid  -- Disagree - Clinically unable to determine / Unknown  -- Refer to Clinical Documentation Reviewer    PROVIDER RESPONSE TEXT:    After study, COPD, not in exacerbation confirmed and COPD exacerbation ruled   out.     Query created by: Dorys Correa on 2023 1:56 PM      Electronically signed by:  Radu Carpio MD 2023 3:08 PM

## 2023-09-19 LAB
ALBUMIN SERPL-MCNC: 4.1 GM/DL (ref 3.4–5)
ALP BLD-CCNC: 93 IU/L (ref 40–128)
ALT SERPL-CCNC: 21 U/L (ref 10–40)
AMPHETAMINES: NEGATIVE
ANION GAP SERPL CALCULATED.3IONS-SCNC: 16 MMOL/L (ref 4–16)
AST SERPL-CCNC: 14 IU/L (ref 15–37)
BARBITURATE SCREEN URINE: NEGATIVE
BASOPHILS ABSOLUTE: 0 K/CU MM
BASOPHILS RELATIVE PERCENT: 0.2 % (ref 0–1)
BENZODIAZEPINE SCREEN, URINE: NEGATIVE
BILIRUB SERPL-MCNC: 0.5 MG/DL (ref 0–1)
BILIRUBIN URINE: NEGATIVE MG/DL
BLOOD, URINE: NEGATIVE
BUN SERPL-MCNC: 20 MG/DL (ref 6–23)
CALCIUM SERPL-MCNC: 9 MG/DL (ref 8.3–10.6)
CANNABINOID SCREEN URINE: NEGATIVE
CHLORIDE BLD-SCNC: 86 MMOL/L (ref 99–110)
CLARITY: CLEAR
CO2: 33 MMOL/L (ref 21–32)
COCAINE METABOLITE: NEGATIVE
COLOR: YELLOW
COMMENT UA: ABNORMAL
CREAT SERPL-MCNC: 0.7 MG/DL (ref 0.9–1.3)
DIFFERENTIAL TYPE: ABNORMAL
EOSINOPHILS ABSOLUTE: 0.1 K/CU MM
EOSINOPHILS RELATIVE PERCENT: 0.8 % (ref 0–3)
FENTANYL URINE: NEGATIVE
GFR SERPL CREATININE-BSD FRML MDRD: >60 ML/MIN/1.73M2
GLUCOSE BLD-MCNC: 156 MG/DL (ref 70–99)
GLUCOSE BLD-MCNC: 240 MG/DL (ref 70–99)
GLUCOSE BLD-MCNC: 341 MG/DL (ref 70–99)
GLUCOSE BLD-MCNC: 365 MG/DL (ref 70–99)
GLUCOSE BLD-MCNC: 450 MG/DL (ref 70–99)
GLUCOSE SERPL-MCNC: 453 MG/DL (ref 70–99)
GLUCOSE, URINE: >1000 MG/DL
HCT VFR BLD CALC: 51.5 % (ref 42–52)
HEMOGLOBIN: 17 GM/DL (ref 13.5–18)
IMMATURE NEUTROPHIL %: 1.2 % (ref 0–0.43)
KETONES, URINE: ABNORMAL MG/DL
LEUKOCYTE ESTERASE, URINE: NEGATIVE
LYMPHOCYTES ABSOLUTE: 1.9 K/CU MM
LYMPHOCYTES RELATIVE PERCENT: 15.3 % (ref 24–44)
MCH RBC QN AUTO: 30.4 PG (ref 27–31)
MCHC RBC AUTO-ENTMCNC: 33 % (ref 32–36)
MCV RBC AUTO: 92.1 FL (ref 78–100)
MONOCYTES ABSOLUTE: 0.9 K/CU MM
MONOCYTES RELATIVE PERCENT: 7.2 % (ref 0–4)
NITRITE URINE, QUANTITATIVE: NEGATIVE
NUCLEATED RBC %: 0 %
OPIATES, URINE: NEGATIVE
OXYCODONE: NEGATIVE
PDW BLD-RTO: 14.8 % (ref 11.7–14.9)
PH, URINE: 5.5 (ref 5–8)
PLATELET # BLD: 191 K/CU MM (ref 140–440)
PMV BLD AUTO: 10.1 FL (ref 7.5–11.1)
POTASSIUM SERPL-SCNC: 3.9 MMOL/L (ref 3.5–5.1)
PROTEIN UA: NEGATIVE MG/DL
RBC # BLD: 5.59 M/CU MM (ref 4.6–6.2)
SEGMENTED NEUTROPHILS ABSOLUTE COUNT: 9.4 K/CU MM
SEGMENTED NEUTROPHILS RELATIVE PERCENT: 75.3 % (ref 36–66)
SODIUM BLD-SCNC: 135 MMOL/L (ref 135–145)
SPECIFIC GRAVITY UA: 1.02 (ref 1–1.03)
TOTAL IMMATURE NEUTOROPHIL: 0.15 K/CU MM
TOTAL NUCLEATED RBC: 0 K/CU MM
TOTAL PROTEIN: 6.1 GM/DL (ref 6.4–8.2)
UROBILINOGEN, URINE: 0.2 MG/DL (ref 0.2–1)
WBC # BLD: 12.4 K/CU MM (ref 4–10.5)

## 2023-09-19 PROCEDURE — 2580000003 HC RX 258: Performed by: STUDENT IN AN ORGANIZED HEALTH CARE EDUCATION/TRAINING PROGRAM

## 2023-09-19 PROCEDURE — 6370000000 HC RX 637 (ALT 250 FOR IP): Performed by: STUDENT IN AN ORGANIZED HEALTH CARE EDUCATION/TRAINING PROGRAM

## 2023-09-19 PROCEDURE — 85025 COMPLETE CBC W/AUTO DIFF WBC: CPT

## 2023-09-19 PROCEDURE — 99223 1ST HOSP IP/OBS HIGH 75: CPT | Performed by: INTERNAL MEDICINE

## 2023-09-19 PROCEDURE — 81003 URINALYSIS AUTO W/O SCOPE: CPT

## 2023-09-19 PROCEDURE — 80053 COMPREHEN METABOLIC PANEL: CPT

## 2023-09-19 PROCEDURE — 2700000000 HC OXYGEN THERAPY PER DAY

## 2023-09-19 PROCEDURE — 36415 COLL VENOUS BLD VENIPUNCTURE: CPT

## 2023-09-19 PROCEDURE — 6370000000 HC RX 637 (ALT 250 FOR IP): Performed by: INTERNAL MEDICINE

## 2023-09-19 PROCEDURE — 1200000000 HC SEMI PRIVATE

## 2023-09-19 PROCEDURE — 6360000002 HC RX W HCPCS: Performed by: INTERNAL MEDICINE

## 2023-09-19 PROCEDURE — 94761 N-INVAS EAR/PLS OXIMETRY MLT: CPT

## 2023-09-19 PROCEDURE — 80307 DRUG TEST PRSMV CHEM ANLYZR: CPT

## 2023-09-19 PROCEDURE — 82962 GLUCOSE BLOOD TEST: CPT

## 2023-09-19 PROCEDURE — 94640 AIRWAY INHALATION TREATMENT: CPT

## 2023-09-19 RX ORDER — FUROSEMIDE 10 MG/ML
60 INJECTION INTRAMUSCULAR; INTRAVENOUS 2 TIMES DAILY
Status: DISCONTINUED | OUTPATIENT
Start: 2023-09-19 | End: 2023-09-21

## 2023-09-19 RX ORDER — INSULIN LISPRO 100 [IU]/ML
0-16 INJECTION, SOLUTION INTRAVENOUS; SUBCUTANEOUS
Status: DISCONTINUED | OUTPATIENT
Start: 2023-09-19 | End: 2023-09-22 | Stop reason: HOSPADM

## 2023-09-19 RX ORDER — INSULIN LISPRO 100 [IU]/ML
0-4 INJECTION, SOLUTION INTRAVENOUS; SUBCUTANEOUS NIGHTLY
Status: DISCONTINUED | OUTPATIENT
Start: 2023-09-19 | End: 2023-09-22 | Stop reason: HOSPADM

## 2023-09-19 RX ADMIN — BUDESONIDE AND FORMOTEROL FUMARATE DIHYDRATE 2 PUFF: 160; 4.5 AEROSOL RESPIRATORY (INHALATION) at 20:34

## 2023-09-19 RX ADMIN — APIXABAN 5 MG: 5 TABLET, FILM COATED ORAL at 21:47

## 2023-09-19 RX ADMIN — FUROSEMIDE 60 MG: 10 INJECTION, SOLUTION INTRAMUSCULAR; INTRAVENOUS at 08:36

## 2023-09-19 RX ADMIN — SODIUM CHLORIDE, PRESERVATIVE FREE 10 ML: 5 INJECTION INTRAVENOUS at 08:36

## 2023-09-19 RX ADMIN — BUDESONIDE AND FORMOTEROL FUMARATE DIHYDRATE 2 PUFF: 160; 4.5 AEROSOL RESPIRATORY (INHALATION) at 07:49

## 2023-09-19 RX ADMIN — INSULIN LISPRO 20 UNITS: 100 INJECTION, SOLUTION INTRAVENOUS; SUBCUTANEOUS at 09:44

## 2023-09-19 RX ADMIN — INSULIN GLARGINE 20 UNITS: 100 INJECTION, SOLUTION SUBCUTANEOUS at 12:52

## 2023-09-19 RX ADMIN — INSULIN LISPRO 8 UNITS: 100 INJECTION, SOLUTION INTRAVENOUS; SUBCUTANEOUS at 09:44

## 2023-09-19 RX ADMIN — IPRATROPIUM BROMIDE AND ALBUTEROL SULFATE 1 DOSE: .5; 2.5 SOLUTION RESPIRATORY (INHALATION) at 07:48

## 2023-09-19 RX ADMIN — INSULIN LISPRO 20 UNITS: 100 INJECTION, SOLUTION INTRAVENOUS; SUBCUTANEOUS at 18:35

## 2023-09-19 RX ADMIN — INSULIN LISPRO 20 UNITS: 100 INJECTION, SOLUTION INTRAVENOUS; SUBCUTANEOUS at 12:53

## 2023-09-19 RX ADMIN — IPRATROPIUM BROMIDE AND ALBUTEROL SULFATE 1 DOSE: .5; 2.5 SOLUTION RESPIRATORY (INHALATION) at 11:42

## 2023-09-19 RX ADMIN — APIXABAN 5 MG: 5 TABLET, FILM COATED ORAL at 09:43

## 2023-09-19 RX ADMIN — INSULIN LISPRO 4 UNITS: 100 INJECTION, SOLUTION INTRAVENOUS; SUBCUTANEOUS at 18:34

## 2023-09-19 RX ADMIN — ATORVASTATIN CALCIUM 40 MG: 40 TABLET, FILM COATED ORAL at 21:47

## 2023-09-19 RX ADMIN — SODIUM CHLORIDE, PRESERVATIVE FREE 5 ML: 5 INJECTION INTRAVENOUS at 21:52

## 2023-09-19 RX ADMIN — IPRATROPIUM BROMIDE AND ALBUTEROL SULFATE 1 DOSE: .5; 2.5 SOLUTION RESPIRATORY (INHALATION) at 20:33

## 2023-09-19 RX ADMIN — DILTIAZEM HYDROCHLORIDE 120 MG: 120 CAPSULE, COATED, EXTENDED RELEASE ORAL at 09:43

## 2023-09-19 RX ADMIN — INSULIN LISPRO 16 UNITS: 100 INJECTION, SOLUTION INTRAVENOUS; SUBCUTANEOUS at 12:53

## 2023-09-19 RX ADMIN — FUROSEMIDE 60 MG: 10 INJECTION, SOLUTION INTRAMUSCULAR; INTRAVENOUS at 19:02

## 2023-09-19 ASSESSMENT — ENCOUNTER SYMPTOMS
SHORTNESS OF BREATH: 1
EYE ITCHING: 0
NAUSEA: 0
BACK PAIN: 0
EYE REDNESS: 0
VOMITING: 0
SINUS PAIN: 0
COUGH: 0
CONSTIPATION: 0
SINUS PRESSURE: 0
EYE PAIN: 0
SORE THROAT: 0
ABDOMINAL PAIN: 0
WHEEZING: 0
DIARRHEA: 0
CHEST TIGHTNESS: 0

## 2023-09-19 ASSESSMENT — PAIN DESCRIPTION - LOCATION
LOCATION: GENERALIZED
LOCATION: GENERALIZED

## 2023-09-19 ASSESSMENT — PAIN SCALES - GENERAL
PAINLEVEL_OUTOF10: 6
PAINLEVEL_OUTOF10: 3
PAINLEVEL_OUTOF10: 6

## 2023-09-19 ASSESSMENT — PAIN DESCRIPTION - DESCRIPTORS: DESCRIPTORS: ACHING

## 2023-09-19 ASSESSMENT — PAIN DESCRIPTION - ORIENTATION: ORIENTATION: OTHER (COMMENT)

## 2023-09-19 ASSESSMENT — PAIN - FUNCTIONAL ASSESSMENT: PAIN_FUNCTIONAL_ASSESSMENT: ACTIVITIES ARE NOT PREVENTED

## 2023-09-19 NOTE — PROGRESS NOTES
Pt afternoon BG was 450, attending was notified via message on perfect serve. Humalog sliding scale was increased.

## 2023-09-19 NOTE — PLAN OF CARE
Problem: Discharge Planning  Goal: Discharge to home or other facility with appropriate resources  9/19/2023 0808 by Maximino Wise  Outcome: Progressing  9/19/2023 0556 by Serg Jamison LPN  Outcome: Progressing     Problem: Safety - Adult  Goal: Free from fall injury  9/19/2023 0808 by Maximino Wise  Outcome: Progressing  9/19/2023 0556 by Serg Jamison LPN  Outcome: Progressing

## 2023-09-20 LAB
ANION GAP SERPL CALCULATED.3IONS-SCNC: 16 MMOL/L (ref 4–16)
BASOPHILS ABSOLUTE: 0 K/CU MM
BASOPHILS RELATIVE PERCENT: 0.2 % (ref 0–1)
BUN SERPL-MCNC: 15 MG/DL (ref 6–23)
CALCIUM SERPL-MCNC: 8.7 MG/DL (ref 8.3–10.6)
CHLORIDE BLD-SCNC: 87 MMOL/L (ref 99–110)
CO2: 31 MMOL/L (ref 21–32)
CREAT SERPL-MCNC: 0.7 MG/DL (ref 0.9–1.3)
DIFFERENTIAL TYPE: ABNORMAL
EOSINOPHILS ABSOLUTE: 0.1 K/CU MM
EOSINOPHILS RELATIVE PERCENT: 0.8 % (ref 0–3)
GFR SERPL CREATININE-BSD FRML MDRD: >60 ML/MIN/1.73M2
GLUCOSE BLD-MCNC: 171 MG/DL (ref 70–99)
GLUCOSE BLD-MCNC: 291 MG/DL (ref 70–99)
GLUCOSE BLD-MCNC: 324 MG/DL (ref 70–99)
GLUCOSE BLD-MCNC: 348 MG/DL (ref 70–99)
GLUCOSE BLD-MCNC: 408 MG/DL (ref 70–99)
GLUCOSE SERPL-MCNC: 374 MG/DL (ref 70–99)
HCT VFR BLD CALC: 49 % (ref 42–52)
HEMOGLOBIN: 16.4 GM/DL (ref 13.5–18)
IMMATURE NEUTROPHIL %: 1.4 % (ref 0–0.43)
LYMPHOCYTES ABSOLUTE: 1.6 K/CU MM
LYMPHOCYTES RELATIVE PERCENT: 12.2 % (ref 24–44)
MAGNESIUM: 1.9 MG/DL (ref 1.8–2.4)
MCH RBC QN AUTO: 30.9 PG (ref 27–31)
MCHC RBC AUTO-ENTMCNC: 33.5 % (ref 32–36)
MCV RBC AUTO: 92.3 FL (ref 78–100)
MONOCYTES ABSOLUTE: 0.9 K/CU MM
MONOCYTES RELATIVE PERCENT: 6.9 % (ref 0–4)
NUCLEATED RBC %: 0 %
PDW BLD-RTO: 14.6 % (ref 11.7–14.9)
PHOSPHORUS: 4.8 MG/DL (ref 2.5–4.9)
PLATELET # BLD: 164 K/CU MM (ref 140–440)
PMV BLD AUTO: 9.5 FL (ref 7.5–11.1)
POTASSIUM SERPL-SCNC: 3.7 MMOL/L (ref 3.5–5.1)
RBC # BLD: 5.31 M/CU MM (ref 4.6–6.2)
SEGMENTED NEUTROPHILS ABSOLUTE COUNT: 10.1 K/CU MM
SEGMENTED NEUTROPHILS RELATIVE PERCENT: 78.5 % (ref 36–66)
SODIUM BLD-SCNC: 134 MMOL/L (ref 135–145)
TOTAL IMMATURE NEUTOROPHIL: 0.18 K/CU MM
TOTAL NUCLEATED RBC: 0 K/CU MM
WBC # BLD: 12.8 K/CU MM (ref 4–10.5)

## 2023-09-20 PROCEDURE — 94640 AIRWAY INHALATION TREATMENT: CPT

## 2023-09-20 PROCEDURE — 1200000000 HC SEMI PRIVATE

## 2023-09-20 PROCEDURE — 6360000004 HC RX CONTRAST MEDICATION

## 2023-09-20 PROCEDURE — 82962 GLUCOSE BLOOD TEST: CPT

## 2023-09-20 PROCEDURE — 6360000002 HC RX W HCPCS: Performed by: INTERNAL MEDICINE

## 2023-09-20 PROCEDURE — 2700000000 HC OXYGEN THERAPY PER DAY

## 2023-09-20 PROCEDURE — 85025 COMPLETE CBC W/AUTO DIFF WBC: CPT

## 2023-09-20 PROCEDURE — 6370000000 HC RX 637 (ALT 250 FOR IP): Performed by: STUDENT IN AN ORGANIZED HEALTH CARE EDUCATION/TRAINING PROGRAM

## 2023-09-20 PROCEDURE — 83735 ASSAY OF MAGNESIUM: CPT

## 2023-09-20 PROCEDURE — 94761 N-INVAS EAR/PLS OXIMETRY MLT: CPT

## 2023-09-20 PROCEDURE — 99233 SBSQ HOSP IP/OBS HIGH 50: CPT | Performed by: INTERNAL MEDICINE

## 2023-09-20 PROCEDURE — C8923 2D TTE W OR W/O FOL W/CON,CO: HCPCS

## 2023-09-20 PROCEDURE — 6370000000 HC RX 637 (ALT 250 FOR IP): Performed by: INTERNAL MEDICINE

## 2023-09-20 PROCEDURE — 84100 ASSAY OF PHOSPHORUS: CPT

## 2023-09-20 PROCEDURE — 93306 TTE W/DOPPLER COMPLETE: CPT

## 2023-09-20 PROCEDURE — 36415 COLL VENOUS BLD VENIPUNCTURE: CPT

## 2023-09-20 PROCEDURE — 2580000003 HC RX 258: Performed by: STUDENT IN AN ORGANIZED HEALTH CARE EDUCATION/TRAINING PROGRAM

## 2023-09-20 PROCEDURE — 6370000000 HC RX 637 (ALT 250 FOR IP): Performed by: FAMILY MEDICINE

## 2023-09-20 PROCEDURE — 80048 BASIC METABOLIC PNL TOTAL CA: CPT

## 2023-09-20 RX ADMIN — ACETAMINOPHEN 650 MG: 325 TABLET ORAL at 21:26

## 2023-09-20 RX ADMIN — ATORVASTATIN CALCIUM 40 MG: 40 TABLET, FILM COATED ORAL at 21:25

## 2023-09-20 RX ADMIN — INSULIN GLARGINE 20 UNITS: 100 INJECTION, SOLUTION SUBCUTANEOUS at 09:17

## 2023-09-20 RX ADMIN — INSULIN LISPRO 8 UNITS: 100 INJECTION, SOLUTION INTRAVENOUS; SUBCUTANEOUS at 09:18

## 2023-09-20 RX ADMIN — APIXABAN 5 MG: 5 TABLET, FILM COATED ORAL at 09:15

## 2023-09-20 RX ADMIN — BUDESONIDE AND FORMOTEROL FUMARATE DIHYDRATE 2 PUFF: 160; 4.5 AEROSOL RESPIRATORY (INHALATION) at 21:20

## 2023-09-20 RX ADMIN — INSULIN LISPRO 20 UNITS: 100 INJECTION, SOLUTION INTRAVENOUS; SUBCUTANEOUS at 12:39

## 2023-09-20 RX ADMIN — SODIUM CHLORIDE, PRESERVATIVE FREE 10 ML: 5 INJECTION INTRAVENOUS at 09:47

## 2023-09-20 RX ADMIN — INSULIN LISPRO 4 UNITS: 100 INJECTION, SOLUTION INTRAVENOUS; SUBCUTANEOUS at 21:29

## 2023-09-20 RX ADMIN — SODIUM CHLORIDE, PRESERVATIVE FREE 5 ML: 5 INJECTION INTRAVENOUS at 21:27

## 2023-09-20 RX ADMIN — ACETAMINOPHEN 650 MG: 325 TABLET ORAL at 03:01

## 2023-09-20 RX ADMIN — IPRATROPIUM BROMIDE AND ALBUTEROL SULFATE 1 DOSE: .5; 2.5 SOLUTION RESPIRATORY (INHALATION) at 07:56

## 2023-09-20 RX ADMIN — INSULIN GLARGINE 60 UNITS: 100 INJECTION, SOLUTION SUBCUTANEOUS at 21:28

## 2023-09-20 RX ADMIN — INSULIN LISPRO 16 UNITS: 100 INJECTION, SOLUTION INTRAVENOUS; SUBCUTANEOUS at 12:38

## 2023-09-20 RX ADMIN — FUROSEMIDE 60 MG: 10 INJECTION, SOLUTION INTRAMUSCULAR; INTRAVENOUS at 09:47

## 2023-09-20 RX ADMIN — DILTIAZEM HYDROCHLORIDE 120 MG: 120 CAPSULE, COATED, EXTENDED RELEASE ORAL at 09:15

## 2023-09-20 RX ADMIN — IPRATROPIUM BROMIDE AND ALBUTEROL SULFATE 1 DOSE: .5; 2.5 SOLUTION RESPIRATORY (INHALATION) at 21:15

## 2023-09-20 RX ADMIN — IPRATROPIUM BROMIDE AND ALBUTEROL SULFATE 1 DOSE: .5; 2.5 SOLUTION RESPIRATORY (INHALATION) at 15:27

## 2023-09-20 RX ADMIN — BUDESONIDE AND FORMOTEROL FUMARATE DIHYDRATE 2 PUFF: 160; 4.5 AEROSOL RESPIRATORY (INHALATION) at 07:56

## 2023-09-20 RX ADMIN — APIXABAN 5 MG: 5 TABLET, FILM COATED ORAL at 21:25

## 2023-09-20 RX ADMIN — PERFLUTREN 2.2 ML: 6.52 INJECTION, SUSPENSION INTRAVENOUS at 08:45

## 2023-09-20 RX ADMIN — IPRATROPIUM BROMIDE AND ALBUTEROL SULFATE 1 DOSE: .5; 2.5 SOLUTION RESPIRATORY (INHALATION) at 13:40

## 2023-09-20 RX ADMIN — INSULIN LISPRO 20 UNITS: 100 INJECTION, SOLUTION INTRAVENOUS; SUBCUTANEOUS at 18:35

## 2023-09-20 ASSESSMENT — ENCOUNTER SYMPTOMS
EYE PAIN: 0
SORE THROAT: 0
SHORTNESS OF BREATH: 1
WHEEZING: 0
NAUSEA: 0
SINUS PRESSURE: 0
CHEST TIGHTNESS: 0
CONSTIPATION: 0
VOMITING: 0
EYE REDNESS: 0
SINUS PAIN: 0
ABDOMINAL PAIN: 0
BACK PAIN: 0
COUGH: 0
EYE ITCHING: 0
DIARRHEA: 0

## 2023-09-20 ASSESSMENT — PAIN DESCRIPTION - LOCATION
LOCATION: SHOULDER
LOCATION: LEG
LOCATION: SHOULDER
LOCATION: OTHER (COMMENT)
LOCATION: LEG

## 2023-09-20 ASSESSMENT — PAIN - FUNCTIONAL ASSESSMENT
PAIN_FUNCTIONAL_ASSESSMENT: ACTIVITIES ARE NOT PREVENTED

## 2023-09-20 ASSESSMENT — PAIN DESCRIPTION - DESCRIPTORS
DESCRIPTORS: ACHING

## 2023-09-20 ASSESSMENT — PAIN DESCRIPTION - ORIENTATION
ORIENTATION: RIGHT
ORIENTATION: RIGHT;LEFT
ORIENTATION: RIGHT
ORIENTATION: RIGHT;LEFT
ORIENTATION: RIGHT

## 2023-09-20 ASSESSMENT — PAIN SCALES - GENERAL
PAINLEVEL_OUTOF10: 4
PAINLEVEL_OUTOF10: 5
PAINLEVEL_OUTOF10: 7
PAINLEVEL_OUTOF10: 6
PAINLEVEL_OUTOF10: 6

## 2023-09-20 NOTE — PROGRESS NOTES
09/20/23  3:01 AM   Result Value Ref Range    Magnesium 1.9 1.8 - 2.4 mg/dl   POCT Glucose    Collection Time: 09/20/23  8:02 AM   Result Value Ref Range    POC Glucose 291 (H) 70 - 99 MG/DL   POCT Glucose    Collection Time: 09/20/23 11:34 AM   Result Value Ref Range    POC Glucose 408 (H) 70 - 99 MG/DL     Results       Procedure Component Value Units Date/Time    Respiratory Panel, Molecular, with COVID-19 (Restricted: peds pts or suitable admitted adults) [[id number]] Collected: 09/13/23 1616    Order Status: Completed Specimen: Nasopharyngeal Updated: 09/13/23 1737     Adenovirus Detection by PCR NOT DETECTED     Coronavirus 229E PCR NOT DETECTED     Coronavirus HKU1 PCR NOT DETECTED     Coronavirus NL63 PCR NOT DETECTED     Coronavirus OC43 PCR NOT DETECTED     SARS-CoV-2 NOT DETECTED     Comment:         Fact sheet for Healthcare Providers: [email or web address]  Fact sheet for Patients: PhysicianPortal.es          METHODOLOGY: Multiplex PCR, NIC NON-PROBE Detection          Human Metapneumovirus PCR NOT DETECTED     Rhinovirus Enterovirus PCR NOT DETECTED     Influenza A by PCR NOT DETECTED     Influenza A H1 Pandemic PCR NOT DETECTED     Influenza A H1 (2009) PCR NOT DETECTED     Influenza A H3 PCR NOT DETECTED     Influenza B by PCR NOT DETECTED     Parainfluenza 1 PCR NOT DETECTED     Parainfluenza 2 PCR NOT DETECTED     Parainfluenza 3 PCR NOT DETECTED     Parainfluenza 4 PCR NOT DETECTED     RSV PCR NOT DETECTED     Bordetella parapertussis by PCR NOT DETECTED     B Pertussis by PCR NOT DETECTED     Chlamydophila Pneumonia PCR NOT DETECTED     Mycoplasma pneumo by PCR NOT DETECTED    Culture, Urine [[id number]] Collected: 09/13/23 0809    Order Status: Completed Specimen: Urine, clean catch Updated: 09/14/23 1321     Specimen URINE CLEAN CATCH     Special Requests NONE     Culture Final Report <50,000 CFU/ml mixed skin/urogenital digna.  No further workup 1:27 PM

## 2023-09-20 NOTE — PROGRESS NOTES
Met with patient and introduced myself as the Heart failure education R.N. Patient has been seen in the [facility] at the [facility] on one occasion. Patient dismissive of education. Reports knowing and understanding instructions. Able to state heart failure measures. He reports limited exercise due to pain in feet. Discussed chair exercises. Patient received his meals from a friend. States, \"beggars can't be choosers \" and \" I eat what is made for me. \"  Suggested sharing low sodium instructions with friend. Admitting diagnosis-Acute on chronic diastolic heart failure  Cardiologist- Michael  Heart Failure Education Nurse consulted-  Ejection fraction 50 % as of 2/23/23  Pro BNP- 402.9  Hospital follow up appt-  9/28 at the [facility]    Patient informed of appointment and appointment added to AVS  Cardiac rehabilitation referral- N/A   ICD information-N/A   Patient informed of Heart Failure Clinic at the [facility]  Smoking Cessation information and referral- current smoker  Pneumonia vaccine- last dose 2011  PCP-Ryley   Patient has a digital scale? Yes - has not been weighing self   Transportation- friend assists and Dial a ride    Able to obtain medications without difficulty? - Yes- Patient denies difficulty in obtaining or taking medications. Reviewed Heart failure patient education book with patient. Heart failure education included: Type of heart failure, How it is diagnosed, causes, symptoms, medications, diet, daily weights, exercise and fluid control. Questions answered. Patient's heart failure medications reviewed and information given on each. Reviewed the Stop Light Handout with patient and instructed when to call his provider. Patient was engaged and attentive during education session.     The following handouts were reviewed with patient and patient was given a copy of the following : Heart Failure education booklet, the 'Stop Light' Handout,  a

## 2023-09-20 NOTE — CONSULTS
INPATIENT CARDIOLOGY CONSULT NOTE         Reason for consultation:  CHF    Chief Complaint   Patient presents with    Shortness of Breath       History of present illness:Armando is a 79 y. o.year old who is admitted with   Chief Complaint   Patient presents with    Shortness of Breath     Patient is a pleasant 79-year-old gentleman who presents to the hospital with chief complaint of shortness of breath. Patient follows up with Dr. Saul Li as outpatient has prior medical history significant for essential hypertension, hyperlipidemia, heart failure, PAD. Patient presents with dyspnea with exertion +  lower extremity edema. Patient has been receiving IV diuretics, has responded well with improved symptoms.      Last echocardiogram during COVID-19 infection showed preserved LV ejection fraction, 50-55% otherwise unremarkable study    Chest x-ray suggest pulmonary edema    Electrocardiogram shows sinus rhythm with PVCs    Pertinent Lab Personally Review     Recent Labs     09/20/23  0301   WBC 12.8*   HGB 16.4   HCT 49.0         Recent Labs     09/20/23  0301   *   K 3.7   CL 87*   CO2 31   PHOS 4.8   BUN 15   CREATININE 0.7*     Recent Labs     09/19/23  0953   AST 14*   ALT 21   BILITOT 0.5   ALKPHOS 93     Recent Labs     09/18/23  0045   TROPONINT <0.010     Lab Results   Component Value Date    PROBNP 402.9 (H) 09/18/2023    PROBNP 334.8 (H) 09/13/2023    PROBNP 212.0 08/14/2023         Past medical history:    has a past medical history of A-fib ([address]), Anxiety, Arthritis, COPD (chronic obstructive pulmonary disease) ([address]), Depression, Diabetes mellitus ([address]), Full dentures, H/O angiography, H/O Doppler ultrasound, H/O echocardiogram, Hx of colonoscopy, Hx of Doppler ultrasound, Hyperlipidemia, Hypertension, Macular degeneration, Obesity, On home oxygen therapy, PAD (peripheral artery disease) ([address]), Panic attacks, Pneumonia, PTSD (post-traumatic stress disorder), Restless leg, Shortness of breath, Sleep apnea, and Wears glasses. Past surgical history:   has a past surgical history that includes Atrial ablation surgery (05/23/2018); pr laparoscopy surg cholecystectomy (N/A, 11/12/2018); Colonoscopy (07/2011); eye surgery (Left, 2017); Dental surgery; Cardiac surgery (05/2018); Cholecystectomy, laparoscopic (11/12/2018); Appendectomy (2003); hernia repair (2003); femoral bypass (Left, 01/2011); Tonsillectomy (1960's); fracture surgery (Left, 1980's); fracture surgery (Right, 2000's); and femoral bypass (Left, 1/9/2019). Social History:   reports that he has been smoking cigars and cigarettes. He started smoking about 50 years ago. He has a 12.00 pack-year smoking history. He quit smokeless tobacco use about 48 years ago. His smokeless tobacco use included chew. He reports that he does not drink alcohol and does not use drugs.   Family history:   no family history of CAD, STROKE of DM    Allergies   Allergen Reactions    Metoprolol      \"Chest Pain And Burning In The Chest\"       furosemide (LASIX) injection 60 mg, BID  insulin lispro (HUMALOG) injection vial 0-16 Units, TID WC  insulin lispro (HUMALOG) injection vial 0-4 Units, Nightly  apixaban (ELIQUIS) tablet 5 mg, BID  atorvastatin (LIPITOR) tablet 40 mg, Nightly  dilTIAZem (CARDIZEM CD) extended release capsule 120 mg, Daily  budesonide-formoterol (SYMBICORT) 160-4.5 MCG/ACT inhaler 2 puff, BID  sodium chloride flush 0.9 % injection 5-40 mL, 2 times per day  sodium chloride flush 0.9 % injection 5-40 mL, PRN  0.9 % sodium chloride infusion, PRN  potassium chloride (KLOR-CON M) extended release tablet 40 mEq, PRN   Or  potassium chloride (KLOR-CON) 20 MEQ packet 40 mEq, PRN   Or  potassium chloride 10 mEq/100 mL IVPB (Peripheral Line), PRN  potassium chloride 10 mEq/100 mL IVPB (Peripheral Line), PRN  magnesium sulfate 2000 mg in 50 mL IVPB premix, PRN  ondansetron (ZOFRAN-ODT) disintegrating tablet 4 mg, Q8H PRN   Or  ondansetron (ZOFRAN)

## 2023-09-20 NOTE — PROGRESS NOTES
Pt BS under control during shift until reassessed nurse found pt had been eating fritos nikhil crackers and peanut butter. BS was 348. HCP notified will continue to monitor. No coverage given at this time. Pt BS at HS was 156. No coverage needed HCP notified as well. Will report to oncoming shift.

## 2023-09-21 ENCOUNTER — APPOINTMENT (OUTPATIENT)
Dept: GENERAL RADIOLOGY | Age: 68
End: 2023-09-21
Payer: MEDICARE

## 2023-09-21 ENCOUNTER — APPOINTMENT (OUTPATIENT)
Dept: NUCLEAR MEDICINE | Age: 68
End: 2023-09-21
Payer: MEDICARE

## 2023-09-21 LAB
ANION GAP SERPL CALCULATED.3IONS-SCNC: 13 MMOL/L (ref 4–16)
BASOPHILS ABSOLUTE: 0 K/CU MM
BASOPHILS RELATIVE PERCENT: 0.1 % (ref 0–1)
BUN SERPL-MCNC: 17 MG/DL (ref 6–23)
CALCIUM SERPL-MCNC: 9 MG/DL (ref 8.3–10.6)
CHLORIDE BLD-SCNC: 88 MMOL/L (ref 99–110)
CO2: 32 MMOL/L (ref 21–32)
CREAT SERPL-MCNC: 0.8 MG/DL (ref 0.9–1.3)
DIFFERENTIAL TYPE: ABNORMAL
EOSINOPHILS ABSOLUTE: 0.1 K/CU MM
EOSINOPHILS RELATIVE PERCENT: 0.7 % (ref 0–3)
GFR SERPL CREATININE-BSD FRML MDRD: >60 ML/MIN/1.73M2
GLUCOSE BLD-MCNC: 214 MG/DL (ref 70–99)
GLUCOSE BLD-MCNC: 294 MG/DL (ref 70–99)
GLUCOSE BLD-MCNC: 301 MG/DL (ref 70–99)
GLUCOSE BLD-MCNC: 357 MG/DL (ref 70–99)
GLUCOSE SERPL-MCNC: 361 MG/DL (ref 70–99)
HCT VFR BLD CALC: 48.7 % (ref 42–52)
HEMOGLOBIN: 16.3 GM/DL (ref 13.5–18)
IMMATURE NEUTROPHIL %: 0.7 % (ref 0–0.43)
LYMPHOCYTES ABSOLUTE: 1.5 K/CU MM
LYMPHOCYTES RELATIVE PERCENT: 11.2 % (ref 24–44)
MAGNESIUM: 1.8 MG/DL (ref 1.8–2.4)
MCH RBC QN AUTO: 30.9 PG (ref 27–31)
MCHC RBC AUTO-ENTMCNC: 33.5 % (ref 32–36)
MCV RBC AUTO: 92.4 FL (ref 78–100)
MONOCYTES ABSOLUTE: 1 K/CU MM
MONOCYTES RELATIVE PERCENT: 7.1 % (ref 0–4)
NUCLEATED RBC %: 0 %
PDW BLD-RTO: 14.8 % (ref 11.7–14.9)
PHOSPHORUS: 4.8 MG/DL (ref 2.5–4.9)
PLATELET # BLD: 163 K/CU MM (ref 140–440)
PMV BLD AUTO: 9.9 FL (ref 7.5–11.1)
POTASSIUM SERPL-SCNC: 3.7 MMOL/L (ref 3.5–5.1)
RBC # BLD: 5.27 M/CU MM (ref 4.6–6.2)
SEGMENTED NEUTROPHILS ABSOLUTE COUNT: 11 K/CU MM
SEGMENTED NEUTROPHILS RELATIVE PERCENT: 80.2 % (ref 36–66)
SODIUM BLD-SCNC: 133 MMOL/L (ref 135–145)
TOTAL IMMATURE NEUTOROPHIL: 0.09 K/CU MM
TOTAL NUCLEATED RBC: 0 K/CU MM
WBC # BLD: 13.7 K/CU MM (ref 4–10.5)

## 2023-09-21 PROCEDURE — APPSS30 APP SPLIT SHARED TIME 16-30 MINUTES

## 2023-09-21 PROCEDURE — 83735 ASSAY OF MAGNESIUM: CPT

## 2023-09-21 PROCEDURE — 99233 SBSQ HOSP IP/OBS HIGH 50: CPT | Performed by: INTERNAL MEDICINE

## 2023-09-21 PROCEDURE — 80048 BASIC METABOLIC PNL TOTAL CA: CPT

## 2023-09-21 PROCEDURE — 1200000000 HC SEMI PRIVATE

## 2023-09-21 PROCEDURE — 6370000000 HC RX 637 (ALT 250 FOR IP): Performed by: STUDENT IN AN ORGANIZED HEALTH CARE EDUCATION/TRAINING PROGRAM

## 2023-09-21 PROCEDURE — 36415 COLL VENOUS BLD VENIPUNCTURE: CPT

## 2023-09-21 PROCEDURE — 78452 HT MUSCLE IMAGE SPECT MULT: CPT

## 2023-09-21 PROCEDURE — A9500 TC99M SESTAMIBI: HCPCS | Performed by: INTERNAL MEDICINE

## 2023-09-21 PROCEDURE — 6360000002 HC RX W HCPCS: Performed by: INTERNAL MEDICINE

## 2023-09-21 PROCEDURE — 94761 N-INVAS EAR/PLS OXIMETRY MLT: CPT

## 2023-09-21 PROCEDURE — 93017 CV STRESS TEST TRACING ONLY: CPT

## 2023-09-21 PROCEDURE — 94640 AIRWAY INHALATION TREATMENT: CPT

## 2023-09-21 PROCEDURE — 71045 X-RAY EXAM CHEST 1 VIEW: CPT

## 2023-09-21 PROCEDURE — 94664 DEMO&/EVAL PT USE INHALER: CPT

## 2023-09-21 PROCEDURE — 85025 COMPLETE CBC W/AUTO DIFF WBC: CPT

## 2023-09-21 PROCEDURE — 99223 1ST HOSP IP/OBS HIGH 75: CPT | Performed by: INTERNAL MEDICINE

## 2023-09-21 PROCEDURE — 3430000000 HC RX DIAGNOSTIC RADIOPHARMACEUTICAL: Performed by: INTERNAL MEDICINE

## 2023-09-21 PROCEDURE — 84100 ASSAY OF PHOSPHORUS: CPT

## 2023-09-21 PROCEDURE — 2700000000 HC OXYGEN THERAPY PER DAY

## 2023-09-21 PROCEDURE — 6360000002 HC RX W HCPCS

## 2023-09-21 PROCEDURE — 2580000003 HC RX 258: Performed by: STUDENT IN AN ORGANIZED HEALTH CARE EDUCATION/TRAINING PROGRAM

## 2023-09-21 PROCEDURE — APPNB60 APP NON BILLABLE TIME 46-60 MINS: Performed by: NURSE PRACTITIONER

## 2023-09-21 PROCEDURE — 6370000000 HC RX 637 (ALT 250 FOR IP): Performed by: INTERNAL MEDICINE

## 2023-09-21 PROCEDURE — 6370000000 HC RX 637 (ALT 250 FOR IP): Performed by: FAMILY MEDICINE

## 2023-09-21 PROCEDURE — 93005 ELECTROCARDIOGRAM TRACING: CPT

## 2023-09-21 PROCEDURE — 82962 GLUCOSE BLOOD TEST: CPT

## 2023-09-21 RX ORDER — DILTIAZEM HYDROCHLORIDE 240 MG/1
240 CAPSULE, COATED, EXTENDED RELEASE ORAL DAILY
Status: DISCONTINUED | OUTPATIENT
Start: 2023-09-22 | End: 2023-09-22 | Stop reason: HOSPADM

## 2023-09-21 RX ORDER — TETRAKIS(2-METHOXYISOBUTYLISOCYANIDE)COPPER(I) TETRAFLUOROBORATE 1 MG/ML
30 INJECTION, POWDER, LYOPHILIZED, FOR SOLUTION INTRAVENOUS
Status: COMPLETED | OUTPATIENT
Start: 2023-09-21 | End: 2023-09-21

## 2023-09-21 RX ORDER — MAGNESIUM SULFATE IN WATER 40 MG/ML
2000 INJECTION, SOLUTION INTRAVENOUS PRN
Status: DISCONTINUED | OUTPATIENT
Start: 2023-09-21 | End: 2023-09-22 | Stop reason: HOSPADM

## 2023-09-21 RX ORDER — POTASSIUM CHLORIDE 1.5 G/1.58G
40 POWDER, FOR SOLUTION ORAL PRN
Status: DISCONTINUED | OUTPATIENT
Start: 2023-09-21 | End: 2023-09-22 | Stop reason: HOSPADM

## 2023-09-21 RX ORDER — REGADENOSON 0.08 MG/ML
0.4 INJECTION, SOLUTION INTRAVENOUS
Status: COMPLETED | OUTPATIENT
Start: 2023-09-21 | End: 2023-09-21

## 2023-09-21 RX ORDER — FUROSEMIDE 10 MG/ML
60 INJECTION INTRAMUSCULAR; INTRAVENOUS 2 TIMES DAILY
Status: COMPLETED | OUTPATIENT
Start: 2023-09-21 | End: 2023-09-21

## 2023-09-21 RX ORDER — POTASSIUM CHLORIDE 20 MEQ/1
40 TABLET, EXTENDED RELEASE ORAL PRN
Status: DISCONTINUED | OUTPATIENT
Start: 2023-09-21 | End: 2023-09-22 | Stop reason: HOSPADM

## 2023-09-21 RX ORDER — TETRAKIS(2-METHOXYISOBUTYLISOCYANIDE)COPPER(I) TETRAFLUOROBORATE 1 MG/ML
10 INJECTION, POWDER, LYOPHILIZED, FOR SOLUTION INTRAVENOUS
Status: COMPLETED | OUTPATIENT
Start: 2023-09-21 | End: 2023-09-21

## 2023-09-21 RX ORDER — DILTIAZEM HYDROCHLORIDE 120 MG/1
120 CAPSULE, COATED, EXTENDED RELEASE ORAL ONCE
Status: COMPLETED | OUTPATIENT
Start: 2023-09-21 | End: 2023-09-21

## 2023-09-21 RX ORDER — POTASSIUM CHLORIDE 7.45 MG/ML
10 INJECTION INTRAVENOUS PRN
Status: DISCONTINUED | OUTPATIENT
Start: 2023-09-21 | End: 2023-09-22 | Stop reason: HOSPADM

## 2023-09-21 RX ORDER — FUROSEMIDE 40 MG/1
40 TABLET ORAL DAILY
Status: DISCONTINUED | OUTPATIENT
Start: 2023-09-22 | End: 2023-09-22 | Stop reason: HOSPADM

## 2023-09-21 RX ADMIN — DILTIAZEM HYDROCHLORIDE 120 MG: 120 CAPSULE, COATED, EXTENDED RELEASE ORAL at 18:00

## 2023-09-21 RX ADMIN — INSULIN LISPRO 16 UNITS: 100 INJECTION, SOLUTION INTRAVENOUS; SUBCUTANEOUS at 18:00

## 2023-09-21 RX ADMIN — BUDESONIDE AND FORMOTEROL FUMARATE DIHYDRATE 2 PUFF: 160; 4.5 AEROSOL RESPIRATORY (INHALATION) at 08:50

## 2023-09-21 RX ADMIN — FUROSEMIDE 60 MG: 10 INJECTION, SOLUTION INTRAMUSCULAR; INTRAVENOUS at 18:18

## 2023-09-21 RX ADMIN — SODIUM CHLORIDE, PRESERVATIVE FREE 10 ML: 5 INJECTION INTRAVENOUS at 09:06

## 2023-09-21 RX ADMIN — KIT FOR THE PREPARATION OF TECHNETIUM TC99M SESTAMIBI 10 MILLICURIE: 1 INJECTION, POWDER, LYOPHILIZED, FOR SOLUTION PARENTERAL at 08:10

## 2023-09-21 RX ADMIN — ACETAMINOPHEN 650 MG: 325 TABLET ORAL at 19:16

## 2023-09-21 RX ADMIN — FUROSEMIDE 60 MG: 10 INJECTION, SOLUTION INTRAMUSCULAR; INTRAVENOUS at 09:21

## 2023-09-21 RX ADMIN — SODIUM CHLORIDE, PRESERVATIVE FREE 10 ML: 5 INJECTION INTRAVENOUS at 21:52

## 2023-09-21 RX ADMIN — IPRATROPIUM BROMIDE AND ALBUTEROL SULFATE 1 DOSE: .5; 2.5 SOLUTION RESPIRATORY (INHALATION) at 08:52

## 2023-09-21 RX ADMIN — APIXABAN 5 MG: 5 TABLET, FILM COATED ORAL at 21:07

## 2023-09-21 RX ADMIN — IPRATROPIUM BROMIDE AND ALBUTEROL SULFATE 1 DOSE: .5; 2.5 SOLUTION RESPIRATORY (INHALATION) at 16:12

## 2023-09-21 RX ADMIN — INSULIN LISPRO 20 UNITS: 100 INJECTION, SOLUTION INTRAVENOUS; SUBCUTANEOUS at 17:59

## 2023-09-21 RX ADMIN — INSULIN GLARGINE 20 UNITS: 100 INJECTION, SOLUTION SUBCUTANEOUS at 08:52

## 2023-09-21 RX ADMIN — REGADENOSON 0.4 MG: 0.08 INJECTION, SOLUTION INTRAVENOUS at 11:13

## 2023-09-21 RX ADMIN — INSULIN GLARGINE 60 UNITS: 100 INJECTION, SOLUTION SUBCUTANEOUS at 21:07

## 2023-09-21 RX ADMIN — INSULIN LISPRO 20 UNITS: 100 INJECTION, SOLUTION INTRAVENOUS; SUBCUTANEOUS at 12:51

## 2023-09-21 RX ADMIN — INSULIN LISPRO 12 UNITS: 100 INJECTION, SOLUTION INTRAVENOUS; SUBCUTANEOUS at 12:46

## 2023-09-21 RX ADMIN — IPRATROPIUM BROMIDE AND ALBUTEROL SULFATE 1 DOSE: .5; 2.5 SOLUTION RESPIRATORY (INHALATION) at 04:00

## 2023-09-21 RX ADMIN — INSULIN LISPRO 8 UNITS: 100 INJECTION, SOLUTION INTRAVENOUS; SUBCUTANEOUS at 09:01

## 2023-09-21 RX ADMIN — KIT FOR THE PREPARATION OF TECHNETIUM TC99M SESTAMIBI 30 MILLICURIE: 1 INJECTION, POWDER, LYOPHILIZED, FOR SOLUTION PARENTERAL at 11:10

## 2023-09-21 RX ADMIN — ATORVASTATIN CALCIUM 40 MG: 40 TABLET, FILM COATED ORAL at 21:09

## 2023-09-21 ASSESSMENT — ENCOUNTER SYMPTOMS
EYE REDNESS: 0
COUGH: 0
EYE ITCHING: 0
SHORTNESS OF BREATH: 1
VOMITING: 0
CONSTIPATION: 0
NAUSEA: 0
DIARRHEA: 0
BACK PAIN: 0
CHEST TIGHTNESS: 0
WHEEZING: 0
PHOTOPHOBIA: 0
SORE THROAT: 0
ABDOMINAL DISTENTION: 0
ABDOMINAL PAIN: 0
EYE PAIN: 0
SINUS PRESSURE: 0
SINUS PAIN: 0
COLOR CHANGE: 0

## 2023-09-21 ASSESSMENT — PAIN DESCRIPTION - DESCRIPTORS: DESCRIPTORS: DULL;ACHING

## 2023-09-21 NOTE — PROGRESS NOTES
PAF  Acute on chronic diastolic heart failure  HTN  DM-2  Obesity BMI 42  Sp atrial fibrillation ablation in 2018      Patient PAF runs  Patient in acute on chronic diastolic dysfunction and is being treated for heart failure exacerbation. PAF could be secondary to the heart failure also or it could be recurrence. Patient previously had been on amiodarone but post ablation we discontinued it and he had been well maintained in sinus rhythm for years now. Patient currently on Cardizem and can increase the Cardizem 240 mg daily. Discussed with the patient about option of antiarrhythmic -could consider Tikosyn but patient does not want to be on it at this time as he is already been in the hospital 7 days and does not want to be here anymore and want to be discharged. Amiodarone is another option but with lung issues and already on oxygen I am little hesitant to start him on amiodarone for chest PAF at this point. Will recommend to uptitrate Cardizem and could add metoprolol low-dose as his heart rate is above 80. And diastolic heart failure we would like to keep the heart rate is a little lower.     Patient is on eliquis    Full note to follow

## 2023-09-21 NOTE — CONSULTS
Electrophysiology Consult Note      Reason for consultation: PAF    Chief complaint: shortness of breath    Referring physician:  Dr Kevin LUBIN      Primary care physician: Chriss Brower MD      History of Present Illness:     Feroz Knox is a 79year old male with a history of persistent atrial fibrillation s/p ablation in 2018, anxiety, COPD, Diabetes type2, HLD, HTN, Morbid Obesity, PAD, Hypercoagulable state, sleep apnea. He presents with complaints of shortness of breath with exertion and at rest, bilateral lower leg edema, orthopnea. He was recently admitted 2 days ago with COPD exacerbation. He was discharged home on antibiotics and steroids. He reports that his symptoms started after discharge and gradually worsened. During this admission he was noted on telemetry to have short runs of atrial fibrillation. He has been on amiodarone in the past. He had an atrial fibrillation ablation in 2018. Patient denies palpitations. He does not drink alcohol or caffeine. He reports he is compliant with his medications at home. Most recent labs: , K 3.7, Creatinine 0.7, Magnesium 1.9, WBC 13.7,   Chest xray reveals pulmonary edema. He had a stress test this admission that showed no ischemia. ECHO EF 50-55%  EP was consulted for management of atrial fibrillation.      Past medical history:   Past Medical History:   Diagnosis Date    A-fib [facility])     Resolved after ablation 2018    Anxiety     Arthritis     \"Hips, Knees, Elbows And Fingers\"    COPD (chronic obstructive pulmonary disease) ([facility])     Sees Dr. Aquiles Wilkerson    Depression     Diabetes mellitus [facility]) Dx 2404'S    Full dentures     H/O angiography 12/13/2018    peripheral angio - LFA occluded, filling collaterals, RSFA 90% stenosis-vasc surg consult    H/O Doppler ultrasound 09/05/2018    LE arterial - left mid and distal femoral artery occluded, lt FANI shows mild-mod PVD    H/O echocardiogram 01/06/2016    EF60% mild MR,

## 2023-09-21 NOTE — PROGRESS NOTES
Failure with Hypoxia   -No wheezing on exam   -Continue home inhalers   - Baseline 2L/min at home   - Was 88% on 3L/min during admission - now back to baseline. Class III Obesity       # Peptic ulcer prophylaxis: -   # DVT Prophylaxis: Apixaban       Current living situation: Home   Expected Disposition: Home with C, F/U PT/OT  Estimated discharge date: 1-2 days. On IV diuresis. May need an additional day or so. Personally reviewed Lab Studies and Imaging     Discussed management of the case during IDR - pt. To go home with [address] [facility] [address] [facility]. Currently on IV diuretics - assess his SOB and swelling again in the AM tomorrow. Drugs that require monitoring for toxicity include lasix and the method of monitoring was creatiine     Monitor apixaban with CBC     Monitor lantus and humalog with POCT glucose - watch for hypoglycemia and hyperglycemia     Review of System     Review of Systems   Constitutional:  Negative for appetite change, chills, fatigue, fever and unexpected weight change. HENT:  Negative for sinus pressure, sinus pain and sore throat. Eyes:  Negative for pain, redness and itching. Respiratory:  Positive for shortness of breath. Negative for cough, chest tightness and wheezing. Cardiovascular:  Positive for leg swelling. Negative for chest pain and palpitations. Gastrointestinal:  Negative for abdominal pain, constipation, diarrhea, nausea and vomiting. Endocrine: Negative for polydipsia, polyphagia and polyuria. Genitourinary:  Positive for decreased urine volume. Negative for difficulty urinating, dysuria, frequency, hematuria and urgency. Musculoskeletal:  Negative for arthralgias, back pain and myalgias. Skin:  Negative for pallor and rash. Neurological:  Negative for dizziness, tremors, seizures, syncope, weakness, light-headedness, numbness and headaches. Psychiatric/Behavioral:  Negative for agitation and confusion.         I have reviewed all pertinent PMHx, PSHx, FamHx, Diagnosis       Sinus rhythm with marked sinus arrhythmia  Left axis deviation  Inferior infarct (cited on or before 14-JAN-2017)  Anterolateral infarct (cited on or before 01-JAN-2018)  Abnormal ECG  When compared with ECG of 18-SEP-2023 00:12,  Significant changes have occurred       Results       Procedure Component Value Units Date/Time    Respiratory Panel, Molecular, with COVID-19 (Restricted: peds pts or suitable admitted adults) [[id number]] Collected: 09/13/23 1616    Order Status: Completed Specimen: Nasopharyngeal Updated: 09/13/23 1737     Adenovirus Detection by PCR NOT DETECTED     Coronavirus 229E PCR NOT DETECTED     Coronavirus HKU1 PCR NOT DETECTED     Coronavirus NL63 PCR NOT DETECTED     Coronavirus OC43 PCR NOT DETECTED     SARS-CoV-2 NOT DETECTED     Comment:         Fact sheet for Healthcare Providers: Omar  Fact sheet for Patients: Omar          METHODOLOGY: Multiplex PCR, NIC NON-PROBE Detection          Human Metapneumovirus PCR NOT DETECTED     Rhinovirus Enterovirus PCR NOT DETECTED     Influenza A by PCR NOT DETECTED     Influenza A H1 Pandemic PCR NOT DETECTED     Influenza A H1 (2009) PCR NOT DETECTED     Influenza A H3 PCR NOT DETECTED     Influenza B by PCR NOT DETECTED     Parainfluenza 1 PCR NOT DETECTED     Parainfluenza 2 PCR NOT DETECTED     Parainfluenza 3 PCR NOT DETECTED     Parainfluenza 4 PCR NOT DETECTED     RSV PCR NOT DETECTED     Bordetella parapertussis by PCR NOT DETECTED     B Pertussis by PCR NOT DETECTED     Chlamydophila Pneumonia PCR NOT DETECTED     Mycoplasma pneumo by PCR NOT DETECTED    Culture, Urine [[id number]] Collected: 09/13/23 0809    Order Status: Completed Specimen: Urine, clean catch Updated: 09/14/23 1321     Specimen URINE CLEAN CATCH     Special Requests NONE     Culture Final Report <50,000 CFU/ml mixed skin/urogenital digna.  No further workup    Narrative:      SETUP

## 2023-09-21 NOTE — PROGRESS NOTES
Gómez Nieves PA-C, 9/21/2023 1:08 PM          CARDIOLOGY ATTENDING ADDENDUM    I have seen, spoken to and examined this patient personally, independent of the NP/PAC. I have reviewed the hospital care given to date and reviewed all pertinent labs and imaging. I have spoken with patient, nursing staff and provided written and verbal instructions . The above note has been reviewed. I have spent substantive amount of time in formulating patient care. Physical Exam:    General:   alert  Head: normal  Eye: normal  Chest:  Clear to auscultation  0 Basilar crackles   Cardiovascular:  S1S2   Abdomen: soft   Extremities:  0 edema  Pulses :  palpable      MEDICAL DECISION MAKING :         ASSESSMENT/ PLAN:  Acute on chronic heart Failure with preserved EF:     Last echocardiogram shows preserved LV ejection fraction  IV lasix 60 mg twice daily. Consider switching to oral tomorrow. Stress test negative for ischemia  Echocardiogram showing preserved EF, no wall motion abnormalities       Observe for drug toxicity by ordering daily Chem-7 magnesium. Daily monitoring for renal function with creatinine, magnesium and potassium levels      Obtain serial chest x-rays to assess for response. No significant pulm edema today, however swelling present  Recommend close monitoring of daily weights  Strict intake output monitoring  Low-salt diet     History of atrial fibrillation s/p ablation:  Paroxysmal atrial fibrillation noted on EKG tracing during stress test today    Will consult EP     Continue with Eliquis 5 mg p.o. twice daily for stroke prophylaxis  Continue with Cardizem 120 mg p.o. daily. Diabetes mellitus: On insulin. Essential hypertension continue with medication as above  Hyperlipidemia: Continue with statins Lipitor 40 daily  Suspected sleep apnea: Recommend sleep study as outpatient      Echo 09/20:  Technically difficult exam due to body habitus; definity was utilized.   Left ventricular systolic function is low normal.  Ejection fraction is visually estimated at 50-55%. Mitral annular calcification is present. No evidence of any pericardial effusion. Pericardial fat pad noted. Stress 09/21:   Normal tracer uptake in all segments of myocardium on stress ans rest   images. Stress perfusion images better than resting - Decreased uptake   inferiorly due to diaphragmatic artifact. No infarct or ischemia noted.    Gated study not reliable with PAF noted during stress test      Recommendation   PAF noted with Lexiscan , will get 12 leads EKG then consider EP evaluation   No inducible ischemia   Risk Factor Modification      Dr. Annie Reed MD

## 2023-09-21 NOTE — CONSULTS
Electrophysiology Consult Note      Reason for consultation:  PAF    Chief complaint :Shortness of breath, edema, orthopnea    Referring physician:       Primary care physician: Lizbeth Berry MD      History of Present Illness:     Patient is a 70-year-old male with a history of COPD, hypertension, hyperlipidemia, diabetes mellitus, atrial fibrillation with history of ablation in 2018, peripheral arterial disease and morbid obesity presents with complaints of shortness of breath at rest and exertion with orthopnea and bilateral lower leg edema which has been going for few weeks but worsened in the last 2 days along with difficulty breathing and wheezing so now admitted for heart failure exacerbation and COPD exacerbation. Patient was discharged home recently after admission on antibiotics and steroids and he felt his symptoms worsen so he came back to the hospital.  During that admission he had was having short runs of atrial fibrillation on the monitor. He was on amiodarone post ablation for the blanking period. But stopped because he did not need it and he maintained sinus rhythm. Patient denies any palpitations patient does not drink alcohol or caffeine. Patient reports he is compliant with his medication. Patient denies any chest pain, dizziness or syncope.     Pastmedical history:   Past Medical History:   Diagnosis Date    A-fib [facility])     Resolved after ablation 2018    Anxiety     Arthritis     \"Hips, Knees, Elbows And Fingers\"    COPD (chronic obstructive pulmonary disease) ([facility])     Sees Dr. Mills Prior    Depression     Diabetes mellitus [facility]) Dx 8037'U    Full dentures     H/O angiography 12/13/2018    peripheral angio - LFA occluded, filling collaterals, RSFA 90% stenosis-vasc surg consult    H/O Doppler ultrasound 09/05/2018    LE arterial - left mid and distal femoral artery occluded, lt FANI shows mild-mod PVD    H/O echocardiogram 01/06/2016 Mouth: Mucous membranes are moist.   Eyes:      Extraocular Movements: Extraocular movements intact. Conjunctiva/sclera: Conjunctivae normal.      Pupils: Pupils are equal, round, and reactive to light. Cardiovascular:      Rate and Rhythm: Normal rate and regular rhythm. Heart sounds: Murmur (grade 2/6 systolic murmur) heard. Pulmonary:      Effort: Pulmonary effort is normal. No respiratory distress. Breath sounds: Wheezing, rhonchi and rales present. Abdominal:      General: Abdomen is flat. Bowel sounds are normal. There is no distension. Palpations: Abdomen is soft. Tenderness: There is no abdominal tenderness. Musculoskeletal:         General: No tenderness. Cervical back: Normal range of motion. No tenderness. Right lower leg: Edema present. Left lower leg: Edema present. Skin:     General: Skin is warm. Neurological:      General: No focal deficit present. Mental Status: He is alert and oriented to person, place, and time. Cranial Nerves: No cranial nerve deficit.    Psychiatric:         Mood and Affect: Mood normal.               CBC:   Lab Results   Component Value Date/Time    WBC 12.8 09/20/2023 03:01 AM    HGB 16.4 09/20/2023 03:01 AM    HCT 49.0 09/20/2023 03:01 AM     09/20/2023 03:01 AM     Lipids:  Lab Results   Component Value Date    CHOL 159 03/01/2023    TRIG 182 (H) 03/01/2023    HDL 29 (L) 03/01/2023    LDLCALC 94 03/01/2023     PT/INR:   Lab Results   Component Value Date/Time    INR 1.0 09/18/2023 02:08 PM        BMP:    Lab Results   Component Value Date     (L) 09/20/2023    K 3.7 09/20/2023    CL 87 (L) 09/20/2023    CO2 31 09/20/2023    BUN 15 09/20/2023     CMP:   Lab Results   Component Value Date    AST 14 (L) 09/19/2023    ALT 21 09/19/2023    PROT 6.1 (L) 09/19/2023    BILITOT 0.5 09/19/2023    ALKPHOS 93 09/19/2023     TSH:    Lab Results   Component Value Date/Time    TSH 2.15 03/06/2019 10:22 AM

## 2023-09-21 NOTE — PROGRESS NOTES
Patient has been ordering double portions of food for meals, in addition having meal here he has been ordering [employer], this nurse notified Doctor. ..

## 2023-09-21 NOTE — PROGRESS NOTES
09/21/23 1121   Encounter Summary   Encounter Overview/Reason  Attempted Encounter  (PT out of room; follow-up)   Service Provided For: Patient not available   Referral/Consult From: [address] Family members   Last Encounter  09/21/23  (PT out of room; will follow-up)   Complexity of Encounter Low   Begin Time 1118   End Time  1124   Total Time Calculated 6 min   Assessment/Intervention/Outcome   Assessment Unable to assess   Plan and Referrals   Plan/Referrals Continue to visit, (comment)  (follow-up)

## 2023-09-22 ENCOUNTER — CARE COORDINATION (OUTPATIENT)
Dept: CARE COORDINATION | Age: 68
End: 2023-09-22

## 2023-09-22 VITALS
DIASTOLIC BLOOD PRESSURE: 80 MMHG | SYSTOLIC BLOOD PRESSURE: 131 MMHG | HEART RATE: 85 BPM | OXYGEN SATURATION: 95 % | RESPIRATION RATE: 14 BRPM | BODY MASS INDEX: 36.45 KG/M2 | TEMPERATURE: 99.5 F | HEIGHT: 78 IN | WEIGHT: 315 LBS

## 2023-09-22 DIAGNOSIS — I48.0 PAROXYSMAL ATRIAL FIBRILLATION (HCC): Primary | ICD-10-CM

## 2023-09-22 LAB
ANION GAP SERPL CALCULATED.3IONS-SCNC: 10 MMOL/L (ref 4–16)
BASOPHILS ABSOLUTE: 0 K/CU MM
BASOPHILS RELATIVE PERCENT: 0.3 % (ref 0–1)
BUN SERPL-MCNC: 15 MG/DL (ref 6–23)
CALCIUM SERPL-MCNC: 8.7 MG/DL (ref 8.3–10.6)
CHLORIDE BLD-SCNC: 92 MMOL/L (ref 99–110)
CO2: 36 MMOL/L (ref 21–32)
CREAT SERPL-MCNC: 0.6 MG/DL (ref 0.9–1.3)
DIFFERENTIAL TYPE: ABNORMAL
EKG ATRIAL RATE: 87 BPM
EKG ATRIAL RATE: 96 BPM
EKG DIAGNOSIS: NORMAL
EKG DIAGNOSIS: NORMAL
EKG P AXIS: 24 DEGREES
EKG P AXIS: 74 DEGREES
EKG P-R INTERVAL: 196 MS
EKG P-R INTERVAL: 198 MS
EKG Q-T INTERVAL: 368 MS
EKG Q-T INTERVAL: 404 MS
EKG QRS DURATION: 112 MS
EKG QRS DURATION: 98 MS
EKG QTC CALCULATION (BAZETT): 464 MS
EKG QTC CALCULATION (BAZETT): 486 MS
EKG R AXIS: -64 DEGREES
EKG R AXIS: -66 DEGREES
EKG T AXIS: 63 DEGREES
EKG T AXIS: 90 DEGREES
EKG VENTRICULAR RATE: 87 BPM
EKG VENTRICULAR RATE: 96 BPM
EOSINOPHILS ABSOLUTE: 0.2 K/CU MM
EOSINOPHILS RELATIVE PERCENT: 1.4 % (ref 0–3)
GFR SERPL CREATININE-BSD FRML MDRD: >60 ML/MIN/1.73M2
GLUCOSE BLD-MCNC: 221 MG/DL (ref 70–99)
GLUCOSE BLD-MCNC: 222 MG/DL (ref 70–99)
GLUCOSE BLD-MCNC: 241 MG/DL (ref 70–99)
GLUCOSE SERPL-MCNC: 235 MG/DL (ref 70–99)
HCT VFR BLD CALC: 47.2 % (ref 42–52)
HEMOGLOBIN: 15.5 GM/DL (ref 13.5–18)
IMMATURE NEUTROPHIL %: 1 % (ref 0–0.43)
LYMPHOCYTES ABSOLUTE: 1.9 K/CU MM
LYMPHOCYTES RELATIVE PERCENT: 17.5 % (ref 24–44)
MAGNESIUM: 1.8 MG/DL (ref 1.8–2.4)
MCH RBC QN AUTO: 30.6 PG (ref 27–31)
MCHC RBC AUTO-ENTMCNC: 32.8 % (ref 32–36)
MCV RBC AUTO: 93.1 FL (ref 78–100)
MONOCYTES ABSOLUTE: 1 K/CU MM
MONOCYTES RELATIVE PERCENT: 9.5 % (ref 0–4)
NUCLEATED RBC %: 0 %
PDW BLD-RTO: 14.7 % (ref 11.7–14.9)
PHOSPHORUS: 4.9 MG/DL (ref 2.5–4.9)
PLATELET # BLD: 136 K/CU MM (ref 140–440)
PMV BLD AUTO: 10 FL (ref 7.5–11.1)
POTASSIUM SERPL-SCNC: 3.6 MMOL/L (ref 3.5–5.1)
RBC # BLD: 5.07 M/CU MM (ref 4.6–6.2)
SEGMENTED NEUTROPHILS ABSOLUTE COUNT: 7.5 K/CU MM
SEGMENTED NEUTROPHILS RELATIVE PERCENT: 70.3 % (ref 36–66)
SODIUM BLD-SCNC: 138 MMOL/L (ref 135–145)
TOTAL IMMATURE NEUTOROPHIL: 0.11 K/CU MM
TOTAL NUCLEATED RBC: 0 K/CU MM
WBC # BLD: 10.7 K/CU MM (ref 4–10.5)

## 2023-09-22 PROCEDURE — 6370000000 HC RX 637 (ALT 250 FOR IP): Performed by: STUDENT IN AN ORGANIZED HEALTH CARE EDUCATION/TRAINING PROGRAM

## 2023-09-22 PROCEDURE — 2700000000 HC OXYGEN THERAPY PER DAY

## 2023-09-22 PROCEDURE — 82962 GLUCOSE BLOOD TEST: CPT

## 2023-09-22 PROCEDURE — 6370000000 HC RX 637 (ALT 250 FOR IP): Performed by: INTERNAL MEDICINE

## 2023-09-22 PROCEDURE — 2580000003 HC RX 258: Performed by: STUDENT IN AN ORGANIZED HEALTH CARE EDUCATION/TRAINING PROGRAM

## 2023-09-22 PROCEDURE — 99232 SBSQ HOSP IP/OBS MODERATE 35: CPT | Performed by: INTERNAL MEDICINE

## 2023-09-22 PROCEDURE — 93005 ELECTROCARDIOGRAM TRACING: CPT | Performed by: NURSE PRACTITIONER

## 2023-09-22 PROCEDURE — APPSS30 APP SPLIT SHARED TIME 16-30 MINUTES

## 2023-09-22 PROCEDURE — 93010 ELECTROCARDIOGRAM REPORT: CPT | Performed by: INTERNAL MEDICINE

## 2023-09-22 PROCEDURE — 85025 COMPLETE CBC W/AUTO DIFF WBC: CPT

## 2023-09-22 PROCEDURE — 83735 ASSAY OF MAGNESIUM: CPT

## 2023-09-22 PROCEDURE — 84100 ASSAY OF PHOSPHORUS: CPT

## 2023-09-22 PROCEDURE — 94761 N-INVAS EAR/PLS OXIMETRY MLT: CPT

## 2023-09-22 PROCEDURE — 94640 AIRWAY INHALATION TREATMENT: CPT

## 2023-09-22 PROCEDURE — 36415 COLL VENOUS BLD VENIPUNCTURE: CPT

## 2023-09-22 PROCEDURE — 80048 BASIC METABOLIC PNL TOTAL CA: CPT

## 2023-09-22 RX ORDER — LANOLIN ALCOHOL/MO/W.PET/CERES
400 CREAM (GRAM) TOPICAL DAILY
Status: DISCONTINUED | OUTPATIENT
Start: 2023-09-22 | End: 2023-09-22 | Stop reason: HOSPADM

## 2023-09-22 RX ORDER — POTASSIUM CHLORIDE 20 MEQ/1
40 TABLET, EXTENDED RELEASE ORAL ONCE
Status: COMPLETED | OUTPATIENT
Start: 2023-09-22 | End: 2023-09-22

## 2023-09-22 RX ORDER — FUROSEMIDE 40 MG/1
40 TABLET ORAL DAILY
Qty: 30 TABLET | Refills: 0 | Status: SHIPPED | OUTPATIENT
Start: 2023-09-22

## 2023-09-22 RX ORDER — POTASSIUM CHLORIDE 750 MG/1
10 TABLET, EXTENDED RELEASE ORAL DAILY
Qty: 30 TABLET | Refills: 0 | Status: SHIPPED | OUTPATIENT
Start: 2023-09-22

## 2023-09-22 RX ORDER — INSULIN DETEMIR 100 [IU]/ML
INJECTION, SOLUTION SUBCUTANEOUS
Qty: 15 ML | Refills: 10 | Status: SHIPPED | OUTPATIENT
Start: 2023-09-22

## 2023-09-22 RX ORDER — DILTIAZEM HYDROCHLORIDE 240 MG/1
240 CAPSULE, COATED, EXTENDED RELEASE ORAL DAILY
Qty: 30 CAPSULE | Refills: 0 | Status: SHIPPED | OUTPATIENT
Start: 2023-09-23

## 2023-09-22 RX ORDER — LANOLIN ALCOHOL/MO/W.PET/CERES
400 CREAM (GRAM) TOPICAL DAILY
Qty: 30 TABLET | Refills: 0 | Status: SHIPPED | OUTPATIENT
Start: 2023-09-23

## 2023-09-22 RX ADMIN — APIXABAN 5 MG: 5 TABLET, FILM COATED ORAL at 08:56

## 2023-09-22 RX ADMIN — Medication 400 MG: at 08:57

## 2023-09-22 RX ADMIN — INSULIN LISPRO 20 UNITS: 100 INJECTION, SOLUTION INTRAVENOUS; SUBCUTANEOUS at 08:57

## 2023-09-22 RX ADMIN — BUDESONIDE AND FORMOTEROL FUMARATE DIHYDRATE 2 PUFF: 160; 4.5 AEROSOL RESPIRATORY (INHALATION) at 11:28

## 2023-09-22 RX ADMIN — POTASSIUM CHLORIDE 40 MEQ: 1500 TABLET, EXTENDED RELEASE ORAL at 08:56

## 2023-09-22 RX ADMIN — IPRATROPIUM BROMIDE AND ALBUTEROL SULFATE 1 DOSE: .5; 2.5 SOLUTION RESPIRATORY (INHALATION) at 11:28

## 2023-09-22 RX ADMIN — INSULIN LISPRO 4 UNITS: 100 INJECTION, SOLUTION INTRAVENOUS; SUBCUTANEOUS at 08:57

## 2023-09-22 RX ADMIN — DILTIAZEM HYDROCHLORIDE 240 MG: 240 CAPSULE, COATED, EXTENDED RELEASE ORAL at 08:56

## 2023-09-22 RX ADMIN — INSULIN GLARGINE 20 UNITS: 100 INJECTION, SOLUTION SUBCUTANEOUS at 08:58

## 2023-09-22 RX ADMIN — SODIUM CHLORIDE, PRESERVATIVE FREE 10 ML: 5 INJECTION INTRAVENOUS at 08:57

## 2023-09-22 ASSESSMENT — PAIN SCALES - GENERAL: PAINLEVEL_OUTOF10: 6

## 2023-09-22 ASSESSMENT — PAIN DESCRIPTION - LOCATION: LOCATION: SHOULDER

## 2023-09-22 ASSESSMENT — PAIN DESCRIPTION - ORIENTATION: ORIENTATION: RIGHT

## 2023-09-22 ASSESSMENT — PAIN - FUNCTIONAL ASSESSMENT: PAIN_FUNCTIONAL_ASSESSMENT: ACTIVITIES ARE NOT PREVENTED

## 2023-09-22 ASSESSMENT — PAIN DESCRIPTION - DESCRIPTORS: DESCRIPTORS: DULL;ACHING

## 2023-09-22 NOTE — CARE COORDINATION
Pt from home, If ride is needed will need to set up transport. Call [phone] if [facility] is needed, or call for rides plus if ambulance is not needed.

## 2023-09-22 NOTE — PROGRESS NOTES
CLINICAL PHARMACY NOTE: MEDS TO BEDS    Total # of Prescriptions Filled: 3   The following medications were delivered to the patient:  DILTIAZEM ER  MAGNESIUM OXIDE  POTASSIUM CL    Additional Documentation:    Delivered by volunteer to pts room.  Signed by pt - DF

## 2023-09-22 NOTE — PROGRESS NOTES
Physician Progress Note      PATIENT:               Fernando Adame  CSN #:                  [id number]  :                       1955  ADMIT DATE:       2023 11:08 PM  [facility] DATE:        2023 12:36 PM  RESPONDING  PROVIDER #:        Zayra Cat MD          QUERY TEXT:    Patient with BMI of 42.14. If possible, please document in progress notes and   discharge summary if you are evaluating and /or treating any of the following: The medical record reflects the following:  Risk Factors: lifestyle choices  Clinical Indicators: Pt admitted with BMI of 46.19, current BMI is 42.14. Pt   has been being diuresed which could account for weight loss. Treatment: will require lifestyle modification    Thank you,  Belia Lombardi RN    ? Specificity of obesity and morbid obesity should be reported based on   physician documentation, as there are several published classifications and   definitions?  MS-DRG Training Guide. CDC:   https://cook-ford.info/. WHO:   http://traore.biz/. NIH:   LargeFood.be  Options provided:  -- Morbid obesity  -- Other - I will add my own diagnosis  -- Disagree - Not applicable / Not valid  -- Disagree - Clinically unable to determine / Unknown  -- Refer to Clinical Documentation Reviewer    PROVIDER RESPONSE TEXT:    This patient has morbid obesity.     Query created by: Karissa Galdamez on 2023 11:48 AM      Electronically signed by:  Zayra Cat MD 2023 1:36 PM

## 2023-09-22 NOTE — CARE COORDINATION
[facility] Liaison aware of discharge & will initiate [facility] AT [facility]. Telephone Encounter by Lala Moran OT at 09/29/17 07:51 AM     Author:  Lala Moran OT Service:  (none) Author Type:  Occupational Therapist     Filed:  09/29/17 07:55 AM Encounter Date:  9/29/2017 Status:  Signed     :  Lala Moran OT (Occupational Therapist)            Dr. Rodriguez,   I saw this patient for her initial evaluation in Occupational Therapy s/p her left carpal tunnel release performed on[AB1.1M] September 11, 2017. Patient also presents with pain at her left thumb carpometacarpal joint and a positive left Grind's test consistent with first carpometacarpal degenerative changes.[AB1.1C] I believe she may benefit from a custom hand-based thermoplastic orthosis to stabilize her CMC joint. If you agree, please place rehab order and specify custom splint in the comments section.   Please let me know if you have any questions or concerns.  With Appreciation,  TERA Up/L[AB1.1M]       Revision History        User Key Date/Time User Provider Type Action    > AB1.1 09/29/17 07:55 AM Lala Moran OT Occupational Therapist Sign    C - Copied, M - Manual

## 2023-09-22 NOTE — DISCHARGE INSTRUCTIONS
Internal Medicine Discharge Instruction    Discharge to:  Home  Diet: Diabetic, cardiac diet   Activity: As tolerated       Be compliant with medications  Please take medications as prescribed   Please get blood work done in a few days  Please make sure you check your sugars four times a day         Electronically signed by Oneida Solis MD on 9/22/2023 at 9:55 AM

## 2023-09-24 PROBLEM — I48.0 PAF (PAROXYSMAL ATRIAL FIBRILLATION) (HCC): Status: ACTIVE | Noted: 2023-09-24

## 2023-09-24 ASSESSMENT — ENCOUNTER SYMPTOMS
COUGH: 0
BLOOD IN STOOL: 0
VOMITING: 0
EYE PAIN: 0
ABDOMINAL PAIN: 0
SHORTNESS OF BREATH: 1
PHOTOPHOBIA: 0
NAUSEA: 0
CHEST TIGHTNESS: 0
COLOR CHANGE: 0
CONSTIPATION: 0
BACK PAIN: 0
DIARRHEA: 0
WHEEZING: 0

## 2023-09-25 ENCOUNTER — CARE COORDINATION (OUTPATIENT)
Dept: CASE MANAGEMENT | Age: 68
End: 2023-09-25

## 2023-09-25 NOTE — CARE COORDINATION
Care Transitions Outreach Attempt    Call within 2 business days of discharge: Yes     Attempted to reach patient for transitions of care follow up. Unable to reach patient. (#1 initial unsuccessful) CTN to try again. Patient: Таьтяна Alcaraz Patient : 1955 MRN: [id number]    Last Discharge [address]       Date Complaint Diagnosis Description Type Department Provider    23 Shortness of Breath COPD exacerbation ([address] [facility]) . .. ED to Hosp-Admission (Discharged) (ADMITTED) Kinjal Read MD; Gala Aranda. ..         Discharge Facility: [facility]    Noted following upcoming appointments from discharge chart review:   [facility] follow up appointment(s):   Future Appointments   Date Time Provider [address]   2023  2:20 PM Kenny Cueva, APRN - [address] [facility]   11/10/2023  1:45 PM Jaime Ogden MD [facility]     Non-[facility] follow up appointment(s): KAE Gil RN -[phone]

## 2023-09-26 ENCOUNTER — CARE COORDINATION (OUTPATIENT)
Dept: CARE COORDINATION | Age: 68
End: 2023-09-26

## 2023-09-26 NOTE — CARE COORDINATION
Patient Current Location: Home: [address] [facility]       Phone call to Pt to follow up regarding low vision services. Pt declined to continue with referral process, stating he doesn't think it will do him much good and he is aware his vision will not improve. Pt reported he declined Passport services because he would need to pay some money out of pocket and he can't afford it. HDM - SW offered to contact insurance to determine if they would provide 2 weeks of post discharge HDM - Pt declined HDM and stated his cousin cooks for him and he gets groceries delivered by [employer] - declined current need for HHA, stated a nurse is coming to the home currently and that is all he needs. SW plan of care: SW will resolve from SW services as Pt did not identify any current needs.

## 2023-09-27 ENCOUNTER — CARE COORDINATION (OUTPATIENT)
Dept: CASE MANAGEMENT | Age: 68
End: 2023-09-27

## 2023-09-27 NOTE — CARE COORDINATION
[address] [place] [address] Care Transitions Initial Follow Up Call    Call within 2 business days of discharge: Yes    Patient Current Location:  Home: [address] Transition Nurse contacted the patient by telephone to perform post hospital discharge assessment. Verified name and  with patient as identifiers. Provided introduction to self, and explanation of the Care Transition Nurse role. Patient: Filemon Wild Patient : 1955   MRN: [id number]  Reason for Admission: A/C CHF, AFib  Discharge Date: 23 RARS: Readmission Risk Score: 26.5      Last Discharge [address] [facility]       Date Complaint Diagnosis Description Type Department Provider    23 Shortness of Breath COPD exacerbation ([address]) . .. ED to Hosp-Admission (Discharged) (ADMITTED) Radha Bautista MD; Tong Samuels. .. Was this an external facility discharge? No Discharge Facility: [facility]    Challenges to be reviewed by the provider   Additional needs identified to be addressed with provider: No  none               Method of communication with provider: none. Spoke with Clarence Wilson, says he is feeling somewhat better. His shortness of breath/RAE has improved slightly but its still there. Denies chest pain/palpitations. Appetite good, No n/v/d.  [address] has come out, says he is working with them for a home CHF monitoring program. He is using home O2 @ 2-3L/NC, continuously. SpO2 94% while O2 in place. He says he spoke to our  (reviewed Erin's note) yesterday, he is refusing any further assistance for vision svs, feels it wont do much good as vision won't improve. He is now declining Passport svs & home delivered meals as he is worried about addtl costs which he can't afford. He has worked with [employer] in the recent past, has been noncompliant with application requirements & they have given him several med vouchers for assistance.      Discussed importance of adhering to CHF

## 2023-10-03 ENCOUNTER — CARE COORDINATION (OUTPATIENT)
Dept: CASE MANAGEMENT | Age: 68
End: 2023-10-03

## 2023-10-03 NOTE — CARE COORDINATION
plan, and red flags with patient and discussed any barriers to care and/or understanding of plan of care after discharge. Discussed appropriate site of care based on symptoms and resources available to patient including: PCP  Specialist  Urgent care clinics  Home health  When to call Krishan Davidson. The patient agrees to contact the PCP office for questions related to their healthcare. Advance Care Planning:   reviewed and current. Patients top risk factors for readmission: CHF, COPD, PAF, DM, HTN, macular degen  Interventions to address risk factors: Scheduled appointment with PCP-11/10/23 or sooner, Scheduled appointment with Specialist-10/16/23 Cardio, and Assessment and support for treatment adherence and medication management-glucometer ordered 8/30/23? Offered patient enrollment in the Remote Patient Monitoring (RPM) program for in-home monitoring: Patient is not eligible for RPM program.     Care Transitions Subsequent and Final Call    Schedule Follow Up Appointment with PCP: Declined  Subsequent and Final Calls  Do you have any ongoing symptoms?: Yes  Onset of Patient-reported symptoms:  In the past 7 days  Patient-reported symptoms: Shortness of Breath  Interventions for patient-reported symptoms: Notified Home Care  Have your medications changed?: No  Do you have any questions related to your medications?: No  Do you currently have any active services?: Yes  Are you currently active with any services?: Home Health  Do you have any needs or concerns that I can assist you with?: No  Identified Barriers: Lack of Education, Lack of Motivation  Care Transitions Interventions   Home Care Waiver: Completed  Other Services: Completed      Transportation Support: Declined     Registered Dietician: Declined DME Assistance: Declined          Medication Assistance Program: Completed       Social Work: Completed    Disease Association: Completed      Other Interventions:             Care Transition

## 2023-10-04 ENCOUNTER — CARE COORDINATION (OUTPATIENT)
Dept: CASE MANAGEMENT | Age: 68
End: 2023-10-04

## 2023-10-04 NOTE — CARE COORDINATION
Care Transitions Follow Up Call    Patient Current Location:  Home: [address] [place] [address] [employer] Transition Nurse contacted the patient by telephone to follow up after admission on 23. Verified name and  with patient as identifiers. Patient: Lenin Mckeon  Patient : 1955   MRN: [id number]  Reason for Admission: CHF  Discharge Date: 23 RARS: Readmission Risk Score: 26.5      Needs to be reviewed by the provider   Additional needs identified to be addressed with provider: No  none             Method of communication with provider: none. CTN call to Andrew Newell today and he says he is feeling ok-no worse. Denies inc. Sob, swelling, dizziness, chest pain, n/v/d, fever, chills, wheezing. Says is resting today. Does not sound sob while speaking. Aware of PCP HFU 10/6/23 @ 11:30 says he has transportation. O2 2-3l/min pO2=did not check  We discussed if sxs worsen-> ED. He expressed understanding. Addressed changes since last contact:  home health care-[facility] active  labs-BMP, PH, Mg need completed  Discussed follow-up appointments. If no appointment was previously scheduled, appointment scheduling offered: Yes. Is follow up appointment scheduled within 7 days of discharge? No.    Follow Up  Future Appointments   Date Time Provider [address]   10/6/2023 11:30 AM Patricia Hedrick MD [place] [address] [facility]   10/16/2023  3:00 PM BRENNAN Nunez - CNP [facility]   11/10/2023  1:45 PM Patricia Hedrick MD [place] [facility]     External follow up appointment(s):     Care Transition Nurse reviewed discharge instructions, medical action plan, and red flags with patient and discussed any barriers to care and/or understanding of plan of care after discharge. Discussed appropriate site of care based on symptoms and resources available to patient including: PCP  Specialist  Urgent care clinics  Home health  When to call [phone].  The

## 2023-10-11 ENCOUNTER — CARE COORDINATION (OUTPATIENT)
Dept: CASE MANAGEMENT | Age: 68
End: 2023-10-11

## 2023-10-11 ENCOUNTER — APPOINTMENT (OUTPATIENT)
Dept: GENERAL RADIOLOGY | Age: 68
DRG: 190 | End: 2023-10-11
Payer: MEDICARE

## 2023-10-11 ENCOUNTER — HOSPITAL ENCOUNTER (INPATIENT)
Age: 68
LOS: 3 days | Discharge: HOME | DRG: 190 | End: 2023-10-15
Attending: STUDENT IN AN ORGANIZED HEALTH CARE EDUCATION/TRAINING PROGRAM | Admitting: STUDENT IN AN ORGANIZED HEALTH CARE EDUCATION/TRAINING PROGRAM
Payer: MEDICARE

## 2023-10-11 DIAGNOSIS — R06.09 DYSPNEA ON EXERTION: ICD-10-CM

## 2023-10-11 DIAGNOSIS — J44.1 COPD EXACERBATION (HCC): Primary | ICD-10-CM

## 2023-10-11 DIAGNOSIS — R09.02 HYPOXIA: ICD-10-CM

## 2023-10-11 LAB
ALBUMIN SERPL-MCNC: 3.1 GM/DL (ref 3.4–5)
ALP BLD-CCNC: 110 IU/L (ref 40–128)
ALT SERPL-CCNC: 14 U/L (ref 10–40)
ANION GAP SERPL CALCULATED.3IONS-SCNC: 11 MMOL/L (ref 4–16)
AST SERPL-CCNC: 19 IU/L (ref 15–37)
BASE EXCESS MIXED: 15.3 (ref 0–1.2)
BASOPHILS ABSOLUTE: 0 K/CU MM
BASOPHILS RELATIVE PERCENT: 0.3 % (ref 0–1)
BILIRUB SERPL-MCNC: 0.5 MG/DL (ref 0–1)
BUN SERPL-MCNC: 8 MG/DL (ref 6–23)
CALCIUM SERPL-MCNC: 9.1 MG/DL (ref 8.3–10.6)
CHLORIDE BLD-SCNC: 88 MMOL/L (ref 99–110)
CO2: 33 MMOL/L (ref 21–32)
COMMENT: ABNORMAL
CREAT SERPL-MCNC: 0.5 MG/DL (ref 0.9–1.3)
DIFFERENTIAL TYPE: ABNORMAL
EOSINOPHILS ABSOLUTE: 0.1 K/CU MM
EOSINOPHILS RELATIVE PERCENT: 1.4 % (ref 0–3)
GFR SERPL CREATININE-BSD FRML MDRD: >60 ML/MIN/1.73M2
GLUCOSE SERPL-MCNC: 369 MG/DL (ref 70–99)
HCO3 VENOUS: 44.5 MMOL/L (ref 19–25)
HCT VFR BLD CALC: 47.5 % (ref 42–52)
HEMOGLOBIN: 15.2 GM/DL (ref 13.5–18)
IMMATURE NEUTROPHIL %: 0.8 % (ref 0–0.43)
LYMPHOCYTES ABSOLUTE: 1.1 K/CU MM
LYMPHOCYTES RELATIVE PERCENT: 10.8 % (ref 24–44)
MAGNESIUM: 2 MG/DL (ref 1.8–2.4)
MCH RBC QN AUTO: 31.3 PG (ref 27–31)
MCHC RBC AUTO-ENTMCNC: 32 % (ref 32–36)
MCV RBC AUTO: 97.9 FL (ref 78–100)
MONOCYTES ABSOLUTE: 0.7 K/CU MM
MONOCYTES RELATIVE PERCENT: 7 % (ref 0–4)
NUCLEATED RBC %: 0 %
O2 SAT, VEN: 88.2 % (ref 50–70)
PCO2, VEN: 77 MMHG (ref 38–52)
PDW BLD-RTO: 15.9 % (ref 11.7–14.9)
PH VENOUS: 7.37 (ref 7.32–7.42)
PLATELET # BLD: 183 K/CU MM (ref 140–440)
PMV BLD AUTO: 10 FL (ref 7.5–11.1)
PO2, VEN: 64 MMHG (ref 28–48)
POTASSIUM SERPL-SCNC: 4.5 MMOL/L (ref 3.5–5.1)
PRO-BNP: 334.5 PG/ML
RBC # BLD: 4.85 M/CU MM (ref 4.6–6.2)
REASON FOR REJECTION: NORMAL
REJECTED TEST: NORMAL
SEGMENTED NEUTROPHILS ABSOLUTE COUNT: 8.1 K/CU MM
SEGMENTED NEUTROPHILS RELATIVE PERCENT: 79.7 % (ref 36–66)
SODIUM BLD-SCNC: 132 MMOL/L (ref 135–145)
TOTAL IMMATURE NEUTOROPHIL: 0.08 K/CU MM
TOTAL NUCLEATED RBC: 0 K/CU MM
TOTAL PROTEIN: 6.6 GM/DL (ref 6.4–8.2)
TROPONIN, HIGH SENSITIVITY: 33 NG/L (ref 0–22)
WBC # BLD: 10.2 K/CU MM (ref 4–10.5)

## 2023-10-11 PROCEDURE — 99285 EMERGENCY DEPT VISIT HI MDM: CPT

## 2023-10-11 PROCEDURE — 80053 COMPREHEN METABOLIC PANEL: CPT

## 2023-10-11 PROCEDURE — 83880 ASSAY OF NATRIURETIC PEPTIDE: CPT

## 2023-10-11 PROCEDURE — 82805 BLOOD GASES W/O2 SATURATION: CPT

## 2023-10-11 PROCEDURE — 84484 ASSAY OF TROPONIN QUANT: CPT

## 2023-10-11 PROCEDURE — 83735 ASSAY OF MAGNESIUM: CPT

## 2023-10-11 PROCEDURE — 71045 X-RAY EXAM CHEST 1 VIEW: CPT

## 2023-10-11 PROCEDURE — 93005 ELECTROCARDIOGRAM TRACING: CPT | Performed by: STUDENT IN AN ORGANIZED HEALTH CARE EDUCATION/TRAINING PROGRAM

## 2023-10-11 PROCEDURE — 85025 COMPLETE CBC W/AUTO DIFF WBC: CPT

## 2023-10-11 PROCEDURE — 2700000000 HC OXYGEN THERAPY PER DAY

## 2023-10-11 PROCEDURE — 6370000000 HC RX 637 (ALT 250 FOR IP): Performed by: STUDENT IN AN ORGANIZED HEALTH CARE EDUCATION/TRAINING PROGRAM

## 2023-10-11 RX ORDER — PREDNISONE 20 MG/1
60 TABLET ORAL ONCE
Status: COMPLETED | OUTPATIENT
Start: 2023-10-11 | End: 2023-10-11

## 2023-10-11 RX ADMIN — PREDNISONE 60 MG: 20 TABLET ORAL at 21:51

## 2023-10-11 NOTE — CARE COORDINATION
ongoing symptoms?: Yes  Onset of Patient-reported symptoms: In the past 7 days  Patient-reported symptoms: Shortness of Breath  Have your medications changed?: No  Do you have any questions related to your medications?: No  Do you currently have any active services?: Yes  Are you currently active with any services?: Home Health  Do you have any needs or concerns that I can assist you with?: No  Identified Barriers: Impairment, Stress, Medication Side Effects, Lack of Motivation, Lack of Support  Care Transitions Interventions   Home Care Waiver: Completed  Other Services: Completed      Transportation Support: Declined     Registered Dietician: Declined DME Assistance: Declined          Medication Assistance Program: Completed       Social Work: Completed    Disease Association: Completed      Other Interventions:             Care Transition Nurse provided contact information for future needs. No further follow-up call indicated.  Hand off to Noah EBJARANO RN

## 2023-10-12 ENCOUNTER — CARE COORDINATION (OUTPATIENT)
Dept: CARE COORDINATION | Age: 68
End: 2023-10-12

## 2023-10-12 LAB
ALBUMIN SERPL-MCNC: 3.6 GM/DL (ref 3.4–5)
ALP BLD-CCNC: 117 IU/L (ref 40–129)
ALT SERPL-CCNC: 16 U/L (ref 10–40)
ANION GAP SERPL CALCULATED.3IONS-SCNC: 8 MMOL/L (ref 4–16)
AST SERPL-CCNC: 10 IU/L (ref 15–37)
B PARAP IS1001 DNA NPH QL NAA+NON-PROBE: NOT DETECTED
B PERT.PT PRMT NPH QL NAA+NON-PROBE: NOT DETECTED
BASE EXCESS MIXED: 13.6 (ref 0–1.2)
BASE EXCESS MIXED: 13.9 (ref 0–1.2)
BASOPHILS ABSOLUTE: 0 K/CU MM
BASOPHILS RELATIVE PERCENT: 0.2 % (ref 0–1)
BILIRUB SERPL-MCNC: 0.8 MG/DL (ref 0–1)
BUN SERPL-MCNC: 13 MG/DL (ref 6–23)
C PNEUM DNA NPH QL NAA+NON-PROBE: NOT DETECTED
CALCIUM SERPL-MCNC: 9.1 MG/DL (ref 8.3–10.6)
CHLORIDE BLD-SCNC: 87 MMOL/L (ref 99–110)
CHP ED QC CHECK: 377
CO2: 38 MMOL/L (ref 21–32)
COMMENT: ABNORMAL
COMMENT: ABNORMAL
CREAT SERPL-MCNC: 0.6 MG/DL (ref 0.9–1.3)
DIFFERENTIAL TYPE: ABNORMAL
EKG ATRIAL RATE: 80 BPM
EKG DIAGNOSIS: NORMAL
EKG P AXIS: -9 DEGREES
EKG P-R INTERVAL: 224 MS
EKG Q-T INTERVAL: 376 MS
EKG QRS DURATION: 100 MS
EKG QTC CALCULATION (BAZETT): 433 MS
EKG R AXIS: -61 DEGREES
EKG T AXIS: 52 DEGREES
EKG VENTRICULAR RATE: 80 BPM
EOSINOPHILS ABSOLUTE: 0 K/CU MM
EOSINOPHILS RELATIVE PERCENT: 0.1 % (ref 0–3)
FLUAV H1 2009 PAN RNA NPH NAA+NON-PROBE: NOT DETECTED
FLUAV H1 RNA NPH QL NAA+NON-PROBE: NOT DETECTED
FLUAV H3 RNA NPH QL NAA+NON-PROBE: NOT DETECTED
FLUAV RNA NPH QL NAA+NON-PROBE: NOT DETECTED
FLUBV RNA NPH QL NAA+NON-PROBE: NOT DETECTED
GFR SERPL CREATININE-BSD FRML MDRD: >60 ML/MIN/1.73M2
GLUCOSE BLD-MCNC: 377 MG/DL (ref 70–99)
GLUCOSE BLD-MCNC: 419 MG/DL (ref 70–99)
GLUCOSE BLD-MCNC: 449 MG/DL (ref 70–99)
GLUCOSE BLD-MCNC: 452 MG/DL (ref 70–99)
GLUCOSE BLD-MCNC: >600 MG/DL (ref 70–99)
GLUCOSE SERPL-MCNC: 440 MG/DL (ref 70–99)
HADV DNA NPH QL NAA+NON-PROBE: NOT DETECTED
HCO3 VENOUS: 43.8 MMOL/L (ref 19–25)
HCO3 VENOUS: 45.5 MMOL/L (ref 19–25)
HCOV 229E RNA NPH QL NAA+NON-PROBE: NOT DETECTED
HCOV HKU1 RNA NPH QL NAA+NON-PROBE: NOT DETECTED
HCOV NL63 RNA NPH QL NAA+NON-PROBE: NOT DETECTED
HCOV OC43 RNA NPH QL NAA+NON-PROBE: NOT DETECTED
HCT VFR BLD CALC: 45.2 % (ref 42–52)
HEMOGLOBIN: 14.8 GM/DL (ref 13.5–18)
HMPV RNA NPH QL NAA+NON-PROBE: NOT DETECTED
HPIV1 RNA NPH QL NAA+NON-PROBE: NOT DETECTED
HPIV2 RNA NPH QL NAA+NON-PROBE: NOT DETECTED
HPIV3 RNA NPH QL NAA+NON-PROBE: NOT DETECTED
HPIV4 RNA NPH QL NAA+NON-PROBE: NOT DETECTED
IMMATURE NEUTROPHIL %: 0.8 % (ref 0–0.43)
INFLUENZA A ANTIGEN: NOT DETECTED
INFLUENZA B ANTIGEN: NOT DETECTED
INR BLD: 1 INDEX
LYMPHOCYTES ABSOLUTE: 0.5 K/CU MM
LYMPHOCYTES RELATIVE PERCENT: 4.5 % (ref 24–44)
M PNEUMO DNA NPH QL NAA+NON-PROBE: NOT DETECTED
MCH RBC QN AUTO: 30.9 PG (ref 27–31)
MCHC RBC AUTO-ENTMCNC: 32.7 % (ref 32–36)
MCV RBC AUTO: 94.4 FL (ref 78–100)
MONOCYTES ABSOLUTE: 0.2 K/CU MM
MONOCYTES RELATIVE PERCENT: 1.5 % (ref 0–4)
NUCLEATED RBC %: 0 %
O2 SAT, VEN: 81.7 % (ref 50–70)
O2 SAT, VEN: 84.5 % (ref 50–70)
PCO2, VEN: 85 MMHG (ref 38–52)
PCO2, VEN: 99 MMHG (ref 38–52)
PDW BLD-RTO: 15.6 % (ref 11.7–14.9)
PH VENOUS: 7.27 (ref 7.32–7.42)
PH VENOUS: 7.32 (ref 7.32–7.42)
PLATELET # BLD: 187 K/CU MM (ref 140–440)
PMV BLD AUTO: 9.9 FL (ref 7.5–11.1)
PO2, VEN: 58 MMHG (ref 28–48)
PO2, VEN: 61 MMHG (ref 28–48)
POTASSIUM SERPL-SCNC: 5.2 MMOL/L (ref 3.5–5.1)
PROTHROMBIN TIME: 13.7 SECONDS (ref 11.7–14.5)
RBC # BLD: 4.79 M/CU MM (ref 4.6–6.2)
RSV RNA NPH QL NAA+NON-PROBE: NOT DETECTED
RV+EV RNA NPH QL NAA+NON-PROBE: NOT DETECTED
SARS-COV-2 RDRP RESP QL NAA+PROBE: NOT DETECTED
SARS-COV-2 RNA NPH QL NAA+NON-PROBE: NOT DETECTED
SEGMENTED NEUTROPHILS ABSOLUTE COUNT: 10.2 K/CU MM
SEGMENTED NEUTROPHILS RELATIVE PERCENT: 92.9 % (ref 36–66)
SODIUM BLD-SCNC: 133 MMOL/L (ref 135–145)
SOURCE: NORMAL
TOTAL IMMATURE NEUTOROPHIL: 0.09 K/CU MM
TOTAL NUCLEATED RBC: 0 K/CU MM
TOTAL PROTEIN: 6.2 GM/DL (ref 6.4–8.2)
TROPONIN, HIGH SENSITIVITY: 32 NG/L (ref 0–22)
WBC # BLD: 11 K/CU MM (ref 4–10.5)

## 2023-10-12 PROCEDURE — 93010 ELECTROCARDIOGRAM REPORT: CPT | Performed by: INTERNAL MEDICINE

## 2023-10-12 PROCEDURE — 87502 INFLUENZA DNA AMP PROBE: CPT

## 2023-10-12 PROCEDURE — 80053 COMPREHEN METABOLIC PANEL: CPT

## 2023-10-12 PROCEDURE — 6370000000 HC RX 637 (ALT 250 FOR IP): Performed by: STUDENT IN AN ORGANIZED HEALTH CARE EDUCATION/TRAINING PROGRAM

## 2023-10-12 PROCEDURE — 94640 AIRWAY INHALATION TREATMENT: CPT

## 2023-10-12 PROCEDURE — 1200000000 HC SEMI PRIVATE

## 2023-10-12 PROCEDURE — 0202U NFCT DS 22 TRGT SARS-COV-2: CPT

## 2023-10-12 PROCEDURE — 82805 BLOOD GASES W/O2 SATURATION: CPT

## 2023-10-12 PROCEDURE — 85025 COMPLETE CBC W/AUTO DIFF WBC: CPT

## 2023-10-12 PROCEDURE — 85610 PROTHROMBIN TIME: CPT

## 2023-10-12 PROCEDURE — 82962 GLUCOSE BLOOD TEST: CPT

## 2023-10-12 PROCEDURE — 87635 SARS-COV-2 COVID-19 AMP PRB: CPT

## 2023-10-12 PROCEDURE — 94660 CPAP INITIATION&MGMT: CPT

## 2023-10-12 PROCEDURE — 36415 COLL VENOUS BLD VENIPUNCTURE: CPT

## 2023-10-12 PROCEDURE — 2580000003 HC RX 258: Performed by: STUDENT IN AN ORGANIZED HEALTH CARE EDUCATION/TRAINING PROGRAM

## 2023-10-12 PROCEDURE — 6360000002 HC RX W HCPCS: Performed by: STUDENT IN AN ORGANIZED HEALTH CARE EDUCATION/TRAINING PROGRAM

## 2023-10-12 PROCEDURE — 94761 N-INVAS EAR/PLS OXIMETRY MLT: CPT

## 2023-10-12 RX ORDER — IPRATROPIUM BROMIDE AND ALBUTEROL SULFATE 2.5; .5 MG/3ML; MG/3ML
1 SOLUTION RESPIRATORY (INHALATION)
Status: DISCONTINUED | OUTPATIENT
Start: 2023-10-12 | End: 2023-10-14

## 2023-10-12 RX ORDER — POTASSIUM CHLORIDE 20 MEQ/1
40 TABLET, EXTENDED RELEASE ORAL PRN
Status: DISCONTINUED | OUTPATIENT
Start: 2023-10-12 | End: 2023-10-15 | Stop reason: HOSPADM

## 2023-10-12 RX ORDER — INSULIN LISPRO 100 [IU]/ML
0-16 INJECTION, SOLUTION INTRAVENOUS; SUBCUTANEOUS
Status: DISCONTINUED | OUTPATIENT
Start: 2023-10-12 | End: 2023-10-15 | Stop reason: HOSPADM

## 2023-10-12 RX ORDER — ONDANSETRON 2 MG/ML
4 INJECTION INTRAMUSCULAR; INTRAVENOUS EVERY 6 HOURS PRN
Status: DISCONTINUED | OUTPATIENT
Start: 2023-10-12 | End: 2023-10-15 | Stop reason: HOSPADM

## 2023-10-12 RX ORDER — DEXTROSE MONOHYDRATE 100 MG/ML
INJECTION, SOLUTION INTRAVENOUS CONTINUOUS PRN
Status: DISCONTINUED | OUTPATIENT
Start: 2023-10-12 | End: 2023-10-15 | Stop reason: HOSPADM

## 2023-10-12 RX ORDER — ONDANSETRON 4 MG/1
4 TABLET, ORALLY DISINTEGRATING ORAL EVERY 8 HOURS PRN
Status: DISCONTINUED | OUTPATIENT
Start: 2023-10-12 | End: 2023-10-15 | Stop reason: HOSPADM

## 2023-10-12 RX ORDER — POLYETHYLENE GLYCOL 3350 17 G/17G
17 POWDER, FOR SOLUTION ORAL DAILY PRN
Status: DISCONTINUED | OUTPATIENT
Start: 2023-10-12 | End: 2023-10-15 | Stop reason: HOSPADM

## 2023-10-12 RX ORDER — DILTIAZEM HYDROCHLORIDE 240 MG/1
240 CAPSULE, COATED, EXTENDED RELEASE ORAL DAILY
Status: DISCONTINUED | OUTPATIENT
Start: 2023-10-12 | End: 2023-10-15 | Stop reason: HOSPADM

## 2023-10-12 RX ORDER — FUROSEMIDE 20 MG/1
40 TABLET ORAL DAILY
Status: DISCONTINUED | OUTPATIENT
Start: 2023-10-13 | End: 2023-10-15 | Stop reason: HOSPADM

## 2023-10-12 RX ORDER — SODIUM CHLORIDE 0.9 % (FLUSH) 0.9 %
5-40 SYRINGE (ML) INJECTION PRN
Status: DISCONTINUED | OUTPATIENT
Start: 2023-10-12 | End: 2023-10-15 | Stop reason: HOSPADM

## 2023-10-12 RX ORDER — GLUCAGON 1 MG/ML
1 KIT INJECTION PRN
Status: DISCONTINUED | OUTPATIENT
Start: 2023-10-12 | End: 2023-10-15 | Stop reason: HOSPADM

## 2023-10-12 RX ORDER — POTASSIUM CHLORIDE 7.45 MG/ML
10 INJECTION INTRAVENOUS PRN
Status: DISCONTINUED | OUTPATIENT
Start: 2023-10-12 | End: 2023-10-15 | Stop reason: HOSPADM

## 2023-10-12 RX ORDER — INSULIN LISPRO 100 [IU]/ML
0-4 INJECTION, SOLUTION INTRAVENOUS; SUBCUTANEOUS NIGHTLY
Status: DISCONTINUED | OUTPATIENT
Start: 2023-10-12 | End: 2023-10-15 | Stop reason: HOSPADM

## 2023-10-12 RX ORDER — INSULIN GLARGINE 100 [IU]/ML
50 INJECTION, SOLUTION SUBCUTANEOUS NIGHTLY
Status: DISCONTINUED | OUTPATIENT
Start: 2023-10-12 | End: 2023-10-14

## 2023-10-12 RX ORDER — SODIUM CHLORIDE 9 MG/ML
INJECTION, SOLUTION INTRAVENOUS PRN
Status: DISCONTINUED | OUTPATIENT
Start: 2023-10-12 | End: 2023-10-15 | Stop reason: HOSPADM

## 2023-10-12 RX ORDER — INSULIN LISPRO 100 [IU]/ML
0-4 INJECTION, SOLUTION INTRAVENOUS; SUBCUTANEOUS
Status: DISCONTINUED | OUTPATIENT
Start: 2023-10-12 | End: 2023-10-12

## 2023-10-12 RX ORDER — FUROSEMIDE 20 MG/1
40 TABLET ORAL DAILY
Status: DISCONTINUED | OUTPATIENT
Start: 2023-10-12 | End: 2023-10-12

## 2023-10-12 RX ORDER — INSULIN LISPRO 100 [IU]/ML
6 INJECTION, SOLUTION INTRAVENOUS; SUBCUTANEOUS ONCE
Status: COMPLETED | OUTPATIENT
Start: 2023-10-12 | End: 2023-10-12

## 2023-10-12 RX ORDER — FUROSEMIDE 10 MG/ML
40 INJECTION INTRAMUSCULAR; INTRAVENOUS ONCE
Status: COMPLETED | OUTPATIENT
Start: 2023-10-12 | End: 2023-10-12

## 2023-10-12 RX ORDER — MAGNESIUM SULFATE IN WATER 40 MG/ML
2000 INJECTION, SOLUTION INTRAVENOUS PRN
Status: DISCONTINUED | OUTPATIENT
Start: 2023-10-12 | End: 2023-10-15 | Stop reason: HOSPADM

## 2023-10-12 RX ORDER — ACETAMINOPHEN 325 MG/1
650 TABLET ORAL EVERY 6 HOURS PRN
Status: DISCONTINUED | OUTPATIENT
Start: 2023-10-12 | End: 2023-10-15 | Stop reason: HOSPADM

## 2023-10-12 RX ORDER — SODIUM CHLORIDE 0.9 % (FLUSH) 0.9 %
5-40 SYRINGE (ML) INJECTION EVERY 12 HOURS SCHEDULED
Status: DISCONTINUED | OUTPATIENT
Start: 2023-10-12 | End: 2023-10-15 | Stop reason: HOSPADM

## 2023-10-12 RX ORDER — ATORVASTATIN CALCIUM 40 MG/1
40 TABLET, FILM COATED ORAL NIGHTLY
Status: DISCONTINUED | OUTPATIENT
Start: 2023-10-12 | End: 2023-10-15 | Stop reason: HOSPADM

## 2023-10-12 RX ORDER — ACETAMINOPHEN 650 MG/1
650 SUPPOSITORY RECTAL EVERY 6 HOURS PRN
Status: DISCONTINUED | OUTPATIENT
Start: 2023-10-12 | End: 2023-10-15 | Stop reason: HOSPADM

## 2023-10-12 RX ORDER — LEVOFLOXACIN 5 MG/ML
500 INJECTION, SOLUTION INTRAVENOUS EVERY 24 HOURS
Status: DISCONTINUED | OUTPATIENT
Start: 2023-10-12 | End: 2023-10-15 | Stop reason: HOSPADM

## 2023-10-12 RX ORDER — INSULIN LISPRO 100 [IU]/ML
20 INJECTION, SOLUTION INTRAVENOUS; SUBCUTANEOUS
Status: DISCONTINUED | OUTPATIENT
Start: 2023-10-12 | End: 2023-10-14

## 2023-10-12 RX ADMIN — INSULIN GLARGINE 50 UNITS: 100 INJECTION, SOLUTION SUBCUTANEOUS at 20:24

## 2023-10-12 RX ADMIN — INSULIN LISPRO 4 UNITS: 100 INJECTION, SOLUTION INTRAVENOUS; SUBCUTANEOUS at 12:38

## 2023-10-12 RX ADMIN — ATORVASTATIN CALCIUM 40 MG: 40 TABLET, FILM COATED ORAL at 20:23

## 2023-10-12 RX ADMIN — INSULIN LISPRO 16 UNITS: 100 INJECTION, SOLUTION INTRAVENOUS; SUBCUTANEOUS at 16:55

## 2023-10-12 RX ADMIN — IPRATROPIUM BROMIDE AND ALBUTEROL SULFATE 1 DOSE: .5; 2.5 SOLUTION RESPIRATORY (INHALATION) at 07:27

## 2023-10-12 RX ADMIN — METHYLPREDNISOLONE SODIUM SUCCINATE 40 MG: 40 INJECTION, POWDER, FOR SOLUTION INTRAMUSCULAR; INTRAVENOUS at 16:56

## 2023-10-12 RX ADMIN — INSULIN LISPRO 4 UNITS: 100 INJECTION, SOLUTION INTRAVENOUS; SUBCUTANEOUS at 08:25

## 2023-10-12 RX ADMIN — IPRATROPIUM BROMIDE AND ALBUTEROL SULFATE 1 DOSE: .5; 2.5 SOLUTION RESPIRATORY (INHALATION) at 15:29

## 2023-10-12 RX ADMIN — IPRATROPIUM BROMIDE AND ALBUTEROL SULFATE 1 DOSE: .5; 2.5 SOLUTION RESPIRATORY (INHALATION) at 20:29

## 2023-10-12 RX ADMIN — IPRATROPIUM BROMIDE AND ALBUTEROL SULFATE 1 DOSE: .5; 2.5 SOLUTION RESPIRATORY (INHALATION) at 11:11

## 2023-10-12 RX ADMIN — INSULIN LISPRO 4 UNITS: 100 INJECTION, SOLUTION INTRAVENOUS; SUBCUTANEOUS at 06:41

## 2023-10-12 RX ADMIN — LEVOFLOXACIN 500 MG: 5 INJECTION, SOLUTION INTRAVENOUS at 06:44

## 2023-10-12 RX ADMIN — APIXABAN 5 MG: 5 TABLET, FILM COATED ORAL at 20:23

## 2023-10-12 RX ADMIN — INSULIN LISPRO 6 UNITS: 100 INJECTION, SOLUTION INTRAVENOUS; SUBCUTANEOUS at 12:39

## 2023-10-12 RX ADMIN — APIXABAN 5 MG: 5 TABLET, FILM COATED ORAL at 08:24

## 2023-10-12 RX ADMIN — INSULIN LISPRO 4 UNITS: 100 INJECTION, SOLUTION INTRAVENOUS; SUBCUTANEOUS at 20:25

## 2023-10-12 RX ADMIN — INSULIN LISPRO 20 UNITS: 100 INJECTION, SOLUTION INTRAVENOUS; SUBCUTANEOUS at 16:56

## 2023-10-12 RX ADMIN — SODIUM CHLORIDE, PRESERVATIVE FREE 10 ML: 5 INJECTION INTRAVENOUS at 08:25

## 2023-10-12 RX ADMIN — INSULIN LISPRO 4 UNITS: 100 INJECTION, SOLUTION INTRAVENOUS; SUBCUTANEOUS at 20:23

## 2023-10-12 RX ADMIN — DILTIAZEM HYDROCHLORIDE 240 MG: 240 CAPSULE, COATED, EXTENDED RELEASE ORAL at 08:25

## 2023-10-12 RX ADMIN — FUROSEMIDE 40 MG: 10 INJECTION, SOLUTION INTRAMUSCULAR; INTRAVENOUS at 06:56

## 2023-10-12 RX ADMIN — METHYLPREDNISOLONE SODIUM SUCCINATE 40 MG: 40 INJECTION, POWDER, FOR SOLUTION INTRAMUSCULAR; INTRAVENOUS at 06:57

## 2023-10-12 RX ADMIN — INSULIN LISPRO 20 UNITS: 100 INJECTION, SOLUTION INTRAVENOUS; SUBCUTANEOUS at 12:39

## 2023-10-12 RX ADMIN — SODIUM CHLORIDE, PRESERVATIVE FREE 10 ML: 5 INJECTION INTRAVENOUS at 21:49

## 2023-10-12 RX ADMIN — INSULIN LISPRO 20 UNITS: 100 INJECTION, SOLUTION INTRAVENOUS; SUBCUTANEOUS at 08:25

## 2023-10-12 ASSESSMENT — PAIN SCALES - WONG BAKER: WONGBAKER_NUMERICALRESPONSE: 6

## 2023-10-12 ASSESSMENT — PAIN SCALES - GENERAL: PAINLEVEL_OUTOF10: 6

## 2023-10-12 NOTE — PROGRESS NOTES
4 Eyes Skin Assessment     NAME:  Sal Joseph  YOB: 1955  MEDICAL RECORD NUMBER:  [id number]    The patient is being assessed for  Admission    I agree that at least one RN has performed a thorough Head to Toe Skin Assessment on the patient. ALL assessment sites listed below have been assessed. Areas assessed by both nurses:    Head, Face, Ears, Shoulders, Back, Chest, Arms, Elbows, Hands, Sacrum. Buttock, Coccyx, Ischium, Legs. Feet and Heels, and Under Medical Devices         Does the Patient have a Wound?  No noted wound(s)       Hector Prevention initiated by RN: Yes  Wound Care Orders initiated by RN: No    Pressure Injury (Stage 3,4, Unstageable, DTI, NWPT, and Complex wounds) if present, place Wound referral order by RN under : No    New Ostomies, if present place, Ostomy referral order under : No     Nurse 1 eSignature: Electronically signed by Luis Fernando Guidry RN on 10/12/23 at 7:33 AM EDT    **SHARE this note so that the co-signing nurse can place an eSignature**    Nurse 2 eSignature: Electronically signed by Scott Guo RN on 10/12/23 at 4:54 PM EDT

## 2023-10-12 NOTE — PLAN OF CARE
[facility]  Report of Consultation    Ashley Pierce Patient Status:  Emergency    1955 MRN [id number]   Location [address] [facility] Attending Kai Randle MD   [address] Day # 0 PCP Nicolas Diaz MD     [facility] for [facility] SERVICES, [facility] Problem: Discharge Planning  Goal: Discharge to home or other facility with appropriate resources  Outcome: Progressing     Problem: Safety - Adult  Goal: Free from fall injury  Outcome: Progressing     Problem: Pain  Goal: Verbalizes/displays adequate comfort level or baseline comfort level  Outcome: Progressing     Problem: Respiratory - Adult  Goal: Achieves optimal ventilation and oxygenation  Outcome: Progressing     Problem: Cardiovascular - Adult  Goal: Maintains optimal cardiac output and hemodynamic stability  Outcome: Progressing  Goal: Absence of cardiac dysrhythmias or at baseline  Outcome: Progressing     Problem: Skin/Tissue Integrity - Adult  Goal: Skin integrity remains intact  Outcome: Progressing  Goal: Incisions, wounds, or drain sites healing without S/S of infection  Outcome: Progressing  Goal: Oral mucous membranes remain intact  Outcome: Progressing     Problem: Infection - Adult  Goal: Absence of infection during hospitalization  Outcome: Progressing  Goal: Absence of fever/infection during anticipated neutropenic period  Outcome: Progressing     Problem: Hematologic - Adult  Goal: Maintains hematologic stability  Outcome: Progressing OTHER SURGICAL HISTORY  2/2014    (R) Shoulder Replacement   • REPAIR ING HERNIA,5+Y/O,REDUCIBL  Nov. 2012     Family History   Problem Relation Age of Onset   • Cancer Mother    • Heart Disorder Father    • Cancer Father    • Diabetes Father    • Other (O 07/11/2021    BILT 0.5 07/11/2021    TP 7.3 07/11/2021    AST 27 07/11/2021    ALT 28 07/11/2021    LIP 43 07/11/2021         Impression and Plan:  Patient Active Problem List:     Shoulder pain     Recurrent ventral incisional hernia with obstruction complications, recurrence, incorrect diagnosis, injury to adjacent organs and structures. We also discussed the possibile need for further therapeutic, diagnostic, or surgical intervention.   The patient voiced understanding, and after all questions were an

## 2023-10-12 NOTE — CARE COORDINATION
10/12/23 1028   Service Assessment   Patient Orientation Alert and Oriented;Person;Place;Situation;Self   Cognition Alert   History Provided By Patient   Primary Nash Woodard Family Members   Patient's Healthcare Decision Maker is: Legal Next of [address] [facility]   PCP Verified by CM Yes   Last Visit to PCP Within last 3 months   Prior Functional Level Independent in ADLs/IADLs   Current Functional Level Independent in ADLs/IADLs   Can patient return to prior living arrangement Yes   Ability to make needs known: Good   Family able to assist with home care needs: No   Would you like for me to discuss the discharge plan with any other family members/significant others, and if so, who? No   Financial Resources  ([place]);Medicare   Social/Functional History   Lives With Alone   Type of Home Apartment   Home Layout One level   Home Access Level entry   Bathroom Shower/Tub Tub/Shower unit   Bathroom Toilet Standard   Bathroom Equipment Tub transfer bench   [address]  (Does not remember the name of the company. He says its been 8 years)   Receives Help From Family;Friend(s); Neighbor   ADL Assistance Independent   Homemaking Assistance Independent   Homemaking Responsibilities Yes   Ambulation Assistance Independent   Transfer Assistance Independent   Active  No   Patient's  Info friends   Occupation On disability   Condition of Participation: Discharge Planning   The Patient and/or Patient Representative was provided with a Choice of Provider? Patient   The Patient and/Or Patient Representative agree with the Discharge Plan? Yes   Freedom of Choice list was provided with basic dialogue that supports the patient's individualized plan of care/goals, treatment preferences, and shares the quality data associated with the providers? Yes     Chart reviewed.  Patient lives at home alone but he states that his neighbor is next door and they do everything

## 2023-10-12 NOTE — ED NOTES
ED TO INPATIENT SBAR HANDOFF    Patient Name: Bouchra Albert   :  1955  79 y.o. Preferred Name  Moe  Family/Caregiver Present no   Restraints no   C-SSRS: Risk of Suicide: No Risk  Sitter no   Sepsis Risk Score Sepsis Risk Score: 2.27      Situation  Chief Complaint   Patient presents with    Shortness of Breath     Brief Description of Patient's Condition: Patient SOB, having difficulty breathing at home. Has hx of COPD, CHF, & neuropathy in legs. Recvd breathing treatment in route by EMS O2 was 76 upon arrival.   Mental Status: oriented, alert, coherent, logical, thought processes intact, and able to concentrate and follow conversation  Arrived from: home    Imaging:   XR CHEST PORTABLE   Final Result   Increased ill-defined bibasilar pulmonary opacities may reflect atelectasis,   edema, or pneumonia in the appropriate clinical setting.            Abnormal labs:   Abnormal Labs Reviewed   CBC WITH AUTO DIFFERENTIAL - Abnormal; Notable for the following components:       Result Value    MCH 31.3 (*)     RDW 15.9 (*)     Segs Relative 79.7 (*)     Lymphocytes % 10.8 (*)     Monocytes % 7.0 (*)     Immature Neutrophil % 0.8 (*)     All other components within normal limits   COMPREHENSIVE METABOLIC PANEL - Abnormal; Notable for the following components:    Sodium 132 (*)     Chloride 88 (*)     CO2 33 (*)     Glucose 369 (*)     Creatinine 0.5 (*)     Albumin 3.1 (*)     All other components within normal limits   TROPONIN - Abnormal; Notable for the following components:    Troponin, High Sensitivity 32 (*)     All other components within normal limits   BRAIN NATRIURETIC PEPTIDE - Abnormal; Notable for the following components:    Pro-.5 (*)     All other components within normal limits   TROPONIN - Abnormal; Notable for the following components:    Troponin, High Sensitivity 33 (*)     All other components within normal limits   BLOOD GAS, VENOUS - Abnormal; Notable for the following components:

## 2023-10-12 NOTE — PLAN OF CARE
Problem: Discharge Planning  Goal: Discharge to home or other facility with appropriate resources  10/12/2023 1058 by Joni Young RN  Outcome: Progressing  10/12/2023 0630 by Jeanette Wyatt RN  Outcome: Progressing  10/12/2023 0630 by Jeanette Wyatt RN  Outcome: Progressing     Problem: Safety - Adult  Goal: Free from fall injury  10/12/2023 1058 by Joni Young RN  Outcome: Progressing  10/12/2023 0630 by Jeanette Wyatt RN  Outcome: Progressing  10/12/2023 0630 by Jeanette Wyatt RN  Outcome: Progressing     Problem: Pain  Goal: Verbalizes/displays adequate comfort level or baseline comfort level  10/12/2023 1058 by Joni Young RN  Outcome: Progressing  10/12/2023 0630 by Jeanette Wyatt RN  Outcome: Progressing  10/12/2023 0630 by Jeanette Wyatt RN  Outcome: Progressing     Problem: Respiratory - Adult  Goal: Achieves optimal ventilation and oxygenation  10/12/2023 1058 by Joni Young RN  Outcome: Progressing  10/12/2023 0630 by Jeanette Wyatt RN  Outcome: Progressing  10/12/2023 0630 by Jeanette Wyatt RN  Outcome: Progressing     Problem: Cardiovascular - Adult  Goal: Maintains optimal cardiac output and hemodynamic stability  10/12/2023 1058 by Joni Young RN  Outcome: Progressing  10/12/2023 0630 by Jeanette Wyatt RN  Outcome: Progressing  10/12/2023 0630 by Jeanette Wyatt RN  Outcome: Progressing  Goal: Absence of cardiac dysrhythmias or at baseline  10/12/2023 1058 by Joni Young RN  Outcome: Progressing  10/12/2023 0630 by Jeanette Wyatt RN  Outcome: Progressing  10/12/2023 0630 by Jeanette Wyatt RN  Outcome: Progressing     Problem: Skin/Tissue Integrity - Adult  Goal: Skin integrity remains intact  10/12/2023 1058 by Joni Young RN  Outcome: Progressing  10/12/2023 0630 by Jeanette Wyatt RN  Outcome: Progressing  10/12/2023 0630 by Jeanette Wyatt RN  Outcome: Progressing  Goal: Incisions, wounds, or drain sites healing without S/S of infection  10/12/2023 1058 by Joni Young

## 2023-10-12 NOTE — ED NOTES
The following labs were labeled with appropriate pt sticker and tubed to lab:     [] Blue     [x] Lavender   [] on ice  [x] Green/yellow  [] Green/black [] on ice  [x] Grey  [x] on ice  [] Yellow  [x] Red  [] Pink  [] Type/ Screen  [] ABG  [] VBG    [] COVID-19 swab    [] Rapid  [] PCR  [] Flu swab  [] Peds Viral Panel     [] Urine Sample  [] Fecal Sample  [] Pelvic Cultures  [] Blood Cultures  [] X 2  [] STREP Cultures      Jourdan Mars RN  10/11/23 1952

## 2023-10-12 NOTE — H&P
Transportation Needs: No Transportation Needs (8/11/2023)    PRAPARE - Transportation     Lack of Transportation (Medical): No     Lack of Transportation (Non-Medical): No   Physical Activity: Insufficiently Active (5/12/2023)    Exercise Vital Sign     Days of Exercise per Week: 2 days     Minutes of Exercise per Session: 10 min   Stress: No Stress Concern Present (5/12/2023)    [facility]     Feeling of Stress : Only a little   Social Connections: Socially Isolated (5/12/2023)    Social Connection and Isolation Panel [NHANES]     Frequency of Communication with Friends and Family: Twice a week     Frequency of Social Gatherings with Friends and Family: Never     Attends Sabianist Services: 1 to 4 times per year     Active Member of [employer] or Organizations: No     Attends Club or Organization Meetings: Never     Marital Status:    Intimate Partner Violence: Not At Risk (5/10/2023)    Humiliation, Afraid, Rape, and Kick questionnaire     Fear of Current or Ex-Partner: No     Emotionally Abused: No     Physically Abused: No     Sexually Abused: No   Housing Stability: Low Risk  (8/11/2023)    Housing Stability Vital Sign     Unable to Pay for Housing in the Last Year: No     Number of [address] in the Last Year: 1     Unstable Housing in the Last Year: No       Medications Prior to Admission     Prior to Admission medications    Medication Sig Start Date End Date Taking?  Authorizing Provider   LEVEMIR FLEXPEN 100 UNIT/ML injection pen INJECT 50 UNITS SUBCUTANEOUSLY ONCE DAILY 9/22/23   Krishan Jackson MD   dilTIAZem (CARDIZEM CD) 240 MG extended release capsule Take 1 capsule by mouth daily 9/23/23   Gokul Hull MD   furosemide (LASIX) 40 MG tablet Take 1 tablet by mouth daily 9/22/23   Gokul Hull MD   magnesium oxide (MAG-OX) 400 (240 Mg) MG tablet Take 1 tablet by mouth daily 9/23/23   Sara Keith, MD   potassium chloride (KLOR-CON M) 10 MEQ extended release tablet Take 1 tablet by mouth daily 9/22/23   Dale Vaz MD   HUMALOG KWIKPEN 100 UNIT/ML SOPN INJECT 20 UNITS SUBCUTANEOUSLY THREE TIMES A DAY WITH MEALS 9/11/23   Paul Patel MD   blood glucose monitor strips Test once daily 8/30/23   Paul Patel MD   blood glucose monitor kit and supplies Check sugar daily 8/30/23   Paul Patel MD   SYMBICORT 160-4.5 MCG/ACT AERO Inhale 2 puffs into the lungs 2 times daily 8/18/23   Paul Patel MD   albuterol sulfate HFA (PROVENTIL;VENTOLIN;PROAIR) 108 (90 Base) MCG/ACT inhaler Inhale 2 puffs into the lungs 2 times daily 8/18/23   Paul Patel MD   apixaban Rhoderick Buggy) 5 MG TABS tablet Take 1 tablet by mouth 2 times daily 8/18/23 11/16/23  Paul Patel MD   OXYGEN Inhale 3 L into the lungs continuous 8/17/23   Lima Mortensen MD   ketoconazole (NIZORAL) 2 % cream Apply topically daily to the chest where there is a rash for a week. . 8/3/23   Orlando Coffman MD   metFORMIN (GLUCOPHAGE) 1000 MG tablet Take 1 tablet by mouth 2 times daily (with meals) 8/3/23   Orlando Coffman MD   atorvastatin (LIPITOR) 40 MG tablet Take 1 tablet by mouth daily  Patient taking differently: Take 1 tablet by mouth nightly 3/6/23   Sidra Coronel MD   aspirin (ASPIRIN LOW DOSE) 81 MG EC tablet Take 1 tablet by mouth daily 6/10/22   Paul Patel MD   Nebulizers (COMPRESSOR/NEBULIZER) MISC Use qid 6/13/17   Paul Patel MD       Medications:     Medications:        Infusions:       PRN Meds:       Data:     CBC:   Recent Labs     10/11/23  2042   WBC 10.2   HGB 15.2      MCV 97.9   RDW 15.9*   LYMPHOPCT 10.8*   MONOPCT 7.0*   BASOPCT 0.3   MONOSABS 0.7   LYMPHSABS 1.1   EOSABS 0.1   BASOSABS 0.0     CMP:    Recent Labs     10/11/23  2042   *   K 4.5   CL 88*   CO2 33*   BUN 8   CREATININE 0.5*   GLUCOSE 369*

## 2023-10-12 NOTE — ED PROVIDER NOTES
Emergency Department Encounter    Patient: Olya Aceves  MRN: [id number]  : 1955  Date of Evaluation: 10/11/2023  ED Provider:  Rahat Rausch MD    Triage Chief Complaint:   Shortness of Breath    [place]:  Olya Aceves is a 79 y.o. male with complex past medical history including heart failure with preserved ejection fraction, A-fib on Eliquis, COPD, type 2 diabetes that presents with shortness of breath. Patient reports shortness of breath for the last 3 days has been improving a little bit but not nearly enough. He reports severe dyspnea on exertion getting very short of breath with a few steps. He also reports lower extremity edema that is elevated worse than his usual.  He denies any fevers, chest pain, worsening cough, abdominal pain, nausea, vomiting, dysuria, hematuria. Of note, patient has 3 L of O2 at home. ROS - see HPI    Past Medical History:   Diagnosis Date    A-fib ([address])     Resolved after ablation     Anxiety     Arthritis     \"Hips, Knees, Elbows And Fingers\"    COPD (chronic obstructive pulmonary disease) ([facility])     Sees Dr. Chaudhari Query    Depression     Diabetes mellitus [facility]) Dx 7548'G    Full dentures     H/O angiography 2018    peripheral angio - LFA occluded, filling collaterals, RSFA 90% stenosis-vasc surg consult    H/O Doppler ultrasound 2018    LE arterial - left mid and distal femoral artery occluded, lt FANI shows mild-mod PVD    H/O echocardiogram 2016    EF60% mild MR, TR, pulm HTN    Hx of colonoscopy      C-scope - benign polyp x1    Hx of Doppler ultrasound 2018    Lower extremity doppler  Normal study.     Hyperlipidemia     Hypertension     Macular degeneration     States is legally blind    Obesity     On home oxygen therapy     Continuous At 2 Liters Per Nasal Cannula    PAD (peripheral artery disease) ([facility])     10/10 FANI mild PAD left superficial femoral s/p left fem-pop bypass    Panic attacks     Pneumonia Last Episode In morphology        Chronic conditions affecting care: Heart failure, COPD both of which could be causing his symptoms today    Discussion with Other Profesionals : None    Social Determinants : None    Records Reviewed : Other    last discharge summary      Disposition Considerations (tests considered but not done, Shared Decision Making, Pt Expectation of Test or Tx.): Admit      I am the Primary Clinician of Record. Clinical Impression:  1. COPD exacerbation ([address])    2. Dyspnea on exertion    3. Hypoxia      Disposition referral (if applicable):  No follow-up provider specified. Disposition medications (if applicable):  New Prescriptions    No medications on file     ED Provider Disposition Time  DISPOSITION Decision To Admit 10/11/2023 11:27:34 PM        Comment: Please note this report has been produced using speech recognition software and may contain errors related to that system including errors in grammar, punctuation, and spelling, as well as words and phrases that may be inappropriate. Efforts were made to edit the dictations.         Reyes Ortiz MD  10/11/23 9279

## 2023-10-12 NOTE — PROGRESS NOTES
Patient admitted earlier this morning by my partner. History and physical reviewed. Agree with history and physical.  Continue with plan of care. Continue current measures for now. Patient already feeling better weaned off of BIPAP on HFnC can be transitioned to NC soon. BG elevated will adjust insulin dosage.     Karol Harry MD  Hospitalist

## 2023-10-13 ENCOUNTER — TELEPHONE (OUTPATIENT)
Dept: INTERNAL MEDICINE CLINIC | Age: 68
End: 2023-10-13

## 2023-10-13 LAB
ALBUMIN SERPL-MCNC: 3.7 GM/DL (ref 3.4–5)
ALP BLD-CCNC: 102 IU/L (ref 40–128)
ALT SERPL-CCNC: 13 U/L (ref 10–40)
ANION GAP SERPL CALCULATED.3IONS-SCNC: 9 MMOL/L (ref 4–16)
AST SERPL-CCNC: 8 IU/L (ref 15–37)
BASOPHILS ABSOLUTE: 0 K/CU MM
BASOPHILS RELATIVE PERCENT: 0.1 % (ref 0–1)
BILIRUB SERPL-MCNC: 0.3 MG/DL (ref 0–1)
BUN SERPL-MCNC: 18 MG/DL (ref 6–23)
CALCIUM SERPL-MCNC: 9.4 MG/DL (ref 8.3–10.6)
CHLORIDE BLD-SCNC: 87 MMOL/L (ref 99–110)
CO2: 35 MMOL/L (ref 21–32)
CREAT SERPL-MCNC: 0.5 MG/DL (ref 0.9–1.3)
DIFFERENTIAL TYPE: ABNORMAL
EOSINOPHILS ABSOLUTE: 0 K/CU MM
EOSINOPHILS RELATIVE PERCENT: 0 % (ref 0–3)
GFR SERPL CREATININE-BSD FRML MDRD: >60 ML/MIN/1.73M2
GLUCOSE BLD-MCNC: 381 MG/DL (ref 70–99)
GLUCOSE BLD-MCNC: 406 MG/DL (ref 70–99)
GLUCOSE BLD-MCNC: 416 MG/DL (ref 70–99)
GLUCOSE BLD-MCNC: 417 MG/DL (ref 70–99)
GLUCOSE SERPL-MCNC: 396 MG/DL (ref 70–99)
HCT VFR BLD CALC: 41.3 % (ref 42–52)
HEMOGLOBIN: 13.5 GM/DL (ref 13.5–18)
IMMATURE NEUTROPHIL %: 0.8 % (ref 0–0.43)
LYMPHOCYTES ABSOLUTE: 0.7 K/CU MM
LYMPHOCYTES RELATIVE PERCENT: 5.9 % (ref 24–44)
MCH RBC QN AUTO: 30.8 PG (ref 27–31)
MCHC RBC AUTO-ENTMCNC: 32.7 % (ref 32–36)
MCV RBC AUTO: 94.1 FL (ref 78–100)
MONOCYTES ABSOLUTE: 0.4 K/CU MM
MONOCYTES RELATIVE PERCENT: 3.5 % (ref 0–4)
NUCLEATED RBC %: 0 %
PDW BLD-RTO: 14.9 % (ref 11.7–14.9)
PLATELET # BLD: 202 K/CU MM (ref 140–440)
PMV BLD AUTO: 9.9 FL (ref 7.5–11.1)
POTASSIUM SERPL-SCNC: 5.1 MMOL/L (ref 3.5–5.1)
RBC # BLD: 4.39 M/CU MM (ref 4.6–6.2)
SEGMENTED NEUTROPHILS ABSOLUTE COUNT: 10.3 K/CU MM
SEGMENTED NEUTROPHILS RELATIVE PERCENT: 89.7 % (ref 36–66)
SODIUM BLD-SCNC: 131 MMOL/L (ref 135–145)
TOTAL IMMATURE NEUTOROPHIL: 0.09 K/CU MM
TOTAL NUCLEATED RBC: 0 K/CU MM
TOTAL PROTEIN: 5.8 GM/DL (ref 6.4–8.2)
WBC # BLD: 11.5 K/CU MM (ref 4–10.5)

## 2023-10-13 PROCEDURE — 6360000002 HC RX W HCPCS: Performed by: STUDENT IN AN ORGANIZED HEALTH CARE EDUCATION/TRAINING PROGRAM

## 2023-10-13 PROCEDURE — 94660 CPAP INITIATION&MGMT: CPT

## 2023-10-13 PROCEDURE — 6370000000 HC RX 637 (ALT 250 FOR IP): Performed by: STUDENT IN AN ORGANIZED HEALTH CARE EDUCATION/TRAINING PROGRAM

## 2023-10-13 PROCEDURE — 94761 N-INVAS EAR/PLS OXIMETRY MLT: CPT

## 2023-10-13 PROCEDURE — 85025 COMPLETE CBC W/AUTO DIFF WBC: CPT

## 2023-10-13 PROCEDURE — 94640 AIRWAY INHALATION TREATMENT: CPT

## 2023-10-13 PROCEDURE — 80053 COMPREHEN METABOLIC PANEL: CPT

## 2023-10-13 PROCEDURE — 2700000000 HC OXYGEN THERAPY PER DAY

## 2023-10-13 PROCEDURE — 82962 GLUCOSE BLOOD TEST: CPT

## 2023-10-13 PROCEDURE — 36415 COLL VENOUS BLD VENIPUNCTURE: CPT

## 2023-10-13 PROCEDURE — 5A09357 ASSISTANCE WITH RESPIRATORY VENTILATION, LESS THAN 24 CONSECUTIVE HOURS, CONTINUOUS POSITIVE AIRWAY PRESSURE: ICD-10-PCS | Performed by: STUDENT IN AN ORGANIZED HEALTH CARE EDUCATION/TRAINING PROGRAM

## 2023-10-13 PROCEDURE — 1200000000 HC SEMI PRIVATE

## 2023-10-13 PROCEDURE — 2580000003 HC RX 258: Performed by: STUDENT IN AN ORGANIZED HEALTH CARE EDUCATION/TRAINING PROGRAM

## 2023-10-13 RX ADMIN — INSULIN LISPRO 16 UNITS: 100 INJECTION, SOLUTION INTRAVENOUS; SUBCUTANEOUS at 16:43

## 2023-10-13 RX ADMIN — FUROSEMIDE 40 MG: 20 TABLET ORAL at 09:01

## 2023-10-13 RX ADMIN — INSULIN GLARGINE 50 UNITS: 100 INJECTION, SOLUTION SUBCUTANEOUS at 20:59

## 2023-10-13 RX ADMIN — INSULIN LISPRO 20 UNITS: 100 INJECTION, SOLUTION INTRAVENOUS; SUBCUTANEOUS at 12:41

## 2023-10-13 RX ADMIN — METHYLPREDNISOLONE SODIUM SUCCINATE 40 MG: 40 INJECTION, POWDER, FOR SOLUTION INTRAMUSCULAR; INTRAVENOUS at 05:45

## 2023-10-13 RX ADMIN — INSULIN LISPRO 20 UNITS: 100 INJECTION, SOLUTION INTRAVENOUS; SUBCUTANEOUS at 09:01

## 2023-10-13 RX ADMIN — INSULIN LISPRO 20 UNITS: 100 INJECTION, SOLUTION INTRAVENOUS; SUBCUTANEOUS at 16:44

## 2023-10-13 RX ADMIN — INSULIN LISPRO 4 UNITS: 100 INJECTION, SOLUTION INTRAVENOUS; SUBCUTANEOUS at 21:01

## 2023-10-13 RX ADMIN — IPRATROPIUM BROMIDE AND ALBUTEROL SULFATE 1 DOSE: .5; 2.5 SOLUTION RESPIRATORY (INHALATION) at 07:22

## 2023-10-13 RX ADMIN — APIXABAN 5 MG: 5 TABLET, FILM COATED ORAL at 09:01

## 2023-10-13 RX ADMIN — ATORVASTATIN CALCIUM 40 MG: 40 TABLET, FILM COATED ORAL at 20:59

## 2023-10-13 RX ADMIN — SODIUM CHLORIDE, PRESERVATIVE FREE 10 ML: 5 INJECTION INTRAVENOUS at 09:09

## 2023-10-13 RX ADMIN — DILTIAZEM HYDROCHLORIDE 240 MG: 240 CAPSULE, COATED, EXTENDED RELEASE ORAL at 09:01

## 2023-10-13 RX ADMIN — INSULIN LISPRO 16 UNITS: 100 INJECTION, SOLUTION INTRAVENOUS; SUBCUTANEOUS at 09:02

## 2023-10-13 RX ADMIN — METHYLPREDNISOLONE SODIUM SUCCINATE 40 MG: 40 INJECTION, POWDER, FOR SOLUTION INTRAMUSCULAR; INTRAVENOUS at 16:44

## 2023-10-13 RX ADMIN — LEVOFLOXACIN 500 MG: 5 INJECTION, SOLUTION INTRAVENOUS at 05:45

## 2023-10-13 RX ADMIN — INSULIN LISPRO 4 UNITS: 100 INJECTION, SOLUTION INTRAVENOUS; SUBCUTANEOUS at 21:00

## 2023-10-13 RX ADMIN — SODIUM CHLORIDE, PRESERVATIVE FREE 10 ML: 5 INJECTION INTRAVENOUS at 21:01

## 2023-10-13 RX ADMIN — IPRATROPIUM BROMIDE AND ALBUTEROL SULFATE 1 DOSE: .5; 2.5 SOLUTION RESPIRATORY (INHALATION) at 11:18

## 2023-10-13 RX ADMIN — APIXABAN 5 MG: 5 TABLET, FILM COATED ORAL at 20:59

## 2023-10-13 RX ADMIN — INSULIN LISPRO 16 UNITS: 100 INJECTION, SOLUTION INTRAVENOUS; SUBCUTANEOUS at 12:40

## 2023-10-13 NOTE — PROGRESS NOTES
Took over care from [facility], [address] [place] [address]. Pt seated on side of bed, requested water. Now laying in bed with NC at 4L  States he will apply bipap himself when ready. O2 sats 88% at this time.

## 2023-10-13 NOTE — PROGRESS NOTES
V2.0  [facility] [place] Hospital[place] Progress Note      Name:  Sal Joseph /Age/Sex: 1955  (79 y.o. male)   MRN & CSN:  [id number] & [id number] Encounter Date/Time: 10/13/2023 12:36 PM EDT    Location:  [facility]25[facility]- PCP: Nicolette Evans MD       Hospital Day: 3    Assessment and Plan:   Sal Joseph is a 79 y.o. male with pmh of  HFpEF, Afib on elqiuis, T2DM, COPD on 2-3L NC  who presents with COPD exacerbation ([address] [facility])      Plan:  Acute COPD exacerbation   Acute on Chronic Hypoxemic Hypercapnic Respiratory Failure   - Endorsed worsening SOB with increased productive cough for 3 days. Compliant with home inhalers, saturating 85% on home 2-3L NC  -VBG 7.27/99 while on HFNC, improved to 7.32/85 while on bipap. - CXR with stable BLT opacities    - Continue steroids (solumedrol 40 mg BID, switch to PO tomorrow  - Levaquin 500mg daily   -On BiPAP at night and high flow nasal cannula 5-6 L oxygen during daytime        Non-MI troponin elevation  - Denied any typical CP.  - Initial Tn mildly elevated and plateaued. ECG without acute ischemic changes. - Likely secondary to hypoxia. -Tele            Chronic HFpEF  - Grossly euvolemic with mild peripheral edema  -TTE on  LVEF of 50% with no WMA. -onPO home lasix         T2DM  - BS on admit 369  - Home Meds: Lantus 50u qhs, 20uNovolog TIDMW, Metformin,  - received 10u insulin in ED and 40u Lantus at night   - Lantus 50u qhs, 20u Novolog TIDMW and HDCIS  - Hold PO meds   - -180          Afib  -Continue Diltiazem and Eliquis      HLD  -Continue statin     Diet ADULT DIET;  Regular   DVT Prophylaxis [] Lovenox, []  Heparin, [] SCDs, [] Ambulation,  [x] Eliquis, [] Xarelto  [] Coumadin   Code Status Full Code   Disposition From: Home  Expected Disposition: Same  Estimated Date of Discharge: 1 to 2 days  Patient requires continued admission due to COPD exacerbation   Surrogate Decision Maker/ POA      Subjective:     Chief Complaint: Shortness of Breath PRN  sodium chloride flush, 5-40 mL, PRN  sodium chloride, , PRN  potassium chloride, 40 mEq, PRN   Or  potassium alternative oral replacement, 40 mEq, PRN   Or  potassium chloride, 10 mEq, PRN  potassium chloride, 10 mEq, PRN  magnesium sulfate, 2,000 mg, PRN  ondansetron, 4 mg, Q8H PRN   Or  ondansetron, 4 mg, Q6H PRN  polyethylene glycol, 17 g, Daily PRN  acetaminophen, 650 mg, Q6H PRN   Or  acetaminophen, 650 mg, Q6H PRN        Labs      Recent Results (from the past 24 hour(s))   POCT Glucose    Collection Time: 10/12/23  4:07 PM   Result Value Ref Range    POC Glucose 452 (H) 70 - 99 MG/DL   POCT Glucose    Collection Time: 10/12/23  7:38 PM   Result Value Ref Range    POC Glucose 419 (H) 70 - 99 MG/DL   Comprehensive Metabolic Panel w/ Reflex to MG    Collection Time: 10/13/23  4:40 AM   Result Value Ref Range    Sodium 131 (L) 135 - 145 MMOL/L    Potassium 5.1 3.5 - 5.1 MMOL/L    Chloride 87 (L) 99 - 110 mMol/L    CO2 35 (H) 21 - 32 MMOL/L    Anion Gap 9 4 - 16    Glucose 396 (H) 70 - 99 MG/DL    BUN 18 6 - 23 MG/DL    Creatinine 0.5 (L) 0.9 - 1.3 MG/DL    Est, Glom Filt Rate >60 >60 mL/min/1.73m2    Calcium 9.4 8.3 - 10.6 MG/DL    Total Protein 5.8 (L) 6.4 - 8.2 GM/DL    Albumin 3.7 3.4 - 5.0 GM/DL    Total Bilirubin 0.3 0.0 - 1.0 MG/DL    Alkaline Phosphatase 102 40 - 128 IU/L    ALT 13 10 - 40 U/L    AST 8 (L) 15 - 37 IU/L   CBC with Auto Differential    Collection Time: 10/13/23  4:40 AM   Result Value Ref Range    WBC 11.5 (H) 4.0 - 10.5 K/CU MM    RBC 4.39 (L) 4.6 - 6.2 M/CU MM    Hemoglobin 13.5 13.5 - 18.0 GM/DL    Hematocrit 41.3 (L) 42 - 52 %    MCV 94.1 78 - 100 FL    MCH 30.8 27 - 31 PG    MCHC 32.7 32.0 - 36.0 %    RDW 14.9 11.7 - 14.9 %    Platelets 243 017 - 650 K/CU MM    MPV 9.9 7.5 - 11.1 FL    Differential Type AUTOMATED DIFFERENTIAL     Segs Relative 89.7 (H) 36 - 66 %    Lymphocytes % 5.9 (L) 24 - 44 %    Monocytes % 3.5 0 - 4 %    Eosinophils % 0.0 0 - 3 %    Basophils % 0.1 0 - 1 %

## 2023-10-13 NOTE — TELEPHONE ENCOUNTER
Mireille Leija with [facility] wanted to know is it okay resume home care after he is released from the hospital?

## 2023-10-13 NOTE — PLAN OF CARE
Problem: Discharge Planning  Goal: Discharge to home or other facility with appropriate resources  Outcome: Progressing     Problem: Safety - Adult  Goal: Free from fall injury  Outcome: Progressing     Problem: Pain  Goal: Verbalizes/displays adequate comfort level or baseline comfort level  Outcome: Progressing     Problem: Respiratory - Adult  Goal: Achieves optimal ventilation and oxygenation  Outcome: Progressing     Problem: Cardiovascular - Adult  Goal: Maintains optimal cardiac output and hemodynamic stability  Outcome: Progressing  Goal: Absence of cardiac dysrhythmias or at baseline  Outcome: Progressing     Problem: Skin/Tissue Integrity - Adult  Goal: Skin integrity remains intact  Outcome: Progressing  Goal: Incisions, wounds, or drain sites healing without S/S of infection  Outcome: Progressing  Goal: Oral mucous membranes remain intact  Outcome: Progressing     Problem: Infection - Adult  Goal: Absence of infection during hospitalization  Outcome: Progressing  Goal: Absence of fever/infection during anticipated neutropenic period  Outcome: Progressing     Problem: Hematologic - Adult  Goal: Maintains hematologic stability  Outcome: Progressing     Problem: Chronic Conditions and Co-morbidities  Goal: Patient's chronic conditions and co-morbidity symptoms are monitored and maintained or improved  Outcome: Progressing

## 2023-10-14 LAB
ALBUMIN SERPL-MCNC: 3.9 GM/DL (ref 3.4–5)
ALP BLD-CCNC: 102 IU/L (ref 40–128)
ALT SERPL-CCNC: 14 U/L (ref 10–40)
ANION GAP SERPL CALCULATED.3IONS-SCNC: 8 MMOL/L (ref 4–16)
AST SERPL-CCNC: 11 IU/L (ref 15–37)
BASOPHILS ABSOLUTE: 0 K/CU MM
BASOPHILS RELATIVE PERCENT: 0.1 % (ref 0–1)
BILIRUB SERPL-MCNC: 0.3 MG/DL (ref 0–1)
BUN SERPL-MCNC: 22 MG/DL (ref 6–23)
CALCIUM SERPL-MCNC: 9.5 MG/DL (ref 8.3–10.6)
CHLORIDE BLD-SCNC: 88 MMOL/L (ref 99–110)
CO2: 39 MMOL/L (ref 21–32)
CREAT SERPL-MCNC: 0.6 MG/DL (ref 0.9–1.3)
DIFFERENTIAL TYPE: ABNORMAL
EOSINOPHILS ABSOLUTE: 0 K/CU MM
EOSINOPHILS RELATIVE PERCENT: 0 % (ref 0–3)
ESTIMATED AVERAGE GLUCOSE: 240 MG/DL
GFR SERPL CREATININE-BSD FRML MDRD: >60 ML/MIN/1.73M2
GLUCOSE BLD-MCNC: 351 MG/DL (ref 70–99)
GLUCOSE BLD-MCNC: 365 MG/DL (ref 70–99)
GLUCOSE BLD-MCNC: 375 MG/DL (ref 70–99)
GLUCOSE BLD-MCNC: 378 MG/DL (ref 70–99)
GLUCOSE BLD-MCNC: 403 MG/DL (ref 70–99)
GLUCOSE BLD-MCNC: 407 MG/DL (ref 70–99)
GLUCOSE SERPL-MCNC: 414 MG/DL (ref 70–99)
HBA1C MFR BLD: 10 % (ref 4.2–6.3)
HCT VFR BLD CALC: 46.8 % (ref 42–52)
HEMOGLOBIN: 15 GM/DL (ref 13.5–18)
IMMATURE NEUTROPHIL %: 0.8 % (ref 0–0.43)
LYMPHOCYTES ABSOLUTE: 0.4 K/CU MM
LYMPHOCYTES RELATIVE PERCENT: 2.9 % (ref 24–44)
MCH RBC QN AUTO: 30.7 PG (ref 27–31)
MCHC RBC AUTO-ENTMCNC: 32.1 % (ref 32–36)
MCV RBC AUTO: 95.7 FL (ref 78–100)
MONOCYTES ABSOLUTE: 0.4 K/CU MM
MONOCYTES RELATIVE PERCENT: 3 % (ref 0–4)
NUCLEATED RBC %: 0 %
PDW BLD-RTO: 14.9 % (ref 11.7–14.9)
PLATELET # BLD: 239 K/CU MM (ref 140–440)
PMV BLD AUTO: 9.9 FL (ref 7.5–11.1)
POTASSIUM SERPL-SCNC: 4.8 MMOL/L (ref 3.5–5.1)
RBC # BLD: 4.89 M/CU MM (ref 4.6–6.2)
SEGMENTED NEUTROPHILS ABSOLUTE COUNT: 13.6 K/CU MM
SEGMENTED NEUTROPHILS RELATIVE PERCENT: 93.2 % (ref 36–66)
SODIUM BLD-SCNC: 135 MMOL/L (ref 135–145)
TOTAL IMMATURE NEUTOROPHIL: 0.11 K/CU MM
TOTAL NUCLEATED RBC: 0 K/CU MM
TOTAL PROTEIN: 6.3 GM/DL (ref 6.4–8.2)
WBC # BLD: 14.6 K/CU MM (ref 4–10.5)

## 2023-10-14 PROCEDURE — 2580000003 HC RX 258: Performed by: STUDENT IN AN ORGANIZED HEALTH CARE EDUCATION/TRAINING PROGRAM

## 2023-10-14 PROCEDURE — 80053 COMPREHEN METABOLIC PANEL: CPT

## 2023-10-14 PROCEDURE — 6360000002 HC RX W HCPCS: Performed by: STUDENT IN AN ORGANIZED HEALTH CARE EDUCATION/TRAINING PROGRAM

## 2023-10-14 PROCEDURE — 6370000000 HC RX 637 (ALT 250 FOR IP): Performed by: STUDENT IN AN ORGANIZED HEALTH CARE EDUCATION/TRAINING PROGRAM

## 2023-10-14 PROCEDURE — 83036 HEMOGLOBIN GLYCOSYLATED A1C: CPT

## 2023-10-14 PROCEDURE — 36415 COLL VENOUS BLD VENIPUNCTURE: CPT

## 2023-10-14 PROCEDURE — 1200000000 HC SEMI PRIVATE

## 2023-10-14 PROCEDURE — 94640 AIRWAY INHALATION TREATMENT: CPT

## 2023-10-14 PROCEDURE — 85025 COMPLETE CBC W/AUTO DIFF WBC: CPT

## 2023-10-14 PROCEDURE — 2700000000 HC OXYGEN THERAPY PER DAY

## 2023-10-14 PROCEDURE — 82962 GLUCOSE BLOOD TEST: CPT

## 2023-10-14 PROCEDURE — 94761 N-INVAS EAR/PLS OXIMETRY MLT: CPT

## 2023-10-14 PROCEDURE — 94660 CPAP INITIATION&MGMT: CPT

## 2023-10-14 RX ORDER — INSULIN GLARGINE 100 [IU]/ML
40 INJECTION, SOLUTION SUBCUTANEOUS 2 TIMES DAILY
Status: DISCONTINUED | OUTPATIENT
Start: 2023-10-14 | End: 2023-10-15 | Stop reason: HOSPADM

## 2023-10-14 RX ORDER — INSULIN LISPRO 100 [IU]/ML
25 INJECTION, SOLUTION INTRAVENOUS; SUBCUTANEOUS
Status: DISCONTINUED | OUTPATIENT
Start: 2023-10-14 | End: 2023-10-15 | Stop reason: HOSPADM

## 2023-10-14 RX ORDER — INSULIN GLARGINE 100 [IU]/ML
40 INJECTION, SOLUTION SUBCUTANEOUS ONCE
Status: COMPLETED | OUTPATIENT
Start: 2023-10-14 | End: 2023-10-14

## 2023-10-14 RX ORDER — PREDNISONE 10 MG/1
40 TABLET ORAL DAILY
Status: DISCONTINUED | OUTPATIENT
Start: 2023-10-14 | End: 2023-10-15 | Stop reason: HOSPADM

## 2023-10-14 RX ORDER — IPRATROPIUM BROMIDE AND ALBUTEROL SULFATE 2.5; .5 MG/3ML; MG/3ML
1 SOLUTION RESPIRATORY (INHALATION)
Status: DISCONTINUED | OUTPATIENT
Start: 2023-10-14 | End: 2023-10-15 | Stop reason: HOSPADM

## 2023-10-14 RX ADMIN — INSULIN LISPRO 4 UNITS: 100 INJECTION, SOLUTION INTRAVENOUS; SUBCUTANEOUS at 20:20

## 2023-10-14 RX ADMIN — PREDNISONE 40 MG: 10 TABLET ORAL at 15:30

## 2023-10-14 RX ADMIN — METHYLPREDNISOLONE SODIUM SUCCINATE 40 MG: 40 INJECTION, POWDER, FOR SOLUTION INTRAMUSCULAR; INTRAVENOUS at 06:11

## 2023-10-14 RX ADMIN — APIXABAN 5 MG: 5 TABLET, FILM COATED ORAL at 07:53

## 2023-10-14 RX ADMIN — FUROSEMIDE 40 MG: 20 TABLET ORAL at 07:53

## 2023-10-14 RX ADMIN — INSULIN LISPRO 16 UNITS: 100 INJECTION, SOLUTION INTRAVENOUS; SUBCUTANEOUS at 07:55

## 2023-10-14 RX ADMIN — SODIUM CHLORIDE, PRESERVATIVE FREE 10 ML: 5 INJECTION INTRAVENOUS at 21:43

## 2023-10-14 RX ADMIN — IPRATROPIUM BROMIDE AND ALBUTEROL SULFATE 1 DOSE: .5; 2.5 SOLUTION RESPIRATORY (INHALATION) at 12:46

## 2023-10-14 RX ADMIN — INSULIN GLARGINE 40 UNITS: 100 INJECTION, SOLUTION SUBCUTANEOUS at 20:19

## 2023-10-14 RX ADMIN — LEVOFLOXACIN 500 MG: 5 INJECTION, SOLUTION INTRAVENOUS at 06:11

## 2023-10-14 RX ADMIN — INSULIN LISPRO 20 UNITS: 100 INJECTION, SOLUTION INTRAVENOUS; SUBCUTANEOUS at 07:55

## 2023-10-14 RX ADMIN — ATORVASTATIN CALCIUM 40 MG: 40 TABLET, FILM COATED ORAL at 20:20

## 2023-10-14 RX ADMIN — APIXABAN 5 MG: 5 TABLET, FILM COATED ORAL at 20:21

## 2023-10-14 RX ADMIN — INSULIN LISPRO 20 UNITS: 100 INJECTION, SOLUTION INTRAVENOUS; SUBCUTANEOUS at 11:33

## 2023-10-14 RX ADMIN — SODIUM CHLORIDE, PRESERVATIVE FREE 10 ML: 5 INJECTION INTRAVENOUS at 07:55

## 2023-10-14 RX ADMIN — IPRATROPIUM BROMIDE AND ALBUTEROL SULFATE 1 DOSE: .5; 2.5 SOLUTION RESPIRATORY (INHALATION) at 07:44

## 2023-10-14 RX ADMIN — INSULIN GLARGINE 40 UNITS: 100 INJECTION, SOLUTION SUBCUTANEOUS at 15:30

## 2023-10-14 RX ADMIN — IPRATROPIUM BROMIDE AND ALBUTEROL SULFATE 1 DOSE: 2.5; .5 SOLUTION RESPIRATORY (INHALATION) at 22:59

## 2023-10-14 RX ADMIN — INSULIN LISPRO 16 UNITS: 100 INJECTION, SOLUTION INTRAVENOUS; SUBCUTANEOUS at 11:33

## 2023-10-14 RX ADMIN — INSULIN LISPRO 25 UNITS: 100 INJECTION, SOLUTION INTRAVENOUS; SUBCUTANEOUS at 16:38

## 2023-10-14 RX ADMIN — INSULIN LISPRO 16 UNITS: 100 INJECTION, SOLUTION INTRAVENOUS; SUBCUTANEOUS at 16:38

## 2023-10-14 ASSESSMENT — PAIN SCALES - GENERAL: PAINLEVEL_OUTOF10: 0

## 2023-10-14 NOTE — PROGRESS NOTES
V2.0  [facility] [place] Hospitalist Progress Note      Name:  Mark Wiggins /Age/Sex: 1955  (79 y.o. male)   MRN & CSN:  [id number] & [id number] Encounter Date/Time: 10/14/2023 12:36 PM EDT    Location:  [id number] PCP: Pastora Shin MD       Hospital Day: 4    Assessment and Plan:   Mark Wiggins is a 79 y.o. male with pmh of  HFpEF, Afib on elqiuis, T2DM, COPD on 2-3L NC  who presents with COPD exacerbation ([address])      Plan:  Acute COPD exacerbation   Acute on Chronic Hypoxemic Hypercapnic Respiratory Failure   - Endorsed worsening SOB with increased productive cough for 3 days. Compliant with home inhalers, saturating 85% on home 2-3L NC  -VBG 7.27/99 while on HFNC, improved to 7.32/85 while on bipap. Currently on 5 L O2 vias NC  - CXR with stable BLT opacities    - Continue steroids prednisone 40 mg PO daily- Levaquin 500mg daily   -On BiPAP at night      Non-MI troponin elevation  - Denied any typical CP.  - Initial Tn mildly elevated and plateaued. ECG without acute ischemic changes. - Likely secondary to hypoxia. -Tele            Chronic HFpEF  - Grossly euvolemic with mild peripheral edema  -TTE on  LVEF of 50% with no WMA. -on PO home lasix         T2DM  - BS on admit 369  - Home Meds: Lantus 50u qhs, 20uNovolog TIDMW, Metformin,  - BG above goal   -Increase Lantus to 40 units twice a day, lispro 25 units 3 times daily with high-dose sliding scale. - Hold PO meds   - -180        Afib  -Continue Diltiazem and Eliquis      HLD  -Continue statin     Diet ADULT DIET; Regular; 3 carb choices (45 gm/meal);  Low Fat/Low Chol/High Fiber/SUE   DVT Prophylaxis [] Lovenox, []  Heparin, [] SCDs, [] Ambulation,  [x] Eliquis, [] Xarelto  [] Coumadin   Code Status Full Code   Disposition From: Home  Expected Disposition: Same  Estimated Date of Discharge: 1 to 2 days  Patient requires continued admission due to COPD exacerbation   Surrogate Decision Maker/ POA      Subjective:     Chief

## 2023-10-14 NOTE — PROGRESS NOTES
10/13/23 2158   NIV Type   NIV Started/Stopped On   Equipment Type v60   Mode Bilevel   Mask Type Full face mask   Mask Size Large   Bonnet size Large   Assessment   Pulse 77   Respirations 18   SpO2 95 %   Level of Consciousness 0   Comfort Level Good   Using Accessory Muscles No   Mask Compliance Good   Skin Protection for O2 Device N/A   Settings/Measurements   PIP Observed 16 cm H20   IPAP 18 cmH20   CPAP/EPAP 8 cmH2O   Vt (Measured) 664 mL   Rate Ordered 12   FiO2  40 %   I Time/ I Time % 1 s   Minute Volume (L/min) 14.3 Liters   Mask Leak (lpm) 0 lpm   Patient's Home Machine No   Alarm Settings   Alarms On Y   Low Pressure (cmH2O) 3 cmH2O   High Pressure (cmH2O) 30 cmH2O   Delay Alarm 20 sec(s)   Apnea (secs) 20 secs   RR Low (bpm) 13   RR High (bpm) 40 br/min   Patient Observation   Observations Pt is resting in bed

## 2023-10-14 NOTE — PROGRESS NOTES
10/14/23 0025   NIV Type   $NIV $Daily Charge   NIV Started/Stopped On   Equipment Type v60   Mode Bilevel   Mask Type Full face mask   Mask Size Large   Bonnet size Large   Assessment   Pulse 76   Respirations 17   SpO2 99 %   Level of Consciousness 0   Comfort Level Fair   Using Accessory Muscles No   Mask Compliance Good   Skin Protection for O2 Device N/A   Settings/Measurements   PIP Observed 15 cm H20   IPAP 8 cmH20   Vt (Measured) 751 mL   Rate Ordered 12   FiO2  40 %   I Time/ I Time % 1 s   Minute Volume (L/min) 25.8 Liters   Mask Leak (lpm) 51 lpm   Patient's Home Machine No   Alarm Settings   Alarms On Y   Low Pressure (cmH2O) 3 cmH2O   High Pressure (cmH2O) 30 cmH2O   Delay Alarm 20 sec(s)   Apnea (secs) 20 secs   RR Low (bpm) 13   RR High (bpm) 40 br/min   Patient Observation   Observations Pt is up all night, drinking water and lifting up the Bipap mask to take drinks of water.   RRT decreased press to 12/6

## 2023-10-15 VITALS
HEART RATE: 88 BPM | WEIGHT: 315 LBS | OXYGEN SATURATION: 97 % | HEIGHT: 78 IN | SYSTOLIC BLOOD PRESSURE: 134 MMHG | DIASTOLIC BLOOD PRESSURE: 76 MMHG | BODY MASS INDEX: 36.45 KG/M2 | RESPIRATION RATE: 24 BRPM | TEMPERATURE: 98 F

## 2023-10-15 LAB
GLUCOSE BLD-MCNC: 271 MG/DL (ref 70–99)
GLUCOSE BLD-MCNC: 355 MG/DL (ref 70–99)
GLUCOSE BLD-MCNC: 376 MG/DL (ref 70–99)
GLUCOSE BLD-MCNC: 435 MG/DL (ref 70–99)

## 2023-10-15 PROCEDURE — 82962 GLUCOSE BLOOD TEST: CPT

## 2023-10-15 PROCEDURE — 6360000002 HC RX W HCPCS: Performed by: STUDENT IN AN ORGANIZED HEALTH CARE EDUCATION/TRAINING PROGRAM

## 2023-10-15 PROCEDURE — 6370000000 HC RX 637 (ALT 250 FOR IP): Performed by: STUDENT IN AN ORGANIZED HEALTH CARE EDUCATION/TRAINING PROGRAM

## 2023-10-15 PROCEDURE — 94640 AIRWAY INHALATION TREATMENT: CPT

## 2023-10-15 PROCEDURE — 94761 N-INVAS EAR/PLS OXIMETRY MLT: CPT

## 2023-10-15 PROCEDURE — 94660 CPAP INITIATION&MGMT: CPT

## 2023-10-15 PROCEDURE — 93005 ELECTROCARDIOGRAM TRACING: CPT | Performed by: STUDENT IN AN ORGANIZED HEALTH CARE EDUCATION/TRAINING PROGRAM

## 2023-10-15 PROCEDURE — 2700000000 HC OXYGEN THERAPY PER DAY

## 2023-10-15 RX ORDER — PREDNISONE 20 MG/1
TABLET ORAL
Qty: 9 TABLET | Refills: 0 | Status: ON HOLD | OUTPATIENT
Start: 2023-10-15 | End: 2023-10-21 | Stop reason: HOSPADM

## 2023-10-15 RX ADMIN — PREDNISONE 40 MG: 10 TABLET ORAL at 09:01

## 2023-10-15 RX ADMIN — INSULIN GLARGINE 40 UNITS: 100 INJECTION, SOLUTION SUBCUTANEOUS at 09:01

## 2023-10-15 RX ADMIN — INSULIN LISPRO 16 UNITS: 100 INJECTION, SOLUTION INTRAVENOUS; SUBCUTANEOUS at 07:01

## 2023-10-15 RX ADMIN — APIXABAN 5 MG: 5 TABLET, FILM COATED ORAL at 09:02

## 2023-10-15 RX ADMIN — IPRATROPIUM BROMIDE AND ALBUTEROL SULFATE 1 DOSE: 2.5; .5 SOLUTION RESPIRATORY (INHALATION) at 09:33

## 2023-10-15 RX ADMIN — FUROSEMIDE 40 MG: 20 TABLET ORAL at 09:02

## 2023-10-15 RX ADMIN — DILTIAZEM HYDROCHLORIDE 240 MG: 240 CAPSULE, COATED, EXTENDED RELEASE ORAL at 09:02

## 2023-10-15 RX ADMIN — INSULIN LISPRO 25 UNITS: 100 INJECTION, SOLUTION INTRAVENOUS; SUBCUTANEOUS at 09:08

## 2023-10-15 RX ADMIN — LEVOFLOXACIN 500 MG: 5 INJECTION, SOLUTION INTRAVENOUS at 05:57

## 2023-10-15 ASSESSMENT — PAIN SCALES - GENERAL: PAINLEVEL_OUTOF10: 0

## 2023-10-15 NOTE — PROGRESS NOTES
Upon discharge pt asked for a ride to be set up. This nurse asked about 18 and someone bringing it to him. Pt said \"no I live 4 blocks away and ill sign what I need too to refuse but I'm leaving. \" This nurse educated pt on the dangers of not wearing 02. Pt said \"I am fine I do this all the time, I'm only on 2L and I'm leaving. \"

## 2023-10-15 NOTE — DISCHARGE SUMMARY
V2.0  Discharge Summary    Name:  Sal Joseph /Age/Sex: 1955 (79 y.o. male)   Admit Date: 10/11/2023  Discharge Date: 10/15/23    MRN & CSN:  [id number] & [id number] Encounter Date and Time 10/15/23 10:35 AM EDT    Attending:  Shamika Zhang MD Discharging Provider: Shamika Zhang MD       Hospital Course:     Brief HPI: Patient is a 79 y.o. male with a PMHx of HFpEF, Afib on elqiuis, T2DM, COPD on 2-3L NC who presented to the ED with SOB. Reports he has been experiencing increased cough with clear sputum for the last few days associated with shortness of breath. Patient states that he has been desatting to 85% while on home 2-3 L NC. Patient states that he does not currently have a rescue inhaler. Of note patient reports that she has no motivation and has been feeling depressed for the last few weeks, denies any SI/HI. Denied any F/C, HA, dizziness, presyncope, syncope,  CP, N/V, abdominal pain, bleeding (hemoptysis / hematemesis, hematuria, BRBPR), C/D, or changes in urinary habits. reports that he has been smoking cigars and cigarettes. He started smoking about 50 years ago. He has a 12.00 pack-year smoking history. He quit smokeless tobacco use about 48 years ago. His smokeless tobacco use included chew. He reports that he does not drink alcohol and does not use drugs. Brief Problem Based Course:   Acute COPD exacerbation   Acute on Chronic Hypoxemic Hypercapnic Respiratory Failure   - Endorsed worsening SOB with increased productive cough for 3 days. Compliant with home inhalers, saturating 85% on home 2-3L NC  -VBG 7.27/99 while on HFNC, improved to 7.32/85 while on bipap.  Currently on 5 L O2 vias NC  - CXR with stable BLT opacities    - Continue steroids prednisone 40 mg PO daily, discharged on 9-day tapering dose of prednisone, saturating well on 4 to 5 L oxygen via nasal cannula which seems to be his baseline O2 requirements  -On BiPAP at night, continue BiPAP at night on discharge

## 2023-10-15 NOTE — PROGRESS NOTES
10/15/23 0149   NIV Type   Equipment Type v60   Mode Bilevel   Mask Type Full face mask   Mask Size Large   Breath Sounds   Breath Sounds Bilateral Diminished   Right Upper Lobe Diminished   Right Middle Lobe Diminished   Right Lower Lobe Diminished   Left Upper Lobe Diminished   Left Lower Lobe Diminished   Settings/Measurements   IPAP 12 cmH20   CPAP/EPAP 6 cmH2O   Vt (Measured) 911 mL   Rate Ordered 12   FiO2  40 %   Minute Volume (L/min) 15.3 Liters   Alarm Settings   Alarms On Y   Low Pressure (cmH2O) 3 cmH2O   High Pressure (cmH2O) 30 cmH2O   Delay Alarm 20 sec(s)   Apnea (secs) 20 secs   RR Low (bpm) 13   RR High (bpm) 40 br/min

## 2023-10-16 ENCOUNTER — CARE COORDINATION (OUTPATIENT)
Dept: CASE MANAGEMENT | Age: 68
End: 2023-10-16

## 2023-10-16 LAB
EKG ATRIAL RATE: 71 BPM
EKG DIAGNOSIS: NORMAL
EKG P AXIS: 85 DEGREES
EKG P-R INTERVAL: 190 MS
EKG Q-T INTERVAL: 400 MS
EKG QRS DURATION: 108 MS
EKG QTC CALCULATION (BAZETT): 434 MS
EKG R AXIS: -45 DEGREES
EKG T AXIS: 43 DEGREES
EKG VENTRICULAR RATE: 71 BPM

## 2023-10-16 PROCEDURE — 93010 ELECTROCARDIOGRAM REPORT: CPT | Performed by: INTERNAL MEDICINE

## 2023-10-16 NOTE — CARE COORDINATION
Care Transitions Outreach Attempt    Call within 2 business days of discharge: Yes     Patient: Tasneem Duty Patient : 1955 MRN: [id number]    Attempted to reach patient for transitions of care follow up. Unable to reach patient. (#1 Initial)  CTN will try again. Last Discharge Facility       Date Complaint Diagnosis Description Type Department Provider    10/11/23 Shortness of Breath COPD exacerbation ([address]) . .. ED to Hosp-Admission (Discharged) (ADMITTED) [address] [address] Ralph Caceres MD; LINDSAY Crowell. Was this an external facility discharge?  No Discharge Facility Name: [facility]    Noted following upcoming appointments from discharge chart review:   [address] [address] [facility],[address]imm[address] follow up appointment(s):   Future Appointments   Date Time Provider [address]   10/23/2023  1:20 PM BRENNAN Escudero - CNP [facility]   11/10/2023  1:45 PM Carmita Hedrick MD [facility]     Non-[facility]  follow up appointment(s): KAE Felix RN [phone]

## 2023-10-17 ENCOUNTER — CARE COORDINATION (OUTPATIENT)
Dept: CASE MANAGEMENT | Age: 68
End: 2023-10-17

## 2023-10-17 ENCOUNTER — CARE COORDINATION (OUTPATIENT)
Dept: CARE COORDINATION | Age: 68
End: 2023-10-17

## 2023-10-17 NOTE — CARE COORDINATION
Ambulatory Care Coordination Note  10/17/2023    Patient Current Location:  Home: [address] [place] [address]     ACM contacted the patient by telephone. Verified name and  with patient as identifiers. Provided introduction to self, and explanation of the ACM role. Challenges to be reviewed by the provider   Additional needs identified to be addressed with provider: No  none               Method of communication with provider: none. ACM: Alexis Martinez RN        Spoke with patient for ACM follow up; hospital discharge. Patient is pleasant; but is not engaged with ACM. Reviewed AVS.  Confirmed that patient has prednisone and is taking as directed. Encouraged patient to complete the entire course of this medication. Instructed on the recommendation for 7 day hospital follow up. Patient confirms that he has a follow up appointment with Cardiology 10/23/23. COPD:  denies increased SOB, CP or cough. Patient is using home oxygen and Bipap as directed. Reviewed zone tool and s/s to report to MD.    Offered patient enrollment in the Remote Patient Monitoring (RPM) program for in-home monitoring: NA.    Patient confirms plan for [facility] support. Has not been contacted to this point. Informed of ACM plan to confirm referral.  Reminded patient of the availability of a 24/7 nurse triage line for any changes in patient condition. Patient denies any questions or concerns at this time. Patient denies the need for ongoing outreach. Confirmed ACM contact information should needs arise. Spoke with [facility] to confirm referral.            Lab Results       None                 Goals Addressed                   This Visit's Progress     Community Resource Goal   No change     I will complete referral to community agency Medication Assistance for assistance.      Barriers: lack of motivation, lack of support, and stress  Plan for overcoming my barriers:  Patient will complete

## 2023-10-17 NOTE — CARE COORDINATION
Care Transitions Outreach Attempt    Call within 2 business days of discharge: Yes     Patient: Ese Mchugh Patient : 1955 MRN: [id number]    2nd unsuccessful attempt to reach for initial care transitions call. HIPAA compliant message left requesting call back. If no call back received, episode will be closed per protocol & no further outreach scheduled. Unable to reach letter sent via My chart    Last Discharge Facility       Date Complaint Diagnosis Description Type Department Provider    10/11/23 Shortness of Breath COPD exacerbation ([address]) . .. ED to Hosp-Admission (Discharged) (ADMITTED) [facility] Julienne Villa MD; LINDSAY Palacios. Was this an external facility discharge?  No Discharge Facility Name: [facility]    Noted following upcoming appointments from discharge chart review:   [facility] follow up appointment(s):   Future Appointments   Date Time Provider [address]   10/23/2023  1:20 PM Anna Boo, APRN - CNP [facility]   11/10/2023  1:45 PM Diann Hedrick MD [facility]     Non-[facility]  follow up appointment(s): KAE Teresa RN [phone]

## 2023-10-18 ENCOUNTER — HOSPITAL ENCOUNTER (INPATIENT)
Age: 68
LOS: 3 days | Discharge: HOME | End: 2023-10-21
Attending: EMERGENCY MEDICINE | Admitting: INTERNAL MEDICINE
Payer: MEDICARE

## 2023-10-18 ENCOUNTER — APPOINTMENT (OUTPATIENT)
Dept: GENERAL RADIOLOGY | Age: 68
End: 2023-10-18
Payer: MEDICARE

## 2023-10-18 DIAGNOSIS — J44.1 ACUTE EXACERBATION OF CHRONIC OBSTRUCTIVE PULMONARY DISEASE (HCC): ICD-10-CM

## 2023-10-18 DIAGNOSIS — J96.22 ACUTE ON CHRONIC RESPIRATORY FAILURE WITH HYPOXIA AND HYPERCAPNIA (HCC): Primary | ICD-10-CM

## 2023-10-18 DIAGNOSIS — J96.21 ACUTE ON CHRONIC RESPIRATORY FAILURE WITH HYPOXIA AND HYPERCAPNIA (HCC): Primary | ICD-10-CM

## 2023-10-18 PROBLEM — J96.02 ACUTE RESPIRATORY FAILURE WITH HYPOXIA AND HYPERCAPNIA (HCC): Status: ACTIVE | Noted: 2023-10-18

## 2023-10-18 PROBLEM — J96.01 ACUTE RESPIRATORY FAILURE WITH HYPOXIA AND HYPERCAPNIA (HCC): Status: ACTIVE | Noted: 2023-10-18

## 2023-10-18 LAB
ALBUMIN SERPL-MCNC: 3.5 GM/DL (ref 3.4–5)
ALP BLD-CCNC: 96 IU/L (ref 40–129)
ALT SERPL-CCNC: 19 U/L (ref 10–40)
ANION GAP SERPL CALCULATED.3IONS-SCNC: 5 MMOL/L (ref 4–16)
AST SERPL-CCNC: 12 IU/L (ref 15–37)
BASE EXCESS MIXED: 13.6 (ref 0–1.2)
BASE EXCESS MIXED: 13.8 (ref 0–1.2)
BASOPHILS ABSOLUTE: 0 K/CU MM
BASOPHILS RELATIVE PERCENT: 0.3 % (ref 0–1)
BILIRUB SERPL-MCNC: 0.4 MG/DL (ref 0–1)
BILIRUBIN URINE: NEGATIVE MG/DL
BLOOD, URINE: NEGATIVE
BUN SERPL-MCNC: 18 MG/DL (ref 6–23)
CALCIUM SERPL-MCNC: 8.7 MG/DL (ref 8.3–10.6)
CHLORIDE BLD-SCNC: 87 MMOL/L (ref 99–110)
CLARITY: CLEAR
CO2: 39 MMOL/L (ref 21–32)
COLOR: YELLOW
COMMENT UA: ABNORMAL
COMMENT: ABNORMAL
COMMENT: ABNORMAL
CREAT SERPL-MCNC: 0.7 MG/DL (ref 0.9–1.3)
DIFFERENTIAL TYPE: ABNORMAL
EKG DIAGNOSIS: NORMAL
EKG Q-T INTERVAL: 286 MS
EKG QRS DURATION: 82 MS
EKG QTC CALCULATION (BAZETT): 398 MS
EKG R AXIS: -72 DEGREES
EKG T AXIS: 75 DEGREES
EKG VENTRICULAR RATE: 117 BPM
EOSINOPHILS ABSOLUTE: 0.1 K/CU MM
EOSINOPHILS RELATIVE PERCENT: 0.9 % (ref 0–3)
GFR SERPL CREATININE-BSD FRML MDRD: >60 ML/MIN/1.73M2
GLUCOSE BLD-MCNC: 539 MG/DL (ref 70–99)
GLUCOSE BLD-MCNC: 587 MG/DL (ref 70–99)
GLUCOSE BLD-MCNC: 588 MG/DL (ref 70–99)
GLUCOSE SERPL-MCNC: 537 MG/DL (ref 70–99)
GLUCOSE, URINE: >1000 MG/DL
HCO3 VENOUS: 44.3 MMOL/L (ref 19–25)
HCO3 VENOUS: 44.3 MMOL/L (ref 19–25)
HCT VFR BLD CALC: 50.3 % (ref 42–52)
HEMOGLOBIN: 16.2 GM/DL (ref 13.5–18)
IMMATURE NEUTROPHIL %: 1.7 % (ref 0–0.43)
KETONES, URINE: NEGATIVE MG/DL
LACTATE: 2.8 MMOL/L (ref 0.5–1.9)
LACTIC ACID, SEPSIS: 2.2 MMOL/L (ref 0.4–2)
LACTIC ACID, SEPSIS: 2.5 MMOL/L (ref 0.4–2)
LEUKOCYTE ESTERASE, URINE: NEGATIVE
LYMPHOCYTES ABSOLUTE: 1.4 K/CU MM
LYMPHOCYTES RELATIVE PERCENT: 10.3 % (ref 24–44)
MAGNESIUM: 1.9 MG/DL (ref 1.8–2.4)
MCH RBC QN AUTO: 30.9 PG (ref 27–31)
MCHC RBC AUTO-ENTMCNC: 32.2 % (ref 32–36)
MCV RBC AUTO: 95.8 FL (ref 78–100)
MONOCYTES ABSOLUTE: 0.8 K/CU MM
MONOCYTES RELATIVE PERCENT: 5.8 % (ref 0–4)
NITRITE URINE, QUANTITATIVE: NEGATIVE
NUCLEATED RBC %: 0 %
O2 SAT, VEN: 86.3 % (ref 50–70)
O2 SAT, VEN: 93.1 % (ref 50–70)
PCO2, VEN: 88 MMHG (ref 38–52)
PCO2, VEN: 90 MMHG (ref 38–52)
PDW BLD-RTO: 15 % (ref 11.7–14.9)
PH VENOUS: 7.3 (ref 7.32–7.42)
PH VENOUS: 7.31 (ref 7.32–7.42)
PH, URINE: 5 (ref 5–8)
PLATELET # BLD: 210 K/CU MM (ref 140–440)
PMV BLD AUTO: 10 FL (ref 7.5–11.1)
PO2, VEN: 56 MMHG (ref 28–48)
PO2, VEN: 84 MMHG (ref 28–48)
POTASSIUM SERPL-SCNC: 4.6 MMOL/L (ref 3.5–5.1)
PRO-BNP: 745.5 PG/ML
PROCALCITONIN SERPL-MCNC: 0.04 NG/ML
PROTEIN UA: NEGATIVE MG/DL
RBC # BLD: 5.25 M/CU MM (ref 4.6–6.2)
SEGMENTED NEUTROPHILS ABSOLUTE COUNT: 10.8 K/CU MM
SEGMENTED NEUTROPHILS RELATIVE PERCENT: 81 % (ref 36–66)
SODIUM BLD-SCNC: 131 MMOL/L (ref 135–145)
SPECIFIC GRAVITY UA: <1.005 (ref 1–1.03)
TOTAL IMMATURE NEUTOROPHIL: 0.22 K/CU MM
TOTAL NUCLEATED RBC: 0 K/CU MM
TOTAL PROTEIN: 6.2 GM/DL (ref 6.4–8.2)
TROPONIN, HIGH SENSITIVITY: 34 NG/L (ref 0–22)
TROPONIN, HIGH SENSITIVITY: 46 NG/L (ref 0–22)
UROBILINOGEN, URINE: 0.2 MG/DL (ref 0.2–1)
WBC # BLD: 13.3 K/CU MM (ref 4–10.5)

## 2023-10-18 PROCEDURE — 71045 X-RAY EXAM CHEST 1 VIEW: CPT

## 2023-10-18 PROCEDURE — 82805 BLOOD GASES W/O2 SATURATION: CPT

## 2023-10-18 PROCEDURE — 99291 CRITICAL CARE FIRST HOUR: CPT

## 2023-10-18 PROCEDURE — 2580000003 HC RX 258: Performed by: INTERNAL MEDICINE

## 2023-10-18 PROCEDURE — 2580000003 HC RX 258

## 2023-10-18 PROCEDURE — 84145 PROCALCITONIN (PCT): CPT

## 2023-10-18 PROCEDURE — 96374 THER/PROPH/DIAG INJ IV PUSH: CPT

## 2023-10-18 PROCEDURE — 6360000002 HC RX W HCPCS

## 2023-10-18 PROCEDURE — 94640 AIRWAY INHALATION TREATMENT: CPT

## 2023-10-18 PROCEDURE — 94660 CPAP INITIATION&MGMT: CPT

## 2023-10-18 PROCEDURE — 83605 ASSAY OF LACTIC ACID: CPT

## 2023-10-18 PROCEDURE — 6370000000 HC RX 637 (ALT 250 FOR IP): Performed by: INTERNAL MEDICINE

## 2023-10-18 PROCEDURE — 6370000000 HC RX 637 (ALT 250 FOR IP): Performed by: FAMILY MEDICINE

## 2023-10-18 PROCEDURE — 87040 BLOOD CULTURE FOR BACTERIA: CPT

## 2023-10-18 PROCEDURE — 83735 ASSAY OF MAGNESIUM: CPT

## 2023-10-18 PROCEDURE — 93010 ELECTROCARDIOGRAM REPORT: CPT | Performed by: INTERNAL MEDICINE

## 2023-10-18 PROCEDURE — 84484 ASSAY OF TROPONIN QUANT: CPT

## 2023-10-18 PROCEDURE — 5A09457 ASSISTANCE WITH RESPIRATORY VENTILATION, 24-96 CONSECUTIVE HOURS, CONTINUOUS POSITIVE AIRWAY PRESSURE: ICD-10-PCS | Performed by: INTERNAL MEDICINE

## 2023-10-18 PROCEDURE — 2700000000 HC OXYGEN THERAPY PER DAY

## 2023-10-18 PROCEDURE — 36415 COLL VENOUS BLD VENIPUNCTURE: CPT

## 2023-10-18 PROCEDURE — 85025 COMPLETE CBC W/AUTO DIFF WBC: CPT

## 2023-10-18 PROCEDURE — 80053 COMPREHEN METABOLIC PANEL: CPT

## 2023-10-18 PROCEDURE — 83880 ASSAY OF NATRIURETIC PEPTIDE: CPT

## 2023-10-18 PROCEDURE — 6360000002 HC RX W HCPCS: Performed by: INTERNAL MEDICINE

## 2023-10-18 PROCEDURE — 93005 ELECTROCARDIOGRAM TRACING: CPT

## 2023-10-18 PROCEDURE — 2140000000 HC CCU INTERMEDIATE R&B

## 2023-10-18 PROCEDURE — 82803 BLOOD GASES ANY COMBINATION: CPT

## 2023-10-18 PROCEDURE — 81003 URINALYSIS AUTO W/O SCOPE: CPT

## 2023-10-18 PROCEDURE — 82962 GLUCOSE BLOOD TEST: CPT

## 2023-10-18 PROCEDURE — 6370000000 HC RX 637 (ALT 250 FOR IP)

## 2023-10-18 RX ORDER — INSULIN LISPRO 100 [IU]/ML
16 INJECTION, SOLUTION INTRAVENOUS; SUBCUTANEOUS ONCE
Status: COMPLETED | OUTPATIENT
Start: 2023-10-18 | End: 2023-10-18

## 2023-10-18 RX ORDER — METHYLPREDNISOLONE SODIUM SUCCINATE 125 MG/2ML
125 INJECTION, POWDER, LYOPHILIZED, FOR SOLUTION INTRAMUSCULAR; INTRAVENOUS ONCE
Status: COMPLETED | OUTPATIENT
Start: 2023-10-18 | End: 2023-10-18

## 2023-10-18 RX ORDER — IPRATROPIUM BROMIDE AND ALBUTEROL SULFATE 2.5; .5 MG/3ML; MG/3ML
1 SOLUTION RESPIRATORY (INHALATION)
Status: DISCONTINUED | OUTPATIENT
Start: 2023-10-18 | End: 2023-10-21 | Stop reason: HOSPADM

## 2023-10-18 RX ORDER — DEXTROSE MONOHYDRATE 100 MG/ML
INJECTION, SOLUTION INTRAVENOUS CONTINUOUS PRN
Status: DISCONTINUED | OUTPATIENT
Start: 2023-10-18 | End: 2023-10-21 | Stop reason: HOSPADM

## 2023-10-18 RX ORDER — PREDNISONE 20 MG/1
40 TABLET ORAL DAILY
Status: DISCONTINUED | OUTPATIENT
Start: 2023-10-21 | End: 2023-10-20

## 2023-10-18 RX ORDER — ASPIRIN 81 MG/1
81 TABLET, CHEWABLE ORAL DAILY
Status: DISCONTINUED | OUTPATIENT
Start: 2023-10-18 | End: 2023-10-21 | Stop reason: HOSPADM

## 2023-10-18 RX ORDER — FUROSEMIDE 40 MG/1
40 TABLET ORAL DAILY
Status: DISCONTINUED | OUTPATIENT
Start: 2023-10-18 | End: 2023-10-21 | Stop reason: HOSPADM

## 2023-10-18 RX ORDER — ONDANSETRON 4 MG/1
4 TABLET, ORALLY DISINTEGRATING ORAL EVERY 8 HOURS PRN
Status: DISCONTINUED | OUTPATIENT
Start: 2023-10-18 | End: 2023-10-21 | Stop reason: HOSPADM

## 2023-10-18 RX ORDER — IPRATROPIUM BROMIDE AND ALBUTEROL SULFATE 2.5; .5 MG/3ML; MG/3ML
3 SOLUTION RESPIRATORY (INHALATION) ONCE
Status: COMPLETED | OUTPATIENT
Start: 2023-10-18 | End: 2023-10-18

## 2023-10-18 RX ORDER — INSULIN LISPRO 100 [IU]/ML
0-4 INJECTION, SOLUTION INTRAVENOUS; SUBCUTANEOUS NIGHTLY
Status: DISCONTINUED | OUTPATIENT
Start: 2023-10-18 | End: 2023-10-21 | Stop reason: HOSPADM

## 2023-10-18 RX ORDER — ALBUTEROL SULFATE 90 UG/1
2 AEROSOL, METERED RESPIRATORY (INHALATION) 2 TIMES DAILY
Status: DISCONTINUED | OUTPATIENT
Start: 2023-10-18 | End: 2023-10-18

## 2023-10-18 RX ORDER — ONDANSETRON 2 MG/ML
4 INJECTION INTRAMUSCULAR; INTRAVENOUS EVERY 6 HOURS PRN
Status: DISCONTINUED | OUTPATIENT
Start: 2023-10-18 | End: 2023-10-21 | Stop reason: HOSPADM

## 2023-10-18 RX ORDER — SODIUM CHLORIDE 9 MG/ML
INJECTION, SOLUTION INTRAVENOUS PRN
Status: DISCONTINUED | OUTPATIENT
Start: 2023-10-18 | End: 2023-10-21 | Stop reason: HOSPADM

## 2023-10-18 RX ORDER — SODIUM CHLORIDE 0.9 % (FLUSH) 0.9 %
5-40 SYRINGE (ML) INJECTION EVERY 12 HOURS SCHEDULED
Status: DISCONTINUED | OUTPATIENT
Start: 2023-10-18 | End: 2023-10-21 | Stop reason: HOSPADM

## 2023-10-18 RX ORDER — FLUTICASONE PROPIONATE 110 UG/1
2 AEROSOL, METERED RESPIRATORY (INHALATION)
Status: DISCONTINUED | OUTPATIENT
Start: 2023-10-18 | End: 2023-10-21 | Stop reason: HOSPADM

## 2023-10-18 RX ORDER — ACETAMINOPHEN 325 MG/1
650 TABLET ORAL EVERY 6 HOURS PRN
Status: DISCONTINUED | OUTPATIENT
Start: 2023-10-18 | End: 2023-10-21 | Stop reason: HOSPADM

## 2023-10-18 RX ORDER — INSULIN GLARGINE 100 [IU]/ML
40 INJECTION, SOLUTION SUBCUTANEOUS NIGHTLY
Status: DISCONTINUED | OUTPATIENT
Start: 2023-10-18 | End: 2023-10-20

## 2023-10-18 RX ORDER — SODIUM CHLORIDE 0.9 % (FLUSH) 0.9 %
5-40 SYRINGE (ML) INJECTION PRN
Status: DISCONTINUED | OUTPATIENT
Start: 2023-10-18 | End: 2023-10-21 | Stop reason: HOSPADM

## 2023-10-18 RX ORDER — INSULIN LISPRO 100 [IU]/ML
0-16 INJECTION, SOLUTION INTRAVENOUS; SUBCUTANEOUS
Status: DISCONTINUED | OUTPATIENT
Start: 2023-10-18 | End: 2023-10-21 | Stop reason: HOSPADM

## 2023-10-18 RX ORDER — POLYETHYLENE GLYCOL 3350 17 G/17G
17 POWDER, FOR SOLUTION ORAL DAILY PRN
Status: DISCONTINUED | OUTPATIENT
Start: 2023-10-18 | End: 2023-10-21 | Stop reason: HOSPADM

## 2023-10-18 RX ORDER — ATORVASTATIN CALCIUM 40 MG/1
40 TABLET, FILM COATED ORAL DAILY
Status: DISCONTINUED | OUTPATIENT
Start: 2023-10-18 | End: 2023-10-21 | Stop reason: HOSPADM

## 2023-10-18 RX ORDER — DILTIAZEM HYDROCHLORIDE 240 MG/1
240 CAPSULE, COATED, EXTENDED RELEASE ORAL DAILY
Status: DISCONTINUED | OUTPATIENT
Start: 2023-10-18 | End: 2023-10-21 | Stop reason: HOSPADM

## 2023-10-18 RX ORDER — GLUCAGON 1 MG/ML
1 KIT INJECTION PRN
Status: DISCONTINUED | OUTPATIENT
Start: 2023-10-18 | End: 2023-10-21 | Stop reason: HOSPADM

## 2023-10-18 RX ORDER — ACETAMINOPHEN 650 MG/1
650 SUPPOSITORY RECTAL EVERY 6 HOURS PRN
Status: DISCONTINUED | OUTPATIENT
Start: 2023-10-18 | End: 2023-10-21 | Stop reason: HOSPADM

## 2023-10-18 RX ORDER — 0.9 % SODIUM CHLORIDE 0.9 %
500 INTRAVENOUS SOLUTION INTRAVENOUS ONCE
Status: COMPLETED | OUTPATIENT
Start: 2023-10-18 | End: 2023-10-18

## 2023-10-18 RX ORDER — ARFORMOTEROL TARTRATE 15 UG/2ML
15 SOLUTION RESPIRATORY (INHALATION)
Status: DISCONTINUED | OUTPATIENT
Start: 2023-10-18 | End: 2023-10-18 | Stop reason: CLARIF

## 2023-10-18 RX ADMIN — FLUTICASONE PROPIONATE 2 PUFF: 110 AEROSOL, METERED RESPIRATORY (INHALATION) at 19:26

## 2023-10-18 RX ADMIN — DILTIAZEM HYDROCHLORIDE 240 MG: 240 CAPSULE, COATED, EXTENDED RELEASE ORAL at 18:59

## 2023-10-18 RX ADMIN — SODIUM CHLORIDE 500 ML: 9 INJECTION, SOLUTION INTRAVENOUS at 12:40

## 2023-10-18 RX ADMIN — IPRATROPIUM BROMIDE AND ALBUTEROL SULFATE 1 DOSE: 2.5; .5 SOLUTION RESPIRATORY (INHALATION) at 19:25

## 2023-10-18 RX ADMIN — INSULIN GLARGINE 40 UNITS: 100 INJECTION, SOLUTION SUBCUTANEOUS at 22:03

## 2023-10-18 RX ADMIN — SODIUM CHLORIDE, PRESERVATIVE FREE 10 ML: 5 INJECTION INTRAVENOUS at 22:20

## 2023-10-18 RX ADMIN — METHYLPREDNISOLONE SODIUM SUCCINATE 125 MG: 125 INJECTION, POWDER, LYOPHILIZED, FOR SOLUTION INTRAMUSCULAR; INTRAVENOUS at 12:39

## 2023-10-18 RX ADMIN — INSULIN LISPRO 16 UNITS: 100 INJECTION, SOLUTION INTRAVENOUS; SUBCUTANEOUS at 18:43

## 2023-10-18 RX ADMIN — ATORVASTATIN CALCIUM 40 MG: 40 TABLET, FILM COATED ORAL at 18:43

## 2023-10-18 RX ADMIN — INSULIN LISPRO 4 UNITS: 100 INJECTION, SOLUTION INTRAVENOUS; SUBCUTANEOUS at 22:02

## 2023-10-18 RX ADMIN — INSULIN LISPRO 16 UNITS: 100 INJECTION, SOLUTION INTRAVENOUS; SUBCUTANEOUS at 22:11

## 2023-10-18 RX ADMIN — METHYLPREDNISOLONE SODIUM SUCCINATE 40 MG: 40 INJECTION, POWDER, FOR SOLUTION INTRAMUSCULAR; INTRAVENOUS at 22:03

## 2023-10-18 RX ADMIN — APIXABAN 5 MG: 5 TABLET, FILM COATED ORAL at 22:02

## 2023-10-18 RX ADMIN — ASPIRIN 81 MG CHEWABLE TABLET 81 MG: 81 TABLET CHEWABLE at 18:43

## 2023-10-18 RX ADMIN — FUROSEMIDE 40 MG: 40 TABLET ORAL at 18:59

## 2023-10-18 RX ADMIN — IPRATROPIUM BROMIDE AND ALBUTEROL SULFATE 3 DOSE: 2.5; .5 SOLUTION RESPIRATORY (INHALATION) at 14:31

## 2023-10-18 ASSESSMENT — PAIN SCALES - GENERAL: PAINLEVEL_OUTOF10: 0

## 2023-10-18 NOTE — PROGRESS NOTES
4 Eyes Skin Assessment     NAME:  Yves Maynard  YOB: 1955  MEDICAL RECORD NUMBER:  [id number]    The patient is being assessed for  Admission    I agree that at least one RN has performed a thorough Head to Toe Skin Assessment on the patient. ALL assessment sites listed below have been assessed. Areas assessed by both nurses:    Head, Face, Ears, Shoulders, Back, Chest, Arms, Elbows, Hands, Sacrum. Buttock, Coccyx, Ischium, Legs. Feet and Heels, Under Medical Devices , and Other          Does the Patient have a Wound?  No noted wound(s)       Hector Prevention initiated by RN: No  Wound Care Orders initiated by RN: No    Pressure Injury (Stage 3,4, Unstageable, DTI, NWPT, and Complex wounds) if present, place Wound referral order by RN under : No    New Ostomies, if present place, Ostomy referral order under : No     Nurse 1 eSignature: Electronically signed by Dick Reid RN on 10/18/23 at 6:04 PM EDT    **SHARE this note so that the co-signing nurse can place an eSignature**    Nurse 2 eSignature: Electronically signed by Addison Miller RN on 10/18/23 at 6:26 PM EDT

## 2023-10-18 NOTE — CARE COORDINATION
CM consult per Que OLIVER due to Transitional care Coordinator o/p follow up call was unsuccessful on one call but has since talked with pt yesterday for ongoing o/p needs. This CM did visit pt who is alert and oriented, Brother, Sheryl Stiles, [address] at bedside, pt sitting up in bed, BiPAP on, difficult to talk at this time. Pt lives at home feels safe at home alone with help from neighbor for driving, etc. Notes from Prior CM when talking with pt during last admission on 10/03-15/23: Patient lives at home alone but he states that his neighbor is next door and they do everything together including picking days to cook for each other. He states he does not drive but [employer] delivers what he needs and he has friends for anything else. He is on oxygen 2 liters at baseline and has a c-pap at home. He states he is active with [facility] and a nurse sets out his pills for him. CM sent a message to [facility] liaison to update [facility]. CM while visiting pt confirms aforementioned note. POC for today is for admission due to increased need for O2, pt did state he has quite smoking. Discharge plan per pt is to return home.  MANNYRN/MINH

## 2023-10-18 NOTE — ED PROVIDER NOTES
[address] [facility] ENCOUNTER        Pt Name: Bam Segal  MRN: [id number]  [address] [facility] 1955  Date of evaluation: 10/18/2023  Provider: BRENNAN Madera - CNP  PCP: Krishan Jackson MD  Note Started: 11:39 AM EDT 10/18/23       I have seen and evaluated this patient with my supervising physician Kathy Mcintyre MD.      [address]       Chief Complaint   Patient presents with    Shortness of Breath              HISTORY OF PRESENT ILLNESS: 1 or more Elements     History From: Patient and EMS    Limitations to history : None    Social Determinants Significantly Affecting Health : None    Chief Complaint: Shortness of breath    Bam Segal is a 79 y.o. male who presents to ED via EMS. EMS reported that upon arrival patient was on 4 L of oxygen and was satting at 70%. They placed patient on BiPAP. Patient stated he has been short of breath that has been gradually getting worse. Stated he has had a cough for a few weeks. Denied fever nausea vomiting or diarrhea. Stated he has a history of A-fib and is taking his medications as prescribed. Denies chest pain at this time denies abdominal pain. Reports that he uses 4 L of oxygen at home that was not providing him relief. Patient also reports he feels as if his abdomen is more swollen but he has been taking his Lasix as prescribed. Nursing Notes were all reviewed and agreed with or any disagreements were addressed in the HPI. REVIEW OF SYSTEMS :      Review of Systems    Positives and Pertinent negatives as per HPI.      SURGICAL HISTORY     Past Surgical History:   Procedure Laterality Date    APPENDECTOMY  2003    ATRIAL ABLATION SURGERY  05/23/2018    A-fib ablation     CARDIAC SURGERY  05/2018    \"Heart Ablation Done Twice\"    CHOLECYSTECTOMY, LAPAROSCOPIC  11/12/2018    COLONOSCOPY  07/2011    Polyp Removed    DENTAL SURGERY      All Teeth Extracted In

## 2023-10-18 NOTE — ED NOTES
Medication History  [facility]    Patient Name: Marshall Smiley 1955     Medication history has been completed by: Duran Sherwood CPhT    Source(s) of information: insurance claims discharge notes    Primary Care Physician: Khalif Sunshine MD     Pharmacy: [facility]    Allergies as of 10/18/2023 - Fully Reviewed 10/18/2023   Allergen Reaction Noted    Metoprolol  02/03/2017        Prior to Admission medications    Medication Sig Start Date End Date Taking? Authorizing Provider   predniSONE (DELTASONE) 20 MG tablet Take 1.5 tablets by mouth daily for 3 days, THEN 1 tablet daily for 3 days, THEN 0.5 tablets daily for 3 days.  10/15/23 10/24/23  Sander Andrew MD   LEVEMIR FLEXPEN 100 UNIT/ML injection pen INJECT 50 UNITS SUBCUTANEOUSLY ONCE DAILY 9/22/23   Khalif Sunshine MD   dilTIAZem [facility] CD) 240 MG extended release capsule Take 1 capsule by mouth daily 9/23/23   Carlos Enrique Villatoro MD   furosemide (LASIX) 40 MG tablet Take 1 tablet by mouth daily 9/22/23   Carlos Enrique Villatoro MD   magnesium oxide (MAG-OX) 400 (240 Mg) MG tablet Take 1 tablet by mouth daily 9/23/23   Carlos Enrique Villatoro MD   potassium chloride (KLOR-CON M) 10 MEQ extended release tablet Take 1 tablet by mouth daily 9/22/23   Carlos Enrique Villatoro MD   HUMALOG KWIKPEN 100 UNIT/ML SOPN INJECT 20 UNITS SUBCUTANEOUSLY THREE TIMES A DAY WITH MEALS 9/11/23   Khalif Sunshine MD   blood glucose monitor strips Test once daily 8/30/23   Khalif Sunshine MD   blood glucose monitor kit and supplies Check sugar daily 8/30/23   Khalif Sunshine MD   SYMBICORT 160-4.5 MCG/ACT AERO Inhale 2 puffs into the lungs 2 times daily 8/18/23   Khalif Sunshine MD   albuterol sulfate HFA (PROVENTIL;VENTOLIN;PROAIR) 108 (90 Base) MCG/ACT inhaler Inhale 2 puffs into the lungs 2 times daily 8/18/23   Khalif Sunshine MD   apixaban (ELIQUIS) 5 MG TABS tablet Take 1 tablet by mouth 2

## 2023-10-18 NOTE — ED TRIAGE NOTES
Patient was 66 to 80% on room air on EMS arrival. EMS placed patient on bipap. Hx CHF and COPD. Has been feeling short of breath for a few days prior to this.

## 2023-10-18 NOTE — H&P
week     Frequency of Social Gatherings with Friends and Family: Never     Attends Jehovah's witness Services: 1 to 4 times per year     Active Member of Clubs or Organizations: No     Attends Club or Organization Meetings: Never     Marital Status:    Intimate Partner Violence: Not At Risk (5/10/2023)    Humiliation, Afraid, Rape, and Kick questionnaire     Fear of Current or Ex-Partner: No     Emotionally Abused: No     Physically Abused: No     Sexually Abused: No   Housing Stability: Low Risk  (8/11/2023)    Housing Stability Vital Sign     Unable to Pay for Housing in the Last Year: No     Number of Places Lived in the Last Year: 1     Unstable Housing in the Last Year: No       Medications:   Medications:    ipratropium 0.5 mg-albuterol 2.5 mg  3 Dose Inhalation Once    sodium chloride  500 mL IntraVENous Once      Infusions:   PRN Meds:      Labs      CBC:   Recent Labs     10/18/23  1120   WBC 13.3*   HGB 16.2        BMP:    Recent Labs     10/18/23  1120   *   K 4.6   CL 87*   CO2 39*   BUN 18   CREATININE 0.7*   GLUCOSE 537*     Hepatic:   Recent Labs     10/18/23  1120   AST 12*   ALT 19   BILITOT 0.4   ALKPHOS 96     Lipids:   Lab Results   Component Value Date/Time    CHOL 159 03/01/2023 08:34 AM    CHOL 112 12/15/2021 01:35 PM    HDL 29 03/01/2023 08:34 AM    TRIG 182 03/01/2023 08:34 AM     Hemoglobin A1C:   Lab Results   Component Value Date/Time    LABA1C 10.0 10/14/2023 10:33 AM     TSH:   Lab Results   Component Value Date/Time    TSH 2.15 03/06/2019 10:22 AM     Troponin:   Lab Results   Component Value Date/Time    TROPONINT <0.010 09/18/2023 12:45 AM    TROPONINT <0.010 09/13/2023 07:15 AM    TROPONINT <0.010 08/14/2023 09:30 AM     Lactic Acid: No results for input(s): \"LACTA\" in the last 72 hours.   BNP:   Recent Labs     10/18/23  1120   PROBNP 745.5*     UA:  Lab Results   Component Value Date/Time    NITRU NEGATIVE 10/18/2023 12:43 PM    COLORU YELLOW 10/18/2023 12:43 PM WBCUA <1 09/13/2023 08:09 AM    RBCUA 0 09/13/2023 08:09 AM    MUCUS RARE 11/07/2018 04:30 PM    TRICHOMONAS NONE SEEN 09/13/2023 08:09 AM    BACTERIA NEGATIVE 09/13/2023 08:09 AM    CLARITYU CLEAR 10/18/2023 12:43 PM    SPECGRAV <1.005 10/18/2023 12:43 PM    LEUKOCYTESUR NEGATIVE 10/18/2023 12:43 PM    UROBILINOGEN 0.2 10/18/2023 12:43 PM    BILIRUBINUR NEGATIVE 10/18/2023 12:43 PM    BLOODU NEGATIVE 10/18/2023 12:43 PM    KETUA NEGATIVE 10/18/2023 12:43 PM     Urine Cultures: No results found for: \"LABURIN\"  Blood Cultures: No results found for: \"BC\"  No results found for: \"BLOODCULT2\"  Organism: No results found for: \"ORG\"    Imaging/Diagnostics Last 24 Hours   XR CHEST PORTABLE    Result Date: 10/18/2023  EXAMINATION: ONE XRAY VIEW OF THE CHEST 10/18/2023 11:37 am COMPARISON: 10/11/2023 HISTORY: ORDERING SYSTEM PROVIDED HISTORY: Shortness of Breath TECHNOLOGIST PROVIDED HISTORY: Reason for exam:->Shortness of Breath Reason for Exam: Shortness of Breath Additional signs and symptoms: Shortness of Breath Relevant Medical/Surgical History: Shortness of Breath FINDINGS: The lungs are without acute focal process. There is no effusion or pneumothorax. The cardiomediastinal silhouette is without acute process. The osseous structures are without acute process. No acute process. XR CHEST PORTABLE    Result Date: 10/11/2023  EXAMINATION: ONE XRAY VIEW OF THE CHEST 10/11/2023 8:59 pm COMPARISON: 09/21/2023 HISTORY: ORDERING SYSTEM PROVIDED HISTORY: Shortness of Breath TECHNOLOGIST PROVIDED HISTORY: Reason for exam:->Shortness of Breath Reason for Exam: Shortness of Breath FINDINGS: Increased ill-defined bibasilar pulmonary opacities. Stable lingular scarring/atelectasis. No pneumothorax or definite pleural effusion. Cardiac and mediastinal contours stable. Chronic calcified left hilar lymph nodes redemonstrated. No new osseous abnormality.      Increased ill-defined bibasilar pulmonary opacities may reflect

## 2023-10-18 NOTE — PROGRESS NOTES
Pt came up from ED with BS of 539. Notified Dr. Zahira Mauricio MD. Dr. Lorri Barba said just give the sliding scale of 16 units. This nurse gave 16 units of insulin. Night shift nurse is aware.

## 2023-10-18 NOTE — CARE COORDINATION
MCG criteria for Respiratory Failure  reviewed at this time, criteria supports Inpatient Admission. MANNY,RN/CM

## 2023-10-18 NOTE — ED PROVIDER NOTES
I independently examined and evaluated Charlotte Ga. I personally saw the patient and performed a substantive portion of the visit including all aspects of the medical decision making. In brief their history revealed patient is here by squad with shortness of breath. Patient reports that this is a \"recurring problem\". Patient was actually just hospitalized for the same and has only been home a few days. Patient is already on steroid course. Patient does report history of COPD and is on home oxygen. Patient does report associated cough. Patient reports adherence to medications. EMS did find patient with oxygen saturation in the 70s on his home nasal cannula. Patient is on Eliquis. Their focused exam revealed the patient is afebrile, very deconditioned and with obvious increased work of breathing. The patient appears older than stated age and deconditioned. Mucous membranes are moist. Speech is clear. Breathing is with tachypnea, wheezing, globally diminished breath sounds. Patient is speaking in short sentences with obvious increased work of breathing. Skin is dry. Mental status is normal. The patient moves all extremities and is without facial droop. No bilateral lower extremity edema.     Results for orders placed or performed during the hospital encounter of 10/18/23   Blood Gas, Venous   Result Value Ref Range    pH, Tylor 7.30 (L) 7.32 - 7.42    pCO2, Tylor 90 (H) 38 - 52 mmHG    pO2, Tylor 56 (H) 28 - 48 mmHG    Base Exc, Mixed 13.6 (H) 0 - 1.2    HCO3, Venous 44.3 (H) 19 - 25 MMOL/L    O2 Sat, Tylor 86.3 (H) 50 - 70 %    Comment VBG    CBC with Auto Differential   Result Value Ref Range    WBC 13.3 (H) 4.0 - 10.5 K/CU MM    RBC 5.25 4.6 - 6.2 M/CU MM    Hemoglobin 16.2 13.5 - 18.0 GM/DL    Hematocrit 50.3 42 - 52 %    MCV 95.8 78 - 100 FL    MCH 30.9 27 - 31 PG    MCHC 32.2 32.0 - 36.0 %    RDW 15.0 (H) 11.7 - 14.9 %    Platelets 212 049 - 527 K/CU MM    MPV 10.0 7.5 - 11.1 FL    Differential Est, Glom Filt Rate >60 >60 mL/min/1.73m2    Calcium 8.8 8.3 - 10.6 MG/DL   Lactic Acid   Result Value Ref Range    Lactate 3.6 (HH) 0.5 - 1.9 mMOL/L   POCT Glucose   Result Value Ref Range    POC Glucose 539 (H) 70 - 99 MG/DL   POCT Glucose   Result Value Ref Range    POC Glucose 588 (H) 70 - 99 MG/DL   POCT Glucose   Result Value Ref Range    POC Glucose 587 (H) 70 - 99 MG/DL   POCT Glucose   Result Value Ref Range    POC Glucose 432 (H) 70 - 99 MG/DL   POCT Glucose   Result Value Ref Range    POC Glucose 402 (H) 70 - 99 MG/DL   POCT Glucose   Result Value Ref Range    POC Glucose 350 (H) 70 - 99 MG/DL   EKG 12 Lead   Result Value Ref Range    Ventricular Rate 117 BPM    QRS Duration 82 ms    Q-T Interval 286 ms    QTc Calculation (Bazett) 398 ms    R Axis -72 degrees    T Axis 75 degrees    Diagnosis       Atrial fibrillation with rapid ventricular response  Left axis deviation  Low voltage QRS  Inferior infarct (cited on or before 14-JAN-2017)  Possible Anterolateral infarct (cited on or before 01-JAN-2018)  Abnormal ECG  When compared with ECG of 15-OCT-2023 02:19,  Atrial fibrillation replaced sinus rhythm  Confirmed by [facility] Francois TODD ([id number]) on 10/18/2023 5:00:05 PM           12 lead EKG per my interpretation:  Atrial Fibrillation with RVR at 117  [place] is   Left axis deviation  QTc is  within an acceptable range  There is no specific T wave changes appreciated. There is no specific ST wave changes appreciated. No STEMI    Prior EKG to compare with was available and normal sinus rhythm with occasional PACs seen on prior. ED course:   CC/HPI Summary, DDx, ED Course, and Reassessment:   Pt presents as above. Emergent conditions considered. Presentation prompted initial broad work-up with labs, EKG and imaging. Respiratory therapy was paged to bedside and patient is placed on BiPAP. IV steroids, multiple breathing treatments are given. Patient is found to be with mild acidemia on VBG.   Patient's

## 2023-10-18 NOTE — ED NOTES
ED TO INPATIENT SBAR HANDOFF    Patient Name: Feroz Douse   :  1955  79 y.o. Preferred Name  Moe  Family/Caregiver Present no   Restraints no   C-SSRS: Risk of Suicide: No Risk  Sitter no   Sepsis Risk Score Sepsis Risk Score: 7.68      Situation  Chief Complaint   Patient presents with    Shortness of Breath            Brief Description of Patient's Condition: Pt is a/o from home. C/o sob. Pt at baseline uses 4L of oxygen via nc. Upon ems arrival he was sating in the 70's on his 4L of oxygen. Pt placed on cpap and up to 90's for ems. Pt is currently on bipap and not noted to be in any respiratory distress sating in the 90's. .5. Lactic 2.2. Mental Status: alert  Arrived from: home    Imaging:   XR CHEST PORTABLE   Final Result   No acute process.            Abnormal labs:   Abnormal Labs Reviewed   BLOOD GAS, VENOUS - Abnormal; Notable for the following components:       Result Value    pH, Tylor 7.30 (*)     pCO2, Tylor 90 (*)     pO2, Tylor 56 (*)     Base Exc, Mixed 13.6 (*)     HCO3, Venous 44.3 (*)     O2 Sat, Tylor 86.3 (*)     All other components within normal limits   CBC WITH AUTO DIFFERENTIAL - Abnormal; Notable for the following components:    WBC 13.3 (*)     RDW 15.0 (*)     Segs Relative 81.0 (*)     Lymphocytes % 10.3 (*)     Monocytes % 5.8 (*)     Immature Neutrophil % 1.7 (*)     All other components within normal limits   COMPREHENSIVE METABOLIC PANEL - Abnormal; Notable for the following components:    Sodium 131 (*)     Chloride 87 (*)     CO2 39 (*)     Glucose 537 (*)     Creatinine 0.7 (*)     Total Protein 6.2 (*)     AST 12 (*)     All other components within normal limits   LACTATE, SEPSIS - Abnormal; Notable for the following components:    Lactic Acid, Sepsis 2.2 (*)     All other components within normal limits   LACTATE, SEPSIS - Abnormal; Notable for the following components:    Lactic Acid, Sepsis 2.5 (*)     All other components within normal limits   BRAIN

## 2023-10-19 ENCOUNTER — APPOINTMENT (OUTPATIENT)
Dept: CT IMAGING | Age: 68
End: 2023-10-19
Payer: MEDICARE

## 2023-10-19 LAB
ANION GAP SERPL CALCULATED.3IONS-SCNC: 10 MMOL/L (ref 4–16)
BASOPHILS ABSOLUTE: 0 K/CU MM
BASOPHILS RELATIVE PERCENT: 0.1 % (ref 0–1)
BUN SERPL-MCNC: 24 MG/DL (ref 6–23)
CALCIUM SERPL-MCNC: 8.8 MG/DL (ref 8.3–10.6)
CHLORIDE BLD-SCNC: 86 MMOL/L (ref 99–110)
CO2: 38 MMOL/L (ref 21–32)
CREAT SERPL-MCNC: 0.8 MG/DL (ref 0.9–1.3)
DIFFERENTIAL TYPE: ABNORMAL
EOSINOPHILS ABSOLUTE: 0 K/CU MM
EOSINOPHILS RELATIVE PERCENT: 0 % (ref 0–3)
GFR SERPL CREATININE-BSD FRML MDRD: >60 ML/MIN/1.73M2
GLUCOSE BLD-MCNC: 343 MG/DL (ref 70–99)
GLUCOSE BLD-MCNC: 350 MG/DL (ref 70–99)
GLUCOSE BLD-MCNC: 362 MG/DL (ref 70–99)
GLUCOSE BLD-MCNC: 374 MG/DL (ref 70–99)
GLUCOSE BLD-MCNC: 402 MG/DL (ref 70–99)
GLUCOSE BLD-MCNC: 432 MG/DL (ref 70–99)
GLUCOSE SERPL-MCNC: 381 MG/DL (ref 70–99)
HCT VFR BLD CALC: 48.4 % (ref 42–52)
HEMOGLOBIN: 15.9 GM/DL (ref 13.5–18)
IMMATURE NEUTROPHIL %: 1.3 % (ref 0–0.43)
LACTATE: 2.5 MMOL/L (ref 0.5–1.9)
LACTATE: 3.6 MMOL/L (ref 0.5–1.9)
LACTATE: 3.7 MMOL/L (ref 0.5–1.9)
LYMPHOCYTES ABSOLUTE: 0.9 K/CU MM
LYMPHOCYTES RELATIVE PERCENT: 5.3 % (ref 24–44)
MCH RBC QN AUTO: 31.1 PG (ref 27–31)
MCHC RBC AUTO-ENTMCNC: 32.9 % (ref 32–36)
MCV RBC AUTO: 94.5 FL (ref 78–100)
MONOCYTES ABSOLUTE: 0.3 K/CU MM
MONOCYTES RELATIVE PERCENT: 1.5 % (ref 0–4)
NUCLEATED RBC %: 0 %
PDW BLD-RTO: 14.6 % (ref 11.7–14.9)
PLATELET # BLD: 230 K/CU MM (ref 140–440)
PMV BLD AUTO: 9.9 FL (ref 7.5–11.1)
POTASSIUM SERPL-SCNC: 5.2 MMOL/L (ref 3.5–5.1)
RBC # BLD: 5.12 M/CU MM (ref 4.6–6.2)
SEGMENTED NEUTROPHILS ABSOLUTE COUNT: 15.3 K/CU MM
SEGMENTED NEUTROPHILS RELATIVE PERCENT: 91.8 % (ref 36–66)
SODIUM BLD-SCNC: 134 MMOL/L (ref 135–145)
TOTAL IMMATURE NEUTOROPHIL: 0.22 K/CU MM
TOTAL NUCLEATED RBC: 0 K/CU MM
WBC # BLD: 16.7 K/CU MM (ref 4–10.5)

## 2023-10-19 PROCEDURE — 83605 ASSAY OF LACTIC ACID: CPT

## 2023-10-19 PROCEDURE — 6370000000 HC RX 637 (ALT 250 FOR IP): Performed by: FAMILY MEDICINE

## 2023-10-19 PROCEDURE — 94761 N-INVAS EAR/PLS OXIMETRY MLT: CPT

## 2023-10-19 PROCEDURE — 2580000003 HC RX 258: Performed by: INTERNAL MEDICINE

## 2023-10-19 PROCEDURE — 85025 COMPLETE CBC W/AUTO DIFF WBC: CPT

## 2023-10-19 PROCEDURE — 6360000002 HC RX W HCPCS: Performed by: INTERNAL MEDICINE

## 2023-10-19 PROCEDURE — 94660 CPAP INITIATION&MGMT: CPT

## 2023-10-19 PROCEDURE — 6370000000 HC RX 637 (ALT 250 FOR IP): Performed by: INTERNAL MEDICINE

## 2023-10-19 PROCEDURE — 36415 COLL VENOUS BLD VENIPUNCTURE: CPT

## 2023-10-19 PROCEDURE — 74177 CT ABD & PELVIS W/CONTRAST: CPT

## 2023-10-19 PROCEDURE — 94640 AIRWAY INHALATION TREATMENT: CPT

## 2023-10-19 PROCEDURE — 80048 BASIC METABOLIC PNL TOTAL CA: CPT

## 2023-10-19 PROCEDURE — 82962 GLUCOSE BLOOD TEST: CPT

## 2023-10-19 PROCEDURE — 2700000000 HC OXYGEN THERAPY PER DAY

## 2023-10-19 PROCEDURE — 6360000004 HC RX CONTRAST MEDICATION: Performed by: INTERNAL MEDICINE

## 2023-10-19 PROCEDURE — 2140000000 HC CCU INTERMEDIATE R&B

## 2023-10-19 RX ORDER — SODIUM CHLORIDE 9 MG/ML
INJECTION, SOLUTION INTRAVENOUS CONTINUOUS
Status: DISPENSED | OUTPATIENT
Start: 2023-10-19 | End: 2023-10-19

## 2023-10-19 RX ORDER — INSULIN LISPRO 100 [IU]/ML
16 INJECTION, SOLUTION INTRAVENOUS; SUBCUTANEOUS ONCE
Status: COMPLETED | OUTPATIENT
Start: 2023-10-19 | End: 2023-10-19

## 2023-10-19 RX ORDER — INSULIN LISPRO 100 [IU]/ML
10 INJECTION, SOLUTION INTRAVENOUS; SUBCUTANEOUS
Status: DISCONTINUED | OUTPATIENT
Start: 2023-10-19 | End: 2023-10-20

## 2023-10-19 RX ADMIN — FLUTICASONE PROPIONATE 2 PUFF: 110 AEROSOL, METERED RESPIRATORY (INHALATION) at 23:30

## 2023-10-19 RX ADMIN — SODIUM CHLORIDE, PRESERVATIVE FREE 10 ML: 5 INJECTION INTRAVENOUS at 22:08

## 2023-10-19 RX ADMIN — SODIUM CHLORIDE: 9 INJECTION, SOLUTION INTRAVENOUS at 17:33

## 2023-10-19 RX ADMIN — FUROSEMIDE 40 MG: 40 TABLET ORAL at 10:18

## 2023-10-19 RX ADMIN — ATORVASTATIN CALCIUM 40 MG: 40 TABLET, FILM COATED ORAL at 10:18

## 2023-10-19 RX ADMIN — APIXABAN 5 MG: 5 TABLET, FILM COATED ORAL at 10:18

## 2023-10-19 RX ADMIN — ASPIRIN 81 MG CHEWABLE TABLET 81 MG: 81 TABLET CHEWABLE at 10:18

## 2023-10-19 RX ADMIN — METHYLPREDNISOLONE SODIUM SUCCINATE 40 MG: 40 INJECTION, POWDER, FOR SOLUTION INTRAMUSCULAR; INTRAVENOUS at 03:23

## 2023-10-19 RX ADMIN — PIPERACILLIN AND TAZOBACTAM 4500 MG: 4; .5 INJECTION, POWDER, FOR SOLUTION INTRAVENOUS at 11:17

## 2023-10-19 RX ADMIN — METHYLPREDNISOLONE SODIUM SUCCINATE 40 MG: 40 INJECTION, POWDER, FOR SOLUTION INTRAMUSCULAR; INTRAVENOUS at 11:18

## 2023-10-19 RX ADMIN — IPRATROPIUM BROMIDE AND ALBUTEROL SULFATE 1 DOSE: 2.5; .5 SOLUTION RESPIRATORY (INHALATION) at 07:41

## 2023-10-19 RX ADMIN — IPRATROPIUM BROMIDE AND ALBUTEROL SULFATE 1 DOSE: 2.5; .5 SOLUTION RESPIRATORY (INHALATION) at 11:21

## 2023-10-19 RX ADMIN — DILTIAZEM HYDROCHLORIDE 240 MG: 240 CAPSULE, COATED, EXTENDED RELEASE ORAL at 10:18

## 2023-10-19 RX ADMIN — INSULIN LISPRO 4 UNITS: 100 INJECTION, SOLUTION INTRAVENOUS; SUBCUTANEOUS at 22:08

## 2023-10-19 RX ADMIN — INSULIN LISPRO 10 UNITS: 100 INJECTION, SOLUTION INTRAVENOUS; SUBCUTANEOUS at 10:20

## 2023-10-19 RX ADMIN — PIPERACILLIN AND TAZOBACTAM 3375 MG: 3; .375 INJECTION, POWDER, LYOPHILIZED, FOR SOLUTION INTRAVENOUS at 17:37

## 2023-10-19 RX ADMIN — INSULIN LISPRO 12 UNITS: 100 INJECTION, SOLUTION INTRAVENOUS; SUBCUTANEOUS at 10:19

## 2023-10-19 RX ADMIN — METHYLPREDNISOLONE SODIUM SUCCINATE 40 MG: 40 INJECTION, POWDER, FOR SOLUTION INTRAMUSCULAR; INTRAVENOUS at 22:08

## 2023-10-19 RX ADMIN — SODIUM CHLORIDE, PRESERVATIVE FREE 10 ML: 5 INJECTION INTRAVENOUS at 10:18

## 2023-10-19 RX ADMIN — IPRATROPIUM BROMIDE AND ALBUTEROL SULFATE 1 DOSE: 2.5; .5 SOLUTION RESPIRATORY (INHALATION) at 14:58

## 2023-10-19 RX ADMIN — INSULIN LISPRO 12 UNITS: 100 INJECTION, SOLUTION INTRAVENOUS; SUBCUTANEOUS at 17:37

## 2023-10-19 RX ADMIN — IPRATROPIUM BROMIDE AND ALBUTEROL SULFATE 1 DOSE: 2.5; .5 SOLUTION RESPIRATORY (INHALATION) at 23:30

## 2023-10-19 RX ADMIN — INSULIN LISPRO 16 UNITS: 100 INJECTION, SOLUTION INTRAVENOUS; SUBCUTANEOUS at 14:02

## 2023-10-19 RX ADMIN — INSULIN LISPRO 10 UNITS: 100 INJECTION, SOLUTION INTRAVENOUS; SUBCUTANEOUS at 14:02

## 2023-10-19 RX ADMIN — IOPAMIDOL 75 ML: 755 INJECTION, SOLUTION INTRAVENOUS at 13:10

## 2023-10-19 RX ADMIN — INSULIN LISPRO 10 UNITS: 100 INJECTION, SOLUTION INTRAVENOUS; SUBCUTANEOUS at 17:37

## 2023-10-19 RX ADMIN — SODIUM CHLORIDE: 9 INJECTION, SOLUTION INTRAVENOUS at 10:34

## 2023-10-19 RX ADMIN — INSULIN LISPRO 16 UNITS: 100 INJECTION, SOLUTION INTRAVENOUS; SUBCUTANEOUS at 05:19

## 2023-10-19 RX ADMIN — INSULIN GLARGINE 40 UNITS: 100 INJECTION, SOLUTION SUBCUTANEOUS at 22:07

## 2023-10-19 ASSESSMENT — PAIN SCALES - GENERAL
PAINLEVEL_OUTOF10: 0
PAINLEVEL_OUTOF10: 0

## 2023-10-19 ASSESSMENT — PAIN SCALES - WONG BAKER: WONGBAKER_NUMERICALRESPONSE: 0

## 2023-10-19 NOTE — PROGRESS NOTES
Pts BS reading 374 patient aware,  attending notified. Patient refused diabetic education from this nurse and states that he does not care how high bs is he's ordering another lunch tray, and also states that family is bringing in skyline chili to pt. Pt educated on importance of being compliant with diabetic diet. Pt refused education. Care plan ongoing.

## 2023-10-19 NOTE — CARE COORDINATION
10/19/23 1516   Service Assessment   Patient Orientation Alert and Oriented   Cognition Alert   History Provided By Patient   Primary Caregiver Self   Support Systems Friends/Neighbors; Family Members   Patient's [address] is: Named in [address]   PCP Verified by CM Yes   Prior Functional Level Assistance with the following:;Mobility   Current Functional Level Assistance with the following:;Mobility   Can patient return to prior living arrangement Yes   Ability to make needs known: Good   Family able to assist with home care needs: No   Would you like for me to discuss the discharge plan with any other family members/significant others, and if so, who? No   Financial Resources Medicare;Medicaid   Community Resources None     CM in to see Pt to initiate discharge planning. Pt from home alone with support of family and neighbors. Pt denies the need for home care at this time.   CM following for needs

## 2023-10-19 NOTE — PROGRESS NOTES
V2.0  [facility] Hospitalist Progress Note      Name:  Mukesh Tobias /Age/Sex: 1955  (79 y.o. male)   MRN & CSN:  [id number] & [id number] Encounter Date/Time: 10/19/2023 3:58 PM EDT    Location:  [address] [place] [address]-A PCP: Migdalia Mills MD       Hospital Day: 2    Assessment and Plan:     Mukesh Tboias is a 79 y.o. male with a pmh of COPD, type II DM, atrial fibrillation, hyperlipidemia, chronic HFpEF who presents with <principal problem not specified>     Acute on chronic respiratory failure with hypoxia and hypercapnia  Patient found to be saturating in 70% on 4 L which is his baseline O2 at home  In the emergency room, VBG showed pH 7.3 with PCO2 90  Placed on BiPAP  Continue BiPAP with intermittent breaks. BiPAP nightly  Patient has BiPAP at home but is noncompliant  Continue breathing treatments  Recently finished prednisone taper. Continue with prednisone 40 mg daily   Procalcitonin 0.035, leukocytosis likely due to steroid use. Lactic acidosis  Lactic acid on presentation 2.3, worsening. Today at 3.7 mg/liter  Initially considered due to hypoxia. But currently worsening. Due to presence of large ventral, inguinal and umbilical hernia some concern of ischemic bowel to given the patient is denying any abdominal pain symptoms. Obtain stat CT abdomen pelvis which does not reveal any evidence of bowel obstruction in the visualized part of the bowel but some portion of bowel not visualized due to body habitus. General surgery also on board for further evaluation. Continue to trend lactic acid  Start gentle IVF and empiric antibiotic coverage. Type II DM  Uncontrolled blood sugar  Blood glucose 537 mg/DL on presentation  Resume Lantus 40 units nightly  High-dose sliding scale. Resume Premeal today. Chronic HFpEF  Does not appear to be in exacerbation  Resume home Lasix     History of A-fib  RVR on presentation  Currently improved  Resume Cardizem and Eliquis from home.   We will hold

## 2023-10-19 NOTE — CARE COORDINATION
Pt is requesting a wheelchair. PS to Dr. Zahra Naranjo to update    Lily Si was evaluated today and a DME order was entered for a extra heavy duty wheelchair because he requires this to successfully complete daily living tasks of personal cares and ambulating. A extra heavy duty manual wheelchair is necessary due to patient's impaired ambulation and mobility restrictions and would be unable to resolve these daily living tasks using a cane or walker. The patient is capable of using a extra heavy wheelchair safely in their home and can maneuver within their home with adequate access. There is a caregiver available to provide necessary assistance. The need for this equipment was discussed with the patient and he understands, is in agreement, and has not expressed an unwillingness to use the wheelchair.

## 2023-10-20 LAB
ANION GAP SERPL CALCULATED.3IONS-SCNC: 7 MMOL/L (ref 4–16)
BASOPHILS ABSOLUTE: 0 K/CU MM
BASOPHILS RELATIVE PERCENT: 0.1 % (ref 0–1)
BUN SERPL-MCNC: 24 MG/DL (ref 6–23)
CALCIUM SERPL-MCNC: 8.5 MG/DL (ref 8.3–10.6)
CHLORIDE BLD-SCNC: 90 MMOL/L (ref 99–110)
CO2: 36 MMOL/L (ref 21–32)
CREAT SERPL-MCNC: 0.7 MG/DL (ref 0.9–1.3)
DIFFERENTIAL TYPE: ABNORMAL
EOSINOPHILS ABSOLUTE: 0 K/CU MM
EOSINOPHILS RELATIVE PERCENT: 0 % (ref 0–3)
GFR SERPL CREATININE-BSD FRML MDRD: >60 ML/MIN/1.73M2
GLUCOSE BLD-MCNC: 336 MG/DL (ref 70–99)
GLUCOSE BLD-MCNC: 377 MG/DL (ref 70–99)
GLUCOSE BLD-MCNC: 380 MG/DL (ref 70–99)
GLUCOSE BLD-MCNC: 381 MG/DL (ref 70–99)
GLUCOSE BLD-MCNC: 397 MG/DL (ref 70–99)
GLUCOSE BLD-MCNC: 490 MG/DL (ref 70–99)
GLUCOSE SERPL-MCNC: 451 MG/DL (ref 70–99)
HCT VFR BLD CALC: 46.6 % (ref 42–52)
HEMOGLOBIN: 15.2 GM/DL (ref 13.5–18)
IMMATURE NEUTROPHIL %: 0.9 % (ref 0–0.43)
LACTATE: 2.6 MMOL/L (ref 0.5–1.9)
LACTATE: 2.8 MMOL/L (ref 0.5–1.9)
LACTATE: 4.1 MMOL/L (ref 0.5–1.9)
LYMPHOCYTES ABSOLUTE: 0.5 K/CU MM
LYMPHOCYTES RELATIVE PERCENT: 3.6 % (ref 24–44)
MCH RBC QN AUTO: 30.5 PG (ref 27–31)
MCHC RBC AUTO-ENTMCNC: 32.6 % (ref 32–36)
MCV RBC AUTO: 93.6 FL (ref 78–100)
MONOCYTES ABSOLUTE: 0.3 K/CU MM
MONOCYTES RELATIVE PERCENT: 2.1 % (ref 0–4)
PDW BLD-RTO: 14.5 % (ref 11.7–14.9)
PLATELET # BLD: 162 K/CU MM (ref 140–440)
PMV BLD AUTO: 10.1 FL (ref 7.5–11.1)
POTASSIUM SERPL-SCNC: 4.9 MMOL/L (ref 3.5–5.1)
RBC # BLD: 4.98 M/CU MM (ref 4.6–6.2)
SEGMENTED NEUTROPHILS ABSOLUTE COUNT: 11.8 K/CU MM
SEGMENTED NEUTROPHILS RELATIVE PERCENT: 93.3 % (ref 36–66)
SODIUM BLD-SCNC: 133 MMOL/L (ref 135–145)
TOTAL IMMATURE NEUTOROPHIL: 0.11 K/CU MM
WBC # BLD: 12.7 K/CU MM (ref 4–10.5)

## 2023-10-20 PROCEDURE — 6370000000 HC RX 637 (ALT 250 FOR IP): Performed by: INTERNAL MEDICINE

## 2023-10-20 PROCEDURE — 6360000002 HC RX W HCPCS: Performed by: INTERNAL MEDICINE

## 2023-10-20 PROCEDURE — 2700000000 HC OXYGEN THERAPY PER DAY

## 2023-10-20 PROCEDURE — 2580000003 HC RX 258: Performed by: INTERNAL MEDICINE

## 2023-10-20 PROCEDURE — 85025 COMPLETE CBC W/AUTO DIFF WBC: CPT

## 2023-10-20 PROCEDURE — 83605 ASSAY OF LACTIC ACID: CPT

## 2023-10-20 PROCEDURE — 94640 AIRWAY INHALATION TREATMENT: CPT

## 2023-10-20 PROCEDURE — 36415 COLL VENOUS BLD VENIPUNCTURE: CPT

## 2023-10-20 PROCEDURE — 99223 1ST HOSP IP/OBS HIGH 75: CPT | Performed by: SURGERY

## 2023-10-20 PROCEDURE — 2140000000 HC CCU INTERMEDIATE R&B

## 2023-10-20 PROCEDURE — 94761 N-INVAS EAR/PLS OXIMETRY MLT: CPT

## 2023-10-20 PROCEDURE — 82962 GLUCOSE BLOOD TEST: CPT

## 2023-10-20 PROCEDURE — 80048 BASIC METABOLIC PNL TOTAL CA: CPT

## 2023-10-20 RX ORDER — SODIUM CHLORIDE 9 MG/ML
INJECTION, SOLUTION INTRAVENOUS CONTINUOUS
Status: DISCONTINUED | OUTPATIENT
Start: 2023-10-20 | End: 2023-10-20

## 2023-10-20 RX ORDER — INSULIN GLARGINE 100 [IU]/ML
50 INJECTION, SOLUTION SUBCUTANEOUS NIGHTLY
Status: DISCONTINUED | OUTPATIENT
Start: 2023-10-20 | End: 2023-10-21 | Stop reason: HOSPADM

## 2023-10-20 RX ORDER — INSULIN LISPRO 100 [IU]/ML
20 INJECTION, SOLUTION INTRAVENOUS; SUBCUTANEOUS
Status: DISCONTINUED | OUTPATIENT
Start: 2023-10-20 | End: 2023-10-21 | Stop reason: HOSPADM

## 2023-10-20 RX ORDER — PREDNISONE 20 MG/1
40 TABLET ORAL
Status: DISCONTINUED | OUTPATIENT
Start: 2023-10-20 | End: 2023-10-21 | Stop reason: HOSPADM

## 2023-10-20 RX ADMIN — IPRATROPIUM BROMIDE AND ALBUTEROL SULFATE 1 DOSE: 2.5; .5 SOLUTION RESPIRATORY (INHALATION) at 07:51

## 2023-10-20 RX ADMIN — OLODATEROL RESPIMAT INHALATION SPRAY 2 PUFF: 2.5 SPRAY, METERED RESPIRATORY (INHALATION) at 07:52

## 2023-10-20 RX ADMIN — FLUTICASONE PROPIONATE 2 PUFF: 110 AEROSOL, METERED RESPIRATORY (INHALATION) at 07:52

## 2023-10-20 RX ADMIN — INSULIN LISPRO 4 UNITS: 100 INJECTION, SOLUTION INTRAVENOUS; SUBCUTANEOUS at 21:20

## 2023-10-20 RX ADMIN — INSULIN LISPRO 20 UNITS: 100 INJECTION, SOLUTION INTRAVENOUS; SUBCUTANEOUS at 11:20

## 2023-10-20 RX ADMIN — INSULIN LISPRO 10 UNITS: 100 INJECTION, SOLUTION INTRAVENOUS; SUBCUTANEOUS at 08:49

## 2023-10-20 RX ADMIN — INSULIN LISPRO 16 UNITS: 100 INJECTION, SOLUTION INTRAVENOUS; SUBCUTANEOUS at 17:18

## 2023-10-20 RX ADMIN — PIPERACILLIN AND TAZOBACTAM 3375 MG: 3; .375 INJECTION, POWDER, LYOPHILIZED, FOR SOLUTION INTRAVENOUS at 02:23

## 2023-10-20 RX ADMIN — ASPIRIN 81 MG CHEWABLE TABLET 81 MG: 81 TABLET CHEWABLE at 08:46

## 2023-10-20 RX ADMIN — INSULIN GLARGINE 50 UNITS: 100 INJECTION, SOLUTION SUBCUTANEOUS at 21:21

## 2023-10-20 RX ADMIN — METHYLPREDNISOLONE SODIUM SUCCINATE 40 MG: 40 INJECTION, POWDER, FOR SOLUTION INTRAMUSCULAR; INTRAVENOUS at 05:51

## 2023-10-20 RX ADMIN — PREDNISONE 40 MG: 20 TABLET ORAL at 14:13

## 2023-10-20 RX ADMIN — APIXABAN 5 MG: 5 TABLET, FILM COATED ORAL at 21:20

## 2023-10-20 RX ADMIN — IPRATROPIUM BROMIDE AND ALBUTEROL SULFATE 1 DOSE: 2.5; .5 SOLUTION RESPIRATORY (INHALATION) at 15:50

## 2023-10-20 RX ADMIN — SODIUM CHLORIDE: 9 INJECTION, SOLUTION INTRAVENOUS at 16:03

## 2023-10-20 RX ADMIN — IPRATROPIUM BROMIDE AND ALBUTEROL SULFATE 1 DOSE: 2.5; .5 SOLUTION RESPIRATORY (INHALATION) at 19:41

## 2023-10-20 RX ADMIN — INSULIN LISPRO 20 UNITS: 100 INJECTION, SOLUTION INTRAVENOUS; SUBCUTANEOUS at 17:18

## 2023-10-20 RX ADMIN — PIPERACILLIN AND TAZOBACTAM 3375 MG: 3; .375 INJECTION, POWDER, LYOPHILIZED, FOR SOLUTION INTRAVENOUS at 08:46

## 2023-10-20 RX ADMIN — SODIUM CHLORIDE, PRESERVATIVE FREE 10 ML: 5 INJECTION INTRAVENOUS at 21:21

## 2023-10-20 RX ADMIN — IPRATROPIUM BROMIDE AND ALBUTEROL SULFATE 1 DOSE: 2.5; .5 SOLUTION RESPIRATORY (INHALATION) at 11:55

## 2023-10-20 RX ADMIN — APIXABAN 5 MG: 5 TABLET, FILM COATED ORAL at 11:19

## 2023-10-20 RX ADMIN — INSULIN LISPRO 16 UNITS: 100 INJECTION, SOLUTION INTRAVENOUS; SUBCUTANEOUS at 12:25

## 2023-10-20 RX ADMIN — SODIUM CHLORIDE, PRESERVATIVE FREE 10 ML: 5 INJECTION INTRAVENOUS at 08:49

## 2023-10-20 RX ADMIN — ATORVASTATIN CALCIUM 40 MG: 40 TABLET, FILM COATED ORAL at 08:46

## 2023-10-20 RX ADMIN — FLUTICASONE PROPIONATE 2 PUFF: 110 AEROSOL, METERED RESPIRATORY (INHALATION) at 19:41

## 2023-10-20 RX ADMIN — INSULIN LISPRO 16 UNITS: 100 INJECTION, SOLUTION INTRAVENOUS; SUBCUTANEOUS at 08:46

## 2023-10-20 RX ADMIN — SODIUM CHLORIDE: 9 INJECTION, SOLUTION INTRAVENOUS at 08:45

## 2023-10-20 NOTE — PROGRESS NOTES
V2.0  [facility] [place] [facility]ist Progress Note      Name:  Mukesh Tobias /Age/Sex: 1955  (79 y.o. male)   MRN & CSN:  [id number] & [id number] Encounter Date/Time: 10/20/2023 3:58 PM EDT    Location:  [address] [place] [address]-A PCP: Migdalia Mills MD       Hospital Day: 3    Assessment and Plan:     Mukesh Tobias is a 79 y.o. male with a pmh of COPD, type II DM, atrial fibrillation, hyperlipidemia, chronic HFpEF who presents with <principal problem not specified>     Acute on chronic respiratory failure with hypoxia and hypercapnia  Patient found to be saturating in 70% on 4 L which is his baseline O2 at home  In the emergency room, VBG showed pH 7.3 with PCO2 90  Placed on BiPAP  Continue BiPAP with intermittent breaks. BiPAP nightly  Patient has BiPAP at home but is noncompliant  Continue breathing treatments  Recently finished prednisone taper. Continue with prednisone 40 mg daily   Procalcitonin 0.035, leukocytosis likely due to steroid use. Lactic acidosis  Lactic acid on presentation 2.3, worsening. Today at 3.7 mg/liter  Initially considered due to hypoxia. But currently worsening. Due to presence of large ventral, inguinal and umbilical hernia some concern of ischemic bowel to given the patient is denying any abdominal pain symptoms. Obtain stat CT abdomen pelvis which does not reveal any evidence of bowel obstruction in the visualized part of the bowel but some portion of bowel not visualized due to body habitus. General surgery also on board for further evaluation. Continue to trend lactic acid  Will reengage with general surgery. Type II DM  Uncontrolled blood sugar  Blood glucose 537 mg/DL on presentation  Increase Premeal and nightly Lantus. Chronic HFpEF  Does not appear to be in exacerbation  Resume home Lasix     History of A-fib  RVR on presentation  Currently improved  Resume Cardizem and Eliquis from home. We will hold Eliquis for now.           Mukesh Tobias was evaluated

## 2023-10-20 NOTE — PROGRESS NOTES
0730- pt bs 380 before breakfast, Contacted Dr. Monty Salas, 16 units sliding scale given, and 10 units additional given as ordered with meals. 1110- Pt . Dr. Monty Salas notified, waiting on response     21 [phone]- new order for 20 units lispro before meals.     [address]- Dr. Monty Salas notified lactic acid 4.1

## 2023-10-20 NOTE — PROGRESS NOTES
Blood sugar was 377, 16 units sliding scale and 20 units with meals, a total of 36 units of insulin was  given Dr. Magdy faye served.

## 2023-10-21 VITALS
RESPIRATION RATE: 22 BRPM | HEIGHT: 78 IN | TEMPERATURE: 97 F | BODY MASS INDEX: 36.45 KG/M2 | HEART RATE: 109 BPM | WEIGHT: 315 LBS | OXYGEN SATURATION: 95 % | DIASTOLIC BLOOD PRESSURE: 71 MMHG | SYSTOLIC BLOOD PRESSURE: 92 MMHG

## 2023-10-21 LAB
ANION GAP SERPL CALCULATED.3IONS-SCNC: 10 MMOL/L (ref 4–16)
BASOPHILS ABSOLUTE: 0 K/CU MM
BASOPHILS RELATIVE PERCENT: 0.1 % (ref 0–1)
BUN SERPL-MCNC: 22 MG/DL (ref 6–23)
CALCIUM SERPL-MCNC: 8.7 MG/DL (ref 8.3–10.6)
CHLORIDE BLD-SCNC: 92 MMOL/L (ref 99–110)
CO2: 33 MMOL/L (ref 21–32)
CREAT SERPL-MCNC: 0.7 MG/DL (ref 0.9–1.3)
DIFFERENTIAL TYPE: ABNORMAL
EOSINOPHILS ABSOLUTE: 0 K/CU MM
EOSINOPHILS RELATIVE PERCENT: 0 % (ref 0–3)
GFR SERPL CREATININE-BSD FRML MDRD: >60 ML/MIN/1.73M2
GLUCOSE BLD-MCNC: 235 MG/DL (ref 70–99)
GLUCOSE BLD-MCNC: 263 MG/DL (ref 70–99)
GLUCOSE BLD-MCNC: 305 MG/DL (ref 70–99)
GLUCOSE BLD-MCNC: 310 MG/DL (ref 70–99)
GLUCOSE SERPL-MCNC: 353 MG/DL (ref 70–99)
HCT VFR BLD CALC: 47.4 % (ref 42–52)
HEMOGLOBIN: 15.5 GM/DL (ref 13.5–18)
IMMATURE NEUTROPHIL %: 0.8 % (ref 0–0.43)
LYMPHOCYTES ABSOLUTE: 0.6 K/CU MM
LYMPHOCYTES RELATIVE PERCENT: 4.3 % (ref 24–44)
MCH RBC QN AUTO: 30.9 PG (ref 27–31)
MCHC RBC AUTO-ENTMCNC: 32.7 % (ref 32–36)
MCV RBC AUTO: 94.4 FL (ref 78–100)
MONOCYTES ABSOLUTE: 0.8 K/CU MM
MONOCYTES RELATIVE PERCENT: 5.7 % (ref 0–4)
NUCLEATED RBC %: 0 %
PDW BLD-RTO: 14.6 % (ref 11.7–14.9)
PLATELET # BLD: 178 K/CU MM (ref 140–440)
PMV BLD AUTO: 9.8 FL (ref 7.5–11.1)
POTASSIUM SERPL-SCNC: 4.7 MMOL/L (ref 3.5–5.1)
RBC # BLD: 5.02 M/CU MM (ref 4.6–6.2)
SEGMENTED NEUTROPHILS ABSOLUTE COUNT: 12.8 K/CU MM
SEGMENTED NEUTROPHILS RELATIVE PERCENT: 89.1 % (ref 36–66)
SODIUM BLD-SCNC: 135 MMOL/L (ref 135–145)
TOTAL IMMATURE NEUTOROPHIL: 0.11 K/CU MM
TOTAL NUCLEATED RBC: 0 K/CU MM
WBC # BLD: 14.3 K/CU MM (ref 4–10.5)

## 2023-10-21 PROCEDURE — 94640 AIRWAY INHALATION TREATMENT: CPT

## 2023-10-21 PROCEDURE — 83605 ASSAY OF LACTIC ACID: CPT

## 2023-10-21 PROCEDURE — 85025 COMPLETE CBC W/AUTO DIFF WBC: CPT

## 2023-10-21 PROCEDURE — 94761 N-INVAS EAR/PLS OXIMETRY MLT: CPT

## 2023-10-21 PROCEDURE — 80048 BASIC METABOLIC PNL TOTAL CA: CPT

## 2023-10-21 PROCEDURE — 6370000000 HC RX 637 (ALT 250 FOR IP): Performed by: INTERNAL MEDICINE

## 2023-10-21 PROCEDURE — 82962 GLUCOSE BLOOD TEST: CPT

## 2023-10-21 PROCEDURE — 2700000000 HC OXYGEN THERAPY PER DAY

## 2023-10-21 PROCEDURE — 36415 COLL VENOUS BLD VENIPUNCTURE: CPT

## 2023-10-21 RX ORDER — PREDNISONE 20 MG/1
TABLET ORAL
Qty: 9 TABLET | Refills: 0 | Status: SHIPPED | OUTPATIENT
Start: 2023-10-21 | End: 2023-10-29

## 2023-10-21 RX ORDER — FUROSEMIDE 40 MG/1
40 TABLET ORAL DAILY
Qty: 30 TABLET | Refills: 2 | Status: SHIPPED | OUTPATIENT
Start: 2023-10-21

## 2023-10-21 RX ADMIN — INSULIN LISPRO 20 UNITS: 100 INJECTION, SOLUTION INTRAVENOUS; SUBCUTANEOUS at 12:39

## 2023-10-21 RX ADMIN — INSULIN LISPRO 20 UNITS: 100 INJECTION, SOLUTION INTRAVENOUS; SUBCUTANEOUS at 08:38

## 2023-10-21 RX ADMIN — PREDNISONE 40 MG: 20 TABLET ORAL at 12:38

## 2023-10-21 RX ADMIN — IPRATROPIUM BROMIDE AND ALBUTEROL SULFATE 1 DOSE: 2.5; .5 SOLUTION RESPIRATORY (INHALATION) at 12:10

## 2023-10-21 RX ADMIN — OLODATEROL RESPIMAT INHALATION SPRAY 2 PUFF: 2.5 SPRAY, METERED RESPIRATORY (INHALATION) at 07:36

## 2023-10-21 RX ADMIN — FLUTICASONE PROPIONATE 2 PUFF: 110 AEROSOL, METERED RESPIRATORY (INHALATION) at 07:37

## 2023-10-21 RX ADMIN — APIXABAN 5 MG: 5 TABLET, FILM COATED ORAL at 08:29

## 2023-10-21 RX ADMIN — IPRATROPIUM BROMIDE AND ALBUTEROL SULFATE 1 DOSE: 2.5; .5 SOLUTION RESPIRATORY (INHALATION) at 07:34

## 2023-10-21 RX ADMIN — INSULIN LISPRO 8 UNITS: 100 INJECTION, SOLUTION INTRAVENOUS; SUBCUTANEOUS at 12:38

## 2023-10-21 RX ADMIN — ATORVASTATIN CALCIUM 40 MG: 40 TABLET, FILM COATED ORAL at 08:29

## 2023-10-21 RX ADMIN — ASPIRIN 81 MG CHEWABLE TABLET 81 MG: 81 TABLET CHEWABLE at 08:29

## 2023-10-21 RX ADMIN — INSULIN LISPRO 4 UNITS: 100 INJECTION, SOLUTION INTRAVENOUS; SUBCUTANEOUS at 08:41

## 2023-10-21 ASSESSMENT — ENCOUNTER SYMPTOMS
CONSTIPATION: 0
BACK PAIN: 0
ANAL BLEEDING: 0
COLOR CHANGE: 0
EYE REDNESS: 0
PHOTOPHOBIA: 0
COUGH: 1
ABDOMINAL DISTENTION: 1
CHOKING: 0
SHORTNESS OF BREATH: 1
RECTAL PAIN: 0
SORE THROAT: 0
EYE ITCHING: 1
APNEA: 0
STRIDOR: 0

## 2023-10-21 NOTE — CONSULTS
Department of GeneralSurgery   Surgical Service Dr Karla Salgado   Consult Note    Date of Consult: 10/21/23      Reason for Consult: Lactic acidosis  Requesting Physician: Hospitalist    CHIEF COMPLAINT: Respiratory distress    History Obtained From:  patient    HISTORY OF PRESENT ILLNESS:                The patient is a 79 y.o. male who presents with acute on chronic respiratory distress with hypercapnia. Patient is on home oxygen. He is known to me for abdominal wall hernia. Patient is morbidly obese with a BMI of 44. I did evaluate him as outpatient for repair however patient's respiratory condition did not clear him for surgery. Patient with minimal abdominal pain. On this admission patient was noted to have lactic acidosis of 2.3 on admission. CT scan of the abdomen showed  1. No CT finding to account for patient's abdominal pain. 2. Bilateral nonobstructing nephrolithiasis. 3. Cholecystectomy. 4. Skin thickening of the lower anterior abdominal wall. Please correlate  with clinical symptoms of cellulitis. \  . Past Medical History:    Past Medical History:   Diagnosis Date    A-fib [facility])     Resolved after ablation 2018    Anxiety     Arthritis     \"Hips, Knees, Elbows And Fingers\"    COPD (chronic obstructive pulmonary disease) (HCC)     Sees Dr. Xin Araujo    Depression     Diabetes mellitus [facility]) Dx 1321'T    Full dentures     H/O angiography 12/13/2018    peripheral angio - LFA occluded, filling collaterals, RSFA 90% stenosis-vasc surg consult    H/O Doppler ultrasound 09/05/2018    LE arterial - left mid and distal femoral artery occluded, lt FANI shows mild-mod PVD    H/O echocardiogram 01/06/2016    EF60% mild MR, TR, pulm HTN    Hx of colonoscopy     7/11 C-scope - benign polyp x1    Hx of Doppler ultrasound 02/20/2018    Lower extremity doppler  Normal study.     Hyperlipidemia     Hypertension     Macular degeneration     States is legally blind    Obesity     On home oxygen therapy     Continuous At

## 2023-10-21 NOTE — PLAN OF CARE
Problem: Discharge Planning  Goal: Discharge to home or other facility with appropriate resources  10/21/2023 1126 by Holly Corbin RN  Outcome: Adequate for Discharge  10/21/2023 0808 by Holly Corbin RN  Outcome: Progressing     Problem: Safety - Adult  Goal: Free from fall injury  10/21/2023 1126 by Holly Corbin RN  Outcome: Adequate for Discharge  10/21/2023 0808 by Holly Corbin RN  Outcome: Progressing

## 2023-10-22 LAB
CULTURE: NORMAL
CULTURE: NORMAL
Lab: NORMAL
Lab: NORMAL
SPECIMEN: NORMAL
SPECIMEN: NORMAL

## 2023-10-23 ENCOUNTER — CARE COORDINATION (OUTPATIENT)
Dept: CASE MANAGEMENT | Age: 68
End: 2023-10-23

## 2023-10-23 LAB
CULTURE: NORMAL
CULTURE: NORMAL
Lab: NORMAL
Lab: NORMAL
SPECIMEN: NORMAL
SPECIMEN: NORMAL

## 2023-10-23 NOTE — CARE COORDINATION
[address] about BIPAP, Med Neb needs. CT program closed as Patient declines monitoring.    Jose Tyson, RN

## 2023-11-03 ENCOUNTER — HOSPITAL ENCOUNTER (EMERGENCY)
Age: 68
Discharge: HOME | End: 2023-11-03
Attending: EMERGENCY MEDICINE
Payer: MEDICARE

## 2023-11-03 ENCOUNTER — APPOINTMENT (OUTPATIENT)
Dept: GENERAL RADIOLOGY | Age: 68
End: 2023-11-03
Payer: MEDICARE

## 2023-11-03 VITALS
TEMPERATURE: 97.9 F | DIASTOLIC BLOOD PRESSURE: 65 MMHG | HEART RATE: 94 BPM | WEIGHT: 315 LBS | OXYGEN SATURATION: 93 % | BODY MASS INDEX: 43.11 KG/M2 | RESPIRATION RATE: 22 BRPM | SYSTOLIC BLOOD PRESSURE: 123 MMHG

## 2023-11-03 DIAGNOSIS — J44.1 COPD EXACERBATION (HCC): Primary | ICD-10-CM

## 2023-11-03 LAB
ALBUMIN SERPL-MCNC: 3.9 GM/DL (ref 3.4–5)
ALP BLD-CCNC: 76 IU/L (ref 40–129)
ALT SERPL-CCNC: 33 U/L (ref 10–40)
ANION GAP SERPL CALCULATED.3IONS-SCNC: 8 MMOL/L (ref 4–16)
AST SERPL-CCNC: 22 IU/L (ref 15–37)
BASE EXCESS MIXED: 12 (ref 0–1.2)
BASOPHILS ABSOLUTE: 0 K/CU MM
BASOPHILS RELATIVE PERCENT: 0.3 % (ref 0–1)
BILIRUB SERPL-MCNC: 0.4 MG/DL (ref 0–1)
BUN SERPL-MCNC: 14 MG/DL (ref 6–23)
CALCIUM SERPL-MCNC: 8.7 MG/DL (ref 8.3–10.6)
CHLORIDE BLD-SCNC: 90 MMOL/L (ref 99–110)
CO2: 35 MMOL/L (ref 21–32)
CREAT SERPL-MCNC: 0.4 MG/DL (ref 0.9–1.3)
DIFFERENTIAL TYPE: ABNORMAL
EKG ATRIAL RATE: 117 BPM
EKG DIAGNOSIS: NORMAL
EKG Q-T INTERVAL: 352 MS
EKG QRS DURATION: 100 MS
EKG QTC CALCULATION (BAZETT): 465 MS
EKG R AXIS: -61 DEGREES
EKG T AXIS: 71 DEGREES
EKG VENTRICULAR RATE: 105 BPM
EOSINOPHILS ABSOLUTE: 0.2 K/CU MM
EOSINOPHILS RELATIVE PERCENT: 2 % (ref 0–3)
GFR SERPL CREATININE-BSD FRML MDRD: >60 ML/MIN/1.73M2
GLUCOSE SERPL-MCNC: 198 MG/DL (ref 70–99)
HCO3 VENOUS: 40.7 MMOL/L (ref 19–25)
HCT VFR BLD CALC: 48.1 % (ref 42–52)
HEMOGLOBIN: 15.7 GM/DL (ref 13.5–18)
IMMATURE NEUTROPHIL %: 0.4 % (ref 0–0.43)
LYMPHOCYTES ABSOLUTE: 1.5 K/CU MM
LYMPHOCYTES RELATIVE PERCENT: 13 % (ref 24–44)
MCH RBC QN AUTO: 31.5 PG (ref 27–31)
MCHC RBC AUTO-ENTMCNC: 32.6 % (ref 32–36)
MCV RBC AUTO: 96.4 FL (ref 78–100)
MONOCYTES ABSOLUTE: 0.7 K/CU MM
MONOCYTES RELATIVE PERCENT: 5.6 % (ref 0–4)
NUCLEATED RBC %: 0 %
O2 SAT, VEN: 94.7 % (ref 50–70)
PCO2, VEN: 72 MMHG (ref 38–52)
PDW BLD-RTO: 14.6 % (ref 11.7–14.9)
PH VENOUS: 7.36 (ref 7.32–7.42)
PLATELET # BLD: 93 K/CU MM (ref 140–440)
PMV BLD AUTO: 10 FL (ref 7.5–11.1)
PO2, VEN: 124 MMHG (ref 28–48)
POTASSIUM SERPL-SCNC: 4.2 MMOL/L (ref 3.5–5.1)
RBC # BLD: 4.99 M/CU MM (ref 4.6–6.2)
SEGMENTED NEUTROPHILS ABSOLUTE COUNT: 9.1 K/CU MM
SEGMENTED NEUTROPHILS RELATIVE PERCENT: 78.7 % (ref 36–66)
SODIUM BLD-SCNC: 133 MMOL/L (ref 135–145)
TOTAL IMMATURE NEUTOROPHIL: 0.05 K/CU MM
TOTAL NUCLEATED RBC: 0 K/CU MM
TOTAL PROTEIN: 6 GM/DL (ref 6.4–8.2)
WBC # BLD: 11.6 K/CU MM (ref 4–10.5)

## 2023-11-03 PROCEDURE — 2700000000 HC OXYGEN THERAPY PER DAY

## 2023-11-03 PROCEDURE — 6370000000 HC RX 637 (ALT 250 FOR IP): Performed by: EMERGENCY MEDICINE

## 2023-11-03 PROCEDURE — 82805 BLOOD GASES W/O2 SATURATION: CPT

## 2023-11-03 PROCEDURE — 80053 COMPREHEN METABOLIC PANEL: CPT

## 2023-11-03 PROCEDURE — 93005 ELECTROCARDIOGRAM TRACING: CPT | Performed by: EMERGENCY MEDICINE

## 2023-11-03 PROCEDURE — 94640 AIRWAY INHALATION TREATMENT: CPT

## 2023-11-03 PROCEDURE — 85025 COMPLETE CBC W/AUTO DIFF WBC: CPT

## 2023-11-03 PROCEDURE — 99285 EMERGENCY DEPT VISIT HI MDM: CPT

## 2023-11-03 PROCEDURE — 71045 X-RAY EXAM CHEST 1 VIEW: CPT

## 2023-11-03 PROCEDURE — 93010 ELECTROCARDIOGRAM REPORT: CPT | Performed by: INTERNAL MEDICINE

## 2023-11-03 RX ORDER — IPRATROPIUM BROMIDE AND ALBUTEROL SULFATE 2.5; .5 MG/3ML; MG/3ML
1 SOLUTION RESPIRATORY (INHALATION) ONCE
Status: COMPLETED | OUTPATIENT
Start: 2023-11-03 | End: 2023-11-03

## 2023-11-03 RX ORDER — PREDNISONE 10 MG/1
TABLET ORAL
Qty: 45 TABLET | Refills: 0 | Status: ON HOLD | OUTPATIENT
Start: 2023-11-03 | End: 2023-11-12 | Stop reason: HOSPADM

## 2023-11-03 RX ORDER — PREDNISONE 20 MG/1
60 TABLET ORAL ONCE
Status: COMPLETED | OUTPATIENT
Start: 2023-11-03 | End: 2023-11-03

## 2023-11-03 RX ADMIN — PREDNISONE 60 MG: 20 TABLET ORAL at 05:00

## 2023-11-03 RX ADMIN — IPRATROPIUM BROMIDE AND ALBUTEROL SULFATE 1 DOSE: 2.5; .5 SOLUTION RESPIRATORY (INHALATION) at 04:53

## 2023-11-03 NOTE — ED PROVIDER NOTES
93 (L) 140 - 440 K/CU MM    MPV 10.0 7.5 - 11.1 FL    Differential Type AUTOMATED DIFFERENTIAL     Segs Relative 78.7 (H) 36 - 66 %    Lymphocytes % 13.0 (L) 24 - 44 %    Monocytes % 5.6 (H) 0 - 4 %    Eosinophils % 2.0 0 - 3 %    Basophils % 0.3 0 - 1 %    Segs Absolute 9.1 K/CU MM    Lymphocytes Absolute 1.5 K/CU MM    Monocytes Absolute 0.7 K/CU MM    Eosinophils Absolute 0.2 K/CU MM    Basophils Absolute 0.0 K/CU MM    Nucleated RBC % 0.0 %    Total Nucleated RBC 0.0 K/CU MM    Total Immature Neutrophil 0.05 K/CU MM    Immature Neutrophil % 0.4 0 - 0.43 %   Comprehensive Metabolic Panel w/ Reflex to MG   Result Value Ref Range    Sodium 133 (L) 135 - 145 MMOL/L    Potassium 4.2 3.5 - 5.1 MMOL/L    Chloride 90 (L) 99 - 110 mMol/L    CO2 35 (H) 21 - 32 MMOL/L    Anion Gap 8 4 - 16    Glucose 198 (H) 70 - 99 MG/DL    BUN 14 6 - 23 MG/DL    Creatinine 0.4 (L) 0.9 - 1.3 MG/DL    Est, Glom Filt Rate >60 >60 mL/min/1.73m2    Calcium 8.7 8.3 - 10.6 MG/DL    Total Protein 6.0 (L) 6.4 - 8.2 GM/DL    Albumin 3.9 3.4 - 5.0 GM/DL    Total Bilirubin 0.4 0.0 - 1.0 MG/DL    Alkaline Phosphatase 76 40 - 129 IU/L    ALT 33 10 - 40 U/L    AST 22 15 - 37 IU/L      Radiographs (if obtained):  [] The following radiograph was interpreted by myself in the absence of a radiologist:  [x] Radiologist's Report Reviewed:    Medical Decision Making and ED Course:    CC/HPI Summary, DDx, ED Course, and Reassessment: Patient presents as above with acute dyspnea likely secondary to his history of COPD given wheezing on exam and significant improvement with DuoNeb's prior to arrival.  Given another DuoNeb treatment here and started on prednisone. Did recently finished prednisone taper within the last 4 to 5 days. Patient is afebrile. Denies any chest pain. EKG does not show any new ischemic changes. He has known atrial fibrillation and is reasonably rate controlled at this time around 100.     Patient with significant improvement in symptoms on reevaluation. VBG does not show any evidence of acute hypercapnia. Patient does have mild thrombocytopenia of the intermittently has had thrombocytopenia before in the past.  Plan for outpatient follow-up. Metabolic work-up is largely unremarkable. Chest x-ray does not show any evidence of acute abnormality. I feel that at this point the patient's symptoms are consistent with COPD exacerbation and he will need prednisone taper at home but is appropriate for outpatient therapy at this time -patient understands that he needs to return for any new or worsening symptoms. Low suspicion for ACS, PE, other acute emergent process. Discharged in stable condition. History from : Patient    Limitations to history : None    Patient was given the following medications:  Medications   ipratropium 0.5 mg-albuterol 2.5 mg (DUONEB) nebulizer solution 1 Dose (1 Dose Inhalation Given 11/3/23 0453)   predniSONE (DELTASONE) tablet 60 mg (60 mg Oral Given 11/3/23 0500)       Independent Imaging Interpretation by me:     EKG (if obtained): (All EKG's are interpreted by myself in the absence of a cardiologist) atrial fibrillation with RVR. Left axis deviation. No specific ST or T wave changes. Age-indeterminate inferior and septal infarct. QTc is normal.    Chronic conditions affecting care: COPD, afib    Discussion with Other Profesionals : None    Social Determinants : None    Disposition Considerations (tests considered but not done, Shared Decision Making, Pt Expectation of Test or Tx.):     Appropriate for outpatient management      I am the Primary Clinician of Record. Clinical Impression:  1.  COPD exacerbation ([address])      (Please note that portions of this note may have been completed with a voice recognition program. Efforts were made to edit the dictations but occasionally words are mis-transcribed.)    MD Willard Winn MD  11/03/23 8510

## 2023-11-06 ENCOUNTER — CARE COORDINATION (OUTPATIENT)
Dept: CARE COORDINATION | Age: 68
End: 2023-11-06

## 2023-11-06 RX ORDER — NEBULIZER ACCESSORIES
1 KIT MISCELLANEOUS DAILY PRN
Qty: 1 KIT | Refills: 0 | Status: SHIPPED | OUTPATIENT
Start: 2023-11-06

## 2023-11-06 NOTE — CARE COORDINATION
Ambulatory Care Coordination  ED Follow up Call    Reason for ED visit:  COPD    Status:     improved    Did you call your PCP prior to going to the ED? No      Did you receive a discharge instructions from the Emergency Room? Yes  Review of Instructions:     Understands what to report/when to return?:  Yes   Understands discharge instructions?:  Yes   Following discharge instructions?:  Yes   If not why? N/A    Are there any new complaints of pain? No  New Pain Meds? No    Constipation prophylaxis needed? N/A    If you have a wound is the dressing clean, dry, and intact? N/A  Understands wound care regimen? N/A    Are there any other complaints/concerns that you wish to tell your provider? Patient has requested a prescription for Nebulizer mask sent to [address] [place] [address]. FU appts/Provider:    Future Appointments   Date Time Provider [address]   11/10/2023  1:45 PM Landry Hedrick MD [place] [facility]   11/13/2023  3:00 PM BRENNAN Rossi - CNP [facility]           New Medications?:   Yes      Medication Reconciliation by phone - Patient was seen and treated at [facility]  Understands Medications? Yes  Taking Medications? Yes  Can you swallow your pills? Yes    Any further needs in the home i.e. Equipment? Yes  Patient is requesting mask for nebulizer. Patient has machine but mask no longer has effective seal.   Link to services in community?:  Yes   Which services:  LSW - patient is requesting phone call to discuss Visiting Physicians as patient states it is too difficult to leave home for appointments. ACM made outreach to patient to follow up from ED and care management. ACM had limiting conversation with patient. Patient did note that he is feeling improved since discharge from ED. He has received and reviewed d/c instructions and is aware of s/sx to monitor and report. Patient is aware of RED flag symptoms and when to return to ED.  Patient advised ACM that he is

## 2023-11-07 ENCOUNTER — HOSPITAL ENCOUNTER (INPATIENT)
Age: 68
LOS: 4 days | Discharge: HOME HEALTH | DRG: 189 | End: 2023-11-12
Attending: EMERGENCY MEDICINE | Admitting: STUDENT IN AN ORGANIZED HEALTH CARE EDUCATION/TRAINING PROGRAM
Payer: MEDICARE

## 2023-11-07 ENCOUNTER — APPOINTMENT (OUTPATIENT)
Dept: GENERAL RADIOLOGY | Age: 68
DRG: 189 | End: 2023-11-07
Payer: MEDICARE

## 2023-11-07 DIAGNOSIS — I48.0 PAF (PAROXYSMAL ATRIAL FIBRILLATION) (HCC): ICD-10-CM

## 2023-11-07 DIAGNOSIS — J44.1 COPD EXACERBATION (HCC): ICD-10-CM

## 2023-11-07 DIAGNOSIS — E11.65 TYPE 2 DIABETES MELLITUS WITH HYPERGLYCEMIA, WITHOUT LONG-TERM CURRENT USE OF INSULIN (HCC): ICD-10-CM

## 2023-11-07 DIAGNOSIS — J96.22 ACUTE ON CHRONIC RESPIRATORY FAILURE WITH HYPOXIA AND HYPERCAPNIA (HCC): ICD-10-CM

## 2023-11-07 DIAGNOSIS — J96.02 ACUTE RESPIRATORY FAILURE WITH HYPOXIA AND HYPERCAPNIA (HCC): Primary | ICD-10-CM

## 2023-11-07 DIAGNOSIS — J96.21 ACUTE ON CHRONIC RESPIRATORY FAILURE WITH HYPOXIA AND HYPERCAPNIA (HCC): ICD-10-CM

## 2023-11-07 DIAGNOSIS — J96.01 ACUTE RESPIRATORY FAILURE WITH HYPOXIA AND HYPERCAPNIA (HCC): Primary | ICD-10-CM

## 2023-11-07 PROCEDURE — 71045 X-RAY EXAM CHEST 1 VIEW: CPT

## 2023-11-07 PROCEDURE — 99285 EMERGENCY DEPT VISIT HI MDM: CPT

## 2023-11-07 RX ORDER — IPRATROPIUM BROMIDE AND ALBUTEROL SULFATE 2.5; .5 MG/3ML; MG/3ML
2 SOLUTION RESPIRATORY (INHALATION) ONCE
Status: COMPLETED | OUTPATIENT
Start: 2023-11-08 | End: 2023-11-08

## 2023-11-08 ENCOUNTER — CARE COORDINATION (OUTPATIENT)
Dept: CARE COORDINATION | Age: 68
End: 2023-11-08

## 2023-11-08 LAB
ALBUMIN SERPL-MCNC: 3.7 GM/DL (ref 3.4–5)
ALP BLD-CCNC: 85 IU/L (ref 40–128)
ALT SERPL-CCNC: 22 U/L (ref 10–40)
ANION GAP SERPL CALCULATED.3IONS-SCNC: 5 MMOL/L (ref 4–16)
AST SERPL-CCNC: 9 IU/L (ref 15–37)
BASE EXCESS MIXED: 10.5 (ref 0–1.2)
BASE EXCESS MIXED: 14.4 (ref 0–1.2)
BASE EXCESS MIXED: 23.1 (ref 0–1.2)
BASOPHILS ABSOLUTE: 0 K/CU MM
BASOPHILS RELATIVE PERCENT: 0.3 % (ref 0–1)
BILIRUB SERPL-MCNC: 1.1 MG/DL (ref 0–1)
BUN SERPL-MCNC: 12 MG/DL (ref 6–23)
CALCIUM SERPL-MCNC: 9.6 MG/DL (ref 8.3–10.6)
CARBON MONOXIDE, BLOOD: 2.5 % (ref 0–5)
CARBON MONOXIDE, BLOOD: 3.4 % (ref 0–5)
CHLORIDE BLD-SCNC: 89 MMOL/L (ref 99–110)
CO2 CONTENT: 40 MMOL/L (ref 19–24)
CO2 CONTENT: 45.7 MMOL/L (ref 19–24)
CO2: 44 MMOL/L (ref 21–32)
COMMENT: ABNORMAL
COMMENT: ABNORMAL
CREAT SERPL-MCNC: 0.5 MG/DL (ref 0.9–1.3)
DIFFERENTIAL TYPE: ABNORMAL
EOSINOPHILS ABSOLUTE: 0.2 K/CU MM
EOSINOPHILS RELATIVE PERCENT: 1.5 % (ref 0–3)
GFR SERPL CREATININE-BSD FRML MDRD: >60 ML/MIN/1.73M2
GLUCOSE BLD-MCNC: 365 MG/DL
GLUCOSE BLD-MCNC: 365 MG/DL (ref 70–99)
GLUCOSE BLD-MCNC: 403 MG/DL
GLUCOSE BLD-MCNC: 403 MG/DL (ref 70–99)
GLUCOSE BLD-MCNC: 403 MG/DL (ref 70–99)
GLUCOSE BLD-MCNC: 407 MG/DL (ref 70–99)
GLUCOSE BLD-MCNC: 460 MG/DL (ref 70–99)
GLUCOSE BLD-MCNC: 563 MG/DL (ref 70–99)
GLUCOSE SERPL-MCNC: 255 MG/DL (ref 70–99)
HCO3 ARTERIAL: 38.1 MMOL/L (ref 18–23)
HCO3 ARTERIAL: 43.4 MMOL/L (ref 18–23)
HCO3 VENOUS: 54.7 MMOL/L (ref 19–25)
HCT VFR BLD CALC: 49.4 % (ref 42–52)
HEMOGLOBIN: 16.1 GM/DL (ref 13.5–18)
IMMATURE NEUTROPHIL %: 0.5 % (ref 0–0.43)
INFLUENZA A ANTIGEN: NOT DETECTED
INFLUENZA B ANTIGEN: NOT DETECTED
LACTIC ACID, SEPSIS: 0.9 MMOL/L (ref 0.4–2)
LYMPHOCYTES ABSOLUTE: 0.9 K/CU MM
LYMPHOCYTES RELATIVE PERCENT: 8.3 % (ref 24–44)
MCH RBC QN AUTO: 31.1 PG (ref 27–31)
MCHC RBC AUTO-ENTMCNC: 32.6 % (ref 32–36)
MCV RBC AUTO: 95.6 FL (ref 78–100)
METHEMOGLOBIN ARTERIAL: 1.1 %
METHEMOGLOBIN ARTERIAL: 1.3 %
MONOCYTES ABSOLUTE: 0.8 K/CU MM
MONOCYTES RELATIVE PERCENT: 7.3 % (ref 0–4)
NUCLEATED RBC %: 0 %
O2 SAT, VEN: 84.4 % (ref 50–70)
O2 SATURATION: 83.6 % (ref 96–97)
O2 SATURATION: 93.8 % (ref 96–97)
PCO2 ARTERIAL: 63 MMHG (ref 32–45)
PCO2 ARTERIAL: 75 MMHG (ref 32–45)
PCO2, VEN: 99 MMHG (ref 38–52)
PDW BLD-RTO: 14.5 % (ref 11.7–14.9)
PH BLOOD: 7.37 (ref 7.34–7.45)
PH BLOOD: 7.39 (ref 7.34–7.45)
PH VENOUS: 7.35 (ref 7.32–7.42)
PLATELET # BLD: 140 K/CU MM (ref 140–440)
PMV BLD AUTO: 9.3 FL (ref 7.5–11.1)
PO2 ARTERIAL: 51 MMHG (ref 75–100)
PO2 ARTERIAL: 69 MMHG (ref 75–100)
PO2, VEN: 56 MMHG (ref 28–48)
POTASSIUM SERPL-SCNC: 4 MMOL/L (ref 3.5–5.1)
PROCALCITONIN SERPL-MCNC: 0.11 NG/ML
RBC # BLD: 5.17 M/CU MM (ref 4.6–6.2)
SARS-COV-2 RDRP RESP QL NAA+PROBE: NOT DETECTED
SEGMENTED NEUTROPHILS ABSOLUTE COUNT: 8.6 K/CU MM
SEGMENTED NEUTROPHILS RELATIVE PERCENT: 82.1 % (ref 36–66)
SODIUM BLD-SCNC: 138 MMOL/L (ref 135–145)
SOURCE: NORMAL
TOTAL IMMATURE NEUTOROPHIL: 0.05 K/CU MM
TOTAL NUCLEATED RBC: 0 K/CU MM
TOTAL PROTEIN: 6.4 GM/DL (ref 6.4–8.2)
WBC # BLD: 10.5 K/CU MM (ref 4–10.5)

## 2023-11-08 PROCEDURE — 6370000000 HC RX 637 (ALT 250 FOR IP): Performed by: STUDENT IN AN ORGANIZED HEALTH CARE EDUCATION/TRAINING PROGRAM

## 2023-11-08 PROCEDURE — 6370000000 HC RX 637 (ALT 250 FOR IP): Performed by: EMERGENCY MEDICINE

## 2023-11-08 PROCEDURE — 80053 COMPREHEN METABOLIC PANEL: CPT

## 2023-11-08 PROCEDURE — 94664 DEMO&/EVAL PT USE INHALER: CPT

## 2023-11-08 PROCEDURE — 89220 SPUTUM SPECIMEN COLLECTION: CPT

## 2023-11-08 PROCEDURE — 2580000003 HC RX 258: Performed by: STUDENT IN AN ORGANIZED HEALTH CARE EDUCATION/TRAINING PROGRAM

## 2023-11-08 PROCEDURE — 94640 AIRWAY INHALATION TREATMENT: CPT

## 2023-11-08 PROCEDURE — 82962 GLUCOSE BLOOD TEST: CPT

## 2023-11-08 PROCEDURE — 2580000003 HC RX 258: Performed by: EMERGENCY MEDICINE

## 2023-11-08 PROCEDURE — 6360000002 HC RX W HCPCS: Performed by: STUDENT IN AN ORGANIZED HEALTH CARE EDUCATION/TRAINING PROGRAM

## 2023-11-08 PROCEDURE — 87635 SARS-COV-2 COVID-19 AMP PRB: CPT

## 2023-11-08 PROCEDURE — 2700000000 HC OXYGEN THERAPY PER DAY

## 2023-11-08 PROCEDURE — 6360000002 HC RX W HCPCS: Performed by: EMERGENCY MEDICINE

## 2023-11-08 PROCEDURE — 82803 BLOOD GASES ANY COMBINATION: CPT

## 2023-11-08 PROCEDURE — 84145 PROCALCITONIN (PCT): CPT

## 2023-11-08 PROCEDURE — 82805 BLOOD GASES W/O2 SATURATION: CPT

## 2023-11-08 PROCEDURE — 87502 INFLUENZA DNA AMP PROBE: CPT

## 2023-11-08 PROCEDURE — 6370000000 HC RX 637 (ALT 250 FOR IP): Performed by: INTERNAL MEDICINE

## 2023-11-08 PROCEDURE — 2060000000 HC ICU INTERMEDIATE R&B

## 2023-11-08 PROCEDURE — 85025 COMPLETE CBC W/AUTO DIFF WBC: CPT

## 2023-11-08 PROCEDURE — 94761 N-INVAS EAR/PLS OXIMETRY MLT: CPT

## 2023-11-08 PROCEDURE — 87040 BLOOD CULTURE FOR BACTERIA: CPT

## 2023-11-08 PROCEDURE — 83605 ASSAY OF LACTIC ACID: CPT

## 2023-11-08 RX ORDER — POTASSIUM CHLORIDE 7.45 MG/ML
10 INJECTION INTRAVENOUS PRN
Status: DISCONTINUED | OUTPATIENT
Start: 2023-11-08 | End: 2023-11-12 | Stop reason: HOSPADM

## 2023-11-08 RX ORDER — FUROSEMIDE 40 MG/1
40 TABLET ORAL DAILY
Status: DISCONTINUED | OUTPATIENT
Start: 2023-11-08 | End: 2023-11-12 | Stop reason: HOSPADM

## 2023-11-08 RX ORDER — IPRATROPIUM BROMIDE AND ALBUTEROL SULFATE 2.5; .5 MG/3ML; MG/3ML
1 SOLUTION RESPIRATORY (INHALATION)
Status: DISCONTINUED | OUTPATIENT
Start: 2023-11-08 | End: 2023-11-08

## 2023-11-08 RX ORDER — ATORVASTATIN CALCIUM 40 MG/1
40 TABLET, FILM COATED ORAL NIGHTLY
Status: DISCONTINUED | OUTPATIENT
Start: 2023-11-08 | End: 2023-11-12 | Stop reason: HOSPADM

## 2023-11-08 RX ORDER — INSULIN GLARGINE 100 [IU]/ML
50 INJECTION, SOLUTION SUBCUTANEOUS NIGHTLY
Status: DISCONTINUED | OUTPATIENT
Start: 2023-11-08 | End: 2023-11-09

## 2023-11-08 RX ORDER — ACETAMINOPHEN 650 MG/1
650 SUPPOSITORY RECTAL EVERY 6 HOURS PRN
Status: DISCONTINUED | OUTPATIENT
Start: 2023-11-08 | End: 2023-11-12 | Stop reason: HOSPADM

## 2023-11-08 RX ORDER — INSULIN LISPRO 100 [IU]/ML
20 INJECTION, SOLUTION INTRAVENOUS; SUBCUTANEOUS
Status: DISCONTINUED | OUTPATIENT
Start: 2023-11-08 | End: 2023-11-10

## 2023-11-08 RX ORDER — NICOTINE 21 MG/24HR
1 PATCH, TRANSDERMAL 24 HOURS TRANSDERMAL DAILY PRN
Status: DISCONTINUED | OUTPATIENT
Start: 2023-11-08 | End: 2023-11-12 | Stop reason: HOSPADM

## 2023-11-08 RX ORDER — POLYETHYLENE GLYCOL 3350 17 G/17G
17 POWDER, FOR SOLUTION ORAL DAILY PRN
Status: DISCONTINUED | OUTPATIENT
Start: 2023-11-08 | End: 2023-11-12 | Stop reason: HOSPADM

## 2023-11-08 RX ORDER — SODIUM CHLORIDE 9 MG/ML
INJECTION, SOLUTION INTRAVENOUS PRN
Status: DISCONTINUED | OUTPATIENT
Start: 2023-11-08 | End: 2023-11-12 | Stop reason: HOSPADM

## 2023-11-08 RX ORDER — ONDANSETRON 4 MG/1
4 TABLET, ORALLY DISINTEGRATING ORAL EVERY 8 HOURS PRN
Status: DISCONTINUED | OUTPATIENT
Start: 2023-11-08 | End: 2023-11-12 | Stop reason: HOSPADM

## 2023-11-08 RX ORDER — ROFLUMILAST 500 UG/1
500 TABLET ORAL DAILY
Status: DISCONTINUED | OUTPATIENT
Start: 2023-11-08 | End: 2023-11-12 | Stop reason: HOSPADM

## 2023-11-08 RX ORDER — GLUCAGON 1 MG/ML
1 KIT INJECTION PRN
Status: DISCONTINUED | OUTPATIENT
Start: 2023-11-08 | End: 2023-11-12 | Stop reason: HOSPADM

## 2023-11-08 RX ORDER — BUDESONIDE AND FORMOTEROL FUMARATE DIHYDRATE 160; 4.5 UG/1; UG/1
2 AEROSOL RESPIRATORY (INHALATION) 2 TIMES DAILY
Status: DISCONTINUED | OUTPATIENT
Start: 2023-11-08 | End: 2023-11-12 | Stop reason: HOSPADM

## 2023-11-08 RX ORDER — AZITHROMYCIN 250 MG/1
500 TABLET, FILM COATED ORAL ONCE
Status: COMPLETED | OUTPATIENT
Start: 2023-11-08 | End: 2023-11-08

## 2023-11-08 RX ORDER — GUAIFENESIN 600 MG/1
600 TABLET, EXTENDED RELEASE ORAL 2 TIMES DAILY
Status: DISCONTINUED | OUTPATIENT
Start: 2023-11-08 | End: 2023-11-12 | Stop reason: HOSPADM

## 2023-11-08 RX ORDER — DEXTROSE MONOHYDRATE 100 MG/ML
INJECTION, SOLUTION INTRAVENOUS CONTINUOUS PRN
Status: DISCONTINUED | OUTPATIENT
Start: 2023-11-08 | End: 2023-11-12 | Stop reason: HOSPADM

## 2023-11-08 RX ORDER — ACETAMINOPHEN 325 MG/1
650 TABLET ORAL EVERY 6 HOURS PRN
Status: DISCONTINUED | OUTPATIENT
Start: 2023-11-08 | End: 2023-11-12 | Stop reason: HOSPADM

## 2023-11-08 RX ORDER — ONDANSETRON 2 MG/ML
4 INJECTION INTRAMUSCULAR; INTRAVENOUS EVERY 6 HOURS PRN
Status: DISCONTINUED | OUTPATIENT
Start: 2023-11-08 | End: 2023-11-12 | Stop reason: HOSPADM

## 2023-11-08 RX ORDER — IPRATROPIUM BROMIDE AND ALBUTEROL SULFATE 2.5; .5 MG/3ML; MG/3ML
1 SOLUTION RESPIRATORY (INHALATION)
Status: DISCONTINUED | OUTPATIENT
Start: 2023-11-08 | End: 2023-11-12 | Stop reason: HOSPADM

## 2023-11-08 RX ORDER — SODIUM CHLORIDE 0.9 % (FLUSH) 0.9 %
5-40 SYRINGE (ML) INJECTION PRN
Status: DISCONTINUED | OUTPATIENT
Start: 2023-11-08 | End: 2023-11-12 | Stop reason: HOSPADM

## 2023-11-08 RX ORDER — DILTIAZEM HYDROCHLORIDE 240 MG/1
240 CAPSULE, COATED, EXTENDED RELEASE ORAL DAILY
Status: DISCONTINUED | OUTPATIENT
Start: 2023-11-08 | End: 2023-11-12 | Stop reason: HOSPADM

## 2023-11-08 RX ORDER — MAGNESIUM SULFATE IN WATER 40 MG/ML
2000 INJECTION, SOLUTION INTRAVENOUS PRN
Status: DISCONTINUED | OUTPATIENT
Start: 2023-11-08 | End: 2023-11-12 | Stop reason: HOSPADM

## 2023-11-08 RX ORDER — INSULIN LISPRO 100 [IU]/ML
0-4 INJECTION, SOLUTION INTRAVENOUS; SUBCUTANEOUS NIGHTLY
Status: DISCONTINUED | OUTPATIENT
Start: 2023-11-08 | End: 2023-11-09

## 2023-11-08 RX ORDER — POTASSIUM CHLORIDE 20 MEQ/1
10 TABLET, EXTENDED RELEASE ORAL DAILY
Status: DISCONTINUED | OUTPATIENT
Start: 2023-11-08 | End: 2023-11-12 | Stop reason: HOSPADM

## 2023-11-08 RX ORDER — SODIUM CHLORIDE 0.9 % (FLUSH) 0.9 %
5-40 SYRINGE (ML) INJECTION EVERY 12 HOURS SCHEDULED
Status: DISCONTINUED | OUTPATIENT
Start: 2023-11-08 | End: 2023-11-12 | Stop reason: HOSPADM

## 2023-11-08 RX ORDER — INSULIN LISPRO 100 [IU]/ML
0-8 INJECTION, SOLUTION INTRAVENOUS; SUBCUTANEOUS
Status: DISCONTINUED | OUTPATIENT
Start: 2023-11-08 | End: 2023-11-09

## 2023-11-08 RX ORDER — ASPIRIN 81 MG/1
81 TABLET, CHEWABLE ORAL DAILY
Status: DISCONTINUED | OUTPATIENT
Start: 2023-11-08 | End: 2023-11-12 | Stop reason: HOSPADM

## 2023-11-08 RX ADMIN — ASPIRIN 81 MG CHEWABLE TABLET 81 MG: 81 TABLET CHEWABLE at 09:43

## 2023-11-08 RX ADMIN — POTASSIUM CHLORIDE 10 MEQ: 1500 TABLET, EXTENDED RELEASE ORAL at 09:43

## 2023-11-08 RX ADMIN — INSULIN LISPRO 4 UNITS: 100 INJECTION, SOLUTION INTRAVENOUS; SUBCUTANEOUS at 21:00

## 2023-11-08 RX ADMIN — GUAIFENESIN 600 MG: 600 TABLET, EXTENDED RELEASE ORAL at 20:53

## 2023-11-08 RX ADMIN — IPRATROPIUM BROMIDE AND ALBUTEROL SULFATE 1 DOSE: 2.5; .5 SOLUTION RESPIRATORY (INHALATION) at 08:07

## 2023-11-08 RX ADMIN — APIXABAN 5 MG: 5 TABLET, FILM COATED ORAL at 09:43

## 2023-11-08 RX ADMIN — METHYLPREDNISOLONE SODIUM SUCCINATE 40 MG: 40 INJECTION, POWDER, FOR SOLUTION INTRAMUSCULAR; INTRAVENOUS at 09:45

## 2023-11-08 RX ADMIN — SODIUM CHLORIDE, PRESERVATIVE FREE 10 ML: 5 INJECTION INTRAVENOUS at 20:54

## 2023-11-08 RX ADMIN — CEFTRIAXONE 1000 MG: 1 INJECTION, POWDER, FOR SOLUTION INTRAMUSCULAR; INTRAVENOUS at 01:24

## 2023-11-08 RX ADMIN — GUAIFENESIN 600 MG: 600 TABLET, EXTENDED RELEASE ORAL at 15:38

## 2023-11-08 RX ADMIN — INSULIN LISPRO 8 UNITS: 100 INJECTION, SOLUTION INTRAVENOUS; SUBCUTANEOUS at 09:45

## 2023-11-08 RX ADMIN — INSULIN LISPRO 20 UNITS: 100 INJECTION, SOLUTION INTRAVENOUS; SUBCUTANEOUS at 17:19

## 2023-11-08 RX ADMIN — INSULIN LISPRO 20 UNITS: 100 INJECTION, SOLUTION INTRAVENOUS; SUBCUTANEOUS at 09:45

## 2023-11-08 RX ADMIN — INSULIN LISPRO 8 UNITS: 100 INJECTION, SOLUTION INTRAVENOUS; SUBCUTANEOUS at 17:19

## 2023-11-08 RX ADMIN — ROFLUMILAST 500 MCG: 500 TABLET ORAL at 15:34

## 2023-11-08 RX ADMIN — APIXABAN 5 MG: 5 TABLET, FILM COATED ORAL at 03:02

## 2023-11-08 RX ADMIN — ATORVASTATIN CALCIUM 40 MG: 40 TABLET, FILM COATED ORAL at 20:53

## 2023-11-08 RX ADMIN — IPRATROPIUM BROMIDE AND ALBUTEROL SULFATE 2 DOSE: 2.5; .5 SOLUTION RESPIRATORY (INHALATION) at 00:01

## 2023-11-08 RX ADMIN — AZITHROMYCIN DIHYDRATE 500 MG: 250 TABLET ORAL at 01:35

## 2023-11-08 RX ADMIN — METHYLPREDNISOLONE SODIUM SUCCINATE 40 MG: 40 INJECTION, POWDER, FOR SOLUTION INTRAMUSCULAR; INTRAVENOUS at 20:53

## 2023-11-08 RX ADMIN — FUROSEMIDE 40 MG: 40 TABLET ORAL at 09:43

## 2023-11-08 RX ADMIN — IPRATROPIUM BROMIDE AND ALBUTEROL SULFATE 1 DOSE: 2.5; .5 SOLUTION RESPIRATORY (INHALATION) at 12:07

## 2023-11-08 RX ADMIN — APIXABAN 5 MG: 5 TABLET, FILM COATED ORAL at 20:53

## 2023-11-08 RX ADMIN — SODIUM CHLORIDE, PRESERVATIVE FREE 10 ML: 5 INJECTION INTRAVENOUS at 09:47

## 2023-11-08 RX ADMIN — INSULIN GLARGINE 50 UNITS: 100 INJECTION, SOLUTION SUBCUTANEOUS at 20:53

## 2023-11-08 RX ADMIN — IPRATROPIUM BROMIDE AND ALBUTEROL SULFATE 1 DOSE: 2.5; .5 SOLUTION RESPIRATORY (INHALATION) at 16:51

## 2023-11-08 RX ADMIN — DILTIAZEM HYDROCHLORIDE 240 MG: 240 CAPSULE, COATED, EXTENDED RELEASE ORAL at 09:45

## 2023-11-08 RX ADMIN — BUDESONIDE AND FORMOTEROL FUMARATE DIHYDRATE 2 PUFF: 160; 4.5 AEROSOL RESPIRATORY (INHALATION) at 08:16

## 2023-11-08 ASSESSMENT — PAIN SCALES - GENERAL
PAINLEVEL_OUTOF10: 0

## 2023-11-08 ASSESSMENT — PAIN SCALES - WONG BAKER
WONGBAKER_NUMERICALRESPONSE: 0

## 2023-11-08 NOTE — PROGRESS NOTES
Patient admitted earlier this morning by my partner. History and physical reviewed. Agree with history and physical.  Continue with plan of care. Continue current measures for now. Patient seen and examined at bedside. Pt appears slightly lethargic but awakens to questions. States he feels has congested cough but unable to bring up any sputum. Was sating 90% on 7L NC worse with any movement. Diminished breathing sounds. Add acapalla, chest physiotherapy, order ABG STAT, consult pulm given high O2 req and recurrent admission. Will get CT Chest if no improvement.        Mary Knox MD

## 2023-11-08 NOTE — ED TRIAGE NOTES
Patient brought in by EMS with complaint of shortness of breath. Patient states he has been experiencing shortness of breath for the past 3 weeks. Per EMS patient wears 2L oxygen at home and his oxygen saturation was at 83%. EMS stated they increased his oxygen to 5L and his oxygen saturation increased to 92%.

## 2023-11-08 NOTE — CONSULTS
Subjective:   CHIEF COMPLAINT / HPI:  79year old male with repeat hospitalizatuion is readmitted with worsening sob and this is ACCOMPANIED BY WHEEZE. HE HAS COUGH WITH YELLOW SPUTUM PRODUCTION. he denies any fever or chest pain or hemoptysis. Past Medical History:  Past Medical History:   Diagnosis Date    A-fib [facility])     Resolved after ablation 2018    Anxiety     Arthritis     \"Hips, Knees, Elbows And Fingers\"    COPD (chronic obstructive pulmonary disease) ([facility])     Sees Dr. Dami Zimmerman    Depression     Diabetes mellitus [facility]) Dx 6687'L    Full dentures     H/O angiography 12/13/2018    peripheral angio - LFA occluded, filling collaterals, RSFA 90% stenosis-vasc surg consult    H/O Doppler ultrasound 09/05/2018    LE arterial - left mid and distal femoral artery occluded, lt FANI shows mild-mod PVD    H/O echocardiogram 01/06/2016    EF60% mild MR, TR, pulm HTN    Hx of colonoscopy     7/11 C-scope - benign polyp x1    Hx of Doppler ultrasound 02/20/2018    Lower extremity doppler  Normal study.     Hyperlipidemia     Hypertension     Macular degeneration     States is legally blind    Obesity     On home oxygen therapy     Continuous At 2 Liters Per Nasal Cannula    PAD (peripheral artery disease) ([address])     10/10 FANI mild PAD left superficial femoral s/p left fem-pop bypass    Panic attacks     Pneumonia Last Episode In 2018    PTSD (post-traumatic stress disorder)     Restless leg     Shortness of breath     Sleep apnea     Uses BiPap - stopped using 4/2020    Wears glasses     To Read       Past Surgical History:        Procedure Laterality Date    APPENDECTOMY  2003    ATRIAL ABLATION SURGERY  05/23/2018    A-fib ablation     CARDIAC SURGERY  05/2018    \"Heart Ablation Done Twice\"    CHOLECYSTECTOMY, LAPAROSCOPIC  11/12/2018    COLONOSCOPY  07/2011    Polyp Removed    DENTAL SURGERY      All Teeth Extracted In Past    EYE SURGERY Left 2017    \"For Macular Degeneration\"    FEMORAL BYPASS Left 01/2011 Accident    Allergy (Severe) Brother     Arthritis Sister     Mental Illness Sister     Diabetes Sister     Obesity Brother        Immunization:  Immunization History   Administered Date(s) Administered    COVID-19, PFIZER PURPLE top, DILUTE for use, (age 15 y+), 30mcg/0.3mL 03/04/2021, 03/25/2021, 12/08/2021    Influenza Virus Vaccine 10/08/2014    Influenza, AFLURIA (age 1 yrs+), FLUZONE, (age 10 mo+), MDV, 0.5mL 09/09/2016, 09/12/2017, 10/23/2018    Pneumococcal, PPSV23, PNEUMOVAX 23, (age 2y+), SC/IM, 0.5mL 09/12/2011    TDaP, ADACEL (age 6y-58y), BOOSTRIX (age 10y+), IM, 0.5mL 07/17/2018         REVIEW OF SYSTEMS:    CONSTITUTIONAL:  negative for fevers, chills, diaphoresis, activity change, appetite change, fatigue, night sweats and unexpected weight change. EYES:  negative for blurred vision, eye discharge, visual disturbance and icterus  HEENT:  negative for hearing loss, tinnitus, ear drainage, sinus pressure, nasal congestion, epistaxis and snoring  RESPIRATORY:  See HPI  CARDIOVASCULAR:  negative for chest pain, palpitations, exertional chest pressure/discomfort, edema, syncope  GASTROINTESTINAL:  negative for nausea, vomiting, diarrhea, constipation, blood in stool and abdominal pain  GENITOURINARY:  negative for frequency, dysuria and hematuria  HEMATOLOGIC/LYMPHATIC:  negative for easy bruising, bleeding and lymphadenopathy  ALLERGIC/IMMUNOLOGIC:  negative for recurrent infections, angioedema, anaphylaxis and drug reactions  ENDOCRINE:  negative for weight changes and diabetic symptoms including polyuria, polydipsia and polyphagia    MUSCULOSKELETAL:  negative for  pain, joint swelling, decreased range of motion and muscle weakness  NEUROLOGICAL:  negative for headaches, slurred speech, unilateral weakness  PSYCHIATRIC/BEHAVIORAL: negative for hallucinations, behavioral problems, confusion and agitation.      Objective:   PHYSICAL EXAM:      VITALS:    Vitals:    11/08/23 1115 11/08/23 1126 11/08/23 possible  peribronchial edema. 2. Patchy bibasilar airspace disease, which could represent atelectasis  though pneumonia could have this appearance in the appropriate clinical  setting. Specimen Collected: 11/08/23 00:56 EST Last Resulted: 11/08/23 01:01 EST              Abg 7.39/63/69    Assessment:      Ac on ch resp failure  Hypoxic and hypercapneic  Ac copd  Possible cassia pneumonia          Plan:     D/w pt  Adequate o2 adm  Bipap at night  Bd rx  Iv steroids  Agree with antibx  Will start daliresp  Thanks will follow

## 2023-11-08 NOTE — CARE COORDINATION
Received incoming call from [facility] ([facility] reporting they do accept [employer], requesting copy of demographics, med list, dx and insurance info is faxed to [phone]. VIELKA consulted with [facility] who reported Pt had identified the need for visiting physcian during their previous call, requesting for this SW to send referral.    [facility]      VIELKA plan of care: VIELKA will follow up with Mike regarding referral on 11/10

## 2023-11-08 NOTE — CARE COORDINATION
VIELKA conducted chart review, Pt is in the ED, awaiting bed for IP stay. Attempted phone call to [facility] for referral for Visiting Physician. No answer, vm message left requesting return call, contact number provided. VIELKA plan of care: VIELKA will make next attempt to reach [facility] on 11/13 if no response prior to that.

## 2023-11-08 NOTE — H&P
History and Physical      Name:  Mukesh Tobias /Age/Sex: 1955  (79 y.o. male)   MRN & CSN:  [id number] & [id number] Encounter Date/Time: 2023 1:24 AM EDT   Location:  ED21/ED-21 PCP: Migdalia Mills MD       Hospital Day: 2    Assessment and Plan:     Patient is a 79 y.o. male who presented with SOB     Acute COPD exacerbation   Acute on Chronic Hypoxemic Hypercapnic Respiratory Failure   - Endorsed worsening SOB with increased productive cough for few weeks. Compliant with home inhalers, saturating 85% on home 2-3L NC  - Requiring 4-5L NC in ED, VBG 7.35/99.   - CXR with stable BLT opacities, COVID and flu Negative      - Continue steroids   - Levaquin 500mg daily   - DuoNebs  - Continue supportive care. - Patient would benefit from prolonged hospital course to prevent recurrent admissions   - consult to help with above     Chronic HFpEF  - Grossly euvolemic with mild peripheral edema  -TTE on  LVEF of 50% with no WMA. - Continue PO home lasix         T2DM with hyperglycemia  - BS on admit 255  - Home Meds: Lantus 50u qhs, 20uNovolog TIDMW, Metformin,                   - Lantus 50u qhs, 20u Novolog TIDMW and LCIS              - Hold PO meds               - -180      Afib  -Continue Diltiazem and Eliquis      HLD  -Continue statin     Checklist:  Advanced directive: full  Diet: cardiac  DVT ppx: Eliquis  Sugar: BG goal of 140-180 while inpatient    Disposition: admit to inpatient. Estimated discharge: 4 day(s). Current living situation: home. Expected disposition: home. Spoke with ED provider who recommended admission for the patient and I agree with that plan. Personally reviewed lab studies and imaging. EKG interpreted personally and results as stated above. Imaging that was interpreted personally and results as stated above.     History of Present Illness:     Chief Complaint: SOB    Patient is a 79 y.o. male with a PMHx of HFpEF, Afib on ,

## 2023-11-08 NOTE — ED NOTES
ED TO INPATIENT SBAR HANDOFF    Patient Name: Tyree Wolfe   :  1955  79 y.o. Preferred Name  Cecille Woodson  Family/Caregiver Present no   Restraints no   C-SSRS: Risk of Suicide: No Risk  Sitter no   Sepsis Risk Score Sepsis Risk Score: 2.05      Situation  Chief Complaint   Patient presents with    Shortness of Breath     Brief Description of Patient's Condition: Patient presented with complaint of shortness of breath for the past few weeks.    Mental Status: oriented, alert, coherent, logical, thought processes intact, and able to concentrate and follow conversation  Arrived from: home    Imaging:   XR CHEST PORTABLE    (Results Pending)     Abnormal labs:   Abnormal Labs Reviewed   CBC WITH AUTO DIFFERENTIAL - Abnormal; Notable for the following components:       Result Value    MCH 31.1 (*)     Segs Relative 82.1 (*)     Lymphocytes % 8.3 (*)     Monocytes % 7.3 (*)     Immature Neutrophil % 0.5 (*)     All other components within normal limits   COMPREHENSIVE METABOLIC PANEL W/ REFLEX TO MG FOR LOW K - Abnormal; Notable for the following components:    Chloride 89 (*)     CO2 44 (*)     Glucose 255 (*)     Creatinine 0.5 (*)     Total Bilirubin 1.1 (*)     AST 9 (*)     All other components within normal limits   BLOOD GAS, VENOUS - Abnormal; Notable for the following components:    pCO2, Tylor 99 (*)     pO2, Tylor 56 (*)     Base Exc, Mixed 23.1 (*)     HCO3, Venous 54.7 (*)     O2 Sat, Tylor 84.4 (*)     All other components within normal limits       Background  History:   Past Medical History:   Diagnosis Date    A-fib ([address])     Resolved after ablation     Anxiety     Arthritis     \"Hips, Knees, Elbows And Fingers\"    COPD (chronic obstructive pulmonary disease) (HCC)     Sees Dr. Oma Aschoff    Depression     Diabetes mellitus [facility]) Dx 8547'J    Full dentures     H/O angiography 2018    peripheral angio - LFA occluded, filling collaterals, RSFA 90% stenosis-vasc surg consult    H/O Doppler ultrasound

## 2023-11-08 NOTE — ED NOTES
ED TO INPATIENT SBAR HANDOFF    Patient Name: Keya Aparicio   :  1955  79 y.o. Preferred Name  Lucy Fu  Family/Caregiver Present no   Restraints no   C-SSRS: Risk of Suicide: No Risk  Sitter no   Sepsis Risk Score Sepsis Risk Score: 5.05      Situation  Chief Complaint   Patient presents with    Shortness of Breath     Brief Description of Patient's Condition: Patient is here for SOB. Patient does not like to keep oxygen on and drops o2 when the patient gets up. Mental Status: oriented, alert, and coherent  Arrived from: home    Imaging:   XR CHEST PORTABLE   Final Result   1. Bronchial thickening suggestive of inflammatory bronchitis or possible   peribronchial edema. 2. Patchy bibasilar airspace disease, which could represent atelectasis   though pneumonia could have this appearance in the appropriate clinical   setting.            Abnormal labs:   Abnormal Labs Reviewed   CBC WITH AUTO DIFFERENTIAL - Abnormal; Notable for the following components:       Result Value    MCH 31.1 (*)     Segs Relative 82.1 (*)     Lymphocytes % 8.3 (*)     Monocytes % 7.3 (*)     Immature Neutrophil % 0.5 (*)     All other components within normal limits   COMPREHENSIVE METABOLIC PANEL W/ REFLEX TO MG FOR LOW K - Abnormal; Notable for the following components:    Chloride 89 (*)     CO2 44 (*)     Glucose 255 (*)     Creatinine 0.5 (*)     Total Bilirubin 1.1 (*)     AST 9 (*)     All other components within normal limits   BLOOD GAS, VENOUS - Abnormal; Notable for the following components:    pCO2, Tylor 99 (*)     pO2, Tylor 56 (*)     Base Exc, Mixed 23.1 (*)     HCO3, Venous 54.7 (*)     O2 Sat, Tylor 84.4 (*)     All other components within normal limits   POCT GLUCOSE - Abnormal; Notable for the following components:    POC Glucose 403 (*)     All other components within normal limits       Background  History:   Past Medical History:   Diagnosis Date    A-fib [facility])     Resolved after ablation     Anxiety

## 2023-11-08 NOTE — ED NOTES
Medication History  [facility]    Patient Name: Olya Aceves 1955     Medication history has been completed by: Giovanna Diggs CPhT    Source(s) of information: insurance claims     Primary Care Physician: Tyree Shah MD     Pharmacy: [facility]    Allergies as of 2023 - Fully Reviewed 2023   Allergen Reaction Noted    Metoprolol  2017        Prior to Admission medications    Medication Sig Start Date End Date Taking? Authorizing Provider   Respiratory Therapy Supplies (NEBULIZER/TUBING/MOUTHPIECE) KIT 1 kit by Does not apply route daily as needed (SOB, wheezing) 23   Zeynep Hedrick MD   predniSONE (DELTASONE) 10 MG tablet Take 5 tabs daily by mouth for days 1 through 3. Take 4 tabs daily by mouth for days 4 through 6. Take 3 tabs daily by mouth for days 7 through 9. Take 2 tabs daily by mouth for days 10 through 12.  Take 1 tab daily by mouth for days 13 through 15. 11/3/23   Isaiah Sommers MD   olodaterol (STRIVERDI RESPIMAT) 2.5 MCG/ACT inhaler Inhale 2 puffs into the lungs daily 10/22/23   Josie Downing MD   furosemide (LASIX) 40 MG tablet Take 1 tablet by mouth daily 10/21/23   Josie Downing MD   LEVEMIR FLEXPEN 100 UNIT/ML injection pen INJECT 50 UNITS SUBCUTANEOUSLY ONCE DAILY 23   Tyree Shah MD   dilTIAZem (CARDIZEM CD) 240 MG extended release capsule Take 1 capsule by mouth daily 23   Amari Sánchez MD   magnesium oxide (MAG-OX) 400 (240 Mg) MG tablet Take 1 tablet by mouth daily 23   Amari Sánchez MD   potassium chloride (KLOR-CON M) 10 MEQ extended release tablet Take 1 tablet by mouth daily 23   Amari Sánchez MD   HUMALOG KWIKPEN 100 UNIT/ML SOPN INJECT [address] A DAY WITH MEALS 23   Tyree Shah MD   blood glucose monitor strips Test once daily 23   Tyree Shah MD   blood glucose monitor kit and supplies Check sugar daily 8/30/23   Jaime Ogden MD   SYMBICORT 160-4.5 MCG/ACT AERO Inhale 2 puffs into the lungs 2 times daily 8/18/23   Jaime Ogden MD   albuterol sulfate HFA (PROVENTIL;VENTOLIN;PROAIR) 108 (90 Base) MCG/ACT inhaler Inhale 2 puffs into the lungs 2 times daily 8/18/23   Jaime Ogden MD   apixaban Kimberly Mandie) 5 MG TABS tablet Take 1 tablet by mouth 2 times daily 8/18/23 11/16/23  Jaime Ogden MD   OXYGEN Inhale 3 L into the lungs continuous 8/17/23   Kemal Smiley MD   ketoconazole (NIZORAL) 2 % cream Apply topically daily to the chest where there is a rash for a week. . 8/3/23   Sky Whitman MD   metFORMIN (GLUCOPHAGE) 1000 MG tablet Take 1 tablet by mouth 2 times daily (with meals) 8/3/23   Sky Whitman MD   atorvastatin (LIPITOR) 40 MG tablet Take 1 tablet by mouth daily  Patient taking differently: Take 1 tablet by mouth nightly 3/6/23   Bella Sparks MD   aspirin (ASPIRIN LOW DOSE) 81 MG EC tablet Take 1 tablet by mouth daily 6/10/22   Jaime Ogden MD   Nebulizers (COMPRESSOR/NEBULIZER) MISC Use qid 6/13/17   Jaime Ogden MD     Medications requiring reconciliation with provider:    Diltiazem: last doses dispensed were Diltiazem  mg daily in Oct and Nov. Diltiazem  mg ordered patient unsure what he is taking, states he takes what is given to him. Comments:  Patient is poor historian for medications. Patient with active prescriptions for medications listed. Claims for glipizide unknown if patient taking.     To my knowledge the above medication history is accurate as of 11/8/2023 9:20 AM.   Miguel Martinez CPhT   11/8/2023 9:20 AM

## 2023-11-08 NOTE — CARE COORDINATION
VIELKA conducted chart review. Pt is currently in ER, awaiting admission into IP. Attempted phone call to [address] to make a referral for visiting physician. No answer, vm message left requesting return call, contact number provided. VEILKA plan of care: SW will await return call to make referral for Visiting Physician. VIELKA will make next outreach on 11/13 if no prior response.

## 2023-11-08 NOTE — PROGRESS NOTES
4 Eyes Skin Assessment     NAME:  Dandy Rob  YOB: 1955  MEDICAL RECORD NUMBER:  [id number]    The patient is being assessed for  Admission    I agree that at least one RN has performed a thorough Head to Toe Skin Assessment on the patient. ALL assessment sites listed below have been assessed. Areas assessed by both nurses:    Head, Face, Ears, Shoulders, Back, Chest, Arms, Elbows, Hands, Sacrum. Buttock, Coccyx, Ischium, Legs. Feet and Heels, and Under Medical Devices         Does the Patient have a Wound?  No noted wound(s)       Hector Prevention initiated by RN: Yes  Wound Care Orders initiated by RN: No    Pressure Injury (Stage 3,4, Unstageable, DTI, NWPT, and Complex wounds) if present, place Wound referral order by RN under : No    New Ostomies, if present place, Ostomy referral order under : No     Nurse 1 eSignature: Electronically signed by Blanche Harris RN on 11/8/23 at 6:48 PM EST    **SHARE this note so that the co-signing nurse can place an eSignature**    Nurse 2 eSignature: Electronically signed by Nia Burnette RN on 11/8/23 at 6:56 PM EST

## 2023-11-08 NOTE — PLAN OF CARE
Problem: Discharge Planning  Goal: Discharge to home or other facility with appropriate resources  Outcome: Progressing     Problem: Chronic Conditions and Co-morbidities  Goal: Patient's chronic conditions and co-morbidity symptoms are monitored and maintained or improved  Outcome: Progressing     Problem: Safety - Adult  Goal: Free from fall injury  Outcome: Progressing     Problem: Respiratory - Adult  Goal: Achieves optimal ventilation and oxygenation  Outcome: Progressing     Problem: Cardiovascular - Adult  Goal: Maintains optimal cardiac output and hemodynamic stability  Outcome: Progressing

## 2023-11-09 LAB
ALBUMIN SERPL-MCNC: 3.6 GM/DL (ref 3.4–5)
ALP BLD-CCNC: 80 IU/L (ref 40–129)
ALT SERPL-CCNC: 18 U/L (ref 10–40)
ANION GAP SERPL CALCULATED.3IONS-SCNC: 11 MMOL/L (ref 4–16)
AST SERPL-CCNC: 8 IU/L (ref 15–37)
BASOPHILS ABSOLUTE: 0 K/CU MM
BASOPHILS RELATIVE PERCENT: 0.2 % (ref 0–1)
BILIRUB SERPL-MCNC: 0.9 MG/DL (ref 0–1)
BUN SERPL-MCNC: 18 MG/DL (ref 6–23)
CALCIUM SERPL-MCNC: 9.3 MG/DL (ref 8.3–10.6)
CHLORIDE BLD-SCNC: 85 MMOL/L (ref 99–110)
CO2: 37 MMOL/L (ref 21–32)
CREAT SERPL-MCNC: 0.5 MG/DL (ref 0.9–1.3)
DIFFERENTIAL TYPE: ABNORMAL
EOSINOPHILS ABSOLUTE: 0 K/CU MM
EOSINOPHILS RELATIVE PERCENT: 0 % (ref 0–3)
GFR SERPL CREATININE-BSD FRML MDRD: >60 ML/MIN/1.73M2
GLUCOSE BLD-MCNC: 211 MG/DL (ref 70–99)
GLUCOSE BLD-MCNC: 374 MG/DL (ref 70–99)
GLUCOSE BLD-MCNC: 446 MG/DL (ref 70–99)
GLUCOSE BLD-MCNC: 447 MG/DL (ref 70–99)
GLUCOSE BLD-MCNC: 492 MG/DL (ref 70–99)
GLUCOSE SERPL-MCNC: 375 MG/DL (ref 70–99)
HCT VFR BLD CALC: 45.5 % (ref 42–52)
HEMOGLOBIN: 15.1 GM/DL (ref 13.5–18)
IMMATURE NEUTROPHIL %: 0.6 % (ref 0–0.43)
INR BLD: 1.2 INDEX
LYMPHOCYTES ABSOLUTE: 0.6 K/CU MM
LYMPHOCYTES RELATIVE PERCENT: 5.8 % (ref 24–44)
MCH RBC QN AUTO: 31.1 PG (ref 27–31)
MCHC RBC AUTO-ENTMCNC: 33.2 % (ref 32–36)
MCV RBC AUTO: 93.6 FL (ref 78–100)
MONOCYTES ABSOLUTE: 0.4 K/CU MM
MONOCYTES RELATIVE PERCENT: 3.3 % (ref 0–4)
NUCLEATED RBC %: 0 %
PDW BLD-RTO: 14.1 % (ref 11.7–14.9)
PLATELET # BLD: 161 K/CU MM (ref 140–440)
PMV BLD AUTO: 9.7 FL (ref 7.5–11.1)
POTASSIUM SERPL-SCNC: 4.7 MMOL/L (ref 3.5–5.1)
PROTHROMBIN TIME: 15.3 SECONDS (ref 11.7–14.5)
RBC # BLD: 4.86 M/CU MM (ref 4.6–6.2)
SEGMENTED NEUTROPHILS ABSOLUTE COUNT: 9.6 K/CU MM
SEGMENTED NEUTROPHILS RELATIVE PERCENT: 90.1 % (ref 36–66)
SODIUM BLD-SCNC: 133 MMOL/L (ref 135–145)
TOTAL IMMATURE NEUTOROPHIL: 0.06 K/CU MM
TOTAL NUCLEATED RBC: 0 K/CU MM
TOTAL PROTEIN: 5.8 GM/DL (ref 6.4–8.2)
WBC # BLD: 10.7 K/CU MM (ref 4–10.5)

## 2023-11-09 PROCEDURE — 82962 GLUCOSE BLOOD TEST: CPT

## 2023-11-09 PROCEDURE — 94150 VITAL CAPACITY TEST: CPT

## 2023-11-09 PROCEDURE — 6370000000 HC RX 637 (ALT 250 FOR IP): Performed by: STUDENT IN AN ORGANIZED HEALTH CARE EDUCATION/TRAINING PROGRAM

## 2023-11-09 PROCEDURE — 94640 AIRWAY INHALATION TREATMENT: CPT

## 2023-11-09 PROCEDURE — 6360000002 HC RX W HCPCS: Performed by: STUDENT IN AN ORGANIZED HEALTH CARE EDUCATION/TRAINING PROGRAM

## 2023-11-09 PROCEDURE — 6370000000 HC RX 637 (ALT 250 FOR IP): Performed by: INTERNAL MEDICINE

## 2023-11-09 PROCEDURE — 85610 PROTHROMBIN TIME: CPT

## 2023-11-09 PROCEDURE — 80053 COMPREHEN METABOLIC PANEL: CPT

## 2023-11-09 PROCEDURE — 6370000000 HC RX 637 (ALT 250 FOR IP)

## 2023-11-09 PROCEDURE — 85025 COMPLETE CBC W/AUTO DIFF WBC: CPT

## 2023-11-09 PROCEDURE — 36415 COLL VENOUS BLD VENIPUNCTURE: CPT

## 2023-11-09 PROCEDURE — 94669 MECHANICAL CHEST WALL OSCILL: CPT

## 2023-11-09 PROCEDURE — 2580000003 HC RX 258: Performed by: STUDENT IN AN ORGANIZED HEALTH CARE EDUCATION/TRAINING PROGRAM

## 2023-11-09 PROCEDURE — 94761 N-INVAS EAR/PLS OXIMETRY MLT: CPT

## 2023-11-09 PROCEDURE — 2060000000 HC ICU INTERMEDIATE R&B

## 2023-11-09 PROCEDURE — 2700000000 HC OXYGEN THERAPY PER DAY

## 2023-11-09 RX ORDER — INSULIN GLARGINE 100 [IU]/ML
10 INJECTION, SOLUTION SUBCUTANEOUS ONCE
Status: COMPLETED | OUTPATIENT
Start: 2023-11-09 | End: 2023-11-09

## 2023-11-09 RX ORDER — INSULIN LISPRO 100 [IU]/ML
0-4 INJECTION, SOLUTION INTRAVENOUS; SUBCUTANEOUS NIGHTLY
Status: DISCONTINUED | OUTPATIENT
Start: 2023-11-09 | End: 2023-11-12 | Stop reason: HOSPADM

## 2023-11-09 RX ORDER — LANOLIN ALCOHOL/MO/W.PET/CERES
3 CREAM (GRAM) TOPICAL NIGHTLY PRN
Status: DISCONTINUED | OUTPATIENT
Start: 2023-11-09 | End: 2023-11-12 | Stop reason: HOSPADM

## 2023-11-09 RX ORDER — INSULIN LISPRO 100 [IU]/ML
0-16 INJECTION, SOLUTION INTRAVENOUS; SUBCUTANEOUS
Status: DISCONTINUED | OUTPATIENT
Start: 2023-11-09 | End: 2023-11-12 | Stop reason: HOSPADM

## 2023-11-09 RX ORDER — INSULIN GLARGINE 100 [IU]/ML
40 INJECTION, SOLUTION SUBCUTANEOUS 2 TIMES DAILY
Status: DISCONTINUED | OUTPATIENT
Start: 2023-11-10 | End: 2023-11-10

## 2023-11-09 RX ORDER — INSULIN LISPRO 100 [IU]/ML
2 INJECTION, SOLUTION INTRAVENOUS; SUBCUTANEOUS ONCE
Status: COMPLETED | OUTPATIENT
Start: 2023-11-09 | End: 2023-11-09

## 2023-11-09 RX ORDER — INSULIN GLARGINE 100 [IU]/ML
30 INJECTION, SOLUTION SUBCUTANEOUS 2 TIMES DAILY
Status: DISCONTINUED | OUTPATIENT
Start: 2023-11-09 | End: 2023-11-09

## 2023-11-09 RX ADMIN — ROFLUMILAST 500 MCG: 500 TABLET ORAL at 08:54

## 2023-11-09 RX ADMIN — DILTIAZEM HYDROCHLORIDE 240 MG: 240 CAPSULE, COATED, EXTENDED RELEASE ORAL at 08:56

## 2023-11-09 RX ADMIN — GUAIFENESIN 600 MG: 600 TABLET, EXTENDED RELEASE ORAL at 08:55

## 2023-11-09 RX ADMIN — BUDESONIDE AND FORMOTEROL FUMARATE DIHYDRATE 2 PUFF: 160; 4.5 AEROSOL RESPIRATORY (INHALATION) at 20:01

## 2023-11-09 RX ADMIN — METHYLPREDNISOLONE SODIUM SUCCINATE 40 MG: 40 INJECTION, POWDER, FOR SOLUTION INTRAMUSCULAR; INTRAVENOUS at 21:09

## 2023-11-09 RX ADMIN — ATORVASTATIN CALCIUM 40 MG: 40 TABLET, FILM COATED ORAL at 21:11

## 2023-11-09 RX ADMIN — IPRATROPIUM BROMIDE AND ALBUTEROL SULFATE 1 DOSE: 2.5; .5 SOLUTION RESPIRATORY (INHALATION) at 11:24

## 2023-11-09 RX ADMIN — APIXABAN 5 MG: 5 TABLET, FILM COATED ORAL at 21:08

## 2023-11-09 RX ADMIN — FUROSEMIDE 40 MG: 40 TABLET ORAL at 08:56

## 2023-11-09 RX ADMIN — ACETAMINOPHEN 650 MG: 325 TABLET ORAL at 09:49

## 2023-11-09 RX ADMIN — MELATONIN TAB 3 MG 3 MG: 3 TAB at 21:08

## 2023-11-09 RX ADMIN — SODIUM CHLORIDE, PRESERVATIVE FREE 10 ML: 5 INJECTION INTRAVENOUS at 21:11

## 2023-11-09 RX ADMIN — INSULIN GLARGINE 30 UNITS: 100 INJECTION, SOLUTION SUBCUTANEOUS at 21:09

## 2023-11-09 RX ADMIN — INSULIN LISPRO 2 UNITS: 100 INJECTION, SOLUTION INTRAVENOUS; SUBCUTANEOUS at 21:10

## 2023-11-09 RX ADMIN — METHYLPREDNISOLONE SODIUM SUCCINATE 40 MG: 40 INJECTION, POWDER, FOR SOLUTION INTRAMUSCULAR; INTRAVENOUS at 09:05

## 2023-11-09 RX ADMIN — INSULIN LISPRO 16 UNITS: 100 INJECTION, SOLUTION INTRAVENOUS; SUBCUTANEOUS at 09:02

## 2023-11-09 RX ADMIN — GUAIFENESIN 600 MG: 600 TABLET, EXTENDED RELEASE ORAL at 21:09

## 2023-11-09 RX ADMIN — IPRATROPIUM BROMIDE AND ALBUTEROL SULFATE 1 DOSE: 2.5; .5 SOLUTION RESPIRATORY (INHALATION) at 20:00

## 2023-11-09 RX ADMIN — ASPIRIN 81 MG CHEWABLE TABLET 81 MG: 81 TABLET CHEWABLE at 08:54

## 2023-11-09 RX ADMIN — POTASSIUM CHLORIDE 10 MEQ: 1500 TABLET, EXTENDED RELEASE ORAL at 08:55

## 2023-11-09 RX ADMIN — INSULIN LISPRO 20 UNITS: 100 INJECTION, SOLUTION INTRAVENOUS; SUBCUTANEOUS at 09:09

## 2023-11-09 RX ADMIN — INSULIN LISPRO 20 UNITS: 100 INJECTION, SOLUTION INTRAVENOUS; SUBCUTANEOUS at 11:16

## 2023-11-09 RX ADMIN — INSULIN LISPRO 4 UNITS: 100 INJECTION, SOLUTION INTRAVENOUS; SUBCUTANEOUS at 21:09

## 2023-11-09 RX ADMIN — APIXABAN 5 MG: 5 TABLET, FILM COATED ORAL at 08:56

## 2023-11-09 RX ADMIN — INSULIN LISPRO 4 UNITS: 100 INJECTION, SOLUTION INTRAVENOUS; SUBCUTANEOUS at 17:22

## 2023-11-09 RX ADMIN — IPRATROPIUM BROMIDE AND ALBUTEROL SULFATE 1 DOSE: 2.5; .5 SOLUTION RESPIRATORY (INHALATION) at 08:00

## 2023-11-09 RX ADMIN — INSULIN GLARGINE 30 UNITS: 100 INJECTION, SOLUTION SUBCUTANEOUS at 09:03

## 2023-11-09 RX ADMIN — INSULIN GLARGINE 10 UNITS: 100 INJECTION, SOLUTION SUBCUTANEOUS at 21:33

## 2023-11-09 RX ADMIN — BUDESONIDE AND FORMOTEROL FUMARATE DIHYDRATE 2 PUFF: 160; 4.5 AEROSOL RESPIRATORY (INHALATION) at 08:00

## 2023-11-09 RX ADMIN — INSULIN LISPRO 20 UNITS: 100 INJECTION, SOLUTION INTRAVENOUS; SUBCUTANEOUS at 17:22

## 2023-11-09 RX ADMIN — INSULIN LISPRO 16 UNITS: 100 INJECTION, SOLUTION INTRAVENOUS; SUBCUTANEOUS at 11:18

## 2023-11-09 ASSESSMENT — PAIN SCALES - WONG BAKER
WONGBAKER_NUMERICALRESPONSE: 0

## 2023-11-09 ASSESSMENT — PAIN DESCRIPTION - LOCATION
LOCATION: CHEST;KNEE;SHOULDER
LOCATION: SHOULDER

## 2023-11-09 ASSESSMENT — PAIN SCALES - GENERAL
PAINLEVEL_OUTOF10: 6
PAINLEVEL_OUTOF10: 6
PAINLEVEL_OUTOF10: 0

## 2023-11-09 ASSESSMENT — PAIN DESCRIPTION - DESCRIPTORS
DESCRIPTORS: DISCOMFORT;DULL
DESCRIPTORS: DISCOMFORT;DULL

## 2023-11-09 NOTE — CARE COORDINATION
11/09/23 1550   Service Assessment   Patient Orientation Alert and Oriented   Cognition Alert   History Provided By Patient   Primary Nash Woodard Family Members;Friends/Neighbors   PCP Verified by CM Yes   Last Visit to PCP Within last year   Prior Functional Level Independent in ADLs/IADLs;Assistance with the following:;Mobility   Current Functional Level Independent in ADLs/IADLs;Assistance with the following:;Mobility   Can patient return to prior living arrangement Yes   Ability to make needs known: Good   Family able to assist with home care needs: No   Would you like for me to discuss the discharge plan with any other family members/significant others, and if so, who? No   Financial Resources Medicaid; Medicare   Community Resources None   Social/Functional History   Lives With Alone   Type of Home Apartment   Home Layout One level   Home Access Level entry   [address] bars in shower; Tub transfer bench   [address] [facility] Help From Family;Friend(s); Neighbor   ADL Assistance Independent   Homemaking Assistance Independent   Homemaking Responsibilities Yes   Ambulation Assistance Independent   Transfer Assistance Independent   Active  No   Patient's  Info Brother, or Dial-a-Ride   Occupation On disability   Discharge  [address] Prior To Admission C-pap;Meals On Wheels; Oxygen Therapy   Potential Assistance Needed Skilled Nursing Facility   Type of Home Care Services None   Patient expects to be discharged to: Apartment   One/Two Story Residence One story   Services At/After Discharge   Services At/After Discharge [facility] (SNF)   Condition of Participation: Discharge Planning   The Patient and/or Patient Representative was provided with a Choice of Provider? Patient   The Patient and/Or Patient Representative agree with the Discharge Plan?  Yes   Freedom of Choice list was provided with basic dialogue that supports the patient's individualized plan of care/goals, treatment preferences, and shares the quality data associated with the providers? Yes     Pt lives at home alone. Has transportation. Independent with ADLs & housekeeping. Appears that pt would benefit from help in his home. Pt stated that he is having difficulty walking and is interested in going to a SNF for therapy. First choice is [facility] and second choice is [facility]. Pt is considering moving to assisted living. PS to Dr Milton Rodriguez requesting OT/PT. WB placed for OT/PT.

## 2023-11-09 NOTE — PLAN OF CARE
Problem: Discharge Planning  Goal: Discharge to home or other facility with appropriate resources  11/9/2023 0630 by Tyree Garduno RN  Outcome: Progressing  Flowsheets (Taken 11/8/2023 2000)  Discharge to home or other facility with appropriate resources: Identify barriers to discharge with patient and caregiver  11/8/2023 1852 by Santo Rodriguez RN  Outcome: Progressing     Problem: Chronic Conditions and Co-morbidities  Goal: Patient's chronic conditions and co-morbidity symptoms are monitored and maintained or improved  11/9/2023 0630 by Tyree Garduno RN  Outcome: Progressing  Flowsheets (Taken 11/8/2023 2000)  Care Plan - Patient's Chronic Conditions and Co-Morbidity Symptoms are Monitored and Maintained or Improved: Monitor and assess patient's chronic conditions and comorbid symptoms for stability, deterioration, or improvement  11/8/2023 1852 by Santo Rodriguez RN  Outcome: Progressing     Problem: Safety - Adult  Goal: Free from fall injury  11/9/2023 0630 by Tyree Garduno RN  Outcome: Progressing  11/8/2023 1852 by Santo Rodriguez RN  Outcome: Progressing     Problem: Respiratory - Adult  Goal: Achieves optimal ventilation and oxygenation  11/9/2023 0630 by Tyree Garduno RN  Outcome: Progressing  11/8/2023 1852 by Santo Rodriguez RN  Outcome: Progressing     Problem: Cardiovascular - Adult  Goal: Maintains optimal cardiac output and hemodynamic stability  11/9/2023 0630 by Tyree Garduno RN  Outcome: Progressing  11/8/2023 1852 by Santo Rodriguez RN  Outcome: Progressing

## 2023-11-09 NOTE — PROGRESS NOTES
V2.0  [facility] Hospitalist Progress Note      Name:  Tory Law /Age/Sex: 1955  (79 y.o. male)   MRN & CSN:  [id number] & [id number] Encounter Date/Time: 2023 9:53 AM EST    Location:  -A PCP: Marco Sousa MD       Hospital Day: 3      Subjective:     Chief Complaint:  Shortness of Breath     Pt feels better today. States no change to his cough unable to bring up sputum. Remains on 6L NC; baseline 2L. No n/v. No chest pain. Assessment and Plan:   Acute on Chronic Hypoxemic Hypercapnic Respiratory Failure 2/2 COPD exacerbation  Still on 6L sating 90%. Baseline 2L NC.   CXR with stable BLT opacities, COVID and flu Negative    - Continue steroids   - Levaquin 500mg daily   - DuoNebs  - Continue supportive care. - pulm consulted  - add acapalla, mucinex  - started on daliresp by pulm     Chronic HFpEF  - Grossly euvolemic with mild peripheral edema  -TTE on  LVEF of 50% with no WMA. - Continue PO home lasix         T2DM with hyperglycemia  Glucose in the high side 400's. Home Meds: Lantus 50u qhs, 20uNovolog TIDMW, Metformin,  - change Lantus to 30U BID  - increase SSI to HDSSI  - continue 20U TID w meals      Afib  Rate controlled today  -Continue Diltiazem and Eliquis           Personally reviewed Lab Studies and Imaging     Drugs that require monitoring for toxicity include Eliquis and the method of monitoring was cbc      Diet ADULT DIET;  Regular; 4 carb choices (60 gm/meal)   DVT Prophylaxis [] Lovenox, []  Heparin, [] SCDs, [] Ambulation,  [x] Eliquis, [] Xarelto  [] Coumadin   Code Status Full Code   Disposition From: home  Expected Disposition: TBD  Estimated Date of Discharge: 1-2 days  Patient requires continued admission due to high o2 requirement   Surrogate Decision Maker/ POA      Review of Systems:    Review of Systems    See above    Objective:   No intake or output data in the 24 hours ending 23 0958     Vitals:   Vitals:    23 0802 11/08/23 11:30 AM   Result Value Ref Range    pH, Bld 7.39 7.34 - 7.45    pCO2, Arterial 63.0 (H) 32 - 45 MMHG    pO2, Arterial 69 (L) 75 - 100 MMHG    Base Exc, Mixed 10.5 (H) 0 - 1.2    HCO3, Arterial 38.1 (H) 18 - 23 MMOL/L    CO2 Content 40.0 (H) 19 - 24 MMOL/L    O2 Sat 93.8 (L) 96 - 97 %    Carbon Monoxide, Blood 2.5 0 - 5 %    Methemoglobin, Arterial 1.3 <1.5 %    Comment ACVC 18 500 +8 80%    Blood Gas, Arterial    Collection Time: 11/08/23 12:45 PM   Result Value Ref Range    pH, Bld 7.37 7.34 - 7.45    pCO2, Arterial 75.0 (H) 32 - 45 MMHG    pO2, Arterial 51 (L) 75 - 100 MMHG    Base Exc, Mixed 14.4 (H) 0 - 1.2    HCO3, Arterial 43.4 (H) 18 - 23 MMOL/L    CO2 Content 45.7 (H) 19 - 24 MMOL/L    O2 Sat 83.6 (L) 96 - 97 %    Carbon Monoxide, Blood 3.4 0 - 5 %    Methemoglobin, Arterial 1.1 <1.5 %    Comment 5L    POCT Glucose    Collection Time: 11/08/23  5:09 PM   Result Value Ref Range    POC Glucose 563 (H) 70 - 99 MG/DL   POCT Glucose    Collection Time: 11/08/23  5:49 PM   Result Value Ref Range    POC Glucose 403 (H) 70 - 99 MG/DL   POCT Glucose    Collection Time: 11/08/23  6:56 PM   Result Value Ref Range    POC Glucose 460 (H) 70 - 99 MG/DL   POCT Glucose    Collection Time: 11/08/23  8:26 PM   Result Value Ref Range    POC Glucose 407 (H) 70 - 99 MG/DL   Comprehensive Metabolic Panel w/ Reflex to MG    Collection Time: 11/09/23  4:18 AM   Result Value Ref Range    Sodium 133 (L) 135 - 145 MMOL/L    Potassium 4.7 3.5 - 5.1 MMOL/L    Chloride 85 (L) 99 - 110 mMol/L    CO2 37 (H) 21 - 32 MMOL/L    Anion Gap 11 4 - 16    Glucose 375 (H) 70 - 99 MG/DL    BUN 18 6 - 23 MG/DL    Creatinine 0.5 (L) 0.9 - 1.3 MG/DL    Est, Glom Filt Rate >60 >60 mL/min/1.73m2    Calcium 9.3 8.3 - 10.6 MG/DL    Total Protein 5.8 (L) 6.4 - 8.2 GM/DL    Albumin 3.6 3.4 - 5.0 GM/DL    Total Bilirubin 0.9 0.0 - 1.0 MG/DL    Alkaline Phosphatase 80 40 - 129 IU/L    ALT 18 10 - 40 U/L    AST 8 (L) 15 - 37 IU/L   CBC with Auto appearance in the appropriate clinical setting.        Electronically signed by Ana M Maldonado MD on 11/9/2023 at 9:58 AM

## 2023-11-09 NOTE — CARE COORDINATION
[id number] [facility] Liaison spoke with pt (verified address, pcp and number) and pt is agreeable to resume [facility]hc at discharge if he go home.  Pt also stated that he is considering a snf at [facility] depending on if insurance will cover the cost.

## 2023-11-09 NOTE — PLAN OF CARE
Problem: Discharge Planning  Goal: Discharge to home or other facility with appropriate resources  11/9/2023 0943 by Joel Lazo RN  Outcome: Progressing  11/9/2023 0630 by Chuck Herring RN  Outcome: Progressing  Flowsheets (Taken 11/8/2023 2000)  Discharge to home or other facility with appropriate resources: Identify barriers to discharge with patient and caregiver     Problem: Chronic Conditions and Co-morbidities  Goal: Patient's chronic conditions and co-morbidity symptoms are monitored and maintained or improved  11/9/2023 0943 by Joel Lazo RN  Outcome: Progressing  11/9/2023 0630 by Chuck Herring RN  Outcome: Progressing  Flowsheets (Taken 11/8/2023 2000)  Care Plan - Patient's Chronic Conditions and Co-Morbidity Symptoms are Monitored and Maintained or Improved: Monitor and assess patient's chronic conditions and comorbid symptoms for stability, deterioration, or improvement     Problem: Safety - Adult  Goal: Free from fall injury  11/9/2023 0943 by Joel Lazo RN  Outcome: Progressing  11/9/2023 0630 by Chuck Herring RN  Outcome: Progressing     Problem: Respiratory - Adult  Goal: Achieves optimal ventilation and oxygenation  11/9/2023 0943 by Joel Lazo RN  Outcome: Progressing  11/9/2023 0630 by Chuck Herring RN  Outcome: Progressing     Problem: Cardiovascular - Adult  Goal: Maintains optimal cardiac output and hemodynamic stability  11/9/2023 0943 by Joel Lazo RN  Outcome: Progressing  11/9/2023 0630 by Chuck Herring RN  Outcome: Progressing     Problem: Pain  Goal: Verbalizes/displays adequate comfort level or baseline comfort level  Outcome: Progressing

## 2023-11-09 NOTE — PROGRESS NOTES
11/09/23 0950   Encounter Summary   Encounter Overview/Reason  Initial Encounter   Service Provided For: Patient   Referral/Consult From: [address] Family members   Last Encounter  11/09/23  (Bart Landaverde sitting at side of bed, receptive to [place] visit. Spiritual and emotional support given with prayer. No other needs at this time. Pt. informed how to contact .)   Complexity of Encounter Low   Begin Time 0946   End Time  0953   Total Time Calculated 7 min   Spiritual/Emotional needs   Type Spiritual Support   Assessment/Intervention/Outcome   Assessment Calm;Coping; Hopeful   Intervention Active listening;Nurtured Hope;Prayer (assurance of)/[place];Sustaining Presence/Ministry of presence   Outcome Acceptance;Comfort;Coping;Encouraged;Engaged in conversation;Expressed feelings, needs, and concerns;Expressed Gratitude   Plan and Referrals   Plan/Referrals Continue Support (comment)  (Patient informed how to contact )

## 2023-11-10 LAB
ANION GAP SERPL CALCULATED.3IONS-SCNC: 12 MMOL/L (ref 4–16)
BASOPHILS ABSOLUTE: 0 K/CU MM
BASOPHILS RELATIVE PERCENT: 0.1 % (ref 0–1)
BUN SERPL-MCNC: 24 MG/DL (ref 6–23)
CALCIUM SERPL-MCNC: 9.5 MG/DL (ref 8.3–10.6)
CHLORIDE BLD-SCNC: 82 MMOL/L (ref 99–110)
CO2: 37 MMOL/L (ref 21–32)
CREAT SERPL-MCNC: 0.7 MG/DL (ref 0.9–1.3)
DIFFERENTIAL TYPE: ABNORMAL
EOSINOPHILS ABSOLUTE: 0 K/CU MM
EOSINOPHILS RELATIVE PERCENT: 0 % (ref 0–3)
GFR SERPL CREATININE-BSD FRML MDRD: >60 ML/MIN/1.73M2
GLUCOSE BLD-MCNC: 189 MG/DL (ref 70–99)
GLUCOSE BLD-MCNC: 288 MG/DL (ref 70–99)
GLUCOSE BLD-MCNC: 344 MG/DL (ref 70–99)
GLUCOSE BLD-MCNC: 383 MG/DL (ref 70–99)
GLUCOSE SERPL-MCNC: 462 MG/DL (ref 70–99)
HCT VFR BLD CALC: 45.6 % (ref 42–52)
HEMOGLOBIN: 15.1 GM/DL (ref 13.5–18)
IMMATURE NEUTROPHIL %: 0.9 % (ref 0–0.43)
LYMPHOCYTES ABSOLUTE: 0.5 K/CU MM
LYMPHOCYTES RELATIVE PERCENT: 4.9 % (ref 24–44)
MCH RBC QN AUTO: 30.9 PG (ref 27–31)
MCHC RBC AUTO-ENTMCNC: 33.1 % (ref 32–36)
MCV RBC AUTO: 93.3 FL (ref 78–100)
MONOCYTES ABSOLUTE: 0.4 K/CU MM
MONOCYTES RELATIVE PERCENT: 3.7 % (ref 0–4)
NUCLEATED RBC %: 0 %
PDW BLD-RTO: 13.9 % (ref 11.7–14.9)
PLATELET # BLD: 177 K/CU MM (ref 140–440)
PMV BLD AUTO: 9.7 FL (ref 7.5–11.1)
POTASSIUM SERPL-SCNC: 4.7 MMOL/L (ref 3.5–5.1)
RBC # BLD: 4.89 M/CU MM (ref 4.6–6.2)
SEGMENTED NEUTROPHILS ABSOLUTE COUNT: 8.8 K/CU MM
SEGMENTED NEUTROPHILS RELATIVE PERCENT: 90.4 % (ref 36–66)
SODIUM BLD-SCNC: 131 MMOL/L (ref 135–145)
TOTAL IMMATURE NEUTOROPHIL: 0.09 K/CU MM
TOTAL NUCLEATED RBC: 0 K/CU MM
WBC # BLD: 9.8 K/CU MM (ref 4–10.5)

## 2023-11-10 PROCEDURE — 6370000000 HC RX 637 (ALT 250 FOR IP): Performed by: STUDENT IN AN ORGANIZED HEALTH CARE EDUCATION/TRAINING PROGRAM

## 2023-11-10 PROCEDURE — 97166 OT EVAL MOD COMPLEX 45 MIN: CPT

## 2023-11-10 PROCEDURE — 94640 AIRWAY INHALATION TREATMENT: CPT

## 2023-11-10 PROCEDURE — 85025 COMPLETE CBC W/AUTO DIFF WBC: CPT

## 2023-11-10 PROCEDURE — 97162 PT EVAL MOD COMPLEX 30 MIN: CPT

## 2023-11-10 PROCEDURE — 82962 GLUCOSE BLOOD TEST: CPT

## 2023-11-10 PROCEDURE — 6360000002 HC RX W HCPCS: Performed by: STUDENT IN AN ORGANIZED HEALTH CARE EDUCATION/TRAINING PROGRAM

## 2023-11-10 PROCEDURE — 2060000000 HC ICU INTERMEDIATE R&B

## 2023-11-10 PROCEDURE — 36415 COLL VENOUS BLD VENIPUNCTURE: CPT

## 2023-11-10 PROCEDURE — 6370000000 HC RX 637 (ALT 250 FOR IP): Performed by: INTERNAL MEDICINE

## 2023-11-10 PROCEDURE — 2580000003 HC RX 258: Performed by: STUDENT IN AN ORGANIZED HEALTH CARE EDUCATION/TRAINING PROGRAM

## 2023-11-10 PROCEDURE — 80048 BASIC METABOLIC PNL TOTAL CA: CPT

## 2023-11-10 PROCEDURE — 94669 MECHANICAL CHEST WALL OSCILL: CPT

## 2023-11-10 PROCEDURE — 2700000000 HC OXYGEN THERAPY PER DAY

## 2023-11-10 PROCEDURE — 94761 N-INVAS EAR/PLS OXIMETRY MLT: CPT

## 2023-11-10 RX ORDER — FUROSEMIDE 10 MG/ML
40 INJECTION INTRAMUSCULAR; INTRAVENOUS ONCE
Status: COMPLETED | OUTPATIENT
Start: 2023-11-10 | End: 2023-11-10

## 2023-11-10 RX ORDER — PREDNISONE 10 MG/1
40 TABLET ORAL DAILY
Status: DISCONTINUED | OUTPATIENT
Start: 2023-11-10 | End: 2023-11-12 | Stop reason: HOSPADM

## 2023-11-10 RX ORDER — INSULIN GLARGINE 100 [IU]/ML
45 INJECTION, SOLUTION SUBCUTANEOUS 2 TIMES DAILY
Status: DISCONTINUED | OUTPATIENT
Start: 2023-11-10 | End: 2023-11-11

## 2023-11-10 RX ORDER — INSULIN LISPRO 100 [IU]/ML
23 INJECTION, SOLUTION INTRAVENOUS; SUBCUTANEOUS
Status: DISCONTINUED | OUTPATIENT
Start: 2023-11-10 | End: 2023-11-11

## 2023-11-10 RX ADMIN — FUROSEMIDE 40 MG: 10 INJECTION, SOLUTION INTRAMUSCULAR; INTRAVENOUS at 15:25

## 2023-11-10 RX ADMIN — ACETAMINOPHEN 650 MG: 325 TABLET ORAL at 01:34

## 2023-11-10 RX ADMIN — FUROSEMIDE 40 MG: 40 TABLET ORAL at 09:19

## 2023-11-10 RX ADMIN — BUDESONIDE AND FORMOTEROL FUMARATE DIHYDRATE 2 PUFF: 160; 4.5 AEROSOL RESPIRATORY (INHALATION) at 20:20

## 2023-11-10 RX ADMIN — SODIUM CHLORIDE, PRESERVATIVE FREE 10 ML: 5 INJECTION INTRAVENOUS at 21:53

## 2023-11-10 RX ADMIN — POTASSIUM CHLORIDE 10 MEQ: 1500 TABLET, EXTENDED RELEASE ORAL at 09:19

## 2023-11-10 RX ADMIN — ROFLUMILAST 500 MCG: 500 TABLET ORAL at 09:20

## 2023-11-10 RX ADMIN — INSULIN LISPRO 12 UNITS: 100 INJECTION, SOLUTION INTRAVENOUS; SUBCUTANEOUS at 12:01

## 2023-11-10 RX ADMIN — DILTIAZEM HYDROCHLORIDE 240 MG: 240 CAPSULE, COATED, EXTENDED RELEASE ORAL at 09:19

## 2023-11-10 RX ADMIN — INSULIN LISPRO 23 UNITS: 100 INJECTION, SOLUTION INTRAVENOUS; SUBCUTANEOUS at 09:17

## 2023-11-10 RX ADMIN — GUAIFENESIN 600 MG: 600 TABLET, EXTENDED RELEASE ORAL at 21:06

## 2023-11-10 RX ADMIN — INSULIN GLARGINE 45 UNITS: 100 INJECTION, SOLUTION SUBCUTANEOUS at 21:06

## 2023-11-10 RX ADMIN — BUDESONIDE AND FORMOTEROL FUMARATE DIHYDRATE 2 PUFF: 160; 4.5 AEROSOL RESPIRATORY (INHALATION) at 07:01

## 2023-11-10 RX ADMIN — IPRATROPIUM BROMIDE AND ALBUTEROL SULFATE 1 DOSE: 2.5; .5 SOLUTION RESPIRATORY (INHALATION) at 11:16

## 2023-11-10 RX ADMIN — INSULIN LISPRO 16 UNITS: 100 INJECTION, SOLUTION INTRAVENOUS; SUBCUTANEOUS at 09:18

## 2023-11-10 RX ADMIN — INSULIN LISPRO 23 UNITS: 100 INJECTION, SOLUTION INTRAVENOUS; SUBCUTANEOUS at 12:02

## 2023-11-10 RX ADMIN — INSULIN GLARGINE 45 UNITS: 100 INJECTION, SOLUTION SUBCUTANEOUS at 09:17

## 2023-11-10 RX ADMIN — APIXABAN 5 MG: 5 TABLET, FILM COATED ORAL at 21:06

## 2023-11-10 RX ADMIN — SODIUM CHLORIDE, PRESERVATIVE FREE 10 ML: 5 INJECTION INTRAVENOUS at 09:20

## 2023-11-10 RX ADMIN — ATORVASTATIN CALCIUM 40 MG: 40 TABLET, FILM COATED ORAL at 21:06

## 2023-11-10 RX ADMIN — IPRATROPIUM BROMIDE AND ALBUTEROL SULFATE 1 DOSE: 2.5; .5 SOLUTION RESPIRATORY (INHALATION) at 15:59

## 2023-11-10 RX ADMIN — IPRATROPIUM BROMIDE AND ALBUTEROL SULFATE 1 DOSE: 2.5; .5 SOLUTION RESPIRATORY (INHALATION) at 06:59

## 2023-11-10 RX ADMIN — ASPIRIN 81 MG CHEWABLE TABLET 81 MG: 81 TABLET CHEWABLE at 09:20

## 2023-11-10 RX ADMIN — IPRATROPIUM BROMIDE AND ALBUTEROL SULFATE 1 DOSE: 2.5; .5 SOLUTION RESPIRATORY (INHALATION) at 20:19

## 2023-11-10 RX ADMIN — GUAIFENESIN 600 MG: 600 TABLET, EXTENDED RELEASE ORAL at 09:19

## 2023-11-10 RX ADMIN — PREDNISONE 40 MG: 10 TABLET ORAL at 09:19

## 2023-11-10 RX ADMIN — APIXABAN 5 MG: 5 TABLET, FILM COATED ORAL at 09:19

## 2023-11-10 RX ADMIN — INSULIN LISPRO 23 UNITS: 100 INJECTION, SOLUTION INTRAVENOUS; SUBCUTANEOUS at 17:19

## 2023-11-10 ASSESSMENT — PAIN SCALES - GENERAL
PAINLEVEL_OUTOF10: 0
PAINLEVEL_OUTOF10: 0
PAINLEVEL_OUTOF10: 5

## 2023-11-10 ASSESSMENT — PAIN SCALES - WONG BAKER: WONGBAKER_NUMERICALRESPONSE: 0

## 2023-11-10 ASSESSMENT — PAIN DESCRIPTION - DESCRIPTORS: DESCRIPTORS: ACHING

## 2023-11-10 ASSESSMENT — PAIN DESCRIPTION - LOCATION: LOCATION: SHOULDER

## 2023-11-10 ASSESSMENT — PAIN DESCRIPTION - ORIENTATION: ORIENTATION: RIGHT

## 2023-11-10 NOTE — PROGRESS NOTES
V2.0  [facility] Hospitalist Progress Note      Name:  Lily Strauss /Age/Sex: 1955  (79 y.o. male)   MRN & CSN:  [id number] & [id number] Encounter Date/Time: 11/10/2023 9:53 AM EST    Location:  -A PCP: Jeri Patterson MD       Hospital Day: 4      Subjective:     Chief Complaint:  Shortness of Breath     Pt feels better today. States no change to his cough unable to bring up sputum. Was on 6L NC sating 95% weaned down to 4L    No n/v. No chest pain. Was asking when he can be discharged. Assessment and Plan:   Acute on Chronic Hypoxemic Hypercapnic Respiratory Failure 2/2 COPD exacerbation  Today on 4L sating 94%. Baseline 2L NC.   CXR with stable BLT opacities, COVID and flu Negative    - Continue steroids   - Levaquin 500mg daily   - DuoNebs  - Continue supportive care. - pulm consulted  - add acapalla, mucinex  - started on daliresp by pulm  - will try additional lasix dose 20 mg IV    Chronic HFpEF  - Grossly euvolemic with mild peripheral edema slightly worsened today  -TTE on  LVEF of 50% with no WMA. - Continue PO home lasix    - additional lasix dose as above     T2DM with hyperglycemia  Glucose remains in the high side 400's despite increasing insulin  Home Meds: Lantus 50u qhs, 20uNovolog TIDMW, Metformin,  - change Lantus to 45U BID  - Continue HDSSI   - increase to 23U TID w meals      Afib  Rate controlled today  -Continue Diltiazem and Eliquis           Personally reviewed Lab Studies and Imaging     Drugs that require monitoring for toxicity include Eliquis and the method of monitoring was cbc      Diet ADULT DIET;  Regular; 4 carb choices (60 gm/meal)   DVT Prophylaxis [] Lovenox, []  Heparin, [] SCDs, [] Ambulation,  [x] Eliquis, [] Xarelto  [] Coumadin   Code Status Full Code   Disposition From: home  Expected Disposition: TBD  Estimated Date of Discharge: 1-2 days  Patient requires continued admission due to high o2 requirement   Surrogate Decision Maker/ Time: 11/09/23  4:53 PM   Result Value Ref Range    POC Glucose 211 (H) 70 - 99 MG/DL   POCT Glucose    Collection Time: 11/09/23  8:15 PM   Result Value Ref Range    POC Glucose 446 (H) 70 - 99 MG/DL   POCT Glucose    Collection Time: 11/09/23 10:12 PM   Result Value Ref Range    POC Glucose 374 (H) 70 - 99 MG/DL   POCT Glucose    Collection Time: 11/10/23  6:14 AM   Result Value Ref Range    POC Glucose 383 (H) 70 - 99 MG/DL   CBC with Auto Differential    Collection Time: 11/10/23  8:18 AM   Result Value Ref Range    WBC 9.8 4.0 - 10.5 K/CU MM    RBC 4.89 4.6 - 6.2 M/CU MM    Hemoglobin 15.1 13.5 - 18.0 GM/DL    Hematocrit 45.6 42 - 52 %    MCV 93.3 78 - 100 FL    MCH 30.9 27 - 31 PG    MCHC 33.1 32.0 - 36.0 %    RDW 13.9 11.7 - 14.9 %    Platelets 202 926 - 365 K/CU MM    MPV 9.7 7.5 - 11.1 FL    Differential Type AUTOMATED DIFFERENTIAL     Segs Relative 90.4 (H) 36 - 66 %    Lymphocytes % 4.9 (L) 24 - 44 %    Monocytes % 3.7 0 - 4 %    Eosinophils % 0.0 0 - 3 %    Basophils % 0.1 0 - 1 %    Segs Absolute 8.8 K/CU MM    Lymphocytes Absolute 0.5 K/CU MM    Monocytes Absolute 0.4 K/CU MM    Eosinophils Absolute 0.0 K/CU MM    Basophils Absolute 0.0 K/CU MM    Nucleated RBC % 0.0 %    Total Nucleated RBC 0.0 K/CU MM    Total Immature Neutrophil 0.09 K/CU MM    Immature Neutrophil % 0.9 (H) 0 - 0.43 %   Basic Metabolic Panel w/ Reflex to MG    Collection Time: 11/10/23  8:18 AM   Result Value Ref Range    Sodium 131 (L) 135 - 145 MMOL/L    Potassium 4.7 3.5 - 5.1 MMOL/L    Chloride 82 (L) 99 - 110 mMol/L    CO2 37 (H) 21 - 32 MMOL/L    Anion Gap 12 4 - 16    Glucose 462 (HH) 70 - 99 MG/DL    BUN 24 (H) 6 - 23 MG/DL    Creatinine 0.7 (L) 0.9 - 1.3 MG/DL    Est, Glom Filt Rate >60 >60 mL/min/1.73m2    Calcium 9.5 8.3 - 10.6 MG/DL   POCT Glucose    Collection Time: 11/10/23 11:51 AM   Result Value Ref Range    POC Glucose 344 (H) 70 - 99 MG/DL        Imaging/Diagnostics Last 24 Hours   XR CHEST PORTABLE    Result Date: 11/8/2023  EXAMINATION: ONE XRAY VIEW OF THE CHEST 11/7/2023 11:52 pm COMPARISON: 11/03/2023, 09/21/2023 HISTORY: ORDERING SYSTEM PROVIDED HISTORY: dyspnea TECHNOLOGIST PROVIDED HISTORY: Reason for exam:->dyspnea Reason for Exam: dyspnea FINDINGS: Cardiac and mediastinal silhouettes appear stable. Bibasilar airspace disease is seen, similar to the previous examination. Bronchial thickening is identified. No apical pneumothorax. No acute osseous abnormality is seen. No overt edema. No free air beneath the diaphragms. 1. Bronchial thickening suggestive of inflammatory bronchitis or possible peribronchial edema. 2. Patchy bibasilar airspace disease, which could represent atelectasis though pneumonia could have this appearance in the appropriate clinical setting.        Electronically signed by Karson Gooden MD on 11/10/2023 at 12:23 PM

## 2023-11-10 NOTE — PROGRESS NOTES
Occupational [facility] ACUTE CARE OCCUPATIONAL THERAPY EVALUATION  Ese Mchugh, 1955, 2018/2018-A, 11/10/2023    Discharge Recommendation: Home Health OT services, potential [facility] candidate     History  [place]:  The primary encounter diagnosis was Acute respiratory failure with hypoxia and hypercapnia ([address] A diagnosis of COPD exacerbation ([address]) was also pertinent to this visit. Patient  has a past medical history of A-fib ([address]), Anxiety, Arthritis, COPD (chronic obstructive pulmonary disease) ([address]), Depression, Diabetes mellitus ([address]), Full dentures, H/O angiography, H/O Doppler ultrasound, H/O echocardiogram, Hx of colonoscopy, Hx of Doppler ultrasound, Hyperlipidemia, Hypertension, Macular degeneration, Obesity, On home oxygen therapy, PAD (peripheral artery disease) ([address]), Panic attacks, Pneumonia, PTSD (post-traumatic stress disorder), Restless leg, Shortness of breath, Sleep apnea, and Wears glasses. Patient  has a past surgical history that includes Atrial ablation surgery (05/23/2018); pr laparoscopy surg cholecystectomy (N/A, 11/12/2018); Colonoscopy (07/2011); eye surgery (Left, 2017); Dental surgery; Cardiac surgery (05/2018); Cholecystectomy, laparoscopic (11/12/2018); Appendectomy (2003); hernia repair (2003); femoral bypass (Left, 01/2011); Tonsillectomy (1960's); fracture surgery (Left, 1980's); fracture surgery (Right, 2000's); and femoral bypass (Left, 1/9/2019). Subjective:  Patient states:  \"I'm fine, I don't need rehab\". Pain:  Did not report. Communication with other providers: RN, co-eval with YELENA Nelson for CM note.   Restrictions: General Precautions, Fall Risk     Home Setup/Prior level of function  Social/Functional History  Lives With: Alone  Type of Home: Apartment  Home Layout: One level  Home Access: Level entry  Bathroom Equipment: Grab bars in shower, Tub transfer bench  Home Equipment: Oxygen  Receives Help From: Family, Friend(s),

## 2023-11-10 NOTE — PLAN OF CARE
Problem: Discharge Planning  Goal: Discharge to home or other facility with appropriate resources  Outcome: Progressing     Problem: Chronic Conditions and Co-morbidities  Goal: Patient's chronic conditions and co-morbidity symptoms are monitored and maintained or improved  Outcome: Progressing     Problem: Safety - Adult  Goal: Free from fall injury  Outcome: Progressing     Problem: Respiratory - Adult  Goal: Achieves optimal ventilation and oxygenation  Outcome: Progressing     Problem: Cardiovascular - Adult  Goal: Maintains optimal cardiac output and hemodynamic stability  Outcome: Progressing     Problem: Pain  Goal: Verbalizes/displays adequate comfort level or baseline comfort level  Outcome: Progressing

## 2023-11-10 NOTE — CARE COORDINATION
Discussed in IDR. PT/OT ordered as patient stated that he is weak and has difficulty ambulating. Encouraged OOB and nursing to mobilize.  Asa Nedy, RN

## 2023-11-10 NOTE — CARE COORDINATION
CM reviewed chart. Pt requested PT/OT for possible SNF placement. Orders placed yesterday. WB placed this morning. PT/OT are signed on. Pt choices - 1st [facility], 2nd [facility]. CM following for PT/OT notes to make referral. The pt will require precert and PAS if he goes to a SNF.

## 2023-11-10 NOTE — CONSULTS
[address] R Jerad, 1955, 2018/2018-A, 11/10/2023    History  [place]:  The primary encounter diagnosis was Acute respiratory failure with hypoxia and hypercapnia ([address]). A diagnosis of COPD exacerbation ([address]) was also pertinent to this visit. Patient  has a past medical history of A-fib ([address]), Anxiety, Arthritis, COPD (chronic obstructive pulmonary disease) ([address]), Depression, Diabetes mellitus ([address]), Full dentures, H/O angiography, H/O Doppler ultrasound, H/O echocardiogram, Hx of colonoscopy, Hx of Doppler ultrasound, Hyperlipidemia, Hypertension, Macular degeneration, Obesity, On home oxygen therapy, PAD (peripheral artery disease) ([address]), Panic attacks, Pneumonia, PTSD (post-traumatic stress disorder), Restless leg, Shortness of breath, Sleep apnea, and Wears glasses. Patient  has a past surgical history that includes Atrial ablation surgery (05/23/2018); pr laparoscopy surg cholecystectomy (N/A, 11/12/2018); Colonoscopy (07/2011); eye surgery (Left, 2017); Dental surgery; Cardiac surgery (05/2018); Cholecystectomy, laparoscopic (11/12/2018); Appendectomy (2003); hernia repair (2003); femoral bypass (Left, 01/2011); Tonsillectomy (1960's); fracture surgery (Left, 1980's); fracture surgery (Right, 2000's); and femoral bypass (Left, 1/9/2019). Discharge Recommendation: home with assistance as needed and HHPT    Subjective:    Patient states:  \"I don't need therapy. \"      Pain:  denies.       Communication with other providers:  Handoff to RN, co-eval with Ronna Ortega for safety    Restrictions: General Precautions, Fall Risk    Home Setup/Prior level of function  Social/Functional History  Lives With: Alone  Type of Home: Apartment  Home Layout: One level  Home Access: Level entry  Bathroom Equipment: Grab bars in shower, Tub transfer bench  Home Equipment: Oxygen  Receives Help From: Family, Friend(s), Neighbor  ADL Assistance:

## 2023-11-11 LAB
ANION GAP SERPL CALCULATED.3IONS-SCNC: 7 MMOL/L (ref 4–16)
BASOPHILS ABSOLUTE: 0 K/CU MM
BASOPHILS RELATIVE PERCENT: 0.2 % (ref 0–1)
BUN SERPL-MCNC: 24 MG/DL (ref 6–23)
CALCIUM SERPL-MCNC: 9.2 MG/DL (ref 8.3–10.6)
CHLORIDE BLD-SCNC: 88 MMOL/L (ref 99–110)
CO2: 38 MMOL/L (ref 21–32)
CREAT SERPL-MCNC: 0.6 MG/DL (ref 0.9–1.3)
DIFFERENTIAL TYPE: ABNORMAL
EOSINOPHILS ABSOLUTE: 0 K/CU MM
EOSINOPHILS RELATIVE PERCENT: 0.2 % (ref 0–3)
GFR SERPL CREATININE-BSD FRML MDRD: >60 ML/MIN/1.73M2
GLUCOSE BLD-MCNC: 139 MG/DL (ref 70–99)
GLUCOSE BLD-MCNC: 157 MG/DL (ref 70–99)
GLUCOSE BLD-MCNC: 191 MG/DL (ref 70–99)
GLUCOSE BLD-MCNC: 227 MG/DL (ref 70–99)
GLUCOSE BLD-MCNC: 341 MG/DL (ref 70–99)
GLUCOSE SERPL-MCNC: 320 MG/DL (ref 70–99)
HCT VFR BLD CALC: 47.6 % (ref 42–52)
HEMOGLOBIN: 14.9 GM/DL (ref 13.5–18)
IMMATURE NEUTROPHIL %: 0.9 % (ref 0–0.43)
LYMPHOCYTES ABSOLUTE: 0.8 K/CU MM
LYMPHOCYTES RELATIVE PERCENT: 7.9 % (ref 24–44)
MCH RBC QN AUTO: 30.8 PG (ref 27–31)
MCHC RBC AUTO-ENTMCNC: 31.3 % (ref 32–36)
MCV RBC AUTO: 98.3 FL (ref 78–100)
MONOCYTES ABSOLUTE: 0.9 K/CU MM
MONOCYTES RELATIVE PERCENT: 9.2 % (ref 0–4)
NUCLEATED RBC %: 0 %
PDW BLD-RTO: 13.9 % (ref 11.7–14.9)
PLATELET # BLD: 167 K/CU MM (ref 140–440)
PMV BLD AUTO: 9.5 FL (ref 7.5–11.1)
POTASSIUM SERPL-SCNC: 4.2 MMOL/L (ref 3.5–5.1)
RBC # BLD: 4.84 M/CU MM (ref 4.6–6.2)
SEGMENTED NEUTROPHILS ABSOLUTE COUNT: 8.2 K/CU MM
SEGMENTED NEUTROPHILS RELATIVE PERCENT: 81.6 % (ref 36–66)
SODIUM BLD-SCNC: 133 MMOL/L (ref 135–145)
TOTAL IMMATURE NEUTOROPHIL: 0.09 K/CU MM
TOTAL NUCLEATED RBC: 0 K/CU MM
WBC # BLD: 10 K/CU MM (ref 4–10.5)

## 2023-11-11 PROCEDURE — 94669 MECHANICAL CHEST WALL OSCILL: CPT

## 2023-11-11 PROCEDURE — 36415 COLL VENOUS BLD VENIPUNCTURE: CPT

## 2023-11-11 PROCEDURE — 80048 BASIC METABOLIC PNL TOTAL CA: CPT

## 2023-11-11 PROCEDURE — 2060000000 HC ICU INTERMEDIATE R&B

## 2023-11-11 PROCEDURE — 85025 COMPLETE CBC W/AUTO DIFF WBC: CPT

## 2023-11-11 PROCEDURE — 6370000000 HC RX 637 (ALT 250 FOR IP): Performed by: STUDENT IN AN ORGANIZED HEALTH CARE EDUCATION/TRAINING PROGRAM

## 2023-11-11 PROCEDURE — 94640 AIRWAY INHALATION TREATMENT: CPT

## 2023-11-11 PROCEDURE — 6370000000 HC RX 637 (ALT 250 FOR IP): Performed by: INTERNAL MEDICINE

## 2023-11-11 PROCEDURE — 6370000000 HC RX 637 (ALT 250 FOR IP)

## 2023-11-11 PROCEDURE — 2580000003 HC RX 258: Performed by: STUDENT IN AN ORGANIZED HEALTH CARE EDUCATION/TRAINING PROGRAM

## 2023-11-11 PROCEDURE — 82962 GLUCOSE BLOOD TEST: CPT

## 2023-11-11 RX ORDER — INSULIN GLARGINE 100 [IU]/ML
52 INJECTION, SOLUTION SUBCUTANEOUS 2 TIMES DAILY
Status: DISCONTINUED | OUTPATIENT
Start: 2023-11-11 | End: 2023-11-12 | Stop reason: HOSPADM

## 2023-11-11 RX ORDER — INSULIN LISPRO 100 [IU]/ML
26 INJECTION, SOLUTION INTRAVENOUS; SUBCUTANEOUS
Status: DISCONTINUED | OUTPATIENT
Start: 2023-11-11 | End: 2023-11-12 | Stop reason: HOSPADM

## 2023-11-11 RX ORDER — OXYMETAZOLINE HYDROCHLORIDE 0.05 G/100ML
2 SPRAY NASAL 2 TIMES DAILY
Status: DISCONTINUED | OUTPATIENT
Start: 2023-11-11 | End: 2023-11-12 | Stop reason: HOSPADM

## 2023-11-11 RX ADMIN — FUROSEMIDE 40 MG: 40 TABLET ORAL at 07:51

## 2023-11-11 RX ADMIN — APIXABAN 5 MG: 5 TABLET, FILM COATED ORAL at 07:51

## 2023-11-11 RX ADMIN — OXYMETAZOLINE HYDROCHLORIDE 2 SPRAY: 0.05 SPRAY NASAL at 20:34

## 2023-11-11 RX ADMIN — ROFLUMILAST 500 MCG: 500 TABLET ORAL at 07:50

## 2023-11-11 RX ADMIN — ACETAMINOPHEN 650 MG: 325 TABLET ORAL at 20:31

## 2023-11-11 RX ADMIN — INSULIN GLARGINE 52 UNITS: 100 INJECTION, SOLUTION SUBCUTANEOUS at 07:50

## 2023-11-11 RX ADMIN — INSULIN LISPRO 12 UNITS: 100 INJECTION, SOLUTION INTRAVENOUS; SUBCUTANEOUS at 07:49

## 2023-11-11 RX ADMIN — PREDNISONE 40 MG: 10 TABLET ORAL at 07:50

## 2023-11-11 RX ADMIN — APIXABAN 5 MG: 5 TABLET, FILM COATED ORAL at 20:31

## 2023-11-11 RX ADMIN — IPRATROPIUM BROMIDE AND ALBUTEROL SULFATE 1 DOSE: 2.5; .5 SOLUTION RESPIRATORY (INHALATION) at 16:13

## 2023-11-11 RX ADMIN — ASPIRIN 81 MG CHEWABLE TABLET 81 MG: 81 TABLET CHEWABLE at 07:50

## 2023-11-11 RX ADMIN — OXYMETAZOLINE HYDROCHLORIDE 2 SPRAY: 0.05 SPRAY NASAL at 10:38

## 2023-11-11 RX ADMIN — INSULIN LISPRO 26 UNITS: 100 INJECTION, SOLUTION INTRAVENOUS; SUBCUTANEOUS at 16:44

## 2023-11-11 RX ADMIN — INSULIN LISPRO 26 UNITS: 100 INJECTION, SOLUTION INTRAVENOUS; SUBCUTANEOUS at 12:45

## 2023-11-11 RX ADMIN — MELATONIN TAB 3 MG 3 MG: 3 TAB at 20:32

## 2023-11-11 RX ADMIN — INSULIN LISPRO 26 UNITS: 100 INJECTION, SOLUTION INTRAVENOUS; SUBCUTANEOUS at 07:50

## 2023-11-11 RX ADMIN — SODIUM CHLORIDE, PRESERVATIVE FREE 10 ML: 5 INJECTION INTRAVENOUS at 07:51

## 2023-11-11 RX ADMIN — INSULIN LISPRO 4 UNITS: 100 INJECTION, SOLUTION INTRAVENOUS; SUBCUTANEOUS at 16:43

## 2023-11-11 RX ADMIN — POTASSIUM CHLORIDE 10 MEQ: 1500 TABLET, EXTENDED RELEASE ORAL at 07:51

## 2023-11-11 RX ADMIN — GUAIFENESIN 600 MG: 600 TABLET, EXTENDED RELEASE ORAL at 20:31

## 2023-11-11 RX ADMIN — ATORVASTATIN CALCIUM 40 MG: 40 TABLET, FILM COATED ORAL at 20:31

## 2023-11-11 RX ADMIN — BUDESONIDE AND FORMOTEROL FUMARATE DIHYDRATE 2 PUFF: 160; 4.5 AEROSOL RESPIRATORY (INHALATION) at 11:44

## 2023-11-11 RX ADMIN — IPRATROPIUM BROMIDE AND ALBUTEROL SULFATE 1 DOSE: 2.5; .5 SOLUTION RESPIRATORY (INHALATION) at 11:47

## 2023-11-11 RX ADMIN — DILTIAZEM HYDROCHLORIDE 240 MG: 240 CAPSULE, COATED, EXTENDED RELEASE ORAL at 07:51

## 2023-11-11 RX ADMIN — GUAIFENESIN 600 MG: 600 TABLET, EXTENDED RELEASE ORAL at 07:51

## 2023-11-11 RX ADMIN — SODIUM CHLORIDE, PRESERVATIVE FREE 10 ML: 5 INJECTION INTRAVENOUS at 20:32

## 2023-11-11 RX ADMIN — INSULIN GLARGINE 52 UNITS: 100 INJECTION, SOLUTION SUBCUTANEOUS at 21:34

## 2023-11-11 NOTE — PROGRESS NOTES
Pulmonary and Critical Care  Progress Note    Subjective: The patient is comfortable in bed. On 3 L N/C. Shortness of breath has improved  Chest pain none. Addressing respiratory complaints Patient is negative for  hemoptysis and cyanosis  CONSTITUTIONAL:  negative for fevers and chills      Past Medical History:     has a past medical history of A-fib ([address]), Anxiety, Arthritis, COPD (chronic obstructive pulmonary disease) ([address]), Depression, Diabetes mellitus ([address]), Full dentures, H/O angiography, H/O Doppler ultrasound, H/O echocardiogram, Hx of colonoscopy, Hx of Doppler ultrasound, Hyperlipidemia, Hypertension, Macular degeneration, Obesity, On home oxygen therapy, PAD (peripheral artery disease) ([address]), Panic attacks, Pneumonia, PTSD (post-traumatic stress disorder), Restless leg, Shortness of breath, Sleep apnea, and Wears glasses. has a past surgical history that includes Atrial ablation surgery (05/23/2018); pr laparoscopy surg cholecystectomy (N/A, 11/12/2018); Colonoscopy (07/2011); eye surgery (Left, 2017); Dental surgery; Cardiac surgery (05/2018); Cholecystectomy, laparoscopic (11/12/2018); Appendectomy (2003); hernia repair (2003); femoral bypass (Left, 01/2011); Tonsillectomy (1960's); fracture surgery (Left, 1980's); fracture surgery (Right, 2000's); and femoral bypass (Left, 1/9/2019). reports that he has been smoking cigars and cigarettes. He started smoking about 50 years ago. He has a 12.00 pack-year smoking history. He quit smokeless tobacco use about 48 years ago. His smokeless tobacco use included chew. He reports that he does not drink alcohol and does not use drugs. Family history:  family history includes Allergy (Severe) in his brother; Arthritis in his sister; Diabetes in his sister; Early Death in his father and mother; Early Death (age of onset: 24) in his brother; Mental Illness in his sister; Obesity in his brother.     Allergies   Allergen Reactions    Metoprolol      \"Chest

## 2023-11-11 NOTE — PROGRESS NOTES
Walked with patient approximately 50 feet on unit while he utilized 3L of O2. O2 sats remained at 94-96%, until we returned to his room. They dropped to 87% while he sat on the bed recovering. Patient's heart rate shot up to 137 during this encounter. This amount of exertion was all the patient could handle at one time. Attempted to notify Dr. Ritika Brennan via [address] but it was not working at this time.

## 2023-11-12 VITALS
WEIGHT: 315 LBS | HEART RATE: 89 BPM | HEIGHT: 78 IN | BODY MASS INDEX: 36.45 KG/M2 | RESPIRATION RATE: 18 BRPM | TEMPERATURE: 98.2 F | SYSTOLIC BLOOD PRESSURE: 103 MMHG | OXYGEN SATURATION: 93 % | DIASTOLIC BLOOD PRESSURE: 74 MMHG

## 2023-11-12 LAB
ANION GAP SERPL CALCULATED.3IONS-SCNC: 9 MMOL/L (ref 4–16)
BASOPHILS ABSOLUTE: 0 K/CU MM
BASOPHILS RELATIVE PERCENT: 0.2 % (ref 0–1)
BUN SERPL-MCNC: 18 MG/DL (ref 6–23)
CALCIUM SERPL-MCNC: 8.8 MG/DL (ref 8.3–10.6)
CHLORIDE BLD-SCNC: 90 MMOL/L (ref 99–110)
CO2: 39 MMOL/L (ref 21–32)
CREAT SERPL-MCNC: 0.7 MG/DL (ref 0.9–1.3)
DIFFERENTIAL TYPE: ABNORMAL
EOSINOPHILS ABSOLUTE: 0.1 K/CU MM
EOSINOPHILS RELATIVE PERCENT: 0.9 % (ref 0–3)
GFR SERPL CREATININE-BSD FRML MDRD: >60 ML/MIN/1.73M2
GLUCOSE BLD-MCNC: 181 MG/DL (ref 70–99)
GLUCOSE BLD-MCNC: 226 MG/DL (ref 70–99)
GLUCOSE SERPL-MCNC: 221 MG/DL (ref 70–99)
HCT VFR BLD CALC: 44.3 % (ref 42–52)
HEMOGLOBIN: 14.2 GM/DL (ref 13.5–18)
IMMATURE NEUTROPHIL %: 1.5 % (ref 0–0.43)
LYMPHOCYTES ABSOLUTE: 1.6 K/CU MM
LYMPHOCYTES RELATIVE PERCENT: 15.9 % (ref 24–44)
MAGNESIUM: 1.9 MG/DL (ref 1.8–2.4)
MCH RBC QN AUTO: 30.6 PG (ref 27–31)
MCHC RBC AUTO-ENTMCNC: 32.1 % (ref 32–36)
MCV RBC AUTO: 95.5 FL (ref 78–100)
MONOCYTES ABSOLUTE: 0.9 K/CU MM
MONOCYTES RELATIVE PERCENT: 9 % (ref 0–4)
NUCLEATED RBC %: 0 %
PDW BLD-RTO: 14.1 % (ref 11.7–14.9)
PLATELET # BLD: 185 K/CU MM (ref 140–440)
PMV BLD AUTO: 9.5 FL (ref 7.5–11.1)
POTASSIUM SERPL-SCNC: 3.5 MMOL/L (ref 3.5–5.1)
RBC # BLD: 4.64 M/CU MM (ref 4.6–6.2)
SEGMENTED NEUTROPHILS ABSOLUTE COUNT: 7.3 K/CU MM
SEGMENTED NEUTROPHILS RELATIVE PERCENT: 72.5 % (ref 36–66)
SODIUM BLD-SCNC: 138 MMOL/L (ref 135–145)
TOTAL IMMATURE NEUTOROPHIL: 0.15 K/CU MM
TOTAL NUCLEATED RBC: 0 K/CU MM
WBC # BLD: 10 K/CU MM (ref 4–10.5)

## 2023-11-12 PROCEDURE — 82962 GLUCOSE BLOOD TEST: CPT

## 2023-11-12 PROCEDURE — 2700000000 HC OXYGEN THERAPY PER DAY

## 2023-11-12 PROCEDURE — 2580000003 HC RX 258: Performed by: STUDENT IN AN ORGANIZED HEALTH CARE EDUCATION/TRAINING PROGRAM

## 2023-11-12 PROCEDURE — 6370000000 HC RX 637 (ALT 250 FOR IP): Performed by: INTERNAL MEDICINE

## 2023-11-12 PROCEDURE — 6370000000 HC RX 637 (ALT 250 FOR IP): Performed by: STUDENT IN AN ORGANIZED HEALTH CARE EDUCATION/TRAINING PROGRAM

## 2023-11-12 PROCEDURE — 36415 COLL VENOUS BLD VENIPUNCTURE: CPT

## 2023-11-12 PROCEDURE — 94640 AIRWAY INHALATION TREATMENT: CPT

## 2023-11-12 PROCEDURE — 80048 BASIC METABOLIC PNL TOTAL CA: CPT

## 2023-11-12 PROCEDURE — 94761 N-INVAS EAR/PLS OXIMETRY MLT: CPT

## 2023-11-12 PROCEDURE — 85025 COMPLETE CBC W/AUTO DIFF WBC: CPT

## 2023-11-12 PROCEDURE — 83735 ASSAY OF MAGNESIUM: CPT

## 2023-11-12 RX ORDER — GLUCOSAMINE HCL/CHONDROITIN SU 500-400 MG
CAPSULE ORAL
Qty: 300 STRIP | Refills: 3 | Status: SHIPPED | OUTPATIENT
Start: 2023-11-12

## 2023-11-12 RX ORDER — IPRATROPIUM BROMIDE AND ALBUTEROL SULFATE 2.5; .5 MG/3ML; MG/3ML
3 SOLUTION RESPIRATORY (INHALATION)
Qty: 168 ML | Refills: 0 | Status: SHIPPED | OUTPATIENT
Start: 2023-11-12 | End: 2023-11-26

## 2023-11-12 RX ORDER — PREDNISONE 10 MG/1
TABLET ORAL
Qty: 20 TABLET | Refills: 0 | Status: ON HOLD | OUTPATIENT
Start: 2023-11-12 | End: 2023-11-16 | Stop reason: HOSPADM

## 2023-11-12 RX ORDER — LANCETS 30 GAUGE
1 EACH MISCELLANEOUS 3 TIMES DAILY
Qty: 200 EACH | Refills: 0 | Status: SHIPPED | OUTPATIENT
Start: 2023-11-12

## 2023-11-12 RX ORDER — INSULIN DETEMIR 100 [IU]/ML
52 INJECTION, SOLUTION SUBCUTANEOUS 2 TIMES DAILY
Qty: 31.2 ML | Refills: 0 | Status: SHIPPED | OUTPATIENT
Start: 2023-11-12 | End: 2023-11-12 | Stop reason: SDUPTHER

## 2023-11-12 RX ORDER — PREDNISONE 10 MG/1
TABLET ORAL
Qty: 20 TABLET | Refills: 0 | Status: SHIPPED | OUTPATIENT
Start: 2023-11-12 | End: 2023-11-12 | Stop reason: SDUPTHER

## 2023-11-12 RX ORDER — INSULIN DETEMIR 100 [IU]/ML
45 INJECTION, SOLUTION SUBCUTANEOUS 2 TIMES DAILY
Qty: 27 ML | Refills: 2 | Status: ON HOLD | OUTPATIENT
Start: 2023-11-12 | End: 2023-11-27 | Stop reason: SDUPTHER

## 2023-11-12 RX ORDER — GUAIFENESIN 600 MG/1
600 TABLET, EXTENDED RELEASE ORAL 2 TIMES DAILY
Qty: 60 TABLET | Refills: 0 | Status: SHIPPED | OUTPATIENT
Start: 2023-11-12 | End: 2023-12-12

## 2023-11-12 RX ORDER — BLOOD-GLUCOSE METER
1 KIT MISCELLANEOUS DAILY
Qty: 1 KIT | Refills: 0 | Status: SHIPPED | OUTPATIENT
Start: 2023-11-12

## 2023-11-12 RX ORDER — INSULIN LISPRO 100 [IU]/ML
INJECTION, SOLUTION INTRAVENOUS; SUBCUTANEOUS
Qty: 15 ML | Refills: 2 | Status: SHIPPED | OUTPATIENT
Start: 2023-11-12

## 2023-11-12 RX ORDER — ROFLUMILAST 500 UG/1
500 TABLET ORAL DAILY
Qty: 30 TABLET | Refills: 2 | Status: SHIPPED | OUTPATIENT
Start: 2023-11-13 | End: 2024-02-11

## 2023-11-12 RX ADMIN — PREDNISONE 40 MG: 10 TABLET ORAL at 09:00

## 2023-11-12 RX ADMIN — INSULIN LISPRO 26 UNITS: 100 INJECTION, SOLUTION INTRAVENOUS; SUBCUTANEOUS at 12:02

## 2023-11-12 RX ADMIN — INSULIN GLARGINE 52 UNITS: 100 INJECTION, SOLUTION SUBCUTANEOUS at 09:00

## 2023-11-12 RX ADMIN — IPRATROPIUM BROMIDE AND ALBUTEROL SULFATE 1 DOSE: 2.5; .5 SOLUTION RESPIRATORY (INHALATION) at 15:14

## 2023-11-12 RX ADMIN — IPRATROPIUM BROMIDE AND ALBUTEROL SULFATE 1 DOSE: 2.5; .5 SOLUTION RESPIRATORY (INHALATION) at 11:28

## 2023-11-12 RX ADMIN — IPRATROPIUM BROMIDE AND ALBUTEROL SULFATE 1 DOSE: 2.5; .5 SOLUTION RESPIRATORY (INHALATION) at 07:14

## 2023-11-12 RX ADMIN — GUAIFENESIN 600 MG: 600 TABLET, EXTENDED RELEASE ORAL at 09:00

## 2023-11-12 RX ADMIN — BUDESONIDE AND FORMOTEROL FUMARATE DIHYDRATE 2 PUFF: 160; 4.5 AEROSOL RESPIRATORY (INHALATION) at 00:22

## 2023-11-12 RX ADMIN — DILTIAZEM HYDROCHLORIDE 240 MG: 240 CAPSULE, COATED, EXTENDED RELEASE ORAL at 09:00

## 2023-11-12 RX ADMIN — IPRATROPIUM BROMIDE AND ALBUTEROL SULFATE 1 DOSE: 2.5; .5 SOLUTION RESPIRATORY (INHALATION) at 00:20

## 2023-11-12 RX ADMIN — OXYMETAZOLINE HYDROCHLORIDE 2 SPRAY: 0.05 SPRAY NASAL at 09:01

## 2023-11-12 RX ADMIN — ROFLUMILAST 500 MCG: 500 TABLET ORAL at 09:00

## 2023-11-12 RX ADMIN — SODIUM CHLORIDE, PRESERVATIVE FREE 10 ML: 5 INJECTION INTRAVENOUS at 09:01

## 2023-11-12 RX ADMIN — POTASSIUM CHLORIDE 10 MEQ: 1500 TABLET, EXTENDED RELEASE ORAL at 09:00

## 2023-11-12 RX ADMIN — FUROSEMIDE 40 MG: 40 TABLET ORAL at 09:00

## 2023-11-12 RX ADMIN — BUDESONIDE AND FORMOTEROL FUMARATE DIHYDRATE 2 PUFF: 160; 4.5 AEROSOL RESPIRATORY (INHALATION) at 07:15

## 2023-11-12 RX ADMIN — INSULIN LISPRO 4 UNITS: 100 INJECTION, SOLUTION INTRAVENOUS; SUBCUTANEOUS at 12:03

## 2023-11-12 RX ADMIN — APIXABAN 5 MG: 5 TABLET, FILM COATED ORAL at 09:01

## 2023-11-12 RX ADMIN — INSULIN LISPRO 26 UNITS: 100 INJECTION, SOLUTION INTRAVENOUS; SUBCUTANEOUS at 09:00

## 2023-11-12 RX ADMIN — ASPIRIN 81 MG CHEWABLE TABLET 81 MG: 81 TABLET CHEWABLE at 09:00

## 2023-11-12 NOTE — DISCHARGE INSTRUCTIONS
- please schedule an appointment to see your PCP  - please schedule an appointment to see pulmonologist  and endocronoligst  - please go to lab in one week for blood work  - please take medications as prescribed    - please monitor glucose closely  - take insulin detemir 45U twice daily for 10 days; hold if glucose below 100.   Starting 11/23/2023, decrease dose to 35U twice daily  - take insulin per sliding scale    Check glucose with meals and take insulin as below     0U  200-249 2U  250-299 4U  300-349 6U  >349 8U    At night time, check glucose and take insulin as below:   0  300-349 4U  >349 4U

## 2023-11-12 NOTE — CARE COORDINATION
Discussed with Dr Wen Vargas. PT/OT rec home w/HHC. Need HHC order; CM [facility]HC is following. Raegan Cuevas RN     [facility] [address] liaison -   Please call [facility] [phone]) to notify pt was discharged. Also, [phone] fax number is [phone]. Fax the AVS, d/c Summary if available & Inpatient Home Health Needs order to the above number. If the fax fails to send call [phone] to receive a additional fax number to send the mentioned information. Patient is active with [facility]. Please re-order at discharge if appropriate.

## 2023-11-12 NOTE — PROGRESS NOTES
V2.0  [facility] Hospitalist Progress Note      Name:  Carroll Campbell /Age/Sex: 1955  (79 y.o. male)   MRN & CSN:  [id number] & [id number] Encounter Date/Time: 2023 9:53 AM EST    Location:  -A PCP: Endy Epps [address] [place] [address] Day: 6      Subjective:     Chief Complaint:  Shortness of Breath   Attempted to ambulate pt, felt very SOB, tachycardic, and O2 dropped to the 80's. Pt feels better today. Down to 2.5L NC sating 93%. No n/v. No chest pain. Glucose is better today. Assessment and Plan:   Acute on Chronic Hypoxemic Hypercapnic Respiratory Failure 2/2 COPD exacerbation  Today on 2.5L sating 93%. Baseline 2L NC.   CXR with stable BLT opacities, COVID and flu Negative    - Continue steroids   - Levaquin 500mg daily   - DuoNebs  - Continue supportive care. - pulm consulted  - continue acapalla, mucinex  - started on daliresp by pulm    Chronic HFpEF  - Grossly euvolemic with mild peripheral edema slightly worsened today  -TTE on  LVEF of 50% with no WMA. - Continue PO home lasix    - additional lasix dose given this admission; urine output not recorded accurately     T2DM with hyperglycemia  Glucose remains in the high side 300's despite increasing insulin 2/2 steroids. Home Meds: Lantus 50u qhs, 20uNovolog TIDMW, Metformin  - continue Lantus to 52U BID; plan to discharge on same dose and to continue 4 days after stopping steroids, then down to 30U BID  - Continue HDSSI ; will discharge on MDSSI  - continue 26U TID w meals; plan to discharge on same dose while on steroids and for 4 days after stopping, then switch to 15U TID      Afib  Rate controlled today  -Continue Diltiazem and Eliquis           Personally reviewed Lab Studies and Imaging     Drugs that require monitoring for toxicity include Eliquis and the method of monitoring was cbc      Diet ADULT DIET;  Regular; 4 carb choices (60 gm/meal)   DVT Prophylaxis [] Lovenox, []  Heparin, [] SCDs, []

## 2023-11-12 NOTE — CARE COORDINATION
Received a call from charge RN asking for assist with transportation for pt who is on portable 02 , manages per self. Pt will discharge to ER. CM call to [employer] [phone], voucher completed for pt ride to [address] [place] [address] MANNYRN/CM

## 2023-11-12 NOTE — PROGRESS NOTES
Pulmonary and Critical Care  Progress Note    Subjective: The patient is sitting on the bed. On 3 L N/C. Shortness of breath has improved. Chest pain none. Addressing respiratory complaints Patient is negative for  hemoptysis and cyanosis. CONSTITUTIONAL:  negative for fevers and chills. Past Medical History:     has a past medical history of A-fib ([address]), Anxiety, Arthritis, COPD (chronic obstructive pulmonary disease) ([address]), Depression, Diabetes mellitus ([address]), Full dentures, H/O angiography, H/O Doppler ultrasound, H/O echocardiogram, Hx of colonoscopy, Hx of Doppler ultrasound, Hyperlipidemia, Hypertension, Macular degeneration, Obesity, On home oxygen therapy, PAD (peripheral artery disease) ([address]), Panic attacks, Pneumonia, PTSD (post-traumatic stress disorder), Restless leg, Shortness of breath, Sleep apnea, and Wears glasses. has a past surgical history that includes Atrial ablation surgery (05/23/2018); pr laparoscopy surg cholecystectomy (N/A, 11/12/2018); Colonoscopy (07/2011); eye surgery (Left, 2017); Dental surgery; Cardiac surgery (05/2018); Cholecystectomy, laparoscopic (11/12/2018); Appendectomy (2003); hernia repair (2003); femoral bypass (Left, 01/2011); Tonsillectomy (1960's); fracture surgery (Left, 1980's); fracture surgery (Right, 2000's); and femoral bypass (Left, 1/9/2019). reports that he has been smoking cigars and cigarettes. He started smoking about 50 years ago. He has a 12.00 pack-year smoking history. He quit smokeless tobacco use about 48 years ago. His smokeless tobacco use included chew. He reports that he does not drink alcohol and does not use drugs. Family history:  family history includes Allergy (Severe) in his brother; Arthritis in his sister; Diabetes in his sister; Early Death in his father and mother; Early Death (age of onset: 24) in his brother; Mental Illness in his sister; Obesity in his brother.     Allergies   Allergen Reactions    Metoprolol      \"Chest long-term current use of insulin (HCC)    Benign essential HTN    Simple chronic bronchitis (HCC)    Macular degeneration, dry    PVD (peripheral vascular disease) (HCC)    VT (ventricular tachycardia) (HCC)    Left wrist sprain    Hypoxia    Morbid obesity (HCC)    History of atrial fibrillation    Swelling of lower extremity    S/P ablation of atrial fibrillation    LFT elevation    Cholecystitis    COPD, severe (HCC)    Type 2 diabetes mellitus with hyperglycemia    COVID-19    COPD exacerbation (HCC)    Non-compliance    Acute on chronic respiratory failure with hypoxia and hypercapnia (HCC)    Acute on chronic diastolic (congestive) heart failure (HCC)    PAF (paroxysmal atrial fibrillation) ([address])    Acute respiratory failure with hypoxia and hypercapnia ([address])       Plan:   1. Overall the patient has improved. 2. [facility] Activity.     Amberly Lindsay MD   11/12/2023  11:33 AM

## 2023-11-12 NOTE — PROGRESS NOTES
Pt left message for brother to come pick him up for discharge. He was expecting to go home via [facility] but was informed that they are closed today. I also left a message for his brother explaining the situation. At this time we are waiting to hear from him.

## 2023-11-12 NOTE — PLAN OF CARE
Problem: Discharge Planning  Goal: Discharge to home or other facility with appropriate resources  11/12/2023 1747 by Daisy Shah RN  Outcome: Adequate for Discharge  11/12/2023 1031 by Daisy Shah RN  Outcome: Progressing     Problem: Chronic Conditions and Co-morbidities  Goal: Patient's chronic conditions and co-morbidity symptoms are monitored and maintained or improved  11/12/2023 1747 by Daisy Shah RN  Outcome: Adequate for Discharge  11/12/2023 1031 by Daisy Shah RN  Outcome: Progressing     Problem: Safety - Adult  Goal: Free from fall injury  11/12/2023 1747 by Daisy Shah RN  Outcome: Adequate for Discharge  11/12/2023 1031 by Daisy Shah RN  Outcome: Progressing     Problem: Respiratory - Adult  Goal: Achieves optimal ventilation and oxygenation  11/12/2023 1747 by Daisy Shah RN  Outcome: Adequate for Discharge  11/12/2023 1031 by Daisy Shah RN  Outcome: Progressing     Problem: Cardiovascular - Adult  Goal: Maintains optimal cardiac output and hemodynamic stability  11/12/2023 1747 by Daisy Shah RN  Outcome: Adequate for Discharge  11/12/2023 1031 by Daisy Shah RN  Outcome: Progressing     Problem: Pain  Goal: Verbalizes/displays adequate comfort level or baseline comfort level  11/12/2023 1747 by Daisy Shah RN  Outcome: Adequate for Discharge  11/12/2023 1031 by Daisy Shah RN  Outcome: Progressing

## 2023-11-13 ENCOUNTER — CARE COORDINATION (OUTPATIENT)
Dept: CARE COORDINATION | Age: 68
End: 2023-11-13

## 2023-11-13 ENCOUNTER — HOSPITAL ENCOUNTER (INPATIENT)
Age: 68
LOS: 3 days | Discharge: HOME HEALTH | DRG: 291 | End: 2023-11-16
Attending: STUDENT IN AN ORGANIZED HEALTH CARE EDUCATION/TRAINING PROGRAM | Admitting: STUDENT IN AN ORGANIZED HEALTH CARE EDUCATION/TRAINING PROGRAM
Payer: MEDICARE

## 2023-11-13 ENCOUNTER — CARE COORDINATION (OUTPATIENT)
Dept: CASE MANAGEMENT | Age: 68
End: 2023-11-13

## 2023-11-13 ENCOUNTER — APPOINTMENT (OUTPATIENT)
Dept: GENERAL RADIOLOGY | Age: 68
DRG: 291 | End: 2023-11-13
Payer: MEDICARE

## 2023-11-13 DIAGNOSIS — J90 PLEURAL EFFUSION: ICD-10-CM

## 2023-11-13 DIAGNOSIS — J96.02 ACUTE HYPERCAPNIC RESPIRATORY FAILURE (HCC): ICD-10-CM

## 2023-11-13 DIAGNOSIS — I50.9 ACUTE CONGESTIVE HEART FAILURE, UNSPECIFIED HEART FAILURE TYPE (HCC): ICD-10-CM

## 2023-11-13 DIAGNOSIS — J44.1 COPD EXACERBATION (HCC): Primary | ICD-10-CM

## 2023-11-13 LAB
ALBUMIN SERPL-MCNC: 3.7 GM/DL (ref 3.4–5)
ALP BLD-CCNC: 81 IU/L (ref 40–129)
ALT SERPL-CCNC: 46 U/L (ref 10–40)
ANION GAP SERPL CALCULATED.3IONS-SCNC: 9 MMOL/L (ref 4–16)
APTT: 28.4 SECONDS (ref 25.1–37.1)
AST SERPL-CCNC: 26 IU/L (ref 15–37)
B PARAP IS1001 DNA NPH QL NAA+NON-PROBE: NOT DETECTED
B PERT.PT PRMT NPH QL NAA+NON-PROBE: NOT DETECTED
BASE EXCESS MIXED: 17.9 (ref 0–1.2)
BASOPHILS ABSOLUTE: 0 K/CU MM
BASOPHILS RELATIVE PERCENT: 0.3 % (ref 0–1)
BILIRUB SERPL-MCNC: 0.5 MG/DL (ref 0–1)
BUN SERPL-MCNC: 12 MG/DL (ref 6–23)
C PNEUM DNA NPH QL NAA+NON-PROBE: NOT DETECTED
CALCIUM SERPL-MCNC: 9.1 MG/DL (ref 8.3–10.6)
CHLORIDE BLD-SCNC: 92 MMOL/L (ref 99–110)
CO2: 40 MMOL/L (ref 21–32)
COMMENT: ABNORMAL
CREAT SERPL-MCNC: 0.6 MG/DL (ref 0.9–1.3)
CULTURE: NORMAL
CULTURE: NORMAL
DIFFERENTIAL TYPE: ABNORMAL
EKG DIAGNOSIS: NORMAL
EKG Q-T INTERVAL: 294 MS
EKG QRS DURATION: 100 MS
EKG QTC CALCULATION (BAZETT): 401 MS
EKG R AXIS: -77 DEGREES
EKG T AXIS: 106 DEGREES
EKG VENTRICULAR RATE: 112 BPM
EOSINOPHILS ABSOLUTE: 0.2 K/CU MM
EOSINOPHILS RELATIVE PERCENT: 1.4 % (ref 0–3)
FLUAV H1 2009 PAN RNA NPH NAA+NON-PROBE: NOT DETECTED
FLUAV H1 RNA NPH QL NAA+NON-PROBE: NOT DETECTED
FLUAV H3 RNA NPH QL NAA+NON-PROBE: NOT DETECTED
FLUAV RNA NPH QL NAA+NON-PROBE: NOT DETECTED
FLUBV RNA NPH QL NAA+NON-PROBE: NOT DETECTED
GFR SERPL CREATININE-BSD FRML MDRD: >60 ML/MIN/1.73M2
GLUCOSE BLD-MCNC: 462 MG/DL (ref 70–99)
GLUCOSE SERPL-MCNC: 185 MG/DL (ref 70–99)
HADV DNA NPH QL NAA+NON-PROBE: NOT DETECTED
HCO3 VENOUS: 48 MMOL/L (ref 19–25)
HCOV 229E RNA NPH QL NAA+NON-PROBE: NOT DETECTED
HCOV HKU1 RNA NPH QL NAA+NON-PROBE: NOT DETECTED
HCOV NL63 RNA NPH QL NAA+NON-PROBE: NOT DETECTED
HCOV OC43 RNA NPH QL NAA+NON-PROBE: NOT DETECTED
HCT VFR BLD CALC: 51.8 % (ref 42–52)
HEMOGLOBIN: 16.5 GM/DL (ref 13.5–18)
HMPV RNA NPH QL NAA+NON-PROBE: NOT DETECTED
HPIV1 RNA NPH QL NAA+NON-PROBE: NOT DETECTED
HPIV2 RNA NPH QL NAA+NON-PROBE: NOT DETECTED
HPIV3 RNA NPH QL NAA+NON-PROBE: NOT DETECTED
HPIV4 RNA NPH QL NAA+NON-PROBE: NOT DETECTED
IMMATURE NEUTROPHIL %: 1.5 % (ref 0–0.43)
INR BLD: 0.9 INDEX
LYMPHOCYTES ABSOLUTE: 1.9 K/CU MM
LYMPHOCYTES RELATIVE PERCENT: 13.7 % (ref 24–44)
Lab: NORMAL
Lab: NORMAL
M PNEUMO DNA NPH QL NAA+NON-PROBE: NOT DETECTED
MCH RBC QN AUTO: 30.7 PG (ref 27–31)
MCHC RBC AUTO-ENTMCNC: 31.9 % (ref 32–36)
MCV RBC AUTO: 96.5 FL (ref 78–100)
MONOCYTES ABSOLUTE: 1 K/CU MM
MONOCYTES RELATIVE PERCENT: 6.9 % (ref 0–4)
NUCLEATED RBC %: 0 %
O2 SAT, VEN: 49.8 % (ref 50–70)
PCO2, VEN: 85 MMHG (ref 38–52)
PDW BLD-RTO: 14.2 % (ref 11.7–14.9)
PH VENOUS: 7.36 (ref 7.32–7.42)
PLATELET # BLD: 197 K/CU MM (ref 140–440)
PMV BLD AUTO: 9.4 FL (ref 7.5–11.1)
PO2, VEN: 30 MMHG (ref 28–48)
POTASSIUM SERPL-SCNC: 3.6 MMOL/L (ref 3.5–5.1)
PRO-BNP: 1649 PG/ML
PROCALCITONIN SERPL-MCNC: 0.05 NG/ML
PROTHROMBIN TIME: 12.8 SECONDS (ref 11.7–14.5)
RBC # BLD: 5.37 M/CU MM (ref 4.6–6.2)
RSV RNA NPH QL NAA+NON-PROBE: NOT DETECTED
RV+EV RNA NPH QL NAA+NON-PROBE: NOT DETECTED
SARS-COV-2 RNA NPH QL NAA+NON-PROBE: NOT DETECTED
SEGMENTED NEUTROPHILS ABSOLUTE COUNT: 10.4 K/CU MM
SEGMENTED NEUTROPHILS RELATIVE PERCENT: 76.2 % (ref 36–66)
SODIUM BLD-SCNC: 141 MMOL/L (ref 135–145)
SPECIMEN: NORMAL
SPECIMEN: NORMAL
TOTAL IMMATURE NEUTOROPHIL: 0.2 K/CU MM
TOTAL NUCLEATED RBC: 0 K/CU MM
TOTAL PROTEIN: 6.4 GM/DL (ref 6.4–8.2)
TROPONIN, HIGH SENSITIVITY: 59 NG/L (ref 0–22)
WBC # BLD: 13.7 K/CU MM (ref 4–10.5)

## 2023-11-13 PROCEDURE — 0202U NFCT DS 22 TRGT SARS-COV-2: CPT

## 2023-11-13 PROCEDURE — 6360000002 HC RX W HCPCS: Performed by: STUDENT IN AN ORGANIZED HEALTH CARE EDUCATION/TRAINING PROGRAM

## 2023-11-13 PROCEDURE — 83036 HEMOGLOBIN GLYCOSYLATED A1C: CPT

## 2023-11-13 PROCEDURE — 94640 AIRWAY INHALATION TREATMENT: CPT

## 2023-11-13 PROCEDURE — 84145 PROCALCITONIN (PCT): CPT

## 2023-11-13 PROCEDURE — 94660 CPAP INITIATION&MGMT: CPT

## 2023-11-13 PROCEDURE — 85730 THROMBOPLASTIN TIME PARTIAL: CPT

## 2023-11-13 PROCEDURE — 96374 THER/PROPH/DIAG INJ IV PUSH: CPT

## 2023-11-13 PROCEDURE — 2580000003 HC RX 258: Performed by: STUDENT IN AN ORGANIZED HEALTH CARE EDUCATION/TRAINING PROGRAM

## 2023-11-13 PROCEDURE — 82805 BLOOD GASES W/O2 SATURATION: CPT

## 2023-11-13 PROCEDURE — 82962 GLUCOSE BLOOD TEST: CPT

## 2023-11-13 PROCEDURE — 99285 EMERGENCY DEPT VISIT HI MDM: CPT

## 2023-11-13 PROCEDURE — 71045 X-RAY EXAM CHEST 1 VIEW: CPT

## 2023-11-13 PROCEDURE — 93005 ELECTROCARDIOGRAM TRACING: CPT | Performed by: STUDENT IN AN ORGANIZED HEALTH CARE EDUCATION/TRAINING PROGRAM

## 2023-11-13 PROCEDURE — 84484 ASSAY OF TROPONIN QUANT: CPT

## 2023-11-13 PROCEDURE — 83880 ASSAY OF NATRIURETIC PEPTIDE: CPT

## 2023-11-13 PROCEDURE — 2060000000 HC ICU INTERMEDIATE R&B

## 2023-11-13 PROCEDURE — 2500000003 HC RX 250 WO HCPCS: Performed by: STUDENT IN AN ORGANIZED HEALTH CARE EDUCATION/TRAINING PROGRAM

## 2023-11-13 PROCEDURE — 80053 COMPREHEN METABOLIC PANEL: CPT

## 2023-11-13 PROCEDURE — 85610 PROTHROMBIN TIME: CPT

## 2023-11-13 PROCEDURE — 85025 COMPLETE CBC W/AUTO DIFF WBC: CPT

## 2023-11-13 PROCEDURE — 93010 ELECTROCARDIOGRAM REPORT: CPT | Performed by: INTERNAL MEDICINE

## 2023-11-13 PROCEDURE — 5A09457 ASSISTANCE WITH RESPIRATORY VENTILATION, 24-96 CONSECUTIVE HOURS, CONTINUOUS POSITIVE AIRWAY PRESSURE: ICD-10-PCS | Performed by: STUDENT IN AN ORGANIZED HEALTH CARE EDUCATION/TRAINING PROGRAM

## 2023-11-13 PROCEDURE — 6370000000 HC RX 637 (ALT 250 FOR IP): Performed by: STUDENT IN AN ORGANIZED HEALTH CARE EDUCATION/TRAINING PROGRAM

## 2023-11-13 RX ORDER — DEXTROSE MONOHYDRATE 100 MG/ML
INJECTION, SOLUTION INTRAVENOUS CONTINUOUS PRN
Status: DISCONTINUED | OUTPATIENT
Start: 2023-11-13 | End: 2023-11-16 | Stop reason: HOSPADM

## 2023-11-13 RX ORDER — FUROSEMIDE 20 MG/1
40 TABLET ORAL DAILY
Status: DISCONTINUED | OUTPATIENT
Start: 2023-11-13 | End: 2023-11-13 | Stop reason: SDUPTHER

## 2023-11-13 RX ORDER — ACETAMINOPHEN 650 MG/1
650 SUPPOSITORY RECTAL EVERY 6 HOURS PRN
Status: DISCONTINUED | OUTPATIENT
Start: 2023-11-13 | End: 2023-11-16 | Stop reason: HOSPADM

## 2023-11-13 RX ORDER — MAGNESIUM SULFATE IN WATER 40 MG/ML
2000 INJECTION, SOLUTION INTRAVENOUS PRN
Status: DISCONTINUED | OUTPATIENT
Start: 2023-11-13 | End: 2023-11-16 | Stop reason: HOSPADM

## 2023-11-13 RX ORDER — POTASSIUM CHLORIDE 20 MEQ/1
40 TABLET, EXTENDED RELEASE ORAL PRN
Status: DISCONTINUED | OUTPATIENT
Start: 2023-11-13 | End: 2023-11-16 | Stop reason: HOSPADM

## 2023-11-13 RX ORDER — INSULIN GLARGINE 100 [IU]/ML
30 INJECTION, SOLUTION SUBCUTANEOUS NIGHTLY
Status: DISCONTINUED | OUTPATIENT
Start: 2023-11-13 | End: 2023-11-15

## 2023-11-13 RX ORDER — ASPIRIN 81 MG/1
81 TABLET, CHEWABLE ORAL DAILY
Status: DISCONTINUED | OUTPATIENT
Start: 2023-11-13 | End: 2023-11-16 | Stop reason: HOSPADM

## 2023-11-13 RX ORDER — IPRATROPIUM BROMIDE AND ALBUTEROL SULFATE 2.5; .5 MG/3ML; MG/3ML
1 SOLUTION RESPIRATORY (INHALATION)
Status: DISCONTINUED | OUTPATIENT
Start: 2023-11-13 | End: 2023-11-16 | Stop reason: HOSPADM

## 2023-11-13 RX ORDER — ALBUTEROL SULFATE 90 UG/1
2 AEROSOL, METERED RESPIRATORY (INHALATION) EVERY 4 HOURS PRN
Status: DISCONTINUED | OUTPATIENT
Start: 2023-11-13 | End: 2023-11-16 | Stop reason: HOSPADM

## 2023-11-13 RX ORDER — SODIUM CHLORIDE 0.9 % (FLUSH) 0.9 %
5-40 SYRINGE (ML) INJECTION EVERY 12 HOURS SCHEDULED
Status: DISCONTINUED | OUTPATIENT
Start: 2023-11-13 | End: 2023-11-16 | Stop reason: HOSPADM

## 2023-11-13 RX ORDER — 0.9 % SODIUM CHLORIDE 0.9 %
500 INTRAVENOUS SOLUTION INTRAVENOUS ONCE
Status: DISCONTINUED | OUTPATIENT
Start: 2023-11-13 | End: 2023-11-16 | Stop reason: HOSPADM

## 2023-11-13 RX ORDER — SODIUM CHLORIDE 9 MG/ML
INJECTION, SOLUTION INTRAVENOUS PRN
Status: DISCONTINUED | OUTPATIENT
Start: 2023-11-13 | End: 2023-11-16 | Stop reason: HOSPADM

## 2023-11-13 RX ORDER — ATORVASTATIN CALCIUM 40 MG/1
40 TABLET, FILM COATED ORAL DAILY
Status: DISCONTINUED | OUTPATIENT
Start: 2023-11-13 | End: 2023-11-16 | Stop reason: HOSPADM

## 2023-11-13 RX ORDER — ACETAMINOPHEN 325 MG/1
650 TABLET ORAL EVERY 6 HOURS PRN
Status: DISCONTINUED | OUTPATIENT
Start: 2023-11-13 | End: 2023-11-16 | Stop reason: HOSPADM

## 2023-11-13 RX ORDER — ALBUTEROL SULFATE 90 UG/1
2 AEROSOL, METERED RESPIRATORY (INHALATION) 2 TIMES DAILY
Status: DISCONTINUED | OUTPATIENT
Start: 2023-11-13 | End: 2023-11-13

## 2023-11-13 RX ORDER — FUROSEMIDE 10 MG/ML
40 INJECTION INTRAMUSCULAR; INTRAVENOUS 2 TIMES DAILY
Status: DISCONTINUED | OUTPATIENT
Start: 2023-11-13 | End: 2023-11-16 | Stop reason: HOSPADM

## 2023-11-13 RX ORDER — INSULIN LISPRO 100 [IU]/ML
0-4 INJECTION, SOLUTION INTRAVENOUS; SUBCUTANEOUS NIGHTLY
Status: DISCONTINUED | OUTPATIENT
Start: 2023-11-13 | End: 2023-11-14

## 2023-11-13 RX ORDER — ONDANSETRON 4 MG/1
4 TABLET, ORALLY DISINTEGRATING ORAL EVERY 8 HOURS PRN
Status: DISCONTINUED | OUTPATIENT
Start: 2023-11-13 | End: 2023-11-16 | Stop reason: HOSPADM

## 2023-11-13 RX ORDER — ROFLUMILAST 500 UG/1
500 TABLET ORAL DAILY
Status: DISCONTINUED | OUTPATIENT
Start: 2023-11-13 | End: 2023-11-16 | Stop reason: HOSPADM

## 2023-11-13 RX ORDER — DILTIAZEM HYDROCHLORIDE 240 MG/1
240 CAPSULE, COATED, EXTENDED RELEASE ORAL DAILY
Status: DISCONTINUED | OUTPATIENT
Start: 2023-11-13 | End: 2023-11-16 | Stop reason: HOSPADM

## 2023-11-13 RX ORDER — IPRATROPIUM BROMIDE AND ALBUTEROL SULFATE 2.5; .5 MG/3ML; MG/3ML
1 SOLUTION RESPIRATORY (INHALATION)
Status: DISCONTINUED | OUTPATIENT
Start: 2023-11-13 | End: 2023-11-13

## 2023-11-13 RX ORDER — SODIUM CHLORIDE 0.9 % (FLUSH) 0.9 %
5-40 SYRINGE (ML) INJECTION PRN
Status: DISCONTINUED | OUTPATIENT
Start: 2023-11-13 | End: 2023-11-16 | Stop reason: HOSPADM

## 2023-11-13 RX ORDER — POTASSIUM CHLORIDE 7.45 MG/ML
10 INJECTION INTRAVENOUS PRN
Status: DISCONTINUED | OUTPATIENT
Start: 2023-11-13 | End: 2023-11-16 | Stop reason: HOSPADM

## 2023-11-13 RX ORDER — GLUCAGON 1 MG/ML
1 KIT INJECTION PRN
Status: DISCONTINUED | OUTPATIENT
Start: 2023-11-13 | End: 2023-11-16 | Stop reason: HOSPADM

## 2023-11-13 RX ORDER — INSULIN LISPRO 100 [IU]/ML
0-4 INJECTION, SOLUTION INTRAVENOUS; SUBCUTANEOUS
Status: DISCONTINUED | OUTPATIENT
Start: 2023-11-13 | End: 2023-11-14

## 2023-11-13 RX ORDER — POLYETHYLENE GLYCOL 3350 17 G/17G
17 POWDER, FOR SOLUTION ORAL DAILY PRN
Status: DISCONTINUED | OUTPATIENT
Start: 2023-11-13 | End: 2023-11-16 | Stop reason: HOSPADM

## 2023-11-13 RX ORDER — METHYLPREDNISOLONE SODIUM SUCCINATE 125 MG/2ML
125 INJECTION, POWDER, LYOPHILIZED, FOR SOLUTION INTRAMUSCULAR; INTRAVENOUS ONCE
Status: COMPLETED | OUTPATIENT
Start: 2023-11-13 | End: 2023-11-13

## 2023-11-13 RX ORDER — FUROSEMIDE 10 MG/ML
80 INJECTION INTRAMUSCULAR; INTRAVENOUS ONCE
Status: COMPLETED | OUTPATIENT
Start: 2023-11-13 | End: 2023-11-13

## 2023-11-13 RX ORDER — ONDANSETRON 2 MG/ML
4 INJECTION INTRAMUSCULAR; INTRAVENOUS EVERY 6 HOURS PRN
Status: DISCONTINUED | OUTPATIENT
Start: 2023-11-13 | End: 2023-11-16 | Stop reason: HOSPADM

## 2023-11-13 RX ADMIN — APIXABAN 5 MG: 5 TABLET, FILM COATED ORAL at 21:34

## 2023-11-13 RX ADMIN — IPRATROPIUM BROMIDE AND ALBUTEROL SULFATE 1 DOSE: 2.5; .5 SOLUTION RESPIRATORY (INHALATION) at 15:53

## 2023-11-13 RX ADMIN — METHYLPREDNISOLONE SODIUM SUCCINATE 125 MG: 125 INJECTION, POWDER, LYOPHILIZED, FOR SOLUTION INTRAMUSCULAR; INTRAVENOUS at 13:42

## 2023-11-13 RX ADMIN — DOXYCYCLINE 100 MG: 100 INJECTION, POWDER, LYOPHILIZED, FOR SOLUTION INTRAVENOUS at 17:48

## 2023-11-13 RX ADMIN — SODIUM CHLORIDE, PRESERVATIVE FREE 10 ML: 5 INJECTION INTRAVENOUS at 21:33

## 2023-11-13 RX ADMIN — CEFTRIAXONE 1000 MG: 1 INJECTION, POWDER, FOR SOLUTION INTRAMUSCULAR; INTRAVENOUS at 17:04

## 2023-11-13 RX ADMIN — INSULIN LISPRO 4 UNITS: 100 INJECTION, SOLUTION INTRAVENOUS; SUBCUTANEOUS at 22:11

## 2023-11-13 RX ADMIN — INSULIN GLARGINE 30 UNITS: 100 INJECTION, SOLUTION SUBCUTANEOUS at 21:34

## 2023-11-13 RX ADMIN — METHYLPREDNISOLONE SODIUM SUCCINATE 40 MG: 40 INJECTION, POWDER, FOR SOLUTION INTRAMUSCULAR; INTRAVENOUS at 21:34

## 2023-11-13 RX ADMIN — FUROSEMIDE 80 MG: 10 INJECTION, SOLUTION INTRAMUSCULAR; INTRAVENOUS at 17:02

## 2023-11-13 NOTE — DISCHARGE SUMMARY
Discharge Summary    Name:  Татьяна Alcaraz /Age/Sex: 1955  (79 y.o. male)   MRN & CSN:  [id number] & [id number] Admission Date/Time: 2023 11:45 PM   Attending:  No att. providers found Discharging Physician: Anoop Lizarraga MD     Hospital Course:   Татьяна Alcaraz is a 79 y.o.  male  who presents with COPD exacerbation ([address])    HPI  \"   Patient is a 79 y.o. male with a PMHx of HFpEF, Afib on elqiuis, T2DM, COPD on 2-3L NC who presented to the ED with SOB. Reports since he was last discharged he never felt completely back to baseline. He states he presented to the ED on 11/3/23 and was prescribed a steroid taper with no relief of symptoms. Reports chronic cough and SOB. Denied any F/C, HA, dizziness, presyncope, syncope,  CP, N/V, abdominal pain, bleeding (hemoptysis / hematemesis, hematuria, BRBPR), C/D, or changes in urinary habits. reports that he has been smoking cigars and cigarettes. He started smoking about 50 years ago. He has a 12.00 pack-year smoking history. He quit smokeless tobacco use about 48 years ago. His smokeless tobacco use included chew. He reports that he does not drink alcohol and does not use drugs. \"       The following problems have been addressed during this hospitalization. Acute on Chronic Hypoxemic Hypercapnic Respiratory Failure 2/2 COPD exacerbation  Today on 2.5L sating 93%. Baseline 2L NC.   CXR with changes suggestive of atelectasis vs pneumonia, COVID and flu Negative    No sign of bacterial pneumonia. - received steroids IV transitioned to PO.   - received DuoNebs  - pulm consulted  - had acapalla, mucinex with improvement in oxygen requirement  - started on daliresp by pulm  - pt back to baseline for the past 2 days prior to discharge. Chronic HFpEF  - Grossly euvolemic on admission with mild peripheral edema noted to be worsened during admission  -TTE on  LVEF of 50% with no WMA.    - additional lasix dose IV given this admission; urine Medications:        Medication List        START taking these medications      guaiFENesin 600 MG extended release tablet  Commonly known as: MUCINEX  Take 1 tablet by mouth 2 times daily     ipratropium 0.5 mg-albuterol 2.5 mg 0.5-2.5 (3) MG/3ML Soln nebulizer solution  Commonly known as: DUONEB  Inhale 3 mLs into the lungs every 4 hours (while awake) for 14 days     Lancets Misc  1 each by Does not apply route 3 times daily     Levemir 100 UNIT/ML injection vial  Generic drug: insulin detemir  Inject 45 Units into the skin 2 times daily  Replaces: Levemir FlexPen 100 UNIT/ML injection pen     Roflumilast 500 MCG tablet  Commonly known as: DALIRESP  Take 1 tablet by mouth daily            CHANGE how you take these medications      atorvastatin 40 MG tablet  Commonly known as: LIPITOR  Take 1 tablet by mouth daily  What changed: when to take this     * blood glucose monitor kit and supplies  Check sugar daily  What changed: Another medication with the same name was added. Make sure you understand how and when to take each. * glucose monitoring kit  1 kit by Does not apply route daily  What changed: You were already taking a medication with the same name, and this prescription was added. Make sure you understand how and when to take each. * HumaLOG KwikPen 100 UNIT/ML Sopn  Generic drug: insulin lispro (1 Unit Dial)  INJECT 20 UNITS SUBCUTANEOUSLY THREE TIMES A DAY WITH MEALS  What changed: Another medication with the same name was added. Make sure you understand how and when to take each. * insulin lispro (1 Unit Dial) 100 UNIT/ML Sopn  Commonly known as: HumaLOG KwikPen  Check glucose with meals and take insulin as below     0U  200-249 2U  250-299 4U  300-349 6U  >349 8U    At night time, check glucose and take insulin as below:   0  300-349 4U  >349 4U  What changed: You were already taking a medication with the same name, and this prescription was added.  Make sure you understand how and detemir  Replaced by: Levemir 100 UNIT/ML injection vial            ASK your doctor about these medications      ketoconazole 2 % cream  Commonly known as: NIZORAL  Apply topically daily to the chest where there is a rash for a week. .               Where to Get Your Medications        These medications were sent to [facility] [address] [facility] [phone] Zackery Rich [phone]  [address] [place] [address] [address] [facility], [address] [place] [id number]      Phone: [phone]   blood glucose test strips  glucose monitoring kit  guaiFENesin 600 MG extended release tablet  ipratropium 0.5 mg-albuterol 2.5 mg 0.5-2.5 (3) MG/3ML Soln nebulizer solution  Lancets Misc  Levemir 100 UNIT/ML injection vial  predniSONE 10 MG tablet  Roflumilast 500 MCG tablet       You can get these medications from any pharmacy    Bring a paper prescription for each of these medications  insulin lispro (1 Unit Dial) 100 UNIT/ML Sopn         Objective Findings at Discharge:       BMP/CBC  Recent Labs     11/11/23  0206 11/12/23  0451 11/13/23  1328   * 138 141   K 4.2 3.5 3.6   CL 88* 90* 92*   CO2 38* 39* 40*   BUN 24* 18 12   CREATININE 0.6* 0.7* 0.6*   WBC 10.0 10.0 13.7*   HCT 47.6 44.3 51.8    185 197       IMAGING:      Additional Information: Patient seen and examined day of discharge.  For more information regarding patient's care please contact [address] [place] [address] records [phone]    Discharge Time of 35 minutes    Electronically signed by Alexandra Roman MD on 11/13/2023 at 3:42 PM

## 2023-11-13 NOTE — CARE COORDINATION
Patient possible readmission. Pateint admitted 11/8/23 - 11/12/23 for Shortness of Breath. Patient discharged with [facility] and O2. Patient presents to ED today w/C/O Shortness of Breath. Patient admitted for Copd Exacerbation.

## 2023-11-13 NOTE — H&P
V2.0  History and Physical      Name:  Keya Aparicio /Age/Sex: 1955  (79 y.o. male)   MRN & CSN:  [id number] & [id number] Encounter Date/Time: 2023 5:05 PM EST   Location:  03/03TR-03 PCP: Susanna Calixto MD       Hospital Day: 1    Assessment and Plan:   Keya Aparicio is a 79 y.o. male with a pmh of HFpEF A-fib on Eliquis type 2 diabetes COPD on 2 to 3 L oxygen at home who presents with COPD exacerbation [facility]    Hospital Problems             Last Modified POA    * (Principal) COPD exacerbation ([address]) 2023 Yes       Acute on chronic hypoxic hypercapnic respiratory failure in the setting of COPD exacerbation: Some component of CHF exacerbation? Appears more euvolemic. We will do IV Lasix and trend troponins asked to keeping in mind the fact that the BNP is elevated at 1500. Could be nonspecific. Start the patient on Solu-Medrol and DuoNebs. Currently on BiPAP with a PCO2 of around 80. Continue to monitor. De-escalate oxygen requirement as needed. Keeping in mind the fact that patient is a previous admission with a similar problem in the last 1 week we will consider adding IV doxycycline. We will check Pro-Caesar as well. End-stage Gold classification D COPD  Non-insulin-dependent diabetes mellitus type 2 start the patient on sliding scale insulin with hypoglycemia protocol. Start the patient on Lantus as well  Paroxysmal atrial fibrillation on Cardizem and Eliquis. Continue both. Currently rate controlled in sinus rhythm  Obstructive sleep apnea not using CPAP at home    Comment: Please note this report has been produced using speech recognition software and may contain errors related to that system including errors in grammar, punctuation, and spelling, as well as words and phrases that may be inappropriate. If there are any questions or concerns please feel free to contact the dictating provider for clarification.      Disposition:   Current Living situation:

## 2023-11-13 NOTE — CARE COORDINATION
Upon attempt at initial Care Transition call Patient noted to currently be in ED for sob. Will follow.  [facility], [facility] [phone]

## 2023-11-13 NOTE — ED PROVIDER NOTES
pending read by cardiologist.       Laboratory evaluations include[de-identified]    Labs Reviewed   CBC WITH AUTO DIFFERENTIAL - Abnormal; Notable for the following components:       Result Value    WBC 13.7 (*)     MCHC 31.9 (*)     Segs Relative 76.2 (*)     Lymphocytes % 13.7 (*)     Monocytes % 6.9 (*)     Immature Neutrophil % 1.5 (*)     All other components within normal limits   COMPREHENSIVE METABOLIC PANEL - Abnormal; Notable for the following components:    Chloride 92 (*)     CO2 40 (*)     Glucose 185 (*)     Creatinine 0.6 (*)     ALT 46 (*)     All other components within normal limits   TROPONIN - Abnormal; Notable for the following components:    Troponin, High Sensitivity 59 (*)     All other components within normal limits   BRAIN NATRIURETIC PEPTIDE - Abnormal; Notable for the following components:    Pro-BNP 1,649 (*)     All other components within normal limits   BLOOD GAS, VENOUS - Abnormal; Notable for the following components:    pCO2, Tylor 85 (*)     Base Exc, Mixed 17.9 (*)     HCO3, Venous 48.0 (*)     O2 Sat, Tylor 49.8 (*)     All other components within normal limits   HEMOGLOBIN A1C - Abnormal; Notable for the following components:    Hemoglobin A1C 10.2 (*)     All other components within normal limits   CBC - Abnormal; Notable for the following components:    WBC 11.3 (*)     MCHC 31.4 (*)     All other components within normal limits   BASIC METABOLIC PANEL - Abnormal; Notable for the following components:    Sodium 134 (*)     Chloride 86 (*)     CO2 36 (*)     Glucose 399 (*)     Creatinine 0.7 (*)     All other components within normal limits   POCT GLUCOSE - Abnormal; Notable for the following components:    POC Glucose 462 (*)     All other components within normal limits   RESPIRATORY PANEL, MOLECULAR, WITH COVID-19   PROTIME/INR & PTT   MAGNESIUM   PHOSPHORUS   PROCALCITONIN   POCT GLUCOSE   POCT GLUCOSE   POCT GLUCOSE   POCT GLUCOSE       Radiology include:      XR CHEST PORTABLE   Final

## 2023-11-13 NOTE — CARE COORDINATION
Attempted phone call to [facility] to follow up on referral for visiting physician. No answer, vm message left requesting return call for update on referral, contact number provided. VIELKA plan of care: VIELKA will make next outreach attempt to Mike to follow up regarding referral for visiting physicians on 11/15.

## 2023-11-13 NOTE — PROGRESS NOTES
Physician Progress Note      PATIENT:               Alisson Lima  CSN #:                  [id number]  :                       1955  ADMIT DATE:       2023 11:45 PM  [address] [facility] DATE:        2023 5:48 PM  RESPONDING  PROVIDER #:        Frank Calderon MD          QUERY TEXT:    Pulmonology,    Patient admitted with COPD exacerbation and has possible PNA documented and   has now been dropped from documentation. If possible, please document if the   diagnosis of PNA  has been ruled out after further study. The medical record reflects the following:  Risk Factors: COPD  Clinical Indicators: Possible cassia pneumonia documented by Pulmonology, resp   failure  Treatment: labs, imaging, O2, supportive care, steroids, - Levaquin, Clark    Thank you,  Nathan Mcgill RN CDS  [phone]  Options provided:  -- Pneumonia present as evidenced by, Please document evidence.   -- Pneumonia was ruled out  -- Other - I will add my own diagnosis  -- Disagree - Not applicable / Not valid  -- Disagree - Clinically unable to determine / Unknown  -- Refer to Clinical Documentation Reviewer    PROVIDER RESPONSE TEXT:    Pneumonia is present as evidenced by cxr    Query created by: Nathan Mcgill on 11/10/2023 6:25 PM      Electronically signed by:  Frank Calderon MD 2023 9:53 AM

## 2023-11-14 ENCOUNTER — CARE COORDINATION (OUTPATIENT)
Dept: CASE MANAGEMENT | Age: 68
End: 2023-11-14

## 2023-11-14 LAB
ANION GAP SERPL CALCULATED.3IONS-SCNC: 12 MMOL/L (ref 4–16)
BUN SERPL-MCNC: 17 MG/DL (ref 6–23)
CALCIUM SERPL-MCNC: 8.6 MG/DL (ref 8.3–10.6)
CHLORIDE BLD-SCNC: 86 MMOL/L (ref 99–110)
CO2: 36 MMOL/L (ref 21–32)
CREAT SERPL-MCNC: 0.7 MG/DL (ref 0.9–1.3)
ESTIMATED AVERAGE GLUCOSE: 246 MG/DL
GFR SERPL CREATININE-BSD FRML MDRD: >60 ML/MIN/1.73M2
GLUCOSE BLD-MCNC: 344 MG/DL (ref 70–99)
GLUCOSE BLD-MCNC: 369 MG/DL (ref 70–99)
GLUCOSE BLD-MCNC: 407 MG/DL (ref 70–99)
GLUCOSE BLD-MCNC: 426 MG/DL (ref 70–99)
GLUCOSE SERPL-MCNC: 399 MG/DL (ref 70–99)
HBA1C MFR BLD: 10.2 % (ref 4.2–6.3)
HCT VFR BLD CALC: 46.2 % (ref 42–52)
HEMOGLOBIN: 14.5 GM/DL (ref 13.5–18)
MAGNESIUM: 1.8 MG/DL (ref 1.8–2.4)
MCH RBC QN AUTO: 30.5 PG (ref 27–31)
MCHC RBC AUTO-ENTMCNC: 31.4 % (ref 32–36)
MCV RBC AUTO: 97.1 FL (ref 78–100)
PDW BLD-RTO: 14 % (ref 11.7–14.9)
PHOSPHORUS: 4.7 MG/DL (ref 2.5–4.9)
PLATELET # BLD: 154 K/CU MM (ref 140–440)
PMV BLD AUTO: 9.7 FL (ref 7.5–11.1)
POTASSIUM SERPL-SCNC: 4.3 MMOL/L (ref 3.5–5.1)
RBC # BLD: 4.76 M/CU MM (ref 4.6–6.2)
SODIUM BLD-SCNC: 134 MMOL/L (ref 135–145)
WBC # BLD: 11.3 K/CU MM (ref 4–10.5)

## 2023-11-14 PROCEDURE — 94761 N-INVAS EAR/PLS OXIMETRY MLT: CPT

## 2023-11-14 PROCEDURE — 2500000003 HC RX 250 WO HCPCS: Performed by: STUDENT IN AN ORGANIZED HEALTH CARE EDUCATION/TRAINING PROGRAM

## 2023-11-14 PROCEDURE — 84100 ASSAY OF PHOSPHORUS: CPT

## 2023-11-14 PROCEDURE — 97530 THERAPEUTIC ACTIVITIES: CPT

## 2023-11-14 PROCEDURE — 97166 OT EVAL MOD COMPLEX 45 MIN: CPT

## 2023-11-14 PROCEDURE — 36415 COLL VENOUS BLD VENIPUNCTURE: CPT

## 2023-11-14 PROCEDURE — 6360000002 HC RX W HCPCS: Performed by: STUDENT IN AN ORGANIZED HEALTH CARE EDUCATION/TRAINING PROGRAM

## 2023-11-14 PROCEDURE — 94660 CPAP INITIATION&MGMT: CPT

## 2023-11-14 PROCEDURE — 83735 ASSAY OF MAGNESIUM: CPT

## 2023-11-14 PROCEDURE — 2580000003 HC RX 258: Performed by: STUDENT IN AN ORGANIZED HEALTH CARE EDUCATION/TRAINING PROGRAM

## 2023-11-14 PROCEDURE — 94640 AIRWAY INHALATION TREATMENT: CPT

## 2023-11-14 PROCEDURE — 6370000000 HC RX 637 (ALT 250 FOR IP): Performed by: STUDENT IN AN ORGANIZED HEALTH CARE EDUCATION/TRAINING PROGRAM

## 2023-11-14 PROCEDURE — 85027 COMPLETE CBC AUTOMATED: CPT

## 2023-11-14 PROCEDURE — 2060000000 HC ICU INTERMEDIATE R&B

## 2023-11-14 PROCEDURE — 6370000000 HC RX 637 (ALT 250 FOR IP): Performed by: NURSE PRACTITIONER

## 2023-11-14 PROCEDURE — 2700000000 HC OXYGEN THERAPY PER DAY

## 2023-11-14 PROCEDURE — 82962 GLUCOSE BLOOD TEST: CPT

## 2023-11-14 PROCEDURE — 80048 BASIC METABOLIC PNL TOTAL CA: CPT

## 2023-11-14 RX ORDER — INSULIN LISPRO 100 [IU]/ML
0-16 INJECTION, SOLUTION INTRAVENOUS; SUBCUTANEOUS
Status: DISCONTINUED | OUTPATIENT
Start: 2023-11-15 | End: 2023-11-16 | Stop reason: HOSPADM

## 2023-11-14 RX ORDER — INSULIN LISPRO 100 [IU]/ML
0-4 INJECTION, SOLUTION INTRAVENOUS; SUBCUTANEOUS NIGHTLY
Status: DISCONTINUED | OUTPATIENT
Start: 2023-11-14 | End: 2023-11-16 | Stop reason: HOSPADM

## 2023-11-14 RX ORDER — INSULIN LISPRO 100 [IU]/ML
5 INJECTION, SOLUTION INTRAVENOUS; SUBCUTANEOUS
Status: DISCONTINUED | OUTPATIENT
Start: 2023-11-14 | End: 2023-11-15

## 2023-11-14 RX ADMIN — INSULIN LISPRO 4 UNITS: 100 INJECTION, SOLUTION INTRAVENOUS; SUBCUTANEOUS at 17:00

## 2023-11-14 RX ADMIN — APIXABAN 5 MG: 5 TABLET, FILM COATED ORAL at 21:28

## 2023-11-14 RX ADMIN — APIXABAN 5 MG: 5 TABLET, FILM COATED ORAL at 08:07

## 2023-11-14 RX ADMIN — IPRATROPIUM BROMIDE AND ALBUTEROL SULFATE 1 DOSE: 2.5; .5 SOLUTION RESPIRATORY (INHALATION) at 09:45

## 2023-11-14 RX ADMIN — DOXYCYCLINE 100 MG: 100 INJECTION, POWDER, LYOPHILIZED, FOR SOLUTION INTRAVENOUS at 05:17

## 2023-11-14 RX ADMIN — METHYLPREDNISOLONE SODIUM SUCCINATE 40 MG: 40 INJECTION, POWDER, FOR SOLUTION INTRAMUSCULAR; INTRAVENOUS at 08:07

## 2023-11-14 RX ADMIN — ACETAMINOPHEN 650 MG: 325 TABLET ORAL at 18:36

## 2023-11-14 RX ADMIN — FUROSEMIDE 40 MG: 10 INJECTION, SOLUTION INTRAMUSCULAR; INTRAVENOUS at 17:08

## 2023-11-14 RX ADMIN — IPRATROPIUM BROMIDE AND ALBUTEROL SULFATE 1 DOSE: 2.5; .5 SOLUTION RESPIRATORY (INHALATION) at 20:18

## 2023-11-14 RX ADMIN — ROFLUMILAST 500 MCG: 500 TABLET ORAL at 08:07

## 2023-11-14 RX ADMIN — ASPIRIN 81 MG: 81 TABLET, CHEWABLE ORAL at 08:07

## 2023-11-14 RX ADMIN — IPRATROPIUM BROMIDE AND ALBUTEROL SULFATE 1 DOSE: 2.5; .5 SOLUTION RESPIRATORY (INHALATION) at 16:03

## 2023-11-14 RX ADMIN — ATORVASTATIN CALCIUM 40 MG: 40 TABLET, FILM COATED ORAL at 08:07

## 2023-11-14 RX ADMIN — IPRATROPIUM BROMIDE AND ALBUTEROL SULFATE 1 DOSE: 2.5; .5 SOLUTION RESPIRATORY (INHALATION) at 12:39

## 2023-11-14 RX ADMIN — INSULIN LISPRO 3 UNITS: 100 INJECTION, SOLUTION INTRAVENOUS; SUBCUTANEOUS at 08:08

## 2023-11-14 RX ADMIN — SODIUM CHLORIDE, PRESERVATIVE FREE 10 ML: 5 INJECTION INTRAVENOUS at 08:07

## 2023-11-14 RX ADMIN — INSULIN LISPRO 4 UNITS: 100 INJECTION, SOLUTION INTRAVENOUS; SUBCUTANEOUS at 21:12

## 2023-11-14 RX ADMIN — SODIUM CHLORIDE, PRESERVATIVE FREE 10 ML: 5 INJECTION INTRAVENOUS at 21:37

## 2023-11-14 RX ADMIN — FUROSEMIDE 40 MG: 10 INJECTION, SOLUTION INTRAMUSCULAR; INTRAVENOUS at 08:07

## 2023-11-14 RX ADMIN — DOXYCYCLINE 100 MG: 100 INJECTION, POWDER, LYOPHILIZED, FOR SOLUTION INTRAVENOUS at 18:13

## 2023-11-14 RX ADMIN — INSULIN GLARGINE 30 UNITS: 100 INJECTION, SOLUTION SUBCUTANEOUS at 21:13

## 2023-11-14 RX ADMIN — INSULIN LISPRO 5 UNITS: 100 INJECTION, SOLUTION INTRAVENOUS; SUBCUTANEOUS at 17:00

## 2023-11-14 RX ADMIN — SODIUM CHLORIDE 25 ML: 9 INJECTION, SOLUTION INTRAVENOUS at 05:17

## 2023-11-14 RX ADMIN — DILTIAZEM HYDROCHLORIDE 240 MG: 240 CAPSULE, COATED, EXTENDED RELEASE ORAL at 08:07

## 2023-11-14 RX ADMIN — INSULIN LISPRO 5 UNITS: 100 INJECTION, SOLUTION INTRAVENOUS; SUBCUTANEOUS at 11:55

## 2023-11-14 RX ADMIN — INSULIN LISPRO 4 UNITS: 100 INJECTION, SOLUTION INTRAVENOUS; SUBCUTANEOUS at 11:55

## 2023-11-14 RX ADMIN — INSULIN LISPRO 5 UNITS: 100 INJECTION, SOLUTION INTRAVENOUS; SUBCUTANEOUS at 08:08

## 2023-11-14 ASSESSMENT — PAIN SCALES - GENERAL: PAINLEVEL_OUTOF10: 0

## 2023-11-14 NOTE — ED NOTES
ED TO INPATIENT SBAR HANDOFF    Patient Name: Michael Patricia   :  1955  79 y.o. Preferred Name  [facility]  Family/Caregiver Present no   Restraints no   C-SSRS: Risk of Suicide: No Risk  Sitter no   Sepsis Risk Score Sepsis Risk Score: 4.43      Situation  Chief Complaint   Patient presents with    Shortness of Breath     Brief Description of Patient's Condition: Pt lives at home-COPD exacerbation. Pt wears 2L NC at home. Ambulates with assist. Was just d/c this past weekend. Mental Status: oriented, alert, coherent, logical, thought processes intact, and able to concentrate and follow conversation  Arrived from: home    Imaging:   XR CHEST PORTABLE   Final Result   New small left pleural effusion.            Abnormal labs:   Abnormal Labs Reviewed   CBC WITH AUTO DIFFERENTIAL - Abnormal; Notable for the following components:       Result Value    WBC 13.7 (*)     MCHC 31.9 (*)     Segs Relative 76.2 (*)     Lymphocytes % 13.7 (*)     Monocytes % 6.9 (*)     Immature Neutrophil % 1.5 (*)     All other components within normal limits   COMPREHENSIVE METABOLIC PANEL - Abnormal; Notable for the following components:    Chloride 92 (*)     CO2 40 (*)     Glucose 185 (*)     Creatinine 0.6 (*)     ALT 46 (*)     All other components within normal limits   TROPONIN - Abnormal; Notable for the following components:    Troponin, High Sensitivity 59 (*)     All other components within normal limits   BRAIN NATRIURETIC PEPTIDE - Abnormal; Notable for the following components:    Pro-BNP 1,649 (*)     All other components within normal limits   BLOOD GAS, VENOUS - Abnormal; Notable for the following components:    pCO2, Tylor 85 (*)     Base Exc, Mixed 17.9 (*)     HCO3, Venous 48.0 (*)     O2 Sat, Tylor 49.8 (*)     All other components within normal limits       Background  History:   Past Medical History:   Diagnosis Date    A-fib ([address])     Resolved after ablation     Anxiety     Arthritis     \"Hips, Knees, Elbows

## 2023-11-14 NOTE — PROGRESS NOTES
Physician Progress Note      PATIENT:               Jesse Matisa  CSN #:                  [id number]  :                       1955  ADMIT DATE:       2023 1:19 PM  [address] [facility] DATE:  RESPONDING  PROVIDER #:        Rojelio Wong MD          QUERY TEXT:    Pt admitted with AECOPD with Respiratory failure and has Chronic Diastolic CHF   that was treated with Lasix IV documented. If possible, please document in   progress notes and discharge summary further specificity regarding the type   and acuity of CHF:    The medical record reflects the following:  Risk Factors: Chronic Diastolic CHF  Clinical Indicators: \"Patient has elevated BNP suggestive of CHF and patient   is given lasix.' does have mild Rales on the bilateral lower lungs but   wheezing is more prominent,  \"We will do IV Lasix and trend troponins asked to   keeping in mind the fact that the BNP is elevated at 1500\", \"Acute on chronic   hypoxic hypercapnic respiratory failure in the setting of COPD exacerbation:   Some component of CHF exacerbation? \" CXR: New small left pleural effusion. Treatment: IV Lasix, Monitoring, BNP testing    Thank you, Santosh Dey RN, CDS [phone]  Options provided:  -- Acute on Chronic Diastolic CHF/HFpEF  -- Chronic Diastolic CHF/HFpEF  -- Other - I will add my own diagnosis  -- Disagree - Not applicable / Not valid  -- Disagree - Clinically unable to determine / Unknown  -- Refer to Clinical Documentation Reviewer    PROVIDER RESPONSE TEXT:    This patient is in acute on chronic diastolic CHF/HFpEF. Query created by: Santosh Dey on 2023 10:28 AM      QUERY TEXT:    Patient admitted with AECOPD, noted to have PAF and is maintained on Eliquis. If possible, please document in progress notes and discharge summary if you   are evaluating and/or treating any of the following:     The medical record reflects the following:  Risk Factors: PAF  Clinical Indicators: Atrial fibrillation with rapid ventricular

## 2023-11-14 NOTE — PROGRESS NOTES
4 Eyes Skin Assessment     NAME:  Consuelo Almonte  YOB: 1955  MEDICAL RECORD NUMBER:  [id number]    The patient is being assessed for  Admission    I agree that at least one RN has performed a thorough Head to Toe Skin Assessment on the patient. ALL assessment sites listed below have been assessed. Areas assessed by both nurses:    Head, Face, Ears, Shoulders, Back, Chest, Arms, Elbows, Hands, Sacrum. Buttock, Coccyx, Ischium, Legs. Feet and Heels, and Under Medical Devices         Does the Patient have a Wound?  No noted wound(s)       Hector Prevention initiated by RN: No  Wound Care Orders initiated by RN: No    Pressure Injury (Stage 3,4, Unstageable, DTI, NWPT, and Complex wounds) if present, place Wound referral order by RN under : No    New Ostomies, if present place, Ostomy referral order under : No     Nurse 1 eSignature: Electronically signed by Aimee Billings RN on 11/13/23 at 10:50 PM EST    **SHARE this note so that the co-signing nurse can place an eSignature**    Nurse 2 eSignature: Electronically signed by [employer] RN on 11/13/23 at 10:54 PM EST

## 2023-11-14 NOTE — CARE COORDINATION
11/14/23 1962   Service Assessment   Patient Orientation Alert and Oriented   Cognition Alert   History Provided By Patient;Medical Record   Primary Caregiver Self   Support Systems Family Members;Friends/Neighbors   PCP Verified by CM Yes   Last Visit to PCP Within last year   Prior Functional Level Independent in ADLs/IADLs;Assistance with the following:;Mobility   Current Functional Level Independent in ADLs/IADLs;Assistance with the following:;Mobility   Can patient return to prior living arrangement Yes   Ability to make needs known: Good   Family able to assist with home care needs: No   Would you like for me to discuss the discharge plan with any other family members/significant others, and if so, who? No   Financial Resources Medicaid; Medicare   Community Resources ECF/Home Care   Social/Functional History   Lives With Alone   Type of Home Apartment   Home Layout One level   Home Access Level entry   Bathroom Shower/Tub Tub/Shower unit   Bathroom Toilet Standard   Bathroom Equipment Grab bars in shower; Tub transfer bench   [address] Help From Family;Friend(s); Neighbor   ADL Assistance Independent   Homemaking Assistance Independent   Homemaking Responsibilities Yes   Transfer Assistance Independent   Active  No   Patient's  Info brother or Dial-a-Ride   Occupation On disability   Discharge  [address] Prior To Admission C-pap;Meals On Wheels; Oxygen Therapy   Potential Assistance Needed Skilled Nursing Facility   Type of Home Care Services None   Patient expects to be discharged to: Apartment   One/Two Story Residence One story     Pt has insurance and a PCP. Lives alone. Limited assistance from family. Current with CM [facility]. May benefit from [employer]. Plan home.

## 2023-11-14 NOTE — PROGRESS NOTES
pt received 9 units of insulin. pt requested second lunch tray because \"he's still hungry. \" educated on carb intake and sugar management. pt acknowledges education, still wants food.  MD aware

## 2023-11-14 NOTE — PLAN OF CARE
Problem: Discharge Planning  Goal: Discharge to home or other facility with appropriate resources  Outcome: Progressing     Problem: Nutrition Deficit:  Goal: Optimize nutritional status  Outcome: Progressing     Problem: Safety - Adult  Goal: Free from fall injury  Outcome: Progressing     Problem: Respiratory - Adult  Goal: Achieves optimal ventilation and oxygenation  Outcome: Progressing     Problem: Cardiovascular - Adult  Goal: Maintains optimal cardiac output and hemodynamic stability  Outcome: Progressing     Problem: Metabolic/Fluid and Electrolytes - Adult  Goal: Electrolytes maintained within normal limits  Outcome: Progressing  Goal: Hemodynamic stability and optimal renal function maintained  Outcome: Progressing  Goal: Glucose maintained within prescribed range  Outcome: Progressing

## 2023-11-14 NOTE — PROGRESS NOTES
V2.0  [facility] Hospitalist Progress Note      Name:  Jacob Parson /Age/Sex: 1955  (79 y.o. male)   MRN & CSN:  [id number] & [id number] Encounter Date/Time: 2023 10:44 AM EST    Location:  -A PCP: Amor Grissom MD       Hospital Day: 2    Assessment and Plan:   Jacob Parson is a 79 y.o. male with a pmh of HFpEF A-fib on Eliquis type 2 diabetes COPD on 2 to 3 L oxygen at home who presents with COPD exacerbation [facility]     Hospital Problems               Last Modified POA     * (Principal) COPD exacerbation ([address]) 2023 Yes         Acute on chronic hypoxic hypercapnic respiratory failure in the setting of COPD exacerbation: Some component of CHF exacerbation? We will do IV Lasix and trend troponins asked to keeping in mind the fact that the BNP is elevated at 1500. Could be nonspecific. Started the patient on Solu-Medrol and DuoNebs. Was on BiPAP with a PCO2 of around 80, improving. Continue to monitor. De-escalate oxygen requirement as needed. Keeping in mind the fact that patient is a previous admission with a similar problem in the last 1 week we started IV doxycycline. Pro-Caesar is negative. Will recommend bipap. End-stage Gold classification D COPD  Non-insulin-dependent diabetes mellitus type 2 start the patient on sliding scale insulin with hypoglycemia protocol. Start the patient on Lantus as well. Blood sugars were still above 300 started the patient on 5 units of Humalog. He with meals  Paroxysmal atrial fibrillation on Cardizem and Eliquis. Continue both. Currently rate controlled in sinus rhythm  Obstructive sleep apnea not using CPAP at home    Comment: Please note this report has been produced using speech recognition software and may contain errors related to that system including errors in grammar, punctuation, and spelling, as well as words and phrases that may be inappropriate.  If there are any questions or concerns please feel free to contact

## 2023-11-14 NOTE — CARE COORDINATION
Patient noted to have been readmitted to [facility] for Copd Exac, Care Transition episode closed per protocol.  [facility], [facility] [phone]

## 2023-11-14 NOTE — CONSULTS
Setup/Prior level of function:  Social/Functional History  Lives With: Alone  Type of Home: Apartment  Home Layout: One level  Home Access: Level entry  Bathroom Shower/Tub: Tub/Shower unit  Bathroom Toilet: Standard  Bathroom Equipment: Grab bars in shower, Tub transfer bench  Bathroom Accessibility: Accessible  Home Equipment: Oxygen  Receives Help From: Family, Friend(s), Neighbor  ADL Assistance: Independent  Homemaking Assistance: Independent  Homemaking Responsibilities: Yes  Transfer Assistance: Independent  Active : No  Patient's  Info: brother or Dial-a-Ride  Occupation: On disability    Examination:  Observation: Pt was seated EOB with RT upon arrival, agreeable to session  Vision: WFL  Hearing: WFL  Objective Measures: stable vitals on 4L O2 supp, typically wears 2L O2 supp intermittently. Pt desat to ~88% and inc to 94% at rest.     Body Systems and functions:  ROM: WFL in BUE  Strength: 4/5 in BUE  Sensation: WFL  Tone: normotonic in BUE  Coordination: movements fluid and coordinated  Posture: normal posture  Activity Tolerance: Good     Activities of Daily Living (ADLs):  Feeding: IND   Grooming: SetupA   Toileting: SBA   UB dressing: SetupA   LB dressing: ModA (seated EOB)   UB bathing: James   LB bathing: James       *pt ADL function inferred from gross functional assessment of mobility, balance, posture, safety awareness, activity tolerance (unless otherwise indicated)    Cognitive and Psychosocial Functioning:  Overall cognitive status: WFL   Affect: normal     Balance:   Sitting: good   Standing: fair+     Functional Mobility:  Rolling Laterally in Bed: NT d/t received EOB   Supine to Sit: NT d/t received EOB   Sit to Stand: SBA from EOB     Ambulation: SBA using no AD ~75' with no LOB throughout. AM-PAC 6 click short form for inpatient daily activity:   How much help from another person does the patient currently need. ..  Unable  Dep A Lot  Max A A Lot   Mod A A Little  Min A A

## 2023-11-14 NOTE — PROGRESS NOTES
11/14/23 0018   NIV Type   $NIV $Daily Charge   NIV Started/Stopped On   Equipment Type v60   Mode Bilevel   Mask Type Full face mask   Mask Size Large   Bonnet size Large   Assessment   Pulse 94   Respirations 21   SpO2 98 %   Level of Consciousness 0   Comfort Level Good   Using Accessory Muscles No   Mask Compliance Good   Skin Protection for O2 Device N/A   Settings/Measurements   PIP Observed 15 cm H20   IPAP 15 cmH20   CPAP/EPAP 5 cmH2O   Vt (Measured) 980 mL   Rate Ordered 14   FiO2  75 %   I Time/ I Time % 1 s   Minute Volume (L/min) 15.3 Liters   Mask Leak (lpm) 5 lpm   Patient's Home Machine No   Alarm Settings   Alarms On Y   Low Pressure (cmH2O) 6 cmH2O   High Pressure (cmH2O) 40 cmH2O   Delay Alarm 20 sec(s)   Apnea (secs) 20 secs   RR Low (bpm) 4   RR High (bpm) 50 br/min   Patient Observation   Observations Pt resting in bed

## 2023-11-15 LAB
ANION GAP SERPL CALCULATED.3IONS-SCNC: 11 MMOL/L (ref 4–16)
BUN SERPL-MCNC: 13 MG/DL (ref 6–23)
CALCIUM SERPL-MCNC: 8.5 MG/DL (ref 8.3–10.6)
CHLORIDE BLD-SCNC: 90 MMOL/L (ref 99–110)
CO2: 35 MMOL/L (ref 21–32)
CREAT SERPL-MCNC: 0.5 MG/DL (ref 0.9–1.3)
GFR SERPL CREATININE-BSD FRML MDRD: >60 ML/MIN/1.73M2
GLUCOSE BLD-MCNC: 290 MG/DL (ref 70–99)
GLUCOSE BLD-MCNC: 295 MG/DL (ref 70–99)
GLUCOSE BLD-MCNC: 299 MG/DL (ref 70–99)
GLUCOSE BLD-MCNC: 346 MG/DL (ref 70–99)
GLUCOSE BLD-MCNC: 362 MG/DL (ref 70–99)
GLUCOSE SERPL-MCNC: 294 MG/DL (ref 70–99)
HCT VFR BLD CALC: 43.9 % (ref 42–52)
HEMOGLOBIN: 14 GM/DL (ref 13.5–18)
MAGNESIUM: 1.9 MG/DL (ref 1.8–2.4)
MCH RBC QN AUTO: 30.9 PG (ref 27–31)
MCHC RBC AUTO-ENTMCNC: 31.9 % (ref 32–36)
MCV RBC AUTO: 96.9 FL (ref 78–100)
PDW BLD-RTO: 13.9 % (ref 11.7–14.9)
PHOSPHORUS: 3.2 MG/DL (ref 2.5–4.9)
PLATELET # BLD: 148 K/CU MM (ref 140–440)
PMV BLD AUTO: 9.7 FL (ref 7.5–11.1)
POTASSIUM SERPL-SCNC: 3.8 MMOL/L (ref 3.5–5.1)
RBC # BLD: 4.53 M/CU MM (ref 4.6–6.2)
SODIUM BLD-SCNC: 136 MMOL/L (ref 135–145)
WBC # BLD: 12.6 K/CU MM (ref 4–10.5)

## 2023-11-15 PROCEDURE — 2580000003 HC RX 258: Performed by: STUDENT IN AN ORGANIZED HEALTH CARE EDUCATION/TRAINING PROGRAM

## 2023-11-15 PROCEDURE — 94640 AIRWAY INHALATION TREATMENT: CPT

## 2023-11-15 PROCEDURE — 94761 N-INVAS EAR/PLS OXIMETRY MLT: CPT

## 2023-11-15 PROCEDURE — 84100 ASSAY OF PHOSPHORUS: CPT

## 2023-11-15 PROCEDURE — 2500000003 HC RX 250 WO HCPCS: Performed by: STUDENT IN AN ORGANIZED HEALTH CARE EDUCATION/TRAINING PROGRAM

## 2023-11-15 PROCEDURE — 6360000002 HC RX W HCPCS: Performed by: STUDENT IN AN ORGANIZED HEALTH CARE EDUCATION/TRAINING PROGRAM

## 2023-11-15 PROCEDURE — 2700000000 HC OXYGEN THERAPY PER DAY

## 2023-11-15 PROCEDURE — 83735 ASSAY OF MAGNESIUM: CPT

## 2023-11-15 PROCEDURE — 80048 BASIC METABOLIC PNL TOTAL CA: CPT

## 2023-11-15 PROCEDURE — 82962 GLUCOSE BLOOD TEST: CPT

## 2023-11-15 PROCEDURE — 1200000000 HC SEMI PRIVATE

## 2023-11-15 PROCEDURE — 6370000000 HC RX 637 (ALT 250 FOR IP): Performed by: STUDENT IN AN ORGANIZED HEALTH CARE EDUCATION/TRAINING PROGRAM

## 2023-11-15 PROCEDURE — 6370000000 HC RX 637 (ALT 250 FOR IP): Performed by: NURSE PRACTITIONER

## 2023-11-15 PROCEDURE — 36415 COLL VENOUS BLD VENIPUNCTURE: CPT

## 2023-11-15 PROCEDURE — 85027 COMPLETE CBC AUTOMATED: CPT

## 2023-11-15 RX ORDER — INSULIN LISPRO 100 [IU]/ML
10 INJECTION, SOLUTION INTRAVENOUS; SUBCUTANEOUS
Status: DISCONTINUED | OUTPATIENT
Start: 2023-11-15 | End: 2023-11-16 | Stop reason: HOSPADM

## 2023-11-15 RX ORDER — INSULIN GLARGINE 100 [IU]/ML
20 INJECTION, SOLUTION SUBCUTANEOUS 2 TIMES DAILY
Status: DISCONTINUED | OUTPATIENT
Start: 2023-11-15 | End: 2023-11-16 | Stop reason: HOSPADM

## 2023-11-15 RX ADMIN — INSULIN GLARGINE 20 UNITS: 100 INJECTION, SOLUTION SUBCUTANEOUS at 21:23

## 2023-11-15 RX ADMIN — APIXABAN 5 MG: 5 TABLET, FILM COATED ORAL at 21:24

## 2023-11-15 RX ADMIN — INSULIN LISPRO 8 UNITS: 100 INJECTION, SOLUTION INTRAVENOUS; SUBCUTANEOUS at 08:20

## 2023-11-15 RX ADMIN — DILTIAZEM HYDROCHLORIDE 240 MG: 240 CAPSULE, COATED, EXTENDED RELEASE ORAL at 08:19

## 2023-11-15 RX ADMIN — APIXABAN 5 MG: 5 TABLET, FILM COATED ORAL at 08:19

## 2023-11-15 RX ADMIN — IPRATROPIUM BROMIDE AND ALBUTEROL SULFATE 1 DOSE: 2.5; .5 SOLUTION RESPIRATORY (INHALATION) at 11:49

## 2023-11-15 RX ADMIN — INSULIN LISPRO 8 UNITS: 100 INJECTION, SOLUTION INTRAVENOUS; SUBCUTANEOUS at 11:59

## 2023-11-15 RX ADMIN — ASPIRIN 81 MG: 81 TABLET, CHEWABLE ORAL at 08:19

## 2023-11-15 RX ADMIN — INSULIN LISPRO 10 UNITS: 100 INJECTION, SOLUTION INTRAVENOUS; SUBCUTANEOUS at 12:04

## 2023-11-15 RX ADMIN — INSULIN LISPRO 10 UNITS: 100 INJECTION, SOLUTION INTRAVENOUS; SUBCUTANEOUS at 16:40

## 2023-11-15 RX ADMIN — ROFLUMILAST 500 MCG: 500 TABLET ORAL at 08:20

## 2023-11-15 RX ADMIN — INSULIN LISPRO 12 UNITS: 100 INJECTION, SOLUTION INTRAVENOUS; SUBCUTANEOUS at 16:40

## 2023-11-15 RX ADMIN — DOXYCYCLINE 100 MG: 100 INJECTION, POWDER, LYOPHILIZED, FOR SOLUTION INTRAVENOUS at 06:26

## 2023-11-15 RX ADMIN — INSULIN GLARGINE 20 UNITS: 100 INJECTION, SOLUTION SUBCUTANEOUS at 10:20

## 2023-11-15 RX ADMIN — IPRATROPIUM BROMIDE AND ALBUTEROL SULFATE 1 DOSE: 2.5; .5 SOLUTION RESPIRATORY (INHALATION) at 08:42

## 2023-11-15 RX ADMIN — FUROSEMIDE 40 MG: 10 INJECTION, SOLUTION INTRAMUSCULAR; INTRAVENOUS at 08:20

## 2023-11-15 RX ADMIN — INSULIN LISPRO 5 UNITS: 100 INJECTION, SOLUTION INTRAVENOUS; SUBCUTANEOUS at 08:21

## 2023-11-15 RX ADMIN — FUROSEMIDE 40 MG: 10 INJECTION, SOLUTION INTRAMUSCULAR; INTRAVENOUS at 16:40

## 2023-11-15 RX ADMIN — ATORVASTATIN CALCIUM 40 MG: 40 TABLET, FILM COATED ORAL at 08:19

## 2023-11-15 RX ADMIN — SODIUM CHLORIDE 25 ML/HR: 9 INJECTION, SOLUTION INTRAVENOUS at 17:34

## 2023-11-15 RX ADMIN — IPRATROPIUM BROMIDE AND ALBUTEROL SULFATE 1 DOSE: 2.5; .5 SOLUTION RESPIRATORY (INHALATION) at 15:57

## 2023-11-15 RX ADMIN — SODIUM CHLORIDE, PRESERVATIVE FREE 10 ML: 5 INJECTION INTRAVENOUS at 08:21

## 2023-11-15 RX ADMIN — SODIUM CHLORIDE, PRESERVATIVE FREE 10 ML: 5 INJECTION INTRAVENOUS at 21:34

## 2023-11-15 RX ADMIN — DOXYCYCLINE 100 MG: 100 INJECTION, POWDER, LYOPHILIZED, FOR SOLUTION INTRAVENOUS at 17:35

## 2023-11-15 RX ADMIN — METHYLPREDNISOLONE SODIUM SUCCINATE 40 MG: 40 INJECTION, POWDER, FOR SOLUTION INTRAMUSCULAR; INTRAVENOUS at 08:20

## 2023-11-15 RX ADMIN — IPRATROPIUM BROMIDE AND ALBUTEROL SULFATE 1 DOSE: 2.5; .5 SOLUTION RESPIRATORY (INHALATION) at 20:26

## 2023-11-15 RX ADMIN — INSULIN LISPRO 4 UNITS: 100 INJECTION, SOLUTION INTRAVENOUS; SUBCUTANEOUS at 21:24

## 2023-11-15 ASSESSMENT — PAIN SCALES - GENERAL: PAINLEVEL_OUTOF10: 0

## 2023-11-15 ASSESSMENT — PAIN SCALES - WONG BAKER: WONGBAKER_NUMERICALRESPONSE: 0

## 2023-11-15 NOTE — PROGRESS NOTES
Physician Progress Note      PATIENT:               Yannick Hadley  CSN #:                  [id number]  :                       1955  ADMIT DATE:       2023 11:45 PM  [address] [facility] DATE:        2023 5:48 PM  RESPONDING  PROVIDER #:        Yodit Faulkner MD          QUERY TEXT:    Internal Medicine,    Patient admitted with  resp failure d/t CPD exacerbation and has PNA   documented by Pulmonology. A clinical validation query was sent to Pulmonology   with confirmation of PNA based on CXR results. Internal Medicine documents no   evidence of bacterial PNA. If possible, please document in progress notes and   discharge summary clarification regarding the presence of PNA. The medical record reflects the following:  Risk Factors: COPD  Clinical Indicators: Pulmonology confirmed presence of bilateral PNA with CXR   as supporting evidence per query response, Internal Med documents no evidence   of bacterial PNA  Treatment: labs, imaging, O2, supportive care, steroids, - Levaquin, Duonebs    Thank you,  Steve Wu RN CDS  [phone]  Options provided:  -- Bacterial PNA confirmed  -- Bacterial PNA ruled out  -- Other PNA, please document PNA type. -- Defer to Pulmonologist documentation regarding  PNA  -- Other - I will add my own diagnosis  -- Disagree - Not applicable / Not valid  -- Disagree - Clinically unable to determine / Unknown  -- Refer to Clinical Documentation Reviewer    PROVIDER RESPONSE TEXT:    After study, bacterial PNA ruled out.     Query created by: Steve Wu on 2023 11:50 AM      Electronically signed by:  Yodit Faulkner MD 11/15/2023 2:37 PM

## 2023-11-15 NOTE — PROGRESS NOTES
09/14/23 1147   Spirometry Assessment   FEV1 (%PRED) 38   Post Bronchodilator FEV/FVC 36   COPD Exacerbations in last year 5    L/min   COPD Assessment (CAT Score)   Cough Assessment 0   Phlegm Assessment 1   Chest tightness 1   Walking on an incline 3   Home Activities 2   Confident Leaving The Home 1   Sleeping Soundly 0   Have Energy 2   Assessment Score 10   $RT COPD Assessment Yes   GOLD Staging   Gold Grade 3   Group Group E      Current GOLD classification for Bam Segal        GOLD Stage:  Gold ndGndrndanddndend:nd nd2nd Group: Group E  Recorded domestic exacerbations past 12 months: 5  Current recorded COPD Assessment Tool (CAT) score of 10  Current eosinophil count: 0     Inhaler Device     Acceptable for Use   Respimat   Not Breath Actuated Yes   MDI   Not Breath Actuated Yes                DPI  Observed PIF   using  In-Check Meter    Optimal PIF    Acceptable for Use   HANDIHALER 120 >30 YES   Pressair 120 >45 YES   NEOHALER 120 >50 YES   Diskus 120 >60 YES   ELLIPTA 120 >60 YES      Records show Bam Segal was using Symbicort as maintenance therapy prior to admission. Will request starting patient on maintenance LAMA for maintenance.           LONG-ACTING (LABA)   Arformoterol (Brovana) NEBULIZER   Indacaterol (Arcapta) NEOHALER   Olodaterol (Striverdi) Respimat   Salmeterol (Serevent) MDI, DISKUS   LONG-ACTING (LAMA)   Aclidinium bromide (Tudorza) PRESSAIR   Glycopyrronium bromide Morris Griffin) NEOHALER   Tiotropium (Spiriva) Respimat, HANDIHALER   Umeclidinium (Incruse) ELLIPTA   (LABA/LAMA)   Formoterol/glycopyrronium (Bevespi) MDI   Indacaterol/glycopyrronium (Utibron) NEOHALER   Vilanterol/umeclidinium (Anoro) ELLIPTA   Olodaterol/tiotropium (Stiolto) Respimat   (LABA/ICS)   Formoterol/budesomide (Symbicort) MDI   Formoterol/mometasone (Dulera) MDI   Salmeterol/fluticasone (Advair) MDI, DISKUS   Vilanterol/fluticasone (Breo) ELLIPTA   (LABA/LAMA/ICS)   Fluticasone/umeclidinium/vilanterol

## 2023-11-15 NOTE — PROGRESS NOTES
V2.0  [facility] Hospitalist Progress Note      Name:  Anthony Gasca /Age/Sex: 1955  (79 y.o. male)   MRN & CSN:  [id number] & [id number] Encounter Date/Time: 11/15/2023 10:44 AM EST    Location:  -A PCP: Berlinda Claude, MD       Hospital Day: 3    Assessment and Plan:   Anthony Gasca is a 79 y.o. male with a pmh of HFpEF A-fib on Eliquis type 2 diabetes COPD on 2 to 3 L oxygen at home who presents with COPD exacerbation [facility]     Hospital Problems               Last Modified POA     * (Principal) COPD exacerbation ([address]) 2023 Yes         Acute on chronic hypoxic hypercapnic respiratory failure in the setting of COPD exacerbation: Some component of CHF exacerbation? We did IV Lasix and trend troponins x2 keeping in mind the fact that the BNP is elevated at 1500. Could be nonspecific. Started the patient on Solu-Medrol and DuoNebs. Was on BiPAP with a PCO2 of around 80, improving. Continue to monitor. Is doing better currently on baseline oxygen but patient still feels very weak and wheezy. Consider discharge tomorrow with home health care  End-stage Gold classification D COPD  Non-insulin-dependent diabetes mellitus type 2 start the patient on sliding scale insulin with hypoglycemia protocol. Start the patient on Lantus as well. Blood sugars were still above 300 started the patient on 5 units of Humalog. He with meals  Paroxysmal atrial fibrillation on Cardizem and Eliquis. Continue both. Currently rate controlled in sinus rhythm  Obstructive sleep apnea not using CPAP at home    Comment: Please note this report has been produced using speech recognition software and may contain errors related to that system including errors in grammar, punctuation, and spelling, as well as words and phrases that may be inappropriate. If there are any questions or concerns please feel free to contact the dictating provider for clarification. Diet ADULT DIET;  Regular; 5 carb choices (75

## 2023-11-15 NOTE — PROGRESS NOTES
Pt ok to have a second brkfst tray per MD.   Pt also stating during shift report that he wasn't sent home on oxygen, upon reviewing previous DC instructions he was set home on 2 LPM and wears 2 at baseline. Pt also states he only wears cpap occasionally while at home.

## 2023-11-16 ENCOUNTER — CARE COORDINATION (OUTPATIENT)
Dept: CARE COORDINATION | Age: 68
End: 2023-11-16

## 2023-11-16 VITALS
HEIGHT: 78 IN | RESPIRATION RATE: 18 BRPM | SYSTOLIC BLOOD PRESSURE: 109 MMHG | BODY MASS INDEX: 36.45 KG/M2 | OXYGEN SATURATION: 97 % | TEMPERATURE: 98.1 F | HEART RATE: 80 BPM | DIASTOLIC BLOOD PRESSURE: 60 MMHG | WEIGHT: 315 LBS

## 2023-11-16 LAB
ANION GAP SERPL CALCULATED.3IONS-SCNC: 7 MMOL/L (ref 4–16)
BUN SERPL-MCNC: 15 MG/DL (ref 6–23)
CALCIUM SERPL-MCNC: 8.5 MG/DL (ref 8.3–10.6)
CHLORIDE BLD-SCNC: 93 MMOL/L (ref 99–110)
CO2: 36 MMOL/L (ref 21–32)
CREAT SERPL-MCNC: 0.6 MG/DL (ref 0.9–1.3)
GFR SERPL CREATININE-BSD FRML MDRD: >60 ML/MIN/1.73M2
GLUCOSE BLD-MCNC: 228 MG/DL (ref 70–99)
GLUCOSE BLD-MCNC: 265 MG/DL (ref 70–99)
GLUCOSE SERPL-MCNC: 301 MG/DL (ref 70–99)
HCT VFR BLD CALC: 43.8 % (ref 42–52)
HEMOGLOBIN: 14.4 GM/DL (ref 13.5–18)
MAGNESIUM: 1.7 MG/DL (ref 1.8–2.4)
MCH RBC QN AUTO: 31 PG (ref 27–31)
MCHC RBC AUTO-ENTMCNC: 32.9 % (ref 32–36)
MCV RBC AUTO: 94.4 FL (ref 78–100)
PDW BLD-RTO: 13.8 % (ref 11.7–14.9)
PHOSPHORUS: 3.6 MG/DL (ref 2.5–4.9)
PLATELET # BLD: 153 K/CU MM (ref 140–440)
PMV BLD AUTO: 9.7 FL (ref 7.5–11.1)
POTASSIUM SERPL-SCNC: 4 MMOL/L (ref 3.5–5.1)
RBC # BLD: 4.64 M/CU MM (ref 4.6–6.2)
SODIUM BLD-SCNC: 136 MMOL/L (ref 135–145)
WBC # BLD: 10.9 K/CU MM (ref 4–10.5)

## 2023-11-16 PROCEDURE — 2500000003 HC RX 250 WO HCPCS: Performed by: STUDENT IN AN ORGANIZED HEALTH CARE EDUCATION/TRAINING PROGRAM

## 2023-11-16 PROCEDURE — 6370000000 HC RX 637 (ALT 250 FOR IP): Performed by: STUDENT IN AN ORGANIZED HEALTH CARE EDUCATION/TRAINING PROGRAM

## 2023-11-16 PROCEDURE — 85027 COMPLETE CBC AUTOMATED: CPT

## 2023-11-16 PROCEDURE — 36415 COLL VENOUS BLD VENIPUNCTURE: CPT

## 2023-11-16 PROCEDURE — 94660 CPAP INITIATION&MGMT: CPT

## 2023-11-16 PROCEDURE — 83735 ASSAY OF MAGNESIUM: CPT

## 2023-11-16 PROCEDURE — 80048 BASIC METABOLIC PNL TOTAL CA: CPT

## 2023-11-16 PROCEDURE — 6370000000 HC RX 637 (ALT 250 FOR IP): Performed by: NURSE PRACTITIONER

## 2023-11-16 PROCEDURE — 82962 GLUCOSE BLOOD TEST: CPT

## 2023-11-16 PROCEDURE — 84100 ASSAY OF PHOSPHORUS: CPT

## 2023-11-16 PROCEDURE — 2700000000 HC OXYGEN THERAPY PER DAY

## 2023-11-16 PROCEDURE — 94761 N-INVAS EAR/PLS OXIMETRY MLT: CPT

## 2023-11-16 PROCEDURE — 2580000003 HC RX 258: Performed by: STUDENT IN AN ORGANIZED HEALTH CARE EDUCATION/TRAINING PROGRAM

## 2023-11-16 PROCEDURE — 6360000002 HC RX W HCPCS: Performed by: STUDENT IN AN ORGANIZED HEALTH CARE EDUCATION/TRAINING PROGRAM

## 2023-11-16 PROCEDURE — 94640 AIRWAY INHALATION TREATMENT: CPT

## 2023-11-16 RX ORDER — MAGNESIUM SULFATE IN WATER 40 MG/ML
2000 INJECTION, SOLUTION INTRAVENOUS ONCE
Status: COMPLETED | OUTPATIENT
Start: 2023-11-16 | End: 2023-11-16

## 2023-11-16 RX ORDER — PREDNISONE 20 MG/1
40 TABLET ORAL DAILY
Qty: 8 TABLET | Refills: 0 | Status: SHIPPED | OUTPATIENT
Start: 2023-11-16 | End: 2023-11-20

## 2023-11-16 RX ORDER — DOXYCYCLINE HYCLATE 100 MG
100 TABLET ORAL EVERY 12 HOURS SCHEDULED
Qty: 8 TABLET | Refills: 0 | Status: SHIPPED | OUTPATIENT
Start: 2023-11-16 | End: 2023-11-20

## 2023-11-16 RX ORDER — DOXYCYCLINE HYCLATE 100 MG
100 TABLET ORAL EVERY 12 HOURS SCHEDULED
Status: DISCONTINUED | OUTPATIENT
Start: 2023-11-16 | End: 2023-11-16 | Stop reason: HOSPADM

## 2023-11-16 RX ORDER — FUROSEMIDE 40 MG/1
40 TABLET ORAL 2 TIMES DAILY
Qty: 30 TABLET | Refills: 2 | Status: SHIPPED | OUTPATIENT
Start: 2023-11-16 | End: 2023-11-17 | Stop reason: SDUPTHER

## 2023-11-16 RX ORDER — CYCLOBENZAPRINE HCL 10 MG
10 TABLET ORAL 3 TIMES DAILY PRN
Status: DISCONTINUED | OUTPATIENT
Start: 2023-11-16 | End: 2023-11-16 | Stop reason: HOSPADM

## 2023-11-16 RX ADMIN — SODIUM CHLORIDE, PRESERVATIVE FREE 10 ML: 5 INJECTION INTRAVENOUS at 08:35

## 2023-11-16 RX ADMIN — DILTIAZEM HYDROCHLORIDE 240 MG: 240 CAPSULE, COATED, EXTENDED RELEASE ORAL at 08:34

## 2023-11-16 RX ADMIN — FUROSEMIDE 40 MG: 10 INJECTION, SOLUTION INTRAMUSCULAR; INTRAVENOUS at 08:34

## 2023-11-16 RX ADMIN — METHYLPREDNISOLONE SODIUM SUCCINATE 40 MG: 40 INJECTION, POWDER, FOR SOLUTION INTRAMUSCULAR; INTRAVENOUS at 08:34

## 2023-11-16 RX ADMIN — IPRATROPIUM BROMIDE AND ALBUTEROL SULFATE 1 DOSE: 2.5; .5 SOLUTION RESPIRATORY (INHALATION) at 11:21

## 2023-11-16 RX ADMIN — ASPIRIN 81 MG: 81 TABLET, CHEWABLE ORAL at 08:34

## 2023-11-16 RX ADMIN — ROFLUMILAST 500 MCG: 500 TABLET ORAL at 08:33

## 2023-11-16 RX ADMIN — INSULIN LISPRO 10 UNITS: 100 INJECTION, SOLUTION INTRAVENOUS; SUBCUTANEOUS at 08:35

## 2023-11-16 RX ADMIN — APIXABAN 5 MG: 5 TABLET, FILM COATED ORAL at 08:33

## 2023-11-16 RX ADMIN — INSULIN LISPRO 10 UNITS: 100 INJECTION, SOLUTION INTRAVENOUS; SUBCUTANEOUS at 12:08

## 2023-11-16 RX ADMIN — DOXYCYCLINE 100 MG: 100 INJECTION, POWDER, LYOPHILIZED, FOR SOLUTION INTRAVENOUS at 05:17

## 2023-11-16 RX ADMIN — MAGNESIUM SULFATE HEPTAHYDRATE 2000 MG: 40 INJECTION, SOLUTION INTRAVENOUS at 08:40

## 2023-11-16 RX ADMIN — IPRATROPIUM BROMIDE AND ALBUTEROL SULFATE 1 DOSE: 2.5; .5 SOLUTION RESPIRATORY (INHALATION) at 07:54

## 2023-11-16 RX ADMIN — DOXYCYCLINE HYCLATE 100 MG: 100 TABLET, COATED ORAL at 08:34

## 2023-11-16 RX ADMIN — INSULIN LISPRO 8 UNITS: 100 INJECTION, SOLUTION INTRAVENOUS; SUBCUTANEOUS at 12:07

## 2023-11-16 RX ADMIN — INSULIN LISPRO 4 UNITS: 100 INJECTION, SOLUTION INTRAVENOUS; SUBCUTANEOUS at 08:35

## 2023-11-16 RX ADMIN — INSULIN GLARGINE 20 UNITS: 100 INJECTION, SOLUTION SUBCUTANEOUS at 08:34

## 2023-11-16 RX ADMIN — ATORVASTATIN CALCIUM 40 MG: 40 TABLET, FILM COATED ORAL at 08:34

## 2023-11-16 RX ADMIN — SODIUM CHLORIDE 25 ML/HR: 9 INJECTION, SOLUTION INTRAVENOUS at 08:39

## 2023-11-16 RX ADMIN — CYCLOBENZAPRINE 10 MG: 10 TABLET, FILM COATED ORAL at 05:55

## 2023-11-16 ASSESSMENT — PAIN DESCRIPTION - ORIENTATION: ORIENTATION: RIGHT

## 2023-11-16 ASSESSMENT — PAIN SCALES - WONG BAKER: WONGBAKER_NUMERICALRESPONSE: 0

## 2023-11-16 ASSESSMENT — PAIN DESCRIPTION - LOCATION: LOCATION: LEG

## 2023-11-16 NOTE — PROGRESS NOTES
11/16/23 0029   NIV Type   $NIV $Daily Charge   NIV Started/Stopped On   Equipment Type v60   Mode Bilevel   Mask Type Full face mask   Mask Size Large   Bonnet size Large   Assessment   Respirations 15   SpO2 96 %   Level of Consciousness 0   Comfort Level Good   Using Accessory Muscles No   Mask Compliance Good   Settings/Measurements   PIP Observed 16 cm H20   IPAP 15 cmH20   CPAP/EPAP 5 cmH2O   Vt (Measured) 1335 mL   Rate Ordered 14   FiO2  75 %   I Time/ I Time % 1 s   Minute Volume (L/min) 20.6 Liters   Mask Leak (lpm) 25 lpm   Patient's Home Machine No   Alarm Settings   Alarms On Y   Low Pressure (cmH2O) 6 cmH2O   High Pressure (cmH2O) 40 cmH2O   Delay Alarm 20 sec(s)   Apnea (secs) 20 secs   RR Low (bpm) 13   RR High (bpm) 40 br/min

## 2023-11-16 NOTE — CARE COORDINATION
VIELKA conducted chart review and it appears Pt is being discharged home from IP at [facility] on this date. Attempted phone call to Mike to follow up regarding referral for visiting physician. No answer, vm message left requesting return call for update on referral status. Received return call from [facility] with Mike who reported Pt is scheduled for initial appointment with visiting physician for 11/21 @ 12:30pm.     VIELKA plan of care: VIELKA will make next outreach to Pt on 11/17 to remind him of visiting physician appointment scheduled for 11/21.

## 2023-11-16 NOTE — PROGRESS NOTES
This tech entered patients room to do purposeful rounding, patient made comment to this tech that the hospital is supposed to discharge him today and that once he gets discharged he will be right back in tonight. RN, charge RN, and manager notified.

## 2023-11-16 NOTE — DISCHARGE INSTRUCTIONS
Follow up with all doctors as ordered  Continue all medications as prescribed  Activety as tolerated  Continue diabetic diet

## 2023-11-16 NOTE — PROGRESS NOTES
RN entered patients room to administer medication, patient made a statement about being kicked out today and not even knowing where he would go. Patient asked why he couldn't go back home, pt states because no one is there and he isnt able to do a lot of things for himself. Then pt stated it didn't really matter anyways as he would be back tonight. Charge, manager and case management updated.

## 2023-11-16 NOTE — DISCHARGE INSTR - DIET

## 2023-11-16 NOTE — DISCHARGE SUMMARY
atelectasis. No pneumothorax. No gross bony abnormality. New small left pleural effusion. CBC:   Recent Labs     11/14/23  0407 11/15/23  0438 11/16/23  0041   WBC 11.3* 12.6* 10.9*   HGB 14.5 14.0 14.4    148 153     BMP:    Recent Labs     11/14/23  0407 11/15/23  0438 11/16/23  0041   * 136 136   K 4.3 3.8 4.0   CL 86* 90* 93*   CO2 36* 35* 36*   BUN 17 13 15   CREATININE 0.7* 0.5* 0.6*   GLUCOSE 399* 294* 301*     Hepatic:   Recent Labs     11/13/23  1328   AST 26   ALT 46*   BILITOT 0.5   ALKPHOS 81     Lipids:   Lab Results   Component Value Date/Time    CHOL 159 03/01/2023 08:34 AM    CHOL 112 12/15/2021 01:35 PM    HDL 29 03/01/2023 08:34 AM    TRIG 182 03/01/2023 08:34 AM     Hemoglobin A1C:   Lab Results   Component Value Date/Time    LABA1C 10.2 11/13/2023 01:00 PM     TSH:   Lab Results   Component Value Date/Time    TSH 2.15 03/06/2019 10:22 AM     Troponin:   Lab Results   Component Value Date/Time    TROPONINT <0.010 09/18/2023 12:45 AM    TROPONINT <0.010 09/13/2023 07:15 AM    TROPONINT <0.010 08/14/2023 09:30 AM     Lactic Acid: No results for input(s): \"LACTA\" in the last 72 hours.   BNP:   Recent Labs     11/13/23  1328   PROBNP 1,649*     UA:  Lab Results   Component Value Date/Time    NITRU NEGATIVE 10/18/2023 12:43 PM    COLORU YELLOW 10/18/2023 12:43 PM    WBCUA <1 09/13/2023 08:09 AM    RBCUA 0 09/13/2023 08:09 AM    MUCUS RARE 11/07/2018 04:30 PM    TRICHOMONAS NONE SEEN 09/13/2023 08:09 AM    BACTERIA NEGATIVE 09/13/2023 08:09 AM    CLARITYU CLEAR 10/18/2023 12:43 PM    SPECGRAV <1.005 10/18/2023 12:43 PM    LEUKOCYTESUR NEGATIVE 10/18/2023 12:43 PM    UROBILINOGEN 0.2 10/18/2023 12:43 PM    BILIRUBINUR NEGATIVE 10/18/2023 12:43 PM    BLOODU NEGATIVE 10/18/2023 12:43 PM    KETUA NEGATIVE 10/18/2023 12:43 PM     Urine Cultures: No results found for: \"LABURIN\"  Blood Cultures: No results found for: \"BC\"  No results found for: \"BLOODCULT2\"  Organism: No results found

## 2023-11-16 NOTE — PROGRESS NOTES
AVS reviewed with patient, all questions answered. Pt educated on wearing oxygen at home, pt voiced understanding stating that he wore it occasionally at home, pt reminded that instructions were for pt to be wearing it all the time. Pt again voiced understanding. Pt ambulated to bathroom by self without difficulty, mumbling about how its hard for him to walk and that he cant believe he is being sent home. Pt pink, warm and dry upon DC, LDA removed, pt verified that he had his credit card and important belongings.

## 2023-11-16 NOTE — PROGRESS NOTES
11/16/23 1111   Encounter Summary   Encounter Overview/Reason  Initial Encounter   Service Provided For: Patient   Referral/Consult From: [address] Family members   Last Encounter  11/16/23  Corrie Sagastume is man of varinder; excited about going home today with a little hesitancy.   A time of prayer was observed.)   Complexity of Encounter Low   Begin Time 1050   End Time  1112   Total Time Calculated 22 min   Spiritual/Emotional needs   Type Spiritual Support   Assessment/Intervention/Outcome   Assessment Calm;Coping   Intervention Active listening;Discussed relationship with God;Nurtured Hope;Prayer (assurance of)/[place];Sustaining Presence/Ministry of presence   Outcome Comfort;Encouraged;Engaged in conversation;Expressed feelings, needs, and concerns;Expressed Gratitude   Plan and Referrals   Plan/Referrals Continue Support (comment)  (Patient informed how to contact )

## 2023-11-16 NOTE — PROGRESS NOTES
Patient did not receive full dose of doxycycline d/t him not tolerating the infusion. Attempted to lower rate of infusion. Patient stated after a few minutes of rate change, \" take this out or ill do it myself. \" Patient did not feel any pain or burning when flushing his line with NS. Provider was perfect served.

## 2023-11-16 NOTE — PROGRESS NOTES
Outpatient Pharmacy Progress Note for Meds-to-Beds    Total number of Prescriptions Filled: 2  The following medications were dispensed to the patient during the discharge process:  Doxycycline  Prednisone     Additional Documentation:  Medication(s) were delivered to the patient's room prior to discharge by a volunteer  The following prescription(s) were refilled to soon per insurance (patient has some at home): Furosemide (can be refilled on 11/30/2023)      Thank you for letting us serve your patients.   [address]    [address] [place] [address], [address] [place] [address]    Phone: [phone]    Fax: [phone]

## 2023-11-16 NOTE — PROGRESS NOTES
Per charge and case management plan is to continue with DC, patient has been up, ambulating in room and toileting self without staff assistance without difficulty.

## 2023-11-17 ENCOUNTER — CARE COORDINATION (OUTPATIENT)
Dept: CARE COORDINATION | Age: 68
End: 2023-11-17

## 2023-11-17 ENCOUNTER — CARE COORDINATION (OUTPATIENT)
Dept: CASE MANAGEMENT | Age: 68
End: 2023-11-17

## 2023-11-17 RX ORDER — FUROSEMIDE 40 MG/1
40 TABLET ORAL 2 TIMES DAILY
Qty: 30 TABLET | Refills: 0 | Status: SHIPPED | OUTPATIENT
Start: 2023-11-17

## 2023-11-17 NOTE — CARE COORDINATION
Patient Current Location: Home: [address] [facility] [address] [facility]     Phone call to Pt to discuss upcoming visiting physician appointments scheduled for Tuesday Nov. 21 @ 12:30pm. Pt stated he was not aware and is in agreement with Visiting Physician appointment as transportation is a barrier currently. Pt reported he does not have food currently, but his friend will assist him by tomorrow with completing [facility] food delivery order. Pt declined for this SW to call to make referral for MOW and declined for this SW to contact [facility] to determine if they send post-hospital meal for 2 weeks. Pt reported he is not poor, but does need assistance with ordering as he has visual impairments. SW plan of care: SW will follow up with Pt regarding Visiting physician on 12/1 and will discuss AL placement.

## 2023-11-17 NOTE — CARE COORDINATION
[facility], resumption of care 11/18/23. Reviewed AVS, DC meds & follow up appts. Taking all meds as prescribed. Emphasized importance of HFU appt needed within 7 days. As above Visiting Physicians appt 11/21/23. Care Transition Nurse reviewed discharge instructions, medical action plan, and red flags with patient who verbalized understanding. The patient was given an opportunity to ask questions and does not have any further questions or concerns at this time. Were discharge instructions available to patient? Yes. Reviewed appropriate site of care based on symptoms and resources available to patient including: PCP  Specialist  Urgent care clinics  Home health  When to call Krishan Davidson. The patient agrees to contact the PCP office for questions related to their healthcare. Advance Care Planning:   Does patient have an Advance Directive: reviewed and current. Medication reconciliation was performed with patient, who verbalizes understanding of administration of home medications. Medications reviewed, 1111F entered: yes    Was patient discharged with a pulse oximeter? yes, discussed and confirmed pulse oximeter discharge instructions and when to notify provider or seek emergency care.     Non-face-to-face services provided:  Obtained and reviewed discharge summary and/or continuity of care documents  Communication with home health agencies or other community services the patient is currently using-[facility] kanchan  Education of patient/family/caregiver/guardian to support self-management-COPD  Assessment and support for treatment adherence and medication management-med review    Offered patient enrollment in the Remote Patient Monitoring (RPM) program for in-home monitoring: Patient is not eligible for RPM program. PCP    Care Transitions 24 Hour Call    Schedule Follow Up Appointment with PCP: Completed  Do you have a copy of your discharge instructions?: Yes  Do you have all of your

## 2023-11-20 ENCOUNTER — APPOINTMENT (OUTPATIENT)
Dept: GENERAL RADIOLOGY | Age: 68
DRG: 190 | End: 2023-11-20
Payer: MEDICARE

## 2023-11-20 ENCOUNTER — HOSPITAL ENCOUNTER (INPATIENT)
Age: 68
LOS: 7 days | Discharge: NURSING, REHAB OR LONG-TERM CARE | DRG: 190 | End: 2023-11-27
Attending: STUDENT IN AN ORGANIZED HEALTH CARE EDUCATION/TRAINING PROGRAM | Admitting: STUDENT IN AN ORGANIZED HEALTH CARE EDUCATION/TRAINING PROGRAM
Payer: MEDICARE

## 2023-11-20 DIAGNOSIS — J96.11 CHRONIC HYPOXIC RESPIRATORY FAILURE (HCC): ICD-10-CM

## 2023-11-20 DIAGNOSIS — R79.89 ELEVATED BRAIN NATRIURETIC PEPTIDE (BNP) LEVEL: ICD-10-CM

## 2023-11-20 DIAGNOSIS — E87.20 LACTIC ACIDOSIS: ICD-10-CM

## 2023-11-20 DIAGNOSIS — I48.0 PAF (PAROXYSMAL ATRIAL FIBRILLATION) (HCC): ICD-10-CM

## 2023-11-20 DIAGNOSIS — J44.1 COPD EXACERBATION (HCC): Primary | ICD-10-CM

## 2023-11-20 DIAGNOSIS — R79.89 ELEVATED TROPONIN: ICD-10-CM

## 2023-11-20 LAB
ALBUMIN SERPL-MCNC: 3.8 GM/DL (ref 3.4–5)
ALP BLD-CCNC: 82 IU/L (ref 40–129)
ALT SERPL-CCNC: 58 U/L (ref 10–40)
ANION GAP SERPL CALCULATED.3IONS-SCNC: 10 MMOL/L (ref 4–16)
AST SERPL-CCNC: 16 IU/L (ref 15–37)
B PARAP IS1001 DNA NPH QL NAA+NON-PROBE: NOT DETECTED
B PERT.PT PRMT NPH QL NAA+NON-PROBE: NOT DETECTED
BASE EXCESS MIXED: 12 (ref 0–1.2)
BASOPHILS ABSOLUTE: 0 K/CU MM
BASOPHILS RELATIVE PERCENT: 0.1 % (ref 0–1)
BILIRUB SERPL-MCNC: 0.4 MG/DL (ref 0–1)
BILIRUBIN URINE: NEGATIVE MG/DL
BLOOD, URINE: NEGATIVE
BUN SERPL-MCNC: 17 MG/DL (ref 6–23)
C PNEUM DNA NPH QL NAA+NON-PROBE: NOT DETECTED
CALCIUM SERPL-MCNC: 8.5 MG/DL (ref 8.3–10.6)
CHLORIDE BLD-SCNC: 93 MMOL/L (ref 99–110)
CLARITY: CLEAR
CO2: 34 MMOL/L (ref 21–32)
COLOR: YELLOW
COMMENT UA: ABNORMAL
COMMENT: ABNORMAL
CREAT SERPL-MCNC: 0.6 MG/DL (ref 0.9–1.3)
DIFFERENTIAL TYPE: ABNORMAL
EOSINOPHILS ABSOLUTE: 0 K/CU MM
EOSINOPHILS RELATIVE PERCENT: 0.1 % (ref 0–3)
FLUAV H1 2009 PAN RNA NPH NAA+NON-PROBE: NOT DETECTED
FLUAV H1 RNA NPH QL NAA+NON-PROBE: NOT DETECTED
FLUAV H3 RNA NPH QL NAA+NON-PROBE: NOT DETECTED
FLUAV RNA NPH QL NAA+NON-PROBE: NOT DETECTED
FLUBV RNA NPH QL NAA+NON-PROBE: NOT DETECTED
GFR SERPL CREATININE-BSD FRML MDRD: >60 ML/MIN/1.73M2
GLUCOSE BLD-MCNC: 432 MG/DL (ref 70–99)
GLUCOSE SERPL-MCNC: 432 MG/DL (ref 70–99)
GLUCOSE, URINE: >1000 MG/DL
HADV DNA NPH QL NAA+NON-PROBE: NOT DETECTED
HCO3 VENOUS: 40.7 MMOL/L (ref 19–25)
HCOV 229E RNA NPH QL NAA+NON-PROBE: NOT DETECTED
HCOV HKU1 RNA NPH QL NAA+NON-PROBE: NOT DETECTED
HCOV NL63 RNA NPH QL NAA+NON-PROBE: NOT DETECTED
HCOV OC43 RNA NPH QL NAA+NON-PROBE: NOT DETECTED
HCT VFR BLD CALC: 51.6 % (ref 42–52)
HEMOGLOBIN: 16.7 GM/DL (ref 13.5–18)
HMPV RNA NPH QL NAA+NON-PROBE: NOT DETECTED
HPIV1 RNA NPH QL NAA+NON-PROBE: NOT DETECTED
HPIV2 RNA NPH QL NAA+NON-PROBE: NOT DETECTED
HPIV3 RNA NPH QL NAA+NON-PROBE: NOT DETECTED
HPIV4 RNA NPH QL NAA+NON-PROBE: NOT DETECTED
IMMATURE NEUTROPHIL %: 0.9 % (ref 0–0.43)
KETONES, URINE: NEGATIVE MG/DL
LACTIC ACID, SEPSIS: 2.8 MMOL/L (ref 0.4–2)
LACTIC ACID, SEPSIS: 2.9 MMOL/L (ref 0.4–2)
LEUKOCYTE ESTERASE, URINE: NEGATIVE
LYMPHOCYTES ABSOLUTE: 0.7 K/CU MM
LYMPHOCYTES RELATIVE PERCENT: 5.1 % (ref 24–44)
M PNEUMO DNA NPH QL NAA+NON-PROBE: NOT DETECTED
MCH RBC QN AUTO: 30.9 PG (ref 27–31)
MCHC RBC AUTO-ENTMCNC: 32.4 % (ref 32–36)
MCV RBC AUTO: 95.6 FL (ref 78–100)
MONOCYTES ABSOLUTE: 0.4 K/CU MM
MONOCYTES RELATIVE PERCENT: 3.3 % (ref 0–4)
NITRITE URINE, QUANTITATIVE: NEGATIVE
NUCLEATED RBC %: 0 %
O2 SAT, VEN: 40.4 % (ref 50–70)
PCO2, VEN: 72 MMHG (ref 38–52)
PDW BLD-RTO: 13.7 % (ref 11.7–14.9)
PH VENOUS: 7.36 (ref 7.32–7.42)
PH, URINE: 5.5 (ref 5–8)
PLATELET # BLD: 156 K/CU MM (ref 140–440)
PMV BLD AUTO: 10.1 FL (ref 7.5–11.1)
PO2, VEN: 23 MMHG (ref 28–48)
POTASSIUM SERPL-SCNC: 4.8 MMOL/L (ref 3.5–5.1)
PRO-BNP: 654.1 PG/ML
PROCALCITONIN SERPL-MCNC: 0.04 NG/ML
PROTEIN UA: NEGATIVE MG/DL
RBC # BLD: 5.4 M/CU MM (ref 4.6–6.2)
RSV RNA NPH QL NAA+NON-PROBE: NOT DETECTED
RV+EV RNA NPH QL NAA+NON-PROBE: NOT DETECTED
SARS-COV-2 RNA NPH QL NAA+NON-PROBE: NOT DETECTED
SEGMENTED NEUTROPHILS ABSOLUTE COUNT: 12.2 K/CU MM
SEGMENTED NEUTROPHILS RELATIVE PERCENT: 90.5 % (ref 36–66)
SODIUM BLD-SCNC: 137 MMOL/L (ref 135–145)
SPECIFIC GRAVITY UA: 1.02 (ref 1–1.03)
TOTAL IMMATURE NEUTOROPHIL: 0.12 K/CU MM
TOTAL NUCLEATED RBC: 0 K/CU MM
TOTAL PROTEIN: 5.8 GM/DL (ref 6.4–8.2)
TROPONIN, HIGH SENSITIVITY: 30 NG/L (ref 0–22)
TROPONIN, HIGH SENSITIVITY: 33 NG/L (ref 0–22)
UROBILINOGEN, URINE: 0.2 MG/DL (ref 0.2–1)
WBC # BLD: 13.5 K/CU MM (ref 4–10.5)

## 2023-11-20 PROCEDURE — 87502 INFLUENZA DNA AMP PROBE: CPT

## 2023-11-20 PROCEDURE — 80053 COMPREHEN METABOLIC PANEL: CPT

## 2023-11-20 PROCEDURE — 85025 COMPLETE CBC W/AUTO DIFF WBC: CPT

## 2023-11-20 PROCEDURE — 83605 ASSAY OF LACTIC ACID: CPT

## 2023-11-20 PROCEDURE — 6370000000 HC RX 637 (ALT 250 FOR IP): Performed by: PHYSICIAN ASSISTANT

## 2023-11-20 PROCEDURE — 81003 URINALYSIS AUTO W/O SCOPE: CPT

## 2023-11-20 PROCEDURE — 93005 ELECTROCARDIOGRAM TRACING: CPT | Performed by: PHYSICIAN ASSISTANT

## 2023-11-20 PROCEDURE — 94644 CONT INHLJ TX 1ST HOUR: CPT

## 2023-11-20 PROCEDURE — 96375 TX/PRO/DX INJ NEW DRUG ADDON: CPT

## 2023-11-20 PROCEDURE — 87086 URINE CULTURE/COLONY COUNT: CPT

## 2023-11-20 PROCEDURE — 83880 ASSAY OF NATRIURETIC PEPTIDE: CPT

## 2023-11-20 PROCEDURE — 94640 AIRWAY INHALATION TREATMENT: CPT

## 2023-11-20 PROCEDURE — 84484 ASSAY OF TROPONIN QUANT: CPT

## 2023-11-20 PROCEDURE — 6360000002 HC RX W HCPCS: Performed by: STUDENT IN AN ORGANIZED HEALTH CARE EDUCATION/TRAINING PROGRAM

## 2023-11-20 PROCEDURE — 87205 SMEAR GRAM STAIN: CPT

## 2023-11-20 PROCEDURE — 87070 CULTURE OTHR SPECIMN AEROBIC: CPT

## 2023-11-20 PROCEDURE — 99285 EMERGENCY DEPT VISIT HI MDM: CPT

## 2023-11-20 PROCEDURE — 87040 BLOOD CULTURE FOR BACTERIA: CPT

## 2023-11-20 PROCEDURE — 6370000000 HC RX 637 (ALT 250 FOR IP): Performed by: STUDENT IN AN ORGANIZED HEALTH CARE EDUCATION/TRAINING PROGRAM

## 2023-11-20 PROCEDURE — 71045 X-RAY EXAM CHEST 1 VIEW: CPT

## 2023-11-20 PROCEDURE — 1200000000 HC SEMI PRIVATE

## 2023-11-20 PROCEDURE — 84145 PROCALCITONIN (PCT): CPT

## 2023-11-20 PROCEDURE — 82805 BLOOD GASES W/O2 SATURATION: CPT

## 2023-11-20 PROCEDURE — 96365 THER/PROPH/DIAG IV INF INIT: CPT

## 2023-11-20 PROCEDURE — 6370000000 HC RX 637 (ALT 250 FOR IP): Performed by: FAMILY MEDICINE

## 2023-11-20 PROCEDURE — 2580000003 HC RX 258: Performed by: STUDENT IN AN ORGANIZED HEALTH CARE EDUCATION/TRAINING PROGRAM

## 2023-11-20 PROCEDURE — 6360000002 HC RX W HCPCS: Performed by: PHYSICIAN ASSISTANT

## 2023-11-20 PROCEDURE — 82962 GLUCOSE BLOOD TEST: CPT

## 2023-11-20 PROCEDURE — 0202U NFCT DS 22 TRGT SARS-COV-2: CPT

## 2023-11-20 PROCEDURE — 87635 SARS-COV-2 COVID-19 AMP PRB: CPT

## 2023-11-20 RX ORDER — ALBUTEROL SULFATE 2.5 MG/3ML
7.5 SOLUTION RESPIRATORY (INHALATION) ONCE
Status: COMPLETED | OUTPATIENT
Start: 2023-11-20 | End: 2023-11-20

## 2023-11-20 RX ORDER — IPRATROPIUM BROMIDE AND ALBUTEROL SULFATE 2.5; .5 MG/3ML; MG/3ML
1 SOLUTION RESPIRATORY (INHALATION)
Status: DISCONTINUED | OUTPATIENT
Start: 2023-11-20 | End: 2023-11-25

## 2023-11-20 RX ORDER — ATORVASTATIN CALCIUM 40 MG/1
40 TABLET, FILM COATED ORAL NIGHTLY
Status: DISCONTINUED | OUTPATIENT
Start: 2023-11-20 | End: 2023-11-27 | Stop reason: HOSPADM

## 2023-11-20 RX ORDER — SODIUM CHLORIDE 0.9 % (FLUSH) 0.9 %
5-40 SYRINGE (ML) INJECTION EVERY 12 HOURS SCHEDULED
Status: DISCONTINUED | OUTPATIENT
Start: 2023-11-20 | End: 2023-11-27 | Stop reason: HOSPADM

## 2023-11-20 RX ORDER — GLUCAGON 1 MG/ML
1 KIT INJECTION PRN
Status: DISCONTINUED | OUTPATIENT
Start: 2023-11-20 | End: 2023-11-27 | Stop reason: HOSPADM

## 2023-11-20 RX ORDER — ONDANSETRON 4 MG/1
4 TABLET, ORALLY DISINTEGRATING ORAL EVERY 8 HOURS PRN
Status: DISCONTINUED | OUTPATIENT
Start: 2023-11-20 | End: 2023-11-27 | Stop reason: HOSPADM

## 2023-11-20 RX ORDER — BUDESONIDE AND FORMOTEROL FUMARATE DIHYDRATE 160; 4.5 UG/1; UG/1
2 AEROSOL RESPIRATORY (INHALATION)
Status: DISCONTINUED | OUTPATIENT
Start: 2023-11-20 | End: 2023-11-27 | Stop reason: HOSPADM

## 2023-11-20 RX ORDER — POLYETHYLENE GLYCOL 3350 17 G/17G
17 POWDER, FOR SOLUTION ORAL DAILY PRN
Status: DISCONTINUED | OUTPATIENT
Start: 2023-11-20 | End: 2023-11-27 | Stop reason: HOSPADM

## 2023-11-20 RX ORDER — INSULIN LISPRO 100 [IU]/ML
0-4 INJECTION, SOLUTION INTRAVENOUS; SUBCUTANEOUS
Status: DISCONTINUED | OUTPATIENT
Start: 2023-11-21 | End: 2023-11-21

## 2023-11-20 RX ORDER — ACETAMINOPHEN 650 MG/1
650 SUPPOSITORY RECTAL EVERY 6 HOURS PRN
Status: DISCONTINUED | OUTPATIENT
Start: 2023-11-20 | End: 2023-11-27 | Stop reason: HOSPADM

## 2023-11-20 RX ORDER — ACETAMINOPHEN 325 MG/1
650 TABLET ORAL EVERY 6 HOURS PRN
Status: DISCONTINUED | OUTPATIENT
Start: 2023-11-20 | End: 2023-11-27 | Stop reason: HOSPADM

## 2023-11-20 RX ORDER — ENOXAPARIN SODIUM 100 MG/ML
40 INJECTION SUBCUTANEOUS DAILY
Status: DISCONTINUED | OUTPATIENT
Start: 2023-11-20 | End: 2023-11-20 | Stop reason: DRUGHIGH

## 2023-11-20 RX ORDER — ROFLUMILAST 500 UG/1
500 TABLET ORAL DAILY
Status: DISCONTINUED | OUTPATIENT
Start: 2023-11-21 | End: 2023-11-27 | Stop reason: HOSPADM

## 2023-11-20 RX ORDER — MAGNESIUM SULFATE IN WATER 40 MG/ML
2000 INJECTION, SOLUTION INTRAVENOUS ONCE
Status: COMPLETED | OUTPATIENT
Start: 2023-11-20 | End: 2023-11-20

## 2023-11-20 RX ORDER — ONDANSETRON 2 MG/ML
4 INJECTION INTRAMUSCULAR; INTRAVENOUS EVERY 6 HOURS PRN
Status: DISCONTINUED | OUTPATIENT
Start: 2023-11-20 | End: 2023-11-27 | Stop reason: HOSPADM

## 2023-11-20 RX ORDER — SODIUM CHLORIDE 9 MG/ML
500 INJECTION, SOLUTION INTRAVENOUS PRN
Status: DISCONTINUED | OUTPATIENT
Start: 2023-11-20 | End: 2023-11-27 | Stop reason: HOSPADM

## 2023-11-20 RX ORDER — INSULIN LISPRO 100 [IU]/ML
10 INJECTION, SOLUTION INTRAVENOUS; SUBCUTANEOUS ONCE
Status: COMPLETED | OUTPATIENT
Start: 2023-11-20 | End: 2023-11-20

## 2023-11-20 RX ORDER — METHYLPREDNISOLONE SODIUM SUCCINATE 125 MG/2ML
125 INJECTION, POWDER, LYOPHILIZED, FOR SOLUTION INTRAMUSCULAR; INTRAVENOUS ONCE
Status: COMPLETED | OUTPATIENT
Start: 2023-11-20 | End: 2023-11-20

## 2023-11-20 RX ORDER — PREDNISONE 20 MG/1
40 TABLET ORAL DAILY
Status: COMPLETED | OUTPATIENT
Start: 2023-11-23 | End: 2023-11-25

## 2023-11-20 RX ORDER — INSULIN GLARGINE 100 [IU]/ML
30 INJECTION, SOLUTION SUBCUTANEOUS NIGHTLY
Status: DISCONTINUED | OUTPATIENT
Start: 2023-11-20 | End: 2023-11-21

## 2023-11-20 RX ORDER — ALBUTEROL SULFATE 90 UG/1
2 AEROSOL, METERED RESPIRATORY (INHALATION) EVERY 4 HOURS PRN
Status: DISCONTINUED | OUTPATIENT
Start: 2023-11-20 | End: 2023-11-27 | Stop reason: HOSPADM

## 2023-11-20 RX ORDER — INSULIN LISPRO 100 [IU]/ML
0-4 INJECTION, SOLUTION INTRAVENOUS; SUBCUTANEOUS NIGHTLY
Status: DISCONTINUED | OUTPATIENT
Start: 2023-11-20 | End: 2023-11-21

## 2023-11-20 RX ORDER — FUROSEMIDE 40 MG/1
40 TABLET ORAL 2 TIMES DAILY
Status: DISCONTINUED | OUTPATIENT
Start: 2023-11-20 | End: 2023-11-27 | Stop reason: HOSPADM

## 2023-11-20 RX ORDER — DEXTROSE MONOHYDRATE 100 MG/ML
1000 INJECTION, SOLUTION INTRAVENOUS CONTINUOUS PRN
Status: DISCONTINUED | OUTPATIENT
Start: 2023-11-20 | End: 2023-11-27 | Stop reason: HOSPADM

## 2023-11-20 RX ORDER — DILTIAZEM HYDROCHLORIDE 240 MG/1
240 CAPSULE, COATED, EXTENDED RELEASE ORAL DAILY
Status: DISCONTINUED | OUTPATIENT
Start: 2023-11-21 | End: 2023-11-27 | Stop reason: HOSPADM

## 2023-11-20 RX ORDER — SODIUM CHLORIDE 0.9 % (FLUSH) 0.9 %
5-40 SYRINGE (ML) INJECTION PRN
Status: DISCONTINUED | OUTPATIENT
Start: 2023-11-20 | End: 2023-11-27 | Stop reason: HOSPADM

## 2023-11-20 RX ORDER — IPRATROPIUM BROMIDE AND ALBUTEROL SULFATE 2.5; .5 MG/3ML; MG/3ML
2 SOLUTION RESPIRATORY (INHALATION) ONCE
Status: COMPLETED | OUTPATIENT
Start: 2023-11-20 | End: 2023-11-20

## 2023-11-20 RX ADMIN — INSULIN LISPRO 4 UNITS: 100 INJECTION, SOLUTION INTRAVENOUS; SUBCUTANEOUS at 20:56

## 2023-11-20 RX ADMIN — APIXABAN 5 MG: 5 TABLET, FILM COATED ORAL at 20:55

## 2023-11-20 RX ADMIN — IPRATROPIUM BROMIDE AND ALBUTEROL SULFATE 2 DOSE: 2.5; .5 SOLUTION RESPIRATORY (INHALATION) at 14:09

## 2023-11-20 RX ADMIN — AZITHROMYCIN MONOHYDRATE 500 MG: 500 INJECTION, POWDER, LYOPHILIZED, FOR SOLUTION INTRAVENOUS at 20:50

## 2023-11-20 RX ADMIN — METHYLPREDNISOLONE SODIUM SUCCINATE 125 MG: 125 INJECTION, POWDER, LYOPHILIZED, FOR SOLUTION INTRAMUSCULAR; INTRAVENOUS at 14:27

## 2023-11-20 RX ADMIN — METHYLPREDNISOLONE SODIUM SUCCINATE 40 MG: 40 INJECTION, POWDER, FOR SOLUTION INTRAMUSCULAR; INTRAVENOUS at 20:46

## 2023-11-20 RX ADMIN — FUROSEMIDE 40 MG: 40 TABLET ORAL at 20:46

## 2023-11-20 RX ADMIN — SODIUM CHLORIDE, PRESERVATIVE FREE 10 ML: 5 INJECTION INTRAVENOUS at 20:48

## 2023-11-20 RX ADMIN — SODIUM CHLORIDE, PRESERVATIVE FREE 10 ML: 5 INJECTION INTRAVENOUS at 21:00

## 2023-11-20 RX ADMIN — ATORVASTATIN CALCIUM 40 MG: 40 TABLET, FILM COATED ORAL at 20:46

## 2023-11-20 RX ADMIN — MAGNESIUM SULFATE HEPTAHYDRATE 2000 MG: 40 INJECTION, SOLUTION INTRAVENOUS at 15:34

## 2023-11-20 RX ADMIN — ALBUTEROL SULFATE 7.5 MG: 2.5 SOLUTION RESPIRATORY (INHALATION) at 16:35

## 2023-11-20 RX ADMIN — INSULIN GLARGINE 30 UNITS: 100 INJECTION, SOLUTION SUBCUTANEOUS at 20:57

## 2023-11-20 RX ADMIN — INSULIN LISPRO 10 UNITS: 100 INJECTION, SOLUTION INTRAVENOUS; SUBCUTANEOUS at 20:57

## 2023-11-20 ASSESSMENT — ENCOUNTER SYMPTOMS
ABDOMINAL PAIN: 0
SHORTNESS OF BREATH: 1

## 2023-11-20 NOTE — CARE COORDINATION
Patient potential readmission. Patient admitted 11/13/23 - 11/16/23 for respiratory failure. Patient from home alone. Patient has PCP and insurance. Some family assistance. Patient has [address] and is on O2. Patient presents to ED today with complaint of Shortness of Breath. Patient being admitted to inpatient for COPD exacerbation.

## 2023-11-20 NOTE — H&P
V2.0  History and Physical      Name:  Rnezo Benavides /Age/Sex: 1955  (79 y.o. male)   MRN & CSN:  [id number] & [id number] Encounter Date/Time: 2023 6:08 PM EST   Location:  ED25/ED-25 PCP: Tiff Cordon MD       Hospital Day: 1    Assessment and Plan:   Renzo Benavides is a 79 y.o. male with a pmh of HFpEF, A-fib on Eliquis, T2DM and COPD on 2 to 3 L oxygen at home who presents with COPD exacerbation [facility]    Hospital Problems             Last Modified POA    * (Principal) COPD exacerbation ([address]) 2023 Yes       Plan:  Acute on chronic resp with hypoxia and hypercapnia  COPD exacerbation  P/W progressively worsening dyspnea and wheezing on physical exam  --IV steroids, azithromycin  --Patient currently on 4L of O2, wean to home O2 as able  --Pulm consult given recent admission  --Continue scheduled inhalers, DuoNebs scheduled and albuterol as needed    Afib w/ RVR  Paroxysimal A-fib  Likely triggered by resp distress, patient is on Cardizem and Eliquis  --Continue at this time  --Currently bouncing between low 100s and 110s, if rate worsens, will start on drip    Elevated lactic acid  Likely secondary to respiratory distress    HFpEF  On lasix  --Continue     T2DM with uncontrolled hyperglycemia  --Continue Accu-Cheks  --Cover with SSI and hypoglycemic protocol  --ADA diet     LINA on CPAP  Noncompliant with CPAP  --Continue on O2  --Attempt CPAP at bedtime    Disposition:   Current Living situation: Home  Expected Disposition: Home  Estimated D/C: 2-3 days    Diet No diet orders on file   DVT Prophylaxis [] Lovenox, []  Heparin, [] SCDs, [] Ambulation,  [x] Eliquis, [] Xarelto, [] Coumadin   Code Status Prior   Surrogate Decision Maker/ POA Brother     Personally reviewed Lab Studies and Imaging     Discussed management of the case with Robert and agree with need for admission       History from:     patient    History of Present Illness:     Chief Complaint: SOB  Refugio Champ

## 2023-11-20 NOTE — ED NOTES
ED TO INPATIENT SBAR HANDOFF    Patient Name: Shonna Foster   :  1955  79 y.o. Preferred Name  Liya Lanier  Family/Caregiver Present no   Restraints no   C-SSRS: Risk of Suicide: No Risk  Sitter no   Sepsis Risk Score Sepsis Risk Score: 8.67      Situation  Chief Complaint   Patient presents with    Shortness of Breath     COPD 3L n/c     Brief Description of Patient's Condition: Patient came in for Sob since yesterday. Patient is on home o2 of 3L n/c. Patient has a hx of COPD and non-compliance. Mental Status: oriented, alert, and coherent  Arrived from: home    Imaging:   XR CHEST PORTABLE   Final Result   No acute cardiopulmonary process identified.            Abnormal labs:   Abnormal Labs Reviewed   LACTATE, SEPSIS - Abnormal; Notable for the following components:       Result Value    Lactic Acid, Sepsis 2.8 (*)     All other components within normal limits   URINALYSIS - Abnormal; Notable for the following components:    Glucose, Urine >1,000 (*)     All other components within normal limits   BRAIN NATRIURETIC PEPTIDE - Abnormal; Notable for the following components:    Pro-.1 (*)     All other components within normal limits   TROPONIN - Abnormal; Notable for the following components:    Troponin, High Sensitivity 33 (*)     All other components within normal limits   TROPONIN - Abnormal; Notable for the following components:    Troponin, High Sensitivity 30 (*)     All other components within normal limits   BLOOD GAS, VENOUS - Abnormal; Notable for the following components:    pCO2, Tylor 72 (*)     pO2, Tylor 23 (*)     Base Exc, Mixed 12 (*)     HCO3, Venous 40.7 (*)     O2 Sat, Tylor 40.4 (*)     All other components within normal limits   CBC WITH AUTO DIFFERENTIAL - Abnormal; Notable for the following components:    WBC 13.5 (*)     Segs Relative 90.5 (*)     Lymphocytes % 5.1 (*)     Immature Neutrophil % 0.9 (*)     All other components within normal limits   COMPREHENSIVE METABOLIC PANEL

## 2023-11-21 ENCOUNTER — CARE COORDINATION (OUTPATIENT)
Dept: CARE COORDINATION | Age: 68
End: 2023-11-21

## 2023-11-21 LAB
ALBUMIN SERPL-MCNC: 3.4 GM/DL (ref 3.4–5)
ALP BLD-CCNC: 73 IU/L (ref 40–128)
ALT SERPL-CCNC: 47 U/L (ref 10–40)
ANION GAP SERPL CALCULATED.3IONS-SCNC: 10 MMOL/L (ref 4–16)
AST SERPL-CCNC: 10 IU/L (ref 15–37)
BASOPHILS ABSOLUTE: 0 K/CU MM
BASOPHILS RELATIVE PERCENT: 0.1 % (ref 0–1)
BILIRUB SERPL-MCNC: 0.4 MG/DL (ref 0–1)
BUN SERPL-MCNC: 19 MG/DL (ref 6–23)
CALCIUM SERPL-MCNC: 8.5 MG/DL (ref 8.3–10.6)
CHLORIDE BLD-SCNC: 93 MMOL/L (ref 99–110)
CO2: 33 MMOL/L (ref 21–32)
CREAT SERPL-MCNC: 0.6 MG/DL (ref 0.9–1.3)
CULTURE: NORMAL
DIFFERENTIAL TYPE: ABNORMAL
EKG ATRIAL RATE: 136 BPM
EKG DIAGNOSIS: NORMAL
EKG Q-T INTERVAL: 326 MS
EKG QRS DURATION: 100 MS
EKG QTC CALCULATION (BAZETT): 447 MS
EKG R AXIS: -71 DEGREES
EKG T AXIS: 85 DEGREES
EKG VENTRICULAR RATE: 113 BPM
EOSINOPHILS ABSOLUTE: 0 K/CU MM
EOSINOPHILS RELATIVE PERCENT: 0 % (ref 0–3)
GFR SERPL CREATININE-BSD FRML MDRD: >60 ML/MIN/1.73M2
GLUCOSE BLD-MCNC: 325 MG/DL (ref 70–99)
GLUCOSE BLD-MCNC: 325 MG/DL (ref 70–99)
GLUCOSE BLD-MCNC: 355 MG/DL (ref 70–99)
GLUCOSE BLD-MCNC: 420 MG/DL (ref 70–99)
GLUCOSE SERPL-MCNC: 355 MG/DL (ref 70–99)
HCT VFR BLD CALC: 45.7 % (ref 42–52)
HEMOGLOBIN: 15.1 GM/DL (ref 13.5–18)
IMMATURE NEUTROPHIL %: 1 % (ref 0–0.43)
L PNEUMO AG UR QL IA: NEGATIVE
LYMPHOCYTES ABSOLUTE: 0.5 K/CU MM
LYMPHOCYTES RELATIVE PERCENT: 3.7 % (ref 24–44)
Lab: NORMAL
MCH RBC QN AUTO: 30.8 PG (ref 27–31)
MCHC RBC AUTO-ENTMCNC: 33 % (ref 32–36)
MCV RBC AUTO: 93.1 FL (ref 78–100)
MONOCYTES ABSOLUTE: 0.2 K/CU MM
MONOCYTES RELATIVE PERCENT: 1.5 % (ref 0–4)
MRSA, DNA, NASAL: NEGATIVE
NUCLEATED RBC %: 0 %
PDW BLD-RTO: 13.3 % (ref 11.7–14.9)
PLATELET # BLD: 149 K/CU MM (ref 140–440)
PMV BLD AUTO: 9.4 FL (ref 7.5–11.1)
POTASSIUM SERPL-SCNC: 4.4 MMOL/L (ref 3.5–5.1)
RBC # BLD: 4.91 M/CU MM (ref 4.6–6.2)
S PNEUM AG CSF QL: NORMAL
SEGMENTED NEUTROPHILS ABSOLUTE COUNT: 13.4 K/CU MM
SEGMENTED NEUTROPHILS RELATIVE PERCENT: 93.7 % (ref 36–66)
SODIUM BLD-SCNC: 136 MMOL/L (ref 135–145)
SPECIMEN DESCRIPTION: NORMAL
SPECIMEN: NORMAL
TOTAL IMMATURE NEUTOROPHIL: 0.14 K/CU MM
TOTAL NUCLEATED RBC: 0 K/CU MM
TOTAL PROTEIN: 5 GM/DL (ref 6.4–8.2)
WBC # BLD: 14.3 K/CU MM (ref 4–10.5)

## 2023-11-21 PROCEDURE — 87449 NOS EACH ORGANISM AG IA: CPT

## 2023-11-21 PROCEDURE — 94669 MECHANICAL CHEST WALL OSCILL: CPT

## 2023-11-21 PROCEDURE — 87899 AGENT NOS ASSAY W/OPTIC: CPT

## 2023-11-21 PROCEDURE — 6370000000 HC RX 637 (ALT 250 FOR IP): Performed by: STUDENT IN AN ORGANIZED HEALTH CARE EDUCATION/TRAINING PROGRAM

## 2023-11-21 PROCEDURE — 2700000000 HC OXYGEN THERAPY PER DAY

## 2023-11-21 PROCEDURE — 6360000002 HC RX W HCPCS: Performed by: STUDENT IN AN ORGANIZED HEALTH CARE EDUCATION/TRAINING PROGRAM

## 2023-11-21 PROCEDURE — 80053 COMPREHEN METABOLIC PANEL: CPT

## 2023-11-21 PROCEDURE — 97166 OT EVAL MOD COMPLEX 45 MIN: CPT

## 2023-11-21 PROCEDURE — 97530 THERAPEUTIC ACTIVITIES: CPT

## 2023-11-21 PROCEDURE — 94761 N-INVAS EAR/PLS OXIMETRY MLT: CPT

## 2023-11-21 PROCEDURE — 97163 PT EVAL HIGH COMPLEX 45 MIN: CPT

## 2023-11-21 PROCEDURE — 93010 ELECTROCARDIOGRAM REPORT: CPT | Performed by: INTERNAL MEDICINE

## 2023-11-21 PROCEDURE — 84145 PROCALCITONIN (PCT): CPT

## 2023-11-21 PROCEDURE — 94664 DEMO&/EVAL PT USE INHALER: CPT

## 2023-11-21 PROCEDURE — 89220 SPUTUM SPECIMEN COLLECTION: CPT

## 2023-11-21 PROCEDURE — 97116 GAIT TRAINING THERAPY: CPT

## 2023-11-21 PROCEDURE — 1200000000 HC SEMI PRIVATE

## 2023-11-21 PROCEDURE — 36415 COLL VENOUS BLD VENIPUNCTURE: CPT

## 2023-11-21 PROCEDURE — 82962 GLUCOSE BLOOD TEST: CPT

## 2023-11-21 PROCEDURE — 87641 MR-STAPH DNA AMP PROBE: CPT

## 2023-11-21 PROCEDURE — 2580000003 HC RX 258: Performed by: STUDENT IN AN ORGANIZED HEALTH CARE EDUCATION/TRAINING PROGRAM

## 2023-11-21 PROCEDURE — 85025 COMPLETE CBC W/AUTO DIFF WBC: CPT

## 2023-11-21 PROCEDURE — 94640 AIRWAY INHALATION TREATMENT: CPT

## 2023-11-21 RX ORDER — INSULIN LISPRO 100 [IU]/ML
0-8 INJECTION, SOLUTION INTRAVENOUS; SUBCUTANEOUS
Status: DISCONTINUED | OUTPATIENT
Start: 2023-11-21 | End: 2023-11-22

## 2023-11-21 RX ORDER — GUAIFENESIN 600 MG/1
600 TABLET, EXTENDED RELEASE ORAL 2 TIMES DAILY
Status: DISCONTINUED | OUTPATIENT
Start: 2023-11-21 | End: 2023-11-27 | Stop reason: HOSPADM

## 2023-11-21 RX ORDER — INSULIN LISPRO 100 [IU]/ML
0-4 INJECTION, SOLUTION INTRAVENOUS; SUBCUTANEOUS NIGHTLY
Status: DISCONTINUED | OUTPATIENT
Start: 2023-11-21 | End: 2023-11-27 | Stop reason: HOSPADM

## 2023-11-21 RX ORDER — INSULIN GLARGINE 100 [IU]/ML
30 INJECTION, SOLUTION SUBCUTANEOUS 2 TIMES DAILY
Status: DISCONTINUED | OUTPATIENT
Start: 2023-11-21 | End: 2023-11-22

## 2023-11-21 RX ADMIN — DILTIAZEM HYDROCHLORIDE 240 MG: 240 CAPSULE, COATED, EXTENDED RELEASE ORAL at 09:18

## 2023-11-21 RX ADMIN — AZITHROMYCIN MONOHYDRATE 500 MG: 500 INJECTION, POWDER, LYOPHILIZED, FOR SOLUTION INTRAVENOUS at 21:29

## 2023-11-21 RX ADMIN — APIXABAN 5 MG: 5 TABLET, FILM COATED ORAL at 09:18

## 2023-11-21 RX ADMIN — FUROSEMIDE 40 MG: 40 TABLET ORAL at 09:18

## 2023-11-21 RX ADMIN — INSULIN GLARGINE 30 UNITS: 100 INJECTION, SOLUTION SUBCUTANEOUS at 21:21

## 2023-11-21 RX ADMIN — SODIUM CHLORIDE, PRESERVATIVE FREE 10 ML: 5 INJECTION INTRAVENOUS at 21:22

## 2023-11-21 RX ADMIN — METHYLPREDNISOLONE SODIUM SUCCINATE 40 MG: 40 INJECTION, POWDER, FOR SOLUTION INTRAMUSCULAR; INTRAVENOUS at 02:28

## 2023-11-21 RX ADMIN — INSULIN LISPRO 8 UNITS: 100 INJECTION, SOLUTION INTRAVENOUS; SUBCUTANEOUS at 12:19

## 2023-11-21 RX ADMIN — GUAIFENESIN 600 MG: 600 TABLET, EXTENDED RELEASE ORAL at 21:24

## 2023-11-21 RX ADMIN — BUDESONIDE AND FORMOTEROL FUMARATE DIHYDRATE 2 PUFF: 160; 4.5 AEROSOL RESPIRATORY (INHALATION) at 07:30

## 2023-11-21 RX ADMIN — METHYLPREDNISOLONE SODIUM SUCCINATE 40 MG: 40 INJECTION, POWDER, FOR SOLUTION INTRAMUSCULAR; INTRAVENOUS at 21:21

## 2023-11-21 RX ADMIN — METHYLPREDNISOLONE SODIUM SUCCINATE 40 MG: 40 INJECTION, POWDER, FOR SOLUTION INTRAMUSCULAR; INTRAVENOUS at 15:59

## 2023-11-21 RX ADMIN — INSULIN GLARGINE 30 UNITS: 100 INJECTION, SOLUTION SUBCUTANEOUS at 09:20

## 2023-11-21 RX ADMIN — SODIUM CHLORIDE, PRESERVATIVE FREE 10 ML: 5 INJECTION INTRAVENOUS at 09:18

## 2023-11-21 RX ADMIN — APIXABAN 5 MG: 5 TABLET, FILM COATED ORAL at 21:21

## 2023-11-21 RX ADMIN — IPRATROPIUM BROMIDE AND ALBUTEROL SULFATE 1 DOSE: 2.5; .5 SOLUTION RESPIRATORY (INHALATION) at 11:23

## 2023-11-21 RX ADMIN — METHYLPREDNISOLONE SODIUM SUCCINATE 40 MG: 40 INJECTION, POWDER, FOR SOLUTION INTRAMUSCULAR; INTRAVENOUS at 09:18

## 2023-11-21 RX ADMIN — IPRATROPIUM BROMIDE AND ALBUTEROL SULFATE 1 DOSE: 2.5; .5 SOLUTION RESPIRATORY (INHALATION) at 15:15

## 2023-11-21 RX ADMIN — INSULIN LISPRO 6 UNITS: 100 INJECTION, SOLUTION INTRAVENOUS; SUBCUTANEOUS at 17:38

## 2023-11-21 RX ADMIN — IPRATROPIUM BROMIDE AND ALBUTEROL SULFATE 1 DOSE: 2.5; .5 SOLUTION RESPIRATORY (INHALATION) at 07:29

## 2023-11-21 RX ADMIN — FUROSEMIDE 40 MG: 40 TABLET ORAL at 17:38

## 2023-11-21 RX ADMIN — GUAIFENESIN 600 MG: 600 TABLET, EXTENDED RELEASE ORAL at 09:18

## 2023-11-21 RX ADMIN — ATORVASTATIN CALCIUM 40 MG: 40 TABLET, FILM COATED ORAL at 21:21

## 2023-11-21 RX ADMIN — INSULIN LISPRO 4 UNITS: 100 INJECTION, SOLUTION INTRAVENOUS; SUBCUTANEOUS at 21:20

## 2023-11-21 NOTE — PLAN OF CARE
Problem: Discharge Planning  Goal: Discharge to home or other facility with appropriate resources  11/21/2023 1041 by Lavenia Kussmaul, RN  Outcome: Progressing  11/20/2023 2338 by Alma Hernandez RN  Outcome: Progressing     Problem: Respiratory - Adult  Goal: Achieves optimal ventilation and oxygenation  Outcome: Progressing     Problem: Cardiovascular - Adult  Goal: Maintains optimal cardiac output and hemodynamic stability  Outcome: Progressing  Goal: Absence of cardiac dysrhythmias or at baseline  Outcome: Progressing     Problem: Skin/Tissue Integrity - Adult  Goal: Skin integrity remains intact  Outcome: Progressing     Problem: Metabolic/Fluid and Electrolytes - Adult  Goal: Hemodynamic stability and optimal renal function maintained  Outcome: Progressing     Problem: Chronic Conditions and Co-morbidities  Goal: Patient's chronic conditions and co-morbidity symptoms are monitored and maintained or improved  Outcome: Progressing

## 2023-11-21 NOTE — CARE COORDINATION
LSW spoke with pt and his POA regarding discharge plans. Pt has had a few readmission in the past few months. Pt and POA would like pt to go to a SNF for rehab. LSW gave pt a list of SNF. Pt will need PT/OT evals. LSW has asked the doctor for orders. WB note placed.   CM following

## 2023-11-21 NOTE — CARE COORDINATION
Attempted phone call to [facility] to notify them that Pt is in the hospital. No answer, voicemail message left requesting return call, contact number provided.      VIELKA plan of care: SW will reach out to Pt following his d/c from hospital.

## 2023-11-22 ENCOUNTER — CARE COORDINATION (OUTPATIENT)
Dept: CASE MANAGEMENT | Age: 68
End: 2023-11-22

## 2023-11-22 LAB
ANION GAP SERPL CALCULATED.3IONS-SCNC: 7 MMOL/L (ref 4–16)
BASOPHILS ABSOLUTE: 0 K/CU MM
BASOPHILS RELATIVE PERCENT: 0.1 % (ref 0–1)
BUN SERPL-MCNC: 15 MG/DL (ref 6–23)
CALCIUM SERPL-MCNC: 8.6 MG/DL (ref 8.3–10.6)
CHLORIDE BLD-SCNC: 89 MMOL/L (ref 99–110)
CO2: 40 MMOL/L (ref 21–32)
CREAT SERPL-MCNC: 0.6 MG/DL (ref 0.9–1.3)
DIFFERENTIAL TYPE: ABNORMAL
EOSINOPHILS ABSOLUTE: 0 K/CU MM
EOSINOPHILS RELATIVE PERCENT: 0 % (ref 0–3)
GFR SERPL CREATININE-BSD FRML MDRD: >60 ML/MIN/1.73M2
GLUCOSE BLD-MCNC: 295 MG/DL (ref 70–99)
GLUCOSE BLD-MCNC: 313 MG/DL (ref 70–99)
GLUCOSE BLD-MCNC: 380 MG/DL (ref 70–99)
GLUCOSE BLD-MCNC: 428 MG/DL (ref 70–99)
GLUCOSE SERPL-MCNC: 297 MG/DL (ref 70–99)
HCT VFR BLD CALC: 44.3 % (ref 42–52)
HEMOGLOBIN: 14.8 GM/DL (ref 13.5–18)
IMMATURE NEUTROPHIL %: 0.8 % (ref 0–0.43)
LYMPHOCYTES ABSOLUTE: 0.5 K/CU MM
LYMPHOCYTES RELATIVE PERCENT: 4.6 % (ref 24–44)
MCH RBC QN AUTO: 31 PG (ref 27–31)
MCHC RBC AUTO-ENTMCNC: 33.4 % (ref 32–36)
MCV RBC AUTO: 92.9 FL (ref 78–100)
MONOCYTES ABSOLUTE: 0.2 K/CU MM
MONOCYTES RELATIVE PERCENT: 2.1 % (ref 0–4)
NUCLEATED RBC %: 0 %
PDW BLD-RTO: 13.2 % (ref 11.7–14.9)
PLATELET # BLD: 135 K/CU MM (ref 140–440)
PMV BLD AUTO: 10.1 FL (ref 7.5–11.1)
POTASSIUM SERPL-SCNC: 4.9 MMOL/L (ref 3.5–5.1)
RBC # BLD: 4.77 M/CU MM (ref 4.6–6.2)
SEGMENTED NEUTROPHILS ABSOLUTE COUNT: 9.9 K/CU MM
SEGMENTED NEUTROPHILS RELATIVE PERCENT: 92.4 % (ref 36–66)
SODIUM BLD-SCNC: 136 MMOL/L (ref 135–145)
TOTAL IMMATURE NEUTOROPHIL: 0.09 K/CU MM
TOTAL NUCLEATED RBC: 0 K/CU MM
WBC # BLD: 10.8 K/CU MM (ref 4–10.5)

## 2023-11-22 PROCEDURE — 80048 BASIC METABOLIC PNL TOTAL CA: CPT

## 2023-11-22 PROCEDURE — 85025 COMPLETE CBC W/AUTO DIFF WBC: CPT

## 2023-11-22 PROCEDURE — 2700000000 HC OXYGEN THERAPY PER DAY

## 2023-11-22 PROCEDURE — 2580000003 HC RX 258: Performed by: STUDENT IN AN ORGANIZED HEALTH CARE EDUCATION/TRAINING PROGRAM

## 2023-11-22 PROCEDURE — 94761 N-INVAS EAR/PLS OXIMETRY MLT: CPT

## 2023-11-22 PROCEDURE — 82962 GLUCOSE BLOOD TEST: CPT

## 2023-11-22 PROCEDURE — 94640 AIRWAY INHALATION TREATMENT: CPT

## 2023-11-22 PROCEDURE — 6370000000 HC RX 637 (ALT 250 FOR IP): Performed by: STUDENT IN AN ORGANIZED HEALTH CARE EDUCATION/TRAINING PROGRAM

## 2023-11-22 PROCEDURE — 6360000002 HC RX W HCPCS: Performed by: STUDENT IN AN ORGANIZED HEALTH CARE EDUCATION/TRAINING PROGRAM

## 2023-11-22 PROCEDURE — 36415 COLL VENOUS BLD VENIPUNCTURE: CPT

## 2023-11-22 PROCEDURE — 94669 MECHANICAL CHEST WALL OSCILL: CPT

## 2023-11-22 PROCEDURE — 1200000000 HC SEMI PRIVATE

## 2023-11-22 RX ORDER — INSULIN LISPRO 100 [IU]/ML
0-4 INJECTION, SOLUTION INTRAVENOUS; SUBCUTANEOUS NIGHTLY
Status: DISCONTINUED | OUTPATIENT
Start: 2023-11-22 | End: 2023-11-27 | Stop reason: HOSPADM

## 2023-11-22 RX ORDER — INSULIN GLARGINE 100 [IU]/ML
45 INJECTION, SOLUTION SUBCUTANEOUS 2 TIMES DAILY
Status: DISCONTINUED | OUTPATIENT
Start: 2023-11-22 | End: 2023-11-24

## 2023-11-22 RX ORDER — INSULIN LISPRO 100 [IU]/ML
0-16 INJECTION, SOLUTION INTRAVENOUS; SUBCUTANEOUS
Status: DISCONTINUED | OUTPATIENT
Start: 2023-11-22 | End: 2023-11-27 | Stop reason: HOSPADM

## 2023-11-22 RX ADMIN — SODIUM CHLORIDE, PRESERVATIVE FREE 10 ML: 5 INJECTION INTRAVENOUS at 20:55

## 2023-11-22 RX ADMIN — INSULIN GLARGINE 45 UNITS: 100 INJECTION, SOLUTION SUBCUTANEOUS at 22:11

## 2023-11-22 RX ADMIN — INSULIN LISPRO 6 UNITS: 100 INJECTION, SOLUTION INTRAVENOUS; SUBCUTANEOUS at 10:11

## 2023-11-22 RX ADMIN — ROFLUMILAST 500 MCG: 500 TABLET ORAL at 08:55

## 2023-11-22 RX ADMIN — BUDESONIDE AND FORMOTEROL FUMARATE DIHYDRATE 2 PUFF: 160; 4.5 AEROSOL RESPIRATORY (INHALATION) at 18:58

## 2023-11-22 RX ADMIN — INSULIN LISPRO 16 UNITS: 100 INJECTION, SOLUTION INTRAVENOUS; SUBCUTANEOUS at 11:58

## 2023-11-22 RX ADMIN — IPRATROPIUM BROMIDE AND ALBUTEROL SULFATE 1 DOSE: 2.5; .5 SOLUTION RESPIRATORY (INHALATION) at 07:53

## 2023-11-22 RX ADMIN — IPRATROPIUM BROMIDE AND ALBUTEROL SULFATE 1 DOSE: 2.5; .5 SOLUTION RESPIRATORY (INHALATION) at 18:58

## 2023-11-22 RX ADMIN — INSULIN LISPRO 4 UNITS: 100 INJECTION, SOLUTION INTRAVENOUS; SUBCUTANEOUS at 22:11

## 2023-11-22 RX ADMIN — ACETAMINOPHEN 650 MG: 325 TABLET ORAL at 21:03

## 2023-11-22 RX ADMIN — INSULIN GLARGINE 45 UNITS: 100 INJECTION, SOLUTION SUBCUTANEOUS at 09:00

## 2023-11-22 RX ADMIN — ACETAMINOPHEN 650 MG: 325 TABLET ORAL at 14:53

## 2023-11-22 RX ADMIN — FUROSEMIDE 40 MG: 40 TABLET ORAL at 18:35

## 2023-11-22 RX ADMIN — IPRATROPIUM BROMIDE AND ALBUTEROL SULFATE 1 DOSE: 2.5; .5 SOLUTION RESPIRATORY (INHALATION) at 15:44

## 2023-11-22 RX ADMIN — GUAIFENESIN 600 MG: 600 TABLET, EXTENDED RELEASE ORAL at 08:53

## 2023-11-22 RX ADMIN — BUDESONIDE AND FORMOTEROL FUMARATE DIHYDRATE 2 PUFF: 160; 4.5 AEROSOL RESPIRATORY (INHALATION) at 08:02

## 2023-11-22 RX ADMIN — METHYLPREDNISOLONE SODIUM SUCCINATE 40 MG: 40 INJECTION, POWDER, FOR SOLUTION INTRAMUSCULAR; INTRAVENOUS at 09:01

## 2023-11-22 RX ADMIN — ATORVASTATIN CALCIUM 40 MG: 40 TABLET, FILM COATED ORAL at 20:55

## 2023-11-22 RX ADMIN — METHYLPREDNISOLONE SODIUM SUCCINATE 40 MG: 40 INJECTION, POWDER, FOR SOLUTION INTRAMUSCULAR; INTRAVENOUS at 18:32

## 2023-11-22 RX ADMIN — GUAIFENESIN 600 MG: 600 TABLET, EXTENDED RELEASE ORAL at 20:55

## 2023-11-22 RX ADMIN — FUROSEMIDE 40 MG: 40 TABLET ORAL at 08:54

## 2023-11-22 RX ADMIN — SODIUM CHLORIDE, PRESERVATIVE FREE 10 ML: 5 INJECTION INTRAVENOUS at 09:05

## 2023-11-22 RX ADMIN — METHYLPREDNISOLONE SODIUM SUCCINATE 40 MG: 40 INJECTION, POWDER, FOR SOLUTION INTRAMUSCULAR; INTRAVENOUS at 03:54

## 2023-11-22 RX ADMIN — APIXABAN 5 MG: 5 TABLET, FILM COATED ORAL at 20:55

## 2023-11-22 RX ADMIN — INSULIN LISPRO 8 UNITS: 100 INJECTION, SOLUTION INTRAVENOUS; SUBCUTANEOUS at 18:35

## 2023-11-22 RX ADMIN — INSULIN LISPRO 4 UNITS: 100 INJECTION, SOLUTION INTRAVENOUS; SUBCUTANEOUS at 22:12

## 2023-11-22 RX ADMIN — AZITHROMYCIN MONOHYDRATE 500 MG: 500 INJECTION, POWDER, LYOPHILIZED, FOR SOLUTION INTRAVENOUS at 20:59

## 2023-11-22 RX ADMIN — APIXABAN 5 MG: 5 TABLET, FILM COATED ORAL at 08:54

## 2023-11-22 ASSESSMENT — PAIN DESCRIPTION - DESCRIPTORS
DESCRIPTORS: CRAMPING;DISCOMFORT
DESCRIPTORS: ACHING;DISCOMFORT
DESCRIPTORS: ACHING
DESCRIPTORS: ACHING;CRAMPING
DESCRIPTORS: ACHING;CRAMPING
DESCRIPTORS: ACHING;DISCOMFORT

## 2023-11-22 ASSESSMENT — PAIN DESCRIPTION - LOCATION
LOCATION: LEG
LOCATION: SHOULDER
LOCATION: LEG

## 2023-11-22 ASSESSMENT — PAIN - FUNCTIONAL ASSESSMENT
PAIN_FUNCTIONAL_ASSESSMENT: ACTIVITIES ARE NOT PREVENTED

## 2023-11-22 ASSESSMENT — PAIN SCALES - GENERAL
PAINLEVEL_OUTOF10: 6
PAINLEVEL_OUTOF10: 6
PAINLEVEL_OUTOF10: 5
PAINLEVEL_OUTOF10: 3
PAINLEVEL_OUTOF10: 4
PAINLEVEL_OUTOF10: 4
PAINLEVEL_OUTOF10: 6

## 2023-11-22 ASSESSMENT — PAIN DESCRIPTION - ORIENTATION
ORIENTATION: RIGHT;LEFT
ORIENTATION: RIGHT
ORIENTATION: LEFT
ORIENTATION: LEFT
ORIENTATION: RIGHT;LEFT
ORIENTATION: LEFT
ORIENTATION: LEFT

## 2023-11-22 ASSESSMENT — PAIN DESCRIPTION - ONSET
ONSET: ON-GOING

## 2023-11-22 ASSESSMENT — PAIN DESCRIPTION - FREQUENCY
FREQUENCY: INTERMITTENT
FREQUENCY: INTERMITTENT
FREQUENCY: CONTINUOUS
FREQUENCY: INTERMITTENT

## 2023-11-22 ASSESSMENT — PAIN SCALES - WONG BAKER
WONGBAKER_NUMERICALRESPONSE: 2
WONGBAKER_NUMERICALRESPONSE: 2

## 2023-11-22 ASSESSMENT — PAIN DESCRIPTION - PAIN TYPE
TYPE: ACUTE PAIN

## 2023-11-22 NOTE — CARE COORDINATION
LIBERTY received a message from Varsha with [facility] and they cannot take pt due to not being in network with [facility]. YOVANAW spoke with pt and informed of this info. LIBERTY went over the list with pt and he requested [facility]. LIBERTY called Shelley with [facility] and she will look over pt info.

## 2023-11-22 NOTE — CARE COORDINATION
LSW spoke with pt and he is agreeable to SNF and requested [facility].  LSW called Sheela and gave referral.

## 2023-11-22 NOTE — PLAN OF CARE
Problem: Discharge Planning  Goal: Discharge to home or other facility with appropriate resources  Outcome: Progressing     Problem: Respiratory - Adult  Goal: Achieves optimal ventilation and oxygenation  Outcome: Progressing     Problem: Cardiovascular - Adult  Goal: Maintains optimal cardiac output and hemodynamic stability  Outcome: Progressing  Goal: Absence of cardiac dysrhythmias or at baseline  Outcome: Progressing     Problem: Skin/Tissue Integrity - Adult  Goal: Skin integrity remains intact  Outcome: Progressing     Problem: Metabolic/Fluid and Electrolytes - Adult  Goal: Hemodynamic stability and optimal renal function maintained  Outcome: Progressing     Problem: Chronic Conditions and Co-morbidities  Goal: Patient's chronic conditions and co-morbidity symptoms are monitored and maintained or improved  Outcome: Progressing     Problem: Safety - Adult  Goal: Free from fall injury  Outcome: Progressing     Problem: Pain  Goal: Verbalizes/displays adequate comfort level or baseline comfort level  Outcome: Progressing

## 2023-11-22 NOTE — DISCHARGE INSTR - COC
Continuity of Care Form    Patient Name: Lenin Mckeon   :  1955  MRN:  [id number]    Admit date:  2023  Discharge date:  2023    Code Status Order: Full Code   Advance Directives:     Admitting Physician:  Robert Foreman MD  PCP: Hazel Stallings MD    Discharging Nurse: [address] [place] [address] Unit/Room#: 8284/0802-G  Discharging Unit Phone Number: [phone]    Emergency Contact:   Extended Emergency Contact Information  Primary Emergency Contact: [address] [place] [address] Phone: [phone]  Mobile Phone: [phone]  Relation: Brother/Sister   needed? No  Secondary Emergency Contact: Nico Mars  [place] Phone: [phone]  Mobile Phone: [phone]  Relation: Brother/Sister  Preferred language: English   needed?  No    Past Surgical History:  Past Surgical History:   Procedure Laterality Date    APPENDECTOMY  2003    ATRIAL ABLATION SURGERY  2018    A-fib ablation     CARDIAC SURGERY  2018    \"Heart Ablation Done Twice\"    CHOLECYSTECTOMY, LAPAROSCOPIC  2018    COLONOSCOPY  2011    Polyp Removed    DENTAL SURGERY      All Teeth Extracted In Past    EYE SURGERY Left     \"For Macular Degeneration\"    FEMORAL BYPASS Left 2011    FEMORAL BYPASS Left 2019    FEMORAL POPLITEAL BYPASS LEFT, REDO performed by Parth Rodriguez MD at [facility] Left 's    Broken Left Wrist , No Hardware    FRACTURE SURGERY Right 's    Broken Right Wrist With Hardware, Hardware Later Removed    HERNIA REPAIR  3954    Umbilical Hernia    OR LAPAROSCOPY SURG CHOLECYSTECTOMY N/A 2018    CHOLECYSTECTOMY LAPAROSCOPIC performed by Tessa Mora MD at [facility]  1960's       Immunization History:   Immunization History   Administered Date(s) Administered    COVID-19, PFIZER PURPLE top, DILUTE for use, (age 15 y+), 30mcg/0.3mL 2021, 2021, 2021    Influenza Virus Vaccine

## 2023-11-22 NOTE — CARE COORDINATION
Upon CTN outreach attempt, it has been noted Jean Paul Fuentes readmitted to [facility] 11/20/23 for COPD Exacerbation. Per protocol, Care Transitions episode closed. CTN team will follow up on when discharged from hospital if eligible.         Janes Smith RN [phone]

## 2023-11-23 LAB
GLUCOSE BLD-MCNC: 132 MG/DL (ref 70–99)
GLUCOSE BLD-MCNC: 160 MG/DL (ref 70–99)
GLUCOSE BLD-MCNC: 273 MG/DL (ref 70–99)
GLUCOSE BLD-MCNC: 336 MG/DL (ref 70–99)

## 2023-11-23 PROCEDURE — 94640 AIRWAY INHALATION TREATMENT: CPT

## 2023-11-23 PROCEDURE — 6370000000 HC RX 637 (ALT 250 FOR IP): Performed by: STUDENT IN AN ORGANIZED HEALTH CARE EDUCATION/TRAINING PROGRAM

## 2023-11-23 PROCEDURE — 1200000000 HC SEMI PRIVATE

## 2023-11-23 PROCEDURE — 2700000000 HC OXYGEN THERAPY PER DAY

## 2023-11-23 PROCEDURE — 99232 SBSQ HOSP IP/OBS MODERATE 35: CPT | Performed by: INTERNAL MEDICINE

## 2023-11-23 PROCEDURE — 2580000003 HC RX 258: Performed by: STUDENT IN AN ORGANIZED HEALTH CARE EDUCATION/TRAINING PROGRAM

## 2023-11-23 PROCEDURE — 82962 GLUCOSE BLOOD TEST: CPT

## 2023-11-23 RX ORDER — INSULIN LISPRO 100 [IU]/ML
5 INJECTION, SOLUTION INTRAVENOUS; SUBCUTANEOUS
Status: DISCONTINUED | OUTPATIENT
Start: 2023-11-23 | End: 2023-11-24

## 2023-11-23 RX ADMIN — INSULIN LISPRO 5 UNITS: 100 INJECTION, SOLUTION INTRAVENOUS; SUBCUTANEOUS at 17:40

## 2023-11-23 RX ADMIN — GUAIFENESIN 600 MG: 600 TABLET, EXTENDED RELEASE ORAL at 08:33

## 2023-11-23 RX ADMIN — FUROSEMIDE 40 MG: 40 TABLET ORAL at 08:33

## 2023-11-23 RX ADMIN — ATORVASTATIN CALCIUM 40 MG: 40 TABLET, FILM COATED ORAL at 21:08

## 2023-11-23 RX ADMIN — APIXABAN 5 MG: 5 TABLET, FILM COATED ORAL at 21:08

## 2023-11-23 RX ADMIN — SODIUM CHLORIDE, PRESERVATIVE FREE 10 ML: 5 INJECTION INTRAVENOUS at 08:32

## 2023-11-23 RX ADMIN — PREDNISONE 40 MG: 20 TABLET ORAL at 08:33

## 2023-11-23 RX ADMIN — BUDESONIDE AND FORMOTEROL FUMARATE DIHYDRATE 2 PUFF: 160; 4.5 AEROSOL RESPIRATORY (INHALATION) at 12:51

## 2023-11-23 RX ADMIN — DILTIAZEM HYDROCHLORIDE 240 MG: 240 CAPSULE, COATED, EXTENDED RELEASE ORAL at 08:33

## 2023-11-23 RX ADMIN — INSULIN LISPRO 8 UNITS: 100 INJECTION, SOLUTION INTRAVENOUS; SUBCUTANEOUS at 08:33

## 2023-11-23 RX ADMIN — GUAIFENESIN 600 MG: 600 TABLET, EXTENDED RELEASE ORAL at 21:08

## 2023-11-23 RX ADMIN — INSULIN GLARGINE 45 UNITS: 100 INJECTION, SOLUTION SUBCUTANEOUS at 08:33

## 2023-11-23 RX ADMIN — FUROSEMIDE 40 MG: 40 TABLET ORAL at 17:36

## 2023-11-23 RX ADMIN — IPRATROPIUM BROMIDE AND ALBUTEROL SULFATE 1 DOSE: 2.5; .5 SOLUTION RESPIRATORY (INHALATION) at 12:52

## 2023-11-23 RX ADMIN — ROFLUMILAST 500 MCG: 500 TABLET ORAL at 08:35

## 2023-11-23 RX ADMIN — IPRATROPIUM BROMIDE AND ALBUTEROL SULFATE 1 DOSE: 2.5; .5 SOLUTION RESPIRATORY (INHALATION) at 08:14

## 2023-11-23 RX ADMIN — INSULIN LISPRO 4 UNITS: 100 INJECTION, SOLUTION INTRAVENOUS; SUBCUTANEOUS at 21:08

## 2023-11-23 RX ADMIN — APIXABAN 5 MG: 5 TABLET, FILM COATED ORAL at 08:33

## 2023-11-23 RX ADMIN — INSULIN GLARGINE 45 UNITS: 100 INJECTION, SOLUTION SUBCUTANEOUS at 21:08

## 2023-11-23 RX ADMIN — IPRATROPIUM BROMIDE AND ALBUTEROL SULFATE 1 DOSE: 2.5; .5 SOLUTION RESPIRATORY (INHALATION) at 17:00

## 2023-11-23 RX ADMIN — SODIUM CHLORIDE, PRESERVATIVE FREE 10 ML: 5 INJECTION INTRAVENOUS at 21:10

## 2023-11-23 RX ADMIN — INSULIN LISPRO 5 UNITS: 100 INJECTION, SOLUTION INTRAVENOUS; SUBCUTANEOUS at 12:15

## 2023-11-23 RX ADMIN — INSULIN LISPRO 4 UNITS: 100 INJECTION, SOLUTION INTRAVENOUS; SUBCUTANEOUS at 21:09

## 2023-11-23 ASSESSMENT — PAIN DESCRIPTION - ORIENTATION
ORIENTATION: RIGHT;LEFT
ORIENTATION: RIGHT;LEFT

## 2023-11-23 ASSESSMENT — PAIN SCALES - WONG BAKER
WONGBAKER_NUMERICALRESPONSE: 2

## 2023-11-23 ASSESSMENT — PAIN DESCRIPTION - DESCRIPTORS
DESCRIPTORS: ACHING
DESCRIPTORS: ACHING;DISCOMFORT

## 2023-11-23 ASSESSMENT — PAIN - FUNCTIONAL ASSESSMENT
PAIN_FUNCTIONAL_ASSESSMENT: ACTIVITIES ARE NOT PREVENTED
PAIN_FUNCTIONAL_ASSESSMENT: ACTIVITIES ARE NOT PREVENTED

## 2023-11-23 ASSESSMENT — PAIN DESCRIPTION - FREQUENCY
FREQUENCY: INTERMITTENT
FREQUENCY: INTERMITTENT

## 2023-11-23 ASSESSMENT — PAIN SCALES - GENERAL
PAINLEVEL_OUTOF10: 2
PAINLEVEL_OUTOF10: 2

## 2023-11-23 ASSESSMENT — PAIN DESCRIPTION - ONSET
ONSET: ON-GOING
ONSET: ON-GOING

## 2023-11-23 ASSESSMENT — PAIN DESCRIPTION - LOCATION
LOCATION: LEG
LOCATION: LEG

## 2023-11-23 NOTE — PLAN OF CARE
Problem: Discharge Planning  Goal: Discharge to home or other facility with appropriate resources  11/23/2023 1035 by Sheri Woods RN  Outcome: Progressing  11/22/2023 2234 by Diane Mckeon RN  Outcome: Progressing     Problem: Respiratory - Adult  Goal: Achieves optimal ventilation and oxygenation  11/23/2023 1035 by Sheri Woods RN  Outcome: Progressing  11/22/2023 2234 by Diane Mckeon RN  Outcome: Progressing     Problem: Cardiovascular - Adult  Goal: Maintains optimal cardiac output and hemodynamic stability  11/23/2023 1035 by Sheri Woods RN  Outcome: Progressing  11/22/2023 2234 by Diane Mckeon RN  Outcome: Progressing  Goal: Absence of cardiac dysrhythmias or at baseline  11/23/2023 1035 by Sheri Woods RN  Outcome: Progressing  11/22/2023 2234 by Diane Mckeon RN  Outcome: Progressing     Problem: Skin/Tissue Integrity - Adult  Goal: Skin integrity remains intact  11/23/2023 1035 by Sheri Woods RN  Outcome: Progressing  11/22/2023 2234 by Diane Mckeon RN  Outcome: Progressing     Problem: Metabolic/Fluid and Electrolytes - Adult  Goal: Hemodynamic stability and optimal renal function maintained  11/23/2023 1035 by Sheri Woods RN  Outcome: Progressing  11/22/2023 2234 by Diane Mckeon RN  Outcome: Progressing     Problem: Chronic Conditions and Co-morbidities  Goal: Patient's chronic conditions and co-morbidity symptoms are monitored and maintained or improved  11/23/2023 1035 by Sheri Woods RN  Outcome: Progressing  11/22/2023 2234 by Diane Mckeon RN  Outcome: Progressing     Problem: Safety - Adult  Goal: Free from fall injury  11/23/2023 1035 by Sheri Woods RN  Outcome: Progressing  11/22/2023 2234 by Diane Mckeon RN  Outcome: Progressing     Problem: Pain  Goal: Verbalizes/displays adequate comfort level or baseline comfort level  11/23/2023 1035 by Sheri Woods RN  Outcome: Progressing  11/22/2023 2234 by Diane Mckeon RN  Outcome: Progressing

## 2023-11-24 LAB
GLUCOSE BLD-MCNC: 273 MG/DL (ref 70–99)
GLUCOSE BLD-MCNC: 281 MG/DL (ref 70–99)
GLUCOSE BLD-MCNC: 71 MG/DL (ref 70–99)
GLUCOSE BLD-MCNC: 76 MG/DL (ref 70–99)

## 2023-11-24 PROCEDURE — 2700000000 HC OXYGEN THERAPY PER DAY

## 2023-11-24 PROCEDURE — 1200000000 HC SEMI PRIVATE

## 2023-11-24 PROCEDURE — 94669 MECHANICAL CHEST WALL OSCILL: CPT

## 2023-11-24 PROCEDURE — 6370000000 HC RX 637 (ALT 250 FOR IP): Performed by: STUDENT IN AN ORGANIZED HEALTH CARE EDUCATION/TRAINING PROGRAM

## 2023-11-24 PROCEDURE — 94761 N-INVAS EAR/PLS OXIMETRY MLT: CPT

## 2023-11-24 PROCEDURE — 99232 SBSQ HOSP IP/OBS MODERATE 35: CPT | Performed by: INTERNAL MEDICINE

## 2023-11-24 PROCEDURE — 94150 VITAL CAPACITY TEST: CPT

## 2023-11-24 PROCEDURE — 94640 AIRWAY INHALATION TREATMENT: CPT

## 2023-11-24 PROCEDURE — 82962 GLUCOSE BLOOD TEST: CPT

## 2023-11-24 PROCEDURE — 2580000003 HC RX 258: Performed by: STUDENT IN AN ORGANIZED HEALTH CARE EDUCATION/TRAINING PROGRAM

## 2023-11-24 RX ORDER — INSULIN GLARGINE 100 [IU]/ML
40 INJECTION, SOLUTION SUBCUTANEOUS 2 TIMES DAILY
Status: DISCONTINUED | OUTPATIENT
Start: 2023-11-24 | End: 2023-11-25

## 2023-11-24 RX ORDER — INSULIN LISPRO 100 [IU]/ML
8 INJECTION, SOLUTION INTRAVENOUS; SUBCUTANEOUS
Status: DISCONTINUED | OUTPATIENT
Start: 2023-11-24 | End: 2023-11-26

## 2023-11-24 RX ADMIN — INSULIN LISPRO 8 UNITS: 100 INJECTION, SOLUTION INTRAVENOUS; SUBCUTANEOUS at 18:14

## 2023-11-24 RX ADMIN — IPRATROPIUM BROMIDE AND ALBUTEROL SULFATE 1 DOSE: 2.5; .5 SOLUTION RESPIRATORY (INHALATION) at 11:32

## 2023-11-24 RX ADMIN — ATORVASTATIN CALCIUM 40 MG: 40 TABLET, FILM COATED ORAL at 21:06

## 2023-11-24 RX ADMIN — APIXABAN 5 MG: 5 TABLET, FILM COATED ORAL at 08:48

## 2023-11-24 RX ADMIN — BUDESONIDE AND FORMOTEROL FUMARATE DIHYDRATE 2 PUFF: 160; 4.5 AEROSOL RESPIRATORY (INHALATION) at 08:27

## 2023-11-24 RX ADMIN — IPRATROPIUM BROMIDE AND ALBUTEROL SULFATE 1 DOSE: 2.5; .5 SOLUTION RESPIRATORY (INHALATION) at 08:26

## 2023-11-24 RX ADMIN — INSULIN LISPRO 8 UNITS: 100 INJECTION, SOLUTION INTRAVENOUS; SUBCUTANEOUS at 13:14

## 2023-11-24 RX ADMIN — ROFLUMILAST 500 MCG: 500 TABLET ORAL at 08:48

## 2023-11-24 RX ADMIN — FUROSEMIDE 40 MG: 40 TABLET ORAL at 17:21

## 2023-11-24 RX ADMIN — IPRATROPIUM BROMIDE AND ALBUTEROL SULFATE 1 DOSE: 2.5; .5 SOLUTION RESPIRATORY (INHALATION) at 16:16

## 2023-11-24 RX ADMIN — PREDNISONE 40 MG: 20 TABLET ORAL at 08:48

## 2023-11-24 RX ADMIN — INSULIN LISPRO 8 UNITS: 100 INJECTION, SOLUTION INTRAVENOUS; SUBCUTANEOUS at 18:13

## 2023-11-24 RX ADMIN — SODIUM CHLORIDE, PRESERVATIVE FREE 10 ML: 5 INJECTION INTRAVENOUS at 21:06

## 2023-11-24 RX ADMIN — INSULIN GLARGINE 45 UNITS: 100 INJECTION, SOLUTION SUBCUTANEOUS at 08:55

## 2023-11-24 RX ADMIN — APIXABAN 5 MG: 5 TABLET, FILM COATED ORAL at 21:06

## 2023-11-24 RX ADMIN — SODIUM CHLORIDE, PRESERVATIVE FREE 10 ML: 5 INJECTION INTRAVENOUS at 08:56

## 2023-11-24 RX ADMIN — GUAIFENESIN 600 MG: 600 TABLET, EXTENDED RELEASE ORAL at 08:48

## 2023-11-24 RX ADMIN — GUAIFENESIN 600 MG: 600 TABLET, EXTENDED RELEASE ORAL at 21:06

## 2023-11-24 RX ADMIN — INSULIN LISPRO 5 UNITS: 100 INJECTION, SOLUTION INTRAVENOUS; SUBCUTANEOUS at 08:55

## 2023-11-24 RX ADMIN — INSULIN GLARGINE 40 UNITS: 100 INJECTION, SOLUTION SUBCUTANEOUS at 21:06

## 2023-11-24 ASSESSMENT — PAIN SCALES - WONG BAKER
WONGBAKER_NUMERICALRESPONSE: 2

## 2023-11-24 ASSESSMENT — PAIN SCALES - GENERAL: PAINLEVEL_OUTOF10: 2

## 2023-11-25 LAB
CULTURE: NORMAL
CULTURE: NORMAL
GLUCOSE BLD-MCNC: 106 MG/DL (ref 70–99)
GLUCOSE BLD-MCNC: 139 MG/DL (ref 70–99)
GLUCOSE BLD-MCNC: 149 MG/DL (ref 70–99)
GLUCOSE BLD-MCNC: 91 MG/DL (ref 70–99)
Lab: NORMAL
Lab: NORMAL
SPECIMEN: NORMAL
SPECIMEN: NORMAL

## 2023-11-25 PROCEDURE — 6370000000 HC RX 637 (ALT 250 FOR IP): Performed by: STUDENT IN AN ORGANIZED HEALTH CARE EDUCATION/TRAINING PROGRAM

## 2023-11-25 PROCEDURE — 99232 SBSQ HOSP IP/OBS MODERATE 35: CPT | Performed by: INTERNAL MEDICINE

## 2023-11-25 PROCEDURE — 82962 GLUCOSE BLOOD TEST: CPT

## 2023-11-25 PROCEDURE — 94150 VITAL CAPACITY TEST: CPT

## 2023-11-25 PROCEDURE — 97530 THERAPEUTIC ACTIVITIES: CPT

## 2023-11-25 PROCEDURE — 2580000003 HC RX 258: Performed by: STUDENT IN AN ORGANIZED HEALTH CARE EDUCATION/TRAINING PROGRAM

## 2023-11-25 PROCEDURE — 97116 GAIT TRAINING THERAPY: CPT

## 2023-11-25 PROCEDURE — 94664 DEMO&/EVAL PT USE INHALER: CPT

## 2023-11-25 PROCEDURE — 94761 N-INVAS EAR/PLS OXIMETRY MLT: CPT

## 2023-11-25 PROCEDURE — 2700000000 HC OXYGEN THERAPY PER DAY

## 2023-11-25 PROCEDURE — 94640 AIRWAY INHALATION TREATMENT: CPT

## 2023-11-25 PROCEDURE — 1200000000 HC SEMI PRIVATE

## 2023-11-25 PROCEDURE — 6370000000 HC RX 637 (ALT 250 FOR IP): Performed by: INTERNAL MEDICINE

## 2023-11-25 PROCEDURE — 94669 MECHANICAL CHEST WALL OSCILL: CPT

## 2023-11-25 PROCEDURE — 97110 THERAPEUTIC EXERCISES: CPT

## 2023-11-25 RX ORDER — IPRATROPIUM BROMIDE AND ALBUTEROL SULFATE 2.5; .5 MG/3ML; MG/3ML
1 SOLUTION RESPIRATORY (INHALATION)
Status: DISCONTINUED | OUTPATIENT
Start: 2023-11-25 | End: 2023-11-27 | Stop reason: HOSPADM

## 2023-11-25 RX ORDER — HYDROCODONE BITARTRATE AND ACETAMINOPHEN 5; 325 MG/1; MG/1
1 TABLET ORAL
Status: COMPLETED | OUTPATIENT
Start: 2023-11-25 | End: 2023-11-25

## 2023-11-25 RX ORDER — INSULIN GLARGINE 100 [IU]/ML
35 INJECTION, SOLUTION SUBCUTANEOUS 2 TIMES DAILY
Status: DISCONTINUED | OUTPATIENT
Start: 2023-11-25 | End: 2023-11-26

## 2023-11-25 RX ADMIN — APIXABAN 5 MG: 5 TABLET, FILM COATED ORAL at 21:08

## 2023-11-25 RX ADMIN — HYDROCODONE BITARTRATE AND ACETAMINOPHEN 1 TABLET: 5; 325 TABLET ORAL at 17:29

## 2023-11-25 RX ADMIN — INSULIN LISPRO 8 UNITS: 100 INJECTION, SOLUTION INTRAVENOUS; SUBCUTANEOUS at 13:06

## 2023-11-25 RX ADMIN — SODIUM CHLORIDE, PRESERVATIVE FREE 10 ML: 5 INJECTION INTRAVENOUS at 21:09

## 2023-11-25 RX ADMIN — BUDESONIDE AND FORMOTEROL FUMARATE DIHYDRATE 2 PUFF: 160; 4.5 AEROSOL RESPIRATORY (INHALATION) at 21:59

## 2023-11-25 RX ADMIN — BUDESONIDE AND FORMOTEROL FUMARATE DIHYDRATE 2 PUFF: 160; 4.5 AEROSOL RESPIRATORY (INHALATION) at 07:51

## 2023-11-25 RX ADMIN — INSULIN GLARGINE 35 UNITS: 100 INJECTION, SOLUTION SUBCUTANEOUS at 09:06

## 2023-11-25 RX ADMIN — ACETAMINOPHEN 650 MG: 325 TABLET ORAL at 11:30

## 2023-11-25 RX ADMIN — ROFLUMILAST 500 MCG: 500 TABLET ORAL at 09:07

## 2023-11-25 RX ADMIN — FUROSEMIDE 40 MG: 40 TABLET ORAL at 17:29

## 2023-11-25 RX ADMIN — SODIUM CHLORIDE, PRESERVATIVE FREE 10 ML: 5 INJECTION INTRAVENOUS at 09:08

## 2023-11-25 RX ADMIN — APIXABAN 5 MG: 5 TABLET, FILM COATED ORAL at 09:07

## 2023-11-25 RX ADMIN — IPRATROPIUM BROMIDE AND ALBUTEROL SULFATE 1 DOSE: 2.5; .5 SOLUTION RESPIRATORY (INHALATION) at 15:45

## 2023-11-25 RX ADMIN — GUAIFENESIN 600 MG: 600 TABLET, EXTENDED RELEASE ORAL at 21:08

## 2023-11-25 RX ADMIN — ATORVASTATIN CALCIUM 40 MG: 40 TABLET, FILM COATED ORAL at 21:08

## 2023-11-25 RX ADMIN — FUROSEMIDE 40 MG: 40 TABLET ORAL at 09:07

## 2023-11-25 RX ADMIN — PREDNISONE 40 MG: 20 TABLET ORAL at 09:07

## 2023-11-25 RX ADMIN — GUAIFENESIN 600 MG: 600 TABLET, EXTENDED RELEASE ORAL at 09:07

## 2023-11-25 RX ADMIN — IPRATROPIUM BROMIDE AND ALBUTEROL SULFATE 1 DOSE: 2.5; .5 SOLUTION RESPIRATORY (INHALATION) at 21:59

## 2023-11-25 RX ADMIN — IPRATROPIUM BROMIDE AND ALBUTEROL SULFATE 1 DOSE: 2.5; .5 SOLUTION RESPIRATORY (INHALATION) at 07:51

## 2023-11-25 RX ADMIN — DILTIAZEM HYDROCHLORIDE 240 MG: 240 CAPSULE, COATED, EXTENDED RELEASE ORAL at 09:07

## 2023-11-25 ASSESSMENT — PAIN DESCRIPTION - ORIENTATION: ORIENTATION: RIGHT;LEFT

## 2023-11-25 ASSESSMENT — PAIN SCALES - WONG BAKER
WONGBAKER_NUMERICALRESPONSE: 2
WONGBAKER_NUMERICALRESPONSE: 0
WONGBAKER_NUMERICALRESPONSE: 2

## 2023-11-25 ASSESSMENT — PAIN SCALES - GENERAL
PAINLEVEL_OUTOF10: 6
PAINLEVEL_OUTOF10: 0
PAINLEVEL_OUTOF10: 6
PAINLEVEL_OUTOF10: 7
PAINLEVEL_OUTOF10: 3

## 2023-11-25 ASSESSMENT — PAIN DESCRIPTION - LOCATION
LOCATION: LEG
LOCATION: ELBOW
LOCATION: LEG

## 2023-11-25 NOTE — CARE COORDINATION
[facility] agreeable to accept pt when a pre-cert is obtained per Lauren. Notified [facility] to start pre-cert when updated PT notes are in. Requested updated PT notes via WB.   TE

## 2023-11-26 LAB
GLUCOSE BLD-MCNC: 107 MG/DL (ref 70–99)
GLUCOSE BLD-MCNC: 117 MG/DL (ref 70–99)
GLUCOSE BLD-MCNC: 199 MG/DL (ref 70–99)
GLUCOSE BLD-MCNC: 94 MG/DL (ref 70–99)

## 2023-11-26 PROCEDURE — 6370000000 HC RX 637 (ALT 250 FOR IP): Performed by: STUDENT IN AN ORGANIZED HEALTH CARE EDUCATION/TRAINING PROGRAM

## 2023-11-26 PROCEDURE — 94640 AIRWAY INHALATION TREATMENT: CPT

## 2023-11-26 PROCEDURE — 99232 SBSQ HOSP IP/OBS MODERATE 35: CPT | Performed by: INTERNAL MEDICINE

## 2023-11-26 PROCEDURE — 1200000000 HC SEMI PRIVATE

## 2023-11-26 PROCEDURE — 94761 N-INVAS EAR/PLS OXIMETRY MLT: CPT

## 2023-11-26 PROCEDURE — 94669 MECHANICAL CHEST WALL OSCILL: CPT

## 2023-11-26 PROCEDURE — 2580000003 HC RX 258: Performed by: STUDENT IN AN ORGANIZED HEALTH CARE EDUCATION/TRAINING PROGRAM

## 2023-11-26 PROCEDURE — 94664 DEMO&/EVAL PT USE INHALER: CPT

## 2023-11-26 PROCEDURE — 82962 GLUCOSE BLOOD TEST: CPT

## 2023-11-26 PROCEDURE — 94150 VITAL CAPACITY TEST: CPT

## 2023-11-26 PROCEDURE — 2700000000 HC OXYGEN THERAPY PER DAY

## 2023-11-26 PROCEDURE — 6370000000 HC RX 637 (ALT 250 FOR IP): Performed by: INTERNAL MEDICINE

## 2023-11-26 RX ORDER — INSULIN LISPRO 100 [IU]/ML
5 INJECTION, SOLUTION INTRAVENOUS; SUBCUTANEOUS
Status: DISCONTINUED | OUTPATIENT
Start: 2023-11-26 | End: 2023-11-27

## 2023-11-26 RX ORDER — INSULIN GLARGINE 100 [IU]/ML
30 INJECTION, SOLUTION SUBCUTANEOUS 2 TIMES DAILY
Status: DISCONTINUED | OUTPATIENT
Start: 2023-11-26 | End: 2023-11-27

## 2023-11-26 RX ADMIN — INSULIN GLARGINE 35 UNITS: 100 INJECTION, SOLUTION SUBCUTANEOUS at 08:30

## 2023-11-26 RX ADMIN — DILTIAZEM HYDROCHLORIDE 240 MG: 240 CAPSULE, COATED, EXTENDED RELEASE ORAL at 08:30

## 2023-11-26 RX ADMIN — FUROSEMIDE 40 MG: 40 TABLET ORAL at 17:13

## 2023-11-26 RX ADMIN — IPRATROPIUM BROMIDE AND ALBUTEROL SULFATE 1 DOSE: 2.5; .5 SOLUTION RESPIRATORY (INHALATION) at 14:26

## 2023-11-26 RX ADMIN — IPRATROPIUM BROMIDE AND ALBUTEROL SULFATE 1 DOSE: 2.5; .5 SOLUTION RESPIRATORY (INHALATION) at 07:55

## 2023-11-26 RX ADMIN — APIXABAN 5 MG: 5 TABLET, FILM COATED ORAL at 21:18

## 2023-11-26 RX ADMIN — SODIUM CHLORIDE, PRESERVATIVE FREE 10 ML: 5 INJECTION INTRAVENOUS at 08:30

## 2023-11-26 RX ADMIN — GUAIFENESIN 600 MG: 600 TABLET, EXTENDED RELEASE ORAL at 21:18

## 2023-11-26 RX ADMIN — ATORVASTATIN CALCIUM 40 MG: 40 TABLET, FILM COATED ORAL at 21:19

## 2023-11-26 RX ADMIN — INSULIN LISPRO 8 UNITS: 100 INJECTION, SOLUTION INTRAVENOUS; SUBCUTANEOUS at 08:30

## 2023-11-26 RX ADMIN — SODIUM CHLORIDE, PRESERVATIVE FREE 10 ML: 5 INJECTION INTRAVENOUS at 21:20

## 2023-11-26 RX ADMIN — FUROSEMIDE 40 MG: 40 TABLET ORAL at 08:30

## 2023-11-26 RX ADMIN — BUDESONIDE AND FORMOTEROL FUMARATE DIHYDRATE 2 PUFF: 160; 4.5 AEROSOL RESPIRATORY (INHALATION) at 07:57

## 2023-11-26 RX ADMIN — ROFLUMILAST 500 MCG: 500 TABLET ORAL at 08:30

## 2023-11-26 RX ADMIN — GUAIFENESIN 600 MG: 600 TABLET, EXTENDED RELEASE ORAL at 08:30

## 2023-11-26 RX ADMIN — APIXABAN 5 MG: 5 TABLET, FILM COATED ORAL at 08:30

## 2023-11-26 RX ADMIN — ACETAMINOPHEN 650 MG: 325 TABLET ORAL at 21:18

## 2023-11-26 ASSESSMENT — PAIN DESCRIPTION - LOCATION: LOCATION: LEG

## 2023-11-26 ASSESSMENT — PAIN SCALES - WONG BAKER: WONGBAKER_NUMERICALRESPONSE: 0

## 2023-11-26 ASSESSMENT — PAIN DESCRIPTION - ORIENTATION: ORIENTATION: RIGHT;LEFT

## 2023-11-26 ASSESSMENT — PAIN SCALES - GENERAL: PAINLEVEL_OUTOF10: 7

## 2023-11-26 NOTE — PLAN OF CARE
Problem: Discharge Planning  Goal: Discharge to home or other facility with appropriate resources  Outcome: Adequate for Discharge     Problem: Respiratory - Adult  Goal: Achieves optimal ventilation and oxygenation  Outcome: Adequate for Discharge     Problem: Cardiovascular - Adult  Goal: Maintains optimal cardiac output and hemodynamic stability  Outcome: Adequate for Discharge  Goal: Absence of cardiac dysrhythmias or at baseline  Outcome: Adequate for Discharge     Problem: Skin/Tissue Integrity - Adult  Goal: Skin integrity remains intact  Outcome: Adequate for Discharge     Problem: Metabolic/Fluid and Electrolytes - Adult  Goal: Hemodynamic stability and optimal renal function maintained  Outcome: Adequate for Discharge     Problem: Chronic Conditions and Co-morbidities  Goal: Patient's chronic conditions and co-morbidity symptoms are monitored and maintained or improved  Outcome: Adequate for Discharge     Problem: Safety - Adult  Goal: Free from fall injury  Outcome: Adequate for Discharge     Problem: Pain  Goal: Verbalizes/displays adequate comfort level or baseline comfort level  Outcome: Adequate for Discharge

## 2023-11-27 ENCOUNTER — APPOINTMENT (OUTPATIENT)
Dept: GENERAL RADIOLOGY | Age: 68
DRG: 190 | End: 2023-11-27
Payer: MEDICARE

## 2023-11-27 VITALS
HEART RATE: 74 BPM | HEIGHT: 78 IN | OXYGEN SATURATION: 95 % | BODY MASS INDEX: 36.45 KG/M2 | DIASTOLIC BLOOD PRESSURE: 76 MMHG | SYSTOLIC BLOOD PRESSURE: 116 MMHG | WEIGHT: 315 LBS | RESPIRATION RATE: 18 BRPM | TEMPERATURE: 97.9 F

## 2023-11-27 LAB
GLUCOSE BLD-MCNC: 156 MG/DL (ref 70–99)
GLUCOSE BLD-MCNC: 213 MG/DL (ref 70–99)
GLUCOSE BLD-MCNC: 294 MG/DL (ref 70–99)

## 2023-11-27 PROCEDURE — 94640 AIRWAY INHALATION TREATMENT: CPT

## 2023-11-27 PROCEDURE — 2580000003 HC RX 258: Performed by: STUDENT IN AN ORGANIZED HEALTH CARE EDUCATION/TRAINING PROGRAM

## 2023-11-27 PROCEDURE — 6370000000 HC RX 637 (ALT 250 FOR IP): Performed by: STUDENT IN AN ORGANIZED HEALTH CARE EDUCATION/TRAINING PROGRAM

## 2023-11-27 PROCEDURE — 2700000000 HC OXYGEN THERAPY PER DAY

## 2023-11-27 PROCEDURE — 94761 N-INVAS EAR/PLS OXIMETRY MLT: CPT

## 2023-11-27 PROCEDURE — 73560 X-RAY EXAM OF KNEE 1 OR 2: CPT

## 2023-11-27 PROCEDURE — 6370000000 HC RX 637 (ALT 250 FOR IP): Performed by: INTERNAL MEDICINE

## 2023-11-27 PROCEDURE — 82962 GLUCOSE BLOOD TEST: CPT

## 2023-11-27 RX ORDER — INSULIN DETEMIR 100 [IU]/ML
25 INJECTION, SOLUTION SUBCUTANEOUS
Qty: 22.5 ML | Refills: 0 | Status: SHIPPED | OUTPATIENT
Start: 2023-11-27 | End: 2024-02-25

## 2023-11-27 RX ORDER — INSULIN GLARGINE 100 [IU]/ML
25 INJECTION, SOLUTION SUBCUTANEOUS EVERY MORNING
Status: DISCONTINUED | OUTPATIENT
Start: 2023-11-27 | End: 2023-11-27 | Stop reason: HOSPADM

## 2023-11-27 RX ADMIN — ACETAMINOPHEN 650 MG: 325 TABLET ORAL at 04:08

## 2023-11-27 RX ADMIN — GUAIFENESIN 600 MG: 600 TABLET, EXTENDED RELEASE ORAL at 09:44

## 2023-11-27 RX ADMIN — ROFLUMILAST 500 MCG: 500 TABLET ORAL at 09:49

## 2023-11-27 RX ADMIN — FUROSEMIDE 40 MG: 40 TABLET ORAL at 09:44

## 2023-11-27 RX ADMIN — INSULIN GLARGINE 25 UNITS: 100 INJECTION, SOLUTION SUBCUTANEOUS at 09:53

## 2023-11-27 RX ADMIN — IPRATROPIUM BROMIDE AND ALBUTEROL SULFATE 1 DOSE: 2.5; .5 SOLUTION RESPIRATORY (INHALATION) at 14:40

## 2023-11-27 RX ADMIN — DILTIAZEM HYDROCHLORIDE 240 MG: 240 CAPSULE, COATED, EXTENDED RELEASE ORAL at 09:43

## 2023-11-27 RX ADMIN — INSULIN LISPRO 8 UNITS: 100 INJECTION, SOLUTION INTRAVENOUS; SUBCUTANEOUS at 12:36

## 2023-11-27 RX ADMIN — BUDESONIDE AND FORMOTEROL FUMARATE DIHYDRATE 2 PUFF: 160; 4.5 AEROSOL RESPIRATORY (INHALATION) at 07:40

## 2023-11-27 RX ADMIN — SODIUM CHLORIDE, PRESERVATIVE FREE 5 ML: 5 INJECTION INTRAVENOUS at 09:00

## 2023-11-27 RX ADMIN — IPRATROPIUM BROMIDE AND ALBUTEROL SULFATE 1 DOSE: 2.5; .5 SOLUTION RESPIRATORY (INHALATION) at 07:40

## 2023-11-27 RX ADMIN — APIXABAN 5 MG: 5 TABLET, FILM COATED ORAL at 09:43

## 2023-11-27 ASSESSMENT — PAIN DESCRIPTION - ORIENTATION
ORIENTATION: RIGHT
ORIENTATION: RIGHT

## 2023-11-27 ASSESSMENT — PAIN SCALES - GENERAL
PAINLEVEL_OUTOF10: 5
PAINLEVEL_OUTOF10: 7

## 2023-11-27 ASSESSMENT — PAIN DESCRIPTION - DESCRIPTORS
DESCRIPTORS: ACHING;THROBBING
DESCRIPTORS: ACHING

## 2023-11-27 ASSESSMENT — PAIN DESCRIPTION - LOCATION
LOCATION: KNEE
LOCATION: LEG

## 2023-11-27 NOTE — PLAN OF CARE
Problem: Discharge Planning  Goal: Discharge to home or other facility with appropriate resources  Outcome: Completed     Problem: Respiratory - Adult  Goal: Achieves optimal ventilation and oxygenation  Outcome: Completed     Problem: Cardiovascular - Adult  Goal: Maintains optimal cardiac output and hemodynamic stability  Outcome: Completed     Problem: Skin/Tissue Integrity - Adult  Goal: Skin integrity remains intact  Outcome: Completed     Problem: Metabolic/Fluid and Electrolytes - Adult  Goal: Hemodynamic stability and optimal renal function maintained  Outcome: Completed     Problem: Chronic Conditions and Co-morbidities  Goal: Patient's chronic conditions and co-morbidity symptoms are monitored and maintained or improved  Outcome: Completed     Problem: Safety - Adult  Goal: Free from fall injury  Outcome: Completed     Problem: Pain  Goal: Verbalizes/displays adequate comfort level or baseline comfort level  Outcome: Completed  Flowsheets (Taken 11/27/2023 4541)  Verbalizes/displays adequate comfort level or baseline comfort level: Encourage patient to monitor pain and request assistance

## 2023-11-27 NOTE — DISCHARGE SUMMARY
Discharge Summary    Name:  Anthony Gasca /Age/Sex: 1955  (79 y.o. male)   MRN & CSN:  [id number] & [id number] Admission Date/Time: 2023 12:47 PM   Attending:  Gala Alvarado MD Discharging Physician: Gala Alvarado MD     Hospital Course:   Anthony Gasca is a 79 y.o.  male  who presents with COPD exacerbation ([address])    HPI  \"Armando Wesley is a 79 y.o. male with pmh of HFpEF, A-fib on Eliquis, T2DM and COPD on 2 to 3 L oxygen at home who presents with progressively worsening SOB. Vitals on presentation heart rate elevated to 120, /59, respiratory rate 24, afebrile and saturating 93% on 4 L of oxygen. Labs notable for a VBG with pH of 7.36, PCO2 of 72, PO2 of 23 and bicarb of 40.7, initial troponin is 33 down trended to 30, , mild leukocytosis 13.5, lactic acid of 2.8 ->, UA notable for glucosuria, CMP notable for hyperglycemia to 432, hypochloremia to 93 and bicarb of 34, and Pro-Caesar less than 0.5. Chest x-ray shows no acute cardiopulmonary process respiratory panel is negative EKG shows A-fib with RVR on presentation. Patient on encounter is asleep but breathing is unlabored, on awakening is mildly dyspneic but able to hold conversation. He endorses that his SOB has gotten progressively worse but that since he got the steroid and magnesium, feels better. Admit for COPD exacerbation and afib w/RVR\"       The following problems have been addressed during this hospitalization. Acute on chronic resp with hypoxia and hypercapnia - improved  COPD exacerbation  O2 now back to baseline.   - received steroids, azithro  - has been on breathing tx.    - pt to continue acapalla, IS  - continue mucinex     Afib w/ RVR  Paroxysimal A-fib  Likely triggered by resp distress, patient is on Cardizem and Eliquis  --Continue home meds; no changes done     Elevated lactic acid  - resolved  Likely secondary to respiratory distress     HFpEF  On home lasix PO  No sign of volume overload   --Continue

## 2023-11-27 NOTE — CARE COORDINATION
Pt approved to go to [facility]. [facility] to  pt at 3:00PM. [facility] paperwork completed on both sides and placed on packet. RN, Charge RN, pt and Shelley with [facility] aware of  time.

## 2023-11-28 ENCOUNTER — CARE COORDINATION (OUTPATIENT)
Dept: CARE COORDINATION | Age: 68
End: 2023-11-28

## 2023-11-28 NOTE — CARE COORDINATION
Attempted phone call to [facility] to follow up regarding referral for visiting physician. No answer, vm message left stating Pt is in SNF and this SW will provide update when he returns home. VIELKA plan of care: SW will check to see if pt has returned home from SNF in 2 weeks on 12/12.

## 2023-11-28 NOTE — CARE COORDINATION
ACM did not make outreach at this time. Patient was discharged to SNF. ACM will follow up with patient once discharged from facility.

## 2023-11-29 ENCOUNTER — HOSPITAL ENCOUNTER (OUTPATIENT)
Age: 68
Setting detail: SPECIMEN
Discharge: HOME | End: 2023-11-29
Payer: MEDICARE

## 2023-11-29 LAB
ALBUMIN SERPL-MCNC: 3.6 GM/DL (ref 3.4–5)
ALP BLD-CCNC: 84 IU/L (ref 40–128)
ALT SERPL-CCNC: 47 U/L (ref 10–40)
ANION GAP SERPL CALCULATED.3IONS-SCNC: 12 MMOL/L (ref 4–16)
AST SERPL-CCNC: 14 IU/L (ref 15–37)
BASOPHILS ABSOLUTE: 0 K/CU MM
BASOPHILS RELATIVE PERCENT: 0.3 % (ref 0–1)
BILIRUB SERPL-MCNC: 1.6 MG/DL (ref 0–1)
BUN SERPL-MCNC: 15 MG/DL (ref 6–23)
CALCIUM SERPL-MCNC: 8.5 MG/DL (ref 8.3–10.6)
CHLORIDE BLD-SCNC: 90 MMOL/L (ref 99–110)
CO2: 37 MMOL/L (ref 21–32)
CREAT SERPL-MCNC: 0.6 MG/DL (ref 0.9–1.3)
DIFFERENTIAL TYPE: ABNORMAL
EOSINOPHILS ABSOLUTE: 0.2 K/CU MM
EOSINOPHILS RELATIVE PERCENT: 1.5 % (ref 0–3)
GFR SERPL CREATININE-BSD FRML MDRD: >60 ML/MIN/1.73M2
GLUCOSE SERPL-MCNC: 180 MG/DL (ref 70–99)
HCT VFR BLD CALC: 46.7 % (ref 42–52)
HEMOGLOBIN: 15.7 GM/DL (ref 13.5–18)
IMMATURE NEUTROPHIL %: 1 % (ref 0–0.43)
LYMPHOCYTES ABSOLUTE: 1.9 K/CU MM
LYMPHOCYTES RELATIVE PERCENT: 16.8 % (ref 24–44)
MCH RBC QN AUTO: 30.8 PG (ref 27–31)
MCHC RBC AUTO-ENTMCNC: 33.6 % (ref 32–36)
MCV RBC AUTO: 91.7 FL (ref 78–100)
MONOCYTES ABSOLUTE: 0.9 K/CU MM
MONOCYTES RELATIVE PERCENT: 7.9 % (ref 0–4)
NUCLEATED RBC %: 0 %
PDW BLD-RTO: 13.8 % (ref 11.7–14.9)
PLATELET # BLD: 115 K/CU MM (ref 140–440)
PMV BLD AUTO: 10.2 FL (ref 7.5–11.1)
POTASSIUM SERPL-SCNC: 2.7 MMOL/L (ref 3.5–5.1)
RBC # BLD: 5.09 M/CU MM (ref 4.6–6.2)
SEGMENTED NEUTROPHILS ABSOLUTE COUNT: 8.3 K/CU MM
SEGMENTED NEUTROPHILS RELATIVE PERCENT: 72.5 % (ref 36–66)
SODIUM BLD-SCNC: 139 MMOL/L (ref 135–145)
TOTAL IMMATURE NEUTOROPHIL: 0.11 K/CU MM
TOTAL NUCLEATED RBC: 0 K/CU MM
TOTAL PROTEIN: 5.3 GM/DL (ref 6.4–8.2)
WBC # BLD: 11.4 K/CU MM (ref 4–10.5)

## 2023-11-29 PROCEDURE — 36415 COLL VENOUS BLD VENIPUNCTURE: CPT

## 2023-11-29 PROCEDURE — 85025 COMPLETE CBC W/AUTO DIFF WBC: CPT

## 2023-11-29 PROCEDURE — 80053 COMPREHEN METABOLIC PANEL: CPT

## 2023-11-30 ENCOUNTER — HOSPITAL ENCOUNTER (OUTPATIENT)
Age: 68
Setting detail: SPECIMEN
Discharge: HOME | End: 2023-11-30
Payer: MEDICARE

## 2023-11-30 LAB
ANION GAP SERPL CALCULATED.3IONS-SCNC: 8 MMOL/L (ref 4–16)
BUN SERPL-MCNC: 17 MG/DL (ref 6–23)
CALCIUM SERPL-MCNC: 8.5 MG/DL (ref 8.3–10.6)
CHLORIDE BLD-SCNC: 90 MMOL/L (ref 99–110)
CO2: 37 MMOL/L (ref 21–32)
CREAT SERPL-MCNC: 0.7 MG/DL (ref 0.9–1.3)
GFR SERPL CREATININE-BSD FRML MDRD: >60 ML/MIN/1.73M2
GLUCOSE SERPL-MCNC: 167 MG/DL (ref 70–99)
POTASSIUM SERPL-SCNC: 4 MMOL/L (ref 3.5–5.1)
SODIUM BLD-SCNC: 135 MMOL/L (ref 135–145)

## 2023-11-30 PROCEDURE — 80048 BASIC METABOLIC PNL TOTAL CA: CPT

## 2023-11-30 PROCEDURE — 36415 COLL VENOUS BLD VENIPUNCTURE: CPT

## 2023-12-11 ENCOUNTER — CARE COORDINATION (OUTPATIENT)
Dept: CARE COORDINATION | Age: 68
End: 2023-12-11

## 2023-12-11 ENCOUNTER — CARE COORDINATION (OUTPATIENT)
Dept: CASE MANAGEMENT | Age: 68
End: 2023-12-11

## 2023-12-11 DIAGNOSIS — J44.1 COPD EXACERBATION (HCC): Primary | ICD-10-CM

## 2023-12-11 PROCEDURE — 1111F DSCHRG MED/CURRENT MED MERGE: CPT | Performed by: INTERNAL MEDICINE

## 2023-12-11 NOTE — CARE COORDINATION
Care Transitions Initial Follow Up Call    Call within 2 business days of discharge: Yes    Patient Current Location:  Home: [address] [place] [address] Jules Transition Nurse contacted the patient by telephone to perform post hospital discharge assessment. Verified name and  with patient as identifiers. Provided introduction to self, and explanation of the Care Transition Nurse role. Patient: Tory Law Patient : 1955   MRN: [id number]  Reason for Admission: COPD exacerbation  Discharge Date: 23 RARS: Readmission Risk Score: 36.2      Last Discharge Facility       Date Complaint Diagnosis Description Type Department Provider    23 Shortness of Breath COPD exacerbation ([address]) . .. ED to Hosp-Admission (Discharged) (ADMITTED) Chaka Velasquez MD; Pablito Gonzalez. ..          23  Was this an external facility discharge? Yes, 23  Discharge Facility: [facility]. Challenges to be reviewed by the provider   Additional needs identified to be addressed with provider: Yes  medications-Patient does not have inhalers Symbicort and Striverdi d/t cost. Patient also unable to physically come to the office. Method of communication with provider: chart routing. Patient states he is doing better. States he is weak and gets winded going to the bathroom. Patient on [email or web address] continuous. States he has to walk slow. He does not use any assistive devices. States he has a cough, congestion and slight BLE edema. States he takes Tylenol 500mg for achy legs. He said it does not help so much. Appetite good. Fluid intake adequate. Medications reviewed. Patient does not have Symbicort and Striverdi inhalers d/t cost. Message routed to PCP. During conversation [facility] called to notify patient of visit today. Also the [employer] patient belongs to sent meals as they do every Monday. Patient unable to physically go to the office for a f/u visit.
Normal for race/Pink

## 2023-12-11 NOTE — CARE COORDINATION
ACM deferred outreach at this time. Patient is working with CTN with next scheduled outreach 12/12/2023. ACM will follow up with patient after CTN program is complete.

## 2023-12-11 NOTE — CARE COORDINATION
PCP appointment has been scheduled virtually (per pt request) Friday @ 11:15. Patient has been notified and agreed. CTN made aware.      Future Appointments   Date Time Provider [address]   12/15/2023 11:15 AM Charley Hedrick MD [facility]   1/3/2024  1:00 PM MD VITALY StokesAndres [facility]

## 2023-12-12 ENCOUNTER — HOSPITAL ENCOUNTER (INPATIENT)
Age: 68
LOS: 4 days | Discharge: HOME | End: 2023-12-16
Attending: EMERGENCY MEDICINE
Payer: MEDICARE

## 2023-12-12 ENCOUNTER — CARE COORDINATION (OUTPATIENT)
Dept: CARE COORDINATION | Age: 68
End: 2023-12-12

## 2023-12-12 ENCOUNTER — APPOINTMENT (OUTPATIENT)
Dept: GENERAL RADIOLOGY | Age: 68
End: 2023-12-12
Payer: MEDICARE

## 2023-12-12 ENCOUNTER — CARE COORDINATION (OUTPATIENT)
Dept: CASE MANAGEMENT | Age: 68
End: 2023-12-12

## 2023-12-12 DIAGNOSIS — I50.9 ACUTE ON CHRONIC CONGESTIVE HEART FAILURE, UNSPECIFIED HEART FAILURE TYPE (HCC): ICD-10-CM

## 2023-12-12 DIAGNOSIS — J44.1 COPD EXACERBATION (HCC): Primary | ICD-10-CM

## 2023-12-12 LAB
ALBUMIN SERPL-MCNC: 3.4 GM/DL (ref 3.4–5)
ALP BLD-CCNC: 76 IU/L (ref 40–129)
ALT SERPL-CCNC: 24 U/L (ref 10–40)
ANION GAP SERPL CALCULATED.3IONS-SCNC: 10 MMOL/L (ref 4–16)
AST SERPL-CCNC: 12 IU/L (ref 15–37)
BASE EXCESS MIXED: 12.6 (ref 0–1.2)
BASE EXCESS MIXED: 14.5 (ref 0–1.2)
BASOPHILS ABSOLUTE: 0 K/CU MM
BASOPHILS RELATIVE PERCENT: 0.5 % (ref 0–1)
BILIRUB SERPL-MCNC: 0.5 MG/DL (ref 0–1)
BUN SERPL-MCNC: 9 MG/DL (ref 6–23)
CALCIUM SERPL-MCNC: 7.4 MG/DL (ref 8.3–10.6)
CHLORIDE BLD-SCNC: 94 MMOL/L (ref 99–110)
CO2: 38 MMOL/L (ref 21–32)
COMMENT: ABNORMAL
COMMENT: ABNORMAL
CREAT SERPL-MCNC: 0.7 MG/DL (ref 0.9–1.3)
DIFFERENTIAL TYPE: ABNORMAL
EOSINOPHILS ABSOLUTE: 0.2 K/CU MM
EOSINOPHILS RELATIVE PERCENT: 1.9 % (ref 0–3)
GFR SERPL CREATININE-BSD FRML MDRD: >60 ML/MIN/1.73M2
GLUCOSE SERPL-MCNC: 211 MG/DL (ref 70–99)
HCO3 VENOUS: 42.8 MMOL/L (ref 19–25)
HCO3 VENOUS: 46 MMOL/L (ref 19–25)
HCT VFR BLD CALC: 37.7 % (ref 42–52)
HEMOGLOBIN: 12.1 GM/DL (ref 13.5–18)
IMMATURE NEUTROPHIL %: 1.3 % (ref 0–0.43)
INFLUENZA A ANTIGEN: NOT DETECTED
INFLUENZA B ANTIGEN: NOT DETECTED
LYMPHOCYTES ABSOLUTE: 1.3 K/CU MM
LYMPHOCYTES RELATIVE PERCENT: 15.6 % (ref 24–44)
MCH RBC QN AUTO: 30.8 PG (ref 27–31)
MCHC RBC AUTO-ENTMCNC: 32.1 % (ref 32–36)
MCV RBC AUTO: 95.9 FL (ref 78–100)
MONOCYTES ABSOLUTE: 0.8 K/CU MM
MONOCYTES RELATIVE PERCENT: 9.7 % (ref 0–4)
NUCLEATED RBC %: 0 %
O2 SAT, VEN: 54.2 % (ref 50–70)
O2 SAT, VEN: 67.1 % (ref 50–70)
PCO2, VEN: 85 MMHG (ref 38–52)
PCO2, VEN: 98 MMHG (ref 38–52)
PDW BLD-RTO: 15.1 % (ref 11.7–14.9)
PH VENOUS: 7.28 (ref 7.32–7.42)
PH VENOUS: 7.31 (ref 7.32–7.42)
PLATELET # BLD: 181 K/CU MM (ref 140–440)
PMV BLD AUTO: 10 FL (ref 7.5–11.1)
PO2, VEN: 37 MMHG (ref 28–48)
PO2, VEN: 42 MMHG (ref 28–48)
POTASSIUM SERPL-SCNC: 3.8 MMOL/L (ref 3.5–5.1)
PRO-BNP: 2140 PG/ML
RBC # BLD: 3.93 M/CU MM (ref 4.6–6.2)
SARS-COV-2 RDRP RESP QL NAA+PROBE: NOT DETECTED
SEGMENTED NEUTROPHILS ABSOLUTE COUNT: 6.1 K/CU MM
SEGMENTED NEUTROPHILS RELATIVE PERCENT: 71 % (ref 36–66)
SODIUM BLD-SCNC: 142 MMOL/L (ref 135–145)
SOURCE: NORMAL
TOTAL IMMATURE NEUTOROPHIL: 0.11 K/CU MM
TOTAL NUCLEATED RBC: 0 K/CU MM
TOTAL PROTEIN: 6 GM/DL (ref 6.4–8.2)
TROPONIN, HIGH SENSITIVITY: 52 NG/L (ref 0–22)
TROPONIN, HIGH SENSITIVITY: 54 NG/L (ref 0–22)
WBC # BLD: 8.5 K/CU MM (ref 4–10.5)

## 2023-12-12 PROCEDURE — 84484 ASSAY OF TROPONIN QUANT: CPT

## 2023-12-12 PROCEDURE — 85025 COMPLETE CBC W/AUTO DIFF WBC: CPT

## 2023-12-12 PROCEDURE — 71045 X-RAY EXAM CHEST 1 VIEW: CPT

## 2023-12-12 PROCEDURE — 6360000002 HC RX W HCPCS: Performed by: EMERGENCY MEDICINE

## 2023-12-12 PROCEDURE — 6370000000 HC RX 637 (ALT 250 FOR IP): Performed by: EMERGENCY MEDICINE

## 2023-12-12 PROCEDURE — 80053 COMPREHEN METABOLIC PANEL: CPT

## 2023-12-12 PROCEDURE — 83880 ASSAY OF NATRIURETIC PEPTIDE: CPT

## 2023-12-12 PROCEDURE — 99285 EMERGENCY DEPT VISIT HI MDM: CPT

## 2023-12-12 PROCEDURE — 93005 ELECTROCARDIOGRAM TRACING: CPT | Performed by: EMERGENCY MEDICINE

## 2023-12-12 PROCEDURE — 96374 THER/PROPH/DIAG INJ IV PUSH: CPT

## 2023-12-12 PROCEDURE — 82805 BLOOD GASES W/O2 SATURATION: CPT

## 2023-12-12 PROCEDURE — 96375 TX/PRO/DX INJ NEW DRUG ADDON: CPT

## 2023-12-12 PROCEDURE — 94640 AIRWAY INHALATION TREATMENT: CPT

## 2023-12-12 PROCEDURE — 87635 SARS-COV-2 COVID-19 AMP PRB: CPT

## 2023-12-12 PROCEDURE — 2060000000 HC ICU INTERMEDIATE R&B

## 2023-12-12 PROCEDURE — 87502 INFLUENZA DNA AMP PROBE: CPT

## 2023-12-12 PROCEDURE — 94660 CPAP INITIATION&MGMT: CPT

## 2023-12-12 RX ORDER — IPRATROPIUM BROMIDE AND ALBUTEROL SULFATE 2.5; .5 MG/3ML; MG/3ML
3 SOLUTION RESPIRATORY (INHALATION) ONCE
Status: COMPLETED | OUTPATIENT
Start: 2023-12-12 | End: 2023-12-12

## 2023-12-12 RX ORDER — METHYLPREDNISOLONE SODIUM SUCCINATE 125 MG/2ML
125 INJECTION, POWDER, LYOPHILIZED, FOR SOLUTION INTRAMUSCULAR; INTRAVENOUS ONCE
Status: COMPLETED | OUTPATIENT
Start: 2023-12-12 | End: 2023-12-12

## 2023-12-12 RX ORDER — FUROSEMIDE 10 MG/ML
40 INJECTION INTRAMUSCULAR; INTRAVENOUS ONCE
Status: COMPLETED | OUTPATIENT
Start: 2023-12-12 | End: 2023-12-12

## 2023-12-12 RX ADMIN — METHYLPREDNISOLONE SODIUM SUCCINATE 125 MG: 125 INJECTION, POWDER, LYOPHILIZED, FOR SOLUTION INTRAMUSCULAR; INTRAVENOUS at 22:18

## 2023-12-12 RX ADMIN — IPRATROPIUM BROMIDE AND ALBUTEROL SULFATE 3 DOSE: 2.5; .5 SOLUTION RESPIRATORY (INHALATION) at 22:10

## 2023-12-12 RX ADMIN — FUROSEMIDE 40 MG: 10 INJECTION, SOLUTION INTRAMUSCULAR; INTRAVENOUS at 22:18

## 2023-12-12 ASSESSMENT — PAIN DESCRIPTION - DESCRIPTORS: DESCRIPTORS: CRAMPING

## 2023-12-12 ASSESSMENT — PAIN DESCRIPTION - FREQUENCY: FREQUENCY: INTERMITTENT

## 2023-12-12 ASSESSMENT — PAIN DESCRIPTION - LOCATION: LOCATION: KNEE;LEG

## 2023-12-12 ASSESSMENT — PAIN DESCRIPTION - PAIN TYPE: TYPE: CHRONIC PAIN

## 2023-12-12 ASSESSMENT — PAIN DESCRIPTION - ORIENTATION: ORIENTATION: RIGHT;LEFT

## 2023-12-12 ASSESSMENT — ENCOUNTER SYMPTOMS: DYSPNEA ASSOCIATED WITH: MINIMAL EXERTION

## 2023-12-12 NOTE — CARE COORDINATION
Patient Current Location: Home: [address] [facility]     Phone call to Pt who confirmed he is doing ok, stating he is catching up on his sleep. Pt denied any current needs or questions. SW reviewed with Pt that prior to his hospitalization, this SW had set up for visiting physicians to come out. Pt was in agreement with this SW to contact them to reschedule assessment. Phone call to [place] with Mike to follow up regarding referral for visiting physician. [place] reported she will send message to .  requested for initial visit to be after this week as Pt has VV scheduled for 12/15. VIELKA plan of care: SW will follow up with Pt on 12/18 to confirm he has heard form Mike to schedule initial home visit.

## 2023-12-12 NOTE — CARE COORDINATION
Ambulatory Care Coordination Note  2023    Patient Current Location:  Home: [address] [place] [address]     ACM contacted the patient by telephone. Verified name and  with patient as identifiers. Provided introduction to self, and explanation of the ACM role. Method of communication with provider: none. ACM: Jeff Villatoro RN    ACM made outreach to patient to follow up from CTN handoff. ACM reviewed with patient recent outreaches from both [facility] and \[facility]\"". Patient advised that he has spoke with pharmacist and waiting for prior auth for inhalers. He plans to call pharmacist for follow up. He was encouraged to outreach to [facility] if he had questions or needed assistance with medications. Patient has met with [facility] nurse and advised that she will call day before appointment to remind patient of visit. Patient has VV with PCP for 12/15/2023. Patient denies any questions or concerns to address prior to visit. He states that he is following hospital discharge instructions and slowly feeling better. He is still working on getting \"settled\" in. He is happy to be home. ACM discussed fall safety with patient. Per patient he lives in a small apartment and uses the walls to guide him and keep him steady. He states ambulation is not the hard part it is getting up and down from sitting d/t his height. ACM discussed lift chair to allow safer and easier time getting in and out of chair. Patient will \"think about it\". Patient is monitoring FSBS. He states that his readings are <200 at this time. He continues to take his DM medications at this time. He is aware of importance of monitoring and s/sx of concerns and treating appropriately. Patient does not have anyone in the home that can assist with ADLs but feels he is capable to care for himself at this time. Patient is working with Tigist Santillan with scheduling in home medical provider with visiting physician provided by Zaynab.

## 2023-12-13 LAB
ANION GAP SERPL CALCULATED.3IONS-SCNC: 13 MMOL/L (ref 4–16)
BASE EXCESS MIXED: 9.7 (ref 0–1.2)
BUN SERPL-MCNC: 15 MG/DL (ref 6–23)
CALCIUM SERPL-MCNC: 7 MG/DL (ref 8.3–10.6)
CHLORIDE BLD-SCNC: 90 MMOL/L (ref 99–110)
CO2: 31 MMOL/L (ref 21–32)
COMMENT: ABNORMAL
CREAT SERPL-MCNC: 0.7 MG/DL (ref 0.9–1.3)
EKG ATRIAL RATE: 85 BPM
EKG DIAGNOSIS: NORMAL
EKG Q-T INTERVAL: 372 MS
EKG QRS DURATION: 98 MS
EKG QTC CALCULATION (BAZETT): 450 MS
EKG R AXIS: -65 DEGREES
EKG T AXIS: 72 DEGREES
EKG VENTRICULAR RATE: 88 BPM
GFR SERPL CREATININE-BSD FRML MDRD: >60 ML/MIN/1.73M2
GLUCOSE BLD-MCNC: 306 MG/DL (ref 70–99)
GLUCOSE BLD-MCNC: 329 MG/DL (ref 70–99)
GLUCOSE BLD-MCNC: 351 MG/DL (ref 70–99)
GLUCOSE BLD-MCNC: 388 MG/DL (ref 70–99)
GLUCOSE BLD-MCNC: 441 MG/DL (ref 70–99)
GLUCOSE SERPL-MCNC: 448 MG/DL (ref 70–99)
HCO3 VENOUS: 37.9 MMOL/L (ref 19–25)
MAGNESIUM: 1.2 MG/DL (ref 1.8–2.4)
O2 SAT, VEN: 55.3 % (ref 50–70)
PCO2, VEN: 67 MMHG (ref 38–52)
PH VENOUS: 7.36 (ref 7.32–7.42)
PO2, VEN: 33 MMHG (ref 28–48)
POTASSIUM SERPL-SCNC: 4.9 MMOL/L (ref 3.5–5.1)
SODIUM BLD-SCNC: 134 MMOL/L (ref 135–145)

## 2023-12-13 PROCEDURE — 83735 ASSAY OF MAGNESIUM: CPT

## 2023-12-13 PROCEDURE — 82805 BLOOD GASES W/O2 SATURATION: CPT

## 2023-12-13 PROCEDURE — 6360000002 HC RX W HCPCS: Performed by: STUDENT IN AN ORGANIZED HEALTH CARE EDUCATION/TRAINING PROGRAM

## 2023-12-13 PROCEDURE — 94761 N-INVAS EAR/PLS OXIMETRY MLT: CPT

## 2023-12-13 PROCEDURE — 93010 ELECTROCARDIOGRAM REPORT: CPT | Performed by: INTERNAL MEDICINE

## 2023-12-13 PROCEDURE — 1200000000 HC SEMI PRIVATE

## 2023-12-13 PROCEDURE — 6370000000 HC RX 637 (ALT 250 FOR IP): Performed by: STUDENT IN AN ORGANIZED HEALTH CARE EDUCATION/TRAINING PROGRAM

## 2023-12-13 PROCEDURE — 2580000003 HC RX 258: Performed by: STUDENT IN AN ORGANIZED HEALTH CARE EDUCATION/TRAINING PROGRAM

## 2023-12-13 PROCEDURE — 94640 AIRWAY INHALATION TREATMENT: CPT

## 2023-12-13 PROCEDURE — 94660 CPAP INITIATION&MGMT: CPT

## 2023-12-13 PROCEDURE — 82962 GLUCOSE BLOOD TEST: CPT

## 2023-12-13 PROCEDURE — 36415 COLL VENOUS BLD VENIPUNCTURE: CPT

## 2023-12-13 PROCEDURE — 2700000000 HC OXYGEN THERAPY PER DAY

## 2023-12-13 PROCEDURE — 80048 BASIC METABOLIC PNL TOTAL CA: CPT

## 2023-12-13 RX ORDER — POLYETHYLENE GLYCOL 3350 17 G/17G
17 POWDER, FOR SOLUTION ORAL DAILY PRN
Status: DISCONTINUED | OUTPATIENT
Start: 2023-12-13 | End: 2023-12-16 | Stop reason: HOSPADM

## 2023-12-13 RX ORDER — INSULIN LISPRO 100 [IU]/ML
0-4 INJECTION, SOLUTION INTRAVENOUS; SUBCUTANEOUS NIGHTLY
Status: DISCONTINUED | OUTPATIENT
Start: 2023-12-13 | End: 2023-12-16 | Stop reason: HOSPADM

## 2023-12-13 RX ORDER — INSULIN LISPRO 100 [IU]/ML
0-16 INJECTION, SOLUTION INTRAVENOUS; SUBCUTANEOUS
Status: DISCONTINUED | OUTPATIENT
Start: 2023-12-13 | End: 2023-12-16 | Stop reason: HOSPADM

## 2023-12-13 RX ORDER — ALBUTEROL SULFATE 90 UG/1
2 AEROSOL, METERED RESPIRATORY (INHALATION) EVERY 4 HOURS PRN
Status: DISCONTINUED | OUTPATIENT
Start: 2023-12-13 | End: 2023-12-16 | Stop reason: HOSPADM

## 2023-12-13 RX ORDER — POTASSIUM CHLORIDE 7.45 MG/ML
10 INJECTION INTRAVENOUS PRN
Status: DISCONTINUED | OUTPATIENT
Start: 2023-12-13 | End: 2023-12-16 | Stop reason: HOSPADM

## 2023-12-13 RX ORDER — GLUCAGON 1 MG/ML
1 KIT INJECTION PRN
Status: DISCONTINUED | OUTPATIENT
Start: 2023-12-13 | End: 2023-12-16 | Stop reason: HOSPADM

## 2023-12-13 RX ORDER — ATORVASTATIN CALCIUM 40 MG/1
40 TABLET, FILM COATED ORAL NIGHTLY
Status: DISCONTINUED | OUTPATIENT
Start: 2023-12-13 | End: 2023-12-16 | Stop reason: HOSPADM

## 2023-12-13 RX ORDER — ACETAMINOPHEN 650 MG/1
650 SUPPOSITORY RECTAL EVERY 6 HOURS PRN
Status: DISCONTINUED | OUTPATIENT
Start: 2023-12-13 | End: 2023-12-16 | Stop reason: HOSPADM

## 2023-12-13 RX ORDER — ONDANSETRON 4 MG/1
4 TABLET, ORALLY DISINTEGRATING ORAL EVERY 8 HOURS PRN
Status: DISCONTINUED | OUTPATIENT
Start: 2023-12-13 | End: 2023-12-16 | Stop reason: HOSPADM

## 2023-12-13 RX ORDER — ROFLUMILAST 500 UG/1
500 TABLET ORAL DAILY
Status: DISCONTINUED | OUTPATIENT
Start: 2023-12-13 | End: 2023-12-16 | Stop reason: HOSPADM

## 2023-12-13 RX ORDER — INSULIN GLARGINE 100 [IU]/ML
25 INJECTION, SOLUTION SUBCUTANEOUS DAILY
Status: DISCONTINUED | OUTPATIENT
Start: 2023-12-13 | End: 2023-12-16 | Stop reason: HOSPADM

## 2023-12-13 RX ORDER — SODIUM CHLORIDE 0.9 % (FLUSH) 0.9 %
5-40 SYRINGE (ML) INJECTION EVERY 12 HOURS SCHEDULED
Status: DISCONTINUED | OUTPATIENT
Start: 2023-12-13 | End: 2023-12-16 | Stop reason: HOSPADM

## 2023-12-13 RX ORDER — DEXTROSE MONOHYDRATE 100 MG/ML
INJECTION, SOLUTION INTRAVENOUS CONTINUOUS PRN
Status: DISCONTINUED | OUTPATIENT
Start: 2023-12-13 | End: 2023-12-16 | Stop reason: HOSPADM

## 2023-12-13 RX ORDER — ENOXAPARIN SODIUM 100 MG/ML
40 INJECTION SUBCUTANEOUS 2 TIMES DAILY
Status: DISCONTINUED | OUTPATIENT
Start: 2023-12-13 | End: 2023-12-13

## 2023-12-13 RX ORDER — FUROSEMIDE 10 MG/ML
40 INJECTION INTRAMUSCULAR; INTRAVENOUS 2 TIMES DAILY
Status: DISCONTINUED | OUTPATIENT
Start: 2023-12-13 | End: 2023-12-15

## 2023-12-13 RX ORDER — SODIUM CHLORIDE 9 MG/ML
INJECTION, SOLUTION INTRAVENOUS PRN
Status: DISCONTINUED | OUTPATIENT
Start: 2023-12-13 | End: 2023-12-16 | Stop reason: HOSPADM

## 2023-12-13 RX ORDER — ACETAMINOPHEN 325 MG/1
650 TABLET ORAL EVERY 6 HOURS PRN
Status: DISCONTINUED | OUTPATIENT
Start: 2023-12-13 | End: 2023-12-16 | Stop reason: HOSPADM

## 2023-12-13 RX ORDER — MAGNESIUM SULFATE IN WATER 40 MG/ML
2000 INJECTION, SOLUTION INTRAVENOUS PRN
Status: DISCONTINUED | OUTPATIENT
Start: 2023-12-13 | End: 2023-12-16 | Stop reason: HOSPADM

## 2023-12-13 RX ORDER — POTASSIUM CHLORIDE 20 MEQ/1
40 TABLET, EXTENDED RELEASE ORAL PRN
Status: DISCONTINUED | OUTPATIENT
Start: 2023-12-13 | End: 2023-12-16 | Stop reason: HOSPADM

## 2023-12-13 RX ORDER — ONDANSETRON 2 MG/ML
4 INJECTION INTRAMUSCULAR; INTRAVENOUS EVERY 6 HOURS PRN
Status: DISCONTINUED | OUTPATIENT
Start: 2023-12-13 | End: 2023-12-16 | Stop reason: HOSPADM

## 2023-12-13 RX ORDER — ALBUTEROL SULFATE 90 UG/1
2 AEROSOL, METERED RESPIRATORY (INHALATION) EVERY 6 HOURS PRN
Status: DISCONTINUED | OUTPATIENT
Start: 2023-12-13 | End: 2023-12-13

## 2023-12-13 RX ORDER — INSULIN LISPRO 100 [IU]/ML
0-8 INJECTION, SOLUTION INTRAVENOUS; SUBCUTANEOUS EVERY 4 HOURS
Status: DISCONTINUED | OUTPATIENT
Start: 2023-12-13 | End: 2023-12-13

## 2023-12-13 RX ORDER — SODIUM CHLORIDE 0.9 % (FLUSH) 0.9 %
5-40 SYRINGE (ML) INJECTION PRN
Status: DISCONTINUED | OUTPATIENT
Start: 2023-12-13 | End: 2023-12-16 | Stop reason: HOSPADM

## 2023-12-13 RX ORDER — DILTIAZEM HYDROCHLORIDE 240 MG/1
240 CAPSULE, COATED, EXTENDED RELEASE ORAL DAILY
Status: DISCONTINUED | OUTPATIENT
Start: 2023-12-13 | End: 2023-12-16 | Stop reason: HOSPADM

## 2023-12-13 RX ORDER — BUDESONIDE AND FORMOTEROL FUMARATE DIHYDRATE 160; 4.5 UG/1; UG/1
2 AEROSOL RESPIRATORY (INHALATION) 2 TIMES DAILY
Status: DISCONTINUED | OUTPATIENT
Start: 2023-12-13 | End: 2023-12-16 | Stop reason: HOSPADM

## 2023-12-13 RX ADMIN — SODIUM CHLORIDE, PRESERVATIVE FREE 10 ML: 5 INJECTION INTRAVENOUS at 08:23

## 2023-12-13 RX ADMIN — FUROSEMIDE 40 MG: 10 INJECTION, SOLUTION INTRAMUSCULAR; INTRAVENOUS at 08:22

## 2023-12-13 RX ADMIN — INSULIN LISPRO 8 UNITS: 100 INJECTION, SOLUTION INTRAVENOUS; SUBCUTANEOUS at 10:00

## 2023-12-13 RX ADMIN — INSULIN LISPRO 12 UNITS: 100 INJECTION, SOLUTION INTRAVENOUS; SUBCUTANEOUS at 18:23

## 2023-12-13 RX ADMIN — INSULIN LISPRO 16 UNITS: 100 INJECTION, SOLUTION INTRAVENOUS; SUBCUTANEOUS at 12:22

## 2023-12-13 RX ADMIN — APIXABAN 5 MG: 5 TABLET, FILM COATED ORAL at 08:22

## 2023-12-13 RX ADMIN — ROFLUMILAST 500 MCG: 500 TABLET ORAL at 08:22

## 2023-12-13 RX ADMIN — ATORVASTATIN CALCIUM 40 MG: 40 TABLET, FILM COATED ORAL at 20:33

## 2023-12-13 RX ADMIN — FUROSEMIDE 40 MG: 10 INJECTION, SOLUTION INTRAMUSCULAR; INTRAVENOUS at 18:23

## 2023-12-13 RX ADMIN — BUDESONIDE AND FORMOTEROL FUMARATE DIHYDRATE 2 PUFF: 160; 4.5 AEROSOL RESPIRATORY (INHALATION) at 21:05

## 2023-12-13 RX ADMIN — INSULIN GLARGINE 25 UNITS: 100 INJECTION, SOLUTION SUBCUTANEOUS at 12:22

## 2023-12-13 RX ADMIN — INSULIN LISPRO 8 UNITS: 100 INJECTION, SOLUTION INTRAVENOUS; SUBCUTANEOUS at 04:05

## 2023-12-13 RX ADMIN — INSULIN LISPRO 4 UNITS: 100 INJECTION, SOLUTION INTRAVENOUS; SUBCUTANEOUS at 20:34

## 2023-12-13 RX ADMIN — BUDESONIDE AND FORMOTEROL FUMARATE DIHYDRATE 2 PUFF: 160; 4.5 AEROSOL RESPIRATORY (INHALATION) at 07:02

## 2023-12-13 RX ADMIN — SODIUM CHLORIDE, PRESERVATIVE FREE 10 ML: 5 INJECTION INTRAVENOUS at 20:34

## 2023-12-13 RX ADMIN — APIXABAN 5 MG: 5 TABLET, FILM COATED ORAL at 20:33

## 2023-12-13 RX ADMIN — DILTIAZEM HYDROCHLORIDE 240 MG: 240 CAPSULE, COATED, EXTENDED RELEASE ORAL at 08:22

## 2023-12-13 RX ADMIN — TIOTROPIUM BROMIDE INHALATION SPRAY 2 PUFF: 3.12 SPRAY, METERED RESPIRATORY (INHALATION) at 07:02

## 2023-12-13 ASSESSMENT — PAIN DESCRIPTION - LOCATION: LOCATION: LEG

## 2023-12-13 ASSESSMENT — PAIN DESCRIPTION - DESCRIPTORS: DESCRIPTORS: ACHING

## 2023-12-13 ASSESSMENT — PAIN SCALES - GENERAL: PAINLEVEL_OUTOF10: 5

## 2023-12-13 ASSESSMENT — PAIN DESCRIPTION - ORIENTATION: ORIENTATION: RIGHT;LEFT

## 2023-12-13 NOTE — ED NOTES
ED TO INPATIENT SBAR HANDOFF    Patient Name: Shivam Hastings   :  1955  79 y.o. Preferred Name  Nonah Corporal  Family/Caregiver Present no   Restraints no   C-SSRS: Risk of Suicide: No Risk  Sitter no   Sepsis Risk Score Sepsis Risk Score: 1.47      Situation  Chief Complaint   Patient presents with    Shortness of Breath     Brief Description of Patient's Condition: Patient presents with complaint of shortness of breath. Wears 3L oxygen at baseline. Mental Status: oriented, alert, coherent, logical, thought processes intact, and able to concentrate and follow conversation  Arrived from: home    Imaging:   XR CHEST PORTABLE   Final Result   Subsegmental atelectatic change seen in the lung bases, greater on the left. Small left pleural effusion.            Abnormal labs:   Abnormal Labs Reviewed   CBC WITH AUTO DIFFERENTIAL - Abnormal; Notable for the following components:       Result Value    RBC 3.93 (*)     Hemoglobin 12.1 (*)     Hematocrit 37.7 (*)     RDW 15.1 (*)     Segs Relative 71.0 (*)     Lymphocytes % 15.6 (*)     Monocytes % 9.7 (*)     Immature Neutrophil % 1.3 (*)     All other components within normal limits   COMPREHENSIVE METABOLIC PANEL - Abnormal; Notable for the following components:    Chloride 94 (*)     CO2 38 (*)     Glucose 211 (*)     Creatinine 0.7 (*)     Calcium 7.4 (*)     Total Protein 6.0 (*)     AST 12 (*)     All other components within normal limits   TROPONIN - Abnormal; Notable for the following components:    Troponin, High Sensitivity 52 (*)     All other components within normal limits   TROPONIN - Abnormal; Notable for the following components:    Troponin, High Sensitivity 54 (*)     All other components within normal limits   BRAIN NATRIURETIC PEPTIDE - Abnormal; Notable for the following components:    Pro-BNP 2,140 (*)     All other components within normal limits   BLOOD GAS, VENOUS - Abnormal; Notable for the following components:    pH, Tylor 7.28 (*)     pCO2,

## 2023-12-13 NOTE — CONSULTS
Nutrition Education    Educated on Heart Failure Nutrition Therapy  Learners: Patient  Readiness: Acceptance  Method: Explanation and Teachback  Response: Verbalizes Understanding; Pt familiar w/ diet but states he is limited in adherence due to low income. He does endorse adequate food access at home but does often eat higher-sodium foods such as canned soups/pastas. Advised pt not to add salt to foods, use other spices/seasonings if available. Pt declined handouts due to not being able to see them. Contact name and number provided.     Arnulfo Herzog, [address] [facility]  Contact Number: [phone]

## 2023-12-13 NOTE — ED PROVIDER NOTES
Professionals : Admitting Team Dr. Darcie Bautista    Social Determinants: None    Records Reviewed : Inpatient Notes shows recent hospitalization on 11/27/2023 with discharge summary showing that the patient does wear 2 to 3 L of oxygen at home    Chronic conditions affecting care: DM, A. Fib, CHF, COPD    Labs ordered and results as above (interpreted by myself), concerning for respiratory acidosis, elevated BNP, mildly elevated troponin but EKG is not concerning for ischemia  Imaging interpreted and reviewed by myself: CXR showed blunted costophrenic angles bilaterally but this could be related to body habitus as well as effusion  EKG interpreted by myself as above, not acutely concerning    Patient was given the following medications:  Medications   ipratropium 0.5 mg-albuterol 2.5 mg (DUONEB) nebulizer solution 3 Dose (3 Doses Inhalation Given 12/12/23 2210)   methylPREDNISolone sodium succ (SOLU-MEDROL) injection 125 mg (125 mg IntraVENous Given 12/12/23 2218)   furosemide (LASIX) injection 40 mg (40 mg IntraVENous Given 12/12/23 2218)       Disposition Discussion:  Plan is to hospitalize this patient for respiratory failure and acidosis. I suspect likely combination of COPD as well as heart failure exacerbation. Was given diuretic here as well as nebulizer treatments and steroids. Placed on BiPAP given the VBG. Will discuss with HM team for hospitalization. Is this patient to be included in the SEP-1 core measure due to severe sepsis or septic shock? No Exclusion criteria - the patient is NOT to be included for SEP-1 Core Measure due to: Infection is not suspected     Disposition: Hospitalized     I am the primary physician of record    Clinical Impression:  1. COPD exacerbation ([address])    2. Acute on chronic congestive heart failure, unspecified heart failure type [facility])      Disposition referral (if applicable):  No follow-up provider specified.   Disposition medications (if applicable):  New Prescriptions    No medications on file       Comment: Please note this report has been produced using speech recognition software and may contain errors related to that system including errors in grammar, punctuation, and spelling, as well as words and phrases that may be inappropriate. If there are any questions or concerns please feel free to contact the dictating provider for clarification.        Kierra Coats MD  12/12/23 1199

## 2023-12-13 NOTE — CARE COORDINATION
12/13/23 1636   Service Assessment   Patient Orientation Alert and Oriented   Cognition Alert   History Provided By Patient;Medical Record   Primary Caregiver Self   Support Systems Friends/Neighbors; Family Members   PCP Verified by CM Yes   Last Visit to PCP Within last year   Prior Functional Level Independent in ADLs/IADLs;Assistance with the following:;Mobility   Current Functional Level Independent in ADLs/IADLs;Assistance with the following:;Mobility   Can patient return to prior living arrangement Yes   Ability to make needs known: Good   Family able to assist with home care needs: Other (comment)  (minimal help)   Would you like for me to discuss the discharge plan with any other family members/significant others, and if so, who? No   Financial Resources Medicaid; Medicare   Community Resources ECF/Home Care   Social/Functional History   Lives With Alone   Type of [address] [place] [address] One level   Home Access Level entry   Bathroom Shower/Tub Tub/Shower unit   Bathroom Toilet Standard   Bathroom Equipment Grab bars in shower; Tub transfer bench   [address] Help From Family;Friend(s); Neighbor   ADL Assistance Independent   Homemaking Assistance Independent   Homemaking Responsibilities Yes   Ambulation Assistance Independent   Transfer Assistance Independent   Active  No   Patient's  Info Brother or [address] [facility]   Occupation On disability   Discharge Planning   Type of [address] [facility] Prior To Admission C-pap;Home Care;Oxygen Therapy;Meals On [address] [facility].   Patient expects to be discharged to: [address] Discharge   [address] [facility] Discharge Home Health;Skilled [facility] (SNF)     Pt has insurance and a PCP. Lives alone. Limited assistance from family. Current with [facility].  May benefit from [facility]. Plan home.

## 2023-12-13 NOTE — H&P
240 mg  240 mg Oral Daily Dhaval Quintanilla MD        Roflumilast (DALIRESP) tablet 500 mcg  500 mcg Oral Daily Dhaval Quintanilla MD        budesonide-formoterol [facility]) 160-4.5 MCG/ACT inhaler 2 puff  2 puff Inhalation BID Dhaval Quintanilla MD        tiotropium (SPIRIVA RESPIMAT) 2.5 MCG/ACT inhaler 2 puff  2 puff Inhalation Daily RT Dhaval Quintanilla MD         Current Outpatient Medications   Medication Sig Dispense Refill    insulin detemir (LEVEMIR) 100 UNIT/ML injection vial Inject 25 Units into the skin every morning (before breakfast) 22.5 mL 0    furosemide (LASIX) 40 MG tablet Take 1 tablet by mouth 2 times daily 30 tablet 0    blood glucose monitor strips Test once daily 300 strip 3    insulin lispro, 1 Unit Dial, (HUMALOG KWIKPEN) 100 UNIT/ML SOPN Check glucose with meals and take insulin as below     0U  200-249 2U  250-299 4U  300-349 6U  >349 8U    At night time, check glucose and take insulin as below:   0  300-349 4U  >349 4U 15 mL 2    ipratropium 0.5 mg-albuterol 2.5 mg (DUONEB) 0.5-2.5 (3) MG/3ML SOLN nebulizer solution Inhale 3 mLs into the lungs every 4 hours (while awake) for 14 days 168 mL 0    Roflumilast (DALIRESP) 500 MCG tablet Take 1 tablet by mouth daily 30 tablet 2    glucose monitoring kit 1 kit by Does not apply route daily 1 kit 0    Lancets MISC 1 each by Does not apply route 3 times daily 200 each 0    Respiratory Therapy Supplies (NEBULIZER/TUBING/MOUTHPIECE) KIT 1 kit by Does not apply route daily as needed (SOB, wheezing) 1 kit 0    olodaterol (STRIVERDI RESPIMAT) 2.5 MCG/ACT inhaler Inhale 2 puffs into the lungs daily (Patient not taking: Reported on 12/11/2023) 2 each 3    dilTIAZem (CARDIZEM CD) 240 MG extended release capsule Take 1 capsule by mouth daily 30 capsule 0    magnesium oxide (MAG-OX) 400 (240 Mg) MG tablet Take 1 tablet by mouth daily 30 tablet 0    potassium chloride (KLOR-CON M) 10 MEQ extended release tablet Take 1 tablet by mouth daily by Jairo Councilman, MD on 12/13/2023 at 3:06 AM

## 2023-12-13 NOTE — PROGRESS NOTES
V2.0  [facility] Hospitalist Progress Note      Name:  Tasneem Penn /Age/Sex: 1955  (79 y.o. male)   MRN & CSN:  [id number] & [id number] Encounter Date/Time: 2023 11:02 AM EST    Location:  -A PCP: Candance Hug, MD       Hospital Day: 2    Assessment and Plan:   Tasneem Penn is a 79 y.o. male with pmh of chronic diastolic heart failure, A-Fib, Insulin dependent T2DM, COPD on 2-3L NC O2 at baseline, Obesity/LINA, hyperlipidemia  who presents with Acute on chronic heart failure, unspecified heart failure type ([address])      Plan:  Acute on chronic Respiratory failure with hypercapnia requiring NIPPV   Secondary to Acute on chronic diastolic heart failure with underlying CSA, OHS  Evidenced by Pro BNP 2140, peripheral and pulmonary edema. Most recent Echocardiogram in 2023 EF 50-55%  CXR with small BL pleural effusions, atelectasis   Negative COVID and influenza screen     Repeat VBG with improving Ph and CO2,  BIPAP q HS and during nap  IV Lasix 40mg BID, monitor I/O and measure daily weight     Elevated Troponin  52>54, Demand ischemia due to above   EKG with A-Fib 88/min, no acute ST-T wave abnormalities     Chronic medical conditions include:     Persistent A-Fib, Continue home Cardizem and Eliquis for AC     IDDM-II,   high dose sliding scale insulin, lantus 25 units daily hypoglycemia protocol. COPD on 2-3L NC O2 at baseline, Continue Symbicort and Spiriva, as needed Albuterol. Hyperlipidemia, Continue Lipitor     Obesity, BMI 39.41/LINA, BIPAP as above. Counseled on dietary modification and weight loss. Diet ADULT DIET; Regular;  Low Sodium (2 gm); 1800 ml   DVT Prophylaxis [] Lovenox, []  Heparin, [] SCDs, [] Ambulation,  [] Eliquis, [] Xarelto  [] Coumadin   Code Status Full Code   Disposition From: Home  Expected Disposition: Home w [facility]  Estimated Date of Discharge: 2 days  Patient requires continued admission due to COPD and CHF exacerbation   Surrogate

## 2023-12-13 NOTE — ED NOTES
Pt requested recliner. This RN brought one into the room; pt placed in recliner comfortably. Pt educated on safety precautions. Bipap on. Call light within reach. No distress noted at this time. Plan of care ongoing.       Olesya Gamez RN  12/13/23 0470

## 2023-12-14 LAB
ANION GAP SERPL CALCULATED.3IONS-SCNC: 7 MMOL/L (ref 4–16)
BASOPHILS ABSOLUTE: 0 K/CU MM
BASOPHILS RELATIVE PERCENT: 0.1 % (ref 0–1)
BUN SERPL-MCNC: 13 MG/DL (ref 6–23)
CALCIUM SERPL-MCNC: 6.9 MG/DL (ref 8.3–10.6)
CHLORIDE BLD-SCNC: 93 MMOL/L (ref 99–110)
CO2: 37 MMOL/L (ref 21–32)
CREAT SERPL-MCNC: 0.6 MG/DL (ref 0.9–1.3)
DIFFERENTIAL TYPE: ABNORMAL
EOSINOPHILS ABSOLUTE: 0 K/CU MM
EOSINOPHILS RELATIVE PERCENT: 0.1 % (ref 0–3)
GFR SERPL CREATININE-BSD FRML MDRD: >60 ML/MIN/1.73M2
GLUCOSE BLD-MCNC: 224 MG/DL (ref 70–99)
GLUCOSE BLD-MCNC: 263 MG/DL (ref 70–99)
GLUCOSE BLD-MCNC: 289 MG/DL (ref 70–99)
GLUCOSE BLD-MCNC: 299 MG/DL (ref 70–99)
GLUCOSE SERPL-MCNC: 288 MG/DL (ref 70–99)
HCT VFR BLD CALC: 34.7 % (ref 42–52)
HEMOGLOBIN: 11.4 GM/DL (ref 13.5–18)
IMMATURE NEUTROPHIL %: 0.9 % (ref 0–0.43)
LYMPHOCYTES ABSOLUTE: 0.8 K/CU MM
LYMPHOCYTES RELATIVE PERCENT: 10.1 % (ref 24–44)
MAGNESIUM: 1.3 MG/DL (ref 1.8–2.4)
MCH RBC QN AUTO: 30.7 PG (ref 27–31)
MCHC RBC AUTO-ENTMCNC: 32.9 % (ref 32–36)
MCV RBC AUTO: 93.5 FL (ref 78–100)
MONOCYTES ABSOLUTE: 0.7 K/CU MM
MONOCYTES RELATIVE PERCENT: 9 % (ref 0–4)
NUCLEATED RBC %: 0 %
PDW BLD-RTO: 14.6 % (ref 11.7–14.9)
PLATELET # BLD: 181 K/CU MM (ref 140–440)
PMV BLD AUTO: 9.9 FL (ref 7.5–11.1)
POTASSIUM SERPL-SCNC: 4.3 MMOL/L (ref 3.5–5.1)
RBC # BLD: 3.71 M/CU MM (ref 4.6–6.2)
SEGMENTED NEUTROPHILS ABSOLUTE COUNT: 5.9 K/CU MM
SEGMENTED NEUTROPHILS RELATIVE PERCENT: 79.8 % (ref 36–66)
SODIUM BLD-SCNC: 137 MMOL/L (ref 135–145)
TOTAL IMMATURE NEUTOROPHIL: 0.07 K/CU MM
TOTAL NUCLEATED RBC: 0 K/CU MM
WBC # BLD: 7.4 K/CU MM (ref 4–10.5)

## 2023-12-14 PROCEDURE — 6360000002 HC RX W HCPCS: Performed by: STUDENT IN AN ORGANIZED HEALTH CARE EDUCATION/TRAINING PROGRAM

## 2023-12-14 PROCEDURE — 6370000000 HC RX 637 (ALT 250 FOR IP): Performed by: STUDENT IN AN ORGANIZED HEALTH CARE EDUCATION/TRAINING PROGRAM

## 2023-12-14 PROCEDURE — 6370000000 HC RX 637 (ALT 250 FOR IP): Performed by: FAMILY MEDICINE

## 2023-12-14 PROCEDURE — 94660 CPAP INITIATION&MGMT: CPT

## 2023-12-14 PROCEDURE — 94761 N-INVAS EAR/PLS OXIMETRY MLT: CPT

## 2023-12-14 PROCEDURE — 80048 BASIC METABOLIC PNL TOTAL CA: CPT

## 2023-12-14 PROCEDURE — 2700000000 HC OXYGEN THERAPY PER DAY

## 2023-12-14 PROCEDURE — 2580000003 HC RX 258: Performed by: STUDENT IN AN ORGANIZED HEALTH CARE EDUCATION/TRAINING PROGRAM

## 2023-12-14 PROCEDURE — 83735 ASSAY OF MAGNESIUM: CPT

## 2023-12-14 PROCEDURE — 82962 GLUCOSE BLOOD TEST: CPT

## 2023-12-14 PROCEDURE — 94640 AIRWAY INHALATION TREATMENT: CPT

## 2023-12-14 PROCEDURE — 36415 COLL VENOUS BLD VENIPUNCTURE: CPT

## 2023-12-14 PROCEDURE — 1200000000 HC SEMI PRIVATE

## 2023-12-14 PROCEDURE — 85025 COMPLETE CBC W/AUTO DIFF WBC: CPT

## 2023-12-14 RX ORDER — LANOLIN ALCOHOL/MO/W.PET/CERES
400 CREAM (GRAM) TOPICAL 3 TIMES DAILY
Status: COMPLETED | OUTPATIENT
Start: 2023-12-14 | End: 2023-12-15

## 2023-12-14 RX ORDER — CALCIUM GLUCONATE 20 MG/ML
2000 INJECTION, SOLUTION INTRAVENOUS ONCE
Status: COMPLETED | OUTPATIENT
Start: 2023-12-14 | End: 2023-12-14

## 2023-12-14 RX ADMIN — SODIUM CHLORIDE, PRESERVATIVE FREE 10 ML: 5 INJECTION INTRAVENOUS at 21:36

## 2023-12-14 RX ADMIN — FUROSEMIDE 40 MG: 10 INJECTION, SOLUTION INTRAMUSCULAR; INTRAVENOUS at 09:29

## 2023-12-14 RX ADMIN — MAGNESIUM SULFATE HEPTAHYDRATE 2000 MG: 40 INJECTION, SOLUTION INTRAVENOUS at 10:27

## 2023-12-14 RX ADMIN — FUROSEMIDE 40 MG: 10 INJECTION, SOLUTION INTRAMUSCULAR; INTRAVENOUS at 18:15

## 2023-12-14 RX ADMIN — Medication 400 MG: at 13:16

## 2023-12-14 RX ADMIN — Medication 400 MG: at 21:35

## 2023-12-14 RX ADMIN — INSULIN LISPRO 4 UNITS: 100 INJECTION, SOLUTION INTRAVENOUS; SUBCUTANEOUS at 18:15

## 2023-12-14 RX ADMIN — DILTIAZEM HYDROCHLORIDE 240 MG: 240 CAPSULE, COATED, EXTENDED RELEASE ORAL at 09:29

## 2023-12-14 RX ADMIN — TIOTROPIUM BROMIDE INHALATION SPRAY 2 PUFF: 3.12 SPRAY, METERED RESPIRATORY (INHALATION) at 08:44

## 2023-12-14 RX ADMIN — INSULIN LISPRO 8 UNITS: 100 INJECTION, SOLUTION INTRAVENOUS; SUBCUTANEOUS at 13:17

## 2023-12-14 RX ADMIN — ACETAMINOPHEN 650 MG: 325 TABLET ORAL at 21:44

## 2023-12-14 RX ADMIN — INSULIN GLARGINE 25 UNITS: 100 INJECTION, SOLUTION SUBCUTANEOUS at 09:30

## 2023-12-14 RX ADMIN — ROFLUMILAST 500 MCG: 500 TABLET ORAL at 09:30

## 2023-12-14 RX ADMIN — BUDESONIDE AND FORMOTEROL FUMARATE DIHYDRATE 2 PUFF: 160; 4.5 AEROSOL RESPIRATORY (INHALATION) at 08:44

## 2023-12-14 RX ADMIN — APIXABAN 5 MG: 5 TABLET, FILM COATED ORAL at 21:35

## 2023-12-14 RX ADMIN — CALCIUM GLUCONATE 2000 MG: 20 INJECTION, SOLUTION INTRAVENOUS at 13:23

## 2023-12-14 RX ADMIN — ATORVASTATIN CALCIUM 40 MG: 40 TABLET, FILM COATED ORAL at 21:35

## 2023-12-14 RX ADMIN — INSULIN LISPRO 8 UNITS: 100 INJECTION, SOLUTION INTRAVENOUS; SUBCUTANEOUS at 09:30

## 2023-12-14 RX ADMIN — DICLOFENAC SODIUM 2 G: 10 GEL TOPICAL at 23:49

## 2023-12-14 RX ADMIN — BUDESONIDE AND FORMOTEROL FUMARATE DIHYDRATE 2 PUFF: 160; 4.5 AEROSOL RESPIRATORY (INHALATION) at 20:10

## 2023-12-14 RX ADMIN — SODIUM CHLORIDE, PRESERVATIVE FREE 10 ML: 5 INJECTION INTRAVENOUS at 09:30

## 2023-12-14 RX ADMIN — MAGNESIUM SULFATE HEPTAHYDRATE 2000 MG: 40 INJECTION, SOLUTION INTRAVENOUS at 06:45

## 2023-12-14 RX ADMIN — APIXABAN 5 MG: 5 TABLET, FILM COATED ORAL at 09:29

## 2023-12-14 ASSESSMENT — PAIN DESCRIPTION - DESCRIPTORS: DESCRIPTORS: ACHING

## 2023-12-14 ASSESSMENT — PAIN DESCRIPTION - ORIENTATION: ORIENTATION: RIGHT

## 2023-12-14 ASSESSMENT — PAIN DESCRIPTION - LOCATION: LOCATION: SHOULDER

## 2023-12-14 NOTE — PROGRESS NOTES
notified: Pt's HR at times jumping up into low 100's and one teens at times on tele, although often not matching to pulse on pulse ox. Moving around a lot and pulling leads off so not sure of accuracy,  pt not complaining of any increased SOB or chest pain.

## 2023-12-14 NOTE — PROGRESS NOTES
V2.0  [facility] Progress Note      Name:  Jacob Parson /Age/Sex: 1955  (79 y.o. male)   MRN & CSN:  [id number] & [id number] Encounter Date/Time: 2023 11:02 AM EST    Location:  -A PCP: Amor Grissom MD       Hospital Day: 3    Assessment and Plan:   Jacob Parson is a 79 y.o. male with pmh of chronic diastolic heart failure, A-Fib, Insulin dependent T2DM, COPD on 2-3L NC O2 at baseline, Obesity/LINA, hyperlipidemia  who presents with Acute on chronic heart failure, unspecified heart failure type ([address])      Plan:  Acute on chronic Respiratory failure with hypercapnia requiring NIPPV   Secondary to Acute on chronic diastolic heart failure with underlying CSA, OHS  Evidenced by Pro BNP 2140, peripheral and pulmonary edema. Most recent Echocardiogram in 2023 EF 50-55%  CXR with small BL pleural effusions, atelectasis   Negative COVID and influenza screen  Repeat VBG with improving Ph and CO2,  BIPAP q HS and during nap, patient wore bipap for about 4 hours last night but not during naps  IV Lasix 40mg BID, daily weight and I & O not documented accurately     Elevated Troponin  52>54, Demand ischemia due to above   EKG with A-Fib 88/min, no acute ST-T wave abnormalities     Persistent A-Fib Continue home Cardizem and Eliquis for AC     Type 2 diabetes   high dose sliding scale insulin, lantus 25 units daily hypoglycemia protocol. COPD on 2-3L NC O2 at baseline, Continue Symbicort and Spiriva, as needed Albuterol. On 4-5 L O2 via NC     Hyperlipidemia: Continue Lipitor     Obesity, BMI 39.87/LINA, BIPAP as above. Counseled on dietary modification and weight loss. Diet ADULT DIET; Regular;  Low Sodium (2 gm); 1800 ml   DVT Prophylaxis [] Lovenox, []  Heparin, [] SCDs, [] Ambulation,  [] Eliquis, [] Xarelto  [] Coumadin   Code Status Full Code   Disposition From: Home  Expected Disposition: Home w [facility]  Estimated Date of Discharge: 1 days  Patient requires

## 2023-12-14 NOTE — CARE COORDINATION
CM reviewed chart and discussed in IDR. Pt is not medically ready at this time. Pt is current with [facility]. Plan remains home with [address]. Will need a home care order to resume services.

## 2023-12-14 NOTE — PROGRESS NOTES
12/14/23 0018   NIV Type   $NIV $Daily Charge   Equipment Type v60   Mode Bilevel   Mask Type Full face mask   Mask Size Medium   Assessment   Pulse 76   Respirations 10   SpO2 97 %   Level of Consciousness 0   Comfort Level Good   Using Accessory Muscles No   Mask Compliance Good   Settings/Measurements   PIP Observed 16 cm H20   IPAP 15 cmH20   CPAP/EPAP 5 cmH2O   Vt (Measured) 570 mL   Rate Ordered 12   Insp Rise Time (%) 2 %   FiO2  35 %   I Time/ I Time % 1 s   Minute Volume (L/min) 22 Liters   Mask Leak (lpm) 0 lpm   Patient's Home Machine No   Alarm Settings   Alarms On Y   Low Pressure (cmH2O) 3 cmH2O   High Pressure (cmH2O) 20 cmH2O   Delay Alarm 120 sec(s)   Apnea (secs) 20 secs   RR Low (bpm) 13   RR High (bpm) 40 br/min

## 2023-12-15 LAB
ANION GAP SERPL CALCULATED.3IONS-SCNC: 5 MMOL/L (ref 4–16)
BASE EXCESS MIXED: 17.7 (ref 0–1.2)
BUN SERPL-MCNC: 9 MG/DL (ref 6–23)
CALCIUM IONIZED: 4.08 MG/DL (ref 4.48–5.28)
CALCIUM SERPL-MCNC: 7.3 MG/DL (ref 8.3–10.6)
CHLORIDE BLD-SCNC: 94 MMOL/L (ref 99–110)
CO2: 42 MMOL/L (ref 21–32)
CREAT SERPL-MCNC: 0.5 MG/DL (ref 0.9–1.3)
GFR SERPL CREATININE-BSD FRML MDRD: >60 ML/MIN/1.73M2
GLUCOSE BLD-MCNC: 230 MG/DL (ref 70–99)
GLUCOSE BLD-MCNC: 240 MG/DL (ref 70–99)
GLUCOSE BLD-MCNC: 269 MG/DL (ref 70–99)
GLUCOSE BLD-MCNC: 284 MG/DL (ref 70–99)
GLUCOSE SERPL-MCNC: 228 MG/DL (ref 70–99)
HCO3 VENOUS: 48 MMOL/L (ref 19–25)
IONIZED CA: 1.02 MMOL/L (ref 1.12–1.32)
MAGNESIUM: 1.7 MG/DL (ref 1.8–2.4)
O2 SAT, VEN: 88.7 % (ref 50–70)
PCO2, VEN: 87 MMHG (ref 38–52)
PH VENOUS: 7.35 (ref 7.32–7.42)
PO2, VEN: 65 MMHG (ref 28–48)
POTASSIUM SERPL-SCNC: 3.5 MMOL/L (ref 3.5–5.1)
SODIUM BLD-SCNC: 141 MMOL/L (ref 135–145)

## 2023-12-15 PROCEDURE — 82962 GLUCOSE BLOOD TEST: CPT

## 2023-12-15 PROCEDURE — 36415 COLL VENOUS BLD VENIPUNCTURE: CPT

## 2023-12-15 PROCEDURE — 94761 N-INVAS EAR/PLS OXIMETRY MLT: CPT

## 2023-12-15 PROCEDURE — 6370000000 HC RX 637 (ALT 250 FOR IP): Performed by: STUDENT IN AN ORGANIZED HEALTH CARE EDUCATION/TRAINING PROGRAM

## 2023-12-15 PROCEDURE — 82330 ASSAY OF CALCIUM: CPT

## 2023-12-15 PROCEDURE — 1200000000 HC SEMI PRIVATE

## 2023-12-15 PROCEDURE — 2700000000 HC OXYGEN THERAPY PER DAY

## 2023-12-15 PROCEDURE — 94640 AIRWAY INHALATION TREATMENT: CPT

## 2023-12-15 PROCEDURE — 6360000002 HC RX W HCPCS: Performed by: STUDENT IN AN ORGANIZED HEALTH CARE EDUCATION/TRAINING PROGRAM

## 2023-12-15 PROCEDURE — 82805 BLOOD GASES W/O2 SATURATION: CPT

## 2023-12-15 PROCEDURE — 2580000003 HC RX 258: Performed by: STUDENT IN AN ORGANIZED HEALTH CARE EDUCATION/TRAINING PROGRAM

## 2023-12-15 PROCEDURE — 6360000002 HC RX W HCPCS: Performed by: FAMILY MEDICINE

## 2023-12-15 PROCEDURE — 83735 ASSAY OF MAGNESIUM: CPT

## 2023-12-15 PROCEDURE — 80048 BASIC METABOLIC PNL TOTAL CA: CPT

## 2023-12-15 RX ORDER — FUROSEMIDE 40 MG/1
40 TABLET ORAL 2 TIMES DAILY
Status: DISCONTINUED | OUTPATIENT
Start: 2023-12-15 | End: 2023-12-16 | Stop reason: HOSPADM

## 2023-12-15 RX ORDER — CALCIUM GLUCONATE 20 MG/ML
2000 INJECTION, SOLUTION INTRAVENOUS ONCE
Status: COMPLETED | OUTPATIENT
Start: 2023-12-15 | End: 2023-12-15

## 2023-12-15 RX ADMIN — FUROSEMIDE 40 MG: 10 INJECTION, SOLUTION INTRAMUSCULAR; INTRAVENOUS at 09:16

## 2023-12-15 RX ADMIN — ATORVASTATIN CALCIUM 40 MG: 40 TABLET, FILM COATED ORAL at 22:00

## 2023-12-15 RX ADMIN — Medication 400 MG: at 09:16

## 2023-12-15 RX ADMIN — INSULIN GLARGINE 25 UNITS: 100 INJECTION, SOLUTION SUBCUTANEOUS at 09:15

## 2023-12-15 RX ADMIN — CALCIUM GLUCONATE 2000 MG: 20 INJECTION, SOLUTION INTRAVENOUS at 05:36

## 2023-12-15 RX ADMIN — Medication 400 MG: at 22:00

## 2023-12-15 RX ADMIN — INSULIN LISPRO 8 UNITS: 100 INJECTION, SOLUTION INTRAVENOUS; SUBCUTANEOUS at 12:26

## 2023-12-15 RX ADMIN — ROFLUMILAST 500 MCG: 500 TABLET ORAL at 09:16

## 2023-12-15 RX ADMIN — SODIUM CHLORIDE, PRESERVATIVE FREE 10 ML: 5 INJECTION INTRAVENOUS at 22:03

## 2023-12-15 RX ADMIN — DICLOFENAC SODIUM 2 G: 10 GEL TOPICAL at 09:21

## 2023-12-15 RX ADMIN — APIXABAN 5 MG: 5 TABLET, FILM COATED ORAL at 22:00

## 2023-12-15 RX ADMIN — SODIUM CHLORIDE, PRESERVATIVE FREE 10 ML: 5 INJECTION INTRAVENOUS at 09:21

## 2023-12-15 RX ADMIN — TIOTROPIUM BROMIDE INHALATION SPRAY 2 PUFF: 3.12 SPRAY, METERED RESPIRATORY (INHALATION) at 11:15

## 2023-12-15 RX ADMIN — APIXABAN 5 MG: 5 TABLET, FILM COATED ORAL at 09:16

## 2023-12-15 RX ADMIN — INSULIN LISPRO 4 UNITS: 100 INJECTION, SOLUTION INTRAVENOUS; SUBCUTANEOUS at 16:23

## 2023-12-15 RX ADMIN — BUDESONIDE AND FORMOTEROL FUMARATE DIHYDRATE 2 PUFF: 160; 4.5 AEROSOL RESPIRATORY (INHALATION) at 11:14

## 2023-12-15 RX ADMIN — Medication 400 MG: at 14:18

## 2023-12-15 RX ADMIN — BUDESONIDE AND FORMOTEROL FUMARATE DIHYDRATE 2 PUFF: 160; 4.5 AEROSOL RESPIRATORY (INHALATION) at 20:21

## 2023-12-15 RX ADMIN — DICLOFENAC SODIUM 2 G: 10 GEL TOPICAL at 22:00

## 2023-12-15 RX ADMIN — INSULIN LISPRO 4 UNITS: 100 INJECTION, SOLUTION INTRAVENOUS; SUBCUTANEOUS at 09:15

## 2023-12-15 RX ADMIN — DILTIAZEM HYDROCHLORIDE 240 MG: 240 CAPSULE, COATED, EXTENDED RELEASE ORAL at 09:16

## 2023-12-15 RX ADMIN — FUROSEMIDE 40 MG: 40 TABLET ORAL at 16:45

## 2023-12-15 ASSESSMENT — PAIN SCALES - GENERAL
PAINLEVEL_OUTOF10: 5
PAINLEVEL_OUTOF10: 5
PAINLEVEL_OUTOF10: 0

## 2023-12-15 ASSESSMENT — PAIN DESCRIPTION - PAIN TYPE
TYPE: CHRONIC PAIN
TYPE: CHRONIC PAIN

## 2023-12-15 ASSESSMENT — PAIN DESCRIPTION - DESCRIPTORS
DESCRIPTORS: SORE
DESCRIPTORS: SORE

## 2023-12-15 ASSESSMENT — PAIN DESCRIPTION - FREQUENCY
FREQUENCY: INTERMITTENT
FREQUENCY: INTERMITTENT

## 2023-12-15 ASSESSMENT — PAIN SCALES - WONG BAKER
WONGBAKER_NUMERICALRESPONSE: 0

## 2023-12-15 ASSESSMENT — PAIN DESCRIPTION - ONSET
ONSET: ON-GOING
ONSET: ON-GOING

## 2023-12-15 ASSESSMENT — PAIN DESCRIPTION - ORIENTATION
ORIENTATION: OTHER (COMMENT)
ORIENTATION: OTHER (COMMENT)

## 2023-12-15 ASSESSMENT — PAIN DESCRIPTION - LOCATION
LOCATION: GENERALIZED
LOCATION: GENERALIZED

## 2023-12-15 NOTE — PLAN OF CARE
Problem: Discharge Planning  Goal: Discharge to home or other facility with appropriate resources  12/15/2023 1156 by Shruthi Hayes RN  Outcome: Progressing  12/14/2023 2308 by Yemi Mcmahon RN  Outcome: Progressing     Problem: Pain  Goal: Verbalizes/displays adequate comfort level or baseline comfort level  12/15/2023 1156 by Shruthi Hayes RN  Outcome: Progressing  12/14/2023 2308 by Yemi Mcmahon RN  Outcome: Progressing  Flowsheets (Taken 12/14/2023 0926 by Yael Alas RN)  Verbalizes/displays adequate comfort level or baseline comfort level:   Encourage patient to monitor pain and request assistance   Assess pain using appropriate pain scale   Administer analgesics based on type and severity of pain and evaluate response   Implement non-pharmacological measures as appropriate and evaluate response   Consider cultural and social influences on pain and pain management   Notify Licensed Independent Practitioner if interventions unsuccessful or patient reports new pain     Problem: Skin/Tissue Integrity  Goal: Absence of new skin breakdown  Description: 1. Monitor for areas of redness and/or skin breakdown  2. Assess vascular access sites hourly  3. Every 4-6 hours minimum:  Change oxygen saturation probe site  4. Every 4-6 hours:  If on nasal continuous positive airway pressure, respiratory therapy assess nares and determine need for appliance change or resting period.   12/15/2023 1156 by Shruthi Hayes RN  Outcome: Progressing  12/14/2023 2308 by Yemi Mcmahon RN  Outcome: Progressing     Problem: Safety - Adult  Goal: Free from fall injury  12/15/2023 1156 by Shruthi Hayes RN  Outcome: Progressing  12/14/2023 2308 by Yemi Mcmahon RN  Outcome: Progressing     Problem: Neurosensory - Adult  Goal: Achieves stable or improved neurological status  12/15/2023 1156 by Shruthi Hayes RN  Outcome: Progressing  12/14/2023 2308 by Yemi Mcmahon RN  Outcome: Progressing  Goal: Achieves maximal functionality and self care  12/15/2023 1156 by Dawood Burden RN  Outcome: Progressing  12/14/2023 2308 by Ana Gilliland RN  Outcome: Progressing     Problem: Respiratory - Adult  Goal: Achieves optimal ventilation and oxygenation  12/15/2023 1156 by Dawood Burden RN  Outcome: Progressing  12/14/2023 2308 by Ana Gilliland RN  Outcome: Progressing     Problem: Cardiovascular - Adult  Goal: Maintains optimal cardiac output and hemodynamic stability  12/15/2023 1156 by Dawood Burden RN  Outcome: Progressing  12/14/2023 2308 by Ana Gilliland RN  Outcome: Progressing  Goal: Absence of cardiac dysrhythmias or at baseline  12/15/2023 1156 by Dawood Burden RN  Outcome: Progressing  12/14/2023 2308 by Ana Gilliland RN  Outcome: Progressing     Problem: Skin/Tissue Integrity - Adult  Goal: Skin integrity remains intact  12/15/2023 1156 by Dawood Burden RN  Outcome: Progressing  12/14/2023 2308 by Ana Gilliland RN  Outcome: Progressing  Goal: Incisions, wounds, or drain sites healing without S/S of infection  12/15/2023 1156 by Dawood Burden RN  Outcome: Progressing  12/14/2023 2308 by Ana Gilliland RN  Outcome: Progressing  Goal: Oral mucous membranes remain intact  12/15/2023 1156 by Dawood Burden RN  Outcome: Progressing  12/14/2023 2308 by Ana Gilliland RN  Outcome: Progressing     Problem: Musculoskeletal - Adult  Goal: Return mobility to safest level of function  12/15/2023 1156 by Dawood Burden RN  Outcome: Progressing  12/14/2023 2308 by Ana Gilliland RN  Outcome: Progressing  Goal: Return ADL status to a safe level of function  12/15/2023 1156 by Dawood Burden RN  Outcome: Progressing  12/14/2023 2308 by Ana Gilliland RN  Outcome: Progressing     Problem: Gastrointestinal - Adult  Goal: Minimal or absence of nausea and vomiting  12/15/2023 1156 by Dawood Burden RN  Outcome: Progressing  12/14/2023 2308 by Ana Gilliland RN  Outcome:

## 2023-12-15 NOTE — CARE COORDINATION
Chart reviewed. Cm met with patient at bedside and confirmed the discharge plan is home with oxygen and [facility]. The patient will need a HHC order at d/c. Patient says he is on 3 liters at baseline and he has a bipap at home he uses as needed. He has been up to the bathroom as needed. DISCHARGING NURSE: Please call [facility] [phone]) to notify pt was discharged. Also, [phone] fax number is [phone]. Fax the AVS, d/c Summary if available & Inpatient Home Health Needs order to the above number. If the fax fails to send call [phone] to receive a additional fax number to send the mentioned information. Patient is active with [facility]. Please re-order at discharge if appropriate. Thank You.

## 2023-12-15 NOTE — PROGRESS NOTES
V2.0  [facility] [place]      Name:  Tasneem Penn /Age/Sex: 1955  (79 y.o. male)   MRN & CSN:  [id number] & [id number] Encounter Date/Time: 12/15/2023 11:02 AM EST    Location:  -A PCP: Candance Hug, MD       Hospital Day: 4    Assessment and Plan:   Tasneem Penn is a 79 y.o. male with pmh of chronic diastolic heart failure, A-Fib, Insulin dependent T2DM, COPD on 2-3L NC O2 at baseline, Obesity/LINA, hyperlipidemia  who presents with Acute on chronic heart failure, unspecified heart failure type ([address])      Plan:  Acute on chronic Respiratory failure with hypercapnia requiring NIPPV   Secondary to Acute on chronic diastolic heart failure with underlying CSA, OHS  Evidenced by Pro BNP 2140, peripheral and pulmonary edema. Most recent Echocardiogram in 2023 EF 50-55%  CXR with small BL pleural effusions, atelectasis   Negative COVID and influenza screen  Repeat VBG with improving Ph and CO2,  BIPAP q HS and during nap, patient wore bipap for about 4 hours last night but not during naps  IV Lasix 40mg BID switch to PO lasix 40 mg PO BID which is home dose, daily weight and I & O not documented accurately     Elevated Troponin  52>54, Demand ischemia due to above   EKG with A-Fib 88/min, no acute ST-T wave abnormalities     Persistent A-Fib Continue home Cardizem and Eliquis for [facility]    Hypocalcemia: received IV supplementation for repletion recheck in AM    Type 2 diabetes   high dose sliding scale insulin, lantus 25 units daily hypoglycemia protocol. COPD on 2-3L NC O2 at baseline, Continue Symbicort and Spiriva, as needed Albuterol. On 4-5 L O2 via NC     Hyperlipidemia: Continue Lipitor     Obesity, BMI 39.87/LINA, BIPAP as above. Counseled on dietary modification and weight loss. Diet ADULT DIET; Regular;  Low Sodium (2 gm); 1800 ml   DVT Prophylaxis [] Lovenox, []  Heparin, [] SCDs, [] Ambulation,  [] Eliquis, [] Xarelto  [] Coumadin   Code Status Full before 01-JAN-2018)  Abnormal ECG  When compared with ECG of 12-DEC-2023 22:29,  Left anterior fascicular block is no longer present     Calcium, Ionized    Collection Time: 12/15/23  5:29 AM   Result Value Ref Range    Ionized Ca 1.02 (L) 1.12 - 1.32 mMOL/L    Calcium, Ionized 4.08 (L) 4.48 - 5.28 MG/DL   POCT Glucose    Collection Time: 12/15/23  6:47 AM   Result Value Ref Range    POC Glucose 230 (H) 70 - 99 MG/DL   POCT Glucose    Collection Time: 12/15/23 11:10 AM   Result Value Ref Range    POC Glucose 269 (H) 70 - 99 MG/DL        Imaging/Diagnostics Last 24 Hours   XR CHEST PORTABLE    Result Date: 12/12/2023  EXAMINATION: ONE XRAY VIEW OF THE CHEST 12/12/2023 9:51 pm COMPARISON: 11/20/2023 HISTORY: ORDERING SYSTEM PROVIDED HISTORY: shortness of breath TECHNOLOGIST PROVIDED HISTORY: Reason for exam:->shortness of breath Reason for Exam: shortness of breath FINDINGS: The cardiomediastinal silhouette is stable. Calcified lymph node in the left hilum. There are subsegmental atelectatic changes in the lung bases, left greater than the right. There is small left pleural effusion. No pneumothorax. No acute bony abnormalities. Subsegmental atelectatic change seen in the lung bases, greater on the left. Small left pleural effusion.        Electronically signed by Shamika Zhang MD on 12/15/2023 at 11:47 AM

## 2023-12-15 NOTE — PLAN OF CARE
Problem: Discharge Planning  Goal: Discharge to home or other facility with appropriate resources  Outcome: Progressing     Problem: Pain  Goal: Verbalizes/displays adequate comfort level or baseline comfort level  Outcome: Progressing  Flowsheets (Taken 12/14/2023 0926 by Rebecca Prabhakar RN)  Verbalizes/displays adequate comfort level or baseline comfort level:   Encourage patient to monitor pain and request assistance   Assess pain using appropriate pain scale   Administer analgesics based on type and severity of pain and evaluate response   Implement non-pharmacological measures as appropriate and evaluate response   Consider cultural and social influences on pain and pain management   Notify Licensed Independent Practitioner if interventions unsuccessful or patient reports new pain     Problem: Skin/Tissue Integrity  Goal: Absence of new skin breakdown  Description: 1. Monitor for areas of redness and/or skin breakdown  2. Assess vascular access sites hourly  3. Every 4-6 hours minimum:  Change oxygen saturation probe site  4. Every 4-6 hours:  If on nasal continuous positive airway pressure, respiratory therapy assess nares and determine need for appliance change or resting period.   Outcome: Progressing     Problem: Safety - Adult  Goal: Free from fall injury  Outcome: Progressing     Problem: Neurosensory - Adult  Goal: Achieves stable or improved neurological status  Outcome: Progressing  Goal: Achieves maximal functionality and self care  Outcome: Progressing     Problem: Respiratory - Adult  Goal: Achieves optimal ventilation and oxygenation  Outcome: Progressing     Problem: Cardiovascular - Adult  Goal: Maintains optimal cardiac output and hemodynamic stability  Outcome: Progressing  Goal: Absence of cardiac dysrhythmias or at baseline  Outcome: Progressing     Problem: Skin/Tissue Integrity - Adult  Goal: Skin integrity remains intact  Outcome: Progressing  Goal: Incisions, wounds, or drain

## 2023-12-15 NOTE — PROGRESS NOTES
Pt C/O pain in cassia shoulders and arms. Notified [facility]p and received order for EKG and Calcium replacement.

## 2023-12-16 VITALS
RESPIRATION RATE: 20 BRPM | OXYGEN SATURATION: 94 % | HEIGHT: 78 IN | BODY MASS INDEX: 36.45 KG/M2 | SYSTOLIC BLOOD PRESSURE: 102 MMHG | DIASTOLIC BLOOD PRESSURE: 62 MMHG | HEART RATE: 93 BPM | TEMPERATURE: 97.5 F | WEIGHT: 315 LBS

## 2023-12-16 LAB
ANION GAP SERPL CALCULATED.3IONS-SCNC: 9 MMOL/L (ref 4–16)
BUN SERPL-MCNC: 9 MG/DL (ref 6–23)
CALCIUM SERPL-MCNC: 7.5 MG/DL (ref 8.3–10.6)
CHLORIDE BLD-SCNC: 91 MMOL/L (ref 99–110)
CO2: 39 MMOL/L (ref 21–32)
CREAT SERPL-MCNC: 0.5 MG/DL (ref 0.9–1.3)
GFR SERPL CREATININE-BSD FRML MDRD: >60 ML/MIN/1.73M2
GLUCOSE BLD-MCNC: 238 MG/DL (ref 70–99)
GLUCOSE SERPL-MCNC: 280 MG/DL (ref 70–99)
MAGNESIUM: 1.7 MG/DL (ref 1.8–2.4)
POTASSIUM SERPL-SCNC: 3.6 MMOL/L (ref 3.5–5.1)
SODIUM BLD-SCNC: 139 MMOL/L (ref 135–145)

## 2023-12-16 PROCEDURE — 2580000003 HC RX 258: Performed by: STUDENT IN AN ORGANIZED HEALTH CARE EDUCATION/TRAINING PROGRAM

## 2023-12-16 PROCEDURE — 83735 ASSAY OF MAGNESIUM: CPT

## 2023-12-16 PROCEDURE — 6370000000 HC RX 637 (ALT 250 FOR IP): Performed by: STUDENT IN AN ORGANIZED HEALTH CARE EDUCATION/TRAINING PROGRAM

## 2023-12-16 PROCEDURE — 82962 GLUCOSE BLOOD TEST: CPT

## 2023-12-16 PROCEDURE — 94640 AIRWAY INHALATION TREATMENT: CPT

## 2023-12-16 PROCEDURE — 2700000000 HC OXYGEN THERAPY PER DAY

## 2023-12-16 PROCEDURE — 36415 COLL VENOUS BLD VENIPUNCTURE: CPT

## 2023-12-16 PROCEDURE — 80048 BASIC METABOLIC PNL TOTAL CA: CPT

## 2023-12-16 PROCEDURE — 94761 N-INVAS EAR/PLS OXIMETRY MLT: CPT

## 2023-12-16 RX ADMIN — BUDESONIDE AND FORMOTEROL FUMARATE DIHYDRATE 2 PUFF: 160; 4.5 AEROSOL RESPIRATORY (INHALATION) at 07:34

## 2023-12-16 RX ADMIN — DILTIAZEM HYDROCHLORIDE 240 MG: 240 CAPSULE, COATED, EXTENDED RELEASE ORAL at 08:54

## 2023-12-16 RX ADMIN — DICLOFENAC SODIUM 2 G: 10 GEL TOPICAL at 08:55

## 2023-12-16 RX ADMIN — APIXABAN 5 MG: 5 TABLET, FILM COATED ORAL at 08:53

## 2023-12-16 RX ADMIN — INSULIN GLARGINE 25 UNITS: 100 INJECTION, SOLUTION SUBCUTANEOUS at 08:53

## 2023-12-16 RX ADMIN — INSULIN LISPRO 4 UNITS: 100 INJECTION, SOLUTION INTRAVENOUS; SUBCUTANEOUS at 08:53

## 2023-12-16 RX ADMIN — SODIUM CHLORIDE, PRESERVATIVE FREE 10 ML: 5 INJECTION INTRAVENOUS at 08:55

## 2023-12-16 RX ADMIN — TIOTROPIUM BROMIDE INHALATION SPRAY 2 PUFF: 3.12 SPRAY, METERED RESPIRATORY (INHALATION) at 07:34

## 2023-12-16 RX ADMIN — FUROSEMIDE 40 MG: 40 TABLET ORAL at 08:54

## 2023-12-16 RX ADMIN — ROFLUMILAST 500 MCG: 500 TABLET ORAL at 08:55

## 2023-12-16 ASSESSMENT — PAIN DESCRIPTION - PAIN TYPE: TYPE: CHRONIC PAIN

## 2023-12-16 ASSESSMENT — PAIN DESCRIPTION - ONSET: ONSET: ON-GOING

## 2023-12-16 ASSESSMENT — PAIN DESCRIPTION - FREQUENCY: FREQUENCY: INTERMITTENT

## 2023-12-16 ASSESSMENT — PAIN - FUNCTIONAL ASSESSMENT: PAIN_FUNCTIONAL_ASSESSMENT: ACTIVITIES ARE NOT PREVENTED

## 2023-12-16 ASSESSMENT — PAIN SCALES - WONG BAKER: WONGBAKER_NUMERICALRESPONSE: 0

## 2023-12-16 ASSESSMENT — PAIN DESCRIPTION - LOCATION: LOCATION: SHOULDER

## 2023-12-16 ASSESSMENT — PAIN DESCRIPTION - ORIENTATION: ORIENTATION: RIGHT

## 2023-12-16 ASSESSMENT — PAIN DESCRIPTION - DESCRIPTORS: DESCRIPTORS: SORE

## 2023-12-16 ASSESSMENT — PAIN SCALES - GENERAL: PAINLEVEL_OUTOF10: 5

## 2023-12-16 NOTE — PROGRESS NOTES
Pt provided D/C paperwork. All questions answered. IV removed. All belongings accounted for and in pt black bag including wallet, phone and , and keys. Pt taken off of unit by [facility] on stretcher.

## 2023-12-16 NOTE — PLAN OF CARE
Problem: Discharge Planning  Goal: Discharge to home or other facility with appropriate resources  Outcome: Progressing     Problem: Pain  Goal: Verbalizes/displays adequate comfort level or baseline comfort level  Outcome: Progressing     Problem: Skin/Tissue Integrity  Goal: Absence of new skin breakdown  Description: 1. Monitor for areas of redness and/or skin breakdown  2. Assess vascular access sites hourly  3. Every 4-6 hours minimum:  Change oxygen saturation probe site  4. Every 4-6 hours:  If on nasal continuous positive airway pressure, respiratory therapy assess nares and determine need for appliance change or resting period.   Outcome: Progressing     Problem: Safety - Adult  Goal: Free from fall injury  Outcome: Progressing     Problem: Neurosensory - Adult  Goal: Achieves stable or improved neurological status  Outcome: Progressing  Goal: Achieves maximal functionality and self care  Outcome: Progressing     Problem: Respiratory - Adult  Goal: Achieves optimal ventilation and oxygenation  Outcome: Progressing     Problem: Cardiovascular - Adult  Goal: Maintains optimal cardiac output and hemodynamic stability  Outcome: Progressing  Goal: Absence of cardiac dysrhythmias or at baseline  Outcome: Progressing     Problem: Skin/Tissue Integrity - Adult  Goal: Skin integrity remains intact  Outcome: Progressing  Goal: Incisions, wounds, or drain sites healing without S/S of infection  Outcome: Progressing  Goal: Oral mucous membranes remain intact  Outcome: Progressing     Problem: Musculoskeletal - Adult  Goal: Return mobility to safest level of function  Outcome: Progressing  Goal: Return ADL status to a safe level of function  Outcome: Progressing     Problem: Gastrointestinal - Adult  Goal: Minimal or absence of nausea and vomiting  Outcome: Progressing  Goal: Maintains or returns to baseline bowel function  Outcome: Progressing  Goal: Maintains adequate nutritional intake  Outcome: Progressing

## 2023-12-16 NOTE — PLAN OF CARE
Problem: Discharge Planning  Goal: Discharge to home or other facility with appropriate resources  12/16/2023 1044 by Tico Harris RN  Outcome: Progressing  12/16/2023 0350 by Clarence Jimenez RN  Outcome: Progressing     Problem: Pain  Goal: Verbalizes/displays adequate comfort level or baseline comfort level  12/16/2023 1044 by Tico Harris RN  Outcome: Progressing  12/16/2023 0350 by Clarence Jimenez RN  Outcome: Progressing     Problem: Skin/Tissue Integrity  Goal: Absence of new skin breakdown  Description: 1. Monitor for areas of redness and/or skin breakdown  2. Assess vascular access sites hourly  3. Every 4-6 hours minimum:  Change oxygen saturation probe site  4. Every 4-6 hours:  If on nasal continuous positive airway pressure, respiratory therapy assess nares and determine need for appliance change or resting period.   12/16/2023 1044 by Tico Harris RN  Outcome: Progressing  12/16/2023 0350 by Clarence Jimenez RN  Outcome: Progressing     Problem: Safety - Adult  Goal: Free from fall injury  12/16/2023 1044 by Tico Harris RN  Outcome: Progressing  12/16/2023 0350 by Clarence Jimenez RN  Outcome: Progressing     Problem: Neurosensory - Adult  Goal: Achieves stable or improved neurological status  12/16/2023 1044 by Tico Harris RN  Outcome: Progressing  12/16/2023 0350 by Clarence Jimenez RN  Outcome: Progressing  Goal: Achieves maximal functionality and self care  12/16/2023 1044 by Tico Harris RN  Outcome: Progressing  12/16/2023 0350 by Clarence Jimenez RN  Outcome: Progressing     Problem: Respiratory - Adult  Goal: Achieves optimal ventilation and oxygenation  12/16/2023 1044 by Tico Harris RN  Outcome: Progressing  12/16/2023 0350 by Clarence Jimenez RN  Outcome: Progressing     Problem: Cardiovascular - Adult  Goal: Maintains optimal cardiac output and hemodynamic stability  12/16/2023 1044 by Tico Harris RN  Outcome: Progressing  12/16/2023 0350 by Tom Neville Derik Helms RN  Outcome: Progressing  Goal: Absence of cardiac dysrhythmias or at baseline  12/16/2023 1044 by Dany Aase, RN  Outcome: Progressing  12/16/2023 0350 by Mauricio Vanegas RN  Outcome: Progressing     Problem: Skin/Tissue Integrity - Adult  Goal: Skin integrity remains intact  12/16/2023 1044 by Dany Aase, RN  Outcome: Progressing  12/16/2023 0350 by Mauricio Vanegas RN  Outcome: Progressing  Goal: Incisions, wounds, or drain sites healing without S/S of infection  12/16/2023 1044 by Dany Aase, RN  Outcome: Progressing  12/16/2023 0350 by Mauricio Vanegas RN  Outcome: Progressing  Goal: Oral mucous membranes remain intact  12/16/2023 1044 by Dany Aase, RN  Outcome: Progressing  12/16/2023 0350 by Mauricio Vanegas RN  Outcome: Progressing     Problem: Musculoskeletal - Adult  Goal: Return mobility to safest level of function  12/16/2023 1044 by Dany Aase, RN  Outcome: Progressing  12/16/2023 0350 by Mauricio Vanegas RN  Outcome: Progressing  Goal: Return ADL status to a safe level of function  12/16/2023 1044 by Dany Aase, RN  Outcome: Progressing  12/16/2023 0350 by Mauricio Vanegas RN  Outcome: Progressing     Problem: Gastrointestinal - Adult  Goal: Minimal or absence of nausea and vomiting  12/16/2023 1044 by Dany Aase, RN  Outcome: Progressing  12/16/2023 0350 by Mauricio Vanegas RN  Outcome: Progressing  Goal: Maintains or returns to baseline bowel function  12/16/2023 1044 by Dany Aase, RN  Outcome: Progressing  12/16/2023 0350 by Mauricio Vanegas RN  Outcome: Progressing  Goal: Maintains adequate nutritional intake  12/16/2023 1044 by Dany Aase, RN  Outcome: Progressing  12/16/2023 0350 by Mauricio Vanegas RN  Outcome: Progressing     Problem: Genitourinary - Adult  Goal: Absence of urinary retention  12/16/2023 1044 by Dany Aase, RN  Outcome: Progressing  12/16/2023 0350 by Mauricio Vanegas RN  Outcome: Progressing     Problem: Infection -

## 2023-12-16 NOTE — PLAN OF CARE
Progressing  Goal: Return ADL status to a safe level of function  12/16/2023 1153 by Parul Cadena RN  Outcome: Adequate for Discharge  12/16/2023 1044 by Parul Cadena RN  Outcome: Progressing  12/16/2023 0350 by Gurjit Metcalf RN  Outcome: Progressing     Problem: Gastrointestinal - Adult  Goal: Minimal or absence of nausea and vomiting  12/16/2023 1153 by Parul Cadena RN  Outcome: Adequate for Discharge  12/16/2023 1044 by Parul Cadena RN  Outcome: Progressing  12/16/2023 0350 by Gurjit Metcalf RN  Outcome: Progressing  Goal: Maintains or returns to baseline bowel function  12/16/2023 1153 by Parul Cadena RN  Outcome: Adequate for Discharge  12/16/2023 1044 by Parul Cadena RN  Outcome: Progressing  12/16/2023 0350 by Gurjit Metcalf RN  Outcome: Progressing  Goal: Maintains adequate nutritional intake  12/16/2023 1153 by Parul Cadena RN  Outcome: Adequate for Discharge  12/16/2023 1044 by Parul Cadena RN  Outcome: Progressing  12/16/2023 0350 by Gurjit Metcalf RN  Outcome: Progressing     Problem: Genitourinary - Adult  Goal: Absence of urinary retention  12/16/2023 1153 by Parul Cadena RN  Outcome: Adequate for Discharge  12/16/2023 1044 by Parul Cadena RN  Outcome: Progressing  12/16/2023 0350 by Gurjit Metcalf RN  Outcome: Progressing     Problem: Infection - Adult  Goal: Absence of infection at discharge  12/16/2023 1153 by Parul Cadena RN  Outcome: Adequate for Discharge  12/16/2023 1044 by Parul Cadena RN  Outcome: Progressing  12/16/2023 0350 by Gurjit Metcalf RN  Outcome: Progressing  Goal: Absence of infection during hospitalization  12/16/2023 1153 by Parul Cadena RN  Outcome: Adequate for Discharge  12/16/2023 1044 by Parul Cadena RN  Outcome: Progressing  12/16/2023 0350 by Gurjit Metcalf RN  Outcome: Progressing  Goal: Absence of fever/infection during anticipated neutropenic period  12/16/2023 1153 by Parul Cadena RN  Outcome: Adequate for Discharge  12/16/2023 1044 by Eric Patel RN  Outcome: Progressing  12/16/2023 0350 by Anca Arenas RN  Outcome: Progressing     Problem: Metabolic/Fluid and Electrolytes - Adult  Goal: Electrolytes maintained within normal limits  12/16/2023 1153 by Eric Patel RN  Outcome: Adequate for Discharge  12/16/2023 1044 by Eric Patel RN  Outcome: Progressing  12/16/2023 0350 by Anca Arenas RN  Outcome: Progressing  Goal: Hemodynamic stability and optimal renal function maintained  12/16/2023 1153 by Eric Patel RN  Outcome: Adequate for Discharge  12/16/2023 1044 by Eric Patel RN  Outcome: Progressing  12/16/2023 0350 by Anca Arenas RN  Outcome: Progressing     Problem: Hematologic - Adult  Goal: Maintains hematologic stability  12/16/2023 1153 by Eric Patel RN  Outcome: Adequate for Discharge  12/16/2023 1044 by Eric Patel RN  Outcome: Progressing  12/16/2023 0350 by Anca Arenas RN  Outcome: Progressing     Problem: Chronic Conditions and Co-morbidities  Goal: Patient's chronic conditions and co-morbidity symptoms are monitored and maintained or improved  12/16/2023 1153 by Eric Patel RN  Outcome: Adequate for Discharge  12/16/2023 1044 by Eric Patel RN  Outcome: Progressing  12/16/2023 0350 by Anca Arenas RN  Outcome: Progressing

## 2023-12-16 NOTE — CARE COORDINATION
DISCHARGING NURSE: Please call [facility] [phone] to notify pt was discharged. Also, [phone] fax number is [phone]. Fax the AVS, d/c Summary if available & Inpatient Home Health Needs order to the above number. If the fax fails to send call [phone] to receive a additional fax number to send the mentioned information. Patient is active with [facility].  Keysha Guzman RN

## 2023-12-17 LAB
EKG ATRIAL RATE: 98 BPM
EKG DIAGNOSIS: NORMAL
EKG Q-T INTERVAL: 388 MS
EKG QRS DURATION: 112 MS
EKG QTC CALCULATION (BAZETT): 466 MS
EKG R AXIS: -72 DEGREES
EKG T AXIS: 86 DEGREES
EKG VENTRICULAR RATE: 87 BPM

## 2023-12-20 NOTE — DISCHARGE SUMMARY
V2.0  Discharge Summary    Name:  Mukesh Tobias /Age/Sex: 1955 (79 y.o. male)   Admit Date: 2023  Discharge Date: 23    MRN & CSN:  [id number] & [id number] Encounter Date and Time 23 1:52 PM EST    Attending:  No att. providers found Discharging Provider: Fernie Mcarthur MD       Hospital Course:     Brief HPI: Mukesh Tobias is a 79 y.o. male with chronic diastolic heart failure, A-Fib, Insulin dependent T2DM, COPD on 2-3L NC O2 at baseline, Obesity/LINA, hyperlipidemia presented from home with shortness of breath. Patient reports that over the past 3 days has been having progressively worsening SOB/RAE associated with orthopnea. Intermittent cough with no increase in quantity or change in character of sputum. Noted to have acidosis and acute hypercapnia on VBG, placed on BIPAP. During my evaluation, patient was Aaox3, hemodynamically stable. BIPAP in mild distress. Denies chest pain, LOC/Dizziness, nausea, vomiting or diarrhea. Patient states that he is compliant with his diuretic regimen and CPAP at home. Brief Problem Based Course:   Acute on chronic Respiratory failure with hypercapnia requiring NIPPV   Secondary to Acute on chronic diastolic heart failure with underlying CSA, OHS  Evidenced by Pro BNP 2140, peripheral and pulmonary edema.    Most recent Echocardiogram in 2023 EF 50-55%  CXR with small BL pleural effusions, atelectasis   Negative COVID and influenza screen  Repeat VBG with improving Ph and CO2,  BIPAP q HS and during nap, IV Lasix 40mg BID switch to PO lasix 40 mg PO BID which is home dose, daily weight and I & O not documented accurately patient counseled on being compliant with BIPAP     Elevated Troponin  52>54, Demand ischemia due to above   EKG with A-Fib 88/min, no acute ST-T wave abnormalities     Persistent A-Fib Continue home Cardizem and Eliquis for [facility]     Hypocalcemia: received IV supplementation for repletion recheck in AM     Type 2 diabetes   high

## 2023-12-21 ENCOUNTER — HOSPITAL ENCOUNTER (INPATIENT)
Age: 68
LOS: 3 days | Discharge: HOME | DRG: 291 | End: 2023-12-24
Attending: EMERGENCY MEDICINE | Admitting: INTERNAL MEDICINE
Payer: MEDICARE

## 2023-12-21 ENCOUNTER — APPOINTMENT (OUTPATIENT)
Dept: GENERAL RADIOLOGY | Age: 68
DRG: 291 | End: 2023-12-21
Payer: MEDICARE

## 2023-12-21 ENCOUNTER — APPOINTMENT (OUTPATIENT)
Dept: ULTRASOUND IMAGING | Age: 68
DRG: 291 | End: 2023-12-21
Payer: MEDICARE

## 2023-12-21 DIAGNOSIS — M79.89 SWELLING OF LOWER EXTREMITY: Primary | ICD-10-CM

## 2023-12-21 PROBLEM — J96.20 ACUTE ON CHRONIC RESPIRATORY FAILURE (HCC): Status: ACTIVE | Noted: 2023-12-21

## 2023-12-21 LAB
ALBUMIN SERPL-MCNC: 3.6 GM/DL (ref 3.4–5)
ALP BLD-CCNC: 74 IU/L (ref 40–129)
ALT SERPL-CCNC: 23 U/L (ref 10–40)
ANION GAP SERPL CALCULATED.3IONS-SCNC: 7 MMOL/L (ref 7–16)
AST SERPL-CCNC: 15 IU/L (ref 15–37)
BASE EXCESS MIXED: 17.3 (ref 0–1.2)
BASOPHILS ABSOLUTE: 0.1 K/CU MM
BASOPHILS RELATIVE PERCENT: 0.5 % (ref 0–1)
BILIRUB SERPL-MCNC: 0.2 MG/DL (ref 0–1)
BUN SERPL-MCNC: 12 MG/DL (ref 6–23)
CALCIUM SERPL-MCNC: 8.5 MG/DL (ref 8.3–10.6)
CHLORIDE BLD-SCNC: 94 MMOL/L (ref 99–110)
CO2: 42 MMOL/L (ref 21–32)
COMMENT: ABNORMAL
CREAT SERPL-MCNC: 0.7 MG/DL (ref 0.9–1.3)
DIFFERENTIAL TYPE: ABNORMAL
EOSINOPHILS ABSOLUTE: 0.1 K/CU MM
EOSINOPHILS RELATIVE PERCENT: 0.7 % (ref 0–3)
GFR SERPL CREATININE-BSD FRML MDRD: >60 ML/MIN/1.73M2
GLUCOSE BLD-MCNC: 211 MG/DL (ref 70–99)
GLUCOSE BLD-MCNC: 293 MG/DL (ref 70–99)
GLUCOSE BLD-MCNC: 302 MG/DL (ref 70–99)
GLUCOSE SERPL-MCNC: 284 MG/DL (ref 70–99)
HCO3 VENOUS: 47.5 MMOL/L (ref 22–29)
HCT VFR BLD CALC: 37.8 % (ref 42–52)
HEMOGLOBIN: 11.8 GM/DL (ref 13.5–18)
IMMATURE NEUTROPHIL %: 2.9 % (ref 0–0.43)
LYMPHOCYTES ABSOLUTE: 1.4 K/CU MM
LYMPHOCYTES RELATIVE PERCENT: 13.3 % (ref 24–44)
MCH RBC QN AUTO: 30.7 PG (ref 27–31)
MCHC RBC AUTO-ENTMCNC: 31.2 % (ref 32–36)
MCV RBC AUTO: 98.4 FL (ref 78–100)
MONOCYTES ABSOLUTE: 1.1 K/CU MM
MONOCYTES RELATIVE PERCENT: 10 % (ref 0–4)
NUCLEATED RBC %: 0.6 %
O2 SAT, VEN: 90.9 % (ref 50–70)
PCO2, VEN: 86 MMHG (ref 41–51)
PDW BLD-RTO: 16.2 % (ref 11.7–14.9)
PH VENOUS: 7.35 (ref 7.32–7.43)
PLATELET # BLD: 207 K/CU MM (ref 140–440)
PMV BLD AUTO: 9.4 FL (ref 7.5–11.1)
PO2, VEN: 77 MMHG (ref 28–48)
POTASSIUM SERPL-SCNC: 3.8 MMOL/L (ref 3.5–5.1)
PRO-BNP: 1969 PG/ML
RBC # BLD: 3.84 M/CU MM (ref 4.6–6.2)
SEGMENTED NEUTROPHILS ABSOLUTE COUNT: 7.9 K/CU MM
SEGMENTED NEUTROPHILS RELATIVE PERCENT: 72.6 % (ref 36–66)
SODIUM BLD-SCNC: 143 MMOL/L (ref 135–145)
TOTAL IMMATURE NEUTOROPHIL: 0.31 K/CU MM
TOTAL NUCLEATED RBC: 0.1 K/CU MM
TOTAL PROTEIN: 6.3 GM/DL (ref 6.4–8.2)
TROPONIN, HIGH SENSITIVITY: 41 NG/L (ref 0–22)
TROPONIN, HIGH SENSITIVITY: 43 NG/L (ref 0–22)
WBC # BLD: 10.8 K/CU MM (ref 4–10.5)

## 2023-12-21 PROCEDURE — 84484 ASSAY OF TROPONIN QUANT: CPT

## 2023-12-21 PROCEDURE — 99223 1ST HOSP IP/OBS HIGH 75: CPT | Performed by: INTERNAL MEDICINE

## 2023-12-21 PROCEDURE — 71045 X-RAY EXAM CHEST 1 VIEW: CPT

## 2023-12-21 PROCEDURE — 2580000003 HC RX 258: Performed by: STUDENT IN AN ORGANIZED HEALTH CARE EDUCATION/TRAINING PROGRAM

## 2023-12-21 PROCEDURE — 6370000000 HC RX 637 (ALT 250 FOR IP): Performed by: EMERGENCY MEDICINE

## 2023-12-21 PROCEDURE — 6360000002 HC RX W HCPCS: Performed by: STUDENT IN AN ORGANIZED HEALTH CARE EDUCATION/TRAINING PROGRAM

## 2023-12-21 PROCEDURE — 6370000000 HC RX 637 (ALT 250 FOR IP): Performed by: NURSE PRACTITIONER

## 2023-12-21 PROCEDURE — 82962 GLUCOSE BLOOD TEST: CPT

## 2023-12-21 PROCEDURE — 94640 AIRWAY INHALATION TREATMENT: CPT

## 2023-12-21 PROCEDURE — 5A09357 ASSISTANCE WITH RESPIRATORY VENTILATION, LESS THAN 24 CONSECUTIVE HOURS, CONTINUOUS POSITIVE AIRWAY PRESSURE: ICD-10-PCS | Performed by: INTERNAL MEDICINE

## 2023-12-21 PROCEDURE — 99285 EMERGENCY DEPT VISIT HI MDM: CPT

## 2023-12-21 PROCEDURE — 83880 ASSAY OF NATRIURETIC PEPTIDE: CPT

## 2023-12-21 PROCEDURE — 93005 ELECTROCARDIOGRAM TRACING: CPT | Performed by: EMERGENCY MEDICINE

## 2023-12-21 PROCEDURE — 80053 COMPREHEN METABOLIC PANEL: CPT

## 2023-12-21 PROCEDURE — 6360000002 HC RX W HCPCS: Performed by: EMERGENCY MEDICINE

## 2023-12-21 PROCEDURE — 93970 EXTREMITY STUDY: CPT

## 2023-12-21 PROCEDURE — 85025 COMPLETE CBC W/AUTO DIFF WBC: CPT

## 2023-12-21 PROCEDURE — 2700000000 HC OXYGEN THERAPY PER DAY

## 2023-12-21 PROCEDURE — 2580000003 HC RX 258: Performed by: NURSE PRACTITIONER

## 2023-12-21 PROCEDURE — 1200000000 HC SEMI PRIVATE

## 2023-12-21 PROCEDURE — 82805 BLOOD GASES W/O2 SATURATION: CPT

## 2023-12-21 PROCEDURE — 94660 CPAP INITIATION&MGMT: CPT

## 2023-12-21 RX ORDER — DILTIAZEM HYDROCHLORIDE 240 MG/1
240 CAPSULE, COATED, EXTENDED RELEASE ORAL DAILY
Status: DISCONTINUED | OUTPATIENT
Start: 2023-12-21 | End: 2023-12-24 | Stop reason: HOSPADM

## 2023-12-21 RX ORDER — ALBUTEROL SULFATE 90 UG/1
2 AEROSOL, METERED RESPIRATORY (INHALATION) 2 TIMES DAILY
Status: DISCONTINUED | OUTPATIENT
Start: 2023-12-21 | End: 2023-12-22

## 2023-12-21 RX ORDER — SODIUM CHLORIDE 0.9 % (FLUSH) 0.9 %
5-40 SYRINGE (ML) INJECTION EVERY 12 HOURS SCHEDULED
Status: DISCONTINUED | OUTPATIENT
Start: 2023-12-21 | End: 2023-12-24 | Stop reason: HOSPADM

## 2023-12-21 RX ORDER — FUROSEMIDE 10 MG/ML
40 INJECTION INTRAMUSCULAR; INTRAVENOUS 2 TIMES DAILY
Status: DISCONTINUED | OUTPATIENT
Start: 2023-12-21 | End: 2023-12-24 | Stop reason: HOSPADM

## 2023-12-21 RX ORDER — POTASSIUM CHLORIDE 20 MEQ/1
40 TABLET, EXTENDED RELEASE ORAL PRN
Status: DISCONTINUED | OUTPATIENT
Start: 2023-12-21 | End: 2023-12-21 | Stop reason: SDUPTHER

## 2023-12-21 RX ORDER — ACETAMINOPHEN 325 MG/1
650 TABLET ORAL EVERY 6 HOURS PRN
Status: DISCONTINUED | OUTPATIENT
Start: 2023-12-21 | End: 2023-12-21 | Stop reason: SDUPTHER

## 2023-12-21 RX ORDER — ONDANSETRON 4 MG/1
4 TABLET, ORALLY DISINTEGRATING ORAL EVERY 8 HOURS PRN
Status: DISCONTINUED | OUTPATIENT
Start: 2023-12-21 | End: 2023-12-21 | Stop reason: SDUPTHER

## 2023-12-21 RX ORDER — ACETAMINOPHEN 650 MG/1
650 SUPPOSITORY RECTAL EVERY 6 HOURS PRN
Status: DISCONTINUED | OUTPATIENT
Start: 2023-12-21 | End: 2023-12-24 | Stop reason: HOSPADM

## 2023-12-21 RX ORDER — INSULIN LISPRO 100 [IU]/ML
0-8 INJECTION, SOLUTION INTRAVENOUS; SUBCUTANEOUS
Status: DISCONTINUED | OUTPATIENT
Start: 2023-12-21 | End: 2023-12-24 | Stop reason: HOSPADM

## 2023-12-21 RX ORDER — INSULIN LISPRO 100 [IU]/ML
0-4 INJECTION, SOLUTION INTRAVENOUS; SUBCUTANEOUS NIGHTLY
Status: DISCONTINUED | OUTPATIENT
Start: 2023-12-21 | End: 2023-12-24 | Stop reason: HOSPADM

## 2023-12-21 RX ORDER — ACETAMINOPHEN 650 MG/1
650 SUPPOSITORY RECTAL EVERY 6 HOURS PRN
Status: DISCONTINUED | OUTPATIENT
Start: 2023-12-21 | End: 2023-12-21 | Stop reason: SDUPTHER

## 2023-12-21 RX ORDER — ACETAMINOPHEN 325 MG/1
650 TABLET ORAL EVERY 6 HOURS PRN
Status: DISCONTINUED | OUTPATIENT
Start: 2023-12-21 | End: 2023-12-24 | Stop reason: HOSPADM

## 2023-12-21 RX ORDER — FUROSEMIDE 10 MG/ML
40 INJECTION INTRAMUSCULAR; INTRAVENOUS 2 TIMES DAILY
Status: DISCONTINUED | OUTPATIENT
Start: 2023-12-21 | End: 2023-12-21 | Stop reason: SDUPTHER

## 2023-12-21 RX ORDER — ONDANSETRON 2 MG/ML
4 INJECTION INTRAMUSCULAR; INTRAVENOUS EVERY 6 HOURS PRN
Status: DISCONTINUED | OUTPATIENT
Start: 2023-12-21 | End: 2023-12-21 | Stop reason: SDUPTHER

## 2023-12-21 RX ORDER — IPRATROPIUM BROMIDE AND ALBUTEROL SULFATE 2.5; .5 MG/3ML; MG/3ML
1 SOLUTION RESPIRATORY (INHALATION)
Status: DISCONTINUED | OUTPATIENT
Start: 2023-12-21 | End: 2023-12-24 | Stop reason: HOSPADM

## 2023-12-21 RX ORDER — GLUCAGON 1 MG/ML
1 KIT INJECTION PRN
Status: DISCONTINUED | OUTPATIENT
Start: 2023-12-21 | End: 2023-12-24 | Stop reason: HOSPADM

## 2023-12-21 RX ORDER — POLYETHYLENE GLYCOL 3350 17 G
2 POWDER IN PACKET (EA) ORAL
Status: DISCONTINUED | OUTPATIENT
Start: 2023-12-21 | End: 2023-12-24 | Stop reason: HOSPADM

## 2023-12-21 RX ORDER — POTASSIUM CHLORIDE 7.45 MG/ML
10 INJECTION INTRAVENOUS PRN
Status: DISCONTINUED | OUTPATIENT
Start: 2023-12-21 | End: 2023-12-21 | Stop reason: SDUPTHER

## 2023-12-21 RX ORDER — MAGNESIUM SULFATE IN WATER 40 MG/ML
2000 INJECTION, SOLUTION INTRAVENOUS PRN
Status: DISCONTINUED | OUTPATIENT
Start: 2023-12-21 | End: 2023-12-22

## 2023-12-21 RX ORDER — ONDANSETRON 4 MG/1
4 TABLET, ORALLY DISINTEGRATING ORAL EVERY 8 HOURS PRN
Status: DISCONTINUED | OUTPATIENT
Start: 2023-12-21 | End: 2023-12-24 | Stop reason: HOSPADM

## 2023-12-21 RX ORDER — SODIUM CHLORIDE 9 MG/ML
INJECTION, SOLUTION INTRAVENOUS PRN
Status: DISCONTINUED | OUTPATIENT
Start: 2023-12-21 | End: 2023-12-24 | Stop reason: HOSPADM

## 2023-12-21 RX ORDER — POTASSIUM CHLORIDE 20 MEQ/1
40 TABLET, EXTENDED RELEASE ORAL PRN
Status: DISCONTINUED | OUTPATIENT
Start: 2023-12-21 | End: 2023-12-22

## 2023-12-21 RX ORDER — MAGNESIUM SULFATE IN WATER 40 MG/ML
2000 INJECTION, SOLUTION INTRAVENOUS PRN
Status: DISCONTINUED | OUTPATIENT
Start: 2023-12-21 | End: 2023-12-21 | Stop reason: SDUPTHER

## 2023-12-21 RX ORDER — DEXTROSE MONOHYDRATE 100 MG/ML
INJECTION, SOLUTION INTRAVENOUS CONTINUOUS PRN
Status: DISCONTINUED | OUTPATIENT
Start: 2023-12-21 | End: 2023-12-24 | Stop reason: HOSPADM

## 2023-12-21 RX ORDER — FUROSEMIDE 10 MG/ML
40 INJECTION INTRAMUSCULAR; INTRAVENOUS ONCE
Status: COMPLETED | OUTPATIENT
Start: 2023-12-21 | End: 2023-12-21

## 2023-12-21 RX ORDER — INSULIN GLARGINE 100 [IU]/ML
20 INJECTION, SOLUTION SUBCUTANEOUS NIGHTLY
Status: DISCONTINUED | OUTPATIENT
Start: 2023-12-21 | End: 2023-12-22

## 2023-12-21 RX ORDER — IPRATROPIUM BROMIDE AND ALBUTEROL SULFATE 2.5; .5 MG/3ML; MG/3ML
3 SOLUTION RESPIRATORY (INHALATION)
Status: COMPLETED | OUTPATIENT
Start: 2023-12-21 | End: 2023-12-21

## 2023-12-21 RX ORDER — ONDANSETRON 2 MG/ML
4 INJECTION INTRAMUSCULAR; INTRAVENOUS EVERY 6 HOURS PRN
Status: DISCONTINUED | OUTPATIENT
Start: 2023-12-21 | End: 2023-12-24 | Stop reason: HOSPADM

## 2023-12-21 RX ORDER — SODIUM CHLORIDE 0.9 % (FLUSH) 0.9 %
10 SYRINGE (ML) INJECTION PRN
Status: DISCONTINUED | OUTPATIENT
Start: 2023-12-21 | End: 2023-12-24 | Stop reason: HOSPADM

## 2023-12-21 RX ORDER — POTASSIUM CHLORIDE 7.45 MG/ML
10 INJECTION INTRAVENOUS PRN
Status: DISCONTINUED | OUTPATIENT
Start: 2023-12-21 | End: 2023-12-22

## 2023-12-21 RX ORDER — SODIUM CHLORIDE 0.9 % (FLUSH) 0.9 %
5-40 SYRINGE (ML) INJECTION PRN
Status: DISCONTINUED | OUTPATIENT
Start: 2023-12-21 | End: 2023-12-24 | Stop reason: HOSPADM

## 2023-12-21 RX ORDER — POLYETHYLENE GLYCOL 3350 17 G/17G
17 POWDER, FOR SOLUTION ORAL DAILY PRN
Status: DISCONTINUED | OUTPATIENT
Start: 2023-12-21 | End: 2023-12-21 | Stop reason: SDUPTHER

## 2023-12-21 RX ORDER — POLYETHYLENE GLYCOL 3350 17 G/17G
17 POWDER, FOR SOLUTION ORAL DAILY PRN
Status: DISCONTINUED | OUTPATIENT
Start: 2023-12-21 | End: 2023-12-24 | Stop reason: HOSPADM

## 2023-12-21 RX ORDER — ATORVASTATIN CALCIUM 40 MG/1
40 TABLET, FILM COATED ORAL DAILY
Status: DISCONTINUED | OUTPATIENT
Start: 2023-12-21 | End: 2023-12-24 | Stop reason: HOSPADM

## 2023-12-21 RX ADMIN — SODIUM CHLORIDE, PRESERVATIVE FREE 10 ML: 5 INJECTION INTRAVENOUS at 10:26

## 2023-12-21 RX ADMIN — IPRATROPIUM BROMIDE AND ALBUTEROL SULFATE 1 DOSE: 2.5; .5 SOLUTION RESPIRATORY (INHALATION) at 20:22

## 2023-12-21 RX ADMIN — DILTIAZEM HYDROCHLORIDE 240 MG: 240 CAPSULE, COATED, EXTENDED RELEASE ORAL at 10:25

## 2023-12-21 RX ADMIN — INSULIN LISPRO 6 UNITS: 100 INJECTION, SOLUTION INTRAVENOUS; SUBCUTANEOUS at 11:05

## 2023-12-21 RX ADMIN — IPRATROPIUM BROMIDE AND ALBUTEROL SULFATE 3 DOSE: 2.5; .5 SOLUTION RESPIRATORY (INHALATION) at 04:30

## 2023-12-21 RX ADMIN — INSULIN LISPRO 2 UNITS: 100 INJECTION, SOLUTION INTRAVENOUS; SUBCUTANEOUS at 17:32

## 2023-12-21 RX ADMIN — SODIUM CHLORIDE, PRESERVATIVE FREE 10 ML: 5 INJECTION INTRAVENOUS at 21:05

## 2023-12-21 RX ADMIN — APIXABAN 5 MG: 5 TABLET, FILM COATED ORAL at 21:05

## 2023-12-21 RX ADMIN — ATORVASTATIN CALCIUM 40 MG: 40 TABLET, FILM COATED ORAL at 10:25

## 2023-12-21 RX ADMIN — INSULIN GLARGINE 20 UNITS: 100 INJECTION, SOLUTION SUBCUTANEOUS at 21:05

## 2023-12-21 RX ADMIN — SODIUM CHLORIDE, PRESERVATIVE FREE 10 ML: 5 INJECTION INTRAVENOUS at 10:25

## 2023-12-21 RX ADMIN — APIXABAN 5 MG: 5 TABLET, FILM COATED ORAL at 10:25

## 2023-12-21 RX ADMIN — FUROSEMIDE 40 MG: 10 INJECTION, SOLUTION INTRAMUSCULAR; INTRAVENOUS at 17:32

## 2023-12-21 RX ADMIN — FUROSEMIDE 40 MG: 10 INJECTION, SOLUTION INTRAMUSCULAR; INTRAVENOUS at 06:43

## 2023-12-21 ASSESSMENT — PAIN - FUNCTIONAL ASSESSMENT: PAIN_FUNCTIONAL_ASSESSMENT: NONE - DENIES PAIN

## 2023-12-21 NOTE — H&P
V2.0  History and Physical      Name:  Kelly Boyer /Age/Sex: 1955  (79 y.o. male)   MRN & CSN:  [id number] & [id number] Encounter Date/Time: 2023 7:48 AM EST   Location:  [address] [place] [address]-A PCP: Paul Patel MD       Hospital Day: 1    Assessment and Plan:   Kelly Boyer is a 79 y.o. male with a pmh of chronic diastolic heart failure, atrial fibrillation, type 2 diabetes, COPD, chronic respiratory failure with 2 to 3 L NC, obesity, hyperlipidemia, LINA who presents with Acute on chronic heart failure, unspecified heart failure type [facility])    Hospital Problems             Last Modified POA    * (Principal) Acute on chronic heart failure, unspecified heart failure type ([address]) 2023 Yes    Acute on chronic respiratory failure ([address]) 2023 Yes     Acute on chronic respiratory failure  Likely due to acute on chronic diastolic congestive heart failure  -Reported worsening shortness of breath since yesterday  -Patient is not compliant with BiPAP at home  -Need 4 to 5 L NC in ED  -Elevated BNP at 1969  -Chest x-ray showed bibasilar opacity which may reflect underlying edema or pneumonia, small left-sided pleural effusion  -Admit patient to medical surgical floor with telemetry  -IV diuretics with Lasix 40 Mg twice daily  -Cardiology consult, appreciate their recommendations    Compensated respiratory acidosis  COPD  -VBG showed pH 7.35, CO2 86, HCO3 47.5  -Continue DuoNeb treatment 4 times a day, maintenance inhalers  -BiPAP in nap and sleep  -Oxygen panel, wean down oxygen if possible    Type 2 diabetes  -Hemoglobin A1c 10.2 on 2023  -Lantus 20 units nightly plus insulin sliding scale  -Hold oral medications in hospital    History of atrial fibrillation  -Continue Eliquis   -Continue Cardizem for heart rate control    Chronic leg swelling  -However patient reported worsening swelling in left leg, mildly tender in left calf  -Duplex in bilateral lower extremities to rule out a

## 2023-12-21 NOTE — PROGRESS NOTES
Records do not show that Anthony Gasca has had a Pulmonary Function Test (PFT). PFTs are required for confirmation of diagnosis and GOLD grading used for pharmacological and  nonpharmacological therapy recommendations. Please schedule Anthony Gasca for PFTs once stable.

## 2023-12-21 NOTE — ED PROVIDER NOTES
CC: Shortness of breath  Seen in room 30    HPI: This is a 72-year male with a past medical history of COPD on 3 L home oxygen, diabetes, CHF who comes for evaluation of dyspnea. States been worsening over the last 2 days. He notes his legs are little swollen. He thinks this is likely COPD. He has been coughing a little bit but no fevers, chills, he has otherwise been in his typical state of health. Denies any chest pain, denies any dizziness or syncope. No falls, trauma. Nursing Notes Reviewed    Past Medical History:   Diagnosis Date    A-fib [facility])     Resolved after ablation 2018    Anxiety     Arthritis     \"Hips, Knees, Elbows And Fingers\"    COPD (chronic obstructive pulmonary disease) ([facility])     Sees Dr. Alvarado Foil    Depression     Diabetes mellitus [facility]) Dx 2723'K    Full dentures     H/O angiography 12/13/2018    peripheral angio - LFA occluded, filling collaterals, RSFA 90% stenosis-vasc surg consult    H/O Doppler ultrasound 09/05/2018    LE arterial - left mid and distal femoral artery occluded, lt FANI shows mild-mod PVD    H/O echocardiogram 01/06/2016    EF60% mild MR, TR, pulm HTN    Hx of colonoscopy     7/11 C-scope - benign polyp x1    Hx of Doppler ultrasound 02/20/2018    Lower extremity doppler  Normal study.     Hyperlipidemia     Hypertension     Macular degeneration     States is legally blind    Obesity     On home oxygen therapy     Continuous At 2 Liters Per Nasal Cannula    PAD (peripheral artery disease) ([facility])     10/10 FANI mild PAD left superficial femoral s/p left fem-pop bypass    Panic attacks     Pneumonia Last Episode In 2018    PTSD (post-traumatic stress disorder)     Restless leg     Shortness of breath     Sleep apnea     Uses BiPap - stopped using 4/2020    Wears glasses     To Read     Past Surgical History:   Procedure Laterality Date    APPENDECTOMY  2003    ATRIAL ABLATION SURGERY  05/23/2018    A-fib ablation     CARDIAC SURGERY  05/2018    \"Heart Ablation Done Twice\"

## 2023-12-21 NOTE — CONSULTS
Nutrition Assessment     Type and Reason for Visit: Consult, Initial, Patient Education    Nutrition Assessment:  Admitted with acute on chronic HF, recent admit with same problem last week. Diet ed provided by RD last week. Visited pt, pt discussed not eating much. Claims to eat only once or twice per day, no set meal schedule. Has food sent from [employer] pant[employer] every Monday (cans, meats, vegetables) but pt does not cook due to lack of vision. Pt has not been checking blood sugars/A1C due to poor vision. Discussed concern over heart health and diabetes care with nutrition intervention. Pt chooses mainly microwaveable meals, doesn't monitor sodium intake. Pt drinks water, pop, juice, and koolaid. Pt does not have food insecurity, pt reports spending $300-400 per month on food from [employer]- states \"I am not straving. \" However, pt does skip breakfast and has poorly managed blood sugars. Did not provide handouts due to poor vision at this time. Encourage pt to trial small switches from diet beverages, trialing lean cuisine options, adding ready-to-go meals or protein shakes at breakfast such as microwavable oats, eggs, and etc. Pt states \"It's expensive to eat healthy. \" Pt has conceptual barriers around healthier living. Pt does not seem motivated or ready for dietary change at this time. Will assess per protocol. Adjust diet to carb control 5 with low sodium. Endorse pt to follow up with Endocrinology and Cardiology OP.      Nutrition Related Findings:   Hgb A1c 10.2%, POCT 300, Weight gain of 3.2% in the last 9 months likely RT fluids    Nutrition Interventions:   Nutrition Education/Counseling: Education initiated, Education completed    Nutrition Monitoring and Evaluation:   Behavioral-Environmental Outcomes: Beliefs and Attitudes, Knowledge or Skill, Readiness for Change    Eden Stevens RD, LD  Contact: [phone]

## 2023-12-21 NOTE — CONSULTS
CARDIOLOGY CONSULT NOTE         Reason for consultation: Heart failure      Primary care physician: Rosalio Hedrick MD      Chief Complaints :  Chief Complaint   Patient presents with    Shortness of Breath        History of present illness:Armando is a 68 y.o.year old male who has history of hypertension, diabetes mellitus, persistent atrial fibrillation status post ablation in 2018, morbid obesity, HFpEF who is now admitted with shortness of breath.  Patient states that he has been short of breath for few weeks now.  He was recently discharged from hospital on December 16.  He denies any chest discomfort.  He states that he has been compliant with his medications.  He is on oxygen at home.    Review of Systems:     All systems negative except as marked.        Physical Examination:    Vitals:    12/21/23 1411   BP: (!) 108/59   Pulse: 88   Resp: 17   Temp: 97.7 °F (36.5 °C)   SpO2: 98%        General Appearance:  No distress, conversant    Constitutional:  No acute distress, non-toxic appearance.    HENT:  Normocephalic, Atraumatic,   Eyes:  PERRL, EOMI, Conjunctiva normal, No discharge.   Respiratory:  No respiratory distress, No wheezing  Cardiovascular: Breathing comfortably on 2 L of nasal cannula  Abdomen /GI:   Soft, No tenderness   Genitourinary: No costovertebral angle tenderness   Musculoskeletal: 1+ edema, no tenderness, no deformities.   Integument:  Well hydrated, no rash   Neurologic:  Alert & oriented x 3, no focal deficits noted       Medical decision making and Data review:    Lab Review   Recent Labs     12/21/23  0459   WBC 10.8*   HGB 11.8*   HCT 37.8*         Recent Labs     12/21/23  0459      K 3.8   CL 94*   CO2 42*   BUN 12   CREATININE 0.7*     Recent Labs     12/21/23  0459   AST 15   ALT 23   BILITOT 0.2   ALKPHOS 74     No results for input(s): \"TROPONINT\" in the last 72 hours.    Recent Labs     12/21/23  0410   PROBNP 1,969*     Lab Results   Component Value Date  INR 0.9 11/13/2023    PROTIME 12.8 11/13/2023       EKG: (reviewed by myself): His EKG showed atrial fibrillation with nonspecific ST-T changes    ECHO:(reviewed by myself) his echocardiogram from September 2023 showed preserved ejection fraction. His nuclear stress test was also done in September 2023 and showed no evidence of ischemia. Chest Xray:(reviewed by myself): His chest x-ray showed bibasilar opacities consistent with edema. All labs, medications and tests reviewed by myself including data  from outside source , patient and available family . Continue all other medications of all above medical condition listed as is. Impression and Recommendations:    Acute on chronic HFpEF  Morbid obesity  Obstructive sleep apnea/OHS  Elevated troponin      79 y. o.year old with above medical history. He likely has HFpEF but also has significant obesity hypoventilation syndrome given the fact that he is retaining CO2. He also has chronic venous insufficiency. For now we can continue with Lasix 40 mg IV twice daily. Can switch him to oral torsemide 40 mg daily tomorrow. We recommend pulmonology evaluation for severe sleep apnea/OHS. His troponin is chronically elevated and there is no evidence of active ischemia based on his symptoms and EKG. Thank you  much for consult and giving us the opportunity in contributing in the care of this patient. Please feel free to call me for any questions.        Tigist Collier MD, 12/21/2023 3:23 PM

## 2023-12-21 NOTE — PROGRESS NOTES
4 Eyes Skin Assessment     NAME:  Baldomero Dacosta  YOB: 1955  MEDICAL RECORD NUMBER:  [id number]    The patient is being assessed for  Admission    I agree that at least one RN has performed a thorough Head to Toe Skin Assessment on the patient. ALL assessment sites listed below have been assessed. Areas assessed by both nurses:    Head, Face, Ears, Shoulders, Back, Chest, Arms, Elbows, Hands, Sacrum. Buttock, Coccyx, Ischium, and Legs. Feet and Heels        Does the Patient have a Wound?  No noted wound(s)       Hector Prevention initiated by RN: No  Wound Care Orders initiated by RN: No    Pressure Injury (Stage 3,4, Unstageable, DTI, NWPT, and Complex wounds) if present, place Wound referral order by RN under : No    New Ostomies, if present place, Ostomy referral order under : No     Nurse 1 eSignature: Electronically signed by Neil Eden RN on 12/21/23 at 5:46 PM EST    **SHARE this note so that the co-signing nurse can place an eSignature**    Nurse 2 eSignature: Electronically signed by Gloria Giraldo LPN on 95/52/32 at 7:52 PM EST

## 2023-12-21 NOTE — PLAN OF CARE
Problem: Discharge Planning  Goal: Discharge to home or other facility with appropriate resources  Outcome: Progressing  Flowsheets (Taken 12/21/2023 0840)  Discharge to home or other facility with appropriate resources:   Identify barriers to discharge with patient and caregiver   Identify discharge learning needs (meds, wound care, etc)   Refer to discharge planning if patient needs post-hospital services based on physician order or complex needs related to functional status, cognitive ability or social support system   Arrange for needed discharge resources and transportation as appropriate     Problem: Safety - Adult  Goal: Free from fall injury  Outcome: Progressing     Problem: Chronic Conditions and Co-morbidities  Goal: Patient's chronic conditions and co-morbidity symptoms are monitored and maintained or improved  Outcome: Progressing

## 2023-12-21 NOTE — ED NOTES
ED TO INPATIENT SBAR HANDOFF    Patient Name: Jazmin Ramirez   :  1955  79 y.o. Preferred Name  Fayettevilledarell Rubi  Family/Caregiver Present no   Restraints no   C-SSRS: Risk of Suicide: No Risk  Sitter no   Sepsis Risk Score Sepsis Risk Score: 3.53      Situation  Chief Complaint   Patient presents with    Shortness of Breath     Brief Description of Patient's Condition: pt came in for SOB, pt has a history of COPD and is on 3-Lpm at home, pt is currently on 6-Lpm NC and has had a breathing treatment. Pt also complains of hip and right shoulder pain that is chronic. Pt has an elevated BNP and was given 40 mg Lasix.    Mental Status: oriented  Arrived from: home    Imaging:   XR CHEST PORTABLE    (Results Pending)     Abnormal labs:   Abnormal Labs Reviewed   CBC WITH AUTO DIFFERENTIAL - Abnormal; Notable for the following components:       Result Value    WBC 10.8 (*)     RBC 3.84 (*)     Hemoglobin 11.8 (*)     Hematocrit 37.8 (*)     MCHC 31.2 (*)     RDW 16.2 (*)     Segs Relative 72.6 (*)     Lymphocytes % 13.3 (*)     Monocytes % 10.0 (*)     Immature Neutrophil % 2.9 (*)     All other components within normal limits   COMPREHENSIVE METABOLIC PANEL - Abnormal; Notable for the following components:    Chloride 94 (*)     CO2 42 (*)     Glucose 284 (*)     Creatinine 0.7 (*)     Total Protein 6.3 (*)     All other components within normal limits   TROPONIN - Abnormal; Notable for the following components:    Troponin, High Sensitivity 43 (*)     All other components within normal limits   TROPONIN - Abnormal; Notable for the following components:    Troponin, High Sensitivity 41 (*)     All other components within normal limits   BRAIN NATRIURETIC PEPTIDE - Abnormal; Notable for the following components:    Pro-BNP 1,969 (*)     All other components within normal limits   BLOOD GAS, VENOUS - Abnormal; Notable for the following components:    pCO2, Tylor 86 (*)     pO2, Tylor 77 (*)     Base Exc, Mixed 17.3 (*)

## 2023-12-22 LAB
ANION GAP SERPL CALCULATED.3IONS-SCNC: 5 MMOL/L (ref 7–16)
BUN SERPL-MCNC: 10 MG/DL (ref 6–23)
CALCIUM SERPL-MCNC: 7.9 MG/DL (ref 8.3–10.6)
CHLORIDE BLD-SCNC: 92 MMOL/L (ref 99–110)
CO2: 41 MMOL/L (ref 21–32)
CREAT SERPL-MCNC: 0.5 MG/DL (ref 0.9–1.3)
EKG ATRIAL RATE: 96 BPM
EKG DIAGNOSIS: NORMAL
EKG Q-T INTERVAL: 364 MS
EKG QRS DURATION: 88 MS
EKG QTC CALCULATION (BAZETT): 455 MS
EKG R AXIS: -51 DEGREES
EKG T AXIS: 28 DEGREES
EKG VENTRICULAR RATE: 94 BPM
GFR SERPL CREATININE-BSD FRML MDRD: >60 ML/MIN/1.73M2
GLUCOSE BLD-MCNC: 188 MG/DL (ref 70–99)
GLUCOSE BLD-MCNC: 202 MG/DL (ref 70–99)
GLUCOSE BLD-MCNC: 205 MG/DL (ref 70–99)
GLUCOSE BLD-MCNC: 223 MG/DL (ref 70–99)
GLUCOSE SERPL-MCNC: 258 MG/DL (ref 70–99)
MAGNESIUM: 1.5 MG/DL (ref 1.8–2.4)
MAGNESIUM: 2 MG/DL (ref 1.8–2.4)
POTASSIUM SERPL-SCNC: 3.8 MMOL/L (ref 3.5–5.1)
SODIUM BLD-SCNC: 138 MMOL/L (ref 135–145)

## 2023-12-22 PROCEDURE — 83735 ASSAY OF MAGNESIUM: CPT

## 2023-12-22 PROCEDURE — 2700000000 HC OXYGEN THERAPY PER DAY

## 2023-12-22 PROCEDURE — 6360000002 HC RX W HCPCS

## 2023-12-22 PROCEDURE — 93010 ELECTROCARDIOGRAM REPORT: CPT | Performed by: INTERNAL MEDICINE

## 2023-12-22 PROCEDURE — 94761 N-INVAS EAR/PLS OXIMETRY MLT: CPT

## 2023-12-22 PROCEDURE — APPSS30 APP SPLIT SHARED TIME 16-30 MINUTES

## 2023-12-22 PROCEDURE — 6370000000 HC RX 637 (ALT 250 FOR IP)

## 2023-12-22 PROCEDURE — 94640 AIRWAY INHALATION TREATMENT: CPT

## 2023-12-22 PROCEDURE — 6370000000 HC RX 637 (ALT 250 FOR IP): Performed by: STUDENT IN AN ORGANIZED HEALTH CARE EDUCATION/TRAINING PROGRAM

## 2023-12-22 PROCEDURE — 82962 GLUCOSE BLOOD TEST: CPT

## 2023-12-22 PROCEDURE — 6370000000 HC RX 637 (ALT 250 FOR IP): Performed by: NURSE PRACTITIONER

## 2023-12-22 PROCEDURE — 2580000003 HC RX 258: Performed by: NURSE PRACTITIONER

## 2023-12-22 PROCEDURE — 36415 COLL VENOUS BLD VENIPUNCTURE: CPT

## 2023-12-22 PROCEDURE — 6360000002 HC RX W HCPCS: Performed by: STUDENT IN AN ORGANIZED HEALTH CARE EDUCATION/TRAINING PROGRAM

## 2023-12-22 PROCEDURE — 1200000000 HC SEMI PRIVATE

## 2023-12-22 PROCEDURE — 99232 SBSQ HOSP IP/OBS MODERATE 35: CPT | Performed by: INTERNAL MEDICINE

## 2023-12-22 PROCEDURE — 80048 BASIC METABOLIC PNL TOTAL CA: CPT

## 2023-12-22 RX ORDER — INSULIN LISPRO 100 [IU]/ML
6 INJECTION, SOLUTION INTRAVENOUS; SUBCUTANEOUS
Status: DISCONTINUED | OUTPATIENT
Start: 2023-12-22 | End: 2023-12-24 | Stop reason: HOSPADM

## 2023-12-22 RX ORDER — POTASSIUM CHLORIDE 7.45 MG/ML
10 INJECTION INTRAVENOUS PRN
Status: DISCONTINUED | OUTPATIENT
Start: 2023-12-22 | End: 2023-12-24 | Stop reason: HOSPADM

## 2023-12-22 RX ORDER — MAGNESIUM SULFATE IN WATER 40 MG/ML
2000 INJECTION, SOLUTION INTRAVENOUS ONCE
Status: COMPLETED | OUTPATIENT
Start: 2023-12-22 | End: 2023-12-22

## 2023-12-22 RX ORDER — MAGNESIUM SULFATE IN WATER 40 MG/ML
2000 INJECTION, SOLUTION INTRAVENOUS ONCE
Status: DISCONTINUED | OUTPATIENT
Start: 2023-12-22 | End: 2023-12-22

## 2023-12-22 RX ORDER — MAGNESIUM SULFATE IN WATER 40 MG/ML
2000 INJECTION, SOLUTION INTRAVENOUS PRN
Status: DISCONTINUED | OUTPATIENT
Start: 2023-12-22 | End: 2023-12-24 | Stop reason: HOSPADM

## 2023-12-22 RX ORDER — INSULIN GLARGINE 100 [IU]/ML
30 INJECTION, SOLUTION SUBCUTANEOUS NIGHTLY
Status: DISCONTINUED | OUTPATIENT
Start: 2023-12-22 | End: 2023-12-22

## 2023-12-22 RX ORDER — INSULIN GLARGINE 100 [IU]/ML
20 INJECTION, SOLUTION SUBCUTANEOUS NIGHTLY
Status: DISCONTINUED | OUTPATIENT
Start: 2023-12-22 | End: 2023-12-24 | Stop reason: HOSPADM

## 2023-12-22 RX ORDER — POTASSIUM CHLORIDE 20 MEQ/1
40 TABLET, EXTENDED RELEASE ORAL PRN
Status: DISCONTINUED | OUTPATIENT
Start: 2023-12-22 | End: 2023-12-24 | Stop reason: HOSPADM

## 2023-12-22 RX ORDER — POTASSIUM CHLORIDE 20 MEQ/1
40 TABLET, EXTENDED RELEASE ORAL ONCE
Status: COMPLETED | OUTPATIENT
Start: 2023-12-22 | End: 2023-12-22

## 2023-12-22 RX ADMIN — INSULIN LISPRO 2 UNITS: 100 INJECTION, SOLUTION INTRAVENOUS; SUBCUTANEOUS at 08:33

## 2023-12-22 RX ADMIN — POTASSIUM CHLORIDE 40 MEQ: 1500 TABLET, EXTENDED RELEASE ORAL at 10:38

## 2023-12-22 RX ADMIN — APIXABAN 5 MG: 5 TABLET, FILM COATED ORAL at 21:05

## 2023-12-22 RX ADMIN — OLODATEROL RESPIMAT INHALATION SPRAY 2 PUFF: 2.5 SPRAY, METERED RESPIRATORY (INHALATION) at 08:39

## 2023-12-22 RX ADMIN — APIXABAN 5 MG: 5 TABLET, FILM COATED ORAL at 08:27

## 2023-12-22 RX ADMIN — INSULIN LISPRO 2 UNITS: 100 INJECTION, SOLUTION INTRAVENOUS; SUBCUTANEOUS at 18:05

## 2023-12-22 RX ADMIN — MAGNESIUM SULFATE HEPTAHYDRATE 2000 MG: 40 INJECTION, SOLUTION INTRAVENOUS at 10:41

## 2023-12-22 RX ADMIN — INSULIN LISPRO 6 UNITS: 100 INJECTION, SOLUTION INTRAVENOUS; SUBCUTANEOUS at 13:44

## 2023-12-22 RX ADMIN — FUROSEMIDE 40 MG: 10 INJECTION, SOLUTION INTRAMUSCULAR; INTRAVENOUS at 18:05

## 2023-12-22 RX ADMIN — SODIUM CHLORIDE, PRESERVATIVE FREE 10 ML: 5 INJECTION INTRAVENOUS at 21:05

## 2023-12-22 RX ADMIN — ATORVASTATIN CALCIUM 40 MG: 40 TABLET, FILM COATED ORAL at 08:27

## 2023-12-22 RX ADMIN — INSULIN LISPRO 6 UNITS: 100 INJECTION, SOLUTION INTRAVENOUS; SUBCUTANEOUS at 08:34

## 2023-12-22 RX ADMIN — INSULIN GLARGINE 20 UNITS: 100 INJECTION, SOLUTION SUBCUTANEOUS at 21:05

## 2023-12-22 RX ADMIN — ACETAMINOPHEN 650 MG: 325 TABLET ORAL at 08:27

## 2023-12-22 RX ADMIN — IPRATROPIUM BROMIDE AND ALBUTEROL SULFATE 1 DOSE: 2.5; .5 SOLUTION RESPIRATORY (INHALATION) at 12:21

## 2023-12-22 RX ADMIN — IPRATROPIUM BROMIDE AND ALBUTEROL SULFATE 1 DOSE: 2.5; .5 SOLUTION RESPIRATORY (INHALATION) at 15:45

## 2023-12-22 RX ADMIN — FUROSEMIDE 40 MG: 10 INJECTION, SOLUTION INTRAMUSCULAR; INTRAVENOUS at 08:27

## 2023-12-22 RX ADMIN — INSULIN LISPRO 6 UNITS: 100 INJECTION, SOLUTION INTRAVENOUS; SUBCUTANEOUS at 18:04

## 2023-12-22 RX ADMIN — DILTIAZEM HYDROCHLORIDE 240 MG: 240 CAPSULE, COATED, EXTENDED RELEASE ORAL at 08:27

## 2023-12-22 RX ADMIN — IPRATROPIUM BROMIDE AND ALBUTEROL SULFATE 1 DOSE: 2.5; .5 SOLUTION RESPIRATORY (INHALATION) at 08:38

## 2023-12-22 RX ADMIN — SODIUM CHLORIDE, PRESERVATIVE FREE 10 ML: 5 INJECTION INTRAVENOUS at 08:28

## 2023-12-22 RX ADMIN — IPRATROPIUM BROMIDE AND ALBUTEROL SULFATE 1 DOSE: 2.5; .5 SOLUTION RESPIRATORY (INHALATION) at 20:08

## 2023-12-22 ASSESSMENT — PAIN DESCRIPTION - ORIENTATION
ORIENTATION: LEFT;RIGHT
ORIENTATION: RIGHT
ORIENTATION: RIGHT

## 2023-12-22 ASSESSMENT — PAIN SCALES - WONG BAKER
WONGBAKER_NUMERICALRESPONSE: 0
WONGBAKER_NUMERICALRESPONSE: 0

## 2023-12-22 ASSESSMENT — PAIN DESCRIPTION - FREQUENCY: FREQUENCY: INTERMITTENT

## 2023-12-22 ASSESSMENT — PAIN SCALES - GENERAL
PAINLEVEL_OUTOF10: 7
PAINLEVEL_OUTOF10: 5
PAINLEVEL_OUTOF10: 4
PAINLEVEL_OUTOF10: 8

## 2023-12-22 ASSESSMENT — PAIN DESCRIPTION - DESCRIPTORS
DESCRIPTORS: ACHING;DISCOMFORT

## 2023-12-22 ASSESSMENT — PAIN - FUNCTIONAL ASSESSMENT: PAIN_FUNCTIONAL_ASSESSMENT: ACTIVITIES ARE NOT PREVENTED

## 2023-12-22 ASSESSMENT — PAIN DESCRIPTION - LOCATION
LOCATION: GENERALIZED
LOCATION: SHOULDER
LOCATION: LEG;SHOULDER

## 2023-12-22 ASSESSMENT — PAIN DESCRIPTION - PAIN TYPE
TYPE: CHRONIC PAIN
TYPE: CHRONIC PAIN

## 2023-12-22 NOTE — CARE COORDINATION
CM reviewed chart and saw pt at [facility]. Pt is pleasant. Pt lives alone on ground floor apartment. Pt is active with [address]. Pt states his neighbor and brother also help him. Pt does have weakness, poor endurance and RAE with home O2. Mukesh Tobias was evaluated today and a DME order was entered for a wheeled walker with seatbecause he requires this to successfully complete daily living tasks of toileting, personal cares, and ambulating. A wheeled walker is necessary due to the patient's unsteady gait, upper body weakness, and inability to  an ambulation device; and he can ambulate only by pushing a walker instead of a lesser assistive device such as a cane, crutch, or standard walker. The need for this equipment was discussed with the patient and he understands and is in agreement.

## 2023-12-22 NOTE — PROGRESS NOTES
V2.0  [facility] Hospitalist Progress Note      Name:  Jacob Parson /Age/Sex: 1955  (79 y.o. male)   MRN & CSN:  [id number] & [id number] Encounter Date/Time: 2023 12:49 PM EST    Location:  [address] [place] [address] PCP: Amor Grissom MD       Hospital Day: 2    Assessment and Plan:   Jacob Parson is a 79 y.o. male with pmh of  chronic diastolic heart failure, atrial fibrillation, type 2 diabetes, COPD, chronic respiratory failure with 2 to 3 L NC, obesity, hyperlipidemia, LINA who presents who presents with Acute on chronic heart failure, unspecified heart failure type ([address])    Plan:  Acute on chronic respiratory failure  Likely due to acute on chronic diastolic congestive heart failure  -Intermittent shortness of breath since yesterday, needed more oxygen at 6 L NC today  -Patient is not compliant with BiPAP at home  -Elevated BNP at   -Chest x-ray showed bibasilar opacity which may reflect underlying edema or pneumonia, small left-sided pleural effusion  -Cardiology evaluated the patient and agreed to continue with Lasix 40 Mg IV twice a day, can switch to oral torsemide 40 Mg daily in discharge     Compensated respiratory acidosis  COPD  -VBG showed pH 7.35, CO2 86, HCO3 47.5  -Continue DuoNeb treatment 4 times a day, maintenance inhalers  -BiPAP in nap and sleep  -Oxygen panel, wean down oxygen if possible     Type 2 diabetes  -Hemoglobin A1c 10.2 on 2023  -Lantus 20 units nightly plus insulin sliding scale, add insulin 6 units 3 times with meals  -Hold oral medications in hospital     History of atrial fibrillation  -Continue Eliquis   -Continue Cardizem for heart rate control     Chronic leg swelling  -However patient reported worsening swelling in left leg, mildly tender in left calf  -Duplex in bilateral lower extremities ruled out a DVT     Elevated troponin  -43-41  -Chronic and baseline  -No concern of ACS at this moment     Obesity/LINA  -BiPAP in sleep    Diet ADULT DIET;      sodium chloride      dextrose       PRN Meds: potassium chloride, 40 mEq, PRN   Or  potassium alternative oral replacement, 40 mEq, PRN   Or  potassium chloride, 10 mEq, PRN  magnesium sulfate, 2,000 mg, PRN  sodium chloride flush, 5-40 mL, PRN  sodium chloride, , PRN  ondansetron, 4 mg, Q8H PRN   Or  ondansetron, 4 mg, Q6H PRN  polyethylene glycol, 17 g, Daily PRN  acetaminophen, 650 mg, Q6H PRN   Or  acetaminophen, 650 mg, Q6H PRN  sodium chloride flush, 10 mL, PRN  sodium chloride, , PRN  nicotine polacrilex, 2 mg, Q1H PRN  glucose, 4 tablet, PRN  dextrose bolus, 125 mL, PRN   Or  dextrose bolus, 250 mL, PRN  glucagon (rDNA), 1 mg, PRN  dextrose, , Continuous PRN        Labs      Recent Results (from the past 24 hour(s))   POCT Glucose    Collection Time: 12/21/23  5:06 PM   Result Value Ref Range    POC Glucose 211 (H) 70 - 99 MG/DL   POCT Glucose    Collection Time: 12/21/23  8:36 PM   Result Value Ref Range    POC Glucose 293 (H) 70 - 99 MG/DL   Basic Metabolic Panel    Collection Time: 12/22/23  2:38 AM   Result Value Ref Range    Sodium 138 135 - 145 MMOL/L    Potassium 3.8 3.5 - 5.1 MMOL/L    Chloride 92 (L) 99 - 110 mMol/L    CO2 41 (H) 21 - 32 MMOL/L    Anion Gap 5 (L) 7 - 16    Glucose 258 (H) 70 - 99 MG/DL    BUN 10 6 - 23 MG/DL    Creatinine 0.5 (L) 0.9 - 1.3 MG/DL    Est, Glom Filt Rate >60 >60 mL/min/1.73m2    Calcium 7.9 (L) 8.3 - 10.6 MG/DL   Magnesium    Collection Time: 12/22/23  2:38 AM   Result Value Ref Range    Magnesium 1.5 (L) 1.8 - 2.4 mg/dl   POCT Glucose    Collection Time: 12/22/23  8:06 AM   Result Value Ref Range    POC Glucose 223 (H) 70 - 99 MG/DL   POCT Glucose    Collection Time: 12/22/23 11:49 AM   Result Value Ref Range    POC Glucose 188 (H) 70 - 99 MG/DL        Imaging/Diagnostics Last 24 Hours   Vascular duplex lower extremity venous bilateral    Result Date: 12/21/2023  EXAMINATION: DUPLEX VENOUS ULTRASOUND OF THE BILATERAL LOWER [id number]/21/2023 12:53 pm

## 2023-12-22 NOTE — PROGRESS NOTES
Met with patient and introduced myself as the Heart failure education R.N. Patient is established with the Heart failure clinic at the [facility] and has been seen (9/2023) for inpatient education. Patient reports being legally blind. He has home health nurse visit weekly to have medications set up. He has assistance from a neighbor and brother for transportation. His medications and groceries are delivered. Reports receiving meals from friends, and \"beggars can't be choosers. \"  Admitting diagnosis- acute on chronic heart failure  Cardiologist- Michael  Heart Failure Education Nurse consulted- yes   Ejection fraction 50-55 % as of 9/17/23  Pro BNP-1,969  Hospital follow up appt-  1/3 at the [address] [place] [address]   Patient informed of appointment and appointment added to AVS  Cardiac rehabilitation referral-N/A   ICD information-N/A   Patient informed of Heart Failure Clinic at the [address] [place] [address]  Smoking Cessation information and referral-N/A   Pneumonia vaccine- overdue  PCP - Ryley  Patient has a digital scale? Yes -   Transportation- friend, brother or [employer]      Able to obtain medications without difficulty? - Yes- Patient denies difficulty in obtaining or taking medications. Advised not to stop taking a medication without notifying provider. Reviewed Heart failure patient education book with patient. Heart failure education included symptoms, medications, diet, daily weights, exercise and fluid control. Patient dismissive of education. States, Trisha Ahumada had the class. \" Denies desire for further information.

## 2023-12-22 NOTE — PROGRESS NOTES
[facility]  [address] [place] [address], [address] [facility]  Phone: [phone]    Fax [phone]                  Tana Pablo MD, Lissett Monterroso MD, Bree Gold MD, MD April Long MD Glenda Savoy, MD Hosea Cable, MD Dr. Almond Buddy MD Galvin Dillon, BRENNAN Sanabria, BRENNAN Herbert, BRENNAN Dickinson, BRENNAN Shanks PA-C    CARDIOLOGY  NOTE      Name:  Mindy Kenny DOB/Age/Sex: 1955  (79 y.o. male)   MRN & CSN:  [id number] & [id number] Admission Date/Time: 2023  3:36 AM   Location:  [address] [place] [address]-A PCP: Toma Hernandez [address] [place] [address] Day: 2    - Cardiology consult is for: CHF      ASSESSMENT/ PLAN:  Acute on chronic heart failure with preserved ejection fraction  Chronic venous insufficiency  -Patient with 3+ pitting edema and shortness of breath  --Strict I's and O's and daily weights.  -Still appears volume overloaded, but improving. -CHF nurse coordinator  -Trend BNP and CXR  -Continue IV Lasix 40 mg twice daily  Persistent atrial fibrillation s/p ablation   -Currently atrial fibrillation with rate controlled. -Goal potassium 4.0-4.5, magnesium 2.0-2.2. Replete per protocol. Replace K and Mg today  -Patient was ordered to have event monitor following his last hospitalization however was not completed  -Continue Cardizem 240 mg daily.  -Continue Eliquis 5 mg twice daily  Elevated troponin  -Not ACS.   Likely demand ischemia  -Most recent echo in September showing preserved ejection fraction without any significant wall motion abnormalities.  -Stress test 2023 showing diaphragmatic artifact but no signs of ischemia  -No further ischemic workup planned at this time  -Continue Lipitor 40 mg daily  Morbid obesity: BMI 41  Peripheral arterial disease s/p right redo femoral popliteal bypass  -No claudication at this time  COPD  Suspected sodium chloride      dextrose       potassium chloride **OR** potassium alternative oral replacement **OR** potassium chloride, magnesium sulfate, sodium chloride flush, sodium chloride, ondansetron **OR** ondansetron, polyethylene glycol, acetaminophen **OR** acetaminophen, sodium chloride flush, sodium chloride, nicotine polacrilex, glucose, dextrose bolus **OR** dextrose bolus, glucagon (rDNA), dextrose    Lab Data:  CBC:   Recent Labs     12/21/23 0459   WBC 10.8*   HGB 11.8*   HCT 37.8*   MCV 98.4        BMP:   Recent Labs     12/21/23 0459 12/22/23  0238    138   K 3.8 3.8   CL 94* 92*   CO2 42* 41*   BUN 12 10   CREATININE 0.7* 0.5*     LIVER PROFILE:   Recent Labs     12/21/23 0459   AST 15   ALT 23   BILITOT 0.2   ALKPHOS 74     PT/INR: No results for input(s): \"PROTIME\", \"INR\" in the last 72 hours.  APTT: No results for input(s): \"APTT\" in the last 72 hours.  BNP:  No results for input(s): \"BNP\" in the last 72 hours.  TROPONIN: No results for input(s): \"TROPONINT\" in the last 72 hours.  Labs, consult, tests reviewed    Echo 9/20/2023   Technically difficult exam due to body habitus; definity was utilized.   Left ventricular systolic function is low normal.   Ejection fraction is visually estimated at 50-55%.   Mitral annular calcification is present.   No evidence of any pericardial effusion.   Pericardial fat pad noted.       Stress test 9/21/2023   Normal tracer uptake in all segments of myocardium on stress ans rest   images. Stress perfusion images better than resting - Decreased uptake   inferiorly due to diaphragmatic artifact.   No infarct or ischemia noted.   Gated study not reliable with PAF noted during stress test      Recommendation   PAF noted with Lexiscan , will get 12 leads EKG then consider EP evaluation   No inducible ischemia   Risk Factor Modification       Justin Aden PA-C, 12/22/2023 3:37 PM    CARDIOLOGY ATTENDING ADDENDUM    MEDICAL DECISION MAKING:    Acute

## 2023-12-22 NOTE — PLAN OF CARE
Problem: Discharge Planning  Goal: Discharge to home or other facility with appropriate resources  12/22/2023 0936 by Seymour Mazariegos RN  Outcome: Progressing  12/22/2023 0031 by Shannan Lamas RN  Outcome: Progressing     Problem: Safety - Adult  Goal: Free from fall injury  12/22/2023 0936 by Seymour Mazariegos RN  Outcome: Progressing  12/22/2023 0031 by Shannan Lamas RN  Outcome: Progressing     Problem: Chronic Conditions and Co-morbidities  Goal: Patient's chronic conditions and co-morbidity symptoms are monitored and maintained or improved  12/22/2023 0936 by Seymour Mazariegos RN  Outcome: Progressing  12/22/2023 0031 by Shannan Lamas RN  Outcome: Progressing     Problem: Pain  Goal: Verbalizes/displays adequate comfort level or baseline comfort level  Outcome: Progressing

## 2023-12-23 ENCOUNTER — APPOINTMENT (OUTPATIENT)
Dept: GENERAL RADIOLOGY | Age: 68
DRG: 291 | End: 2023-12-23
Payer: MEDICARE

## 2023-12-23 LAB
ANION GAP SERPL CALCULATED.3IONS-SCNC: 7 MMOL/L (ref 7–16)
BUN SERPL-MCNC: 15 MG/DL (ref 6–23)
CALCIUM SERPL-MCNC: 8.2 MG/DL (ref 8.3–10.6)
CHLORIDE BLD-SCNC: 93 MMOL/L (ref 99–110)
CO2: 39 MMOL/L (ref 21–32)
CREAT SERPL-MCNC: 0.6 MG/DL (ref 0.9–1.3)
GFR SERPL CREATININE-BSD FRML MDRD: >60 ML/MIN/1.73M2
GLUCOSE BLD-MCNC: 173 MG/DL (ref 70–99)
GLUCOSE BLD-MCNC: 185 MG/DL (ref 70–99)
GLUCOSE BLD-MCNC: 193 MG/DL (ref 70–99)
GLUCOSE BLD-MCNC: 247 MG/DL (ref 70–99)
GLUCOSE SERPL-MCNC: 213 MG/DL (ref 70–99)
MAGNESIUM: 1.8 MG/DL (ref 1.8–2.4)
POTASSIUM SERPL-SCNC: 3.9 MMOL/L (ref 3.5–5.1)
SODIUM BLD-SCNC: 139 MMOL/L (ref 135–145)

## 2023-12-23 PROCEDURE — 2580000003 HC RX 258: Performed by: NURSE PRACTITIONER

## 2023-12-23 PROCEDURE — 80048 BASIC METABOLIC PNL TOTAL CA: CPT

## 2023-12-23 PROCEDURE — 82962 GLUCOSE BLOOD TEST: CPT

## 2023-12-23 PROCEDURE — 2700000000 HC OXYGEN THERAPY PER DAY

## 2023-12-23 PROCEDURE — 94150 VITAL CAPACITY TEST: CPT

## 2023-12-23 PROCEDURE — 94761 N-INVAS EAR/PLS OXIMETRY MLT: CPT

## 2023-12-23 PROCEDURE — 6370000000 HC RX 637 (ALT 250 FOR IP): Performed by: NURSE PRACTITIONER

## 2023-12-23 PROCEDURE — 71045 X-RAY EXAM CHEST 1 VIEW: CPT

## 2023-12-23 PROCEDURE — 1200000000 HC SEMI PRIVATE

## 2023-12-23 PROCEDURE — 6360000002 HC RX W HCPCS: Performed by: STUDENT IN AN ORGANIZED HEALTH CARE EDUCATION/TRAINING PROGRAM

## 2023-12-23 PROCEDURE — 6360000002 HC RX W HCPCS: Performed by: NURSE PRACTITIONER

## 2023-12-23 PROCEDURE — 83735 ASSAY OF MAGNESIUM: CPT

## 2023-12-23 PROCEDURE — 36415 COLL VENOUS BLD VENIPUNCTURE: CPT

## 2023-12-23 PROCEDURE — 99233 SBSQ HOSP IP/OBS HIGH 50: CPT | Performed by: INTERNAL MEDICINE

## 2023-12-23 PROCEDURE — 94640 AIRWAY INHALATION TREATMENT: CPT

## 2023-12-23 PROCEDURE — 6370000000 HC RX 637 (ALT 250 FOR IP): Performed by: STUDENT IN AN ORGANIZED HEALTH CARE EDUCATION/TRAINING PROGRAM

## 2023-12-23 RX ORDER — MORPHINE SULFATE 2 MG/ML
2 INJECTION, SOLUTION INTRAMUSCULAR; INTRAVENOUS ONCE
Status: COMPLETED | OUTPATIENT
Start: 2023-12-23 | End: 2023-12-23

## 2023-12-23 RX ORDER — LEVOFLOXACIN 750 MG/1
750 TABLET, FILM COATED ORAL DAILY
Status: DISCONTINUED | OUTPATIENT
Start: 2023-12-23 | End: 2023-12-24 | Stop reason: HOSPADM

## 2023-12-23 RX ORDER — DOXYCYCLINE HYCLATE 100 MG
100 TABLET ORAL EVERY 12 HOURS SCHEDULED
Status: DISCONTINUED | OUTPATIENT
Start: 2023-12-23 | End: 2023-12-23

## 2023-12-23 RX ADMIN — FUROSEMIDE 40 MG: 10 INJECTION, SOLUTION INTRAMUSCULAR; INTRAVENOUS at 18:16

## 2023-12-23 RX ADMIN — APIXABAN 5 MG: 5 TABLET, FILM COATED ORAL at 20:09

## 2023-12-23 RX ADMIN — SODIUM CHLORIDE, PRESERVATIVE FREE 10 ML: 5 INJECTION INTRAVENOUS at 20:09

## 2023-12-23 RX ADMIN — FUROSEMIDE 40 MG: 10 INJECTION, SOLUTION INTRAMUSCULAR; INTRAVENOUS at 09:22

## 2023-12-23 RX ADMIN — APIXABAN 5 MG: 5 TABLET, FILM COATED ORAL at 09:09

## 2023-12-23 RX ADMIN — DILTIAZEM HYDROCHLORIDE 240 MG: 240 CAPSULE, COATED, EXTENDED RELEASE ORAL at 09:10

## 2023-12-23 RX ADMIN — INSULIN LISPRO 6 UNITS: 100 INJECTION, SOLUTION INTRAVENOUS; SUBCUTANEOUS at 12:57

## 2023-12-23 RX ADMIN — LEVOFLOXACIN 750 MG: 750 TABLET, FILM COATED ORAL at 16:22

## 2023-12-23 RX ADMIN — ATORVASTATIN CALCIUM 40 MG: 40 TABLET, FILM COATED ORAL at 09:10

## 2023-12-23 RX ADMIN — ACETAMINOPHEN 650 MG: 325 TABLET ORAL at 09:09

## 2023-12-23 RX ADMIN — IPRATROPIUM BROMIDE AND ALBUTEROL SULFATE 1 DOSE: 2.5; .5 SOLUTION RESPIRATORY (INHALATION) at 11:22

## 2023-12-23 RX ADMIN — INSULIN GLARGINE 20 UNITS: 100 INJECTION, SOLUTION SUBCUTANEOUS at 20:08

## 2023-12-23 RX ADMIN — DOXYCYCLINE HYCLATE 100 MG: 100 TABLET, COATED ORAL at 12:57

## 2023-12-23 RX ADMIN — INSULIN LISPRO 6 UNITS: 100 INJECTION, SOLUTION INTRAVENOUS; SUBCUTANEOUS at 09:10

## 2023-12-23 RX ADMIN — OLODATEROL RESPIMAT INHALATION SPRAY 2 PUFF: 2.5 SPRAY, METERED RESPIRATORY (INHALATION) at 07:55

## 2023-12-23 RX ADMIN — IPRATROPIUM BROMIDE AND ALBUTEROL SULFATE 1 DOSE: 2.5; .5 SOLUTION RESPIRATORY (INHALATION) at 19:22

## 2023-12-23 RX ADMIN — INSULIN LISPRO 2 UNITS: 100 INJECTION, SOLUTION INTRAVENOUS; SUBCUTANEOUS at 17:50

## 2023-12-23 RX ADMIN — ACETAMINOPHEN 650 MG: 325 TABLET ORAL at 02:37

## 2023-12-23 RX ADMIN — INSULIN LISPRO 6 UNITS: 100 INJECTION, SOLUTION INTRAVENOUS; SUBCUTANEOUS at 17:51

## 2023-12-23 RX ADMIN — IPRATROPIUM BROMIDE AND ALBUTEROL SULFATE 1 DOSE: 2.5; .5 SOLUTION RESPIRATORY (INHALATION) at 15:36

## 2023-12-23 RX ADMIN — IPRATROPIUM BROMIDE AND ALBUTEROL SULFATE 1 DOSE: 2.5; .5 SOLUTION RESPIRATORY (INHALATION) at 07:52

## 2023-12-23 RX ADMIN — MORPHINE SULFATE 2 MG: 2 INJECTION, SOLUTION INTRAMUSCULAR; INTRAVENOUS at 23:17

## 2023-12-23 ASSESSMENT — PAIN DESCRIPTION - LOCATION
LOCATION: LEG
LOCATION: SHOULDER
LOCATION: SHOULDER

## 2023-12-23 ASSESSMENT — PAIN SCALES - GENERAL
PAINLEVEL_OUTOF10: 6
PAINLEVEL_OUTOF10: 8
PAINLEVEL_OUTOF10: 8

## 2023-12-23 ASSESSMENT — PAIN DESCRIPTION - DESCRIPTORS
DESCRIPTORS: ACHING

## 2023-12-23 ASSESSMENT — PAIN DESCRIPTION - ORIENTATION
ORIENTATION: RIGHT;LEFT
ORIENTATION: RIGHT
ORIENTATION: RIGHT;LEFT

## 2023-12-23 ASSESSMENT — PAIN SCALES - WONG BAKER
WONGBAKER_NUMERICALRESPONSE: 0
WONGBAKER_NUMERICALRESPONSE: 0
WONGBAKER_NUMERICALRESPONSE: 2

## 2023-12-23 ASSESSMENT — PAIN - FUNCTIONAL ASSESSMENT: PAIN_FUNCTIONAL_ASSESSMENT: ACTIVITIES ARE NOT PREVENTED

## 2023-12-23 NOTE — PROGRESS NOTES
Cardiology Progress Note     Admit Date:  12/21/2023    Consult reason/ Seen today for :       Subjective and  Overnight Events : He says that he would like to go home breathing is better      Chief complain on admission : 79 y. o.year old who is admitted for  Chief Complaint   Patient presents with    Shortness of Breath      Assessment / Plan:  Heart failure with preserved EF still has some edema continue IV Lasix twice daily watch for toxicity  Elevated Troponin : will continue medical management for now. CHF: Acute Systolic/ Diastolic decompensated heart failure. Continue IV Lasix 40 mg BID. Daily weights , strict Is and Os  Paroxysmal afib: Persistent atrial fibrillation rate controlled keep electrolytes within normal range continue Cardizem to 40 mg and Eliquis 5 mg twice daily  HTN: stable, continue To titrate up medication as needed  Peripheral arterial disease s/p right femoropopliteal bypass stable  DVT Prophylaxis if no contraindication    Past medical history:    has a past medical history of A-fib ([address]), Anxiety, Arthritis, COPD (chronic obstructive pulmonary disease) ([address]), Depression, Diabetes mellitus ([address]), Full dentures, H/O angiography, H/O Doppler ultrasound, H/O echocardiogram, Hx of colonoscopy, Hx of Doppler ultrasound, Hyperlipidemia, Hypertension, Macular degeneration, Obesity, On home oxygen therapy, PAD (peripheral artery disease) ([address]), Panic attacks, Pneumonia, PTSD (post-traumatic stress disorder), Restless leg, Shortness of breath, Sleep apnea, and Wears glasses. Past surgical history:   has a past surgical history that includes Atrial ablation surgery (05/23/2018); pr laparoscopy surg cholecystectomy (N/A, 11/12/2018); Colonoscopy (07/2011); eye surgery (Left, 2017); Dental surgery; Cardiac surgery (05/2018); Cholecystectomy, laparoscopic (11/12/2018);  Appendectomy (2003); hernia repair (2003); femoral

## 2023-12-24 ENCOUNTER — HOSPITAL ENCOUNTER (INPATIENT)
Age: 68
LOS: 9 days | Discharge: HOME | DRG: 291 | End: 2024-01-02
Attending: EMERGENCY MEDICINE | Admitting: INTERNAL MEDICINE
Payer: MEDICARE

## 2023-12-24 ENCOUNTER — APPOINTMENT (OUTPATIENT)
Dept: GENERAL RADIOLOGY | Age: 68
DRG: 291 | End: 2023-12-24
Payer: MEDICARE

## 2023-12-24 VITALS
BODY MASS INDEX: 36.45 KG/M2 | DIASTOLIC BLOOD PRESSURE: 76 MMHG | HEIGHT: 78 IN | WEIGHT: 315 LBS | OXYGEN SATURATION: 92 % | RESPIRATION RATE: 18 BRPM | TEMPERATURE: 98 F | HEART RATE: 66 BPM | SYSTOLIC BLOOD PRESSURE: 116 MMHG

## 2023-12-24 DIAGNOSIS — I50.9 CONGESTIVE HEART FAILURE, UNSPECIFIED HF CHRONICITY, UNSPECIFIED HEART FAILURE TYPE (HCC): ICD-10-CM

## 2023-12-24 DIAGNOSIS — J96.21 ACUTE ON CHRONIC RESPIRATORY FAILURE WITH HYPOXIA (HCC): Primary | ICD-10-CM

## 2023-12-24 DIAGNOSIS — E78.00 HYPERCHOLESTEREMIA: ICD-10-CM

## 2023-12-24 LAB
ALBUMIN SERPL-MCNC: 3.3 GM/DL (ref 3.4–5)
ALP BLD-CCNC: 74 IU/L (ref 40–129)
ALT SERPL-CCNC: 19 U/L (ref 10–40)
ANION GAP SERPL CALCULATED.3IONS-SCNC: 7 MMOL/L (ref 7–16)
ANION GAP SERPL CALCULATED.3IONS-SCNC: 9 MMOL/L (ref 7–16)
AST SERPL-CCNC: 18 IU/L (ref 15–37)
BASOPHILS ABSOLUTE: 0 K/CU MM
BASOPHILS RELATIVE PERCENT: 0.3 % (ref 0–1)
BILIRUB SERPL-MCNC: 0.3 MG/DL (ref 0–1)
BUN SERPL-MCNC: 12 MG/DL (ref 6–23)
BUN SERPL-MCNC: 9 MG/DL (ref 6–23)
CALCIUM SERPL-MCNC: 8.7 MG/DL (ref 8.3–10.6)
CALCIUM SERPL-MCNC: 9 MG/DL (ref 8.3–10.6)
CHLORIDE BLD-SCNC: 91 MMOL/L (ref 99–110)
CHLORIDE BLD-SCNC: 93 MMOL/L (ref 99–110)
CO2: 36 MMOL/L (ref 21–32)
CO2: 39 MMOL/L (ref 21–32)
CREAT SERPL-MCNC: 0.6 MG/DL (ref 0.9–1.3)
CREAT SERPL-MCNC: 0.6 MG/DL (ref 0.9–1.3)
DIFFERENTIAL TYPE: ABNORMAL
EOSINOPHILS ABSOLUTE: 0.1 K/CU MM
EOSINOPHILS RELATIVE PERCENT: 0.4 % (ref 0–3)
GFR SERPL CREATININE-BSD FRML MDRD: >60 ML/MIN/1.73M2
GFR SERPL CREATININE-BSD FRML MDRD: >60 ML/MIN/1.73M2
GLUCOSE BLD-MCNC: 178 MG/DL (ref 70–99)
GLUCOSE BLD-MCNC: 231 MG/DL (ref 70–99)
GLUCOSE SERPL-MCNC: 217 MG/DL (ref 70–99)
GLUCOSE SERPL-MCNC: 325 MG/DL (ref 70–99)
HCT VFR BLD CALC: 38.7 % (ref 42–52)
HEMOGLOBIN: 12.3 GM/DL (ref 13.5–18)
IMMATURE NEUTROPHIL %: 0.9 % (ref 0–0.43)
LYMPHOCYTES ABSOLUTE: 1.2 K/CU MM
LYMPHOCYTES RELATIVE PERCENT: 9.1 % (ref 24–44)
MAGNESIUM: 1.7 MG/DL (ref 1.8–2.4)
MCH RBC QN AUTO: 30.8 PG (ref 27–31)
MCHC RBC AUTO-ENTMCNC: 31.8 % (ref 32–36)
MCV RBC AUTO: 96.8 FL (ref 78–100)
MONOCYTES ABSOLUTE: 0.9 K/CU MM
MONOCYTES RELATIVE PERCENT: 7.1 % (ref 0–4)
NUCLEATED RBC %: 0 %
PDW BLD-RTO: 16.3 % (ref 11.7–14.9)
PLATELET # BLD: 164 K/CU MM (ref 140–440)
PMV BLD AUTO: 10.1 FL (ref 7.5–11.1)
POTASSIUM SERPL-SCNC: 4.2 MMOL/L (ref 3.5–5.1)
POTASSIUM SERPL-SCNC: 4.6 MMOL/L (ref 3.5–5.1)
PRO-BNP: 1145 PG/ML
RBC # BLD: 4 M/CU MM (ref 4.6–6.2)
SEGMENTED NEUTROPHILS ABSOLUTE COUNT: 10.5 K/CU MM
SEGMENTED NEUTROPHILS RELATIVE PERCENT: 82.2 % (ref 36–66)
SODIUM BLD-SCNC: 136 MMOL/L (ref 135–145)
SODIUM BLD-SCNC: 139 MMOL/L (ref 135–145)
TOTAL IMMATURE NEUTOROPHIL: 0.11 K/CU MM
TOTAL NUCLEATED RBC: 0 K/CU MM
TOTAL PROTEIN: 6.4 GM/DL (ref 6.4–8.2)
TROPONIN, HIGH SENSITIVITY: 38 NG/L (ref 0–22)
TROPONIN, HIGH SENSITIVITY: 40 NG/L (ref 0–22)
WBC # BLD: 12.8 K/CU MM (ref 4–10.5)

## 2023-12-24 PROCEDURE — 85025 COMPLETE CBC W/AUTO DIFF WBC: CPT

## 2023-12-24 PROCEDURE — 82962 GLUCOSE BLOOD TEST: CPT

## 2023-12-24 PROCEDURE — 6360000002 HC RX W HCPCS: Performed by: INTERNAL MEDICINE

## 2023-12-24 PROCEDURE — 94761 N-INVAS EAR/PLS OXIMETRY MLT: CPT

## 2023-12-24 PROCEDURE — 36415 COLL VENOUS BLD VENIPUNCTURE: CPT

## 2023-12-24 PROCEDURE — 71045 X-RAY EXAM CHEST 1 VIEW: CPT

## 2023-12-24 PROCEDURE — 6360000002 HC RX W HCPCS: Performed by: STUDENT IN AN ORGANIZED HEALTH CARE EDUCATION/TRAINING PROGRAM

## 2023-12-24 PROCEDURE — 83735 ASSAY OF MAGNESIUM: CPT

## 2023-12-24 PROCEDURE — 99285 EMERGENCY DEPT VISIT HI MDM: CPT

## 2023-12-24 PROCEDURE — 83880 ASSAY OF NATRIURETIC PEPTIDE: CPT

## 2023-12-24 PROCEDURE — 1200000000 HC SEMI PRIVATE

## 2023-12-24 PROCEDURE — 94640 AIRWAY INHALATION TREATMENT: CPT

## 2023-12-24 PROCEDURE — 2700000000 HC OXYGEN THERAPY PER DAY

## 2023-12-24 PROCEDURE — 2580000003 HC RX 258: Performed by: STUDENT IN AN ORGANIZED HEALTH CARE EDUCATION/TRAINING PROGRAM

## 2023-12-24 PROCEDURE — 80048 BASIC METABOLIC PNL TOTAL CA: CPT

## 2023-12-24 PROCEDURE — 84484 ASSAY OF TROPONIN QUANT: CPT

## 2023-12-24 PROCEDURE — 93005 ELECTROCARDIOGRAM TRACING: CPT | Performed by: PHYSICIAN ASSISTANT

## 2023-12-24 PROCEDURE — 6370000000 HC RX 637 (ALT 250 FOR IP): Performed by: NURSE PRACTITIONER

## 2023-12-24 PROCEDURE — 80053 COMPREHEN METABOLIC PANEL: CPT

## 2023-12-24 PROCEDURE — 6360000002 HC RX W HCPCS: Performed by: NURSE PRACTITIONER

## 2023-12-24 PROCEDURE — APPNB30 APP NON BILLABLE TIME 0-30 MINS: Performed by: NURSE PRACTITIONER

## 2023-12-24 PROCEDURE — 2580000003 HC RX 258: Performed by: NURSE PRACTITIONER

## 2023-12-24 RX ORDER — MORPHINE SULFATE 2 MG/ML
2 INJECTION, SOLUTION INTRAMUSCULAR; INTRAVENOUS ONCE
Status: COMPLETED | OUTPATIENT
Start: 2023-12-24 | End: 2023-12-24

## 2023-12-24 RX ORDER — FUROSEMIDE 10 MG/ML
40 INJECTION INTRAMUSCULAR; INTRAVENOUS 2 TIMES DAILY
Status: DISCONTINUED | OUTPATIENT
Start: 2023-12-24 | End: 2023-12-26

## 2023-12-24 RX ORDER — LEVOFLOXACIN 750 MG/1
750 TABLET, FILM COATED ORAL DAILY
Qty: 10 TABLET | Refills: 0 | Status: ON HOLD | OUTPATIENT
Start: 2023-12-24 | End: 2024-01-01 | Stop reason: HOSPADM

## 2023-12-24 RX ADMIN — LEVOFLOXACIN 750 MG: 750 TABLET, FILM COATED ORAL at 09:59

## 2023-12-24 RX ADMIN — SODIUM CHLORIDE, PRESERVATIVE FREE 10 ML: 5 INJECTION INTRAVENOUS at 09:59

## 2023-12-24 RX ADMIN — INSULIN LISPRO 6 UNITS: 100 INJECTION, SOLUTION INTRAVENOUS; SUBCUTANEOUS at 10:01

## 2023-12-24 RX ADMIN — INSULIN LISPRO 2 UNITS: 100 INJECTION, SOLUTION INTRAVENOUS; SUBCUTANEOUS at 10:00

## 2023-12-24 RX ADMIN — DILTIAZEM HYDROCHLORIDE 240 MG: 240 CAPSULE, COATED, EXTENDED RELEASE ORAL at 09:52

## 2023-12-24 RX ADMIN — IPRATROPIUM BROMIDE AND ALBUTEROL SULFATE 1 DOSE: 2.5; .5 SOLUTION RESPIRATORY (INHALATION) at 11:10

## 2023-12-24 RX ADMIN — SODIUM CHLORIDE, PRESERVATIVE FREE 10 ML: 5 INJECTION INTRAVENOUS at 10:00

## 2023-12-24 RX ADMIN — OLODATEROL RESPIMAT INHALATION SPRAY 2 PUFF: 2.5 SPRAY, METERED RESPIRATORY (INHALATION) at 07:45

## 2023-12-24 RX ADMIN — MORPHINE SULFATE 2 MG: 2 INJECTION, SOLUTION INTRAMUSCULAR; INTRAVENOUS at 06:43

## 2023-12-24 RX ADMIN — FUROSEMIDE 40 MG: 10 INJECTION, SOLUTION INTRAMUSCULAR; INTRAVENOUS at 10:11

## 2023-12-24 RX ADMIN — INSULIN LISPRO 6 UNITS: 100 INJECTION, SOLUTION INTRAVENOUS; SUBCUTANEOUS at 12:46

## 2023-12-24 RX ADMIN — FUROSEMIDE 40 MG: 10 INJECTION, SOLUTION INTRAMUSCULAR; INTRAVENOUS at 22:06

## 2023-12-24 RX ADMIN — APIXABAN 5 MG: 5 TABLET, FILM COATED ORAL at 09:53

## 2023-12-24 RX ADMIN — ATORVASTATIN CALCIUM 40 MG: 40 TABLET, FILM COATED ORAL at 09:52

## 2023-12-24 RX ADMIN — IPRATROPIUM BROMIDE AND ALBUTEROL SULFATE 1 DOSE: 2.5; .5 SOLUTION RESPIRATORY (INHALATION) at 07:46

## 2023-12-24 ASSESSMENT — PAIN DESCRIPTION - LOCATION: LOCATION: SHOULDER

## 2023-12-24 ASSESSMENT — PAIN - FUNCTIONAL ASSESSMENT: PAIN_FUNCTIONAL_ASSESSMENT: 0-10

## 2023-12-24 ASSESSMENT — PAIN SCALES - GENERAL
PAINLEVEL_OUTOF10: 6
PAINLEVEL_OUTOF10: 8

## 2023-12-24 ASSESSMENT — PAIN DESCRIPTION - ORIENTATION: ORIENTATION: RIGHT;LEFT

## 2023-12-24 ASSESSMENT — PAIN DESCRIPTION - DESCRIPTORS: DESCRIPTORS: ACHING

## 2023-12-24 NOTE — PLAN OF CARE
Problem: Discharge Planning  Goal: Discharge to home or other facility with appropriate resources  12/24/2023 1140 by Mirela Tolbert LPN  Outcome: Progressing  12/23/2023 2205 by Annabella Benson RN  Outcome: Progressing     Problem: Safety - Adult  Goal: Free from fall injury  12/24/2023 1140 by Mirela Tolbert LPN  Outcome: Progressing  12/23/2023 2205 by Annabella Benson RN  Outcome: Progressing     Problem: Chronic Conditions and Co-morbidities  Goal: Patient's chronic conditions and co-morbidity symptoms are monitored and maintained or improved  12/24/2023 1140 by Mirela Tolbert LPN  Outcome: Progressing  12/23/2023 2205 by Annabella Benson RN  Outcome: Progressing     Problem: Pain  Goal: Verbalizes/displays adequate comfort level or baseline comfort level  12/24/2023 1140 by Mirela Tolbert LPN  Outcome: Progressing  12/23/2023 2205 by Annabella Benson RN  Outcome: Progressing

## 2023-12-24 NOTE — DISCHARGE INSTRUCTIONS
Please complete Levaquin as instruction, discontinue Lasix, take torsemide 40 Mg twice a day, follow-up with cardiology and your PCP.

## 2023-12-24 NOTE — PROGRESS NOTES
Cardiology Progress Note     Today's Plan sign off    Admit Date:  12/21/2023    Consult reason / Seen today for: CHF    Subjective and Overnight Events: Resting comfortably. Able to lie flat without shortness of breath. He is wanting to go home. Telemetry: Not applicable    Assessment / Plan:     Acute on chronic HFpEF: Clinically improving however continues with edema to bilateral lower extremities. O2 demands trending down. Now on 3 L of nasal cannula. Continue IV Lasix 1 more day  Persistent atrial fibrillation: goal is for rate control: Continue with Eliquis and Cardizem  Elevated troponin : not ACS trend : denies chest pain: EKG non acute   PVD- s/p right femoropopliteal bypass stable : he denies claudication         History of Presenting Illness:    Chief complain on admission : 79 y. o.year old who is admitted for  Chief Complaint   Patient presents with    Shortness of Breath        Past medical history:    has a past medical history of A-fib ([address]), Anxiety, Arthritis, COPD (chronic obstructive pulmonary disease) ([address]), Depression, Diabetes mellitus ([address]), Full dentures, H/O angiography, H/O Doppler ultrasound, H/O echocardiogram, Hx of colonoscopy, Hx of Doppler ultrasound, Hyperlipidemia, Hypertension, Macular degeneration, Obesity, On home oxygen therapy, PAD (peripheral artery disease) ([address]), Panic attacks, Pneumonia, PTSD (post-traumatic stress disorder), Restless leg, Shortness of breath, Sleep apnea, and Wears glasses. Past surgical history:   has a past surgical history that includes Atrial ablation surgery (05/23/2018); pr laparoscopy surg cholecystectomy (N/A, 11/12/2018); Colonoscopy (07/2011); eye surgery (Left, 2017); Dental surgery; Cardiac surgery (05/2018); Cholecystectomy, laparoscopic (11/12/2018); Appendectomy (2003); hernia repair (2003); femoral bypass (Left, 01/2011);  Tonsillectomy (1960's);

## 2023-12-24 NOTE — PLAN OF CARE
Problem: Discharge Planning  Goal: Discharge to home or other facility with appropriate resources  12/23/2023 2205 by Dawit Robertson RN  Outcome: Progressing  12/23/2023 0901 by Qiana Griggs LPN  Outcome: Progressing     Problem: Safety - Adult  Goal: Free from fall injury  12/23/2023 2205 by Dawit Robertson RN  Outcome: Progressing  12/23/2023 0901 by Qiana Griggs LPN  Outcome: Progressing     Problem: Chronic Conditions and Co-morbidities  Goal: Patient's chronic conditions and co-morbidity symptoms are monitored and maintained or improved  12/23/2023 2205 by Dawit Robertson RN  Outcome: Progressing  12/23/2023 0901 by Qiana Griggs LPN  Outcome: Progressing     Problem: Pain  Goal: Verbalizes/displays adequate comfort level or baseline comfort level  12/23/2023 2205 by Dawit Robertson RN  Outcome: Progressing  12/23/2023 0901 by Qiana Griggs LPN  Outcome: Progressing

## 2023-12-24 NOTE — DISCHARGE SUMMARY
V2.0  Discharge Summary    Name:  Armando Mars /Age/Sex: 1955 (68 y.o. male)   Admit Date: 2023  Discharge Date: 23    MRN & CSN:  [id number] & [id number] Encounter Date and Time 23 10:01 AM EST    Attending:  Bran Bynum MD Discharging Provider: BRENNAN Grant - CNP       Hospital Course:     Brief HPI: Armando Mars is a 68 y.o. male with past medical history of chronic diastolic heart failure, atrial fibrillation, type 2 diabetes, COPD, chronic respiratory failure with 2 to 3 L NC, obesity, hyperlipidemia, LINA who presented with acute on chronic heart failure.  Patient requested 6 L NC in admission.  Associated with bilateral legs edema.  Patient had elevated BNP at  and CO2 level 86.  Cardiology consulted.  Patient received IV diuretics, BiPAP at night, breathing treatment and antibiotics.  Patient reported feeling much better today.  He is a currently stable on 3 L NC.  He is able to lay flat without shortness of breath.  Patient will be discharged home with a prescription of torsemide 40 Mg twice a day, Levaquin 750 Mg daily for a week.  Patient will follow-up with cardiology outpatient.  Patient understand he will use the BiPAP/CPAP at night    Brief Problem Based Course:   Acute on chronic respiratory failure  Likely due to acute on chronic diastolic congestive heart failure  -Improving shortness of breath, stable on 3 L NC  -Patient is not compliant with BiPAP at home  -Elevated BNP at   -repeat chest xray: stable opacity which could represent atelectasis versus possible pneumonia  -Cardiology evaluated the patient and agreed to continue with Lasix 40 Mg IV twice a day, can switch to oral torsemide 40 Mg twice daily in     Compensated respiratory acidosis  COPD  Pneumonia  -VBG showed pH 7.35, CO2 86, HCO3 47.5  -Chest x-ray concerns of pneumonia  -Continue DuoNeb treatment 4 times a day, maintenance inhalers  -Received BiPAP 2 hours last night  -Improved  ORDERING SYSTEM PROVIDED HISTORY: sob, h/o chf and copd, just left hospital yesterday TECHNOLOGIST PROVIDED HISTORY: Reason for exam:->sob, h/o chf and copd, just left hospital yesterday Reason for Exam: Shortness of Breath FINDINGS: Enlargement the cardiac silhouette appear stable. Vascular congestion is seen, with patchy interstitial opacity seen in the mid and lower lung fields, more so on the left. No pneumothorax. No acute osseous abnormality. Multilevel degenerative changes are seen in the spine. 1. Cardiomegaly with vascular congestion and prominent interstitial opacities in the mid and lower lungs which may be related to interstitial edema or pneumonitis. CBC: No results for input(s): \"WBC\", \"HGB\", \"PLT\" in the last 72 hours. BMP:    Recent Labs     12/22/23  0238 12/23/23  0100 12/24/23  0425    139 139   K 3.8 3.9 4.2   CL 92* 93* 93*   CO2 41* 39* 39*   BUN 10 15 12   CREATININE 0.5* 0.6* 0.6*   GLUCOSE 258* 213* 217*     Hepatic: No results for input(s): \"AST\", \"ALT\", \"ALB\", \"BILITOT\", \"ALKPHOS\" in the last 72 hours. Lipids:   Lab Results   Component Value Date/Time    CHOL 159 03/01/2023 08:34 AM    CHOL 112 12/15/2021 01:35 PM    HDL 29 03/01/2023 08:34 AM    TRIG 182 03/01/2023 08:34 AM     Hemoglobin A1C:   Lab Results   Component Value Date/Time    LABA1C 10.2 11/13/2023 01:00 PM     TSH:   Lab Results   Component Value Date/Time    TSH 2.15 03/06/2019 10:22 AM     Troponin:   Lab Results   Component Value Date/Time    TROPONINT <0.010 09/18/2023 12:45 AM    TROPONINT <0.010 09/13/2023 07:15 AM    TROPONINT <0.010 08/14/2023 09:30 AM     Lactic Acid: No results for input(s): \"LACTA\" in the last 72 hours. BNP: No results for input(s): \"PROBNP\" in the last 72 hours.   UA:  Lab Results   Component Value Date/Time    NITRU NEGATIVE 11/20/2023 02:26 PM    COLORU YELLOW 11/20/2023 02:26 PM    WBCUA <1 09/13/2023 08:09 AM    RBCUA 0 09/13/2023 08:09 AM    MUCUS RARE 11/07/2018 04:30 PM

## 2023-12-24 NOTE — PLAN OF CARE
Problem: Discharge Planning  Goal: Discharge to home or other facility with appropriate resources  12/24/2023 1216 by Zayra Mckeon LPN  Outcome: Completed  12/24/2023 1140 by Zayra Mckeon LPN  Outcome: Progressing     Problem: Safety - Adult  Goal: Free from fall injury  12/24/2023 1216 by Zayra Mckeon LPN  Outcome: Completed  12/24/2023 1140 by Zayra Mckeon LPN  Outcome: Progressing     Problem: Chronic Conditions and Co-morbidities  Goal: Patient's chronic conditions and co-morbidity symptoms are monitored and maintained or improved  12/24/2023 1216 by Zayra Mckeon LPN  Outcome: Completed  12/24/2023 1140 by Zayra Mckeon LPN  Outcome: Progressing     Problem: Pain  Goal: Verbalizes/displays adequate comfort level or baseline comfort level  12/24/2023 1216 by Zayra Mckeon LPN  Outcome: Completed  12/24/2023 1140 by Zayra Mckeon LPN  Outcome: Progressing

## 2023-12-25 LAB
ANION GAP SERPL CALCULATED.3IONS-SCNC: 10 MMOL/L (ref 7–16)
BASOPHILS ABSOLUTE: 0.1 K/CU MM
BASOPHILS RELATIVE PERCENT: 0.5 % (ref 0–1)
BUN SERPL-MCNC: 8 MG/DL (ref 6–23)
CALCIUM SERPL-MCNC: 8.3 MG/DL (ref 8.3–10.6)
CHLORIDE BLD-SCNC: 93 MMOL/L (ref 99–110)
CO2: 37 MMOL/L (ref 21–32)
CREAT SERPL-MCNC: 0.5 MG/DL (ref 0.9–1.3)
DIFFERENTIAL TYPE: ABNORMAL
EKG ATRIAL RATE: 138 BPM
EKG DIAGNOSIS: NORMAL
EKG Q-T INTERVAL: 350 MS
EKG QRS DURATION: 104 MS
EKG QTC CALCULATION (BAZETT): 484 MS
EKG R AXIS: -59 DEGREES
EKG T AXIS: 76 DEGREES
EKG VENTRICULAR RATE: 115 BPM
EOSINOPHILS ABSOLUTE: 0.1 K/CU MM
EOSINOPHILS RELATIVE PERCENT: 0.5 % (ref 0–3)
GFR SERPL CREATININE-BSD FRML MDRD: >60 ML/MIN/1.73M2
GLUCOSE BLD-MCNC: 129 MG/DL (ref 70–99)
GLUCOSE BLD-MCNC: 162 MG/DL (ref 70–99)
GLUCOSE BLD-MCNC: 198 MG/DL (ref 70–99)
GLUCOSE BLD-MCNC: 226 MG/DL (ref 70–99)
GLUCOSE BLD-MCNC: 335 MG/DL (ref 70–99)
GLUCOSE SERPL-MCNC: 202 MG/DL (ref 70–99)
HCT VFR BLD CALC: 38.3 % (ref 42–52)
HEMOGLOBIN: 12.4 GM/DL (ref 13.5–18)
IMMATURE NEUTROPHIL %: 0.9 % (ref 0–0.43)
LYMPHOCYTES ABSOLUTE: 1.3 K/CU MM
LYMPHOCYTES RELATIVE PERCENT: 10.5 % (ref 24–44)
MAGNESIUM: 1.7 MG/DL (ref 1.8–2.4)
MCH RBC QN AUTO: 31.2 PG (ref 27–31)
MCHC RBC AUTO-ENTMCNC: 32.4 % (ref 32–36)
MCV RBC AUTO: 96.2 FL (ref 78–100)
MONOCYTES ABSOLUTE: 1 K/CU MM
MONOCYTES RELATIVE PERCENT: 7.7 % (ref 0–4)
NUCLEATED RBC %: 0 %
PDW BLD-RTO: 16.4 % (ref 11.7–14.9)
PLATELET # BLD: 178 K/CU MM (ref 140–440)
PMV BLD AUTO: 9.6 FL (ref 7.5–11.1)
POTASSIUM SERPL-SCNC: 4.1 MMOL/L (ref 3.5–5.1)
RBC # BLD: 3.98 M/CU MM (ref 4.6–6.2)
SEGMENTED NEUTROPHILS ABSOLUTE COUNT: 10.1 K/CU MM
SEGMENTED NEUTROPHILS RELATIVE PERCENT: 79.9 % (ref 36–66)
SODIUM BLD-SCNC: 140 MMOL/L (ref 135–145)
TOTAL IMMATURE NEUTOROPHIL: 0.11 K/CU MM
TOTAL NUCLEATED RBC: 0 K/CU MM
WBC # BLD: 12.6 K/CU MM (ref 4–10.5)

## 2023-12-25 PROCEDURE — 94660 CPAP INITIATION&MGMT: CPT

## 2023-12-25 PROCEDURE — 93010 ELECTROCARDIOGRAM REPORT: CPT | Performed by: INTERNAL MEDICINE

## 2023-12-25 PROCEDURE — 2700000000 HC OXYGEN THERAPY PER DAY

## 2023-12-25 PROCEDURE — 6360000002 HC RX W HCPCS: Performed by: STUDENT IN AN ORGANIZED HEALTH CARE EDUCATION/TRAINING PROGRAM

## 2023-12-25 PROCEDURE — 85025 COMPLETE CBC W/AUTO DIFF WBC: CPT

## 2023-12-25 PROCEDURE — 83735 ASSAY OF MAGNESIUM: CPT

## 2023-12-25 PROCEDURE — 1200000000 HC SEMI PRIVATE

## 2023-12-25 PROCEDURE — 2580000003 HC RX 258: Performed by: INTERNAL MEDICINE

## 2023-12-25 PROCEDURE — 36415 COLL VENOUS BLD VENIPUNCTURE: CPT

## 2023-12-25 PROCEDURE — 94640 AIRWAY INHALATION TREATMENT: CPT

## 2023-12-25 PROCEDURE — 99233 SBSQ HOSP IP/OBS HIGH 50: CPT | Performed by: INTERNAL MEDICINE

## 2023-12-25 PROCEDURE — 80048 BASIC METABOLIC PNL TOTAL CA: CPT

## 2023-12-25 PROCEDURE — 6370000000 HC RX 637 (ALT 250 FOR IP): Performed by: INTERNAL MEDICINE

## 2023-12-25 PROCEDURE — 82962 GLUCOSE BLOOD TEST: CPT

## 2023-12-25 PROCEDURE — 6360000002 HC RX W HCPCS: Performed by: INTERNAL MEDICINE

## 2023-12-25 PROCEDURE — 94761 N-INVAS EAR/PLS OXIMETRY MLT: CPT

## 2023-12-25 RX ORDER — MAGNESIUM SULFATE IN WATER 40 MG/ML
2000 INJECTION, SOLUTION INTRAVENOUS PRN
Status: DISCONTINUED | OUTPATIENT
Start: 2023-12-25 | End: 2024-01-02 | Stop reason: HOSPADM

## 2023-12-25 RX ORDER — POTASSIUM CHLORIDE 20 MEQ/1
10 TABLET, EXTENDED RELEASE ORAL DAILY
Status: DISCONTINUED | OUTPATIENT
Start: 2023-12-25 | End: 2024-01-02 | Stop reason: HOSPADM

## 2023-12-25 RX ORDER — ACETAMINOPHEN 325 MG/1
650 TABLET ORAL EVERY 6 HOURS PRN
Status: DISCONTINUED | OUTPATIENT
Start: 2023-12-25 | End: 2024-01-02 | Stop reason: HOSPADM

## 2023-12-25 RX ORDER — BUDESONIDE AND FORMOTEROL FUMARATE DIHYDRATE 160; 4.5 UG/1; UG/1
2 AEROSOL RESPIRATORY (INHALATION) 2 TIMES DAILY
Status: DISCONTINUED | OUTPATIENT
Start: 2023-12-25 | End: 2024-01-02 | Stop reason: HOSPADM

## 2023-12-25 RX ORDER — POTASSIUM CHLORIDE 20 MEQ/1
40 TABLET, EXTENDED RELEASE ORAL PRN
Status: DISCONTINUED | OUTPATIENT
Start: 2023-12-25 | End: 2024-01-02 | Stop reason: HOSPADM

## 2023-12-25 RX ORDER — ALBUTEROL SULFATE 90 UG/1
2 AEROSOL, METERED RESPIRATORY (INHALATION) EVERY 6 HOURS PRN
Status: DISCONTINUED | OUTPATIENT
Start: 2023-12-25 | End: 2024-01-02 | Stop reason: HOSPADM

## 2023-12-25 RX ORDER — POLYETHYLENE GLYCOL 3350 17 G/17G
17 POWDER, FOR SOLUTION ORAL DAILY PRN
Status: DISCONTINUED | OUTPATIENT
Start: 2023-12-25 | End: 2024-01-02 | Stop reason: HOSPADM

## 2023-12-25 RX ORDER — INSULIN GLARGINE 100 [IU]/ML
22 INJECTION, SOLUTION SUBCUTANEOUS NIGHTLY
Status: DISCONTINUED | OUTPATIENT
Start: 2023-12-25 | End: 2023-12-30

## 2023-12-25 RX ORDER — ONDANSETRON 4 MG/1
4 TABLET, ORALLY DISINTEGRATING ORAL EVERY 8 HOURS PRN
Status: DISCONTINUED | OUTPATIENT
Start: 2023-12-25 | End: 2024-01-02 | Stop reason: HOSPADM

## 2023-12-25 RX ORDER — DEXTROSE MONOHYDRATE 100 MG/ML
INJECTION, SOLUTION INTRAVENOUS CONTINUOUS PRN
Status: DISCONTINUED | OUTPATIENT
Start: 2023-12-25 | End: 2024-01-02 | Stop reason: HOSPADM

## 2023-12-25 RX ORDER — LEVOFLOXACIN 500 MG/1
750 TABLET, FILM COATED ORAL DAILY
Status: COMPLETED | OUTPATIENT
Start: 2023-12-25 | End: 2023-12-29

## 2023-12-25 RX ORDER — INSULIN LISPRO 100 [IU]/ML
0-8 INJECTION, SOLUTION INTRAVENOUS; SUBCUTANEOUS
Status: DISCONTINUED | OUTPATIENT
Start: 2023-12-25 | End: 2024-01-02 | Stop reason: HOSPADM

## 2023-12-25 RX ORDER — LANOLIN ALCOHOL/MO/W.PET/CERES
400 CREAM (GRAM) TOPICAL DAILY
Status: DISCONTINUED | OUTPATIENT
Start: 2023-12-25 | End: 2024-01-02 | Stop reason: HOSPADM

## 2023-12-25 RX ORDER — ONDANSETRON 2 MG/ML
4 INJECTION INTRAMUSCULAR; INTRAVENOUS EVERY 6 HOURS PRN
Status: DISCONTINUED | OUTPATIENT
Start: 2023-12-25 | End: 2024-01-02 | Stop reason: HOSPADM

## 2023-12-25 RX ORDER — GLUCAGON 1 MG/ML
1 KIT INJECTION PRN
Status: DISCONTINUED | OUTPATIENT
Start: 2023-12-25 | End: 2024-01-02 | Stop reason: HOSPADM

## 2023-12-25 RX ORDER — ACETAMINOPHEN 650 MG/1
650 SUPPOSITORY RECTAL EVERY 6 HOURS PRN
Status: DISCONTINUED | OUTPATIENT
Start: 2023-12-25 | End: 2024-01-02 | Stop reason: HOSPADM

## 2023-12-25 RX ORDER — SODIUM CHLORIDE 9 MG/ML
INJECTION, SOLUTION INTRAVENOUS PRN
Status: DISCONTINUED | OUTPATIENT
Start: 2023-12-25 | End: 2024-01-02 | Stop reason: HOSPADM

## 2023-12-25 RX ORDER — SODIUM CHLORIDE 0.9 % (FLUSH) 0.9 %
5-40 SYRINGE (ML) INJECTION PRN
Status: DISCONTINUED | OUTPATIENT
Start: 2023-12-25 | End: 2024-01-02 | Stop reason: HOSPADM

## 2023-12-25 RX ORDER — ROFLUMILAST 500 UG/1
500 TABLET ORAL DAILY
Status: DISCONTINUED | OUTPATIENT
Start: 2023-12-25 | End: 2024-01-02 | Stop reason: HOSPADM

## 2023-12-25 RX ORDER — DILTIAZEM HYDROCHLORIDE 240 MG/1
240 CAPSULE, COATED, EXTENDED RELEASE ORAL DAILY
Status: DISCONTINUED | OUTPATIENT
Start: 2023-12-25 | End: 2024-01-02 | Stop reason: HOSPADM

## 2023-12-25 RX ORDER — ATORVASTATIN CALCIUM 40 MG/1
40 TABLET, FILM COATED ORAL DAILY
Status: DISCONTINUED | OUTPATIENT
Start: 2023-12-25 | End: 2024-01-02 | Stop reason: HOSPADM

## 2023-12-25 RX ORDER — INSULIN LISPRO 100 [IU]/ML
0-4 INJECTION, SOLUTION INTRAVENOUS; SUBCUTANEOUS NIGHTLY
Status: DISCONTINUED | OUTPATIENT
Start: 2023-12-25 | End: 2024-01-02 | Stop reason: HOSPADM

## 2023-12-25 RX ORDER — IPRATROPIUM BROMIDE AND ALBUTEROL SULFATE 2.5; .5 MG/3ML; MG/3ML
1 SOLUTION RESPIRATORY (INHALATION)
Status: DISCONTINUED | OUTPATIENT
Start: 2023-12-25 | End: 2024-01-02 | Stop reason: HOSPADM

## 2023-12-25 RX ORDER — SODIUM CHLORIDE 0.9 % (FLUSH) 0.9 %
5-40 SYRINGE (ML) INJECTION EVERY 12 HOURS SCHEDULED
Status: DISCONTINUED | OUTPATIENT
Start: 2023-12-25 | End: 2024-01-02 | Stop reason: HOSPADM

## 2023-12-25 RX ORDER — POTASSIUM CHLORIDE 7.45 MG/ML
10 INJECTION INTRAVENOUS PRN
Status: DISCONTINUED | OUTPATIENT
Start: 2023-12-25 | End: 2024-01-02 | Stop reason: HOSPADM

## 2023-12-25 RX ORDER — MAGNESIUM SULFATE IN WATER 40 MG/ML
2000 INJECTION, SOLUTION INTRAVENOUS ONCE
Status: COMPLETED | OUTPATIENT
Start: 2023-12-25 | End: 2023-12-25

## 2023-12-25 RX ADMIN — FUROSEMIDE 40 MG: 10 INJECTION, SOLUTION INTRAMUSCULAR; INTRAVENOUS at 17:40

## 2023-12-25 RX ADMIN — MAGNESIUM SULFATE HEPTAHYDRATE 2000 MG: 40 INJECTION, SOLUTION INTRAVENOUS at 10:07

## 2023-12-25 RX ADMIN — ATORVASTATIN CALCIUM 40 MG: 40 TABLET, FILM COATED ORAL at 09:53

## 2023-12-25 RX ADMIN — FUROSEMIDE 40 MG: 10 INJECTION, SOLUTION INTRAMUSCULAR; INTRAVENOUS at 09:53

## 2023-12-25 RX ADMIN — POTASSIUM CHLORIDE 10 MEQ: 1500 TABLET, EXTENDED RELEASE ORAL at 09:53

## 2023-12-25 RX ADMIN — SODIUM CHLORIDE, PRESERVATIVE FREE 10 ML: 5 INJECTION INTRAVENOUS at 21:33

## 2023-12-25 RX ADMIN — SODIUM CHLORIDE, PRESERVATIVE FREE 10 ML: 5 INJECTION INTRAVENOUS at 09:53

## 2023-12-25 RX ADMIN — DILTIAZEM HYDROCHLORIDE 240 MG: 240 CAPSULE, COATED, EXTENDED RELEASE ORAL at 09:53

## 2023-12-25 RX ADMIN — IPRATROPIUM BROMIDE AND ALBUTEROL SULFATE 1 DOSE: 2.5; .5 SOLUTION RESPIRATORY (INHALATION) at 12:38

## 2023-12-25 RX ADMIN — APIXABAN 5 MG: 5 TABLET, FILM COATED ORAL at 09:58

## 2023-12-25 RX ADMIN — IPRATROPIUM BROMIDE AND ALBUTEROL SULFATE 1 DOSE: 2.5; .5 SOLUTION RESPIRATORY (INHALATION) at 16:16

## 2023-12-25 RX ADMIN — LEVOFLOXACIN 750 MG: 500 TABLET, FILM COATED ORAL at 09:53

## 2023-12-25 RX ADMIN — INSULIN LISPRO 2 UNITS: 100 INJECTION, SOLUTION INTRAVENOUS; SUBCUTANEOUS at 17:41

## 2023-12-25 RX ADMIN — ROFLUMILAST 500 MCG: 500 TABLET ORAL at 09:58

## 2023-12-25 RX ADMIN — INSULIN LISPRO 4 UNITS: 100 INJECTION, SOLUTION INTRAVENOUS; SUBCUTANEOUS at 02:10

## 2023-12-25 RX ADMIN — APIXABAN 5 MG: 5 TABLET, FILM COATED ORAL at 02:11

## 2023-12-25 RX ADMIN — INSULIN GLARGINE 22 UNITS: 100 INJECTION, SOLUTION SUBCUTANEOUS at 02:11

## 2023-12-25 RX ADMIN — INSULIN GLARGINE 22 UNITS: 100 INJECTION, SOLUTION SUBCUTANEOUS at 21:33

## 2023-12-25 RX ADMIN — Medication 400 MG: at 09:53

## 2023-12-25 RX ADMIN — APIXABAN 5 MG: 5 TABLET, FILM COATED ORAL at 21:33

## 2023-12-25 NOTE — PROGRESS NOTES
RN called stating that pt wanted a breathing treatment due to being SOB. On arrival to pt's room pt was found with BIPAP off, his pulse Ox probe off and a sat of 83%the patient was placed back on BIPAP with his sat increasing to 95%.

## 2023-12-25 NOTE — PLAN OF CARE
Problem: Safety - Adult  Goal: Free from fall injury  Outcome: Progressing  Flowsheets (Taken 12/25/2023 0100)  Free From Fall Injury: Instruct family/caregiver on patient safety     Problem: Discharge Planning  Goal: Discharge to home or other facility with appropriate resources  Outcome: Progressing  Flowsheets (Taken 12/25/2023 0100)  Discharge to home or other facility with appropriate resources:   Identify barriers to discharge with patient and caregiver   Arrange for needed discharge resources and transportation as appropriate   Identify discharge learning needs (meds, wound care, etc)     Problem: Pain  Goal: Verbalizes/displays adequate comfort level or baseline comfort level  Outcome: Progressing

## 2023-12-25 NOTE — ED NOTES
ED TO INPATIENT SBAR HANDOFF    Patient Name: Armando Mars   :  1955  68 y.o.   Preferred Name  Armando  Family/Caregiver Present no   Restraints no   C-SSRS: Risk of Suicide: No Risk  Sitter no   Sepsis Risk Score Sepsis Risk Score: 2.61      Situation  Chief Complaint   Patient presents with    Shortness of Breath     COPD exacerbation, left AMA this morning from hospital     Brief Description of Patient's Condition: 68 y.o. male with past medical history of chronic diastolic heart failure, atrial fibrillation, type 2 diabetes, COPD, chronic respiratory failure with 2 to 3 L NC, obesity, hyperlipidemia, LINA who presented with worsening shortness of breath with some chills but no chest or abdominal pains.  He is he has some chronic leg pain or swelling which he feels has not changed.  Was discharged home this morning from the hospital service after having been admitted on the .  Patient states he requested to be discharged earlier than hospitalist was willing to do because he went to be with his family during the [place] holidays.  On arrival patient was on 4 L of oxygen per nasal cannula but satting 86%, proved to in the mid 90s on 5 and half liters of oxygen per nasal cannula   Mental Status: alert  Arrived from: home    Imaging:   XR CHEST PORTABLE   Final Result   Stable mild bibasilar airspace opacities that could reflect atelectasis or   pneumonia.  Elsewhere, there are no acute findings.           Abnormal labs:   Abnormal Labs Reviewed   CBC WITH AUTO DIFFERENTIAL - Abnormal; Notable for the following components:       Result Value    WBC 12.8 (*)     RBC 4.00 (*)     Hemoglobin 12.3 (*)     Hematocrit 38.7 (*)     MCHC 31.8 (*)     RDW 16.3 (*)     Segs Relative 82.2 (*)     Lymphocytes % 9.1 (*)     Monocytes % 7.1 (*)     Immature Neutrophil % 0.9 (*)     All other components within normal limits   COMPREHENSIVE METABOLIC PANEL - Abnormal; Notable for the following components:    Chloride  122/81   Pulse: (!) 106   Resp: 26   Temp: 98.6 °F (37 °C)   TempSrc: Oral   SpO2: 91%   Weight: (!) 176 kg (388 lb)   Height: 1.981 m (6' 6\")     PO Status: Regular  O2 Flow Rate: O2 Device: Nasal cannula O2 Flow Rate (L/min): 5 L/min  Cardiac Rhythm:   Last documented pain medication administered:   NIH Score: NIH     Active LDA's:   Peripheral IV 12/24/23 Posterior;Right Hand (Active)       Pertinent or High Risk Medications/Drips: no   If Yes, please provide details:   Blood Product Administration: no  If Yes, please provide details:     Recommendation    Incomplete orders   Additional Comments:    If any further questions, please call Sending RN at [phone]    Electronically signed by: Electronically signed by Solis Luo RN on 12/25/2023 at 12:06 AM

## 2023-12-25 NOTE — ED PROVIDER NOTES
[facility] EMERGENCY DEPARTMENT  EMERGENCY DEPARTMENT ENCOUNTER        Pt Name: Armando Mars  MRN: [id number]  Birthdate 1955  Date of evaluation: 12/24/2023  Provider: Emmanuel Stevens PA-C  PCP: Rosalio Hedrick MD       I have seen and evaluated this patient with my supervising physician Dr. Sanchez      CHIEF COMPLAINT      Chief Complaint   Patient presents with    Shortness of Breath     COPD exacerbation, left AMA this morning from hospital       HISTORY OF PRESENT ILLNESS:     History from : Patient    Limitations to history : None    Armando Mars is a 68 y.o. male who presents via EMS from home with reported shortness of breath.  He states that he was discharged earlier this morning, mentioning that he left despite being told that he should stay longer but he wanted to visit with his family.  He got home and had persisting difficulty breathing.  Discussion with him, he tells me he has not been taking any medications he is not sure what medications he was given upon discharge.  He describes swelling to his legs, shortness of breath, worse when lying down.  He denies any confusion, hemoptysis, abdominal pain    Nursing Notes were all reviewed and agreed with or any disagreements were addressed in the HPI.    REVIEW OF SYSTEMS :     Review of Systems   Respiratory:  Positive for shortness of breath.    Cardiovascular:  Positive for leg swelling.   Gastrointestinal:  Negative for abdominal pain.       Pertinent positives and negatives are stated within HPI    PAST HISTORY   has a past medical history of A-fib (HCC), Anxiety, Arthritis, COPD (chronic obstructive pulmonary disease) (HCC), Depression, Diabetes mellitus (HCC), Full dentures, H/O angiography, H/O Doppler ultrasound, H/O echocardiogram, Hx of colonoscopy, Hx of Doppler ultrasound, Hyperlipidemia, Hypertension, Macular degeneration, Obesity, On home oxygen therapy, PAD (peripheral artery

## 2023-12-25 NOTE — H&P
VENOUS ULTRASOUND OF THE BILATERAL LOWER [id number]/21/2023 12:53 pm TECHNIQUE: Duplex ultrasound using B-mode/gray scaled imaging and Doppler spectral analysis and color flow was obtained of the deep venous structures of the bilateral extremities. COMPARISON: None. HISTORY: ORDERING SYSTEM PROVIDED HISTORY: Edema bilateral lower extremities, left calf tender to palpation FINDINGS: Evaluation is somewhat limited secondary to patient body habitus. The visualized portions of the bilateral common femoral, femoral, and popliteal veins demonstrate normal compression, augmentation, and Doppler flow.  The origins of the greater saphenous and profunda femoris veins are patent.  The partially visualized calf veins demonstrate no evidence of thrombus.     1.  No evidence of DVT in the visualized bilateral lower extremities. 2. Partially visualized calf veins demonstrate no evidence of thrombus.     XR CHEST PORTABLE    Result Date: 12/21/2023  EXAMINATION: ONE XRAY VIEW OF THE CHEST 12/21/2023 4:47 am COMPARISON: 12/17/2023. HISTORY: ORDERING SYSTEM PROVIDED HISTORY: cp TECHNOLOGIST PROVIDED HISTORY: Reason for exam:->cp Reason for Exam: cp FINDINGS: The cardiac silhouette is indeterminate.  There is bibasilar opacity, which may reflect underlying edema or pneumonia.  There is small left-sided pleural effusion.  No evidence of pneumothorax seen.     Bibasilar opacity, which may reflect underlying edema or pneumonia.  Small left-sided pleural effusion.     XR CHEST (2 VW)    Result Date: 12/17/2023  EXAMINATION: TWO XRAY VIEWS OF THE CHEST 12/17/2023 10:08 pm COMPARISON: 12/12/2023 HISTORY: ORDERING SYSTEM PROVIDED HISTORY: sob, h/o chf and copd, just left hospital yesterday TECHNOLOGIST PROVIDED HISTORY: Reason for exam:->sob, h/o chf and copd, just left hospital yesterday Reason for Exam: Shortness of Breath FINDINGS: Enlargement the cardiac silhouette appear stable.  Vascular congestion is seen, with patchy interstitial  opacity seen in the mid and lower lung fields, more so on the left.  No pneumothorax.  No acute osseous abnormality. Multilevel degenerative changes are seen in the spine.     1. Cardiomegaly with vascular congestion and prominent interstitial opacities in the mid and lower lungs which may be related to interstitial edema or pneumonitis.       Personally reviewed Lab Studies, Imaging, and discussed case with ED provider.    Electronically signed by Joe Ambriz MD on 12/24/2023 at 9:21 PM    Comment: Please note this report has been produced using speech recognition software and may contain errors related to that system including errors in grammar, punctuation, and spelling, as well as words and phrases that may be inappropriate. If there are any questions or concerns please feel free to contact the dictating provider for clarification.

## 2023-12-25 NOTE — PROGRESS NOTES
Abdominal:      General: Abdomen is flat. Bowel sounds are normal. There is no distension.      Palpations: Abdomen is soft.      Tenderness: There is no abdominal tenderness.   Musculoskeletal:         General: No deformity. Normal range of motion.      Cervical back: Normal range of motion and neck supple.      Right lower leg: No edema.      Left lower leg: No edema.   Skin:     Coloration: Skin is pale. Skin is not jaundiced.   Neurological:      General: No focal deficit present.      Mental Status: He is alert and oriented to person, place, and time. Mental status is at baseline.      Motor: Weakness present.            Medications:   Medications:    sodium chloride flush  5-40 mL IntraVENous 2 times per day    apixaban  5 mg Oral BID    atorvastatin  40 mg Oral Daily    dilTIAZem  240 mg Oral Daily    insulin glargine  22 Units SubCUTAneous Nightly    ipratropium 0.5 mg-albuterol 2.5 mg  1 Dose Inhalation Q4H WA RT    levoFLOXacin  750 mg Oral Daily    magnesium oxide  400 mg Oral Daily    potassium chloride  10 mEq Oral Daily    Roflumilast  500 mcg Oral Daily    budesonide-formoterol  2 puff Inhalation BID    insulin lispro  0-8 Units SubCUTAneous TID WC    insulin lispro  0-4 Units SubCUTAneous Nightly    magnesium sulfate  2,000 mg IntraVENous Once    furosemide  40 mg IntraVENous BID      Infusions:    sodium chloride      dextrose       PRN Meds: sodium chloride flush, 5-40 mL, PRN  sodium chloride, , PRN  ondansetron, 4 mg, Q8H PRN   Or  ondansetron, 4 mg, Q6H PRN  polyethylene glycol, 17 g, Daily PRN  acetaminophen, 650 mg, Q6H PRN   Or  acetaminophen, 650 mg, Q6H PRN  potassium chloride, 40 mEq, PRN   Or  potassium alternative oral replacement, 40 mEq, PRN   Or  potassium chloride, 10 mEq, PRN  magnesium sulfate, 2,000 mg, PRN  albuterol sulfate HFA, 2 puff, Q6H PRN  glucose, 4 tablet, PRN  dextrose bolus, 125 mL, PRN   Or  dextrose bolus, 250 mL, PRN  glucagon (rDNA), 1 mg, PRN  dextrose, ,  Continuous PRN        Labs      Recent Results (from the past 24 hour(s))   POCT Glucose    Collection Time: 12/24/23 12:17 PM   Result Value Ref Range    POC Glucose 178 (H) 70 - 99 MG/DL   EKG 12 Lead    Collection Time: 12/24/23  7:35 PM   Result Value Ref Range    Ventricular Rate 115 BPM    Atrial Rate 138 BPM    QRS Duration 104 ms    Q-T Interval 350 ms    QTc Calculation (Bazett) 484 ms    R Axis -59 degrees    T Axis 76 degrees    Diagnosis       Atrial fibrillation with rapid ventricular response with premature ventricular or aberrantly conducted complexes  Low voltage QRS  Left anterior fascicular block  Septal infarct (cited on or before 01-JAN-2018)  Possible Lateral infarct (cited on or before 01-JAN-2018)  Cannot rule out Inferior infarct (cited on or before 14-JAN-2017)  Abnormal ECG  When compared with ECG of 21-DEC-2023 03:46,  Left anterior fascicular block is now present  Non-specific change in ST segment in Lateral leads     CBC with Auto Differential    Collection Time: 12/24/23  7:39 PM   Result Value Ref Range    WBC 12.8 (H) 4.0 - 10.5 K/CU MM    RBC 4.00 (L) 4.6 - 6.2 M/CU MM    Hemoglobin 12.3 (L) 13.5 - 18.0 GM/DL    Hematocrit 38.7 (L) 42 - 52 %    MCV 96.8 78 - 100 FL    MCH 30.8 27 - 31 PG    MCHC 31.8 (L) 32.0 - 36.0 %    RDW 16.3 (H) 11.7 - 14.9 %    Platelets 164 140 - 440 K/CU MM    MPV 10.1 7.5 - 11.1 FL    Differential Type AUTOMATED DIFFERENTIAL     Segs Relative 82.2 (H) 36 - 66 %    Lymphocytes % 9.1 (L) 24 - 44 %    Monocytes % 7.1 (H) 0 - 4 %    Eosinophils % 0.4 0 - 3 %    Basophils % 0.3 0 - 1 %    Segs Absolute 10.5 K/CU MM    Lymphocytes Absolute 1.2 K/CU MM    Monocytes Absolute 0.9 K/CU MM    Eosinophils Absolute 0.1 K/CU MM    Basophils Absolute 0.0 K/CU MM    Nucleated RBC % 0.0 %    Total Nucleated RBC 0.0 K/CU MM    Total Immature Neutrophil 0.11 K/CU MM    Immature Neutrophil % 0.9 (H) 0 - 0.43 %   Comprehensive Metabolic Panel    Collection Time: 12/24/23  7:39

## 2023-12-25 NOTE — PROGRESS NOTES
Cardiology Progress Note     Admit Date:  12/24/2023    Consult reason/ Seen today for :       Subjective and  Overnight Events : He says that he would like to go home breathing is better      Chief complain on admission : 68 y.o.year old who is admitted for  Chief Complaint   Patient presents with    Shortness of Breath     COPD exacerbation, left AMA this morning from hospital      Assessment / Plan:  Heart failure with preserved EF still has some edema continue IV Lasix twice daily watch for toxicity  Elevated Troponin : will continue medical management for now.   CHF: Acute Systolic/ Diastolic decompensated heart failure. Continue IV Lasix 40 mg BID.  Daily weights , strict Is and Os  Paroxysmal afib: Persistent atrial fibrillation rate controlled keep electrolytes within normal range continue Cardizem to 40 mg and Eliquis 5 mg twice daily  HTN: stable, continue To titrate up medication as needed  Peripheral arterial disease s/p right femoropopliteal bypass stable  DVT Prophylaxis if no contraindication    Past medical history:    has a past medical history of A-fib ([facility]), Anxiety, Arthritis, COPD (chronic obstructive pulmonary disease) ([facility]), Depression, Diabetes mellitus ([facility]), Full dentures, H/O angiography, H/O Doppler ultrasound, H/O echocardiogram, Hx of colonoscopy, Hx of Doppler ultrasound, Hyperlipidemia, Hypertension, Macular degeneration, Obesity, On home oxygen therapy, PAD (peripheral artery disease) ([facility]), Panic attacks, Pneumonia, PTSD (post-traumatic stress disorder), Restless leg, Shortness of breath, Sleep apnea, and Wears glasses.  Past surgical history:   has a past surgical history that includes Atrial ablation surgery (05/23/2018); pr laparoscopy surg cholecystectomy (N/A, 11/12/2018); Colonoscopy (07/2011); eye surgery (Left, 2017); Dental surgery; Cardiac surgery (05/2018); Cholecystectomy, laparoscopic  y.o.year old who is admitted for  Chief Complaint   Patient presents with    Shortness of Breath     COPD exacerbation, left AMA this morning from hospital    , active issues as noted below:  Impression:  Principal Problem:    Acute on chronic diastolic (congestive) heart failure (HCC)  Resolved Problems:    * No resolved hospital problems. *            All labs, medications and tests reviewed by myself , continue all other medications of all above medical condition listed as is except for changes mentioned above.    Thank you very much for consult , please call with questions.    Sayed Harsh Melendez MD, MD 12/25/2023 4:37 PM

## 2023-12-25 NOTE — ED PROVIDER NOTES
Emergency Department Encounter    Patient: Armando Mars  MRN: [id number]  : 1955  Date of Evaluation: 2023  ED Provider:  Reno Sanchez MD    Triage Chief Complaint:   Shortness of Breath (COPD exacerbation, left AMA this morning from hospital)    [place]:  Armando Mars is a 68 y.o. male with past medical history of chronic diastolic heart failure, atrial fibrillation, type 2 diabetes, COPD, chronic respiratory failure with 2 to 3 L NC, obesity, hyperlipidemia, LINA who presented with worsening shortness of breath with some chills but no chest or abdominal pains.  He is he has some chronic leg pain or swelling which he feels has not changed.  Was discharged home this morning from the hospital service after having been admitted on the .  Patient states he requested to be discharged earlier than hospitalist was willing to do because he went to be with his family during the [place] holidays.  On arrival patient was on 4 L of oxygen per nasal cannula but satting 86%, proved to in the mid 90s on 5 and half liters of oxygen per nasal cannula    ROS - see HPI, below listed is current ROS at time of my eval:  General:  No fevers, no chills, no weakness  Eyes:  No recent vison changes, no discharge  ENT:  No sore throat, no nasal congestion, no hearing changes  Cardiovascular:  No chest pain, no palpitations  Respiratory: Shortness of breath  Gastrointestinal:  No pain, no nausea, no vomiting, no diarrhea  Musculoskeletal:  No muscle pain, no joint pain  Skin:  No rash, no pruritis, no easy bruising  Neurologic:  No speech problems, no headache, no extremity numbness, no extremity tingling, no extremity weakness  Psychiatric:  No anxiety  Genitourinary:  No dysuria, no hematuria  Endocrine:  No unexpected weight gain, no unexpected weight loss  Extremities:  no edema, no pain    Past Medical History:   Diagnosis Date    A-fib (HCC)     Resolved after ablation 2018    Anxiety     Arthritis     \"Hips,  Absolute 1.2 K/CU MM    Monocytes Absolute 0.9 K/CU MM    Eosinophils Absolute 0.1 K/CU MM    Basophils Absolute 0.0 K/CU MM    Nucleated RBC % 0.0 %    Total Nucleated RBC 0.0 K/CU MM    Total Immature Neutrophil 0.11 K/CU MM    Immature Neutrophil % 0.9 (H) 0 - 0.43 %   Comprehensive Metabolic Panel   Result Value Ref Range    Sodium 136 135 - 145 MMOL/L    Potassium 4.6 3.5 - 5.1 MMOL/L    Chloride 91 (L) 99 - 110 mMol/L    CO2 36 (H) 21 - 32 MMOL/L    Anion Gap 9 7 - 16    Glucose 325 (H) 70 - 99 MG/DL    BUN 9 6 - 23 MG/DL    Creatinine 0.6 (L) 0.9 - 1.3 MG/DL    Est, Glom Filt Rate >60 >60 mL/min/1.73m2    Calcium 9.0 8.3 - 10.6 MG/DL    Total Protein 6.4 6.4 - 8.2 GM/DL    Albumin 3.3 (L) 3.4 - 5.0 GM/DL    Total Bilirubin 0.3 0.0 - 1.0 MG/DL    Alkaline Phosphatase 74 40 - 129 IU/L    ALT 19 10 - 40 U/L    AST 18 15 - 37 IU/L   Troponin   Result Value Ref Range    Troponin, High Sensitivity 40 (H) 0 - 22 ng/L   Troponin   Result Value Ref Range    Troponin, High Sensitivity 38 (H) 0 - 22 ng/L   Brain Natriuretic Peptide   Result Value Ref Range    Pro-BNP 1,145 (H) <300 PG/ML   EKG 12 Lead   Result Value Ref Range    Ventricular Rate 115 BPM    Atrial Rate 138 BPM    QRS Duration 104 ms    Q-T Interval 350 ms    QTc Calculation (Bazett) 484 ms    R Axis -59 degrees    T Axis 76 degrees    Diagnosis       Atrial fibrillation with rapid ventricular response with premature ventricular or aberrantly conducted complexes  Low voltage QRS  Left anterior fascicular block  Septal infarct (cited on or before 01-JAN-2018)  Possible Lateral infarct (cited on or before 01-JAN-2018)  Cannot rule out Inferior infarct (cited on or before 14-JAN-2017)  Abnormal ECG  When compared with ECG of 21-DEC-2023 03:46,  Left anterior fascicular block is now present  Non-specific change in ST segment in Lateral leads        Radiographs (if obtained):  Radiologist's Report Reviewed:  No results found.    EKG (if obtained): (All EKG's

## 2023-12-26 ENCOUNTER — CARE COORDINATION (OUTPATIENT)
Dept: CASE MANAGEMENT | Age: 68
End: 2023-12-26

## 2023-12-26 LAB
ANION GAP SERPL CALCULATED.3IONS-SCNC: 4 MMOL/L (ref 7–16)
BUN SERPL-MCNC: 7 MG/DL (ref 6–23)
CALCIUM SERPL-MCNC: 8.3 MG/DL (ref 8.3–10.6)
CHLORIDE BLD-SCNC: 93 MMOL/L (ref 99–110)
CO2: 41 MMOL/L (ref 21–32)
CREAT SERPL-MCNC: 0.6 MG/DL (ref 0.9–1.3)
GFR SERPL CREATININE-BSD FRML MDRD: >60 ML/MIN/1.73M2
GLUCOSE BLD-MCNC: 181 MG/DL (ref 70–99)
GLUCOSE BLD-MCNC: 200 MG/DL (ref 70–99)
GLUCOSE BLD-MCNC: 211 MG/DL (ref 70–99)
GLUCOSE BLD-MCNC: 285 MG/DL (ref 70–99)
GLUCOSE BLD-MCNC: 301 MG/DL (ref 70–99)
GLUCOSE SERPL-MCNC: 201 MG/DL (ref 70–99)
MAGNESIUM: 2 MG/DL (ref 1.8–2.4)
POTASSIUM SERPL-SCNC: 4.8 MMOL/L (ref 3.5–5.1)
SODIUM BLD-SCNC: 138 MMOL/L (ref 135–145)

## 2023-12-26 PROCEDURE — 2580000003 HC RX 258: Performed by: INTERNAL MEDICINE

## 2023-12-26 PROCEDURE — 82962 GLUCOSE BLOOD TEST: CPT

## 2023-12-26 PROCEDURE — 36415 COLL VENOUS BLD VENIPUNCTURE: CPT

## 2023-12-26 PROCEDURE — 1200000000 HC SEMI PRIVATE

## 2023-12-26 PROCEDURE — 6370000000 HC RX 637 (ALT 250 FOR IP)

## 2023-12-26 PROCEDURE — 80048 BASIC METABOLIC PNL TOTAL CA: CPT

## 2023-12-26 PROCEDURE — 94640 AIRWAY INHALATION TREATMENT: CPT

## 2023-12-26 PROCEDURE — 94761 N-INVAS EAR/PLS OXIMETRY MLT: CPT

## 2023-12-26 PROCEDURE — 83735 ASSAY OF MAGNESIUM: CPT

## 2023-12-26 PROCEDURE — 6370000000 HC RX 637 (ALT 250 FOR IP): Performed by: INTERNAL MEDICINE

## 2023-12-26 PROCEDURE — 99232 SBSQ HOSP IP/OBS MODERATE 35: CPT | Performed by: INTERNAL MEDICINE

## 2023-12-26 PROCEDURE — 2700000000 HC OXYGEN THERAPY PER DAY

## 2023-12-26 RX ORDER — TORSEMIDE 20 MG/1
20 TABLET ORAL 2 TIMES DAILY
Status: DISCONTINUED | OUTPATIENT
Start: 2023-12-26 | End: 2023-12-30

## 2023-12-26 RX ADMIN — DILTIAZEM HYDROCHLORIDE 240 MG: 240 CAPSULE, COATED, EXTENDED RELEASE ORAL at 09:33

## 2023-12-26 RX ADMIN — Medication 400 MG: at 09:33

## 2023-12-26 RX ADMIN — BUDESONIDE AND FORMOTEROL FUMARATE DIHYDRATE 2 PUFF: 160; 4.5 AEROSOL RESPIRATORY (INHALATION) at 22:42

## 2023-12-26 RX ADMIN — ATORVASTATIN CALCIUM 40 MG: 40 TABLET, FILM COATED ORAL at 09:33

## 2023-12-26 RX ADMIN — SODIUM CHLORIDE, PRESERVATIVE FREE 10 ML: 5 INJECTION INTRAVENOUS at 21:53

## 2023-12-26 RX ADMIN — IPRATROPIUM BROMIDE AND ALBUTEROL SULFATE 1 DOSE: 2.5; .5 SOLUTION RESPIRATORY (INHALATION) at 22:41

## 2023-12-26 RX ADMIN — APIXABAN 5 MG: 5 TABLET, FILM COATED ORAL at 21:43

## 2023-12-26 RX ADMIN — BUDESONIDE AND FORMOTEROL FUMARATE DIHYDRATE 2 PUFF: 160; 4.5 AEROSOL RESPIRATORY (INHALATION) at 07:01

## 2023-12-26 RX ADMIN — ACETAMINOPHEN 650 MG: 325 TABLET ORAL at 01:38

## 2023-12-26 RX ADMIN — IPRATROPIUM BROMIDE AND ALBUTEROL SULFATE 1 DOSE: 2.5; .5 SOLUTION RESPIRATORY (INHALATION) at 10:57

## 2023-12-26 RX ADMIN — ACETAMINOPHEN 650 MG: 325 TABLET ORAL at 07:01

## 2023-12-26 RX ADMIN — SODIUM CHLORIDE, PRESERVATIVE FREE 10 ML: 5 INJECTION INTRAVENOUS at 09:34

## 2023-12-26 RX ADMIN — LEVOFLOXACIN 750 MG: 500 TABLET, FILM COATED ORAL at 09:33

## 2023-12-26 RX ADMIN — INSULIN LISPRO 4 UNITS: 100 INJECTION, SOLUTION INTRAVENOUS; SUBCUTANEOUS at 21:55

## 2023-12-26 RX ADMIN — TORSEMIDE 20 MG: 20 TABLET ORAL at 09:37

## 2023-12-26 RX ADMIN — IPRATROPIUM BROMIDE AND ALBUTEROL SULFATE 1 DOSE: 2.5; .5 SOLUTION RESPIRATORY (INHALATION) at 14:54

## 2023-12-26 RX ADMIN — IPRATROPIUM BROMIDE AND ALBUTEROL SULFATE 1 DOSE: 2.5; .5 SOLUTION RESPIRATORY (INHALATION) at 07:01

## 2023-12-26 RX ADMIN — INSULIN LISPRO 2 UNITS: 100 INJECTION, SOLUTION INTRAVENOUS; SUBCUTANEOUS at 09:32

## 2023-12-26 RX ADMIN — INSULIN LISPRO 2 UNITS: 100 INJECTION, SOLUTION INTRAVENOUS; SUBCUTANEOUS at 12:24

## 2023-12-26 RX ADMIN — ROFLUMILAST 500 MCG: 500 TABLET ORAL at 09:37

## 2023-12-26 RX ADMIN — INSULIN GLARGINE 22 UNITS: 100 INJECTION, SOLUTION SUBCUTANEOUS at 21:53

## 2023-12-26 RX ADMIN — IPRATROPIUM BROMIDE AND ALBUTEROL SULFATE 1 DOSE: 2.5; .5 SOLUTION RESPIRATORY (INHALATION) at 04:39

## 2023-12-26 RX ADMIN — POTASSIUM CHLORIDE 10 MEQ: 1500 TABLET, EXTENDED RELEASE ORAL at 09:33

## 2023-12-26 RX ADMIN — TORSEMIDE 20 MG: 20 TABLET ORAL at 21:52

## 2023-12-26 RX ADMIN — APIXABAN 5 MG: 5 TABLET, FILM COATED ORAL at 09:33

## 2023-12-26 ASSESSMENT — PAIN DESCRIPTION - LOCATION
LOCATION: BACK
LOCATION: KNEE;LEG

## 2023-12-26 ASSESSMENT — PAIN - FUNCTIONAL ASSESSMENT
PAIN_FUNCTIONAL_ASSESSMENT: PREVENTS OR INTERFERES SOME ACTIVE ACTIVITIES AND ADLS
PAIN_FUNCTIONAL_ASSESSMENT: ACTIVITIES ARE NOT PREVENTED

## 2023-12-26 ASSESSMENT — PAIN SCALES - GENERAL
PAINLEVEL_OUTOF10: 4
PAINLEVEL_OUTOF10: 4

## 2023-12-26 ASSESSMENT — PAIN DESCRIPTION - ORIENTATION
ORIENTATION: LEFT;RIGHT
ORIENTATION: LOWER;UPPER

## 2023-12-26 ASSESSMENT — PAIN DESCRIPTION - DESCRIPTORS
DESCRIPTORS: ACHING
DESCRIPTORS: ACHING;DISCOMFORT

## 2023-12-26 NOTE — PLAN OF CARE
Problem: Safety - Adult  Goal: Free from fall injury  Outcome: Progressing     Problem: Discharge Planning  Goal: Discharge to home or other facility with appropriate resources  Outcome: Progressing     Problem: Pain  Goal: Verbalizes/displays adequate comfort level or baseline comfort level  Outcome: Progressing

## 2023-12-26 NOTE — CARE COORDINATION
Daniel Gu was readmitted to [address] [place] [address] on 12/24/23 for COPD Exac. Per protocol, Care Transitions episode closed. Care Transitions team will follow up on hospital discharge if eligible.        Jahaira Stanley RN [phone]

## 2023-12-26 NOTE — CARE COORDINATION
Pt lives alone in a home. Pt is active with [facility]. Pt has home O2 and gets MOW.  Pt is independent of his ADLs. Pt has a PCP and has insurance to help with healthcare cost.  No needs at this time.  Plan is for home and to continue with [facility].      CM will need a inpt HC order at discharge.  If pt is discharge after hours please complete the following.  Call [facility] [phone] and inform them of pt discharge and to resume services.  Fax HC order and [phone]

## 2023-12-26 NOTE — PROGRESS NOTES
V2.0  [facility] Hospitalist Progress Note      Name:  Armando Mars /Age/Sex: 1955  (68 y.o. male)   MRN & CSN:  [id number] & [id number] Encounter Date/Time: 2023 9:14 AM EST    Location:  [address] [place] [address]-A PCP: Rosalio Hedrick MD       Hospital Day: 3    Assessment and Plan:   Armando Mars is a 68 y.o. male  who presents with Acute on chronic diastolic (congestive) heart failure (HCC)      Plan:  #.  Acute on chronic diastolic congestive heart failure  -Patient was discharged today, but return to ER with worsening shortness of breath  -proBNP 1145, has bilateral lower extremity edema, increased oxygen requirement.  -Continue IV Lasix twice daily  -Strict input/output, daily weight  -Consult cardiology in place, Diuresing for now  Strict Is and Os     #.  Acute on chronic respiratory failure with hypoxia  -Patient was on 3 L of oxygen, currently requiring 5 L of oxygen through nasal cannula to maintain oxygen saturation>90%.  -Patient was extremely short of breath, oxygen saturation was down to 80s, heart rate went up to 122 stressed by using urinal at bedside.  -Continue oxygen supplementation and wean as tolerated  Not using bipap, encouraged to do so  HCO3 is worsening, needs Bipap, to be placed on bipap for 6 hours in morning and then plan nightly Bipap     #.  Pneumonia with intractable nausea and dry heaves  -Patient was discharged on levofloxacin-continue same dose for now     #.  Chronic atrial fibrillation  - at rest, was up to 122 with slightest activity in the setting of deconditioning  -Resume Cardizem, Eliquis     #.  Uncontrolled diabetes mellitus type 2  -Random blood glucose 325  -Continue Lantus 22 units every afternoon, insulin sliding scale with hypoglycemia protocol.     #.  COPD, chronic respiratory failure on home oxygen  -Does not appear to be in exacerbation  -Patient is on olodaterol, Symbicort, Roflumilast, DuoNeb, albuterol HFA     #.  LINA-continue BiPAP  chloride, 10 mEq, PRN  magnesium sulfate, 2,000 mg, PRN  albuterol sulfate HFA, 2 puff, Q6H PRN  glucose, 4 tablet, PRN  dextrose bolus, 125 mL, PRN   Or  dextrose bolus, 250 mL, PRN  glucagon (rDNA), 1 mg, PRN  dextrose, , Continuous PRN        Labs      Recent Results (from the past 24 hour(s))   POCT Glucose    Collection Time: 12/25/23 11:58 AM   Result Value Ref Range    POC Glucose 162 (H) 70 - 99 MG/DL   POCT Glucose    Collection Time: 12/25/23  4:10 PM   Result Value Ref Range    POC Glucose 226 (H) 70 - 99 MG/DL   POCT Glucose    Collection Time: 12/25/23  9:24 PM   Result Value Ref Range    POC Glucose 198 (H) 70 - 99 MG/DL   Basic Metabolic Panel    Collection Time: 12/26/23  4:38 AM   Result Value Ref Range    Sodium 138 135 - 145 MMOL/L    Potassium 4.8 3.5 - 5.1 MMOL/L    Chloride 93 (L) 99 - 110 mMol/L    CO2 41 (H) 21 - 32 MMOL/L    Anion Gap 4 (L) 7 - 16    Glucose 201 (H) 70 - 99 MG/DL    BUN 7 6 - 23 MG/DL    Creatinine 0.6 (L) 0.9 - 1.3 MG/DL    Est, Glom Filt Rate >60 >60 mL/min/1.73m2    Calcium 8.3 8.3 - 10.6 MG/DL   Magnesium    Collection Time: 12/26/23  4:38 AM   Result Value Ref Range    Magnesium 2.0 1.8 - 2.4 mg/dl   POCT Glucose    Collection Time: 12/26/23  8:14 AM   Result Value Ref Range    POC Glucose 200 (H) 70 - 99 MG/DL        Imaging/Diagnostics Last 24 Hours   XR CHEST PORTABLE    Result Date: 12/24/2023  EXAMINATION: ONE XRAY VIEW OF THE CHEST 12/24/2023 6:22 pm COMPARISON: 12/23/2023. HISTORY: ORDERING SYSTEM PROVIDED HISTORY: sob, h/o of chf and recent pneumonia TECHNOLOGIST PROVIDED HISTORY: Reason for exam:->sob, h/o of chf and recent pneumonia Reason for Exam: sob, h/o of chf and recent pneumonia FINDINGS: Mild bibasilar airspace opacities appear stable.  There is no pleural effusion.  The cardiomediastinal silhouette, pulmonary vessels and interstitium are unremarkable.     Stable mild bibasilar airspace opacities that could reflect atelectasis or pneumonia.  Elsewhere,

## 2023-12-27 ENCOUNTER — APPOINTMENT (OUTPATIENT)
Dept: GENERAL RADIOLOGY | Age: 68
DRG: 291 | End: 2023-12-27
Payer: MEDICARE

## 2023-12-27 LAB
ANION GAP SERPL CALCULATED.3IONS-SCNC: 10 MMOL/L (ref 7–16)
BUN SERPL-MCNC: 10 MG/DL (ref 6–23)
CALCIUM SERPL-MCNC: 8.4 MG/DL (ref 8.3–10.6)
CHLORIDE BLD-SCNC: 92 MMOL/L (ref 99–110)
CO2: 38 MMOL/L (ref 21–32)
CREAT SERPL-MCNC: 0.6 MG/DL (ref 0.9–1.3)
GFR SERPL CREATININE-BSD FRML MDRD: >60 ML/MIN/1.73M2
GLUCOSE BLD-MCNC: 192 MG/DL (ref 70–99)
GLUCOSE BLD-MCNC: 227 MG/DL (ref 70–99)
GLUCOSE BLD-MCNC: 273 MG/DL (ref 70–99)
GLUCOSE BLD-MCNC: 356 MG/DL (ref 70–99)
GLUCOSE SERPL-MCNC: 241 MG/DL (ref 70–99)
MAGNESIUM: 1.8 MG/DL (ref 1.8–2.4)
POTASSIUM SERPL-SCNC: 3.5 MMOL/L (ref 3.5–5.1)
PRO-BNP: 1725 PG/ML
SODIUM BLD-SCNC: 140 MMOL/L (ref 135–145)

## 2023-12-27 PROCEDURE — 2580000003 HC RX 258

## 2023-12-27 PROCEDURE — 99233 SBSQ HOSP IP/OBS HIGH 50: CPT | Performed by: INTERNAL MEDICINE

## 2023-12-27 PROCEDURE — 6370000000 HC RX 637 (ALT 250 FOR IP)

## 2023-12-27 PROCEDURE — 6370000000 HC RX 637 (ALT 250 FOR IP): Performed by: INTERNAL MEDICINE

## 2023-12-27 PROCEDURE — 83735 ASSAY OF MAGNESIUM: CPT

## 2023-12-27 PROCEDURE — 94761 N-INVAS EAR/PLS OXIMETRY MLT: CPT

## 2023-12-27 PROCEDURE — 82962 GLUCOSE BLOOD TEST: CPT

## 2023-12-27 PROCEDURE — 2580000003 HC RX 258: Performed by: INTERNAL MEDICINE

## 2023-12-27 PROCEDURE — 83880 ASSAY OF NATRIURETIC PEPTIDE: CPT

## 2023-12-27 PROCEDURE — 71045 X-RAY EXAM CHEST 1 VIEW: CPT

## 2023-12-27 PROCEDURE — APPSS30 APP SPLIT SHARED TIME 16-30 MINUTES

## 2023-12-27 PROCEDURE — 6360000002 HC RX W HCPCS: Performed by: INTERNAL MEDICINE

## 2023-12-27 PROCEDURE — 94640 AIRWAY INHALATION TREATMENT: CPT

## 2023-12-27 PROCEDURE — 1200000000 HC SEMI PRIVATE

## 2023-12-27 PROCEDURE — 36415 COLL VENOUS BLD VENIPUNCTURE: CPT

## 2023-12-27 PROCEDURE — 80048 BASIC METABOLIC PNL TOTAL CA: CPT

## 2023-12-27 PROCEDURE — 2700000000 HC OXYGEN THERAPY PER DAY

## 2023-12-27 RX ORDER — POTASSIUM CHLORIDE 20 MEQ/1
20 TABLET, EXTENDED RELEASE ORAL ONCE
Status: COMPLETED | OUTPATIENT
Start: 2023-12-27 | End: 2023-12-27

## 2023-12-27 RX ORDER — ACETAZOLAMIDE 500 MG/5ML
250 INJECTION, POWDER, LYOPHILIZED, FOR SOLUTION INTRAVENOUS ONCE
Status: COMPLETED | OUTPATIENT
Start: 2023-12-27 | End: 2023-12-27

## 2023-12-27 RX ORDER — WATER 10 ML/10ML
INJECTION INTRAMUSCULAR; INTRAVENOUS; SUBCUTANEOUS
Status: COMPLETED
Start: 2023-12-27 | End: 2023-12-27

## 2023-12-27 RX ADMIN — POTASSIUM CHLORIDE 10 MEQ: 1500 TABLET, EXTENDED RELEASE ORAL at 09:41

## 2023-12-27 RX ADMIN — APIXABAN 5 MG: 5 TABLET, FILM COATED ORAL at 09:40

## 2023-12-27 RX ADMIN — ATORVASTATIN CALCIUM 40 MG: 40 TABLET, FILM COATED ORAL at 09:41

## 2023-12-27 RX ADMIN — DILTIAZEM HYDROCHLORIDE 240 MG: 240 CAPSULE, COATED, EXTENDED RELEASE ORAL at 09:41

## 2023-12-27 RX ADMIN — IPRATROPIUM BROMIDE AND ALBUTEROL SULFATE 1 DOSE: 2.5; .5 SOLUTION RESPIRATORY (INHALATION) at 17:12

## 2023-12-27 RX ADMIN — INSULIN LISPRO 4 UNITS: 100 INJECTION, SOLUTION INTRAVENOUS; SUBCUTANEOUS at 20:28

## 2023-12-27 RX ADMIN — LEVOFLOXACIN 750 MG: 500 TABLET, FILM COATED ORAL at 09:40

## 2023-12-27 RX ADMIN — APIXABAN 5 MG: 5 TABLET, FILM COATED ORAL at 20:17

## 2023-12-27 RX ADMIN — SODIUM CHLORIDE, PRESERVATIVE FREE 10 ML: 5 INJECTION INTRAVENOUS at 09:44

## 2023-12-27 RX ADMIN — ACETAZOLAMIDE 250 MG: 500 INJECTION, POWDER, LYOPHILIZED, FOR SOLUTION INTRAVENOUS at 11:18

## 2023-12-27 RX ADMIN — TORSEMIDE 20 MG: 20 TABLET ORAL at 09:41

## 2023-12-27 RX ADMIN — INSULIN LISPRO 4 UNITS: 100 INJECTION, SOLUTION INTRAVENOUS; SUBCUTANEOUS at 09:38

## 2023-12-27 RX ADMIN — ACETAMINOPHEN 650 MG: 325 TABLET ORAL at 03:46

## 2023-12-27 RX ADMIN — IPRATROPIUM BROMIDE AND ALBUTEROL SULFATE 1 DOSE: 2.5; .5 SOLUTION RESPIRATORY (INHALATION) at 20:23

## 2023-12-27 RX ADMIN — IPRATROPIUM BROMIDE AND ALBUTEROL SULFATE 1 DOSE: 2.5; .5 SOLUTION RESPIRATORY (INHALATION) at 11:40

## 2023-12-27 RX ADMIN — SODIUM CHLORIDE, PRESERVATIVE FREE 10 ML: 5 INJECTION INTRAVENOUS at 20:17

## 2023-12-27 RX ADMIN — INSULIN GLARGINE 22 UNITS: 100 INJECTION, SOLUTION SUBCUTANEOUS at 20:18

## 2023-12-27 RX ADMIN — ACETAMINOPHEN 650 MG: 325 TABLET ORAL at 15:04

## 2023-12-27 RX ADMIN — BUDESONIDE AND FORMOTEROL FUMARATE DIHYDRATE 2 PUFF: 160; 4.5 AEROSOL RESPIRATORY (INHALATION) at 12:06

## 2023-12-27 RX ADMIN — TORSEMIDE 20 MG: 20 TABLET ORAL at 20:17

## 2023-12-27 RX ADMIN — Medication 400 MG: at 09:40

## 2023-12-27 RX ADMIN — BUDESONIDE AND FORMOTEROL FUMARATE DIHYDRATE 2 PUFF: 160; 4.5 AEROSOL RESPIRATORY (INHALATION) at 20:23

## 2023-12-27 RX ADMIN — POTASSIUM CHLORIDE 20 MEQ: 1500 TABLET, EXTENDED RELEASE ORAL at 11:19

## 2023-12-27 RX ADMIN — ACETAMINOPHEN 650 MG: 325 TABLET ORAL at 20:17

## 2023-12-27 RX ADMIN — INSULIN LISPRO 2 UNITS: 100 INJECTION, SOLUTION INTRAVENOUS; SUBCUTANEOUS at 18:14

## 2023-12-27 RX ADMIN — ROFLUMILAST 500 MCG: 500 TABLET ORAL at 09:43

## 2023-12-27 RX ADMIN — WATER: 1 INJECTION INTRAMUSCULAR; INTRAVENOUS; SUBCUTANEOUS at 11:19

## 2023-12-27 ASSESSMENT — PAIN SCALES - GENERAL
PAINLEVEL_OUTOF10: 2
PAINLEVEL_OUTOF10: 7
PAINLEVEL_OUTOF10: 5
PAINLEVEL_OUTOF10: 7
PAINLEVEL_OUTOF10: 5

## 2023-12-27 ASSESSMENT — PAIN SCALES - WONG BAKER
WONGBAKER_NUMERICALRESPONSE: 0
WONGBAKER_NUMERICALRESPONSE: 2

## 2023-12-27 ASSESSMENT — PAIN DESCRIPTION - DESCRIPTORS
DESCRIPTORS: ACHING

## 2023-12-27 ASSESSMENT — PAIN DESCRIPTION - ORIENTATION
ORIENTATION: RIGHT;LEFT
ORIENTATION: RIGHT;LEFT

## 2023-12-27 ASSESSMENT — PAIN - FUNCTIONAL ASSESSMENT
PAIN_FUNCTIONAL_ASSESSMENT: PREVENTS OR INTERFERES SOME ACTIVE ACTIVITIES AND ADLS
PAIN_FUNCTIONAL_ASSESSMENT: ACTIVITIES ARE NOT PREVENTED
PAIN_FUNCTIONAL_ASSESSMENT: PREVENTS OR INTERFERES SOME ACTIVE ACTIVITIES AND ADLS

## 2023-12-27 ASSESSMENT — PAIN DESCRIPTION - LOCATION
LOCATION: LEG
LOCATION: LEG
LOCATION: SHOULDER

## 2023-12-27 NOTE — PROGRESS NOTES
12/24/23 1939   PROBNP 1,145*     TROPONIN: No results for input(s): \"TROPONINT\" in the last 72 hours.     ECHO :   echocardiogram    Assessment:  68 y.o.year old who is admitted for  Chief Complaint   Patient presents with    Shortness of Breath     COPD exacerbation, left AMA this morning from hospital    , active issues as noted below:  Impression:  Principal Problem:    Acute on chronic diastolic (congestive) heart failure (HCC)  Resolved Problems:    * No resolved hospital problems. *            All labs, medications and tests reviewed by myself , continue all other medications of all above medical condition listed as is except for changes mentioned above.    Thank you very much for consult , please call with questions.    Sayed Harsh Melendez MD, MD 12/27/2023 5:07 PM

## 2023-12-27 NOTE — PROGRESS NOTES
Cardiology Progress Note    Subjective/Overnight Events:  Patient is still complaining of lower extremity edema as well as shortness of breath.  Patient is however laying flat in bed and does not appear to be in any acute distress.  He is still requiring supplemental oxygen.    Oxygen is continuing to drop with exertion.    Assessment/Plan:  Acute on chronic heart failure with preserved ejection fraction  Chronic venous insufficiency  -Patient with 2+ pitting edema and shortness of breath  -Strict I's and O's and daily weights.  -Still appears volume overloaded, but improving.  -CHF nurse coordinator  -Repeat BNP and CXR  -Continue torsemide 20 mg twice daily  Persistent atrial fibrillation s/p ablation 2018  -Currently atrial fibrillation with rate controlled.  -Goal potassium 4.0-4.5, magnesium 2.0-2.2. Replete per protocol.  -Continue Cardizem 240 mg daily.  -Continue Eliquis 5 mg twice daily  Morbid obesity: BMI 44  Peripheral arterial disease s/p right redo femoral popliteal bypass  Suspected sleep apnea  COPD        Justin Aden PA-C 12/27/23 3:22 PM

## 2023-12-27 NOTE — PLAN OF CARE
Problem: Safety - Adult  Goal: Free from fall injury  12/26/2023 1556 by Yesi Pantoja RN  Outcome: Progressing     Problem: Discharge Planning  Goal: Discharge to home or other facility with appropriate resources  12/26/2023 1556 by Yesi Pantoja RN  Outcome: Progressing     Problem: Pain  Goal: Verbalizes/displays adequate comfort level or baseline comfort level  Recent Flowsheet Documentation  Taken 12/26/2023 2130 by Jeff Sandra RN  Verbalizes/displays adequate comfort level or baseline comfort level:   Encourage patient to monitor pain and request assistance   Assess pain using appropriate pain scale   Administer analgesics based on type and severity of pain and evaluate response  12/26/2023 1556 by Yesi Pantoja RN  Outcome: Progressing

## 2023-12-27 NOTE — PROGRESS NOTES
12/27/23 1605   Encounter Summary   Encounter Overview/Reason  Initial Encounter   Service Provided For: Patient   Referral/Consult From: [facility]   Support System Family members   Last Encounter  12/27/23  (PT felling much better, he is confident he will recover well, he's feeling tired but coping and adjusting to his health issues. PT has solid support, he is hopeful.)   Complexity of Encounter Low   Begin Time 1552   End Time  1607   Total Time Calculated 15 min   Spiritual/Emotional needs   Type Spiritual Support   Grief, Loss, and Adjustments   Type Adjustment to illness   Assessment/Intervention/Outcome   Assessment Calm;Coping;Concerns with suffering;Hopeful;Stress overload   Intervention Active listening;Discussed illness injury and it’s impact;Discussed meaning/purpose;Explored/Affirmed feelings, thoughts, concerns;Explored Coping Skills/Resources;Sustaining Presence/Ministry of presence   Outcome Comfort;Coping;Concerns relieved;Encouraged;Engaged in conversation;Expressed feelings, needs, and concerns;Expressed Gratitude;Less anxious, Less agitated   Plan and Referrals   Plan/Referrals Continue Support (comment)

## 2023-12-27 NOTE — PROGRESS NOTES
Cardiology Progress Note     Admit Date:  12/24/2023    Consult reason/ Seen today for :       Subjective and  Overnight Events : Continues to lie down flat lethargic somnolent short of breath he says that he would like to go home breathing is better      Chief complain on admission : 68 y.o.year old who is admitted for  Chief Complaint   Patient presents with    Shortness of Breath     COPD exacerbation, left AMA this morning from hospital      Assessment / Plan:  Heart failure with preserved EF still has some edema continue IV Lasix twice daily watch for toxicity  Elevated Troponin : will continue medical management for now.   CHF: Acute Systolic/ Diastolic decompensated heart failure. Continue IV Lasix 40 mg BID.  Daily weights , strict Is and Os  Paroxysmal afib: Persistent atrial fibrillation rate controlled keep electrolytes within normal range continue Cardizem to 40 mg and Eliquis 5 mg twice daily  HTN: stable, continue To titrate up medication as needed  Peripheral arterial disease s/p right femoropopliteal bypass stable  DVT Prophylaxis if no contraindication    Past medical history:    has a past medical history of A-fib ([facility]), Anxiety, Arthritis, COPD (chronic obstructive pulmonary disease) ([facility]), Depression, Diabetes mellitus ([facility]), Full dentures, H/O angiography, H/O Doppler ultrasound, H/O echocardiogram, Hx of colonoscopy, Hx of Doppler ultrasound, Hyperlipidemia, Hypertension, Macular degeneration, Obesity, On home oxygen therapy, PAD (peripheral artery disease) ([facility]), Panic attacks, Pneumonia, PTSD (post-traumatic stress disorder), Restless leg, Shortness of breath, Sleep apnea, and Wears glasses.  Past surgical history:   has a past surgical history that includes Atrial ablation surgery (05/23/2018); pr laparoscopy surg cholecystectomy (N/A, 11/12/2018); Colonoscopy (07/2011); eye surgery (Left, 2017); Dental surgery;      ECHO :   echocardiogram    Assessment:  68 y.o.year old who is admitted for  Chief Complaint   Patient presents with    Shortness of Breath     COPD exacerbation, left AMA this morning from hospital    , active issues as noted below:  Impression:  Principal Problem:    Acute on chronic diastolic (congestive) heart failure (HCC)  Resolved Problems:    * No resolved hospital problems. *            All labs, medications and tests reviewed by myself , continue all other medications of all above medical condition listed as is except for changes mentioned above.    Thank you very much for consult , please call with questions.    Sayed Harsh Melendez MD, MD 12/26/2023 7:06 PM

## 2023-12-27 NOTE — PROGRESS NOTES
Range    Sodium 140 135 - 145 MMOL/L    Potassium 3.5 3.5 - 5.1 MMOL/L    Chloride 92 (L) 99 - 110 mMol/L    CO2 38 (H) 21 - 32 MMOL/L    Anion Gap 10 7 - 16    Glucose 241 (H) 70 - 99 MG/DL    BUN 10 6 - 23 MG/DL    Creatinine 0.6 (L) 0.9 - 1.3 MG/DL    Est, Glom Filt Rate >60 >60 mL/min/1.73m2    Calcium 8.4 8.3 - 10.6 MG/DL   Magnesium    Collection Time: 12/27/23 12:30 AM   Result Value Ref Range    Magnesium 1.8 1.8 - 2.4 mg/dl   POCT Glucose    Collection Time: 12/27/23  8:23 AM   Result Value Ref Range    POC Glucose 273 (H) 70 - 99 MG/DL        Imaging/Diagnostics Last 24 Hours   XR CHEST PORTABLE    Result Date: 12/24/2023  EXAMINATION: ONE XRAY VIEW OF THE CHEST 12/24/2023 6:22 pm COMPARISON: 12/23/2023. HISTORY: ORDERING SYSTEM PROVIDED HISTORY: sob, h/o of chf and recent pneumonia TECHNOLOGIST PROVIDED HISTORY: Reason for exam:->sob, h/o of chf and recent pneumonia Reason for Exam: sob, h/o of chf and recent pneumonia FINDINGS: Mild bibasilar airspace opacities appear stable.  There is no pleural effusion.  The cardiomediastinal silhouette, pulmonary vessels and interstitium are unremarkable.     Stable mild bibasilar airspace opacities that could reflect atelectasis or pneumonia.  Elsewhere, there are no acute findings.     XR CHEST PORTABLE    Result Date: 12/23/2023  EXAMINATION: ONE XRAY VIEW OF THE CHEST 12/23/2023 11:18 am COMPARISON: Chest radiograph 12/21/2023. HISTORY: ORDERING SYSTEM PROVIDED HISTORY: shortness of breath TECHNOLOGIST PROVIDED HISTORY: Reason for exam:->shortness of breath Reason for Exam: shortness of breath FINDINGS: Single view provided. Stable mediastinal silhouette.  Stable central pulmonary vascular prominence. Mild bilateral pleural effusions.  Stable mild bilateral lower lobe/lung base pulmonary opacities.  No lobar consolidation.  No pneumothorax or free subdiaphragmatic air.     1. Stable mild bibasilar opacities which could represent atelectasis versus possible

## 2023-12-28 ENCOUNTER — CARE COORDINATION (OUTPATIENT)
Dept: CASE MANAGEMENT | Age: 68
End: 2023-12-28

## 2023-12-28 LAB
ANION GAP SERPL CALCULATED.3IONS-SCNC: 11 MMOL/L (ref 7–16)
BUN SERPL-MCNC: 7 MG/DL (ref 6–23)
CALCIUM SERPL-MCNC: 8.5 MG/DL (ref 8.3–10.6)
CHLORIDE BLD-SCNC: 96 MMOL/L (ref 99–110)
CO2: 31 MMOL/L (ref 21–32)
CREAT SERPL-MCNC: 0.5 MG/DL (ref 0.9–1.3)
GFR SERPL CREATININE-BSD FRML MDRD: >60 ML/MIN/1.73M2
GLUCOSE BLD-MCNC: 179 MG/DL (ref 70–99)
GLUCOSE BLD-MCNC: 210 MG/DL (ref 70–99)
GLUCOSE BLD-MCNC: 247 MG/DL (ref 70–99)
GLUCOSE BLD-MCNC: 279 MG/DL (ref 70–99)
GLUCOSE SERPL-MCNC: 213 MG/DL (ref 70–99)
HCT VFR BLD CALC: 37.5 % (ref 42–52)
HEMOGLOBIN: 11.7 GM/DL (ref 13.5–18)
MAGNESIUM: 1.9 MG/DL (ref 1.8–2.4)
MCH RBC QN AUTO: 30.8 PG (ref 27–31)
MCHC RBC AUTO-ENTMCNC: 31.2 % (ref 32–36)
MCV RBC AUTO: 98.7 FL (ref 78–100)
PDW BLD-RTO: 16.8 % (ref 11.7–14.9)
PLATELET # BLD: 163 K/CU MM (ref 140–440)
PMV BLD AUTO: 10.2 FL (ref 7.5–11.1)
POTASSIUM SERPL-SCNC: 3.9 MMOL/L (ref 3.5–5.1)
RBC # BLD: 3.8 M/CU MM (ref 4.6–6.2)
SODIUM BLD-SCNC: 138 MMOL/L (ref 135–145)
WBC # BLD: 10.7 K/CU MM (ref 4–10.5)

## 2023-12-28 PROCEDURE — 83735 ASSAY OF MAGNESIUM: CPT

## 2023-12-28 PROCEDURE — 6360000002 HC RX W HCPCS

## 2023-12-28 PROCEDURE — 85027 COMPLETE CBC AUTOMATED: CPT

## 2023-12-28 PROCEDURE — 2580000003 HC RX 258: Performed by: INTERNAL MEDICINE

## 2023-12-28 PROCEDURE — 6370000000 HC RX 637 (ALT 250 FOR IP): Performed by: INTERNAL MEDICINE

## 2023-12-28 PROCEDURE — 82962 GLUCOSE BLOOD TEST: CPT

## 2023-12-28 PROCEDURE — 2700000000 HC OXYGEN THERAPY PER DAY

## 2023-12-28 PROCEDURE — APPSS30 APP SPLIT SHARED TIME 16-30 MINUTES

## 2023-12-28 PROCEDURE — 1200000000 HC SEMI PRIVATE

## 2023-12-28 PROCEDURE — 6370000000 HC RX 637 (ALT 250 FOR IP): Performed by: FAMILY MEDICINE

## 2023-12-28 PROCEDURE — 6370000000 HC RX 637 (ALT 250 FOR IP)

## 2023-12-28 PROCEDURE — 36415 COLL VENOUS BLD VENIPUNCTURE: CPT

## 2023-12-28 PROCEDURE — 80048 BASIC METABOLIC PNL TOTAL CA: CPT

## 2023-12-28 PROCEDURE — 94640 AIRWAY INHALATION TREATMENT: CPT

## 2023-12-28 PROCEDURE — 99233 SBSQ HOSP IP/OBS HIGH 50: CPT | Performed by: INTERNAL MEDICINE

## 2023-12-28 PROCEDURE — 94761 N-INVAS EAR/PLS OXIMETRY MLT: CPT

## 2023-12-28 RX ORDER — MIDODRINE HYDROCHLORIDE 5 MG/1
10 TABLET ORAL ONCE
Status: COMPLETED | OUTPATIENT
Start: 2023-12-28 | End: 2023-12-28

## 2023-12-28 RX ORDER — FUROSEMIDE 10 MG/ML
40 INJECTION INTRAMUSCULAR; INTRAVENOUS 2 TIMES DAILY
Status: COMPLETED | OUTPATIENT
Start: 2023-12-28 | End: 2023-12-29

## 2023-12-28 RX ADMIN — MIDODRINE HYDROCHLORIDE 10 MG: 5 TABLET ORAL at 04:00

## 2023-12-28 RX ADMIN — SODIUM CHLORIDE, PRESERVATIVE FREE 10 ML: 5 INJECTION INTRAVENOUS at 10:11

## 2023-12-28 RX ADMIN — APIXABAN 5 MG: 5 TABLET, FILM COATED ORAL at 10:09

## 2023-12-28 RX ADMIN — TORSEMIDE 20 MG: 20 TABLET ORAL at 10:09

## 2023-12-28 RX ADMIN — ACETAMINOPHEN 650 MG: 325 TABLET ORAL at 18:52

## 2023-12-28 RX ADMIN — LEVOFLOXACIN 750 MG: 500 TABLET, FILM COATED ORAL at 10:09

## 2023-12-28 RX ADMIN — FUROSEMIDE 40 MG: 10 INJECTION, SOLUTION INTRAMUSCULAR; INTRAVENOUS at 18:58

## 2023-12-28 RX ADMIN — ATORVASTATIN CALCIUM 40 MG: 40 TABLET, FILM COATED ORAL at 10:10

## 2023-12-28 RX ADMIN — INSULIN LISPRO 2 UNITS: 100 INJECTION, SOLUTION INTRAVENOUS; SUBCUTANEOUS at 10:11

## 2023-12-28 RX ADMIN — IPRATROPIUM BROMIDE AND ALBUTEROL SULFATE 1 DOSE: 2.5; .5 SOLUTION RESPIRATORY (INHALATION) at 15:46

## 2023-12-28 RX ADMIN — INSULIN LISPRO 2 UNITS: 100 INJECTION, SOLUTION INTRAVENOUS; SUBCUTANEOUS at 13:27

## 2023-12-28 RX ADMIN — ALBUTEROL SULFATE 2 PUFF: 90 AEROSOL, METERED RESPIRATORY (INHALATION) at 20:17

## 2023-12-28 RX ADMIN — ALBUTEROL SULFATE 2 PUFF: 90 AEROSOL, METERED RESPIRATORY (INHALATION) at 05:31

## 2023-12-28 RX ADMIN — BUDESONIDE AND FORMOTEROL FUMARATE DIHYDRATE 2 PUFF: 160; 4.5 AEROSOL RESPIRATORY (INHALATION) at 09:27

## 2023-12-28 RX ADMIN — SODIUM CHLORIDE, PRESERVATIVE FREE 10 ML: 5 INJECTION INTRAVENOUS at 20:35

## 2023-12-28 RX ADMIN — POTASSIUM CHLORIDE 10 MEQ: 1500 TABLET, EXTENDED RELEASE ORAL at 10:09

## 2023-12-28 RX ADMIN — INSULIN GLARGINE 22 UNITS: 100 INJECTION, SOLUTION SUBCUTANEOUS at 20:28

## 2023-12-28 RX ADMIN — DILTIAZEM HYDROCHLORIDE 240 MG: 240 CAPSULE, COATED, EXTENDED RELEASE ORAL at 10:10

## 2023-12-28 RX ADMIN — Medication 400 MG: at 10:09

## 2023-12-28 RX ADMIN — BUDESONIDE AND FORMOTEROL FUMARATE DIHYDRATE 2 PUFF: 160; 4.5 AEROSOL RESPIRATORY (INHALATION) at 20:17

## 2023-12-28 RX ADMIN — IPRATROPIUM BROMIDE AND ALBUTEROL SULFATE 1 DOSE: 2.5; .5 SOLUTION RESPIRATORY (INHALATION) at 09:27

## 2023-12-28 RX ADMIN — APIXABAN 5 MG: 5 TABLET, FILM COATED ORAL at 20:30

## 2023-12-28 RX ADMIN — ALBUTEROL SULFATE 2 PUFF: 90 AEROSOL, METERED RESPIRATORY (INHALATION) at 12:49

## 2023-12-28 RX ADMIN — ROFLUMILAST 500 MCG: 500 TABLET ORAL at 10:10

## 2023-12-28 ASSESSMENT — PAIN DESCRIPTION - LOCATION
LOCATION: SHOULDER
LOCATION: SHOULDER

## 2023-12-28 ASSESSMENT — PAIN DESCRIPTION - ORIENTATION
ORIENTATION: RIGHT;LEFT
ORIENTATION: RIGHT;LEFT

## 2023-12-28 ASSESSMENT — PAIN SCALES - WONG BAKER
WONGBAKER_NUMERICALRESPONSE: 2
WONGBAKER_NUMERICALRESPONSE: 0

## 2023-12-28 ASSESSMENT — PAIN - FUNCTIONAL ASSESSMENT: PAIN_FUNCTIONAL_ASSESSMENT: ACTIVITIES ARE NOT PREVENTED

## 2023-12-28 ASSESSMENT — PAIN DESCRIPTION - PAIN TYPE: TYPE: CHRONIC PAIN

## 2023-12-28 ASSESSMENT — PAIN DESCRIPTION - DESCRIPTORS: DESCRIPTORS: SHARP;ACHING

## 2023-12-28 ASSESSMENT — PAIN SCALES - GENERAL
PAINLEVEL_OUTOF10: 7
PAINLEVEL_OUTOF10: 0
PAINLEVEL_OUTOF10: 2

## 2023-12-28 ASSESSMENT — PAIN DESCRIPTION - ONSET: ONSET: ON-GOING

## 2023-12-28 ASSESSMENT — PAIN DESCRIPTION - FREQUENCY: FREQUENCY: INTERMITTENT

## 2023-12-28 NOTE — PROGRESS NOTES
Cardiology Progress Note     Admit Date:  12/24/2023    Consult reason/ Seen today for :       Subjective and  Overnight Events :  oes not interact much   Continues to lie down flat lethargic somnolent short of breath he says that he would like to go home breathing is better  Cxr and bnp are still abnormal     Chief complain on admission : 68 y.o.year old who is admitted for  Chief Complaint   Patient presents with    Shortness of Breath     COPD exacerbation, left AMA this morning from hospital      Assessment / Plan:  Heart failure with preserved EF still has some edema currently on torsemide watch for toxicity but he does not improve  Restart lasix 40 mg IV and get clear I a nd O  Get chest x-ray repeat BNP and debate about dose of diuresis depending on result  Elevated Troponin : will continue medical management for now.   CHF: Acute Systolic/ Diastolic decompensated heart failure. Continue IV Lasix 40 mg BID.  Daily weights , strict Is and Os  Paroxysmal afib: Persistent atrial fibrillation rate controlled keep electrolytes within normal range continue Cardizem to 240 mg and Eliquis 5 mg twice daily  HTN: stable, continue To titrate up medication as needed  Peripheral arterial disease s/p right femoropopliteal bypass stable  DVT Prophylaxis if no contraindication    Past medical history:    has a past medical history of A-fib (HCC), Anxiety, Arthritis, COPD (chronic obstructive pulmonary disease) ([facility]), Depression, Diabetes mellitus (HCC), Full dentures, H/O angiography, H/O Doppler ultrasound, H/O echocardiogram, Hx of colonoscopy, Hx of Doppler ultrasound, Hyperlipidemia, Hypertension, Macular degeneration, Obesity, On home oxygen therapy, PAD (peripheral artery disease) ([facility]), Panic attacks, Pneumonia, PTSD (post-traumatic stress disorder), Restless leg, Shortness of breath, Sleep apnea, and Wears glasses.  Past surgical

## 2023-12-28 NOTE — PROGRESS NOTES
Exam  Constitutional:       Appearance: He is not diaphoretic.   Pulmonary:      Effort: Pulmonary effort is normal.   Neurological:      Cranial Nerves: No cranial nerve deficit.          Medications:   Medications:    torsemide  20 mg Oral BID    sodium chloride flush  5-40 mL IntraVENous 2 times per day    apixaban  5 mg Oral BID    atorvastatin  40 mg Oral Daily    dilTIAZem  240 mg Oral Daily    insulin glargine  22 Units SubCUTAneous Nightly    ipratropium 0.5 mg-albuterol 2.5 mg  1 Dose Inhalation Q4H WA RT    levoFLOXacin  750 mg Oral Daily    magnesium oxide  400 mg Oral Daily    potassium chloride  10 mEq Oral Daily    Roflumilast  500 mcg Oral Daily    budesonide-formoterol  2 puff Inhalation BID    insulin lispro  0-8 Units SubCUTAneous TID WC    insulin lispro  0-4 Units SubCUTAneous Nightly      Infusions:    sodium chloride      dextrose       PRN Meds: sodium chloride flush, 5-40 mL, PRN  sodium chloride, , PRN  ondansetron, 4 mg, Q8H PRN   Or  ondansetron, 4 mg, Q6H PRN  polyethylene glycol, 17 g, Daily PRN  acetaminophen, 650 mg, Q6H PRN   Or  acetaminophen, 650 mg, Q6H PRN  potassium chloride, 40 mEq, PRN   Or  potassium alternative oral replacement, 40 mEq, PRN   Or  potassium chloride, 10 mEq, PRN  magnesium sulfate, 2,000 mg, PRN  albuterol sulfate HFA, 2 puff, Q6H PRN  glucose, 4 tablet, PRN  dextrose bolus, 125 mL, PRN   Or  dextrose bolus, 250 mL, PRN  glucagon (rDNA), 1 mg, PRN  dextrose, , Continuous PRN        Labs      Recent Results (from the past 24 hour(s))   Brain Natriuretic Peptide    Collection Time: 12/27/23  5:10 PM   Result Value Ref Range    Pro-BNP 1,725 (H) <300 PG/ML   POCT Glucose    Collection Time: 12/27/23  5:41 PM   Result Value Ref Range    POC Glucose 227 (H) 70 - 99 MG/DL   POCT Glucose    Collection Time: 12/27/23  8:15 PM   Result Value Ref Range    POC Glucose 356 (H) 70 - 99 MG/DL   POCT Glucose    Collection Time: 12/28/23  8:09 AM   Result Value Ref Range     of breath TECHNOLOGIST PROVIDED HISTORY: Reason for exam:->shortness of breath Reason for Exam: shortness of breath FINDINGS: Single view provided. Stable mediastinal silhouette.  Stable central pulmonary vascular prominence. Mild bilateral pleural effusions.  Stable mild bilateral lower lobe/lung base pulmonary opacities.  No lobar consolidation.  No pneumothorax or free subdiaphragmatic air.     1. Stable mild bibasilar opacities which could represent atelectasis versus possible pneumonia. 2. Stable mild bilateral pleural effusions.       Electronically signed by EUGENIE RAJPUT MD on 12/28/2023 at 2:25 PM

## 2023-12-28 NOTE — CARE COORDINATION
Pt discharge plans remain the same for home with [facility].     CM will need a inpt HC order at discharge.  If pt is discharge after hours please complete the following.  Call [facility] [phone] and inform them of pt discharge and to resume services.  Fax HC order and [phone]

## 2023-12-28 NOTE — PROGRESS NOTES
Cardiology Progress Note    Assessment/Plan:  Acute on chronic heart failure with preserved ejection fraction  Chronic venous insufficiency  -Patient with 2+ pitting edema and shortness of breath  -Strict I's and O's and daily weights.  -Still appears volume overloaded, but improving.  -CHF nurse coordinator  -Repeat BNP slightly more elevated.  Chest x-ray relatively unchanged  -Give two doses of IV lasix 40 mg twice daily. Likely switch back to oral torsemide following  Persistent atrial fibrillation s/p ablation 2018  -Currently atrial fibrillation with rate controlled.  -Goal potassium 4.0-4.5, magnesium 2.0-2.2. Replete per protocol.  -Continue Cardizem 240 mg daily.  -Continue Eliquis 5 mg twice daily  Morbid obesity: BMI 44  Peripheral arterial disease s/p right redo femoral popliteal bypass  Suspected sleep apnea  COPD        Justin Aden PA-C 12/28/23 5:03 PM

## 2023-12-29 LAB
ANION GAP SERPL CALCULATED.3IONS-SCNC: 9 MMOL/L (ref 7–16)
BUN SERPL-MCNC: 10 MG/DL (ref 6–23)
CALCIUM SERPL-MCNC: 8.3 MG/DL (ref 8.3–10.6)
CHLORIDE BLD-SCNC: 98 MMOL/L (ref 99–110)
CO2: 34 MMOL/L (ref 21–32)
CREAT SERPL-MCNC: 0.6 MG/DL (ref 0.9–1.3)
GFR SERPL CREATININE-BSD FRML MDRD: >60 ML/MIN/1.73M2
GLUCOSE BLD-MCNC: 196 MG/DL (ref 70–99)
GLUCOSE BLD-MCNC: 221 MG/DL (ref 70–99)
GLUCOSE BLD-MCNC: 254 MG/DL (ref 70–99)
GLUCOSE BLD-MCNC: 261 MG/DL (ref 70–99)
GLUCOSE SERPL-MCNC: 177 MG/DL (ref 70–99)
HCT VFR BLD CALC: 34.2 % (ref 42–52)
HEMOGLOBIN: 10.8 GM/DL (ref 13.5–18)
MAGNESIUM: 1.9 MG/DL (ref 1.8–2.4)
MCH RBC QN AUTO: 31 PG (ref 27–31)
MCHC RBC AUTO-ENTMCNC: 31.6 % (ref 32–36)
MCV RBC AUTO: 98.3 FL (ref 78–100)
PDW BLD-RTO: 16.9 % (ref 11.7–14.9)
PLATELET # BLD: 158 K/CU MM (ref 140–440)
PMV BLD AUTO: 10 FL (ref 7.5–11.1)
POTASSIUM SERPL-SCNC: 4.1 MMOL/L (ref 3.5–5.1)
RBC # BLD: 3.48 M/CU MM (ref 4.6–6.2)
SODIUM BLD-SCNC: 141 MMOL/L (ref 135–145)
WBC # BLD: 9.4 K/CU MM (ref 4–10.5)

## 2023-12-29 PROCEDURE — 6370000000 HC RX 637 (ALT 250 FOR IP): Performed by: INTERNAL MEDICINE

## 2023-12-29 PROCEDURE — 85027 COMPLETE CBC AUTOMATED: CPT

## 2023-12-29 PROCEDURE — 82962 GLUCOSE BLOOD TEST: CPT

## 2023-12-29 PROCEDURE — APPSS30 APP SPLIT SHARED TIME 16-30 MINUTES

## 2023-12-29 PROCEDURE — 1200000000 HC SEMI PRIVATE

## 2023-12-29 PROCEDURE — 99233 SBSQ HOSP IP/OBS HIGH 50: CPT | Performed by: INTERNAL MEDICINE

## 2023-12-29 PROCEDURE — 94761 N-INVAS EAR/PLS OXIMETRY MLT: CPT

## 2023-12-29 PROCEDURE — 83735 ASSAY OF MAGNESIUM: CPT

## 2023-12-29 PROCEDURE — 94660 CPAP INITIATION&MGMT: CPT

## 2023-12-29 PROCEDURE — 2580000003 HC RX 258: Performed by: INTERNAL MEDICINE

## 2023-12-29 PROCEDURE — 80048 BASIC METABOLIC PNL TOTAL CA: CPT

## 2023-12-29 PROCEDURE — 2700000000 HC OXYGEN THERAPY PER DAY

## 2023-12-29 PROCEDURE — 6370000000 HC RX 637 (ALT 250 FOR IP)

## 2023-12-29 PROCEDURE — 36415 COLL VENOUS BLD VENIPUNCTURE: CPT

## 2023-12-29 PROCEDURE — 6360000002 HC RX W HCPCS

## 2023-12-29 PROCEDURE — 94640 AIRWAY INHALATION TREATMENT: CPT

## 2023-12-29 RX ORDER — FUROSEMIDE 10 MG/ML
40 INJECTION INTRAMUSCULAR; INTRAVENOUS 2 TIMES DAILY
Status: COMPLETED | OUTPATIENT
Start: 2023-12-29 | End: 2023-12-30

## 2023-12-29 RX ORDER — IBUPROFEN 400 MG/1
400 TABLET ORAL ONCE
Status: COMPLETED | OUTPATIENT
Start: 2023-12-29 | End: 2023-12-29

## 2023-12-29 RX ORDER — TIOTROPIUM BROMIDE 18 UG/1
18 CAPSULE ORAL; RESPIRATORY (INHALATION) DAILY
Qty: 90 CAPSULE | Refills: 1 | Status: SHIPPED | OUTPATIENT
Start: 2023-12-29 | End: 2024-01-01

## 2023-12-29 RX ORDER — ACETAZOLAMIDE 250 MG/1
250 TABLET ORAL 2 TIMES DAILY
Status: COMPLETED | OUTPATIENT
Start: 2023-12-29 | End: 2023-12-31

## 2023-12-29 RX ADMIN — LEVOFLOXACIN 750 MG: 500 TABLET, FILM COATED ORAL at 09:15

## 2023-12-29 RX ADMIN — INSULIN LISPRO 2 UNITS: 100 INJECTION, SOLUTION INTRAVENOUS; SUBCUTANEOUS at 13:27

## 2023-12-29 RX ADMIN — Medication 400 MG: at 09:13

## 2023-12-29 RX ADMIN — IPRATROPIUM BROMIDE AND ALBUTEROL SULFATE 1 DOSE: 2.5; .5 SOLUTION RESPIRATORY (INHALATION) at 23:29

## 2023-12-29 RX ADMIN — DILTIAZEM HYDROCHLORIDE 240 MG: 240 CAPSULE, COATED, EXTENDED RELEASE ORAL at 09:15

## 2023-12-29 RX ADMIN — INSULIN LISPRO 4 UNITS: 100 INJECTION, SOLUTION INTRAVENOUS; SUBCUTANEOUS at 18:06

## 2023-12-29 RX ADMIN — IPRATROPIUM BROMIDE AND ALBUTEROL SULFATE 1 DOSE: 2.5; .5 SOLUTION RESPIRATORY (INHALATION) at 08:53

## 2023-12-29 RX ADMIN — TORSEMIDE 20 MG: 20 TABLET ORAL at 13:28

## 2023-12-29 RX ADMIN — FUROSEMIDE 40 MG: 10 INJECTION, SOLUTION INTRAMUSCULAR; INTRAVENOUS at 09:15

## 2023-12-29 RX ADMIN — FUROSEMIDE 40 MG: 10 INJECTION, SOLUTION INTRAMUSCULAR; INTRAVENOUS at 18:07

## 2023-12-29 RX ADMIN — ACETAMINOPHEN 650 MG: 325 TABLET ORAL at 05:11

## 2023-12-29 RX ADMIN — BUDESONIDE AND FORMOTEROL FUMARATE DIHYDRATE 2 PUFF: 160; 4.5 AEROSOL RESPIRATORY (INHALATION) at 23:29

## 2023-12-29 RX ADMIN — ACETAZOLAMIDE 250 MG: 250 TABLET ORAL at 21:22

## 2023-12-29 RX ADMIN — INSULIN GLARGINE 22 UNITS: 100 INJECTION, SOLUTION SUBCUTANEOUS at 21:22

## 2023-12-29 RX ADMIN — IPRATROPIUM BROMIDE AND ALBUTEROL SULFATE 1 DOSE: 2.5; .5 SOLUTION RESPIRATORY (INHALATION) at 15:33

## 2023-12-29 RX ADMIN — IBUPROFEN 400 MG: 400 TABLET ORAL at 09:13

## 2023-12-29 RX ADMIN — POTASSIUM CHLORIDE 10 MEQ: 1500 TABLET, EXTENDED RELEASE ORAL at 09:15

## 2023-12-29 RX ADMIN — BUDESONIDE AND FORMOTEROL FUMARATE DIHYDRATE 2 PUFF: 160; 4.5 AEROSOL RESPIRATORY (INHALATION) at 08:54

## 2023-12-29 RX ADMIN — ALBUTEROL SULFATE 2 PUFF: 90 AEROSOL, METERED RESPIRATORY (INHALATION) at 03:11

## 2023-12-29 RX ADMIN — APIXABAN 5 MG: 5 TABLET, FILM COATED ORAL at 21:23

## 2023-12-29 RX ADMIN — ROFLUMILAST 500 MCG: 500 TABLET ORAL at 09:13

## 2023-12-29 RX ADMIN — SODIUM CHLORIDE, PRESERVATIVE FREE 10 ML: 5 INJECTION INTRAVENOUS at 09:15

## 2023-12-29 RX ADMIN — ATORVASTATIN CALCIUM 40 MG: 40 TABLET, FILM COATED ORAL at 09:15

## 2023-12-29 RX ADMIN — APIXABAN 5 MG: 5 TABLET, FILM COATED ORAL at 09:13

## 2023-12-29 RX ADMIN — SODIUM CHLORIDE, PRESERVATIVE FREE 10 ML: 5 INJECTION INTRAVENOUS at 21:25

## 2023-12-29 ASSESSMENT — PAIN DESCRIPTION - LOCATION
LOCATION: LEG
LOCATION: SHOULDER

## 2023-12-29 ASSESSMENT — PAIN DESCRIPTION - ONSET: ONSET: ON-GOING

## 2023-12-29 ASSESSMENT — PAIN DESCRIPTION - FREQUENCY: FREQUENCY: INTERMITTENT

## 2023-12-29 ASSESSMENT — PAIN DESCRIPTION - PAIN TYPE: TYPE: ACUTE PAIN

## 2023-12-29 ASSESSMENT — PAIN SCALES - GENERAL
PAINLEVEL_OUTOF10: 6
PAINLEVEL_OUTOF10: 0

## 2023-12-29 ASSESSMENT — PAIN DESCRIPTION - DESCRIPTORS
DESCRIPTORS: ACHING
DESCRIPTORS: ACHING

## 2023-12-29 ASSESSMENT — PAIN DESCRIPTION - ORIENTATION
ORIENTATION: RIGHT;LEFT
ORIENTATION: RIGHT;LEFT

## 2023-12-29 ASSESSMENT — PAIN - FUNCTIONAL ASSESSMENT: PAIN_FUNCTIONAL_ASSESSMENT: ACTIVITIES ARE NOT PREVENTED

## 2023-12-29 NOTE — PROGRESS NOTES
Outpatient Pharmacy Progress Note for Meds-to-Beds    Total number of Prescriptions Filled: 1  The following medications were dispensed to the patient during the discharge process:  Spiriva    Additional Documentation:  Medication(s) were delivered to the patient's room prior to discharge and patient was counseled on proper inhaler usage/technique. Patient verbalized understanding and reported proper inhaler use at home.      Thank you for letting us serve your patients.  [address] [address]    Phone: [phone]    Fax: [phone]

## 2023-12-29 NOTE — PROGRESS NOTES
Cardiology Progress Note  Subjective:    Continues to be sob. Lethargic. Laying flat. Minimal feedback given following questions.     Assessment/Plan:  Acute on chronic heart failure with preserved ejection fraction  Chronic venous insufficiency  -Patient with 2+ pitting edema and shortness of breath  -Strict I's and O's and daily weights.  -Still appears volume overloaded, but improving.  -CHF nurse coordinator  -Repeat BNP slightly more elevated.  Chest x-ray relatively unchanged  -Give  another two doses of IV lasix 40 mg twice daily  Persistent atrial fibrillation s/p ablation 2018  -Currently atrial fibrillation with rate controlled.  -Goal potassium 4.0-4.5, magnesium 2.0-2.2. Replete per protocol.  -Continue Cardizem 240 mg daily.  -Continue Eliquis 5 mg twice daily  Morbid obesity: BMI 44  Peripheral arterial disease s/p right redo femoral popliteal bypass  Suspected sleep apnea  COPD        Justin Aden PA-C 12/29/23 2:02 PM

## 2023-12-29 NOTE — PROGRESS NOTES
Cardiology Progress Note     Admit Date:  12/24/2023    Consult reason/ Seen today for :       Subjective and  Overnight Events :  Does not interact much   Continues to lie down flat lethargic somnolent short of breath he says that he would like to go home breathing is better  Cxr and bnp are still abnormal   Lab work is concerning for metabolic alkalosis    Chief complain on admission : 68 y.o.year old who is admitted for  Chief Complaint   Patient presents with    Shortness of Breath     COPD exacerbation, left AMA this morning from hospital      Assessment / Plan:  Heart failure with preserved EF still has some edema currently on torsemide watch for toxicity but he does not improve  Add acetazolamide for 2 days   Restart lasix 40 mg IV and get clear I a nd O  Get chest x-ray repeat BNP and debate about dose of diuresis depending on result  Elevated Troponin : will continue medical management for now.   CHF: Acute Systolic/ Diastolic decompensated heart failure. Continue IV Lasix 40 mg BID.  Daily weights , strict Is and Os  Paroxysmal afib: Persistent atrial fibrillation rate controlled keep electrolytes within normal range continue Cardizem to 240 mg and Eliquis 5 mg twice daily  HTN: stable, continue To titrate up medication as needed  Peripheral arterial disease s/p right femoropopliteal bypass stable  DVT Prophylaxis if no contraindication    Past medical history:    has a past medical history of A-fib ([facility]), Anxiety, Arthritis, COPD (chronic obstructive pulmonary disease) ([facility]), Depression, Diabetes mellitus ([facility]), Full dentures, H/O angiography, H/O Doppler ultrasound, H/O echocardiogram, Hx of colonoscopy, Hx of Doppler ultrasound, Hyperlipidemia, Hypertension, Macular degeneration, Obesity, On home oxygen therapy, PAD (peripheral artery disease) ([facility]), Panic attacks, Pneumonia, PTSD (post-traumatic stress disorder), Restless

## 2023-12-29 NOTE — PROGRESS NOTES
This is patient's 11th admission in 2023 for COPD. Pt has not had a PFT since 2017. We have requested that patient be referred for current pulmonary functions several times prior.                09/14/23 1147   Spirometry Assessment   FEV1 (%PRED) 38   Post Bronchodilator FEV/FVC 36   COPD Exacerbations in last year 5    L/min   COPD Assessment (CAT Score)   Cough Assessment 0   Phlegm Assessment 1   Chest tightness 1   Walking on an incline 3   Home Activities 2   Confident Leaving The Home 1   Sleeping Soundly 0   Have Energy 2   Assessment Score 10   $RT COPD Assessment Yes   GOLD Staging   Gold Grade 3   Group Group E      Current GOLD classification for Armando Mars        GOLD Stage:  Gold ndGndrndanddndend:nd nd2nd Group: Group E  Recorded domestic exacerbations past 12 months: 5  Current recorded COPD Assessment Tool (CAT) score of 10  Current eosinophil count: 0     Inhaler Device     Acceptable for Use   Respimat   Not Breath Actuated Yes   MDI   Not Breath Actuated Yes                DPI  Observed PIF   using  In-Check Meter    Optimal PIF    Acceptable for Use   HANDIHALER 120 >30 YES   Pressair 120 >45 YES   NEOHALER 120 >50 YES   Diskus 120 >60 YES   ELLIPTA 120 >60 YES      Records show Armando Mars was using Symbicort as maintenance therapy prior to admission. Will request starting patient on maintenance LAMA for maintenance.          LONG-ACTING (LABA)   Arformoterol (Brovana) NEBULIZER   Indacaterol (Arcapta) NEOHALER   Olodaterol (Striverdi) Respimat   Salmeterol (Serevent) MDI, DISKUS   LONG-ACTING (LAMA)   Aclidinium bromide (Tudorza) PRESSAIR   Glycopyrronium bromide (Seebri) NEOHALER   Tiotropium (Spiriva) Respimat, HANDIHALER   Umeclidinium (Incruse) ELLIPTA   (LABA/LAMA)   Formoterol/glycopyrronium (Bevespi) MDI   Indacaterol/glycopyrronium (Utibron) NEOHALER   Vilanterol/umeclidinium (Anoro) ELLIPTA   Olodaterol/tiotropium (Stiolto) Respimat   (LABA/ICS)   Formoterol/budesomide (Symbicort)  MDI   Formoterol/mometasone (Dulera) MDI   Salmeterol/fluticasone (Advair) MDI, DISKUS   Vilanterol/fluticasone (Breo) ELLIPTA   (LABA/LAMA/ICS)   Fluticasone/umeclidinium/vilanterol (Trelegy) ELLIPTA   Budesonide/glycopyrrolate/formoterol fumarate (Beztri) aerosphere

## 2023-12-29 NOTE — PROGRESS NOTES
V2.0  [facility] Progress Note      Name:  Armando Mars /Age/Sex: 1955  (68 y.o. male)   MRN & CSN:  [id number] & [id number] Encounter Date/Time: 2023 9:14 AM EST    Location:  [address] [place] [address]-A PCP: Rosalio Hedrick MD       Hospital Day: 6    Assessment and Plan:   Armando Mars is a 68 y.o. male  who presents with Acute on chronic diastolic (congestive) heart failure (HCC)      -Cardiology ordered 2 days of acetazolamide, appreciate consult      -Still with CHF  -IV Lasix today        -He has metabolic alkalosis  -He still short of breath  -1 dose IV Diamox ordered  -He is hypoxic with exertion  -He is not ready for discharge        Plan:  #.  Acute on chronic diastolic congestive heart failure  -Patient was discharged, but return to ER with worsening shortness of breath same day  -proBNP 1145, has bilateral lower extremity edema, increased oxygen requirement.  -Continue IV Lasix twice daily  -Strict input/output, daily weight  -Consult cardiology in place, Diuresing for now  Strict Is and Os     #.  Acute on chronic respiratory failure with hypoxia  -Patient was on 3 L of oxygen, currently requiring 5 L of oxygen through nasal cannula to maintain oxygen saturation>90%.  -Patient was extremely short of breath, oxygen saturation was down to 80s, heart rate went up to 122 stressed by using urinal at bedside.  -Continue oxygen supplementation and wean as tolerated  Not using bipap, encouraged to do so  HCO3 is worsening, needs Bipap, to be placed on bipap for 6 hours in morning and then plan nightly Bipap     #.  Pneumonia with intractable nausea and dry heaves  -Patient was discharged on levofloxacin-continue same dose for now     #.  Chronic atrial fibrillation  - at rest, was up to 122 with slightest activity in the setting of deconditioning  -Resume Cardizem, Eliquis     #.  Uncontrolled diabetes mellitus type 2  -Random blood glucose  8.3 - 10.6 MG/DL   CBC    Collection Time: 12/29/23  4:17 AM   Result Value Ref Range    WBC 9.4 4.0 - 10.5 K/CU MM    RBC 3.48 (L) 4.6 - 6.2 M/CU MM    Hemoglobin 10.8 (L) 13.5 - 18.0 GM/DL    Hematocrit 34.2 (L) 42 - 52 %    MCV 98.3 78 - 100 FL    MCH 31.0 27 - 31 PG    MCHC 31.6 (L) 32.0 - 36.0 %    RDW 16.9 (H) 11.7 - 14.9 %    Platelets 158 140 - 440 K/CU MM    MPV 10.0 7.5 - 11.1 FL   POCT Glucose    Collection Time: 12/29/23  7:37 AM   Result Value Ref Range    POC Glucose 196 (H) 70 - 99 MG/DL        Imaging/Diagnostics Last 24 Hours   XR CHEST PORTABLE    Result Date: 12/24/2023  EXAMINATION: ONE XRAY VIEW OF THE CHEST 12/24/2023 6:22 pm COMPARISON: 12/23/2023. HISTORY: ORDERING SYSTEM PROVIDED HISTORY: sob, h/o of chf and recent pneumonia TECHNOLOGIST PROVIDED HISTORY: Reason for exam:->sob, h/o of chf and recent pneumonia Reason for Exam: sob, h/o of chf and recent pneumonia FINDINGS: Mild bibasilar airspace opacities appear stable.  There is no pleural effusion.  The cardiomediastinal silhouette, pulmonary vessels and interstitium are unremarkable.     Stable mild bibasilar airspace opacities that could reflect atelectasis or pneumonia.  Elsewhere, there are no acute findings.     XR CHEST PORTABLE    Result Date: 12/23/2023  EXAMINATION: ONE XRAY VIEW OF THE CHEST 12/23/2023 11:18 am COMPARISON: Chest radiograph 12/21/2023. HISTORY: ORDERING SYSTEM PROVIDED HISTORY: shortness of breath TECHNOLOGIST PROVIDED HISTORY: Reason for exam:->shortness of breath Reason for Exam: shortness of breath FINDINGS: Single view provided. Stable mediastinal silhouette.  Stable central pulmonary vascular prominence. Mild bilateral pleural effusions.  Stable mild bilateral lower lobe/lung base pulmonary opacities.  No lobar consolidation.  No pneumothorax or free subdiaphragmatic air.     1. Stable mild bibasilar opacities which could represent atelectasis versus possible pneumonia. 2. Stable mild bilateral pleural

## 2023-12-30 LAB
ANION GAP SERPL CALCULATED.3IONS-SCNC: 10 MMOL/L (ref 7–16)
BUN SERPL-MCNC: 10 MG/DL (ref 6–23)
CALCIUM SERPL-MCNC: 8.5 MG/DL (ref 8.3–10.6)
CHLORIDE BLD-SCNC: 95 MMOL/L (ref 99–110)
CO2: 33 MMOL/L (ref 21–32)
CREAT SERPL-MCNC: 0.5 MG/DL (ref 0.9–1.3)
GFR SERPL CREATININE-BSD FRML MDRD: >60 ML/MIN/1.73M2
GLUCOSE BLD-MCNC: 228 MG/DL (ref 70–99)
GLUCOSE BLD-MCNC: 231 MG/DL (ref 70–99)
GLUCOSE BLD-MCNC: 231 MG/DL (ref 70–99)
GLUCOSE BLD-MCNC: 302 MG/DL (ref 70–99)
GLUCOSE SERPL-MCNC: 229 MG/DL (ref 70–99)
HCT VFR BLD CALC: 36.8 % (ref 42–52)
HEMOGLOBIN: 11.5 GM/DL (ref 13.5–18)
MCH RBC QN AUTO: 30.6 PG (ref 27–31)
MCHC RBC AUTO-ENTMCNC: 31.3 % (ref 32–36)
MCV RBC AUTO: 97.9 FL (ref 78–100)
PDW BLD-RTO: 17 % (ref 11.7–14.9)
PLATELET # BLD: 181 K/CU MM (ref 140–440)
PMV BLD AUTO: 10.2 FL (ref 7.5–11.1)
POTASSIUM SERPL-SCNC: 3.7 MMOL/L (ref 3.5–5.1)
RBC # BLD: 3.76 M/CU MM (ref 4.6–6.2)
SODIUM BLD-SCNC: 138 MMOL/L (ref 135–145)
WBC # BLD: 10.5 K/CU MM (ref 4–10.5)

## 2023-12-30 PROCEDURE — 6370000000 HC RX 637 (ALT 250 FOR IP): Performed by: INTERNAL MEDICINE

## 2023-12-30 PROCEDURE — 2580000003 HC RX 258: Performed by: INTERNAL MEDICINE

## 2023-12-30 PROCEDURE — 6360000002 HC RX W HCPCS

## 2023-12-30 PROCEDURE — 94640 AIRWAY INHALATION TREATMENT: CPT

## 2023-12-30 PROCEDURE — 80048 BASIC METABOLIC PNL TOTAL CA: CPT

## 2023-12-30 PROCEDURE — 2700000000 HC OXYGEN THERAPY PER DAY

## 2023-12-30 PROCEDURE — 1200000000 HC SEMI PRIVATE

## 2023-12-30 PROCEDURE — 94761 N-INVAS EAR/PLS OXIMETRY MLT: CPT

## 2023-12-30 PROCEDURE — 85027 COMPLETE CBC AUTOMATED: CPT

## 2023-12-30 PROCEDURE — 94660 CPAP INITIATION&MGMT: CPT

## 2023-12-30 PROCEDURE — 82962 GLUCOSE BLOOD TEST: CPT

## 2023-12-30 PROCEDURE — 99232 SBSQ HOSP IP/OBS MODERATE 35: CPT | Performed by: INTERNAL MEDICINE

## 2023-12-30 PROCEDURE — 36415 COLL VENOUS BLD VENIPUNCTURE: CPT

## 2023-12-30 PROCEDURE — APPSS60 APP SPLIT SHARED TIME 46-60 MINUTES: Performed by: NURSE PRACTITIONER

## 2023-12-30 RX ORDER — TORSEMIDE 20 MG/1
20 TABLET ORAL 2 TIMES DAILY
Status: DISCONTINUED | OUTPATIENT
Start: 2024-01-01 | End: 2024-01-02 | Stop reason: HOSPADM

## 2023-12-30 RX ORDER — INSULIN LISPRO 100 [IU]/ML
4 INJECTION, SOLUTION INTRAVENOUS; SUBCUTANEOUS
Status: DISCONTINUED | OUTPATIENT
Start: 2023-12-30 | End: 2024-01-01

## 2023-12-30 RX ORDER — INSULIN GLARGINE 100 [IU]/ML
26 INJECTION, SOLUTION SUBCUTANEOUS NIGHTLY
Status: DISCONTINUED | OUTPATIENT
Start: 2023-12-30 | End: 2024-01-02 | Stop reason: HOSPADM

## 2023-12-30 RX ADMIN — ROFLUMILAST 500 MCG: 500 TABLET ORAL at 08:11

## 2023-12-30 RX ADMIN — BUDESONIDE AND FORMOTEROL FUMARATE DIHYDRATE 2 PUFF: 160; 4.5 AEROSOL RESPIRATORY (INHALATION) at 08:04

## 2023-12-30 RX ADMIN — ACETAZOLAMIDE 250 MG: 250 TABLET ORAL at 08:11

## 2023-12-30 RX ADMIN — SODIUM CHLORIDE, PRESERVATIVE FREE 10 ML: 5 INJECTION INTRAVENOUS at 08:10

## 2023-12-30 RX ADMIN — DILTIAZEM HYDROCHLORIDE 240 MG: 240 CAPSULE, COATED, EXTENDED RELEASE ORAL at 08:10

## 2023-12-30 RX ADMIN — APIXABAN 5 MG: 5 TABLET, FILM COATED ORAL at 08:11

## 2023-12-30 RX ADMIN — APIXABAN 5 MG: 5 TABLET, FILM COATED ORAL at 20:12

## 2023-12-30 RX ADMIN — IPRATROPIUM BROMIDE AND ALBUTEROL SULFATE 1 DOSE: 2.5; .5 SOLUTION RESPIRATORY (INHALATION) at 11:29

## 2023-12-30 RX ADMIN — ACETAMINOPHEN 650 MG: 325 TABLET ORAL at 17:56

## 2023-12-30 RX ADMIN — INSULIN LISPRO 6 UNITS: 100 INJECTION, SOLUTION INTRAVENOUS; SUBCUTANEOUS at 17:54

## 2023-12-30 RX ADMIN — IPRATROPIUM BROMIDE AND ALBUTEROL SULFATE 1 DOSE: 2.5; .5 SOLUTION RESPIRATORY (INHALATION) at 08:04

## 2023-12-30 RX ADMIN — Medication 400 MG: at 08:11

## 2023-12-30 RX ADMIN — FUROSEMIDE 40 MG: 10 INJECTION, SOLUTION INTRAMUSCULAR; INTRAVENOUS at 08:10

## 2023-12-30 RX ADMIN — INSULIN GLARGINE 26 UNITS: 100 INJECTION, SOLUTION SUBCUTANEOUS at 20:12

## 2023-12-30 RX ADMIN — INSULIN LISPRO 2 UNITS: 100 INJECTION, SOLUTION INTRAVENOUS; SUBCUTANEOUS at 08:11

## 2023-12-30 RX ADMIN — SODIUM CHLORIDE, PRESERVATIVE FREE 10 ML: 5 INJECTION INTRAVENOUS at 20:48

## 2023-12-30 RX ADMIN — ATORVASTATIN CALCIUM 40 MG: 40 TABLET, FILM COATED ORAL at 08:10

## 2023-12-30 RX ADMIN — INSULIN LISPRO 2 UNITS: 100 INJECTION, SOLUTION INTRAVENOUS; SUBCUTANEOUS at 13:10

## 2023-12-30 RX ADMIN — INSULIN LISPRO 4 UNITS: 100 INJECTION, SOLUTION INTRAVENOUS; SUBCUTANEOUS at 17:54

## 2023-12-30 RX ADMIN — ACETAZOLAMIDE 250 MG: 250 TABLET ORAL at 20:13

## 2023-12-30 RX ADMIN — POTASSIUM CHLORIDE 10 MEQ: 1500 TABLET, EXTENDED RELEASE ORAL at 08:10

## 2023-12-30 RX ADMIN — IPRATROPIUM BROMIDE AND ALBUTEROL SULFATE 1 DOSE: 2.5; .5 SOLUTION RESPIRATORY (INHALATION) at 15:15

## 2023-12-30 ASSESSMENT — PAIN SCALES - GENERAL
PAINLEVEL_OUTOF10: 7
PAINLEVEL_OUTOF10: 0
PAINLEVEL_OUTOF10: 0

## 2023-12-30 ASSESSMENT — PAIN - FUNCTIONAL ASSESSMENT: PAIN_FUNCTIONAL_ASSESSMENT: ACTIVITIES ARE NOT PREVENTED

## 2023-12-30 ASSESSMENT — PAIN DESCRIPTION - LOCATION: LOCATION: GROIN

## 2023-12-30 ASSESSMENT — PAIN DESCRIPTION - DESCRIPTORS: DESCRIPTORS: ACHING

## 2023-12-30 NOTE — PLAN OF CARE
Problem: Safety - Adult  Goal: Free from fall injury  12/30/2023 1104 by Lisette Barakat RN  Outcome: Progressing  12/30/2023 0116 by Kimberly Boss RN  Outcome: Progressing  12/29/2023 2122 by Kika Gómez RN  Outcome: Progressing     Problem: Discharge Planning  Goal: Discharge to home or other facility with appropriate resources  12/30/2023 1104 by Lisette Barakat RN  Outcome: Progressing  Flowsheets (Taken 12/30/2023 0800)  Discharge to home or other facility with appropriate resources:   Identify barriers to discharge with patient and caregiver   Arrange for needed discharge resources and transportation as appropriate   Identify discharge learning needs (meds, wound care, etc)   Arrange for interpreters to assist at discharge as needed   Refer to discharge planning if patient needs post-hospital services based on physician order or complex needs related to functional status, cognitive ability or social support system  12/30/2023 0116 by Kimberly Boss RN  Outcome: Progressing  12/29/2023 2122 by Kika Gómez RN  Outcome: Progressing     Problem: Pain  Goal: Verbalizes/displays adequate comfort level or baseline comfort level  12/30/2023 1104 by Lisette Barakat RN  Outcome: Progressing  12/30/2023 0116 by Kimberly Boss RN  Outcome: Progressing  12/29/2023 2122 by Kika Gómez RN  Outcome: Progressing     Problem: Respiratory - Adult  Goal: Achieves optimal ventilation and oxygenation  12/30/2023 1104 by Lisette Barakat RN  Outcome: Progressing  Flowsheets (Taken 12/30/2023 0800)  Achieves optimal ventilation and oxygenation:   Assess for changes in respiratory status   Assess for changes in mentation and behavior   Position to facilitate oxygenation and minimize respiratory effort   Oxygen supplementation based on oxygen saturation or arterial blood gases   Initiate smoking cessation protocol as indicated   Encourage broncho-pulmonary hygiene including cough, deep

## 2023-12-30 NOTE — PLAN OF CARE
Problem: Safety - Adult  Goal: Free from fall injury  12/30/2023 0116 by Kimberly Boss RN  Outcome: Progressing  12/29/2023 2122 by Kika Gómez RN  Outcome: Progressing     Problem: Discharge Planning  Goal: Discharge to home or other facility with appropriate resources  12/30/2023 0116 by Kimberly Boss RN  Outcome: Progressing  12/29/2023 2122 by Kika Gómez RN  Outcome: Progressing     Problem: Pain  Goal: Verbalizes/displays adequate comfort level or baseline comfort level  12/30/2023 0116 by Kimberly Boss RN  Outcome: Progressing  12/29/2023 2122 by Kika Gómez RN  Outcome: Progressing     Problem: Respiratory - Adult  Goal: Achieves optimal ventilation and oxygenation  12/30/2023 0116 by Kimberly Boss RN  Outcome: Progressing  12/29/2023 2122 by Kika Gómez RN  Outcome: Progressing     Problem: Cardiovascular - Adult  Goal: Maintains optimal cardiac output and hemodynamic stability  12/30/2023 0116 by Kimberly Boss RN  Outcome: Progressing  12/29/2023 2122 by Kika Gómez RN  Outcome: Progressing  Goal: Absence of cardiac dysrhythmias or at baseline  12/30/2023 0116 by Kimberly Boss RN  Outcome: Progressing  12/29/2023 2122 by Kika Gómez RN  Outcome: Progressing     Problem: Infection - Adult  Goal: Absence of infection at discharge  12/30/2023 0116 by Kimberly Boss RN  Outcome: Progressing  12/29/2023 2122 by Kika Gómez RN  Outcome: Progressing  Goal: Absence of infection during hospitalization  12/30/2023 0116 by Kimberly Boss RN  Outcome: Progressing  12/29/2023 2122 by Kika Gómez RN  Outcome: Progressing     Problem: Metabolic/Fluid and Electrolytes - Adult  Goal: Electrolytes maintained within normal limits  12/30/2023 0116 by Kimberly Boss RN  Outcome: Progressing  12/29/2023 2122 by Kika Gómez RN  Outcome: Progressing  Goal: Hemodynamic stability  and optimal renal function maintained  12/30/2023 0116 by Kimberly Boss RN  Outcome: Progressing  12/29/2023 2122 by Kika Gómez RN  Outcome: Progressing  Goal: Glucose maintained within prescribed range  12/30/2023 0116 by Kimberly Boss, RN  Outcome: Progressing  12/29/2023 2122 by Kika Gómez RN  Outcome: Progressing

## 2023-12-30 NOTE — PROGRESS NOTES
Physician Progress Note      PATIENT:               Mery Brown  CSN #:                  [id number]  :                       1955  ADMIT DATE:       2023 3:36 AM  DISCH DATE:        2023 2:30 PM  RESPONDING  PROVIDER #:        Chente Abraham MD          QUERY TEXT:    Patient admitted with Diastolic CHF exacerbation, noted to have persistent   atrial fibrillation documented and is maintained on Eliquis. If possible,   please document in progress notes and discharge summary if you are evaluating   and/or treating any of the following: The medical record reflects the following:  Risk Factors: CHF, DM, 83 yo  Clinical Indicators:  Cardiology consult on  ? Persistent Afib? Treatment: Eliquis, cardiology consult, [facility]    CIARRA JaimesN, RN, [facility]  Clinical   [phone]  Options provided:  -- Secondary hypercoagulable state in a patient with atrial fibrillation  -- Other - I will add my own diagnosis  -- Disagree - Not applicable / Not valid  -- Disagree - Clinically unable to determine / Unknown  -- Refer to Clinical Documentation Reviewer    PROVIDER RESPONSE TEXT:    This patient has secondary hypercoagulable state in a patient with atrial   fibrillation.     Query created by: Jeanette Bullock on 2023 12:21 PM      Electronically signed by:  Chente Abraham MD 2023 10:55 AM

## 2023-12-30 NOTE — PROGRESS NOTES
Nutrition Note    Pt assessed due to a length of stay. Pt has been on a carb controlled diet and is consuming % of most of his meals. Pt does not have a wound and his weight appears stable. Pt is at low risk at this time.      Electronically signed by Masha Singer RD, CANDIS on 12/30/23 at 10:45 AM EST    Contact: [phone]

## 2023-12-30 NOTE — PROGRESS NOTES
Cardiology Progress Note     Today's Plan: [facility]    Admit Date:  12/24/2023    Consult reason/ Seen today for: CHF    Subjective and  Overnight Events:  Withdrawn, but denies any chest pain, SOB, or palpations.     Assessment / Plan / Recommendation: Will sign off    Elevated Troponin : in setting of renal failure, sepsis and acute infection, doubt ACS.  Most probably demand ischemia related leak. No further work up at this time.    HFpEF :Acute on chronic decompensated heart failure. Appears compensated. Continue with Diamox 250 mg for two day then restart home torsemide 40 mg. Daily weights, strict Is and Os   HTN:controlled, continue Cardizem 240 mg, can titrate accordingly   Persistent Afib: Current heart rate control continue with Eliquis 5 mg twice daily.  DVT prophylaxis if not contraindicated.      History of Presenting Illness:    Chief complain on admission : 68 y.o.year old who is admitted for  Chief Complaint   Patient presents with    Shortness of Breath     COPD exacerbation, left AMA this morning from hospital        Past medical history:    has a past medical history of A-fib ([facility]), Anxiety, Arthritis, COPD (chronic obstructive pulmonary disease) ([facility]), Depression, Diabetes mellitus ([facility]), Full dentures, H/O angiography, H/O Doppler ultrasound, H/O echocardiogram, Hx of colonoscopy, Hx of Doppler ultrasound, Hyperlipidemia, Hypertension, Macular degeneration, Obesity, On home oxygen therapy, PAD (peripheral artery disease) ([facility]), Panic attacks, Pneumonia, PTSD (post-traumatic stress disorder), Restless leg, Shortness of breath, Sleep apnea, and Wears glasses.  Past surgical history:   has a past surgical history that includes Atrial ablation surgery (05/23/2018); pr laparoscopy surg cholecystectomy (N/A, 11/12/2018); Colonoscopy (07/2011); eye surgery (Left, 2017); Dental surgery; Cardiac surgery (05/2018); Cholecystectomy, laparoscopic (11/12/2018);  Appendectomy (2003); hernia repair (2003); femoral bypass (Left, 01/2011); Tonsillectomy (1960's); fracture surgery (Left, 1980's); fracture surgery (Right, 2000's); and femoral bypass (Left, 1/9/2019).  Social History:   reports that he has been smoking cigars and cigarettes. He started smoking about 51 years ago. He has a 12.2 pack-year smoking history. He quit smokeless tobacco use about 49 years ago.  His smokeless tobacco use included chew. He reports that he does not drink alcohol and does not use drugs.  Family history:  family history includes Allergy (Severe) in his brother; Arthritis in his sister; Diabetes in his sister; Early Death in his father and mother; Early Death (age of onset: 21) in his brother; Mental Illness in his sister; Obesity in his brother.    Allergies   Allergen Reactions    Metoprolol      \"Chest Pain And Burning In The Chest\"       Review of Systems:  Review of Systems   Respiratory:  Positive for shortness of breath.    Cardiovascular:  Negative for chest pain, palpitations and leg swelling.   Musculoskeletal: Negative.    Skin: Negative.    Neurological:  Negative for dizziness and weakness.   All other systems reviewed and are negative.       BP (!) 94/55   Pulse 80   Temp 98.1 °F (36.7 °C) (Oral)   Resp 20   Ht 1.981 m (6' 6\")   Wt (!) 175.6 kg (387 lb 2 oz)   SpO2 94%   BMI 44.74 kg/m²     Intake/Output Summary (Last 24 hours) at 12/30/2023 1138  Last data filed at 12/29/2023 1330  Gross per 24 hour   Intake 480 ml   Output --   Net 480 ml       Physical Exam:  Physical Exam  Constitutional:       Appearance: He is well-developed.   Cardiovascular:      Rate and Rhythm: Normal rate and regular rhythm.      Pulses: Intact distal pulses.           Dorsalis pedis pulses are 2+ on the right side and 2+ on the left side.        Posterior tibial pulses are 2+ on the right side and 2+ on the left side.      Heart sounds: Normal heart sounds, S1 normal and S2 normal.   Pulmonary:

## 2023-12-30 NOTE — PROGRESS NOTES
V2.0  [facility] [place] Hospitalist Progress Note      Name:  Armando Mars /Age/Sex: 1955  (68 y.o. male)   MRN & CSN:  [id number] & [id number] Encounter Date/Time: 2023 9:14 AM EST    Location:  [address] [place] [address]-A PCP: Rosalio Hedrick MD       Hospital Day: 7    Assessment and Plan:   Armando Mars is a 68 y.o. male  who presents with Acute on chronic diastolic (congestive) heart failure (HCC)      -increased HS Lantus and added prandial insulin  -completed 7 day course Levaquin 750 mg daily yesterday  -had one 10 mg dose midodrine 2 d ago at 0400 but no further doses  -on roflumilast      -Cardiology ordered 2 days of acetazolamide, appreciate consult      -Still with CHF  -IV Lasix today        -He has metabolic alkalosis  -He still short of breath  -1 dose IV Diamox ordered  -He is hypoxic with exertion  -He is not ready for discharge        Plan:  #.  Acute on chronic diastolic congestive heart failure  -Patient was discharged, but return to ER with worsening shortness of breath same day  -proBNP 1145, has bilateral lower extremity edema, increased oxygen requirement.  -Continue IV Lasix twice daily  -Strict input/output, daily weight  -Consult cardiology in place, Diuresing for now  Strict Is and Os     #.  Acute on chronic respiratory failure with hypoxia  -Patient was on 3 L of oxygen, currently requiring 5 L of oxygen through nasal cannula to maintain oxygen saturation>90%.  -Patient was extremely short of breath, oxygen saturation was down to 80s, heart rate went up to 122 stressed by using urinal at bedside.  -Continue oxygen supplementation and wean as tolerated  Not using bipap, encouraged to do so  HCO3 is worsening, needs Bipap, to be placed on bipap for 6 hours in morning and then plan nightly Bipap     #.  Pneumonia with intractable nausea and dry heaves  -Patient was discharged on levofloxacin-continue same dose for now     #.  Chronic atrial  glycol, 17 g, Daily PRN  acetaminophen, 650 mg, Q6H PRN   Or  acetaminophen, 650 mg, Q6H PRN  potassium chloride, 40 mEq, PRN   Or  potassium alternative oral replacement, 40 mEq, PRN   Or  potassium chloride, 10 mEq, PRN  magnesium sulfate, 2,000 mg, PRN  albuterol sulfate HFA, 2 puff, Q6H PRN  glucose, 4 tablet, PRN  dextrose bolus, 125 mL, PRN   Or  dextrose bolus, 250 mL, PRN  glucagon (rDNA), 1 mg, PRN  dextrose, , Continuous PRN        Labs      Recent Results (from the past 24 hour(s))   POCT Glucose    Collection Time: 12/29/23  4:20 PM   Result Value Ref Range    POC Glucose 254 (H) 70 - 99 MG/DL   POCT Glucose    Collection Time: 12/29/23  9:21 PM   Result Value Ref Range    POC Glucose 261 (H) 70 - 99 MG/DL   Basic Metabolic Panel    Collection Time: 12/30/23  7:46 AM   Result Value Ref Range    Sodium 138 135 - 145 MMOL/L    Potassium 3.7 3.5 - 5.1 MMOL/L    Chloride 95 (L) 99 - 110 mMol/L    CO2 33 (H) 21 - 32 MMOL/L    Anion Gap 10 7 - 16    Glucose 229 (H) 70 - 99 MG/DL    BUN 10 6 - 23 MG/DL    Creatinine 0.5 (L) 0.9 - 1.3 MG/DL    Est, Glom Filt Rate >60 >60 mL/min/1.73m2    Calcium 8.5 8.3 - 10.6 MG/DL   CBC    Collection Time: 12/30/23  7:46 AM   Result Value Ref Range    WBC 10.5 4.0 - 10.5 K/CU MM    RBC 3.76 (L) 4.6 - 6.2 M/CU MM    Hemoglobin 11.5 (L) 13.5 - 18.0 GM/DL    Hematocrit 36.8 (L) 42 - 52 %    MCV 97.9 78 - 100 FL    MCH 30.6 27 - 31 PG    MCHC 31.3 (L) 32.0 - 36.0 %    RDW 17.0 (H) 11.7 - 14.9 %    Platelets 181 140 - 440 K/CU MM    MPV 10.2 7.5 - 11.1 FL   POCT Glucose    Collection Time: 12/30/23  8:06 AM   Result Value Ref Range    POC Glucose 228 (H) 70 - 99 MG/DL   POCT Glucose    Collection Time: 12/30/23 12:11 PM   Result Value Ref Range    POC Glucose 231 (H) 70 - 99 MG/DL        Imaging/Diagnostics Last 24 Hours   XR CHEST PORTABLE    Result Date: 12/24/2023  EXAMINATION: ONE XRAY VIEW OF THE CHEST 12/24/2023 6:22 pm COMPARISON: 12/23/2023. HISTORY: ORDERING SYSTEM

## 2023-12-30 NOTE — PROGRESS NOTES
RN made aware by PCT that pt had [employer] takeout brought to his hospital room this afternoon.  Dr Mae notified by secure messaging that pt is being non-compliant with his ordered diet.

## 2023-12-30 NOTE — FLOWSHEET NOTE
[facility] RN rounded on pt for DI score of 51- score now is 43.  Pt in bed resting quietly with eyes closed.  O2 NC on  at 3lt.  No c/o pain.

## 2023-12-31 LAB
ANION GAP SERPL CALCULATED.3IONS-SCNC: 10 MMOL/L (ref 7–16)
BUN SERPL-MCNC: 13 MG/DL (ref 6–23)
CALCIUM SERPL-MCNC: 8.5 MG/DL (ref 8.3–10.6)
CHLORIDE BLD-SCNC: 100 MMOL/L (ref 99–110)
CO2: 28 MMOL/L (ref 21–32)
CREAT SERPL-MCNC: 0.5 MG/DL (ref 0.9–1.3)
GFR SERPL CREATININE-BSD FRML MDRD: >60 ML/MIN/1.73M2
GLUCOSE BLD-MCNC: 232 MG/DL (ref 70–99)
GLUCOSE BLD-MCNC: 244 MG/DL (ref 70–99)
GLUCOSE BLD-MCNC: 296 MG/DL (ref 70–99)
GLUCOSE BLD-MCNC: 314 MG/DL (ref 70–99)
GLUCOSE SERPL-MCNC: 275 MG/DL (ref 70–99)
POTASSIUM SERPL-SCNC: 4.2 MMOL/L (ref 3.5–5.1)
SODIUM BLD-SCNC: 138 MMOL/L (ref 135–145)

## 2023-12-31 PROCEDURE — 94640 AIRWAY INHALATION TREATMENT: CPT

## 2023-12-31 PROCEDURE — 2700000000 HC OXYGEN THERAPY PER DAY

## 2023-12-31 PROCEDURE — 94761 N-INVAS EAR/PLS OXIMETRY MLT: CPT

## 2023-12-31 PROCEDURE — 80048 BASIC METABOLIC PNL TOTAL CA: CPT

## 2023-12-31 PROCEDURE — 1200000000 HC SEMI PRIVATE

## 2023-12-31 PROCEDURE — 6370000000 HC RX 637 (ALT 250 FOR IP): Performed by: INTERNAL MEDICINE

## 2023-12-31 PROCEDURE — 2580000003 HC RX 258: Performed by: INTERNAL MEDICINE

## 2023-12-31 PROCEDURE — 36415 COLL VENOUS BLD VENIPUNCTURE: CPT

## 2023-12-31 PROCEDURE — 82962 GLUCOSE BLOOD TEST: CPT

## 2023-12-31 PROCEDURE — 94660 CPAP INITIATION&MGMT: CPT

## 2023-12-31 RX ADMIN — INSULIN LISPRO 4 UNITS: 100 INJECTION, SOLUTION INTRAVENOUS; SUBCUTANEOUS at 13:32

## 2023-12-31 RX ADMIN — BUDESONIDE AND FORMOTEROL FUMARATE DIHYDRATE 2 PUFF: 160; 4.5 AEROSOL RESPIRATORY (INHALATION) at 19:30

## 2023-12-31 RX ADMIN — INSULIN LISPRO 2 UNITS: 100 INJECTION, SOLUTION INTRAVENOUS; SUBCUTANEOUS at 09:35

## 2023-12-31 RX ADMIN — IPRATROPIUM BROMIDE AND ALBUTEROL SULFATE 1 DOSE: 2.5; .5 SOLUTION RESPIRATORY (INHALATION) at 19:29

## 2023-12-31 RX ADMIN — IPRATROPIUM BROMIDE AND ALBUTEROL SULFATE 1 DOSE: 2.5; .5 SOLUTION RESPIRATORY (INHALATION) at 23:42

## 2023-12-31 RX ADMIN — APIXABAN 5 MG: 5 TABLET, FILM COATED ORAL at 22:34

## 2023-12-31 RX ADMIN — BUDESONIDE AND FORMOTEROL FUMARATE DIHYDRATE 2 PUFF: 160; 4.5 AEROSOL RESPIRATORY (INHALATION) at 07:34

## 2023-12-31 RX ADMIN — IPRATROPIUM BROMIDE AND ALBUTEROL SULFATE 1 DOSE: 2.5; .5 SOLUTION RESPIRATORY (INHALATION) at 15:48

## 2023-12-31 RX ADMIN — ATORVASTATIN CALCIUM 40 MG: 40 TABLET, FILM COATED ORAL at 09:35

## 2023-12-31 RX ADMIN — INSULIN LISPRO 6 UNITS: 100 INJECTION, SOLUTION INTRAVENOUS; SUBCUTANEOUS at 17:38

## 2023-12-31 RX ADMIN — APIXABAN 5 MG: 5 TABLET, FILM COATED ORAL at 09:36

## 2023-12-31 RX ADMIN — IPRATROPIUM BROMIDE AND ALBUTEROL SULFATE 1 DOSE: 2.5; .5 SOLUTION RESPIRATORY (INHALATION) at 03:42

## 2023-12-31 RX ADMIN — INSULIN LISPRO 2 UNITS: 100 INJECTION, SOLUTION INTRAVENOUS; SUBCUTANEOUS at 13:31

## 2023-12-31 RX ADMIN — INSULIN GLARGINE 26 UNITS: 100 INJECTION, SOLUTION SUBCUTANEOUS at 22:34

## 2023-12-31 RX ADMIN — INSULIN LISPRO 4 UNITS: 100 INJECTION, SOLUTION INTRAVENOUS; SUBCUTANEOUS at 09:34

## 2023-12-31 RX ADMIN — ROFLUMILAST 500 MCG: 500 TABLET ORAL at 09:35

## 2023-12-31 RX ADMIN — SODIUM CHLORIDE, PRESERVATIVE FREE 10 ML: 5 INJECTION INTRAVENOUS at 09:36

## 2023-12-31 RX ADMIN — ACETAZOLAMIDE 250 MG: 250 TABLET ORAL at 09:35

## 2023-12-31 RX ADMIN — DILTIAZEM HYDROCHLORIDE 240 MG: 240 CAPSULE, COATED, EXTENDED RELEASE ORAL at 09:35

## 2023-12-31 RX ADMIN — INSULIN LISPRO 4 UNITS: 100 INJECTION, SOLUTION INTRAVENOUS; SUBCUTANEOUS at 17:37

## 2023-12-31 RX ADMIN — POTASSIUM CHLORIDE 10 MEQ: 1500 TABLET, EXTENDED RELEASE ORAL at 09:36

## 2023-12-31 RX ADMIN — Medication 400 MG: at 09:36

## 2023-12-31 RX ADMIN — IPRATROPIUM BROMIDE AND ALBUTEROL SULFATE 1 DOSE: 2.5; .5 SOLUTION RESPIRATORY (INHALATION) at 07:31

## 2023-12-31 NOTE — PROGRESS NOTES
V2.0  [facility] [place] Progress Note      Name:  Armando Mars /Age/Sex: 1955  (68 y.o. male)   MRN & CSN:  [id number] & [id number] Encounter Date/Time: 2023 9:14 AM EST    Location:  [address] [place] [address]-A PCP: Rosalio Hedrick MD       Hospital Day: 8    Assessment and Plan:   Armando Mars is a 68 y.o. male  who presents with Acute on chronic diastolic (congestive) heart failure (HCC)          -increased HS Lantus and added prandial insulin  -completed 7 day course Levaquin 750 mg daily yesterday  -had one 10 mg dose midodrine 2 d ago at 0400 but no further doses  -on roflumilast      -Cardiology ordered 2 days of acetazolamide, appreciate consult      -Still with CHF  -IV Lasix today        -He has metabolic alkalosis  -He still short of breath  -1 dose IV Diamox ordered  -He is hypoxic with exertion  -He is not ready for discharge        Plan:  #.  Acute on chronic diastolic congestive heart failure  -Patient was discharged, but return to ER with worsening shortness of breath same day  -proBNP 1145, has bilateral lower extremity edema, increased oxygen requirement.  -IV Lasix to be continued  -Strict input/output, daily weight  -Consult cardiology in place, Diuresing for now  Strict Is and Os     #.  Acute on chronic respiratory failure with hypoxia  -Patient was on 3 L of oxygen, currently requiring 5 L of oxygen through nasal cannula to maintain oxygen saturation>90%.  -Patient was extremely short of breath, oxygen saturation was down to 80s, heart rate went up to 122 stressed by using urinal at bedside.  -Continue oxygen supplementation and wean as tolerated  Not using bipap, encouraged to do so  HCO3 is worsening, needs Bipap, to be placed on bipap for 6 hours in morning and then plan nightly Bipap     #.  Pneumonia with intractable nausea and dry heaves  -Patient was discharged on levofloxacin-continue same dose for now     #.  Chronic atrial

## 2023-12-31 NOTE — PLAN OF CARE
Problem: Safety - Adult  Goal: Free from fall injury  Outcome: Progressing     Problem: Discharge Planning  Goal: Discharge to home or other facility with appropriate resources  Outcome: Progressing     Problem: Pain  Goal: Verbalizes/displays adequate comfort level or baseline comfort level  Outcome: Progressing     Problem: Respiratory - Adult  Goal: Achieves optimal ventilation and oxygenation  Outcome: Progressing     Problem: Cardiovascular - Adult  Goal: Maintains optimal cardiac output and hemodynamic stability  Outcome: Progressing  Goal: Absence of cardiac dysrhythmias or at baseline  Outcome: Progressing     Problem: Infection - Adult  Goal: Absence of infection at discharge  Outcome: Progressing  Goal: Absence of infection during hospitalization  Outcome: Progressing     Problem: Metabolic/Fluid and Electrolytes - Adult  Goal: Electrolytes maintained within normal limits  Outcome: Progressing  Goal: Hemodynamic stability and optimal renal function maintained  Outcome: Progressing  Goal: Glucose maintained within prescribed range  Outcome: Progressing

## 2024-01-01 LAB
ANION GAP SERPL CALCULATED.3IONS-SCNC: 10 MMOL/L (ref 7–16)
BUN SERPL-MCNC: 13 MG/DL (ref 6–23)
CALCIUM SERPL-MCNC: 8.3 MG/DL (ref 8.3–10.6)
CHLORIDE BLD-SCNC: 99 MMOL/L (ref 99–110)
CO2: 26 MMOL/L (ref 21–32)
CREAT SERPL-MCNC: 0.5 MG/DL (ref 0.9–1.3)
GFR SERPL CREATININE-BSD FRML MDRD: >60 ML/MIN/1.73M2
GLUCOSE BLD-MCNC: 216 MG/DL (ref 70–99)
GLUCOSE BLD-MCNC: 218 MG/DL (ref 70–99)
GLUCOSE BLD-MCNC: 273 MG/DL (ref 70–99)
GLUCOSE BLD-MCNC: 276 MG/DL (ref 70–99)
GLUCOSE SERPL-MCNC: 218 MG/DL (ref 70–99)
POTASSIUM SERPL-SCNC: 4.1 MMOL/L (ref 3.5–5.1)
SODIUM BLD-SCNC: 135 MMOL/L (ref 135–145)

## 2024-01-01 PROCEDURE — 94660 CPAP INITIATION&MGMT: CPT

## 2024-01-01 PROCEDURE — 82962 GLUCOSE BLOOD TEST: CPT

## 2024-01-01 PROCEDURE — 94640 AIRWAY INHALATION TREATMENT: CPT

## 2024-01-01 PROCEDURE — 6370000000 HC RX 637 (ALT 250 FOR IP): Performed by: NURSE PRACTITIONER

## 2024-01-01 PROCEDURE — 80048 BASIC METABOLIC PNL TOTAL CA: CPT

## 2024-01-01 PROCEDURE — 36415 COLL VENOUS BLD VENIPUNCTURE: CPT

## 2024-01-01 PROCEDURE — 2580000003 HC RX 258: Performed by: INTERNAL MEDICINE

## 2024-01-01 PROCEDURE — 2700000000 HC OXYGEN THERAPY PER DAY

## 2024-01-01 PROCEDURE — 6370000000 HC RX 637 (ALT 250 FOR IP): Performed by: INTERNAL MEDICINE

## 2024-01-01 PROCEDURE — 94761 N-INVAS EAR/PLS OXIMETRY MLT: CPT

## 2024-01-01 PROCEDURE — 1200000000 HC SEMI PRIVATE

## 2024-01-01 RX ORDER — DILTIAZEM HYDROCHLORIDE 240 MG/1
240 CAPSULE, COATED, EXTENDED RELEASE ORAL DAILY
Qty: 30 CAPSULE | Refills: 0 | Status: ON HOLD | OUTPATIENT
Start: 2024-01-01

## 2024-01-01 RX ORDER — BUDESONIDE AND FORMOTEROL FUMARATE DIHYDRATE 160; 4.5 UG/1; UG/1
2 AEROSOL RESPIRATORY (INHALATION) 2 TIMES DAILY
Qty: 30.6 G | Refills: 0 | Status: ON HOLD | OUTPATIENT
Start: 2024-01-01

## 2024-01-01 RX ORDER — ROFLUMILAST 500 UG/1
500 TABLET ORAL DAILY
Qty: 30 TABLET | Refills: 0 | Status: ON HOLD | OUTPATIENT
Start: 2024-01-01 | End: 2024-03-31

## 2024-01-01 RX ORDER — TIOTROPIUM BROMIDE 18 UG/1
18 CAPSULE ORAL; RESPIRATORY (INHALATION) DAILY
Qty: 90 CAPSULE | Refills: 0 | Status: ON HOLD | OUTPATIENT
Start: 2024-01-01

## 2024-01-01 RX ORDER — TORSEMIDE 20 MG/1
20 TABLET ORAL 2 TIMES DAILY
Qty: 60 TABLET | Refills: 0 | Status: ON HOLD | OUTPATIENT
Start: 2024-01-01

## 2024-01-01 RX ORDER — POTASSIUM CHLORIDE 750 MG/1
10 TABLET, EXTENDED RELEASE ORAL DAILY
Qty: 30 TABLET | Refills: 0 | Status: ON HOLD | OUTPATIENT
Start: 2024-01-01

## 2024-01-01 RX ORDER — INSULIN DETEMIR 100 [IU]/ML
25 INJECTION, SOLUTION SUBCUTANEOUS
Qty: 22.5 ML | Refills: 0 | Status: ON HOLD | OUTPATIENT
Start: 2024-01-01 | End: 2024-03-31

## 2024-01-01 RX ORDER — INSULIN LISPRO 100 [IU]/ML
7 INJECTION, SOLUTION INTRAVENOUS; SUBCUTANEOUS
Status: DISCONTINUED | OUTPATIENT
Start: 2024-01-02 | End: 2024-01-02 | Stop reason: HOSPADM

## 2024-01-01 RX ORDER — LANOLIN ALCOHOL/MO/W.PET/CERES
400 CREAM (GRAM) TOPICAL DAILY
Qty: 30 TABLET | Refills: 0 | Status: ON HOLD | OUTPATIENT
Start: 2024-01-01

## 2024-01-01 RX ORDER — ALBUTEROL SULFATE 90 UG/1
2 AEROSOL, METERED RESPIRATORY (INHALATION) 2 TIMES DAILY
Qty: 18 G | Refills: 0 | Status: ON HOLD | OUTPATIENT
Start: 2024-01-01

## 2024-01-01 RX ORDER — INSULIN LISPRO 100 [IU]/ML
INJECTION, SOLUTION INTRAVENOUS; SUBCUTANEOUS
Qty: 15 ML | Refills: 0 | Status: ON HOLD | OUTPATIENT
Start: 2024-01-01

## 2024-01-01 RX ORDER — IPRATROPIUM BROMIDE AND ALBUTEROL SULFATE 2.5; .5 MG/3ML; MG/3ML
3 SOLUTION RESPIRATORY (INHALATION) EVERY 4 HOURS PRN
Qty: 360 ML | Refills: 0 | Status: ON HOLD | OUTPATIENT
Start: 2024-01-01

## 2024-01-01 RX ORDER — ATORVASTATIN CALCIUM 40 MG/1
40 TABLET, FILM COATED ORAL NIGHTLY
Qty: 30 TABLET | Refills: 0 | Status: ON HOLD | OUTPATIENT
Start: 2024-01-01

## 2024-01-01 RX ADMIN — SODIUM CHLORIDE, PRESERVATIVE FREE 10 ML: 5 INJECTION INTRAVENOUS at 09:07

## 2024-01-01 RX ADMIN — INSULIN LISPRO 2 UNITS: 100 INJECTION, SOLUTION INTRAVENOUS; SUBCUTANEOUS at 17:33

## 2024-01-01 RX ADMIN — INSULIN LISPRO 4 UNITS: 100 INJECTION, SOLUTION INTRAVENOUS; SUBCUTANEOUS at 09:07

## 2024-01-01 RX ADMIN — INSULIN LISPRO 4 UNITS: 100 INJECTION, SOLUTION INTRAVENOUS; SUBCUTANEOUS at 13:10

## 2024-01-01 RX ADMIN — Medication 400 MG: at 09:06

## 2024-01-01 RX ADMIN — INSULIN GLARGINE 26 UNITS: 100 INJECTION, SOLUTION SUBCUTANEOUS at 22:16

## 2024-01-01 RX ADMIN — INSULIN LISPRO 4 UNITS: 100 INJECTION, SOLUTION INTRAVENOUS; SUBCUTANEOUS at 17:33

## 2024-01-01 RX ADMIN — INSULIN LISPRO 4 UNITS: 100 INJECTION, SOLUTION INTRAVENOUS; SUBCUTANEOUS at 13:11

## 2024-01-01 RX ADMIN — BUDESONIDE AND FORMOTEROL FUMARATE DIHYDRATE 2 PUFF: 160; 4.5 AEROSOL RESPIRATORY (INHALATION) at 21:04

## 2024-01-01 RX ADMIN — IPRATROPIUM BROMIDE AND ALBUTEROL SULFATE 1 DOSE: 2.5; .5 SOLUTION RESPIRATORY (INHALATION) at 12:08

## 2024-01-01 RX ADMIN — TORSEMIDE 20 MG: 20 TABLET ORAL at 17:31

## 2024-01-01 RX ADMIN — SODIUM CHLORIDE, PRESERVATIVE FREE 10 ML: 5 INJECTION INTRAVENOUS at 22:18

## 2024-01-01 RX ADMIN — BUDESONIDE AND FORMOTEROL FUMARATE DIHYDRATE 2 PUFF: 160; 4.5 AEROSOL RESPIRATORY (INHALATION) at 08:25

## 2024-01-01 RX ADMIN — INSULIN LISPRO 2 UNITS: 100 INJECTION, SOLUTION INTRAVENOUS; SUBCUTANEOUS at 09:07

## 2024-01-01 RX ADMIN — ROFLUMILAST 500 MCG: 500 TABLET ORAL at 09:07

## 2024-01-01 RX ADMIN — IPRATROPIUM BROMIDE AND ALBUTEROL SULFATE 1 DOSE: 2.5; .5 SOLUTION RESPIRATORY (INHALATION) at 15:30

## 2024-01-01 RX ADMIN — IPRATROPIUM BROMIDE AND ALBUTEROL SULFATE 1 DOSE: 2.5; .5 SOLUTION RESPIRATORY (INHALATION) at 21:03

## 2024-01-01 RX ADMIN — APIXABAN 5 MG: 5 TABLET, FILM COATED ORAL at 09:06

## 2024-01-01 RX ADMIN — IPRATROPIUM BROMIDE AND ALBUTEROL SULFATE 1 DOSE: 2.5; .5 SOLUTION RESPIRATORY (INHALATION) at 08:24

## 2024-01-01 RX ADMIN — ATORVASTATIN CALCIUM 40 MG: 40 TABLET, FILM COATED ORAL at 09:06

## 2024-01-01 RX ADMIN — DILTIAZEM HYDROCHLORIDE 240 MG: 240 CAPSULE, COATED, EXTENDED RELEASE ORAL at 09:07

## 2024-01-01 RX ADMIN — TORSEMIDE 20 MG: 20 TABLET ORAL at 09:06

## 2024-01-01 RX ADMIN — APIXABAN 5 MG: 5 TABLET, FILM COATED ORAL at 22:16

## 2024-01-01 RX ADMIN — POTASSIUM CHLORIDE 10 MEQ: 1500 TABLET, EXTENDED RELEASE ORAL at 09:06

## 2024-01-01 NOTE — PROGRESS NOTES
Patient blood glucose is 296.  No current prandial dose ordered and does not meet order parameters for HS sliding scale at this moment.  Patient is getting ready to eat a quarter pounder, a double cheeseburger, and a fish sandwich from [employer] after his recent meal of a burger, chili, and a frosty from [employer] a couple of hours ago.  Notified nurse practitioner on call.  No new orders given.  Will recheck blood sugar at 0200.

## 2024-01-01 NOTE — PROGRESS NOTES
bipap for 6 hours in morning and then plan nightly Bipap         Objective:     Intake/Output Summary (Last 24 hours) at 1/1/2024 1804  Last data filed at 1/1/2024 1312  Gross per 24 hour   Intake 480 ml   Output --   Net 480 ml          Vitals:   Vitals:    01/01/24 1731   BP: 131/61   Pulse:    Resp:    Temp:    SpO2:        Physical Exam:      Physical Exam  Constitutional:       Appearance: He is not diaphoretic.   Pulmonary:      Effort: Pulmonary effort is normal.   Neurological:      Cranial Nerves: No cranial nerve deficit.          Medications:   Medications:    [START ON 1/2/2024] insulin lispro  7 Units SubCUTAneous TID WC    torsemide  20 mg Oral BID    insulin glargine  26 Units SubCUTAneous Nightly    sodium chloride flush  5-40 mL IntraVENous 2 times per day    apixaban  5 mg Oral BID    atorvastatin  40 mg Oral Daily    dilTIAZem  240 mg Oral Daily    ipratropium 0.5 mg-albuterol 2.5 mg  1 Dose Inhalation Q4H WA RT    magnesium oxide  400 mg Oral Daily    potassium chloride  10 mEq Oral Daily    Roflumilast  500 mcg Oral Daily    budesonide-formoterol  2 puff Inhalation BID    insulin lispro  0-8 Units SubCUTAneous TID WC    insulin lispro  0-4 Units SubCUTAneous Nightly      Infusions:    sodium chloride      dextrose       PRN Meds: sodium chloride flush, 5-40 mL, PRN  sodium chloride, , PRN  ondansetron, 4 mg, Q8H PRN   Or  ondansetron, 4 mg, Q6H PRN  polyethylene glycol, 17 g, Daily PRN  acetaminophen, 650 mg, Q6H PRN   Or  acetaminophen, 650 mg, Q6H PRN  potassium chloride, 40 mEq, PRN   Or  potassium alternative oral replacement, 40 mEq, PRN   Or  potassium chloride, 10 mEq, PRN  magnesium sulfate, 2,000 mg, PRN  albuterol sulfate HFA, 2 puff, Q6H PRN  glucose, 4 tablet, PRN  dextrose bolus, 125 mL, PRN   Or  dextrose bolus, 250 mL, PRN  glucagon (rDNA), 1 mg, PRN  dextrose, , Continuous PRN        Labs      Recent Results (from the past 24 hour(s))   POCT Glucose    Collection Time: 12/31/23   Mild bilateral pleural effusions.  Stable mild bilateral lower lobe/lung base pulmonary opacities.  No lobar consolidation.  No pneumothorax or free subdiaphragmatic air.     1. Stable mild bibasilar opacities which could represent atelectasis versus possible pneumonia. 2. Stable mild bilateral pleural effusions.       Electronically signed by EUGENIE RAJPUT MD on 1/1/2024 at 6:04 PM

## 2024-01-01 NOTE — PROGRESS NOTES
Pt refuses prn mdi and wants to wait on his scheduled nebulizer tx. Pt also put bipap back on with previously charted settings after being educated on the use of bipap.

## 2024-01-02 VITALS
DIASTOLIC BLOOD PRESSURE: 60 MMHG | OXYGEN SATURATION: 95 % | RESPIRATION RATE: 18 BRPM | SYSTOLIC BLOOD PRESSURE: 112 MMHG | TEMPERATURE: 97.9 F | HEART RATE: 88 BPM | WEIGHT: 315 LBS | BODY MASS INDEX: 36.45 KG/M2 | HEIGHT: 78 IN

## 2024-01-02 LAB — GLUCOSE BLD-MCNC: 166 MG/DL (ref 70–99)

## 2024-01-02 PROCEDURE — 94664 DEMO&/EVAL PT USE INHALER: CPT

## 2024-01-02 PROCEDURE — 2580000003 HC RX 258: Performed by: INTERNAL MEDICINE

## 2024-01-02 PROCEDURE — 6370000000 HC RX 637 (ALT 250 FOR IP): Performed by: INTERNAL MEDICINE

## 2024-01-02 PROCEDURE — 94640 AIRWAY INHALATION TREATMENT: CPT

## 2024-01-02 PROCEDURE — 2700000000 HC OXYGEN THERAPY PER DAY

## 2024-01-02 PROCEDURE — 85027 COMPLETE CBC AUTOMATED: CPT

## 2024-01-02 PROCEDURE — 94761 N-INVAS EAR/PLS OXIMETRY MLT: CPT

## 2024-01-02 PROCEDURE — 82962 GLUCOSE BLOOD TEST: CPT

## 2024-01-02 PROCEDURE — 6370000000 HC RX 637 (ALT 250 FOR IP): Performed by: NURSE PRACTITIONER

## 2024-01-02 PROCEDURE — 80048 BASIC METABOLIC PNL TOTAL CA: CPT

## 2024-01-02 RX ADMIN — INSULIN LISPRO 7 UNITS: 100 INJECTION, SOLUTION INTRAVENOUS; SUBCUTANEOUS at 08:21

## 2024-01-02 RX ADMIN — APIXABAN 5 MG: 5 TABLET, FILM COATED ORAL at 08:21

## 2024-01-02 RX ADMIN — ATORVASTATIN CALCIUM 40 MG: 40 TABLET, FILM COATED ORAL at 08:21

## 2024-01-02 RX ADMIN — IPRATROPIUM BROMIDE AND ALBUTEROL SULFATE 1 DOSE: 2.5; .5 SOLUTION RESPIRATORY (INHALATION) at 11:50

## 2024-01-02 RX ADMIN — IPRATROPIUM BROMIDE AND ALBUTEROL SULFATE 1 DOSE: 2.5; .5 SOLUTION RESPIRATORY (INHALATION) at 04:12

## 2024-01-02 RX ADMIN — TORSEMIDE 20 MG: 20 TABLET ORAL at 08:21

## 2024-01-02 RX ADMIN — BUDESONIDE AND FORMOTEROL FUMARATE DIHYDRATE 2 PUFF: 160; 4.5 AEROSOL RESPIRATORY (INHALATION) at 08:27

## 2024-01-02 RX ADMIN — SODIUM CHLORIDE, PRESERVATIVE FREE 10 ML: 5 INJECTION INTRAVENOUS at 08:21

## 2024-01-02 RX ADMIN — POTASSIUM CHLORIDE 10 MEQ: 1500 TABLET, EXTENDED RELEASE ORAL at 08:21

## 2024-01-02 RX ADMIN — Medication 400 MG: at 08:21

## 2024-01-02 RX ADMIN — ROFLUMILAST 500 MCG: 500 TABLET ORAL at 08:22

## 2024-01-02 RX ADMIN — IPRATROPIUM BROMIDE AND ALBUTEROL SULFATE 1 DOSE: 2.5; .5 SOLUTION RESPIRATORY (INHALATION) at 08:29

## 2024-01-02 RX ADMIN — DILTIAZEM HYDROCHLORIDE 240 MG: 240 CAPSULE, COATED, EXTENDED RELEASE ORAL at 08:21

## 2024-01-02 ASSESSMENT — PAIN SCALES - GENERAL: PAINLEVEL_OUTOF10: 0

## 2024-01-02 NOTE — DISCHARGE SUMMARY
V2.0  Discharge Summary    Name:  Armando Mars /Age/Sex: 1955 (68 y.o. male)   Admit Date: 2023  Discharge Date: 24    MRN & CSN:  [id number] & [id number] Encounter Date and Time 24 9:32 AM EST    Attending:  Eugenie Rajput MD Discharging Provider: EUGENIE RAJPUT MD       Hospital Course:     Brief HPI: Armando Mars is a 68 y.o. male    Armando has multiple recent admissions including 3 in November, and 2 other admissions this month in December.    For the most recent admission he was here from  until , for 3 days.  He was discharged at about 2:30 PM.  During that admission he was treated for CHF.  He then presented back to the ED about 4 and half hours later with shortness of breath.  In the ED he was diagnosed with CHF, and was admitted.      Brief Problem Based Course:     For this admission he was here from  until .  He was treated for CHF.  He was diuresed.  During the latter part of his hospitalization during rounds I observed him eating high salt foods such as French fries and other food that he states that he obtained to his hospital [facility].  I advised him against eating French fries.  He stated that he was not interested in stopping high salt food.  We diuresed him the best we could.  He was now stable, and was discharged home with home health care.    Problem list    Acute on chronic respiratory failure with hypoxia and hypercapnia  -pCO2 went up to 87 on venous blood gas  -improved, he is on home oxygen at 2 L    Acute on chronic HFpEF  -He required extensive diuresis    Hypomagnesemia-magnesium level at 1 point was 1.3-recommend repeating it as outpatient    Metabolic alkalosis-Limited diuresis at times    Elevated troponin-most likely demand ischemia related per cardiology-no further workup at this time per cardiology    Hypertension - stable at this time    Persistent atrial  MG Tabs tablet  Commonly known as: Eliquis  Take 1 tablet by mouth 2 times daily     atorvastatin 40 MG tablet  Commonly known as: LIPITOR  Take 1 tablet by mouth nightly     * blood glucose monitor kit and supplies  Check sugar daily     * glucose monitoring kit  1 kit by Does not apply route daily     blood glucose test strips  Test once daily     Compressor/Nebulizer Misc  Use qid     dilTIAZem 240 MG extended release capsule  Commonly known as: CARDIZEM CD  Take 1 capsule by mouth daily     insulin lispro (1 Unit Dial) 100 UNIT/ML Sopn  Commonly known as: HumaLOG KwikPen  Check glucose with meals and take insulin as below     0U  200-249 2U  250-299 4U  300-349 6U  >349 8U    At night time, check glucose and take insulin as below:   0  300-349 4U  >349 4U     Lancets Misc  1 each by Does not apply route 3 times daily     Levemir 100 UNIT/ML injection vial  Generic drug: insulin detemir  Inject 25 Units into the skin every morning (before breakfast)     magnesium oxide 400 (240 Mg) MG tablet  Commonly known as: MAG-OX  Take 1 tablet by mouth daily     metFORMIN 1000 MG tablet  Commonly known as: GLUCOPHAGE  Take 1 tablet by mouth 2 times daily (with meals)     Nebulizer/Tubing/Mouthpiece Kit  1 kit by Does not apply route daily as needed (SOB, wheezing)     olodaterol 2.5 MCG/ACT inhaler  Commonly known as: STRIVERDI RESPIMAT  Inhale 2 puffs into the lungs daily     OXYGEN  Inhale 3 L into the lungs continuous     potassium chloride 10 MEQ extended release tablet  Commonly known as: KLOR-CON M  Take 1 tablet by mouth daily     Roflumilast 500 MCG tablet  Commonly known as: DALIRESP  Take 1 tablet by mouth daily     Symbicort 160-4.5 MCG/ACT Aero  Generic drug: budesonide-formoterol  Inhale 2 puffs into the lungs 2 times daily           * This list has 2 medication(s) that are the same as other medications prescribed for you. Read the directions carefully, and ask your doctor or other care provider to

## 2024-01-02 NOTE — PROGRESS NOTES
RRT checked on pt.    Pt was already off the Bipap machine.    RRT placed the pt on @ 2255.    Pt was off already before 2355.

## 2024-01-02 NOTE — PROGRESS NOTES
Updated patient about discharge. He states he must have medical transportation as he came by ambulance and not having a ride to get home. While attempting to figure out the correct service, pt became angry and began using expletives towards me. I removed myself from the room to secure transportation; updated [facility] charge nurse.       11:51 AM  Spoke with Sade with [employer] and she states she will send crew up ASAP for discharge.

## 2024-01-02 NOTE — CARE COORDINATION
LSW received discharge orders on this pt.  Pt is active with [facility]. LSW PS Mary with [facility] and informed her about discharge.  Mary will initiate HC services.

## 2024-01-02 NOTE — H&P
Vickie Galeazzi, RN4 days ago     GE  Per Dr. Miranda Fontana, Can hold Plavix for 5 days. stay on aspirin      Writer placed call to pt, Keisha Ba answered. Orders relayed to Keisha Ba, she v/u. Will route encounter to Dr. Roshan Rollins office to make them aware. capsule by mouth daily 30 capsule 5    albuterol sulfate  (90 Base) MCG/ACT inhaler Inhale 2 puffs into the lungs every 6 hours as needed for Wheezing 3 Inhaler 3    UNABLE TO FIND Please administer two SHINGRIX vaccines 2-6 months apart 2 Units 0    ipratropium (ATROVENT) 0.03 % nasal spray 2 sprays by Nasal route 3 times daily 1 Bottle 3    ASPIRIN LOW DOSE 81 MG EC tablet take 1 tablet by mouth once daily 90 tablet 3    albuterol (PROVENTIL) (2.5 MG/3ML) 0.083% nebulizer solution Take 3 mLs by nebulization every 4 hours as needed for Wheezing 120 vial 5    ipratropium (ATROVENT) 0.02 % nebulizer solution Take 2.5 mLs by nebulization every 4 hours as needed for Wheezing 300 mL 5    OXYGEN Inhale 2 L into the lungs continuous       Nebulizers (COMPRESSOR/NEBULIZER) MISC Use qid 1 each 3         Allergies  Allergies   Allergen Reactions    Metoprolol      \"Chest Pain And Burning In The Chest\"       REVIEW OF SYSTEMS   Within above limitations. 14 point review of systems reviewed. Pertinent positive or negative as per HPI or otherwise negative per 14 point systems review. PHYSICAL EXAM     Wt Readings from Last 3 Encounters:   04/19/20 (!) 350 lb (158.8 kg)   08/31/19 (!) 350 lb (158.8 kg)   03/11/19 (!) 339 lb 3.2 oz (153.9 kg)       Blood pressure 120/67, pulse 91, temperature 98 °F (36.7 °C), temperature source Oral, resp. rate 18, height 6' 6\" (1.981 m), weight (!) 350 lb (158.8 kg), SpO2 95 %. GEN  -Awake, alert, NAD.   EYES   -PERRL. HENT  -MM are moist.   RESP  -decreased breath sounds, no wheezing heard   C/V  -S1/S2 auscultated. RRR, without appreciable M/R/G. No JVD or carotid bruits. No peripheral edema. No reproducible chest wall tenderness. GI  -Abdomen is soft, non-distended, no significant tenderness. MS  -B/L extremities- Full movements. No gross joint deformities. No swelling, intact sensation symmetrical.   SKIN  -Normal coloration, warm, dry.    NEURO  -CN 2-12 appear

## 2024-01-03 ENCOUNTER — HOSPITAL ENCOUNTER (INPATIENT)
Age: 69
LOS: 5 days | Discharge: HOME HEALTH | DRG: 193 | End: 2024-01-08
Attending: EMERGENCY MEDICINE | Admitting: INTERNAL MEDICINE
Payer: MEDICARE

## 2024-01-03 ENCOUNTER — APPOINTMENT (OUTPATIENT)
Dept: GENERAL RADIOLOGY | Age: 69
DRG: 193 | End: 2024-01-03
Payer: MEDICARE

## 2024-01-03 DIAGNOSIS — I48.0 PAF (PAROXYSMAL ATRIAL FIBRILLATION) (HCC): ICD-10-CM

## 2024-01-03 DIAGNOSIS — J18.9 COMMUNITY ACQUIRED PNEUMONIA OF RIGHT MIDDLE LOBE OF LUNG: Primary | ICD-10-CM

## 2024-01-03 DIAGNOSIS — J96.20 ACUTE ON CHRONIC RESPIRATORY FAILURE, UNSPECIFIED WHETHER WITH HYPOXIA OR HYPERCAPNIA (HCC): ICD-10-CM

## 2024-01-03 DIAGNOSIS — J18.9 COMMUNITY ACQUIRED PNEUMONIA, UNSPECIFIED LATERALITY: ICD-10-CM

## 2024-01-03 PROBLEM — J16.0 CAP (COMMUNITY ACQUIRED PNEUMONIA) DUE TO CHLAMYDIA SPECIES: Status: ACTIVE | Noted: 2024-01-03

## 2024-01-03 LAB
ALBUMIN SERPL-MCNC: 3.3 GM/DL (ref 3.4–5)
ALP BLD-CCNC: 74 IU/L (ref 40–129)
ALT SERPL-CCNC: 18 U/L (ref 10–40)
ANION GAP SERPL CALCULATED.3IONS-SCNC: 8 MMOL/L (ref 7–16)
AST SERPL-CCNC: 10 IU/L (ref 15–37)
B PARAP IS1001 DNA NPH QL NAA+NON-PROBE: NOT DETECTED
B PERT.PT PRMT NPH QL NAA+NON-PROBE: NOT DETECTED
BASOPHILS ABSOLUTE: 0 K/CU MM
BASOPHILS RELATIVE PERCENT: 0.4 % (ref 0–1)
BILIRUB SERPL-MCNC: 0.5 MG/DL (ref 0–1)
BUN SERPL-MCNC: 8 MG/DL (ref 6–23)
C PNEUM DNA NPH QL NAA+NON-PROBE: NOT DETECTED
CALCIUM SERPL-MCNC: 8.6 MG/DL (ref 8.3–10.6)
CHLORIDE BLD-SCNC: 95 MMOL/L (ref 99–110)
CO2: 33 MMOL/L (ref 21–32)
CREAT SERPL-MCNC: 0.5 MG/DL (ref 0.9–1.3)
DIFFERENTIAL TYPE: ABNORMAL
EOSINOPHILS ABSOLUTE: 0.1 K/CU MM
EOSINOPHILS RELATIVE PERCENT: 0.9 % (ref 0–3)
FLUAV H1 2009 PAN RNA NPH NAA+NON-PROBE: NOT DETECTED
FLUAV H1 RNA NPH QL NAA+NON-PROBE: NOT DETECTED
FLUAV H3 RNA NPH QL NAA+NON-PROBE: NOT DETECTED
FLUAV RNA NPH QL NAA+NON-PROBE: NOT DETECTED
FLUBV RNA NPH QL NAA+NON-PROBE: NOT DETECTED
GFR SERPL CREATININE-BSD FRML MDRD: >60 ML/MIN/1.73M2
GLUCOSE BLD-MCNC: 262 MG/DL (ref 70–99)
GLUCOSE BLD-MCNC: 407 MG/DL (ref 70–99)
GLUCOSE SERPL-MCNC: 294 MG/DL (ref 70–99)
HADV DNA NPH QL NAA+NON-PROBE: NOT DETECTED
HCOV 229E RNA NPH QL NAA+NON-PROBE: NOT DETECTED
HCOV HKU1 RNA NPH QL NAA+NON-PROBE: NOT DETECTED
HCOV NL63 RNA NPH QL NAA+NON-PROBE: NOT DETECTED
HCOV OC43 RNA NPH QL NAA+NON-PROBE: NOT DETECTED
HCT VFR BLD CALC: 36.4 % (ref 42–52)
HEMOGLOBIN: 11.6 GM/DL (ref 13.5–18)
HMPV RNA NPH QL NAA+NON-PROBE: NOT DETECTED
HPIV1 RNA NPH QL NAA+NON-PROBE: NOT DETECTED
HPIV2 RNA NPH QL NAA+NON-PROBE: NOT DETECTED
HPIV3 RNA NPH QL NAA+NON-PROBE: NOT DETECTED
HPIV4 RNA NPH QL NAA+NON-PROBE: NOT DETECTED
IMMATURE NEUTROPHIL %: 0.7 % (ref 0–0.43)
LACTATE: 1.1 MMOL/L (ref 0.5–1.9)
LYMPHOCYTES ABSOLUTE: 0.8 K/CU MM
LYMPHOCYTES RELATIVE PERCENT: 7.2 % (ref 24–44)
M PNEUMO DNA NPH QL NAA+NON-PROBE: NOT DETECTED
MCH RBC QN AUTO: 30.6 PG (ref 27–31)
MCHC RBC AUTO-ENTMCNC: 31.9 % (ref 32–36)
MCV RBC AUTO: 96 FL (ref 78–100)
MONOCYTES ABSOLUTE: 1.2 K/CU MM
MONOCYTES RELATIVE PERCENT: 11.2 % (ref 0–4)
NUCLEATED RBC %: 0 %
PDW BLD-RTO: 16.9 % (ref 11.7–14.9)
PLATELET # BLD: 186 K/CU MM (ref 140–440)
PMV BLD AUTO: 9.4 FL (ref 7.5–11.1)
POTASSIUM SERPL-SCNC: 3.9 MMOL/L (ref 3.5–5.1)
RBC # BLD: 3.79 M/CU MM (ref 4.6–6.2)
RSV RNA NPH QL NAA+NON-PROBE: NOT DETECTED
RV+EV RNA NPH QL NAA+NON-PROBE: NOT DETECTED
SARS-COV-2 RNA NPH QL NAA+NON-PROBE: NOT DETECTED
SEGMENTED NEUTROPHILS ABSOLUTE COUNT: 8.7 K/CU MM
SEGMENTED NEUTROPHILS RELATIVE PERCENT: 79.6 % (ref 36–66)
SODIUM BLD-SCNC: 136 MMOL/L (ref 135–145)
TOTAL IMMATURE NEUTOROPHIL: 0.08 K/CU MM
TOTAL NUCLEATED RBC: 0 K/CU MM
TOTAL PROTEIN: 6.4 GM/DL (ref 6.4–8.2)
TROPONIN, HIGH SENSITIVITY: 47 NG/L (ref 0–22)
TROPONIN, HIGH SENSITIVITY: 52 NG/L (ref 0–22)
WBC # BLD: 10.9 K/CU MM (ref 4–10.5)

## 2024-01-03 PROCEDURE — 6370000000 HC RX 637 (ALT 250 FOR IP)

## 2024-01-03 PROCEDURE — 2580000003 HC RX 258

## 2024-01-03 PROCEDURE — 99222 1ST HOSP IP/OBS MODERATE 55: CPT | Performed by: INTERNAL MEDICINE

## 2024-01-03 PROCEDURE — 2580000003 HC RX 258: Performed by: EMERGENCY MEDICINE

## 2024-01-03 PROCEDURE — 87899 AGENT NOS ASSAY W/OPTIC: CPT

## 2024-01-03 PROCEDURE — 83605 ASSAY OF LACTIC ACID: CPT

## 2024-01-03 PROCEDURE — 96365 THER/PROPH/DIAG IV INF INIT: CPT

## 2024-01-03 PROCEDURE — 2700000000 HC OXYGEN THERAPY PER DAY

## 2024-01-03 PROCEDURE — 94640 AIRWAY INHALATION TREATMENT: CPT

## 2024-01-03 PROCEDURE — 6360000002 HC RX W HCPCS: Performed by: INTERNAL MEDICINE

## 2024-01-03 PROCEDURE — 87150 DNA/RNA AMPLIFIED PROBE: CPT

## 2024-01-03 PROCEDURE — 93005 ELECTROCARDIOGRAM TRACING: CPT | Performed by: EMERGENCY MEDICINE

## 2024-01-03 PROCEDURE — 85025 COMPLETE CBC W/AUTO DIFF WBC: CPT

## 2024-01-03 PROCEDURE — 1200000000 HC SEMI PRIVATE

## 2024-01-03 PROCEDURE — 94761 N-INVAS EAR/PLS OXIMETRY MLT: CPT

## 2024-01-03 PROCEDURE — 99285 EMERGENCY DEPT VISIT HI MDM: CPT

## 2024-01-03 PROCEDURE — 2580000003 HC RX 258: Performed by: INTERNAL MEDICINE

## 2024-01-03 PROCEDURE — 6370000000 HC RX 637 (ALT 250 FOR IP): Performed by: INTERNAL MEDICINE

## 2024-01-03 PROCEDURE — 0202U NFCT DS 22 TRGT SARS-COV-2: CPT

## 2024-01-03 PROCEDURE — 80053 COMPREHEN METABOLIC PANEL: CPT

## 2024-01-03 PROCEDURE — 71045 X-RAY EXAM CHEST 1 VIEW: CPT

## 2024-01-03 PROCEDURE — 6360000002 HC RX W HCPCS: Performed by: EMERGENCY MEDICINE

## 2024-01-03 PROCEDURE — 87040 BLOOD CULTURE FOR BACTERIA: CPT

## 2024-01-03 PROCEDURE — 84484 ASSAY OF TROPONIN QUANT: CPT

## 2024-01-03 PROCEDURE — 87449 NOS EACH ORGANISM AG IA: CPT

## 2024-01-03 PROCEDURE — 82962 GLUCOSE BLOOD TEST: CPT

## 2024-01-03 RX ORDER — DEXTROSE MONOHYDRATE 100 MG/ML
INJECTION, SOLUTION INTRAVENOUS CONTINUOUS PRN
Status: DISCONTINUED | OUTPATIENT
Start: 2024-01-03 | End: 2024-01-08 | Stop reason: HOSPADM

## 2024-01-03 RX ORDER — ENOXAPARIN SODIUM 100 MG/ML
40 INJECTION SUBCUTANEOUS 2 TIMES DAILY
Status: DISCONTINUED | OUTPATIENT
Start: 2024-01-03 | End: 2024-01-03

## 2024-01-03 RX ORDER — ACETAMINOPHEN 650 MG/1
650 SUPPOSITORY RECTAL EVERY 6 HOURS PRN
Status: DISCONTINUED | OUTPATIENT
Start: 2024-01-03 | End: 2024-01-08 | Stop reason: HOSPADM

## 2024-01-03 RX ORDER — ALBUTEROL SULFATE 90 UG/1
2 AEROSOL, METERED RESPIRATORY (INHALATION) 2 TIMES DAILY
Status: DISCONTINUED | OUTPATIENT
Start: 2024-01-03 | End: 2024-01-04

## 2024-01-03 RX ORDER — GLUCAGON 1 MG/ML
1 KIT INJECTION PRN
Status: DISCONTINUED | OUTPATIENT
Start: 2024-01-03 | End: 2024-01-08 | Stop reason: HOSPADM

## 2024-01-03 RX ORDER — TORSEMIDE 20 MG/1
20 TABLET ORAL 2 TIMES DAILY
Status: DISCONTINUED | OUTPATIENT
Start: 2024-01-03 | End: 2024-01-07

## 2024-01-03 RX ORDER — POTASSIUM CHLORIDE 20 MEQ/1
10 TABLET, EXTENDED RELEASE ORAL DAILY
Status: DISCONTINUED | OUTPATIENT
Start: 2024-01-03 | End: 2024-01-08 | Stop reason: HOSPADM

## 2024-01-03 RX ORDER — LANOLIN ALCOHOL/MO/W.PET/CERES
400 CREAM (GRAM) TOPICAL DAILY
Status: DISCONTINUED | OUTPATIENT
Start: 2024-01-03 | End: 2024-01-08 | Stop reason: HOSPADM

## 2024-01-03 RX ORDER — SODIUM CHLORIDE 0.9 % (FLUSH) 0.9 %
5-40 SYRINGE (ML) INJECTION EVERY 12 HOURS SCHEDULED
Status: DISCONTINUED | OUTPATIENT
Start: 2024-01-03 | End: 2024-01-08 | Stop reason: HOSPADM

## 2024-01-03 RX ORDER — DILTIAZEM HYDROCHLORIDE 240 MG/1
240 CAPSULE, COATED, EXTENDED RELEASE ORAL DAILY
Status: DISCONTINUED | OUTPATIENT
Start: 2024-01-03 | End: 2024-01-08 | Stop reason: HOSPADM

## 2024-01-03 RX ORDER — SODIUM CHLORIDE 9 MG/ML
INJECTION, SOLUTION INTRAVENOUS PRN
Status: DISCONTINUED | OUTPATIENT
Start: 2024-01-03 | End: 2024-01-08 | Stop reason: HOSPADM

## 2024-01-03 RX ORDER — BUDESONIDE AND FORMOTEROL FUMARATE DIHYDRATE 160; 4.5 UG/1; UG/1
2 AEROSOL RESPIRATORY (INHALATION) 2 TIMES DAILY
Status: DISCONTINUED | OUTPATIENT
Start: 2024-01-03 | End: 2024-01-08 | Stop reason: HOSPADM

## 2024-01-03 RX ORDER — POLYETHYLENE GLYCOL 3350 17 G/17G
17 POWDER, FOR SOLUTION ORAL DAILY PRN
Status: DISCONTINUED | OUTPATIENT
Start: 2024-01-03 | End: 2024-01-08 | Stop reason: HOSPADM

## 2024-01-03 RX ORDER — ONDANSETRON 4 MG/1
4 TABLET, ORALLY DISINTEGRATING ORAL EVERY 8 HOURS PRN
Status: DISCONTINUED | OUTPATIENT
Start: 2024-01-03 | End: 2024-01-08 | Stop reason: HOSPADM

## 2024-01-03 RX ORDER — INSULIN LISPRO 100 [IU]/ML
0-4 INJECTION, SOLUTION INTRAVENOUS; SUBCUTANEOUS NIGHTLY
Status: DISCONTINUED | OUTPATIENT
Start: 2024-01-03 | End: 2024-01-04

## 2024-01-03 RX ORDER — ONDANSETRON 2 MG/ML
4 INJECTION INTRAMUSCULAR; INTRAVENOUS EVERY 6 HOURS PRN
Status: DISCONTINUED | OUTPATIENT
Start: 2024-01-03 | End: 2024-01-08 | Stop reason: HOSPADM

## 2024-01-03 RX ORDER — ACETAMINOPHEN 325 MG/1
650 TABLET ORAL EVERY 6 HOURS PRN
Status: DISCONTINUED | OUTPATIENT
Start: 2024-01-03 | End: 2024-01-08 | Stop reason: HOSPADM

## 2024-01-03 RX ORDER — INSULIN GLARGINE 100 [IU]/ML
12 INJECTION, SOLUTION SUBCUTANEOUS NIGHTLY
Status: DISCONTINUED | OUTPATIENT
Start: 2024-01-03 | End: 2024-01-04

## 2024-01-03 RX ORDER — INSULIN LISPRO 100 [IU]/ML
0-4 INJECTION, SOLUTION INTRAVENOUS; SUBCUTANEOUS
Status: DISCONTINUED | OUTPATIENT
Start: 2024-01-03 | End: 2024-01-04

## 2024-01-03 RX ORDER — IPRATROPIUM BROMIDE AND ALBUTEROL SULFATE 2.5; .5 MG/3ML; MG/3ML
1 SOLUTION RESPIRATORY (INHALATION) EVERY 4 HOURS PRN
Status: DISCONTINUED | OUTPATIENT
Start: 2024-01-03 | End: 2024-01-08 | Stop reason: HOSPADM

## 2024-01-03 RX ORDER — IPRATROPIUM BROMIDE AND ALBUTEROL SULFATE 2.5; .5 MG/3ML; MG/3ML
1 SOLUTION RESPIRATORY (INHALATION) 2 TIMES DAILY
Status: DISCONTINUED | OUTPATIENT
Start: 2024-01-03 | End: 2024-01-03

## 2024-01-03 RX ORDER — GUAIFENESIN 600 MG/1
600 TABLET, EXTENDED RELEASE ORAL 2 TIMES DAILY
Status: DISCONTINUED | OUTPATIENT
Start: 2024-01-03 | End: 2024-01-08 | Stop reason: HOSPADM

## 2024-01-03 RX ORDER — PREDNISONE 20 MG/1
40 TABLET ORAL DAILY
Status: DISCONTINUED | OUTPATIENT
Start: 2024-01-03 | End: 2024-01-04

## 2024-01-03 RX ORDER — SODIUM CHLORIDE 0.9 % (FLUSH) 0.9 %
5-40 SYRINGE (ML) INJECTION PRN
Status: DISCONTINUED | OUTPATIENT
Start: 2024-01-03 | End: 2024-01-08 | Stop reason: HOSPADM

## 2024-01-03 RX ADMIN — TORSEMIDE 20 MG: 20 TABLET ORAL at 14:11

## 2024-01-03 RX ADMIN — SODIUM CHLORIDE 25 ML: 9 INJECTION, SOLUTION INTRAVENOUS at 21:37

## 2024-01-03 RX ADMIN — BUDESONIDE AND FORMOTEROL FUMARATE DIHYDRATE 2 PUFF: 160; 4.5 AEROSOL RESPIRATORY (INHALATION) at 11:41

## 2024-01-03 RX ADMIN — APIXABAN 5 MG: 5 TABLET, FILM COATED ORAL at 21:29

## 2024-01-03 RX ADMIN — CEFEPIME 2000 MG: 2 INJECTION, POWDER, FOR SOLUTION INTRAVENOUS at 11:52

## 2024-01-03 RX ADMIN — AZITHROMYCIN MONOHYDRATE 500 MG: 500 INJECTION, POWDER, LYOPHILIZED, FOR SOLUTION INTRAVENOUS at 08:58

## 2024-01-03 RX ADMIN — POTASSIUM CHLORIDE 10 MEQ: 1500 TABLET, EXTENDED RELEASE ORAL at 12:56

## 2024-01-03 RX ADMIN — ALBUTEROL SULFATE 2 PUFF: 90 AEROSOL, METERED RESPIRATORY (INHALATION) at 20:20

## 2024-01-03 RX ADMIN — APIXABAN 5 MG: 5 TABLET, FILM COATED ORAL at 12:56

## 2024-01-03 RX ADMIN — PREDNISONE 40 MG: 20 TABLET ORAL at 11:15

## 2024-01-03 RX ADMIN — CEFEPIME 2000 MG: 2 INJECTION, POWDER, FOR SOLUTION INTRAVENOUS at 21:38

## 2024-01-03 RX ADMIN — INSULIN GLARGINE 12 UNITS: 100 INJECTION, SOLUTION SUBCUTANEOUS at 22:40

## 2024-01-03 RX ADMIN — TORSEMIDE 20 MG: 20 TABLET ORAL at 21:29

## 2024-01-03 RX ADMIN — ALBUTEROL SULFATE 2 PUFF: 90 AEROSOL, METERED RESPIRATORY (INHALATION) at 11:41

## 2024-01-03 RX ADMIN — INSULIN LISPRO 2 UNITS: 100 INJECTION, SOLUTION INTRAVENOUS; SUBCUTANEOUS at 12:55

## 2024-01-03 RX ADMIN — CEFTRIAXONE 1000 MG: 1 INJECTION, POWDER, FOR SOLUTION INTRAMUSCULAR; INTRAVENOUS at 07:24

## 2024-01-03 RX ADMIN — INSULIN LISPRO 4 UNITS: 100 INJECTION, SOLUTION INTRAVENOUS; SUBCUTANEOUS at 22:41

## 2024-01-03 RX ADMIN — GUAIFENESIN 600 MG: 600 TABLET, EXTENDED RELEASE ORAL at 21:29

## 2024-01-03 RX ADMIN — BUDESONIDE AND FORMOTEROL FUMARATE DIHYDRATE 2 PUFF: 160; 4.5 AEROSOL RESPIRATORY (INHALATION) at 20:22

## 2024-01-03 RX ADMIN — SODIUM CHLORIDE, PRESERVATIVE FREE 5 ML: 5 INJECTION INTRAVENOUS at 21:30

## 2024-01-03 RX ADMIN — GUAIFENESIN 600 MG: 600 TABLET, EXTENDED RELEASE ORAL at 08:58

## 2024-01-03 RX ADMIN — Medication 400 MG: at 12:56

## 2024-01-03 RX ADMIN — DILTIAZEM HYDROCHLORIDE 240 MG: 240 CAPSULE, COATED, EXTENDED RELEASE ORAL at 14:11

## 2024-01-03 ASSESSMENT — PAIN - FUNCTIONAL ASSESSMENT: PAIN_FUNCTIONAL_ASSESSMENT: NONE - DENIES PAIN

## 2024-01-03 ASSESSMENT — PAIN SCALES - GENERAL: PAINLEVEL_OUTOF10: 0

## 2024-01-03 NOTE — CARE COORDINATION
Pt was just admitted 12/24/23-1/2/24 for acute on chronic diastolic heart failure. Pt is from home alone, has insurance and PCP. Pt is active with [facility]. Updated Mary with [facility] that pt returned.     Pt returns for SOB. Pt is being admitted for further treatment at this time.

## 2024-01-03 NOTE — ED PROVIDER NOTES
Emergency Department Encounter    Patient: Armando Mars  MRN: [id number]  : 1955  Date of Evaluation: 1/3/2024  ED Provider:  Reno Sanchez MD    Triage Chief Complaint:   Shortness of Breath    [place]:  Armando Mars is a 68 y.o. morbidly obese male male with history of COPD on 3 L of oxygen all the time at home, type 2 diabetes, hypertension that presents to the department with complaints of shortness of breath since 3 PM yesterday afternoon associated with some cough and runny nose no chest or abdominal pain or leg pain or new leg swelling.  Patient denies headache, sore throat or neck pain or neck stiffness.  Patient denies history of DVT or PE.    ROS - see HPI, below listed is current ROS at time of my eval:  General: Fever  Eyes:  No recent vison changes, no discharge  ENT: Runny nose/nasal congestion  Cardiovascular:  No chest pain, no palpitations  Respiratory: Cough, shortness of breath  Gastrointestinal:  No pain, no nausea, no vomiting, no diarrhea  Musculoskeletal:  No muscle pain, no joint pain  Skin:  No rash, no pruritis, no easy bruising  Neurologic:  No speech problems, no headache, no extremity numbness, no extremity tingling, no extremity weakness  Psychiatric:  No anxiety  Genitourinary:  No dysuria, no hematuria  Endocrine:  No unexpected weight gain, no unexpected weight loss  Extremities:  no edema, no pain    Past Medical History:   Diagnosis Date    A-fib ([facility])     Resolved after ablation     Anxiety     Arthritis     \"Hips, Knees, Elbows And Fingers\"    COPD (chronic obstructive pulmonary disease) ([facility])     Sees Dr. Keen    Depression     Diabetes mellitus ([facility]) Dx     Full dentures     H/O angiography 2018    peripheral angio - LFA occluded, filling collaterals, RSFA 90% stenosis-vasc surg consult    H/O Doppler ultrasound 2018    LE arterial - left mid and distal femoral artery occluded, lt FANI shows mild-mod PVD    H/O echocardiogram 2016

## 2024-01-03 NOTE — ED NOTES
Dr. Sanchez notified of critical Troponin.   
Pt desatting due to taking his oxygen off, this RN attempted to educate pt on the need to keep his oxygen on, pt stated \"your emergency is not my emergency\". This RN attempted to educate again that the pt needs the oxygen. Pt states \"don't piss me off\" pt repositioned to comfort.   
1003   Infiltration Assessment 0 01/03/24 1003   Alcohol Cap Used Yes 01/03/24 1003   Dressing Status Clean, dry & intact 01/03/24 1003   Dressing Type Transparent 01/03/24 1003       Pertinent or High Risk Medications/Drips: no   If Yes, please provide details: NA  Blood Product Administration: no  If Yes, please provide details: NA    Recommendation    Incomplete orders NA  Additional Comments: NA   If any further questions, please call Sending RN at [phone]    Electronically signed by: Electronically signed by Isabel Herrera RN on 1/3/2024 at 6:40 PM    
[id number]    Electronically signed by: Electronically signed by Rodrigo Chaney RN on 1/3/2024 at 7:46 AM

## 2024-01-03 NOTE — ED TRIAGE NOTES
Pt comes from home for SOB, pt was just discharged from inpatient a few days ago for the same problem. Pt is on 3-Lpm at home and had an oxygen saturation of 88% when EMS arrived. Pt was brought in on a non-re-breather mask and is now on a NC at 5-Lpm.

## 2024-01-03 NOTE — H&P
V2.0  History and Physical      Name:  Armando Mars /Age/Sex: 1955  (68 y.o. male)   MRN & CSN:  [id number] & [id number] Encounter Date/Time: 1/3/2024 8:16 AM EST   Location:  [facility] PCP: Rosalio Hedrick MD       Hospital Day: 1    Assessment and Plan:   Amrando Mars is a 68 y.o. male with a pmh of Afib, HF, T2DM, COPD, HTN, HLD, Morbid Obesity who presents with CAP (community acquired pneumonia)    Hospital Problems             Last Modified POA    * (Principal) CAP (community acquired pneumonia) 1/3/2024 Yes       CAP  RML Pnuemonia   seen on CXR  --Shortness of breath and fatique for 2 weeks, gradully worsening.  Received Zithromax   Started on Rocephin, switched to Cefepime and cont Zithromax   --LA, Blood cx urine  Antigens ordered, Resp panel ordered    COPD exacerbation with acute on chronic hypoxic failure 2/2 above  -Base line 3 L; on 5L NC currently  -- Continue inhalers  Pulmonary hygiene  --Mucinex p.o.  -O2 protocol    T2DM  --continue Lantus at reduced dose  --LDSI  Hold oral medications  --Hypoglycemia protocol  POCT     Elevated Trop  -- Trop 47, 52  --Cards consult  --Reports Intermittent . CP    Afib   --Irreg on Tele  EKG shows Afib  --On Eliquis and Diltiazem    Morbid Obesity  --BMI 43.91  --Lifestyle modification                 Disposition:   Current Living situation: Home  Expected Disposition: Home  Estimated D/C: 3-4 days    Diet Diet NPO   DVT Prophylaxis [] Lovenox, []  Heparin, [] SCDs, [] Ambulation,  [] Eliquis, [] Xarelto   Code Status Full Code   Surrogate Decision Maker/ POA Self     History from:     patient, electronic medical record    History of Present Illness:     Chief Complaint: CAP  Armando Mars is a 68 y.o. male with pmh of T2DM, A-fib, morbid obesity, COPD who presents with shortness of breath, found to have pneumonia on chest x-ray.  Patient seen in ED, admitted inpatient for right middle lobe pneumonia.  Patient started on IV

## 2024-01-04 LAB
ALBUMIN SERPL-MCNC: 3.2 GM/DL (ref 3.4–5)
ALP BLD-CCNC: 67 IU/L (ref 40–129)
ALT SERPL-CCNC: 19 U/L (ref 10–40)
ANION GAP SERPL CALCULATED.3IONS-SCNC: 9 MMOL/L (ref 7–16)
AST SERPL-CCNC: 12 IU/L (ref 15–37)
BASOPHILS ABSOLUTE: 0 K/CU MM
BASOPHILS RELATIVE PERCENT: 0.4 % (ref 0–1)
BILIRUB SERPL-MCNC: 0.4 MG/DL (ref 0–1)
BUN SERPL-MCNC: 13 MG/DL (ref 6–23)
CALCIUM SERPL-MCNC: 8.3 MG/DL (ref 8.3–10.6)
CHLORIDE BLD-SCNC: 94 MMOL/L (ref 99–110)
CO2: 33 MMOL/L (ref 21–32)
CREAT SERPL-MCNC: 0.6 MG/DL (ref 0.9–1.3)
DIFFERENTIAL TYPE: ABNORMAL
EKG ATRIAL RATE: 100 BPM
EKG DIAGNOSIS: NORMAL
EKG Q-T INTERVAL: 360 MS
EKG QRS DURATION: 106 MS
EKG QTC CALCULATION (BAZETT): 473 MS
EKG R AXIS: -63 DEGREES
EKG T AXIS: 60 DEGREES
EKG VENTRICULAR RATE: 104 BPM
EOSINOPHILS ABSOLUTE: 0.1 K/CU MM
EOSINOPHILS RELATIVE PERCENT: 1.1 % (ref 0–3)
GFR SERPL CREATININE-BSD FRML MDRD: >60 ML/MIN/1.73M2
GLUCOSE BLD-MCNC: 234 MG/DL (ref 70–99)
GLUCOSE BLD-MCNC: 315 MG/DL (ref 70–99)
GLUCOSE BLD-MCNC: 386 MG/DL (ref 70–99)
GLUCOSE BLD-MCNC: 497 MG/DL (ref 70–99)
GLUCOSE SERPL-MCNC: 231 MG/DL (ref 70–99)
HCT VFR BLD CALC: 33.4 % (ref 42–52)
HEMOGLOBIN: 10.7 GM/DL (ref 13.5–18)
IMMATURE NEUTROPHIL %: 0.5 % (ref 0–0.43)
L PNEUMO AG UR QL IA: NEGATIVE
LYMPHOCYTES ABSOLUTE: 1 K/CU MM
LYMPHOCYTES RELATIVE PERCENT: 10.5 % (ref 24–44)
MCH RBC QN AUTO: 30.4 PG (ref 27–31)
MCHC RBC AUTO-ENTMCNC: 32 % (ref 32–36)
MCV RBC AUTO: 94.9 FL (ref 78–100)
MONOCYTES ABSOLUTE: 1.2 K/CU MM
MONOCYTES RELATIVE PERCENT: 12.3 % (ref 0–4)
MRSA, DNA, NASAL: NEGATIVE
NUCLEATED RBC %: 0 %
PDW BLD-RTO: 16.6 % (ref 11.7–14.9)
PLATELET # BLD: 170 K/CU MM (ref 140–440)
PMV BLD AUTO: 9.9 FL (ref 7.5–11.1)
POTASSIUM SERPL-SCNC: 4 MMOL/L (ref 3.5–5.1)
PRO-BNP: 2534 PG/ML
PROCALCITONIN SERPL-MCNC: 0.14 NG/ML
RBC # BLD: 3.52 M/CU MM (ref 4.6–6.2)
S PNEUM AG CSF QL: NORMAL
SEGMENTED NEUTROPHILS ABSOLUTE COUNT: 7.2 K/CU MM
SEGMENTED NEUTROPHILS RELATIVE PERCENT: 75.2 % (ref 36–66)
SODIUM BLD-SCNC: 136 MMOL/L (ref 135–145)
SPECIMEN DESCRIPTION: NORMAL
TOTAL IMMATURE NEUTOROPHIL: 0.05 K/CU MM
TOTAL NUCLEATED RBC: 0 K/CU MM
TOTAL PROTEIN: 5.1 GM/DL (ref 6.4–8.2)
WBC # BLD: 9.6 K/CU MM (ref 4–10.5)

## 2024-01-04 PROCEDURE — 83880 ASSAY OF NATRIURETIC PEPTIDE: CPT

## 2024-01-04 PROCEDURE — 2580000003 HC RX 258: Performed by: STUDENT IN AN ORGANIZED HEALTH CARE EDUCATION/TRAINING PROGRAM

## 2024-01-04 PROCEDURE — 87641 MR-STAPH DNA AMP PROBE: CPT

## 2024-01-04 PROCEDURE — 99211 OFF/OP EST MAY X REQ PHY/QHP: CPT

## 2024-01-04 PROCEDURE — 85025 COMPLETE CBC W/AUTO DIFF WBC: CPT

## 2024-01-04 PROCEDURE — 1200000000 HC SEMI PRIVATE

## 2024-01-04 PROCEDURE — 2580000003 HC RX 258: Performed by: INTERNAL MEDICINE

## 2024-01-04 PROCEDURE — 2580000003 HC RX 258

## 2024-01-04 PROCEDURE — 6360000002 HC RX W HCPCS: Performed by: NURSE PRACTITIONER

## 2024-01-04 PROCEDURE — 6370000000 HC RX 637 (ALT 250 FOR IP)

## 2024-01-04 PROCEDURE — 93010 ELECTROCARDIOGRAM REPORT: CPT | Performed by: INTERNAL MEDICINE

## 2024-01-04 PROCEDURE — 6360000002 HC RX W HCPCS: Performed by: INTERNAL MEDICINE

## 2024-01-04 PROCEDURE — 80053 COMPREHEN METABOLIC PANEL: CPT

## 2024-01-04 PROCEDURE — 94640 AIRWAY INHALATION TREATMENT: CPT

## 2024-01-04 PROCEDURE — 94761 N-INVAS EAR/PLS OXIMETRY MLT: CPT

## 2024-01-04 PROCEDURE — 36415 COLL VENOUS BLD VENIPUNCTURE: CPT

## 2024-01-04 PROCEDURE — 6370000000 HC RX 637 (ALT 250 FOR IP): Performed by: STUDENT IN AN ORGANIZED HEALTH CARE EDUCATION/TRAINING PROGRAM

## 2024-01-04 PROCEDURE — 84145 PROCALCITONIN (PCT): CPT

## 2024-01-04 PROCEDURE — 6360000002 HC RX W HCPCS: Performed by: STUDENT IN AN ORGANIZED HEALTH CARE EDUCATION/TRAINING PROGRAM

## 2024-01-04 PROCEDURE — 6370000000 HC RX 637 (ALT 250 FOR IP): Performed by: INTERNAL MEDICINE

## 2024-01-04 PROCEDURE — 2700000000 HC OXYGEN THERAPY PER DAY

## 2024-01-04 PROCEDURE — 82962 GLUCOSE BLOOD TEST: CPT

## 2024-01-04 RX ORDER — ROFLUMILAST 500 UG/1
500 TABLET ORAL DAILY
Status: DISCONTINUED | OUTPATIENT
Start: 2024-01-04 | End: 2024-01-08 | Stop reason: HOSPADM

## 2024-01-04 RX ORDER — INSULIN GLARGINE 100 [IU]/ML
7 INJECTION, SOLUTION SUBCUTANEOUS ONCE
Status: COMPLETED | OUTPATIENT
Start: 2024-01-04 | End: 2024-01-04

## 2024-01-04 RX ORDER — INSULIN LISPRO 100 [IU]/ML
0-8 INJECTION, SOLUTION INTRAVENOUS; SUBCUTANEOUS
Status: DISCONTINUED | OUTPATIENT
Start: 2024-01-04 | End: 2024-01-07

## 2024-01-04 RX ORDER — INSULIN GLARGINE 100 [IU]/ML
18 INJECTION, SOLUTION SUBCUTANEOUS NIGHTLY
Status: DISCONTINUED | OUTPATIENT
Start: 2024-01-04 | End: 2024-01-04

## 2024-01-04 RX ORDER — ATORVASTATIN CALCIUM 40 MG/1
40 TABLET, FILM COATED ORAL NIGHTLY
Status: DISCONTINUED | OUTPATIENT
Start: 2024-01-04 | End: 2024-01-08 | Stop reason: HOSPADM

## 2024-01-04 RX ORDER — ALBUTEROL SULFATE 90 UG/1
2 AEROSOL, METERED RESPIRATORY (INHALATION)
Status: DISCONTINUED | OUTPATIENT
Start: 2024-01-04 | End: 2024-01-05

## 2024-01-04 RX ORDER — FUROSEMIDE 10 MG/ML
60 INJECTION INTRAMUSCULAR; INTRAVENOUS 2 TIMES DAILY
Status: DISCONTINUED | OUTPATIENT
Start: 2024-01-04 | End: 2024-01-06

## 2024-01-04 RX ORDER — KETOROLAC TROMETHAMINE 30 MG/ML
15 INJECTION, SOLUTION INTRAMUSCULAR; INTRAVENOUS
Status: COMPLETED | OUTPATIENT
Start: 2024-01-04 | End: 2024-01-04

## 2024-01-04 RX ORDER — INSULIN GLARGINE 100 [IU]/ML
25 INJECTION, SOLUTION SUBCUTANEOUS NIGHTLY
Status: DISCONTINUED | OUTPATIENT
Start: 2024-01-05 | End: 2024-01-06

## 2024-01-04 RX ORDER — INSULIN LISPRO 100 [IU]/ML
0-4 INJECTION, SOLUTION INTRAVENOUS; SUBCUTANEOUS NIGHTLY
Status: DISCONTINUED | OUTPATIENT
Start: 2024-01-04 | End: 2024-01-07

## 2024-01-04 RX ADMIN — Medication 400 MG: at 11:40

## 2024-01-04 RX ADMIN — POTASSIUM CHLORIDE 10 MEQ: 1500 TABLET, EXTENDED RELEASE ORAL at 11:40

## 2024-01-04 RX ADMIN — INSULIN GLARGINE 18 UNITS: 100 INJECTION, SOLUTION SUBCUTANEOUS at 21:17

## 2024-01-04 RX ADMIN — TORSEMIDE 20 MG: 20 TABLET ORAL at 11:40

## 2024-01-04 RX ADMIN — ALBUTEROL SULFATE 2 PUFF: 90 AEROSOL, METERED RESPIRATORY (INHALATION) at 05:57

## 2024-01-04 RX ADMIN — INSULIN LISPRO 6 UNITS: 100 INJECTION, SOLUTION INTRAVENOUS; SUBCUTANEOUS at 12:42

## 2024-01-04 RX ADMIN — SODIUM CHLORIDE: 9 INJECTION, SOLUTION INTRAVENOUS at 21:12

## 2024-01-04 RX ADMIN — GUAIFENESIN 600 MG: 600 TABLET, EXTENDED RELEASE ORAL at 11:40

## 2024-01-04 RX ADMIN — VANCOMYCIN HYDROCHLORIDE 2000 MG: 5 INJECTION, POWDER, LYOPHILIZED, FOR SOLUTION INTRAVENOUS at 11:51

## 2024-01-04 RX ADMIN — IPRATROPIUM BROMIDE AND ALBUTEROL SULFATE 1 DOSE: .5; 2.5 SOLUTION RESPIRATORY (INHALATION) at 14:39

## 2024-01-04 RX ADMIN — ALBUTEROL SULFATE 2 PUFF: 90 AEROSOL, METERED RESPIRATORY (INHALATION) at 17:00

## 2024-01-04 RX ADMIN — FUROSEMIDE 60 MG: 10 INJECTION, SOLUTION INTRAMUSCULAR; INTRAVENOUS at 15:08

## 2024-01-04 RX ADMIN — ATORVASTATIN CALCIUM 40 MG: 40 TABLET, FILM COATED ORAL at 21:17

## 2024-01-04 RX ADMIN — SODIUM CHLORIDE 25 ML: 9 INJECTION, SOLUTION INTRAVENOUS at 05:22

## 2024-01-04 RX ADMIN — BUDESONIDE AND FORMOTEROL FUMARATE DIHYDRATE 2 PUFF: 160; 4.5 AEROSOL RESPIRATORY (INHALATION) at 09:55

## 2024-01-04 RX ADMIN — SODIUM CHLORIDE, PRESERVATIVE FREE 10 ML: 5 INJECTION INTRAVENOUS at 22:35

## 2024-01-04 RX ADMIN — SODIUM CHLORIDE, PRESERVATIVE FREE 10 ML: 5 INJECTION INTRAVENOUS at 11:42

## 2024-01-04 RX ADMIN — KETOROLAC TROMETHAMINE 15 MG: 30 INJECTION INTRAMUSCULAR; INTRAVENOUS at 01:34

## 2024-01-04 RX ADMIN — GUAIFENESIN 600 MG: 600 TABLET, EXTENDED RELEASE ORAL at 21:17

## 2024-01-04 RX ADMIN — CEFEPIME 2000 MG: 2 INJECTION, POWDER, FOR SOLUTION INTRAVENOUS at 13:05

## 2024-01-04 RX ADMIN — PREDNISONE 40 MG: 20 TABLET ORAL at 11:40

## 2024-01-04 RX ADMIN — ALBUTEROL SULFATE 2 PUFF: 90 AEROSOL, METERED RESPIRATORY (INHALATION) at 19:35

## 2024-01-04 RX ADMIN — INSULIN LISPRO 4 UNITS: 100 INJECTION, SOLUTION INTRAVENOUS; SUBCUTANEOUS at 21:16

## 2024-01-04 RX ADMIN — INSULIN LISPRO 8 UNITS: 100 INJECTION, SOLUTION INTRAVENOUS; SUBCUTANEOUS at 17:39

## 2024-01-04 RX ADMIN — ROFLUMILAST 500 MCG: 500 TABLET ORAL at 15:08

## 2024-01-04 RX ADMIN — ALBUTEROL SULFATE 2 PUFF: 90 AEROSOL, METERED RESPIRATORY (INHALATION) at 09:54

## 2024-01-04 RX ADMIN — APIXABAN 5 MG: 5 TABLET, FILM COATED ORAL at 11:40

## 2024-01-04 RX ADMIN — APIXABAN 5 MG: 5 TABLET, FILM COATED ORAL at 21:17

## 2024-01-04 RX ADMIN — CEFEPIME 2000 MG: 2 INJECTION, POWDER, FOR SOLUTION INTRAVENOUS at 21:13

## 2024-01-04 RX ADMIN — BUDESONIDE AND FORMOTEROL FUMARATE DIHYDRATE 2 PUFF: 160; 4.5 AEROSOL RESPIRATORY (INHALATION) at 19:36

## 2024-01-04 RX ADMIN — CEFEPIME 2000 MG: 2 INJECTION, POWDER, FOR SOLUTION INTRAVENOUS at 05:23

## 2024-01-04 RX ADMIN — DILTIAZEM HYDROCHLORIDE 240 MG: 240 CAPSULE, COATED, EXTENDED RELEASE ORAL at 11:40

## 2024-01-04 RX ADMIN — INSULIN GLARGINE 7 UNITS: 100 INJECTION, SOLUTION SUBCUTANEOUS at 22:35

## 2024-01-04 ASSESSMENT — PAIN DESCRIPTION - DESCRIPTORS: DESCRIPTORS: ACHING

## 2024-01-04 ASSESSMENT — PAIN SCALES - GENERAL
PAINLEVEL_OUTOF10: 0
PAINLEVEL_OUTOF10: 4

## 2024-01-04 ASSESSMENT — PAIN DESCRIPTION - ORIENTATION: ORIENTATION: RIGHT;LEFT

## 2024-01-04 ASSESSMENT — PAIN DESCRIPTION - LOCATION: LOCATION: HIP;KNEE;SHOULDER

## 2024-01-04 NOTE — PLAN OF CARE
Problem: Safety - Adult  Goal: Free from fall injury  Outcome: Progressing     Problem: Discharge Planning  Goal: Discharge to home or other facility with appropriate resources  Outcome: Progressing     Problem: Skin/Tissue Integrity  Goal: Absence of new skin breakdown  Description: 1.  Monitor for areas of redness and/or skin breakdown  2.  Assess vascular access sites hourly  3.  Every 4-6 hours minimum:  Change oxygen saturation probe site  4.  Every 4-6 hours:  If on nasal continuous positive airway pressure, respiratory therapy assess nares and determine need for appliance change or resting period.  Outcome: Progressing     Problem: Skin/Tissue Integrity - Adult  Goal: Skin integrity remains intact  Outcome: Progressing     Problem: Infection - Adult  Goal: Absence of infection at discharge  Outcome: Progressing

## 2024-01-04 NOTE — CARE COORDINATION
LSW reviewed chart; noted pt is readmission recently dc'd 01/02/24.  LSW met with pt to initiate dc planning and review concern for readmission and potential need for higher level of care.  Pt reported PTA residing alone at [address] apt with elevator access.  Pt reported being indp with ADLs, ambulates w/o AD, is on cont home O2 @ 2L - as well as nebs PRN and bipap HS.  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  Pt reported family and neighbors assist him with transportation, when needed.  LSW discussed [facility] referral for support services: [employer], [employer], transportation assistance, etc - as well as referral to [employer].  Pt declined any further assistance at this time other than cont'd [facility].  LSW included [facility] and [employer] info on AVS.  Dc plan is home alone with cont'd [facility] and family/neighbor support.    Discharging MD: please provide inpt consult to HC order at dc.  LSW noted Mary/[facility] has already been notified of pt's readmission.  Electronically signed by LIBERTY Alegria on 1/4/2024 at 8:51 AM     01/04/24 0828   Service Assessment   Patient Orientation Alert and Oriented   Cognition Alert   History Provided By Patient   Primary Caregiver Self   Support Systems Family Members;Home Care Staff   PCP Verified by CM Yes   Prior Functional Level Independent in ADLs/IADLs;Assistance with the following:;Other (see comment)  (driving)   Can patient return to prior living arrangement Yes   Ability to make needs known: Good   Financial Resources Medicaid;Medicare   Community Resources ECF/Home Care   Social/Functional History   Lives With Alone   Type of Home Apartment   Home Access Elevator   Active  No   Discharge Planning   Potential Assistance Needed Home Care   Potential Assistance Purchasing Medications No   Patient expects to be discharged to: Apartment   Services At/After Discharge   Transition of Care Consult (CM Consult) Home Health   Internal Home Health Yes

## 2024-01-04 NOTE — CONSULTS
CARDIOLOGY CONSULT NOTE         Reason for consultation: Elevated troponin      Primary care physician: Rosalio Hedrick MD      Chief Complaints :  Chief Complaint   Patient presents with    Shortness of Breath        History of present illness:Armando is a 68 y.o.year old male who has history of hypertension, diabetes mellitus, persistent atrial fibrillation status post ablation in 2018, morbid obesity, HFpEF.  He was recently discharged from the hospital.  He is now back with shortness of breath.  His chest x-ray showed right lower lobe pneumonia.  I am asked to see him for elevated troponin.    Review of Systems:     All systems negative except as marked.        Physical Examination:    Vitals:    01/03/24 0704   BP: 132/74   Pulse: (!) 117   Resp: 25   Temp:    SpO2: 90%        General Appearance:  No distress, conversant    Constitutional:  No acute distress, non-toxic appearance.    HENT:  Normocephalic, Atraumatic,   Eyes:  PERRL, EOMI, Conjunctiva normal, No discharge.   Respiratory:  No respiratory distress, No wheezing  Cardiovascular: S1, S2, no murmurs, gallops. JVD wnl  Abdomen /GI:   Soft, No tenderness   Genitourinary: No costovertebral angle tenderness   Musculoskeletal:  No edema, no tenderness, no deformities.   Integument:  Well hydrated, no rash   Neurologic:  Alert & oriented x 3, no focal deficits noted       Medical decision making and Data review:    Lab Review   Recent Labs     01/03/24  0547   WBC 10.9*   HGB 11.6*   HCT 36.4*         Recent Labs     01/03/24  0547      K 3.9   CL 95*   CO2 33*   BUN 8   CREATININE 0.5*     Recent Labs     01/03/24  0547   AST 10*   ALT 18   BILITOT 0.5   ALKPHOS 74     No results for input(s): \"TROPONINT\" in the last 72 hours.    No results for input(s): \"PROBNP\" in the last 72 hours.  Lab Results   Component Value Date    INR 0.9 11/13/2023    PROTIME 12.8 11/13/2023       EKG: (reviewed by myself): His ECG shows atrial fibrillation 
01/04/24 0913   Drainage Description Serosanguinous 01/04/24 0913   Odor None 01/04/24 0913   Maru-wound Assessment Intact 01/04/24 0913   Margins Attached edges 01/04/24 0913   Number of days: 0       Response to treatment:  Well tolerated by patient.     Pain Assessment:  Severity:  none  Quality of pain: na  Wound Pain Timing/Severity: na  Premedicated: no    Plan:     Plan of Care: Wound 01/04/24 Pretibial Right;Proximal-Dressing/Treatment: Silicone border    Pt in bed. Agreeable to wound assessment. Right anterior leg with possible traumatic wound. Appears dry but small serosanginous drainage so will apply silicone foam border for now. Updated nurse. Pt is generally not at risk for skin breakdown TISHA thakur.     Specialty Bed Required : no  [] Low Air Loss   [] Pressure Redistribution  [] Fluid Immersion  [] Bariatric  [] Total Pressure Relief  [] Other:     Discharge Plan:  Placement for patient upon discharge: tbd  Hospice Care: no  Patient appropriate for Outpatient Wound Care Center: no    Patient/Caregiver Teaching:  Level of patient/caregiver understanding able to:   Voiced understanding.        Electronically signed by Ibeth Scott RN, CWOCN on 1/4/2024 at 9:15 AM

## 2024-01-05 LAB
ANION GAP SERPL CALCULATED.3IONS-SCNC: 10 MMOL/L (ref 7–16)
BASOPHILS ABSOLUTE: 0 K/CU MM
BASOPHILS RELATIVE PERCENT: 0.3 % (ref 0–1)
BUN SERPL-MCNC: 9 MG/DL (ref 6–23)
CALCIUM SERPL-MCNC: 8.5 MG/DL (ref 8.3–10.6)
CHLORIDE BLD-SCNC: 91 MMOL/L (ref 99–110)
CO2: 36 MMOL/L (ref 21–32)
CREAT SERPL-MCNC: 0.5 MG/DL (ref 0.9–1.3)
DIFFERENTIAL TYPE: ABNORMAL
EOSINOPHILS ABSOLUTE: 0.2 K/CU MM
EOSINOPHILS RELATIVE PERCENT: 1.7 % (ref 0–3)
GFR SERPL CREATININE-BSD FRML MDRD: >60 ML/MIN/1.73M2
GLUCOSE BLD-MCNC: 212 MG/DL (ref 70–99)
GLUCOSE BLD-MCNC: 226 MG/DL (ref 70–99)
GLUCOSE BLD-MCNC: 285 MG/DL (ref 70–99)
GLUCOSE BLD-MCNC: 318 MG/DL (ref 70–99)
GLUCOSE SERPL-MCNC: 184 MG/DL (ref 70–99)
HCT VFR BLD CALC: 37.5 % (ref 42–52)
HEMOGLOBIN: 11.6 GM/DL (ref 13.5–18)
IMMATURE NEUTROPHIL %: 0.5 % (ref 0–0.43)
LYMPHOCYTES ABSOLUTE: 1.1 K/CU MM
LYMPHOCYTES RELATIVE PERCENT: 9.4 % (ref 24–44)
MCH RBC QN AUTO: 30.5 PG (ref 27–31)
MCHC RBC AUTO-ENTMCNC: 30.9 % (ref 32–36)
MCV RBC AUTO: 98.7 FL (ref 78–100)
MONOCYTES ABSOLUTE: 1.3 K/CU MM
MONOCYTES RELATIVE PERCENT: 10.9 % (ref 0–4)
NUCLEATED RBC %: 0 %
PDW BLD-RTO: 16.6 % (ref 11.7–14.9)
PLATELET # BLD: 235 K/CU MM (ref 140–440)
PMV BLD AUTO: 10.1 FL (ref 7.5–11.1)
POTASSIUM SERPL-SCNC: 4.1 MMOL/L (ref 3.5–5.1)
RBC # BLD: 3.8 M/CU MM (ref 4.6–6.2)
SEGMENTED NEUTROPHILS ABSOLUTE COUNT: 8.9 K/CU MM
SEGMENTED NEUTROPHILS RELATIVE PERCENT: 77.2 % (ref 36–66)
SODIUM BLD-SCNC: 137 MMOL/L (ref 135–145)
TOTAL IMMATURE NEUTOROPHIL: 0.06 K/CU MM
TOTAL NUCLEATED RBC: 0 K/CU MM
WBC # BLD: 11.5 K/CU MM (ref 4–10.5)

## 2024-01-05 PROCEDURE — 6370000000 HC RX 637 (ALT 250 FOR IP)

## 2024-01-05 PROCEDURE — 6370000000 HC RX 637 (ALT 250 FOR IP): Performed by: STUDENT IN AN ORGANIZED HEALTH CARE EDUCATION/TRAINING PROGRAM

## 2024-01-05 PROCEDURE — 85025 COMPLETE CBC W/AUTO DIFF WBC: CPT

## 2024-01-05 PROCEDURE — 36415 COLL VENOUS BLD VENIPUNCTURE: CPT

## 2024-01-05 PROCEDURE — 2580000003 HC RX 258

## 2024-01-05 PROCEDURE — 6360000002 HC RX W HCPCS: Performed by: STUDENT IN AN ORGANIZED HEALTH CARE EDUCATION/TRAINING PROGRAM

## 2024-01-05 PROCEDURE — 6360000002 HC RX W HCPCS: Performed by: INTERNAL MEDICINE

## 2024-01-05 PROCEDURE — 2700000000 HC OXYGEN THERAPY PER DAY

## 2024-01-05 PROCEDURE — 82962 GLUCOSE BLOOD TEST: CPT

## 2024-01-05 PROCEDURE — 2580000003 HC RX 258: Performed by: INTERNAL MEDICINE

## 2024-01-05 PROCEDURE — 94761 N-INVAS EAR/PLS OXIMETRY MLT: CPT

## 2024-01-05 PROCEDURE — 1200000000 HC SEMI PRIVATE

## 2024-01-05 PROCEDURE — 80048 BASIC METABOLIC PNL TOTAL CA: CPT

## 2024-01-05 PROCEDURE — 94640 AIRWAY INHALATION TREATMENT: CPT

## 2024-01-05 RX ORDER — IPRATROPIUM BROMIDE AND ALBUTEROL SULFATE 2.5; .5 MG/3ML; MG/3ML
1 SOLUTION RESPIRATORY (INHALATION)
Status: DISCONTINUED | OUTPATIENT
Start: 2024-01-05 | End: 2024-01-06

## 2024-01-05 RX ORDER — HYDROXYZINE HYDROCHLORIDE 10 MG/1
10 TABLET, FILM COATED ORAL 3 TIMES DAILY PRN
Status: DISCONTINUED | OUTPATIENT
Start: 2024-01-05 | End: 2024-01-08 | Stop reason: HOSPADM

## 2024-01-05 RX ADMIN — INSULIN LISPRO 2 UNITS: 100 INJECTION, SOLUTION INTRAVENOUS; SUBCUTANEOUS at 09:17

## 2024-01-05 RX ADMIN — GUAIFENESIN 600 MG: 600 TABLET, EXTENDED RELEASE ORAL at 09:17

## 2024-01-05 RX ADMIN — Medication 400 MG: at 09:17

## 2024-01-05 RX ADMIN — IPRATROPIUM BROMIDE AND ALBUTEROL SULFATE 1 DOSE: 2.5; .5 SOLUTION RESPIRATORY (INHALATION) at 16:25

## 2024-01-05 RX ADMIN — SODIUM CHLORIDE: 9 INJECTION, SOLUTION INTRAVENOUS at 05:14

## 2024-01-05 RX ADMIN — INSULIN LISPRO 6 UNITS: 100 INJECTION, SOLUTION INTRAVENOUS; SUBCUTANEOUS at 17:08

## 2024-01-05 RX ADMIN — CEFEPIME 2000 MG: 2 INJECTION, POWDER, FOR SOLUTION INTRAVENOUS at 13:09

## 2024-01-05 RX ADMIN — CEFEPIME 2000 MG: 2 INJECTION, POWDER, FOR SOLUTION INTRAVENOUS at 20:38

## 2024-01-05 RX ADMIN — SODIUM CHLORIDE, PRESERVATIVE FREE 10 ML: 5 INJECTION INTRAVENOUS at 09:17

## 2024-01-05 RX ADMIN — IPRATROPIUM BROMIDE AND ALBUTEROL SULFATE 1 DOSE: 2.5; .5 SOLUTION RESPIRATORY (INHALATION) at 10:37

## 2024-01-05 RX ADMIN — SODIUM CHLORIDE: 9 INJECTION, SOLUTION INTRAVENOUS at 20:38

## 2024-01-05 RX ADMIN — CEFEPIME 2000 MG: 2 INJECTION, POWDER, FOR SOLUTION INTRAVENOUS at 05:17

## 2024-01-05 RX ADMIN — GUAIFENESIN 600 MG: 600 TABLET, EXTENDED RELEASE ORAL at 20:35

## 2024-01-05 RX ADMIN — FUROSEMIDE 60 MG: 10 INJECTION, SOLUTION INTRAMUSCULAR; INTRAVENOUS at 09:17

## 2024-01-05 RX ADMIN — IPRATROPIUM BROMIDE AND ALBUTEROL SULFATE 1 DOSE: 2.5; .5 SOLUTION RESPIRATORY (INHALATION) at 12:17

## 2024-01-05 RX ADMIN — SODIUM CHLORIDE, PRESERVATIVE FREE 10 ML: 5 INJECTION INTRAVENOUS at 20:40

## 2024-01-05 RX ADMIN — APIXABAN 5 MG: 5 TABLET, FILM COATED ORAL at 09:17

## 2024-01-05 RX ADMIN — INSULIN LISPRO 2 UNITS: 100 INJECTION, SOLUTION INTRAVENOUS; SUBCUTANEOUS at 13:08

## 2024-01-05 RX ADMIN — BUDESONIDE AND FORMOTEROL FUMARATE DIHYDRATE 2 PUFF: 160; 4.5 AEROSOL RESPIRATORY (INHALATION) at 08:08

## 2024-01-05 RX ADMIN — FUROSEMIDE 60 MG: 10 INJECTION, SOLUTION INTRAMUSCULAR; INTRAVENOUS at 17:07

## 2024-01-05 RX ADMIN — ROFLUMILAST 500 MCG: 500 TABLET ORAL at 09:17

## 2024-01-05 RX ADMIN — APIXABAN 5 MG: 5 TABLET, FILM COATED ORAL at 20:35

## 2024-01-05 RX ADMIN — IPRATROPIUM BROMIDE AND ALBUTEROL SULFATE 1 DOSE: 2.5; .5 SOLUTION RESPIRATORY (INHALATION) at 19:31

## 2024-01-05 RX ADMIN — POTASSIUM CHLORIDE 10 MEQ: 1500 TABLET, EXTENDED RELEASE ORAL at 09:17

## 2024-01-05 RX ADMIN — ATORVASTATIN CALCIUM 40 MG: 40 TABLET, FILM COATED ORAL at 20:35

## 2024-01-05 RX ADMIN — BUDESONIDE AND FORMOTEROL FUMARATE DIHYDRATE 2 PUFF: 160; 4.5 AEROSOL RESPIRATORY (INHALATION) at 19:38

## 2024-01-05 RX ADMIN — ALBUTEROL SULFATE 2 PUFF: 90 AEROSOL, METERED RESPIRATORY (INHALATION) at 08:07

## 2024-01-05 RX ADMIN — DILTIAZEM HYDROCHLORIDE 240 MG: 240 CAPSULE, COATED, EXTENDED RELEASE ORAL at 09:17

## 2024-01-05 RX ADMIN — INSULIN GLARGINE 25 UNITS: 100 INJECTION, SOLUTION SUBCUTANEOUS at 20:35

## 2024-01-05 RX ADMIN — HYDROXYZINE HYDROCHLORIDE 10 MG: 10 TABLET ORAL at 13:08

## 2024-01-05 ASSESSMENT — PAIN SCALES - GENERAL: PAINLEVEL_OUTOF10: 0

## 2024-01-05 NOTE — PLAN OF CARE
Problem: Safety - Adult  Goal: Free from fall injury  Outcome: Adequate for Discharge     Problem: Discharge Planning  Goal: Discharge to home or other facility with appropriate resources  Outcome: Adequate for Discharge     Problem: Skin/Tissue Integrity  Goal: Absence of new skin breakdown  Description: 1.  Monitor for areas of redness and/or skin breakdown  2.  Assess vascular access sites hourly  3.  Every 4-6 hours minimum:  Change oxygen saturation probe site  4.  Every 4-6 hours:  If on nasal continuous positive airway pressure, respiratory therapy assess nares and determine need for appliance change or resting period.  Outcome: Adequate for Discharge     Problem: Skin/Tissue Integrity - Adult  Goal: Skin integrity remains intact  Outcome: Adequate for Discharge  Goal: Incisions, wounds, or drain sites healing without S/S of infection  Outcome: Adequate for Discharge     Problem: Infection - Adult  Goal: Absence of infection at discharge  Outcome: Adequate for Discharge     Problem: Chronic Conditions and Co-morbidities  Goal: Patient's chronic conditions and co-morbidity symptoms are monitored and maintained or improved  Outcome: Adequate for Discharge     Problem: Respiratory - Adult  Goal: Achieves optimal ventilation and oxygenation  Outcome: Adequate for Discharge     Problem: Cardiovascular - Adult  Goal: Maintains optimal cardiac output and hemodynamic stability  Outcome: Adequate for Discharge  Goal: Absence of cardiac dysrhythmias or at baseline  Outcome: Adequate for Discharge     Problem: Metabolic/Fluid and Electrolytes - Adult  Goal: Electrolytes maintained within normal limits  Outcome: Adequate for Discharge  Goal: Hemodynamic stability and optimal renal function maintained  Outcome: Adequate for Discharge  Goal: Glucose maintained within prescribed range  Outcome: Adequate for Discharge

## 2024-01-06 LAB
ANION GAP SERPL CALCULATED.3IONS-SCNC: 8 MMOL/L (ref 7–16)
BASE EXCESS MIXED: 19.4 (ref 0–3)
BUN SERPL-MCNC: 10 MG/DL (ref 6–23)
CALCIUM SERPL-MCNC: 8.1 MG/DL (ref 8.3–10.6)
CARBON MONOXIDE, BLOOD: 4.6 % (ref 0–5)
CHLORIDE BLD-SCNC: 89 MMOL/L (ref 99–110)
CO2 CONTENT: 47.8 MMOL/L (ref 21–32)
CO2: 39 MMOL/L (ref 21–32)
COMMENT: ABNORMAL
CREAT SERPL-MCNC: 0.6 MG/DL (ref 0.9–1.3)
CULTURE: ABNORMAL
CULTURE: ABNORMAL
GFR SERPL CREATININE-BSD FRML MDRD: >60 ML/MIN/1.73M2
GLUCOSE BLD-MCNC: 240 MG/DL (ref 70–99)
GLUCOSE BLD-MCNC: 240 MG/DL (ref 70–99)
GLUCOSE BLD-MCNC: 280 MG/DL (ref 70–99)
GLUCOSE BLD-MCNC: 417 MG/DL (ref 70–99)
GLUCOSE SERPL-MCNC: 243 MG/DL (ref 70–99)
HCO3 ARTERIAL: 46 MMOL/L (ref 21–28)
HCT VFR BLD CALC: 35.2 % (ref 42–52)
HEMOGLOBIN: 11.1 GM/DL (ref 13.5–18)
Lab: ABNORMAL
METHEMOGLOBIN ARTERIAL: 0 %
O2 SATURATION: 69.9 % (ref 94–98)
PCO2 ARTERIAL: 59 MMHG (ref 35–48)
PH BLOOD: 7.5 (ref 7.35–7.45)
PO2 ARTERIAL: 37 MMHG (ref 83–108)
POTASSIUM SERPL-SCNC: 3.7 MMOL/L (ref 3.5–5.1)
SODIUM BLD-SCNC: 136 MMOL/L (ref 135–145)
SPECIMEN: ABNORMAL

## 2024-01-06 PROCEDURE — 6360000002 HC RX W HCPCS: Performed by: STUDENT IN AN ORGANIZED HEALTH CARE EDUCATION/TRAINING PROGRAM

## 2024-01-06 PROCEDURE — 5A09457 ASSISTANCE WITH RESPIRATORY VENTILATION, 24-96 CONSECUTIVE HOURS, CONTINUOUS POSITIVE AIRWAY PRESSURE: ICD-10-PCS | Performed by: INTERNAL MEDICINE

## 2024-01-06 PROCEDURE — 83880 ASSAY OF NATRIURETIC PEPTIDE: CPT

## 2024-01-06 PROCEDURE — 6360000002 HC RX W HCPCS: Performed by: INTERNAL MEDICINE

## 2024-01-06 PROCEDURE — 82962 GLUCOSE BLOOD TEST: CPT

## 2024-01-06 PROCEDURE — 82803 BLOOD GASES ANY COMBINATION: CPT

## 2024-01-06 PROCEDURE — 94640 AIRWAY INHALATION TREATMENT: CPT

## 2024-01-06 PROCEDURE — 6370000000 HC RX 637 (ALT 250 FOR IP): Performed by: STUDENT IN AN ORGANIZED HEALTH CARE EDUCATION/TRAINING PROGRAM

## 2024-01-06 PROCEDURE — 1200000000 HC SEMI PRIVATE

## 2024-01-06 PROCEDURE — 85014 HEMATOCRIT: CPT

## 2024-01-06 PROCEDURE — 94660 CPAP INITIATION&MGMT: CPT

## 2024-01-06 PROCEDURE — 80048 BASIC METABOLIC PNL TOTAL CA: CPT

## 2024-01-06 PROCEDURE — 2700000000 HC OXYGEN THERAPY PER DAY

## 2024-01-06 PROCEDURE — 2580000003 HC RX 258

## 2024-01-06 PROCEDURE — 85025 COMPLETE CBC W/AUTO DIFF WBC: CPT

## 2024-01-06 PROCEDURE — 6370000000 HC RX 637 (ALT 250 FOR IP)

## 2024-01-06 PROCEDURE — 94761 N-INVAS EAR/PLS OXIMETRY MLT: CPT

## 2024-01-06 PROCEDURE — 36600 WITHDRAWAL OF ARTERIAL BLOOD: CPT

## 2024-01-06 PROCEDURE — 85018 HEMOGLOBIN: CPT

## 2024-01-06 PROCEDURE — 87205 SMEAR GRAM STAIN: CPT

## 2024-01-06 PROCEDURE — 36415 COLL VENOUS BLD VENIPUNCTURE: CPT

## 2024-01-06 PROCEDURE — 87070 CULTURE OTHR SPECIMN AEROBIC: CPT

## 2024-01-06 PROCEDURE — 2580000003 HC RX 258: Performed by: INTERNAL MEDICINE

## 2024-01-06 RX ORDER — PREDNISONE 20 MG/1
40 TABLET ORAL DAILY
Status: DISCONTINUED | OUTPATIENT
Start: 2024-01-06 | End: 2024-01-08 | Stop reason: HOSPADM

## 2024-01-06 RX ORDER — IPRATROPIUM BROMIDE AND ALBUTEROL SULFATE 2.5; .5 MG/3ML; MG/3ML
1 SOLUTION RESPIRATORY (INHALATION)
Status: DISCONTINUED | OUTPATIENT
Start: 2024-01-06 | End: 2024-01-08 | Stop reason: HOSPADM

## 2024-01-06 RX ORDER — INSULIN GLARGINE 100 [IU]/ML
30 INJECTION, SOLUTION SUBCUTANEOUS NIGHTLY
Status: DISCONTINUED | OUTPATIENT
Start: 2024-01-06 | End: 2024-01-07

## 2024-01-06 RX ORDER — ACETAZOLAMIDE 250 MG/1
500 TABLET ORAL ONCE
Status: COMPLETED | OUTPATIENT
Start: 2024-01-06 | End: 2024-01-06

## 2024-01-06 RX ORDER — 0.9 % SODIUM CHLORIDE 0.9 %
500 INTRAVENOUS SOLUTION INTRAVENOUS ONCE
Status: DISCONTINUED | OUTPATIENT
Start: 2024-01-06 | End: 2024-01-08 | Stop reason: HOSPADM

## 2024-01-06 RX ADMIN — CEFEPIME 2000 MG: 2 INJECTION, POWDER, FOR SOLUTION INTRAVENOUS at 05:44

## 2024-01-06 RX ADMIN — ATORVASTATIN CALCIUM 40 MG: 40 TABLET, FILM COATED ORAL at 22:00

## 2024-01-06 RX ADMIN — INSULIN LISPRO 4 UNITS: 100 INJECTION, SOLUTION INTRAVENOUS; SUBCUTANEOUS at 12:00

## 2024-01-06 RX ADMIN — INSULIN LISPRO 2 UNITS: 100 INJECTION, SOLUTION INTRAVENOUS; SUBCUTANEOUS at 09:16

## 2024-01-06 RX ADMIN — SODIUM CHLORIDE: 9 INJECTION, SOLUTION INTRAVENOUS at 22:02

## 2024-01-06 RX ADMIN — Medication 400 MG: at 09:15

## 2024-01-06 RX ADMIN — APIXABAN 5 MG: 5 TABLET, FILM COATED ORAL at 09:15

## 2024-01-06 RX ADMIN — POTASSIUM CHLORIDE 10 MEQ: 1500 TABLET, EXTENDED RELEASE ORAL at 09:15

## 2024-01-06 RX ADMIN — APIXABAN 5 MG: 5 TABLET, FILM COATED ORAL at 21:58

## 2024-01-06 RX ADMIN — CEFEPIME 2000 MG: 2 INJECTION, POWDER, FOR SOLUTION INTRAVENOUS at 14:53

## 2024-01-06 RX ADMIN — CEFEPIME 2000 MG: 2 INJECTION, POWDER, FOR SOLUTION INTRAVENOUS at 22:02

## 2024-01-06 RX ADMIN — IPRATROPIUM BROMIDE AND ALBUTEROL SULFATE 1 DOSE: 2.5; .5 SOLUTION RESPIRATORY (INHALATION) at 12:15

## 2024-01-06 RX ADMIN — BUDESONIDE AND FORMOTEROL FUMARATE DIHYDRATE 2 PUFF: 160; 4.5 AEROSOL RESPIRATORY (INHALATION) at 21:40

## 2024-01-06 RX ADMIN — INSULIN LISPRO 4 UNITS: 100 INJECTION, SOLUTION INTRAVENOUS; SUBCUTANEOUS at 23:07

## 2024-01-06 RX ADMIN — DILTIAZEM HYDROCHLORIDE 240 MG: 240 CAPSULE, COATED, EXTENDED RELEASE ORAL at 09:15

## 2024-01-06 RX ADMIN — SODIUM CHLORIDE: 9 INJECTION, SOLUTION INTRAVENOUS at 05:43

## 2024-01-06 RX ADMIN — GUAIFENESIN 600 MG: 600 TABLET, EXTENDED RELEASE ORAL at 21:58

## 2024-01-06 RX ADMIN — FUROSEMIDE 60 MG: 10 INJECTION, SOLUTION INTRAMUSCULAR; INTRAVENOUS at 09:15

## 2024-01-06 RX ADMIN — IPRATROPIUM BROMIDE AND ALBUTEROL SULFATE 1 DOSE: 2.5; .5 SOLUTION RESPIRATORY (INHALATION) at 21:34

## 2024-01-06 RX ADMIN — PREDNISONE 40 MG: 20 TABLET ORAL at 14:53

## 2024-01-06 RX ADMIN — INSULIN LISPRO 2 UNITS: 100 INJECTION, SOLUTION INTRAVENOUS; SUBCUTANEOUS at 18:45

## 2024-01-06 RX ADMIN — IPRATROPIUM BROMIDE AND ALBUTEROL SULFATE 1 DOSE: 2.5; .5 SOLUTION RESPIRATORY (INHALATION) at 16:26

## 2024-01-06 RX ADMIN — INSULIN GLARGINE 30 UNITS: 100 INJECTION, SOLUTION SUBCUTANEOUS at 21:59

## 2024-01-06 RX ADMIN — SODIUM CHLORIDE, PRESERVATIVE FREE 10 ML: 5 INJECTION INTRAVENOUS at 14:54

## 2024-01-06 RX ADMIN — ROFLUMILAST 500 MCG: 500 TABLET ORAL at 09:15

## 2024-01-06 RX ADMIN — IPRATROPIUM BROMIDE AND ALBUTEROL SULFATE 1 DOSE: 2.5; .5 SOLUTION RESPIRATORY (INHALATION) at 02:10

## 2024-01-06 RX ADMIN — SODIUM CHLORIDE, PRESERVATIVE FREE 10 ML: 5 INJECTION INTRAVENOUS at 21:58

## 2024-01-06 RX ADMIN — BUDESONIDE AND FORMOTEROL FUMARATE DIHYDRATE 2 PUFF: 160; 4.5 AEROSOL RESPIRATORY (INHALATION) at 12:16

## 2024-01-06 RX ADMIN — GUAIFENESIN 600 MG: 600 TABLET, EXTENDED RELEASE ORAL at 09:16

## 2024-01-06 RX ADMIN — ACETAZOLAMIDE 500 MG: 250 TABLET ORAL at 12:27

## 2024-01-06 ASSESSMENT — PAIN SCALES - GENERAL: PAINLEVEL_OUTOF10: 0

## 2024-01-06 NOTE — RT PROTOCOL NOTE
RT Inhaler-Nebulizer Bronchodilator Protocol Note    There is a bronchodilator order in the chart from a provider indicating to follow the RT Bronchodilator Protocol and there is an “Initiate RT Inhaler-Nebulizer Bronchodilator Protocol” order as well (see protocol at bottom of note).    CXR Findings:  No results found.    The findings from the last RT Protocol Assessment were as follows:   History Pulmonary Disease: Chronic pulmonary disease  Respiratory Pattern: Dyspnea on exertion or RR 21-25 bpm  Breath Sounds: Slightly diminished and/or crackles  Cough: Strong, productive  Indication for Bronchodilator Therapy: On home bronchodilators  Bronchodilator Assessment Score: 7    Aerosolized bronchodilator medication orders have been revised according to the RT Inhaler-Nebulizer Bronchodilator Protocol below.    Respiratory Therapist to perform RT Therapy Protocol Assessment initially then follow the protocol.  Repeat RT Therapy Protocol Assessment PRN for score 0-3 or on second treatment, BID, and PRN for scores above 3.    No Indications - adjust the frequency to every 6 hours PRN wheezing or bronchospasm, if no treatments needed after 48 hours then discontinue using Per Protocol order mode.     If indication present, adjust the RT bronchodilator orders based on the Bronchodilator Assessment Score as indicated below.  Use Inhaler orders unless patient has one or more of the following: on home nebulizer, not able to hold breath for 10 seconds, is not alert and oriented, cannot activate and use MDI correctly, or respiratory rate 25 breaths per minute or more, then use the equivalent nebulizer order(s) with same Frequency and PRN reasons based on the score.  If a patient is on this medication at home then do not decrease Frequency below that used at home.    0-3 - enter or revise RT bronchodilator order(s) to equivalent RT Bronchodilator order with Frequency of every 4 hours PRN for wheezing or increased work of

## 2024-01-06 NOTE — PLAN OF CARE
Problem: Safety - Adult  Goal: Free from fall injury  Outcome: Progressing     Problem: Discharge Planning  Goal: Discharge to home or other facility with appropriate resources  Outcome: Progressing     Problem: Skin/Tissue Integrity  Goal: Absence of new skin breakdown  Description: 1.  Monitor for areas of redness and/or skin breakdown  2.  Assess vascular access sites hourly  3.  Every 4-6 hours minimum:  Change oxygen saturation probe site  4.  Every 4-6 hours:  If on nasal continuous positive airway pressure, respiratory therapy assess nares and determine need for appliance change or resting period.  Outcome: Progressing     Problem: Skin/Tissue Integrity - Adult  Goal: Skin integrity remains intact  Outcome: Progressing  Goal: Incisions, wounds, or drain sites healing without S/S of infection  Outcome: Progressing     Problem: Infection - Adult  Goal: Absence of infection at discharge  Outcome: Progressing     Problem: Chronic Conditions and Co-morbidities  Goal: Patient's chronic conditions and co-morbidity symptoms are monitored and maintained or improved  Outcome: Progressing     Problem: Respiratory - Adult  Goal: Achieves optimal ventilation and oxygenation  Outcome: Progressing     Problem: Cardiovascular - Adult  Goal: Maintains optimal cardiac output and hemodynamic stability  Outcome: Progressing  Goal: Absence of cardiac dysrhythmias or at baseline  Outcome: Progressing     Problem: Metabolic/Fluid and Electrolytes - Adult  Goal: Electrolytes maintained within normal limits  Outcome: Progressing  Goal: Hemodynamic stability and optimal renal function maintained  Outcome: Progressing  Goal: Glucose maintained within prescribed range  Outcome: Progressing

## 2024-01-07 LAB
ANION GAP SERPL CALCULATED.3IONS-SCNC: 9 MMOL/L (ref 7–16)
BASOPHILS ABSOLUTE: 0 K/CU MM
BASOPHILS RELATIVE PERCENT: 0.2 % (ref 0–1)
BUN SERPL-MCNC: 11 MG/DL (ref 6–23)
CALCIUM SERPL-MCNC: 8.4 MG/DL (ref 8.3–10.6)
CHLORIDE BLD-SCNC: 90 MMOL/L (ref 99–110)
CO2: 37 MMOL/L (ref 21–32)
CREAT SERPL-MCNC: 0.5 MG/DL (ref 0.9–1.3)
DIFFERENTIAL TYPE: ABNORMAL
EOSINOPHILS ABSOLUTE: 0 K/CU MM
EOSINOPHILS RELATIVE PERCENT: 0.3 % (ref 0–3)
GFR SERPL CREATININE-BSD FRML MDRD: >60 ML/MIN/1.73M2
GLUCOSE BLD-MCNC: 242 MG/DL (ref 70–99)
GLUCOSE BLD-MCNC: 261 MG/DL (ref 70–99)
GLUCOSE BLD-MCNC: 292 MG/DL (ref 70–99)
GLUCOSE BLD-MCNC: 423 MG/DL (ref 70–99)
GLUCOSE SERPL-MCNC: 259 MG/DL (ref 70–99)
HCT VFR BLD CALC: 34.6 % (ref 42–52)
HEMOGLOBIN: 10.9 GM/DL (ref 13.5–18)
IMMATURE NEUTROPHIL %: 0.6 % (ref 0–0.43)
LYMPHOCYTES ABSOLUTE: 0.7 K/CU MM
LYMPHOCYTES RELATIVE PERCENT: 6.8 % (ref 24–44)
MCH RBC QN AUTO: 30.3 PG (ref 27–31)
MCHC RBC AUTO-ENTMCNC: 31.5 % (ref 32–36)
MCV RBC AUTO: 96.1 FL (ref 78–100)
MONOCYTES ABSOLUTE: 0.9 K/CU MM
MONOCYTES RELATIVE PERCENT: 8.8 % (ref 0–4)
NUCLEATED RBC %: 0 %
PDW BLD-RTO: 15.9 % (ref 11.7–14.9)
PLATELET # BLD: 198 K/CU MM (ref 140–440)
PMV BLD AUTO: 10.1 FL (ref 7.5–11.1)
POTASSIUM SERPL-SCNC: 3.8 MMOL/L (ref 3.5–5.1)
RBC # BLD: 3.6 M/CU MM (ref 4.6–6.2)
SEGMENTED NEUTROPHILS ABSOLUTE COUNT: 8.8 K/CU MM
SEGMENTED NEUTROPHILS RELATIVE PERCENT: 83.3 % (ref 36–66)
SODIUM BLD-SCNC: 136 MMOL/L (ref 135–145)
TOTAL IMMATURE NEUTOROPHIL: 0.06 K/CU MM
TOTAL NUCLEATED RBC: 0 K/CU MM
WBC # BLD: 10.6 K/CU MM (ref 4–10.5)

## 2024-01-07 PROCEDURE — 36415 COLL VENOUS BLD VENIPUNCTURE: CPT

## 2024-01-07 PROCEDURE — 94640 AIRWAY INHALATION TREATMENT: CPT

## 2024-01-07 PROCEDURE — 85025 COMPLETE CBC W/AUTO DIFF WBC: CPT

## 2024-01-07 PROCEDURE — 6370000000 HC RX 637 (ALT 250 FOR IP): Performed by: STUDENT IN AN ORGANIZED HEALTH CARE EDUCATION/TRAINING PROGRAM

## 2024-01-07 PROCEDURE — 94761 N-INVAS EAR/PLS OXIMETRY MLT: CPT

## 2024-01-07 PROCEDURE — 2580000003 HC RX 258: Performed by: INTERNAL MEDICINE

## 2024-01-07 PROCEDURE — 94660 CPAP INITIATION&MGMT: CPT

## 2024-01-07 PROCEDURE — 80048 BASIC METABOLIC PNL TOTAL CA: CPT

## 2024-01-07 PROCEDURE — 82962 GLUCOSE BLOOD TEST: CPT

## 2024-01-07 PROCEDURE — 2580000003 HC RX 258

## 2024-01-07 PROCEDURE — 6360000002 HC RX W HCPCS: Performed by: INTERNAL MEDICINE

## 2024-01-07 PROCEDURE — 1200000000 HC SEMI PRIVATE

## 2024-01-07 PROCEDURE — 2700000000 HC OXYGEN THERAPY PER DAY

## 2024-01-07 PROCEDURE — 6370000000 HC RX 637 (ALT 250 FOR IP)

## 2024-01-07 RX ORDER — INSULIN LISPRO 100 [IU]/ML
0-16 INJECTION, SOLUTION INTRAVENOUS; SUBCUTANEOUS
Status: DISCONTINUED | OUTPATIENT
Start: 2024-01-07 | End: 2024-01-08 | Stop reason: HOSPADM

## 2024-01-07 RX ORDER — INSULIN GLARGINE 100 [IU]/ML
35 INJECTION, SOLUTION SUBCUTANEOUS NIGHTLY
Status: DISCONTINUED | OUTPATIENT
Start: 2024-01-07 | End: 2024-01-08 | Stop reason: HOSPADM

## 2024-01-07 RX ORDER — TORSEMIDE 20 MG/1
20 TABLET ORAL 2 TIMES DAILY
Status: DISCONTINUED | OUTPATIENT
Start: 2024-01-07 | End: 2024-01-08 | Stop reason: HOSPADM

## 2024-01-07 RX ORDER — INSULIN LISPRO 100 [IU]/ML
0-4 INJECTION, SOLUTION INTRAVENOUS; SUBCUTANEOUS NIGHTLY
Status: DISCONTINUED | OUTPATIENT
Start: 2024-01-07 | End: 2024-01-08 | Stop reason: HOSPADM

## 2024-01-07 RX ADMIN — CEFEPIME 2000 MG: 2 INJECTION, POWDER, FOR SOLUTION INTRAVENOUS at 22:06

## 2024-01-07 RX ADMIN — BUDESONIDE AND FORMOTEROL FUMARATE DIHYDRATE 2 PUFF: 160; 4.5 AEROSOL RESPIRATORY (INHALATION) at 08:30

## 2024-01-07 RX ADMIN — SODIUM CHLORIDE: 9 INJECTION, SOLUTION INTRAVENOUS at 06:29

## 2024-01-07 RX ADMIN — GUAIFENESIN 600 MG: 600 TABLET, EXTENDED RELEASE ORAL at 22:07

## 2024-01-07 RX ADMIN — GUAIFENESIN 600 MG: 600 TABLET, EXTENDED RELEASE ORAL at 09:05

## 2024-01-07 RX ADMIN — ATORVASTATIN CALCIUM 40 MG: 40 TABLET, FILM COATED ORAL at 22:08

## 2024-01-07 RX ADMIN — DILTIAZEM HYDROCHLORIDE 240 MG: 240 CAPSULE, COATED, EXTENDED RELEASE ORAL at 09:05

## 2024-01-07 RX ADMIN — INSULIN GLARGINE 35 UNITS: 100 INJECTION, SOLUTION SUBCUTANEOUS at 22:07

## 2024-01-07 RX ADMIN — IPRATROPIUM BROMIDE AND ALBUTEROL SULFATE 1 DOSE: 2.5; .5 SOLUTION RESPIRATORY (INHALATION) at 08:29

## 2024-01-07 RX ADMIN — CEFEPIME 2000 MG: 2 INJECTION, POWDER, FOR SOLUTION INTRAVENOUS at 06:29

## 2024-01-07 RX ADMIN — POTASSIUM CHLORIDE 10 MEQ: 1500 TABLET, EXTENDED RELEASE ORAL at 09:05

## 2024-01-07 RX ADMIN — Medication 400 MG: at 09:05

## 2024-01-07 RX ADMIN — PREDNISONE 40 MG: 20 TABLET ORAL at 09:05

## 2024-01-07 RX ADMIN — APIXABAN 5 MG: 5 TABLET, FILM COATED ORAL at 22:08

## 2024-01-07 RX ADMIN — TORSEMIDE 20 MG: 20 TABLET ORAL at 22:07

## 2024-01-07 RX ADMIN — INSULIN LISPRO 16 UNITS: 100 INJECTION, SOLUTION INTRAVENOUS; SUBCUTANEOUS at 18:11

## 2024-01-07 RX ADMIN — SODIUM CHLORIDE: 9 INJECTION, SOLUTION INTRAVENOUS at 22:05

## 2024-01-07 RX ADMIN — INSULIN LISPRO 8 UNITS: 100 INJECTION, SOLUTION INTRAVENOUS; SUBCUTANEOUS at 14:30

## 2024-01-07 RX ADMIN — ROFLUMILAST 500 MCG: 500 TABLET ORAL at 09:05

## 2024-01-07 RX ADMIN — IPRATROPIUM BROMIDE AND ALBUTEROL SULFATE 1 DOSE: 2.5; .5 SOLUTION RESPIRATORY (INHALATION) at 12:58

## 2024-01-07 RX ADMIN — TORSEMIDE 20 MG: 20 TABLET ORAL at 14:33

## 2024-01-07 RX ADMIN — APIXABAN 5 MG: 5 TABLET, FILM COATED ORAL at 09:05

## 2024-01-07 RX ADMIN — CEFEPIME 2000 MG: 2 INJECTION, POWDER, FOR SOLUTION INTRAVENOUS at 14:27

## 2024-01-07 RX ADMIN — INSULIN LISPRO 2 UNITS: 100 INJECTION, SOLUTION INTRAVENOUS; SUBCUTANEOUS at 09:06

## 2024-01-07 NOTE — PLAN OF CARE
Problem: Safety - Adult  Goal: Free from fall injury  1/7/2024 0023 by Suzanne Hooker LPN  Outcome: Progressing  1/6/2024 1653 by Mey Carr RN  Outcome: Progressing     Problem: Discharge Planning  Goal: Discharge to home or other facility with appropriate resources  1/7/2024 0023 by Suzanne Hooker LPN  Outcome: Progressing  1/6/2024 1653 by Mey Carr RN  Outcome: Progressing     Problem: Skin/Tissue Integrity  Goal: Absence of new skin breakdown  Description: 1.  Monitor for areas of redness and/or skin breakdown  2.  Assess vascular access sites hourly  3.  Every 4-6 hours minimum:  Change oxygen saturation probe site  4.  Every 4-6 hours:  If on nasal continuous positive airway pressure, respiratory therapy assess nares and determine need for appliance change or resting period.  1/7/2024 0023 by Suzanne Hooker LPN  Outcome: Progressing  1/6/2024 1653 by Mey Carr RN  Outcome: Progressing     Problem: Skin/Tissue Integrity - Adult  Goal: Skin integrity remains intact  1/7/2024 0023 by Suzanne Hooker LPN  Outcome: Progressing  1/6/2024 1653 by Mey Carr RN  Outcome: Progressing     Problem: Skin/Tissue Integrity - Adult  Goal: Incisions, wounds, or drain sites healing without S/S of infection  1/7/2024 0023 by Suzanne Hooker LPN  Outcome: Progressing  1/6/2024 1653 by Mey Carr RN  Outcome: Progressing     Problem: Infection - Adult  Goal: Absence of infection at discharge  1/7/2024 0023 by Suzanne Hooker LPN  Outcome: Progressing  1/6/2024 1653 by Mey Carr RN  Outcome: Progressing     Problem: Chronic Conditions and Co-morbidities  Goal: Patient's chronic conditions and co-morbidity symptoms are monitored and maintained or improved  1/7/2024 0023 by Suzanne Hooker LPN  Outcome: Progressing  1/6/2024 1653 by Mey Carr RN  Outcome: Progressing     Problem: Respiratory - Adult  Goal: Achieves optimal ventilation and oxygenation  1/7/2024

## 2024-01-08 VITALS
DIASTOLIC BLOOD PRESSURE: 50 MMHG | BODY MASS INDEX: 36.45 KG/M2 | HEART RATE: 73 BPM | HEIGHT: 78 IN | RESPIRATION RATE: 20 BRPM | OXYGEN SATURATION: 93 % | TEMPERATURE: 97.7 F | SYSTOLIC BLOOD PRESSURE: 104 MMHG | WEIGHT: 315 LBS

## 2024-01-08 LAB
ANION GAP SERPL CALCULATED.3IONS-SCNC: 11 MMOL/L (ref 7–16)
BUN SERPL-MCNC: 13 MG/DL (ref 6–23)
CALCIUM SERPL-MCNC: 8.3 MG/DL (ref 8.3–10.6)
CHLORIDE BLD-SCNC: 93 MMOL/L (ref 99–110)
CO2: 34 MMOL/L (ref 21–32)
CREAT SERPL-MCNC: 0.5 MG/DL (ref 0.9–1.3)
CULTURE: NORMAL
CULTURE: NORMAL
GFR SERPL CREATININE-BSD FRML MDRD: >60 ML/MIN/1.73M2
GLUCOSE BLD-MCNC: 245 MG/DL (ref 70–99)
GLUCOSE BLD-MCNC: 302 MG/DL (ref 70–99)
GLUCOSE SERPL-MCNC: 224 MG/DL (ref 70–99)
GRAM SMEAR: NORMAL
Lab: NORMAL
Lab: NORMAL
POTASSIUM SERPL-SCNC: 3.9 MMOL/L (ref 3.5–5.1)
PRO-BNP: 2644 PG/ML
SODIUM BLD-SCNC: 138 MMOL/L (ref 135–145)
SPECIMEN: NORMAL
SPECIMEN: NORMAL

## 2024-01-08 PROCEDURE — 2700000000 HC OXYGEN THERAPY PER DAY

## 2024-01-08 PROCEDURE — 6370000000 HC RX 637 (ALT 250 FOR IP): Performed by: STUDENT IN AN ORGANIZED HEALTH CARE EDUCATION/TRAINING PROGRAM

## 2024-01-08 PROCEDURE — 94660 CPAP INITIATION&MGMT: CPT

## 2024-01-08 PROCEDURE — 2580000003 HC RX 258: Performed by: INTERNAL MEDICINE

## 2024-01-08 PROCEDURE — 94761 N-INVAS EAR/PLS OXIMETRY MLT: CPT

## 2024-01-08 PROCEDURE — 6360000002 HC RX W HCPCS: Performed by: INTERNAL MEDICINE

## 2024-01-08 PROCEDURE — 36415 COLL VENOUS BLD VENIPUNCTURE: CPT

## 2024-01-08 PROCEDURE — 80048 BASIC METABOLIC PNL TOTAL CA: CPT

## 2024-01-08 PROCEDURE — 6370000000 HC RX 637 (ALT 250 FOR IP)

## 2024-01-08 PROCEDURE — 2580000003 HC RX 258

## 2024-01-08 PROCEDURE — 94640 AIRWAY INHALATION TREATMENT: CPT

## 2024-01-08 PROCEDURE — 82962 GLUCOSE BLOOD TEST: CPT

## 2024-01-08 RX ORDER — HYDROXYZINE HYDROCHLORIDE 10 MG/1
10 TABLET, FILM COATED ORAL 3 TIMES DAILY PRN
Qty: 90 TABLET | Refills: 0 | Status: ON HOLD | OUTPATIENT
Start: 2024-01-08 | End: 2024-02-07

## 2024-01-08 RX ORDER — INSULIN DETEMIR 100 [IU]/ML
30 INJECTION, SOLUTION SUBCUTANEOUS
Qty: 27 ML | Refills: 0 | Status: ON HOLD
Start: 2024-01-08 | End: 2024-04-07

## 2024-01-08 RX ORDER — PREDNISONE 10 MG/1
TABLET ORAL
Qty: 20 TABLET | Refills: 0 | Status: ON HOLD | OUTPATIENT
Start: 2024-01-08

## 2024-01-08 RX ORDER — GUAIFENESIN 600 MG/1
600 TABLET, EXTENDED RELEASE ORAL 2 TIMES DAILY
Qty: 30 TABLET | Refills: 0 | Status: ON HOLD | OUTPATIENT
Start: 2024-01-08 | End: 2024-01-23

## 2024-01-08 RX ORDER — LEVOFLOXACIN 750 MG/1
750 TABLET, FILM COATED ORAL DAILY
Qty: 5 TABLET | Refills: 0 | Status: ON HOLD | OUTPATIENT
Start: 2024-01-08 | End: 2024-01-13

## 2024-01-08 RX ADMIN — Medication 400 MG: at 09:05

## 2024-01-08 RX ADMIN — IPRATROPIUM BROMIDE AND ALBUTEROL SULFATE 1 DOSE: 2.5; .5 SOLUTION RESPIRATORY (INHALATION) at 19:40

## 2024-01-08 RX ADMIN — TORSEMIDE 20 MG: 20 TABLET ORAL at 09:05

## 2024-01-08 RX ADMIN — BUDESONIDE AND FORMOTEROL FUMARATE DIHYDRATE 2 PUFF: 160; 4.5 AEROSOL RESPIRATORY (INHALATION) at 07:11

## 2024-01-08 RX ADMIN — GUAIFENESIN 600 MG: 600 TABLET, EXTENDED RELEASE ORAL at 09:05

## 2024-01-08 RX ADMIN — ROFLUMILAST 500 MCG: 500 TABLET ORAL at 09:04

## 2024-01-08 RX ADMIN — DILTIAZEM HYDROCHLORIDE 240 MG: 240 CAPSULE, COATED, EXTENDED RELEASE ORAL at 09:05

## 2024-01-08 RX ADMIN — IPRATROPIUM BROMIDE AND ALBUTEROL SULFATE 1 DOSE: 2.5; .5 SOLUTION RESPIRATORY (INHALATION) at 11:04

## 2024-01-08 RX ADMIN — INSULIN LISPRO 12 UNITS: 100 INJECTION, SOLUTION INTRAVENOUS; SUBCUTANEOUS at 13:00

## 2024-01-08 RX ADMIN — SODIUM CHLORIDE, PRESERVATIVE FREE 10 ML: 5 INJECTION INTRAVENOUS at 09:05

## 2024-01-08 RX ADMIN — IPRATROPIUM BROMIDE AND ALBUTEROL SULFATE 1 DOSE: 2.5; .5 SOLUTION RESPIRATORY (INHALATION) at 07:11

## 2024-01-08 RX ADMIN — INSULIN LISPRO 4 UNITS: 100 INJECTION, SOLUTION INTRAVENOUS; SUBCUTANEOUS at 09:15

## 2024-01-08 RX ADMIN — APIXABAN 5 MG: 5 TABLET, FILM COATED ORAL at 09:05

## 2024-01-08 RX ADMIN — IPRATROPIUM BROMIDE AND ALBUTEROL SULFATE 1 DOSE: 2.5; .5 SOLUTION RESPIRATORY (INHALATION) at 15:04

## 2024-01-08 RX ADMIN — BUDESONIDE AND FORMOTEROL FUMARATE DIHYDRATE 2 PUFF: 160; 4.5 AEROSOL RESPIRATORY (INHALATION) at 19:48

## 2024-01-08 RX ADMIN — POTASSIUM CHLORIDE 10 MEQ: 1500 TABLET, EXTENDED RELEASE ORAL at 09:05

## 2024-01-08 RX ADMIN — PREDNISONE 40 MG: 20 TABLET ORAL at 09:04

## 2024-01-08 RX ADMIN — CEFEPIME 2000 MG: 2 INJECTION, POWDER, FOR SOLUTION INTRAVENOUS at 06:03

## 2024-01-08 NOTE — PLAN OF CARE
Problem: Safety - Adult  Goal: Free from fall injury  Outcome: Completed     Problem: Discharge Planning  Goal: Discharge to home or other facility with appropriate resources  Outcome: Completed     Problem: Skin/Tissue Integrity  Goal: Absence of new skin breakdown  Description: 1.  Monitor for areas of redness and/or skin breakdown  2.  Assess vascular access sites hourly  3.  Every 4-6 hours minimum:  Change oxygen saturation probe site  4.  Every 4-6 hours:  If on nasal continuous positive airway pressure, respiratory therapy assess nares and determine need for appliance change or resting period.  Outcome: Completed     Problem: Skin/Tissue Integrity - Adult  Goal: Skin integrity remains intact  Outcome: Completed  Goal: Incisions, wounds, or drain sites healing without S/S of infection  Outcome: Completed     Problem: Infection - Adult  Goal: Absence of infection at discharge  Outcome: Completed     Problem: Chronic Conditions and Co-morbidities  Goal: Patient's chronic conditions and co-morbidity symptoms are monitored and maintained or improved  Outcome: Completed     Problem: Respiratory - Adult  Goal: Achieves optimal ventilation and oxygenation  Outcome: Completed     Problem: Cardiovascular - Adult  Goal: Maintains optimal cardiac output and hemodynamic stability  Outcome: Completed  Goal: Absence of cardiac dysrhythmias or at baseline  Outcome: Completed     Problem: Metabolic/Fluid and Electrolytes - Adult  Goal: Electrolytes maintained within normal limits  Outcome: Completed  Goal: Hemodynamic stability and optimal renal function maintained  Outcome: Completed  Goal: Glucose maintained within prescribed range  Outcome: Completed

## 2024-01-08 NOTE — PROGRESS NOTES
V2.0  [facility] [place]      Name:  Armando Mars /Age/Sex: 1955  (68 y.o. male)   MRN & CSN:  [id number] & [id number] Encounter Date/Time: 2024 1:11 PM EST    Location:  [address] [place] [address] PCP: Rosalio Hedrick MD       Hospital Day: 2      Subjective:     Chief Complaint:  Shortness of Breath     Doing better today down to 3L NC.  States cough is not worse.  No chest pain    Assessment and Plan:   acute on chronic hypoxic failure 2/2 community acquired pnemonia and COPD exacerbation  Some volume overload BNP more elevated compared to last admission  CXR RML Pnuemonia  P/w SOB and fatique for 2 weeks, gradully worsening. Was on 5L on admisison (3L at home)  Resp panel negative  Will treat as healthcare associated pneumonia  - will continue cefepime  - no need to continue vanc as recent MRSA was negative and CXR findings not suggestive of MRSA pneumonia  --f/u Blood cx urine  Antigens ordered  - wean O2 as tolerated  - hold home torsemide; do lasix 60 mg IV BID  - breathing tx  - steroids  - supportive management        T2DM  - continue Lantus at reduced dose  -MDSI     Elevated Trop  Trop 47, 52  Reports Intermittent CP likely 2/2 pneumonia and cough     Afib   --Irreg on Tele  EKG shows Afib  --On Eliquis and Diltiazem; continue     Morbid Obesity  --BMI 43.91  --Lifestyle modification      Personally reviewed Lab Studies and Imaging     Imaging that was interpreted personally includes CXR and results as above    Drugs that require monitoring for toxicity include Eliquis and the method of monitoring was cbc      Diet ADULT DIET; Regular; 4 carb choices (60 gm/meal); Low Sodium (2 gm)   DVT Prophylaxis [] Lovenox, []  Heparin, [] SCDs, [] Ambulation,  [] Eliquis, [] Xarelto  [] Coumadin   Code Status Full Code   Disposition From: home  Expected Disposition: home  Estimated Date of Discharge: 1-2 days  Patient requires continued admission due to hypoxia   Surrogate Decision Maker/ POA 
    [place]2.[place]  [facility] [place]      Name:  Armando Mars /Age/Sex: 1955  (68 y.o. male)   MRN & CSN:  [id number] & [id number] Encounter Date/Time: 2024 1:11 PM EST    Location:  [address] [place] [address] PCP: Rosalio Hedrick MD       Hospital Day: 3      Subjective:     Chief Complaint:  Shortness of Breath     Had increase in O2 requirements reaching 6L yesterday afternoon. Today down to 4L sating 98% pt was asking to stay at higher O2 requirement for comfort.  States his breathing is the same not worse or better. No cough  No chest pain  No urine output recorded.    Assessment and Plan:   acute on chronic hypoxic failure 2/2 community acquired pnemonia and COPD exacerbation  Some volume overload BNP more elevated compared to last admission  CXR RML Pnuemonia  P/w SOB and fatique for 2 weeks, gradully worsening. Was on 5L on admisison (3L at home)  Resp panel negative. Strep, legionalla negative.  Will treat as healthcare associated pneumonia  - will continue cefepime  - wean O2 as tolerated  - hold home torsemide; continue lasix 60 mg IV BID  - breathing tx  - steroids  - supportive management        T2DM  - resume home Lantus 25U QHS  -MDSI     Elevated Trop  Trop 47, 52  Reports Intermittent CP likely 2/2 pneumonia and cough     Afib   --Irreg on Tele  EKG shows Afib  --On Eliquis and Diltiazem; continue     Morbid Obesity  --BMI 43.91  --Lifestyle modification      Personally reviewed Lab Studies and Imaging     Imaging that was interpreted personally includes CXR and results as above    Drugs that require monitoring for toxicity include Eliquis and the method of monitoring was cbc      Diet ADULT DIET; Regular; 4 carb choices (60 gm/meal); Low Sodium (2 gm)   DVT Prophylaxis [] Lovenox, []  Heparin, [] SCDs, [] Ambulation,  [] Eliquis, [] Xarelto  [] Coumadin   Code Status Full Code   Disposition From: home  Expected Disposition: home  Estimated Date of Discharge: 1-2 days  Patient requires 
   01/08/24 1027   Encounter Summary   Encounter Overview/Reason  Initial Encounter   Service Provided For: Patient   Referral/Consult From: [facility]   Support System Significant other;Family members   Last Encounter  01/08/24  (Offered silent prayer.)   Complexity of Encounter Low   Begin Time 1000   End Time  1010   Total Time Calculated 10 min   Spiritual/Emotional needs   Type Spiritual Support   Grief, Loss, and Adjustments   Type Adjustment to illness   Assessment/Intervention/Outcome   Assessment Coping   Intervention Prayer (assurance of)/[place]   Outcome Coping   Plan and Referrals   Plan/Referrals Continue Support (comment)  (as needed)       
  Physician Progress Note      PATIENT:               KAROLINE HGAEN  CSN #:                  [id number]  :                       1955  ADMIT DATE:       1/3/2024 4:11 AM  DISCH DATE:  RESPONDING  PROVIDER #:        Jarad Cox MD          QUERY TEXT:    Pt admitted with pneumonia and has HFpEF documented by cardiology. If   possible, please document in progress notes and discharge summary further   specificity regarding the type and acuity of CHF:    The medical record reflects the following:  Risk Factors: HTN  Clinical Indicators: proBNP 2,534. Echo on 2023: Left ventricular   systolic function is low normal.  Ejection fraction is visually estimated at 50-55%.  Treatment: cardiology consult, IV Lasix, Torsemide, Lipitor, weights, I&O's    MARILUZ Bolanos, RN, CRCR  Clinical   [phone]  Options provided:  -- Acute on Chronic Diastolic CHF/HFpEF  -- Chronic Diastolic CHF/HFpEF  -- Other - I will add my own diagnosis  -- Disagree - Not applicable / Not valid  -- Disagree - Clinically unable to determine / Unknown  -- Refer to Clinical Documentation Reviewer    PROVIDER RESPONSE TEXT:    This patient is in acute on chronic diastolic CHF/HFpEF.    Query created by: Tova Kim on 2024 1:14 PM      QUERY TEXT:    Pt admitted with pneumonia and receiving IV Cefepime. If possible, please   document in the progress notes and discharge summary if you are evaluating   and/or treating any of the following:    Note: CAP and HCAP indicate where the pneumonia was acquired, not a specific   type.    The medical record reflects the following:  Risk Factors: COPD  Clinical Indicators: H&P: \"Received Zithromax Started on Rocephin, switched to   Cefepime and cont Zithromax.\" CXR on 1/3: New right mid lung zone airspace   opacity compatible with pneumonia. 2.Stable bibasilar opacities.  Treatment: IV Cefepime, IV Zithromax, duonebs, Prednisone    MARILUZ Bolanos, RN, 
4 Eyes Skin Assessment     NAME:  Armando Mars  YOB: 1955  MEDICAL RECORD NUMBER:  [id number]    The patient is being assessed for  Admission    I agree that at least one RN has performed a thorough Head to Toe Skin Assessment on the patient. ALL assessment sites listed below have been assessed.      Areas assessed by both nurses:    Head, Face, Ears, Shoulders, Back, Chest, Arms, Elbows, Hands, Sacrum. Buttock, Coccyx, Ischium, Legs. Feet and Heels, and Under Medical Devices         Does the Patient have a Wound? Yes wound(s) were present on assessment. LDA wound assessment was Initiated and completed by RN       Hector Prevention initiated by RN: Yes  Wound Care Orders initiated by RN: Yes    Pressure Injury (Stage 3,4, Unstageable, DTI, NWPT, and Complex wounds) if present, place Wound referral order by RN under : No    New Ostomies, if present place, Ostomy referral order under : No     Nurse 1 eSignature: Electronically signed by Aishwarya Álvarez LPN on 1/4/24 at 12:06 AM EST    **SHARE this note so that the co-signing nurse can place an eSignature**    Nurse 2 eSignature: Electronically signed by Leslie Schilling RN on 1/4/24 at 12:24 AM EST  
Clinical Pharmacy Progress Note    Vancomycin has been discontinued. Pt received a loading dose of 2000 mg IV x ONCE prior to vancomycin being discontinued. Pharmacy will sign off. Please re-consult pharmacy if vancomycin dosing is wanted in the future.    Please call with questions.  Althea Taveras, PharmD, [facility], [facility]  Main Pharmacy: [id number]  1/4/2024 1:16 PM      
Delivered an I-GO to pt.  Per Vivien at [facility], they will pick that unit up tomorrow.  Pt also said he had two other I-GO's at home.  [facility] made aware of that as well.  They will  all three tomorrow.      Instructed pt on use of I-GO.  Pt is almost blind but is aware of how to operate unit due to having other units at home.      Ppwk faxed to [facility].  
During bedside report, night shift nurse reports patient's discharge plan is to go home with home health care and that patient wears 3 liters oxygen at baseline.    Patient then states that he needs placement at a facility because his home oxygen tank only supports 4 liters of oxygen and he currently is on 5 liters; reports difficulty breathing; appears to be in no apparent distress. Vitals WNL with oxygen saturation of 100% on 5L nasal cannula and respiratory rate of 18 breaths per minute. Patient requesting oxygen flow be increased. Explained to patient rationale behind not increasing oxygen flow. Provided patient with emotional support and encouragement to cough and deep breathe.     Informed patient that we will work on weaning his oxygen to his baseline rate today as he continues to improve and maintain oxygen saturations of % on 5 liters supplemental oxygen.     Patient requests BiPAP from respiratory therapist during morning rounds; RT explained that there is no order for BiPAP and no clinical indications for BiPAP at this time.     Care ongoing.   
Outpatient Pharmacy Progress Note for Meds-to-Beds    Total number of Prescriptions Filled: 4  The following medications were dispensed to the patient during the discharge process:  guaiFENesin  hydrOXYzine HCl  levoFLOXacin  Prednisone     Additional Documentation:  Medication(s) were delivered to the patient's room prior to discharge by a volunteer       Thank you for letting us serve your patients.  [place] [id number]    Phone: [phone]    Fax: [phone]        
Patient arrived back to room. House Supervisor Moe talked with MINH CASTRO To set up alternative transport via superior.  with gricel at 8:00 pm.    
Patient is refusing to wear telemetry, he has been taking if off all night. It was laying on the ground and he states he does not want to wear it. NP aware.  
Pt on 6L NC ambulated to bathroom O2 sat went as low as 83%, Pt now at 91% SPO2 in bed  
Resource RN called to bedside to assess pt for increasing O2 requirement, 5 to 7L high flow cannula, and elevated bp, 216/190. RT currently at bedside giving breathing and pt sitting on bedside, O2 currently 95-96% with breathing treatment, and bp retaken with sbp 128. Pt educated on bipap use and need, states he will after using the restroom, rips off nasal cannula, ambulates self to bathroom. Upon returning to bed, O2 76%, pale, and c/o SOB, nasal re-applied and O2 increased to 93%. Pt states he will wear bipap tonight, wear telemetry wires, and pulse ox.  
Unable to give demadex or cardizem due to medicine not available, Pharmacy [paged and asked to send meds/verify  
PRN  acetaminophen, 650 mg, Q6H PRN   Or  acetaminophen, 650 mg, Q6H PRN  ipratropium 0.5 mg-albuterol 2.5 mg, 1 Dose, Q4H PRN  glucose, 4 tablet, PRN  dextrose bolus, 125 mL, PRN   Or  dextrose bolus, 250 mL, PRN  glucagon (rDNA), 1 mg, PRN  dextrose, , Continuous PRN        Labs      Recent Results (from the past 24 hour(s))   POCT Glucose    Collection Time: 01/05/24  4:24 PM   Result Value Ref Range    POC Glucose 318 (H) 70 - 99 MG/DL   POCT Glucose    Collection Time: 01/05/24  7:52 PM   Result Value Ref Range    POC Glucose 285 (H) 70 - 99 MG/DL   Basic Metabolic Panel w/ Reflex to MG    Collection Time: 01/06/24  2:58 AM   Result Value Ref Range    Sodium 136 135 - 145 MMOL/L    Potassium 3.7 3.5 - 5.1 MMOL/L    Chloride 89 (L) 99 - 110 mMol/L    CO2 39 (H) 21 - 32 MMOL/L    Anion Gap 8 7 - 16    Glucose 243 (H) 70 - 99 MG/DL    BUN 10 6 - 23 MG/DL    Creatinine 0.6 (L) 0.9 - 1.3 MG/DL    Est, Glom Filt Rate >60 >60 mL/min/1.73m2    Calcium 8.1 (L) 8.3 - 10.6 MG/DL   Hemoglobin and Hematocrit    Collection Time: 01/06/24  2:58 AM   Result Value Ref Range    Hemoglobin 11.1 (L) 13.5 - 18.0 GM/DL    Hematocrit 35.2 (L) 42 - 52 %   Blood gas, arterial    Collection Time: 01/06/24  4:15 AM   Result Value Ref Range    pH, Bld 7.50 (H) 7.35 - 7.45    pCO2, Arterial 59.0 (H) 35 - 48 MMHG    pO2, Arterial 37 (L) 83 - 108 MMHG    Base Exc, Mixed 19.4 (H) 0 - 3.0    HCO3, Arterial 46.0 (H) 21 - 28 MMOL/L    CO2 Content 47.8 (H) 21 - 32 MMOL/L    O2 Sat 69.9 (L) 94 - 98 %    Carbon Monoxide, Blood 4.6 0 - 5 %    Methemoglobin, Arterial 0.0 <1.5 %    Comment 3L PADMINI    POCT Glucose    Collection Time: 01/06/24  8:15 AM   Result Value Ref Range    POC Glucose 240 (H) 70 - 99 MG/DL        Imaging/Diagnostics Last 24 Hours   XR CHEST PORTABLE    Result Date: 1/3/2024  EXAMINATION: ONE XRAY VIEW OF THE CHEST 1/3/2024 5:04 am COMPARISON: December 27, 2023 HISTORY: ORDERING SYSTEM PROVIDED HISTORY: Shortness of breath 
Collection Time: 01/07/24  8:01 AM   Result Value Ref Range    POC Glucose 242 (H) 70 - 99 MG/DL        Imaging/Diagnostics Last 24 Hours   XR CHEST PORTABLE    Result Date: 1/3/2024  EXAMINATION: ONE XRAY VIEW OF THE CHEST 1/3/2024 5:04 am COMPARISON: December 27, 2023 HISTORY: ORDERING SYSTEM PROVIDED HISTORY: Shortness of breath TECHNOLOGIST PROVIDED HISTORY: Reason for exam:->Shortness of breath Reason for Exam: Shortness of breath FINDINGS: No lines or tubes.  Cardiomediastinal silhouette is enlarged.  There is a new opacity in the right mid lung zone.  Bibasilar opacities are unchanged.  No obvious pleural effusion.  Findings are superimposed on emphysema. No acute or aggressive osseous lesion.     1. New right mid lung zone airspace opacity compatible with pneumonia. 2. Stable bibasilar opacities.       Electronically signed by Jarad Cox MD on 1/7/2024 at 11:10 AM

## 2024-01-08 NOTE — CARE COORDINATION
Reviewed chart, pt on discharge home with [facility].  Sharmaine GARRIDO has [facility] set up.  No other needs identified.        1430 Dr Cox came to  desk stating pt would like SNU now.  Spoke with pt his has been to [facility] in past and does not want to return. Went over PPO SNU list with pt , he does not want to go now to any facility. Plan is home with [facility] and would like Nurse to call Brother to pick him up.  Updated pt's ROSARIO Freeman and PS to Dr Cox.

## 2024-01-08 NOTE — DISCHARGE SUMMARY
Discharge Summary    Name:  Armando Mars /Age/Sex: 1955  (68 y.o. male)   MRN & CSN:  [id number] & [id number] Admission Date/Time: 1/3/2024  4:11 AM   Attending:  Jarad Cox MD Discharging Physician: Jarad Cox MD     Hospital Course:   Armando Mars is a 68 y.o.  male  who presents with CAP (community acquired pneumonia)    HPI  \"Armando Mars is a 68 y.o. male with pmh of T2DM, A-fib, morbid obesity, COPD who presents with shortness of breath, found to have pneumonia on chest x-ray.  Patient seen in ED, admitted inpatient for right middle lobe pneumonia.  Patient started on IV antibiotics, blood cultures, urine antigens, requiring 5 L, nasal cannula, baseline supplemental oxygen requirement 3 L.  Troponin elevated, likely demand ischemia, however patient is having chest pain complaints will get cardiology to eval. \"       The following problems have been addressed during this hospitalization.  acute on chronic hypoxic failure 2/2 community acquired pnemonia and COPD exacerbation  Some volume overload BNP more elevated compared to last admission  CXR RML Pnuemonia  P/w SOB and fatique for 2 weeks, gradully worsening. Was on 5L on admisison (3L at home)  Resp panel negative. Strep, legionalla negative.  O2 back to baseline 3L NC.  -Treated as healthcare associated pneumonia. Received cefepime, discharged on levofloxacin.   -Received lasix IV few doses with good urine output, resumed home torsemide  -Received breathing tx  -received steroids discharged on taper  -Started on atarax for anxiety      T2DM  Glucose high 2/2 steroids and pt's diet.   - increase home Lantus 30U QHS  -sliding scale resumed  - pt to monitor glucose closely     Primary Metabolic Alkalosis, with: Secondary Respiratory Acidosis. Suspect contraction alkalosis from IV lasix. VBG 2024 pH 7.50, pCO2 60, HCO3 37 from 39.  S/p 1 dose diamox  - home torsemide resumed  - monitor        Elevated Trop  Trop 47, 52  Reports

## 2024-01-08 NOTE — PLAN OF CARE
Problem: Safety - Adult  Goal: Free from fall injury  Outcome: Progressing     Problem: Discharge Planning  Goal: Discharge to home or other facility with appropriate resources  Outcome: Progressing     Problem: Skin/Tissue Integrity  Goal: Absence of new skin breakdown  Description: 1.  Monitor for areas of redness and/or skin breakdown  2.  Assess vascular access sites hourly  3.  Every 4-6 hours minimum:  Change oxygen saturation probe site  4.  Every 4-6 hours:  If on nasal continuous positive airway pressure, respiratory therapy assess nares and determine need for appliance change or resting period.  Outcome: Progressing     Problem: Skin/Tissue Integrity - Adult  Goal: Skin integrity remains intact  Outcome: Progressing     Problem: Skin/Tissue Integrity - Adult  Goal: Incisions, wounds, or drain sites healing without S/S of infection  Outcome: Progressing     Problem: Infection - Adult  Goal: Absence of infection at discharge  Outcome: Progressing     Problem: Chronic Conditions and Co-morbidities  Goal: Patient's chronic conditions and co-morbidity symptoms are monitored and maintained or improved  Outcome: Progressing     Problem: Respiratory - Adult  Goal: Achieves optimal ventilation and oxygenation  Outcome: Progressing     Problem: Cardiovascular - Adult  Goal: Absence of cardiac dysrhythmias or at baseline  Outcome: Progressing     Problem: Metabolic/Fluid and Electrolytes - Adult  Goal: Electrolytes maintained within normal limits  Outcome: Progressing     Problem: Metabolic/Fluid and Electrolytes - Adult  Goal: Hemodynamic stability and optimal renal function maintained  Outcome: Progressing     Problem: Metabolic/Fluid and Electrolytes - Adult  Goal: Glucose maintained within prescribed range  Outcome: Progressing

## 2024-01-09 ENCOUNTER — CARE COORDINATION (OUTPATIENT)
Dept: CASE MANAGEMENT | Age: 69
End: 2024-01-09

## 2024-01-09 NOTE — CARE COORDINATION
Care Transitions Outreach Attempt    Call within 2 business days of discharge: Yes     Patient: Armando Mars Patient : 1955 MRN: [id number]    Last Discharge Facility       Date Complaint Diagnosis Description Type Department Provider    1/3/24 Shortness of Breath Community acquired pneumonia of right middle lobe of lung ... ED to Hosp-Admission (Discharged) (ADMITTED) [facility]N Jarad Cox MD; Reno Sanchez M...        Noted following upcoming appointments from discharge chart review:   [facility] follow up appointment(s): No future appointments.    Spoke briefly w/ Patient. Reports he took Levaquin and Prednisone. Patient then abruptly asks to end call citing short of breath. Advised med neb and inhalers. Advised seeking medical attention if noting acute resp distress. Requested call back when feeling better. Will reattempt call later in day. Ebony Louie RN, [phone]

## 2024-01-09 NOTE — CARE COORDINATION
Care Transitions Outreach Attempt    Call within 2 business days of discharge: Yes   Patient: Armando Mars Patient : 1955 MRN: [id number]  Dx: Reina, Copd Exac  Facility: [facility] 1/3/24-24    Last Discharge Facility       Date Complaint Diagnosis Description Type Department Provider    1/3/24 Shortness of Breath Community acquired pneumonia of right middle lobe of lung ... ED to Hosp-Admission (Discharged) (ADMITTED) [facility] 4N Jarad Cox MD; [place] Sanchez M...        Noted following upcoming appointments from discharge chart review:   [facility] follow up appointment(s): No future appointments.    Challenges to be reviewed by the provider   Additional needs identified to be addressed with provider:    Dx: Reina Copd Exac  Facility: [facility] 1/3/24-24    Please contact for scheduling of 7 day hospital follow up appt due by 1/15/24.   Care Transitions will make second attempt at initial discharge call.        Method of communication with provider : chart routing to office staff    Attempt to reach for Care Transition discharge call call back unsuccessful. HIPAA compliant message left requesting call back.   Ebony Louie RN, [phone]

## 2024-01-10 ENCOUNTER — CARE COORDINATION (OUTPATIENT)
Dept: CASE MANAGEMENT | Age: 69
End: 2024-01-10

## 2024-01-10 ENCOUNTER — APPOINTMENT (OUTPATIENT)
Dept: GENERAL RADIOLOGY | Age: 69
DRG: 871 | End: 2024-01-10
Payer: MEDICARE

## 2024-01-10 ENCOUNTER — CARE COORDINATION (OUTPATIENT)
Dept: CARE COORDINATION | Age: 69
End: 2024-01-10

## 2024-01-10 ENCOUNTER — HOSPITAL ENCOUNTER (INPATIENT)
Age: 69
LOS: 7 days | Discharge: NURSING, REHAB OR LONG-TERM CARE | DRG: 871 | End: 2024-01-17
Attending: EMERGENCY MEDICINE | Admitting: INTERNAL MEDICINE
Payer: MEDICARE

## 2024-01-10 DIAGNOSIS — J44.1 COPD EXACERBATION (HCC): Primary | ICD-10-CM

## 2024-01-10 DIAGNOSIS — I50.9 ACUTE ON CHRONIC HEART FAILURE, UNSPECIFIED HEART FAILURE TYPE (HCC): ICD-10-CM

## 2024-01-10 LAB
ALBUMIN SERPL-MCNC: 3.5 GM/DL (ref 3.4–5)
ALP BLD-CCNC: 95 IU/L (ref 40–128)
ALT SERPL-CCNC: 48 U/L (ref 10–40)
ANION GAP SERPL CALCULATED.3IONS-SCNC: 9 MMOL/L (ref 7–16)
AST SERPL-CCNC: 21 IU/L (ref 15–37)
BASE EXCESS MIXED: 12.5 (ref 0–3)
BASOPHILS ABSOLUTE: 0.1 K/CU MM
BASOPHILS RELATIVE PERCENT: 0.4 % (ref 0–1)
BILIRUB SERPL-MCNC: 0.2 MG/DL (ref 0–1)
BUN SERPL-MCNC: 17 MG/DL (ref 6–23)
CALCIUM SERPL-MCNC: 8.3 MG/DL (ref 8.3–10.6)
CHLORIDE BLD-SCNC: 92 MMOL/L (ref 99–110)
CO2: 37 MMOL/L (ref 21–32)
COMMENT: ABNORMAL
CREAT SERPL-MCNC: 0.6 MG/DL (ref 0.9–1.3)
DIFFERENTIAL TYPE: ABNORMAL
EKG ATRIAL RATE: 73 BPM
EKG DIAGNOSIS: NORMAL
EKG Q-T INTERVAL: 344 MS
EKG QRS DURATION: 104 MS
EKG QTC CALCULATION (BAZETT): 483 MS
EKG R AXIS: -44 DEGREES
EKG T AXIS: 73 DEGREES
EKG VENTRICULAR RATE: 119 BPM
EOSINOPHILS ABSOLUTE: 0.4 K/CU MM
EOSINOPHILS RELATIVE PERCENT: 2.9 % (ref 0–3)
GFR SERPL CREATININE-BSD FRML MDRD: >60 ML/MIN/1.73M2
GLUCOSE BLD-MCNC: 272 MG/DL (ref 70–99)
GLUCOSE BLD-MCNC: 328 MG/DL (ref 70–99)
GLUCOSE BLD-MCNC: 427 MG/DL (ref 70–99)
GLUCOSE BLD-MCNC: 430 MG/DL (ref 70–99)
GLUCOSE SERPL-MCNC: 384 MG/DL (ref 70–99)
HCO3 VENOUS: 40.8 MMOL/L (ref 22–29)
HCT VFR BLD CALC: 38.5 % (ref 42–52)
HEMOGLOBIN: 11.9 GM/DL (ref 13.5–18)
IMMATURE NEUTROPHIL %: 1.3 % (ref 0–0.43)
LYMPHOCYTES ABSOLUTE: 1.3 K/CU MM
LYMPHOCYTES RELATIVE PERCENT: 8.9 % (ref 24–44)
MAGNESIUM: 2 MG/DL (ref 1.8–2.4)
MCH RBC QN AUTO: 30.1 PG (ref 27–31)
MCHC RBC AUTO-ENTMCNC: 30.9 % (ref 32–36)
MCV RBC AUTO: 97.5 FL (ref 78–100)
MONOCYTES ABSOLUTE: 1.2 K/CU MM
MONOCYTES RELATIVE PERCENT: 8.4 % (ref 0–4)
NUCLEATED RBC %: 0.2 %
O2 SAT, VEN: 87.2 % (ref 50–70)
PCO2, VEN: 69 MMHG (ref 41–51)
PDW BLD-RTO: 16.4 % (ref 11.7–14.9)
PH VENOUS: 7.38 (ref 7.32–7.43)
PHOSPHORUS: 3.9 MG/DL (ref 2.5–4.9)
PLATELET # BLD: 268 K/CU MM (ref 140–440)
PMV BLD AUTO: 9.9 FL (ref 7.5–11.1)
PO2, VEN: 66 MMHG (ref 28–48)
POTASSIUM SERPL-SCNC: 4.3 MMOL/L (ref 3.5–5.1)
PRO-BNP: 3427 PG/ML
RBC # BLD: 3.95 M/CU MM (ref 4.6–6.2)
REASON FOR REJECTION: NORMAL
REJECTED TEST: NORMAL
SEGMENTED NEUTROPHILS ABSOLUTE COUNT: 11.1 K/CU MM
SEGMENTED NEUTROPHILS RELATIVE PERCENT: 78.1 % (ref 36–66)
SODIUM BLD-SCNC: 138 MMOL/L (ref 135–145)
TOTAL IMMATURE NEUTOROPHIL: 0.18 K/CU MM
TOTAL NUCLEATED RBC: 0 K/CU MM
TOTAL PROTEIN: 5.9 GM/DL (ref 6.4–8.2)
TROPONIN, HIGH SENSITIVITY: 39 NG/L (ref 0–22)
TROPONIN, HIGH SENSITIVITY: 39 NG/L (ref 0–22)
WBC # BLD: 14.3 K/CU MM (ref 4–10.5)

## 2024-01-10 PROCEDURE — 99223 1ST HOSP IP/OBS HIGH 75: CPT | Performed by: INTERNAL MEDICINE

## 2024-01-10 PROCEDURE — 80053 COMPREHEN METABOLIC PANEL: CPT

## 2024-01-10 PROCEDURE — 83735 ASSAY OF MAGNESIUM: CPT

## 2024-01-10 PROCEDURE — 6370000000 HC RX 637 (ALT 250 FOR IP): Performed by: INTERNAL MEDICINE

## 2024-01-10 PROCEDURE — 82805 BLOOD GASES W/O2 SATURATION: CPT

## 2024-01-10 PROCEDURE — 71045 X-RAY EXAM CHEST 1 VIEW: CPT

## 2024-01-10 PROCEDURE — 84484 ASSAY OF TROPONIN QUANT: CPT

## 2024-01-10 PROCEDURE — 2580000003 HC RX 258: Performed by: EMERGENCY MEDICINE

## 2024-01-10 PROCEDURE — 6360000002 HC RX W HCPCS: Performed by: INTERNAL MEDICINE

## 2024-01-10 PROCEDURE — 99285 EMERGENCY DEPT VISIT HI MDM: CPT

## 2024-01-10 PROCEDURE — 93005 ELECTROCARDIOGRAM TRACING: CPT | Performed by: EMERGENCY MEDICINE

## 2024-01-10 PROCEDURE — 82962 GLUCOSE BLOOD TEST: CPT

## 2024-01-10 PROCEDURE — 2060000000 HC ICU INTERMEDIATE R&B

## 2024-01-10 PROCEDURE — 83880 ASSAY OF NATRIURETIC PEPTIDE: CPT

## 2024-01-10 PROCEDURE — 93010 ELECTROCARDIOGRAM REPORT: CPT | Performed by: INTERNAL MEDICINE

## 2024-01-10 PROCEDURE — 84100 ASSAY OF PHOSPHORUS: CPT

## 2024-01-10 PROCEDURE — 2580000003 HC RX 258: Performed by: INTERNAL MEDICINE

## 2024-01-10 PROCEDURE — APPSS45 APP SPLIT SHARED TIME 31-45 MINUTES

## 2024-01-10 PROCEDURE — 6360000002 HC RX W HCPCS

## 2024-01-10 PROCEDURE — 6370000000 HC RX 637 (ALT 250 FOR IP): Performed by: EMERGENCY MEDICINE

## 2024-01-10 PROCEDURE — 85025 COMPLETE CBC W/AUTO DIFF WBC: CPT

## 2024-01-10 PROCEDURE — 94640 AIRWAY INHALATION TREATMENT: CPT

## 2024-01-10 PROCEDURE — 6370000000 HC RX 637 (ALT 250 FOR IP)

## 2024-01-10 PROCEDURE — 93005 ELECTROCARDIOGRAM TRACING: CPT | Performed by: STUDENT IN AN ORGANIZED HEALTH CARE EDUCATION/TRAINING PROGRAM

## 2024-01-10 RX ORDER — ONDANSETRON 4 MG/1
4 TABLET, ORALLY DISINTEGRATING ORAL EVERY 8 HOURS PRN
Status: DISCONTINUED | OUTPATIENT
Start: 2024-01-10 | End: 2024-01-17 | Stop reason: HOSPADM

## 2024-01-10 RX ORDER — ACETAMINOPHEN 650 MG/1
650 SUPPOSITORY RECTAL EVERY 6 HOURS PRN
Status: DISCONTINUED | OUTPATIENT
Start: 2024-01-10 | End: 2024-01-17 | Stop reason: HOSPADM

## 2024-01-10 RX ORDER — FUROSEMIDE 10 MG/ML
40 INJECTION INTRAMUSCULAR; INTRAVENOUS 2 TIMES DAILY
Status: DISCONTINUED | OUTPATIENT
Start: 2024-01-10 | End: 2024-01-10

## 2024-01-10 RX ORDER — ACETAMINOPHEN 325 MG/1
650 TABLET ORAL EVERY 6 HOURS PRN
Status: DISCONTINUED | OUTPATIENT
Start: 2024-01-10 | End: 2024-01-17 | Stop reason: HOSPADM

## 2024-01-10 RX ORDER — LEVOFLOXACIN 500 MG/1
750 TABLET, FILM COATED ORAL DAILY
Status: COMPLETED | OUTPATIENT
Start: 2024-01-10 | End: 2024-01-14

## 2024-01-10 RX ORDER — GUAIFENESIN 600 MG/1
600 TABLET, EXTENDED RELEASE ORAL 2 TIMES DAILY
Status: DISCONTINUED | OUTPATIENT
Start: 2024-01-10 | End: 2024-01-17 | Stop reason: HOSPADM

## 2024-01-10 RX ORDER — POLYETHYLENE GLYCOL 3350 17 G/17G
17 POWDER, FOR SOLUTION ORAL DAILY PRN
Status: DISCONTINUED | OUTPATIENT
Start: 2024-01-10 | End: 2024-01-17 | Stop reason: HOSPADM

## 2024-01-10 RX ORDER — ATORVASTATIN CALCIUM 40 MG/1
40 TABLET, FILM COATED ORAL NIGHTLY
Status: DISCONTINUED | OUTPATIENT
Start: 2024-01-10 | End: 2024-01-17 | Stop reason: HOSPADM

## 2024-01-10 RX ORDER — DILTIAZEM HYDROCHLORIDE 240 MG/1
240 CAPSULE, COATED, EXTENDED RELEASE ORAL DAILY
Status: DISCONTINUED | OUTPATIENT
Start: 2024-01-10 | End: 2024-01-10

## 2024-01-10 RX ORDER — LEVOFLOXACIN 500 MG/1
750 TABLET, FILM COATED ORAL ONCE
Status: COMPLETED | OUTPATIENT
Start: 2024-01-10 | End: 2024-01-10

## 2024-01-10 RX ORDER — DEXTROSE MONOHYDRATE 100 MG/ML
INJECTION, SOLUTION INTRAVENOUS CONTINUOUS PRN
Status: DISCONTINUED | OUTPATIENT
Start: 2024-01-10 | End: 2024-01-17 | Stop reason: HOSPADM

## 2024-01-10 RX ORDER — HYDROXYZINE HYDROCHLORIDE 10 MG/1
10 TABLET, FILM COATED ORAL 3 TIMES DAILY PRN
Status: DISCONTINUED | OUTPATIENT
Start: 2024-01-10 | End: 2024-01-17 | Stop reason: HOSPADM

## 2024-01-10 RX ORDER — DILTIAZEM HYDROCHLORIDE 60 MG/1
60 TABLET, FILM COATED ORAL EVERY 6 HOURS SCHEDULED
Status: DISCONTINUED | OUTPATIENT
Start: 2024-01-10 | End: 2024-01-12

## 2024-01-10 RX ORDER — ONDANSETRON 2 MG/ML
4 INJECTION INTRAMUSCULAR; INTRAVENOUS EVERY 6 HOURS PRN
Status: DISCONTINUED | OUTPATIENT
Start: 2024-01-10 | End: 2024-01-17 | Stop reason: HOSPADM

## 2024-01-10 RX ORDER — 0.9 % SODIUM CHLORIDE 0.9 %
500 INTRAVENOUS SOLUTION INTRAVENOUS ONCE
Status: COMPLETED | OUTPATIENT
Start: 2024-01-10 | End: 2024-01-10

## 2024-01-10 RX ORDER — POTASSIUM CHLORIDE 7.45 MG/ML
10 INJECTION INTRAVENOUS PRN
Status: DISCONTINUED | OUTPATIENT
Start: 2024-01-10 | End: 2024-01-17 | Stop reason: HOSPADM

## 2024-01-10 RX ORDER — POTASSIUM CHLORIDE 20 MEQ/1
10 TABLET, EXTENDED RELEASE ORAL DAILY
Status: DISCONTINUED | OUTPATIENT
Start: 2024-01-10 | End: 2024-01-17

## 2024-01-10 RX ORDER — BUDESONIDE AND FORMOTEROL FUMARATE DIHYDRATE 160; 4.5 UG/1; UG/1
2 AEROSOL RESPIRATORY (INHALATION) 2 TIMES DAILY
Status: DISCONTINUED | OUTPATIENT
Start: 2024-01-10 | End: 2024-01-17 | Stop reason: HOSPADM

## 2024-01-10 RX ORDER — INSULIN GLARGINE 100 [IU]/ML
30 INJECTION, SOLUTION SUBCUTANEOUS EVERY MORNING
Status: DISCONTINUED | OUTPATIENT
Start: 2024-01-10 | End: 2024-01-13

## 2024-01-10 RX ORDER — IPRATROPIUM BROMIDE AND ALBUTEROL SULFATE 2.5; .5 MG/3ML; MG/3ML
3 SOLUTION RESPIRATORY (INHALATION) ONCE
Status: COMPLETED | OUTPATIENT
Start: 2024-01-10 | End: 2024-01-10

## 2024-01-10 RX ORDER — GLUCAGON 1 MG/ML
1 KIT INJECTION PRN
Status: DISCONTINUED | OUTPATIENT
Start: 2024-01-10 | End: 2024-01-17 | Stop reason: HOSPADM

## 2024-01-10 RX ORDER — SODIUM CHLORIDE 0.9 % (FLUSH) 0.9 %
5-40 SYRINGE (ML) INJECTION PRN
Status: DISCONTINUED | OUTPATIENT
Start: 2024-01-10 | End: 2024-01-17 | Stop reason: HOSPADM

## 2024-01-10 RX ORDER — FUROSEMIDE 10 MG/ML
60 INJECTION INTRAMUSCULAR; INTRAVENOUS 3 TIMES DAILY
Status: DISCONTINUED | OUTPATIENT
Start: 2024-01-10 | End: 2024-01-12

## 2024-01-10 RX ORDER — POTASSIUM CHLORIDE 20 MEQ/1
40 TABLET, EXTENDED RELEASE ORAL PRN
Status: DISCONTINUED | OUTPATIENT
Start: 2024-01-10 | End: 2024-01-17 | Stop reason: HOSPADM

## 2024-01-10 RX ORDER — SODIUM CHLORIDE 0.9 % (FLUSH) 0.9 %
5-40 SYRINGE (ML) INJECTION EVERY 12 HOURS SCHEDULED
Status: DISCONTINUED | OUTPATIENT
Start: 2024-01-10 | End: 2024-01-17 | Stop reason: HOSPADM

## 2024-01-10 RX ORDER — MAGNESIUM SULFATE IN WATER 40 MG/ML
2000 INJECTION, SOLUTION INTRAVENOUS PRN
Status: DISCONTINUED | OUTPATIENT
Start: 2024-01-10 | End: 2024-01-17 | Stop reason: HOSPADM

## 2024-01-10 RX ORDER — INSULIN LISPRO 100 [IU]/ML
0-4 INJECTION, SOLUTION INTRAVENOUS; SUBCUTANEOUS NIGHTLY
Status: DISCONTINUED | OUTPATIENT
Start: 2024-01-10 | End: 2024-01-17 | Stop reason: HOSPADM

## 2024-01-10 RX ORDER — PREDNISONE 20 MG/1
60 TABLET ORAL ONCE
Status: COMPLETED | OUTPATIENT
Start: 2024-01-10 | End: 2024-01-10

## 2024-01-10 RX ORDER — ROFLUMILAST 500 UG/1
500 TABLET ORAL DAILY
Status: DISCONTINUED | OUTPATIENT
Start: 2024-01-10 | End: 2024-01-17 | Stop reason: HOSPADM

## 2024-01-10 RX ORDER — SODIUM CHLORIDE 9 MG/ML
INJECTION, SOLUTION INTRAVENOUS PRN
Status: DISCONTINUED | OUTPATIENT
Start: 2024-01-10 | End: 2024-01-17 | Stop reason: HOSPADM

## 2024-01-10 RX ORDER — LANOLIN ALCOHOL/MO/W.PET/CERES
400 CREAM (GRAM) TOPICAL DAILY
Status: DISCONTINUED | OUTPATIENT
Start: 2024-01-10 | End: 2024-01-17 | Stop reason: HOSPADM

## 2024-01-10 RX ORDER — INSULIN LISPRO 100 [IU]/ML
0-8 INJECTION, SOLUTION INTRAVENOUS; SUBCUTANEOUS
Status: DISCONTINUED | OUTPATIENT
Start: 2024-01-10 | End: 2024-01-17 | Stop reason: HOSPADM

## 2024-01-10 RX ORDER — DILTIAZEM HYDROCHLORIDE 120 MG/1
120 CAPSULE, COATED, EXTENDED RELEASE ORAL DAILY
Qty: 30 CAPSULE | Refills: 10 | OUTPATIENT
Start: 2024-01-10

## 2024-01-10 RX ADMIN — LEVOFLOXACIN 750 MG: 500 TABLET, FILM COATED ORAL at 09:15

## 2024-01-10 RX ADMIN — TIOTROPIUM BROMIDE INHALATION SPRAY 2 PUFF: 3.12 SPRAY, METERED RESPIRATORY (INHALATION) at 08:25

## 2024-01-10 RX ADMIN — Medication 400 MG: at 09:15

## 2024-01-10 RX ADMIN — DILTIAZEM HYDROCHLORIDE 240 MG: 240 CAPSULE, EXTENDED RELEASE ORAL at 15:52

## 2024-01-10 RX ADMIN — ONDANSETRON 4 MG: 2 INJECTION INTRAMUSCULAR; INTRAVENOUS at 21:30

## 2024-01-10 RX ADMIN — SODIUM CHLORIDE, PRESERVATIVE FREE 10 ML: 5 INJECTION INTRAVENOUS at 21:06

## 2024-01-10 RX ADMIN — ATORVASTATIN CALCIUM 40 MG: 40 TABLET, FILM COATED ORAL at 21:06

## 2024-01-10 RX ADMIN — INSULIN LISPRO 6 UNITS: 100 INJECTION, SOLUTION INTRAVENOUS; SUBCUTANEOUS at 08:05

## 2024-01-10 RX ADMIN — POTASSIUM CHLORIDE 10 MEQ: 1500 TABLET, EXTENDED RELEASE ORAL at 09:15

## 2024-01-10 RX ADMIN — PREDNISONE 60 MG: 20 TABLET ORAL at 04:25

## 2024-01-10 RX ADMIN — FUROSEMIDE 60 MG: 10 INJECTION, SOLUTION INTRAMUSCULAR; INTRAVENOUS at 21:05

## 2024-01-10 RX ADMIN — INSULIN LISPRO 2 UNITS: 100 INJECTION, SOLUTION INTRAVENOUS; SUBCUTANEOUS at 17:30

## 2024-01-10 RX ADMIN — IPRATROPIUM BROMIDE AND ALBUTEROL SULFATE 3 DOSE: 2.5; .5 SOLUTION RESPIRATORY (INHALATION) at 04:20

## 2024-01-10 RX ADMIN — APIXABAN 5 MG: 5 TABLET, FILM COATED ORAL at 21:06

## 2024-01-10 RX ADMIN — DILTIAZEM HYDROCHLORIDE 60 MG: 60 TABLET, FILM COATED ORAL at 18:25

## 2024-01-10 RX ADMIN — BUDESONIDE AND FORMOTEROL FUMARATE DIHYDRATE 2 PUFF: 160; 4.5 AEROSOL RESPIRATORY (INHALATION) at 08:25

## 2024-01-10 RX ADMIN — SODIUM CHLORIDE 500 ML: 9 INJECTION, SOLUTION INTRAVENOUS at 05:33

## 2024-01-10 RX ADMIN — GUAIFENESIN 600 MG: 600 TABLET, EXTENDED RELEASE ORAL at 09:15

## 2024-01-10 RX ADMIN — INSULIN LISPRO 8 UNITS: 100 INJECTION, SOLUTION INTRAVENOUS; SUBCUTANEOUS at 12:18

## 2024-01-10 RX ADMIN — GUAIFENESIN 600 MG: 600 TABLET, EXTENDED RELEASE ORAL at 21:05

## 2024-01-10 RX ADMIN — LEVOFLOXACIN 750 MG: 500 TABLET, FILM COATED ORAL at 05:29

## 2024-01-10 RX ADMIN — APIXABAN 5 MG: 5 TABLET, FILM COATED ORAL at 09:15

## 2024-01-10 RX ADMIN — FUROSEMIDE 40 MG: 10 INJECTION, SOLUTION INTRAMUSCULAR; INTRAVENOUS at 09:14

## 2024-01-10 RX ADMIN — INSULIN LISPRO 8 UNITS: 100 INJECTION, SOLUTION INTRAVENOUS; SUBCUTANEOUS at 17:04

## 2024-01-10 ASSESSMENT — PAIN SCALES - GENERAL
PAINLEVEL_OUTOF10: 7
PAINLEVEL_OUTOF10: 0

## 2024-01-10 ASSESSMENT — PAIN DESCRIPTION - FREQUENCY: FREQUENCY: CONTINUOUS

## 2024-01-10 ASSESSMENT — PAIN DESCRIPTION - ORIENTATION: ORIENTATION: RIGHT;LEFT

## 2024-01-10 ASSESSMENT — PAIN DESCRIPTION - PAIN TYPE: TYPE: ACUTE PAIN

## 2024-01-10 ASSESSMENT — PAIN DESCRIPTION - LOCATION: LOCATION: GENERALIZED;LEG

## 2024-01-10 ASSESSMENT — PAIN DESCRIPTION - ONSET: ONSET: ON-GOING

## 2024-01-10 ASSESSMENT — PAIN DESCRIPTION - DESCRIPTORS: DESCRIPTORS: ACHING

## 2024-01-10 ASSESSMENT — PAIN - FUNCTIONAL ASSESSMENT: PAIN_FUNCTIONAL_ASSESSMENT: ACTIVITIES ARE NOT PREVENTED

## 2024-01-10 ASSESSMENT — PAIN SCALES - WONG BAKER: WONGBAKER_NUMERICALRESPONSE: 2

## 2024-01-10 NOTE — ED NOTES
ED TO INPATIENT SBAR HANDOFF    Patient Name: Armando Mars   :  1955  68 y.o.   Preferred Name  Armando  Family/Caregiver Present no   Restraints no   C-SSRS: Risk of Suicide: No Risk  Sitter no   Sepsis Risk Score Sepsis Risk Score: 10.01      Situation  Chief Complaint   Patient presents with    Shortness of Breath     Brief Description of Patient's Condition: Pt to the ED for shortness of breathe and hypoxia. Pt recently d/c'd for the same thing. Pt reports wearing 3L NC at home, but pt currently on 4L NC   Mental Status: oriented and alert  Arrived from: home    Imaging:   XR CHEST PORTABLE   Final Result   Similar appearing chest with persistent consolidative pneumonia in the right   upper lobe.           Abnormal labs:   Abnormal Labs Reviewed   BRAIN NATRIURETIC PEPTIDE - Abnormal; Notable for the following components:       Result Value    Pro-BNP 3,427 (*)     All other components within normal limits   COMPREHENSIVE METABOLIC PANEL W/ REFLEX TO MG FOR LOW K - Abnormal; Notable for the following components:    Chloride 92 (*)     CO2 37 (*)     Glucose 384 (*)     Creatinine 0.6 (*)     Total Protein 5.9 (*)     ALT 48 (*)     All other components within normal limits   CBC WITH AUTO DIFFERENTIAL - Abnormal; Notable for the following components:    WBC 14.3 (*)     RBC 3.95 (*)     Hemoglobin 11.9 (*)     Hematocrit 38.5 (*)     MCHC 30.9 (*)     RDW 16.4 (*)     Segs Relative 78.1 (*)     Lymphocytes % 8.9 (*)     Monocytes % 8.4 (*)     Immature Neutrophil % 1.3 (*)     All other components within normal limits   TROPONIN - Abnormal; Notable for the following components:    Troponin, High Sensitivity 39 (*)     All other components within normal limits   BLOOD GAS, VENOUS - Abnormal; Notable for the following components:    pCO2, Tylor 69 (*)     pO2, Tylor 66 (*)     Base Exc, Mixed 12.5 (*)     HCO3, Venous 40.8 (*)     O2 Sat, Tylor 87.2 (*)     All other components within normal limits   TROPONIN

## 2024-01-10 NOTE — ED NOTES
Pt has been moving from recliner to bed multiple times.  Discussed with pt that he needs to put call light on so I can assist him.  This nurse is concerned pt may fall due to all the wires.  Pt stated \"I'm fine\".      This nurse has encouraged pt to lie in the bed.  Pt states \" this isnt a bed, its too short\".  This nurse has tried making pt comfortable with no success.     Pt continues to move back and forth disconnecting cords, taking off BP cuff.

## 2024-01-10 NOTE — CARE COORDINATION
Upon 2nd attempt at initial Care Transition discharge call Patient noted to have been readmitted to [facility] this morning for:  Ac Resp Failure  A/C CHF  Pna  Current CT program closed per protocol.   Ebony Louie RN, [phone]

## 2024-01-10 NOTE — H&P
TSH:   Lab Results   Component Value Date/Time    TSH 2.15 03/06/2019 10:22 AM     Troponin:   Lab Results   Component Value Date/Time    TROPONINT <0.010 09/18/2023 12:45 AM    TROPONINT <0.010 09/13/2023 07:15 AM    TROPONINT <0.010 08/14/2023 09:30 AM     Lactic Acid: No results for input(s): \"LACTA\" in the last 72 hours.  BNP:   Recent Labs     01/10/24  0410   PROBNP 3,427*       UA:  Lab Results   Component Value Date/Time    NITRU NEGATIVE 11/20/2023 02:26 PM    COLORU YELLOW 11/20/2023 02:26 PM    WBCUA <1 09/13/2023 08:09 AM    RBCUA 0 09/13/2023 08:09 AM    MUCUS RARE 11/07/2018 04:30 PM    TRICHOMONAS NONE SEEN 09/13/2023 08:09 AM    BACTERIA NEGATIVE 09/13/2023 08:09 AM    CLARITYU CLEAR 11/20/2023 02:26 PM    SPECGRAV 1.020 11/20/2023 02:26 PM    LEUKOCYTESUR NEGATIVE 11/20/2023 02:26 PM    UROBILINOGEN 0.2 11/20/2023 02:26 PM    BILIRUBINUR NEGATIVE 11/20/2023 02:26 PM    BLOODU NEGATIVE 11/20/2023 02:26 PM    KETUA NEGATIVE 11/20/2023 02:26 PM     Urine Cultures: No results found for: \"LABURIN\"  Blood Cultures: No results found for: \"BC\"  No results found for: \"BLOODCULT2\"  Organism: No results found for: \"ORG\"    Imaging/Diagnostics Last 24 Hours   XR CHEST PORTABLE    Result Date: 12/24/2023  EXAMINATION: ONE XRAY VIEW OF THE CHEST 12/24/2023 6:22 pm COMPARISON: 12/23/2023. HISTORY: ORDERING SYSTEM PROVIDED HISTORY: sob, h/o of chf and recent pneumonia TECHNOLOGIST PROVIDED HISTORY: Reason for exam:->sob, h/o of chf and recent pneumonia Reason for Exam: sob, h/o of chf and recent pneumonia FINDINGS: Mild bibasilar airspace opacities appear stable.  There is no pleural effusion.  The cardiomediastinal silhouette, pulmonary vessels and interstitium are unremarkable.     Stable mild bibasilar airspace opacities that could reflect atelectasis or pneumonia.  Elsewhere, there are no acute findings.     XR CHEST PORTABLE    Result Date: 12/23/2023  EXAMINATION: ONE XRAY VIEW OF THE CHEST 12/23/2023

## 2024-01-10 NOTE — ED PROVIDER NOTES
membranes moist. Tolerates saliva. No trismus. Neck supple. Trachea midline.  HEART: Tachycardic and irregular. Radial pulses 2+.  LUNGS: Respirations tachypneic and hyperpneic with diminished lung sounds bilaterally  ABDOMEN: Soft. Non-tender. No guarding or rebound.  EXTREMITIES: No acute deformities. No edema  SKIN: Warm and dry.  NEUROLOGICAL: No gross facial drooping. Moves all 4 extremities spontaneously.  PSYCHIATRIC: Normal mood.    I have reviewed and interpreted all of the currently available lab results from this visit (if applicable):  Results for orders placed or performed during the hospital encounter of 01/10/24   Brain Natriuretic Peptide   Result Value Ref Range    Pro-BNP 3,427 (H) <300 PG/ML   Comprehensive Metabolic Panel w/ Reflex to MG   Result Value Ref Range    Sodium 138 135 - 145 MMOL/L    Potassium 4.3 3.5 - 5.1 MMOL/L    Chloride 92 (L) 99 - 110 mMol/L    CO2 37 (H) 21 - 32 MMOL/L    Anion Gap 9 7 - 16    Glucose 384 (H) 70 - 99 MG/DL    BUN 17 6 - 23 MG/DL    Creatinine 0.6 (L) 0.9 - 1.3 MG/DL    Est, Glom Filt Rate >60 >60 mL/min/1.73m2    Calcium 8.3 8.3 - 10.6 MG/DL    Total Protein 5.9 (L) 6.4 - 8.2 GM/DL    Albumin 3.5 3.4 - 5.0 GM/DL    Total Bilirubin 0.2 0.0 - 1.0 MG/DL    Alkaline Phosphatase 95 40 - 128 IU/L    ALT 48 (H) 10 - 40 U/L    AST 21 15 - 37 IU/L   CBC with Auto Differential   Result Value Ref Range    WBC 14.3 (H) 4.0 - 10.5 K/CU MM    RBC 3.95 (L) 4.6 - 6.2 M/CU MM    Hemoglobin 11.9 (L) 13.5 - 18.0 GM/DL    Hematocrit 38.5 (L) 42 - 52 %    MCV 97.5 78 - 100 FL    MCH 30.1 27 - 31 PG    MCHC 30.9 (L) 32.0 - 36.0 %    RDW 16.4 (H) 11.7 - 14.9 %    Platelets 268 140 - 440 K/CU MM    MPV 9.9 7.5 - 11.1 FL    Differential Type AUTOMATED DIFFERENTIAL     Segs Relative 78.1 (H) 36 - 66 %    Lymphocytes % 8.9 (L) 24 - 44 %    Monocytes % 8.4 (H) 0 - 4 %    Eosinophils % 2.9 0 - 3 %    Basophils % 0.4 0 - 1 %    Segs Absolute 11.1 K/CU MM    Lymphocytes Absolute 1.3 K/CU MM

## 2024-01-10 NOTE — CARE COORDINATION
SW outreach deferred. Pt was admitted into [facility] on 1/10. SW will follow up with Pt upon his d/c from hospital.

## 2024-01-10 NOTE — ED NOTES
Pt refusing for transport to take him up in bed on monitor.   Transport taking pt via wheelchair.

## 2024-01-11 LAB
ANION GAP SERPL CALCULATED.3IONS-SCNC: 11 MMOL/L (ref 7–16)
ANISOCYTOSIS: ABNORMAL
BUN SERPL-MCNC: 18 MG/DL (ref 6–23)
CALCIUM SERPL-MCNC: 8.1 MG/DL (ref 8.3–10.6)
CHLORIDE BLD-SCNC: 89 MMOL/L (ref 99–110)
CO2: 31 MMOL/L (ref 21–32)
CREAT SERPL-MCNC: 0.8 MG/DL (ref 0.9–1.3)
DIFFERENTIAL TYPE: ABNORMAL
EKG ATRIAL RATE: 119 BPM
EKG DIAGNOSIS: NORMAL
EKG Q-T INTERVAL: 334 MS
EKG QRS DURATION: 100 MS
EKG QTC CALCULATION (BAZETT): 465 MS
EKG R AXIS: -47 DEGREES
EKG T AXIS: 85 DEGREES
EKG VENTRICULAR RATE: 117 BPM
EOSINOPHILS ABSOLUTE: 0.2 K/CU MM
EOSINOPHILS RELATIVE PERCENT: 1 % (ref 0–3)
GFR SERPL CREATININE-BSD FRML MDRD: >60 ML/MIN/1.73M2
GLUCOSE BLD-MCNC: 239 MG/DL (ref 70–99)
GLUCOSE BLD-MCNC: 250 MG/DL (ref 70–99)
GLUCOSE BLD-MCNC: 262 MG/DL (ref 70–99)
GLUCOSE BLD-MCNC: 285 MG/DL (ref 70–99)
GLUCOSE SERPL-MCNC: 257 MG/DL (ref 70–99)
HCT VFR BLD CALC: 43.7 % (ref 42–52)
HEMOGLOBIN: 12.7 GM/DL (ref 13.5–18)
LYMPHOCYTES ABSOLUTE: 1.4 K/CU MM
LYMPHOCYTES RELATIVE PERCENT: 8 % (ref 24–44)
MACROCYTES: ABNORMAL
MAGNESIUM: 2.1 MG/DL (ref 1.8–2.4)
MCH RBC QN AUTO: 30.5 PG (ref 27–31)
MCHC RBC AUTO-ENTMCNC: 29.1 % (ref 32–36)
MCV RBC AUTO: 104.8 FL (ref 78–100)
MONOCYTES ABSOLUTE: 1.4 K/CU MM
MONOCYTES RELATIVE PERCENT: 8 % (ref 0–4)
PDW BLD-RTO: 16.5 % (ref 11.7–14.9)
PLATELET # BLD: 270 K/CU MM (ref 140–440)
PMV BLD AUTO: 9.6 FL (ref 7.5–11.1)
POLYCHROMASIA: ABNORMAL
POTASSIUM SERPL-SCNC: 5.4 MMOL/L (ref 3.5–5.1)
RBC # BLD: 4.17 M/CU MM (ref 4.6–6.2)
RBC # BLD: ABNORMAL 10*6/UL
SEGMENTED NEUTROPHILS ABSOLUTE COUNT: 13.9 K/CU MM
SEGMENTED NEUTROPHILS RELATIVE PERCENT: 83 % (ref 36–66)
SODIUM BLD-SCNC: 131 MMOL/L (ref 135–145)
SPHEROCYTES: ABNORMAL
WBC # BLD: 16.9 K/CU MM (ref 4–10.5)

## 2024-01-11 PROCEDURE — 2700000000 HC OXYGEN THERAPY PER DAY

## 2024-01-11 PROCEDURE — 94640 AIRWAY INHALATION TREATMENT: CPT

## 2024-01-11 PROCEDURE — 6360000002 HC RX W HCPCS

## 2024-01-11 PROCEDURE — 85007 BL SMEAR W/DIFF WBC COUNT: CPT

## 2024-01-11 PROCEDURE — 6370000000 HC RX 637 (ALT 250 FOR IP): Performed by: INTERNAL MEDICINE

## 2024-01-11 PROCEDURE — 83735 ASSAY OF MAGNESIUM: CPT

## 2024-01-11 PROCEDURE — 36415 COLL VENOUS BLD VENIPUNCTURE: CPT

## 2024-01-11 PROCEDURE — 2580000003 HC RX 258: Performed by: INTERNAL MEDICINE

## 2024-01-11 PROCEDURE — 94762 N-INVAS EAR/PLS OXIMTRY CONT: CPT

## 2024-01-11 PROCEDURE — 93010 ELECTROCARDIOGRAM REPORT: CPT | Performed by: INTERNAL MEDICINE

## 2024-01-11 PROCEDURE — 6370000000 HC RX 637 (ALT 250 FOR IP)

## 2024-01-11 PROCEDURE — 2060000000 HC ICU INTERMEDIATE R&B

## 2024-01-11 PROCEDURE — 82962 GLUCOSE BLOOD TEST: CPT

## 2024-01-11 PROCEDURE — 85027 COMPLETE CBC AUTOMATED: CPT

## 2024-01-11 PROCEDURE — 80048 BASIC METABOLIC PNL TOTAL CA: CPT

## 2024-01-11 PROCEDURE — 94761 N-INVAS EAR/PLS OXIMETRY MLT: CPT

## 2024-01-11 PROCEDURE — 99233 SBSQ HOSP IP/OBS HIGH 50: CPT | Performed by: INTERNAL MEDICINE

## 2024-01-11 RX ADMIN — TIOTROPIUM BROMIDE INHALATION SPRAY 2 PUFF: 3.12 SPRAY, METERED RESPIRATORY (INHALATION) at 09:14

## 2024-01-11 RX ADMIN — FUROSEMIDE 60 MG: 10 INJECTION, SOLUTION INTRAMUSCULAR; INTRAVENOUS at 14:09

## 2024-01-11 RX ADMIN — INSULIN LISPRO 4 UNITS: 100 INJECTION, SOLUTION INTRAVENOUS; SUBCUTANEOUS at 08:00

## 2024-01-11 RX ADMIN — ROFLUMILAST 500 MCG: 500 TABLET ORAL at 10:16

## 2024-01-11 RX ADMIN — DILTIAZEM HYDROCHLORIDE 60 MG: 60 TABLET, FILM COATED ORAL at 06:19

## 2024-01-11 RX ADMIN — APIXABAN 5 MG: 5 TABLET, FILM COATED ORAL at 10:15

## 2024-01-11 RX ADMIN — ATORVASTATIN CALCIUM 40 MG: 40 TABLET, FILM COATED ORAL at 20:24

## 2024-01-11 RX ADMIN — SODIUM CHLORIDE, PRESERVATIVE FREE 10 ML: 5 INJECTION INTRAVENOUS at 20:24

## 2024-01-11 RX ADMIN — DILTIAZEM HYDROCHLORIDE 60 MG: 60 TABLET, FILM COATED ORAL at 11:44

## 2024-01-11 RX ADMIN — SODIUM CHLORIDE, PRESERVATIVE FREE 10 ML: 5 INJECTION INTRAVENOUS at 11:43

## 2024-01-11 RX ADMIN — DILTIAZEM HYDROCHLORIDE 60 MG: 60 TABLET, FILM COATED ORAL at 00:30

## 2024-01-11 RX ADMIN — GUAIFENESIN 600 MG: 600 TABLET, EXTENDED RELEASE ORAL at 10:15

## 2024-01-11 RX ADMIN — FUROSEMIDE 60 MG: 10 INJECTION, SOLUTION INTRAMUSCULAR; INTRAVENOUS at 11:42

## 2024-01-11 RX ADMIN — BUDESONIDE AND FORMOTEROL FUMARATE DIHYDRATE 2 PUFF: 160; 4.5 AEROSOL RESPIRATORY (INHALATION) at 09:14

## 2024-01-11 RX ADMIN — POTASSIUM CHLORIDE 10 MEQ: 1500 TABLET, EXTENDED RELEASE ORAL at 10:15

## 2024-01-11 RX ADMIN — DILTIAZEM HYDROCHLORIDE 60 MG: 60 TABLET, FILM COATED ORAL at 17:19

## 2024-01-11 RX ADMIN — LEVOFLOXACIN 750 MG: 500 TABLET, FILM COATED ORAL at 10:15

## 2024-01-11 RX ADMIN — INSULIN LISPRO 4 UNITS: 100 INJECTION, SOLUTION INTRAVENOUS; SUBCUTANEOUS at 11:44

## 2024-01-11 RX ADMIN — APIXABAN 5 MG: 5 TABLET, FILM COATED ORAL at 20:24

## 2024-01-11 RX ADMIN — Medication 400 MG: at 10:15

## 2024-01-11 RX ADMIN — GUAIFENESIN 600 MG: 600 TABLET, EXTENDED RELEASE ORAL at 20:24

## 2024-01-11 RX ADMIN — ACETAMINOPHEN 650 MG: 325 TABLET ORAL at 01:28

## 2024-01-11 RX ADMIN — INSULIN GLARGINE 30 UNITS: 100 INJECTION, SOLUTION SUBCUTANEOUS at 08:00

## 2024-01-11 RX ADMIN — FUROSEMIDE 60 MG: 10 INJECTION, SOLUTION INTRAMUSCULAR; INTRAVENOUS at 20:24

## 2024-01-11 RX ADMIN — INSULIN LISPRO 4 UNITS: 100 INJECTION, SOLUTION INTRAVENOUS; SUBCUTANEOUS at 17:18

## 2024-01-11 ASSESSMENT — PAIN SCALES - WONG BAKER
WONGBAKER_NUMERICALRESPONSE: 2

## 2024-01-11 ASSESSMENT — PAIN DESCRIPTION - ORIENTATION: ORIENTATION: RIGHT

## 2024-01-11 ASSESSMENT — PAIN SCALES - GENERAL
PAINLEVEL_OUTOF10: 0
PAINLEVEL_OUTOF10: 7
PAINLEVEL_OUTOF10: 0

## 2024-01-11 ASSESSMENT — PAIN DESCRIPTION - LOCATION: LOCATION: HIP

## 2024-01-11 ASSESSMENT — PAIN DESCRIPTION - DESCRIPTORS: DESCRIPTORS: THROBBING

## 2024-01-12 LAB
ANION GAP SERPL CALCULATED.3IONS-SCNC: 6 MMOL/L (ref 7–16)
BUN SERPL-MCNC: 16 MG/DL (ref 6–23)
CALCIUM SERPL-MCNC: 8.7 MG/DL (ref 8.3–10.6)
CHLORIDE BLD-SCNC: 88 MMOL/L (ref 99–110)
CO2: 44 MMOL/L (ref 21–32)
CREAT SERPL-MCNC: 0.6 MG/DL (ref 0.9–1.3)
GFR SERPL CREATININE-BSD FRML MDRD: >60 ML/MIN/1.73M2
GLUCOSE BLD-MCNC: 249 MG/DL (ref 70–99)
GLUCOSE BLD-MCNC: 299 MG/DL (ref 70–99)
GLUCOSE BLD-MCNC: 311 MG/DL (ref 70–99)
GLUCOSE BLD-MCNC: 387 MG/DL (ref 70–99)
GLUCOSE SERPL-MCNC: 175 MG/DL (ref 70–99)
HCT VFR BLD CALC: 40.6 % (ref 42–52)
HEMOGLOBIN: 12.5 GM/DL (ref 13.5–18)
MAGNESIUM: 2 MG/DL (ref 1.8–2.4)
MCH RBC QN AUTO: 29.9 PG (ref 27–31)
MCHC RBC AUTO-ENTMCNC: 30.8 % (ref 32–36)
MCV RBC AUTO: 97.1 FL (ref 78–100)
PDW BLD-RTO: 16.2 % (ref 11.7–14.9)
PHOSPHORUS: 4.2 MG/DL (ref 2.5–4.9)
PLATELET # BLD: 266 K/CU MM (ref 140–440)
PMV BLD AUTO: 9.7 FL (ref 7.5–11.1)
POTASSIUM SERPL-SCNC: 5.1 MMOL/L (ref 3.5–5.1)
RBC # BLD: 4.18 M/CU MM (ref 4.6–6.2)
SODIUM BLD-SCNC: 138 MMOL/L (ref 135–145)
WBC # BLD: 18.2 K/CU MM (ref 4–10.5)

## 2024-01-12 PROCEDURE — 36415 COLL VENOUS BLD VENIPUNCTURE: CPT

## 2024-01-12 PROCEDURE — 6360000002 HC RX W HCPCS: Performed by: INTERNAL MEDICINE

## 2024-01-12 PROCEDURE — 97166 OT EVAL MOD COMPLEX 45 MIN: CPT

## 2024-01-12 PROCEDURE — 2060000000 HC ICU INTERMEDIATE R&B

## 2024-01-12 PROCEDURE — 94761 N-INVAS EAR/PLS OXIMETRY MLT: CPT

## 2024-01-12 PROCEDURE — 94640 AIRWAY INHALATION TREATMENT: CPT

## 2024-01-12 PROCEDURE — 82962 GLUCOSE BLOOD TEST: CPT

## 2024-01-12 PROCEDURE — 6370000000 HC RX 637 (ALT 250 FOR IP): Performed by: INTERNAL MEDICINE

## 2024-01-12 PROCEDURE — 83735 ASSAY OF MAGNESIUM: CPT

## 2024-01-12 PROCEDURE — 99233 SBSQ HOSP IP/OBS HIGH 50: CPT | Performed by: INTERNAL MEDICINE

## 2024-01-12 PROCEDURE — 6370000000 HC RX 637 (ALT 250 FOR IP)

## 2024-01-12 PROCEDURE — 6360000002 HC RX W HCPCS

## 2024-01-12 PROCEDURE — 94660 CPAP INITIATION&MGMT: CPT

## 2024-01-12 PROCEDURE — 84100 ASSAY OF PHOSPHORUS: CPT

## 2024-01-12 PROCEDURE — 80048 BASIC METABOLIC PNL TOTAL CA: CPT

## 2024-01-12 PROCEDURE — 97162 PT EVAL MOD COMPLEX 30 MIN: CPT

## 2024-01-12 PROCEDURE — 6370000000 HC RX 637 (ALT 250 FOR IP): Performed by: STUDENT IN AN ORGANIZED HEALTH CARE EDUCATION/TRAINING PROGRAM

## 2024-01-12 PROCEDURE — 2580000003 HC RX 258: Performed by: INTERNAL MEDICINE

## 2024-01-12 PROCEDURE — 2700000000 HC OXYGEN THERAPY PER DAY

## 2024-01-12 PROCEDURE — 85027 COMPLETE CBC AUTOMATED: CPT

## 2024-01-12 RX ORDER — DILTIAZEM HYDROCHLORIDE 240 MG/1
240 CAPSULE, COATED, EXTENDED RELEASE ORAL DAILY
Status: DISCONTINUED | OUTPATIENT
Start: 2024-01-12 | End: 2024-01-17 | Stop reason: HOSPADM

## 2024-01-12 RX ORDER — ACETAZOLAMIDE 250 MG/1
250 TABLET ORAL DAILY
Status: COMPLETED | OUTPATIENT
Start: 2024-01-12 | End: 2024-01-13

## 2024-01-12 RX ORDER — FUROSEMIDE 10 MG/ML
60 INJECTION INTRAMUSCULAR; INTRAVENOUS 2 TIMES DAILY
Status: DISCONTINUED | OUTPATIENT
Start: 2024-01-12 | End: 2024-01-15

## 2024-01-12 RX ORDER — INSULIN LISPRO 100 [IU]/ML
4 INJECTION, SOLUTION INTRAVENOUS; SUBCUTANEOUS
Status: DISCONTINUED | OUTPATIENT
Start: 2024-01-12 | End: 2024-01-13

## 2024-01-12 RX ADMIN — APIXABAN 5 MG: 5 TABLET, FILM COATED ORAL at 21:56

## 2024-01-12 RX ADMIN — POTASSIUM CHLORIDE 10 MEQ: 1500 TABLET, EXTENDED RELEASE ORAL at 08:28

## 2024-01-12 RX ADMIN — BUDESONIDE AND FORMOTEROL FUMARATE DIHYDRATE 2 PUFF: 160; 4.5 AEROSOL RESPIRATORY (INHALATION) at 08:07

## 2024-01-12 RX ADMIN — APIXABAN 5 MG: 5 TABLET, FILM COATED ORAL at 08:28

## 2024-01-12 RX ADMIN — ACETAZOLAMIDE 250 MG: 250 TABLET ORAL at 10:01

## 2024-01-12 RX ADMIN — INSULIN LISPRO 2 UNITS: 100 INJECTION, SOLUTION INTRAVENOUS; SUBCUTANEOUS at 08:30

## 2024-01-12 RX ADMIN — GUAIFENESIN 600 MG: 600 TABLET, EXTENDED RELEASE ORAL at 08:28

## 2024-01-12 RX ADMIN — ATORVASTATIN CALCIUM 40 MG: 40 TABLET, FILM COATED ORAL at 21:56

## 2024-01-12 RX ADMIN — INSULIN LISPRO 6 UNITS: 100 INJECTION, SOLUTION INTRAVENOUS; SUBCUTANEOUS at 16:34

## 2024-01-12 RX ADMIN — SODIUM CHLORIDE, PRESERVATIVE FREE 10 ML: 5 INJECTION INTRAVENOUS at 08:29

## 2024-01-12 RX ADMIN — Medication 400 MG: at 08:28

## 2024-01-12 RX ADMIN — SODIUM CHLORIDE, PRESERVATIVE FREE 10 ML: 5 INJECTION INTRAVENOUS at 21:58

## 2024-01-12 RX ADMIN — METHYLPREDNISOLONE SODIUM SUCCINATE 125 MG: 125 INJECTION, POWDER, FOR SOLUTION INTRAMUSCULAR; INTRAVENOUS at 08:35

## 2024-01-12 RX ADMIN — INSULIN GLARGINE 30 UNITS: 100 INJECTION, SOLUTION SUBCUTANEOUS at 08:30

## 2024-01-12 RX ADMIN — ROFLUMILAST 500 MCG: 500 TABLET ORAL at 08:28

## 2024-01-12 RX ADMIN — FUROSEMIDE 60 MG: 10 INJECTION, SOLUTION INTRAMUSCULAR; INTRAVENOUS at 08:27

## 2024-01-12 RX ADMIN — INSULIN LISPRO 4 UNITS: 100 INJECTION, SOLUTION INTRAVENOUS; SUBCUTANEOUS at 21:57

## 2024-01-12 RX ADMIN — INSULIN LISPRO 4 UNITS: 100 INJECTION, SOLUTION INTRAVENOUS; SUBCUTANEOUS at 16:34

## 2024-01-12 RX ADMIN — BUDESONIDE AND FORMOTEROL FUMARATE DIHYDRATE 2 PUFF: 160; 4.5 AEROSOL RESPIRATORY (INHALATION) at 19:49

## 2024-01-12 RX ADMIN — LEVOFLOXACIN 750 MG: 500 TABLET, FILM COATED ORAL at 08:28

## 2024-01-12 RX ADMIN — INSULIN LISPRO 4 UNITS: 100 INJECTION, SOLUTION INTRAVENOUS; SUBCUTANEOUS at 12:12

## 2024-01-12 RX ADMIN — GUAIFENESIN 600 MG: 600 TABLET, EXTENDED RELEASE ORAL at 21:56

## 2024-01-12 RX ADMIN — TIOTROPIUM BROMIDE INHALATION SPRAY 2 PUFF: 3.12 SPRAY, METERED RESPIRATORY (INHALATION) at 08:07

## 2024-01-12 RX ADMIN — FUROSEMIDE 60 MG: 10 INJECTION, SOLUTION INTRAMUSCULAR; INTRAVENOUS at 16:34

## 2024-01-12 RX ADMIN — DILTIAZEM HYDROCHLORIDE 60 MG: 60 TABLET, FILM COATED ORAL at 05:53

## 2024-01-12 RX ADMIN — INSULIN LISPRO 4 UNITS: 100 INJECTION, SOLUTION INTRAVENOUS; SUBCUTANEOUS at 12:11

## 2024-01-12 ASSESSMENT — PAIN SCALES - WONG BAKER
WONGBAKER_NUMERICALRESPONSE: 2

## 2024-01-12 ASSESSMENT — PAIN SCALES - GENERAL
PAINLEVEL_OUTOF10: 0
PAINLEVEL_OUTOF10: 0

## 2024-01-12 NOTE — CONSULTS
Cardiology Progress Note    Subjective/Overnight Events:  Patient is here once again with increased shortness of breath as well as lower extremity swelling.    Patient also complaining of worsening cough.    Patient is been seen multiple times by cardiology services.    Assessment/Plan:  Assessment/Plan:  Acute on chronic heart failure with preserved ejection fraction  Chronic venous insufficiency  -Patient with 3+ pitting edema and shortness of breath  -Strict I's and O's and daily weights.  -appears volume overloaded  -BNP increased from previous.  Currently 3427. Weight 160 kg  -Increase Lasix to 60 mg IV 3 times daily.  Persistent atrial fibrillation s/p ablation 2018  -Heart rate currently 105-140s.  Goal heart rate less than 120 in setting of hypoxia  -Goal potassium 4.0-4.5, magnesium 2.0-2.2. Replete per protocol.  -Switch Cardizem to short acting.  60 mg every 6 hours.  Will titrate accordingly  -Continue Eliquis 5 mg twice daily  Morbid obesity: BMI 40  Peripheral arterial disease s/p right redo femoral popliteal bypass  Sleep apnea: Encourage Bipap  COPD  Uncontrolled type 2 diabetes mellitus: Most recent A1c 10.2.  Blood glucose 427          Justin Aden PA-C 01/10/24 4:29 PM        
[facility] PHYSICAL THERAPY EVALUATION  Armando Mars, 1955, 2026/2026-A, 1/12/2024    History  [place]:  The encounter diagnosis was COPD exacerbation (HCC).  Patient  has a past medical history of A-fib (HCC), Anxiety, Arthritis, COPD (chronic obstructive pulmonary disease) (HCC), Depression, Diabetes mellitus (HCC), Full dentures, H/O angiography, H/O Doppler ultrasound, H/O echocardiogram, Hx of colonoscopy, Hx of Doppler ultrasound, Hyperlipidemia, Hypertension, Macular degeneration, Obesity, On home oxygen therapy, PAD (peripheral artery disease) ([facility]), Panic attacks, Pneumonia, PTSD (post-traumatic stress disorder), Restless leg, Shortness of breath, Sleep apnea, and Wears glasses.  Patient  has a past surgical history that includes Atrial ablation surgery (05/23/2018); pr laparoscopy surg cholecystectomy (N/A, 11/12/2018); Colonoscopy (07/2011); eye surgery (Left, 2017); Dental surgery; Cardiac surgery (05/2018); Cholecystectomy, laparoscopic (11/12/2018); Appendectomy (2003); hernia repair (2003); femoral bypass (Left, 01/2011); Tonsillectomy (1960's); fracture surgery (Left, 1980's); fracture surgery (Right, 2000's); and femoral bypass (Left, 1/9/2019).    Discharge Recommendation: SNF    Subjective:    Patient states:  \"why do I need to stand again?\"      Pain:  7/10 in BLEs.      Communication with other providers:  Handoff to Arpita PONCE and Lenora, co-eval with ZACHARIAH Watts for safety and tolerance    Restrictions: General Precautions, Fall Rsik    Home Setup/Prior level of function  Social/Functional History  Lives With: Alone  Type of Home: Apartment  Home Layout: One level  Home Access: Level entry  Bathroom Shower/Tub: Tub/Shower unit  Bathroom Equipment: Grab bars in shower  Home Equipment: Oxygen  ADL Assistance: Independent  Homemaking Assistance: Independent  Ambulation Assistance: Independent  Transfer Assistance: Independent  Active : No  Patient's  Info: 
MD Joe   750 mg at 01/10/24 0915    magnesium oxide (MAG-OX) tablet 400 mg  400 mg Oral Daily Joe Ambrzi MD   400 mg at 01/10/24 0915    potassium chloride (KLOR-CON M) extended release tablet 10 mEq  10 mEq Oral Daily Joe Ambriz MD   10 mEq at 01/10/24 0915    Roflumilast (DALIRESP) tablet 500 mcg  500 mcg Oral Daily Joe Ambriz MD        budesonide-formoterol (SYMBICORT) 160-4.5 MCG/ACT inhaler 2 puff  2 puff Inhalation BID Joe Ambriz MD   2 puff at 01/10/24 0825    tiotropium (SPIRIVA RESPIMAT) 2.5 MCG/ACT inhaler 2 puff  2 puff Inhalation Daily RT Joe Ambriz MD   2 puff at 01/10/24 0825    insulin lispro (HUMALOG) injection vial 0-8 Units  0-8 Units SubCUTAneous TID WC Joe Ambriz MD   2 Units at 01/10/24 1730    insulin lispro (HUMALOG) injection vial 0-4 Units  0-4 Units SubCUTAneous Nightly Joe Ambriz MD        glucose chewable tablet 16 g  4 tablet Oral PRN Joe Ambriz MD        dextrose bolus 10% 125 mL  125 mL IntraVENous PRN Joe Ambriz MD        Or    dextrose bolus 10% 250 mL  250 mL IntraVENous PRN Joe Ambriz MD        glucagon injection 1 mg  1 mg SubCUTAneous PRN Joe Ambriz MD        dextrose 10 % infusion   IntraVENous Continuous PRN Joe Ambriz MD        dilTIAZem (CARDIZEM) tablet 60 mg  60 mg Oral 4 times per day Justin Aden PA-C        furosemide (LASIX) injection 60 mg  60 mg IntraVENous TID Justin Aden PA-C         Review of Systems:   Constitutional: No Fever or Weight Loss   Eyes: No Decreased Vision  ENT: No Headaches, Hearing Loss or Vertigo  Cardiovascular: As per HPI  Respiratory: As per HPI  Gastrointestinal: No abdominal pain, appetite loss, blood in stools, constipation, diarrhea or heartburn  Genitourinary: No dysuria, trouble voiding, or hematuria  Musculoskeletal:  No gait disturbance, weakness or joint complaints  Integumentary: 
agitation.     Objective:   PHYSICAL EXAM:      VITALS:    Vitals:    01/11/24 0919 01/11/24 1002 01/11/24 1133 01/11/24 1409   BP:  119/77 (!) 125/111 114/72   Pulse:  94 95    Resp:  22 27    Temp:  98.1 °F (36.7 °C) 97.8 °F (36.6 °C)    TempSrc:  Oral Axillary    SpO2: 94% 98% 90%    Weight:       Height:             CONSTITUTIONAL:  awake, alert, cooperative, no apparent distress, and appears stated age  NECK:  Supple, symmetrical, trachea midline, no adenopathy, thyroid symmetric, not enlarged and no tenderness  CHEST: Chest expansion equal and symmetrical, no intercostal retraction.  LUNGS:  no increased work of breathing, has expiratory wheezes both lungs, no crackles.  CARDIOVASCULAR: S1 and S2, no edema and no JVD  ABDOMEN:  normal bowel sounds, non-distended and no masses palpated, and no tenderness to palpation. No hepatospleenomegaly  LYMPHADENOPATHY:  no axillary or supraclavicular adenopathy. No cervical adnenopathy  PSYCHIATRIC: Oriented to person place and time. No obvious depression or anxiety.  MUSCULOSKELETAL: No obvious misalignment or effusion of the joints. No clubbing, cyanosis of the digits.  RIGHT AND LEFT LOWER EXTREMITIES: No edema, no inflammation, no tenderness.  SKIN:  normal skin color, texture, turgor and no redness, warmth, or swelling. No palpable nodules    DATA:                  EXAMINATION:  ONE XRAY VIEW OF THE CHEST     1/10/2024 5:01 am     COMPARISON:  01/03/2024, 12/23/2023     HISTORY:  ORDERING SYSTEM PROVIDED HISTORY: dyspnea  TECHNOLOGIST PROVIDED HISTORY:  Reason for exam:->dyspnea  Reason for Exam: dyspnea     FINDINGS:  Cardiac silhouette is enlarged.  Vascular congestion.  Multilobar parenchymal  infiltrates are seen in the lungs bilaterally, with right upper lobe  consolidation.  Interstitial changes at the lung bases appear similar to more  remote exams.  No pneumothorax is identified.  No acute osseous abnormality  is seen.     IMPRESSION:  Similar appearing chest

## 2024-01-13 LAB
ANION GAP SERPL CALCULATED.3IONS-SCNC: 9 MMOL/L (ref 7–16)
BUN SERPL-MCNC: 19 MG/DL (ref 6–23)
CALCIUM SERPL-MCNC: 8.4 MG/DL (ref 8.3–10.6)
CHLORIDE BLD-SCNC: 84 MMOL/L (ref 99–110)
CO2: 39 MMOL/L (ref 21–32)
CREAT SERPL-MCNC: 0.6 MG/DL (ref 0.9–1.3)
GFR SERPL CREATININE-BSD FRML MDRD: >60 ML/MIN/1.73M2
GLUCOSE BLD-MCNC: 248 MG/DL (ref 70–99)
GLUCOSE BLD-MCNC: 256 MG/DL (ref 70–99)
GLUCOSE BLD-MCNC: 336 MG/DL (ref 70–99)
GLUCOSE BLD-MCNC: 340 MG/DL (ref 70–99)
GLUCOSE SERPL-MCNC: 348 MG/DL (ref 70–99)
HCT VFR BLD CALC: 37.4 % (ref 42–52)
HEMOGLOBIN: 12.1 GM/DL (ref 13.5–18)
MAGNESIUM: 2 MG/DL (ref 1.8–2.4)
MCH RBC QN AUTO: 30.4 PG (ref 27–31)
MCHC RBC AUTO-ENTMCNC: 32.4 % (ref 32–36)
MCV RBC AUTO: 94 FL (ref 78–100)
PDW BLD-RTO: 15.7 % (ref 11.7–14.9)
PHOSPHORUS: 4.3 MG/DL (ref 2.5–4.9)
PLATELET # BLD: 230 K/CU MM (ref 140–440)
PMV BLD AUTO: 10.1 FL (ref 7.5–11.1)
POTASSIUM SERPL-SCNC: 3.7 MMOL/L (ref 3.5–5.1)
RBC # BLD: 3.98 M/CU MM (ref 4.6–6.2)
SODIUM BLD-SCNC: 132 MMOL/L (ref 135–145)
WBC # BLD: 11.8 K/CU MM (ref 4–10.5)

## 2024-01-13 PROCEDURE — 94640 AIRWAY INHALATION TREATMENT: CPT

## 2024-01-13 PROCEDURE — 6360000002 HC RX W HCPCS: Performed by: INTERNAL MEDICINE

## 2024-01-13 PROCEDURE — 2700000000 HC OXYGEN THERAPY PER DAY

## 2024-01-13 PROCEDURE — 6370000000 HC RX 637 (ALT 250 FOR IP): Performed by: INTERNAL MEDICINE

## 2024-01-13 PROCEDURE — 2580000003 HC RX 258: Performed by: INTERNAL MEDICINE

## 2024-01-13 PROCEDURE — 1200000000 HC SEMI PRIVATE

## 2024-01-13 PROCEDURE — 84100 ASSAY OF PHOSPHORUS: CPT

## 2024-01-13 PROCEDURE — 82962 GLUCOSE BLOOD TEST: CPT

## 2024-01-13 PROCEDURE — 85027 COMPLETE CBC AUTOMATED: CPT

## 2024-01-13 PROCEDURE — 36415 COLL VENOUS BLD VENIPUNCTURE: CPT

## 2024-01-13 PROCEDURE — 94761 N-INVAS EAR/PLS OXIMETRY MLT: CPT

## 2024-01-13 PROCEDURE — 83735 ASSAY OF MAGNESIUM: CPT

## 2024-01-13 PROCEDURE — 80048 BASIC METABOLIC PNL TOTAL CA: CPT

## 2024-01-13 PROCEDURE — 6370000000 HC RX 637 (ALT 250 FOR IP): Performed by: STUDENT IN AN ORGANIZED HEALTH CARE EDUCATION/TRAINING PROGRAM

## 2024-01-13 RX ORDER — INSULIN GLARGINE 100 [IU]/ML
30 INJECTION, SOLUTION SUBCUTANEOUS NIGHTLY
Status: DISCONTINUED | OUTPATIENT
Start: 2024-01-13 | End: 2024-01-17 | Stop reason: HOSPADM

## 2024-01-13 RX ORDER — INSULIN LISPRO 100 [IU]/ML
5 INJECTION, SOLUTION INTRAVENOUS; SUBCUTANEOUS
Status: DISCONTINUED | OUTPATIENT
Start: 2024-01-13 | End: 2024-01-17 | Stop reason: HOSPADM

## 2024-01-13 RX ADMIN — DILTIAZEM HYDROCHLORIDE 240 MG: 240 CAPSULE, COATED, EXTENDED RELEASE ORAL at 09:18

## 2024-01-13 RX ADMIN — GUAIFENESIN 600 MG: 600 TABLET, EXTENDED RELEASE ORAL at 09:17

## 2024-01-13 RX ADMIN — APIXABAN 5 MG: 5 TABLET, FILM COATED ORAL at 21:07

## 2024-01-13 RX ADMIN — TIOTROPIUM BROMIDE INHALATION SPRAY 2 PUFF: 3.12 SPRAY, METERED RESPIRATORY (INHALATION) at 08:40

## 2024-01-13 RX ADMIN — INSULIN LISPRO 5 UNITS: 100 INJECTION, SOLUTION INTRAVENOUS; SUBCUTANEOUS at 08:03

## 2024-01-13 RX ADMIN — INSULIN LISPRO 5 UNITS: 100 INJECTION, SOLUTION INTRAVENOUS; SUBCUTANEOUS at 18:50

## 2024-01-13 RX ADMIN — BUDESONIDE AND FORMOTEROL FUMARATE DIHYDRATE 2 PUFF: 160; 4.5 AEROSOL RESPIRATORY (INHALATION) at 20:35

## 2024-01-13 RX ADMIN — FUROSEMIDE 60 MG: 10 INJECTION, SOLUTION INTRAMUSCULAR; INTRAVENOUS at 18:51

## 2024-01-13 RX ADMIN — LEVOFLOXACIN 750 MG: 500 TABLET, FILM COATED ORAL at 09:17

## 2024-01-13 RX ADMIN — SODIUM CHLORIDE, PRESERVATIVE FREE 10 ML: 5 INJECTION INTRAVENOUS at 09:33

## 2024-01-13 RX ADMIN — ATORVASTATIN CALCIUM 40 MG: 40 TABLET, FILM COATED ORAL at 21:08

## 2024-01-13 RX ADMIN — INSULIN LISPRO 6 UNITS: 100 INJECTION, SOLUTION INTRAVENOUS; SUBCUTANEOUS at 08:03

## 2024-01-13 RX ADMIN — INSULIN LISPRO 5 UNITS: 100 INJECTION, SOLUTION INTRAVENOUS; SUBCUTANEOUS at 11:59

## 2024-01-13 RX ADMIN — Medication 400 MG: at 09:18

## 2024-01-13 RX ADMIN — SODIUM CHLORIDE, PRESERVATIVE FREE 10 ML: 5 INJECTION INTRAVENOUS at 21:08

## 2024-01-13 RX ADMIN — INSULIN LISPRO 2 UNITS: 100 INJECTION, SOLUTION INTRAVENOUS; SUBCUTANEOUS at 11:58

## 2024-01-13 RX ADMIN — FUROSEMIDE 60 MG: 10 INJECTION, SOLUTION INTRAMUSCULAR; INTRAVENOUS at 09:19

## 2024-01-13 RX ADMIN — INSULIN GLARGINE 30 UNITS: 100 INJECTION, SOLUTION SUBCUTANEOUS at 21:08

## 2024-01-13 RX ADMIN — GUAIFENESIN 600 MG: 600 TABLET, EXTENDED RELEASE ORAL at 21:07

## 2024-01-13 RX ADMIN — ACETAZOLAMIDE 250 MG: 250 TABLET ORAL at 09:17

## 2024-01-13 RX ADMIN — ROFLUMILAST 500 MCG: 500 TABLET ORAL at 09:19

## 2024-01-13 RX ADMIN — APIXABAN 5 MG: 5 TABLET, FILM COATED ORAL at 09:17

## 2024-01-13 RX ADMIN — POTASSIUM CHLORIDE 10 MEQ: 1500 TABLET, EXTENDED RELEASE ORAL at 09:18

## 2024-01-13 RX ADMIN — BUDESONIDE AND FORMOTEROL FUMARATE DIHYDRATE 2 PUFF: 160; 4.5 AEROSOL RESPIRATORY (INHALATION) at 08:40

## 2024-01-13 RX ADMIN — INSULIN LISPRO 6 UNITS: 100 INJECTION, SOLUTION INTRAVENOUS; SUBCUTANEOUS at 18:51

## 2024-01-13 ASSESSMENT — PAIN SCALES - WONG BAKER
WONGBAKER_NUMERICALRESPONSE: 2

## 2024-01-13 ASSESSMENT — PAIN SCALES - GENERAL: PAINLEVEL_OUTOF10: 0

## 2024-01-14 LAB
ANION GAP SERPL CALCULATED.3IONS-SCNC: 8 MMOL/L (ref 7–16)
BUN SERPL-MCNC: 18 MG/DL (ref 6–23)
CALCIUM SERPL-MCNC: 8.4 MG/DL (ref 8.3–10.6)
CHLORIDE BLD-SCNC: 91 MMOL/L (ref 99–110)
CO2: 39 MMOL/L (ref 21–32)
CREAT SERPL-MCNC: 0.5 MG/DL (ref 0.9–1.3)
GFR SERPL CREATININE-BSD FRML MDRD: >60 ML/MIN/1.73M2
GLUCOSE BLD-MCNC: 162 MG/DL (ref 70–99)
GLUCOSE BLD-MCNC: 168 MG/DL (ref 70–99)
GLUCOSE BLD-MCNC: 169 MG/DL (ref 70–99)
GLUCOSE BLD-MCNC: 256 MG/DL (ref 70–99)
GLUCOSE SERPL-MCNC: 241 MG/DL (ref 70–99)
HCT VFR BLD CALC: 38 % (ref 42–52)
HEMOGLOBIN: 11.9 GM/DL (ref 13.5–18)
MAGNESIUM: 2 MG/DL (ref 1.8–2.4)
MCH RBC QN AUTO: 30.1 PG (ref 27–31)
MCHC RBC AUTO-ENTMCNC: 31.3 % (ref 32–36)
MCV RBC AUTO: 96 FL (ref 78–100)
PDW BLD-RTO: 15.6 % (ref 11.7–14.9)
PHOSPHORUS: 4.5 MG/DL (ref 2.5–4.9)
PLATELET # BLD: 202 K/CU MM (ref 140–440)
PMV BLD AUTO: 9.7 FL (ref 7.5–11.1)
POTASSIUM SERPL-SCNC: 3.3 MMOL/L (ref 3.5–5.1)
RBC # BLD: 3.96 M/CU MM (ref 4.6–6.2)
SODIUM BLD-SCNC: 138 MMOL/L (ref 135–145)
WBC # BLD: 12.5 K/CU MM (ref 4–10.5)

## 2024-01-14 PROCEDURE — 2700000000 HC OXYGEN THERAPY PER DAY

## 2024-01-14 PROCEDURE — 83735 ASSAY OF MAGNESIUM: CPT

## 2024-01-14 PROCEDURE — 6370000000 HC RX 637 (ALT 250 FOR IP): Performed by: STUDENT IN AN ORGANIZED HEALTH CARE EDUCATION/TRAINING PROGRAM

## 2024-01-14 PROCEDURE — 6370000000 HC RX 637 (ALT 250 FOR IP): Performed by: INTERNAL MEDICINE

## 2024-01-14 PROCEDURE — 82962 GLUCOSE BLOOD TEST: CPT

## 2024-01-14 PROCEDURE — 80048 BASIC METABOLIC PNL TOTAL CA: CPT

## 2024-01-14 PROCEDURE — 2580000003 HC RX 258: Performed by: INTERNAL MEDICINE

## 2024-01-14 PROCEDURE — 85027 COMPLETE CBC AUTOMATED: CPT

## 2024-01-14 PROCEDURE — 6360000002 HC RX W HCPCS: Performed by: INTERNAL MEDICINE

## 2024-01-14 PROCEDURE — 36415 COLL VENOUS BLD VENIPUNCTURE: CPT

## 2024-01-14 PROCEDURE — 94761 N-INVAS EAR/PLS OXIMETRY MLT: CPT

## 2024-01-14 PROCEDURE — 84100 ASSAY OF PHOSPHORUS: CPT

## 2024-01-14 PROCEDURE — 1200000000 HC SEMI PRIVATE

## 2024-01-14 PROCEDURE — 94640 AIRWAY INHALATION TREATMENT: CPT

## 2024-01-14 RX ORDER — POTASSIUM CHLORIDE 20 MEQ/1
40 TABLET, EXTENDED RELEASE ORAL ONCE
Status: COMPLETED | OUTPATIENT
Start: 2024-01-14 | End: 2024-01-14

## 2024-01-14 RX ADMIN — ACETAMINOPHEN 650 MG: 325 TABLET ORAL at 18:44

## 2024-01-14 RX ADMIN — SODIUM CHLORIDE, PRESERVATIVE FREE 10 ML: 5 INJECTION INTRAVENOUS at 20:30

## 2024-01-14 RX ADMIN — INSULIN LISPRO 5 UNITS: 100 INJECTION, SOLUTION INTRAVENOUS; SUBCUTANEOUS at 10:54

## 2024-01-14 RX ADMIN — FUROSEMIDE 60 MG: 10 INJECTION, SOLUTION INTRAMUSCULAR; INTRAVENOUS at 10:54

## 2024-01-14 RX ADMIN — TIOTROPIUM BROMIDE INHALATION SPRAY 2 PUFF: 3.12 SPRAY, METERED RESPIRATORY (INHALATION) at 09:07

## 2024-01-14 RX ADMIN — FUROSEMIDE 60 MG: 10 INJECTION, SOLUTION INTRAMUSCULAR; INTRAVENOUS at 17:35

## 2024-01-14 RX ADMIN — GUAIFENESIN 600 MG: 600 TABLET, EXTENDED RELEASE ORAL at 20:30

## 2024-01-14 RX ADMIN — POTASSIUM CHLORIDE 10 MEQ: 1500 TABLET, EXTENDED RELEASE ORAL at 10:59

## 2024-01-14 RX ADMIN — INSULIN LISPRO 4 UNITS: 100 INJECTION, SOLUTION INTRAVENOUS; SUBCUTANEOUS at 10:56

## 2024-01-14 RX ADMIN — INSULIN LISPRO 5 UNITS: 100 INJECTION, SOLUTION INTRAVENOUS; SUBCUTANEOUS at 17:35

## 2024-01-14 RX ADMIN — POTASSIUM CHLORIDE 40 MEQ: 1500 TABLET, EXTENDED RELEASE ORAL at 10:59

## 2024-01-14 RX ADMIN — APIXABAN 5 MG: 5 TABLET, FILM COATED ORAL at 10:53

## 2024-01-14 RX ADMIN — ATORVASTATIN CALCIUM 40 MG: 40 TABLET, FILM COATED ORAL at 20:29

## 2024-01-14 RX ADMIN — DILTIAZEM HYDROCHLORIDE 240 MG: 240 CAPSULE, COATED, EXTENDED RELEASE ORAL at 10:52

## 2024-01-14 RX ADMIN — APIXABAN 5 MG: 5 TABLET, FILM COATED ORAL at 20:29

## 2024-01-14 RX ADMIN — LEVOFLOXACIN 750 MG: 500 TABLET, FILM COATED ORAL at 10:52

## 2024-01-14 RX ADMIN — Medication 400 MG: at 10:52

## 2024-01-14 RX ADMIN — SODIUM CHLORIDE, PRESERVATIVE FREE 10 ML: 5 INJECTION INTRAVENOUS at 10:57

## 2024-01-14 RX ADMIN — ROFLUMILAST 500 MCG: 500 TABLET ORAL at 10:54

## 2024-01-14 RX ADMIN — GUAIFENESIN 600 MG: 600 TABLET, EXTENDED RELEASE ORAL at 10:54

## 2024-01-14 RX ADMIN — BUDESONIDE AND FORMOTEROL FUMARATE DIHYDRATE 2 PUFF: 160; 4.5 AEROSOL RESPIRATORY (INHALATION) at 20:07

## 2024-01-14 RX ADMIN — INSULIN GLARGINE 30 UNITS: 100 INJECTION, SOLUTION SUBCUTANEOUS at 20:29

## 2024-01-14 RX ADMIN — BUDESONIDE AND FORMOTEROL FUMARATE DIHYDRATE 2 PUFF: 160; 4.5 AEROSOL RESPIRATORY (INHALATION) at 09:06

## 2024-01-14 RX ADMIN — INSULIN LISPRO 5 UNITS: 100 INJECTION, SOLUTION INTRAVENOUS; SUBCUTANEOUS at 13:15

## 2024-01-14 ASSESSMENT — PAIN DESCRIPTION - DESCRIPTORS: DESCRIPTORS: ACHING

## 2024-01-14 ASSESSMENT — PAIN DESCRIPTION - LOCATION: LOCATION: SHOULDER

## 2024-01-14 ASSESSMENT — PAIN SCALES - GENERAL: PAINLEVEL_OUTOF10: 7

## 2024-01-14 ASSESSMENT — PAIN DESCRIPTION - ORIENTATION: ORIENTATION: RIGHT

## 2024-01-15 LAB
ANION GAP SERPL CALCULATED.3IONS-SCNC: 6 MMOL/L (ref 7–16)
BUN SERPL-MCNC: 11 MG/DL (ref 6–23)
CALCIUM SERPL-MCNC: 8.3 MG/DL (ref 8.3–10.6)
CHLORIDE BLD-SCNC: 94 MMOL/L (ref 99–110)
CO2: 38 MMOL/L (ref 21–32)
CREAT SERPL-MCNC: 0.5 MG/DL (ref 0.9–1.3)
GFR SERPL CREATININE-BSD FRML MDRD: >60 ML/MIN/1.73M2
GLUCOSE BLD-MCNC: 139 MG/DL (ref 70–99)
GLUCOSE BLD-MCNC: 151 MG/DL (ref 70–99)
GLUCOSE BLD-MCNC: 151 MG/DL (ref 70–99)
GLUCOSE BLD-MCNC: 203 MG/DL (ref 70–99)
GLUCOSE SERPL-MCNC: 144 MG/DL (ref 70–99)
HCT VFR BLD CALC: 40.1 % (ref 42–52)
HEMOGLOBIN: 12.6 GM/DL (ref 13.5–18)
MCH RBC QN AUTO: 29.9 PG (ref 27–31)
MCHC RBC AUTO-ENTMCNC: 31.4 % (ref 32–36)
MCV RBC AUTO: 95.2 FL (ref 78–100)
PDW BLD-RTO: 15.5 % (ref 11.7–14.9)
PLATELET # BLD: 186 K/CU MM (ref 140–440)
PMV BLD AUTO: 9.8 FL (ref 7.5–11.1)
POTASSIUM SERPL-SCNC: 3.3 MMOL/L (ref 3.5–5.1)
PRO-BNP: 1146 PG/ML
RBC # BLD: 4.21 M/CU MM (ref 4.6–6.2)
SODIUM BLD-SCNC: 138 MMOL/L (ref 135–145)
WBC # BLD: 10.7 K/CU MM (ref 4–10.5)

## 2024-01-15 PROCEDURE — 2700000000 HC OXYGEN THERAPY PER DAY

## 2024-01-15 PROCEDURE — 6370000000 HC RX 637 (ALT 250 FOR IP): Performed by: INTERNAL MEDICINE

## 2024-01-15 PROCEDURE — 6360000002 HC RX W HCPCS: Performed by: INTERNAL MEDICINE

## 2024-01-15 PROCEDURE — 82962 GLUCOSE BLOOD TEST: CPT

## 2024-01-15 PROCEDURE — 6370000000 HC RX 637 (ALT 250 FOR IP): Performed by: STUDENT IN AN ORGANIZED HEALTH CARE EDUCATION/TRAINING PROGRAM

## 2024-01-15 PROCEDURE — 94761 N-INVAS EAR/PLS OXIMETRY MLT: CPT

## 2024-01-15 PROCEDURE — 36415 COLL VENOUS BLD VENIPUNCTURE: CPT

## 2024-01-15 PROCEDURE — 97530 THERAPEUTIC ACTIVITIES: CPT

## 2024-01-15 PROCEDURE — 85027 COMPLETE CBC AUTOMATED: CPT

## 2024-01-15 PROCEDURE — 94640 AIRWAY INHALATION TREATMENT: CPT

## 2024-01-15 PROCEDURE — 2580000003 HC RX 258: Performed by: INTERNAL MEDICINE

## 2024-01-15 PROCEDURE — 1200000000 HC SEMI PRIVATE

## 2024-01-15 PROCEDURE — 83880 ASSAY OF NATRIURETIC PEPTIDE: CPT

## 2024-01-15 PROCEDURE — 97116 GAIT TRAINING THERAPY: CPT

## 2024-01-15 PROCEDURE — 80048 BASIC METABOLIC PNL TOTAL CA: CPT

## 2024-01-15 RX ORDER — TORSEMIDE 20 MG/1
20 TABLET ORAL 2 TIMES DAILY
Status: DISCONTINUED | OUTPATIENT
Start: 2024-01-15 | End: 2024-01-17 | Stop reason: HOSPADM

## 2024-01-15 RX ADMIN — SODIUM CHLORIDE, PRESERVATIVE FREE 10 ML: 5 INJECTION INTRAVENOUS at 21:01

## 2024-01-15 RX ADMIN — INSULIN LISPRO 5 UNITS: 100 INJECTION, SOLUTION INTRAVENOUS; SUBCUTANEOUS at 12:55

## 2024-01-15 RX ADMIN — INSULIN GLARGINE 30 UNITS: 100 INJECTION, SOLUTION SUBCUTANEOUS at 21:00

## 2024-01-15 RX ADMIN — INSULIN LISPRO 2 UNITS: 100 INJECTION, SOLUTION INTRAVENOUS; SUBCUTANEOUS at 17:27

## 2024-01-15 RX ADMIN — TORSEMIDE 20 MG: 20 TABLET ORAL at 20:55

## 2024-01-15 RX ADMIN — GUAIFENESIN 600 MG: 600 TABLET, EXTENDED RELEASE ORAL at 20:55

## 2024-01-15 RX ADMIN — TIOTROPIUM BROMIDE INHALATION SPRAY 2 PUFF: 3.12 SPRAY, METERED RESPIRATORY (INHALATION) at 07:55

## 2024-01-15 RX ADMIN — SODIUM CHLORIDE, PRESERVATIVE FREE 10 ML: 5 INJECTION INTRAVENOUS at 09:00

## 2024-01-15 RX ADMIN — BUDESONIDE AND FORMOTEROL FUMARATE DIHYDRATE 2 PUFF: 160; 4.5 AEROSOL RESPIRATORY (INHALATION) at 07:57

## 2024-01-15 RX ADMIN — ATORVASTATIN CALCIUM 40 MG: 40 TABLET, FILM COATED ORAL at 20:55

## 2024-01-15 RX ADMIN — INSULIN LISPRO 5 UNITS: 100 INJECTION, SOLUTION INTRAVENOUS; SUBCUTANEOUS at 09:00

## 2024-01-15 RX ADMIN — Medication 400 MG: at 09:01

## 2024-01-15 RX ADMIN — ROFLUMILAST 500 MCG: 500 TABLET ORAL at 09:01

## 2024-01-15 RX ADMIN — APIXABAN 5 MG: 5 TABLET, FILM COATED ORAL at 09:01

## 2024-01-15 RX ADMIN — FUROSEMIDE 60 MG: 10 INJECTION, SOLUTION INTRAMUSCULAR; INTRAVENOUS at 09:01

## 2024-01-15 RX ADMIN — APIXABAN 5 MG: 5 TABLET, FILM COATED ORAL at 20:55

## 2024-01-15 RX ADMIN — POTASSIUM CHLORIDE 10 MEQ: 1500 TABLET, EXTENDED RELEASE ORAL at 09:01

## 2024-01-15 RX ADMIN — DILTIAZEM HYDROCHLORIDE 240 MG: 240 CAPSULE, COATED, EXTENDED RELEASE ORAL at 09:01

## 2024-01-15 RX ADMIN — HYDROXYZINE HYDROCHLORIDE 10 MG: 10 TABLET ORAL at 00:55

## 2024-01-15 RX ADMIN — INSULIN LISPRO 5 UNITS: 100 INJECTION, SOLUTION INTRAVENOUS; SUBCUTANEOUS at 17:27

## 2024-01-15 RX ADMIN — GUAIFENESIN 600 MG: 600 TABLET, EXTENDED RELEASE ORAL at 09:01

## 2024-01-15 ASSESSMENT — PAIN SCALES - WONG BAKER: WONGBAKER_NUMERICALRESPONSE: 0

## 2024-01-15 ASSESSMENT — PAIN SCALES - GENERAL
PAINLEVEL_OUTOF10: 0
PAINLEVEL_OUTOF10: 0

## 2024-01-16 LAB
ANION GAP SERPL CALCULATED.3IONS-SCNC: 10 MMOL/L (ref 7–16)
BUN SERPL-MCNC: 13 MG/DL (ref 6–23)
CALCIUM SERPL-MCNC: 7.8 MG/DL (ref 8.3–10.6)
CHLORIDE BLD-SCNC: 91 MMOL/L (ref 99–110)
CO2: 36 MMOL/L (ref 21–32)
CREAT SERPL-MCNC: 0.5 MG/DL (ref 0.9–1.3)
GFR SERPL CREATININE-BSD FRML MDRD: >60 ML/MIN/1.73M2
GLUCOSE BLD-MCNC: 141 MG/DL (ref 70–99)
GLUCOSE BLD-MCNC: 161 MG/DL (ref 70–99)
GLUCOSE BLD-MCNC: 178 MG/DL (ref 70–99)
GLUCOSE BLD-MCNC: 214 MG/DL (ref 70–99)
GLUCOSE BLD-MCNC: 238 MG/DL (ref 70–99)
GLUCOSE SERPL-MCNC: 174 MG/DL (ref 70–99)
HCT VFR BLD CALC: 39.1 % (ref 42–52)
HEMOGLOBIN: 12.4 GM/DL (ref 13.5–18)
MCH RBC QN AUTO: 30 PG (ref 27–31)
MCHC RBC AUTO-ENTMCNC: 31.7 % (ref 32–36)
MCV RBC AUTO: 94.7 FL (ref 78–100)
PDW BLD-RTO: 15.6 % (ref 11.7–14.9)
PLATELET # BLD: 179 K/CU MM (ref 140–440)
PMV BLD AUTO: 9.6 FL (ref 7.5–11.1)
POTASSIUM SERPL-SCNC: 3.1 MMOL/L (ref 3.5–5.1)
RBC # BLD: 4.13 M/CU MM (ref 4.6–6.2)
SODIUM BLD-SCNC: 137 MMOL/L (ref 135–145)
WBC # BLD: 11.7 K/CU MM (ref 4–10.5)

## 2024-01-16 PROCEDURE — 6370000000 HC RX 637 (ALT 250 FOR IP): Performed by: STUDENT IN AN ORGANIZED HEALTH CARE EDUCATION/TRAINING PROGRAM

## 2024-01-16 PROCEDURE — 36415 COLL VENOUS BLD VENIPUNCTURE: CPT

## 2024-01-16 PROCEDURE — 80048 BASIC METABOLIC PNL TOTAL CA: CPT

## 2024-01-16 PROCEDURE — 6370000000 HC RX 637 (ALT 250 FOR IP): Performed by: INTERNAL MEDICINE

## 2024-01-16 PROCEDURE — 2580000003 HC RX 258: Performed by: INTERNAL MEDICINE

## 2024-01-16 PROCEDURE — 94761 N-INVAS EAR/PLS OXIMETRY MLT: CPT

## 2024-01-16 PROCEDURE — 1200000000 HC SEMI PRIVATE

## 2024-01-16 PROCEDURE — 2700000000 HC OXYGEN THERAPY PER DAY

## 2024-01-16 PROCEDURE — 85027 COMPLETE CBC AUTOMATED: CPT

## 2024-01-16 PROCEDURE — 94640 AIRWAY INHALATION TREATMENT: CPT

## 2024-01-16 PROCEDURE — 82962 GLUCOSE BLOOD TEST: CPT

## 2024-01-16 RX ORDER — POTASSIUM CHLORIDE 750 MG/1
10 TABLET, EXTENDED RELEASE ORAL 2 TIMES DAILY
Qty: 30 TABLET | Refills: 0 | Status: SHIPPED | OUTPATIENT
Start: 2024-01-16 | End: 2024-01-17 | Stop reason: HOSPADM

## 2024-01-16 RX ADMIN — SODIUM CHLORIDE, PRESERVATIVE FREE 10 ML: 5 INJECTION INTRAVENOUS at 19:28

## 2024-01-16 RX ADMIN — ROFLUMILAST 500 MCG: 500 TABLET ORAL at 12:17

## 2024-01-16 RX ADMIN — ATORVASTATIN CALCIUM 40 MG: 40 TABLET, FILM COATED ORAL at 19:28

## 2024-01-16 RX ADMIN — BUDESONIDE AND FORMOTEROL FUMARATE DIHYDRATE 2 PUFF: 160; 4.5 AEROSOL RESPIRATORY (INHALATION) at 19:31

## 2024-01-16 RX ADMIN — POTASSIUM CHLORIDE 10 MEQ: 1500 TABLET, EXTENDED RELEASE ORAL at 09:37

## 2024-01-16 RX ADMIN — POTASSIUM CHLORIDE 40 MEQ: 1500 TABLET, EXTENDED RELEASE ORAL at 05:24

## 2024-01-16 RX ADMIN — TORSEMIDE 20 MG: 20 TABLET ORAL at 09:37

## 2024-01-16 RX ADMIN — BUDESONIDE AND FORMOTEROL FUMARATE DIHYDRATE 2 PUFF: 160; 4.5 AEROSOL RESPIRATORY (INHALATION) at 07:39

## 2024-01-16 RX ADMIN — GUAIFENESIN 600 MG: 600 TABLET, EXTENDED RELEASE ORAL at 09:37

## 2024-01-16 RX ADMIN — TIOTROPIUM BROMIDE INHALATION SPRAY 2 PUFF: 3.12 SPRAY, METERED RESPIRATORY (INHALATION) at 07:38

## 2024-01-16 RX ADMIN — INSULIN LISPRO 5 UNITS: 100 INJECTION, SOLUTION INTRAVENOUS; SUBCUTANEOUS at 12:17

## 2024-01-16 RX ADMIN — INSULIN LISPRO 5 UNITS: 100 INJECTION, SOLUTION INTRAVENOUS; SUBCUTANEOUS at 09:37

## 2024-01-16 RX ADMIN — APIXABAN 5 MG: 5 TABLET, FILM COATED ORAL at 09:37

## 2024-01-16 RX ADMIN — ACETAMINOPHEN 650 MG: 325 TABLET ORAL at 12:17

## 2024-01-16 RX ADMIN — SODIUM CHLORIDE, PRESERVATIVE FREE 10 ML: 5 INJECTION INTRAVENOUS at 09:38

## 2024-01-16 RX ADMIN — APIXABAN 5 MG: 5 TABLET, FILM COATED ORAL at 19:28

## 2024-01-16 RX ADMIN — TORSEMIDE 20 MG: 20 TABLET ORAL at 19:28

## 2024-01-16 RX ADMIN — Medication 400 MG: at 09:37

## 2024-01-16 RX ADMIN — DILTIAZEM HYDROCHLORIDE 240 MG: 240 CAPSULE, COATED, EXTENDED RELEASE ORAL at 09:37

## 2024-01-16 RX ADMIN — GUAIFENESIN 600 MG: 600 TABLET, EXTENDED RELEASE ORAL at 19:28

## 2024-01-16 RX ADMIN — ACETAMINOPHEN 650 MG: 325 TABLET ORAL at 02:24

## 2024-01-16 RX ADMIN — INSULIN LISPRO 5 UNITS: 100 INJECTION, SOLUTION INTRAVENOUS; SUBCUTANEOUS at 18:05

## 2024-01-16 RX ADMIN — INSULIN GLARGINE 30 UNITS: 100 INJECTION, SOLUTION SUBCUTANEOUS at 19:32

## 2024-01-16 RX ADMIN — INSULIN LISPRO 2 UNITS: 100 INJECTION, SOLUTION INTRAVENOUS; SUBCUTANEOUS at 18:05

## 2024-01-16 ASSESSMENT — PAIN SCALES - WONG BAKER
WONGBAKER_NUMERICALRESPONSE: 0

## 2024-01-16 ASSESSMENT — PAIN DESCRIPTION - DESCRIPTORS: DESCRIPTORS: ACHING

## 2024-01-16 ASSESSMENT — PAIN SCALES - GENERAL
PAINLEVEL_OUTOF10: 0
PAINLEVEL_OUTOF10: 0
PAINLEVEL_OUTOF10: 2

## 2024-01-16 ASSESSMENT — PAIN DESCRIPTION - ORIENTATION: ORIENTATION: RIGHT

## 2024-01-16 ASSESSMENT — PAIN - FUNCTIONAL ASSESSMENT: PAIN_FUNCTIONAL_ASSESSMENT: ACTIVITIES ARE NOT PREVENTED

## 2024-01-16 ASSESSMENT — PAIN DESCRIPTION - LOCATION: LOCATION: SHOULDER

## 2024-01-16 NOTE — DISCHARGE INSTR - COC
-     Safety Concerns:     At Risk for Falls    Impairments/Disabilities:      None    Nutrition Therapy:  Current Nutrition Therapy:       Routes of Feeding: Oral  Liquids: Thin Liquids  Daily Fluid Restriction: yes - amount 1800 while in here  Last Modified Barium Swallow with Video (Video Swallowing Test):     Treatments at the Time of Hospital Discharge:   Respiratory Treatments: ***  Oxygen Therapy:  is on oxygen at 3 L/min per nasal cannula.  Ventilator:    - No ventilator support    Rehab Therapies: Physical Therapy and Occupational Therapy  Weight Bearing Status/Restrictions: No weight bearing restrictions  Other Medical Equipment (for information only, NOT a DME order):  walker  Other Treatments:     Patient's personal belongings (please select all that are sent with patient):  {[facility] DME Belongings:[id number]}    RN SIGNATURE:  Electronically signed by Day Randhawa RN on 1/17/24 at 11:36 AM EST    CASE MANAGEMENT/SOCIAL WORK SECTION    Inpatient Status Date: ***    Readmission Risk Assessment Score:  Readmission Risk              Risk of Unplanned Readmission:  82           Discharging to Facility/ Agency   Name:   Address:  Phone:  Fax:    Dialysis Facility (if applicable)   Name:  Address:  Dialysis Schedule:  Phone:  Fax:    / signature: {Esignature:[id number]}    PHYSICIAN SECTION    Prognosis: Good    Condition at Discharge: Stable    Rehab Potential (if transferring to Rehab): Good    Recommended Labs or Other Treatments After Discharge:   - lab work in one week  - monitor glucose    Physician Certification: I certify the above information and transfer of Armando Mars  is necessary for the continuing treatment of the diagnosis listed and that he requires Skilled Nursing Facility for greater 30 days.     Update Admission H&P: No change in H&P    PHYSICIAN SIGNATURE:  Electronically signed by Jarad Cox MD on 1/16/24 at 11:12 AM EST

## 2024-01-17 ENCOUNTER — CARE COORDINATION (OUTPATIENT)
Dept: CARE COORDINATION | Age: 69
End: 2024-01-17

## 2024-01-17 VITALS
BODY MASS INDEX: 36.45 KG/M2 | HEIGHT: 78 IN | DIASTOLIC BLOOD PRESSURE: 72 MMHG | RESPIRATION RATE: 18 BRPM | SYSTOLIC BLOOD PRESSURE: 110 MMHG | HEART RATE: 74 BPM | TEMPERATURE: 97.8 F | WEIGHT: 315 LBS | OXYGEN SATURATION: 95 %

## 2024-01-17 LAB
ANION GAP SERPL CALCULATED.3IONS-SCNC: 10 MMOL/L (ref 7–16)
BUN SERPL-MCNC: 13 MG/DL (ref 6–23)
CALCIUM SERPL-MCNC: 7.7 MG/DL (ref 8.3–10.6)
CHLORIDE BLD-SCNC: 92 MMOL/L (ref 99–110)
CO2: 34 MMOL/L (ref 21–32)
CREAT SERPL-MCNC: 0.5 MG/DL (ref 0.9–1.3)
GFR SERPL CREATININE-BSD FRML MDRD: >60 ML/MIN/1.73M2
GLUCOSE BLD-MCNC: 147 MG/DL (ref 70–99)
GLUCOSE BLD-MCNC: 156 MG/DL (ref 70–99)
GLUCOSE SERPL-MCNC: 241 MG/DL (ref 70–99)
HCT VFR BLD CALC: 38.5 % (ref 42–52)
HEMOGLOBIN: 12.1 GM/DL (ref 13.5–18)
MCH RBC QN AUTO: 29.9 PG (ref 27–31)
MCHC RBC AUTO-ENTMCNC: 31.4 % (ref 32–36)
MCV RBC AUTO: 95.1 FL (ref 78–100)
PDW BLD-RTO: 15.6 % (ref 11.7–14.9)
PLATELET # BLD: 167 K/CU MM (ref 140–440)
PMV BLD AUTO: 10 FL (ref 7.5–11.1)
POTASSIUM SERPL-SCNC: 3 MMOL/L (ref 3.5–5.1)
RBC # BLD: 4.05 M/CU MM (ref 4.6–6.2)
SODIUM BLD-SCNC: 136 MMOL/L (ref 135–145)
WBC # BLD: 12.3 K/CU MM (ref 4–10.5)

## 2024-01-17 PROCEDURE — 6370000000 HC RX 637 (ALT 250 FOR IP): Performed by: STUDENT IN AN ORGANIZED HEALTH CARE EDUCATION/TRAINING PROGRAM

## 2024-01-17 PROCEDURE — 6370000000 HC RX 637 (ALT 250 FOR IP): Performed by: INTERNAL MEDICINE

## 2024-01-17 PROCEDURE — 82962 GLUCOSE BLOOD TEST: CPT

## 2024-01-17 PROCEDURE — 36415 COLL VENOUS BLD VENIPUNCTURE: CPT

## 2024-01-17 PROCEDURE — 94640 AIRWAY INHALATION TREATMENT: CPT

## 2024-01-17 PROCEDURE — 6370000000 HC RX 637 (ALT 250 FOR IP)

## 2024-01-17 PROCEDURE — 80048 BASIC METABOLIC PNL TOTAL CA: CPT

## 2024-01-17 PROCEDURE — 2700000000 HC OXYGEN THERAPY PER DAY

## 2024-01-17 PROCEDURE — 85027 COMPLETE CBC AUTOMATED: CPT

## 2024-01-17 PROCEDURE — 94761 N-INVAS EAR/PLS OXIMETRY MLT: CPT

## 2024-01-17 PROCEDURE — 2580000003 HC RX 258: Performed by: INTERNAL MEDICINE

## 2024-01-17 RX ORDER — POTASSIUM CHLORIDE 20 MEQ/1
20 TABLET, EXTENDED RELEASE ORAL 2 TIMES DAILY
Qty: 60 TABLET | Refills: 3 | Status: SHIPPED | OUTPATIENT
Start: 2024-01-17

## 2024-01-17 RX ORDER — POTASSIUM CHLORIDE 20 MEQ/1
20 TABLET, EXTENDED RELEASE ORAL DAILY
Status: DISCONTINUED | OUTPATIENT
Start: 2024-01-17 | End: 2024-01-17

## 2024-01-17 RX ORDER — POTASSIUM CHLORIDE 20 MEQ/1
40 TABLET, EXTENDED RELEASE ORAL ONCE
Status: COMPLETED | OUTPATIENT
Start: 2024-01-17 | End: 2024-01-17

## 2024-01-17 RX ORDER — POTASSIUM CHLORIDE 20 MEQ/1
20 TABLET, EXTENDED RELEASE ORAL 2 TIMES DAILY
Status: DISCONTINUED | OUTPATIENT
Start: 2024-01-17 | End: 2024-01-17 | Stop reason: HOSPADM

## 2024-01-17 RX ADMIN — INSULIN LISPRO 5 UNITS: 100 INJECTION, SOLUTION INTRAVENOUS; SUBCUTANEOUS at 12:50

## 2024-01-17 RX ADMIN — ROFLUMILAST 500 MCG: 500 TABLET ORAL at 08:24

## 2024-01-17 RX ADMIN — SODIUM CHLORIDE, PRESERVATIVE FREE 10 ML: 5 INJECTION INTRAVENOUS at 08:24

## 2024-01-17 RX ADMIN — ACETAMINOPHEN 650 MG: 325 TABLET ORAL at 01:44

## 2024-01-17 RX ADMIN — APIXABAN 5 MG: 5 TABLET, FILM COATED ORAL at 08:24

## 2024-01-17 RX ADMIN — GUAIFENESIN 600 MG: 600 TABLET, EXTENDED RELEASE ORAL at 08:24

## 2024-01-17 RX ADMIN — TIOTROPIUM BROMIDE INHALATION SPRAY 2 PUFF: 3.12 SPRAY, METERED RESPIRATORY (INHALATION) at 08:43

## 2024-01-17 RX ADMIN — Medication 400 MG: at 08:25

## 2024-01-17 RX ADMIN — DILTIAZEM HYDROCHLORIDE 240 MG: 240 CAPSULE, COATED, EXTENDED RELEASE ORAL at 08:24

## 2024-01-17 RX ADMIN — POTASSIUM CHLORIDE 20 MEQ: 1500 TABLET, EXTENDED RELEASE ORAL at 08:30

## 2024-01-17 RX ADMIN — BUDESONIDE AND FORMOTEROL FUMARATE DIHYDRATE 2 PUFF: 160; 4.5 AEROSOL RESPIRATORY (INHALATION) at 08:43

## 2024-01-17 RX ADMIN — POTASSIUM CHLORIDE 40 MEQ: 1500 TABLET, EXTENDED RELEASE ORAL at 05:04

## 2024-01-17 RX ADMIN — HYDROXYZINE HYDROCHLORIDE 10 MG: 10 TABLET ORAL at 01:44

## 2024-01-17 RX ADMIN — INSULIN LISPRO 5 UNITS: 100 INJECTION, SOLUTION INTRAVENOUS; SUBCUTANEOUS at 08:23

## 2024-01-17 RX ADMIN — TORSEMIDE 20 MG: 20 TABLET ORAL at 08:24

## 2024-01-17 ASSESSMENT — PAIN DESCRIPTION - LOCATION: LOCATION: ARM

## 2024-01-17 ASSESSMENT — PAIN SCALES - WONG BAKER
WONGBAKER_NUMERICALRESPONSE: 0

## 2024-01-17 ASSESSMENT — PAIN SCALES - GENERAL
PAINLEVEL_OUTOF10: 6
PAINLEVEL_OUTOF10: 0

## 2024-01-17 NOTE — CARE COORDINATION
Is on the weekend f/u list to f/u for SNF choice with brother.  Chart reviewed.  Noted that pt is alert and oriented and is able to make his own decisions.  This CM met with pt for d/c planning. Discussed home vs SNF.  Pt states that he would agree to go to a SNF if needed. Pt refused to work with PT today.  CM informed pt that he has to agree to work with PT/OT if he wants to go to a SNF.  Pt states that he will work with PT/OT. Pt states that he will discuss d/c plan with his sister.  SNF list provided. Pt is from home alone and is active with [facility].  Pt states that he has a neighbor and family that help him.  CM to f/u with pt tomorrow for d/c plan.  TE  
CM in to see Pt to follow up on discharge planning.  Plan remains SNF at this time.  List of facilities offered, Pt choice of [facility].    Whiteboard to therapy for updated notes.      CM call to [facility] with referral.     Pt denies any needs at this time.  CM following   
CM updated by [facility], they can accept Pt when medically ready and insurances approves.    PS to Dr. Cox to update    11:15 AM   Pt pending precert at this time.  [facility] updated  
MCG criteria for HF reviewed at this time, criteria supports Inpatient Admission. MANNY,RN/CM  
Pt on discharge.  CM set stretcher transportation for 1500.    Pt [facility] updated  [facility] updated    12:16 PM   Transport time changed to 1400.  [facility] updated.  [facility] updated  
Pt's current/active insurance not listed in [employer] portal, TC to [employer] and obtained correct insurance and had updated in system to precert could be initiated   [facility]  [id number]    Precert initiated via [employer]: APPROVED/   [id number]  01/16/2024-01/18/2024Approved   Electronic PAS completed     
pt and then discuss facilities with another brother & TAISHA.  CM will place on the weekend discharge list for follow up with John for facility choice.

## 2024-01-17 NOTE — PROGRESS NOTES
Cardiology Progress Note     Admit Date:  1/10/2024    Consult reason/ Seen today for :       Subjective and  Overnight Events : He says that he is feeling much better he would like to be discharged today  In afib but rate controlled   Cxr and bnp are still abnormal   Lab work is concerning for metabolic alkalosis    Chief complain on admission : 68 y.o.year old who is admitted for  Chief Complaint   Patient presents with    Shortness of Breath      Assessment / Plan:  Heart failure with preserved EF still has some edema  on lasix watch for toxicity but he does not improveAdd acetazolamide for 2 days   Changed to torsemide 20 twice a day at discharge  Changed to Lasix 60 IV for another 1 day  Elevated Troponin : will continue medical management for now.   CHF: Acute Systolic/ Diastolic decompensated heart failure. Continue IV Lasix   Daily weights , strict Is and Os  Paroxysmal afib: Persistent atrial fibrillation rate controlled keep electrolytes within normal range continue Cardizem to 240 mg and Eliquis 5 mg twice daily  HTN: stable, continue To titrate up medication as needed  Peripheral arterial disease s/p right femoropopliteal bypass stable  DVT Prophylaxis if no contraindication  Call with questions we will see as needed based    Past medical history:    has a past medical history of A-fib ([facility]), Anxiety, Arthritis, COPD (chronic obstructive pulmonary disease) ([facility]), Depression, Diabetes mellitus ([facility]), Full dentures, H/O angiography, H/O Doppler ultrasound, H/O echocardiogram, Hx of colonoscopy, Hx of Doppler ultrasound, Hyperlipidemia, Hypertension, Macular degeneration, Obesity, On home oxygen therapy, PAD (peripheral artery disease) ([facility]), Panic attacks, Pneumonia, PTSD (post-traumatic stress disorder), Restless leg, Shortness of breath, Sleep apnea, and Wears glasses.  Past surgical history:   has a past surgical history that 
    Patient Room #: 2026/2026-A  Patient Name: Armando Mars    Home NIV Evaluation / Orders  1. Notify Pulmonary Diagnostics of order to evaluate for home NIV.    2. Perform the following tests at any point before discharge.     [x] Perform an Overnight Oximetry while the patient is on at least                  2 lpm of oxygen. The saturation level must be <  88% for > 5                   cumulative minutes to qualify.                 [x] Arterial puncture (ABG) for blood gas analysis done while awake.      Qualifying result is a PaCO2 >   52 mmHg    3.         [x]  I have documented in a progress note that LINA is not the primary cause of hypercapnia in this patient. CPAP will not effectively treat this patient hypercapnia. BIPA Therapy will benefit and more effectively treat the hypercapnia.                            Results Documentation    (Attach a copy of Reports)  1. ABG Results       Date _01/10/2024_  PaCO2 __59_ mmHg on _3_ lpm oxygen    2. Oximetry Level _________% for ___________ minutes on ________ lpm oxygen    3. [] Patient Qualifies      [] Patient Does NOT qualify      Complete order below:  Diagnosis _SEVERE COPD__           Length of Need: [x] Lifetime  Home NIV () at:   Inspiratory Setting __________ cm H2O         Expiratory Setting _________ cm H2O    Therapist Signature: __Alexandra Bee RRT__Date: _01/11/2024_    Physician Signature:   Electronically Signed in EMR_______  Date: _____________    Physician Printed Name: __WILLIAM ROBLES MD  NPI:  [id number] _                          
    V2.0    [facility] [place]      Name:  Armando Mars /Age/Sex: 1955  (68 y.o. male)   MRN & CSN:  [id number] & [id number] Encounter Date/Time: 1/10/2024 7:37 AM EST   Location:  ED30/ED-30 PCP: Rosalio Hedrick MD     Attending:Ananya Jeffrey*       Hospital Day: 1    Assessment and Recommendations   Armando Mars is a 68 y.o. male with pmh of chronic diastolic congestive heart failure, COPD, chronic respite failure on home oxygen, diabetes mellitus type 2, on long-term insulin, chronic atrial fibrillation, on anticoagulation, LINA on CPAP/BiPAP, and morbid obesity  who presents with Acute on chronic diastolic (congestive) heart failure (HCC)    Plan:   Acute on chronic respiratory failure with hypoxia  Recent admission with pneumonia  SIRS criteria met with his elevated white blood count and tachycardia on admission, however he was in A-fib and he has been on steroids, last dose was day prior to presentation. Patient recently discharged on  and was found to have PNA during that admission, discharged on levo  -- Baseline requirement 3 L of oxygen, patient has had multiple admissions  -- As per information from EMS patient was saturating 70% on 3 L of oxygen. VBG showed pH 7.38, pCO2 69, pO2 66, HCO3 40.8.  -- CXR reported a similar appearing chest in comparison to the x-ray from the third of this month with persistent consolidative pneumonia in the right upper lobe  -- Supplemental O2 as needed, discussed with nursing to titrate O2 to 90% saturation, will wean down to baseline O2 as able  -- Will consider ID eval if unimproved  -- Continue levo, symbicort, roflumilast and tiotropium.  -- Pulm consult, appreciate recs     Acute on chronic diastolic congestive heart failure  Echo from 2023 was a difficult exam due to body habitus but did show a low normal LV SF with EF of 50 to 55%.  -- proBNP is elevated at 3427 which is higher than his last admission, patient had 
    V2.0    [facility] [place]      Name:  Armando Mars /Age/Sex: 1955  (68 y.o. male)   MRN & CSN:  [id number] & [id number] Encounter Date/Time: 2024 7:37 AM EST   Location:  [address] [place] [address] PCP: Rosalio Hedrick MD     Attending:Jarad Cox MD       Hospital Day: 7        Subjective:     Chief Complaint: Progressively worsening SOB    Plan for SNF. Pt doing well today. No edema noted.    Assessment and Recommendations   Armando Mars is a 68 y.o. male with pmh of chronic diastolic congestive heart failure, COPD, chronic respite failure on home oxygen, diabetes mellitus type 2, on long-term insulin, chronic atrial fibrillation, on anticoagulation, LINA on CPAP/BiPAP, and morbid obesity  who presents with Acute on chronic diastolic (congestive) heart failure (HCC)    Plan:   Acute on chronic respiratory failure with hypoxia - resolved  Recent admission with pneumonia  SIRS criteria met with his elevated white blood count and tachycardia on admission, however he was in A-fib and he has been on steroids, last dose was day prior to presentation. Patient recently discharged on  and was found to have PNA during that admission, discharged on levo  -- Baseline requirement 3 L of oxygen, patient has had multiple admissions,   -- As per information from EMS patient was saturating 70% on 3 L of oxygen. VBG showed pH 7.38, pCO2 69, pO2 66, HCO3 40.8.  -- CXR reported a similar appearing chest in comparison to the x-ray from the third of this month with persistent consolidative pneumonia in the right upper lobe  -- Supplemental O2 as needed, currently weaned down to his baseline home O2  -- Continue levo, symbicort, roflumilast and tiotropium.  -- Pulm consult, appreciate recs  -- Patient not tolerating BiPAP     Acute on chronic diastolic congestive heart failure- resolving  Echo from 2023 was a difficult exam due to body habitus but did show a low normal LV SF with EF of 50 to 55%.  -- 
   01/12/24 2818   NIV Type   $NIV $Daily Charge   NIV Started/Stopped On   Equipment Type v60   Mode Bilevel   Mask Type Full face mask   Mask Size Large   Bonnet size Large   Assessment   Pulse 83   Respirations 19   SpO2 93 %   Level of Consciousness 0   Comfort Level Fair   Using Accessory Muscles No   Mask Compliance Good   Skin Protection for O2 Device No   Settings/Measurements   PIP Observed 18 cm H20   IPAP 18 cmH20   CPAP/EPAP 8 cmH2O   Vt (Measured) 1140 mL   Rate Ordered 14   Insp Rise Time (%) 3 %   FiO2  35 %   I Time/ I Time % 1 s   Minute Volume (L/min) 18.5 Liters   Mask Leak (lpm) 0 lpm   Humidity 0   Patient's Home Machine No   Alarm Settings   Alarms On Y   Low Pressure (cmH2O) 5 cmH2O   High Pressure (cmH2O) 35 cmH2O   Delay Alarm 20 sec(s)   Apnea (secs) 20 secs   RR Low (bpm) 13   RR High (bpm) 40 br/min       
   01/15/24 1122   Encounter Summary   Encounter Overview/Reason  Initial Encounter   Service Provided For: Patient   Referral/Consult From: [facility]   Support System Family members   Last Encounter  01/15/24  (PT was in pain today, he wanted prayer, he said he was receiving good care, he requested to see his nurse due to feeling sick/pain. He was coping well and expressed feeling better yet still in pain.)   Complexity of Encounter Low   Begin Time 1110   End Time  1124   Total Time Calculated 14 min   Spiritual/Emotional needs   Type Spiritual Support;Emotional Distress   Grief, Loss, and Adjustments   Type Adjustment to illness   Assessment/Intervention/Outcome   Assessment Concerns with suffering;Coping;Hopeful;Peaceful;Stress overload   Intervention Active listening;Explored/Affirmed feelings, thoughts, concerns;Prayer (assurance of)/[place];Sustaining Presence/Ministry of presence   Outcome Comfort;Coping;Expressed feelings, needs, and concerns;Expressed Gratitude;Less anxious, Less agitated   Plan and Referrals   Plan/Referrals Continue Support (comment)       
  Physician Progress Note      PATIENT:               KAROLINE HAGEN  CSN #:                  [id number]  :                       1955  ADMIT DATE:       1/10/2024 3:56 AM  DISCH DATE:  RESPONDING  PROVIDER #:        Ananya Jeffrey MD          QUERY TEXT:    Internal Medicine,    Pt admitted with respiratory failure, CHF exacerbation, PNA and  noted to have    HR over 100, RR over 20. If possible, please document in the progress notes   and discharge summary if you are evaluating and /or treating any of the   following:    The medical record reflects the following:  Risk Factors:  PNA  Clinical Indicators: +SIRS with HR over 100 into 120's, RR over 20 into the    30's, O2 sats in 70's & 80's with respiratory failure, WBC up to 14  Treatment: labs, imaging, po Levaquin, O2, supportive care    Thank you,  Farzana Garcia RN [facility]  [phone]  Options provided:  -- Sepsis, present on admission  -- Sepsis, present on admission, now resolved  -- Sepsis, developed following admission  -- Pneumonia without Sepsis  -- Other - I will add my own diagnosis  -- Disagree - Not applicable / Not valid  -- Disagree - Clinically unable to determine / Unknown  -- Refer to Clinical Documentation Reviewer    PROVIDER RESPONSE TEXT:    This patient has sepsis which was present on admission.    Query created by: Farzana Garcia on 2024 6:09 PM      Electronically signed by:  Ananya Jeffrey MD 2024 11:38 AM          
 was sent a perfectserve message related to 02 level constantly dropping. Requesting an order for Bipap/Cpap eval.   
4 Eyes Skin Assessment     NAME:  Armando Mars  YOB: 1955  MEDICAL RECORD NUMBER:  [id number]    The patient is being assessed for  Admission    I agree that at least one RN has performed a thorough Head to Toe Skin Assessment on the patient. ALL assessment sites listed below have been assessed.      Areas assessed by both nurses:    Head, Face, Ears, Shoulders, Back, Chest, Arms, Elbows, Hands, Sacrum. Buttock, Coccyx, Ischium, Legs. Feet and Heels, Under Medical Devices , and Other          Does the Patient have a Wound? No noted wound(s)       Hector Prevention initiated by RN: Yes  Wound Care Orders initiated by RN: No    Pressure Injury (Stage 3,4, Unstageable, DTI, NWPT, and Complex wounds) if present, place Wound referral order by RN under : No    New Ostomies, if present place, Ostomy referral order under : No     Nurse 1 eSignature: Electronically signed by Quin Ponce RN on 1/10/24 at 12:55 PM EST    **SHARE this note so that the co-signing nurse can place an eSignature**    Nurse 2 eSignature: Electronically signed by Ilana Martinez RN on 1/10/24 at 2:17 PM EST   
Attempted to call report to [place], no answer, I left a message on the voice mail with my contact info.  
Bipap eval was discontinued due to pt being on high levels of oxygen (currently on 7L HFNC) with 02 saturations at 91-93%.  
Lab called and informed RN that this patient currently has a potassium level of 3.0. Provider informed via Perfect serve message   
Last night, patient was completely non-compliant with overnight trending and also with exiting his bed with it on and taking off probe and turning off bed alarm.     In order to get patient Bipap or NIV, which will hopefully assist with his many admissions to date this year, patient must wear the overnight trending throughout the entire evening while on his home oxygen or 2 LPM minimum.     Pulmonary Diagnostics will not be able to have Bipap evaluation completed prior to weekend so tonight, please repeat pulse oximetry nocturnal study and fax results to PFT lab and complete setup through Respiratory charge if the study is able to be completed this weekend. [phone] to alert them if results have been able to be obtained and if patient qualifies for BIPAP. If patient does not qualify for that, then on Monday, Pulmonary Diagnostics could try and get patient a device that is called Dorothy and will be able to obtained based on patient history of hypercapnia.Pt discharge is set to be today, but we will follow up if they are still admitted.     Patient will be educated once again on the importance of following the instructions on trying to get him the best possible care for when he is home. Pt has had 14 admissions since July of 2023.12 of 14 of these admissions are due to COPD. Pt has LINA and has been non-compliant with wearing CPAP. Please see GOLD assessment completed on patient. We have gotten patient on maintenance COPD medications that he is supposed to take daily as his regime. Unsure if patient has been compliant.                  09/14/23 1147   Spirometry Assessment   FEV1 (%PRED) 38   Post Bronchodilator FEV/FVC 36   COPD Exacerbations in last year 5    L/min   COPD Assessment (CAT Score)   Cough Assessment 0   Phlegm Assessment 1   Chest tightness 1   Walking on an incline 3   Home Activities 2   Confident Leaving The Home 1   Sleeping Soundly 0   Have Energy 2   Assessment Score 10   $RT COPD 
Met with patient and introduced myself as the Heart failure education R.MARCO ANTONIO. I have seen this patient multiple times for heart failure education.  He has failing vision and inability to read medication labels.  He reports having home health visits to set up his medication. States his medication compliance has improved.  Patient has limited mobility and reports only moving about his apartment.  Patient is dismissive when discussing low sodium diet and fluid limits. States he can not cook and is dependent on freezer meals and what family and friends give him.He does not like Meals on Wheels food.  States he can not afford higher quality food choices.   Per OT notes, SNF placement recommended.     Admitting diagnosis- acute on chronic diastolic heart failure  Cardiologist- Michael  Ejection fraction 50-55 % as of 9/20/2023  Pro BNP- 3,427 on 1/10  Hospital follow up appt-  1/15 Dr. Hedrick (previously scheduled)  Patient has a digital scale? Has scale, but does not weigh himself. Reeducated on daily weights to catch heart failure symptoms early.  Transportation- limited   Dial a Ride and family    Able to obtain medications without difficulty?- Yes- Patient denies difficulty in obtaining or taking medications.     Reviewed Heart failure patient education book with patient. Heart failure education included:causes, symptoms, medications, diet, daily weights, exercise and fluid control.       
NP notified of 387 BG, no new orders at this time.  
Nurse unable to sign patient's Tele strips for shift due to patient refusal of telemetry monitoring and Continuous pulse 02 monitoring. Attending provider aware of patient refusal.   
Nutrition Assessment     Type and Reason for Visit: Initial, RD Nutrition Re-Screen/LOS    Nutrition Recommendations/Plan:   Trial Carb Controlled 5, Cardiac/2 gm with 1800 ml per MD   Add snacks between meals   Monitor PO intakes, GI status, weights, fluids, labs, lytes, glucose, and plan of care   Refer pt to nutrition counseling outpatient for further DM/Cardiac/Weight Mgt for long term intervention upon discharge if desired     Nutrition Assessment:  Admitted with acute on chronic CHF, COPD excerbation. Hx COPD, Hypercholesteremia, DMII, HTN, CHF. Tolerates Carb Control 4, Cardiac/low Sodium diet with 1800 ml FR. Does not drink Ensure supplements, dislikes taste, discussed importance of trial more vegetables and protein for healing. Pt dislikes all the cooked vegetables, recommended alterative options, will add higher protein and fiber items between meals. No change in po intake/weights recently. Plans for d/c soon. Low nutrition risk.    Nutrition Related Findings:   Reports buys 90/10 lean beef/hamburgers. +torsemide  POCT: 156, 141, 238, K+: 3, Ca 7.7, GFR >60; reports on and off steroids; reports making urine Wound Type: None  No intake or output data in the 24 hours ending 01/17/24 1154    Current Nutrition Therapies:    ADULT ORAL NUTRITION SUPPLEMENT; Breakfast, Lunch; Low Calorie/High Protein Oral Supplement  ADULT DIET; Regular; 4 carb choices (60 gm/meal); Low Fat/Low Chol/High Fiber/2 gm Na; 1800 ml; No Concentrated sweets, Double Protein Portions    Anthropometric Measures:  Height: 198.1 cm (6' 6\")  Current Body Wt: 160.6 kg (354 lb)   BMI: 40.9    Nutrition Diagnosis:   Altered nutrition-related lab values related to endocrine dysfuntion, cardiac dysfunction as evidenced by lab values    Nutrition Interventions:   Food and/or Nutrient Delivery: Modify Current Diet, Snacks (Comment)  Nutrition Education/Counseling: Education initiated  Coordination of Nutrition Care: Continue to monitor while 
Nutrition Education Assessment     Pt admitted with Acute on Chronic CHF, COPD, SOB. Pt on 3L O2 baseline, requiring 4L on NC with elevated -400. Multiple diet education over heart failure and diabetes provided by dietitian team within the last 1 month. Will adjust diet accordingly to better meet needs. Will defer education at this time. Please consult dietitian if pt requests for additional nutrition concerns.    Payal Devine RD, LD  Contact: [phone]      
Occupational Therapy  .  Occupational Therapy Treatment Note    Name: Armando Mars MRN: [id number] :   1955   Date:  1/15/2024   Admission Date: 1/10/2024 Room:  [address] [place] [address]-A     Primary Problem:  The encounter diagnosis was COPD exacerbation (HCC).     Restrictions/Precautions:          General Precautions, Fall Risk    Communication with other providers: Per chart review, patient is appropriate for therapeutic intervention. Co-Tx c PTA Tawana for simultaneous therapeutic intervention / cues during transfer training and functional mobility.    Subjective:  Patient states:  Pt agreeable to Tx session. Pt deferred toileting / toilet transfer this Tx session.  Pain:   Location, Type, Intensity (0/10 to 10/10):  0/10, denies pain     Objective:    Observation: Pt received supine in bed, A&O to self / situation. Pt exhibits s/s of shortness of breath and rapid onset of decreased endurance during mobility. Required mod to max cues for PLB technique and self-pacing c rest breaks PRN.    Objective Measures:  O2 sats varied 89-98% on 3L O2 NC, pulse rate  during mobility in hallway.    Treatment, including education:  Therapeutic Activity Training:   Therapeutic activity training was instructed today.  Cues were given for safety, sequence, UE/LE placement, awareness, and balance.    Activities performed today included bed mobility training, sup-sit, sit-stand, SPT.  Supine<>sit: SBA   Scooting: SBA    Sit to stands: Varied widely c fatigue, initially CGA w/o AD from EOB, progressed to Mod A x2 w/o AD for stand from bedside chair in hallway.     Stand to sits: CGA w/o AD, required Min A x2 w/o AD for stand to sit in bedside chair (pt exhibiting decreased endurance for mobility in hallway), impulsivity and unsteadiness noted.     Functional Mobility: CGA w/o AD + SBA to Min A for chair follow and assistance for controlled / safe descent to chair as pt exhibits rapid decreased activity tolerance s/p 30 ft 
Occupational Therapy  [facility] ACUTE CARE OCCUPATIONAL THERAPY EVALUATION    Armando Mars, 1955, 2026/2026-A, 1/12/2024    Discharge Recommendation: Skilled Nursing Facility    History:  [place]:  The encounter diagnosis was COPD exacerbation (HCC).    Subjective:  Patient states: \"I get around just fine at home. Here is the problem!\"   Pain: Pt reported 7/10 pain in Rt leg at rest  Communication with other providers: PT ROSARIO Nelson  Restrictions: General Precautions, Fall Risk, 3L o2, Telemetry, Pulse Ox, BP cuff, Bed/chair alarm    Home Setup/Prior level of function:  Social/Functional History  Lives With: Alone  Type of Home: Apartment  Home Layout: One level  Home Access: Level entry  Bathroom Shower/Tub: Tub/Shower unit  Bathroom Equipment: Grab bars in shower  Home Equipment: Oxygen  ADL Assistance: Independent  Homemaking Assistance: Independent  Ambulation Assistance: Independent (uses no AD)  Transfer Assistance: Independent  Active : No  Occupation: Disabled  Patient's  Info: Brother or Dial-a-Ride    Examination:  Observation: Sitting in chair upon OT arrival. Agreeable to limited evaluation with encouragement/education.  Vision: WFL  Hearing: WFL  Vitals: Stable vitals throughout session on 3L o2    Body Systems and functions:  ROM: WNL all joints in BL UEs  Strength: 4+/5 MMT all major muscle groups BL UEs  Sensation: WFL in BL UEs (see PT evaluation for LE assessment)  Tone: Normal  Coordination: WFL for ADLs  Perception: WNL    Activities of Daily Living (ADLs):  Feeding: Independent   Grooming: SBA (seated; unable to tolerate in standing at sink this date)  UB bathing: SBA   LB bathing: Max A (reaching distal LEs, bottom, posterior thighs)  UB dressing: SBA   LB dressing: Dependent (donning BL socks)  Toileting: Dependent    Cognitive and Psychosocial Functioning:  Overall cognitive status: WFL (grossly; significantly decreased insight to the importance of regular 
Patient caused malfunctions to multiple pulse oximeter probes and disconnected himself from device at least once. Overnight oxygen study will need to be repeated.   
Patient experienced 14 beats of V-Tach. Patient nonsymptomatic. Patient denies dizziness. MD informed via perfect serve message. Print out of ECG in chart. Will continue to monitor  
Patient is refusing telemetry and continuous pulse 02 monitoring. Attending provider is aware.   
Patient was educated multiple times throughout the shift to use call light instead of turning bed alarm and assisting self to toilet.  Patient remains non-compliant.   
Per patient brother John:    Patients cpap is an invac, it has a sticker from [facility] in [place], his concentrator is through [employer].  
Physical Therapy      Attempted tx. Pt initially sits up on EOB, then states he does not want to get up and prefers to lay back down, stating he does not want therapy at this time. Will f/u when able.    Electronically signed by:    Tex Beltran PTA  1/14/2024, 10:35 AM      
Pt . Provider notified via perfect serve. Directed to give an additional 2 units of insulin.   
Pt educated on the importance of the bed alarm system and calling for help. Shows acceptance and verbalizes, but continues to attempt it on their own.  
Pt refusing to wear Pap due to thinking it is too loud  
Report called to jaime pt updated and denies any further needs at this time.    
Report given to 3E nurse at this time. All questions answered at this time.   
Report given to [facility], they have the correct packet placed in chart by Social work  
Spoke with pt regarding Bi-Pap.  He will wear tonight.  Will instruct night shift to assist with Bi-Pap.  
This RT attempted to place patient on BiPAP twice. Both times unsuccessful due to patient discomfort. Will continue to monitor.  
pulmonary      SUBJECTIVE:  doing fair     OBJECTIVE    VITALS:  BP 94/80   Pulse 69   Temp 97.7 °F (36.5 °C) (Oral)   Resp 16   Ht 1.981 m (6' 6\")   Wt (!) 155.4 kg (342 lb 11.2 oz)   SpO2 94%   BMI 39.60 kg/m²   HEAD AND FACE EXAM:  No throat injection, no active exudate,no thrush  NECK EXAM;No JVD, no masses, symmetrical  CHEST EXAM; Expansion equal and symmetrical, no masses  LUNG EXAM; Good breath sounds bilaterally. There are expiratory wheezes both lungs, there are crackles at both lung bases  CARDIOVASCULAR EXAM: Positive S1 and S2, no S3 or S4, no clicks ,no murmurs  RIGHT AND LEFT LOWER EXTRIMITY EXAM: No edema, no swelling, no inflamation  CNS EXAM: Alert and oriented X3          LABS   Lab Results   Component Value Date    WBC 12.5 (H) 01/14/2024    HGB 11.9 (L) 01/14/2024    HCT 38.0 (L) 01/14/2024    MCV 96.0 01/14/2024     01/14/2024     Lab Results   Component Value Date    CREATININE 0.5 (L) 01/14/2024    BUN 18 01/14/2024     01/14/2024    K 3.3 (L) 01/14/2024    CL 91 (L) 01/14/2024    CO2 39 (H) 01/14/2024     Lab Results   Component Value Date    INR 0.9 11/13/2023    PROTIME 12.8 11/13/2023          Lab Results   Component Value Date/Time    PHOS 4.5 01/14/2024 04:29 AM    PHOS 4.3 01/13/2024 04:02 AM    PHOS 4.2 01/12/2024 03:31 AM      No results for input(s): \"PH\", \"PO2ART\", \"[id number]\", \"HCO3\", \"BEART\", \"O2SAT\" in the last 72 hours.      Wt Readings from Last 3 Encounters:   01/14/24 (!) 155.4 kg (342 lb 11.2 oz)   01/06/24 (!) 158.7 kg (349 lb 13.9 oz)   01/01/24 (!) 172.5 kg (380 lb 4.7 oz)               ASSESMENT  Ac on ch resp failure  Ac copd  monica        PLAN  Bd rx  Steroids  Will try bipap tonite    1/14/2024  Neptali Keen MD, MSTEVEN.  
pulmonary      SUBJECTIVE:  he feels better     OBJECTIVE    VITALS:  /70   Pulse (!) 108   Temp 98.8 °F (37.1 °C) (Oral)   Resp 30   Ht 1.981 m (6' 6\")   Wt (!) 159.9 kg (352 lb 8.3 oz)   SpO2 96%   BMI 40.74 kg/m²   HEAD AND FACE EXAM:  No throat injection, no active exudate,no thrush  NECK EXAM;No JVD, no masses, symmetrical  CHEST EXAM; Expansion equal and symmetrical, no masses  LUNG EXAM; Good breath sounds bilaterally. There are expiratory wheezes both lungs, there are crackles at both lung bases  CARDIOVASCULAR EXAM: Positive S1 and S2, no S3 or S4, no clicks ,no murmurs  RIGHT AND LEFT LOWER EXTRIMITY EXAM: No edema, no swelling, no inflamation  CNS EXAM: Alert and oriented X3          LABS   Lab Results   Component Value Date    WBC 11.8 (H) 01/13/2024    HGB 12.1 (L) 01/13/2024    HCT 37.4 (L) 01/13/2024    MCV 94.0 01/13/2024     01/13/2024     Lab Results   Component Value Date    CREATININE 0.6 (L) 01/13/2024    BUN 19 01/13/2024     (L) 01/13/2024    K 3.7 01/13/2024    CL 84 (L) 01/13/2024    CO2 39 (H) 01/13/2024     Lab Results   Component Value Date    INR 0.9 11/13/2023    PROTIME 12.8 11/13/2023          Lab Results   Component Value Date/Time    PHOS 4.3 01/13/2024 04:02 AM    PHOS 4.2 01/12/2024 03:31 AM    PHOS 3.9 01/10/2024 05:15 AM      No results for input(s): \"PH\", \"PO2ART\", \"ZBJ8CAD\", \"HCO3\", \"BEART\", \"O2SAT\" in the last 72 hours.      Wt Readings from Last 3 Encounters:   01/13/24 (!) 159.9 kg (352 lb 8.3 oz)   01/06/24 (!) 158.7 kg (349 lb 13.9 oz)   01/01/24 (!) 172.5 kg (380 lb 4.7 oz)               ASSESMENT  Ac on ch resp failure  Ac copd  Ac on ch chf  monica        PLAN  Cpm  Cont o2  Bipap at night  As per pt nobody put it on him last night. I will make sure that he is put on bipap tonight    1/13/2024  Neptali Keen MD, M.D.  
pulmonary      SUBJECTIVE:  he has sob and on o2     OBJECTIVE    VITALS:  /79   Pulse (!) 102   Temp 97.9 °F (36.6 °C) (Oral)   Resp 21   Ht 1.981 m (6' 6\")   Wt (!) 160.4 kg (353 lb 9.9 oz)   SpO2 96%   BMI 40.86 kg/m²   HEAD AND FACE EXAM:  No throat injection, no active exudate,no thrush  NECK EXAM;No JVD, no masses, symmetrical  CHEST EXAM; Expansion equal and symmetrical, no masses  LUNG EXAM; Good breath sounds bilaterally. There are expiratory wheezes both lungs, there are crackles at both lung bases  CARDIOVASCULAR EXAM: Positive S1 and S2, no S3 or S4, no clicks ,no murmurs  RIGHT AND LEFT LOWER EXTRIMITY EXAM: No edema, no swelling, no inflamation  CNS EXAM: Alert and oriented X3          LABS   Lab Results   Component Value Date    WBC 18.2 (H) 01/12/2024    HGB 12.5 (L) 01/12/2024    HCT 40.6 (L) 01/12/2024    MCV 97.1 01/12/2024     01/12/2024     Lab Results   Component Value Date    CREATININE 0.6 (L) 01/12/2024    BUN 16 01/12/2024     01/12/2024    K 5.1 01/12/2024    CL 88 (L) 01/12/2024    CO2 44 (H) 01/12/2024     Lab Results   Component Value Date    INR 0.9 11/13/2023    PROTIME 12.8 11/13/2023          Lab Results   Component Value Date/Time    PHOS 4.2 01/12/2024 03:31 AM    PHOS 3.9 01/10/2024 05:15 AM    PHOS 3.6 11/16/2023 12:41 AM      No results for input(s): \"PH\", \"PO2ART\", \"RAS0JUX\", \"HCO3\", \"BEART\", \"O2SAT\" in the last 72 hours.      Wt Readings from Last 3 Encounters:   01/10/24 (!) 160.4 kg (353 lb 9.9 oz)   01/06/24 (!) 158.7 kg (349 lb 13.9 oz)   01/01/24 (!) 172.5 kg (380 lb 4.7 oz)               ASSESMENT  Ac on ch resp failure  Ac copd very sev  Ac on ch chf  monica        PLAN  O2 adm  Pap at night  Bd rx  Antibx on levaquin  Will give one dose solumedrol    1/12/2024  Neptali Keen MD, MJulianneD.   
pulmonary      SUBJECTIVE:  seen early am and sleeping     OBJECTIVE    VITALS:  /63   Pulse (!) 103   Temp 97.9 °F (36.6 °C) (Oral)   Resp 21   Ht 1.981 m (6' 6\")   Wt (!) 167.2 kg (368 lb 9.6 oz)   SpO2 93%   BMI 42.60 kg/m²   HEAD AND FACE EXAM:  No throat injection, no active exudate,no thrush  NECK EXAM;No JVD, no masses, symmetrical  CHEST EXAM; Expansion equal and symmetrical, no masses  LUNG EXAM; Good breath sounds bilaterally. There are expiratory wheezes both lungs, there are crackles at both lung bases  CARDIOVASCULAR EXAM: Positive S1 and S2, no S3 or S4, no clicks ,no murmurs  RIGHT AND LEFT LOWER EXTRIMITY EXAM: No edema, no swelling,           LABS   Lab Results   Component Value Date    WBC 10.7 (H) 01/15/2024    HGB 12.6 (L) 01/15/2024    HCT 40.1 (L) 01/15/2024    MCV 95.2 01/15/2024     01/15/2024     Lab Results   Component Value Date    CREATININE 0.5 (L) 01/15/2024    BUN 11 01/15/2024     01/15/2024    K 3.3 (L) 01/15/2024    CL 94 (L) 01/15/2024    CO2 38 (H) 01/15/2024     Lab Results   Component Value Date    INR 0.9 11/13/2023    PROTIME 12.8 11/13/2023          Lab Results   Component Value Date/Time    PHOS 4.5 01/14/2024 04:29 AM    PHOS 4.3 01/13/2024 04:02 AM    PHOS 4.2 01/12/2024 03:31 AM      No results for input(s): \"PH\", \"PO2ART\", \"LIV9LDF\", \"HCO3\", \"BEART\", \"O2SAT\" in the last 72 hours.      Wt Readings from Last 3 Encounters:   01/15/24 (!) 167.2 kg (368 lb 9.6 oz)   01/06/24 (!) 158.7 kg (349 lb 13.9 oz)   01/01/24 (!) 172.5 kg (380 lb 4.7 oz)               ASSESMENT  Ac on ch resp failure  Ac copd  Ac on ch chf  monica        PLAN  Cpm  Unable to tolerate pap last night    1/15/2024  Neptali Keen MD, M.D.   
pulmonary      SUBJECTIVE:  seen early am and sleeping     OBJECTIVE    VITALS:  /72   Pulse 74   Temp 97.8 °F (36.6 °C) (Oral)   Resp 18   Ht 1.981 m (6' 6\")   Wt (!) 160.6 kg (354 lb)   SpO2 95%   BMI 40.91 kg/m²   HEAD AND FACE EXAM:  No throat injection, no active exudate,no thrush  NECK EXAM;No JVD, no masses, symmetrical  CHEST EXAM; Expansion equal and symmetrical, no masses  LUNG EXAM; Good breath sounds bilaterally. There are expiratory wheezes both lungs, there are crackles at both lung bases  CARDIOVASCULAR EXAM: Positive S1 and S2, no S3 or S4, no clicks ,no murmurs  RIGHT AND LEFT LOWER EXTRIMITY EXAM: No edema, no swelling, no inflamation  CNS EXAM: Alert and oriented X3          LABS   Lab Results   Component Value Date    WBC 12.3 (H) 01/17/2024    HGB 12.1 (L) 01/17/2024    HCT 38.5 (L) 01/17/2024    MCV 95.1 01/17/2024     01/17/2024     Lab Results   Component Value Date    CREATININE 0.5 (L) 01/17/2024    BUN 13 01/17/2024     01/17/2024    K 3.0 (LL) 01/17/2024    CL 92 (L) 01/17/2024    CO2 34 (H) 01/17/2024     Lab Results   Component Value Date    INR 0.9 11/13/2023    PROTIME 12.8 11/13/2023          Lab Results   Component Value Date/Time    PHOS 4.5 01/14/2024 04:29 AM    PHOS 4.3 01/13/2024 04:02 AM    PHOS 4.2 01/12/2024 03:31 AM      No results for input(s): \"PH\", \"PO2ART\", \"HOA9RPT\", \"HCO3\", \"BEART\", \"O2SAT\" in the last 72 hours.      Wt Readings from Last 3 Encounters:   01/17/24 (!) 160.6 kg (354 lb)   01/06/24 (!) 158.7 kg (349 lb 13.9 oz)   01/01/24 (!) 172.5 kg (380 lb 4.7 oz)               ASSESMENT  Ac on ch resp failure  Ac copd  Sukumar  chf        PLAN  Bd rx  O2 adm  Dont use bipap  Agree with placement    1/17/2024  Neptali Keen MD, M.D.   
pulmonary      SUBJECTIVE:  sleeping but without bipap     OBJECTIVE    VITALS:  BP 93/75   Pulse 81   Temp 97.8 °F (36.6 °C) (Oral)   Resp 17   Ht 1.981 m (6' 6\")   Wt (!) 160.9 kg (354 lb 12.8 oz)   SpO2 96%   BMI 41.00 kg/m²   HEAD AND FACE EXAM:  No throat injection, no active exudate,no thrush  NECK EXAM;No JVD, no masses, symmetrical  CHEST EXAM; Expansion equal and symmetrical, no masses  LUNG EXAM; Good breath sounds bilaterally. There are expiratory wheezes both lungs, there are crackles at both lung bases  CARDIOVASCULAR EXAM: Positive S1 and S2, no S3 or S4, no clicks ,no murmurs            LABS   Lab Results   Component Value Date    WBC 11.7 (H) 01/16/2024    HGB 12.4 (L) 01/16/2024    HCT 39.1 (L) 01/16/2024    MCV 94.7 01/16/2024     01/16/2024     Lab Results   Component Value Date    CREATININE 0.5 (L) 01/16/2024    BUN 13 01/16/2024     01/16/2024    K 3.1 (L) 01/16/2024    CL 91 (L) 01/16/2024    CO2 36 (H) 01/16/2024     Lab Results   Component Value Date    INR 0.9 11/13/2023    PROTIME 12.8 11/13/2023          Lab Results   Component Value Date/Time    PHOS 4.5 01/14/2024 04:29 AM    PHOS 4.3 01/13/2024 04:02 AM    PHOS 4.2 01/12/2024 03:31 AM      No results for input(s): \"PH\", \"PO2ART\", \"MQY8EAB\", \"HCO3\", \"BEART\", \"O2SAT\" in the last 72 hours.      Wt Readings from Last 3 Encounters:   01/16/24 (!) 160.9 kg (354 lb 12.8 oz)   01/06/24 (!) 158.7 kg (349 lb 13.9 oz)   01/01/24 (!) 172.5 kg (380 lb 4.7 oz)               ASSESMENT  Ac on ch resp failure  Ac copd  Ac on ch chf  monica        PLAN  Cpm  O2 adm  Dc plans    1/16/2024  Neptali Keen MD, M.D.   
exam and increased oxygen requirement  -- Decreased 60 mg of furosemide 3 times daily to BID, and patient got two doses of diamox 250mg, cardiologist managing diuretics  -- Cardio on board, appreciate recs  -- Strict I's/O, daily weights and fluid restriction    A-fib with RVR - resolved  Patient on presentation was in A-fib, and flipped to afib w/ RVR, he is now rate controlled  -- Continue diltiazem 240 mg daily and Eliquis 5mg BID    Uncontrolled diabetes mellitus type 2 - improving  Blood glucose elevated as high as 400, the patient was on prednisone in the outpatient setting  -- Continue Accu-Cheks, A1c in 11/23 was 10.2  -- Cover with basal bolus regimen, bolus dose increased from 4 unit to 5  -- Continue sliding scale insulin and hypoglycemic protocol  -- ADA diet     COPD, chronic respiratory failure on home oxygen  Does not appear to be in exacerbation, no wheezing on auscultation  -- Continue home regimen, pulm consulted, patient is s/p 1 dose of methylprednisolone 125 mg.    LINA  -- Continue BiPAP nightly, and during naps as needed. Patient has not been compliant  -- Home BIPAP eval planned     Morbid obesity   -- with BMI 40.43      Diet ADULT ORAL NUTRITION SUPPLEMENT; Breakfast, Lunch; Low Calorie/High Protein Oral Supplement  ADULT DIET; Regular; 4 carb choices (60 gm/meal); Low Fat/Low Chol/High Fiber/2 gm Na; 1800 ml; No Concentrated sweets, Double Protein Portions   DVT Prophylaxis [] Lovenox, []  Heparin, [] SCDs, [] Ambulation,  [x] Eliquis, [] Xarelto  [] Coumadin   Code Status Full Code   Disposition From: Home  Expected Disposition: Home vs SNF  Estimated Date of Discharge: 1-2 days  Patient requires continued admission due to ARF w/ hypoxia   Surrogate Decision Maker/ POA       Personally reviewed Lab Studies and Imaging     Drugs that require monitoring for toxicity include lasix and the method of monitoring was renal functioning      Subjective:     Chief Complaint: Progressively worsening 
increased gait distances well and motivated to participate in therapy this session as compared to last.   Patient's tolerance of treatment:  Good   Adverse Reaction: none  Significant change in status and impact:  none  Barriers to improvement:  none  Plan for Next Session:    Plan to continue OOB activities.   Time in:  1411  Time out:  1430  Timed treatment minutes:  19  Total treatment time:  19    Previously filed items:  Social/Functional History  Lives With: Alone  Type of Home: Apartment  Home Layout: One level  Home Access: Level entry, Elevator  Bathroom Shower/Tub: Tub/Shower unit  Bathroom Toilet: Standard  Bathroom Equipment: Grab bars in shower  Bathroom Accessibility: Accessible  Home Equipment: Oxygen  Receives Help From: Family, Friend(s), Neighbor  ADL Assistance: Independent  Homemaking Assistance: Independent  Homemaking Responsibilities: Yes  Ambulation Assistance: Independent  Transfer Assistance: Independent  Active : No  Patient's  Info: Brother  Occupation: On disability        Short Term Goals  Time Frame for Short Term Goals: 1 week  Short Term Goal 1: Patient will perform all aspects of bed mobility with mod-I  Short Term Goal 2: Patient will perform functional transfers with FWW or LRAD and Rich x1  Short Term Goal 3: Patient will ambulate 50' with FWW or LRAD and Rich    Electronically signed by:    Tawana Carlson PTA  1/15/2024, 2:35 PM      
  PROBNP 3,427*     TROPONIN: No results for input(s): \"TROPONINT\" in the last 72 hours.     ECHO :   echocardiogram    Assessment:  68 y.o.year old who is admitted for  Chief Complaint   Patient presents with    Shortness of Breath    , active issues as noted below:  Impression:  Principal Problem:    Acute on chronic diastolic (congestive) heart failure ([facility])  Active Problems:    PVD (peripheral vascular disease) ([facility])    S/P ablation of atrial fibrillation    Type 2 diabetes mellitus with hyperglycemia    History of atrial fibrillation    Acute on chronic respiratory failure with hypoxia and hypercapnia ([facility])    PAF (paroxysmal atrial fibrillation) ([facility])    Acute on chronic respiratory failure ([facility])  Resolved Problems:    * No resolved hospital problems. *            All labs, medications and tests reviewed by myself , continue all other medications of all above medical condition listed as is except for changes mentioned above.    Thank you very much for consult , please call with questions.    Sayed Harsh Melendez MD, MD 1/11/2024 2:30 PM      
PROVIDED HISTORY: Reason for exam:->dyspnea Reason for Exam: dyspnea FINDINGS: Cardiac silhouette is enlarged.  Vascular congestion.  Multilobar parenchymal infiltrates are seen in the lungs bilaterally, with right upper lobe consolidation.  Interstitial changes at the lung bases appear similar to more remote exams.  No pneumothorax is identified.  No acute osseous abnormality is seen.     Similar appearing chest with persistent consolidative pneumonia in the right upper lobe.       CBC:   Recent Labs     01/10/24  0410   WBC 14.3*   HGB 11.9*        BMP:    Recent Labs     01/08/24  0543 01/10/24  0410    138   K 3.9 4.3   CL 93* 92*   CO2 34* 37*   BUN 13 17   CREATININE 0.5* 0.6*   GLUCOSE 224* 384*     Hepatic:   Recent Labs     01/10/24  0410   AST 21   ALT 48*   BILITOT 0.2   ALKPHOS 95     Lipids:   Lab Results   Component Value Date/Time    CHOL 159 03/01/2023 08:34 AM    CHOL 112 12/15/2021 01:35 PM    HDL 29 03/01/2023 08:34 AM    TRIG 182 03/01/2023 08:34 AM     Hemoglobin A1C:   Lab Results   Component Value Date/Time    LABA1C 10.2 11/13/2023 01:00 PM     TSH:   Lab Results   Component Value Date/Time    TSH 2.15 03/06/2019 10:22 AM     Troponin:   Lab Results   Component Value Date/Time    TROPONINT <0.010 09/18/2023 12:45 AM    TROPONINT <0.010 09/13/2023 07:15 AM    TROPONINT <0.010 08/14/2023 09:30 AM     Lactic Acid: No results for input(s): \"LACTA\" in the last 72 hours.  BNP:   Recent Labs     01/10/24  0410   PROBNP 3,427*     UA:  Lab Results   Component Value Date/Time    NITRU NEGATIVE 11/20/2023 02:26 PM    COLORU YELLOW 11/20/2023 02:26 PM    WBCUA <1 09/13/2023 08:09 AM    RBCUA 0 09/13/2023 08:09 AM    MUCUS RARE 11/07/2018 04:30 PM    TRICHOMONAS NONE SEEN 09/13/2023 08:09 AM    BACTERIA NEGATIVE 09/13/2023 08:09 AM    CLARITYU CLEAR 11/20/2023 02:26 PM    SPECGRAV 1.020 11/20/2023 02:26 PM    LEUKOCYTESUR NEGATIVE 11/20/2023 02:26 PM    UROBILINOGEN 0.2 11/20/2023 02:26 PM 
SubCUTAneous TID WC    insulin lispro  0-4 Units SubCUTAneous Nightly      Infusions:    sodium chloride      dextrose       PRN Meds: sodium chloride flush, 5-40 mL, PRN  sodium chloride, , PRN  ondansetron, 4 mg, Q8H PRN   Or  ondansetron, 4 mg, Q6H PRN  polyethylene glycol, 17 g, Daily PRN  acetaminophen, 650 mg, Q6H PRN   Or  acetaminophen, 650 mg, Q6H PRN  potassium chloride, 40 mEq, PRN   Or  potassium alternative oral replacement, 40 mEq, PRN   Or  potassium chloride, 10 mEq, PRN  magnesium sulfate, 2,000 mg, PRN  hydrOXYzine HCl, 10 mg, TID PRN  glucose, 4 tablet, PRN  dextrose bolus, 125 mL, PRN   Or  dextrose bolus, 250 mL, PRN  glucagon (rDNA), 1 mg, PRN  dextrose, , Continuous PRN        Labs and Imaging   XR CHEST PORTABLE    Result Date: 1/10/2024  EXAMINATION: ONE XRAY VIEW OF THE CHEST 1/10/2024 5:01 am COMPARISON: 01/03/2024, 12/23/2023 HISTORY: ORDERING SYSTEM PROVIDED HISTORY: dyspnea TECHNOLOGIST PROVIDED HISTORY: Reason for exam:->dyspnea Reason for Exam: dyspnea FINDINGS: Cardiac silhouette is enlarged.  Vascular congestion.  Multilobar parenchymal infiltrates are seen in the lungs bilaterally, with right upper lobe consolidation.  Interstitial changes at the lung bases appear similar to more remote exams.  No pneumothorax is identified.  No acute osseous abnormality is seen.     Similar appearing chest with persistent consolidative pneumonia in the right upper lobe.       CBC:   Recent Labs     01/12/24  0331 01/13/24  0402 01/14/24 0429   WBC 18.2* 11.8* 12.5*   HGB 12.5* 12.1* 11.9*    230 202     BMP:    Recent Labs     01/12/24  0331 01/13/24  0402 01/14/24  0429    132* 138   K 5.1 3.7 3.3*   CL 88* 84* 91*   CO2 44* 39* 39*   BUN 16 19 18   CREATININE 0.6* 0.6* 0.5*   GLUCOSE 175* 348* 241*     Hepatic:   No results for input(s): \"AST\", \"ALT\", \"ALB\", \"BILITOT\", \"ALKPHOS\" in the last 72 hours.    Lipids:   Lab Results   Component Value Date/Time    CHOL 159 03/01/2023 08:34 
2.15 03/06/2019 10:22 AM     Troponin:   Lab Results   Component Value Date/Time    TROPONINT <0.010 09/18/2023 12:45 AM    TROPONINT <0.010 09/13/2023 07:15 AM    TROPONINT <0.010 08/14/2023 09:30 AM     Lactic Acid: No results for input(s): \"LACTA\" in the last 72 hours.  BNP:   Recent Labs     01/15/24  0824   PROBNP 1,146*       UA:  Lab Results   Component Value Date/Time    NITRU NEGATIVE 11/20/2023 02:26 PM    COLORU YELLOW 11/20/2023 02:26 PM    WBCUA <1 09/13/2023 08:09 AM    RBCUA 0 09/13/2023 08:09 AM    MUCUS RARE 11/07/2018 04:30 PM    TRICHOMONAS NONE SEEN 09/13/2023 08:09 AM    BACTERIA NEGATIVE 09/13/2023 08:09 AM    CLARITYU CLEAR 11/20/2023 02:26 PM    SPECGRAV 1.020 11/20/2023 02:26 PM    LEUKOCYTESUR NEGATIVE 11/20/2023 02:26 PM    UROBILINOGEN 0.2 11/20/2023 02:26 PM    BILIRUBINUR NEGATIVE 11/20/2023 02:26 PM    BLOODU NEGATIVE 11/20/2023 02:26 PM    KETUA NEGATIVE 11/20/2023 02:26 PM     Urine Cultures: No results found for: \"LABURIN\"  Blood Cultures: No results found for: \"BC\"  No results found for: \"BLOODCULT2\"  Organism: No results found for: \"ORG\"      Electronically signed by Jarad Cox MD on 1/17/2024 at 10:58 AM        
Results   Component Value Date/Time    LABA1C 10.2 11/13/2023 01:00 PM     TSH:   Lab Results   Component Value Date/Time    TSH 2.15 03/06/2019 10:22 AM     Troponin:   Lab Results   Component Value Date/Time    TROPONINT <0.010 09/18/2023 12:45 AM    TROPONINT <0.010 09/13/2023 07:15 AM    TROPONINT <0.010 08/14/2023 09:30 AM     Lactic Acid: No results for input(s): \"LACTA\" in the last 72 hours.  BNP:   No results for input(s): \"PROBNP\" in the last 72 hours.    UA:  Lab Results   Component Value Date/Time    NITRU NEGATIVE 11/20/2023 02:26 PM    COLORU YELLOW 11/20/2023 02:26 PM    WBCUA <1 09/13/2023 08:09 AM    RBCUA 0 09/13/2023 08:09 AM    MUCUS RARE 11/07/2018 04:30 PM    TRICHOMONAS NONE SEEN 09/13/2023 08:09 AM    BACTERIA NEGATIVE 09/13/2023 08:09 AM    CLARITYU CLEAR 11/20/2023 02:26 PM    SPECGRAV 1.020 11/20/2023 02:26 PM    LEUKOCYTESUR NEGATIVE 11/20/2023 02:26 PM    UROBILINOGEN 0.2 11/20/2023 02:26 PM    BILIRUBINUR NEGATIVE 11/20/2023 02:26 PM    BLOODU NEGATIVE 11/20/2023 02:26 PM    KETUA NEGATIVE 11/20/2023 02:26 PM     Urine Cultures: No results found for: \"LABURIN\"  Blood Cultures: No results found for: \"BC\"  No results found for: \"BLOODCULT2\"  Organism: No results found for: \"ORG\"      Electronically signed by Ananya Jeffrey MD on 1/15/2024 at 7:38 AM

## 2024-01-17 NOTE — PLAN OF CARE
Problem: Discharge Planning  Goal: Discharge to home or other facility with appropriate resources  1/10/2024 1315 by Quin Ponce RN  Outcome: Progressing  1/10/2024 1314 by Quin Ponce RN  Outcome: Progressing  1/10/2024 1314 by Quin Ponce RN  Outcome: Progressing     Problem: Safety - Adult  Goal: Free from fall injury  1/10/2024 1315 by Quin Ponce RN  Outcome: Progressing  1/10/2024 1314 by Quin Ponce RN  Outcome: Progressing  1/10/2024 1314 by Quin Ponce RN  Outcome: Progressing     Problem: Respiratory - Adult  Goal: Achieves optimal ventilation and oxygenation  1/10/2024 1315 by Quin Ponce RN  Outcome: Progressing  1/10/2024 1314 by Quin Ponce RN  Outcome: Progressing  1/10/2024 1314 by Quin Ponce RN  Reactivated     Problem: Cardiovascular - Adult  Goal: Maintains optimal cardiac output and hemodynamic stability  1/10/2024 1315 by Quin Ponce RN  Outcome: Progressing  1/10/2024 1314 by Quin Ponce RN  Outcome: Progressing  1/10/2024 1314 by Quin Ponce RN  Reactivated     Problem: Metabolic/Fluid and Electrolytes - Adult  Goal: Electrolytes maintained within normal limits  1/10/2024 1315 by Quin Ponce RN  Outcome: Progressing  1/10/2024 1314 by Quin Ponce RN  Outcome: Progressing  1/10/2024 1314 by Quin Ponce RN  Reactivated     
  Problem: Discharge Planning  Goal: Discharge to home or other facility with appropriate resources  1/13/2024 2337 by Debbi Boss RN  Outcome: Progressing  1/13/2024 1805 by Yesi Pantoja RN  Outcome: Progressing     Problem: Safety - Adult  Goal: Free from fall injury  1/13/2024 2337 by Debbi Boss RN  Outcome: Progressing  1/13/2024 1805 by Yesi Pantoja, RN  Outcome: Progressing     Problem: Respiratory - Adult  Goal: Achieves optimal ventilation and oxygenation  1/13/2024 1805 by Yesi Pantoja, RN  Outcome: Progressing     
  Problem: Discharge Planning  Goal: Discharge to home or other facility with appropriate resources  1/13/2024 2350 by Debbi Boss RN  Outcome: Progressing  1/13/2024 2337 by Debbi Boss RN  Outcome: Progressing  1/13/2024 1805 by Yesi Pantoja RN  Outcome: Progressing     Problem: Respiratory - Adult  Goal: Achieves optimal ventilation and oxygenation  1/13/2024 2350 by Debbi Boss RN  Outcome: Progressing  1/13/2024 1805 by Yesi Pantoja RN  Outcome: Progressing     Problem: Safety - Adult  Goal: Free from fall injury  1/13/2024 2350 by Debbi Boss RN  Outcome: Progressing  1/13/2024 2337 by Debbi Boss RN  Outcome: Progressing  1/13/2024 1805 by Yesi Pantoja RN  Outcome: Progressing     
  Problem: Discharge Planning  Goal: Discharge to home or other facility with appropriate resources  1/15/2024 2251 by Benjamin Bahena RN  Outcome: Progressing  1/15/2024 2250 by Benjamin Bahena RN  Outcome: Progressing  1/15/2024 1004 by Suzanne Feliz RN  Outcome: Progressing     Problem: Safety - Adult  Goal: Free from fall injury  1/15/2024 2251 by Benjamin Bahena RN  Outcome: Progressing  1/15/2024 2250 by Benjamin Bahena RN  Outcome: Progressing  1/15/2024 1004 by Suzanne Feliz RN  Outcome: Progressing     Problem: Respiratory - Adult  Goal: Achieves optimal ventilation and oxygenation  1/15/2024 2251 by Benjamin Bahena RN  Outcome: Progressing  1/15/2024 2250 by Benjamin Bahena RN  Outcome: Progressing     Problem: Metabolic/Fluid and Electrolytes - Adult  Goal: Glucose maintained within prescribed range  1/15/2024 2251 by Benjamin Bahena RN  Outcome: Progressing  1/15/2024 2250 by Benjamin Bahena RN  Outcome: Progressing     Problem: Skin/Tissue Integrity - Adult  Goal: Skin integrity remains intact  1/15/2024 2251 by Benjamin Bahena RN  Outcome: Progressing  1/15/2024 2250 by Benjamin Bahena RN  Outcome: Progressing     Problem: Musculoskeletal - Adult  Goal: Return mobility to safest level of function  1/15/2024 2251 by Benjamin Bahena RN  Outcome: Progressing  1/15/2024 2250 by Benjamin Bahena RN  Outcome: Progressing     Problem: Musculoskeletal - Adult  Goal: Maintain proper alignment of affected body part  1/15/2024 2251 by Benjamin Bahena RN  Outcome: Progressing  1/15/2024 2250 by Benjamin Bahena RN  Outcome: Progressing     Problem: Musculoskeletal - Adult  Goal: Return ADL status to a safe level of function  1/15/2024 2251 by Benjamin Bahena RN  Outcome: Progressing  1/15/2024 2250 by Benjamin Bahena RN  Outcome: Progressing     
  Problem: Discharge Planning  Goal: Discharge to home or other facility with appropriate resources  1/16/2024 1112 by Suzanne Feliz RN  Outcome: Progressing  1/15/2024 2251 by Benjamin Bahena RN  Outcome: Progressing  1/15/2024 2250 by Benjamin Bahena RN  Outcome: Progressing     Problem: Safety - Adult  Goal: Free from fall injury  1/16/2024 1112 by Suzanne Feliz RN  Outcome: Progressing  1/15/2024 2251 by Benjamin Bahena RN  Outcome: Progressing  1/15/2024 2250 by Benjamin Bahena RN  Outcome: Progressing     Problem: Respiratory - Adult  Goal: Achieves optimal ventilation and oxygenation  1/15/2024 2251 by Benjamin Bahena RN  Outcome: Progressing  1/15/2024 2250 by Benjamin Bahena RN  Outcome: Progressing     Problem: Metabolic/Fluid and Electrolytes - Adult  Goal: Glucose maintained within prescribed range  1/15/2024 2251 by Benjamin Bahena RN  Outcome: Progressing  1/15/2024 2250 by Benjamin Bahena RN  Outcome: Progressing     Problem: Skin/Tissue Integrity - Adult  Goal: Skin integrity remains intact  1/15/2024 2251 by Bnejamin Bahena RN  Outcome: Progressing  1/15/2024 2250 by Benjamin Bahena RN  Outcome: Progressing     Problem: Musculoskeletal - Adult  Goal: Return mobility to safest level of function  1/15/2024 2251 by Benjamin Bahena RN  Outcome: Progressing  1/15/2024 2250 by Benjamin Bahena RN  Outcome: Progressing  Goal: Maintain proper alignment of affected body part  1/15/2024 2251 by Benjamin Bahena RN  Outcome: Progressing  1/15/2024 2250 by Benjamin Bahena RN  Outcome: Progressing  Goal: Return ADL status to a safe level of function  1/15/2024 2251 by Benjamin Bahena RN  Outcome: Progressing  1/15/2024 2250 by Benjamin Bahena RN  Outcome: Progressing     
  Problem: Discharge Planning  Goal: Discharge to home or other facility with appropriate resources  Outcome: Progressing     Problem: Safety - Adult  Goal: Free from fall injury  Outcome: Progressing     
  Problem: Discharge Planning  Goal: Discharge to home or other facility with appropriate resources  Outcome: Progressing     Problem: Safety - Adult  Goal: Free from fall injury  Outcome: Progressing     Problem: Discharge Planning  Goal: Discharge to home or other facility with appropriate resources  1/10/2024 1314 by Quin Ponce RN  Outcome: Progressing  1/10/2024 1314 by Quin Ponce RN  Outcome: Progressing     Problem: Safety - Adult  Goal: Free from fall injury  1/10/2024 1314 by Quin Ponce RN  Outcome: Progressing  1/10/2024 1314 by Quin Ponce RN  Outcome: Progressing     Problem: Respiratory - Adult  Goal: Achieves optimal ventilation and oxygenation  1/10/2024 1314 by Quin Ponce RN  Outcome: Progressing  1/10/2024 1314 by Quin Ponce RN  Reactivated     Problem: Cardiovascular - Adult  Goal: Maintains optimal cardiac output and hemodynamic stability  1/10/2024 1314 by Quin Ponce RN  Outcome: Progressing  1/10/2024 1314 by Quin Ponce RN  Reactivated     Problem: Metabolic/Fluid and Electrolytes - Adult  Goal: Electrolytes maintained within normal limits  1/10/2024 1314 by Quin Ponce RN  Outcome: Progressing  1/10/2024 1314 by Quin Ponce RN  Reactivated     
  Problem: Discharge Planning  Goal: Discharge to home or other facility with appropriate resources  Outcome: Progressing     Problem: Safety - Adult  Goal: Free from fall injury  Outcome: Progressing     Problem: Respiratory - Adult  Goal: Achieves optimal ventilation and oxygenation  Outcome: Progressing     Problem: Cardiovascular - Adult  Goal: Maintains optimal cardiac output and hemodynamic stability  Outcome: Progressing     Problem: Metabolic/Fluid and Electrolytes - Adult  Goal: Electrolytes maintained within normal limits  Outcome: Progressing     Problem: Chronic Conditions and Co-morbidities  Goal: Patient's chronic conditions and co-morbidity symptoms are monitored and maintained or improved  Outcome: Progressing     Problem: Pain  Goal: Verbalizes/displays adequate comfort level or baseline comfort level  Outcome: Progressing     Problem: Skin/Tissue Integrity  Goal: Absence of new skin breakdown  Description: 1.  Monitor for areas of redness and/or skin breakdown  2.  Assess vascular access sites hourly  3.  Every 4-6 hours minimum:  Change oxygen saturation probe site  4.  Every 4-6 hours:  If on nasal continuous positive airway pressure, respiratory therapy assess nares and determine need for appliance change or resting period.  Outcome: Progressing     
  Problem: Discharge Planning  Goal: Discharge to home or other facility with appropriate resources  Outcome: Progressing     Problem: Safety - Adult  Goal: Free from fall injury  Outcome: Progressing     Problem: Respiratory - Adult  Goal: Achieves optimal ventilation and oxygenation  Outcome: Progressing     Problem: Cardiovascular - Adult  Goal: Maintains optimal cardiac output and hemodynamic stability  Outcome: Progressing  Goal: Absence of cardiac dysrhythmias or at baseline  Outcome: Progressing     Problem: Metabolic/Fluid and Electrolytes - Adult  Goal: Electrolytes maintained within normal limits  Outcome: Progressing  Goal: Hemodynamic stability and optimal renal function maintained  Outcome: Progressing  Goal: Glucose maintained within prescribed range  Outcome: Progressing     Problem: Chronic Conditions and Co-morbidities  Goal: Patient's chronic conditions and co-morbidity symptoms are monitored and maintained or improved  Outcome: Progressing     Problem: Musculoskeletal - Adult  Goal: Return mobility to safest level of function  Outcome: Progressing  Goal: Maintain proper alignment of affected body part  Outcome: Progressing  Goal: Return ADL status to a safe level of function  Outcome: Progressing     Problem: Neurosensory - Adult  Goal: Achieves stable or improved neurological status  Outcome: Progressing  Goal: Achieves maximal functionality and self care  Outcome: Progressing     Problem: Anxiety  Goal: Will report anxiety at manageable levels  Description: INTERVENTIONS:  1. Administer medication as ordered  2. Teach and rehearse alternative coping skills  3. Provide emotional support with 1:1 interaction with staff  Outcome: Progressing     
part  Outcome: Progressing  Goal: Return ADL status to a safe level of function  Outcome: Progressing     Problem: Neurosensory - Adult  Goal: Achieves stable or improved neurological status  Outcome: Progressing  Goal: Achieves maximal functionality and self care  Outcome: Progressing     Problem: Gastrointestinal - Adult  Goal: Minimal or absence of nausea and vomiting  Outcome: Progressing  Goal: Maintains or returns to baseline bowel function  Outcome: Progressing  Goal: Maintains adequate nutritional intake  Outcome: Progressing     Problem: Genitourinary - Adult  Goal: Absence of urinary retention  Outcome: Progressing     Problem: Infection - Adult  Goal: Absence of infection at discharge  Outcome: Progressing  Goal: Absence of infection during hospitalization  Outcome: Progressing     Problem: Hematologic - Adult  Goal: Maintains hematologic stability  Outcome: Progressing     Problem: Anxiety  Goal: Will report anxiety at manageable levels  Description: INTERVENTIONS:  1. Administer medication as ordered  2. Teach and rehearse alternative coping skills  3. Provide emotional support with 1:1 interaction with staff  Outcome: Progressing     Problem: Decision Making  Goal: Pt/Family able to effectively weigh alternatives and participate in decision making related to treatment and care  Description: INTERVENTIONS:  1. Determine when there are differences between patient's view, family's view, and healthcare provider's view of condition  2. Facilitate patient and family articulation of goals for care  3. Help patient and family identify pros/cons of alternative solutions  4. Provide information as requested by patient/family  5. Respect patient/family right to receive or not to receive information  6. Serve as a liaison between patient and family and health care team  7. Initiate Consults from Ethics, Palliative Care or initiate Family Care Conference as is appropriate  Outcome: Progressing     Problem:

## 2024-01-17 NOTE — CARE COORDINATION
SW outreach deferred as Pt was d/c from hospital to SNF on this date. Pt has been approved for Pass[employer] services and has been linked with visiting physician during the time that this SW has worked with Pt.     SW plan of care: SW will resolve from SW services due to SNF admission.

## 2024-01-19 RX ORDER — FUROSEMIDE 40 MG/1
40 TABLET ORAL 2 TIMES DAILY
Qty: 60 TABLET | Refills: 10 | OUTPATIENT
Start: 2024-01-19

## 2024-01-24 ENCOUNTER — CARE COORDINATION (OUTPATIENT)
Dept: CARE COORDINATION | Age: 69
End: 2024-01-24

## 2024-01-24 NOTE — CARE COORDINATION
ACM made outreach to patient for care management. UTR VM is full. ACM will attempt at a later date/time.

## 2024-01-31 ENCOUNTER — CARE COORDINATION (OUTPATIENT)
Dept: CARE COORDINATION | Age: 69
End: 2024-01-31

## 2024-02-04 ENCOUNTER — HOSPITAL ENCOUNTER (INPATIENT)
Age: 69
LOS: 6 days | Discharge: NURSING, REHAB OR LONG-TERM CARE | End: 2024-02-10
Attending: STUDENT IN AN ORGANIZED HEALTH CARE EDUCATION/TRAINING PROGRAM | Admitting: STUDENT IN AN ORGANIZED HEALTH CARE EDUCATION/TRAINING PROGRAM
Payer: MEDICARE

## 2024-02-04 ENCOUNTER — APPOINTMENT (OUTPATIENT)
Dept: GENERAL RADIOLOGY | Age: 69
End: 2024-02-04
Payer: MEDICARE

## 2024-02-04 DIAGNOSIS — A41.9 SEPTICEMIA (HCC): ICD-10-CM

## 2024-02-04 DIAGNOSIS — I50.43 ACUTE ON CHRONIC COMBINED SYSTOLIC AND DIASTOLIC CONGESTIVE HEART FAILURE (HCC): ICD-10-CM

## 2024-02-04 DIAGNOSIS — J18.9 PNEUMONIA OF BOTH LOWER LOBES DUE TO INFECTIOUS ORGANISM: Primary | ICD-10-CM

## 2024-02-04 DIAGNOSIS — J96.01 ACUTE RESPIRATORY FAILURE WITH HYPOXEMIA (HCC): ICD-10-CM

## 2024-02-04 LAB
ALBUMIN SERPL-MCNC: 3.1 GM/DL (ref 3.4–5)
ALP BLD-CCNC: 95 IU/L (ref 40–128)
ALT SERPL-CCNC: 11 U/L (ref 10–40)
ANION GAP SERPL CALCULATED.3IONS-SCNC: 16 MMOL/L (ref 7–16)
AST SERPL-CCNC: 11 IU/L (ref 15–37)
B PARAP IS1001 DNA NPH QL NAA+NON-PROBE: NOT DETECTED
B PERT.PT PRMT NPH QL NAA+NON-PROBE: NOT DETECTED
BASE EXCESS MIXED: 6 (ref 0–3)
BASOPHILS ABSOLUTE: 0 K/CU MM
BASOPHILS RELATIVE PERCENT: 0.2 % (ref 0–1)
BILIRUB SERPL-MCNC: 0.4 MG/DL (ref 0–1)
BUN SERPL-MCNC: 8 MG/DL (ref 6–23)
C PNEUM DNA NPH QL NAA+NON-PROBE: NOT DETECTED
CALCIUM SERPL-MCNC: 8.4 MG/DL (ref 8.3–10.6)
CHLORIDE BLD-SCNC: 89 MMOL/L (ref 99–110)
CO2: 28 MMOL/L (ref 21–32)
CREAT SERPL-MCNC: 0.7 MG/DL (ref 0.9–1.3)
DIFFERENTIAL TYPE: ABNORMAL
EOSINOPHILS ABSOLUTE: 0 K/CU MM
EOSINOPHILS RELATIVE PERCENT: 0.1 % (ref 0–3)
FLUAV H1 2009 PAN RNA NPH NAA+NON-PROBE: NOT DETECTED
FLUAV H1 RNA NPH QL NAA+NON-PROBE: NOT DETECTED
FLUAV H3 RNA NPH QL NAA+NON-PROBE: NOT DETECTED
FLUAV RNA NPH QL NAA+NON-PROBE: NOT DETECTED
FLUBV RNA NPH QL NAA+NON-PROBE: NOT DETECTED
GFR SERPL CREATININE-BSD FRML MDRD: >60 ML/MIN/1.73M2
GLUCOSE SERPL-MCNC: 204 MG/DL (ref 70–99)
HADV DNA NPH QL NAA+NON-PROBE: NOT DETECTED
HCO3 VENOUS: 33.3 MMOL/L (ref 22–29)
HCOV 229E RNA NPH QL NAA+NON-PROBE: NOT DETECTED
HCOV HKU1 RNA NPH QL NAA+NON-PROBE: NOT DETECTED
HCOV NL63 RNA NPH QL NAA+NON-PROBE: NOT DETECTED
HCOV OC43 RNA NPH QL NAA+NON-PROBE: NOT DETECTED
HCT VFR BLD CALC: 37.5 % (ref 42–52)
HEMOGLOBIN: 11.6 GM/DL (ref 13.5–18)
HMPV RNA NPH QL NAA+NON-PROBE: NOT DETECTED
HPIV1 RNA NPH QL NAA+NON-PROBE: NOT DETECTED
HPIV2 RNA NPH QL NAA+NON-PROBE: NOT DETECTED
HPIV3 RNA NPH QL NAA+NON-PROBE: NOT DETECTED
HPIV4 RNA NPH QL NAA+NON-PROBE: NOT DETECTED
IMMATURE NEUTROPHIL %: 0.6 % (ref 0–0.43)
LACTIC ACID, SEPSIS: 4.4 MMOL/L (ref 0.4–2)
LACTIC ACID, SEPSIS: 4.6 MMOL/L (ref 0.4–2)
LYMPHOCYTES ABSOLUTE: 0.6 K/CU MM
LYMPHOCYTES RELATIVE PERCENT: 5.7 % (ref 24–44)
M PNEUMO DNA NPH QL NAA+NON-PROBE: NOT DETECTED
MCH RBC QN AUTO: 29.1 PG (ref 27–31)
MCHC RBC AUTO-ENTMCNC: 30.9 % (ref 32–36)
MCV RBC AUTO: 94.2 FL (ref 78–100)
MONOCYTES ABSOLUTE: 0.3 K/CU MM
MONOCYTES RELATIVE PERCENT: 2.6 % (ref 0–4)
NUCLEATED RBC %: 0 %
O2 SAT, VEN: 94.3 % (ref 50–70)
PCO2, VEN: 59 MMHG (ref 41–51)
PDW BLD-RTO: 15.1 % (ref 11.7–14.9)
PH VENOUS: 7.36 (ref 7.32–7.43)
PLATELET # BLD: 259 K/CU MM (ref 140–440)
PMV BLD AUTO: 9.8 FL (ref 7.5–11.1)
PO2, VEN: 116 MMHG (ref 28–48)
POTASSIUM SERPL-SCNC: 4.8 MMOL/L (ref 3.5–5.1)
PRO-BNP: 3276 PG/ML
PROCALCITONIN SERPL-MCNC: 0.15 NG/ML
RBC # BLD: 3.98 M/CU MM (ref 4.6–6.2)
RSV RNA NPH QL NAA+NON-PROBE: DETECTED
RV+EV RNA NPH QL NAA+NON-PROBE: NOT DETECTED
SARS-COV-2 RNA NPH QL NAA+NON-PROBE: NOT DETECTED
SEGMENTED NEUTROPHILS ABSOLUTE COUNT: 9.8 K/CU MM
SEGMENTED NEUTROPHILS RELATIVE PERCENT: 90.8 % (ref 36–66)
SODIUM BLD-SCNC: 133 MMOL/L (ref 135–145)
TOTAL IMMATURE NEUTOROPHIL: 0.06 K/CU MM
TOTAL NUCLEATED RBC: 0 K/CU MM
TOTAL PROTEIN: 6.4 GM/DL (ref 6.4–8.2)
TROPONIN, HIGH SENSITIVITY: 25 NG/L (ref 0–22)
TROPONIN, HIGH SENSITIVITY: 29 NG/L (ref 0–22)
WBC # BLD: 10.8 K/CU MM (ref 4–10.5)

## 2024-02-04 PROCEDURE — 83605 ASSAY OF LACTIC ACID: CPT

## 2024-02-04 PROCEDURE — 94640 AIRWAY INHALATION TREATMENT: CPT

## 2024-02-04 PROCEDURE — 96365 THER/PROPH/DIAG IV INF INIT: CPT

## 2024-02-04 PROCEDURE — 84145 PROCALCITONIN (PCT): CPT

## 2024-02-04 PROCEDURE — 87040 BLOOD CULTURE FOR BACTERIA: CPT

## 2024-02-04 PROCEDURE — 2580000003 HC RX 258

## 2024-02-04 PROCEDURE — 99285 EMERGENCY DEPT VISIT HI MDM: CPT

## 2024-02-04 PROCEDURE — 6370000000 HC RX 637 (ALT 250 FOR IP)

## 2024-02-04 PROCEDURE — 2700000000 HC OXYGEN THERAPY PER DAY

## 2024-02-04 PROCEDURE — 85025 COMPLETE CBC W/AUTO DIFF WBC: CPT

## 2024-02-04 PROCEDURE — 83880 ASSAY OF NATRIURETIC PEPTIDE: CPT

## 2024-02-04 PROCEDURE — 93005 ELECTROCARDIOGRAM TRACING: CPT

## 2024-02-04 PROCEDURE — 82805 BLOOD GASES W/O2 SATURATION: CPT

## 2024-02-04 PROCEDURE — 84484 ASSAY OF TROPONIN QUANT: CPT

## 2024-02-04 PROCEDURE — 6360000002 HC RX W HCPCS

## 2024-02-04 PROCEDURE — 1200000000 HC SEMI PRIVATE

## 2024-02-04 PROCEDURE — 0202U NFCT DS 22 TRGT SARS-COV-2: CPT

## 2024-02-04 PROCEDURE — 83036 HEMOGLOBIN GLYCOSYLATED A1C: CPT

## 2024-02-04 PROCEDURE — 71045 X-RAY EXAM CHEST 1 VIEW: CPT

## 2024-02-04 PROCEDURE — 80053 COMPREHEN METABOLIC PANEL: CPT

## 2024-02-04 RX ORDER — LANOLIN ALCOHOL/MO/W.PET/CERES
400 CREAM (GRAM) TOPICAL DAILY
Status: DISCONTINUED | OUTPATIENT
Start: 2024-02-05 | End: 2024-02-10 | Stop reason: HOSPADM

## 2024-02-04 RX ORDER — INSULIN LISPRO 100 [IU]/ML
0-4 INJECTION, SOLUTION INTRAVENOUS; SUBCUTANEOUS NIGHTLY
Status: DISCONTINUED | OUTPATIENT
Start: 2024-02-04 | End: 2024-02-10 | Stop reason: HOSPADM

## 2024-02-04 RX ORDER — TORSEMIDE 20 MG/1
20 TABLET ORAL 2 TIMES DAILY
Status: DISCONTINUED | OUTPATIENT
Start: 2024-02-05 | End: 2024-02-10 | Stop reason: HOSPADM

## 2024-02-04 RX ORDER — INSULIN LISPRO 100 [IU]/ML
0-4 INJECTION, SOLUTION INTRAVENOUS; SUBCUTANEOUS
Status: DISCONTINUED | OUTPATIENT
Start: 2024-02-05 | End: 2024-02-10 | Stop reason: HOSPADM

## 2024-02-04 RX ORDER — INSULIN LISPRO 100 [IU]/ML
5 INJECTION, SOLUTION INTRAVENOUS; SUBCUTANEOUS
Status: DISCONTINUED | OUTPATIENT
Start: 2024-02-05 | End: 2024-02-07

## 2024-02-04 RX ORDER — ACETAMINOPHEN 650 MG/1
650 SUPPOSITORY RECTAL EVERY 6 HOURS PRN
Status: DISCONTINUED | OUTPATIENT
Start: 2024-02-04 | End: 2024-02-10 | Stop reason: HOSPADM

## 2024-02-04 RX ORDER — IPRATROPIUM BROMIDE AND ALBUTEROL SULFATE 2.5; .5 MG/3ML; MG/3ML
3 SOLUTION RESPIRATORY (INHALATION) ONCE
Status: COMPLETED | OUTPATIENT
Start: 2024-02-04 | End: 2024-02-04

## 2024-02-04 RX ORDER — INSULIN GLARGINE 100 [IU]/ML
30 INJECTION, SOLUTION SUBCUTANEOUS NIGHTLY
Status: DISCONTINUED | OUTPATIENT
Start: 2024-02-04 | End: 2024-02-07

## 2024-02-04 RX ORDER — IPRATROPIUM BROMIDE AND ALBUTEROL SULFATE 2.5; .5 MG/3ML; MG/3ML
1 SOLUTION RESPIRATORY (INHALATION)
Status: DISCONTINUED | OUTPATIENT
Start: 2024-02-05 | End: 2024-02-08

## 2024-02-04 RX ORDER — POLYETHYLENE GLYCOL 3350 17 G/17G
17 POWDER, FOR SOLUTION ORAL DAILY PRN
Status: DISCONTINUED | OUTPATIENT
Start: 2024-02-04 | End: 2024-02-10 | Stop reason: HOSPADM

## 2024-02-04 RX ORDER — MAGNESIUM SULFATE IN WATER 40 MG/ML
2000 INJECTION, SOLUTION INTRAVENOUS PRN
Status: DISCONTINUED | OUTPATIENT
Start: 2024-02-04 | End: 2024-02-10 | Stop reason: HOSPADM

## 2024-02-04 RX ORDER — DEXTROSE MONOHYDRATE 100 MG/ML
INJECTION, SOLUTION INTRAVENOUS CONTINUOUS PRN
Status: DISCONTINUED | OUTPATIENT
Start: 2024-02-04 | End: 2024-02-10 | Stop reason: HOSPADM

## 2024-02-04 RX ORDER — ATORVASTATIN CALCIUM 40 MG/1
40 TABLET, FILM COATED ORAL NIGHTLY
Status: DISCONTINUED | OUTPATIENT
Start: 2024-02-04 | End: 2024-02-10 | Stop reason: HOSPADM

## 2024-02-04 RX ORDER — ONDANSETRON 2 MG/ML
4 INJECTION INTRAMUSCULAR; INTRAVENOUS EVERY 6 HOURS PRN
Status: DISCONTINUED | OUTPATIENT
Start: 2024-02-04 | End: 2024-02-10 | Stop reason: HOSPADM

## 2024-02-04 RX ORDER — NICOTINE 21 MG/24HR
1 PATCH, TRANSDERMAL 24 HOURS TRANSDERMAL DAILY PRN
Status: DISCONTINUED | OUTPATIENT
Start: 2024-02-04 | End: 2024-02-10 | Stop reason: HOSPADM

## 2024-02-04 RX ORDER — POTASSIUM CHLORIDE 20 MEQ/1
20 TABLET, EXTENDED RELEASE ORAL 2 TIMES DAILY
Status: DISCONTINUED | OUTPATIENT
Start: 2024-02-04 | End: 2024-02-10 | Stop reason: HOSPADM

## 2024-02-04 RX ORDER — 0.9 % SODIUM CHLORIDE 0.9 %
500 INTRAVENOUS SOLUTION INTRAVENOUS ONCE
Status: COMPLETED | OUTPATIENT
Start: 2024-02-04 | End: 2024-02-04

## 2024-02-04 RX ORDER — BUDESONIDE AND FORMOTEROL FUMARATE DIHYDRATE 160; 4.5 UG/1; UG/1
2 AEROSOL RESPIRATORY (INHALATION) 2 TIMES DAILY
Status: DISCONTINUED | OUTPATIENT
Start: 2024-02-04 | End: 2024-02-10 | Stop reason: HOSPADM

## 2024-02-04 RX ORDER — DILTIAZEM HYDROCHLORIDE 240 MG/1
240 CAPSULE, COATED, EXTENDED RELEASE ORAL DAILY
Status: DISCONTINUED | OUTPATIENT
Start: 2024-02-04 | End: 2024-02-10 | Stop reason: HOSPADM

## 2024-02-04 RX ORDER — POTASSIUM CHLORIDE 7.45 MG/ML
10 INJECTION INTRAVENOUS PRN
Status: DISCONTINUED | OUTPATIENT
Start: 2024-02-04 | End: 2024-02-10 | Stop reason: HOSPADM

## 2024-02-04 RX ORDER — SODIUM CHLORIDE 9 MG/ML
INJECTION, SOLUTION INTRAVENOUS PRN
Status: DISCONTINUED | OUTPATIENT
Start: 2024-02-04 | End: 2024-02-10 | Stop reason: HOSPADM

## 2024-02-04 RX ORDER — ACETAMINOPHEN 325 MG/1
650 TABLET ORAL EVERY 6 HOURS PRN
Status: DISCONTINUED | OUTPATIENT
Start: 2024-02-04 | End: 2024-02-10 | Stop reason: HOSPADM

## 2024-02-04 RX ORDER — ONDANSETRON 4 MG/1
4 TABLET, ORALLY DISINTEGRATING ORAL EVERY 8 HOURS PRN
Status: DISCONTINUED | OUTPATIENT
Start: 2024-02-04 | End: 2024-02-10 | Stop reason: HOSPADM

## 2024-02-04 RX ORDER — ROFLUMILAST 500 UG/1
500 TABLET ORAL DAILY
Status: DISCONTINUED | OUTPATIENT
Start: 2024-02-05 | End: 2024-02-10 | Stop reason: HOSPADM

## 2024-02-04 RX ORDER — GLUCAGON 1 MG/ML
1 KIT INJECTION PRN
Status: DISCONTINUED | OUTPATIENT
Start: 2024-02-04 | End: 2024-02-10 | Stop reason: HOSPADM

## 2024-02-04 RX ORDER — SODIUM CHLORIDE 0.9 % (FLUSH) 0.9 %
5-40 SYRINGE (ML) INJECTION PRN
Status: DISCONTINUED | OUTPATIENT
Start: 2024-02-04 | End: 2024-02-10 | Stop reason: HOSPADM

## 2024-02-04 RX ORDER — SODIUM CHLORIDE 0.9 % (FLUSH) 0.9 %
5-40 SYRINGE (ML) INJECTION EVERY 12 HOURS SCHEDULED
Status: DISCONTINUED | OUTPATIENT
Start: 2024-02-04 | End: 2024-02-10 | Stop reason: HOSPADM

## 2024-02-04 RX ORDER — DILTIAZEM HYDROCHLORIDE 240 MG/1
240 CAPSULE, COATED, EXTENDED RELEASE ORAL DAILY
Status: DISCONTINUED | OUTPATIENT
Start: 2024-02-05 | End: 2024-02-04

## 2024-02-04 RX ADMIN — CEFTRIAXONE 1000 MG: 1 INJECTION, POWDER, FOR SOLUTION INTRAMUSCULAR; INTRAVENOUS at 21:44

## 2024-02-04 RX ADMIN — DILTIAZEM HYDROCHLORIDE 240 MG: 240 CAPSULE, COATED, EXTENDED RELEASE ORAL at 22:14

## 2024-02-04 RX ADMIN — IPRATROPIUM BROMIDE AND ALBUTEROL SULFATE 3 DOSE: .5; 3 SOLUTION RESPIRATORY (INHALATION) at 19:54

## 2024-02-04 RX ADMIN — SODIUM CHLORIDE 500 ML: 9 INJECTION, SOLUTION INTRAVENOUS at 21:48

## 2024-02-05 LAB
ALBUMIN SERPL-MCNC: 3.3 GM/DL (ref 3.4–5)
ALP BLD-CCNC: 90 IU/L (ref 40–128)
ALT SERPL-CCNC: 10 U/L (ref 10–40)
ANION GAP SERPL CALCULATED.3IONS-SCNC: 9 MMOL/L (ref 7–16)
AST SERPL-CCNC: 10 IU/L (ref 15–37)
BASOPHILS ABSOLUTE: 0 K/CU MM
BASOPHILS RELATIVE PERCENT: 0 % (ref 0–1)
BILIRUB SERPL-MCNC: 0.3 MG/DL (ref 0–1)
BILIRUBIN URINE: NEGATIVE MG/DL
BLOOD, URINE: NEGATIVE
BUN SERPL-MCNC: 14 MG/DL (ref 6–23)
CALCIUM SERPL-MCNC: 8.6 MG/DL (ref 8.3–10.6)
CHLORIDE BLD-SCNC: 91 MMOL/L (ref 99–110)
CLARITY: CLEAR
CO2: 32 MMOL/L (ref 21–32)
COLOR: YELLOW
COMMENT UA: NORMAL
CREAT SERPL-MCNC: 0.5 MG/DL (ref 0.9–1.3)
DIFFERENTIAL TYPE: ABNORMAL
EOSINOPHILS ABSOLUTE: 0 K/CU MM
EOSINOPHILS RELATIVE PERCENT: 0 % (ref 0–3)
ESTIMATED AVERAGE GLUCOSE: 154 MG/DL
GFR SERPL CREATININE-BSD FRML MDRD: >60 ML/MIN/1.73M2
GLUCOSE BLD-MCNC: 166 MG/DL (ref 70–99)
GLUCOSE BLD-MCNC: 236 MG/DL (ref 70–99)
GLUCOSE BLD-MCNC: 248 MG/DL (ref 70–99)
GLUCOSE BLD-MCNC: 253 MG/DL (ref 70–99)
GLUCOSE SERPL-MCNC: 243 MG/DL (ref 70–99)
GLUCOSE, URINE: NEGATIVE MG/DL
HBA1C MFR BLD: 7 % (ref 4.2–6.3)
HCT VFR BLD CALC: 35 % (ref 42–52)
HEMOGLOBIN: 11.1 GM/DL (ref 13.5–18)
IMMATURE NEUTROPHIL %: 0.9 % (ref 0–0.43)
INR BLD: 1.5 INDEX
KETONES, URINE: NEGATIVE MG/DL
LACTATE: 2.9 MMOL/L (ref 0.5–1.9)
LEUKOCYTE ESTERASE, URINE: NEGATIVE
LYMPHOCYTES ABSOLUTE: 0.5 K/CU MM
LYMPHOCYTES RELATIVE PERCENT: 5.3 % (ref 24–44)
MCH RBC QN AUTO: 29.6 PG (ref 27–31)
MCHC RBC AUTO-ENTMCNC: 31.7 % (ref 32–36)
MCV RBC AUTO: 93.3 FL (ref 78–100)
MONOCYTES ABSOLUTE: 0.4 K/CU MM
MONOCYTES RELATIVE PERCENT: 3.9 % (ref 0–4)
NITRITE URINE, QUANTITATIVE: NEGATIVE
NUCLEATED RBC %: 0 %
PDW BLD-RTO: 15.1 % (ref 11.7–14.9)
PH, URINE: 5.5 (ref 5–8)
PLATELET # BLD: 268 K/CU MM (ref 140–440)
PMV BLD AUTO: 9.9 FL (ref 7.5–11.1)
POTASSIUM SERPL-SCNC: 5 MMOL/L (ref 3.5–5.1)
PROTEIN UA: NEGATIVE MG/DL
PROTHROMBIN TIME: 17.9 SECONDS (ref 11.7–14.5)
RBC # BLD: 3.75 M/CU MM (ref 4.6–6.2)
SEGMENTED NEUTROPHILS ABSOLUTE COUNT: 8.2 K/CU MM
SEGMENTED NEUTROPHILS RELATIVE PERCENT: 89.9 % (ref 36–66)
SODIUM BLD-SCNC: 132 MMOL/L (ref 135–145)
SPECIFIC GRAVITY UA: 1.02 (ref 1–1.03)
TOTAL IMMATURE NEUTOROPHIL: 0.08 K/CU MM
TOTAL NUCLEATED RBC: 0 K/CU MM
TOTAL PROTEIN: 6.4 GM/DL (ref 6.4–8.2)
UROBILINOGEN, URINE: 0.2 MG/DL (ref 0.2–1)
WBC # BLD: 9.2 K/CU MM (ref 4–10.5)

## 2024-02-05 PROCEDURE — 2580000003 HC RX 258: Performed by: FAMILY MEDICINE

## 2024-02-05 PROCEDURE — 6370000000 HC RX 637 (ALT 250 FOR IP): Performed by: STUDENT IN AN ORGANIZED HEALTH CARE EDUCATION/TRAINING PROGRAM

## 2024-02-05 PROCEDURE — 87081 CULTURE SCREEN ONLY: CPT

## 2024-02-05 PROCEDURE — 2580000003 HC RX 258: Performed by: STUDENT IN AN ORGANIZED HEALTH CARE EDUCATION/TRAINING PROGRAM

## 2024-02-05 PROCEDURE — 6360000002 HC RX W HCPCS: Performed by: FAMILY MEDICINE

## 2024-02-05 PROCEDURE — 87641 MR-STAPH DNA AMP PROBE: CPT

## 2024-02-05 PROCEDURE — 83605 ASSAY OF LACTIC ACID: CPT

## 2024-02-05 PROCEDURE — 6360000002 HC RX W HCPCS

## 2024-02-05 PROCEDURE — 1200000000 HC SEMI PRIVATE

## 2024-02-05 PROCEDURE — 2700000000 HC OXYGEN THERAPY PER DAY

## 2024-02-05 PROCEDURE — 87899 AGENT NOS ASSAY W/OPTIC: CPT

## 2024-02-05 PROCEDURE — 82962 GLUCOSE BLOOD TEST: CPT

## 2024-02-05 PROCEDURE — 87449 NOS EACH ORGANISM AG IA: CPT

## 2024-02-05 PROCEDURE — 80053 COMPREHEN METABOLIC PANEL: CPT

## 2024-02-05 PROCEDURE — 94640 AIRWAY INHALATION TREATMENT: CPT

## 2024-02-05 PROCEDURE — 94761 N-INVAS EAR/PLS OXIMETRY MLT: CPT

## 2024-02-05 PROCEDURE — 6370000000 HC RX 637 (ALT 250 FOR IP)

## 2024-02-05 PROCEDURE — 81003 URINALYSIS AUTO W/O SCOPE: CPT

## 2024-02-05 PROCEDURE — 94660 CPAP INITIATION&MGMT: CPT

## 2024-02-05 PROCEDURE — 85025 COMPLETE CBC W/AUTO DIFF WBC: CPT

## 2024-02-05 PROCEDURE — 2580000003 HC RX 258

## 2024-02-05 PROCEDURE — 85610 PROTHROMBIN TIME: CPT

## 2024-02-05 RX ORDER — SPIRONOLACTONE 25 MG/1
25 TABLET ORAL DAILY
COMMUNITY

## 2024-02-05 RX ORDER — PREDNISONE 20 MG/1
20 TABLET ORAL 2 TIMES DAILY
Status: ON HOLD | COMMUNITY
Start: 2024-02-04 | End: 2024-02-10 | Stop reason: HOSPADM

## 2024-02-05 RX ORDER — MIDODRINE HYDROCHLORIDE 5 MG/1
2.5 TABLET ORAL 2 TIMES DAILY
Status: DISCONTINUED | OUTPATIENT
Start: 2024-02-05 | End: 2024-02-10 | Stop reason: HOSPADM

## 2024-02-05 RX ORDER — MIDODRINE HYDROCHLORIDE 2.5 MG/1
2.5 TABLET ORAL 2 TIMES DAILY
COMMUNITY

## 2024-02-05 RX ORDER — CIPROFLOXACIN 500 MG/1
500 TABLET, FILM COATED ORAL 2 TIMES DAILY
Status: ON HOLD | COMMUNITY
Start: 2024-02-03 | End: 2024-02-05

## 2024-02-05 RX ADMIN — IPRATROPIUM BROMIDE AND ALBUTEROL SULFATE 1 DOSE: 2.5; .5 SOLUTION RESPIRATORY (INHALATION) at 11:15

## 2024-02-05 RX ADMIN — IPRATROPIUM BROMIDE AND ALBUTEROL SULFATE 1 DOSE: 2.5; .5 SOLUTION RESPIRATORY (INHALATION) at 07:49

## 2024-02-05 RX ADMIN — CEFEPIME 2000 MG: 2 INJECTION, POWDER, FOR SOLUTION INTRAVENOUS at 11:47

## 2024-02-05 RX ADMIN — SODIUM CHLORIDE, PRESERVATIVE FREE 10 ML: 5 INJECTION INTRAVENOUS at 22:05

## 2024-02-05 RX ADMIN — INSULIN LISPRO 5 UNITS: 100 INJECTION, SOLUTION INTRAVENOUS; SUBCUTANEOUS at 18:25

## 2024-02-05 RX ADMIN — ATORVASTATIN CALCIUM 40 MG: 40 TABLET, FILM COATED ORAL at 22:04

## 2024-02-05 RX ADMIN — ATORVASTATIN CALCIUM 40 MG: 40 TABLET, FILM COATED ORAL at 02:06

## 2024-02-05 RX ADMIN — CEFEPIME 2000 MG: 2 INJECTION, POWDER, FOR SOLUTION INTRAVENOUS at 17:51

## 2024-02-05 RX ADMIN — SODIUM CHLORIDE, PRESERVATIVE FREE 10 ML: 5 INJECTION INTRAVENOUS at 11:31

## 2024-02-05 RX ADMIN — BUDESONIDE AND FORMOTEROL FUMARATE DIHYDRATE 2 PUFF: 160; 4.5 AEROSOL RESPIRATORY (INHALATION) at 07:48

## 2024-02-05 RX ADMIN — APIXABAN 5 MG: 5 TABLET, FILM COATED ORAL at 22:04

## 2024-02-05 RX ADMIN — TORSEMIDE 20 MG: 20 TABLET ORAL at 22:05

## 2024-02-05 RX ADMIN — INSULIN LISPRO 1 UNITS: 100 INJECTION, SOLUTION INTRAVENOUS; SUBCUTANEOUS at 13:33

## 2024-02-05 RX ADMIN — POTASSIUM CHLORIDE 20 MEQ: 1500 TABLET, EXTENDED RELEASE ORAL at 22:04

## 2024-02-05 RX ADMIN — AZITHROMYCIN MONOHYDRATE 500 MG: 500 INJECTION, POWDER, LYOPHILIZED, FOR SOLUTION INTRAVENOUS at 02:06

## 2024-02-05 RX ADMIN — INSULIN LISPRO 2 UNITS: 100 INJECTION, SOLUTION INTRAVENOUS; SUBCUTANEOUS at 18:25

## 2024-02-05 RX ADMIN — SODIUM CHLORIDE 25 ML: 9 INJECTION, SOLUTION INTRAVENOUS at 11:46

## 2024-02-05 RX ADMIN — POTASSIUM CHLORIDE 20 MEQ: 1500 TABLET, EXTENDED RELEASE ORAL at 02:06

## 2024-02-05 RX ADMIN — Medication 400 MG: at 11:29

## 2024-02-05 RX ADMIN — INSULIN GLARGINE 30 UNITS: 100 INJECTION, SOLUTION SUBCUTANEOUS at 22:04

## 2024-02-05 RX ADMIN — ROFLUMILAST 500 MCG: 500 TABLET ORAL at 11:29

## 2024-02-05 RX ADMIN — DILTIAZEM HYDROCHLORIDE 240 MG: 240 CAPSULE, COATED, EXTENDED RELEASE ORAL at 13:33

## 2024-02-05 RX ADMIN — SODIUM CHLORIDE 25 ML: 9 INJECTION, SOLUTION INTRAVENOUS at 17:50

## 2024-02-05 RX ADMIN — APIXABAN 5 MG: 5 TABLET, FILM COATED ORAL at 02:06

## 2024-02-05 RX ADMIN — POTASSIUM CHLORIDE 20 MEQ: 1500 TABLET, EXTENDED RELEASE ORAL at 11:29

## 2024-02-05 RX ADMIN — IPRATROPIUM BROMIDE AND ALBUTEROL SULFATE 1 DOSE: 2.5; .5 SOLUTION RESPIRATORY (INHALATION) at 21:40

## 2024-02-05 RX ADMIN — IPRATROPIUM BROMIDE AND ALBUTEROL SULFATE 1 DOSE: 2.5; .5 SOLUTION RESPIRATORY (INHALATION) at 16:24

## 2024-02-05 RX ADMIN — VANCOMYCIN HYDROCHLORIDE 2000 MG: 5 INJECTION, POWDER, LYOPHILIZED, FOR SOLUTION INTRAVENOUS at 16:26

## 2024-02-05 RX ADMIN — INSULIN GLARGINE 30 UNITS: 100 INJECTION, SOLUTION SUBCUTANEOUS at 02:07

## 2024-02-05 RX ADMIN — BUDESONIDE AND FORMOTEROL FUMARATE DIHYDRATE 2 PUFF: 160; 4.5 AEROSOL RESPIRATORY (INHALATION) at 21:41

## 2024-02-05 RX ADMIN — INSULIN LISPRO 5 UNITS: 100 INJECTION, SOLUTION INTRAVENOUS; SUBCUTANEOUS at 13:32

## 2024-02-05 RX ADMIN — APIXABAN 5 MG: 5 TABLET, FILM COATED ORAL at 11:29

## 2024-02-05 RX ADMIN — TORSEMIDE 20 MG: 20 TABLET ORAL at 11:29

## 2024-02-05 ASSESSMENT — PAIN DESCRIPTION - FREQUENCY: FREQUENCY: CONTINUOUS

## 2024-02-05 ASSESSMENT — PAIN DESCRIPTION - ONSET: ONSET: ON-GOING

## 2024-02-05 ASSESSMENT — PAIN SCALES - GENERAL: PAINLEVEL_OUTOF10: 6

## 2024-02-05 ASSESSMENT — PAIN DESCRIPTION - PAIN TYPE: TYPE: CHRONIC PAIN

## 2024-02-05 ASSESSMENT — PAIN DESCRIPTION - DESCRIPTORS: DESCRIPTORS: ACHING

## 2024-02-05 ASSESSMENT — PAIN DESCRIPTION - ORIENTATION: ORIENTATION: RIGHT;LEFT

## 2024-02-05 ASSESSMENT — PAIN DESCRIPTION - LOCATION: LOCATION: LEG

## 2024-02-05 ASSESSMENT — PAIN - FUNCTIONAL ASSESSMENT: PAIN_FUNCTIONAL_ASSESSMENT: ACTIVITIES ARE NOT PREVENTED

## 2024-02-05 NOTE — PROGRESS NOTES
PHARMACY VANCOMYCIN MONITORING SERVICE  Pharmacy consulted by Dr. Da Silva for monitoring and adjustment.    Indication for treatment: Vancomycin indication: Other: Multifocal PNA  Goal trough: Trough Goal: 15-20 mcg/mL  AUC/JUAN: 400-600    Risk Factors for MRSA Identified:   Hospitalization within the past 90 days, Received IV antibiotics within the past 90 days    Pertinent Laboratory Values:   Temp Readings from Last 3 Encounters:   02/05/24 98 °F (36.7 °C) (Oral)   01/17/24 97.8 °F (36.6 °C) (Oral)   01/08/24 97.7 °F (36.5 °C) (Oral)     Ideal body weight: 91.4 kg (201 lb 8 oz)  Adjusted ideal body weight: 114.7 kg (252 lb 14.4 oz)    Recent Labs     02/04/24 2035   WBC 10.8*     Recent Labs     02/04/24 2035   BUN 8   CREATININE 0.7*     Estimated Creatinine Clearance: 168 mL/min (A) (based on SCr of 0.7 mg/dL (L)).  No intake or output data in the 24 hours ending 02/05/24 1224  Last Encounter Weight:  Wt Readings from Last 3 Encounters:   02/05/24 (!) 149.7 kg (330 lb)   01/17/24 (!) 160.6 kg (354 lb)   01/06/24 (!) 158.7 kg (349 lb 13.9 oz)       No intake/output data recorded.     Pertinent Cultures:   Date    Source    Results  02/04   Blood    Sent  02/04   Sputum/Respiratory  RSV  02/04   MRSA Nasal   Ordered  02/04   Strep/Legonella  Ordered    Vancomycin level:   TROUGH:  No results for input(s): \"VANCOTROUGH\" in the last 72 hours.  RANDOM:  No results for input(s): \"VANCORANDOM\" in the last 72 hours.    Assessment:  HPI: Armando \"Moe\" Jerad is a 68 yoM with a PMH of chronic diastolic congestive heart failure, COPD, chronic respite failure on home oxygen, diabetes mellitus type 2, on long-term insulin, chronic atrial fibrillation, on anticoagulation, ILNA on CPAP/BiPAP, morbid obesity who  LINA on CPAP/BiPAP, and morbid obesity. He presented for septic shock and multifocal PNA.  Interval History: New start today 2/5/2024  SCr, BUN, and urine output:   Scr 0.7 mg/dL yesterday evening, baseline appears

## 2024-02-05 NOTE — PROGRESS NOTES
V2.0    [facility] Progress Note      Name:  Armando Mars /Age/Sex: 1955  (68 y.o. male)   MRN & CSN:  [id number] & [id number] Encounter Date/Time: 2024 1:46 PM EST   Location:  ED30/ED-30 PCP: Rosalio Hedrick MD     Attending:Clemente Da Silva MD       Hospital Day: 2    Assessment and Recommendations   Armando Mars is a 68 y.o. male with pmh of multiple admissions for respiratory issues and pneumonia, COPD, A-fib, hypertension, hyperlipidemia, diabetes who presents with Multifocal pneumonia. Labs showed Na 133, BNP 3276, trop 25, wbc 10.8, RSV +, chest xray with patchy opacities in RUL and BL LL. Admitted for infectious eval.       Plan:   Septic shock due to multifocal pneumonia: In the setting of RSV infection but recent hospitalization in 2024 for pneumonia as well.  No leukocytosis, afebrile.  RSV positive.  Lactate 4.4 and will repeat.  Initiated on IV Rocephin/azithromycin but in light of recent hospitalization with patient was on IV antibiotics were broadened to vancomycin/cefepime.  MRSA DNA, urinary antigens and blood cultures pending.  Acute on chronic respiratory failure with hypoxia: 3 L nasal cannula at baseline and required 4 L as of 2024.  Treating infection as above.  Continue oral torsemide.  Given IV Solu-Medrol x 1.  Continue Symbicort, Daliresp, and bronchodilators.  Non-MI troponin elevation: Chronic in nature and no chest pain currently.  EKG without evidence of acute ischemia and troponin overall flat.  Suspect in the setting of sepsis and respiratory failure.  Monitoring on telemetry.  A-fib with RVR: Noted on admission but rate improved.  Continue Cardizem and Eliquis.  Chronic diastolic heart failure: EF of 50 to 55% on echocardiogram in 2023.  Stable volume status.  Continue oral torsemide.  Type 2 diabetes with hyperglycemia: A1c of 7 24. Suspect steroid contributing. Continue Lantus with SSI and will titrate as needed.   Hyponatremia: Na 132.

## 2024-02-05 NOTE — ED TRIAGE NOTES
Patient brought in by EMS from [facility] with complaint of shortness of breath and cough. Patient stated symptoms began approximately a day or so ago. Patient wears 3L oxygen at baseline, EMS placed patient on 4L nasal cannula.

## 2024-02-05 NOTE — ED NOTES
This RN covering primary RN for lunch coverage. Patient in bed, appears SOB. Patient asking when he will be moved to his own room as the doctor has already told him he is going to be admitted.

## 2024-02-05 NOTE — ED PROVIDER NOTES
[facility] EMERGENCY DEPARTMENT  EMERGENCY DEPARTMENT ENCOUNTER        Pt Name: Armando Mars  MRN: [id number]  Birthdate 1955  Date of evaluation: 2/4/2024  Provider: BRENNAN Judd - CNP  PCP: Rosalio Hedrick MD  Note Started: 7:33 PM EST 2/4/24       I have seen and evaluated this patient with my supervising physician Shane Tucker, *.      CHIEF COMPLAINT       Chief Complaint   Patient presents with    Shortness of Breath    Cough       HISTORY OF PRESENT ILLNESS: 1 or more Elements     History From: Patient    Limitations to history : None    Social Determinants Significantly Affecting Health : None    Chief Complaint: Cough congestion    Armando Mars is a 68 y.o. male with a history of chronic diastolic congestive heart failure, COPD, chronic respite failure on home oxygen, diabetes mellitus type 2, on long-term insulin, chronic atrial fibrillation, on anticoagulation, LINA on CPAP/BiPAP, morbid obesity who  LINA on CPAP/BiPAP, morbid obesit who presents to ED stating that he has had increasing cough and congestion with some diarrhea since he left the rehab center 3 days ago.  He states he has not been taking his breathing treatments.  Denies any chest pain or abdominal pain.  States he has not given any antibiotics on discharge.  Denies any fever or nasal discharge.  States he is coughing up white sputum.  Denies any nausea vomiting.    Nursing Notes were all reviewed and agreed with or any disagreements were addressed in the HPI.    REVIEW OF SYSTEMS :      Review of Systems    Positives and Pertinent negatives as per HPI.     SURGICAL HISTORY     Past Surgical History:   Procedure Laterality Date    APPENDECTOMY  2003    ATRIAL ABLATION SURGERY  05/23/2018    A-fib ablation     CARDIAC SURGERY  05/2018    \"Heart Ablation Done Twice\"    CHOLECYSTECTOMY, LAPAROSCOPIC  11/12/2018    COLONOSCOPY  07/2011    Polyp Removed    DENTAL       General: No focal deficit present.      Mental Status: He is alert and oriented to person, place, and time.   Psychiatric:         Mood and Affect: Mood normal.         Behavior: Behavior normal.           DIAGNOSTIC RESULTS   LABS:    Labs Reviewed   RESPIRATORY PANEL, MOLECULAR, WITH COVID-19 - Abnormal; Notable for the following components:       Result Value    RSV PCR DETECTED (*)     All other components within normal limits   CBC WITH AUTO DIFFERENTIAL - Abnormal; Notable for the following components:    WBC 10.8 (*)     RBC 3.98 (*)     Hemoglobin 11.6 (*)     Hematocrit 37.5 (*)     MCHC 30.9 (*)     RDW 15.1 (*)     Segs Relative 90.8 (*)     Lymphocytes % 5.7 (*)     Immature Neutrophil % 0.6 (*)     All other components within normal limits   COMPREHENSIVE METABOLIC PANEL - Abnormal; Notable for the following components:    Sodium 133 (*)     Chloride 89 (*)     Glucose 204 (*)     Creatinine 0.7 (*)     Albumin 3.1 (*)     AST 11 (*)     All other components within normal limits   LACTATE, SEPSIS - Abnormal; Notable for the following components:    Lactic Acid, Sepsis 4.6 (*)     All other components within normal limits   TROPONIN - Abnormal; Notable for the following components:    Troponin, High Sensitivity 29 (*)     All other components within normal limits   BRAIN NATRIURETIC PEPTIDE - Abnormal; Notable for the following components:    Pro-BNP 3,276 (*)     All other components within normal limits   BLOOD GAS, VENOUS - Abnormal; Notable for the following components:    pCO2, Tylor 59 (*)     pO2, Tylor 116 (*)     Base Exc, Mixed 6 (*)     HCO3, Venous 33.3 (*)     O2 Sat, Tylor 94.3 (*)     All other components within normal limits   CULTURE, BLOOD 1   CULTURE, BLOOD 1   CULTURE, MRSA, SCREENING   LEGIONELLA ANTIGEN, URINE   STREP PNEUMONIAE ANTIGEN   PROCALCITONIN   LACTATE, SEPSIS   URINALYSIS   TROPONIN       When ordered only abnormal lab results are displayed. All other labs were within

## 2024-02-05 NOTE — PROGRESS NOTES
Patient is non-compliant with keeping on telemetry wires, patient care, lab draws and vital signs. Patient only calls out with requests for food and drinks. Will not allow other patient care.

## 2024-02-05 NOTE — ED NOTES
Pharmacy called inquiring on why vancomycin was not started at 8am.  RN who had Pt at time has left for the day, vancomycin dose found in pt room.  RN instructed to start 2g dose by pharmacy at this time

## 2024-02-05 NOTE — PROGRESS NOTES
Patient had a second IV in the right forearm infusing vancomycin that infiltrated. IV removed and vancomycin switched to other right forearm IV.

## 2024-02-05 NOTE — PROGRESS NOTES
4 Eyes Skin Assessment     NAME:  Armando Mars  YOB: 1955  MEDICAL RECORD NUMBER:  [id number]    The patient is being assessed for  Admission    I agree that at least one RN has performed a thorough Head to Toe Skin Assessment on the patient. ALL assessment sites listed below have been assessed.      Areas assessed by both nurses:    Head, Face, Ears, Shoulders, Back, Chest, Arms, Elbows, Hands, Sacrum. Buttock, Coccyx, Ischium, and Legs. Feet and Heels        Does the Patient have a Wound? Yes wound(s) were present on assessment. LDA wound assessment was Initiated and completed by RN       Hector Prevention initiated by RN: Yes  Wound Care Orders initiated by RN: Yes    Pressure Injury (Stage 3,4, Unstageable, DTI, NWPT, and Complex wounds) if present, place Wound referral order by RN under : Yes    New Ostomies, if present place, Ostomy referral order under : No     Nurse 1 eSignature: Electronically signed by Amberly Be LPN on 2/5/24 at 6:56 PM EST    **SHARE this note so that the co-signing nurse can place an eSignature**    Nurse 2 eSignature: Electronically signed by Stephania Pacheco RN on 2/5/24 at 7:01 PM EST

## 2024-02-05 NOTE — ED NOTES
ED TO INPATIENT SBAR HANDOFF    Patient Name: Armando Mars   :  1955  68 y.o.   Preferred Name  Armando  Family/Caregiver Present no   Restraints no   C-SSRS: Risk of Suicide: No Risk  Sitter no   Sepsis Risk Score Sepsis Risk Score: 8.11      Situation  Chief Complaint   Patient presents with    Shortness of Breath    Cough     Brief Description of Patient's Condition: Patient presented with complaint of shortness of breath.   Mental Status: oriented, alert, coherent, logical, thought processes intact, and able to concentrate and follow conversation  Arrived from: home    Imaging:   XR CHEST PORTABLE   Final Result   Mild patchy opacities in the right upper and bilateral lower lobes possibly   representing multifocal pneumonia.           Abnormal labs:   Abnormal Labs Reviewed   RESPIRATORY PANEL, MOLECULAR, WITH COVID-19 - Abnormal; Notable for the following components:       Result Value    RSV PCR DETECTED (*)     All other components within normal limits   CBC WITH AUTO DIFFERENTIAL - Abnormal; Notable for the following components:    WBC 10.8 (*)     RBC 3.98 (*)     Hemoglobin 11.6 (*)     Hematocrit 37.5 (*)     MCHC 30.9 (*)     RDW 15.1 (*)     Segs Relative 90.8 (*)     Lymphocytes % 5.7 (*)     Immature Neutrophil % 0.6 (*)     All other components within normal limits   COMPREHENSIVE METABOLIC PANEL - Abnormal; Notable for the following components:    Sodium 133 (*)     Chloride 89 (*)     Glucose 204 (*)     Creatinine 0.7 (*)     Albumin 3.1 (*)     AST 11 (*)     All other components within normal limits   LACTATE, SEPSIS - Abnormal; Notable for the following components:    Lactic Acid, Sepsis 4.6 (*)     All other components within normal limits   LACTATE, SEPSIS - Abnormal; Notable for the following components:    Lactic Acid, Sepsis 4.4 (*)     All other components within normal limits   TROPONIN - Abnormal; Notable for the following components:    Troponin, High Sensitivity 29 (*)      02/04/24 1955 02/04/24 2140 02/04/24 2211   BP: (!) 148/124   (!) 109/90   Pulse: 70  (!) 119 (!) 105   Resp: 23  (!) 35 29   Temp: 98.7 °F (37.1 °C)      TempSrc: Oral      SpO2: 92% 95% 92% (!) 87%     PO Status:   O2 Flow Rate: O2 Device: Nasal cannula O2 Flow Rate (L/min): 5 L/min  Cardiac Rhythm: afib  Last documented pain medication administered: NA  NIH Score: NIH     Active LDA's:   Peripheral IV 02/04/24 Proximal;Right Forearm (Active)       Pertinent or High Risk Medications/Drips: no   If Yes, please provide details: NA  Blood Product Administration: no  If Yes, please provide details: NA    Recommendation    Incomplete orders NA  Additional Comments: NA   If any further questions, please call Sending RN at [phone]    Electronically signed by: Electronically signed by Isabel Herrera RN on 2/5/2024 at 12:01 AM

## 2024-02-05 NOTE — ED PROVIDER NOTES
I independently examined and evaluated Armando Mars.  I personally saw the patient and performed a substantive portion of the visit including all aspects of the medical decision making, made/approved the management plan and take responsibility for the patient management.    In brief their history revealed the patient is here with increased shortness of breath, cough, congestion.  He reports that this been going on for the last 3 days in which time patient has not had any medications.  He also reports bilateral lower extremity edema that is worse, especially on the left.  He reports that he is on 3 L of oxygen at home, he has been on 5 L in the ED.  He denies fever, chest pain, delmi pain, nausea, vomiting, lower extremity pain or redness.    Past medical history of CHF, COPD on home oxygen, diabetes, A-fib on anticoagulation      Their focused exam revealed the patient  has distant breath sounds throughout.  He has lower extremity edema that is pitting, worse over the left without any tenderness to palpation or erythema.  The patient appears age appropriate, appears well-hydrated. Mucous membranes are moist. Speech is clear. Breathing is unlabored.  Skin is dry. Mental status is normal. The patient moves all extremities and is without facial droop.     ED course:   CC/HPI Summary, DDx, ED Course, and Reassessment:   Pt presents as above.  Emergent conditions considered.  Presentation prompted initial broad workup.  His laboratory evaluation is remarkable for elevated lactic acid of 4.4, elevated BNP higher than his usual.  Mild elevated troponin.  Checks x-ray shows opacities in upper and lower lobes likely represent pneumonia, he was started ceftriaxone azithromycin.  Respiratory panel later came with positive RSV which is the likely cause of his symptoms.  In addition patient was given DuoNeb, methylprednisolone for symptom control.  Patient was in A-fib RVR during presentation, likely secondary to infection

## 2024-02-05 NOTE — H&P
History and Physical      Name:  Armando Mars /Age/Sex: 1955  (68 y.o. male)   MRN & CSN:  [id number] & [id number] Encounter Date/Time: 2024 10:12 PM   Location:  [facility] PCP: Rosalio Hedrick MD       Hospital Day: 1    Assessment and Plan:     Patient is a 68-year-old male who presented with shortness of breath.    # Septic shock  # Multifocal pneumonia  # RSV infection  # Acute on chronic hypoxemic hypercapnic respiratory failure   # Suspected COPD  - Endorsed worsening SOB with non-productive cough for few days. On 3L NC at baseline.  - Requiring 5L NC in ED, VBG 7.36/59. Afebrile, no leukocytosis, LA 4.6 (?type B), PCT negative. CXR suspicious for multifocal pneumonia. RVP positive for RSV.  - Started on Rocephin/Azithro. Follow-up cultures and inflammatory markers.     # Non-MI troponin elevation, chronic  - Denied any typical CP.  - Initial Tn mildly elevated, repeat flat. ECG without acute ischemic changes.  - Likely secondary to above.     # HFpEF, compensated  - Last TTE in 2023 with LVEF of 50-55%.  - Clinically euvolemic.  - Continue PO Demadex.     # Atrial fibrillation  - Continue Eliquis and Diltiazem.    # T2DM with hyperglycemia  - Last A1c 10.2% in 2023, repeat pending.   - Held Metformin.  - Continue Insulin Lantus 30 u qhs and Lispro 5 u TIDWM with LCSI.    # LINA  - Continue BPAP qhs.     # Class III obesity  - BMI 40.9.    Checklist:  Advanced directive: full  Diet: cardiac  DVT ppx: Eliquis  Sugar: BG goal of 140-180 while inpatient    Disposition: admit to inpatient.  Estimated discharge: 3-4 day(s).  Current living situation: LTC.  Expected disposition: LTC.    Spoke with ED provider who recommended admission for the patient and I agree with that plan.  Personally reviewed lab studies and imaging.  EKG interpreted personally and results as stated above.  Imaging that was interpreted personally and results as stated above.    History of Present Illness:  Obesity in his brother.    SHx:   Social History     Socioeconomic History    Marital status:      Spouse name: None    Number of children: None    Years of education: None    Highest education level: None   Occupational History    Occupation:    Tobacco Use    Smoking status: Every Day     Current packs/day: 0.00     Average packs/day: 0.3 packs/day for 49.0 years (12.2 ttl pk-yrs)     Types: Cigars, Cigarettes     Start date:      Last attempt to quit: 2021     Years since quittin.1    Smokeless tobacco: Former     Types: Chew     Quit date:     Tobacco comments:     1 filtered cigar per day   Vaping Use    Vaping Use: Former    Substances: Nicotine    Devices: Pre-filled or refillable cartridge   Substance and Sexual Activity    Alcohol use: No    Drug use: No    Sexual activity: Never   Social History Narrative    Diet unrestricted    Exercise none    Seat belt always             Social Determinants of Health     Financial Resource Strain: Low Risk  (2023)    Overall Financial Resource Strain (CARDIA)     Difficulty of Paying Living Expenses: Not hard at all   Food Insecurity: Food Insecurity Present (1/10/2024)    Hunger Vital Sign     Worried About Running Out of Food in the Last Year: Sometimes true     Ran Out of Food in the Last Year: Sometimes true   Transportation Needs: No Transportation Needs (1/10/2024)    PRAPARE - Transportation     Lack of Transportation (Medical): No     Lack of Transportation (Non-Medical): No   Recent Concern: Transportation Needs - Unmet Transportation Needs (1/3/2024)    PRAPARE - Transportation     Lack of Transportation (Medical): Yes     Lack of Transportation (Non-Medical): Yes   Physical Activity: Insufficiently Active (2023)    Exercise Vital Sign     Days of Exercise per Week: 2 days     Minutes of Exercise per Session: 10 min   Stress: No Stress Concern Present (2023)    [employer] [place] [employer] [facility] -

## 2024-02-06 LAB
ANION GAP SERPL CALCULATED.3IONS-SCNC: 11 MMOL/L (ref 7–16)
BASOPHILS ABSOLUTE: 0 K/CU MM
BASOPHILS RELATIVE PERCENT: 0.2 % (ref 0–1)
BUN SERPL-MCNC: 18 MG/DL (ref 6–23)
CALCIUM SERPL-MCNC: 8.2 MG/DL (ref 8.3–10.6)
CHLORIDE BLD-SCNC: 93 MMOL/L (ref 99–110)
CO2: 29 MMOL/L (ref 21–32)
CREAT SERPL-MCNC: 0.6 MG/DL (ref 0.9–1.3)
DIFFERENTIAL TYPE: ABNORMAL
EOSINOPHILS ABSOLUTE: 0 K/CU MM
EOSINOPHILS RELATIVE PERCENT: 0.1 % (ref 0–3)
GFR SERPL CREATININE-BSD FRML MDRD: >60 ML/MIN/1.73M2
GLUCOSE BLD-MCNC: 106 MG/DL (ref 70–99)
GLUCOSE BLD-MCNC: 113 MG/DL (ref 70–99)
GLUCOSE BLD-MCNC: 129 MG/DL (ref 70–99)
GLUCOSE BLD-MCNC: 193 MG/DL (ref 70–99)
GLUCOSE SERPL-MCNC: 167 MG/DL (ref 70–99)
HCT VFR BLD CALC: 37.2 % (ref 42–52)
HEMOGLOBIN: 11 GM/DL (ref 13.5–18)
IMMATURE NEUTROPHIL %: 0.6 % (ref 0–0.43)
L PNEUMO AG UR QL IA: NEGATIVE
LACTATE: 1.3 MMOL/L (ref 0.5–1.9)
LYMPHOCYTES ABSOLUTE: 0.8 K/CU MM
LYMPHOCYTES RELATIVE PERCENT: 5.6 % (ref 24–44)
MAGNESIUM: 1.8 MG/DL (ref 1.8–2.4)
MCH RBC QN AUTO: 29.3 PG (ref 27–31)
MCHC RBC AUTO-ENTMCNC: 29.6 % (ref 32–36)
MCV RBC AUTO: 99.2 FL (ref 78–100)
MONOCYTES ABSOLUTE: 1.4 K/CU MM
MONOCYTES RELATIVE PERCENT: 9.5 % (ref 0–4)
MRSA, DNA, NASAL: NEGATIVE
NUCLEATED RBC %: 0 %
PDW BLD-RTO: 15.3 % (ref 11.7–14.9)
PLATELET # BLD: 243 K/CU MM (ref 140–440)
PMV BLD AUTO: 9.5 FL (ref 7.5–11.1)
POTASSIUM SERPL-SCNC: 4.9 MMOL/L (ref 3.5–5.1)
RBC # BLD: 3.75 M/CU MM (ref 4.6–6.2)
S PNEUM AG CSF QL: NORMAL
SEGMENTED NEUTROPHILS ABSOLUTE COUNT: 12.6 K/CU MM
SEGMENTED NEUTROPHILS RELATIVE PERCENT: 84 % (ref 36–66)
SODIUM BLD-SCNC: 133 MMOL/L (ref 135–145)
SPECIMEN DESCRIPTION: NORMAL
TOTAL IMMATURE NEUTOROPHIL: 0.09 K/CU MM
TOTAL NUCLEATED RBC: 0 K/CU MM
WBC # BLD: 15 K/CU MM (ref 4–10.5)

## 2024-02-06 PROCEDURE — 2580000003 HC RX 258: Performed by: STUDENT IN AN ORGANIZED HEALTH CARE EDUCATION/TRAINING PROGRAM

## 2024-02-06 PROCEDURE — 83605 ASSAY OF LACTIC ACID: CPT

## 2024-02-06 PROCEDURE — 1200000000 HC SEMI PRIVATE

## 2024-02-06 PROCEDURE — 6370000000 HC RX 637 (ALT 250 FOR IP): Performed by: FAMILY MEDICINE

## 2024-02-06 PROCEDURE — 80048 BASIC METABOLIC PNL TOTAL CA: CPT

## 2024-02-06 PROCEDURE — 6370000000 HC RX 637 (ALT 250 FOR IP): Performed by: STUDENT IN AN ORGANIZED HEALTH CARE EDUCATION/TRAINING PROGRAM

## 2024-02-06 PROCEDURE — 82962 GLUCOSE BLOOD TEST: CPT

## 2024-02-06 PROCEDURE — 99223 1ST HOSP IP/OBS HIGH 75: CPT | Performed by: INTERNAL MEDICINE

## 2024-02-06 PROCEDURE — 94640 AIRWAY INHALATION TREATMENT: CPT

## 2024-02-06 PROCEDURE — 6360000002 HC RX W HCPCS: Performed by: FAMILY MEDICINE

## 2024-02-06 PROCEDURE — 36415 COLL VENOUS BLD VENIPUNCTURE: CPT

## 2024-02-06 PROCEDURE — 6370000000 HC RX 637 (ALT 250 FOR IP): Performed by: INTERNAL MEDICINE

## 2024-02-06 PROCEDURE — 2580000003 HC RX 258: Performed by: FAMILY MEDICINE

## 2024-02-06 PROCEDURE — 2700000000 HC OXYGEN THERAPY PER DAY

## 2024-02-06 PROCEDURE — 85025 COMPLETE CBC W/AUTO DIFF WBC: CPT

## 2024-02-06 PROCEDURE — 83735 ASSAY OF MAGNESIUM: CPT

## 2024-02-06 PROCEDURE — 94761 N-INVAS EAR/PLS OXIMETRY MLT: CPT

## 2024-02-06 PROCEDURE — 93005 ELECTROCARDIOGRAM TRACING: CPT | Performed by: FAMILY MEDICINE

## 2024-02-06 PROCEDURE — 6370000000 HC RX 637 (ALT 250 FOR IP)

## 2024-02-06 RX ORDER — DIGOXIN 125 MCG
125 TABLET ORAL DAILY
Status: DISCONTINUED | OUTPATIENT
Start: 2024-02-07 | End: 2024-02-10 | Stop reason: HOSPADM

## 2024-02-06 RX ORDER — IPRATROPIUM BROMIDE AND ALBUTEROL SULFATE 2.5; .5 MG/3ML; MG/3ML
1 SOLUTION RESPIRATORY (INHALATION) EVERY 4 HOURS PRN
Status: DISCONTINUED | OUTPATIENT
Start: 2024-02-06 | End: 2024-02-10 | Stop reason: HOSPADM

## 2024-02-06 RX ORDER — LANOLIN ALCOHOL/MO/W.PET/CERES
6 CREAM (GRAM) TOPICAL NIGHTLY PRN
Status: DISCONTINUED | OUTPATIENT
Start: 2024-02-06 | End: 2024-02-10 | Stop reason: HOSPADM

## 2024-02-06 RX ORDER — DIGOXIN 125 MCG
250 TABLET ORAL ONCE
Status: COMPLETED | OUTPATIENT
Start: 2024-02-06 | End: 2024-02-06

## 2024-02-06 RX ORDER — CARVEDILOL 6.25 MG/1
3.12 TABLET ORAL ONCE
Status: DISCONTINUED | OUTPATIENT
Start: 2024-02-06 | End: 2024-02-08

## 2024-02-06 RX ORDER — HYDROXYZINE HYDROCHLORIDE 25 MG/1
25 TABLET, FILM COATED ORAL
Status: COMPLETED | OUTPATIENT
Start: 2024-02-06 | End: 2024-02-06

## 2024-02-06 RX ORDER — PREDNISONE 20 MG/1
40 TABLET ORAL DAILY
Status: DISCONTINUED | OUTPATIENT
Start: 2024-02-06 | End: 2024-02-10 | Stop reason: HOSPADM

## 2024-02-06 RX ADMIN — ACETAMINOPHEN 650 MG: 325 TABLET ORAL at 00:28

## 2024-02-06 RX ADMIN — SODIUM CHLORIDE: 9 INJECTION, SOLUTION INTRAVENOUS at 16:28

## 2024-02-06 RX ADMIN — BUDESONIDE AND FORMOTEROL FUMARATE DIHYDRATE 2 PUFF: 160; 4.5 AEROSOL RESPIRATORY (INHALATION) at 20:34

## 2024-02-06 RX ADMIN — POTASSIUM CHLORIDE 20 MEQ: 1500 TABLET, EXTENDED RELEASE ORAL at 22:56

## 2024-02-06 RX ADMIN — MIDODRINE HYDROCHLORIDE 2.5 MG: 5 TABLET ORAL at 22:56

## 2024-02-06 RX ADMIN — DIGOXIN 250 MCG: 125 TABLET ORAL at 14:56

## 2024-02-06 RX ADMIN — CEFEPIME 2000 MG: 2 INJECTION, POWDER, FOR SOLUTION INTRAVENOUS at 16:29

## 2024-02-06 RX ADMIN — APIXABAN 5 MG: 5 TABLET, FILM COATED ORAL at 22:55

## 2024-02-06 RX ADMIN — IPRATROPIUM BROMIDE AND ALBUTEROL SULFATE 1 DOSE: 2.5; .5 SOLUTION RESPIRATORY (INHALATION) at 01:44

## 2024-02-06 RX ADMIN — ATORVASTATIN CALCIUM 40 MG: 40 TABLET, FILM COATED ORAL at 22:55

## 2024-02-06 RX ADMIN — MIDODRINE HYDROCHLORIDE 2.5 MG: 5 TABLET ORAL at 08:33

## 2024-02-06 RX ADMIN — BUDESONIDE AND FORMOTEROL FUMARATE DIHYDRATE 2 PUFF: 160; 4.5 AEROSOL RESPIRATORY (INHALATION) at 11:00

## 2024-02-06 RX ADMIN — ROFLUMILAST 500 MCG: 500 TABLET ORAL at 08:45

## 2024-02-06 RX ADMIN — POTASSIUM CHLORIDE 20 MEQ: 1500 TABLET, EXTENDED RELEASE ORAL at 08:33

## 2024-02-06 RX ADMIN — TORSEMIDE 20 MG: 20 TABLET ORAL at 08:33

## 2024-02-06 RX ADMIN — PREDNISONE 40 MG: 20 TABLET ORAL at 22:55

## 2024-02-06 RX ADMIN — IPRATROPIUM BROMIDE AND ALBUTEROL SULFATE 1 DOSE: 2.5; .5 SOLUTION RESPIRATORY (INHALATION) at 20:33

## 2024-02-06 RX ADMIN — VANCOMYCIN HYDROCHLORIDE 1750 MG: 5 INJECTION, POWDER, LYOPHILIZED, FOR SOLUTION INTRAVENOUS at 05:42

## 2024-02-06 RX ADMIN — SODIUM CHLORIDE, PRESERVATIVE FREE 10 ML: 5 INJECTION INTRAVENOUS at 23:00

## 2024-02-06 RX ADMIN — INSULIN LISPRO 5 UNITS: 100 INJECTION, SOLUTION INTRAVENOUS; SUBCUTANEOUS at 18:24

## 2024-02-06 RX ADMIN — IPRATROPIUM BROMIDE AND ALBUTEROL SULFATE 1 DOSE: 2.5; .5 SOLUTION RESPIRATORY (INHALATION) at 10:59

## 2024-02-06 RX ADMIN — APIXABAN 5 MG: 5 TABLET, FILM COATED ORAL at 08:33

## 2024-02-06 RX ADMIN — DILTIAZEM HYDROCHLORIDE 240 MG: 240 CAPSULE, COATED, EXTENDED RELEASE ORAL at 08:33

## 2024-02-06 RX ADMIN — Medication 6 MG: at 01:57

## 2024-02-06 RX ADMIN — Medication 400 MG: at 08:33

## 2024-02-06 RX ADMIN — CEFEPIME 2000 MG: 2 INJECTION, POWDER, FOR SOLUTION INTRAVENOUS at 08:20

## 2024-02-06 RX ADMIN — TORSEMIDE 20 MG: 20 TABLET ORAL at 22:55

## 2024-02-06 RX ADMIN — INSULIN LISPRO 5 UNITS: 100 INJECTION, SOLUTION INTRAVENOUS; SUBCUTANEOUS at 12:28

## 2024-02-06 RX ADMIN — HYDROXYZINE HYDROCHLORIDE 25 MG: 25 TABLET, FILM COATED ORAL at 01:57

## 2024-02-06 RX ADMIN — INSULIN LISPRO 5 UNITS: 100 INJECTION, SOLUTION INTRAVENOUS; SUBCUTANEOUS at 08:43

## 2024-02-06 RX ADMIN — SODIUM CHLORIDE: 9 INJECTION, SOLUTION INTRAVENOUS at 08:20

## 2024-02-06 RX ADMIN — IPRATROPIUM BROMIDE AND ALBUTEROL SULFATE 1 DOSE: 2.5; .5 SOLUTION RESPIRATORY (INHALATION) at 15:10

## 2024-02-06 RX ADMIN — CEFEPIME 2000 MG: 2 INJECTION, POWDER, FOR SOLUTION INTRAVENOUS at 00:07

## 2024-02-06 RX ADMIN — INSULIN GLARGINE 30 UNITS: 100 INJECTION, SOLUTION SUBCUTANEOUS at 23:00

## 2024-02-06 ASSESSMENT — PAIN DESCRIPTION - DESCRIPTORS: DESCRIPTORS: ACHING

## 2024-02-06 ASSESSMENT — PAIN DESCRIPTION - LOCATION: LOCATION: GENERALIZED

## 2024-02-06 NOTE — PROGRESS NOTES
Resource RN to bedside. Pt noted to be sitting on side of bed with legs dangled. Accessory muscles in use, pt c/o SOB. Afib with rate ranging low 100's-120s. Saturations in the high 80's -low 90's on 4L NC. Pt stated he is unable to wear his cpap d/t anxiety. He is complaining of not being able to sleep and is restless. Wheezing noted to left upper lobe and diminished throughout with auscultation.[facility] RN called and requested breathing treatment for pt. This RN updated Kristan BREWER. New orders for melatonin and atarax were placed.

## 2024-02-06 NOTE — PROGRESS NOTES
V2.0    [facility] [place] Progress Note      Name:  Armando Mars /Age/Sex: 1955  (68 y.o. male)   MRN & CSN:  [id number] & [id number] Encounter Date/Time: 2024 1:46 PM EST   Location:  [facility] PCP: Rosalio Hedrick MD     Attending:Edi Mae MD       Hospital Day: 3    Assessment and Recommendations   Armando Mars is a 68 y.o. male with pmh of multiple admissions for respiratory issues and pneumonia, COPD, A-fib, hypertension, hyperlipidemia, diabetes who presents with Multifocal pneumonia. Labs showed Na 133, BNP 3276, trop 25, wbc 10.8, RSV +, chest xray with patchy opacities in RUL and BL LL. Admitted for infectious eval.       Plan:   Septic shock due to multifocal pneumonia: In the setting of RSV infection but recent hospitalization in 2024 for pneumonia as well.  No leukocytosis, afebrile.  RSV positive.  Lactate 4.4 and will repeat.  Initiated on IV Rocephin/azithromycin but in light of recent hospitalization with patient was on IV antibiotics were broadened to vancomycin/cefepime.  Will stop vancomycin on  as MRSA DNA is negative.  Acute on chronic respiratory failure with hypoxia: 3 L nasal cannula at baseline and required 4 L as of 2024.  Treating infection as above.  Continue oral torsemide.  Given IV Solu-Medrol x 1.  Continue Symbicort, Daliresp, and bronchodilators.  Non-MI troponin elevation: Chronic in nature and no chest pain currently.  EKG without evidence of acute ischemia and troponin overall flat.  Suspect in the setting of sepsis and respiratory failure.  Monitoring on telemetry.  A-fib with RVR: Noted on admission but rate improved.  Continue Cardizem and Eliquis.  Chronic diastolic heart failure: EF of 50 to 55% on echocardiogram in 2023.  Stable volume status.  Continue oral torsemide.  Type 2 diabetes with hyperglycemia: A1c of 7 24. Suspect steroid contributing. Continue Lantus with SSI and will titrate as needed.   Hyponatremia: Na 132.  times per day    insulin lispro  0-4 Units SubCUTAneous TID     insulin lispro  0-4 Units SubCUTAneous Nightly    insulin lispro  5 Units SubCUTAneous TID       Infusions:    sodium chloride 250 mL/hr at 02/06/24 1628    dextrose       PRN Meds: ipratropium 0.5 mg-albuterol 2.5 mg, 1 Dose, Q4H PRN  melatonin, 6 mg, Nightly PRN  sodium chloride flush, 5-40 mL, PRN  sodium chloride, , PRN  potassium chloride, 10 mEq, PRN  magnesium sulfate, 2,000 mg, PRN  ondansetron, 4 mg, Q8H PRN   Or  ondansetron, 4 mg, Q6H PRN  polyethylene glycol, 17 g, Daily PRN  nicotine, 1 patch, Daily PRN  acetaminophen, 650 mg, Q6H PRN   Or  acetaminophen, 650 mg, Q6H PRN  glucose, 4 tablet, PRN  dextrose bolus, 125 mL, PRN   Or  dextrose bolus, 250 mL, PRN  glucagon (rDNA), 1 mg, PRN  dextrose, , Continuous PRN        Labs and Imaging   XR CHEST PORTABLE    Result Date: 2/4/2024  EXAMINATION: ONE XRAY VIEW OF THE CHEST 2/4/2024 7:54 pm COMPARISON: Chest radiograph 01/10/2024. HISTORY: ORDERING SYSTEM PROVIDED HISTORY: Sepsis TECHNOLOGIST PROVIDED HISTORY: Reason for exam:->Sepsis Reason for Exam: sepsis, sob, cough FINDINGS: The cardiac silhouette is enlarged but unchanged.  Lingular scarring is seen. Mild patchy opacities in the right upper and bilateral lower lobes.  No pneumothorax, vascular congestion, or pleural effusion.  No acute osseous abnormality.     Mild patchy opacities in the right upper and bilateral lower lobes possibly representing multifocal pneumonia.       CBC:   Recent Labs     02/04/24 2035 02/05/24 1219 02/06/24 0226   WBC 10.8* 9.2 15.0*   HGB 11.6* 11.1* 11.0*    268 243       BMP:    Recent Labs     02/04/24 2035 02/05/24 1219 02/06/24 0226   * 132* 133*   K 4.8 5.0 4.9   CL 89* 91* 93*   CO2 28 32 29   BUN 8 14 18   CREATININE 0.7* 0.5* 0.6*   GLUCOSE 204* 243* 167*       Hepatic:   Recent Labs     02/04/24 2035 02/05/24  1219   AST 11* 10*   ALT 11 10   BILITOT 0.4 0.3   ALKPHOS 95 90

## 2024-02-06 NOTE — CONSULTS
[facility] Nurse  Consult Note       Armando Mars  AGE: 68 y.o.   GENDER: male  : 1955  TODAY'S DATE:  2024    Subjective:     Reason for Evaluation and Assessment: wound care eval.      Armando Mars is a 68 y.o. male referred by:   [x] Physician  [] Nursing  [] Other:     Wound Identification:  Wound Type: diabetic  Contributing Factors: diabetes, decreased mobility, and obesity        PAST MEDICAL HISTORY        Diagnosis Date    A-fib ([facility])     Resolved after ablation     Anxiety     Arthritis     \"Hips, Knees, Elbows And Fingers\"    COPD (chronic obstructive pulmonary disease) ([facility])     Sees Dr. Osmani Og     Diabetes mellitus ([facility]) Dx     Full dentures     H/O angiography 2018    peripheral angio - LFA occluded, filling collaterals, RSFA 90% stenosis-vasc surg consult    H/O Doppler ultrasound 2018    LE arterial - left mid and distal femoral artery occluded, lt FANI shows mild-mod PVD    H/O echocardiogram 2016    EF60% mild MR, TR, pulm HTN    Hx of colonoscopy      C-scope - benign polyp x1    Hx of Doppler ultrasound 2018    Lower extremity doppler  Normal study.    Hyperlipidemia     Hypertension     Macular degeneration     States is legally blind    Obesity     On home oxygen therapy     Continuous At 2 Liters Per Nasal Cannula    PAD (peripheral artery disease) ([facility])     10/10 FANI mild PAD left superficial femoral s/p left fem-pop bypass    Panic attacks     Pneumonia Last Episode In     PTSD (post-traumatic stress disorder)     Restless leg     Shortness of breath     Sleep apnea     Uses BiPap - stopped using 2020    Wears glasses     To Read       PAST SURGICAL HISTORY    Past Surgical History:   Procedure Laterality Date    APPENDECTOMY  2003    ATRIAL ABLATION SURGERY  2018    A-fib ablation     CARDIAC SURGERY  2018    \"Heart Ablation Done Twice\"    CHOLECYSTECTOMY, LAPAROSCOPIC  2018     ALT 10 02/05/2024 12:19 PM     Albumin:    Lab Results   Component Value Date/Time    LABALBU 3.3 02/05/2024 12:19 PM     PT/INR:    Lab Results   Component Value Date/Time    PROTIME 17.9 02/05/2024 12:19 PM    PROTIME 22.7 12/27/2010 01:44 PM    INR 1.5 02/05/2024 12:19 PM     HgBA1c:    Lab Results   Component Value Date/Time    LABA1C 7.0 02/04/2024 08:55 PM         Assessment:     Patient Active Problem List   Diagnosis    Hypercholesteremia    Tobacco use    Anxiety    Controlled type 2 diabetes mellitus without complication, without long-term current use of insulin (HCC)    Benign essential HTN    Simple chronic bronchitis (HCC)    Macular degeneration, dry    PVD (peripheral vascular disease) (HCC)    VT (ventricular tachycardia) (HCC)    Left wrist sprain    Hypoxia    Morbid obesity (HCC)    History of atrial fibrillation    Swelling of lower extremity    S/P ablation of atrial fibrillation    LFT elevation    Cholecystitis    COPD, severe (HCC)    Type 2 diabetes mellitus with hyperglycemia    COVID-19    COPD exacerbation (HCC)    Non-compliance    Acute on chronic respiratory failure with hypoxia and hypercapnia (HCC)    Acute on chronic diastolic (congestive) heart failure (HCC)    PAF (paroxysmal atrial fibrillation) (HCC)    Acute respiratory failure with hypoxia and hypercapnia (HCC)    Acute on chronic heart failure, unspecified heart failure type (HCC)    Acute on chronic respiratory failure (HCC)    CAP (community acquired pneumonia)    Multifocal pneumonia       Measurements:  Wound 02/06/24 Toe (Comment  which one) Left 4th toe (Active)   Wound Image   02/06/24 0927   Wound Etiology Diabetic 02/06/24 0927   Wound Cleansed Not Cleansed 02/06/24 0927   Dressing/Treatment Open to air 02/06/24 0927   Wound Length (cm) 1 cm 02/06/24 0927   Wound Width (cm) 0.6 cm 02/06/24 0927   Wound Depth (cm) 0.1 cm 02/06/24 0927   Wound Surface Area (cm^2) 0.6 cm^2 02/06/24 0927   Wound Volume (cm^3) 0.06 cm^3

## 2024-02-06 NOTE — PROGRESS NOTES
4 Eyes Skin Assessment     NAME:  Armando Mars  YOB: 1955  MEDICAL RECORD NUMBER:  [id number]    The patient is being assessed for  Admission    I agree that at least one RN has performed a thorough Head to Toe Skin Assessment on the patient. ALL assessment sites listed below have been assessed.      Areas assessed by both nurses:    Head, Face, Ears, Shoulders, Back, Chest, Arms, Elbows, Hands, Sacrum. Buttock, Coccyx, Ischium, and Legs. Feet and Heels        Does the Patient have a Wound? No noted wound(s)       Hector Prevention initiated by RN: No  Wound Care Orders initiated by RN: No    Pressure Injury (Stage 3,4, Unstageable, DTI, NWPT, and Complex wounds) if present, place Wound referral order by RN under : No    New Ostomies, if present place, Ostomy referral order under : No     Nurse 1 eSignature: Electronically signed by Slade Gibbs LPN on 2/6/24 at 4:19 PM EST    **SHARE this note so that the co-signing nurse can place an eSignature**    Nurse 2 eSignature: {Esignature:[id number]}

## 2024-02-06 NOTE — PROGRESS NOTES
Bernard Cantu MD  Patient:  KAROLINE HAGEN   YOB: 1955  MRN:  [id number]  Location:  [address]  Forwarded 2/06/2024 11:04 AM  Slade Gibbs - 2/06/2024 9:53 AM  [phone] From: Slade Gibbs [address] ROUTINE RE: KAROLINE HAGEN Pt is vtach in tele, HR is go up to 150 and down to 120.

## 2024-02-06 NOTE — PROGRESS NOTES
Refusal for bed alarm signed. Patient educated on the importance of using call light to ask for assistance; stressed his 'high risk for fall' status. Patient continues to not press his call light to ask for assistance to go to the bathroom and would disconnect his telemetry unit despite multiple reminders.     EKG done at 0058H, no significant changes from previous EKG. Patient very jittery and unable to fall asleep. Melatonin and atarax given as ordered. On regular breathing treatment for SOB. Patient verbalized he feels a lot better this morning.     Electronically signed by Solis Bonilla RN on 2/6/2024 at 6:18 AM

## 2024-02-06 NOTE — PROGRESS NOTES
02/05/24 2245   NIV Type   $NIV $Daily Charge   NIV Started/Stopped On  (Pt became short of breath and couldn\"t tolerate Bipap)   Equipment Type Bipap   Mode Bilevel   Mask Type Full face mask   Mask Size Large   Bonnet size Large   Assessment   Respirations 16   SpO2 94 %   Level of Consciousness 0   Comfort Level Fair   Using Accessory Muscles No   Mask Compliance Good

## 2024-02-06 NOTE — CONSULTS
Clinical Pharmacy Progress Note    Vancomycin has been discontinued. Pharmacy will sign off. Please re-consult pharmacy if vancomycin dosing is wanted in the future.    Please call with questions.  Althea Taveras, PharmD, [facility], [facility]  Main Pharmacy: [id number]  2/6/2024 2:07 PM

## 2024-02-06 NOTE — PROGRESS NOTES
2/6/2024 2:56 PM  [phone]  From: Migdalia Zuniga  [facility]  CASE MGMT  RE: KAROLINE HAGEN Dr, pt will need a precert for PT/OT to see for a precert to return to [facility]. when you feel appropriate will you place the orders.. please and thank you!! migdalia  2/6/2024 3:14 PM

## 2024-02-06 NOTE — CARE COORDINATION
02/06/24 1428   Service Assessment   Patient Orientation Alert and Oriented;Person;Place;Situation   Cognition Alert   History Provided By Patient   Primary Caregiver Self   Support Systems Family Members   Patient's Healthcare Decision Maker is: Named in Scanned ACP Document   PCP Verified by CM Yes   Last Visit to PCP Within last year   Prior Functional Level Shopping;Housework;Assistance with the following:;Other (see comment)  (pt informed CM that vision is very poor and no longer drives. groceries are delivered.)   Current Functional Level Shopping;Housework;Bathing   Can patient return to prior living arrangement Unknown at present   Ability to make needs known: Good   Family able to assist with home care needs: Other (comment)  (pt has a brother John that he requested this CM call.)   Would you like for me to discuss the discharge plan with any other family members/significant others, and if so, who? Yes  (Brothelif Lopez)   Condition of Participation: Discharge Planning   The Plan for Transition of Care is related to the following treatment goals: pt would like to return home with home care   The Patient and/or Patient Representative was provided with a Choice of Provider? Patient   The Patient and/Or Patient Representative agree with the Discharge Plan? Yes   Freedom of Choice list was provided with basic dialogue that supports the patient's individualized plan of care/goals, treatment preferences, and shares the quality data associated with the providers?  Yes     CM into see pt to initiate a safe discharge plan. Cm introduced self and explained role of CM. Pt is kind, alert and oriented.  Pt requested the CM called his brother John. CM informed per his brother that pt was at [facility]. They have plans for pt to stay SNF at [facility] and then move into the assisted living apartments after the SNF stay. CM called [facility] and pt will need a precert.   MINH placed a PS to Dr Mae for PT/OT for a precert.    Discharge plan is for pt to return to SNF at [place] will need a precert. Following his brother has made arrangements for an assisted living at [place].     CM provided card and encouraged to call for any needs or concern.   CM is available if any needs arise.

## 2024-02-06 NOTE — CONSULTS
bypass (Left, 01/2011); Tonsillectomy (1960's); fracture surgery (Left, 1980's); fracture surgery (Right, 2000's); and femoral bypass (Left, 1/9/2019).  Social History:   reports that he has been smoking cigars and cigarettes. He started smoking about 51 years ago. He has a 12.2 pack-year smoking history. He quit smokeless tobacco use about 49 years ago.  His smokeless tobacco use included chew. He reports that he does not drink alcohol and does not use drugs.  Family history:   no family history of CAD, STROKE of DM    Allergies   Allergen Reactions    Metoprolol      \"Chest Pain And Burning In The Chest\"       ipratropium 0.5 mg-albuterol 2.5 mg (DUONEB) nebulizer solution 1 Dose, Q4H PRN  melatonin tablet 6 mg, Nightly PRN  carvedilol (COREG) tablet 3.125 mg, Once  ceFEPIme (MAXIPIME) 2,000 mg in sodium chloride 0.9 % 100 mL IVPB (mini-bag), Q8H  vancomycin (VANCOCIN) 1750 mg in sodium chloride 0.9 % 500 mL IVPB, Q12H  midodrine (PROAMATINE) tablet 2.5 mg, BID  methylPREDNISolone sodium (PF) (SOLU-MEDROL PF) injection 80 mg, Once  dilTIAZem (CARDIZEM CD) extended release capsule 240 mg, Daily  apixaban (ELIQUIS) tablet 5 mg, BID  atorvastatin (LIPITOR) tablet 40 mg, Nightly  insulin glargine (LANTUS) injection vial 30 Units, Nightly  magnesium oxide (MAG-OX) tablet 400 mg, Daily  potassium chloride (KLOR-CON M) extended release tablet 20 mEq, BID  Roflumilast (DALIRESP) tablet 500 mcg, Daily  budesonide-formoterol (SYMBICORT) 160-4.5 MCG/ACT inhaler 2 puff, BID  torsemide (DEMADEX) tablet 20 mg, BID  ipratropium 0.5 mg-albuterol 2.5 mg (DUONEB) nebulizer solution 1 Dose, Q4H WA RT  sodium chloride flush 0.9 % injection 5-40 mL, 2 times per day  sodium chloride flush 0.9 % injection 5-40 mL, PRN  0.9 % sodium chloride infusion, PRN  potassium chloride 10 mEq/100 mL IVPB (Peripheral Line), PRN  magnesium sulfate 2000 mg in 50 mL IVPB premix, PRN  ondansetron (ZOFRAN-ODT) disintegrating tablet 4 mg, Q8H PRN    (LANTUS) injection vial 30 Units  30 Units SubCUTAneous Nightly Skip Moss MD   30 Units at 02/05/24 2204    magnesium oxide (MAG-OX) tablet 400 mg  400 mg Oral Daily Skip Moss MD   400 mg at 02/06/24 0833    potassium chloride (KLOR-CON M) extended release tablet 20 mEq  20 mEq Oral BID Skip Moss MD   20 mEq at 02/06/24 0833    Roflumilast (DALIRESP) tablet 500 mcg  500 mcg Oral Daily Skip Moss MD   500 mcg at 02/06/24 0845    budesonide-formoterol (SYMBICORT) 160-4.5 MCG/ACT inhaler 2 puff  2 puff Inhalation BID Skip Moss MD   2 puff at 02/06/24 1100    torsemide (DEMADEX) tablet 20 mg  20 mg Oral BID Skip Moss MD   20 mg at 02/06/24 0833    ipratropium 0.5 mg-albuterol 2.5 mg (DUONEB) nebulizer solution 1 Dose  1 Dose Inhalation Q4H WA RT Skip Moss MD   1 Dose at 02/06/24 1059    sodium chloride flush 0.9 % injection 5-40 mL  5-40 mL IntraVENous 2 times per day Skip Moss MD   10 mL at 02/05/24 2205    sodium chloride flush 0.9 % injection 5-40 mL  5-40 mL IntraVENous PRN Skip Moss MD        0.9 % sodium chloride infusion   IntraVENous PRN Skip Moss  mL/hr at 02/06/24 0820 New Bag at 02/06/24 0820    potassium chloride 10 mEq/100 mL IVPB (Peripheral Line)  10 mEq IntraVENous PRN Skip Moss MD        magnesium sulfate 2000 mg in 50 mL IVPB premix  2,000 mg IntraVENous PRN Skip Moss MD        ondansetron (ZOFRAN-ODT) disintegrating tablet 4 mg  4 mg Oral Q8H PRN Skip Moss MD        Or    ondansetron (ZOFRAN) injection 4 mg  4 mg IntraVENous Q6H PRN Skip Moss MD        polyethylene glycol (GLYCOLAX) packet 17 g  17 g Oral Daily PRN Skip Moss MD        nicotine (NICODERM CQ) 21 MG/24HR 1 patch  1 patch TransDERmal Daily PRN Skip Moss MD        acetaminophen (TYLENOL) tablet 650 mg  650 mg Oral Q6H PRN Skip Moss MD   650 mg at 02/06/24 0028    Or    acetaminophen

## 2024-02-06 NOTE — DISCHARGE INSTR - COC
Continuity of Care Form    Patient Name: Armando Mars   :  1955  MRN:  [id number]    Admit date:  2024  Discharge date:  02/10/24    Code Status Order: Full Code   Advance Directives:     Admitting Physician:  Skip Moss MD  PCP: Rosalio Hedrick MD    Discharging Nurse: francisca [facility] Unit/Room#: 1127/1127-A  Discharging Unit Phone Number: ***    Emergency Contact:   Extended Emergency Contact Information  Primary Emergency Contact: John Mars  Home Phone: [phone]  Mobile Phone: [phone]  Relation: Brother/Sister   needed? No  Secondary Emergency Contact: Nico Mars  Home Phone: [phone]  Mobile Phone: [phone]  Relation: Brother/Sister  Preferred language: English   needed? No    Past Surgical History:  Past Surgical History:   Procedure Laterality Date    APPENDECTOMY      ATRIAL ABLATION SURGERY  2018    A-fib ablation     CARDIAC SURGERY  2018    \"Heart Ablation Done Twice\"    CHOLECYSTECTOMY, LAPAROSCOPIC  2018    COLONOSCOPY  2011    Polyp Removed    DENTAL SURGERY      All Teeth Extracted In Past    EYE SURGERY Left     \"For Macular Degeneration\"    FEMORAL BYPASS Left 2011    FEMORAL BYPASS Left 2019    FEMORAL POPLITEAL BYPASS LEFT, REDO performed by Ramon Cooper MD at [facility] OR    FRACTURE SURGERY Left     Broken Left Wrist , No Hardware    FRACTURE SURGERY Right 's    Broken Right Wrist With Hardware, Hardware Later Removed    HERNIA REPAIR      Umbilical Hernia    RI LAPAROSCOPY SURG CHOLECYSTECTOMY N/A 2018    CHOLECYSTECTOMY LAPAROSCOPIC performed by Sandor Mendosa MD at [facility] OR    TONSILLECTOMY         Immunization History:   Immunization History   Administered Date(s) Administered    COVID-19, PFIZER PURPLE top, DILUTE for use, (age 12 y+), 30mcg/0.3mL 2021, 2021, 2021    Influenza Virus Vaccine 10/08/2014    Influenza, AFLURIA (age 3

## 2024-02-06 NOTE — PROGRESS NOTES
Coronary anatomy:   The left main coronary artery has no significant disease.      Left anterior descending artery has no significant disease.     Circumflex artery has no significant disease.     The right coronary artery is a dominant vessel with no significant disease     Left ventriculogram shows ejection fraction of 55%. Normal wall motion.     Impression:  No evidence of hemodynamically significant coronary artery disease        Recommendations:  Add BB  ? EPS cons  Holter  Findings are reviewed and discussed  with patient and family. Questions are answered.  Post procedure activity restrictions and continued risk modification and follow up recommended.              Diallo Rodriguez MD, 1/13/2017 11:26 AM

## 2024-02-06 NOTE — PROGRESS NOTES
HIEUKAROLINE  YOB: 1955  MRN:  [id number]  Location:  [facility]  1127 - A  2/6/2024 1:08 PM  [phone]  From: Rob Blount  [facility]  Pharmacy ROUTINE  RE: KAROLINE Moser... This patient is ordered on vancomycin for multifocal pneumonia... His nasal MRSA screen has now resulted negative... Would you like the vancomycin discontinued? Thanks!  2/6/2024 1:11 PM  i will dc it thx  Read 2/6/2024 1:11 PM  2/6/2024 1:12 PM  Sounds good... Thanks again!

## 2024-02-07 ENCOUNTER — CARE COORDINATION (OUTPATIENT)
Dept: CARE COORDINATION | Age: 69
End: 2024-02-07

## 2024-02-07 LAB
ANION GAP SERPL CALCULATED.3IONS-SCNC: 9 MMOL/L (ref 7–16)
BUN SERPL-MCNC: 12 MG/DL (ref 6–23)
CALCIUM SERPL-MCNC: 8 MG/DL (ref 8.3–10.6)
CHLORIDE BLD-SCNC: 93 MMOL/L (ref 99–110)
CO2: 36 MMOL/L (ref 21–32)
CREAT SERPL-MCNC: 0.6 MG/DL (ref 0.9–1.3)
EKG ATRIAL RATE: 111 BPM
EKG ATRIAL RATE: 79 BPM
EKG DIAGNOSIS: NORMAL
EKG DIAGNOSIS: NORMAL
EKG Q-T INTERVAL: 324 MS
EKG Q-T INTERVAL: 334 MS
EKG QRS DURATION: 84 MS
EKG QRS DURATION: 92 MS
EKG QTC CALCULATION (BAZETT): 434 MS
EKG QTC CALCULATION (BAZETT): 474 MS
EKG R AXIS: -50 DEGREES
EKG R AXIS: -63 DEGREES
EKG T AXIS: 59 DEGREES
EKG T AXIS: 63 DEGREES
EKG VENTRICULAR RATE: 108 BPM
EKG VENTRICULAR RATE: 121 BPM
GFR SERPL CREATININE-BSD FRML MDRD: >60 ML/MIN/1.73M2
GLUCOSE BLD-MCNC: 130 MG/DL (ref 70–99)
GLUCOSE BLD-MCNC: 149 MG/DL (ref 70–99)
GLUCOSE BLD-MCNC: 156 MG/DL (ref 70–99)
GLUCOSE BLD-MCNC: 231 MG/DL (ref 70–99)
GLUCOSE BLD-MCNC: 314 MG/DL (ref 70–99)
GLUCOSE SERPL-MCNC: 110 MG/DL (ref 70–99)
HCT VFR BLD CALC: 36.4 % (ref 42–52)
HEMOGLOBIN: 11.1 GM/DL (ref 13.5–18)
MAGNESIUM: 1.7 MG/DL (ref 1.8–2.4)
MCH RBC QN AUTO: 29.3 PG (ref 27–31)
MCHC RBC AUTO-ENTMCNC: 30.5 % (ref 32–36)
MCV RBC AUTO: 96 FL (ref 78–100)
PDW BLD-RTO: 15.2 % (ref 11.7–14.9)
PLATELET # BLD: 264 K/CU MM (ref 140–440)
PMV BLD AUTO: 9.4 FL (ref 7.5–11.1)
POTASSIUM SERPL-SCNC: 4.5 MMOL/L (ref 3.5–5.1)
RBC # BLD: 3.79 M/CU MM (ref 4.6–6.2)
SODIUM BLD-SCNC: 138 MMOL/L (ref 135–145)
WBC # BLD: 10.8 K/CU MM (ref 4–10.5)

## 2024-02-07 PROCEDURE — 94761 N-INVAS EAR/PLS OXIMETRY MLT: CPT

## 2024-02-07 PROCEDURE — 6370000000 HC RX 637 (ALT 250 FOR IP): Performed by: INTERNAL MEDICINE

## 2024-02-07 PROCEDURE — 6370000000 HC RX 637 (ALT 250 FOR IP): Performed by: STUDENT IN AN ORGANIZED HEALTH CARE EDUCATION/TRAINING PROGRAM

## 2024-02-07 PROCEDURE — 2700000000 HC OXYGEN THERAPY PER DAY

## 2024-02-07 PROCEDURE — 2580000003 HC RX 258: Performed by: FAMILY MEDICINE

## 2024-02-07 PROCEDURE — 97530 THERAPEUTIC ACTIVITIES: CPT

## 2024-02-07 PROCEDURE — 6370000000 HC RX 637 (ALT 250 FOR IP): Performed by: FAMILY MEDICINE

## 2024-02-07 PROCEDURE — 6360000002 HC RX W HCPCS: Performed by: INTERNAL MEDICINE

## 2024-02-07 PROCEDURE — 99233 SBSQ HOSP IP/OBS HIGH 50: CPT | Performed by: INTERNAL MEDICINE

## 2024-02-07 PROCEDURE — 97542 WHEELCHAIR MNGMENT TRAINING: CPT

## 2024-02-07 PROCEDURE — 83735 ASSAY OF MAGNESIUM: CPT

## 2024-02-07 PROCEDURE — 82962 GLUCOSE BLOOD TEST: CPT

## 2024-02-07 PROCEDURE — 1200000000 HC SEMI PRIVATE

## 2024-02-07 PROCEDURE — 6360000002 HC RX W HCPCS: Performed by: FAMILY MEDICINE

## 2024-02-07 PROCEDURE — 36415 COLL VENOUS BLD VENIPUNCTURE: CPT

## 2024-02-07 PROCEDURE — 85027 COMPLETE CBC AUTOMATED: CPT

## 2024-02-07 PROCEDURE — 94640 AIRWAY INHALATION TREATMENT: CPT

## 2024-02-07 PROCEDURE — 93010 ELECTROCARDIOGRAM REPORT: CPT | Performed by: INTERNAL MEDICINE

## 2024-02-07 PROCEDURE — 97162 PT EVAL MOD COMPLEX 30 MIN: CPT

## 2024-02-07 PROCEDURE — 2580000003 HC RX 258: Performed by: STUDENT IN AN ORGANIZED HEALTH CARE EDUCATION/TRAINING PROGRAM

## 2024-02-07 PROCEDURE — APPNB15 APP NON BILLABLE TIME 0-15 MINS

## 2024-02-07 PROCEDURE — 80048 BASIC METABOLIC PNL TOTAL CA: CPT

## 2024-02-07 PROCEDURE — 97116 GAIT TRAINING THERAPY: CPT

## 2024-02-07 PROCEDURE — 6370000000 HC RX 637 (ALT 250 FOR IP)

## 2024-02-07 RX ORDER — MAGNESIUM SULFATE IN WATER 40 MG/ML
2000 INJECTION, SOLUTION INTRAVENOUS ONCE
Status: COMPLETED | OUTPATIENT
Start: 2024-02-07 | End: 2024-02-07

## 2024-02-07 RX ORDER — INSULIN GLARGINE 100 [IU]/ML
35 INJECTION, SOLUTION SUBCUTANEOUS NIGHTLY
Status: DISCONTINUED | OUTPATIENT
Start: 2024-02-07 | End: 2024-02-10 | Stop reason: HOSPADM

## 2024-02-07 RX ORDER — INSULIN LISPRO 100 [IU]/ML
8 INJECTION, SOLUTION INTRAVENOUS; SUBCUTANEOUS
Status: DISCONTINUED | OUTPATIENT
Start: 2024-02-08 | End: 2024-02-10 | Stop reason: HOSPADM

## 2024-02-07 RX ADMIN — BUDESONIDE AND FORMOTEROL FUMARATE DIHYDRATE 2 PUFF: 160; 4.5 AEROSOL RESPIRATORY (INHALATION) at 19:26

## 2024-02-07 RX ADMIN — SODIUM CHLORIDE, PRESERVATIVE FREE 10 ML: 5 INJECTION INTRAVENOUS at 20:33

## 2024-02-07 RX ADMIN — POTASSIUM CHLORIDE 20 MEQ: 1500 TABLET, EXTENDED RELEASE ORAL at 09:07

## 2024-02-07 RX ADMIN — SODIUM CHLORIDE, PRESERVATIVE FREE 10 ML: 5 INJECTION INTRAVENOUS at 09:07

## 2024-02-07 RX ADMIN — APIXABAN 5 MG: 5 TABLET, FILM COATED ORAL at 20:31

## 2024-02-07 RX ADMIN — CEFEPIME 2000 MG: 2 INJECTION, POWDER, FOR SOLUTION INTRAVENOUS at 16:04

## 2024-02-07 RX ADMIN — ROFLUMILAST 500 MCG: 500 TABLET ORAL at 12:32

## 2024-02-07 RX ADMIN — DIGOXIN 125 MCG: 125 TABLET ORAL at 09:07

## 2024-02-07 RX ADMIN — POTASSIUM CHLORIDE 20 MEQ: 1500 TABLET, EXTENDED RELEASE ORAL at 20:31

## 2024-02-07 RX ADMIN — APIXABAN 5 MG: 5 TABLET, FILM COATED ORAL at 09:07

## 2024-02-07 RX ADMIN — INSULIN GLARGINE 35 UNITS: 100 INJECTION, SOLUTION SUBCUTANEOUS at 20:31

## 2024-02-07 RX ADMIN — INSULIN LISPRO 4 UNITS: 100 INJECTION, SOLUTION INTRAVENOUS; SUBCUTANEOUS at 20:31

## 2024-02-07 RX ADMIN — PREDNISONE 40 MG: 20 TABLET ORAL at 09:08

## 2024-02-07 RX ADMIN — Medication 400 MG: at 09:07

## 2024-02-07 RX ADMIN — ATORVASTATIN CALCIUM 40 MG: 40 TABLET, FILM COATED ORAL at 20:31

## 2024-02-07 RX ADMIN — CEFEPIME 2000 MG: 2 INJECTION, POWDER, FOR SOLUTION INTRAVENOUS at 00:45

## 2024-02-07 RX ADMIN — INSULIN LISPRO 5 UNITS: 100 INJECTION, SOLUTION INTRAVENOUS; SUBCUTANEOUS at 12:31

## 2024-02-07 RX ADMIN — INSULIN LISPRO 5 UNITS: 100 INJECTION, SOLUTION INTRAVENOUS; SUBCUTANEOUS at 16:18

## 2024-02-07 RX ADMIN — INSULIN LISPRO 1 UNITS: 100 INJECTION, SOLUTION INTRAVENOUS; SUBCUTANEOUS at 16:18

## 2024-02-07 RX ADMIN — CEFEPIME 2000 MG: 2 INJECTION, POWDER, FOR SOLUTION INTRAVENOUS at 09:09

## 2024-02-07 RX ADMIN — TORSEMIDE 20 MG: 20 TABLET ORAL at 20:32

## 2024-02-07 RX ADMIN — SODIUM CHLORIDE 25 ML: 9 INJECTION, SOLUTION INTRAVENOUS at 00:40

## 2024-02-07 RX ADMIN — DILTIAZEM HYDROCHLORIDE 240 MG: 240 CAPSULE, COATED, EXTENDED RELEASE ORAL at 09:08

## 2024-02-07 RX ADMIN — TORSEMIDE 20 MG: 20 TABLET ORAL at 09:07

## 2024-02-07 RX ADMIN — MAGNESIUM SULFATE HEPTAHYDRATE 2000 MG: 40 INJECTION, SOLUTION INTRAVENOUS at 20:46

## 2024-02-07 RX ADMIN — INSULIN LISPRO 5 UNITS: 100 INJECTION, SOLUTION INTRAVENOUS; SUBCUTANEOUS at 09:07

## 2024-02-07 RX ADMIN — IPRATROPIUM BROMIDE AND ALBUTEROL SULFATE 1 DOSE: 2.5; .5 SOLUTION RESPIRATORY (INHALATION) at 19:25

## 2024-02-07 RX ADMIN — IPRATROPIUM BROMIDE AND ALBUTEROL SULFATE 1 DOSE: 2.5; .5 SOLUTION RESPIRATORY (INHALATION) at 15:53

## 2024-02-07 RX ADMIN — MIDODRINE HYDROCHLORIDE 2.5 MG: 5 TABLET ORAL at 09:07

## 2024-02-07 ASSESSMENT — PAIN SCALES - GENERAL
PAINLEVEL_OUTOF10: 4
PAINLEVEL_OUTOF10: 6
PAINLEVEL_OUTOF10: 0

## 2024-02-07 ASSESSMENT — PAIN DESCRIPTION - DESCRIPTORS: DESCRIPTORS: ACHING

## 2024-02-07 ASSESSMENT — PAIN DESCRIPTION - ORIENTATION: ORIENTATION: RIGHT;LEFT

## 2024-02-07 ASSESSMENT — PAIN SCALES - WONG BAKER: WONGBAKER_NUMERICALRESPONSE: 2

## 2024-02-07 ASSESSMENT — PAIN DESCRIPTION - LOCATION: LOCATION: LEG;GENERALIZED

## 2024-02-07 ASSESSMENT — PAIN DESCRIPTION - ONSET: ONSET: ON-GOING

## 2024-02-07 ASSESSMENT — PAIN DESCRIPTION - FREQUENCY: FREQUENCY: CONTINUOUS

## 2024-02-07 ASSESSMENT — PAIN DESCRIPTION - PAIN TYPE: TYPE: CHRONIC PAIN

## 2024-02-07 ASSESSMENT — PAIN - FUNCTIONAL ASSESSMENT: PAIN_FUNCTIONAL_ASSESSMENT: ACTIVITIES ARE NOT PREVENTED

## 2024-02-07 NOTE — PROGRESS NOTES
[place] [address]  Phone: [phone]    Fax [phone]                  Diallo Rodriguez MD, [facility]       Sergio Lucio MD, [facility]  Khadijah De La O MD, [facility]    MD Darci Perkins MD Tariq Rizvi, MD Bilal Alam, MD Dr. Waseem Sajjad MD Melissa Kellis, APRMARCO ANTONIO Griggs, APRMARCO ANTONIO Moody, APRMARCO ANTONIO Woody, APRN  Justin Aden PA-C    CARDIOLOGY  NOTE      Name:  Armando Mars /Age/Sex: 1955  (68 y.o. male)   MRN & CSN:  [id number] & [id number] Admission Date/Time: 2024  7:30 PM   Location:  [address] [place] [address] PCP: Rosalio Hedrick MD       Hospital Day: 4    - Cardiology consult is for: Atrial fibrillation/questionable V. tach          CARDIOLOGY ATTENDING ADDENDUM    I have seen, spoken to and examined this patient personally, independent of the NP/PAC. I have reviewed the hospital care given to date and reviewed all pertinent labs and imaging. I have spoken with patient, nursing staff and provided written and verbal instructions .The above note has been reviewed. I have spent substantive (>50%) amount of time in formulating patient care.               Physical Exam:       Head: normal  Eye: normal  Chest:  coarse  Cardiovascular:  S1S2  IRIR  Abdomen: soft          MEDICAL DECISION MAKING :            Atrial fibrillation, persistent.  Episodes of A-fib RVR  Telemetry strips were reviewed, no ventricular tachycardia appreciated  Diastolic heart failure  Chronically elevated troponin, demand ischemia.  Pneumonia/sepsis  Hypertension     Patient does not have ventricular tachycardia, episodes of A-fib RVR appears to be V. tach however not the case.     Goal heart rate less than ~ 100 in setting of PNA/sepsis.  BP is soft    Continue stroke prophylaxis with Eliquis 5 twice daily  Continue with beta-blocker Coreg 3.125 twice daily  Continue with Cardizem to 40 mg

## 2024-02-07 NOTE — PLAN OF CARE
Problem: Safety - Adult  Goal: Free from fall injury  Outcome: Progressing     Problem: Discharge Planning  Goal: Discharge to home or other facility with appropriate resources  Outcome: Progressing     Problem: Pain  Goal: Verbalizes/displays adequate comfort level or baseline comfort level  Outcome: Progressing     Problem: Chronic Conditions and Co-morbidities  Goal: Patient's chronic conditions and co-morbidity symptoms are monitored and maintained or improved  Outcome: Progressing

## 2024-02-07 NOTE — PROGRESS NOTES
Lantus increased from 30-35  Prandial Humalog increased from 5-8  Today will be the second day of prednisone

## 2024-02-07 NOTE — CONSULTS
[facility] ACUTE CARE PHYSICAL THERAPY EVALUATION  Armando Mars, 1955, 1127/1127-A, 2/7/2024    History  [place]:  The primary encounter diagnosis was Pneumonia of both lower lobes due to infectious organism. Diagnoses of Septicemia (HCC), Acute respiratory failure with hypoxemia (HCC), and Acute on chronic combined systolic and diastolic congestive heart failure (HCC) were also pertinent to this visit.  Patient  has a past medical history of A-fib (HCC), Anxiety, Arthritis, COPD (chronic obstructive pulmonary disease) (HCC), Depression, Diabetes mellitus (HCC), Full dentures, H/O angiography, H/O Doppler ultrasound, H/O echocardiogram, Hx of colonoscopy, Hx of Doppler ultrasound, Hyperlipidemia, Hypertension, Macular degeneration, Obesity, On home oxygen therapy, PAD (peripheral artery disease) (HCC), Panic attacks, Pneumonia, PTSD (post-traumatic stress disorder), Restless leg, Shortness of breath, Sleep apnea, and Wears glasses.  Patient  has a past surgical history that includes Atrial ablation surgery (05/23/2018); pr laparoscopy surg cholecystectomy (N/A, 11/12/2018); Colonoscopy (07/2011); eye surgery (Left, 2017); Dental surgery; Cardiac surgery (05/2018); Cholecystectomy, laparoscopic (11/12/2018); Appendectomy (2003); hernia repair (2003); femoral bypass (Left, 01/2011); Tonsillectomy (1960's); fracture surgery (Left, 1980's); fracture surgery (Right, 2000's); and femoral bypass (Left, 1/9/2019).    Discharge Recommendation: Assisted Living    Subjective:    Patient states:  \"Back up, I don't need your help\"      Pain:  none stated       Communication with other providers:  Handoff to RN    Restrictions: Fall precautions, Contact-droplet precautions, O2 4L    Home Setup/Prior level of function  Social/Functional History  Lives With: Alone  Type of Home: Apartment  Home Layout: One level  Home Access: Level entry  Bathroom Equipment: Grab bars in shower, Tub transfer bench  Home  Equipment: Oxygen  Receives Help From: Family, Friend(s), Neighbor  ADL Assistance: Independent  Homemaking Assistance: Independent  Homemaking Responsibilities: Yes  Ambulation Assistance: Independent  Transfer Assistance: Independent  Active : No  Patient's  Info: Brother, or Dial-a-Ride  Occupation: On disability    *this information was obtained from previous evaluation and verified with the patient*    Examination of body systems (includes body structures/functions, activity/participation limitations):  Observation:  Pt seated EOB upon arrival and agreeable to therapy  Vision:  WFL  Hearing:  WFL  Cardiopulmonary:  O2 4L,  Cognition: decreased processing, oriented to self, and situation, not to time and place, lethargic, see OT/SLP note for further evaluation.    Musculoskeletal  ROM R/L:  WFL.    Strength R/L:  4+/5, mild limitation in function and endurance.    Neuro: decreased sensation in B feet       Mobility:  Rolling L/R:  Not assessed, pt seated EOB at start of session  Supine to sit:  Not assessed, pt seated EOB at start of session  Transfers: pt refused use of gait belt and RW despite education on safety and fall risk  Sit to stand: from EOB x2 SBA and no AD  Stand to sit: to EOB x2 SBA and no AD, poor eccentric control on descent   Sitting balance:  Good, unsupported EOB mod I, increased forward flexed posture  Standing balance: Fair, no AD, forward flexed posture   Gait: Pt ambulated 10'+15' w SBA with seated rest break in between, pt educated on use of RW and gait belt for increased safety and improved gait form, pt is not willing to use RW or gait belt for mobility. Pt is demonstrated unsteady gait, wide TK, decreased margareth, forward flexed posture, decreased step length and decreased step height.     Pt deferred further activity this date    [facility] 6 Clicks Inpatient Mobility:  AM-PAC Inpatient Mobility Raw Score : 18    Safety: patient left EOB, call light within reach, RN notified,

## 2024-02-07 NOTE — PROGRESS NOTES
V2.0    [facility] Progress Note      Name:  Armando Mars /Age/Sex: 1955  (68 y.o. male)   MRN & CSN:  [id number] & [id number] Encounter Date/Time: 2024 1:46 PM EST   Location:  [facility] PCP: Rosalio Hedrick MD     Attending:Edi Mae MD       Hospital Day: 4    Assessment and Recommendations   Armando Mars is a 68 y.o. male with pmh of multiple admissions for respiratory issues and pneumonia, COPD, A-fib, hypertension, hyperlipidemia, diabetes who presents with Multifocal pneumonia. Labs showed Na 133, BNP 3276, trop 25, wbc 10.8, RSV +, chest xray with patchy opacities in RUL and BL LL. Admitted for infectious eval.      - prednisone started yesterday, patient reports it is helping.  Will need [facility] precert    Plan:   Septic shock due to multifocal pneumonia: In the setting of RSV infection but recent hospitalization in 2024 for pneumonia as well.  No leukocytosis, afebrile.  RSV positive.  Lactate 4.4 and will repeat.  Initiated on IV Rocephin/azithromycin but in light of recent hospitalization with patient was on IV antibiotics were broadened to vancomycin/cefepime.  Will stop vancomycin on  as MRSA DNA is negative.  Acute on chronic respiratory failure with hypoxia: 3 L nasal cannula at baseline and required 4 L as of 2024.  Treating infection as above.  Continue oral torsemide.  Given IV Solu-Medrol x 1.  Continue Symbicort, Daliresp, and bronchodilators.  Non-MI troponin elevation: Chronic in nature and no chest pain currently.  EKG without evidence of acute ischemia and troponin overall flat.  Suspect in the setting of sepsis and respiratory failure.  Monitoring on telemetry.  A-fib with RVR: Noted on admission but rate improved.  Continue Cardizem and Eliquis.  Chronic diastolic heart failure: EF of 50 to 55% on echocardiogram in 2023.  Stable volume status.  Continue oral torsemide.  Type 2 diabetes with hyperglycemia: A1c of 7 24. Suspect  steroid contributing.  Continue Lantus and insulin correction scale  Hyponatremia: Na 132. Oral hydration and will obtain further workup as needed if worsens.         Diet ADULT DIET; Regular   DVT Prophylaxis [] Lovenox, []  Heparin, [] SCDs, [] Ambulation,  [x] Eliquis, [] Xarelto  [] Coumadin   Code Status Full Code   Disposition From: home  Expected Disposition: Home  Estimated Date of Discharge: Around 2/8  Patient requires continued admission due to respiratory failure, infection   Surrogate Decision Maker/ SELMA Lopez brother       Subjective:     Chief Complaint: IHSAN Mars is a 68 y.o. male    He reports that he is feeling better today    Review of Systems:      Pertinent positives and negatives discussed in HPI    Objective:     Intake/Output Summary (Last 24 hours) at 2/7/2024 1007  Last data filed at 2/7/2024 0906  Gross per 24 hour   Intake 780 ml   Output --   Net 780 ml        Vitals:   Vitals:    02/06/24 2245 02/07/24 0215 02/07/24 0837 02/07/24 0900   BP: 97/70 (!) 111/56  103/70   Pulse: (!) 107 (!) 106 100 90   Resp: 20 20 20    Temp: 98.1 °F (36.7 °C) 97.8 °F (36.6 °C)  97.8 °F (36.6 °C)   TempSrc: Oral Oral  Oral   SpO2: 93%  94%    Weight:       Height:             Physical Exam:      Physical Exam  Constitutional:       Appearance: He is not diaphoretic.   Pulmonary:      Effort: Pulmonary effort is normal.   Skin:     Coloration: Skin is not jaundiced.            Medications:   Medications:    carvedilol  3.125 mg Oral Once    digoxin  125 mcg Oral Daily    predniSONE  40 mg Oral Daily    cefepime  2,000 mg IntraVENous Q8H    midodrine  2.5 mg Oral BID    methylPREDNISolone  80 mg IntraVENous Once    dilTIAZem  240 mg Oral Daily    apixaban  5 mg Oral BID    atorvastatin  40 mg Oral Nightly    insulin glargine  30 Units SubCUTAneous Nightly    magnesium oxide  400 mg Oral Daily    potassium chloride  20 mEq Oral BID    Roflumilast  500 mcg Oral Daily    budesonide-formoterol  2

## 2024-02-07 NOTE — CARE COORDINATION
Patient is currently IP. [facility] has not had successful outreach with patient since >30days. [facility] will sign off at this time and call patient at a later date if reassigned after discharge.

## 2024-02-07 NOTE — CONSULTS
Devices: Pre-filled or refillable cartridge   Substance and Sexual Activity    Alcohol use: No    Drug use: No    Sexual activity: Never   Social History Narrative    Diet unrestricted    Exercise none    Seat belt always             Social Determinants of Health     Financial Resource Strain: Low Risk  (8/11/2023)    Overall Financial Resource Strain (CARDIA)     Difficulty of Paying Living Expenses: Not hard at all   Food Insecurity: No Food Insecurity (2/5/2024)    Hunger Vital Sign     Worried About Running Out of Food in the Last Year: Never true     Ran Out of Food in the Last Year: Never true   Recent Concern: Food Insecurity - Food Insecurity Present (1/10/2024)    Hunger Vital Sign     Worried About Running Out of Food in the Last Year: Sometimes true     Ran Out of Food in the Last Year: Sometimes true   Transportation Needs: No Transportation Needs (2/5/2024)    PRAPARE - Transportation     Lack of Transportation (Medical): No     Lack of Transportation (Non-Medical): No   Recent Concern: Transportation Needs - Unmet Transportation Needs (1/3/2024)    PRAPARE - Transportation     Lack of Transportation (Medical): Yes     Lack of Transportation (Non-Medical): Yes   Physical Activity: Insufficiently Active (5/12/2023)    Exercise Vital Sign     Days of Exercise per Week: 2 days     Minutes of Exercise per Session: 10 min   Stress: No Stress Concern Present (5/12/2023)    [employer] [place] [employer] - Occupational Stress Questionnaire     Feeling of Stress : Only a little   Social Connections: Socially Isolated (5/12/2023)    Social Connection and Isolation Panel [NHANES]     Frequency of Communication with Friends and Family: Twice a week     Frequency of Social Gatherings with Friends and Family: Never     Attends Christian Services: 1 to 4 times per year     Active Member of Clubs or Organizations: No     Attends Club or Organization Meetings: Never     Marital Status:    Intimate  VIEW OF THE CHEST     2/4/2024 7:54 pm     COMPARISON:  Chest radiograph 01/10/2024.     HISTORY:  ORDERING SYSTEM PROVIDED HISTORY: Sepsis  TECHNOLOGIST PROVIDED HISTORY:  Reason for exam:->Sepsis  Reason for Exam: sepsis, sob, cough     FINDINGS:  The cardiac silhouette is enlarged but unchanged.  Lingular scarring is seen.  Mild patchy opacities in the right upper and bilateral lower lobes.  No  pneumothorax, vascular congestion, or pleural effusion.  No acute osseous  abnormality.     IMPRESSION:  Mild patchy opacities in the right upper and bilateral lower lobes possibly  representing multifocal pneumonia.                                  Assessment:     Ac on ch resp failure hypoxic and hypercapneic  Ac copd  Simba pneumonia  Positive RSV          Plan:     D/w pt  Adequate o2 adm  Bd rx  On po steroids  Continue cefepime  Cx  Vanc is dc as nasal swab negative  Thanks will follow

## 2024-02-07 NOTE — PROGRESS NOTES
This nurse put bipap on patient per patient's request this evening. Patient then took it off and threw the mask on the bed stating \"I'm not wearing it, it doesn't feel right\". This nurse educated patient on importance of wearing it during times of rest. Patient stated he will wear it when he feels like he needs it.

## 2024-02-08 LAB
ANION GAP SERPL CALCULATED.3IONS-SCNC: 6 MMOL/L (ref 7–16)
BUN SERPL-MCNC: 15 MG/DL (ref 6–23)
CALCIUM SERPL-MCNC: 7.8 MG/DL (ref 8.3–10.6)
CHLORIDE BLD-SCNC: 90 MMOL/L (ref 99–110)
CO2: 38 MMOL/L (ref 21–32)
CREAT SERPL-MCNC: 0.6 MG/DL (ref 0.9–1.3)
DIGOXIN LEVEL: 0.8 NG/ML (ref 0.8–2)
DOSE AMOUNT: NORMAL
DOSE TIME: NORMAL
GFR SERPL CREATININE-BSD FRML MDRD: >60 ML/MIN/1.73M2
GLUCOSE BLD-MCNC: 152 MG/DL (ref 70–99)
GLUCOSE BLD-MCNC: 179 MG/DL (ref 70–99)
GLUCOSE BLD-MCNC: 223 MG/DL (ref 70–99)
GLUCOSE BLD-MCNC: 289 MG/DL (ref 70–99)
GLUCOSE BLD-MCNC: 323 MG/DL (ref 70–99)
GLUCOSE SERPL-MCNC: 218 MG/DL (ref 70–99)
MAGNESIUM: 2.2 MG/DL (ref 1.8–2.4)
POTASSIUM SERPL-SCNC: 4.4 MMOL/L (ref 3.5–5.1)
SODIUM BLD-SCNC: 134 MMOL/L (ref 135–145)

## 2024-02-08 PROCEDURE — APPNB30 APP NON BILLABLE TIME 0-30 MINS

## 2024-02-08 PROCEDURE — 80048 BASIC METABOLIC PNL TOTAL CA: CPT

## 2024-02-08 PROCEDURE — 6370000000 HC RX 637 (ALT 250 FOR IP): Performed by: FAMILY MEDICINE

## 2024-02-08 PROCEDURE — 6370000000 HC RX 637 (ALT 250 FOR IP): Performed by: INTERNAL MEDICINE

## 2024-02-08 PROCEDURE — 94640 AIRWAY INHALATION TREATMENT: CPT

## 2024-02-08 PROCEDURE — 87070 CULTURE OTHR SPECIMN AEROBIC: CPT

## 2024-02-08 PROCEDURE — 76937 US GUIDE VASCULAR ACCESS: CPT

## 2024-02-08 PROCEDURE — 1200000000 HC SEMI PRIVATE

## 2024-02-08 PROCEDURE — 94761 N-INVAS EAR/PLS OXIMETRY MLT: CPT

## 2024-02-08 PROCEDURE — 93005 ELECTROCARDIOGRAM TRACING: CPT | Performed by: FAMILY MEDICINE

## 2024-02-08 PROCEDURE — 87205 SMEAR GRAM STAIN: CPT

## 2024-02-08 PROCEDURE — 83735 ASSAY OF MAGNESIUM: CPT

## 2024-02-08 PROCEDURE — 82962 GLUCOSE BLOOD TEST: CPT

## 2024-02-08 PROCEDURE — 2580000003 HC RX 258: Performed by: FAMILY MEDICINE

## 2024-02-08 PROCEDURE — 6370000000 HC RX 637 (ALT 250 FOR IP): Performed by: STUDENT IN AN ORGANIZED HEALTH CARE EDUCATION/TRAINING PROGRAM

## 2024-02-08 PROCEDURE — 80162 ASSAY OF DIGOXIN TOTAL: CPT

## 2024-02-08 PROCEDURE — 2580000003 HC RX 258: Performed by: STUDENT IN AN ORGANIZED HEALTH CARE EDUCATION/TRAINING PROGRAM

## 2024-02-08 PROCEDURE — 99233 SBSQ HOSP IP/OBS HIGH 50: CPT | Performed by: INTERNAL MEDICINE

## 2024-02-08 PROCEDURE — 94664 DEMO&/EVAL PT USE INHALER: CPT

## 2024-02-08 PROCEDURE — 6360000002 HC RX W HCPCS: Performed by: FAMILY MEDICINE

## 2024-02-08 PROCEDURE — 6370000000 HC RX 637 (ALT 250 FOR IP)

## 2024-02-08 PROCEDURE — 2700000000 HC OXYGEN THERAPY PER DAY

## 2024-02-08 PROCEDURE — 36415 COLL VENOUS BLD VENIPUNCTURE: CPT

## 2024-02-08 PROCEDURE — 97166 OT EVAL MOD COMPLEX 45 MIN: CPT

## 2024-02-08 RX ORDER — IPRATROPIUM BROMIDE AND ALBUTEROL SULFATE 2.5; .5 MG/3ML; MG/3ML
1 SOLUTION RESPIRATORY (INHALATION)
Status: DISCONTINUED | OUTPATIENT
Start: 2024-02-08 | End: 2024-02-10 | Stop reason: HOSPADM

## 2024-02-08 RX ORDER — DILTIAZEM HYDROCHLORIDE 120 MG/1
120 CAPSULE, COATED, EXTENDED RELEASE ORAL ONCE
Status: COMPLETED | OUTPATIENT
Start: 2024-02-08 | End: 2024-02-08

## 2024-02-08 RX ORDER — BUDESONIDE AND FORMOTEROL FUMARATE DIHYDRATE 160; 4.5 UG/1; UG/1
AEROSOL RESPIRATORY (INHALATION)
Qty: 10.2 G | Refills: 10 | OUTPATIENT
Start: 2024-02-08

## 2024-02-08 RX ORDER — GUAIFENESIN 600 MG/1
600 TABLET, EXTENDED RELEASE ORAL 2 TIMES DAILY
Status: DISCONTINUED | OUTPATIENT
Start: 2024-02-08 | End: 2024-02-10 | Stop reason: HOSPADM

## 2024-02-08 RX ORDER — APIXABAN 5 MG/1
5 TABLET, FILM COATED ORAL 2 TIMES DAILY
Qty: 60 TABLET | Refills: 10 | OUTPATIENT
Start: 2024-02-08

## 2024-02-08 RX ADMIN — SODIUM CHLORIDE 25 ML: 9 INJECTION, SOLUTION INTRAVENOUS at 17:43

## 2024-02-08 RX ADMIN — DILTIAZEM HYDROCHLORIDE 120 MG: 120 CAPSULE, COATED, EXTENDED RELEASE ORAL at 11:56

## 2024-02-08 RX ADMIN — ATORVASTATIN CALCIUM 40 MG: 40 TABLET, FILM COATED ORAL at 20:44

## 2024-02-08 RX ADMIN — DILTIAZEM HYDROCHLORIDE 240 MG: 240 CAPSULE, COATED, EXTENDED RELEASE ORAL at 08:11

## 2024-02-08 RX ADMIN — SODIUM CHLORIDE 25 ML: 9 INJECTION, SOLUTION INTRAVENOUS at 15:31

## 2024-02-08 RX ADMIN — INSULIN LISPRO 8 UNITS: 100 INJECTION, SOLUTION INTRAVENOUS; SUBCUTANEOUS at 11:55

## 2024-02-08 RX ADMIN — MIDODRINE HYDROCHLORIDE 2.5 MG: 5 TABLET ORAL at 08:10

## 2024-02-08 RX ADMIN — INSULIN LISPRO 8 UNITS: 100 INJECTION, SOLUTION INTRAVENOUS; SUBCUTANEOUS at 08:09

## 2024-02-08 RX ADMIN — BUDESONIDE AND FORMOTEROL FUMARATE DIHYDRATE 2 PUFF: 160; 4.5 AEROSOL RESPIRATORY (INHALATION) at 08:01

## 2024-02-08 RX ADMIN — Medication 400 MG: at 08:10

## 2024-02-08 RX ADMIN — IPRATROPIUM BROMIDE AND ALBUTEROL SULFATE 1 DOSE: 2.5; .5 SOLUTION RESPIRATORY (INHALATION) at 08:03

## 2024-02-08 RX ADMIN — INSULIN LISPRO 3 UNITS: 100 INJECTION, SOLUTION INTRAVENOUS; SUBCUTANEOUS at 16:53

## 2024-02-08 RX ADMIN — PREDNISONE 40 MG: 20 TABLET ORAL at 08:10

## 2024-02-08 RX ADMIN — SODIUM CHLORIDE 25 ML: 9 INJECTION, SOLUTION INTRAVENOUS at 08:19

## 2024-02-08 RX ADMIN — INSULIN LISPRO 1 UNITS: 100 INJECTION, SOLUTION INTRAVENOUS; SUBCUTANEOUS at 11:56

## 2024-02-08 RX ADMIN — INSULIN GLARGINE 35 UNITS: 100 INJECTION, SOLUTION SUBCUTANEOUS at 20:45

## 2024-02-08 RX ADMIN — POTASSIUM CHLORIDE 20 MEQ: 1500 TABLET, EXTENDED RELEASE ORAL at 08:10

## 2024-02-08 RX ADMIN — INSULIN LISPRO 8 UNITS: 100 INJECTION, SOLUTION INTRAVENOUS; SUBCUTANEOUS at 16:52

## 2024-02-08 RX ADMIN — ROFLUMILAST 500 MCG: 500 TABLET ORAL at 08:10

## 2024-02-08 RX ADMIN — CEFEPIME 2000 MG: 2 INJECTION, POWDER, FOR SOLUTION INTRAVENOUS at 00:19

## 2024-02-08 RX ADMIN — TORSEMIDE 20 MG: 20 TABLET ORAL at 08:11

## 2024-02-08 RX ADMIN — POTASSIUM CHLORIDE 20 MEQ: 1500 TABLET, EXTENDED RELEASE ORAL at 20:44

## 2024-02-08 RX ADMIN — APIXABAN 5 MG: 5 TABLET, FILM COATED ORAL at 20:44

## 2024-02-08 RX ADMIN — SODIUM CHLORIDE, PRESERVATIVE FREE 10 ML: 5 INJECTION INTRAVENOUS at 08:21

## 2024-02-08 RX ADMIN — APIXABAN 5 MG: 5 TABLET, FILM COATED ORAL at 08:11

## 2024-02-08 RX ADMIN — TORSEMIDE 20 MG: 20 TABLET ORAL at 20:44

## 2024-02-08 RX ADMIN — DIGOXIN 125 MCG: 125 TABLET ORAL at 08:11

## 2024-02-08 RX ADMIN — CEFEPIME 2000 MG: 2 INJECTION, POWDER, FOR SOLUTION INTRAVENOUS at 08:20

## 2024-02-08 RX ADMIN — GUAIFENESIN 600 MG: 600 TABLET, EXTENDED RELEASE ORAL at 18:18

## 2024-02-08 NOTE — PROGRESS NOTES
pulmonary      SUBJECTIVE:  he feels improving slolwy     OBJECTIVE    VITALS:  /76   Pulse 90   Temp 97.7 °F (36.5 °C) (Oral)   Resp 20   Ht 1.981 m (6' 6\")   Wt (!) 155.6 kg (343 lb)   SpO2 98%   BMI 39.64 kg/m²   HEAD AND FACE EXAM:  No throat injection, no active exudate,no thrush  NECK EXAM;No JVD, no masses, symmetrical  CHEST EXAM; Expansion equal and symmetrical, no masses  LUNG EXAM; Good breath sounds bilaterally. There are expiratory wheezes both lungs, there are crackles at both lung bases  CARDIOVASCULAR EXAM: Positive S1 and S2, no S3 or S4, no clicks ,no murmurs  RIGHT AND LEFT LOWER EXTRIMITY EXAM: No edema, no swelling, no inflamation  CNS EXAM: Alert and oriented X3          LABS   Lab Results   Component Value Date    WBC 10.8 (H) 02/07/2024    HGB 11.1 (L) 02/07/2024    HCT 36.4 (L) 02/07/2024    MCV 96.0 02/07/2024     02/07/2024     Lab Results   Component Value Date    CREATININE 0.6 (L) 02/08/2024    BUN 15 02/08/2024     (L) 02/08/2024    K 4.4 02/08/2024    CL 90 (L) 02/08/2024    CO2 38 (H) 02/08/2024     Lab Results   Component Value Date    INR 1.5 02/05/2024    PROTIME 17.9 (H) 02/05/2024          Lab Results   Component Value Date/Time    PHOS 4.5 01/14/2024 04:29 AM    PHOS 4.3 01/13/2024 04:02 AM    PHOS 4.2 01/12/2024 03:31 AM      No results for input(s): \"PH\", \"PO2ART\", \"GRF6AWV\", \"HCO3\", \"BEART\", \"O2SAT\" in the last 72 hours.      Wt Readings from Last 3 Encounters:   02/08/24 (!) 155.6 kg (343 lb)   01/17/24 (!) 160.6 kg (354 lb)   01/06/24 (!) 158.7 kg (349 lb 13.9 oz)               ASSESMENT  Ac on ch resp failure  Simba pneumonia  Positive RSV  Ac copd        PLAN  Bd rx  On po steroids  antibx    2/8/2024  Neptali Keen MD, M.D.

## 2024-02-08 NOTE — PROGRESS NOTES
Physician Progress Note      PATIENT:               KAROLINE HAGEN  CSN #:                  [id number]  :                       1955  ADMIT DATE:       2024 7:30 PM  DISCH DATE:  RESPONDING  PROVIDER #:        Edi Mae MD          QUERY TEXT:    Internal Medicine,    Pt admitted with septic shock due to multifocal pneumonia, noted to have RSV   infection. If possible, please document in the progress notes and discharge   summary if the multifocal pneumonia can be further specified as any of the   following:    Note: CAP and HCAP indicate where the pneumonia was acquired, not a specific   type.    The medical record reflects the following:  Risk Factors: recent PNA, RSV  Clinical Indicators: Septic shock due to multifocal pneumonia: In the setting   of RSV infection but recent hospitalization in 2024 for pneumonia as well.  Treatment: labs, imaging, IVF, IV Rocephin, azithromycin, vancomycin,   cefepime, supportive care    Thank you,  Farzana Garcia RN CDS  [phone]  Options provided:  -- Gram negative pneumonia  -- Gram positive pneumonia  -- Viral pneumonia  -- Viral pneumonia and suspected bacterial pneumonia  -- Other - I will add my own diagnosis  -- Disagree - Not applicable / Not valid  -- Disagree - Clinically unable to determine / Unknown  -- Refer to Clinical Documentation Reviewer    PROVIDER RESPONSE TEXT:    Patient is being treated for pneumonia, which is possibly gram negative   pneumonia.    Query created by: Farzana Garcia on 2024 12:05 PM      Electronically signed by:  Edi Mae MD 2024 5:54 PM

## 2024-02-08 NOTE — CARE COORDINATION
Packet prepped. Will place with soft chart.    Discharging RN: Should the pt be discharged after hours or over the weekend please complete the following... PLEASE CHECK THAT PRECERT IS COMPLETE PRIOR TO DISCHARGING      1) Call report [phone]  2)  Fax AVS, completed COCs, and any written scripts needed  3) Please ensure pt's medications are sent to SNF, not to a local pharmacy  4) Set up transport [phone]  5) Inform pt and family

## 2024-02-08 NOTE — PROGRESS NOTES
been very between     Objective: Temperature:  Current - Temp: 97.7 °F (36.5 °C); Max - Temp  Av.7 °F (36.5 °C)  Min: 97.7 °F (36.5 °C)  Max: 97.7 °F (36.5 °C)    Respiratory Rate : Resp  Av.8  Min: 18  Max: 20    Pulse Range: Pulse  Av.3  Min: 83  Max: 105    Blood Presuure Range:  Systolic (24hrs), Av , Min:105 , Max:113   ; Diastolic (24hrs), Av, Min:54, Max:76      Pulse ox Range: SpO2  Av.8 %  Min: 92 %  Max: 98 %    24hr I & O:    Intake/Output Summary (Last 24 hours) at 2024 1509  Last data filed at 2024 0900  Gross per 24 hour   Intake 285.02 ml   Output --   Net 285.02 ml           /65   Pulse (!) 105   Temp 97.7 °F (36.5 °C) (Oral)   Resp 20   Ht 1.981 m (6' 6\")   Wt (!) 155.6 kg (343 lb)   SpO2 98%   BMI 39.64 kg/m²         TELEMETRY: Atrial fibrillation      has a past medical history of A-fib ([facility]), Anxiety, Arthritis, COPD (chronic obstructive pulmonary disease) ([facility]), Depression, Diabetes mellitus ([facility]), Full dentures, H/O angiography, H/O Doppler ultrasound, H/O echocardiogram, Hx of colonoscopy, Hx of Doppler ultrasound, Hyperlipidemia, Hypertension, Macular degeneration, Obesity, On home oxygen therapy, PAD (peripheral artery disease) ([facility]), Panic attacks, Pneumonia, PTSD (post-traumatic stress disorder), Restless leg, Shortness of breath, Sleep apnea, and Wears glasses.   has a past surgical history that includes Atrial ablation surgery (2018); pr laparoscopy surg cholecystectomy (N/A, 2018); Colonoscopy (2011); eye surgery (Left, ); Dental surgery; Cardiac surgery (2018); Cholecystectomy, laparoscopic (2018); Appendectomy (); hernia repair (); femoral bypass (Left, 2011); Tonsillectomy (1960's); fracture surgery (Left, s); fracture surgery (Right, ); and femoral bypass (Left, 2019).    Physical Exam  Constitutional:       General: He is not in acute distress.     Appearance: He is obese.  Units SubCUTAneous Nightly      sodium chloride 25 mL (02/08/24 0819)    dextrose       ipratropium 0.5 mg-albuterol 2.5 mg, melatonin, sodium chloride flush, sodium chloride, potassium chloride, magnesium sulfate, ondansetron **OR** ondansetron, polyethylene glycol, nicotine, acetaminophen **OR** acetaminophen, glucose, dextrose bolus **OR** dextrose bolus, glucagon (rDNA), dextrose    Lab Data:  CBC:   Recent Labs     02/06/24 0226 02/07/24 2018   WBC 15.0* 10.8*   HGB 11.0* 11.1*   HCT 37.2* 36.4*   MCV 99.2 96.0    264       BMP:   Recent Labs     02/06/24 0226 02/07/24  0205 02/08/24  0101   * 138 134*   K 4.9 4.5 4.4   CL 93* 93* 90*   CO2 29 36* 38*   BUN 18 12 15   CREATININE 0.6* 0.6* 0.6*       LIVER PROFILE:   No results for input(s): \"AST\", \"ALT\", \"LIPASE\", \"AMYLASE\", \"ALB\", \"BILIDIR\", \"BILITOT\", \"ALKPHOS\" in the last 72 hours.    PT/INR:   No results for input(s): \"PROTIME\", \"INR\" in the last 72 hours.    APTT: No results for input(s): \"APTT\" in the last 72 hours.  BNP:  No results for input(s): \"BNP\" in the last 72 hours.  TROPONIN: No results for input(s): \"TROPONINT\" in the last 72 hours.  Labs, consult, tests reviewed          Justin Aden PA-C, 2/8/2024 3:09 PM

## 2024-02-08 NOTE — CARE COORDINATION
MINH spoke with Star/Petey. She was expecting the pt to return on his medicaid bed hold. Edwin explained that the pt stay on his medicaid benefits once his primary medicare insurance stopped his SNF benefits. MINH explained that the plan is for the pt to return skilled. They are able to accept and once discharged from SNF the plan is to transition to the [place].     Petey plan remains the same.     CM waiting on OT notes to start precert.

## 2024-02-08 NOTE — RT PROTOCOL NOTE
RT Inhaler-Nebulizer Bronchodilator Protocol Note    There is a bronchodilator order in the chart from a provider indicating to follow the RT Bronchodilator Protocol and there is an “Initiate RT Inhaler-Nebulizer Bronchodilator Protocol” order as well (see protocol at bottom of note).    CXR Findings:  No results found.    The findings from the last RT Protocol Assessment were as follows:   History Pulmonary Disease: Chronic pulmonary disease  Respiratory Pattern: Regular pattern and RR 12-20 bpm  Breath Sounds: Slightly diminished and/or crackles  Cough: Strong, productive  Indication for Bronchodilator Therapy:    Bronchodilator Assessment Score: 5    Aerosolized bronchodilator medication orders have been revised according to the RT Inhaler-Nebulizer Bronchodilator Protocol below.    Respiratory Therapist to perform RT Therapy Protocol Assessment initially then follow the protocol.  Repeat RT Therapy Protocol Assessment PRN for score 0-3 or on second treatment, BID, and PRN for scores above 3.    No Indications - adjust the frequency to every 6 hours PRN wheezing or bronchospasm, if no treatments needed after 48 hours then discontinue using Per Protocol order mode.     If indication present, adjust the RT bronchodilator orders based on the Bronchodilator Assessment Score as indicated below.  Use Inhaler orders unless patient has one or more of the following: on home nebulizer, not able to hold breath for 10 seconds, is not alert and oriented, cannot activate and use MDI correctly, or respiratory rate 25 breaths per minute or more, then use the equivalent nebulizer order(s) with same Frequency and PRN reasons based on the score.  If a patient is on this medication at home then do not decrease Frequency below that used at home.    0-3 - enter or revise RT bronchodilator order(s) to equivalent RT Bronchodilator order with Frequency of every 4 hours PRN for wheezing or increased work of breathing using Per Protocol  order mode.        4-6 - enter or revise RT Bronchodilator order(s) to two equivalent RT bronchodilator orders with one order with BID Frequency and one order with Frequency of every 4 hours PRN wheezing or increased work of breathing using Per Protocol order mode.        7-10 - enter or revise RT Bronchodilator order(s) to two equivalent RT bronchodilator orders with one order with TID Frequency and one order with Frequency of every 4 hours PRN wheezing or increased work of breathing using Per Protocol order mode.       11-13 - enter or revise RT Bronchodilator order(s) to one equivalent RT bronchodilator order with QID Frequency and an Albuterol order with Frequency of every 4 hours PRN wheezing or increased work of breathing using Per Protocol order mode.      Greater than 13 - enter or revise RT Bronchodilator order(s) to one equivalent RT bronchodilator order with every 4 hours Frequency and an Albuterol order with Frequency of every 2 hours PRN wheezing or increased work of breathing using Per Protocol order mode.     RT to enter RT Home Evaluation for COPD & MDI Assessment order using Per Protocol order mode.    Electronically signed by Jose Hill RCP on 2/8/2024 at 11:35 AM

## 2024-02-08 NOTE — PROGRESS NOTES
Occupational Therapy  [facility] ACUTE CARE OCCUPATIONAL THERAPY EVALUATION  Armando Mars, 1955, 1127/1127-A, 2/8/2024    Discharge Recommendation: Skilled Nursing Facility  History  [place]:  The primary encounter diagnosis was Pneumonia of both lower lobes due to infectious organism. Diagnoses of Septicemia (HCC), Acute respiratory failure with hypoxemia (HCC), and Acute on chronic combined systolic and diastolic congestive heart failure (HCC) were also pertinent to this visit.  Patient  has a past medical history of A-fib (HCC), Anxiety, Arthritis, COPD (chronic obstructive pulmonary disease) (HCC), Depression, Diabetes mellitus (HCC), Full dentures, H/O angiography, H/O Doppler ultrasound, H/O echocardiogram, Hx of colonoscopy, Hx of Doppler ultrasound, Hyperlipidemia, Hypertension, Macular degeneration, Obesity, On home oxygen therapy, PAD (peripheral artery disease) (HCC), Panic attacks, Pneumonia, PTSD (post-traumatic stress disorder), Restless leg, Shortness of breath, Sleep apnea, and Wears glasses.  Patient  has a past surgical history that includes Atrial ablation surgery (05/23/2018); pr laparoscopy surg cholecystectomy (N/A, 11/12/2018); Colonoscopy (07/2011); eye surgery (Left, 2017); Dental surgery; Cardiac surgery (05/2018); Cholecystectomy, laparoscopic (11/12/2018); Appendectomy (2003); hernia repair (2003); femoral bypass (Left, 01/2011); Tonsillectomy (1960's); fracture surgery (Left, 1980's); fracture surgery (Right, 2000's); and femoral bypass (Left, 1/9/2019).    Subjective:  Patient states:  \"Move.\".    Pain: Denied.    Communication with other providers: RN.  Restrictions: General Precautions, Fall Risk, Contact/Droplet (RSV)     Home Setup/Prior level of function  Social/Functional History  Lives With: Alone  Type of Home: Apartment  Home Layout: One level  Has the patient had two or more falls in the past year or any fall with injury in the past year?: No  ADL

## 2024-02-08 NOTE — PROGRESS NOTES
This patient's early detection of sepsis score (>=5) indicates a HIGH RISK!  Orders are recommended, click accept.             Predictive Model Details               5 (High)   Factor Value     Calculated 2/8/2024 09:18 25% SIRS pulse criterion met     Early Detection of Sepsis Model 16% Number of active cephalosporin orders 2       14% Age 68       11% Diagnosis of hypertension present       7% Diagnosis of diabetes mellitus present         The following actions are recommended:  Order: Lactate, Sepsis  Order: CBC with Auto Differential  Order: Culture, Blood 1  Order: Culture, Blood 2              [id number]

## 2024-02-08 NOTE — PROGRESS NOTES
02/08/24 1150   Encounter Summary   Encounter Overview/Reason  Initial Encounter   Service Provided For: Patient   Referral/Consult From: [facility] [place]   Support System Family members   Last Encounter  02/08/24  (PT having trouble breathing today, PT concerned about having peace with God and going to heaven, he shared his belief & I supported him in that belief, which is Rastafari. He wanted prayer. He expressed peace and contentment.)   Complexity of Encounter Low   Begin Time 1135   End Time  1157   Total Time Calculated 22 min   Spiritual/Emotional needs   Type Spiritual Support;Emotional Distress;Spiritual Distress   Assessment/Intervention/Outcome   Assessment Anxious;Concerns with suffering;Coping;Hopeful;Fearful;Stress overload   Intervention Active listening;Discussed belief system/Worship practices/varinder;Discussed illness injury and it’s impact;Discussed meaning/purpose;Discussed relationship with God;Explored/Affirmed feelings, thoughts, concerns;Explored Coping Skills/Resources;Nurtured Hope;Prayer (assurance of)/[place];Sustaining Presence/Ministry of presence   Outcome Comfort;Coping;Encouraged;Expressed feelings, needs, and concerns;Expressed Gratitude;Peace   Plan and Referrals   Plan/Referrals Continue Support (comment)

## 2024-02-08 NOTE — PROGRESS NOTES
Amberly Be  Patient:  KAROLINE HAGEN   YOB: 1955  MRN:  [id number]  Location:  [facility]  1127  A  2/8/2024 9:28 AM  [phone]  From: Amberly Be  [address] ROUTINE  RE: KAROLINE HAGEN  FYI patient alerting for spesis risk. Charge RN notified.

## 2024-02-08 NOTE — CONSULTS
Consult completed. Nexiva 20g 1.75\" peripheral IV initiated into right forearm x 1 attempt using ultrasound guided technique. Brisk blood return, flushes without resistance. Patient tolerated well. Primary nurse Amberly notified.     Consult the Vascular Access Team for questions, concerns, or change in patient's condition.

## 2024-02-09 LAB
ANION GAP SERPL CALCULATED.3IONS-SCNC: 2 MMOL/L (ref 7–16)
BUN SERPL-MCNC: 14 MG/DL (ref 6–23)
CALCIUM SERPL-MCNC: 7.8 MG/DL (ref 8.3–10.6)
CHLORIDE BLD-SCNC: 91 MMOL/L (ref 99–110)
CO2: 45 MMOL/L (ref 21–32)
CREAT SERPL-MCNC: 0.5 MG/DL (ref 0.9–1.3)
CULTURE: NORMAL
CULTURE: NORMAL
EKG ATRIAL RATE: 277 BPM
EKG DIAGNOSIS: NORMAL
EKG Q-T INTERVAL: 300 MS
EKG QRS DURATION: 86 MS
EKG QTC CALCULATION (BAZETT): 415 MS
EKG R AXIS: -59 DEGREES
EKG T AXIS: 54 DEGREES
EKG VENTRICULAR RATE: 115 BPM
GFR SERPL CREATININE-BSD FRML MDRD: >60 ML/MIN/1.73M2
GLUCOSE BLD-MCNC: 160 MG/DL (ref 70–99)
GLUCOSE BLD-MCNC: 170 MG/DL (ref 70–99)
GLUCOSE BLD-MCNC: 284 MG/DL (ref 70–99)
GLUCOSE BLD-MCNC: 344 MG/DL (ref 70–99)
GLUCOSE SERPL-MCNC: 184 MG/DL (ref 70–99)
HCT VFR BLD CALC: 37.6 % (ref 42–52)
HEMOGLOBIN: 11.6 GM/DL (ref 13.5–18)
Lab: NORMAL
Lab: NORMAL
MAGNESIUM: 1.8 MG/DL (ref 1.8–2.4)
MCH RBC QN AUTO: 29.7 PG (ref 27–31)
MCHC RBC AUTO-ENTMCNC: 30.9 % (ref 32–36)
MCV RBC AUTO: 96.2 FL (ref 78–100)
PDW BLD-RTO: 15.2 % (ref 11.7–14.9)
PLATELET # BLD: 280 K/CU MM (ref 140–440)
PMV BLD AUTO: 9.5 FL (ref 7.5–11.1)
POTASSIUM SERPL-SCNC: 4.4 MMOL/L (ref 3.5–5.1)
RBC # BLD: 3.91 M/CU MM (ref 4.6–6.2)
SODIUM BLD-SCNC: 138 MMOL/L (ref 135–145)
SPECIMEN: NORMAL
SPECIMEN: NORMAL
WBC # BLD: 13.4 K/CU MM (ref 4–10.5)

## 2024-02-09 PROCEDURE — 6370000000 HC RX 637 (ALT 250 FOR IP): Performed by: STUDENT IN AN ORGANIZED HEALTH CARE EDUCATION/TRAINING PROGRAM

## 2024-02-09 PROCEDURE — 82962 GLUCOSE BLOOD TEST: CPT

## 2024-02-09 PROCEDURE — 6370000000 HC RX 637 (ALT 250 FOR IP)

## 2024-02-09 PROCEDURE — 36415 COLL VENOUS BLD VENIPUNCTURE: CPT

## 2024-02-09 PROCEDURE — 6370000000 HC RX 637 (ALT 250 FOR IP): Performed by: INTERNAL MEDICINE

## 2024-02-09 PROCEDURE — 93010 ELECTROCARDIOGRAM REPORT: CPT | Performed by: INTERNAL MEDICINE

## 2024-02-09 PROCEDURE — 1200000000 HC SEMI PRIVATE

## 2024-02-09 PROCEDURE — APPNB30 APP NON BILLABLE TIME 0-30 MINS: Performed by: NURSE PRACTITIONER

## 2024-02-09 PROCEDURE — 2700000000 HC OXYGEN THERAPY PER DAY

## 2024-02-09 PROCEDURE — 6370000000 HC RX 637 (ALT 250 FOR IP): Performed by: FAMILY MEDICINE

## 2024-02-09 PROCEDURE — 99232 SBSQ HOSP IP/OBS MODERATE 35: CPT | Performed by: INTERNAL MEDICINE

## 2024-02-09 PROCEDURE — 6360000002 HC RX W HCPCS: Performed by: FAMILY MEDICINE

## 2024-02-09 PROCEDURE — 85027 COMPLETE CBC AUTOMATED: CPT

## 2024-02-09 PROCEDURE — 80048 BASIC METABOLIC PNL TOTAL CA: CPT

## 2024-02-09 PROCEDURE — 2580000003 HC RX 258: Performed by: STUDENT IN AN ORGANIZED HEALTH CARE EDUCATION/TRAINING PROGRAM

## 2024-02-09 PROCEDURE — 83735 ASSAY OF MAGNESIUM: CPT

## 2024-02-09 PROCEDURE — 94640 AIRWAY INHALATION TREATMENT: CPT

## 2024-02-09 PROCEDURE — 2580000003 HC RX 258: Performed by: FAMILY MEDICINE

## 2024-02-09 PROCEDURE — 94761 N-INVAS EAR/PLS OXIMETRY MLT: CPT

## 2024-02-09 RX ADMIN — GUAIFENESIN 600 MG: 600 TABLET, EXTENDED RELEASE ORAL at 08:38

## 2024-02-09 RX ADMIN — INSULIN LISPRO 8 UNITS: 100 INJECTION, SOLUTION INTRAVENOUS; SUBCUTANEOUS at 12:05

## 2024-02-09 RX ADMIN — SODIUM CHLORIDE, PRESERVATIVE FREE 10 ML: 5 INJECTION INTRAVENOUS at 20:10

## 2024-02-09 RX ADMIN — IPRATROPIUM BROMIDE AND ALBUTEROL SULFATE 1 DOSE: 2.5; .5 SOLUTION RESPIRATORY (INHALATION) at 08:54

## 2024-02-09 RX ADMIN — INSULIN LISPRO 2 UNITS: 100 INJECTION, SOLUTION INTRAVENOUS; SUBCUTANEOUS at 17:16

## 2024-02-09 RX ADMIN — INSULIN LISPRO 4 UNITS: 100 INJECTION, SOLUTION INTRAVENOUS; SUBCUTANEOUS at 20:21

## 2024-02-09 RX ADMIN — INSULIN LISPRO 8 UNITS: 100 INJECTION, SOLUTION INTRAVENOUS; SUBCUTANEOUS at 08:38

## 2024-02-09 RX ADMIN — Medication 400 MG: at 08:38

## 2024-02-09 RX ADMIN — POTASSIUM CHLORIDE 20 MEQ: 1500 TABLET, EXTENDED RELEASE ORAL at 20:10

## 2024-02-09 RX ADMIN — GUAIFENESIN 600 MG: 600 TABLET, EXTENDED RELEASE ORAL at 20:09

## 2024-02-09 RX ADMIN — BUDESONIDE AND FORMOTEROL FUMARATE DIHYDRATE 2 PUFF: 160; 4.5 AEROSOL RESPIRATORY (INHALATION) at 19:58

## 2024-02-09 RX ADMIN — CEFEPIME 2000 MG: 2 INJECTION, POWDER, FOR SOLUTION INTRAVENOUS at 17:13

## 2024-02-09 RX ADMIN — ATORVASTATIN CALCIUM 40 MG: 40 TABLET, FILM COATED ORAL at 20:09

## 2024-02-09 RX ADMIN — TORSEMIDE 20 MG: 20 TABLET ORAL at 20:09

## 2024-02-09 RX ADMIN — INSULIN GLARGINE 35 UNITS: 100 INJECTION, SOLUTION SUBCUTANEOUS at 20:30

## 2024-02-09 RX ADMIN — CEFEPIME 2000 MG: 2 INJECTION, POWDER, FOR SOLUTION INTRAVENOUS at 08:44

## 2024-02-09 RX ADMIN — POTASSIUM CHLORIDE 20 MEQ: 1500 TABLET, EXTENDED RELEASE ORAL at 08:37

## 2024-02-09 RX ADMIN — APIXABAN 5 MG: 5 TABLET, FILM COATED ORAL at 08:38

## 2024-02-09 RX ADMIN — MIDODRINE HYDROCHLORIDE 2.5 MG: 5 TABLET ORAL at 08:38

## 2024-02-09 RX ADMIN — TORSEMIDE 20 MG: 20 TABLET ORAL at 08:37

## 2024-02-09 RX ADMIN — INSULIN LISPRO 8 UNITS: 100 INJECTION, SOLUTION INTRAVENOUS; SUBCUTANEOUS at 17:16

## 2024-02-09 RX ADMIN — ROFLUMILAST 500 MCG: 500 TABLET ORAL at 08:38

## 2024-02-09 RX ADMIN — IPRATROPIUM BROMIDE AND ALBUTEROL SULFATE 1 DOSE: 2.5; .5 SOLUTION RESPIRATORY (INHALATION) at 19:57

## 2024-02-09 RX ADMIN — PREDNISONE 40 MG: 20 TABLET ORAL at 08:38

## 2024-02-09 RX ADMIN — BUDESONIDE AND FORMOTEROL FUMARATE DIHYDRATE 2 PUFF: 160; 4.5 AEROSOL RESPIRATORY (INHALATION) at 08:55

## 2024-02-09 RX ADMIN — DIGOXIN 125 MCG: 125 TABLET ORAL at 08:38

## 2024-02-09 RX ADMIN — APIXABAN 5 MG: 5 TABLET, FILM COATED ORAL at 20:30

## 2024-02-09 RX ADMIN — CEFEPIME 2000 MG: 2 INJECTION, POWDER, FOR SOLUTION INTRAVENOUS at 23:59

## 2024-02-09 RX ADMIN — CEFEPIME 2000 MG: 2 INJECTION, POWDER, FOR SOLUTION INTRAVENOUS at 01:08

## 2024-02-09 RX ADMIN — DILTIAZEM HYDROCHLORIDE 240 MG: 240 CAPSULE, COATED, EXTENDED RELEASE ORAL at 08:38

## 2024-02-09 RX ADMIN — MIDODRINE HYDROCHLORIDE 2.5 MG: 5 TABLET ORAL at 20:09

## 2024-02-09 ASSESSMENT — PAIN DESCRIPTION - FREQUENCY: FREQUENCY: CONTINUOUS

## 2024-02-09 ASSESSMENT — PAIN SCALES - WONG BAKER: WONGBAKER_NUMERICALRESPONSE: 6

## 2024-02-09 ASSESSMENT — PAIN DESCRIPTION - DESCRIPTORS: DESCRIPTORS: ACHING;DISCOMFORT;SORE

## 2024-02-09 ASSESSMENT — PAIN - FUNCTIONAL ASSESSMENT: PAIN_FUNCTIONAL_ASSESSMENT: ACTIVITIES ARE NOT PREVENTED

## 2024-02-09 ASSESSMENT — PAIN DESCRIPTION - LOCATION
LOCATION: LEG
LOCATION: ARM;LEG

## 2024-02-09 ASSESSMENT — PAIN DESCRIPTION - ONSET: ONSET: ON-GOING

## 2024-02-09 ASSESSMENT — PAIN DESCRIPTION - PAIN TYPE: TYPE: CHRONIC PAIN

## 2024-02-09 ASSESSMENT — PAIN SCALES - GENERAL
PAINLEVEL_OUTOF10: 6
PAINLEVEL_OUTOF10: 5

## 2024-02-09 ASSESSMENT — PAIN DESCRIPTION - ORIENTATION: ORIENTATION: LEFT;RIGHT

## 2024-02-09 NOTE — DISCHARGE SUMMARY
Ivt cs for cefpime    V2.0  Discharge Summary    Name:  Armando Mars /Age/Sex: 1955 (68 y.o. male)   Admit Date: 2024  Discharge Date: 2/10/24    MRN & CSN:  [id number] & [id number] Encounter Date and Time 2/10/24 6:49 PM EST    Attending:  Eugenie Rajput MD Discharging Provider: EUGENIE RAJPUT MD     Hospital Course:     Brief HPI: Armando Mars is a 68 y.o. male who presented to ED with shortness of breath.  He was admitted.    He is known to me from the past.  -He had 14 admissions to the hospital in , 8 of which were in the last 3 months of   -I saw him during an admission from  until   -Most of his admissions are related to pulmonary/cardiac issues  -He was recently in the hospital again from January 3 until  with pneumonia and a COPD exacerbation as well as volume overload  -She was here again in the hospital from January 10 until , this time with CHF due to noncompliance per discharge summary  -She now returns for admission 18 days after his last discharge    Brief Problem Based Course:     Armando was treated for an RSV infection while here.  He was here for 6 days from 2024, until February 10, 2024.  He presented from [facility].  He has now been discharged back to [facility].  I understand that the plan is to transition to [place] assisted living after his skilled care stay.    Problem list    RSV infection  -Treated symptomatically and recovered  -Recommend RSV vaccine in a few weeks as an outpatient    Multifocal pneumonia  -In the setting of RSV infection, with suspected superimposed bacterial infection -possible gram-negative pneumonia  -With septic shock-lactic acid level was 4.4 on presentation  -Treated with IV cefepime while here    Acute on chronic respiratory failure with hypoxia  -He reported to me that he is on 2 to 3 L oxygen at home at baseline  -He required 5 L oxygen earlier this

## 2024-02-09 NOTE — PROGRESS NOTES
pulmonary      SUBJECTIVE:  seen early am and sleeping     OBJECTIVE    VITALS:  /78   Pulse 99   Temp 98.1 °F (36.7 °C) (Oral)   Resp 16   Ht 1.981 m (6' 6\")   Wt (!) 154.2 kg (340 lb)   SpO2 98%   BMI 39.29 kg/m²   HEAD AND FACE EXAM:  No throat injection, no active exudate,no thrush  NECK EXAM;No JVD, no masses, symmetrical  CHEST EXAM; Expansion equal and symmetrical, no masses  LUNG EXAM; Good breath sounds bilaterally. There are expiratory wheezes both lungs, there are crackles at both lung bases  CARDIOVASCULAR EXAM: Positive S1 and S2, no S3 or S4, no clicks ,no murmurs  RIGHT AND LEFT LOWER EXTRIMITY EXAM: No edema, no swelling,           LABS   Lab Results   Component Value Date    WBC 10.8 (H) 02/07/2024    HGB 11.1 (L) 02/07/2024    HCT 36.4 (L) 02/07/2024    MCV 96.0 02/07/2024     02/07/2024     Lab Results   Component Value Date    CREATININE 0.5 (L) 02/09/2024    BUN 14 02/09/2024     02/09/2024    K 4.4 02/09/2024    CL 91 (L) 02/09/2024    CO2 45 (H) 02/09/2024     Lab Results   Component Value Date    INR 1.5 02/05/2024    PROTIME 17.9 (H) 02/05/2024          Lab Results   Component Value Date/Time    PHOS 4.5 01/14/2024 04:29 AM    PHOS 4.3 01/13/2024 04:02 AM    PHOS 4.2 01/12/2024 03:31 AM      No results for input(s): \"PH\", \"PO2ART\", \"SFE0IDM\", \"HCO3\", \"BEART\", \"O2SAT\" in the last 72 hours.      Wt Readings from Last 3 Encounters:   02/09/24 (!) 154.2 kg (340 lb)   01/17/24 (!) 160.6 kg (354 lb)   01/06/24 (!) 158.7 kg (349 lb 13.9 oz)               ASSESMENT  Ac opn ch resp failure  Positive RSV  Pneumonia  Ac copd        PLAN  Bd rx  Antibx  O2 adm    2/9/2024  Neptali Keen MD, M.D.

## 2024-02-09 NOTE — CARE COORDINATION
Discharging RN: Should the pt be discharged after hours or over the weekend please complete the following...    1) Call report [phone]  2) Fax AVS, completed COCs, and any written scripts needed  3) Please ensure pt's medications are sent to SNF, not to a local pharmacy  4) Set up transport [phone]  5) Inform pt and family        Pt has been approved for [facility]   [id number]  [id number]  02/09/2024-02/13/2024      Precert initiated via [employer]: Pending at this time  [employer] Auth ID [id number]

## 2024-02-09 NOTE — PROGRESS NOTES
V2.0    [facility] Progress Note      Name:  Armando Mars /Age/Sex: 1955  (68 y.o. male)   MRN & CSN:  [id number] & [id number] Encounter Date/Time: 2024 1:46 PM EST   [facility] Rosalio Hed[facility]     Attending:Edi Mae MD       Hospital Day: 5    Assessment and Recommendations   Armando Mars is a 68 y.o. male with pmh of multiple admissions for respiratory issues and pneumonia, COPD, A-fib, hypertension, hyperlipidemia, diabetes who presents with Multifocal pneumonia. Labs showed Na 133, BNP 3276, trop 25, wbc 10.8, RSV +, chest xray with patchy opacities in RUL and BL LL. Admitted for infectious eval.      - prednisone started .  Has a productive cough today. Will request a sputum cx.   Will need [facility] precert    Plan:   Septic shock due to multifocal pneumonia: In the setting of RSV infection but recent hospitalization in 2024 for pneumonia as well.  No leukocytosis, afebrile.  RSV positive.  Lactate 4.4 and will repeat.  Initiated on IV Rocephin/azithromycin but in light of recent hospitalization with patient was on IV antibiotics were broadened to vancomycin/cefepime.  Will stop vancomycin on  as MRSA DNA is negative and continue cefepime. .  Acute on chronic respiratory failure with hypoxia: 3 L nasal cannula at baseline and required 4 L as of 2024.  Treating infection as above.  Continue oral torsemide.  Given IV Solu-Medrol x 1.  Continue Symbicort, Daliresp, and bronchodilators.  Non-MI troponin elevation: Chronic in nature and no chest pain currently.  EKG without evidence of acute ischemia and troponin overall flat.  Suspect in the setting of sepsis and respiratory failure.  Monitoring on telemetry.  A-fib with RVR: Noted on admission but rate improved.  Continue Cardizem and Eliquis.  Chronic diastolic heart failure: EF of 50 to 55% on echocardiogram in 2023.  Stable volume status.  Continue oral torsemide.  Type 2 diabetes with     atorvastatin  40 mg Oral Nightly    magnesium oxide  400 mg Oral Daily    potassium chloride  20 mEq Oral BID    Roflumilast  500 mcg Oral Daily    budesonide-formoterol  2 puff Inhalation BID    torsemide  20 mg Oral BID    sodium chloride flush  5-40 mL IntraVENous 2 times per day    insulin lispro  0-4 Units SubCUTAneous TID WC    insulin lispro  0-4 Units SubCUTAneous Nightly      Infusions:    sodium chloride 25 mL (02/08/24 1743)    dextrose       PRN Meds: ipratropium 0.5 mg-albuterol 2.5 mg, 1 Dose, Q4H PRN  melatonin, 6 mg, Nightly PRN  sodium chloride flush, 5-40 mL, PRN  sodium chloride, , PRN  potassium chloride, 10 mEq, PRN  magnesium sulfate, 2,000 mg, PRN  ondansetron, 4 mg, Q8H PRN   Or  ondansetron, 4 mg, Q6H PRN  polyethylene glycol, 17 g, Daily PRN  nicotine, 1 patch, Daily PRN  acetaminophen, 650 mg, Q6H PRN   Or  acetaminophen, 650 mg, Q6H PRN  glucose, 4 tablet, PRN  dextrose bolus, 125 mL, PRN   Or  dextrose bolus, 250 mL, PRN  glucagon (rDNA), 1 mg, PRN  dextrose, , Continuous PRN        Labs and Imaging   XR CHEST PORTABLE    Result Date: 2/4/2024  EXAMINATION: ONE XRAY VIEW OF THE CHEST 2/4/2024 7:54 pm COMPARISON: Chest radiograph 01/10/2024. HISTORY: ORDERING SYSTEM PROVIDED HISTORY: Sepsis TECHNOLOGIST PROVIDED HISTORY: Reason for exam:->Sepsis Reason for Exam: sepsis, sob, cough FINDINGS: The cardiac silhouette is enlarged but unchanged.  Lingular scarring is seen. Mild patchy opacities in the right upper and bilateral lower lobes.  No pneumothorax, vascular congestion, or pleural effusion.  No acute osseous abnormality.     Mild patchy opacities in the right upper and bilateral lower lobes possibly representing multifocal pneumonia.       CBC:   Recent Labs     02/06/24 0226 02/07/24  2018   WBC 15.0* 10.8*   HGB 11.0* 11.1*    264       BMP:    Recent Labs     02/06/24 0226 02/07/24  0205 02/08/24  0101   * 138 134*   K 4.9 4.5 4.4   CL 93* 93* 90*   CO2 29 36* 38*

## 2024-02-09 NOTE — PROGRESS NOTES
are documented     /78   Pulse 99   Temp 98.1 °F (36.7 °C) (Oral)   Resp 16   Ht 1.981 m (6' 6\")   Wt (!) 154.2 kg (340 lb)   SpO2 98%   BMI 39.29 kg/m²     Intake/Output Summary (Last 24 hours) at 2/9/2024 1432  Last data filed at 2/9/2024 0803  Gross per 24 hour   Intake 1738.39 ml   Output --   Net 1738.39 ml       Physical Exam:  Physical Exam  Vitals reviewed.   Constitutional:       Appearance: He is obese.   HENT:      Head: Normocephalic.      Mouth/Throat:      Mouth: Mucous membranes are dry.   Eyes:      Extraocular Movements: Extraocular movements intact.   Cardiovascular:      Rate and Rhythm: Normal rate. Rhythm irregular.      Pulses: Normal pulses.      Heart sounds: No murmur heard.  Pulmonary:      Effort: Pulmonary effort is normal.      Breath sounds: Wheezing present.   Skin:     General: Skin is warm and dry.   Neurological:      Mental Status: He is oriented to person, place, and time.          Medications:    ipratropium 0.5 mg-albuterol 2.5 mg  1 Dose Inhalation BID RT    guaiFENesin  600 mg Oral BID    insulin lispro  8 Units SubCUTAneous TID WC    insulin glargine  35 Units SubCUTAneous Nightly    digoxin  125 mcg Oral Daily    predniSONE  40 mg Oral Daily    cefepime  2,000 mg IntraVENous Q8H    midodrine  2.5 mg Oral BID    methylPREDNISolone  80 mg IntraVENous Once    dilTIAZem  240 mg Oral Daily    apixaban  5 mg Oral BID    atorvastatin  40 mg Oral Nightly    magnesium oxide  400 mg Oral Daily    potassium chloride  20 mEq Oral BID    Roflumilast  500 mcg Oral Daily    budesonide-formoterol  2 puff Inhalation BID    torsemide  20 mg Oral BID    sodium chloride flush  5-40 mL IntraVENous 2 times per day    insulin lispro  0-4 Units SubCUTAneous TID WC    insulin lispro  0-4 Units SubCUTAneous Nightly      sodium chloride 25 mL (02/08/24 1743)    dextrose       ipratropium 0.5 mg-albuterol 2.5 mg, melatonin, sodium chloride flush, sodium chloride, potassium chloride,  magnesium sulfate, ondansetron **OR** ondansetron, polyethylene glycol, nicotine, acetaminophen **OR** acetaminophen, glucose, dextrose bolus **OR** dextrose bolus, glucagon (rDNA), dextrose    Lab Data:  CBC:   Recent Labs     02/07/24 2018   WBC 10.8*   HGB 11.1*   HCT 36.4*   MCV 96.0        BMP:   Recent Labs     02/07/24  0205 02/08/24  0101 02/09/24  0507    134* 138   K 4.5 4.4 4.4   CL 93* 90* 91*   CO2 36* 38* 45*   BUN 12 15 14   CREATININE 0.6* 0.6* 0.5*     PT/INR: No results for input(s): \"PROTIME\", \"INR\" in the last 72 hours.  BNP:  No results for input(s): \"PROBNP\" in the last 72 hours.  TROPONIN: No results for input(s): \"TROPONINT\" in the last 72 hours.           Impression:  Principal Problem:    Multifocal pneumonia  Resolved Problems:    * No resolved hospital problems. *       All labs, medications and tests reviewed by myself, continue all other medications of all above medical condition listed as is except for changes mentioned above.    Thank you   Please call with questions.    Electronically signed by BRENNAN Galindo CNP on 2/9/2024 at 2:32 PM

## 2024-02-09 NOTE — PLAN OF CARE
Problem: Safety - Adult  Goal: Free from fall injury  2/8/2024 2359 by Tiana Florian RN  Outcome: Progressing  2/8/2024 1217 by Amberly Be LPN  Outcome: Progressing     Problem: Discharge Planning  Goal: Discharge to home or other facility with appropriate resources  2/8/2024 2359 by Tiana Florian RN  Outcome: Progressing  2/8/2024 1217 by Amberly Be LPN  Outcome: Progressing     Problem: Pain  Goal: Verbalizes/displays adequate comfort level or baseline comfort level  2/8/2024 2359 by Tiana Florian RN  Outcome: Progressing  2/8/2024 1217 by Amberly Be LPN  Outcome: Progressing     Problem: Chronic Conditions and Co-morbidities  Goal: Patient's chronic conditions and co-morbidity symptoms are monitored and maintained or improved  2/8/2024 2359 by Tiana Florian RN  Outcome: Progressing  2/8/2024 1217 by Amberly Be LPN  Outcome: Progressing

## 2024-02-09 NOTE — PROGRESS NOTES
Isabel Kirby  Patient:  KAROLINE HAGEN   Patient name  KAROLINE HAGEN R  Location  [facility]  [address]   Date of birth  1955  MRN  [id number]  Gender  Male  Encounter number  [id number]  Admit date  02/04/2024  Home phone  [phone]  YOB: 1955  MRN:  [id number]  Location:  [address]  2/8/2024 4:12 PM  [phone]  From: Isabel Kirby  [facility]  [address] ROUT[address]  RE: KAROLINE HIEU  Pt having frequent coughing spells. Can we order something? Thanks

## 2024-02-09 NOTE — PLAN OF CARE
Problem: Safety - Adult  Goal: Free from fall injury  2/9/2024 0954 by Bam Guillaume LPN  Outcome: Progressing  2/8/2024 2359 by Tiana Florian, RN  Outcome: Progressing     Problem: Discharge Planning  Goal: Discharge to home or other facility with appropriate resources  2/9/2024 0954 by Bam Guillaume LPN  Outcome: Progressing  2/8/2024 2359 by Tiana Florian RN  Outcome: Progressing     Problem: Pain  Goal: Verbalizes/displays adequate comfort level or baseline comfort level  2/9/2024 0954 by Bam Guillaume LPN  Outcome: Progressing  2/8/2024 2359 by Tiana Florian, RN  Outcome: Progressing     Problem: Chronic Conditions and Co-morbidities  Goal: Patient's chronic conditions and co-morbidity symptoms are monitored and maintained or improved  2/9/2024 0954 by Bam Guillaume LPN  Outcome: Progressing  2/8/2024 2359 by Tiana Florian, RN  Outcome: Progressing

## 2024-02-09 NOTE — PROGRESS NOTES
55% on echocardiogram in 9/2023.  Stable volume status.  Continue oral torsemide.  Type 2 diabetes with hyperglycemia: A1c of 7 2/4/24. Suspect steroid contributing.  Continue Lantus and insulin correction scale  Hyponatremia: Na 132. Oral hydration and will obtain further workup as needed if worsens.         Diet ADULT DIET; Regular   DVT Prophylaxis [] Lovenox, []  Heparin, [] SCDs, [] Ambulation,  [x] Eliquis, [] Xarelto  [] Coumadin   Code Status Full Code   Disposition From: SNF, skilled care  Expected Disposition: same followed by BLANKA MEEHAN  Estimated Date of Discharge: Around 2/9 or 2/10  Patient requires continued admission due to respiratory failure, infection   Surrogate Decision Maker/ SELMA sotoelif       Subjective:     Chief Complaint: IHSAN Mars is a 68 y.o. male    Was sitting up at the edge of the bed and having a coughing spell when I saw him    Review of Systems:      Pertinent positives and negatives discussed in HPI    Objective:     Intake/Output Summary (Last 24 hours) at 2/9/2024 1708  Last data filed at 2/9/2024 0803  Gross per 24 hour   Intake 1498.39 ml   Output --   Net 1498.39 ml        Vitals:   Vitals:    02/09/24 0600 02/09/24 0830 02/09/24 0854 02/09/24 1553   BP:  119/78  (!) 113/58   Pulse:  95 99    Resp:  17 16 18   Temp:  98.1 °F (36.7 °C)  97.9 °F (36.6 °C)   TempSrc:  Oral  Oral   SpO2:  100% 98% 93%   Weight: (!) 154.2 kg (340 lb)      Height:             Physical Exam:      Physical Exam  Constitutional:       Appearance: He is not diaphoretic.   Pulmonary:      Effort: Pulmonary effort is normal.   Skin:     Coloration: Skin is not jaundiced.            Medications:   Medications:    ipratropium 0.5 mg-albuterol 2.5 mg  1 Dose Inhalation BID RT    guaiFENesin  600 mg Oral BID    insulin lispro  8 Units SubCUTAneous TID WC    insulin glargine  35 Units SubCUTAneous Nightly    digoxin  125 mcg Oral Daily    predniSONE  40 mg Oral Daily    cefepime  2,000 mg      02/07/24  0205 02/08/24  0101 02/09/24  0507    134* 138   K 4.5 4.4 4.4   CL 93* 90* 91*   CO2 36* 38* 45*   BUN 12 15 14   CREATININE 0.6* 0.6* 0.5*   GLUCOSE 110* 218* 184*       Hepatic:   No results for input(s): \"AST\", \"ALT\", \"ALB\", \"BILITOT\", \"ALKPHOS\" in the last 72 hours.    Lipids:   Lab Results   Component Value Date/Time    CHOL 159 03/01/2023 08:34 AM    CHOL 112 12/15/2021 01:35 PM    HDL 29 03/01/2023 08:34 AM    TRIG 182 03/01/2023 08:34 AM     Hemoglobin A1C:   Lab Results   Component Value Date/Time    LABA1C 7.0 02/04/2024 08:55 PM     TSH:   Lab Results   Component Value Date/Time    TSH 2.15 03/06/2019 10:22 AM     Troponin:   Lab Results   Component Value Date/Time    TROPONINT <0.010 09/18/2023 12:45 AM    TROPONINT <0.010 09/13/2023 07:15 AM    TROPONINT <0.010 08/14/2023 09:30 AM     Lactic Acid: No results for input(s): \"LACTA\" in the last 72 hours.  BNP:   No results for input(s): \"PROBNP\" in the last 72 hours.    UA:  Lab Results   Component Value Date/Time    NITRU NEGATIVE 02/05/2024 06:50 PM    COLORU YELLOW 02/05/2024 06:50 PM    WBCUA <1 09/13/2023 08:09 AM    RBCUA 0 09/13/2023 08:09 AM    MUCUS RARE 11/07/2018 04:30 PM    TRICHOMONAS NONE SEEN 09/13/2023 08:09 AM    BACTERIA NEGATIVE 09/13/2023 08:09 AM    CLARITYU CLEAR 02/05/2024 06:50 PM    SPECGRAV 1.025 02/05/2024 06:50 PM    LEUKOCYTESUR NEGATIVE 02/05/2024 06:50 PM    UROBILINOGEN 0.2 02/05/2024 06:50 PM    BILIRUBINUR NEGATIVE 02/05/2024 06:50 PM    BLOODU NEGATIVE 02/05/2024 06:50 PM    KETUA NEGATIVE 02/05/2024 06:50 PM     Urine Cultures: No results found for: \"LABURIN\"  Blood Cultures: No results found for: \"BC\"  No results found for: \"BLOODCULT2\"  Organism: No results found for: \"ORG\"      Electronically signed by EUGENIE RAJPUT MD on 2/9/2024 at 5:08 PM

## 2024-02-09 NOTE — PROGRESS NOTES
AM    TRIG 182 03/01/2023 08:34 AM     Hemoglobin A1C:   Lab Results   Component Value Date/Time    LABA1C 7.0 02/04/2024 08:55 PM     TSH:   Lab Results   Component Value Date/Time    TSH 2.15 03/06/2019 10:22 AM     Troponin:   Lab Results   Component Value Date/Time    TROPONINT <0.010 09/18/2023 12:45 AM    TROPONINT <0.010 09/13/2023 07:15 AM    TROPONINT <0.010 08/14/2023 09:30 AM     Lactic Acid: No results for input(s): \"LACTA\" in the last 72 hours.  BNP: No results for input(s): \"PROBNP\" in the last 72 hours.  UA:  Lab Results   Component Value Date/Time    NITRU NEGATIVE 02/05/2024 06:50 PM    COLORU YELLOW 02/05/2024 06:50 PM    WBCUA <1 09/13/2023 08:09 AM    RBCUA 0 09/13/2023 08:09 AM    MUCUS RARE 11/07/2018 04:30 PM    TRICHOMONAS NONE SEEN 09/13/2023 08:09 AM    BACTERIA NEGATIVE 09/13/2023 08:09 AM    CLARITYU CLEAR 02/05/2024 06:50 PM    SPECGRAV 1.025 02/05/2024 06:50 PM    LEUKOCYTESUR NEGATIVE 02/05/2024 06:50 PM    UROBILINOGEN 0.2 02/05/2024 06:50 PM    BILIRUBINUR NEGATIVE 02/05/2024 06:50 PM    BLOODU NEGATIVE 02/05/2024 06:50 PM    KETUA NEGATIVE 02/05/2024 06:50 PM     Urine Cultures: No results found for: \"LABURIN\"  Blood Cultures: No results found for: \"BC\"  No results found for: \"BLOODCULT2\"  Organism: No results found for: \"ORG\"    Time Spent Discharging patient *** minutes    Electronically signed by EUGENIE RAJPUT MD on 2/10/2024 at 6:49 PM

## 2024-02-10 VITALS
SYSTOLIC BLOOD PRESSURE: 110 MMHG | DIASTOLIC BLOOD PRESSURE: 65 MMHG | TEMPERATURE: 98.5 F | WEIGHT: 315 LBS | HEART RATE: 50 BPM | HEIGHT: 78 IN | BODY MASS INDEX: 36.45 KG/M2 | RESPIRATION RATE: 16 BRPM | OXYGEN SATURATION: 96 %

## 2024-02-10 LAB
ANION GAP SERPL CALCULATED.3IONS-SCNC: 7 MMOL/L (ref 7–16)
BUN SERPL-MCNC: 18 MG/DL (ref 6–23)
CALCIUM SERPL-MCNC: 8.2 MG/DL (ref 8.3–10.6)
CHLORIDE BLD-SCNC: 87 MMOL/L (ref 99–110)
CO2: 41 MMOL/L (ref 21–32)
CREAT SERPL-MCNC: 0.6 MG/DL (ref 0.9–1.3)
GFR SERPL CREATININE-BSD FRML MDRD: >60 ML/MIN/1.73M2
GLUCOSE BLD-MCNC: 185 MG/DL (ref 70–99)
GLUCOSE BLD-MCNC: 206 MG/DL (ref 70–99)
GLUCOSE BLD-MCNC: 377 MG/DL (ref 70–99)
GLUCOSE SERPL-MCNC: 350 MG/DL (ref 70–99)
HCT VFR BLD CALC: 36.3 % (ref 42–52)
HEMOGLOBIN: 11.1 GM/DL (ref 13.5–18)
MAGNESIUM: 1.7 MG/DL (ref 1.8–2.4)
MCH RBC QN AUTO: 29.5 PG (ref 27–31)
MCHC RBC AUTO-ENTMCNC: 30.6 % (ref 32–36)
MCV RBC AUTO: 96.5 FL (ref 78–100)
PDW BLD-RTO: 15.2 % (ref 11.7–14.9)
PLATELET # BLD: 267 K/CU MM (ref 140–440)
PMV BLD AUTO: 9.9 FL (ref 7.5–11.1)
POTASSIUM SERPL-SCNC: 4.2 MMOL/L (ref 3.5–5.1)
RBC # BLD: 3.76 M/CU MM (ref 4.6–6.2)
SODIUM BLD-SCNC: 135 MMOL/L (ref 135–145)
WBC # BLD: 12.4 K/CU MM (ref 4–10.5)

## 2024-02-10 PROCEDURE — 85027 COMPLETE CBC AUTOMATED: CPT

## 2024-02-10 PROCEDURE — 6370000000 HC RX 637 (ALT 250 FOR IP): Performed by: INTERNAL MEDICINE

## 2024-02-10 PROCEDURE — 80048 BASIC METABOLIC PNL TOTAL CA: CPT

## 2024-02-10 PROCEDURE — 82962 GLUCOSE BLOOD TEST: CPT

## 2024-02-10 PROCEDURE — 94640 AIRWAY INHALATION TREATMENT: CPT

## 2024-02-10 PROCEDURE — 6370000000 HC RX 637 (ALT 250 FOR IP): Performed by: STUDENT IN AN ORGANIZED HEALTH CARE EDUCATION/TRAINING PROGRAM

## 2024-02-10 PROCEDURE — 36415 COLL VENOUS BLD VENIPUNCTURE: CPT

## 2024-02-10 PROCEDURE — 94761 N-INVAS EAR/PLS OXIMETRY MLT: CPT

## 2024-02-10 PROCEDURE — 2700000000 HC OXYGEN THERAPY PER DAY

## 2024-02-10 PROCEDURE — 6370000000 HC RX 637 (ALT 250 FOR IP)

## 2024-02-10 PROCEDURE — 6370000000 HC RX 637 (ALT 250 FOR IP): Performed by: FAMILY MEDICINE

## 2024-02-10 PROCEDURE — 2580000003 HC RX 258: Performed by: FAMILY MEDICINE

## 2024-02-10 PROCEDURE — 6360000002 HC RX W HCPCS: Performed by: FAMILY MEDICINE

## 2024-02-10 PROCEDURE — 83735 ASSAY OF MAGNESIUM: CPT

## 2024-02-10 RX ORDER — NICOTINE 21 MG/24HR
1 PATCH, TRANSDERMAL 24 HOURS TRANSDERMAL DAILY PRN
Qty: 30 PATCH | Refills: 3
Start: 2024-02-10

## 2024-02-10 RX ORDER — ALBUTEROL SULFATE 2.5 MG/3ML
2.5 SOLUTION RESPIRATORY (INHALATION) 3 TIMES DAILY
Qty: 120 EACH | Refills: 3
Start: 2024-02-10

## 2024-02-10 RX ORDER — DIGOXIN 125 MCG
125 TABLET ORAL DAILY
Qty: 30 TABLET | Refills: 0
Start: 2024-02-11

## 2024-02-10 RX ORDER — MENTHOL 40 MG/ML
GEL TOPICAL EVERY 6 HOURS
COMMUNITY

## 2024-02-10 RX ORDER — PREDNISONE 10 MG/1
TABLET ORAL
Qty: 10 TABLET | Refills: 0
Start: 2024-02-10

## 2024-02-10 RX ORDER — GUAIFENESIN 600 MG/1
600 TABLET, EXTENDED RELEASE ORAL 2 TIMES DAILY
Qty: 60 TABLET | Refills: 0
Start: 2024-02-10

## 2024-02-10 RX ORDER — CEFDINIR 300 MG/1
300 CAPSULE ORAL 2 TIMES DAILY
Qty: 4 CAPSULE | Refills: 0
Start: 2024-02-10 | End: 2024-02-12

## 2024-02-10 RX ORDER — IPRATROPIUM BROMIDE AND ALBUTEROL SULFATE 2.5; .5 MG/3ML; MG/3ML
3 SOLUTION RESPIRATORY (INHALATION) 3 TIMES DAILY
Qty: 360 ML | Refills: 0
Start: 2024-02-10 | End: 2024-02-10 | Stop reason: HOSPADM

## 2024-02-10 RX ORDER — HYDROXYZINE HYDROCHLORIDE 10 MG/1
10 TABLET, FILM COATED ORAL 3 TIMES DAILY PRN
Qty: 90 TABLET | Refills: 0
Start: 2024-02-10 | End: 2024-03-11

## 2024-02-10 RX ORDER — ALBUTEROL SULFATE 90 UG/1
2 AEROSOL, METERED RESPIRATORY (INHALATION) EVERY 4 HOURS PRN
Qty: 18 G | Refills: 0
Start: 2024-02-10

## 2024-02-10 RX ADMIN — INSULIN LISPRO 1 UNITS: 100 INJECTION, SOLUTION INTRAVENOUS; SUBCUTANEOUS at 09:06

## 2024-02-10 RX ADMIN — BUDESONIDE AND FORMOTEROL FUMARATE DIHYDRATE 2 PUFF: 160; 4.5 AEROSOL RESPIRATORY (INHALATION) at 08:22

## 2024-02-10 RX ADMIN — PREDNISONE 40 MG: 20 TABLET ORAL at 09:06

## 2024-02-10 RX ADMIN — INSULIN LISPRO 8 UNITS: 100 INJECTION, SOLUTION INTRAVENOUS; SUBCUTANEOUS at 12:29

## 2024-02-10 RX ADMIN — DIGOXIN 125 MCG: 125 TABLET ORAL at 09:04

## 2024-02-10 RX ADMIN — TORSEMIDE 20 MG: 20 TABLET ORAL at 09:05

## 2024-02-10 RX ADMIN — CEFEPIME 2000 MG: 2 INJECTION, POWDER, FOR SOLUTION INTRAVENOUS at 18:02

## 2024-02-10 RX ADMIN — IPRATROPIUM BROMIDE AND ALBUTEROL SULFATE 1 DOSE: 2.5; .5 SOLUTION RESPIRATORY (INHALATION) at 08:24

## 2024-02-10 RX ADMIN — GUAIFENESIN 600 MG: 600 TABLET, EXTENDED RELEASE ORAL at 09:04

## 2024-02-10 RX ADMIN — CEFEPIME 2000 MG: 2 INJECTION, POWDER, FOR SOLUTION INTRAVENOUS at 09:04

## 2024-02-10 RX ADMIN — DILTIAZEM HYDROCHLORIDE 240 MG: 240 CAPSULE, COATED, EXTENDED RELEASE ORAL at 09:05

## 2024-02-10 RX ADMIN — MIDODRINE HYDROCHLORIDE 2.5 MG: 5 TABLET ORAL at 09:05

## 2024-02-10 RX ADMIN — INSULIN LISPRO 8 UNITS: 100 INJECTION, SOLUTION INTRAVENOUS; SUBCUTANEOUS at 09:06

## 2024-02-10 RX ADMIN — POTASSIUM CHLORIDE 20 MEQ: 1500 TABLET, EXTENDED RELEASE ORAL at 09:06

## 2024-02-10 RX ADMIN — INSULIN LISPRO 4 UNITS: 100 INJECTION, SOLUTION INTRAVENOUS; SUBCUTANEOUS at 18:00

## 2024-02-10 RX ADMIN — Medication 400 MG: at 09:06

## 2024-02-10 RX ADMIN — APIXABAN 5 MG: 5 TABLET, FILM COATED ORAL at 09:04

## 2024-02-10 RX ADMIN — ROFLUMILAST 500 MCG: 500 TABLET ORAL at 09:07

## 2024-02-10 RX ADMIN — INSULIN LISPRO 8 UNITS: 100 INJECTION, SOLUTION INTRAVENOUS; SUBCUTANEOUS at 18:00

## 2024-02-10 NOTE — PROGRESS NOTES
pulmonary      SUBJECTIVE:  slow improvement     OBJECTIVE    VITALS:  /60   Pulse 80   Temp 98.1 °F (36.7 °C) (Oral)   Resp 16   Ht 1.981 m (6' 6\")   Wt (!) 154.2 kg (339 lb 15.2 oz)   SpO2 93%   BMI 39.29 kg/m²   HEAD AND FACE EXAM:  No throat injection, no active exudate,no thrush  NECK EXAM;No JVD, no masses, symmetrical  CHEST EXAM; Expansion equal and symmetrical, no masses  LUNG EXAM; Good breath sounds bilaterally. There are expiratory wheezes both lungs, there are crackles at both lung bases  CARDIOVASCULAR EXAM: Positive S1 and S2, no S3 or S4, no clicks ,no murmurs            LABS   Lab Results   Component Value Date    WBC 12.4 (H) 02/10/2024    HGB 11.1 (L) 02/10/2024    HCT 36.3 (L) 02/10/2024    MCV 96.5 02/10/2024     02/10/2024     Lab Results   Component Value Date    CREATININE 0.6 (L) 02/10/2024    BUN 18 02/10/2024     02/10/2024    K 4.2 02/10/2024    CL 87 (L) 02/10/2024    CO2 41 (H) 02/10/2024     Lab Results   Component Value Date    INR 1.5 02/05/2024    PROTIME 17.9 (H) 02/05/2024          Lab Results   Component Value Date/Time    PHOS 4.5 01/14/2024 04:29 AM    PHOS 4.3 01/13/2024 04:02 AM    PHOS 4.2 01/12/2024 03:31 AM      No results for input(s): \"PH\", \"PO2ART\", \"GDO5XFW\", \"HCO3\", \"BEART\", \"O2SAT\" in the last 72 hours.      Wt Readings from Last 3 Encounters:   02/10/24 (!) 154.2 kg (339 lb 15.2 oz)   01/17/24 (!) 160.6 kg (354 lb)   01/06/24 (!) 158.7 kg (349 lb 13.9 oz)               ASSESMENT  Ac on ch resp fauilure  Pneumonia  Ac copd  Positive RSV        PLAN  Bd rx  Antibx  Slowly increase activity    2/10/2024  Neptali Keen MD, M.ZOILA.

## 2024-02-10 NOTE — PROGRESS NOTES
Discharge 6-please refer to that discharge summary        He apparently had a normal bypass in 2011 and 2019 per past medical history    He apparently had a ablation twice for the same source    He was discharged on the 17th and readmitted on February for    His A1c is 7.0    He has alkalosis and is on torsemide    There is no VSD or PFTs on file    The BMI is 39    He has had 3 more admissions hospital discharge    RSV positive ventilatory for-this admission    He had 14 admissions in 2023        In 2022 he had 1 admission    2021-no admissions    In 2021    Sputum culture is pending    He had septic shock because of lactic acid level of 4    Cardizem 360 was never given  He is on midodrine 2.5 twice daily and torsemide 20 twice daily and his lactate came down to normal and his blood sugar came up to 377 probably because he ate something about a regular diet    Digoxin level in 1 week    Due to prior ABGs    Magnesium level was 1.3 in one of his admissions    He does smoke

## 2024-02-11 LAB
CULTURE: NORMAL
Lab: NORMAL
SPECIMEN: NORMAL

## 2024-02-11 NOTE — PROGRESS NOTES
Patient left unit via stretcher with [facility] service in route to [facility]. Report called by previous shift, IV removed prior to discharge no complaints voiced.

## 2024-02-12 LAB
CULTURE: NORMAL
GRAM SMEAR: NORMAL
Lab: NORMAL
SPECIMEN: NORMAL

## 2024-02-12 RX ORDER — DILTIAZEM HYDROCHLORIDE 240 MG/1
240 CAPSULE, COATED, EXTENDED RELEASE ORAL DAILY
Qty: 30 CAPSULE | Refills: 0 | OUTPATIENT
Start: 2024-02-12

## 2024-02-14 RX ORDER — DILTIAZEM HYDROCHLORIDE 120 MG/1
CAPSULE, COATED, EXTENDED RELEASE ORAL
Qty: 30 CAPSULE | Refills: 10 | OUTPATIENT
Start: 2024-02-14

## 2024-02-27 ENCOUNTER — CARE COORDINATION (OUTPATIENT)
Dept: CARE COORDINATION | Age: 69
End: 2024-02-27

## 2024-02-27 ENCOUNTER — ENROLLMENT (OUTPATIENT)
Dept: CARE COORDINATION | Age: 69
End: 2024-02-27

## 2024-02-27 NOTE — CARE COORDINATION
ACM attempted outreach to patient for CM from [employer] referral. Message received as number could not be completed as dialed. ACM will attempt outreach at a later date/time.

## 2024-03-05 ENCOUNTER — CARE COORDINATION (OUTPATIENT)
Dept: CARE COORDINATION | Age: 69
End: 2024-03-05

## 2024-03-05 NOTE — CARE COORDINATION
ACM attempted outreach to patient. Number list is not in service. ACM sent my chart message to patient as final outreach. ACM will end care management if no outreach by next scheduled call.

## 2024-03-09 RX ORDER — PEN NEEDLE, DIABETIC 31 GX3/16"
NEEDLE, DISPOSABLE MISCELLANEOUS
Refills: 10 | OUTPATIENT
Start: 2024-03-09

## 2024-03-09 RX ORDER — ISOPROPYL ALCOHOL 70 ML/100ML
SWAB TOPICAL
Refills: 10 | OUTPATIENT
Start: 2024-03-09

## 2024-03-12 ENCOUNTER — CARE COORDINATION (OUTPATIENT)
Dept: CARE COORDINATION | Age: 69
End: 2024-03-12

## 2024-03-12 NOTE — CARE COORDINATION
[facility] has ended care management after multiple UTR outreach attempts.   No further outreach at this time.

## 2024-03-21 RX ORDER — PEN NEEDLE, DIABETIC 31 GX3/16"
NEEDLE, DISPOSABLE MISCELLANEOUS
Refills: 10 | OUTPATIENT
Start: 2024-03-21

## 2024-03-21 RX ORDER — ISOPROPYL ALCOHOL 70 ML/100ML
SWAB TOPICAL
Refills: 10 | OUTPATIENT
Start: 2024-03-21

## 2024-05-17 NOTE — PROGRESS NOTES
CARDIOLOGY PROGRESS NOTE                                                  Name:  Yves Maynard /Age/Sex: 1955  (79 y.o. male)   MRN & CSN:  [id number] & [id number] Admission Date/Time: 2023 11:08 PM   Location:  [address] [place] [address]- PCP: Elisha Joshi MD         Admit Date:  2023  Hospital Day: 4      SUBJECTIVE:   Seen patient as follow up as consultation for congestive heart failure    No chest pain. +  shortness of breath  No palpations    TELEMETRY: Sinus         Intake/Output Summary (Last 24 hours) at 2023 1242  Last data filed at 2023 2152  Gross per 24 hour   Intake 5 ml   Output --   Net 5 ml       Assessment/Plan:          Acute on chronic heart Failure:     Last echocardiogram shows preserved LV ejection fraction  Continue with IV Lasix , uptitrate dose to 60 mg twice daily  Schedule patient for stress test tomorrow morning  Echocardiogram pending      Condition:  Stable        Observe for drug toxicity by ordering daily Chem-7 magnesium. Daily monitoring for renal function with creatinine, magnesium and potassium levels      Recent Labs     23  0301   K 3.7   CREATININE 0.7*         Obtain serial chest x-rays to assess for response  Recommend close monitoring of daily weights  Strict intake output monitoring  Low-salt diet     History of atrial fibrillation s/p ablation: Currently in sinus rhythm. Continue with Eliquis 5 mg p.o. twice daily for stroke prophylaxis  Continue with Cardizem 120 mg p.o. daily. Diabetes mellitus: On insulin.   Essential hypertension continue with medication as above  Hyperlipidemia: Continue with statins Lipitor 40 daily  Suspected sleep apnea: Recommend sleep study as outpatient       Past medical history:    has a past medical history of A-fib ([address]), Anxiety, Arthritis, COPD (chronic obstructive pulmonary disease) ([address]), Depression, Diabetes mellitus ([address]), Full dentures, H/O angiography, H/O Doppler ultrasound, H/O Detail Level: Generalized Sunscreen Recommendations: TRANG sunscreen Detail Level: Detailed

## 2025-04-07 NOTE — TELEPHONE ENCOUNTER
"Daily Note     Today's date: 2025  Patient name: Fernie March  : 1955  MRN: [id number]  Referring provider: Desiree Kearney MD  Dx:   Encounter Diagnosis     ICD-10-CM    1. 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  Pt denies radicular sx.          Objective: See treatment diary below      Assessment: Pt tolerated treatment session w/o an increase in RLE pain.  Pt was able to perform weighted MTP/LTP and Palloff Press exercises to further strengthen pt's core.  PT reinforced to pt the importance of performing stretching for HEP.  Pt stated that he will increase HEP stretching to further increase strength training in clinic w/ allotted time.        Plan: Continue per plan of care.      Re-eval Date:      Precautions: None     Visit Count 3 4 5 6 2       Manuals 3/25 3/31 4/3 4/7   3-21     R quad, ITB, HS  DF    B/L HS, Piri, Hip ADD    HS, ITB, piri  EC                           Neuro Re-Ed  3/25 3/31   3-21     PPT        PPT with marches        Standing   x15 ea erinn  W/PPT   TA        TA + OH lifts        MTP/LTP Black x20 ea 2\" hold Black x20 ea 2\" hold Black x20 ea 2\" hold LF Matrix  32.5# ea pulley x20 ea   Blue x20 ea   Palloff press  Black 5\"x15 ea b/l Black 5\"x20 ea b/l Black  10\" x 10  R/L  LF Matrix  37.5# 5\" hold x15 ea way Blue 5\"x10 ea   R/L           L Sidebend when radicular sx occurring in RLE.                        Ther Ex        NuStep for endurance/ conditioning   L5 10'  L6 10'  L6 10' L5 10'   SBC L5 10'       Standing hip flex/abd/ext 2# Flex/ABD 1x20 ea b/l 2# Flex/ABD 1x20 ea b/l 2# Flex/ABD 1x20 ea b/l NV Flex/abd  X 20 ea b/l   Squats  x15    x15   Leg Press  100# x20 130# x20 150# " For Pharmacy Admin Tracking Only     CPA in place:  No   Recommendation Provided To: Patient/Caregiver: 1 via Telephone   Intervention Detail: Adherence Monitorin   Gap Closed?: Yes    Intervention Accepted By: Patient/Caregiver: 1   Time Spent (min): 15 "x20    Step-ups  D 2# 1x20 each  B/l up & down D 2# 1x20 each  B/l up & down D 2# 1x20 each  B/l up & down NV D 1x20 each  B/l   Hip add  Seated w/no   LB support 5\"x15 LF 30# x20 LF 30# x20 LF 30# x20    Hip Abd  Seated w/no   LB support LF  45# x20 LF  50# x20 LF  50# x20 LF  50# x20 LF  45# x20   LTR 5\"x10 ea b/l  10\" holds x 5 erinn  10\"x5 ea b/l 10\" x5 ea b/l   SKTC        SLR ER 1x15 ea b/l ER 1x15 ea b/l 2# ER x15 ea b/l NV 1x10 erinn   LAQ 3\"x10 ea b/l       S/L SLR        Clamshells         Heel walk outs         Bridges  5\"x15 5\"x15 5\"x15 NV 5\"x15   Physioball Rollouts - FWD  10x 10\" 10x 10\"     Self quad stretch  Standing vs prone   HEP       Self HS stretch  B/l 15\"x 3 ea       Butterfly stretch  Supine - single  Leg at a time  30\" x4   Supine - single leg at a time   30\" x 2 Supine - single leg at a time   30\" x 4   Supine - single  Leg at a time  30\" x4    Education on Self Piriformis Stretch B/L 3x20\" ea seated in chair B/L 20\"x3 ea B/L 20\"x3 ea B/L 4x30\" ea seated in chair B/L 3x20\" ea seated in chair           Re-Eval Tests & Measures                        Ther Activity                        Gait Training                        Modalities                                           "

## 2025-05-04 ENCOUNTER — APPOINTMENT (OUTPATIENT)
Dept: GENERAL RADIOLOGY | Age: 70
End: 2025-05-04
Payer: MEDICARE

## 2025-05-04 ENCOUNTER — HOSPITAL ENCOUNTER (EMERGENCY)
Age: 70
Discharge: HOME | End: 2025-05-04
Payer: MEDICARE

## 2025-05-04 VITALS
TEMPERATURE: 98.3 F | DIASTOLIC BLOOD PRESSURE: 90 MMHG | HEART RATE: 85 BPM | RESPIRATION RATE: 18 BRPM | OXYGEN SATURATION: 98 % | BODY MASS INDEX: 50.62 KG/M2 | WEIGHT: 315 LBS | SYSTOLIC BLOOD PRESSURE: 138 MMHG | HEIGHT: 66 IN

## 2025-05-04 DIAGNOSIS — J44.1 COPD EXACERBATION (HCC): Primary | ICD-10-CM

## 2025-05-04 DIAGNOSIS — E87.5 HYPERKALEMIA: ICD-10-CM

## 2025-05-04 DIAGNOSIS — E11.65 HYPERGLYCEMIA DUE TO DIABETES MELLITUS (HCC): ICD-10-CM

## 2025-05-04 LAB
ALBUMIN SERPL-MCNC: 3.9 G/DL (ref 3.4–5)
ALBUMIN/GLOB SERPL: 1.7 {RATIO} (ref 1.1–2.2)
ALP SERPL-CCNC: 87 U/L (ref 40–129)
ALT SERPL-CCNC: 22 U/L (ref 10–40)
ANION GAP SERPL CALCULATED.3IONS-SCNC: 15 MMOL/L (ref 9–17)
AST SERPL-CCNC: 16 U/L (ref 15–37)
B-OH-BUTYR SERPL-MCNC: 0.07 MMOL/L (ref 0–0.27)
BASOPHILS # BLD: 0.03 K/UL
BASOPHILS NFR BLD: 0 % (ref 0–1)
BILIRUB SERPL-MCNC: 0.2 MG/DL (ref 0–1)
BNP SERPL-MCNC: 2971 PG/ML (ref 0–125)
BUN SERPL-MCNC: 19 MG/DL (ref 7–20)
CALCIUM SERPL-MCNC: 9.6 MG/DL (ref 8.3–10.6)
CHLORIDE SERPL-SCNC: 93 MMOL/L (ref 99–110)
CHP ED QC CHECK: NORMAL
CO2 SERPL-SCNC: 22 MMOL/L (ref 21–32)
CREAT SERPL-MCNC: 0.9 MG/DL (ref 0.8–1.3)
EOSINOPHIL # BLD: 0.01 K/UL
EOSINOPHILS RELATIVE PERCENT: 0 % (ref 0–3)
ERYTHROCYTE [DISTWIDTH] IN BLOOD BY AUTOMATED COUNT: 14.8 % (ref 11.7–14.9)
GFR, ESTIMATED: 87 ML/MIN/1.73M2
GLUCOSE BLD-MCNC: 368 MG/DL
GLUCOSE BLD-MCNC: 368 MG/DL (ref 74–99)
GLUCOSE SERPL-MCNC: 491 MG/DL (ref 74–99)
HCT VFR BLD AUTO: 42.2 % (ref 42–52)
HGB BLD-MCNC: 13.9 G/DL (ref 13.5–18)
IMM GRANULOCYTES # BLD AUTO: 0.07 K/UL
IMM GRANULOCYTES NFR BLD: 1 %
LYMPHOCYTES NFR BLD: 0.41 K/UL
LYMPHOCYTES RELATIVE PERCENT: 3 % (ref 24–44)
MCH RBC QN AUTO: 29.4 PG (ref 27–31)
MCHC RBC AUTO-ENTMCNC: 32.9 G/DL (ref 32–36)
MCV RBC AUTO: 89.4 FL (ref 78–100)
MONOCYTES NFR BLD: 0.28 K/UL
MONOCYTES NFR BLD: 2 % (ref 0–5)
NEUTROPHILS NFR BLD: 94 % (ref 36–66)
NEUTS SEG NFR BLD: 11.46 K/UL
PLATELET # BLD AUTO: 204 K/UL (ref 140–440)
PMV BLD AUTO: 9.7 FL (ref 7.5–11.1)
POTASSIUM SERPL-SCNC: 5.5 MMOL/L (ref 3.5–5.1)
POTASSIUM SERPL-SCNC: 5.7 MMOL/L (ref 3.5–5.1)
PROT SERPL-MCNC: 6.1 G/DL (ref 6.4–8.2)
RBC # BLD AUTO: 4.72 M/UL (ref 4.6–6.2)
SODIUM SERPL-SCNC: 130 MMOL/L (ref 136–145)
WBC OTHER # BLD: 12.3 K/UL (ref 4–10.5)

## 2025-05-04 PROCEDURE — 80053 COMPREHEN METABOLIC PANEL: CPT

## 2025-05-04 PROCEDURE — 93005 ELECTROCARDIOGRAM TRACING: CPT | Performed by: STUDENT IN AN ORGANIZED HEALTH CARE EDUCATION/TRAINING PROGRAM

## 2025-05-04 PROCEDURE — 82962 GLUCOSE BLOOD TEST: CPT

## 2025-05-04 PROCEDURE — 83880 ASSAY OF NATRIURETIC PEPTIDE: CPT

## 2025-05-04 PROCEDURE — 85025 COMPLETE CBC W/AUTO DIFF WBC: CPT

## 2025-05-04 PROCEDURE — 6370000000 HC RX 637 (ALT 250 FOR IP): Performed by: NURSE PRACTITIONER

## 2025-05-04 PROCEDURE — 71045 X-RAY EXAM CHEST 1 VIEW: CPT

## 2025-05-04 PROCEDURE — 82010 KETONE BODYS QUAN: CPT

## 2025-05-04 PROCEDURE — 94640 AIRWAY INHALATION TREATMENT: CPT

## 2025-05-04 PROCEDURE — 84132 ASSAY OF SERUM POTASSIUM: CPT

## 2025-05-04 PROCEDURE — 99285 EMERGENCY DEPT VISIT HI MDM: CPT

## 2025-05-04 RX ORDER — IPRATROPIUM BROMIDE AND ALBUTEROL SULFATE 2.5; .5 MG/3ML; MG/3ML
1 SOLUTION RESPIRATORY (INHALATION) ONCE
Status: COMPLETED | OUTPATIENT
Start: 2025-05-04 | End: 2025-05-04

## 2025-05-04 RX ORDER — PREDNISONE 20 MG/1
40 TABLET ORAL ONCE
Status: COMPLETED | OUTPATIENT
Start: 2025-05-04 | End: 2025-05-04

## 2025-05-04 RX ADMIN — INSULIN HUMAN 8 UNITS: 100 INJECTION, SOLUTION PARENTERAL at 18:55

## 2025-05-04 RX ADMIN — IPRATROPIUM BROMIDE AND ALBUTEROL SULFATE 1 DOSE: .5; 2.5 SOLUTION RESPIRATORY (INHALATION) at 17:21

## 2025-05-04 RX ADMIN — INSULIN HUMAN 5 UNITS: 100 INJECTION, SOLUTION PARENTERAL at 21:04

## 2025-05-04 RX ADMIN — PREDNISONE 40 MG: 20 TABLET ORAL at 17:38

## 2025-05-04 RX ADMIN — SODIUM ZIRCONIUM CYCLOSILICATE 10 G: 5 POWDER, FOR SUSPENSION ORAL at 21:04

## 2025-05-04 ASSESSMENT — PAIN - FUNCTIONAL ASSESSMENT: PAIN_FUNCTIONAL_ASSESSMENT: 0-10

## 2025-05-04 ASSESSMENT — PAIN SCALES - GENERAL: PAINLEVEL_OUTOF10: 0

## 2025-05-04 NOTE — ED PROVIDER NOTES
with pulmonology in 3 weeks.  Otherwise has not had any recent hospitalizations.    Chronic conditions affecting care: COPD, diabetes, peripheral vascular disease, atrial fibrillation    Testing considered by not ordered: see MDM    Patient was given the following medications:  Medications   sodium zirconium cyclosilicate (LOKELMA) oral suspension 10 g (has no administration in time range)   insulin regular (HumuLIN R;NovoLIN R) injection 5 Units (has no administration in time range)   ipratropium 0.5 mg-albuterol 2.5 mg (DUONEB) nebulizer solution 1 Dose (1 Dose Inhalation Given 5/4/25 1721)   predniSONE (DELTASONE) tablet 40 mg (40 mg Oral Given 5/4/25 1738)   insulin regular (HumuLIN R;NovoLIN R) injection 8 Units (8 Units SubCUTAneous Given 5/4/25 1855)       CONSULTS: None               In brief, patient presented for evaluation of some increased shortness of breath with an episode of hypoxia this evening.  On arrival the patient was back to 3.5 L of oxygen by nasal cannula and was oxygenating at 99 to 100%.  EMS reports he was in the 70s when they got there but after they placed him on a nonrebreather for a couple of minutes he rebounded quickly and was feeling much better.  It is suspected that maybe there was an issue with his portable oxygen tank or it had run out of oxygen and that was why he decompensated.  But time I saw him he was feeling much better.  Lungs were clear but diminished on exam.  His vitals were otherwise normal.  He was not having any chest pain or other symptoms that seem concerning for an ACS.  Had not had any infectious symptoms that seem concerning for pneumonia but did work him up out of abundance of caution.  Chest x-ray showed chronic changes concerning for emphysema but no acute infiltrate, effusion, pulmonary edema.  Labs including CMP and CBC were obtained which were significant for glucose of 491 with no anion gap, normal bicarb of 22 but a potassium of 5.7.  The specimen was

## 2025-05-05 LAB
EKG DIAGNOSIS: NORMAL
EKG Q-T INTERVAL: 376 MS
EKG QRS DURATION: 108 MS
EKG QTC CALCULATION (BAZETT): 436 MS
EKG R AXIS: -72 DEGREES
EKG T AXIS: 90 DEGREES
EKG VENTRICULAR RATE: 81 BPM

## 2025-05-05 PROCEDURE — 93010 ELECTROCARDIOGRAM REPORT: CPT | Performed by: INTERNAL MEDICINE

## 2025-05-05 NOTE — DISCHARGE INSTRUCTIONS
Your potassium was elevated this evening.  Initial value was 5.7, repeat was 5.5.  You are given a dose of Lokelma to help reduce this.  Your blood sugar was also quite elevated, initially 491, you were given 8 units of insulin and then repeat blood sugar was 368 and you were given an additional 5 units of insulin.  Please have the facility recheck your glucose this evening before bed and dose with insulin as per your sliding scale.  You should also have your potassium rechecked in the next 48 hours.

## 2025-06-20 ENCOUNTER — HOSPITAL ENCOUNTER (INPATIENT)
Age: 70
LOS: 9 days | Discharge: NURSING, REHAB OR LONG-TERM CARE | DRG: 291 | End: 2025-06-29
Attending: STUDENT IN AN ORGANIZED HEALTH CARE EDUCATION/TRAINING PROGRAM | Admitting: STUDENT IN AN ORGANIZED HEALTH CARE EDUCATION/TRAINING PROGRAM
Payer: MEDICARE

## 2025-06-20 ENCOUNTER — APPOINTMENT (OUTPATIENT)
Dept: GENERAL RADIOLOGY | Age: 70
DRG: 291 | End: 2025-06-20
Payer: MEDICARE

## 2025-06-20 DIAGNOSIS — J44.1 COPD EXACERBATION (HCC): Primary | ICD-10-CM

## 2025-06-20 DIAGNOSIS — I50.9 ACUTE EXACERBATION OF CHRONIC HEART FAILURE (HCC): ICD-10-CM

## 2025-06-20 DIAGNOSIS — I50.9 CONGESTIVE HEART FAILURE, UNSPECIFIED HF CHRONICITY, UNSPECIFIED HEART FAILURE TYPE (HCC): ICD-10-CM

## 2025-06-20 DIAGNOSIS — A41.9 SEVERE SEPSIS (HCC): ICD-10-CM

## 2025-06-20 DIAGNOSIS — R65.20 SEVERE SEPSIS (HCC): ICD-10-CM

## 2025-06-20 DIAGNOSIS — J96.21 ACUTE ON CHRONIC RESPIRATORY FAILURE WITH HYPOXIA (HCC): ICD-10-CM

## 2025-06-20 PROBLEM — J96.01 ACUTE HYPOXIC RESPIRATORY FAILURE (HCC): Status: ACTIVE | Noted: 2025-06-20

## 2025-06-20 LAB
ALBUMIN SERPL-MCNC: 3.6 G/DL (ref 3.4–5)
ALBUMIN/GLOB SERPL: 1.7 {RATIO} (ref 1.1–2.2)
ALP SERPL-CCNC: 81 U/L (ref 40–129)
ALT SERPL-CCNC: 24 U/L (ref 10–40)
ANION GAP SERPL CALCULATED.3IONS-SCNC: 12 MMOL/L (ref 9–17)
ARTERIAL PATENCY WRIST A: ABNORMAL
AST SERPL-CCNC: 19 U/L (ref 15–37)
B-OH-BUTYR SERPL-MCNC: 0.15 MMOL/L (ref 0–0.27)
BASOPHILS # BLD: 0.06 K/UL
BASOPHILS NFR BLD: 0 % (ref 0–1)
BILIRUB SERPL-MCNC: 0.4 MG/DL (ref 0–1)
BILIRUB UR QL STRIP: NEGATIVE
BNP SERPL-MCNC: 4365 PG/ML (ref 0–125)
BODY TEMPERATURE: 37
BUN SERPL-MCNC: 17 MG/DL (ref 7–20)
CALCIUM SERPL-MCNC: 9 MG/DL (ref 8.3–10.6)
CHLORIDE SERPL-SCNC: 91 MMOL/L (ref 99–110)
CLARITY UR: CLEAR
CO2 SERPL-SCNC: 34 MMOL/L (ref 21–32)
COHGB MFR BLD: 1.7 % (ref 0.5–1.5)
COLOR UR: YELLOW
COMMENT: NORMAL
CREAT SERPL-MCNC: 0.9 MG/DL (ref 0.8–1.3)
EOSINOPHIL # BLD: 0.09 K/UL
EOSINOPHILS RELATIVE PERCENT: 1 % (ref 0–3)
ERYTHROCYTE [DISTWIDTH] IN BLOOD BY AUTOMATED COUNT: 16.6 % (ref 11.7–14.9)
FERRITIN SERPL-MCNC: 258 NG/ML (ref 30–400)
GFR, ESTIMATED: 86 ML/MIN/1.73M2
GLUCOSE BLD-MCNC: 212 MG/DL (ref 74–99)
GLUCOSE SERPL-MCNC: 106 MG/DL (ref 74–99)
GLUCOSE UR STRIP-MCNC: NEGATIVE MG/DL
HCO3 VENOUS: 35.3 MMOL/L (ref 22–29)
HCT VFR BLD AUTO: 44.2 % (ref 42–52)
HGB BLD-MCNC: 14.2 G/DL (ref 13.5–18)
HGB UR QL STRIP.AUTO: NEGATIVE
IMM GRANULOCYTES # BLD AUTO: 0.05 K/UL
IMM GRANULOCYTES NFR BLD: 0 %
IRON SATN MFR SERPL: 21 % (ref 15–50)
IRON SERPL-MCNC: 49 UG/DL (ref 59–158)
KETONES UR STRIP-MCNC: NEGATIVE MG/DL
LACTATE BLDV-SCNC: 1.8 MMOL/L (ref 0.4–2)
LACTATE BLDV-SCNC: 2.2 MMOL/L (ref 0.4–2)
LEUKOCYTE ESTERASE UR QL STRIP: NEGATIVE
LYMPHOCYTES NFR BLD: 0.83 K/UL
LYMPHOCYTES RELATIVE PERCENT: 6 % (ref 24–44)
MCH RBC QN AUTO: 30.1 PG (ref 27–31)
MCHC RBC AUTO-ENTMCNC: 32.1 G/DL (ref 32–36)
MCV RBC AUTO: 93.6 FL (ref 78–100)
METHEMOGLOBIN: 0.3 % (ref 0.5–1.5)
MONOCYTES NFR BLD: 0.95 K/UL
MONOCYTES NFR BLD: 6 % (ref 0–5)
NEUTROPHILS NFR BLD: 87 % (ref 36–66)
NEUTS SEG NFR BLD: 13.13 K/UL
NITRITE UR QL STRIP: NEGATIVE
OXYHGB MFR BLD: 34.1 %
PCO2 VENOUS: 86.9 MM HG (ref 38–54)
PH UR STRIP: 5 [PH] (ref 5–8)
PH VENOUS: 7.23 (ref 7.32–7.43)
PLATELET # BLD AUTO: 203 K/UL (ref 140–440)
PMV BLD AUTO: 9.3 FL (ref 7.5–11.1)
PO2 VENOUS: 26.4 MM HG (ref 23–48)
POSITIVE BASE EXCESS, VEN: 4.3 MMOL/L (ref 0–3)
POTASSIUM SERPL-SCNC: 4.6 MMOL/L (ref 3.5–5.1)
PROT SERPL-MCNC: 5.7 G/DL (ref 6.4–8.2)
PROT UR STRIP-MCNC: NEGATIVE MG/DL
RBC # BLD AUTO: 4.72 M/UL (ref 4.6–6.2)
SODIUM SERPL-SCNC: 137 MMOL/L (ref 136–145)
SP GR UR STRIP: 1.02 (ref 1–1.03)
TEXT FOR RESPIRATORY: ABNORMAL
TIBC SERPL-MCNC: 231 UG/DL (ref 260–445)
TROPONIN I SERPL HS-MCNC: 45 NG/L (ref 0–22)
TROPONIN I SERPL HS-MCNC: 48 NG/L (ref 0–22)
TROPONIN I SERPL HS-MCNC: 52 NG/L (ref 0–22)
TSH SERPL DL<=0.05 MIU/L-ACNC: 1.16 UIU/ML (ref 0.27–4.2)
UNSATURATED IRON BINDING CAPACITY: 182 UG/DL (ref 110–370)
UROBILINOGEN UR STRIP-ACNC: 1 EU/DL (ref 0–1)
WBC OTHER # BLD: 15.1 K/UL (ref 4–10.5)

## 2025-06-20 PROCEDURE — 6360000002 HC RX W HCPCS: Performed by: PHYSICIAN ASSISTANT

## 2025-06-20 PROCEDURE — 85025 COMPLETE CBC W/AUTO DIFF WBC: CPT

## 2025-06-20 PROCEDURE — 2580000003 HC RX 258: Performed by: PHYSICIAN ASSISTANT

## 2025-06-20 PROCEDURE — 6370000000 HC RX 637 (ALT 250 FOR IP): Performed by: STUDENT IN AN ORGANIZED HEALTH CARE EDUCATION/TRAINING PROGRAM

## 2025-06-20 PROCEDURE — 84484 ASSAY OF TROPONIN QUANT: CPT

## 2025-06-20 PROCEDURE — 94664 DEMO&/EVAL PT USE INHALER: CPT

## 2025-06-20 PROCEDURE — 94640 AIRWAY INHALATION TREATMENT: CPT

## 2025-06-20 PROCEDURE — 2140000000 HC CCU INTERMEDIATE R&B

## 2025-06-20 PROCEDURE — 83540 ASSAY OF IRON: CPT

## 2025-06-20 PROCEDURE — 82962 GLUCOSE BLOOD TEST: CPT

## 2025-06-20 PROCEDURE — 96374 THER/PROPH/DIAG INJ IV PUSH: CPT

## 2025-06-20 PROCEDURE — 2500000003 HC RX 250 WO HCPCS: Performed by: PHYSICIAN ASSISTANT

## 2025-06-20 PROCEDURE — 96375 TX/PRO/DX INJ NEW DRUG ADDON: CPT

## 2025-06-20 PROCEDURE — 2580000003 HC RX 258: Performed by: STUDENT IN AN ORGANIZED HEALTH CARE EDUCATION/TRAINING PROGRAM

## 2025-06-20 PROCEDURE — 82010 KETONE BODYS QUAN: CPT

## 2025-06-20 PROCEDURE — 84443 ASSAY THYROID STIM HORMONE: CPT

## 2025-06-20 PROCEDURE — 71045 X-RAY EXAM CHEST 1 VIEW: CPT

## 2025-06-20 PROCEDURE — 99285 EMERGENCY DEPT VISIT HI MDM: CPT

## 2025-06-20 PROCEDURE — 82805 BLOOD GASES W/O2 SATURATION: CPT

## 2025-06-20 PROCEDURE — 80053 COMPREHEN METABOLIC PANEL: CPT

## 2025-06-20 PROCEDURE — 83605 ASSAY OF LACTIC ACID: CPT

## 2025-06-20 PROCEDURE — 94761 N-INVAS EAR/PLS OXIMETRY MLT: CPT

## 2025-06-20 PROCEDURE — 87040 BLOOD CULTURE FOR BACTERIA: CPT

## 2025-06-20 PROCEDURE — 2700000000 HC OXYGEN THERAPY PER DAY

## 2025-06-20 PROCEDURE — 82728 ASSAY OF FERRITIN: CPT

## 2025-06-20 PROCEDURE — 93005 ELECTROCARDIOGRAM TRACING: CPT | Performed by: STUDENT IN AN ORGANIZED HEALTH CARE EDUCATION/TRAINING PROGRAM

## 2025-06-20 PROCEDURE — 83880 ASSAY OF NATRIURETIC PEPTIDE: CPT

## 2025-06-20 PROCEDURE — 81003 URINALYSIS AUTO W/O SCOPE: CPT

## 2025-06-20 PROCEDURE — 83550 IRON BINDING TEST: CPT

## 2025-06-20 PROCEDURE — 36415 COLL VENOUS BLD VENIPUNCTURE: CPT

## 2025-06-20 PROCEDURE — 6360000002 HC RX W HCPCS: Performed by: STUDENT IN AN ORGANIZED HEALTH CARE EDUCATION/TRAINING PROGRAM

## 2025-06-20 PROCEDURE — 6370000000 HC RX 637 (ALT 250 FOR IP): Performed by: PHYSICIAN ASSISTANT

## 2025-06-20 PROCEDURE — 5A0955A ASSISTANCE WITH RESPIRATORY VENTILATION, GREATER THAN 96 CONSECUTIVE HOURS, HIGH NASAL FLOW/VELOCITY: ICD-10-PCS | Performed by: INTERNAL MEDICINE

## 2025-06-20 RX ORDER — ATORVASTATIN CALCIUM 40 MG/1
40 TABLET, FILM COATED ORAL NIGHTLY
Status: DISCONTINUED | OUTPATIENT
Start: 2025-06-20 | End: 2025-06-29 | Stop reason: HOSPADM

## 2025-06-20 RX ORDER — DILTIAZEM HYDROCHLORIDE 240 MG/1
240 CAPSULE, COATED, EXTENDED RELEASE ORAL DAILY
Status: DISCONTINUED | OUTPATIENT
Start: 2025-06-20 | End: 2025-06-28

## 2025-06-20 RX ORDER — MIDODRINE HYDROCHLORIDE 5 MG/1
2.5 TABLET ORAL 2 TIMES DAILY
Status: DISCONTINUED | OUTPATIENT
Start: 2025-06-20 | End: 2025-06-29 | Stop reason: HOSPADM

## 2025-06-20 RX ORDER — DIGOXIN 125 MCG
125 TABLET ORAL DAILY
Status: DISCONTINUED | OUTPATIENT
Start: 2025-06-20 | End: 2025-06-29 | Stop reason: HOSPADM

## 2025-06-20 RX ORDER — INSULIN LISPRO 100 [IU]/ML
0-4 INJECTION, SOLUTION INTRAVENOUS; SUBCUTANEOUS
Status: DISCONTINUED | OUTPATIENT
Start: 2025-06-20 | End: 2025-06-23

## 2025-06-20 RX ORDER — SODIUM CHLORIDE 9 MG/ML
INJECTION, SOLUTION INTRAVENOUS PRN
Status: DISCONTINUED | OUTPATIENT
Start: 2025-06-20 | End: 2025-06-29 | Stop reason: HOSPADM

## 2025-06-20 RX ORDER — ONDANSETRON 4 MG/1
4 TABLET, ORALLY DISINTEGRATING ORAL EVERY 8 HOURS PRN
Status: DISCONTINUED | OUTPATIENT
Start: 2025-06-20 | End: 2025-06-29 | Stop reason: HOSPADM

## 2025-06-20 RX ORDER — POTASSIUM CHLORIDE 7.45 MG/ML
10 INJECTION INTRAVENOUS PRN
Status: DISCONTINUED | OUTPATIENT
Start: 2025-06-20 | End: 2025-06-29 | Stop reason: HOSPADM

## 2025-06-20 RX ORDER — DEXTROSE MONOHYDRATE 100 MG/ML
INJECTION, SOLUTION INTRAVENOUS CONTINUOUS PRN
Status: DISCONTINUED | OUTPATIENT
Start: 2025-06-20 | End: 2025-06-29 | Stop reason: HOSPADM

## 2025-06-20 RX ORDER — FUROSEMIDE 10 MG/ML
40 INJECTION INTRAMUSCULAR; INTRAVENOUS 2 TIMES DAILY
Status: DISCONTINUED | OUTPATIENT
Start: 2025-06-20 | End: 2025-06-22

## 2025-06-20 RX ORDER — ENOXAPARIN SODIUM 100 MG/ML
40 INJECTION SUBCUTANEOUS 2 TIMES DAILY
Status: CANCELLED | OUTPATIENT
Start: 2025-06-20

## 2025-06-20 RX ORDER — POTASSIUM CHLORIDE 1500 MG/1
40 TABLET, EXTENDED RELEASE ORAL PRN
Status: DISCONTINUED | OUTPATIENT
Start: 2025-06-20 | End: 2025-06-29 | Stop reason: HOSPADM

## 2025-06-20 RX ORDER — ACETAMINOPHEN 325 MG/1
650 TABLET ORAL EVERY 6 HOURS PRN
Status: DISCONTINUED | OUTPATIENT
Start: 2025-06-20 | End: 2025-06-29 | Stop reason: HOSPADM

## 2025-06-20 RX ORDER — SODIUM CHLORIDE 0.9 % (FLUSH) 0.9 %
5-40 SYRINGE (ML) INJECTION EVERY 12 HOURS SCHEDULED
Status: DISCONTINUED | OUTPATIENT
Start: 2025-06-20 | End: 2025-06-29 | Stop reason: HOSPADM

## 2025-06-20 RX ORDER — POLYETHYLENE GLYCOL 3350 17 G/17G
17 POWDER, FOR SOLUTION ORAL DAILY PRN
Status: DISCONTINUED | OUTPATIENT
Start: 2025-06-20 | End: 2025-06-29 | Stop reason: HOSPADM

## 2025-06-20 RX ORDER — AZITHROMYCIN 250 MG/1
500 TABLET, FILM COATED ORAL ONCE
Status: COMPLETED | OUTPATIENT
Start: 2025-06-20 | End: 2025-06-20

## 2025-06-20 RX ORDER — IPRATROPIUM BROMIDE AND ALBUTEROL SULFATE 2.5; .5 MG/3ML; MG/3ML
1 SOLUTION RESPIRATORY (INHALATION)
Status: DISCONTINUED | OUTPATIENT
Start: 2025-06-20 | End: 2025-06-26

## 2025-06-20 RX ORDER — GLUCAGON 1 MG/ML
1 KIT INJECTION PRN
Status: DISCONTINUED | OUTPATIENT
Start: 2025-06-20 | End: 2025-06-29 | Stop reason: HOSPADM

## 2025-06-20 RX ORDER — IPRATROPIUM BROMIDE AND ALBUTEROL SULFATE 2.5; .5 MG/3ML; MG/3ML
3 SOLUTION RESPIRATORY (INHALATION) ONCE
Status: COMPLETED | OUTPATIENT
Start: 2025-06-20 | End: 2025-06-20

## 2025-06-20 RX ORDER — SPIRONOLACTONE 50 MG/1
25 TABLET, FILM COATED ORAL DAILY
Status: DISCONTINUED | OUTPATIENT
Start: 2025-06-20 | End: 2025-06-29 | Stop reason: HOSPADM

## 2025-06-20 RX ORDER — ONDANSETRON 2 MG/ML
4 INJECTION INTRAMUSCULAR; INTRAVENOUS EVERY 6 HOURS PRN
Status: DISCONTINUED | OUTPATIENT
Start: 2025-06-20 | End: 2025-06-29 | Stop reason: HOSPADM

## 2025-06-20 RX ORDER — 0.9 % SODIUM CHLORIDE 0.9 %
100 INTRAVENOUS SOLUTION INTRAVENOUS ONCE
Status: COMPLETED | OUTPATIENT
Start: 2025-06-20 | End: 2025-06-20

## 2025-06-20 RX ORDER — ACETAMINOPHEN 650 MG/1
650 SUPPOSITORY RECTAL EVERY 6 HOURS PRN
Status: DISCONTINUED | OUTPATIENT
Start: 2025-06-20 | End: 2025-06-29 | Stop reason: HOSPADM

## 2025-06-20 RX ORDER — SODIUM CHLORIDE 0.9 % (FLUSH) 0.9 %
5-40 SYRINGE (ML) INJECTION PRN
Status: DISCONTINUED | OUTPATIENT
Start: 2025-06-20 | End: 2025-06-29 | Stop reason: HOSPADM

## 2025-06-20 RX ORDER — MAGNESIUM SULFATE IN WATER 40 MG/ML
2000 INJECTION, SOLUTION INTRAVENOUS PRN
Status: DISCONTINUED | OUTPATIENT
Start: 2025-06-20 | End: 2025-06-29 | Stop reason: HOSPADM

## 2025-06-20 RX ORDER — INSULIN GLARGINE 100 [IU]/ML
10 INJECTION, SOLUTION SUBCUTANEOUS NIGHTLY
Status: DISCONTINUED | OUTPATIENT
Start: 2025-06-20 | End: 2025-06-29 | Stop reason: HOSPADM

## 2025-06-20 RX ORDER — FUROSEMIDE 10 MG/ML
40 INJECTION INTRAMUSCULAR; INTRAVENOUS ONCE
Status: COMPLETED | OUTPATIENT
Start: 2025-06-20 | End: 2025-06-20

## 2025-06-20 RX ADMIN — IPRATROPIUM BROMIDE AND ALBUTEROL SULFATE 3 DOSE: .5; 2.5 SOLUTION RESPIRATORY (INHALATION) at 15:31

## 2025-06-20 RX ADMIN — APIXABAN 5 MG: 5 TABLET, FILM COATED ORAL at 20:22

## 2025-06-20 RX ADMIN — FUROSEMIDE 40 MG: 10 INJECTION, SOLUTION INTRAMUSCULAR; INTRAVENOUS at 17:16

## 2025-06-20 RX ADMIN — WATER 80 MG: 1 INJECTION INTRAMUSCULAR; INTRAVENOUS; SUBCUTANEOUS at 17:16

## 2025-06-20 RX ADMIN — FUROSEMIDE 40 MG: 10 INJECTION, SOLUTION INTRAMUSCULAR; INTRAVENOUS at 20:34

## 2025-06-20 RX ADMIN — SPIRONOLACTONE 25 MG: 50 TABLET ORAL at 20:34

## 2025-06-20 RX ADMIN — DIGOXIN 125 MCG: 125 TABLET ORAL at 20:34

## 2025-06-20 RX ADMIN — WATER 1000 MG: 1 INJECTION INTRAMUSCULAR; INTRAVENOUS; SUBCUTANEOUS at 17:17

## 2025-06-20 RX ADMIN — AZITHROMYCIN DIHYDRATE 500 MG: 250 TABLET ORAL at 17:43

## 2025-06-20 RX ADMIN — DILTIAZEM HYDROCHLORIDE 240 MG: 240 CAPSULE, COATED, EXTENDED RELEASE ORAL at 20:34

## 2025-06-20 RX ADMIN — INSULIN GLARGINE 10 UNITS: 100 INJECTION, SOLUTION SUBCUTANEOUS at 20:21

## 2025-06-20 RX ADMIN — CEFEPIME 2000 MG: 2 INJECTION, POWDER, FOR SOLUTION INTRAVENOUS at 22:22

## 2025-06-20 RX ADMIN — INSULIN LISPRO 1 UNITS: 100 INJECTION, SOLUTION INTRAVENOUS; SUBCUTANEOUS at 20:22

## 2025-06-20 RX ADMIN — ATORVASTATIN CALCIUM 40 MG: 40 TABLET, FILM COATED ORAL at 20:22

## 2025-06-20 RX ADMIN — VANCOMYCIN HYDROCHLORIDE 2500 MG: 5 INJECTION, POWDER, LYOPHILIZED, FOR SOLUTION INTRAVENOUS at 22:20

## 2025-06-20 RX ADMIN — SODIUM CHLORIDE 100 ML: 0.9 INJECTION, SOLUTION INTRAVENOUS at 17:31

## 2025-06-20 RX ADMIN — IPRATROPIUM BROMIDE AND ALBUTEROL SULFATE 1 DOSE: .5; 2.5 SOLUTION RESPIRATORY (INHALATION) at 20:04

## 2025-06-20 ASSESSMENT — PAIN - FUNCTIONAL ASSESSMENT: PAIN_FUNCTIONAL_ASSESSMENT: NONE - DENIES PAIN

## 2025-06-20 ASSESSMENT — PAIN SCALES - GENERAL: PAINLEVEL_OUTOF10: 0

## 2025-06-20 NOTE — ED NOTES
ED TO INPATIENT SBAR HANDOFF    Patient Name: Armando Mars   :  1955  69 y.o.   Preferred Name  armando  Family/Caregiver Present no   Restraints no   C-SSRS: Risk of Suicide: No Risk  Sitter no   Sepsis Risk Score Sepsis V2 Risk Score: 34.3    PLEASE NOTE--Encounter / Re-Admission Within 30 Days  This patient has had another encounter or admission within the last 30 days.      No data recorded    Situation  Chief Complaint   Patient presents with    Shortness of Breath     Patient is a resident in [facility] and today while on 5-6 liters of oxygen per nasal canula patients oxygen was in the 80's per staff at the facility.  Patient complains of shortness of breath and states it feels like I can't get the air out. Arrives on a NRB at 15 liters per minute and oxygen saturation at 100 percent.     Brief Description of Patient's Condition: pt came in for SOB. Pt being admitted for COPD exacerbation hypoxia. Pt ordered to be on heated high flow. Pt Aox4 and will need assistance with ADLs.   Mental Status: oriented, alert, coherent, logical, thought processes intact, and able to concentrate and follow conversation  Arrived from: nursing home    Imaging:   XR CHEST PORTABLE   Final Result        Abnormal labs:   Abnormal Labs Reviewed   BLOOD GAS, VENOUS - Abnormal; Notable for the following components:       Result Value    pH, Tylor 7.227 (*)     pCO2, Tylor 86.9 (*)     HCO3, Venous 35.3 (*)     Positive Base Excess, Tylor 4.3 (*)     Carboxyhemoglobin 1.7 (*)     Methemoglobin 0.3 (*)     All other components within normal limits   TROPONIN - Abnormal; Notable for the following components:    Troponin, High Sensitivity 48 (*)     All other components within normal limits   CBC WITH AUTO DIFFERENTIAL - Abnormal; Notable for the following components:    WBC 15.1 (*)     RDW 16.6 (*)     Neutrophils % 87 (*)     Lymphocytes % 6 (*)     Monocytes % 6 (*)     All other components within normal limits

## 2025-06-20 NOTE — H&P
<0.010 09/13/2023 07:15 AM    TROPONINT <0.010 08/14/2023 09:30 AM     Lactic Acid:   Recent Labs     06/20/25  1512   LACTA 2.2*     BNP:   Recent Labs     06/20/25  1512   PROBNP 4,365*     UA:  Lab Results   Component Value Date/Time    NITRU NEGATIVE 02/05/2024 06:50 PM    COLORU YELLOW 02/05/2024 06:50 PM    PHUR 5.5 02/05/2024 06:50 PM    WBCUA <1 09/13/2023 08:09 AM    RBCUA 0 09/13/2023 08:09 AM    MUCUS RARE 11/07/2018 04:30 PM    TRICHOMONAS NONE SEEN 09/13/2023 08:09 AM    BACTERIA NEGATIVE 09/13/2023 08:09 AM    CLARITYU CLEAR 02/05/2024 06:50 PM    LEUKOCYTESUR NEGATIVE 02/05/2024 06:50 PM    UROBILINOGEN 0.2 02/05/2024 06:50 PM    BILIRUBINUR NEGATIVE 02/05/2024 06:50 PM    BLOODU NEGATIVE 02/05/2024 06:50 PM    GLUCOSEU NEGATIVE 02/05/2024 06:50 PM    KETUA NEGATIVE 02/05/2024 06:50 PM     Urine Cultures: No results found for: \"LABURIN\"  Blood Cultures: No results found for: \"BC\"  No results found for: \"BLOODCULT2\"  Organism: No results found for: \"ORG\"    Imaging/Diagnostics Last 24 Hours   XR CHEST PORTABLE  Result Date: 6/20/2025  XR CHEST PORTABLE. . 6/20/2025 16:10 69 years, Male HISTORY/REASON FOR EXAM: sob, known h/o copd COMPARISON: 5/4/2025 Portable CHEST  [id number] FINDINGS: There is pulmonary emphysema. Scattered bilateral lung parenchymal infiltrates are present, these are accentuated by the pulmonary emphysema. There is mild cardiomegaly. The costophrenic sulci are not well visualized on the right side but blunted on the left side. Correlate clinically for small bilateral pleural effusions. No pneumothorax seen bilaterally. Calcified granulomas present. IMPRESSION: Pulmonary emphysema and bilateral lung parenchymal infiltrates. Small bilateral pleural effusions are possible. If there is persistent clinical concern, appropriate additional investigations maybe performed. This dictation was created with voice recognition software. While attempts have been made to review the dictation

## 2025-06-20 NOTE — ED PROVIDER NOTES
12 lead EKG per my interpretation:  Atrial Fibrillation 82 PVC  Axis is   Normal  QTc is  448  There is no specific T wave changes appreciated.  There is no specific ST wave changes appreciated.    Prior EKG to compare with was not available        Maury Marie DO  06/20/25 3615

## 2025-06-20 NOTE — ED PROVIDER NOTES
[facility]  EMERGENCY DEPARTMENT ENCOUNTER        Pt Name: Armando Mars  MRN: [id number]  Birthdate 1955  Date of evaluation: 6/20/2025  Provider: Emmanuel Stevens PA-C  PCP: Rosalio Hedrick MD      TYRONE. I have evaluated this patient.        CHIEF COMPLAINT      Chief Complaint   Patient presents with    Shortness of Breath     Patient is a resident in [facility] and today while on 5-6 liters of oxygen per nasal canula patients oxygen was in the 80's per staff at the facility.  Patient complains of shortness of breath and states it feels like I can't get the air out. Arrives on a NRB at 15 liters per minute and oxygen saturation at 100 percent.       HISTORY OF PRESENT ILLNESS:     History from : Patient and EMS Report    Limitations to history : None    Armando Mars is a 69 y.o. male who presents with complaint of shortness of breath.  Patient mentions he is at open with, and shortness of breath had worsened this week.  In discussion, he has noted some swelling to his bilateral legs does have a history of CHF, and COPD.  He describes compliance with his medications, including anticoagulation.  Denies any new cough, hemoptysis, lower extremity pain, fevers    Nursing Notes were all reviewed and agreed with or any disagreements were addressed in the HPI.    REVIEW OF SYSTEMS :     Review of Systems   All other systems reviewed and are negative.      Pertinent positives and negatives are stated within HPI    PAST HISTORY   has a past medical history of A-fib (HCC), Anxiety, Arthritis, COPD (chronic obstructive pulmonary disease) (HCC), Depression, Diabetes mellitus (HCC), Full dentures, H/O angiography, H/O Doppler ultrasound, H/O echocardiogram, Hx of colonoscopy, Hx of Doppler ultrasound, Hyperlipidemia, Hypertension, Macular degeneration, Obesity, On home oxygen therapy, PAD (peripheral artery disease), Panic attacks, Pneumonia, PTSD (post-traumatic stress disorder), Restless

## 2025-06-21 LAB
ALBUMIN SERPL-MCNC: 3.8 G/DL (ref 3.4–5)
ALBUMIN/GLOB SERPL: 1.3 {RATIO} (ref 1.1–2.2)
ALP SERPL-CCNC: 84 U/L (ref 40–129)
ALT SERPL-CCNC: 28 U/L (ref 10–40)
ANION GAP SERPL CALCULATED.3IONS-SCNC: 11 MMOL/L (ref 9–17)
ANION GAP SERPL CALCULATED.3IONS-SCNC: 15 MMOL/L (ref 9–17)
AST SERPL-CCNC: 19 U/L (ref 15–37)
BILIRUB DIRECT SERPL-MCNC: 0.4 MG/DL (ref 0–0.3)
BILIRUB INDIRECT SERPL-MCNC: 0.2 MG/DL (ref 0–0.7)
BILIRUB SERPL-MCNC: 0.6 MG/DL (ref 0–1)
BUN SERPL-MCNC: 23 MG/DL (ref 7–20)
BUN SERPL-MCNC: 25 MG/DL (ref 7–20)
CALCIUM SERPL-MCNC: 9.3 MG/DL (ref 8.3–10.6)
CALCIUM SERPL-MCNC: 9.4 MG/DL (ref 8.3–10.6)
CHLORIDE SERPL-SCNC: 88 MMOL/L (ref 99–110)
CHLORIDE SERPL-SCNC: 90 MMOL/L (ref 99–110)
CHOLEST SERPL-MCNC: 102 MG/DL (ref 125–199)
CO2 SERPL-SCNC: 32 MMOL/L (ref 21–32)
CO2 SERPL-SCNC: 32 MMOL/L (ref 21–32)
CREAT SERPL-MCNC: 0.9 MG/DL (ref 0.8–1.3)
CREAT SERPL-MCNC: 0.9 MG/DL (ref 0.8–1.3)
EST. AVERAGE GLUCOSE BLD GHB EST-MCNC: 156 MG/DL
GFR, ESTIMATED: 81 ML/MIN/1.73M2
GFR, ESTIMATED: 82 ML/MIN/1.73M2
GLUCOSE BLD-MCNC: 177 MG/DL (ref 74–99)
GLUCOSE BLD-MCNC: 199 MG/DL (ref 74–99)
GLUCOSE BLD-MCNC: 206 MG/DL (ref 74–99)
GLUCOSE BLD-MCNC: 247 MG/DL (ref 74–99)
GLUCOSE BLD-MCNC: 252 MG/DL (ref 74–99)
GLUCOSE BLD-MCNC: 262 MG/DL (ref 74–99)
GLUCOSE BLD-MCNC: 358 MG/DL (ref 74–99)
GLUCOSE SERPL-MCNC: 260 MG/DL (ref 74–99)
GLUCOSE SERPL-MCNC: 273 MG/DL (ref 74–99)
HBA1C MFR BLD: 7.1 % (ref 4.2–6.3)
HDLC SERPL-MCNC: 39 MG/DL
LACTATE BLDV-SCNC: 2.4 MMOL/L (ref 0.4–2)
LACTATE BLDV-SCNC: 2.5 MMOL/L (ref 0.4–2)
LACTATE BLDV-SCNC: 3.1 MMOL/L (ref 0.4–2)
LDLC SERPL CALC-MCNC: 48 MG/DL
MAGNESIUM SERPL-MCNC: 1.7 MG/DL (ref 1.8–2.4)
POTASSIUM SERPL-SCNC: 5.9 MMOL/L (ref 3.5–5.1)
POTASSIUM SERPL-SCNC: 6.3 MMOL/L (ref 3.5–5.1)
PROT SERPL-MCNC: 6.6 G/DL (ref 6.4–8.2)
SODIUM SERPL-SCNC: 133 MMOL/L (ref 136–145)
SODIUM SERPL-SCNC: 135 MMOL/L (ref 136–145)
TRIGL SERPL-MCNC: 72 MG/DL
TROPONIN I SERPL HS-MCNC: 40 NG/L (ref 0–22)
TROPONIN I SERPL HS-MCNC: 40 NG/L (ref 0–22)

## 2025-06-21 PROCEDURE — 80053 COMPREHEN METABOLIC PANEL: CPT

## 2025-06-21 PROCEDURE — 2500000003 HC RX 250 WO HCPCS: Performed by: STUDENT IN AN ORGANIZED HEALTH CARE EDUCATION/TRAINING PROGRAM

## 2025-06-21 PROCEDURE — 82962 GLUCOSE BLOOD TEST: CPT

## 2025-06-21 PROCEDURE — 76937 US GUIDE VASCULAR ACCESS: CPT

## 2025-06-21 PROCEDURE — 94150 VITAL CAPACITY TEST: CPT

## 2025-06-21 PROCEDURE — 80061 LIPID PANEL: CPT

## 2025-06-21 PROCEDURE — 6370000000 HC RX 637 (ALT 250 FOR IP): Performed by: STUDENT IN AN ORGANIZED HEALTH CARE EDUCATION/TRAINING PROGRAM

## 2025-06-21 PROCEDURE — 87086 URINE CULTURE/COLONY COUNT: CPT

## 2025-06-21 PROCEDURE — 94640 AIRWAY INHALATION TREATMENT: CPT

## 2025-06-21 PROCEDURE — 6360000002 HC RX W HCPCS: Performed by: STUDENT IN AN ORGANIZED HEALTH CARE EDUCATION/TRAINING PROGRAM

## 2025-06-21 PROCEDURE — 2580000003 HC RX 258: Performed by: STUDENT IN AN ORGANIZED HEALTH CARE EDUCATION/TRAINING PROGRAM

## 2025-06-21 PROCEDURE — 2700000000 HC OXYGEN THERAPY PER DAY

## 2025-06-21 PROCEDURE — 94761 N-INVAS EAR/PLS OXIMETRY MLT: CPT

## 2025-06-21 PROCEDURE — 80048 BASIC METABOLIC PNL TOTAL CA: CPT

## 2025-06-21 PROCEDURE — 2140000000 HC CCU INTERMEDIATE R&B

## 2025-06-21 PROCEDURE — 36415 COLL VENOUS BLD VENIPUNCTURE: CPT

## 2025-06-21 PROCEDURE — 84484 ASSAY OF TROPONIN QUANT: CPT

## 2025-06-21 PROCEDURE — 83605 ASSAY OF LACTIC ACID: CPT

## 2025-06-21 PROCEDURE — 82248 BILIRUBIN DIRECT: CPT

## 2025-06-21 PROCEDURE — 83036 HEMOGLOBIN GLYCOSYLATED A1C: CPT

## 2025-06-21 PROCEDURE — 83735 ASSAY OF MAGNESIUM: CPT

## 2025-06-21 RX ORDER — CALCIUM GLUCONATE 20 MG/ML
1000 INJECTION, SOLUTION INTRAVENOUS ONCE
Status: COMPLETED | OUTPATIENT
Start: 2025-06-21 | End: 2025-06-21

## 2025-06-21 RX ORDER — FUROSEMIDE 10 MG/ML
40 INJECTION INTRAMUSCULAR; INTRAVENOUS ONCE
Status: COMPLETED | OUTPATIENT
Start: 2025-06-21 | End: 2025-06-21

## 2025-06-21 RX ORDER — GLUCAGON 1 MG/ML
1 KIT INJECTION PRN
Status: DISCONTINUED | OUTPATIENT
Start: 2025-06-21 | End: 2025-06-29 | Stop reason: HOSPADM

## 2025-06-21 RX ORDER — DEXTROSE MONOHYDRATE 100 MG/ML
INJECTION, SOLUTION INTRAVENOUS CONTINUOUS PRN
Status: DISCONTINUED | OUTPATIENT
Start: 2025-06-21 | End: 2025-06-29 | Stop reason: HOSPADM

## 2025-06-21 RX ADMIN — INSULIN HUMAN 10 UNITS: 100 INJECTION, SOLUTION PARENTERAL at 09:05

## 2025-06-21 RX ADMIN — IPRATROPIUM BROMIDE AND ALBUTEROL SULFATE 1 DOSE: .5; 2.5 SOLUTION RESPIRATORY (INHALATION) at 15:44

## 2025-06-21 RX ADMIN — APIXABAN 5 MG: 5 TABLET, FILM COATED ORAL at 08:24

## 2025-06-21 RX ADMIN — METHYLPREDNISOLONE SODIUM SUCCINATE 40 MG: 40 INJECTION, POWDER, LYOPHILIZED, FOR SOLUTION INTRAMUSCULAR; INTRAVENOUS at 08:22

## 2025-06-21 RX ADMIN — INSULIN LISPRO 1 UNITS: 100 INJECTION, SOLUTION INTRAVENOUS; SUBCUTANEOUS at 21:23

## 2025-06-21 RX ADMIN — DILTIAZEM HYDROCHLORIDE 240 MG: 240 CAPSULE, COATED, EXTENDED RELEASE ORAL at 08:24

## 2025-06-21 RX ADMIN — CEFEPIME 2000 MG: 2 INJECTION, POWDER, FOR SOLUTION INTRAVENOUS at 08:08

## 2025-06-21 RX ADMIN — INSULIN LISPRO 4 UNITS: 100 INJECTION, SOLUTION INTRAVENOUS; SUBCUTANEOUS at 16:18

## 2025-06-21 RX ADMIN — DIGOXIN 125 MCG: 125 TABLET ORAL at 08:24

## 2025-06-21 RX ADMIN — CEFEPIME 2000 MG: 2 INJECTION, POWDER, FOR SOLUTION INTRAVENOUS at 16:13

## 2025-06-21 RX ADMIN — SODIUM CHLORIDE 1500 MG: 0.9 INJECTION, SOLUTION INTRAVENOUS at 21:22

## 2025-06-21 RX ADMIN — SODIUM ZIRCONIUM CYCLOSILICATE 10 G: 10 POWDER, FOR SUSPENSION ORAL at 08:24

## 2025-06-21 RX ADMIN — IPRATROPIUM BROMIDE AND ALBUTEROL SULFATE 1 DOSE: .5; 2.5 SOLUTION RESPIRATORY (INHALATION) at 08:57

## 2025-06-21 RX ADMIN — MIDODRINE HYDROCHLORIDE 2.5 MG: 5 TABLET ORAL at 16:18

## 2025-06-21 RX ADMIN — FUROSEMIDE 40 MG: 10 INJECTION, SOLUTION INTRAMUSCULAR; INTRAVENOUS at 16:18

## 2025-06-21 RX ADMIN — DEXTROSE 250 ML: 10 SOLUTION INTRAVENOUS at 08:35

## 2025-06-21 RX ADMIN — FUROSEMIDE 40 MG: 10 INJECTION, SOLUTION INTRAMUSCULAR; INTRAVENOUS at 08:22

## 2025-06-21 RX ADMIN — SPIRONOLACTONE 25 MG: 50 TABLET ORAL at 08:24

## 2025-06-21 RX ADMIN — MIDODRINE HYDROCHLORIDE 2.5 MG: 5 TABLET ORAL at 08:24

## 2025-06-21 RX ADMIN — SODIUM CHLORIDE 1500 MG: 0.9 INJECTION, SOLUTION INTRAVENOUS at 09:29

## 2025-06-21 RX ADMIN — INSULIN GLARGINE 10 UNITS: 100 INJECTION, SOLUTION SUBCUTANEOUS at 21:22

## 2025-06-21 RX ADMIN — SODIUM CHLORIDE, PRESERVATIVE FREE 10 ML: 5 INJECTION INTRAVENOUS at 21:23

## 2025-06-21 RX ADMIN — INSULIN LISPRO 1 UNITS: 100 INJECTION, SOLUTION INTRAVENOUS; SUBCUTANEOUS at 11:40

## 2025-06-21 RX ADMIN — CEFEPIME 2000 MG: 2 INJECTION, POWDER, FOR SOLUTION INTRAVENOUS at 22:59

## 2025-06-21 RX ADMIN — CALCIUM GLUCONATE 1000 MG: 20 INJECTION, SOLUTION INTRAVENOUS at 09:07

## 2025-06-21 RX ADMIN — IPRATROPIUM BROMIDE AND ALBUTEROL SULFATE 1 DOSE: .5; 2.5 SOLUTION RESPIRATORY (INHALATION) at 19:08

## 2025-06-21 RX ADMIN — IPRATROPIUM BROMIDE AND ALBUTEROL SULFATE 1 DOSE: .5; 2.5 SOLUTION RESPIRATORY (INHALATION) at 12:21

## 2025-06-21 RX ADMIN — ATORVASTATIN CALCIUM 40 MG: 40 TABLET, FILM COATED ORAL at 21:22

## 2025-06-21 RX ADMIN — APIXABAN 5 MG: 5 TABLET, FILM COATED ORAL at 21:22

## 2025-06-22 LAB
ANION GAP SERPL CALCULATED.3IONS-SCNC: 11 MMOL/L (ref 9–17)
BNP SERPL-MCNC: 7758 PG/ML (ref 0–125)
BUN SERPL-MCNC: 28 MG/DL (ref 7–20)
CALCIUM SERPL-MCNC: 9 MG/DL (ref 8.3–10.6)
CHLORIDE SERPL-SCNC: 87 MMOL/L (ref 99–110)
CO2 SERPL-SCNC: 31 MMOL/L (ref 21–32)
CREAT SERPL-MCNC: 0.7 MG/DL (ref 0.8–1.3)
DATE LAST DOSE: NORMAL
GFR, ESTIMATED: >90 ML/MIN/1.73M2
GLUCOSE BLD-MCNC: 171 MG/DL (ref 74–99)
GLUCOSE BLD-MCNC: 175 MG/DL (ref 74–99)
GLUCOSE BLD-MCNC: 186 MG/DL (ref 74–99)
GLUCOSE BLD-MCNC: 214 MG/DL (ref 74–99)
GLUCOSE BLD-MCNC: 245 MG/DL (ref 74–99)
GLUCOSE SERPL-MCNC: 169 MG/DL (ref 74–99)
LACTATE BLDV-SCNC: 1.1 MMOL/L (ref 0.4–2)
LACTATE BLDV-SCNC: 1.3 MMOL/L (ref 0.4–2)
LACTATE BLDV-SCNC: 1.4 MMOL/L (ref 0.4–2)
LACTATE BLDV-SCNC: 1.4 MMOL/L (ref 0.4–2)
MAGNESIUM SERPL-MCNC: 1.9 MG/DL (ref 1.8–2.4)
POTASSIUM SERPL-SCNC: 5 MMOL/L (ref 3.5–5.1)
SODIUM SERPL-SCNC: 129 MMOL/L (ref 136–145)
TME LAST DOSE: NORMAL H
VANCOMYCIN DOSE: NORMAL MG
VANCOMYCIN SERPL-MCNC: 16.7 UG/ML (ref 10–20)

## 2025-06-22 PROCEDURE — 2500000003 HC RX 250 WO HCPCS: Performed by: STUDENT IN AN ORGANIZED HEALTH CARE EDUCATION/TRAINING PROGRAM

## 2025-06-22 PROCEDURE — 82962 GLUCOSE BLOOD TEST: CPT

## 2025-06-22 PROCEDURE — 2140000000 HC CCU INTERMEDIATE R&B

## 2025-06-22 PROCEDURE — 36415 COLL VENOUS BLD VENIPUNCTURE: CPT

## 2025-06-22 PROCEDURE — 83605 ASSAY OF LACTIC ACID: CPT

## 2025-06-22 PROCEDURE — 83735 ASSAY OF MAGNESIUM: CPT

## 2025-06-22 PROCEDURE — 94761 N-INVAS EAR/PLS OXIMETRY MLT: CPT

## 2025-06-22 PROCEDURE — 6360000002 HC RX W HCPCS: Performed by: STUDENT IN AN ORGANIZED HEALTH CARE EDUCATION/TRAINING PROGRAM

## 2025-06-22 PROCEDURE — 94640 AIRWAY INHALATION TREATMENT: CPT

## 2025-06-22 PROCEDURE — 6370000000 HC RX 637 (ALT 250 FOR IP): Performed by: STUDENT IN AN ORGANIZED HEALTH CARE EDUCATION/TRAINING PROGRAM

## 2025-06-22 PROCEDURE — 2580000003 HC RX 258: Performed by: STUDENT IN AN ORGANIZED HEALTH CARE EDUCATION/TRAINING PROGRAM

## 2025-06-22 PROCEDURE — 94150 VITAL CAPACITY TEST: CPT

## 2025-06-22 PROCEDURE — 83880 ASSAY OF NATRIURETIC PEPTIDE: CPT

## 2025-06-22 PROCEDURE — 80202 ASSAY OF VANCOMYCIN: CPT

## 2025-06-22 PROCEDURE — 80048 BASIC METABOLIC PNL TOTAL CA: CPT

## 2025-06-22 PROCEDURE — 2700000000 HC OXYGEN THERAPY PER DAY

## 2025-06-22 RX ADMIN — DIGOXIN 125 MCG: 125 TABLET ORAL at 08:29

## 2025-06-22 RX ADMIN — SODIUM CHLORIDE 1500 MG: 0.9 INJECTION, SOLUTION INTRAVENOUS at 08:39

## 2025-06-22 RX ADMIN — DILTIAZEM HYDROCHLORIDE 240 MG: 240 CAPSULE, COATED, EXTENDED RELEASE ORAL at 08:29

## 2025-06-22 RX ADMIN — INSULIN LISPRO 1 UNITS: 100 INJECTION, SOLUTION INTRAVENOUS; SUBCUTANEOUS at 16:23

## 2025-06-22 RX ADMIN — CEFEPIME 2000 MG: 2 INJECTION, POWDER, FOR SOLUTION INTRAVENOUS at 08:31

## 2025-06-22 RX ADMIN — CEFEPIME 2000 MG: 2 INJECTION, POWDER, FOR SOLUTION INTRAVENOUS at 15:53

## 2025-06-22 RX ADMIN — IPRATROPIUM BROMIDE AND ALBUTEROL SULFATE 1 DOSE: .5; 2.5 SOLUTION RESPIRATORY (INHALATION) at 07:59

## 2025-06-22 RX ADMIN — INSULIN LISPRO 1 UNITS: 100 INJECTION, SOLUTION INTRAVENOUS; SUBCUTANEOUS at 20:02

## 2025-06-22 RX ADMIN — INSULIN LISPRO 1 UNITS: 100 INJECTION, SOLUTION INTRAVENOUS; SUBCUTANEOUS at 11:19

## 2025-06-22 RX ADMIN — SODIUM CHLORIDE 1500 MG: 0.9 INJECTION, SOLUTION INTRAVENOUS at 20:01

## 2025-06-22 RX ADMIN — SODIUM CHLORIDE, PRESERVATIVE FREE 10 ML: 5 INJECTION INTRAVENOUS at 20:01

## 2025-06-22 RX ADMIN — IPRATROPIUM BROMIDE AND ALBUTEROL SULFATE 1 DOSE: .5; 2.5 SOLUTION RESPIRATORY (INHALATION) at 16:41

## 2025-06-22 RX ADMIN — SPIRONOLACTONE 25 MG: 50 TABLET ORAL at 08:29

## 2025-06-22 RX ADMIN — METHYLPREDNISOLONE SODIUM SUCCINATE 40 MG: 40 INJECTION, POWDER, LYOPHILIZED, FOR SOLUTION INTRAMUSCULAR; INTRAVENOUS at 08:28

## 2025-06-22 RX ADMIN — MIDODRINE HYDROCHLORIDE 2.5 MG: 5 TABLET ORAL at 08:29

## 2025-06-22 RX ADMIN — IPRATROPIUM BROMIDE AND ALBUTEROL SULFATE 1 DOSE: .5; 2.5 SOLUTION RESPIRATORY (INHALATION) at 11:00

## 2025-06-22 RX ADMIN — FUROSEMIDE 0.5 MG/HR: 10 INJECTION, SOLUTION INTRAMUSCULAR; INTRAVENOUS at 15:54

## 2025-06-22 RX ADMIN — CEFEPIME 2000 MG: 2 INJECTION, POWDER, FOR SOLUTION INTRAVENOUS at 23:50

## 2025-06-22 RX ADMIN — SODIUM CHLORIDE, PRESERVATIVE FREE 5 ML: 5 INJECTION INTRAVENOUS at 08:28

## 2025-06-22 RX ADMIN — APIXABAN 5 MG: 5 TABLET, FILM COATED ORAL at 08:29

## 2025-06-22 RX ADMIN — INSULIN GLARGINE 10 UNITS: 100 INJECTION, SOLUTION SUBCUTANEOUS at 20:02

## 2025-06-22 RX ADMIN — APIXABAN 5 MG: 5 TABLET, FILM COATED ORAL at 20:03

## 2025-06-22 RX ADMIN — ATORVASTATIN CALCIUM 40 MG: 40 TABLET, FILM COATED ORAL at 20:03

## 2025-06-22 RX ADMIN — IPRATROPIUM BROMIDE AND ALBUTEROL SULFATE 1 DOSE: .5; 2.5 SOLUTION RESPIRATORY (INHALATION) at 20:55

## 2025-06-22 RX ADMIN — MIDODRINE HYDROCHLORIDE 2.5 MG: 5 TABLET ORAL at 16:23

## 2025-06-22 RX ADMIN — FUROSEMIDE 40 MG: 10 INJECTION, SOLUTION INTRAMUSCULAR; INTRAVENOUS at 08:29

## 2025-06-23 ENCOUNTER — APPOINTMENT (OUTPATIENT)
Dept: NON INVASIVE DIAGNOSTICS | Age: 70
DRG: 291 | End: 2025-06-23
Attending: STUDENT IN AN ORGANIZED HEALTH CARE EDUCATION/TRAINING PROGRAM
Payer: MEDICARE

## 2025-06-23 LAB
ANION GAP SERPL CALCULATED.3IONS-SCNC: 10 MMOL/L (ref 9–17)
BUN SERPL-MCNC: 26 MG/DL (ref 7–20)
CALCIUM SERPL-MCNC: 9 MG/DL (ref 8.3–10.6)
CHLORIDE SERPL-SCNC: 90 MMOL/L (ref 99–110)
CO2 SERPL-SCNC: 33 MMOL/L (ref 21–32)
CREAT SERPL-MCNC: 0.6 MG/DL (ref 0.8–1.3)
ECHO AO ROOT DIAM: 3.5 CM
ECHO AO ROOT INDEX: 1.44 CM/M2
ECHO AV AREA PEAK VELOCITY: 1 CM2
ECHO AV AREA VTI: 1.1 CM2
ECHO AV AREA/BSA PEAK VELOCITY: 0.4 CM2/M2
ECHO AV AREA/BSA VTI: 0.5 CM2/M2
ECHO AV MEAN GRADIENT: 32 MMHG
ECHO AV MEAN VELOCITY: 2.6 M/S
ECHO AV PEAK GRADIENT: 58 MMHG
ECHO AV PEAK VELOCITY: 3.8 M/S
ECHO AV VELOCITY RATIO: 0.21
ECHO AV VTI: 68.8 CM
ECHO BSA: 2.84 M2
ECHO EST RA PRESSURE: 3 MMHG
ECHO IVC PROX: 1.9 CM
ECHO LA AREA 4C: 25.4 CM2
ECHO LA DIAMETER INDEX: 1.85 CM/M2
ECHO LA DIAMETER: 4.5 CM
ECHO LA MAJOR AXIS: 7.2 CM
ECHO LA TO AORTIC ROOT RATIO: 1.29
ECHO LA VOL MOD A4C: 72 ML (ref 18–58)
ECHO LA VOLUME INDEX MOD A4C: 30 ML/M2 (ref 16–34)
ECHO LV EDV A4C: 179 ML
ECHO LV EDV INDEX A4C: 74 ML/M2
ECHO LV EF PHYSICIAN: 35 %
ECHO LV EJECTION FRACTION A4C: 36 %
ECHO LV ESV A4C: 114 ML
ECHO LV ESV INDEX A4C: 47 ML/M2
ECHO LV FRACTIONAL SHORTENING: 10 % (ref 28–44)
ECHO LV INTERNAL DIMENSION DIASTOLE INDEX: 2.14 CM/M2
ECHO LV INTERNAL DIMENSION DIASTOLIC: 5.2 CM (ref 4.2–5.9)
ECHO LV INTERNAL DIMENSION SYSTOLIC INDEX: 1.93 CM/M2
ECHO LV INTERNAL DIMENSION SYSTOLIC: 4.7 CM
ECHO LV IVSD: 1.4 CM (ref 0.6–1)
ECHO LV MASS 2D: 309.6 G (ref 88–224)
ECHO LV MASS INDEX 2D: 127.4 G/M2 (ref 49–115)
ECHO LV POSTERIOR WALL DIASTOLIC: 1.4 CM (ref 0.6–1)
ECHO LV RELATIVE WALL THICKNESS RATIO: 0.54
ECHO LVOT AREA: 4.9 CM2
ECHO LVOT AV VTI INDEX: 0.23
ECHO LVOT DIAM: 2.5 CM
ECHO LVOT MEAN GRADIENT: 2 MMHG
ECHO LVOT PEAK GRADIENT: 3 MMHG
ECHO LVOT PEAK VELOCITY: 0.8 M/S
ECHO LVOT STROKE VOLUME INDEX: 31.5 ML/M2
ECHO LVOT SV: 76.5 ML
ECHO LVOT VTI: 15.6 CM
ECHO MV REGURGITANT ALIASING (NYQUIST) VELOCITY: 35 CM/S
ECHO MV REGURGITANT PEAK GRADIENT: 88 MMHG
ECHO MV REGURGITANT PEAK VELOCITY: 4.7 M/S
ECHO MV REGURGITANT RADIUS PISA: 0.9 CM
ECHO MV REGURGITANT VTIA: 140 CM
ECHO RIGHT VENTRICULAR SYSTOLIC PRESSURE (RVSP): 25 MMHG
ECHO RV MID DIMENSION: 5.2 CM
ECHO TV REGURGITANT MAX VELOCITY: 2.34 M/S
ECHO TV REGURGITANT PEAK GRADIENT: 22 MMHG
EKG ATRIAL RATE: 394 BPM
EKG DIAGNOSIS: NORMAL
EKG Q-T INTERVAL: 384 MS
EKG QRS DURATION: 104 MS
EKG QTC CALCULATION (BAZETT): 448 MS
EKG R AXIS: -24 DEGREES
EKG T AXIS: 74 DEGREES
EKG VENTRICULAR RATE: 82 BPM
GFR, ESTIMATED: >90 ML/MIN/1.73M2
GLUCOSE BLD-MCNC: 154 MG/DL (ref 74–99)
GLUCOSE BLD-MCNC: 191 MG/DL (ref 74–99)
GLUCOSE BLD-MCNC: 331 MG/DL (ref 74–99)
GLUCOSE BLD-MCNC: 419 MG/DL (ref 74–99)
GLUCOSE SERPL-MCNC: 161 MG/DL (ref 74–99)
LACTATE BLDV-SCNC: 1.2 MMOL/L (ref 0.4–2)
LACTATE BLDV-SCNC: 1.8 MMOL/L (ref 0.4–2)
LACTATE BLDV-SCNC: 2.1 MMOL/L (ref 0.4–2)
LACTATE BLDV-SCNC: 2.9 MMOL/L (ref 0.4–2)
MAGNESIUM SERPL-MCNC: 2.3 MG/DL (ref 1.8–2.4)
MICROORGANISM SPEC CULT: NORMAL
POTASSIUM SERPL-SCNC: 5.2 MMOL/L (ref 3.5–5.1)
SODIUM SERPL-SCNC: 133 MMOL/L (ref 136–145)
SPECIMEN DESCRIPTION: NORMAL

## 2025-06-23 PROCEDURE — 6360000002 HC RX W HCPCS: Performed by: STUDENT IN AN ORGANIZED HEALTH CARE EDUCATION/TRAINING PROGRAM

## 2025-06-23 PROCEDURE — C8929 TTE W OR WO FOL WCON,DOPPLER: HCPCS

## 2025-06-23 PROCEDURE — 2580000003 HC RX 258: Performed by: STUDENT IN AN ORGANIZED HEALTH CARE EDUCATION/TRAINING PROGRAM

## 2025-06-23 PROCEDURE — 83605 ASSAY OF LACTIC ACID: CPT

## 2025-06-23 PROCEDURE — 94761 N-INVAS EAR/PLS OXIMETRY MLT: CPT

## 2025-06-23 PROCEDURE — 94640 AIRWAY INHALATION TREATMENT: CPT

## 2025-06-23 PROCEDURE — 80048 BASIC METABOLIC PNL TOTAL CA: CPT

## 2025-06-23 PROCEDURE — 93306 TTE W/DOPPLER COMPLETE: CPT | Performed by: INTERNAL MEDICINE

## 2025-06-23 PROCEDURE — 99223 1ST HOSP IP/OBS HIGH 75: CPT | Performed by: INTERNAL MEDICINE

## 2025-06-23 PROCEDURE — 83735 ASSAY OF MAGNESIUM: CPT

## 2025-06-23 PROCEDURE — 6370000000 HC RX 637 (ALT 250 FOR IP): Performed by: STUDENT IN AN ORGANIZED HEALTH CARE EDUCATION/TRAINING PROGRAM

## 2025-06-23 PROCEDURE — 2500000003 HC RX 250 WO HCPCS: Performed by: STUDENT IN AN ORGANIZED HEALTH CARE EDUCATION/TRAINING PROGRAM

## 2025-06-23 PROCEDURE — 82962 GLUCOSE BLOOD TEST: CPT

## 2025-06-23 PROCEDURE — 94150 VITAL CAPACITY TEST: CPT

## 2025-06-23 PROCEDURE — 93010 ELECTROCARDIOGRAM REPORT: CPT | Performed by: INTERNAL MEDICINE

## 2025-06-23 PROCEDURE — 2140000000 HC CCU INTERMEDIATE R&B

## 2025-06-23 PROCEDURE — 2700000000 HC OXYGEN THERAPY PER DAY

## 2025-06-23 PROCEDURE — 36415 COLL VENOUS BLD VENIPUNCTURE: CPT

## 2025-06-23 PROCEDURE — 6360000004 HC RX CONTRAST MEDICATION: Performed by: INTERNAL MEDICINE

## 2025-06-23 RX ORDER — INSULIN LISPRO 100 [IU]/ML
0-8 INJECTION, SOLUTION INTRAVENOUS; SUBCUTANEOUS
Status: DISCONTINUED | OUTPATIENT
Start: 2025-06-23 | End: 2025-06-29 | Stop reason: HOSPADM

## 2025-06-23 RX ADMIN — INSULIN LISPRO 1 UNITS: 100 INJECTION, SOLUTION INTRAVENOUS; SUBCUTANEOUS at 11:37

## 2025-06-23 RX ADMIN — ATORVASTATIN CALCIUM 40 MG: 40 TABLET, FILM COATED ORAL at 21:11

## 2025-06-23 RX ADMIN — METHYLPREDNISOLONE SODIUM SUCCINATE 40 MG: 40 INJECTION, POWDER, LYOPHILIZED, FOR SOLUTION INTRAMUSCULAR; INTRAVENOUS at 08:24

## 2025-06-23 RX ADMIN — MICONAZOLE NITRATE: 2 POWDER TOPICAL at 21:22

## 2025-06-23 RX ADMIN — SULFUR HEXAFLUORIDE 2 ML: 60.7; .19; .19 INJECTION, POWDER, LYOPHILIZED, FOR SUSPENSION INTRAVENOUS; INTRAVESICAL at 14:15

## 2025-06-23 RX ADMIN — INSULIN GLARGINE 10 UNITS: 100 INJECTION, SOLUTION SUBCUTANEOUS at 21:17

## 2025-06-23 RX ADMIN — DIGOXIN 125 MCG: 125 TABLET ORAL at 08:24

## 2025-06-23 RX ADMIN — APIXABAN 5 MG: 5 TABLET, FILM COATED ORAL at 08:24

## 2025-06-23 RX ADMIN — IPRATROPIUM BROMIDE AND ALBUTEROL SULFATE 1 DOSE: .5; 2.5 SOLUTION RESPIRATORY (INHALATION) at 07:46

## 2025-06-23 RX ADMIN — IPRATROPIUM BROMIDE AND ALBUTEROL SULFATE 1 DOSE: .5; 2.5 SOLUTION RESPIRATORY (INHALATION) at 20:02

## 2025-06-23 RX ADMIN — IPRATROPIUM BROMIDE AND ALBUTEROL SULFATE 1 DOSE: .5; 2.5 SOLUTION RESPIRATORY (INHALATION) at 15:07

## 2025-06-23 RX ADMIN — DOXYCYCLINE 100 MG: 100 INJECTION, POWDER, LYOPHILIZED, FOR SOLUTION INTRAVENOUS at 11:37

## 2025-06-23 RX ADMIN — SODIUM CHLORIDE, PRESERVATIVE FREE 10 ML: 5 INJECTION INTRAVENOUS at 21:11

## 2025-06-23 RX ADMIN — INSULIN LISPRO 8 UNITS: 100 INJECTION, SOLUTION INTRAVENOUS; SUBCUTANEOUS at 16:21

## 2025-06-23 RX ADMIN — SODIUM CHLORIDE, PRESERVATIVE FREE 10 ML: 5 INJECTION INTRAVENOUS at 08:25

## 2025-06-23 RX ADMIN — DILTIAZEM HYDROCHLORIDE 240 MG: 240 CAPSULE, COATED, EXTENDED RELEASE ORAL at 08:24

## 2025-06-23 RX ADMIN — SODIUM CHLORIDE, PRESERVATIVE FREE 10 ML: 5 INJECTION INTRAVENOUS at 20:27

## 2025-06-23 RX ADMIN — CEFEPIME 2000 MG: 2 INJECTION, POWDER, FOR SOLUTION INTRAVENOUS at 08:29

## 2025-06-23 RX ADMIN — DOXYCYCLINE 100 MG: 100 INJECTION, POWDER, LYOPHILIZED, FOR SOLUTION INTRAVENOUS at 23:26

## 2025-06-23 RX ADMIN — APIXABAN 5 MG: 5 TABLET, FILM COATED ORAL at 21:11

## 2025-06-23 RX ADMIN — SPIRONOLACTONE 25 MG: 50 TABLET ORAL at 08:24

## 2025-06-23 RX ADMIN — INSULIN LISPRO 6 UNITS: 100 INJECTION, SOLUTION INTRAVENOUS; SUBCUTANEOUS at 21:17

## 2025-06-23 ASSESSMENT — PAIN SCALES - GENERAL
PAINLEVEL_OUTOF10: 0
PAINLEVEL_OUTOF10: 0

## 2025-06-23 NOTE — CARE COORDINATION
.CM met with pt for d/c planning. Pt is on Vapotherm at 70%FiO2.  Introduced self, updated white board and explained role of CM. Pt lives in [facility] and plans to return.  He denies any d/c needs.  He states that [facility] will pick him up when he is discharged. Notify CM if any d/c needs arise.  TE        06/23/25 1187   Service Assessment   Patient Orientation Alert and Oriented   Cognition Alert   History Provided By Patient   Primary Caregiver Self   Support Systems Other (Comment)  (STAFF AT AL)   Patient's Healthcare Decision Maker is: Named in Scanned ACP Document  (SELF)   PCP Verified by CM Yes   Last Visit to PCP Within last 3 months   Prior Functional Level Independent in ADLs/IADLs   Current Functional Level Assistance with the following:;Bathing;Toileting;Mobility  (PER POLICY)   Can patient return to prior living arrangement Yes   Ability to make needs known: Good   Financial Resources Medicare;Medicaid   Community Resources Assisted Living   Social/Functional History   Type of Home Assisted living   Bathroom Shower/Tub Walk-in shower   Bathroom Toilet Handicap height   Bathroom Equipment Shower chair   Bathroom Accessibility Wheelchair accessible   Home Equipment Alert button   Ambulation Assistance Independent   Prior Level of Assist for Transfers Independent   Mode of Transportation Van   Occupation On disability   Discharge Planning   Type of Residence Assisted living   DME Ordered? No   Potential Assistance Purchasing Medications No   Patient expects to be discharged to: Assisted living   Services At/After Discharge   Services At/After Discharge Assisted living   Confirm Follow Up Transport Self   Condition of Participation: Discharge Planning   The Patient and/or Patient Representative was provided with a Choice of Provider? Patient   The Patient and/Or Patient Representative agree with the Discharge Plan? Yes   Freedom of Choice list was provided with basic dialogue that supports the patient's

## 2025-06-24 ENCOUNTER — APPOINTMENT (OUTPATIENT)
Dept: GENERAL RADIOLOGY | Age: 70
DRG: 291 | End: 2025-06-24
Payer: MEDICARE

## 2025-06-24 LAB
ANION GAP SERPL CALCULATED.3IONS-SCNC: 9 MMOL/L (ref 9–17)
BASOPHILS # BLD: 0.01 K/UL
BASOPHILS NFR BLD: 0 % (ref 0–1)
BNP SERPL-MCNC: 9184 PG/ML (ref 0–125)
BUN SERPL-MCNC: 26 MG/DL (ref 7–20)
CALCIUM SERPL-MCNC: 8.9 MG/DL (ref 8.3–10.6)
CHLORIDE SERPL-SCNC: 93 MMOL/L (ref 99–110)
CO2 SERPL-SCNC: 33 MMOL/L (ref 21–32)
CREAT SERPL-MCNC: 0.6 MG/DL (ref 0.8–1.3)
EOSINOPHIL # BLD: 0.07 K/UL
EOSINOPHILS RELATIVE PERCENT: 1 % (ref 0–3)
ERYTHROCYTE [DISTWIDTH] IN BLOOD BY AUTOMATED COUNT: 16.2 % (ref 11.7–14.9)
GFR, ESTIMATED: >90 ML/MIN/1.73M2
GLUCOSE BLD-MCNC: 212 MG/DL (ref 74–99)
GLUCOSE BLD-MCNC: 279 MG/DL (ref 74–99)
GLUCOSE BLD-MCNC: 295 MG/DL (ref 74–99)
GLUCOSE BLD-MCNC: 404 MG/DL (ref 74–99)
GLUCOSE BLD-MCNC: 408 MG/DL (ref 74–99)
GLUCOSE BLD-MCNC: 415 MG/DL (ref 74–99)
GLUCOSE SERPL-MCNC: 272 MG/DL (ref 74–99)
HCT VFR BLD AUTO: 41.8 % (ref 42–52)
HGB BLD-MCNC: 13.4 G/DL (ref 13.5–18)
IMM GRANULOCYTES # BLD AUTO: 0.05 K/UL
IMM GRANULOCYTES NFR BLD: 0 %
LACTATE BLDV-SCNC: 1.9 MMOL/L (ref 0.4–2)
LYMPHOCYTES NFR BLD: 0.86 K/UL
LYMPHOCYTES RELATIVE PERCENT: 7 % (ref 24–44)
MAGNESIUM SERPL-MCNC: 2.3 MG/DL (ref 1.8–2.4)
MCH RBC QN AUTO: 30.2 PG (ref 27–31)
MCHC RBC AUTO-ENTMCNC: 32.1 G/DL (ref 32–36)
MCV RBC AUTO: 94.4 FL (ref 78–100)
MONOCYTES NFR BLD: 1.11 K/UL
MONOCYTES NFR BLD: 9 % (ref 0–5)
NEUTROPHILS NFR BLD: 83 % (ref 36–66)
NEUTS SEG NFR BLD: 10.08 K/UL
PLATELET # BLD AUTO: 179 K/UL (ref 140–440)
PMV BLD AUTO: 9.8 FL (ref 7.5–11.1)
POTASSIUM SERPL-SCNC: 4.7 MMOL/L (ref 3.5–5.1)
RBC # BLD AUTO: 4.43 M/UL (ref 4.6–6.2)
SODIUM SERPL-SCNC: 136 MMOL/L (ref 136–145)
WBC OTHER # BLD: 12.2 K/UL (ref 4–10.5)

## 2025-06-24 PROCEDURE — 2140000000 HC CCU INTERMEDIATE R&B

## 2025-06-24 PROCEDURE — 80048 BASIC METABOLIC PNL TOTAL CA: CPT

## 2025-06-24 PROCEDURE — 6370000000 HC RX 637 (ALT 250 FOR IP): Performed by: INTERNAL MEDICINE

## 2025-06-24 PROCEDURE — 6360000002 HC RX W HCPCS

## 2025-06-24 PROCEDURE — 6370000000 HC RX 637 (ALT 250 FOR IP): Performed by: STUDENT IN AN ORGANIZED HEALTH CARE EDUCATION/TRAINING PROGRAM

## 2025-06-24 PROCEDURE — 36415 COLL VENOUS BLD VENIPUNCTURE: CPT

## 2025-06-24 PROCEDURE — 6360000002 HC RX W HCPCS: Performed by: STUDENT IN AN ORGANIZED HEALTH CARE EDUCATION/TRAINING PROGRAM

## 2025-06-24 PROCEDURE — 2500000003 HC RX 250 WO HCPCS: Performed by: STUDENT IN AN ORGANIZED HEALTH CARE EDUCATION/TRAINING PROGRAM

## 2025-06-24 PROCEDURE — APPNB15 APP NON BILLABLE TIME 0-15 MINS

## 2025-06-24 PROCEDURE — 99233 SBSQ HOSP IP/OBS HIGH 50: CPT | Performed by: INTERNAL MEDICINE

## 2025-06-24 PROCEDURE — 85025 COMPLETE CBC W/AUTO DIFF WBC: CPT

## 2025-06-24 PROCEDURE — 51702 INSERT TEMP BLADDER CATH: CPT

## 2025-06-24 PROCEDURE — 94761 N-INVAS EAR/PLS OXIMETRY MLT: CPT

## 2025-06-24 PROCEDURE — 94640 AIRWAY INHALATION TREATMENT: CPT

## 2025-06-24 PROCEDURE — 76937 US GUIDE VASCULAR ACCESS: CPT

## 2025-06-24 PROCEDURE — 83880 ASSAY OF NATRIURETIC PEPTIDE: CPT

## 2025-06-24 PROCEDURE — 2700000000 HC OXYGEN THERAPY PER DAY

## 2025-06-24 PROCEDURE — 83605 ASSAY OF LACTIC ACID: CPT

## 2025-06-24 PROCEDURE — 2580000003 HC RX 258: Performed by: STUDENT IN AN ORGANIZED HEALTH CARE EDUCATION/TRAINING PROGRAM

## 2025-06-24 PROCEDURE — 71045 X-RAY EXAM CHEST 1 VIEW: CPT

## 2025-06-24 PROCEDURE — 83735 ASSAY OF MAGNESIUM: CPT

## 2025-06-24 PROCEDURE — 82962 GLUCOSE BLOOD TEST: CPT

## 2025-06-24 RX ORDER — FUROSEMIDE 10 MG/ML
60 INJECTION INTRAMUSCULAR; INTRAVENOUS ONCE
Status: COMPLETED | OUTPATIENT
Start: 2025-06-24 | End: 2025-06-24

## 2025-06-24 RX ORDER — METOLAZONE 2.5 MG/1
2.5 TABLET ORAL ONCE
Status: COMPLETED | OUTPATIENT
Start: 2025-06-24 | End: 2025-06-24

## 2025-06-24 RX ORDER — LISINOPRIL 5 MG/1
2.5 TABLET ORAL DAILY
Status: DISCONTINUED | OUTPATIENT
Start: 2025-06-24 | End: 2025-06-25

## 2025-06-24 RX ORDER — FUROSEMIDE 10 MG/ML
40 INJECTION INTRAMUSCULAR; INTRAVENOUS 2 TIMES DAILY
Status: DISCONTINUED | OUTPATIENT
Start: 2025-06-25 | End: 2025-06-27

## 2025-06-24 RX ADMIN — ATORVASTATIN CALCIUM 40 MG: 40 TABLET, FILM COATED ORAL at 20:46

## 2025-06-24 RX ADMIN — APIXABAN 5 MG: 5 TABLET, FILM COATED ORAL at 08:34

## 2025-06-24 RX ADMIN — INSULIN LISPRO 4 UNITS: 100 INJECTION, SOLUTION INTRAVENOUS; SUBCUTANEOUS at 11:56

## 2025-06-24 RX ADMIN — INSULIN LISPRO 2 UNITS: 100 INJECTION, SOLUTION INTRAVENOUS; SUBCUTANEOUS at 08:35

## 2025-06-24 RX ADMIN — INSULIN LISPRO 8 UNITS: 100 INJECTION, SOLUTION INTRAVENOUS; SUBCUTANEOUS at 20:46

## 2025-06-24 RX ADMIN — INSULIN GLARGINE 10 UNITS: 100 INJECTION, SOLUTION SUBCUTANEOUS at 20:47

## 2025-06-24 RX ADMIN — INSULIN LISPRO 8 UNITS: 100 INJECTION, SOLUTION INTRAVENOUS; SUBCUTANEOUS at 17:40

## 2025-06-24 RX ADMIN — MIDODRINE HYDROCHLORIDE 2.5 MG: 5 TABLET ORAL at 17:41

## 2025-06-24 RX ADMIN — SPIRONOLACTONE 25 MG: 50 TABLET ORAL at 08:35

## 2025-06-24 RX ADMIN — DOXYCYCLINE 100 MG: 100 INJECTION, POWDER, LYOPHILIZED, FOR SOLUTION INTRAVENOUS at 12:00

## 2025-06-24 RX ADMIN — SODIUM CHLORIDE, PRESERVATIVE FREE 10 ML: 5 INJECTION INTRAVENOUS at 20:49

## 2025-06-24 RX ADMIN — DILTIAZEM HYDROCHLORIDE 240 MG: 240 CAPSULE, COATED, EXTENDED RELEASE ORAL at 08:35

## 2025-06-24 RX ADMIN — IPRATROPIUM BROMIDE AND ALBUTEROL SULFATE 1 DOSE: .5; 2.5 SOLUTION RESPIRATORY (INHALATION) at 17:03

## 2025-06-24 RX ADMIN — LISINOPRIL 2.5 MG: 5 TABLET ORAL at 17:41

## 2025-06-24 RX ADMIN — METOLAZONE 2.5 MG: 2.5 TABLET ORAL at 15:32

## 2025-06-24 RX ADMIN — MIDODRINE HYDROCHLORIDE 2.5 MG: 5 TABLET ORAL at 08:34

## 2025-06-24 RX ADMIN — DIGOXIN 125 MCG: 125 TABLET ORAL at 08:34

## 2025-06-24 RX ADMIN — APIXABAN 5 MG: 5 TABLET, FILM COATED ORAL at 20:46

## 2025-06-24 RX ADMIN — METHYLPREDNISOLONE SODIUM SUCCINATE 40 MG: 40 INJECTION, POWDER, LYOPHILIZED, FOR SOLUTION INTRAMUSCULAR; INTRAVENOUS at 08:35

## 2025-06-24 RX ADMIN — MICONAZOLE NITRATE: 2 POWDER TOPICAL at 15:31

## 2025-06-24 RX ADMIN — MICONAZOLE NITRATE: 2 POWDER TOPICAL at 20:50

## 2025-06-24 RX ADMIN — SODIUM CHLORIDE, PRESERVATIVE FREE 10 ML: 5 INJECTION INTRAVENOUS at 08:46

## 2025-06-24 RX ADMIN — IPRATROPIUM BROMIDE AND ALBUTEROL SULFATE 1 DOSE: .5; 2.5 SOLUTION RESPIRATORY (INHALATION) at 19:33

## 2025-06-24 RX ADMIN — DOXYCYCLINE 100 MG: 100 INJECTION, POWDER, LYOPHILIZED, FOR SOLUTION INTRAVENOUS at 23:31

## 2025-06-24 RX ADMIN — IPRATROPIUM BROMIDE AND ALBUTEROL SULFATE 1 DOSE: .5; 2.5 SOLUTION RESPIRATORY (INHALATION) at 08:26

## 2025-06-24 RX ADMIN — FUROSEMIDE 60 MG: 10 INJECTION, SOLUTION INTRAMUSCULAR; INTRAVENOUS at 15:31

## 2025-06-24 RX ADMIN — IPRATROPIUM BROMIDE AND ALBUTEROL SULFATE 1 DOSE: .5; 2.5 SOLUTION RESPIRATORY (INHALATION) at 12:17

## 2025-06-24 ASSESSMENT — PAIN DESCRIPTION - ONSET: ONSET: ON-GOING

## 2025-06-24 ASSESSMENT — PAIN DESCRIPTION - LOCATION: LOCATION: BUTTOCKS

## 2025-06-24 ASSESSMENT — PAIN SCALES - GENERAL
PAINLEVEL_OUTOF10: 0
PAINLEVEL_OUTOF10: 3

## 2025-06-24 ASSESSMENT — PAIN DESCRIPTION - DESCRIPTORS: DESCRIPTORS: ACHING

## 2025-06-24 ASSESSMENT — PAIN - FUNCTIONAL ASSESSMENT: PAIN_FUNCTIONAL_ASSESSMENT: ACTIVITIES ARE NOT PREVENTED

## 2025-06-24 ASSESSMENT — PAIN DESCRIPTION - ORIENTATION: ORIENTATION: RIGHT;LEFT

## 2025-06-24 ASSESSMENT — PAIN DESCRIPTION - PAIN TYPE: TYPE: ACUTE PAIN

## 2025-06-24 ASSESSMENT — PAIN DESCRIPTION - FREQUENCY: FREQUENCY: CONTINUOUS

## 2025-06-24 NOTE — CARE COORDINATION
On VAPOTHERM at 60%FiO2.   Pt lives in [facility] and plans to return.  He denies any d/c needs.  He states that [place] will pick him up when he is discharged. Notify CM if any d/c needs arise.  TE

## 2025-06-25 LAB
ANION GAP SERPL CALCULATED.3IONS-SCNC: 8 MMOL/L (ref 9–17)
BASOPHILS # BLD: 0.01 K/UL
BASOPHILS NFR BLD: 0 % (ref 0–1)
BUN SERPL-MCNC: 24 MG/DL (ref 7–20)
CALCIUM SERPL-MCNC: 9 MG/DL (ref 8.3–10.6)
CHLORIDE SERPL-SCNC: 92 MMOL/L (ref 99–110)
CO2 SERPL-SCNC: 38 MMOL/L (ref 21–32)
CREAT SERPL-MCNC: 0.7 MG/DL (ref 0.8–1.3)
EOSINOPHIL # BLD: 0.06 K/UL
EOSINOPHILS RELATIVE PERCENT: 0 % (ref 0–3)
ERYTHROCYTE [DISTWIDTH] IN BLOOD BY AUTOMATED COUNT: 16.2 % (ref 11.7–14.9)
GFR, ESTIMATED: >90 ML/MIN/1.73M2
GLUCOSE BLD-MCNC: 154 MG/DL (ref 74–99)
GLUCOSE BLD-MCNC: 169 MG/DL (ref 74–99)
GLUCOSE BLD-MCNC: 186 MG/DL (ref 74–99)
GLUCOSE BLD-MCNC: 440 MG/DL (ref 74–99)
GLUCOSE BLD-MCNC: 465 MG/DL (ref 74–99)
GLUCOSE SERPL-MCNC: 168 MG/DL (ref 74–99)
HCT VFR BLD AUTO: 43.3 % (ref 42–52)
HGB BLD-MCNC: 13.5 G/DL (ref 13.5–18)
IMM GRANULOCYTES # BLD AUTO: 0.05 K/UL
IMM GRANULOCYTES NFR BLD: 0 %
LYMPHOCYTES NFR BLD: 0.97 K/UL
LYMPHOCYTES RELATIVE PERCENT: 7 % (ref 24–44)
MAGNESIUM SERPL-MCNC: 2.4 MG/DL (ref 1.8–2.4)
MCH RBC QN AUTO: 29.6 PG (ref 27–31)
MCHC RBC AUTO-ENTMCNC: 31.2 G/DL (ref 32–36)
MCV RBC AUTO: 95 FL (ref 78–100)
MONOCYTES NFR BLD: 1.28 K/UL
MONOCYTES NFR BLD: 9 % (ref 0–5)
NEUTROPHILS NFR BLD: 84 % (ref 36–66)
NEUTS SEG NFR BLD: 12.32 K/UL
PLATELET # BLD AUTO: 205 K/UL (ref 140–440)
PMV BLD AUTO: 9.6 FL (ref 7.5–11.1)
POTASSIUM SERPL-SCNC: 4.6 MMOL/L (ref 3.5–5.1)
RBC # BLD AUTO: 4.56 M/UL (ref 4.6–6.2)
SODIUM SERPL-SCNC: 139 MMOL/L (ref 136–145)
WBC OTHER # BLD: 14.7 K/UL (ref 4–10.5)

## 2025-06-25 PROCEDURE — 6370000000 HC RX 637 (ALT 250 FOR IP): Performed by: STUDENT IN AN ORGANIZED HEALTH CARE EDUCATION/TRAINING PROGRAM

## 2025-06-25 PROCEDURE — 85025 COMPLETE CBC W/AUTO DIFF WBC: CPT

## 2025-06-25 PROCEDURE — 6360000002 HC RX W HCPCS

## 2025-06-25 PROCEDURE — 94150 VITAL CAPACITY TEST: CPT

## 2025-06-25 PROCEDURE — 94761 N-INVAS EAR/PLS OXIMETRY MLT: CPT

## 2025-06-25 PROCEDURE — 83735 ASSAY OF MAGNESIUM: CPT

## 2025-06-25 PROCEDURE — 2140000000 HC CCU INTERMEDIATE R&B

## 2025-06-25 PROCEDURE — 2500000003 HC RX 250 WO HCPCS: Performed by: STUDENT IN AN ORGANIZED HEALTH CARE EDUCATION/TRAINING PROGRAM

## 2025-06-25 PROCEDURE — 99233 SBSQ HOSP IP/OBS HIGH 50: CPT | Performed by: INTERNAL MEDICINE

## 2025-06-25 PROCEDURE — 82962 GLUCOSE BLOOD TEST: CPT

## 2025-06-25 PROCEDURE — 6360000002 HC RX W HCPCS: Performed by: STUDENT IN AN ORGANIZED HEALTH CARE EDUCATION/TRAINING PROGRAM

## 2025-06-25 PROCEDURE — 80048 BASIC METABOLIC PNL TOTAL CA: CPT

## 2025-06-25 PROCEDURE — 2580000003 HC RX 258: Performed by: STUDENT IN AN ORGANIZED HEALTH CARE EDUCATION/TRAINING PROGRAM

## 2025-06-25 PROCEDURE — 94640 AIRWAY INHALATION TREATMENT: CPT

## 2025-06-25 PROCEDURE — 51702 INSERT TEMP BLADDER CATH: CPT

## 2025-06-25 RX ORDER — LISINOPRIL 5 MG/1
5 TABLET ORAL DAILY
Status: DISCONTINUED | OUTPATIENT
Start: 2025-06-26 | End: 2025-06-27

## 2025-06-25 RX ORDER — METOLAZONE 2.5 MG/1
2.5 TABLET ORAL ONCE
Status: DISCONTINUED | OUTPATIENT
Start: 2025-06-25 | End: 2025-06-28

## 2025-06-25 RX ADMIN — APIXABAN 5 MG: 5 TABLET, FILM COATED ORAL at 09:15

## 2025-06-25 RX ADMIN — SODIUM CHLORIDE, PRESERVATIVE FREE 10 ML: 5 INJECTION INTRAVENOUS at 19:51

## 2025-06-25 RX ADMIN — FUROSEMIDE 40 MG: 10 INJECTION, SOLUTION INTRAMUSCULAR; INTRAVENOUS at 12:27

## 2025-06-25 RX ADMIN — DIGOXIN 125 MCG: 125 TABLET ORAL at 09:15

## 2025-06-25 RX ADMIN — METHYLPREDNISOLONE SODIUM SUCCINATE 40 MG: 40 INJECTION, POWDER, LYOPHILIZED, FOR SOLUTION INTRAMUSCULAR; INTRAVENOUS at 09:16

## 2025-06-25 RX ADMIN — FUROSEMIDE 40 MG: 10 INJECTION, SOLUTION INTRAMUSCULAR; INTRAVENOUS at 17:32

## 2025-06-25 RX ADMIN — IPRATROPIUM BROMIDE AND ALBUTEROL SULFATE 1 DOSE: .5; 2.5 SOLUTION RESPIRATORY (INHALATION) at 15:18

## 2025-06-25 RX ADMIN — INSULIN LISPRO 8 UNITS: 100 INJECTION, SOLUTION INTRAVENOUS; SUBCUTANEOUS at 16:41

## 2025-06-25 RX ADMIN — APIXABAN 5 MG: 5 TABLET, FILM COATED ORAL at 19:51

## 2025-06-25 RX ADMIN — DOXYCYCLINE 100 MG: 100 INJECTION, POWDER, LYOPHILIZED, FOR SOLUTION INTRAVENOUS at 12:26

## 2025-06-25 RX ADMIN — DOXYCYCLINE 100 MG: 100 INJECTION, POWDER, LYOPHILIZED, FOR SOLUTION INTRAVENOUS at 22:39

## 2025-06-25 RX ADMIN — IPRATROPIUM BROMIDE AND ALBUTEROL SULFATE 1 DOSE: .5; 2.5 SOLUTION RESPIRATORY (INHALATION) at 07:27

## 2025-06-25 RX ADMIN — IPRATROPIUM BROMIDE AND ALBUTEROL SULFATE 1 DOSE: .5; 2.5 SOLUTION RESPIRATORY (INHALATION) at 10:58

## 2025-06-25 RX ADMIN — DIGOXIN 125 MCG: 125 TABLET ORAL at 10:58

## 2025-06-25 RX ADMIN — MICONAZOLE NITRATE: 2 POWDER TOPICAL at 19:54

## 2025-06-25 RX ADMIN — INSULIN GLARGINE 10 UNITS: 100 INJECTION, SOLUTION SUBCUTANEOUS at 19:57

## 2025-06-25 RX ADMIN — MIDODRINE HYDROCHLORIDE 2.5 MG: 5 TABLET ORAL at 09:14

## 2025-06-25 RX ADMIN — MIDODRINE HYDROCHLORIDE 2.5 MG: 5 TABLET ORAL at 17:31

## 2025-06-25 RX ADMIN — INSULIN LISPRO 8 UNITS: 100 INJECTION, SOLUTION INTRAVENOUS; SUBCUTANEOUS at 19:57

## 2025-06-25 RX ADMIN — MICONAZOLE NITRATE: 2 POWDER TOPICAL at 09:18

## 2025-06-25 RX ADMIN — IPRATROPIUM BROMIDE AND ALBUTEROL SULFATE 1 DOSE: .5; 2.5 SOLUTION RESPIRATORY (INHALATION) at 19:27

## 2025-06-25 RX ADMIN — ATORVASTATIN CALCIUM 40 MG: 40 TABLET, FILM COATED ORAL at 19:51

## 2025-06-25 RX ADMIN — SODIUM CHLORIDE, PRESERVATIVE FREE 10 ML: 5 INJECTION INTRAVENOUS at 09:17

## 2025-06-25 ASSESSMENT — PAIN SCALES - GENERAL
PAINLEVEL_OUTOF10: 0
PAINLEVEL_OUTOF10: 0

## 2025-06-26 LAB
ANION GAP SERPL CALCULATED.3IONS-SCNC: 9 MMOL/L (ref 9–17)
BASOPHILS # BLD: 0.01 K/UL
BASOPHILS NFR BLD: 0 % (ref 0–1)
BNP SERPL-MCNC: 4672 PG/ML (ref 0–125)
BUN SERPL-MCNC: 23 MG/DL (ref 7–20)
CALCIUM SERPL-MCNC: 9.5 MG/DL (ref 8.3–10.6)
CHLORIDE SERPL-SCNC: 88 MMOL/L (ref 99–110)
CO2 SERPL-SCNC: 41 MMOL/L (ref 21–32)
CREAT SERPL-MCNC: 0.8 MG/DL (ref 0.8–1.3)
EOSINOPHIL # BLD: 0.03 K/UL
EOSINOPHILS RELATIVE PERCENT: 0 % (ref 0–3)
ERYTHROCYTE [DISTWIDTH] IN BLOOD BY AUTOMATED COUNT: 16 % (ref 11.7–14.9)
GFR, ESTIMATED: >90 ML/MIN/1.73M2
GLUCOSE BLD-MCNC: 180 MG/DL (ref 74–99)
GLUCOSE BLD-MCNC: 193 MG/DL (ref 74–99)
GLUCOSE BLD-MCNC: 309 MG/DL (ref 74–99)
GLUCOSE BLD-MCNC: 452 MG/DL (ref 74–99)
GLUCOSE SERPL-MCNC: 184 MG/DL (ref 74–99)
HCT VFR BLD AUTO: 42 % (ref 42–52)
HGB BLD-MCNC: 13.6 G/DL (ref 13.5–18)
IMM GRANULOCYTES # BLD AUTO: 0.05 K/UL
IMM GRANULOCYTES NFR BLD: 0 %
LYMPHOCYTES NFR BLD: 0.77 K/UL
LYMPHOCYTES RELATIVE PERCENT: 7 % (ref 24–44)
MCH RBC QN AUTO: 30.4 PG (ref 27–31)
MCHC RBC AUTO-ENTMCNC: 32.4 G/DL (ref 32–36)
MCV RBC AUTO: 93.8 FL (ref 78–100)
MICROORGANISM SPEC CULT: NORMAL
MONOCYTES NFR BLD: 0.97 K/UL
MONOCYTES NFR BLD: 9 % (ref 0–5)
NEUTROPHILS NFR BLD: 84 % (ref 36–66)
NEUTS SEG NFR BLD: 9.37 K/UL
PLATELET # BLD AUTO: 186 K/UL (ref 140–440)
PMV BLD AUTO: 9.9 FL (ref 7.5–11.1)
POTASSIUM SERPL-SCNC: 5.2 MMOL/L (ref 3.5–5.1)
RBC # BLD AUTO: 4.48 M/UL (ref 4.6–6.2)
SERVICE CMNT-IMP: NORMAL
SODIUM SERPL-SCNC: 137 MMOL/L (ref 136–145)
SPECIMEN DESCRIPTION: NORMAL
WBC OTHER # BLD: 11.2 K/UL (ref 4–10.5)

## 2025-06-26 PROCEDURE — 80048 BASIC METABOLIC PNL TOTAL CA: CPT

## 2025-06-26 PROCEDURE — 2500000003 HC RX 250 WO HCPCS: Performed by: STUDENT IN AN ORGANIZED HEALTH CARE EDUCATION/TRAINING PROGRAM

## 2025-06-26 PROCEDURE — 6370000000 HC RX 637 (ALT 250 FOR IP): Performed by: STUDENT IN AN ORGANIZED HEALTH CARE EDUCATION/TRAINING PROGRAM

## 2025-06-26 PROCEDURE — 51702 INSERT TEMP BLADDER CATH: CPT

## 2025-06-26 PROCEDURE — 85025 COMPLETE CBC W/AUTO DIFF WBC: CPT

## 2025-06-26 PROCEDURE — 94761 N-INVAS EAR/PLS OXIMETRY MLT: CPT

## 2025-06-26 PROCEDURE — 6370000000 HC RX 637 (ALT 250 FOR IP): Performed by: INTERNAL MEDICINE

## 2025-06-26 PROCEDURE — 97162 PT EVAL MOD COMPLEX 30 MIN: CPT

## 2025-06-26 PROCEDURE — 6360000002 HC RX W HCPCS

## 2025-06-26 PROCEDURE — 99233 SBSQ HOSP IP/OBS HIGH 50: CPT | Performed by: INTERNAL MEDICINE

## 2025-06-26 PROCEDURE — 6360000002 HC RX W HCPCS: Performed by: STUDENT IN AN ORGANIZED HEALTH CARE EDUCATION/TRAINING PROGRAM

## 2025-06-26 PROCEDURE — 2140000000 HC CCU INTERMEDIATE R&B

## 2025-06-26 PROCEDURE — 82962 GLUCOSE BLOOD TEST: CPT

## 2025-06-26 PROCEDURE — 2700000000 HC OXYGEN THERAPY PER DAY

## 2025-06-26 PROCEDURE — 94150 VITAL CAPACITY TEST: CPT

## 2025-06-26 PROCEDURE — 97166 OT EVAL MOD COMPLEX 45 MIN: CPT

## 2025-06-26 PROCEDURE — 94640 AIRWAY INHALATION TREATMENT: CPT

## 2025-06-26 PROCEDURE — 94669 MECHANICAL CHEST WALL OSCILL: CPT

## 2025-06-26 PROCEDURE — 36415 COLL VENOUS BLD VENIPUNCTURE: CPT

## 2025-06-26 PROCEDURE — 83880 ASSAY OF NATRIURETIC PEPTIDE: CPT

## 2025-06-26 RX ORDER — ACETAZOLAMIDE 250 MG/1
250 TABLET ORAL DAILY
Status: DISCONTINUED | OUTPATIENT
Start: 2025-06-26 | End: 2025-06-28

## 2025-06-26 RX ORDER — IPRATROPIUM BROMIDE AND ALBUTEROL SULFATE 2.5; .5 MG/3ML; MG/3ML
1 SOLUTION RESPIRATORY (INHALATION)
Status: DISCONTINUED | OUTPATIENT
Start: 2025-06-26 | End: 2025-06-29 | Stop reason: HOSPADM

## 2025-06-26 RX ADMIN — MICONAZOLE NITRATE: 2 POWDER TOPICAL at 08:14

## 2025-06-26 RX ADMIN — SODIUM CHLORIDE, PRESERVATIVE FREE 10 ML: 5 INJECTION INTRAVENOUS at 08:14

## 2025-06-26 RX ADMIN — IPRATROPIUM BROMIDE AND ALBUTEROL SULFATE 1 DOSE: .5; 2.5 SOLUTION RESPIRATORY (INHALATION) at 20:39

## 2025-06-26 RX ADMIN — INSULIN GLARGINE 10 UNITS: 100 INJECTION, SOLUTION SUBCUTANEOUS at 21:00

## 2025-06-26 RX ADMIN — IPRATROPIUM BROMIDE AND ALBUTEROL SULFATE 1 DOSE: .5; 2.5 SOLUTION RESPIRATORY (INHALATION) at 15:40

## 2025-06-26 RX ADMIN — MIDODRINE HYDROCHLORIDE 2.5 MG: 5 TABLET ORAL at 08:12

## 2025-06-26 RX ADMIN — ATORVASTATIN CALCIUM 40 MG: 40 TABLET, FILM COATED ORAL at 21:00

## 2025-06-26 RX ADMIN — APIXABAN 5 MG: 5 TABLET, FILM COATED ORAL at 08:12

## 2025-06-26 RX ADMIN — INSULIN LISPRO 2 UNITS: 100 INJECTION, SOLUTION INTRAVENOUS; SUBCUTANEOUS at 05:47

## 2025-06-26 RX ADMIN — SPIRONOLACTONE 25 MG: 50 TABLET ORAL at 08:13

## 2025-06-26 RX ADMIN — APIXABAN 5 MG: 5 TABLET, FILM COATED ORAL at 21:00

## 2025-06-26 RX ADMIN — DIGOXIN 125 MCG: 125 TABLET ORAL at 08:13

## 2025-06-26 RX ADMIN — LISINOPRIL 5 MG: 5 TABLET ORAL at 08:12

## 2025-06-26 RX ADMIN — MICONAZOLE NITRATE: 2 POWDER TOPICAL at 21:01

## 2025-06-26 RX ADMIN — INSULIN LISPRO 2 UNITS: 100 INJECTION, SOLUTION INTRAVENOUS; SUBCUTANEOUS at 11:28

## 2025-06-26 RX ADMIN — MIDODRINE HYDROCHLORIDE 2.5 MG: 5 TABLET ORAL at 18:08

## 2025-06-26 RX ADMIN — FUROSEMIDE 40 MG: 10 INJECTION, SOLUTION INTRAMUSCULAR; INTRAVENOUS at 08:13

## 2025-06-26 RX ADMIN — FUROSEMIDE 40 MG: 10 INJECTION, SOLUTION INTRAMUSCULAR; INTRAVENOUS at 18:08

## 2025-06-26 RX ADMIN — ACETAZOLAMIDE 250 MG: 250 TABLET ORAL at 11:28

## 2025-06-26 RX ADMIN — INSULIN LISPRO 8 UNITS: 100 INJECTION, SOLUTION INTRAVENOUS; SUBCUTANEOUS at 18:08

## 2025-06-26 RX ADMIN — METHYLPREDNISOLONE SODIUM SUCCINATE 40 MG: 40 INJECTION, POWDER, LYOPHILIZED, FOR SOLUTION INTRAMUSCULAR; INTRAVENOUS at 08:13

## 2025-06-26 RX ADMIN — IPRATROPIUM BROMIDE AND ALBUTEROL SULFATE 1 DOSE: .5; 2.5 SOLUTION RESPIRATORY (INHALATION) at 07:57

## 2025-06-26 RX ADMIN — DILTIAZEM HYDROCHLORIDE 240 MG: 240 CAPSULE, COATED, EXTENDED RELEASE ORAL at 08:13

## 2025-06-26 RX ADMIN — INSULIN LISPRO 6 UNITS: 100 INJECTION, SOLUTION INTRAVENOUS; SUBCUTANEOUS at 21:00

## 2025-06-26 ASSESSMENT — PAIN SCALES - GENERAL
PAINLEVEL_OUTOF10: 0
PAINLEVEL_OUTOF10: 0

## 2025-06-26 NOTE — CONSULTS
Cardiology Progress Note     Admit Date:  6/20/2025      Subjective and  Overnight Events : Patient states that his breathing is little better.        Physical Exam:  Vitals:    06/25/25 1227   BP: 93/65   Pulse:    Resp:    Temp:    SpO2:         General Appearance:  No distress, conversant  Respiratory:  Normal breath sounds, No respiratory distress, No wheezing  Cardiovascular: S1, S2, no murmurs, gallops. JVD wnl  Abdomen /GI:  Bowel sounds normal, Soft, No tenderness   Integument: Continues to have bilateral pitting edema  Neurologic:  Alert & oriented x 3, no focal deficits noted       Lab Data:  CBC:   Recent Labs     06/24/25  0923 06/25/25  0600   WBC 12.2* 14.7*   HGB 13.4* 13.5   HCT 41.8* 43.3   MCV 94.4 95.0    205     BMP:   Recent Labs     06/23/25  0507 06/24/25  0442 06/25/25  0600   * 136 139   K 5.2* 4.7 4.6   CL 90* 93* 92*   CO2 33* 33* 38*   BUN 26* 26* 24*   CREATININE 0.6* 0.6* 0.7*     PT/INR: No results for input(s): \"PROTIME\", \"INR\" in the last 72 hours.  BNP:    Recent Labs     06/24/25  0442   PROBNP 9,184*     TROPONIN: No results for input(s): \"TROPONINT\" in the last 72 hours.       Assessment and Recommendations:      Acute HFrEF with significant fluid overload  Acute COPD exacerbation with superimposed pneumonia  Longstanding persistent atrial fibrillation        Patient has been -1.75 L overnight.  For now we will continue with current diuretic regimen.  We will increase lisinopril dose to 5 mg daily.  Please closely monitor his kidney function and electrolytes during aggressive IV diuresis.  Please continue with apixaban, digoxin, diltiazem     All labs, medications and tests reviewed by myself , continue all other medications of all above medical condition listed as is except for changes mentioned above.  Thank you very much for consult , please call with questions.    Tino Stark MD, MD 
 [facility] Nurse  Consult Note       Armando Mars  AGE: 69 y.o.   GENDER: male  : 1955  TODAY'S DATE:  2025    Subjective:     Reason for Evaluation and Assessment: wound assessment       Armando Mars is a 69 y.o. male referred by:   [x] Physician  [] Nursing  [] Other:     Wound Identification:  Wound Type: venous and MASD,possible yeast  Contributing Factors: edema, venous stasis, lymphedema, decreased mobility, obesity, and poor hygiene        PAST MEDICAL HISTORY        Diagnosis Date    A-fib (HCC)     Resolved after ablation     Anxiety     Arthritis     \"Hips, Knees, Elbows And Fingers\"    COPD (chronic obstructive pulmonary disease) ([facility])     Sees Dr. Osmani Og     Diabetes mellitus ([facility]) Dx     Full dentures     H/O angiography 2018    peripheral angio - LFA occluded, filling collaterals, RSFA 90% stenosis-vasc surg consult    H/O Doppler ultrasound 2018    LE arterial - left mid and distal femoral artery occluded, lt FANI shows mild-mod PVD    H/O echocardiogram 2016    EF60% mild MR, TR, pulm HTN    Hx of colonoscopy      C-scope - benign polyp x1    Hx of Doppler ultrasound 2018    Lower extremity doppler  Normal study.    Hyperlipidemia     Hypertension     Macular degeneration     States is legally blind    Obesity     On home oxygen therapy     Continuous At 2 Liters Per Nasal Cannula    PAD (peripheral artery disease)     10/10 FANI mild PAD left superficial femoral s/p left fem-pop bypass    Panic attacks     Pneumonia Last Episode In     PTSD (post-traumatic stress disorder)     Restless leg     Shortness of breath     Sleep apnea     Uses BiPap - stopped using 2020    Wears glasses     To Read       PAST SURGICAL HISTORY    Past Surgical History:   Procedure Laterality Date    APPENDECTOMY  2003    ATRIAL ABLATION SURGERY  2018    A-fib ablation     CARDIAC SURGERY  2018    \"Heart Ablation Done 
CARDIOLOGY CONSULT NOTE         Reason for consultation: Heart failure exacerbation      Primary care physician: Rosaloi Hedrick MD      Chief Complaints :  Chief Complaint   Patient presents with    Shortness of Breath     Patient is a resident in [facility] and today while on 5-6 liters of oxygen per nasal canula patients oxygen was in the 80's per staff at the facility.  Patient complains of shortness of breath and states it feels like I can't get the air out. Arrives on a NRB at 15 liters per minute and oxygen saturation at 100 percent.        History of present illness:Armando is a 69 y.o.year old male who is currently admitted with acute on chronic hypoxemic and hypercapnic respiratory failure in the setting of COPD.  He also had severe sepsis.  We are asked to see him for possible component of heart failure.  Patient is able to talk but is visibly short of breath.  He does not have any significant edema in his lower extremities.     Review of Systems:     All systems negative except as marked.        Physical Examination:    Vitals:    06/23/25 1210   BP: 125/63   Pulse: 85   Resp: 17   Temp: 97.8 °F (36.6 °C)   SpO2: 92%        General Appearance:  No distress, conversant    Constitutional:  No acute distress, non-toxic appearance.    HENT:  Normocephalic, Atraumatic,   Eyes:  PERRL, EOMI, Conjunctiva normal, No discharge.   Respiratory:  No respiratory distress, No wheezing  Cardiovascular: S1, S2, mitral regurgitation murmur noted, elevated JVP.  Aortic stenosis murmur noted as well  Abdomen /GI:   Soft, No tenderness   Genitourinary: No costovertebral angle tenderness   Musculoskeletal: Bilateral pitting edema edema, no tenderness, no deformities.   Integument:  Well hydrated, no rash   Neurologic:  Alert & oriented x 3, no focal deficits noted       Medical decision making and Data review:    Lab Review   Recent Labs     06/20/25  1512   WBC 15.1*   HGB 14.2   HCT 44.2       
Consult completed. Procedure/rationale explained to pt & consent obtained. #20ga Nexiva extra-long, 1.75\" PIV initiated using UltraSound-guided technique without difficulty/complications as secondary access r/t incompatible infusions. Pt tolerated well and no other c/o or needs noted or reported.     
IV Consult complete.  Nexiva 20g 1.75\" PIV Inserted in Left Forearm  x 1 attempt using sterile ultrasound guided technique.  Brisk blood return, flushes easy.        
Setup/Prior level of function  Social/Functional History  Type of Home: Assisted living  Bathroom Shower/Tub: Walk-in shower  Bathroom Toilet: Handicap height  Bathroom Equipment: Shower chair  Bathroom Accessibility: Wheelchair accessible  Home Equipment: Alert button  Prior Level of Assist for Transfers: Independent  Mode of Transportation: Van  Occupation: On disability    Examination of body systems (includes body structures/functions, activity/participation limitations):  Observation:  pt up in chair upon arrival and agreeable to therapy  Vision:  legally blind  Hearing:  WFL  Cardiopulmonary:  4L O2 90-95% throughout session  Cognition: A&Ox4 but pt unable to state  why utilizes WC, see OT/SLP note for further evaluation.    Musculoskeletal  ROM R/L:  WFL.    Strength R/L:  3/5, moderate impairment in function and endurance.    Neuro:  WFL      Mobility:  Rolling L/R:  NT, pt up in chair at beginning and end of session  Supine to sit:  NT, pt up in chair at beginning and end of session  Transfers: pt completed STS to/from chair SBA with no LOB  Sitting balance:  good.    Standing balance:  fair+, SBA.    Gait: NT    [facility] 6 Clicks Inpatient Mobility:  AM-PAC Inpatient Mobility Raw Score : 14    Safety: patient left in chair with alarm on,  on, call light within reach, RN notified, gait belt used.    Assessment:  Pt is a 69 y.o. male admitted to the hospital for acute hypoxic respiratory failure. Per pt he does not ambulate and utilizes WC. Pt questionable historian. Pt is currently performing transfers SBA and standing SBA. Pt is presenting with decreased endurance, impaired gait, impaired strength. Pt would benefit from continuous acute care PT as well as  PT if pt does not ambulate and Facility for moderate post-acute rehabilitation, anticipate 1-2 hours per day and 5 days per week if pt typically ambulates.       Complexity: moderate    Prognosis: Good, no significant barriers to participation at this 
pleural effusions are possible.                Assessment:     Ac on ch resp failure hypoxic and hypercapneic  Ac copd  Ac chf  Pneumonia  monica          Plan:     D/w pt  Adequate o2 adm  Cont airvo,currently at 70% fio2  Bipap at nite  Cx  Agree with antibx  Duonebs  Iv solumedrol  On lasix  Thanks will follow

## 2025-06-26 NOTE — CARE COORDINATION
Met with pt for a f/u visit.  He is hoping to return to [facility].  He would be agreeable to go to skilled at [facility] if needed.  PT/OT ordered and requested via wb to help determine a safe d/c plan for pt.  CM to follow up with pt after PT/OT recommendations are in.  Notified Samuels/OW via confidential VM that pt may need skilled when discharged pending PT/OT rec's. TE

## 2025-06-26 NOTE — RT PROTOCOL NOTE
RT Inhaler-Nebulizer Bronchodilator Protocol Note    There is a bronchodilator order in the chart from a provider indicating to follow the RT Bronchodilator Protocol and there is an “Initiate RT Inhaler-Nebulizer Bronchodilator Protocol” order as well (see protocol at bottom of note).    CXR Findings:  No results found.    The findings from the last RT Protocol Assessment were as follows:   History Pulmonary Disease: Chronic pulmonary disease  Respiratory Pattern: Regular pattern and RR 12-20 bpm  Breath Sounds: Slightly diminished and/or crackles  Cough: Strong, spontaneous, non-productive  Indication for Bronchodilator Therapy: On home bronchodilators (albuterol ud tid at home)  Bronchodilator Assessment Score: 4 qualifies for BID but uses albuterol TID at home per chart home med list    Aerosolized bronchodilator medication orders have been revised according to the RT Inhaler-Nebulizer Bronchodilator Protocol below.    Respiratory Therapist to perform RT Therapy Protocol Assessment initially then follow the protocol.  Repeat RT Therapy Protocol Assessment PRN for score 0-3 or on second treatment, BID, and PRN for scores above 3.    No Indications - adjust the frequency to every 6 hours PRN wheezing or bronchospasm, if no treatments needed after 48 hours then discontinue using Per Protocol order mode.     If indication present, adjust the RT bronchodilator orders based on the Bronchodilator Assessment Score as indicated below.  Use Inhaler orders unless patient has one or more of the following: on home nebulizer, not able to hold breath for 10 seconds, is not alert and oriented, cannot activate and use MDI correctly, or respiratory rate 25 breaths per minute or more, then use the equivalent nebulizer order(s) with same Frequency and PRN reasons based on the score.  If a patient is on this medication at home then do not decrease Frequency below that used at home.    0-3 - enter or revise RT bronchodilator order(s)

## 2025-06-27 LAB
ANION GAP SERPL CALCULATED.3IONS-SCNC: 8 MMOL/L (ref 9–17)
BUN SERPL-MCNC: 26 MG/DL (ref 7–20)
CALCIUM SERPL-MCNC: 9.2 MG/DL (ref 8.3–10.6)
CHLORIDE SERPL-SCNC: 89 MMOL/L (ref 99–110)
CO2 SERPL-SCNC: 36 MMOL/L (ref 21–32)
CREAT SERPL-MCNC: 0.7 MG/DL (ref 0.8–1.3)
GFR, ESTIMATED: >90 ML/MIN/1.73M2
GLUCOSE BLD-MCNC: 248 MG/DL (ref 74–99)
GLUCOSE BLD-MCNC: 322 MG/DL (ref 74–99)
GLUCOSE BLD-MCNC: 330 MG/DL (ref 74–99)
GLUCOSE SERPL-MCNC: 168 MG/DL (ref 74–99)
POTASSIUM SERPL-SCNC: 3.9 MMOL/L (ref 3.5–5.1)
SODIUM SERPL-SCNC: 132 MMOL/L (ref 136–145)

## 2025-06-27 PROCEDURE — 6370000000 HC RX 637 (ALT 250 FOR IP): Performed by: STUDENT IN AN ORGANIZED HEALTH CARE EDUCATION/TRAINING PROGRAM

## 2025-06-27 PROCEDURE — 97530 THERAPEUTIC ACTIVITIES: CPT

## 2025-06-27 PROCEDURE — 99233 SBSQ HOSP IP/OBS HIGH 50: CPT | Performed by: INTERNAL MEDICINE

## 2025-06-27 PROCEDURE — 2500000003 HC RX 250 WO HCPCS: Performed by: STUDENT IN AN ORGANIZED HEALTH CARE EDUCATION/TRAINING PROGRAM

## 2025-06-27 PROCEDURE — 6360000002 HC RX W HCPCS

## 2025-06-27 PROCEDURE — 94640 AIRWAY INHALATION TREATMENT: CPT

## 2025-06-27 PROCEDURE — APPNB30 APP NON BILLABLE TIME 0-30 MINS: Performed by: NURSE PRACTITIONER

## 2025-06-27 PROCEDURE — 2140000000 HC CCU INTERMEDIATE R&B

## 2025-06-27 PROCEDURE — 36415 COLL VENOUS BLD VENIPUNCTURE: CPT

## 2025-06-27 PROCEDURE — 80048 BASIC METABOLIC PNL TOTAL CA: CPT

## 2025-06-27 PROCEDURE — 6370000000 HC RX 637 (ALT 250 FOR IP): Performed by: INTERNAL MEDICINE

## 2025-06-27 PROCEDURE — 6370000000 HC RX 637 (ALT 250 FOR IP): Performed by: NURSE PRACTITIONER

## 2025-06-27 PROCEDURE — 6360000002 HC RX W HCPCS: Performed by: STUDENT IN AN ORGANIZED HEALTH CARE EDUCATION/TRAINING PROGRAM

## 2025-06-27 PROCEDURE — 82962 GLUCOSE BLOOD TEST: CPT

## 2025-06-27 PROCEDURE — 2700000000 HC OXYGEN THERAPY PER DAY

## 2025-06-27 PROCEDURE — 94761 N-INVAS EAR/PLS OXIMETRY MLT: CPT

## 2025-06-27 RX ORDER — LISINOPRIL 5 MG/1
2.5 TABLET ORAL DAILY
Status: DISCONTINUED | OUTPATIENT
Start: 2025-06-28 | End: 2025-06-29 | Stop reason: HOSPADM

## 2025-06-27 RX ADMIN — APIXABAN 5 MG: 5 TABLET, FILM COATED ORAL at 21:12

## 2025-06-27 RX ADMIN — FUROSEMIDE 40 MG: 10 INJECTION, SOLUTION INTRAMUSCULAR; INTRAVENOUS at 09:53

## 2025-06-27 RX ADMIN — INSULIN LISPRO 6 UNITS: 100 INJECTION, SOLUTION INTRAVENOUS; SUBCUTANEOUS at 21:12

## 2025-06-27 RX ADMIN — METHYLPREDNISOLONE SODIUM SUCCINATE 40 MG: 40 INJECTION, POWDER, LYOPHILIZED, FOR SOLUTION INTRAMUSCULAR; INTRAVENOUS at 09:53

## 2025-06-27 RX ADMIN — IPRATROPIUM BROMIDE AND ALBUTEROL SULFATE 1 DOSE: .5; 2.5 SOLUTION RESPIRATORY (INHALATION) at 20:29

## 2025-06-27 RX ADMIN — ACETAZOLAMIDE 250 MG: 250 TABLET ORAL at 09:54

## 2025-06-27 RX ADMIN — MICONAZOLE NITRATE: 2 POWDER TOPICAL at 10:11

## 2025-06-27 RX ADMIN — INSULIN LISPRO 6 UNITS: 100 INJECTION, SOLUTION INTRAVENOUS; SUBCUTANEOUS at 17:56

## 2025-06-27 RX ADMIN — IPRATROPIUM BROMIDE AND ALBUTEROL SULFATE 1 DOSE: .5; 2.5 SOLUTION RESPIRATORY (INHALATION) at 08:48

## 2025-06-27 RX ADMIN — MIDODRINE HYDROCHLORIDE 2.5 MG: 5 TABLET ORAL at 17:56

## 2025-06-27 RX ADMIN — APIXABAN 5 MG: 5 TABLET, FILM COATED ORAL at 09:52

## 2025-06-27 RX ADMIN — FUROSEMIDE 60 MG: 40 TABLET ORAL at 17:56

## 2025-06-27 RX ADMIN — DIGOXIN 125 MCG: 125 TABLET ORAL at 09:52

## 2025-06-27 RX ADMIN — INSULIN GLARGINE 10 UNITS: 100 INJECTION, SOLUTION SUBCUTANEOUS at 21:12

## 2025-06-27 RX ADMIN — DILTIAZEM HYDROCHLORIDE 240 MG: 240 CAPSULE, COATED, EXTENDED RELEASE ORAL at 09:53

## 2025-06-27 RX ADMIN — SODIUM CHLORIDE, PRESERVATIVE FREE 10 ML: 5 INJECTION INTRAVENOUS at 21:13

## 2025-06-27 RX ADMIN — IPRATROPIUM BROMIDE AND ALBUTEROL SULFATE 1 DOSE: .5; 2.5 SOLUTION RESPIRATORY (INHALATION) at 16:56

## 2025-06-27 RX ADMIN — INSULIN LISPRO 2 UNITS: 100 INJECTION, SOLUTION INTRAVENOUS; SUBCUTANEOUS at 13:11

## 2025-06-27 RX ADMIN — MIDODRINE HYDROCHLORIDE 2.5 MG: 5 TABLET ORAL at 09:53

## 2025-06-27 RX ADMIN — ATORVASTATIN CALCIUM 40 MG: 40 TABLET, FILM COATED ORAL at 21:12

## 2025-06-27 RX ADMIN — SODIUM CHLORIDE, PRESERVATIVE FREE 10 ML: 5 INJECTION INTRAVENOUS at 10:11

## 2025-06-27 NOTE — CARE COORDINATION
Pt will be returning to [place] for SNF placement.  When discharged pt will be going to SNF at [place]  Please notify family  Place D/S. Scripts, AVS, and Completed DANNY in envelope  Call report to [phone]   Call [facility] [phone] for transport.   CM completed Pasr on line

## 2025-06-27 NOTE — CARE COORDINATION
CM called [facility] at [facility] to confirm bed availability. LH  1045 per [facility], pt has a bed available for SNF whenever medically cleared. Lh

## 2025-06-27 NOTE — DISCHARGE INSTR - COC
Continuity of Care Form    Patient Name: Armando Mars   :  1955  MRN:  [id number]    Admit date:  2025  Discharge date:      Code Status Order: Full Code   Advance Directives:     Admitting Physician:  See Garg MD  PCP: Rosalio Hedrick MD    Discharging Nurse: liza reddy   Discharging Hospital Unit/Room#: 3113/3113-A  Discharging Unit Phone Number: [phone]    Emergency Contact:   Extended Emergency Contact Information  Primary Emergency Contact: John Mars  Home Phone: [phone]  Mobile Phone: [phone]  Relation: Brother/Sister   needed? No  Secondary Emergency Contact: Nico Mars  Home Phone: [phone]  Mobile Phone: [phone]  Relation: Brother/Sister  Preferred language: English   needed? No    Past Surgical History:  Past Surgical History:   Procedure Laterality Date    APPENDECTOMY      ATRIAL ABLATION SURGERY  2018    A-fib ablation     CARDIAC SURGERY  2018    \"Heart Ablation Done Twice\"    CHOLECYSTECTOMY, LAPAROSCOPIC  2018    COLONOSCOPY  2011    Polyp Removed    DENTAL SURGERY      All Teeth Extracted In Past    EYE SURGERY Left     \"For Macular Degeneration\"    FEMORAL BYPASS Left 2011    FEMORAL BYPASS Left 2019    FEMORAL POPLITEAL BYPASS LEFT, REDO performed by Ramon Cooper MD at [facility] OR    FRACTURE SURGERY Left     Broken Left Wrist , No Hardware    FRACTURE SURGERY Right 's    Broken Right Wrist With Hardware, Hardware Later Removed    HERNIA REPAIR      Umbilical Hernia    IN LAPAROSCOPY SURG CHOLECYSTECTOMY N/A 2018    CHOLECYSTECTOMY LAPAROSCOPIC performed by Sandor Mendosa MD at [facility] OR    TONSILLECTOMY         Immunization History:   Immunization History   Administered Date(s) Administered    COVID-19, PFIZER PURPLE top, DILUTE for use, (age 12 y+), 30mcg/0.3mL 2021    Influenza Virus Vaccine 10/08/2014    Influenza, AFLURIA (age 3 y+),

## 2025-06-28 LAB
ANION GAP SERPL CALCULATED.3IONS-SCNC: 9 MMOL/L (ref 9–17)
BUN SERPL-MCNC: 28 MG/DL (ref 7–20)
CALCIUM SERPL-MCNC: 9.2 MG/DL (ref 8.3–10.6)
CHLORIDE SERPL-SCNC: 91 MMOL/L (ref 99–110)
CO2 SERPL-SCNC: 33 MMOL/L (ref 21–32)
CREAT SERPL-MCNC: 0.8 MG/DL (ref 0.8–1.3)
GFR, ESTIMATED: >90 ML/MIN/1.73M2
GLUCOSE BLD-MCNC: 231 MG/DL (ref 74–99)
GLUCOSE BLD-MCNC: 238 MG/DL (ref 74–99)
GLUCOSE BLD-MCNC: 414 MG/DL (ref 74–99)
GLUCOSE BLD-MCNC: 426 MG/DL (ref 74–99)
GLUCOSE SERPL-MCNC: 245 MG/DL (ref 74–99)
POTASSIUM SERPL-SCNC: 4.2 MMOL/L (ref 3.5–5.1)
SODIUM SERPL-SCNC: 133 MMOL/L (ref 136–145)

## 2025-06-28 PROCEDURE — 36415 COLL VENOUS BLD VENIPUNCTURE: CPT

## 2025-06-28 PROCEDURE — 6360000002 HC RX W HCPCS: Performed by: STUDENT IN AN ORGANIZED HEALTH CARE EDUCATION/TRAINING PROGRAM

## 2025-06-28 PROCEDURE — 94669 MECHANICAL CHEST WALL OSCILL: CPT

## 2025-06-28 PROCEDURE — 94640 AIRWAY INHALATION TREATMENT: CPT

## 2025-06-28 PROCEDURE — 2700000000 HC OXYGEN THERAPY PER DAY

## 2025-06-28 PROCEDURE — 6370000000 HC RX 637 (ALT 250 FOR IP): Performed by: STUDENT IN AN ORGANIZED HEALTH CARE EDUCATION/TRAINING PROGRAM

## 2025-06-28 PROCEDURE — 6370000000 HC RX 637 (ALT 250 FOR IP): Performed by: INTERNAL MEDICINE

## 2025-06-28 PROCEDURE — 99233 SBSQ HOSP IP/OBS HIGH 50: CPT | Performed by: INTERNAL MEDICINE

## 2025-06-28 PROCEDURE — 2140000000 HC CCU INTERMEDIATE R&B

## 2025-06-28 PROCEDURE — APPNB15 APP NON BILLABLE TIME 0-15 MINS

## 2025-06-28 PROCEDURE — 51702 INSERT TEMP BLADDER CATH: CPT

## 2025-06-28 PROCEDURE — 80048 BASIC METABOLIC PNL TOTAL CA: CPT

## 2025-06-28 PROCEDURE — 6370000000 HC RX 637 (ALT 250 FOR IP): Performed by: NURSE PRACTITIONER

## 2025-06-28 PROCEDURE — 82962 GLUCOSE BLOOD TEST: CPT

## 2025-06-28 PROCEDURE — 2500000003 HC RX 250 WO HCPCS: Performed by: STUDENT IN AN ORGANIZED HEALTH CARE EDUCATION/TRAINING PROGRAM

## 2025-06-28 PROCEDURE — 94761 N-INVAS EAR/PLS OXIMETRY MLT: CPT

## 2025-06-28 RX ORDER — CARVEDILOL 6.25 MG/1
3.12 TABLET ORAL 2 TIMES DAILY WITH MEALS
Status: DISCONTINUED | OUTPATIENT
Start: 2025-06-28 | End: 2025-06-29 | Stop reason: HOSPADM

## 2025-06-28 RX ADMIN — IPRATROPIUM BROMIDE AND ALBUTEROL SULFATE 1 DOSE: .5; 2.5 SOLUTION RESPIRATORY (INHALATION) at 15:31

## 2025-06-28 RX ADMIN — FUROSEMIDE 60 MG: 40 TABLET ORAL at 08:08

## 2025-06-28 RX ADMIN — ATORVASTATIN CALCIUM 40 MG: 40 TABLET, FILM COATED ORAL at 20:42

## 2025-06-28 RX ADMIN — INSULIN GLARGINE 10 UNITS: 100 INJECTION, SOLUTION SUBCUTANEOUS at 20:43

## 2025-06-28 RX ADMIN — MIDODRINE HYDROCHLORIDE 2.5 MG: 5 TABLET ORAL at 18:08

## 2025-06-28 RX ADMIN — INSULIN LISPRO 2 UNITS: 100 INJECTION, SOLUTION INTRAVENOUS; SUBCUTANEOUS at 05:26

## 2025-06-28 RX ADMIN — DIGOXIN 125 MCG: 125 TABLET ORAL at 08:09

## 2025-06-28 RX ADMIN — INSULIN LISPRO 8 UNITS: 100 INJECTION, SOLUTION INTRAVENOUS; SUBCUTANEOUS at 20:42

## 2025-06-28 RX ADMIN — IPRATROPIUM BROMIDE AND ALBUTEROL SULFATE 1 DOSE: .5; 2.5 SOLUTION RESPIRATORY (INHALATION) at 08:29

## 2025-06-28 RX ADMIN — INSULIN LISPRO 2 UNITS: 100 INJECTION, SOLUTION INTRAVENOUS; SUBCUTANEOUS at 11:29

## 2025-06-28 RX ADMIN — IPRATROPIUM BROMIDE AND ALBUTEROL SULFATE 1 DOSE: .5; 2.5 SOLUTION RESPIRATORY (INHALATION) at 20:11

## 2025-06-28 RX ADMIN — SODIUM CHLORIDE, PRESERVATIVE FREE 10 ML: 5 INJECTION INTRAVENOUS at 20:48

## 2025-06-28 RX ADMIN — ACETAMINOPHEN 650 MG: 325 TABLET ORAL at 05:25

## 2025-06-28 RX ADMIN — MICONAZOLE NITRATE: 2 POWDER TOPICAL at 08:10

## 2025-06-28 RX ADMIN — SODIUM CHLORIDE, PRESERVATIVE FREE 10 ML: 5 INJECTION INTRAVENOUS at 08:10

## 2025-06-28 RX ADMIN — FUROSEMIDE 60 MG: 40 TABLET ORAL at 18:08

## 2025-06-28 RX ADMIN — ACETAZOLAMIDE 250 MG: 250 TABLET ORAL at 08:09

## 2025-06-28 RX ADMIN — MICONAZOLE NITRATE: 2 POWDER TOPICAL at 20:43

## 2025-06-28 RX ADMIN — MIDODRINE HYDROCHLORIDE 2.5 MG: 5 TABLET ORAL at 08:08

## 2025-06-28 RX ADMIN — DILTIAZEM HYDROCHLORIDE 240 MG: 240 CAPSULE, COATED, EXTENDED RELEASE ORAL at 08:08

## 2025-06-28 RX ADMIN — INSULIN LISPRO 8 UNITS: 100 INJECTION, SOLUTION INTRAVENOUS; SUBCUTANEOUS at 16:46

## 2025-06-28 RX ADMIN — METHYLPREDNISOLONE SODIUM SUCCINATE 40 MG: 40 INJECTION, POWDER, LYOPHILIZED, FOR SOLUTION INTRAMUSCULAR; INTRAVENOUS at 08:08

## 2025-06-28 RX ADMIN — APIXABAN 5 MG: 5 TABLET, FILM COATED ORAL at 20:42

## 2025-06-28 RX ADMIN — APIXABAN 5 MG: 5 TABLET, FILM COATED ORAL at 08:09

## 2025-06-28 NOTE — FLOWSHEET NOTE
Communication with Nursing:  pt doing okay, and up in recliner.  Okay to see.    Pt sitting in recliner with legs up in the dark.  When asked if he would work with therapy today, he stated \"he just couldn't, he was currently feeling maximally fatigued and super tired.  He was not going to do anything but sit in the chair and rest today.  No pain other than the normal aches and pains of a 69 year old man.\"    No treatment provided as pt refused this date.    Sujey Garcia PTA [id number]    Time attempted: 0945am

## 2025-06-29 VITALS
HEART RATE: 86 BPM | TEMPERATURE: 97.8 F | DIASTOLIC BLOOD PRESSURE: 49 MMHG | RESPIRATION RATE: 16 BRPM | BODY MASS INDEX: 36.45 KG/M2 | SYSTOLIC BLOOD PRESSURE: 116 MMHG | HEIGHT: 78 IN | OXYGEN SATURATION: 94 % | WEIGHT: 315 LBS

## 2025-06-29 LAB
ANION GAP SERPL CALCULATED.3IONS-SCNC: 12 MMOL/L (ref 9–17)
BUN SERPL-MCNC: 30 MG/DL (ref 7–20)
CALCIUM SERPL-MCNC: 9.6 MG/DL (ref 8.3–10.6)
CHLORIDE SERPL-SCNC: 92 MMOL/L (ref 99–110)
CO2 SERPL-SCNC: 29 MMOL/L (ref 21–32)
CREAT SERPL-MCNC: 0.8 MG/DL (ref 0.8–1.3)
GFR, ESTIMATED: >90 ML/MIN/1.73M2
GLUCOSE BLD-MCNC: 193 MG/DL (ref 74–99)
GLUCOSE BLD-MCNC: 263 MG/DL (ref 74–99)
GLUCOSE SERPL-MCNC: 254 MG/DL (ref 74–99)
POTASSIUM SERPL-SCNC: 4.2 MMOL/L (ref 3.5–5.1)
SODIUM SERPL-SCNC: 133 MMOL/L (ref 136–145)

## 2025-06-29 PROCEDURE — 80048 BASIC METABOLIC PNL TOTAL CA: CPT

## 2025-06-29 PROCEDURE — 6370000000 HC RX 637 (ALT 250 FOR IP): Performed by: INTERNAL MEDICINE

## 2025-06-29 PROCEDURE — 6370000000 HC RX 637 (ALT 250 FOR IP): Performed by: STUDENT IN AN ORGANIZED HEALTH CARE EDUCATION/TRAINING PROGRAM

## 2025-06-29 PROCEDURE — 6370000000 HC RX 637 (ALT 250 FOR IP)

## 2025-06-29 PROCEDURE — 94640 AIRWAY INHALATION TREATMENT: CPT

## 2025-06-29 PROCEDURE — 6370000000 HC RX 637 (ALT 250 FOR IP): Performed by: NURSE PRACTITIONER

## 2025-06-29 PROCEDURE — 94761 N-INVAS EAR/PLS OXIMETRY MLT: CPT

## 2025-06-29 PROCEDURE — 2700000000 HC OXYGEN THERAPY PER DAY

## 2025-06-29 PROCEDURE — 6360000002 HC RX W HCPCS: Performed by: STUDENT IN AN ORGANIZED HEALTH CARE EDUCATION/TRAINING PROGRAM

## 2025-06-29 PROCEDURE — 2500000003 HC RX 250 WO HCPCS: Performed by: STUDENT IN AN ORGANIZED HEALTH CARE EDUCATION/TRAINING PROGRAM

## 2025-06-29 PROCEDURE — 82962 GLUCOSE BLOOD TEST: CPT

## 2025-06-29 PROCEDURE — 36415 COLL VENOUS BLD VENIPUNCTURE: CPT

## 2025-06-29 PROCEDURE — 94669 MECHANICAL CHEST WALL OSCILL: CPT

## 2025-06-29 PROCEDURE — 94664 DEMO&/EVAL PT USE INHALER: CPT

## 2025-06-29 RX ORDER — CARVEDILOL 3.12 MG/1
3.12 TABLET ORAL 2 TIMES DAILY WITH MEALS
Qty: 90 TABLET | Refills: 3 | Status: SHIPPED | OUTPATIENT
Start: 2025-06-29

## 2025-06-29 RX ORDER — LISINOPRIL 2.5 MG/1
2.5 TABLET ORAL DAILY
Qty: 60 TABLET | Refills: 3 | Status: SHIPPED | OUTPATIENT
Start: 2025-06-29

## 2025-06-29 RX ORDER — FUROSEMIDE 20 MG/1
60 TABLET ORAL 2 TIMES DAILY
Qty: 180 TABLET | Refills: 3 | Status: SHIPPED | OUTPATIENT
Start: 2025-06-29

## 2025-06-29 RX ADMIN — IPRATROPIUM BROMIDE AND ALBUTEROL SULFATE 1 DOSE: .5; 2.5 SOLUTION RESPIRATORY (INHALATION) at 08:26

## 2025-06-29 RX ADMIN — CARVEDILOL 3.12 MG: 6.25 TABLET, FILM COATED ORAL at 09:43

## 2025-06-29 RX ADMIN — APIXABAN 5 MG: 5 TABLET, FILM COATED ORAL at 09:43

## 2025-06-29 RX ADMIN — MIDODRINE HYDROCHLORIDE 2.5 MG: 5 TABLET ORAL at 09:43

## 2025-06-29 RX ADMIN — METHYLPREDNISOLONE SODIUM SUCCINATE 40 MG: 40 INJECTION, POWDER, LYOPHILIZED, FOR SOLUTION INTRAMUSCULAR; INTRAVENOUS at 09:43

## 2025-06-29 RX ADMIN — FUROSEMIDE 60 MG: 40 TABLET ORAL at 09:43

## 2025-06-29 RX ADMIN — INSULIN LISPRO 2 UNITS: 100 INJECTION, SOLUTION INTRAVENOUS; SUBCUTANEOUS at 09:43

## 2025-06-29 RX ADMIN — LISINOPRIL 2.5 MG: 5 TABLET ORAL at 09:43

## 2025-06-29 RX ADMIN — DIGOXIN 125 MCG: 125 TABLET ORAL at 09:43

## 2025-06-29 NOTE — PLAN OF CARE
Problem: Chronic Conditions and Co-morbidities  Goal: Patient's chronic conditions and co-morbidity symptoms are monitored and maintained or improved  6/27/2025 0111 by Amalia Gonzalez RN  Outcome: Progressing  6/26/2025 1725 by Gayathri Méndez RN  Outcome: Progressing  Flowsheets (Taken 6/26/2025 0745)  Care Plan - Patient's Chronic Conditions and Co-Morbidity Symptoms are Monitored and Maintained or Improved: Monitor and assess patient's chronic conditions and comorbid symptoms for stability, deterioration, or improvement     Problem: Discharge Planning  Goal: Discharge to home or other facility with appropriate resources  6/27/2025 0111 by Amalia Gonzalez RN  Outcome: Progressing  6/26/2025 1725 by Gayathri Méndez RN  Outcome: Progressing  Flowsheets (Taken 6/26/2025 0745)  Discharge to home or other facility with appropriate resources: Identify barriers to discharge with patient and caregiver     Problem: Pain  Goal: Verbalizes/displays adequate comfort level or baseline comfort level  6/27/2025 0111 by Amalia Gonzalez RN  Outcome: Progressing  6/26/2025 1725 by Gayathri Méndez RN  Outcome: Progressing  Flowsheets (Taken 6/26/2025 0745)  Verbalizes/displays adequate comfort level or baseline comfort level: Encourage patient to monitor pain and request assistance     Problem: Skin/Tissue Integrity  Goal: Skin integrity remains intact  Description: 1.  Monitor for areas of redness and/or skin breakdown  2.  Assess vascular access sites hourly  3.  Every 4-6 hours minimum:  Change oxygen saturation probe site  4.  Every 4-6 hours:  If on nasal continuous positive airway pressure, respiratory therapy assess nares and determine need for appliance change or resting period  6/27/2025 0111 by Amalia Gonzalez RN  Outcome: Progressing  6/26/2025 1725 by Gayathri Méndez RN  Outcome: Progressing  Flowsheets (Taken 6/26/2025 0745)  Skin Integrity Remains Intact: Monitor for areas of redness and/or skin 
  Problem: Chronic Conditions and Co-morbidities  Goal: Patient's chronic conditions and co-morbidity symptoms are monitored and maintained or improved  6/28/2025 1042 by Valente Salinas RN  Outcome: Progressing  6/28/2025 0502 by Kole Poole RN  Outcome: Progressing     Problem: Discharge Planning  Goal: Discharge to home or other facility with appropriate resources  6/28/2025 1042 by Valente Salinas RN  Outcome: Progressing  6/28/2025 0502 by Kole Poole RN  Outcome: Progressing     Problem: Pain  Goal: Verbalizes/displays adequate comfort level or baseline comfort level  6/28/2025 1042 by Valente Salinas RN  Outcome: Progressing  6/28/2025 0502 by Kole Poole RN  Outcome: Progressing     Problem: Skin/Tissue Integrity  Goal: Skin integrity remains intact  Description: 1.  Monitor for areas of redness and/or skin breakdown  2.  Assess vascular access sites hourly  3.  Every 4-6 hours minimum:  Change oxygen saturation probe site  4.  Every 4-6 hours:  If on nasal continuous positive airway pressure, respiratory therapy assess nares and determine need for appliance change or resting period  6/28/2025 1042 by Valente Salinas RN  Outcome: Progressing  Flowsheets (Taken 6/28/2025 0803)  Skin Integrity Remains Intact: Monitor for areas of redness and/or skin breakdown  6/28/2025 0502 by Kole Poole RN  Outcome: Progressing     Problem: Safety - Adult  Goal: Free from fall injury  6/28/2025 1042 by Valente Salinas RN  Outcome: Progressing  6/28/2025 0502 by Kole Poole RN  Outcome: Progressing     Problem: Neurosensory - Adult  Goal: Achieves stable or improved neurological status  6/28/2025 1042 by Valente Salinas RN  Outcome: Progressing  6/28/2025 0502 by Kole Poole RN  Outcome: Progressing  Goal: Absence of seizures  6/28/2025 1042 by Valente Salinas RN  Outcome: Progressing  6/28/2025 0502 by Kole Poole RN  Outcome: Progressing  Goal: Achieves maximal functionality and 
  Problem: Chronic Conditions and Co-morbidities  Goal: Patient's chronic conditions and co-morbidity symptoms are monitored and maintained or improved  6/28/2025 2250 by Waqar Singer RN  Outcome: Progressing  6/28/2025 1042 by Valente Salinas RN  Outcome: Progressing     Problem: Discharge Planning  Goal: Discharge to home or other facility with appropriate resources  6/28/2025 2250 by Waqar Singer RN  Outcome: Progressing  6/28/2025 1042 by Valente Salinas RN  Outcome: Progressing     Problem: Pain  Goal: Verbalizes/displays adequate comfort level or baseline comfort level  6/28/2025 2250 by Waqar Singer RN  Outcome: Progressing  6/28/2025 1042 by Valente Salinas RN  Outcome: Progressing     Problem: Skin/Tissue Integrity  Goal: Skin integrity remains intact  6/28/2025 2250 by Waqar Singer RN  Outcome: Progressing  6/28/2025 1042 by Valente Salinas RN  Outcome: Progressing  Flowsheets (Taken 6/28/2025 0803)  Skin Integrity Remains Intact: Monitor for areas of redness and/or skin breakdown     Problem: Safety - Adult  Goal: Free from fall injury  6/28/2025 2250 by Waqar Singer RN  Outcome: Progressing  6/28/2025 1042 by Valente Salinas RN  Outcome: Progressing     Problem: Neurosensory - Adult  Goal: Achieves stable or improved neurological status  6/28/2025 2250 by Waqar Singer RN  Outcome: Progressing  6/28/2025 1042 by Valente Salinas RN  Outcome: Progressing  Goal: Absence of seizures  6/28/2025 2250 by Waqar Singer RN  Outcome: Progressing  6/28/2025 1042 by Valente Salinas RN  Outcome: Progressing  Goal: Achieves maximal functionality and self care  6/28/2025 2250 by Waqar Singer RN  Outcome: Progressing  6/28/2025 1042 by Valente Salinas RN  Outcome: Progressing     Problem: Neurosensory - Adult  Goal: Absence of seizures  6/28/2025 2250 by Waqar Singer RN  Outcome: Progressing  6/28/2025 1042 by Valente Salinas RN  Outcome: Progressing     Problem: Neurosensory - Adult  Goal: Achieves maximal functionality 
  Problem: Chronic Conditions and Co-morbidities  Goal: Patient's chronic conditions and co-morbidity symptoms are monitored and maintained or improved  Outcome: Progressing     Problem: Discharge Planning  Goal: Discharge to home or other facility with appropriate resources  Outcome: Progressing     Problem: Pain  Goal: Verbalizes/displays adequate comfort level or baseline comfort level  Outcome: Progressing     Problem: Skin/Tissue Integrity  Goal: Skin integrity remains intact  Description: 1.  Monitor for areas of redness and/or skin breakdown  2.  Assess vascular access sites hourly  3.  Every 4-6 hours minimum:  Change oxygen saturation probe site  4.  Every 4-6 hours:  If on nasal continuous positive airway pressure, respiratory therapy assess nares and determine need for appliance change or resting period  Outcome: Progressing     Problem: Safety - Adult  Goal: Free from fall injury  Outcome: Progressing     
  Problem: Chronic Conditions and Co-morbidities  Goal: Patient's chronic conditions and co-morbidity symptoms are monitored and maintained or improved  Outcome: Progressing     Problem: Discharge Planning  Goal: Discharge to home or other facility with appropriate resources  Outcome: Progressing     Problem: Pain  Goal: Verbalizes/displays adequate comfort level or baseline comfort level  Outcome: Progressing     Problem: Skin/Tissue Integrity  Goal: Skin integrity remains intact  Description: 1.  Monitor for areas of redness and/or skin breakdown  2.  Assess vascular access sites hourly  3.  Every 4-6 hours minimum:  Change oxygen saturation probe site  4.  Every 4-6 hours:  If on nasal continuous positive airway pressure, respiratory therapy assess nares and determine need for appliance change or resting period  Outcome: Progressing     Problem: Safety - Adult  Goal: Free from fall injury  Outcome: Progressing     
  Problem: Chronic Conditions and Co-morbidities  Goal: Patient's chronic conditions and co-morbidity symptoms are monitored and maintained or improved  Outcome: Progressing     Problem: Discharge Planning  Goal: Discharge to home or other facility with appropriate resources  Outcome: Progressing     Problem: Pain  Goal: Verbalizes/displays adequate comfort level or baseline comfort level  Outcome: Progressing     Problem: Skin/Tissue Integrity  Goal: Skin integrity remains intact  Description: 1.  Monitor for areas of redness and/or skin breakdown  2.  Assess vascular access sites hourly  3.  Every 4-6 hours minimum:  Change oxygen saturation probe site  4.  Every 4-6 hours:  If on nasal continuous positive airway pressure, respiratory therapy assess nares and determine need for appliance change or resting period  Outcome: Progressing     Problem: Safety - Adult  Goal: Free from fall injury  Outcome: Progressing     Problem: Neurosensory - Adult  Goal: Achieves stable or improved neurological status  Outcome: Progressing  Goal: Absence of seizures  Outcome: Progressing  Goal: Achieves maximal functionality and self care  Outcome: Progressing     Problem: Respiratory - Adult  Goal: Achieves optimal ventilation and oxygenation  Outcome: Progressing     Problem: Cardiovascular - Adult  Goal: Maintains optimal cardiac output and hemodynamic stability  Outcome: Progressing  Goal: Absence of cardiac dysrhythmias or at baseline  Outcome: Progressing     Problem: Skin/Tissue Integrity - Adult  Goal: Skin integrity remains intact  Description: 1.  Monitor for areas of redness and/or skin breakdown  2.  Assess vascular access sites hourly  3.  Every 4-6 hours minimum:  Change oxygen saturation probe site  4.  Every 4-6 hours:  If on nasal continuous positive airway pressure, respiratory therapy assess nares and determine need for appliance change or resting period  Outcome: Progressing  Goal: Incisions, wounds, or drain 
  Problem: Chronic Conditions and Co-morbidities  Goal: Patient's chronic conditions and co-morbidity symptoms are monitored and maintained or improved  Outcome: Progressing     Problem: Discharge Planning  Goal: Discharge to home or other facility with appropriate resources  Outcome: Progressing     Problem: Pain  Goal: Verbalizes/displays adequate comfort level or baseline comfort level  Outcome: Progressing     Problem: Skin/Tissue Integrity  Goal: Skin integrity remains intact  Description: 1.  Monitor for areas of redness and/or skin breakdown  2.  Assess vascular access sites hourly  3.  Every 4-6 hours minimum:  Change oxygen saturation probe site  4.  Every 4-6 hours:  If on nasal continuous positive airway pressure, respiratory therapy assess nares and determine need for appliance change or resting period  Outcome: Progressing  Flowsheets (Taken 6/25/2025 0815)  Skin Integrity Remains Intact: Monitor for areas of redness and/or skin breakdown     Problem: Safety - Adult  Goal: Free from fall injury  Outcome: Progressing     Problem: Neurosensory - Adult  Goal: Achieves stable or improved neurological status  Outcome: Progressing  Flowsheets (Taken 6/25/2025 0815)  Achieves stable or improved neurological status: Assess for and report changes in neurological status  Goal: Absence of seizures  Outcome: Progressing  Flowsheets (Taken 6/25/2025 0815)  Absence of seizures: Monitor for seizure activity.  If seizure occurs, document type and location of movements and any associated apnea  Goal: Achieves maximal functionality and self care  Outcome: Progressing  Flowsheets (Taken 6/25/2025 0815)  Achieves maximal functionality and self care: Monitor swallowing and airway patency with patient fatigue and changes in neurological status     Problem: Respiratory - Adult  Goal: Achieves optimal ventilation and oxygenation  Outcome: Progressing     Problem: Cardiovascular - Adult  Goal: Maintains optimal cardiac output 
  Problem: Chronic Conditions and Co-morbidities  Goal: Patient's chronic conditions and co-morbidity symptoms are monitored and maintained or improved  Outcome: Progressing     Problem: Discharge Planning  Goal: Discharge to home or other facility with appropriate resources  Outcome: Progressing  Flowsheets (Taken 6/23/2025 2055)  Discharge to home or other facility with appropriate resources: Identify discharge learning needs (meds, wound care, etc)     Problem: Pain  Goal: Verbalizes/displays adequate comfort level or baseline comfort level  Outcome: Progressing  Flowsheets  Taken 6/23/2025 2200  Verbalizes/displays adequate comfort level or baseline comfort level: Encourage patient to monitor pain and request assistance  Taken 6/23/2025 2053  Verbalizes/displays adequate comfort level or baseline comfort level: Encourage patient to monitor pain and request assistance     Problem: Skin/Tissue Integrity  Goal: Skin integrity remains intact  Description: 1.  Monitor for areas of redness and/or skin breakdown  2.  Assess vascular access sites hourly  3.  Every 4-6 hours minimum:  Change oxygen saturation probe site  4.  Every 4-6 hours:  If on nasal continuous positive airway pressure, respiratory therapy assess nares and determine need for appliance change or resting period  Outcome: Progressing  Flowsheets (Taken 6/23/2025 2055)  Skin Integrity Remains Intact:   Monitor for areas of redness and/or skin breakdown   Check visual cues for pain   Pressure redistribution bed/mattress (bed type)     Problem: Safety - Adult  Goal: Free from fall injury  Outcome: Progressing     Problem: Neurosensory - Adult  Goal: Achieves stable or improved neurological status  Outcome: Progressing  Flowsheets (Taken 6/23/2025 2055)  Achieves stable or improved neurological status: Assess for and report changes in neurological status  Goal: Absence of seizures  Outcome: Progressing  Goal: Achieves maximal functionality and self 
  Problem: Chronic Conditions and Co-morbidities  Goal: Patient's chronic conditions and co-morbidity symptoms are monitored and maintained or improved  Outcome: Progressing  Flowsheets (Taken 6/21/2025 2000)  Care Plan - Patient's Chronic Conditions and Co-Morbidity Symptoms are Monitored and Maintained or Improved:   Monitor and assess patient's chronic conditions and comorbid symptoms for stability, deterioration, or improvement   Update acute care plan with appropriate goals if chronic or comorbid symptoms are exacerbated and prevent overall improvement and discharge   Collaborate with multidisciplinary team to address chronic and comorbid conditions and prevent exacerbation or deterioration     Problem: Discharge Planning  Goal: Discharge to home or other facility with appropriate resources  Outcome: Progressing  Flowsheets (Taken 6/21/2025 2000)  Discharge to home or other facility with appropriate resources:   Identify discharge learning needs (meds, wound care, etc)   Arrange for needed discharge resources and transportation as appropriate   Identify barriers to discharge with patient and caregiver     Problem: Pain  Goal: Verbalizes/displays adequate comfort level or baseline comfort level  Outcome: Progressing     Problem: Skin/Tissue Integrity  Goal: Skin integrity remains intact  Description: 1.  Monitor for areas of redness and/or skin breakdown  2.  Assess vascular access sites hourly  3.  Every 4-6 hours minimum:  Change oxygen saturation probe site  4.  Every 4-6 hours:  If on nasal continuous positive airway pressure, respiratory therapy assess nares and determine need for appliance change or resting period  Outcome: Progressing  Flowsheets (Taken 6/21/2025 2000)  Skin Integrity Remains Intact:   Monitor for areas of redness and/or skin breakdown   Assess vascular access sites hourly   Check visual cues for pain   Monitor skin under medical devices   Assess need for specialty bed     Problem: Safety 
  Problem: Chronic Conditions and Co-morbidities  Goal: Patient's chronic conditions and co-morbidity symptoms are monitored and maintained or improved  Outcome: Progressing  Flowsheets (Taken 6/22/2025 2000)  Care Plan - Patient's Chronic Conditions and Co-Morbidity Symptoms are Monitored and Maintained or Improved:   Monitor and assess patient's chronic conditions and comorbid symptoms for stability, deterioration, or improvement   Collaborate with multidisciplinary team to address chronic and comorbid conditions and prevent exacerbation or deterioration   Update acute care plan with appropriate goals if chronic or comorbid symptoms are exacerbated and prevent overall improvement and discharge     Problem: Discharge Planning  Goal: Discharge to home or other facility with appropriate resources  Outcome: Progressing  Flowsheets (Taken 6/22/2025 2000)  Discharge to home or other facility with appropriate resources:   Identify barriers to discharge with patient and caregiver   Arrange for needed discharge resources and transportation as appropriate   Identify discharge learning needs (meds, wound care, etc)     Problem: Pain  Goal: Verbalizes/displays adequate comfort level or baseline comfort level  Outcome: Progressing  Flowsheets (Taken 6/22/2025 2000)  Verbalizes/displays adequate comfort level or baseline comfort level:   Encourage patient to monitor pain and request assistance   Assess pain using appropriate pain scale   Administer analgesics based on type and severity of pain and evaluate response   Implement non-pharmacological measures as appropriate and evaluate response     Problem: Skin/Tissue Integrity  Goal: Skin integrity remains intact  Description: 1.  Monitor for areas of redness and/or skin breakdown  2.  Assess vascular access sites hourly  3.  Every 4-6 hours minimum:  Change oxygen saturation probe site  4.  Every 4-6 hours:  If on nasal continuous positive airway pressure, respiratory therapy 
  Problem: Chronic Conditions and Co-morbidities  Goal: Patient's chronic conditions and co-morbidity symptoms are monitored and maintained or improved  Outcome: Progressing  Flowsheets (Taken 6/24/2025 2038)  Care Plan - Patient's Chronic Conditions and Co-Morbidity Symptoms are Monitored and Maintained or Improved: Monitor and assess patient's chronic conditions and comorbid symptoms for stability, deterioration, or improvement     Problem: Discharge Planning  Goal: Discharge to home or other facility with appropriate resources  Outcome: Progressing     Problem: Pain  Goal: Verbalizes/displays adequate comfort level or baseline comfort level  Outcome: Progressing     Problem: Skin/Tissue Integrity  Goal: Skin integrity remains intact  Description: 1.  Monitor for areas of redness and/or skin breakdown  2.  Assess vascular access sites hourly  3.  Every 4-6 hours minimum:  Change oxygen saturation probe site  4.  Every 4-6 hours:  If on nasal continuous positive airway pressure, respiratory therapy assess nares and determine need for appliance change or resting period  Outcome: Progressing  Flowsheets (Taken 6/24/2025 2038)  Skin Integrity Remains Intact: Monitor for areas of redness and/or skin breakdown     Problem: Safety - Adult  Goal: Free from fall injury  Outcome: Progressing     Problem: Neurosensory - Adult  Goal: Achieves stable or improved neurological status  Outcome: Progressing  Flowsheets (Taken 6/24/2025 2038)  Achieves stable or improved neurological status: Assess for and report changes in neurological status  Goal: Absence of seizures  Outcome: Progressing  Goal: Achieves maximal functionality and self care  Outcome: Progressing     Problem: Respiratory - Adult  Goal: Achieves optimal ventilation and oxygenation  Outcome: Progressing  Flowsheets (Taken 6/24/2025 2038)  Achieves optimal ventilation and oxygenation: Assess for changes in respiratory status     Problem: Cardiovascular - Adult  Goal: 
  Problem: Chronic Conditions and Co-morbidities  Goal: Patient's chronic conditions and co-morbidity symptoms are monitored and maintained or improved  Outcome: Progressing  Flowsheets (Taken 6/26/2025 0745)  Care Plan - Patient's Chronic Conditions and Co-Morbidity Symptoms are Monitored and Maintained or Improved: Monitor and assess patient's chronic conditions and comorbid symptoms for stability, deterioration, or improvement     Problem: Discharge Planning  Goal: Discharge to home or other facility with appropriate resources  Outcome: Progressing  Flowsheets (Taken 6/26/2025 0745)  Discharge to home or other facility with appropriate resources: Identify barriers to discharge with patient and caregiver     Problem: Pain  Goal: Verbalizes/displays adequate comfort level or baseline comfort level  Outcome: Progressing  Flowsheets (Taken 6/26/2025 0745)  Verbalizes/displays adequate comfort level or baseline comfort level: Encourage patient to monitor pain and request assistance     Problem: Skin/Tissue Integrity  Goal: Skin integrity remains intact  Description: 1.  Monitor for areas of redness and/or skin breakdown  2.  Assess vascular access sites hourly  3.  Every 4-6 hours minimum:  Change oxygen saturation probe site  4.  Every 4-6 hours:  If on nasal continuous positive airway pressure, respiratory therapy assess nares and determine need for appliance change or resting period  Outcome: Progressing  Flowsheets (Taken 6/26/2025 0745)  Skin Integrity Remains Intact: Monitor for areas of redness and/or skin breakdown     Problem: Safety - Adult  Goal: Free from fall injury  Outcome: Progressing     Problem: Neurosensory - Adult  Goal: Achieves stable or improved neurological status  Outcome: Progressing  Flowsheets (Taken 6/26/2025 0745)  Achieves stable or improved neurological status: Assess for and report changes in neurological status  Goal: Absence of seizures  Outcome: Progressing  Flowsheets (Taken 
Met with patient for heart failure education. See progress note.  
Minimal or absence of nausea and vomiting  6/26/2025 0034 by Amalia Gonzalez RN  Outcome: Progressing  6/25/2025 1556 by Gayathri Méndez RN  Outcome: Progressing  Flowsheets (Taken 6/25/2025 0815)  Minimal or absence of nausea and vomiting: Administer IV fluids as ordered to ensure adequate hydration  Goal: Maintains or returns to baseline bowel function  6/26/2025 0034 by Amalia Gonzalez RN  Outcome: Progressing  6/25/2025 1556 by Gayathri Méndez RN  Outcome: Progressing  Flowsheets (Taken 6/25/2025 0815)  Maintains or returns to baseline bowel function: Assess bowel function  Goal: Maintains adequate nutritional intake  6/26/2025 0034 by Amalia Gonzalez RN  Outcome: Progressing  6/25/2025 1556 by Gayathri Méndez RN  Outcome: Progressing  Flowsheets (Taken 6/25/2025 0815)  Maintains adequate nutritional intake: Monitor percentage of each meal consumed  Goal: Establish and maintain optimal ostomy function  6/26/2025 0034 by Amalia Gonzalez RN  Outcome: Progressing  6/25/2025 1556 by Gayathri Méndez RN  Outcome: Progressing  Flowsheets (Taken 6/25/2025 0815)  Establish and maintain optimal ostomy function: Administer IV fluids and TPN as ordered

## 2025-06-29 NOTE — PROGRESS NOTES
Cardiology Progress Note     Today's Plan change Lasix to p.o.    Admit Date:  6/20/2025    Consult reason / Seen today for: Acute on chronic HFrEF with significant volume overload    Subjective and Overnight Events: Up sitting in the chair.  States he was unable to lay down on the bed last night due to orthopnea.  He does state that his shortness of breath has improved      Telemetry: atrial fib    Assessment / Plan:     Acute HFrEF with component of right heart failure  Echocardiogram shows EF 35-40% with dilated LV along with severely dilated RV  -11 L thus far.  Shortness of breath improving.  Edema is minimal to lower extremities.  Weight has trended down.  Change IV Lasix  to p.o. 60 mg bid  Continue to hold MRA   Blood pressure soft - using midodrine   Decrease lisinopril to 2.5 mg daily      Valvular heart disease  Severe aortic stenosis.  CAMERON 0.9 cm² AV mean gradient 33 mmHg  Moderate to severe MR with MR ERO 0.37 cm²  Will need further workup once he is euvolemic which can be done in the outpatient setting.      Persistent atrial fib  Rate is well-controlled.  Currently on Cardizem and lanoxin  which will be continue  Continue elqiuis for stroke prophylaxis        History of Presenting Illness:    Chief complain on admission : 69 y.o.year old who is admitted for  Chief Complaint   Patient presents with    Shortness of Breath     Patient is a resident in [facility] [place] and today while on 5-6 liters of oxygen per nasal canula patients oxygen was in the 80's per staff at the facility.  Patient complains of shortness of breath and states it feels like I can't get the air out. Arrives on a NRB at 15 liters per minute and oxygen saturation at 100 percent.        Past medical history:    has a past medical history of A-fib ([facility]), Anxiety, Arthritis, COPD (chronic obstructive pulmonary disease) ([facility]), Depression, Diabetes 
                                                                               Cardiology Progress Note     Admit Date:  6/20/2025      Subjective and  Overnight Events : Patient states that his breathing is little better.  His leg edema is also getting better.  He had good urine output overnight.        Physical Exam:  Vitals:    06/26/25 1540   BP:    Pulse: 70   Resp: 19   Temp:    SpO2: 97%        General Appearance:  No distress, conversant  Respiratory:  Normal breath sounds, No respiratory distress, No wheezing  Cardiovascular: S1, S2, no murmurs, gallops. JVD wnl  Abdomen /GI:  Bowel sounds normal, Soft, No tenderness   Integument: Continues to have bilateral pitting edema but it is getting better  Neurologic:  Alert & oriented x 3, no focal deficits noted       Lab Data:  CBC:   Recent Labs     06/24/25  0923 06/25/25  0600 06/26/25  0307   WBC 12.2* 14.7* 11.2*   HGB 13.4* 13.5 13.6   HCT 41.8* 43.3 42.0   MCV 94.4 95.0 93.8    205 186     BMP:   Recent Labs     06/24/25  0442 06/25/25  0600 06/26/25  1003    139 137   K 4.7 4.6 5.2*   CL 93* 92* 88*   CO2 33* 38* 41*   BUN 26* 24* 23*   CREATININE 0.6* 0.7* 0.8     PT/INR: No results for input(s): \"PROTIME\", \"INR\" in the last 72 hours.  BNP:    Recent Labs     06/24/25  0442 06/26/25  0307   PROBNP 9,184* 4,672*     TROPONIN: No results for input(s): \"TROPONINT\" in the last 72 hours.       Assessment and Recommendations:      Acute HFrEF with significant fluid overload  Acute COPD exacerbation with superimposed pneumonia  Longstanding persistent atrial fibrillation        Patient has been - 4 L overnight.  For now we will continue with current diuretic regimen.  We will continue with lisinopril 5 mg daily as well.  Agree with holding spironolactone for now given hyperkalemia.  Please closely monitor his kidney function and electrolytes during aggressive IV diuresis.  Please continue with apixaban, digoxin, diltiazem     All labs, medications 
                                             [place] [address]  Phone: [phone]    Fax [phone]                  Diallo Rodriguez MD, [facility]       Sergio Lucio MD, [facility]  Khadijah De La O MD, [facility]    MD Darci Perkins MD Tariq Rizvi, MD Bilal Alam, MD Dr. Waseem Sajjad MD Melissa Kellis, APRMARCO ANTONIO Griggs, APRMARCO ANTONIO Moody, APRMARCO ANTONIO Woody, APRN  [place] [facility]    CARDIOLOGY  NOTE      Name:  Armando Mars /Age/Sex: 1955  (69 y.o. male)   MRN & CSN:  [id number] & [id number] Admission Date/Time: 2025  3:00 PM   Location:  [address] [place] [address] PCP: Rosalio Hedrick MD       Hospital Day: 9    - Cardiology consult is for:  CHF      ASSESSMENT/ PLAN:  Acute on chronic heart failure with reduced ejection fraction  Chronic venous insufficiency  Severe aortic stenosis   moderate to severe mitral regurgitation  - Volume overload improving  -Strict I's and O's and daily weights.  -17 L, weight down trended however poorly tracked  - Echocardiogram revealing EF 35-40% which is new.  - CAMERON of 0.94 cm² and AV mean gradient 33 mmHg which may be underestimated  - Patient needs to undergo outpatient TAVR workup  -GDMT: Initiated on carvedilol 3.125 mg twice daily (intolerant to metoprolol in past).  Will need to consider initiating on Imdur and hydralazine for afterload reduction  -Oral Lasix 60 mg twice daily  - Spironolactone currently withheld due to recurrent hyperkalemia  Persistent atrial fibrillation s/p ablation   - Heart rate is controlled at this time  -Goal potassium 4.0-4.5, magnesium 2.0-2.2. Replete per protocol.  - Continue digoxin 125 mcg daily  -Discontinue diltiazem in setting of reduced EF  -Continue Eliquis 5 mg twice daily  Morbid obesity: BMI 40  Peripheral arterial disease s/p right redo femoral popliteal bypass  Sleep apnea: Encourage Bipap  COPD  type 2 diabetes mellitus: Most 
    V2.0    [facility] [place] [place] Progress Note      Name:  Armando Mars /Age/Sex: 1955  (69 y.o. male)   MRN & CSN:  [id number] & [id number] Encounter Date/Time: 2025 10:36 AM EDT   Location:  [facility]-A PCP: Rosalio Hedrick MD     Attending:Shankar Downs MD       Hospital Day: 4    Assessment and Recommendations   Armando Mars is a 69 y.o. male  who presents with Acute hypoxic respiratory failure (HCC)    # Acute on chronic hypoxemic/hypercapnic respiratory failure  - Secondary to COPD/CHF exacerbation  - On Vapotherm  - On Lasix drip  - On antibiotics  - Pulmonary toilet/steroids/breathing treatments/doxycycline  - 2D echo  - Cardiology consulted and following  - Pulmonology following    # Chronic A-fib  - On Eliquis and Cardizem    # Type 2 diabetes mellitus  - SSI/Lantus  - POC glucose checks  - Hypoglycemic protocol    # LINA  - BiPAP nightly    # Legally blind    Diet ADULT DIET; Regular; No Added Salt (3-4 gm); 1200 ml   DVT Prophylaxis [] Lovenox, []  Heparin, [] SCDs, [] Ambulation,  [] Eliquis, [] Xarelto  [] Coumadin   Code Status Full Code   Disposition From: Home nursing Home  Expected Disposition: Nursing home  Estimated Date of Discharge: 3 to 4 days  Patient requires continued admission due to IV antibiotics and aggressive IV diuresis   Surrogate Decision Maker/ POA          Personally reviewed Lab Studies and Imaging             Subjective:     Chief Complaint:     Patient seen and examined at bedside this morning.  Reports somewhat okay sleep.  Appetite okay.  Denies any nausea or vomiting, fever or chills      Review of Systems:      Pertinent positives and negatives discussed in HPI    Objective:     Intake/Output Summary (Last 24 hours) at 2025 1036  Last data filed at 2025 0635  Gross per 24 hour   Intake 1150.5 ml   Output 2200 ml   Net -1049.5 ml      Vitals:   Vitals:    25 0600 25 0746 25 0756 25 0802   BP: 129/74   (!) 149/97   Pulse: 69 72 
    V2.0    [facility] [place] Progress Note      Name:  Armando Mars /Age/Sex: 1955  (69 y.o. male)   MRN & CSN:  [id number] & [id number] Encounter Date/Time: 2025 10:36 AM EDT   Location:  [facility] PCP: Rosalio Hedrick MD     Attending:Khushboo Castillo MD       Hospital Day: 5    Assessment and Recommendations   Armando Mars is a 69 y.o. male  who presents with Acute hypoxic respiratory failure (HCC)    # Acute on chronic hypoxemic/hypercapnic respiratory failure  - Secondary to COPD/CHF exacerbation  - On Vapotherm  - On Lasix drip--no strict intake output data available.  - On antibiotics  - Pulmonary toilet/steroids/breathing treatments/doxycycline  - 2D echo completed on 2025 showed EF 35 to 40%, septal and apical akinesis, dilated left ventricle, severe aortic stenosis, moderate to severe mitral valve regurgitation.  - Cardiology consulted and following  - Patient has significant bilateral lower extremity swelling.  Reviewed CXR which appears improved.  Insert Whitaker for proper ARIANE's.  Will give 1 dose of metolazone today    # Chronic A-fib  - On Eliquis and Cardizem    # Type 2 diabetes mellitus  - SSI/Lantus  - POC glucose checks  - Hypoglycemic protocol    # LINA  - BiPAP nightly    # Legally blind    Diet ADULT DIET; Regular; No Added Salt (3-4 gm); 1200 ml   DVT Prophylaxis [] Lovenox, []  Heparin, [] SCDs, [] Ambulation,  [] Eliquis, [] Xarelto  [] Coumadin   Code Status Full Code   Disposition From: Home nursing Home  Expected Disposition: Nursing home  Estimated Date of Discharge: 3 to 4 days  Patient requires continued admission due to IV antibiotics and aggressive IV diuresis   Surrogate Decision Maker/ POA          Personally reviewed Lab Studies and Imaging             Subjective:     Chief Complaint:     Patient seen and examined at bedside this morning.  Patient sitting in chair.  Has significant bilateral lower extremity edema despite of Lasix drip.      Review of 
    V2.0    [facility] [place]      Name:  Armando Mars /Age/Sex: 1955  (69 y.o. male)   MRN & CSN:  [id number] & [id number] Encounter Date/Time: 2025 10:36 AM EDT   [facility]  [facility] PCP: Rosalio Hedrick MD     Attending:Khushboo Castillo MD       Hospital Day: 8    Assessment and Recommendations   Armando Mars is a 69 y.o. male  who presents with Acute hypoxic respiratory failure (HCC)    # Acute on chronic hypoxemic/hypercapnic respiratory failure  - Secondary to COPD/CHF exacerbation  - On antibiotics  - Pulmonary toilet/steroids/breathing treatments/doxycycline  - 2D echo completed on 2025 showed EF 35 to 40%, septal and apical akinesis, dilated left ventricle, severe aortic stenosis, moderate to severe mitral valve regurgitation.  - Cardiology consulted and following  - Weaned off to nasal cannula  - Has been weaned off to nasal cannula.  - Continue lisinopril.  Will hold spironolactone due to hyperkalemia.  Continue midodrine for hypotension.  - Continue to maintain Whitaker for strict intake and output.  Could not do strict intake and output until Whitaker was placed.  - Continue IV Lasix 60 Mg twice daily and Diamox    # Hyperkalemia  -Likely due to lisinopril and spironolactone use.  Will hold Aldactone today.    # Chronic A-fib  - On Eliquis and Cardizem, and digoxin    # Type 2 diabetes mellitus  - SSI/Lantus  - POC glucose checks  - Hypoglycemic protocol    # LINA  - BiPAP nightly    # Legally blind    Diet ADULT DIET; Regular; No Added Salt (3-4 gm); 1200 ml   DVT Prophylaxis [] Lovenox, []  Heparin, [] SCDs, [] Ambulation,  [] Eliquis, [] Xarelto  [] Coumadin   Code Status Full Code   Disposition From: Home nursing Home  Expected Disposition: Nursing home  Estimated Date of Discharge: 3 to 4 days  Patient requires continued admission due to IV antibiotics and aggressive IV diuresis   Surrogate Decision Maker/ POA          Personally reviewed Lab Studies and Imaging 
    V2.[place]    [facility] [place]      Name:  Armando Mars /Age/Sex: 1955  (69 y.o. male)   MRN & CSN:  [id number] & [id number] Encounter Date/Time: 2025 10:36 AM EDT   Location:  [facility]-A PCP: Rosalio Hedrick MD     Attending:Khushboo Castillo MD       Hospital Day: 9    Assessment and Recommendations   Armando Mars is a 69 y.o. male  who presents with Acute hypoxic respiratory failure (HCC)    # Acute on chronic hypoxemic/hypercapnic respiratory failure  - Secondary to COPD/CHF exacerbation  - On antibiotics  - Pulmonary toilet/steroids/breathing treatments/doxycycline  - 2D echo completed on 2025 showed EF 35 to 40%, septal and apical akinesis, dilated left ventricle, severe aortic stenosis, moderate to severe mitral valve regurgitation.  - Cardiology consulted and following  - Weaned off to nasal cannula  - Has been weaned off to nasal cannula.  - Continue lisinopril.  Will hold spironolactone due to hyperkalemia.  Continue midodrine for hypotension.  - Continue with oral Lasix 60 mg twice daily.  Stop Diamox today.  -Remove Whitaker catheter.    # Hyperkalemia  -Likely due to lisinopril and spironolactone use.  Continue to hold Aldactone.    # Chronic A-fib  - On Eliquis and Cardizem, and digoxin    # Type 2 diabetes mellitus  - SSI/Lantus  - POC glucose checks  - Hypoglycemic protocol    # LINA  - BiPAP nightly    # Legally blind    Diet ADULT DIET; Regular; No Added Salt (3-4 gm); 1200 ml   DVT Prophylaxis [] Lovenox, []  Heparin, [] SCDs, [] Ambulation,  [] Eliquis, [] Xarelto  [] Coumadin   Code Status Full Code   Disposition From: Home nursing Home  Expected Disposition: Nursing home  Estimated Date of Discharge: 3 to 4 days  Patient requires continued admission due to IV antibiotics and aggressive IV diuresis   Surrogate Decision Maker/ POA          Personally reviewed Lab Studies and Imaging             Subjective:     Chief Complaint:     Improved bilateral lower extremity 
    V2.0  [facility] [place] Hospitalist Progress Note      Name:  Armando Mars /Age/Sex: 1955  (69 y.o. male)   MRN & CSN:  [id number] & [id number] Encounter Date/Time: 2025 9:40 AM EDT    Location:  [facility]/3[facility]-A PCP: Rosalio Hedrick MD       Hospital Day: 2    Assessment and Plan:   Armando Mars is a 69 y.o. male who presents with Acute hypoxic respiratory failure (HCC)    Assessment and Plan:  Acute on chronic hypoxic hypercapnic respiratory failure in the setting of combined COPD exacerbation, CHF exacerbation and concerns for acute bacterial pneumonia.  Started on vancomycin and cefepime cultures are pending TSL antibody based on culture results.  Started on Solu-Medrol DuoNebs incentive spirometry chest with therapy along with pulmonology consultation.  Also started on Lasix 40 mg IV twice daily telemetry in place strict I's and O's Daily weights.  Goal dry weight of 325 pounds currently 360 pound on the chart recommend standing weight for accurate assessment.  Telemetry in place  Severe sepsis in setting of above currently on antibiotics.  Gradually improving  Chronic hypoxic respiratory failure 3 L oxygen at baseline  Chronic atrial fibrillation continue with Eliquis and Cardize cardiology on board  Diabetes mellitus insulin-dependent type 2 start the patient on 10 of Lantus and sliding scale insulin monitor for hypoglycemia  Obstructive sleep apnea on BiPAP on a nightly basis  Morbid obesity recommend lifestyle modifications and weight loss  Legally blind    Medical Decision Making:  The following items were considered in medical decision making:  Discussion of patient care with other providers  Reviewed clinical lab tests if any  Reviewed radiology tests if any  Reviewed other diagnostic tests/interventions  Independent review of radiologic images if any  Microbiology cultures and other micro tests if any    Estimated time spent for medical decision-making encompassing complexity of the 
    V2.0  [facility] [place] Hospitalist Progress Note      Name:  Armando Mars /Age/Sex: 1955  (69 y.o. male)   MRN & CSN:  [id number] & [id number] Encounter Date/Time: 2025 9:40 AM EDT    Location:  [facility]/3113-A PCP: Rosalio Hedrick MD       Hospital Day: 3    Assessment and Plan:   Armando Mars is a 69 y.o. male who presents with Acute hypoxic respiratory failure (HCC)    Assessment and Plan:  Acute on chronic hypoxic hypercapnic respiratory failure in the setting of combined COPD exacerbation, CHF exacerbation and concerns for acute bacterial pneumonia.  Currently on 35 L high flow nasal cannula.  Diuresing well.  Started on vancomycin and cefepime cultures are pending de-escalate antibiotics based on culture results.  Started on Solu-Medrol DuoNebs incentive spirometry chest with therapy along with pulmonology consultation.  Also started on Lasix 40 mg IV twice daily telemetry in place strict I's and O's Daily weights.  Goal dry weight of 217 pounds currently 232 pound on the chart recommend standing weight for accurate assessment.  Telemetry in place  Severe sepsis in setting of above currently on antibiotics.  Gradually improving  Chronic hypoxic respiratory failure 3 L oxygen at baseline  Chronic atrial fibrillation continue with Eliquis and Cardizem cardiology on board  Diabetes mellitus insulin-dependent type 2 start the patient on 10 of Lantus and sliding scale insulin monitor for hypoglycemia  Obstructive sleep apnea on BiPAP on a nightly basis  Morbid obesity recommend lifestyle modifications and weight loss  Legally blind    Medical Decision Making:  The following items were considered in medical decision making:  Discussion of patient care with other providers  Reviewed clinical lab tests if any  Reviewed radiology tests if any  Reviewed other diagnostic tests/interventions  Independent review of radiologic images if any  Microbiology cultures and other micro tests if any    Estimated 
  Physician Progress Note      PATIENT:               KAROLINE HAGEN  CSN #:                  [id number]  :                       1955  ADMIT DATE:       2025 3:00 PM  DISCH DATE:  RESPONDING  PROVIDER #:        Khushboo Castillo MD          QUERY TEXT:    \"Severe sepsis in setting of above currently on antibiotics.  Gradually   improving-acute bacterial pneumonia\" PN -. The diagnosis was not noted   in subsequent documentation.  Please clarify the status of this condition.    The clinical indicators include:  69 y.o. male who presents with shortness of breath fatigue myalgias weight   gain around 20 pounds    Indicators-wbc 15.1, HR-104, la 2.4-3.1 - pulmonary emphysema and bilateral   lung parenchymal infiltrates.  ED consult--\" Patient is meeting sepsis   criteria, with possible source of infection, leukocytosis, tachycardia, as   well as elevated lactic acid.  Full sepsis fluid bolus held given concern for   fluid overload and CHF, instead, 100 mL normal saline given.  Patient was   started on IV antibiotics.  Cultures are pending. Pulmonary consult-Agree with   antibx-pneumonia    TX-   Full sepsis fluid bolus held given concern for fluid overload and CHF,   instead, 100 mL normal saline given.Started on vancomycin and cefepime   cultures are pending. TSL antibody based on culture results.  Started on   Solu-Medrol DuoNebs incentive spirometry chest with therapy along with   pulmonology consultation.  Also started on Lasix 40 mg IV twice daily   telemetry in place strict I's and O's Daily weights-doxycycline (VIBRAMYCIN)    mg IntraVENous Q12H  Options provided:  -- Severe sepsis due to pneumonia present on admission confirmed after study  -- Sepsis and pneumonia ruled out after study  -- Other - I will add my own diagnosis  -- Disagree - Not applicable / Not valid  -- Disagree - Clinically unable to determine / Unknown  -- Refer to Clinical Documentation Reviewer    PROVIDER RESPONSE 
4 Eyes Skin Assessment     NAME:  Armando Mars  YOB: 1955  MEDICAL RECORD NUMBER:  [id number]    The patient is being assessed for  Admission (from ED to 3N)    I agree that at least one RN has performed a thorough Head to Toe Skin Assessment on the patient. ALL assessment sites listed below have been assessed.      Areas assessed by both nurses:    Head, Face, Ears, Shoulders, Back, Chest, Arms, Elbows, Hands, Sacrum. Buttock, Coccyx, Ischium, Legs. Feet and Heels, and Under Medical Devices     Sebaceous cyst on back, unopened  Scratches to abdomen  Redness abdominal pannus  Open blisters to left leg, healing        Does the Patient have a Wound? No noted wound(s)       Hector Prevention initiated by RN: Yes  Wound Care Orders initiated by RN: Yes    Pressure Injury (Stage 3,4, Unstageable, DTI, NWPT, and Complex wounds) if present, place Wound referral order by RN under : No    New Ostomies, if present place, Ostomy referral order under : No     Nurse 1 eSignature: Electronically signed by Priya Michelle RN on 6/20/25 at 7:08 PM EDT    **SHARE this note so that the co-signing nurse can place an eSignature**    Nurse 2 eSignature: Electronically signed by Bam Guillaume RN on 6/20/25 at 7:12 PM EDT    
Comprehensive Nutrition Assessment    Type and Reason for Visit:  Initial, LOS (d 7)    Nutrition Recommendations/Plan:   Continue current diet, encourage No Concentrated Sweets  Begin low rosa isela, high protein oral nutrition supplement daily-chocolate     Malnutrition Assessment:  Malnutrition Status:  No malnutrition (06/27/25 1249)    Context:  Chronic Illness       Nutrition Assessment:    Pt admitted with COPD exacerbation. H/O CHF, COPD, HLD, DM, HTN. Reports a \"so-so\" appetite, consuming 76% to all of meals per Flowsheets. Is not happy with diet restrictions, but understands why it's important. Willing to trial oral supplement, prefers chocolate. His wt appears fairly stable over the past 6 mos, other than fluid fluctuation. No additional questions re diet for home. Follow as moderate nutrition risk.    Nutrition Related Findings:    Na 132, BUN 26, Cr 0.7, Glu 168-248, A1c 7.1   Wound Type: Venous Stasis       Current Nutrition Intake & Therapies:    Average Meal Intake: %  Average Supplements Intake: None Ordered  ADULT DIET; Regular; No Added Salt (3-4 gm); 1200 ml    Anthropometric Measures:  Height: 198.1 cm (6' 6\")  Ideal Body Weight (IBW): 214 lbs (97 kg)    Admission Body Weight: 146.8 kg (323 lb 10.2 oz)  Current Body Weight: 147.2 kg (324 lb 8.3 oz),   IBW.    Current BMI (kg/m2): 37.5  Usual Body Weight: 146.8 kg (323 lb 10.2 oz) (12/2024)     % Weight Change (Calculated): 0.3                    BMI Categories: Obese Class 2 (BMI 35.0 -39.9)    Estimated Daily Nutrient Needs:  Energy Requirements Based On: Formula  Weight Used for Energy Requirements: Current  Energy (kcal/day): 9549-9910 (MSJ)  Weight Used for Protein Requirements: Ideal  Protein (g/day):  (1-1.2 g/kg IBW)  Method Used for Fluid Requirements: Other  Fluid (ml/day): 1200 ml ordered    Nutrition Diagnosis:   Predicted inadequate energy intake related to increase demand for energy/nutrients as evidenced by other (taller 
Critical VBG results handed to Lico NASH  
Lab called with critical lactic 2.5 Dr See Garg made aware  
Met with patient and brother. Introduced myself as the Heart failure education R.N. I have seen this patient for heart failure education at previous admissions.  Patient resides at [facility].  He is weighed routinely, is given his medication and meals.      Admitting diagnosis- Acute hypoxic respiratory failure  Cardiologist- Michael   Heart Failure Education Nurse consulted- yes   Ejection fraction 36 % as of 6/23/2025  Pro BNP- 7,758 on 6/22  Hospital follow up appt-  [facility]  Patient informed of appointment and appointment added to AVS  Smoking Cessation information and referral-N/A   Pneumonia vaccine- current pneumonia ?  [facility]  Patient has a digital scale? Per [facility]a   Transportation- per Rolla    Able to obtain medications without difficulty?- Yes- Patient denies difficulty in obtaining or taking medications. Reviewed Heart failure patient education book with patient. Heart failure education included:causes, symptoms, medications, diet, daily weights, exercise and fluid control.  Questions answered.      Patient's heart failure medications reviewed and information given on each.   Patient/ brother were engaged and attentive during education session.            
Occupational Therapy    Occupational Therapy Treatment Note  Name: Armando Mars MRN: [id number] :   1955   Date:  2025   Admission Date: 2025 Room:  [address] [place] [address]     Communication with other providers:  RN approved session.     Subjective:  Patient states:  \"Mellissa already stood\"  Pain: denies pain  Restrictions: general, pain, and oxygen  No one at bedside    Objective:    Observation:  pt was seated  upon arrival, agreeable to session  Objective Measures:  PT alert and oriented    Treatment, including education:  Therapeutic Activity Training:   Therapeutic activity training was instructed today.  Cues were given for safety, sequence, UE/LE placement, visual cues, and balance.       Pt seated in chair and practiced lower body dressing doffing and donning socks with MAX A. Pt completed sit to stand from the armed chair with no assistive device with three trials with SBA.  Pt completed seated rest break.  Pt required increased time.     Education: Role of OT, OT POC, safety, benefits of EOB/OOB activity, rationale for treatment  Safety Measures: Gait belt used for safety of pt and therapist, Left in seated in chair , Alarm in place, call light and phone within reach, lines managed    Assessment / Impression:    Patient's tolerance of treatment: good  Adverse Reaction: none  Significant change in status and impact:  none  Barriers to improvement: none    Plan for Next Session:    Continue OT POC    Time in: 1345  Time out:1408    Timed treatment minutes: 23   Total treatment time: 23    Electronically signed by:       OREN Dean      
PHARMACY VANCOMYCIN MONITORING SERVICE  Pharmacy consulted by Britany Peterson for monitoring and adjustment.    Indication for treatment: Vancomycin indication: Other: CAP  Goal trough: Trough Goal: 10-15 mcg/mL  AUC/JUAN: 400-600    Risk Factors for MRSA Identified:   Hospitalization within the past 90 days    Pertinent Laboratory Values:   Temp Readings from Last 3 Encounters:   06/20/25 97.6 °F (36.4 °C) (Oral)   05/04/25 98.3 °F (36.8 °C) (Oral)   02/10/24 98.5 °F (36.9 °C) (Oral)     Recent Labs     06/20/25  1512   WBC 15.1*     Recent Labs     06/20/25  1512   BUN 17   CREATININE 0.9     Estimated Creatinine Clearance: 128 mL/min (based on SCr of 0.9 mg/dL).  No intake or output data in the 24 hours ending 06/20/25 1946  Last Encounter Weight:  Wt Readings from Last 3 Encounters:   06/20/25 (!) 146.8 kg (323 lb 10.2 oz)   05/04/25 (!) 163.3 kg (360 lb)   12/02/24 (!) 142.9 kg (315 lb)       Pertinent Cultures:   Date    Source    Results  06/20   Blood    pending     Sputum/Respiratory    06/20   MRSA Nasal   ordered     Wound        Vancomycin level:   TROUGH:  No results for input(s): \"VANCOTROUGH\" in the last 72 hours.  RANDOM:  No results for input(s): \"VANCORANDOM\" in the last 72 hours.    Assessment:  HPI: Acute on chronic hypoxic hypercapnic respiratory failure in the setting of combined COPD exacerbation. 3 L oxygen at baseline. Diabetes mellitus insulin-dependent type 2. Obstructive sleep apnea on BiPAP.  SCr, BUN, and urine output: 0.9, 17  Day(s) of therapy: 1  Vancomycin concentration: to be collected     Plan:  Loading dose of vancomycin administered: 2500 mg . Followed by a scheduled dose of vancomycin 1500 every 12 hours.   Pharmacy will continue to monitor patient and adjust therapy as indicated ( 510/15.5)    VANCOMYCIN CONCENTRATION SCHEDULED FOR 06/22 @0600    Thank you for the consult.  Kerry Marie RPH  6/20/2025 7:46 PM   
PHARMACY VANCOMYCIN MONITORING SERVICE  Pharmacy consulted by Britany Peterson for monitoring and adjustment.    Indication for treatment: Vancomycin indication: Other: CAP  Goal trough:15-20  AUC/JUAN: 400-600    Risk Factors for MRSA Identified:   Hospitalization within the past 90 days    Pertinent Laboratory Values:   Temp Readings from Last 3 Encounters:   06/21/25 98.2 °F (36.8 °C) (Oral)   05/04/25 98.3 °F (36.8 °C) (Oral)   02/10/24 98.5 °F (36.9 °C) (Oral)     Recent Labs     06/20/25  1512   WBC 15.1*     Recent Labs     06/20/25  1512 06/21/25  0413 06/21/25  0813   BUN 17 23* 25*   CREATININE 0.9 0.9 0.9     Estimated Creatinine Clearance: 128 mL/min (based on SCr of 0.9 mg/dL).    Intake/Output Summary (Last 24 hours) at 6/21/2025 1041  Last data filed at 6/21/2025 0956  Gross per 24 hour   Intake 1396.09 ml   Output 1450 ml   Net -53.91 ml     Last Encounter Weight:  Wt Readings from Last 3 Encounters:   06/20/25 (!) 146.8 kg (323 lb 10.2 oz)   05/04/25 (!) 163.3 kg (360 lb)   12/02/24 (!) 142.9 kg (315 lb)       Pertinent Cultures:   Date    Source    Results  06/20   Blood    pending  06/20   Sputum/Respiratory  pending  06/20   MRSA Nasal   ordered  06/20   Blood                                     pending  06/20                          Urine                          pending    Vancomycin level:   TROUGH:  No results for input(s): \"VANCOTROUGH\" in the last 72 hours.  RANDOM:  No results for input(s): \"VANCORANDOM\" in the last 72 hours.    Assessment:  HPI: Acute on chronic hypoxic hypercapnic respiratory failure in the setting of combined COPD exacerbation. 3 L oxygen at baseline. Diabetes mellitus insulin-dependent type 2. Obstructive sleep apnea on BiPAP.  SCr, BUN: 0.9, 25 U/O:good  Day(s) of therapy: 2  Vancomycin concentration: to be collected     Plan:  Loading dose of vancomycin administered: 2500 mg . Followed by a scheduled dose of vancomycin 1500 every 12 hours. ( 516/15.7)  Pharmacy will 
Patient woundcare done,he keeps on removing telemetry and peeing on the floor despite having purewick.Alert and oriented.on oxygen at 5 litres with good sats.currently sitted on achair.  
Pt AAOx3 has removed oxygen numerous times during shift despite educating on his current resp status. Pt has urinated in the floor multiple times during shift gown and linens changed. Electrodes replaced multiple times and pulse ox due to pt removing them. I have educated pt numerous times on the importance of his pulse ox remaining in place and staying on the tele monitor. Verbalized understanding but continues to remove BP cuff and monitor.  
Pt's blood sugar 419 after eating a cheeseburger and large frosty that friend brought him. Dr. Downs notified via perfects Serve. Insulin scale adjusted. Sheela Madrid RN 06/23/25 4:01 PM      
Pulmonary and Critical Care  Progress Note    Subjective:   The patient has improved  Shortness of breath has improved  Chest pain none.  Addressing respiratory complaints Patient is negative for  hemoptysis and cyanosis  CONSTITUTIONAL:  negative for fevers and chills      Past Medical History:     has a past medical history of A-fib ([facility]), Anxiety, Arthritis, COPD (chronic obstructive pulmonary disease) ([facility]), Depression, Diabetes mellitus ([facility]), Full dentures, H/O angiography, H/O Doppler ultrasound, H/O echocardiogram, Hx of colonoscopy, Hx of Doppler ultrasound, Hyperlipidemia, Hypertension, Macular degeneration, Obesity, On home oxygen therapy, PAD (peripheral artery disease), Panic attacks, Pneumonia, PTSD (post-traumatic stress disorder), Restless leg, Shortness of breath, Sleep apnea, and Wears glasses.   has a past surgical history that includes Atrial ablation surgery (05/23/2018); pr laparoscopy surg cholecystectomy (N/A, 11/12/2018); Colonoscopy (07/2011); eye surgery (Left, 2017); Dental surgery; Cardiac surgery (05/2018); Cholecystectomy, laparoscopic (11/12/2018); Appendectomy (2003); hernia repair (2003); femoral bypass (Left, 01/2011); Tonsillectomy (1960's); fracture surgery (Left, 1980's); fracture surgery (Right, 2000's); and femoral bypass (Left, 1/9/2019).   reports that he quit smoking about 3 years ago. His smoking use included cigars and cigarettes. He started smoking about 52 years ago. He has a 12.2 pack-year smoking history. He quit smokeless tobacco use about 50 years ago.  His smokeless tobacco use included chew. He reports that he does not drink alcohol and does not use drugs.  Family history:  family history includes Allergy (Severe) in his brother; Arthritis in his sister; Diabetes in his sister; Early Death in his father and mother; Early Death (age of onset: 21) in his brother; Mental Illness in his sister; Obesity in his brother.    Allergies   Allergen Reactions    Metoprolol  
RENAL DOSE ADJUSTMENT MADE PER P/T PROTOCOL    PREVIOUS ORDER:  Cefepime 2000mg Q12h (30 minute infusions)    Estimated Creatinine Clearance: 128 mL/min (based on SCr of 0.9 mg/dL).  Recent Labs     06/20/25  1512   BUN 17   CREATININE 0.9        NEW RENALLY ADJUSTED ORDER:  Cefepime 2000mg x 1 dose over 30 minutes, followed by Cefepime 2000mg Q8h extended infusion, each over 4 hours (per [facility] P&T protocol)    Pito Ortega RPH  6/20/2025 7:56 PM     
pulmonary      SUBJECTIVE:  seen early am and improving clinical status slowl;y     OBJECTIVE    VITALS:  /60   Pulse 79   Temp 97.6 °F (36.4 °C)   Resp 18   Ht 1.981 m (6' 6\")   Wt (!) 147.2 kg (324 lb 8.3 oz)   SpO2 96%   BMI 37.50 kg/m²   HEAD AND FACE EXAM:  No throat injection, no active exudate,no thrush  NECK EXAM;No JVD, no masses, symmetrical  CHEST EXAM; Expansion equal and symmetrical, no masses  LUNG EXAM; Good breath sounds bilaterally. There are expiratory wheezes both lungs, there are crackles at both lung bases  CARDIOVASCULAR EXAM: Positive S1 and S2, no S3 or S4, no clicks ,no murmurs  RIGHT AND LEFT LOWER EXTRIMITY EXAM: No edema, no swelling, no inflamation  CNS EXAM: Alert and oriented X3          LABS   Lab Results   Component Value Date    WBC 11.2 (H) 06/26/2025    HGB 13.6 06/26/2025    HCT 42.0 06/26/2025    MCV 93.8 06/26/2025     06/26/2025     Lab Results   Component Value Date    CREATININE 0.7 (L) 06/27/2025    BUN 26 (H) 06/27/2025     (L) 06/27/2025    K 3.9 06/27/2025    CL 89 (L) 06/27/2025    CO2 36 (H) 06/27/2025     Lab Results   Component Value Date    INR 1.5 02/05/2024    PROTIME 17.9 (H) 02/05/2024          Lab Results   Component Value Date/Time    PHOS 4.5 01/14/2024 04:29 AM    PHOS 4.3 01/13/2024 04:02 AM    PHOS 4.2 01/12/2024 03:31 AM      No results for input(s): \"PH\", \"PO2ART\", \"TMI3IXX\", \"HCO3\", \"BEART\", \"O2SAT\" in the last 72 hours.      Wt Readings from Last 3 Encounters:   06/27/25 (!) 147.2 kg (324 lb 8.3 oz)   05/04/25 (!) 163.3 kg (360 lb)   12/02/24 (!) 142.9 kg (315 lb)               ASSESMENT  Ac on ch resp failure  Ac copd  Ac chf  pneumonia        PLAN  Switched to nasal cannula  Duonebs  Po steroids  Wean o2 as tolerated    6/27/2025  Neptali Keen MD, M.D.  
pulmonary      SUBJECTIVE:  seen early am and remains on aitvo 60% fio2 which is better than yesterday     OBJECTIVE    VITALS:  BP 93/61   Pulse 91   Temp 98.6 °F (37 °C) (Oral)   Resp 21   Ht 1.981 m (6' 6\")   Wt 108.3 kg (238 lb 12.1 oz)   SpO2 98%   BMI 27.59 kg/m²   HEAD AND FACE EXAM:  No throat injection, no active exudate,no thrush  NECK EXAM;No JVD, no masses, symmetrical  CHEST EXAM; Expansion equal and symmetrical, no masses  LUNG EXAM; Good breath sounds bilaterally. There are expiratory wheezes both lungs, there are crackles at both lung bases  CARDIOVASCULAR EXAM: Positive S1 and S2, no S3 or S4, no clicks ,no murmurs  RIGHT AND LEFT LOWER EXTRIMITY EXAM: No edema, no swelling, no inflamation  .          LABS   Lab Results   Component Value Date    WBC 12.2 (H) 06/24/2025    HGB 13.4 (L) 06/24/2025    HCT 41.8 (L) 06/24/2025    MCV 94.4 06/24/2025     06/24/2025     Lab Results   Component Value Date    CREATININE 0.6 (L) 06/24/2025    BUN 26 (H) 06/24/2025     06/24/2025    K 4.7 06/24/2025    CL 93 (L) 06/24/2025    CO2 33 (H) 06/24/2025     Lab Results   Component Value Date    INR 1.5 02/05/2024    PROTIME 17.9 (H) 02/05/2024          Lab Results   Component Value Date/Time    PHOS 4.5 01/14/2024 04:29 AM    PHOS 4.3 01/13/2024 04:02 AM    PHOS 4.2 01/12/2024 03:31 AM      No results for input(s): \"PH\", \"PO2ART\", \"FKT0MDM\", \"HCO3\", \"BEART\", \"O2SAT\" in the last 72 hours.      Wt Readings from Last 3 Encounters:   06/23/25 108.3 kg (238 lb 12.1 oz)   05/04/25 (!) 163.3 kg (360 lb)   12/02/24 (!) 142.9 kg (315 lb)               ASSESMENT  Ac onn ch hypoxic and hypercapneic resp failure  Ac copd  Ac chf  Pneumonia  monica        PLAN  Duonebs  Steroids  On lasix  Continue vapotherm and try weaning fio2    6/24/2025  Neptali Keen MD, M.D.  
pulmonary      SUBJECTIVE:  seen early am and sleeping     OBJECTIVE    VITALS:  BP (!) 97/57   Pulse 78   Temp 97.8 °F (36.6 °C) (Oral)   Resp 20   Ht 1.981 m (6' 6\")   Wt (!) 163.3 kg (360 lb 0.2 oz)   SpO2 95%   BMI 41.60 kg/m²   HEAD AND FACE EXAM:  No throat injection, no active exudate,no thrush  NECK EXAM;No JVD, no masses, symmetrical  CHEST EXAM; Expansion equal and symmetrical, no masses  LUNG EXAM; Good breath sounds bilaterally. There are expiratory wheezes both lungs, there are crackles at both lung bases  CARDIOVASCULAR EXAM: Positive S1 and S2, no S3 or S4, no clicks ,no murmurs  RIGHT AND LEFT LOWER EXTRIMITY EXAM: No .          LABS   Lab Results   Component Value Date    WBC 14.7 (H) 06/25/2025    HGB 13.5 06/25/2025    HCT 43.3 06/25/2025    MCV 95.0 06/25/2025     06/25/2025     Lab Results   Component Value Date    CREATININE 0.7 (L) 06/25/2025    BUN 24 (H) 06/25/2025     06/25/2025    K 4.6 06/25/2025    CL 92 (L) 06/25/2025    CO2 38 (H) 06/25/2025     Lab Results   Component Value Date    INR 1.5 02/05/2024    PROTIME 17.9 (H) 02/05/2024          Lab Results   Component Value Date/Time    PHOS 4.5 01/14/2024 04:29 AM    PHOS 4.3 01/13/2024 04:02 AM    PHOS 4.2 01/12/2024 03:31 AM      No results for input(s): \"PH\", \"PO2ART\", \"KNV7BIT\", \"HCO3\", \"BEART\", \"O2SAT\" in the last 72 hours.      Wt Readings from Last 3 Encounters:   06/25/25 (!) 163.3 kg (360 lb 0.2 oz)   05/04/25 (!) 163.3 kg (360 lb)   12/02/24 (!) 142.9 kg (315 lb)               ASSESMENT  Ac on ch resp failure  Ac copd severe  Ac chf EF 35%  Pneumonia  Sukumar  Severe AS  Mod severe mitral regurg        PLAN  Antibx  Duonebs  Vap[otherm  Bipap at night  On iv steroids  On iv lasix    6/25/2025  Neptali Keen MD, M.D.  
pulmonary      SUBJECTIVE:  vapotherm stopped and pt on nasal cannula. He feels improving     OBJECTIVE    VITALS:  /64   Pulse 82   Temp 98.2 °F (36.8 °C) (Oral)   Resp 22   Ht 1.981 m (6' 6\")   Wt (!) 148.2 kg (326 lb 11.6 oz)   SpO2 97%   BMI 37.76 kg/m²   HEAD AND FACE EXAM:  No throat injection, no active exudate,no thrush  NECK EXAM;No JVD, no masses, symmetrical  CHEST EXAM; Expansion equal and symmetrical, no masses  LUNG EXAM; Good breath sounds bilaterally. There are expiratory wheezes both lungs, there are crackles at both lung bases  CARDIOVASCULAR EXAM: Positive S1 and S2, no S3 or S4, no clicks ,no murmurs  RIGHT AND LEFT LOWER EXTRIMITY EXAM: No edema, no swelling, no inflamation  CNS EXAM: Alert and oriented X3          LABS   Lab Results   Component Value Date    WBC 11.2 (H) 06/26/2025    HGB 13.6 06/26/2025    HCT 42.0 06/26/2025    MCV 93.8 06/26/2025     06/26/2025     Lab Results   Component Value Date    CREATININE 0.7 (L) 06/25/2025    BUN 24 (H) 06/25/2025     06/25/2025    K 4.6 06/25/2025    CL 92 (L) 06/25/2025    CO2 38 (H) 06/25/2025     Lab Results   Component Value Date    INR 1.5 02/05/2024    PROTIME 17.9 (H) 02/05/2024          Lab Results   Component Value Date/Time    PHOS 4.5 01/14/2024 04:29 AM    PHOS 4.3 01/13/2024 04:02 AM    PHOS 4.2 01/12/2024 03:31 AM      No results for input(s): \"PH\", \"PO2ART\", \"OSV2NZB\", \"HCO3\", \"BEART\", \"O2SAT\" in the last 72 hours.      Wt Readings from Last 3 Encounters:   06/26/25 (!) 148.2 kg (326 lb 11.6 oz)   05/04/25 (!) 163.3 kg (360 lb)   12/02/24 (!) 142.9 kg (315 lb)               ASSESMENT  Ac on ch resp failure  Ac copd  Ac chf  Pneumonia  Sev AS  Mod-sev MR        PLAN  Cont duoneb  Steroids  Lasix  O2 adm  Not interersted in PAP rx    6/26/2025  Neptali Keen MD, M.D.  
therapy as indicated     VANCOMYCIN CONCENTRATION SCHEDULED FOR     Thank you for the consult.  Ayaan Bhardwaj RPH  6/22/2025 11:07 AM   
°C)    Respiratory Rate : Resp  Av.4  Min: 16  Max: 29    Pulse Range: Pulse  Av.5  Min: 73  Max: 95    Blood Presuure Range:  Systolic (24hrs), Av , Min:93 , Max:131   ; Diastolic (24hrs), Av, Min:61, Max:89      Pulse ox Range: SpO2  Av %  Min: 93 %  Max: 99 %    24hr I & O:    Intake/Output Summary (Last 24 hours) at 2025 1504  Last data filed at 2025 1203  Gross per 24 hour   Intake 1222.63 ml   Output --   Net 1222.63 ml         /70   Pulse 80   Temp 98 °F (36.7 °C) (Oral)   Resp 18   Ht 1.981 m (6' 6\")   Wt 108.3 kg (238 lb 12.1 oz)   SpO2 98%   BMI 27.59 kg/m²         TELEMETRY: Atrial fibrillation      has a past medical history of A-fib ([facility]), Anxiety, Arthritis, COPD (chronic obstructive pulmonary disease) ([facility]), Depression, Diabetes mellitus ([facility]), Full dentures, H/O angiography, H/O Doppler ultrasound, H/O echocardiogram, Hx of colonoscopy, Hx of Doppler ultrasound, Hyperlipidemia, Hypertension, Macular degeneration, Obesity, On home oxygen therapy, PAD (peripheral artery disease), Panic attacks, Pneumonia, PTSD (post-traumatic stress disorder), Restless leg, Shortness of breath, Sleep apnea, and Wears glasses.   has a past surgical history that includes Atrial ablation surgery (2018); pr laparoscopy surg cholecystectomy (N/A, 2018); Colonoscopy (2011); eye surgery (Left, ); Dental surgery; Cardiac surgery (2018); Cholecystectomy, laparoscopic (2018); Appendectomy (); hernia repair (); femoral bypass (Left, 2011); Tonsillectomy ('s); fracture surgery (Left, 's); fracture surgery (Right, 's); and femoral bypass (Left, 2019).    Physical Exam  Constitutional:       Appearance: He is obese. He is ill-appearing.      Comments:  monitor at bedside   HENT:      Mouth/Throat:      Comments: Pupils equal and round  Cardiovascular:      Rate and Rhythm: Normal rate. Rhythm irregular.   Pulmonary:      Breath 
2   [x] 2   [] 3   [] 3   [] 4      2. Bathing (including washing, rinsing, drying)? [] 1   [] 2   [x] 2 [] 3 [] 3 [] 4   3. Toileting, which includes using toilet, bedpan, or urinal? [] 1    [] 2   [x] 2   [] 3   [] 3   [] 4     4. Putting on and taking off regular upper body clothing? [] 1   [] 2   [] 2   [] 3   [x] 3    [] 4      5. Taking care of personal grooming such as brushing teeth? [] 1   [] 2    [] 2 [] 3    [x] 3   [] 4      6. Eating meals?   [] 1   [] 2   [] 2   [] 3   [] 3   [x] 4      Raw Score:  16    [24=0% impaired(CH), 23=1-19%(CI), 20-22=20-39%(CJ), 15-19=40-59%(CK), 10-14=60-79%(CL), 7-9=80-99%(CM), 6=100%(CN)]     Mobility:  Stand Pivot Transfer: SBA (pt completing from chair to EOB as therapist arrived)  Pt deferred all further mobility, citing using a power chair at baseline and reports that he is not supposed to be up on his feet, despite just transferring back form chair     Balance:   Sitting balance: Good     Pt educated on role of therapy, POC, safety awareness, importance of OOB activity     Safety: Patient left seated EOB as was received, call light within reach, RN notified.    Assessment:  Pt is a 68 y/o male admitted from [facility] for acute hypoxic respiratory failure. Pt at baseline needs assist prn for ADLs and for functional transfers/mobility w/ electric wheelchair. Pt currently presents w/ deficits relating to ADLs, IADLs, UE strength/ROM, functional activity tolerance, cognition, safety awareness, and functional mobility. Pt would benefit from continued acute care OT services and upon discharge would benefit from moderate post-acute rehabilitation, anticipate 1-2 hours per day and 5 days per week.    Complexity: Moderate   Prognosis: Good, no significant barriers to participation at this time.   Occupational Therapy Plan  Times Per Week: 3x+  Times Per Day: Once a day  Current Treatment Recommendations: Strengthening, ROM, Functional mobility training, Balance training, 
06/21/2025 04:13 AM    HDL 39 06/21/2025 04:13 AM    TRIG 72 06/21/2025 04:13 AM     Hemoglobin A1C:   Lab Results   Component Value Date/Time    LABA1C 7.1 06/21/2025 04:13 AM     TSH:   Lab Results   Component Value Date/Time    TSH 1.16 06/20/2025 10:11 PM    TSH 2.33 07/17/2018 02:30 PM     Troponin:   Lab Results   Component Value Date/Time    TROPONINT <0.010 09/18/2023 12:45 AM    TROPONINT <0.010 09/13/2023 07:15 AM    TROPONINT <0.010 08/14/2023 09:30 AM     Lactic Acid:   No results for input(s): \"LACTA\" in the last 72 hours.    BNP:   Recent Labs     06/24/25  0442   PROBNP 9,184*     UA:  Lab Results   Component Value Date/Time    NITRU NEGATIVE 06/20/2025 05:43 PM    COLORU Yellow 06/20/2025 05:43 PM    PHUR 5.0 06/20/2025 05:43 PM    WBCUA <1 09/13/2023 08:09 AM    RBCUA 0 09/13/2023 08:09 AM    MUCUS RARE 11/07/2018 04:30 PM    TRICHOMONAS NONE SEEN 09/13/2023 08:09 AM    BACTERIA NEGATIVE 09/13/2023 08:09 AM    CLARITYU CLEAR 02/05/2024 06:50 PM    LEUKOCYTESUR NEGATIVE 06/20/2025 05:43 PM    UROBILINOGEN 1.0 06/20/2025 05:43 PM    BILIRUBINUR NEGATIVE 06/20/2025 05:43 PM    BLOODU NEGATIVE 02/05/2024 06:50 PM    GLUCOSEU NEGATIVE 06/20/2025 05:43 PM    KETUA NEGATIVE 06/20/2025 05:43 PM     Urine Cultures: No results found for: \"LABURIN\"  Blood Cultures: No results found for: \"BC\"  No results found for: \"BLOODCULT2\"  Organism: No results found for: \"ORG\"      Electronically signed by Khushboo Castillo MD on 6/25/2025 at 2:39 PM   
13.4* 13.5 13.6    205 186     BMP:    Recent Labs     06/24/25  0442 06/25/25  0600 06/26/25  1003    139 137   K 4.7 4.6 5.2*   CL 93* 92* 88*   CO2 33* 38* 41*   BUN 26* 24* 23*   CREATININE 0.6* 0.7* 0.8   GLUCOSE 272* 168* 184*     Hepatic:   No results for input(s): \"AST\", \"ALT\", \"BILITOT\", \"ALKPHOS\" in the last 72 hours.    Invalid input(s): \"ALB\"    Lipids:   Lab Results   Component Value Date/Time    CHOL 102 06/21/2025 04:13 AM    HDL 39 06/21/2025 04:13 AM    TRIG 72 06/21/2025 04:13 AM     Hemoglobin A1C:   Lab Results   Component Value Date/Time    LABA1C 7.1 06/21/2025 04:13 AM     TSH:   Lab Results   Component Value Date/Time    TSH 1.16 06/20/2025 10:11 PM    TSH 2.33 07/17/2018 02:30 PM     Troponin:   Lab Results   Component Value Date/Time    TROPONINT <0.010 09/18/2023 12:45 AM    TROPONINT <0.010 09/13/2023 07:15 AM    TROPONINT <0.010 08/14/2023 09:30 AM     Lactic Acid:   No results for input(s): \"LACTA\" in the last 72 hours.    BNP:   Recent Labs     06/24/25  0442 06/26/25  0307   PROBNP 9,184* 4,672*     UA:  Lab Results   Component Value Date/Time    NITRU NEGATIVE 06/20/2025 05:43 PM    COLORU Yellow 06/20/2025 05:43 PM    PHUR 5.0 06/20/2025 05:43 PM    WBCUA <1 09/13/2023 08:09 AM    RBCUA 0 09/13/2023 08:09 AM    MUCUS RARE 11/07/2018 04:30 PM    TRICHOMONAS NONE SEEN 09/13/2023 08:09 AM    BACTERIA NEGATIVE 09/13/2023 08:09 AM    CLARITYU CLEAR 02/05/2024 06:50 PM    LEUKOCYTESUR NEGATIVE 06/20/2025 05:43 PM    UROBILINOGEN 1.0 06/20/2025 05:43 PM    BILIRUBINUR NEGATIVE 06/20/2025 05:43 PM    BLOODU NEGATIVE 02/05/2024 06:50 PM    GLUCOSEU NEGATIVE 06/20/2025 05:43 PM    KETUA NEGATIVE 06/20/2025 05:43 PM     Urine Cultures: No results found for: \"LABURIN\"  Blood Cultures: No results found for: \"BC\"  No results found for: \"BLOODCULT2\"  Organism: No results found for: \"ORG\"      Electronically signed by Khushboo Castillo MD on 6/26/2025 at 1:23 PM

## 2025-06-29 NOTE — DISCHARGE SUMMARY
Discharge Summary    Name:  Armando Mars /Age/Sex: 1955  (69 y.o. male)   MRN & CSN:  [id number] & [id number] Admission Date/Time: 2025  3:00 PM   Attending:  Khushboo Castillo MD Discharging Physician: Khushboo Castillo MD       Discharge Diagnosis:  Acute on chronic hypoxic respiratory failure  CHF exacerbation  Hyperkalemia  Type II DM      Discharge Exam  Physical Exam  Vitals:    25 0200 25 0339 25 0826 25 0939   BP: (!) 116/49      Pulse: 77 63 94 86   Resp: 16 17  16   Temp:  97.5 °F (36.4 °C)  97.8 °F (36.6 °C)   TempSrc:  Oral  Oral   SpO2: 93% 99% (!) 88% 94%   Weight:       Height:          General: NAD  Eyes: EOMI  ENT: neck supple  Cardiovascular: Regular rate.  Respiratory: C diminished breath sounds  Gastrointestinal: Soft, non tender  Genitourinary: no suprapubic tenderness  Musculoskeletal:  bilateral 1 + pitting edema  Skin: warm, dry  Neuro: Alert.  Psych: Mood appropriate.     Hospital Course:   Armando Mars is a 69 y.o.  male  who presents with Acute hypoxic respiratory failure (HCC)      # Acute on chronic hypoxemic/hypercapnic respiratory failure  - Secondary to COPD/CHF exacerbation  -Completed doxycycline.  - Pulmonary toilet/steroids/breathing treatments/doxycycline  - 2D echo completed on 2025 showed EF 35 to 40%, septal and apical akinesis, dilated left ventricle, severe aortic stenosis, moderate to severe mitral valve regurgitation.  - Weaned off to nasal cannula--patient wearing 4 L O2 which is his baseline.  - Continue lisinopril.  Will hold spironolactone due to hyperkalemia.  Continue midodrine for hypotension.  - Continue with oral Lasix 60 mg twice daily.  - Patient's presenting weight was 360 lb.  Patient's weight 324 lb at the time of discharge.  Patient's bilateral lower extremity swelling has significantly improved.     # Hyperkalemia  -Likely due to lisinopril and spironolactone use.  Continue to hold Aldactone.     # Chronic

## 2025-06-29 NOTE — CARE COORDINATION
Pt on discharge back to [facility].      Discharging Nurse: Upon discharge pt will be going to [facility]- report to be called directly to [phone], they will provide number to fax AVS.  Transport can be set up with [facility] by calling dispatch at [phone]. Call Family to notify of discharge.

## 2025-06-30 ENCOUNTER — TELEPHONE (OUTPATIENT)
Dept: CARDIOLOGY CLINIC | Age: 70
End: 2025-06-30

## 2025-07-04 ENCOUNTER — HOSPITAL ENCOUNTER (INPATIENT)
Age: 70
LOS: 2 days | Discharge: HOME | DRG: 291 | End: 2025-07-06
Attending: EMERGENCY MEDICINE | Admitting: STUDENT IN AN ORGANIZED HEALTH CARE EDUCATION/TRAINING PROGRAM
Payer: MEDICARE

## 2025-07-04 ENCOUNTER — APPOINTMENT (OUTPATIENT)
Dept: GENERAL RADIOLOGY | Age: 70
DRG: 291 | End: 2025-07-04
Payer: MEDICARE

## 2025-07-04 DIAGNOSIS — J44.1 COPD EXACERBATION (HCC): ICD-10-CM

## 2025-07-04 DIAGNOSIS — R65.10 SIRS (SYSTEMIC INFLAMMATORY RESPONSE SYNDROME) (HCC): ICD-10-CM

## 2025-07-04 DIAGNOSIS — R07.9 CHEST PAIN, UNSPECIFIED TYPE: ICD-10-CM

## 2025-07-04 DIAGNOSIS — J18.9 PNEUMONIA DUE TO INFECTIOUS ORGANISM, UNSPECIFIED LATERALITY, UNSPECIFIED PART OF LUNG: Primary | ICD-10-CM

## 2025-07-04 DIAGNOSIS — R06.89 DYSPNEA AND RESPIRATORY ABNORMALITIES: ICD-10-CM

## 2025-07-04 DIAGNOSIS — R79.89 TROPONIN LEVEL ELEVATED: ICD-10-CM

## 2025-07-04 DIAGNOSIS — R09.02 HYPOXEMIA: ICD-10-CM

## 2025-07-04 DIAGNOSIS — R06.00 DYSPNEA AND RESPIRATORY ABNORMALITIES: ICD-10-CM

## 2025-07-04 DIAGNOSIS — Z86.79 HISTORY OF CHF (CONGESTIVE HEART FAILURE): ICD-10-CM

## 2025-07-04 DIAGNOSIS — R79.89 ELEVATED BRAIN NATRIURETIC PEPTIDE (BNP) LEVEL: ICD-10-CM

## 2025-07-04 PROBLEM — J96.01 ACUTE HYPOXEMIC RESPIRATORY FAILURE (HCC): Status: ACTIVE | Noted: 2025-07-04

## 2025-07-04 LAB
ALBUMIN SERPL-MCNC: 3.7 G/DL (ref 3.4–5)
ALBUMIN/GLOB SERPL: 1.5 {RATIO} (ref 1.1–2.2)
ALP SERPL-CCNC: 83 U/L (ref 40–129)
ALT SERPL-CCNC: 32 U/L (ref 10–40)
ANION GAP SERPL CALCULATED.3IONS-SCNC: 15 MMOL/L (ref 9–17)
ARTERIAL PATENCY WRIST A: ABNORMAL
AST SERPL-CCNC: 22 U/L (ref 15–37)
BASOPHILS # BLD: 0.03 K/UL
BASOPHILS NFR BLD: 0 % (ref 0–1)
BILIRUB SERPL-MCNC: 1 MG/DL (ref 0–1)
BILIRUB UR QL STRIP: NEGATIVE
BNP SERPL-MCNC: 4083 PG/ML (ref 0–125)
BODY TEMPERATURE: 37
BUN SERPL-MCNC: 14 MG/DL (ref 7–20)
CALCIUM SERPL-MCNC: 8.7 MG/DL (ref 8.3–10.6)
CHLORIDE SERPL-SCNC: 91 MMOL/L (ref 99–110)
CLARITY UR: CLEAR
CO2 SERPL-SCNC: 31 MMOL/L (ref 21–32)
COHGB MFR BLD: 0.8 % (ref 0.5–1.5)
COLOR UR: YELLOW
COMMENT: NORMAL
CREAT SERPL-MCNC: 0.6 MG/DL (ref 0.8–1.3)
CRP SERPL HS-MCNC: 36.1 MG/L (ref 0–5)
D DIMER PPP FEU-MCNC: 0.48 UG/ML FEU (ref 0–0.46)
EOSINOPHIL # BLD: 0.2 K/UL
EOSINOPHILS RELATIVE PERCENT: 1 % (ref 0–3)
ERYTHROCYTE [DISTWIDTH] IN BLOOD BY AUTOMATED COUNT: 15.8 % (ref 11.7–14.9)
ERYTHROCYTE [SEDIMENTATION RATE] IN BLOOD BY WESTERGREN METHOD: 3 MM/HR (ref 0–20)
GFR, ESTIMATED: >90 ML/MIN/1.73M2
GLUCOSE BLD-MCNC: 229 MG/DL (ref 74–99)
GLUCOSE BLD-MCNC: 318 MG/DL (ref 74–99)
GLUCOSE SERPL-MCNC: 123 MG/DL (ref 74–99)
GLUCOSE UR STRIP-MCNC: NEGATIVE MG/DL
HCO3 VENOUS: 28.1 MMOL/L (ref 22–29)
HCT VFR BLD AUTO: 46.6 % (ref 42–52)
HGB BLD-MCNC: 15.1 G/DL (ref 13.5–18)
HGB UR QL STRIP.AUTO: NEGATIVE
IMM GRANULOCYTES # BLD AUTO: 0.13 K/UL
IMM GRANULOCYTES NFR BLD: 1 %
KETONES UR STRIP-MCNC: NEGATIVE MG/DL
LACTATE BLDV-SCNC: 1.3 MMOL/L (ref 0.4–2)
LACTATE BLDV-SCNC: 1.9 MMOL/L (ref 0.4–2)
LEUKOCYTE ESTERASE UR QL STRIP: NEGATIVE
LYMPHOCYTES NFR BLD: 0.95 K/UL
LYMPHOCYTES RELATIVE PERCENT: 5 % (ref 24–44)
MAGNESIUM SERPL-MCNC: 1.7 MG/DL (ref 1.8–2.4)
MCH RBC QN AUTO: 30.4 PG (ref 27–31)
MCHC RBC AUTO-ENTMCNC: 32.4 G/DL (ref 32–36)
MCV RBC AUTO: 94 FL (ref 78–100)
METHEMOGLOBIN: 1.9 % (ref 0.5–1.5)
MONOCYTES NFR BLD: 1.12 K/UL
MONOCYTES NFR BLD: 6 % (ref 0–5)
NEUTROPHILS NFR BLD: 88 % (ref 36–66)
NEUTS SEG NFR BLD: 17.87 K/UL
NITRITE UR QL STRIP: NEGATIVE
OXYHGB MFR BLD: 25 %
PCO2 VENOUS: 58.7 MM HG (ref 38–54)
PH UR STRIP: 6 [PH] (ref 5–8)
PH VENOUS: 7.3 (ref 7.32–7.43)
PLATELET, FLUORESCENCE: 148 K/UL (ref 140–440)
PMV BLD AUTO: 10.1 FL (ref 7.5–11.1)
PO2 VENOUS: 18.8 MM HG (ref 23–48)
POSITIVE BASE EXCESS, VEN: 0.7 MMOL/L (ref 0–3)
POTASSIUM SERPL-SCNC: 3.8 MMOL/L (ref 3.5–5.1)
PROCALCITONIN SERPL-MCNC: 0.08 NG/ML
PROT SERPL-MCNC: 6.2 G/DL (ref 6.4–8.2)
PROT UR STRIP-MCNC: NEGATIVE MG/DL
RBC # BLD AUTO: 4.96 M/UL (ref 4.6–6.2)
SODIUM SERPL-SCNC: 136 MMOL/L (ref 136–145)
SP GR UR STRIP: 1.01 (ref 1–1.03)
TROPONIN I SERPL HS-MCNC: 45 NG/L (ref 0–22)
TROPONIN I SERPL HS-MCNC: 46 NG/L (ref 0–22)
UROBILINOGEN UR STRIP-ACNC: 0.2 EU/DL (ref 0–1)
WBC OTHER # BLD: 20.3 K/UL (ref 4–10.5)

## 2025-07-04 PROCEDURE — 82805 BLOOD GASES W/O2 SATURATION: CPT

## 2025-07-04 PROCEDURE — 94761 N-INVAS EAR/PLS OXIMETRY MLT: CPT

## 2025-07-04 PROCEDURE — 96375 TX/PRO/DX INJ NEW DRUG ADDON: CPT

## 2025-07-04 PROCEDURE — 2580000003 HC RX 258: Performed by: STUDENT IN AN ORGANIZED HEALTH CARE EDUCATION/TRAINING PROGRAM

## 2025-07-04 PROCEDURE — 2700000000 HC OXYGEN THERAPY PER DAY

## 2025-07-04 PROCEDURE — 82962 GLUCOSE BLOOD TEST: CPT

## 2025-07-04 PROCEDURE — 83605 ASSAY OF LACTIC ACID: CPT

## 2025-07-04 PROCEDURE — 6370000000 HC RX 637 (ALT 250 FOR IP): Performed by: EMERGENCY MEDICINE

## 2025-07-04 PROCEDURE — 36415 COLL VENOUS BLD VENIPUNCTURE: CPT

## 2025-07-04 PROCEDURE — 6370000000 HC RX 637 (ALT 250 FOR IP): Performed by: STUDENT IN AN ORGANIZED HEALTH CARE EDUCATION/TRAINING PROGRAM

## 2025-07-04 PROCEDURE — 84145 PROCALCITONIN (PCT): CPT

## 2025-07-04 PROCEDURE — 73610 X-RAY EXAM OF ANKLE: CPT

## 2025-07-04 PROCEDURE — 87086 URINE CULTURE/COLONY COUNT: CPT

## 2025-07-04 PROCEDURE — 99285 EMERGENCY DEPT VISIT HI MDM: CPT

## 2025-07-04 PROCEDURE — 83880 ASSAY OF NATRIURETIC PEPTIDE: CPT

## 2025-07-04 PROCEDURE — 71045 X-RAY EXAM CHEST 1 VIEW: CPT

## 2025-07-04 PROCEDURE — 93005 ELECTROCARDIOGRAM TRACING: CPT | Performed by: EMERGENCY MEDICINE

## 2025-07-04 PROCEDURE — 6360000002 HC RX W HCPCS: Performed by: STUDENT IN AN ORGANIZED HEALTH CARE EDUCATION/TRAINING PROGRAM

## 2025-07-04 PROCEDURE — 2500000003 HC RX 250 WO HCPCS: Performed by: STUDENT IN AN ORGANIZED HEALTH CARE EDUCATION/TRAINING PROGRAM

## 2025-07-04 PROCEDURE — 84484 ASSAY OF TROPONIN QUANT: CPT

## 2025-07-04 PROCEDURE — 83735 ASSAY OF MAGNESIUM: CPT

## 2025-07-04 PROCEDURE — 2060000000 HC ICU INTERMEDIATE R&B

## 2025-07-04 PROCEDURE — 87641 MR-STAPH DNA AMP PROBE: CPT

## 2025-07-04 PROCEDURE — 2580000003 HC RX 258: Performed by: EMERGENCY MEDICINE

## 2025-07-04 PROCEDURE — 5A0935A ASSISTANCE WITH RESPIRATORY VENTILATION, LESS THAN 24 CONSECUTIVE HOURS, HIGH NASAL FLOW/VELOCITY: ICD-10-PCS | Performed by: STUDENT IN AN ORGANIZED HEALTH CARE EDUCATION/TRAINING PROGRAM

## 2025-07-04 PROCEDURE — 96365 THER/PROPH/DIAG IV INF INIT: CPT

## 2025-07-04 PROCEDURE — 85652 RBC SED RATE AUTOMATED: CPT

## 2025-07-04 PROCEDURE — 81003 URINALYSIS AUTO W/O SCOPE: CPT

## 2025-07-04 PROCEDURE — 6360000002 HC RX W HCPCS: Performed by: EMERGENCY MEDICINE

## 2025-07-04 PROCEDURE — 85025 COMPLETE CBC W/AUTO DIFF WBC: CPT

## 2025-07-04 PROCEDURE — 2500000003 HC RX 250 WO HCPCS: Performed by: EMERGENCY MEDICINE

## 2025-07-04 PROCEDURE — 94640 AIRWAY INHALATION TREATMENT: CPT

## 2025-07-04 PROCEDURE — 87040 BLOOD CULTURE FOR BACTERIA: CPT

## 2025-07-04 PROCEDURE — 80053 COMPREHEN METABOLIC PANEL: CPT

## 2025-07-04 PROCEDURE — 86140 C-REACTIVE PROTEIN: CPT

## 2025-07-04 PROCEDURE — 85379 FIBRIN DEGRADATION QUANT: CPT

## 2025-07-04 RX ORDER — IPRATROPIUM BROMIDE AND ALBUTEROL SULFATE 2.5; .5 MG/3ML; MG/3ML
1 SOLUTION RESPIRATORY (INHALATION) ONCE
Status: COMPLETED | OUTPATIENT
Start: 2025-07-04 | End: 2025-07-04

## 2025-07-04 RX ORDER — ALBUTEROL SULFATE 0.83 MG/ML
SOLUTION RESPIRATORY (INHALATION)
Status: DISCONTINUED
Start: 2025-07-04 | End: 2025-07-04

## 2025-07-04 RX ORDER — IPRATROPIUM BROMIDE AND ALBUTEROL SULFATE 2.5; .5 MG/3ML; MG/3ML
1 SOLUTION RESPIRATORY (INHALATION) EVERY 4 HOURS
Status: DISCONTINUED | OUTPATIENT
Start: 2025-07-04 | End: 2025-07-06 | Stop reason: HOSPADM

## 2025-07-04 RX ORDER — SODIUM CHLORIDE 9 MG/ML
INJECTION, SOLUTION INTRAVENOUS PRN
Status: DISCONTINUED | OUTPATIENT
Start: 2025-07-04 | End: 2025-07-06 | Stop reason: HOSPADM

## 2025-07-04 RX ORDER — INSULIN GLARGINE 100 [IU]/ML
20 INJECTION, SOLUTION SUBCUTANEOUS NIGHTLY
Status: DISCONTINUED | OUTPATIENT
Start: 2025-07-04 | End: 2025-07-06 | Stop reason: HOSPADM

## 2025-07-04 RX ORDER — DIGOXIN 125 MCG
125 TABLET ORAL DAILY
Status: DISCONTINUED | OUTPATIENT
Start: 2025-07-04 | End: 2025-07-06 | Stop reason: HOSPADM

## 2025-07-04 RX ORDER — POTASSIUM CHLORIDE 1500 MG/1
40 TABLET, EXTENDED RELEASE ORAL PRN
Status: DISCONTINUED | OUTPATIENT
Start: 2025-07-04 | End: 2025-07-06 | Stop reason: HOSPADM

## 2025-07-04 RX ORDER — FUROSEMIDE 10 MG/ML
40 INJECTION INTRAMUSCULAR; INTRAVENOUS 2 TIMES DAILY
Status: DISCONTINUED | OUTPATIENT
Start: 2025-07-04 | End: 2025-07-06

## 2025-07-04 RX ORDER — ATORVASTATIN CALCIUM 40 MG/1
40 TABLET, FILM COATED ORAL NIGHTLY
Status: DISCONTINUED | OUTPATIENT
Start: 2025-07-04 | End: 2025-07-06 | Stop reason: HOSPADM

## 2025-07-04 RX ORDER — SODIUM CHLORIDE 0.9 % (FLUSH) 0.9 %
5-40 SYRINGE (ML) INJECTION EVERY 12 HOURS SCHEDULED
Status: DISCONTINUED | OUTPATIENT
Start: 2025-07-04 | End: 2025-07-06 | Stop reason: HOSPADM

## 2025-07-04 RX ORDER — LISINOPRIL 5 MG/1
2.5 TABLET ORAL DAILY
Status: DISCONTINUED | OUTPATIENT
Start: 2025-07-04 | End: 2025-07-06 | Stop reason: HOSPADM

## 2025-07-04 RX ORDER — POLYETHYLENE GLYCOL 3350 17 G/17G
17 POWDER, FOR SOLUTION ORAL DAILY PRN
Status: DISCONTINUED | OUTPATIENT
Start: 2025-07-04 | End: 2025-07-06 | Stop reason: HOSPADM

## 2025-07-04 RX ORDER — ENOXAPARIN SODIUM 100 MG/ML
30 INJECTION SUBCUTANEOUS 2 TIMES DAILY
Status: DISCONTINUED | OUTPATIENT
Start: 2025-07-04 | End: 2025-07-04

## 2025-07-04 RX ORDER — ONDANSETRON 4 MG/1
4 TABLET, ORALLY DISINTEGRATING ORAL EVERY 8 HOURS PRN
Status: DISCONTINUED | OUTPATIENT
Start: 2025-07-04 | End: 2025-07-06 | Stop reason: HOSPADM

## 2025-07-04 RX ORDER — PREDNISONE 10 MG/1
40 TABLET ORAL DAILY
Status: DISCONTINUED | OUTPATIENT
Start: 2025-07-04 | End: 2025-07-06 | Stop reason: HOSPADM

## 2025-07-04 RX ORDER — MIDODRINE HYDROCHLORIDE 5 MG/1
2.5 TABLET ORAL 2 TIMES DAILY
Status: DISCONTINUED | OUTPATIENT
Start: 2025-07-04 | End: 2025-07-06 | Stop reason: HOSPADM

## 2025-07-04 RX ORDER — 0.9 % SODIUM CHLORIDE 0.9 %
1000 INTRAVENOUS SOLUTION INTRAVENOUS ONCE
Status: COMPLETED | OUTPATIENT
Start: 2025-07-04 | End: 2025-07-04

## 2025-07-04 RX ORDER — POTASSIUM CHLORIDE 7.45 MG/ML
10 INJECTION INTRAVENOUS PRN
Status: DISCONTINUED | OUTPATIENT
Start: 2025-07-04 | End: 2025-07-06 | Stop reason: HOSPADM

## 2025-07-04 RX ORDER — ONDANSETRON 2 MG/ML
4 INJECTION INTRAMUSCULAR; INTRAVENOUS EVERY 30 MIN PRN
Status: DISCONTINUED | OUTPATIENT
Start: 2025-07-04 | End: 2025-07-04 | Stop reason: HOSPADM

## 2025-07-04 RX ORDER — ONDANSETRON 2 MG/ML
4 INJECTION INTRAMUSCULAR; INTRAVENOUS EVERY 6 HOURS PRN
Status: DISCONTINUED | OUTPATIENT
Start: 2025-07-04 | End: 2025-07-06 | Stop reason: HOSPADM

## 2025-07-04 RX ORDER — ACETAMINOPHEN 650 MG/1
650 SUPPOSITORY RECTAL EVERY 6 HOURS PRN
Status: DISCONTINUED | OUTPATIENT
Start: 2025-07-04 | End: 2025-07-06 | Stop reason: HOSPADM

## 2025-07-04 RX ORDER — GLUCAGON 1 MG/ML
1 KIT INJECTION PRN
Status: DISCONTINUED | OUTPATIENT
Start: 2025-07-04 | End: 2025-07-06 | Stop reason: HOSPADM

## 2025-07-04 RX ORDER — NICOTINE 21 MG/24HR
1 PATCH, TRANSDERMAL 24 HOURS TRANSDERMAL DAILY PRN
Status: DISCONTINUED | OUTPATIENT
Start: 2025-07-04 | End: 2025-07-06 | Stop reason: HOSPADM

## 2025-07-04 RX ORDER — INSULIN LISPRO 100 [IU]/ML
0-8 INJECTION, SOLUTION INTRAVENOUS; SUBCUTANEOUS
Status: DISCONTINUED | OUTPATIENT
Start: 2025-07-04 | End: 2025-07-06 | Stop reason: HOSPADM

## 2025-07-04 RX ORDER — MAGNESIUM SULFATE IN WATER 40 MG/ML
2000 INJECTION, SOLUTION INTRAVENOUS PRN
Status: DISCONTINUED | OUTPATIENT
Start: 2025-07-04 | End: 2025-07-06 | Stop reason: HOSPADM

## 2025-07-04 RX ORDER — SODIUM CHLORIDE 0.9 % (FLUSH) 0.9 %
5-40 SYRINGE (ML) INJECTION PRN
Status: DISCONTINUED | OUTPATIENT
Start: 2025-07-04 | End: 2025-07-06 | Stop reason: HOSPADM

## 2025-07-04 RX ORDER — MORPHINE SULFATE 2 MG/ML
2 INJECTION, SOLUTION INTRAMUSCULAR; INTRAVENOUS EVERY 30 MIN PRN
Status: DISCONTINUED | OUTPATIENT
Start: 2025-07-04 | End: 2025-07-04 | Stop reason: HOSPADM

## 2025-07-04 RX ORDER — ACETAMINOPHEN 325 MG/1
650 TABLET ORAL EVERY 6 HOURS PRN
Status: DISCONTINUED | OUTPATIENT
Start: 2025-07-04 | End: 2025-07-06 | Stop reason: HOSPADM

## 2025-07-04 RX ORDER — GUAIFENESIN 600 MG/1
600 TABLET, EXTENDED RELEASE ORAL 2 TIMES DAILY
Status: DISCONTINUED | OUTPATIENT
Start: 2025-07-04 | End: 2025-07-06 | Stop reason: HOSPADM

## 2025-07-04 RX ORDER — DEXTROSE MONOHYDRATE 100 MG/ML
INJECTION, SOLUTION INTRAVENOUS CONTINUOUS PRN
Status: DISCONTINUED | OUTPATIENT
Start: 2025-07-04 | End: 2025-07-06 | Stop reason: HOSPADM

## 2025-07-04 RX ORDER — ROFLUMILAST 500 UG/1
500 TABLET ORAL DAILY
Status: ON HOLD | COMMUNITY
End: 2025-07-18 | Stop reason: HOSPADM

## 2025-07-04 RX ORDER — AZITHROMYCIN 250 MG/1
500 TABLET, FILM COATED ORAL DAILY
Status: COMPLETED | OUTPATIENT
Start: 2025-07-04 | End: 2025-07-06

## 2025-07-04 RX ORDER — ASPIRIN 81 MG/1
TABLET, CHEWABLE ORAL
Status: DISPENSED
Start: 2025-07-04 | End: 2025-07-05

## 2025-07-04 RX ORDER — BUDESONIDE AND FORMOTEROL FUMARATE DIHYDRATE 160; 4.5 UG/1; UG/1
2 AEROSOL RESPIRATORY (INHALATION)
Status: DISCONTINUED | OUTPATIENT
Start: 2025-07-04 | End: 2025-07-06 | Stop reason: HOSPADM

## 2025-07-04 RX ORDER — CARVEDILOL 6.25 MG/1
3.12 TABLET ORAL 2 TIMES DAILY WITH MEALS
Status: DISCONTINUED | OUTPATIENT
Start: 2025-07-04 | End: 2025-07-06 | Stop reason: HOSPADM

## 2025-07-04 RX ADMIN — INSULIN LISPRO 6 UNITS: 100 INJECTION, SOLUTION INTRAVENOUS; SUBCUTANEOUS at 23:47

## 2025-07-04 RX ADMIN — BUDESONIDE AND FORMOTEROL FUMARATE DIHYDRATE 2 PUFF: 160; 4.5 AEROSOL RESPIRATORY (INHALATION) at 20:50

## 2025-07-04 RX ADMIN — MIDODRINE HYDROCHLORIDE 2.5 MG: 5 TABLET ORAL at 23:13

## 2025-07-04 RX ADMIN — SODIUM CHLORIDE 1000 ML: 0.9 INJECTION, SOLUTION INTRAVENOUS at 13:48

## 2025-07-04 RX ADMIN — GUAIFENESIN 600 MG: 600 TABLET, EXTENDED RELEASE ORAL at 23:13

## 2025-07-04 RX ADMIN — INSULIN LISPRO 2 UNITS: 100 INJECTION, SOLUTION INTRAVENOUS; SUBCUTANEOUS at 18:52

## 2025-07-04 RX ADMIN — PREDNISONE 40 MG: 10 TABLET ORAL at 18:27

## 2025-07-04 RX ADMIN — IPRATROPIUM BROMIDE AND ALBUTEROL SULFATE 1 DOSE: .5; 2.5 SOLUTION RESPIRATORY (INHALATION) at 17:10

## 2025-07-04 RX ADMIN — IPRATROPIUM BROMIDE AND ALBUTEROL SULFATE 1 DOSE: .5; 2.5 SOLUTION RESPIRATORY (INHALATION) at 13:28

## 2025-07-04 RX ADMIN — AZITHROMYCIN DIHYDRATE 500 MG: 250 TABLET ORAL at 18:28

## 2025-07-04 RX ADMIN — SODIUM CHLORIDE: 0.9 INJECTION, SOLUTION INTRAVENOUS at 23:28

## 2025-07-04 RX ADMIN — SODIUM CHLORIDE, PRESERVATIVE FREE 10 ML: 5 INJECTION INTRAVENOUS at 23:15

## 2025-07-04 RX ADMIN — INSULIN GLARGINE 20 UNITS: 100 INJECTION, SOLUTION SUBCUTANEOUS at 23:47

## 2025-07-04 RX ADMIN — CEFEPIME 2000 MG: 2 INJECTION, POWDER, FOR SOLUTION INTRAVENOUS at 13:55

## 2025-07-04 RX ADMIN — IPRATROPIUM BROMIDE AND ALBUTEROL SULFATE 1 DOSE: .5; 2.5 SOLUTION RESPIRATORY (INHALATION) at 20:49

## 2025-07-04 RX ADMIN — Medication 2000 MG: at 15:00

## 2025-07-04 RX ADMIN — CEFEPIME 2000 MG: 2 INJECTION, POWDER, FOR SOLUTION INTRAVENOUS at 23:29

## 2025-07-04 RX ADMIN — METHYLPREDNISOLONE SODIUM SUCCINATE 125 MG: 125 INJECTION, POWDER, LYOPHILIZED, FOR SOLUTION INTRAMUSCULAR; INTRAVENOUS at 13:42

## 2025-07-04 RX ADMIN — APIXABAN 5 MG: 5 TABLET, FILM COATED ORAL at 23:13

## 2025-07-04 RX ADMIN — IPRATROPIUM BROMIDE AND ALBUTEROL SULFATE 1 DOSE: .5; 2.5 SOLUTION RESPIRATORY (INHALATION) at 23:47

## 2025-07-04 RX ADMIN — ATORVASTATIN CALCIUM 40 MG: 40 TABLET, FILM COATED ORAL at 23:13

## 2025-07-04 ASSESSMENT — PAIN DESCRIPTION - ORIENTATION: ORIENTATION: MID

## 2025-07-04 ASSESSMENT — PAIN DESCRIPTION - DESCRIPTORS: DESCRIPTORS: ACHING;DISCOMFORT

## 2025-07-04 ASSESSMENT — PAIN DESCRIPTION - LOCATION: LOCATION: CHEST

## 2025-07-04 ASSESSMENT — PAIN - FUNCTIONAL ASSESSMENT: PAIN_FUNCTIONAL_ASSESSMENT: 0-10

## 2025-07-04 ASSESSMENT — PAIN SCALES - GENERAL: PAINLEVEL_OUTOF10: 5

## 2025-07-04 NOTE — CARE COORDINATION
CM review of pt chart to initiate discharge planning for this pt who resides at [facility], however pt was discharged to [facility] rehab on 6/29/25.    Pt sent from [facility]/rehab today for SOB,Dx PNE.  CM visited pt who is alert and oriented. Pt confirms he is in rehab at [facility] at this time and plans to discharge back to the rehab unit to complete his therapy before returning to [place].  POC for today is for admission, due to acute findings. MANNYRN/CM

## 2025-07-04 NOTE — PROGRESS NOTES
4 Eyes Skin Assessment     NAME:  Armando Mars  YOB: 1955  MEDICAL RECORD NUMBER:  [id number]    The patient is being assessed for  Admission    I agree that at least one RN has performed a thorough Head to Toe Skin Assessment on the patient. ALL assessment sites listed below have been assessed.      Areas assessed by both nurses:    Head, Face, Ears, Shoulders, Back, Chest, Arms, Elbows, Hands, Sacrum. Buttock, Coccyx, Ischium, Legs. Feet and Heels, and Under Medical Devices         Does the Patient have a Wound? Yes wound(s) were present on assessment. LDA wound assessment was Initiated and completed by RN       Hector Prevention initiated by RN: Yes  Wound Care Orders initiated by RN: Yes    Pressure Injury (Stage 1,2,3,4, Unstageable, DTI, NWPT, and Complex wounds) if present, place Wound referral order by RN under : No    New Ostomies, if present place, Ostomy referral order under : No     3 open wounds on left leg, weeping serous drainage. Xero form in place (from assisted living). Cleansed and replaced.     1 open area on right heel, with a small amount of serous drainage. Pt states he might have had a \"yellow dressing\" applied at his assisted living. Cleansed with normal saline, applied xero form.     Scattered ecchymosis on bilateral arms.     Wound care consult entered.     Nurse 1 eSignature: Electronically signed by Yaa Eaton RN on 7/4/25 at 5:15 PM EDT    **SHARE this note so that the co-signing nurse can place an eSignature**    Nurse 2 eSignature: Electronically signed by AJ KEBEDE RN on 7/4/25 at 5:32 PM EDT

## 2025-07-04 NOTE — ED NOTES
..ED TO INPATIENT SBAR HANDOFF    Patient Name: Armando Mars   :  1955  69 y.o.   Preferred Name  Armando  Family/Caregiver Present no   Restraints no   C-SSRS: Risk of Suicide: No Risk  Sitter no   Sepsis Risk Score Sepsis V2 Risk Score: 64.8    PLEASE NOTE--Encounter / Re-Admission Within 30 Days  This patient has had another encounter or admission within the last 30 days.      Readmission Risk Score: 18.2      Situation  Chief Complaint   Patient presents with    Shortness of Breath    Chest Pain     Started this morning. COPD history      Brief Description of Patient's Condition: Patient admitted for shortness of breath and chest pain. Patient has a history of COPD exacerbation and feels like this is similar. Will treat for pneumonia and COPD exacerbation.   Mental Status: oriented and alert  Arrived from: home    Imaging:   XR ANKLE RIGHT (MIN 3 VIEWS)   Final Result      XR ANKLE LEFT (MIN 3 VIEWS)   Final Result      XR CHEST PORTABLE   Final Result        Abnormal labs:   Abnormal Labs Reviewed   CBC WITH AUTO DIFFERENTIAL - Abnormal; Notable for the following components:       Result Value    WBC 20.3 (*)     RDW 15.8 (*)     Neutrophils % 88 (*)     Lymphocytes % 5 (*)     Monocytes % 6 (*)     Immature Granulocytes % 1 (*)     All other components within normal limits   TROPONIN - Abnormal; Notable for the following components:    Troponin, High Sensitivity 46 (*)     All other components within normal limits   BLOOD GAS, VENOUS - Abnormal; Notable for the following components:    pH, Tylor 7.298 (*)     pCO2, Tylor 58.7 (*)     PO2, Tylor 18.8 (*)     Methemoglobin 1.9 (*)     All other components within normal limits        Background  History:   Past Medical History:   Diagnosis Date    A-fib ([facility])     Resolved after ablation     Anxiety     Arthritis     \"Hips, Knees, Elbows And Fingers\"    COPD (chronic obstructive pulmonary disease) ([facility])     Sees Dr. Keen    Depression     Diabetes

## 2025-07-04 NOTE — H&P
V2.0  History and Physical      Name:  Armando Mars /Age/Sex: 1955  (69 y.o. male)   MRN & CSN:  [id number] & [id number] Encounter Date/Time: 2025 3:12 PM EDT   Location:  ED30/ED-30 PCP: Rosalio Hedrick MD       Hospital Day: 1    Assessment and Plan:   Armando Mars is a 69 y.o. male with a pmh as below who presents with Acute hypoxemic respiratory failure (HCC)    Past Medical History:   Diagnosis Date    A-fib (HCC)     Resolved after ablation     Anxiety     Arthritis     \"Hips, Knees, Elbows And Fingers\"    COPD (chronic obstructive pulmonary disease) (HCC)     Sees Dr. Osmani Og     Diabetes mellitus (HCC) Dx     Full dentures     H/O angiography 2018    peripheral angio - LFA occluded, filling collaterals, RSFA 90% stenosis-vasc surg consult    H/O Doppler ultrasound 2018    LE arterial - left mid and distal femoral artery occluded, lt FANI shows mild-mod PVD    H/O echocardiogram 2016    EF60% mild MR, TR, pulm HTN    Hx of colonoscopy      C-scope - benign polyp x1    Hx of Doppler ultrasound 2018    Lower extremity doppler  Normal study.    Hyperlipidemia     Hypertension     Macular degeneration     States is legally blind    Obesity     On home oxygen therapy     Continuous At 2 Liters Per Nasal Cannula    PAD (peripheral artery disease)     10/10 FANI mild PAD left superficial femoral s/p left fem-pop bypass    Panic attacks     Pneumonia Last Episode In     PTSD (post-traumatic stress disorder)     Restless leg     Shortness of breath     Sleep apnea     Uses BiPap - stopped using 2020    Wears glasses     To Read         Hospital Problems           Last Modified POA    * (Principal) Acute hypoxemic respiratory failure (HCC) 2025 Yes       Plan:  Acute on Chronic Hypercapnic and Hypoxemic Respiratory Failure:  Sepsis iso HCAP?:  Mild COPD exacerbation:  CHF exacerbation:  -Combination of CHF , COPD, Sepsis,

## 2025-07-04 NOTE — CONSULTS
CLINICAL PHARMACY SERVICES  Renal dose adjustment made per [facility] protocol     Estimated Creatinine Clearance: 144 mL/min (based on SCr of 0.8 mg/dL).  No results for input(s): \"BUN\", \"CREATININE\" in the last 72 hours.  Body mass index is 37.44 kg/m².    CEFEPIME FOR INDICATION:  Sepsis of Unknown Etiology                Cefepime - Extended Infusion (over 240 minutes)  Loading dose:  2000mg ivpb over 30 minutes (all indications)  [x]  CRCL  >=60 []  CRCL  30-59 []  CRCL  11-29 []  CRCL  <=10, HD []  PD []  CRRT   2000mg  q8h 2000mg  q12h 1000mg  q12h  1000mg  q24h 1000mg  q24h 2000mg  q8h   Maintenance dose:  Start at next regularly scheduled dosing   interval from load based on indication/renal function.   Hemodialysis:  Give dose after dialysis on dialysis days.    PREVIOUS ORDER:  Cefepime  2000mg ivpb q12h for 7 days  [extended infusion over 240 minutes]      NEW RENALLY ADJUSTED ORDER:    Cefepime    2000mg ivpb q8h for 7 days    [extended infusion over 240 minutes]       Rob Blount [facility]  7/4/2025 3:19 PM

## 2025-07-04 NOTE — PROGRESS NOTES
Pt.'s ABG results did not cross over.  Physician was perfect served with results.  pH:     7.40  C02:    55.3  Pa02:   75.2  HC03:   34  Sa02:    92.2%

## 2025-07-04 NOTE — CONSULTS
PHARMACY VANCOMYCIN MONITORING SERVICE  Pharmacy consulted by Dr. Wenceslao De La O for monitoring and adjustment.    Indication for treatment: Bloodstream infection  Goal trough: Trough Goal: 15-20 mcg/mL  AUC/JUAN: 400-600    Pertinent Laboratory Values:   Temp Readings from Last 3 Encounters:   07/04/25 98.2 °F (36.8 °C) (Oral)   06/29/25 97.8 °F (36.6 °C) (Oral)   05/04/25 98.3 °F (36.8 °C) (Oral)     Recent Labs     07/04/25  1335   WBC 20.3*   LACTSEPSIS 1.9     Wt Readings from Last 3 Encounters:   07/04/25 (!) 147 kg (324 lb)   06/27/25 (!) 147.2 kg (324 lb 8.3 oz)   05/04/25 (!) 163.3 kg (360 lb)   Ideal body weight: 91.4 kg (201 lb 8 oz)  Adjusted ideal body weight: 113.6 kg (250 lb 8 oz)   No intake or output data in the 24 hours ending 07/04/25 1515  Estimated Creatinine Clearance: 144 mL/min (based on SCr of 0.8 mg/dL).No results for input(s): \"BUN\", \"CREATININE\" in the last 72 hours.    Pertinent Cultures:   Date    Source    Results  7/04   Blood    Ordered   7/04   MRSA PCR   Ordered   7/04   Urine     Ordered     Vancomycin level:   No results for input(s): \"VANCORANDOM\" in the last 72 hours.    Assessment:  HPI: The patient is a 69 y.o. male who presented with c/o fatigue, congetstion, cough, SOB, wheezing, chest pain, myalgias. Met sepsis criteria in ED with HR, RR, WBC 20.3 and suspected infection.  Scr, BUN, and urine output:   CMP in process  Renal function previously stable with Scr 0.8 mg/dL  Day(s) of therapy: 1  Vancomycin concentration: TBD    Plan:  Patient received vancomycin loading dose of 2000 mg IV x once today in ED  Will follow with 1750 mg (15 mg/kg AdjBW) IV q12h  Predicted ssAUC of 573 mg/L*hr  Pharmacy will continue to monitor patient and adjust therapy as indicated    VANCOMYCIN CONCENTRATION SCHEDULED FOR 7/06 @ 0600, or sooner if clinically needed    Thank you for the consult.  Arleth Tijerina RPH, PharmD.  7/4/2025 3:15 PM

## 2025-07-04 NOTE — PROGRESS NOTES
Reported VBG to Dr Marie. VBG results are:     Latest Reference Range & Units Most Recent   pH, Tylor 7.320 - 7.430  7.298 (L)  7/4/25 13:53   pCO2, Tylor 41 - 51 mmHG 59 (H)  2/4/24 19:45   pCO2, Tylor 38 - 54 mm Hg 58.7 (H)  7/4/25 13:53   pO2, Tylor 23 - 48 mm Hg 18.8 (L)  7/4/25 13:53   Bicarbonate, Venous 22 - 29 mmol/L 28.1  7/4/25 13:53   Positive Base Excess, Tylor 0 - 3 mmol/L 0.7  7/4/25 13:53   O2 Saturation Venous 50 - 70 % 94.3 (H)  2/4/24 19:45   (L): Data is abnormally low  (H): Data is abnormally high

## 2025-07-04 NOTE — ED PROVIDER NOTES
[facility]      TRIAGE CHIEF COMPLAINT:   Shortness of Breath and Chest Pain (Started this morning. COPD history )      [place]:  Armando Mars is a 69 y.o. male that presents by EMS complaint of chest pain, shortness of breath, cough.  Patient has a history of COPD, CHF, he was hypoxic upon arrival is requiring oxygen.  He states symptoms got worse today.  He has been feeling this way for a few days but got worse today.,  He feels like he may have pneumonia again but he also has history of CAD and CHF.  Complains of lower extremity swelling bilaterally.  Has ankle pain bilaterally he is following with wound care.  Denies any abdominal pain he has no umbilical hernia.,  Is having cough and chest pain.  He is on Eliquis which has been taking.  EMS gave him aspirin but no pain medicine.  Constant pain.  Has been coughing as mention.  No travel sick contacts no other questions or concerns..    REVIEW OF SYSTEMS:  At least 10 systems reviewed and otherwise acutely negative except as in the [place].    Review of Systems   Constitutional:  Positive for fatigue.   HENT:  Positive for congestion.    Eyes: Negative.    Respiratory:  Positive for cough, chest tightness, shortness of breath and wheezing.    Cardiovascular:  Positive for chest pain and leg swelling.   Endocrine: Negative.    Genitourinary: Negative.    Musculoskeletal:  Positive for arthralgias, joint swelling and myalgias.   Skin:  Positive for wound.   Allergic/Immunologic: Negative.    Neurological: Negative.    Hematological: Negative.    Psychiatric/Behavioral: Negative.     All other systems reviewed and are negative.      Past Medical History:   Diagnosis Date    A-fib ([facility])     Resolved after ablation 2018    Anxiety     Arthritis     \"Hips, Knees, Elbows And Fingers\"    COPD (chronic obstructive pulmonary disease) ([facility])     Sees Dr. Keen    Depression     Diabetes mellitus ([facility]) Dx 1990's    Full dentures     H/O angiography

## 2025-07-05 ENCOUNTER — APPOINTMENT (OUTPATIENT)
Dept: GENERAL RADIOLOGY | Age: 70
DRG: 291 | End: 2025-07-05
Payer: MEDICARE

## 2025-07-05 LAB
ALBUMIN SERPL-MCNC: 3.2 G/DL (ref 3.4–5)
ALBUMIN/GLOB SERPL: 1.4 {RATIO} (ref 1.1–2.2)
ALP SERPL-CCNC: 70 U/L (ref 40–129)
ALT SERPL-CCNC: 25 U/L (ref 10–40)
ANION GAP SERPL CALCULATED.3IONS-SCNC: 13 MMOL/L (ref 9–17)
ARTERIAL PATENCY WRIST A: ABNORMAL
AST SERPL-CCNC: 16 U/L (ref 15–37)
BASOPHILS # BLD: 0.01 K/UL
BASOPHILS NFR BLD: 0 % (ref 0–1)
BILIRUB SERPL-MCNC: 0.5 MG/DL (ref 0–1)
BODY TEMPERATURE: 37
BUN SERPL-MCNC: 20 MG/DL (ref 7–20)
CALCIUM SERPL-MCNC: 8.2 MG/DL (ref 8.3–10.6)
CHLORIDE SERPL-SCNC: 91 MMOL/L (ref 99–110)
CO2 SERPL-SCNC: 29 MMOL/L (ref 21–32)
COHGB MFR BLD: 1.6 % (ref 0.5–1.5)
CREAT SERPL-MCNC: 0.6 MG/DL (ref 0.8–1.3)
DATE LAST DOSE: ABNORMAL
DIGOXIN DOSE TIME: ABNORMAL
DIGOXIN DOSE: ABNORMAL MG
DIGOXIN SERPL-MCNC: 0.5 NG/ML (ref 0.8–2)
EKG ATRIAL RATE: 61 BPM
EKG ATRIAL RATE: 72 BPM
EKG DIAGNOSIS: NORMAL
EKG DIAGNOSIS: NORMAL
EKG Q-T INTERVAL: 352 MS
EKG Q-T INTERVAL: 394 MS
EKG QRS DURATION: 116 MS
EKG QRS DURATION: 96 MS
EKG QTC CALCULATION (BAZETT): 437 MS
EKG QTC CALCULATION (BAZETT): 471 MS
EKG R AXIS: -55 DEGREES
EKG R AXIS: -57 DEGREES
EKG T AXIS: 65 DEGREES
EKG T AXIS: 73 DEGREES
EKG VENTRICULAR RATE: 86 BPM
EKG VENTRICULAR RATE: 93 BPM
EOSINOPHIL # BLD: 0 K/UL
EOSINOPHILS RELATIVE PERCENT: 0 % (ref 0–3)
ERYTHROCYTE [DISTWIDTH] IN BLOOD BY AUTOMATED COUNT: 15.6 % (ref 11.7–14.9)
GFR, ESTIMATED: >90 ML/MIN/1.73M2
GLUCOSE BLD-MCNC: 186 MG/DL (ref 74–99)
GLUCOSE BLD-MCNC: 210 MG/DL (ref 74–99)
GLUCOSE BLD-MCNC: 271 MG/DL (ref 74–99)
GLUCOSE BLD-MCNC: 294 MG/DL (ref 74–99)
GLUCOSE SERPL-MCNC: 298 MG/DL (ref 74–99)
HCO3 VENOUS: 33.8 MMOL/L (ref 22–29)
HCT VFR BLD AUTO: 41.4 % (ref 42–52)
HGB BLD-MCNC: 13.5 G/DL (ref 13.5–18)
IMM GRANULOCYTES # BLD AUTO: 0.19 K/UL
IMM GRANULOCYTES NFR BLD: 1 %
LYMPHOCYTES NFR BLD: 0.39 K/UL
LYMPHOCYTES RELATIVE PERCENT: 2 % (ref 24–44)
MCH RBC QN AUTO: 30.3 PG (ref 27–31)
MCHC RBC AUTO-ENTMCNC: 32.6 G/DL (ref 32–36)
MCV RBC AUTO: 93 FL (ref 78–100)
METHEMOGLOBIN: 0.4 % (ref 0.5–1.5)
MICROORGANISM SPEC CULT: NORMAL
MONOCYTES NFR BLD: 0.55 K/UL
MONOCYTES NFR BLD: 3 % (ref 0–5)
MRSA, DNA, NASAL: NOT DETECTED
NEUTROPHILS NFR BLD: 94 % (ref 36–66)
NEUTS SEG NFR BLD: 16.73 K/UL
OXYHGB MFR BLD: 83.1 %
PCO2 VENOUS: 58.6 MM HG (ref 38–54)
PH VENOUS: 7.38 (ref 7.32–7.43)
PHOSPHATE SERPL-MCNC: 2.9 MG/DL (ref 2.5–4.9)
PLATELET # BLD AUTO: 123 K/UL (ref 140–440)
PMV BLD AUTO: 10.7 FL (ref 7.5–11.1)
PO2 VENOUS: 49.7 MM HG (ref 23–48)
POSITIVE BASE EXCESS, VEN: 6.7 MMOL/L (ref 0–3)
POTASSIUM SERPL-SCNC: 3.7 MMOL/L (ref 3.5–5.1)
PROT SERPL-MCNC: 5.5 G/DL (ref 6.4–8.2)
RBC # BLD AUTO: 4.45 M/UL (ref 4.6–6.2)
SERVICE CMNT-IMP: NORMAL
SODIUM SERPL-SCNC: 133 MMOL/L (ref 136–145)
SPECIMEN DESCRIPTION: NORMAL
SPECIMEN DESCRIPTION: NORMAL
TROPONIN I SERPL HS-MCNC: 32 NG/L (ref 0–22)
WBC OTHER # BLD: 17.9 K/UL (ref 4–10.5)

## 2025-07-05 PROCEDURE — 6370000000 HC RX 637 (ALT 250 FOR IP): Performed by: STUDENT IN AN ORGANIZED HEALTH CARE EDUCATION/TRAINING PROGRAM

## 2025-07-05 PROCEDURE — 94761 N-INVAS EAR/PLS OXIMETRY MLT: CPT

## 2025-07-05 PROCEDURE — 82962 GLUCOSE BLOOD TEST: CPT

## 2025-07-05 PROCEDURE — 2580000003 HC RX 258: Performed by: STUDENT IN AN ORGANIZED HEALTH CARE EDUCATION/TRAINING PROGRAM

## 2025-07-05 PROCEDURE — 2700000000 HC OXYGEN THERAPY PER DAY

## 2025-07-05 PROCEDURE — 93010 ELECTROCARDIOGRAM REPORT: CPT | Performed by: INTERNAL MEDICINE

## 2025-07-05 PROCEDURE — 94640 AIRWAY INHALATION TREATMENT: CPT

## 2025-07-05 PROCEDURE — 84100 ASSAY OF PHOSPHORUS: CPT

## 2025-07-05 PROCEDURE — 2060000000 HC ICU INTERMEDIATE R&B

## 2025-07-05 PROCEDURE — 80053 COMPREHEN METABOLIC PANEL: CPT

## 2025-07-05 PROCEDURE — 82805 BLOOD GASES W/O2 SATURATION: CPT

## 2025-07-05 PROCEDURE — 2500000003 HC RX 250 WO HCPCS: Performed by: STUDENT IN AN ORGANIZED HEALTH CARE EDUCATION/TRAINING PROGRAM

## 2025-07-05 PROCEDURE — 93005 ELECTROCARDIOGRAM TRACING: CPT | Performed by: STUDENT IN AN ORGANIZED HEALTH CARE EDUCATION/TRAINING PROGRAM

## 2025-07-05 PROCEDURE — 6360000002 HC RX W HCPCS: Performed by: STUDENT IN AN ORGANIZED HEALTH CARE EDUCATION/TRAINING PROGRAM

## 2025-07-05 PROCEDURE — 80162 ASSAY OF DIGOXIN TOTAL: CPT

## 2025-07-05 PROCEDURE — 36415 COLL VENOUS BLD VENIPUNCTURE: CPT

## 2025-07-05 PROCEDURE — 85025 COMPLETE CBC W/AUTO DIFF WBC: CPT

## 2025-07-05 PROCEDURE — 71045 X-RAY EXAM CHEST 1 VIEW: CPT

## 2025-07-05 PROCEDURE — 84484 ASSAY OF TROPONIN QUANT: CPT

## 2025-07-05 PROCEDURE — 99223 1ST HOSP IP/OBS HIGH 75: CPT | Performed by: INTERNAL MEDICINE

## 2025-07-05 RX ORDER — MAGNESIUM SULFATE 1 G/100ML
1000 INJECTION INTRAVENOUS ONCE
Status: COMPLETED | OUTPATIENT
Start: 2025-07-05 | End: 2025-07-05

## 2025-07-05 RX ORDER — MORPHINE SULFATE 2 MG/ML
1 INJECTION, SOLUTION INTRAMUSCULAR; INTRAVENOUS ONCE
Status: COMPLETED | OUTPATIENT
Start: 2025-07-05 | End: 2025-07-06

## 2025-07-05 RX ORDER — HYDROXYZINE HYDROCHLORIDE 10 MG/1
10 TABLET, FILM COATED ORAL 3 TIMES DAILY PRN
Status: DISCONTINUED | OUTPATIENT
Start: 2025-07-05 | End: 2025-07-06 | Stop reason: HOSPADM

## 2025-07-05 RX ADMIN — SODIUM CHLORIDE: 0.9 INJECTION, SOLUTION INTRAVENOUS at 21:28

## 2025-07-05 RX ADMIN — AZITHROMYCIN DIHYDRATE 500 MG: 250 TABLET ORAL at 08:20

## 2025-07-05 RX ADMIN — INSULIN LISPRO 4 UNITS: 100 INJECTION, SOLUTION INTRAVENOUS; SUBCUTANEOUS at 17:24

## 2025-07-05 RX ADMIN — Medication 1750 MG: at 12:31

## 2025-07-05 RX ADMIN — IPRATROPIUM BROMIDE AND ALBUTEROL SULFATE 1 DOSE: .5; 2.5 SOLUTION RESPIRATORY (INHALATION) at 19:35

## 2025-07-05 RX ADMIN — INSULIN LISPRO 2 UNITS: 100 INJECTION, SOLUTION INTRAVENOUS; SUBCUTANEOUS at 08:19

## 2025-07-05 RX ADMIN — ATORVASTATIN CALCIUM 40 MG: 40 TABLET, FILM COATED ORAL at 21:24

## 2025-07-05 RX ADMIN — Medication 1750 MG: at 02:16

## 2025-07-05 RX ADMIN — MIDODRINE HYDROCHLORIDE 2.5 MG: 5 TABLET ORAL at 21:24

## 2025-07-05 RX ADMIN — INSULIN GLARGINE 20 UNITS: 100 INJECTION, SOLUTION SUBCUTANEOUS at 21:25

## 2025-07-05 RX ADMIN — GUAIFENESIN 600 MG: 600 TABLET, EXTENDED RELEASE ORAL at 21:24

## 2025-07-05 RX ADMIN — BUDESONIDE AND FORMOTEROL FUMARATE DIHYDRATE 2 PUFF: 160; 4.5 AEROSOL RESPIRATORY (INHALATION) at 10:50

## 2025-07-05 RX ADMIN — APIXABAN 5 MG: 5 TABLET, FILM COATED ORAL at 08:20

## 2025-07-05 RX ADMIN — MIDODRINE HYDROCHLORIDE 2.5 MG: 5 TABLET ORAL at 08:21

## 2025-07-05 RX ADMIN — SODIUM CHLORIDE, PRESERVATIVE FREE 10 ML: 5 INJECTION INTRAVENOUS at 21:25

## 2025-07-05 RX ADMIN — BUDESONIDE AND FORMOTEROL FUMARATE DIHYDRATE 2 PUFF: 160; 4.5 AEROSOL RESPIRATORY (INHALATION) at 19:35

## 2025-07-05 RX ADMIN — SODIUM CHLORIDE: 0.9 INJECTION, SOLUTION INTRAVENOUS at 06:57

## 2025-07-05 RX ADMIN — LISINOPRIL 2.5 MG: 5 TABLET ORAL at 08:20

## 2025-07-05 RX ADMIN — IPRATROPIUM BROMIDE AND ALBUTEROL SULFATE 1 DOSE: .5; 2.5 SOLUTION RESPIRATORY (INHALATION) at 23:47

## 2025-07-05 RX ADMIN — PREDNISONE 40 MG: 10 TABLET ORAL at 08:21

## 2025-07-05 RX ADMIN — INSULIN LISPRO 2 UNITS: 100 INJECTION, SOLUTION INTRAVENOUS; SUBCUTANEOUS at 12:30

## 2025-07-05 RX ADMIN — FUROSEMIDE 40 MG: 10 INJECTION, SOLUTION INTRAMUSCULAR; INTRAVENOUS at 17:24

## 2025-07-05 RX ADMIN — MAGNESIUM SULFATE HEPTAHYDRATE 1000 MG: 1 INJECTION, SOLUTION INTRAVENOUS at 06:56

## 2025-07-05 RX ADMIN — IPRATROPIUM BROMIDE AND ALBUTEROL SULFATE 1 DOSE: .5; 2.5 SOLUTION RESPIRATORY (INHALATION) at 10:48

## 2025-07-05 RX ADMIN — DIGOXIN 125 MCG: 125 TABLET ORAL at 08:22

## 2025-07-05 RX ADMIN — IPRATROPIUM BROMIDE AND ALBUTEROL SULFATE 1 DOSE: .5; 2.5 SOLUTION RESPIRATORY (INHALATION) at 15:08

## 2025-07-05 RX ADMIN — SODIUM CHLORIDE: 0.9 INJECTION, SOLUTION INTRAVENOUS at 02:15

## 2025-07-05 RX ADMIN — CEFEPIME 2000 MG: 2 INJECTION, POWDER, FOR SOLUTION INTRAVENOUS at 14:38

## 2025-07-05 RX ADMIN — FUROSEMIDE 40 MG: 10 INJECTION, SOLUTION INTRAMUSCULAR; INTRAVENOUS at 08:19

## 2025-07-05 RX ADMIN — CEFEPIME 2000 MG: 2 INJECTION, POWDER, FOR SOLUTION INTRAVENOUS at 06:58

## 2025-07-05 RX ADMIN — CARVEDILOL 3.12 MG: 6.25 TABLET, FILM COATED ORAL at 08:23

## 2025-07-05 RX ADMIN — APIXABAN 5 MG: 5 TABLET, FILM COATED ORAL at 21:24

## 2025-07-05 RX ADMIN — CEFEPIME 2000 MG: 2 INJECTION, POWDER, FOR SOLUTION INTRAVENOUS at 21:29

## 2025-07-05 RX ADMIN — GUAIFENESIN 600 MG: 600 TABLET, EXTENDED RELEASE ORAL at 08:20

## 2025-07-05 RX ADMIN — INSULIN LISPRO 4 UNITS: 100 INJECTION, SOLUTION INTRAVENOUS; SUBCUTANEOUS at 21:24

## 2025-07-05 RX ADMIN — CARVEDILOL 3.12 MG: 6.25 TABLET, FILM COATED ORAL at 17:25

## 2025-07-05 ASSESSMENT — PAIN SCALES - GENERAL: PAINLEVEL_OUTOF10: 5

## 2025-07-05 NOTE — PROGRESS NOTES
Upon entering pt.'s room, pt was c/o how short of breath he was and I stated to him I had his breathing treatment but pt ended his breathing treatment before it was finished stating \"I can't take anymore.\"

## 2025-07-05 NOTE — PROGRESS NOTES
V2.0    [facility] Progress Note      Name:  Armando Mars /Age/Sex: 1955  (69 y.o. male)   MRN & CSN:  [id number] & [id number] Encounter Date/Time: 2025 7:24 AM EDT   Location:  -A PCP: Rosalio Hedrick MD     Attending:Shankar Downs MD       Hospital Day: 2    Assessment and Recommendations   Armando Mars is a 69 y.o. male  who presents with Acute hypoxemic respiratory failure (HCC)      # Acute on chronic hypoxemic/hypercapnic respiratory failure  - Secondary to COPD/CHF exacerbation  - Presenting with worsening shortness of breath  - Started on IV antibiotics  - VBG on presentation pH 7.29, pCO2 58  - Was on high flow nasal cannula 15 L but currently weaned down to 4  - Follow-up on infectious workup  - 2D echo  EF 35 to 40%  - Cardiology consulted and following  - Continue diuresis     # Chronic A-fib  - On Eliquis and Cardizem, and digoxin     # Type 2 diabetes mellitus  - SSI/Lantus  -POC glucose checks     # LINA  - BiPAP nightly      Diet ADULT DIET; Regular   DVT Prophylaxis [] Lovenox, []  Heparin, [] SCDs, [] Ambulation,  [] Eliquis, [] Xarelto  [] Coumadin   Code Status Full Code   Disposition From: [facility]  Expected Disposition: [facility]  Estimated Date of Discharge: TBD  Patient requires continued admission due to medical management   Surrogate Decision Maker/ POA       Personally reviewed Lab Studies and Imaging             Subjective:     Chief Complaint:     Patient seen and examined at bedside this morning.  Reports breathing improving.  Denies active chest pain.      Review of Systems:      Pertinent positives and negatives discussed in HPI    Objective:     Intake/Output Summary (Last 24 hours) at 2025 0724  Last data filed at 2025 1857  Gross per 24 hour   Intake 240 ml   Output --   Net 240 ml      Vitals:   Vitals:    25 2049 25 0347 25 0350   BP: 103/72   (!) 121/58   Pulse: 92 (!) 102 85 89   Resp: 22 23 22 18   Temp:

## 2025-07-05 NOTE — PROGRESS NOTES
PHARMACY VANCOMYCIN MONITORING SERVICE  Pharmacy consulted by Dr. Wenceslao De La O for monitoring and adjustment.    Indication for treatment: Bloodstream infection  Goal trough: Trough Goal: 15-20 mcg/mL  AUC/JUAN: 400-600    Pertinent Laboratory Values:   Temp Readings from Last 3 Encounters:   07/05/25 97.6 °F (36.4 °C) (Oral)   06/29/25 97.8 °F (36.6 °C) (Oral)   05/04/25 98.3 °F (36.8 °C) (Oral)     Recent Labs     07/04/25  1335 07/04/25  1355 07/04/25  1728 07/05/25  0326   WBC 20.3*  --   --  17.9*   CRP  --  36.1*  --   --    PROCAL  --  0.08  --   --    LACTSEPSIS 1.9  --  1.3  --      Wt Readings from Last 3 Encounters:   07/04/25 (!) 147 kg (324 lb)   06/27/25 (!) 147.2 kg (324 lb 8.3 oz)   05/04/25 (!) 163.3 kg (360 lb)   Ideal body weight: 91.4 kg (201 lb 8 oz)  Adjusted ideal body weight: 113.6 kg (250 lb 8 oz)     Intake/Output Summary (Last 24 hours) at 7/5/2025 1636  Last data filed at 7/5/2025 1030  Gross per 24 hour   Intake 480 ml   Output 700 ml   Net -220 ml     Estimated Creatinine Clearance: 192 mL/min (A) (based on SCr of 0.6 mg/dL (L)).  Recent Labs     07/04/25  1355 07/05/25  0326   BUN 14 20   CREATININE 0.6* 0.6*       Pertinent Cultures:   Date    Source    Results  7/04   Blood    In process   7/04   MRSA PCR   In process   7/04   Urine     No growth     Vancomycin level:   No results for input(s): \"VANCORANDOM\" in the last 72 hours.    Assessment:  HPI: The patient is a 69 y.o. male who presented with c/o fatigue, congetstion, cough, SOB, wheezing, chest pain, myalgias. Met sepsis criteria in ED with HR, RR, WBC 20.3 and suspected infection.  Scr, BUN, and urine output:   Scr 0.6 mg/dL  BUN WNL  Day(s) of therapy: 2  Vancomycin concentration:  7/06 - to be collected    Plan:  Continue 1750 mg (15 mg/kg AdjBW) IV q12h  Predicted ssAUC of 507 mg/L*hr  Pharmacy will continue to monitor patient and adjust therapy as indicated    VANCOMYCIN CONCENTRATION SCHEDULED FOR 7/06 @ 0600    Thank you  for the consult.  Arleth Tijerina RPH, PharmD.  7/5/2025 4:36 PM

## 2025-07-05 NOTE — CONSULTS
CARDIOLOGY CONSULT NOTE         Reason for consultation: Elevated troponin      Primary care physician: Rosalio Hedrick MD      Chief Complaints :  Chief Complaint   Patient presents with    Shortness of Breath    Chest Pain     Started this morning. COPD history         History of present illness:Armando is a 69 y.o.year old male who has prior history of chronic HFpEF, recent prolonged hospitalization for severe volume overload, chronic respiratory failure on oxygen at home.  He is now admitted with the cough and mild shortness of breath.  He has nonspecific chest pain as well.  Upon my evaluation patient states that he is feeling much better.  He is able to ambulate.  He is on baseline home oxygen.  He denies any active chest discomfort at present.    Review of Systems:     All systems negative except as marked.        Physical Examination:    Vitals:    07/05/25 1051   BP:    Pulse:    Resp:    Temp:    SpO2: 97%        General Appearance:  No distress, conversant    Constitutional:  No acute distress, non-toxic appearance.    HENT:  Normocephalic, Atraumatic,   Eyes:  PERRL, EOMI, Conjunctiva normal, No discharge.   Respiratory:  No respiratory distress, No wheezing  Cardiovascular: S1, S2, aortic gnosis and mitral regurgitation murmurs noted  Abdomen /GI:   Soft, No tenderness   Genitourinary: No costovertebral angle tenderness   Musculoskeletal: Bilateral edema with chronic venous hypertension which appears to be at baseline.   Integument:  Well hydrated, no rash   Neurologic:  Alert & oriented x 3, no focal deficits noted       Medical decision making and Data review:    Lab Review   Recent Labs     07/05/25  0326   WBC 17.9*   HGB 13.5   HCT 41.4*   *      Recent Labs     07/05/25  0326   *   K 3.7   CL 91*   CO2 29   PHOS 2.9   BUN 20   CREATININE 0.6*     Recent Labs     07/05/25  0326   AST 16   ALT 25   BILITOT 0.5   ALKPHOS 70     No results for input(s): \"TROPONINT\" in the last 72

## 2025-07-06 VITALS
SYSTOLIC BLOOD PRESSURE: 102 MMHG | HEIGHT: 78 IN | WEIGHT: 315 LBS | HEART RATE: 74 BPM | TEMPERATURE: 98.4 F | OXYGEN SATURATION: 95 % | RESPIRATION RATE: 20 BRPM | BODY MASS INDEX: 36.45 KG/M2 | DIASTOLIC BLOOD PRESSURE: 73 MMHG

## 2025-07-06 LAB
ANION GAP SERPL CALCULATED.3IONS-SCNC: 12 MMOL/L (ref 9–17)
BUN SERPL-MCNC: 22 MG/DL (ref 7–20)
CALCIUM SERPL-MCNC: 7.9 MG/DL (ref 8.3–10.6)
CHLORIDE SERPL-SCNC: 93 MMOL/L (ref 99–110)
CO2 SERPL-SCNC: 28 MMOL/L (ref 21–32)
CREAT SERPL-MCNC: 0.6 MG/DL (ref 0.8–1.3)
DATE LAST DOSE: ABNORMAL
ERYTHROCYTE [DISTWIDTH] IN BLOOD BY AUTOMATED COUNT: 15.5 % (ref 11.7–14.9)
GFR, ESTIMATED: >90 ML/MIN/1.73M2
GLUCOSE BLD-MCNC: 161 MG/DL (ref 74–99)
GLUCOSE SERPL-MCNC: 178 MG/DL (ref 74–99)
HCT VFR BLD AUTO: 41.6 % (ref 42–52)
HGB BLD-MCNC: 13.4 G/DL (ref 13.5–18)
MCH RBC QN AUTO: 30.2 PG (ref 27–31)
MCHC RBC AUTO-ENTMCNC: 32.2 G/DL (ref 32–36)
MCV RBC AUTO: 93.7 FL (ref 78–100)
PLATELET # BLD AUTO: 144 K/UL (ref 140–440)
PMV BLD AUTO: 10.5 FL (ref 7.5–11.1)
POTASSIUM SERPL-SCNC: 3.8 MMOL/L (ref 3.5–5.1)
RBC # BLD AUTO: 4.44 M/UL (ref 4.6–6.2)
SODIUM SERPL-SCNC: 133 MMOL/L (ref 136–145)
TME LAST DOSE: ABNORMAL H
TROPONIN I SERPL HS-MCNC: 38 NG/L (ref 0–22)
VANCOMYCIN DOSE: ABNORMAL MG
VANCOMYCIN SERPL-MCNC: 25.2 UG/ML (ref 10–20)
WBC OTHER # BLD: 14 K/UL (ref 4–10.5)

## 2025-07-06 PROCEDURE — 84484 ASSAY OF TROPONIN QUANT: CPT

## 2025-07-06 PROCEDURE — 80048 BASIC METABOLIC PNL TOTAL CA: CPT

## 2025-07-06 PROCEDURE — 2500000003 HC RX 250 WO HCPCS: Performed by: STUDENT IN AN ORGANIZED HEALTH CARE EDUCATION/TRAINING PROGRAM

## 2025-07-06 PROCEDURE — 82962 GLUCOSE BLOOD TEST: CPT

## 2025-07-06 PROCEDURE — 6370000000 HC RX 637 (ALT 250 FOR IP): Performed by: STUDENT IN AN ORGANIZED HEALTH CARE EDUCATION/TRAINING PROGRAM

## 2025-07-06 PROCEDURE — 36415 COLL VENOUS BLD VENIPUNCTURE: CPT

## 2025-07-06 PROCEDURE — 94640 AIRWAY INHALATION TREATMENT: CPT

## 2025-07-06 PROCEDURE — 85027 COMPLETE CBC AUTOMATED: CPT

## 2025-07-06 PROCEDURE — 2580000003 HC RX 258: Performed by: STUDENT IN AN ORGANIZED HEALTH CARE EDUCATION/TRAINING PROGRAM

## 2025-07-06 PROCEDURE — 80202 ASSAY OF VANCOMYCIN: CPT

## 2025-07-06 PROCEDURE — 2700000000 HC OXYGEN THERAPY PER DAY

## 2025-07-06 PROCEDURE — 6360000002 HC RX W HCPCS: Performed by: STUDENT IN AN ORGANIZED HEALTH CARE EDUCATION/TRAINING PROGRAM

## 2025-07-06 PROCEDURE — 94761 N-INVAS EAR/PLS OXIMETRY MLT: CPT

## 2025-07-06 PROCEDURE — APPNB30 APP NON BILLABLE TIME 0-30 MINS: Performed by: NURSE PRACTITIONER

## 2025-07-06 PROCEDURE — 6360000002 HC RX W HCPCS

## 2025-07-06 RX ORDER — DOXYCYCLINE HYCLATE 100 MG
100 TABLET ORAL 2 TIMES DAILY
Qty: 10 TABLET | Refills: 0 | Status: SHIPPED | OUTPATIENT
Start: 2025-07-06 | End: 2025-07-11

## 2025-07-06 RX ORDER — METHYLPREDNISOLONE 4 MG/1
TABLET ORAL
Qty: 1 KIT | Refills: 0 | Status: SHIPPED | OUTPATIENT
Start: 2025-07-06 | End: 2025-07-12

## 2025-07-06 RX ADMIN — ACETAMINOPHEN 650 MG: 325 TABLET ORAL at 01:23

## 2025-07-06 RX ADMIN — PREDNISONE 40 MG: 10 TABLET ORAL at 10:12

## 2025-07-06 RX ADMIN — Medication 1750 MG: at 00:19

## 2025-07-06 RX ADMIN — DIGOXIN 125 MCG: 125 TABLET ORAL at 10:12

## 2025-07-06 RX ADMIN — CARVEDILOL 3.12 MG: 6.25 TABLET, FILM COATED ORAL at 10:12

## 2025-07-06 RX ADMIN — MIDODRINE HYDROCHLORIDE 2.5 MG: 5 TABLET ORAL at 10:13

## 2025-07-06 RX ADMIN — SODIUM CHLORIDE, PRESERVATIVE FREE 10 ML: 5 INJECTION INTRAVENOUS at 10:13

## 2025-07-06 RX ADMIN — SODIUM CHLORIDE: 0.9 INJECTION, SOLUTION INTRAVENOUS at 00:18

## 2025-07-06 RX ADMIN — AZITHROMYCIN DIHYDRATE 500 MG: 250 TABLET ORAL at 10:12

## 2025-07-06 RX ADMIN — SODIUM CHLORIDE: 0.9 INJECTION, SOLUTION INTRAVENOUS at 05:33

## 2025-07-06 RX ADMIN — APIXABAN 5 MG: 5 TABLET, FILM COATED ORAL at 10:12

## 2025-07-06 RX ADMIN — FUROSEMIDE 40 MG: 10 INJECTION, SOLUTION INTRAMUSCULAR; INTRAVENOUS at 10:14

## 2025-07-06 RX ADMIN — MORPHINE SULFATE 1 MG: 2 INJECTION, SOLUTION INTRAMUSCULAR; INTRAVENOUS at 00:11

## 2025-07-06 RX ADMIN — BUDESONIDE AND FORMOTEROL FUMARATE DIHYDRATE 2 PUFF: 160; 4.5 AEROSOL RESPIRATORY (INHALATION) at 11:01

## 2025-07-06 RX ADMIN — GUAIFENESIN 600 MG: 600 TABLET, EXTENDED RELEASE ORAL at 10:12

## 2025-07-06 RX ADMIN — LISINOPRIL 2.5 MG: 5 TABLET ORAL at 10:13

## 2025-07-06 RX ADMIN — IPRATROPIUM BROMIDE AND ALBUTEROL SULFATE 1 DOSE: .5; 2.5 SOLUTION RESPIRATORY (INHALATION) at 11:00

## 2025-07-06 RX ADMIN — CEFEPIME 2000 MG: 2 INJECTION, POWDER, FOR SOLUTION INTRAVENOUS at 05:34

## 2025-07-06 ASSESSMENT — PAIN DESCRIPTION - LOCATION
LOCATION: CHEST;FOOT
LOCATION: CHEST

## 2025-07-06 ASSESSMENT — PAIN DESCRIPTION - DESCRIPTORS
DESCRIPTORS: ACHING;PRESSURE
DESCRIPTORS: PRESSURE

## 2025-07-06 ASSESSMENT — PAIN DESCRIPTION - ORIENTATION
ORIENTATION: MID
ORIENTATION: MID

## 2025-07-06 ASSESSMENT — PAIN DESCRIPTION - FREQUENCY: FREQUENCY: CONTINUOUS

## 2025-07-06 ASSESSMENT — PAIN SCALES - GENERAL
PAINLEVEL_OUTOF10: 5
PAINLEVEL_OUTOF10: 9

## 2025-07-06 ASSESSMENT — PAIN DESCRIPTION - PAIN TYPE: TYPE: ACUTE PAIN

## 2025-07-06 ASSESSMENT — PAIN DESCRIPTION - ONSET: ONSET: ON-GOING

## 2025-07-06 NOTE — PROGRESS NOTES
Pt was [facility]. All belonging was sent with him. Per pt, family will know when he got there.

## 2025-07-06 NOTE — PROGRESS NOTES
Cardiology Progress Note     Today's Plan sign off  Follow up in office in 1 week     Admit Date:  2025    Consult reason / Seen today for: elevated troponin     Subjective and Overnight Events:  wants to go home. States his shortness of breath is at baseline.  Continues with cough      Telemetry: atrial fib    Assessment / Plan:     Elevated troponin   Flat rise  Not ACS  Type II demand leak in the setting of respiratory failure/ CHF  Remote [facility] - No CAD  Stress test -no ischemia      Valvular heart disease  Severe AS and moderate to sever MR  Echo shows: Severe aortic stenosis; LVOT SV: 68 mL, AV mean P mmHg, CAMERON: 0.94 cm sq and Moderate to severe regurgitation; MR ERO: 0.37 cm sq, MR volume: 53 mL.   Will need further work up for his VHD- this can be done in the outpatient setting     Cardiomyopathy  HFrEF acute on chronic  Bnp >4K   EF 35-40%  IV lasix - : negative 1.9 L  Change IV lasix to 60 mg bid   Shortness of breath at baseline  Continue coreg 3.125 mg bid / lisinopril   BP is soft -on midodrine -difficult to up-titrate GDMT  Low salt diet     Atrial fibrillation -chronic   Rate is controlled   H/o ablation of atrial fibrillation (2018)  Continue eliquis 5 mg bid   Continue coreg / lanoxin           History of Presenting Illness:    Chief complain on admission : 69 y.o.year old who is admitted for  Chief Complaint   Patient presents with    Shortness of Breath    Chest Pain     Started this morning. COPD history         Past medical history:    has a past medical history of A-fib ([facility]), Anxiety, Arthritis, COPD (chronic obstructive pulmonary disease) ([facility]), Depression, Diabetes mellitus ([facility]), Full dentures, H/O angiography, H/O Doppler ultrasound, H/O echocardiogram, Hx of colonoscopy, Hx of Doppler ultrasound, Hyperlipidemia, Hypertension, Macular degeneration, Obesity, On home oxygen therapy, PAD

## 2025-07-06 NOTE — DISCHARGE SUMMARY
V2.0  Discharge Summary    Name:  Armando Mars /Age/Sex: 1955 (69 y.o. male)   Admit Date: 2025  Discharge Date: 25    MRN & CSN:  [id number] & [id number] Encounter Date and Time 25 11:19 AM EDT    Attending:  Shankar Downs MD Discharging Provider: Shankar Downs MD       Hospital Course:     Brief HPI:     Armando Mars is a 69 y.o. male with pmh of  who presents with worsening shortness of breath, chest pain, and worsening cough productive sof clear sputum. Pt does report subsective fever episodes and intermittent atypical central sharp non radicating chest pain which is currently resolved. VS stable w 's, pt on 15 LPM O2 having some respiratory labour in ED, labs w leukocytosis , VBG 7.29/ 58/ 18, CXR w consolidation/ small pleural effusion, exam w 1-2+ b/l pitting pedal edema R>L. Pt s/p 1L IV fluid bolus, Cefepime/ Vanco and nebs     Brief Problem Based Course:     # Acute on chronic hypoxemic/hypercapnic respiratory failure  - Secondary to COPD/CHF exacerbation  - Presenting with worsening shortness of breath  - Started on IV antibiotics  - VBG on presentation pH 7.29, pCO2 58  - Was on high flow nasal cannula 15 L but currently weaned down to home baseline  - 2D echo  EF 35 to 40%  - Cardiology was consulted and recs appreciated.  Transition to p.o. diuretics.  Continue follow-up outpatient     # Chronic A-fib  - On Eliquis and Cardizem, and digoxin     # Type 2 diabetes mellitus  - Resume home regimen     # LINA  - BiPAP nightly      The patient expressed appropriate understanding of, and agreement with the discharge recommendations, medications, and plan.     Consults this admission:  PHARMACY TO DOSE VANCOMYCIN  IP CONSULT TO CARDIOLOGY    Discharge Diagnosis:   Acute hypoxemic respiratory failure (HCC)        Discharge Instruction:   Follow up appointments:   Primary care physician: Rosalio Hedrick MD within 1 week  Diet: regular diet   Activity: activity  6U  >349 8U    At night time, check glucose and take insulin as below:   0  300-349 4U  >349 4U     Lancets Misc  1 each by Does not apply route 3 times daily     Levemir 100 UNIT/ML injection vial  Generic drug: insulin detemir  Inject 30 Units into the skin every morning (before breakfast)     lisinopril 2.5 MG tablet  Commonly known as: PRINIVIL;ZESTRIL  Take 1 tablet by mouth daily     magnesium oxide 400 (240 Mg) MG tablet  Commonly known as: MAG-OX  Take 1 tablet by mouth daily     metFORMIN 1000 MG tablet  Commonly known as: GLUCOPHAGE  Take 1 tablet by mouth 2 times daily (with meals)     midodrine 2.5 MG tablet  Commonly known as: PROAMATINE     Nebulizer/Tubing/Mouthpiece Kit  1 kit by Does not apply route daily as needed (SOB, wheezing)     nicotine 21 MG/24HR  Commonly known as: NICODERM CQ  Place 1 patch onto the skin daily as needed (Nicotine craving)     OXYGEN  Inhale 4 L into the lungs continuous     * Roflumilast 500 MCG tablet  Commonly known as: DALIRESP     * Roflumilast 500 MCG tablet  Commonly known as: DALIRESP  Take 1 tablet by mouth daily     Symbicort 160-4.5 MCG/ACT Aero  Generic drug: budesonide-formoterol  Inhale 2 puffs into the lungs 2 times daily     tiotropium 18 MCG inhalation capsule  Commonly known as: Spiriva HandiHaler  Inhale 1 capsule into the lungs daily     Trelegy Ellipta 100-62.5-25 MCG/ACT Aepb inhaler  Generic drug: fluticasone-umeclidin-vilant  Inhale 1 puff into the lungs daily           * This list has 6 medication(s) that are the same as other medications prescribed for you. Read the directions carefully, and ask your doctor or other care provider to review them with you.                   Where to Get Your Medications        You can get these medications from any pharmacy    Bring a paper prescription for each of these medications  doxycycline hyclate 100 MG tablet  methylPREDNISolone 4 MG tablet        Objective Findings at Discharge:   /73   Pulse 74

## 2025-07-06 NOTE — DISCHARGE INSTR - COC
Continuity of Care Form    Patient Name: Armando Mars   :  1955  MRN:  [id number]    Admit date:  2025  Discharge date:  25    Code Status Order: Full Code   Advance Directives:     Admitting Physician:  No admitting provider for patient encounter.  PCP: Rosalio Hedrick MD    Discharging Nurse: Viviana PONCE  Discharging Hospital Unit/Room#: /-A  Discharging Unit Phone Number: [phone]    Emergency Contact:   Extended Emergency Contact Information  Primary Emergency Contact: John Mars  Home Phone: [phone]  Mobile Phone: [phone]  Relation: Brother/Sister   needed? No  Secondary Emergency Contact: Nico Mars  Home Phone: [phone]  Mobile Phone: [phone]  Relation: Brother/Sister  Preferred language: English   needed? No    Past Surgical History:  Past Surgical History:   Procedure Laterality Date    APPENDECTOMY      ATRIAL ABLATION SURGERY  2018    A-fib ablation     CARDIAC SURGERY  2018    \"Heart Ablation Done Twice\"    CHOLECYSTECTOMY, LAPAROSCOPIC  2018    COLONOSCOPY  2011    Polyp Removed    DENTAL SURGERY      All Teeth Extracted In Past    EYE SURGERY Left     \"For Macular Degeneration\"    FEMORAL BYPASS Left 2011    FEMORAL BYPASS Left 2019    FEMORAL POPLITEAL BYPASS LEFT, REDO performed by Ramon Cooper MD at [facility] OR    FRACTURE SURGERY Left     Broken Left Wrist , No Hardware    FRACTURE SURGERY Right 's    Broken Right Wrist With Hardware, Hardware Later Removed    HERNIA REPAIR      Umbilical Hernia    [place] LAPAROSCOPY SURG CHOLECYSTECTOMY N/A 2018    CHOLECYSTECTOMY LAPAROSCOPIC performed by Sandor Mendosa MD at [facility] OR    TONSILLECTOMY         Immunization History:   Immunization History   Administered Date(s) Administered    COVID-19, PFIZER PURPLE top, DILUTE for use, (age 12 y+), 30mcg/0.3mL 2021    Influenza Virus Vaccine 10/08/2014    Influenza, AFLURIA

## 2025-07-06 NOTE — PROGRESS NOTES
This RN setup transfer with [facility] to  pt at 1300. Report was giving to [facility] and all questions were answered. Scripts and document were faxed.

## 2025-07-06 NOTE — CARE COORDINATION
Nursing LVM for this RN CM inquiring about patient returning to [facility]. This RN CM left a voicemail for Abril Hayes to verify patient can return today. Awaiting callback. Attempted to call pt's nurse back from number showing call was from, no answer. CM will update once I hear back from [facility].     1152 - Patient CAN return to [facility] today. No pre-CERT needed.     Discharging nurse, please complete the following: Call [facility] @ [phone] to set up transport. Call report to [phone].Fax AVS and written scripts to [phone].

## 2025-07-06 NOTE — PROGRESS NOTES
PHARMACY VANCOMYCIN MONITORING SERVICE  Pharmacy consulted by Dr. Wenceslao De La O for monitoring and adjustment.    Indication for treatment: Bloodstream infection  Goal trough: Trough Goal: 15-20 mcg/mL  AUC/JUAN: 400-600    Pertinent Laboratory Values:   Temp Readings from Last 3 Encounters:   07/06/25 98.4 °F (36.9 °C) (Oral)   06/29/25 97.8 °F (36.6 °C) (Oral)   05/04/25 98.3 °F (36.8 °C) (Oral)     Recent Labs     07/04/25  1335 07/04/25  1355 07/04/25  1728 07/05/25  0326   WBC 20.3*  --   --  17.9*   CRP  --  36.1*  --   --    PROCAL  --  0.08  --   --    LACTSEPSIS 1.9  --  1.3  --      Wt Readings from Last 3 Encounters:   07/04/25 (!) 147 kg (324 lb)   06/27/25 (!) 147.2 kg (324 lb 8.3 oz)   05/04/25 (!) 163.3 kg (360 lb)   Ideal body weight: 91.4 kg (201 lb 8 oz)  Adjusted ideal body weight: 113.6 kg (250 lb 8 oz)     Intake/Output Summary (Last 24 hours) at 7/6/2025 0756  Last data filed at 7/6/2025 0700  Gross per 24 hour   Intake 240 ml   Output 2475 ml   Net -2235 ml     Estimated Creatinine Clearance: 192 mL/min (A) (based on SCr of 0.6 mg/dL (L)).  Recent Labs     07/04/25  1355 07/05/25  0326   BUN 14 20   CREATININE 0.6* 0.6*       Pertinent Cultures:   Date    Source    Results  6/20   Blood     NGTD  7/04   Blood    In process   7/04   MRSA PCR   Not detected    7/04   Urine     No growth     Vancomycin level:   Recent Labs     07/06/25  0349   VANCORANDOM 25.2*       Assessment:  HPI: The patient is a 69 y.o. male who presented with c/o fatigue, congetstion, cough, SOB, wheezing, chest pain, myalgias. Met sepsis criteria in ED with HR, RR, WBC 20.3 and suspected infection. Acute-on-chronic respiratory failure 2/2 ExCOPD on 15 L NC on presentation, now weaned down. PMHx Afib on Eliquis, T2DM, LINA.  Scr, BUN, and urine output:   Scr 0.6 mg/dL  BUN WNL  Day(s) of therapy: 3  Vancomycin concentration:  7/06 -25.2 mg/L,  collected 1.5 hours post dose.    Plan:  Continue 1750 mg (15 mg/kg AdjBW) IV

## 2025-07-07 ENCOUNTER — OFFICE VISIT (OUTPATIENT)
Dept: CARDIOLOGY CLINIC | Age: 70
End: 2025-07-07
Payer: MEDICARE

## 2025-07-07 ENCOUNTER — RESULTS FOLLOW-UP (OUTPATIENT)
Dept: EMERGENCY DEPT | Age: 70
End: 2025-07-07

## 2025-07-07 VITALS
HEIGHT: 78 IN | DIASTOLIC BLOOD PRESSURE: 60 MMHG | WEIGHT: 315 LBS | HEART RATE: 76 BPM | BODY MASS INDEX: 36.45 KG/M2 | SYSTOLIC BLOOD PRESSURE: 104 MMHG

## 2025-07-07 DIAGNOSIS — Z09 HOSPITAL DISCHARGE FOLLOW-UP: Primary | ICD-10-CM

## 2025-07-07 PROCEDURE — G8427 DOCREV CUR MEDS BY ELIG CLIN: HCPCS | Performed by: INTERNAL MEDICINE

## 2025-07-07 PROCEDURE — 1123F ACP DISCUSS/DSCN MKR DOCD: CPT | Performed by: INTERNAL MEDICINE

## 2025-07-07 PROCEDURE — 1111F DSCHRG MED/CURRENT MED MERGE: CPT | Performed by: INTERNAL MEDICINE

## 2025-07-07 PROCEDURE — G8417 CALC BMI ABV UP PARAM F/U: HCPCS | Performed by: INTERNAL MEDICINE

## 2025-07-07 PROCEDURE — 1160F RVW MEDS BY RX/DR IN RCRD: CPT | Performed by: INTERNAL MEDICINE

## 2025-07-07 PROCEDURE — 1159F MED LIST DOCD IN RCRD: CPT | Performed by: INTERNAL MEDICINE

## 2025-07-07 PROCEDURE — 3017F COLORECTAL CA SCREEN DOC REV: CPT | Performed by: INTERNAL MEDICINE

## 2025-07-07 PROCEDURE — 3078F DIAST BP <80 MM HG: CPT | Performed by: INTERNAL MEDICINE

## 2025-07-07 PROCEDURE — 99214 OFFICE O/P EST MOD 30 MIN: CPT | Performed by: INTERNAL MEDICINE

## 2025-07-07 PROCEDURE — 1036F TOBACCO NON-USER: CPT | Performed by: INTERNAL MEDICINE

## 2025-07-07 PROCEDURE — 3074F SYST BP LT 130 MM HG: CPT | Performed by: INTERNAL MEDICINE

## 2025-07-07 RX ORDER — MONTELUKAST SODIUM 10 MG/1
10 TABLET ORAL NIGHTLY
COMMUNITY

## 2025-07-07 RX ORDER — LIDOCAINE 50 MG/G
1 PATCH TOPICAL DAILY
COMMUNITY

## 2025-07-07 NOTE — PROGRESS NOTES
CLINICAL STAFF DOCUMENTATION    Dr. Diallo Mars  1955  1167    Have you had any Chest Pain recently? - No        Have you had any Shortness of Breath - Yes  When did it begin? -    If Yes - When   How many flights of stairs can you go up without having SOB? -   Are you on Oxygen during the day or at night? - Yes  How many liters of Oxygen are you on? - 4  How many pillows do you sleep with under your head? - sleeps sitting up    Have you had any dizziness - No    Have you had any palpitations recently? - No  Any thyroid issues? - No    Do you have any edema - swelling in always  but getting a little better      Is the patient on any of the following medications -   If Yes DO EKG - Needs done every 3 months    When did you have your last labs drawn 2025  What doctor ordered   Do we have the labs in their chart   If we do not have the labs, ask where they were drawn     If we do not have these labs, you are retrieve these labs for the provider!    Do you need any prescriptions refilled? - Yes    Do you have a surgery or procedure scheduled in the near future - No    Do use tobacco products? - No  Do you drink alcohol? - No  Do you use any illicit drugs? - No  Caffeine? - No  
collaterals, RSFA 90% stenosis-vasc surg consult    H/O Doppler ultrasound 09/05/2018    LE arterial - left mid and distal femoral artery occluded, lt FANI shows mild-mod PVD    H/O echocardiogram 01/06/2016    EF60% mild MR, TR, pulm HTN    Hx of colonoscopy     7/11 C-scope - benign polyp x1    Hx of Doppler ultrasound 02/20/2018    Lower extremity doppler  Normal study.    Hyperlipidemia     Hypertension     Macular degeneration     States is legally blind    Obesity     On home oxygen therapy     Continuous At 2 Liters Per Nasal Cannula    PAD (peripheral artery disease)     10/10 FANI mild PAD left superficial femoral s/p left fem-pop bypass    Panic attacks     Pneumonia Last Episode In 2018    PTSD (post-traumatic stress disorder)     Restless leg     Shortness of breath     Sleep apnea     Uses BiPap - stopped using 4/2020    Wears glasses     To Read     Past Surgical History:   Procedure Laterality Date    APPENDECTOMY  2003    ATRIAL ABLATION SURGERY  05/23/2018    A-fib ablation     CARDIAC SURGERY  05/2018    \"Heart Ablation Done Twice\"    CHOLECYSTECTOMY, LAPAROSCOPIC  11/12/2018    COLONOSCOPY  07/2011    Polyp Removed    DENTAL SURGERY      All Teeth Extracted In Past    EYE SURGERY Left 2017    \"For Macular Degeneration\"    FEMORAL BYPASS Left 01/2011    FEMORAL BYPASS Left 1/9/2019    FEMORAL POPLITEAL BYPASS LEFT, REDO performed by Ramon Cooper MD at [facility] OR    FRACTURE SURGERY Left 1980's    Broken Left Wrist , No Hardware    FRACTURE SURGERY Right 2000's    Broken Right Wrist With Hardware, Hardware Later Removed    HERNIA REPAIR  2003    Umbilical Hernia    [place] LAPAROSCOPY SURG CHOLECYSTECTOMY N/A 11/12/2018    CHOLECYSTECTOMY LAPAROSCOPIC performed by Sandor Mendosa MD at [facility] OR    TONSILLECTOMY  1960's      As reviewed   Family History   Problem Relation Age of Onset    Early Death Father         In His 30's, Suicide    Early Death Mother         In Her 30's, She Was Murdered By     Early

## 2025-07-07 NOTE — PATIENT INSTRUCTIONS
Thank you for allowing us to care for you today!   We want to ensure we can follow your treatment plan and we strive to give you the best outcomes and experience possible.   If you ever have a life threatening emergency and call 911 - for an ambulance (EMS)  REMEMBER  Our providers can only care for you at:   [facility] or [facility]   Even if you have someone take you or you drive yourself we can only care for you in a [facility] facility. Our providers are not setup at the other healthcare locations!    PLEASE CALL OUR OFFICE DURING NORMAL BUSINESS HOURS  Monday through Friday 8 am to 5 pm  AFTER HOURS the physician on-call cannot help with scheduling, rescheduling, procedure instruction questions or any type of medication need or issue.  [facility] P:[phone] - [facility] P:[phone] - [facility] P:[phone]      If you receive a survey:  We would appreciate you taking the time to share your experience concerning your provider visit in the office.    These surveys are confidential!  We are eager to improve and are counting on you to share your feedback so we can ensure you get the best care possible.

## 2025-07-08 DIAGNOSIS — I35.0 AORTIC VALVE STENOSIS, ETIOLOGY OF CARDIAC VALVE DISEASE UNSPECIFIED: ICD-10-CM

## 2025-07-08 DIAGNOSIS — Z01.810 PRE-OPERATIVE CARDIOVASCULAR EXAMINATION: Primary | ICD-10-CM

## 2025-07-10 LAB
MICROORGANISM SPEC CULT: NORMAL
MICROORGANISM SPEC CULT: NORMAL
SERVICE CMNT-IMP: NORMAL
SERVICE CMNT-IMP: NORMAL
SPECIMEN DESCRIPTION: NORMAL
SPECIMEN DESCRIPTION: NORMAL

## 2025-07-14 ENCOUNTER — HOSPITAL ENCOUNTER (INPATIENT)
Age: 70
LOS: 4 days | Discharge: NURSING, REHAB OR LONG-TERM CARE | End: 2025-07-18
Attending: EMERGENCY MEDICINE | Admitting: STUDENT IN AN ORGANIZED HEALTH CARE EDUCATION/TRAINING PROGRAM
Payer: MEDICARE

## 2025-07-14 ENCOUNTER — APPOINTMENT (OUTPATIENT)
Dept: GENERAL RADIOLOGY | Age: 70
End: 2025-07-14
Payer: MEDICARE

## 2025-07-14 ENCOUNTER — TELEPHONE (OUTPATIENT)
Dept: CARDIOLOGY CLINIC | Age: 70
End: 2025-07-14

## 2025-07-14 DIAGNOSIS — J96.21 ACUTE ON CHRONIC RESPIRATORY FAILURE WITH HYPOXIA AND HYPERCAPNIA (HCC): Primary | ICD-10-CM

## 2025-07-14 DIAGNOSIS — I50.9 ACUTE ON CHRONIC HEART FAILURE, UNSPECIFIED HEART FAILURE TYPE (HCC): ICD-10-CM

## 2025-07-14 DIAGNOSIS — I35.0 NONRHEUMATIC AORTIC VALVE STENOSIS: ICD-10-CM

## 2025-07-14 DIAGNOSIS — E83.42 HYPOMAGNESEMIA: ICD-10-CM

## 2025-07-14 DIAGNOSIS — J96.22 ACUTE ON CHRONIC RESPIRATORY FAILURE WITH HYPOXIA AND HYPERCAPNIA (HCC): Primary | ICD-10-CM

## 2025-07-14 DIAGNOSIS — R60.0 BILATERAL LOWER EXTREMITY EDEMA: ICD-10-CM

## 2025-07-14 DIAGNOSIS — R07.9 CHEST PAIN: ICD-10-CM

## 2025-07-14 LAB
ALBUMIN SERPL-MCNC: 3.4 G/DL (ref 3.4–5)
ALBUMIN/GLOB SERPL: 1.3 {RATIO} (ref 1.1–2.2)
ALP SERPL-CCNC: 85 U/L (ref 40–129)
ALT SERPL-CCNC: 22 U/L (ref 10–40)
ANION GAP SERPL CALCULATED.3IONS-SCNC: 8 MMOL/L (ref 9–17)
ARTERIAL PATENCY WRIST A: ABNORMAL
ARTERIAL PATENCY WRIST A: ABNORMAL
ARTERIAL PATENCY WRIST A: YES
AST SERPL-CCNC: 15 U/L (ref 15–37)
BDY SITE: ABNORMAL
BILIRUB SERPL-MCNC: 0.4 MG/DL (ref 0–1)
BNP SERPL-MCNC: 4319 PG/ML (ref 0–125)
BODY TEMPERATURE: 37
BUN SERPL-MCNC: 16 MG/DL (ref 7–20)
CALCIUM SERPL-MCNC: 10 MG/DL (ref 8.3–10.6)
CHLORIDE SERPL-SCNC: 88 MMOL/L (ref 99–110)
CO2 SERPL-SCNC: 44 MMOL/L (ref 21–32)
COHGB MFR BLD: 1.3 % (ref 0.5–1.5)
COHGB MFR BLD: 1.4 % (ref 0.5–1.5)
COHGB MFR BLD: 1.7 % (ref 0.5–1.5)
COHGB MFR BLD: 1.7 % (ref 0.5–1.5)
CREAT SERPL-MCNC: 0.8 MG/DL (ref 0.8–1.3)
ERYTHROCYTE [DISTWIDTH] IN BLOOD BY AUTOMATED COUNT: 14.9 % (ref 11.7–14.9)
GAS FLOW.O2 O2 DELIVERY SYS: ABNORMAL L/MIN
GAS FLOW.O2 O2 DELIVERY SYS: ABNORMAL L/MIN
GFR, ESTIMATED: >90 ML/MIN/1.73M2
GLUCOSE SERPL-MCNC: 129 MG/DL (ref 74–99)
HCO3 ARTERIAL: 34 MMOL/L (ref 21–28)
HCO3 ARTERIAL: 46.8 MMOL/L (ref 21–28)
HCO3 VENOUS: 44.2 MMOL/L (ref 22–29)
HCO3 VENOUS: 53.4 MMOL/L (ref 22–29)
HCT VFR BLD AUTO: 41.8 % (ref 42–52)
HGB BLD-MCNC: 13.1 G/DL (ref 13.5–18)
LACTATE BLDV-SCNC: 1.6 MMOL/L (ref 0.4–2)
LIPASE SERPL-CCNC: 21 U/L (ref 13–60)
MAGNESIUM SERPL-MCNC: 1.7 MG/DL (ref 1.8–2.4)
MCH RBC QN AUTO: 30 PG (ref 27–31)
MCHC RBC AUTO-ENTMCNC: 31.3 G/DL (ref 32–36)
MCV RBC AUTO: 95.9 FL (ref 78–100)
METHEMOGLOBIN: 0.2 % (ref 0.5–1.5)
METHEMOGLOBIN: 0.3 % (ref 0.5–1.5)
METHEMOGLOBIN: 0.5 % (ref 0.5–1.5)
METHEMOGLOBIN: 0.5 % (ref 0.5–1.5)
OXYHGB MFR BLD: 26.6 %
OXYHGB MFR BLD: 30.5 %
OXYHGB MFR BLD: 59.8 %
OXYHGB MFR BLD: 92.2 %
PCO2 ARTERIAL: 55.3 MMHG (ref 35–48)
PCO2 ARTERIAL: 86.3 MMHG (ref 35–48)
PCO2 VENOUS: 125.8 MM HG (ref 38–54)
PCO2 VENOUS: 95.9 MM HG (ref 38–54)
PH ARTERIAL: 7.35 (ref 7.35–7.45)
PH ARTERIAL: 7.41 (ref 7.35–7.45)
PH VENOUS: 7.25 (ref 7.32–7.43)
PH VENOUS: 7.28 (ref 7.32–7.43)
PLATELET, FLUORESCENCE: 137 K/UL (ref 140–440)
PMV BLD AUTO: 9.8 FL (ref 7.5–11.1)
PO2 ARTERIAL: 34.2 MMHG (ref 83–108)
PO2 ARTERIAL: 75.2 MMHG (ref 83–108)
PO2 VENOUS: 21.8 MM HG (ref 23–48)
PO2 VENOUS: 22.8 MM HG (ref 23–48)
POSITIVE BASE EXCESS, ART: 16.6 MMOL/L (ref 0–3)
POSITIVE BASE EXCESS, ART: 7.5 MMOL/L (ref 0–3)
POSITIVE BASE EXCESS, VEN: 12.9 MMOL/L (ref 0–3)
POSITIVE BASE EXCESS, VEN: 19.8 MMOL/L (ref 0–3)
POTASSIUM SERPL-SCNC: 4.1 MMOL/L (ref 3.5–5.1)
PROT SERPL-MCNC: 6 G/DL (ref 6.4–8.2)
RBC # BLD AUTO: 4.36 M/UL (ref 4.6–6.2)
SODIUM SERPL-SCNC: 140 MMOL/L (ref 136–145)
TEXT FOR RESPIRATORY: ABNORMAL
TROPONIN I SERPL HS-MCNC: 44 NG/L (ref 0–22)
TROPONIN I SERPL HS-MCNC: 47 NG/L (ref 0–22)
WBC OTHER # BLD: 9.6 K/UL (ref 4–10.5)

## 2025-07-14 PROCEDURE — 36600 WITHDRAWAL OF ARTERIAL BLOOD: CPT

## 2025-07-14 PROCEDURE — 96374 THER/PROPH/DIAG INJ IV PUSH: CPT

## 2025-07-14 PROCEDURE — 83880 ASSAY OF NATRIURETIC PEPTIDE: CPT

## 2025-07-14 PROCEDURE — 83690 ASSAY OF LIPASE: CPT

## 2025-07-14 PROCEDURE — 71045 X-RAY EXAM CHEST 1 VIEW: CPT

## 2025-07-14 PROCEDURE — 94660 CPAP INITIATION&MGMT: CPT

## 2025-07-14 PROCEDURE — 87040 BLOOD CULTURE FOR BACTERIA: CPT

## 2025-07-14 PROCEDURE — 6370000000 HC RX 637 (ALT 250 FOR IP): Performed by: EMERGENCY MEDICINE

## 2025-07-14 PROCEDURE — 83735 ASSAY OF MAGNESIUM: CPT

## 2025-07-14 PROCEDURE — 5A09357 ASSISTANCE WITH RESPIRATORY VENTILATION, LESS THAN 24 CONSECUTIVE HOURS, CONTINUOUS POSITIVE AIRWAY PRESSURE: ICD-10-PCS | Performed by: STUDENT IN AN ORGANIZED HEALTH CARE EDUCATION/TRAINING PROGRAM

## 2025-07-14 PROCEDURE — 83605 ASSAY OF LACTIC ACID: CPT

## 2025-07-14 PROCEDURE — 2500000003 HC RX 250 WO HCPCS: Performed by: EMERGENCY MEDICINE

## 2025-07-14 PROCEDURE — 85027 COMPLETE CBC AUTOMATED: CPT

## 2025-07-14 PROCEDURE — 6360000002 HC RX W HCPCS: Performed by: EMERGENCY MEDICINE

## 2025-07-14 PROCEDURE — 82805 BLOOD GASES W/O2 SATURATION: CPT

## 2025-07-14 PROCEDURE — 93005 ELECTROCARDIOGRAM TRACING: CPT | Performed by: EMERGENCY MEDICINE

## 2025-07-14 PROCEDURE — 94640 AIRWAY INHALATION TREATMENT: CPT

## 2025-07-14 PROCEDURE — 80053 COMPREHEN METABOLIC PANEL: CPT

## 2025-07-14 PROCEDURE — 84484 ASSAY OF TROPONIN QUANT: CPT

## 2025-07-14 PROCEDURE — 36415 COLL VENOUS BLD VENIPUNCTURE: CPT

## 2025-07-14 PROCEDURE — 99291 CRITICAL CARE FIRST HOUR: CPT

## 2025-07-14 PROCEDURE — 2140000000 HC CCU INTERMEDIATE R&B

## 2025-07-14 RX ORDER — FUROSEMIDE 10 MG/ML
60 INJECTION INTRAMUSCULAR; INTRAVENOUS EVERY 6 HOURS
Status: DISCONTINUED | OUTPATIENT
Start: 2025-07-14 | End: 2025-07-14

## 2025-07-14 RX ORDER — IPRATROPIUM BROMIDE AND ALBUTEROL SULFATE 2.5; .5 MG/3ML; MG/3ML
1 SOLUTION RESPIRATORY (INHALATION)
Status: DISCONTINUED | OUTPATIENT
Start: 2025-07-15 | End: 2025-07-18 | Stop reason: HOSPADM

## 2025-07-14 RX ORDER — SODIUM CHLORIDE 0.9 % (FLUSH) 0.9 %
5-40 SYRINGE (ML) INJECTION PRN
Status: DISCONTINUED | OUTPATIENT
Start: 2025-07-14 | End: 2025-07-18 | Stop reason: HOSPADM

## 2025-07-14 RX ORDER — POLYETHYLENE GLYCOL 3350 17 G/17G
17 POWDER, FOR SOLUTION ORAL DAILY PRN
Status: DISCONTINUED | OUTPATIENT
Start: 2025-07-14 | End: 2025-07-18 | Stop reason: HOSPADM

## 2025-07-14 RX ORDER — INSULIN GLARGINE 100 [IU]/ML
20 INJECTION, SOLUTION SUBCUTANEOUS NIGHTLY
Status: DISCONTINUED | OUTPATIENT
Start: 2025-07-14 | End: 2025-07-18 | Stop reason: HOSPADM

## 2025-07-14 RX ORDER — ACETAMINOPHEN 650 MG/1
650 SUPPOSITORY RECTAL EVERY 6 HOURS PRN
Status: DISCONTINUED | OUTPATIENT
Start: 2025-07-14 | End: 2025-07-18 | Stop reason: HOSPADM

## 2025-07-14 RX ORDER — MAGNESIUM SULFATE IN WATER 40 MG/ML
2000 INJECTION, SOLUTION INTRAVENOUS PRN
Status: DISCONTINUED | OUTPATIENT
Start: 2025-07-14 | End: 2025-07-15

## 2025-07-14 RX ORDER — POTASSIUM CHLORIDE 7.45 MG/ML
10 INJECTION INTRAVENOUS PRN
Status: DISCONTINUED | OUTPATIENT
Start: 2025-07-14 | End: 2025-07-15

## 2025-07-14 RX ORDER — INSULIN LISPRO 100 [IU]/ML
5 INJECTION, SOLUTION INTRAVENOUS; SUBCUTANEOUS
Status: DISCONTINUED | OUTPATIENT
Start: 2025-07-15 | End: 2025-07-18 | Stop reason: HOSPADM

## 2025-07-14 RX ORDER — ATORVASTATIN CALCIUM 40 MG/1
40 TABLET, FILM COATED ORAL NIGHTLY
Status: DISCONTINUED | OUTPATIENT
Start: 2025-07-14 | End: 2025-07-18 | Stop reason: HOSPADM

## 2025-07-14 RX ORDER — IPRATROPIUM BROMIDE AND ALBUTEROL SULFATE 2.5; .5 MG/3ML; MG/3ML
1 SOLUTION RESPIRATORY (INHALATION) ONCE
Status: COMPLETED | OUTPATIENT
Start: 2025-07-14 | End: 2025-07-14

## 2025-07-14 RX ORDER — ROFLUMILAST 500 UG/1
500 TABLET ORAL DAILY
Status: DISCONTINUED | OUTPATIENT
Start: 2025-07-15 | End: 2025-07-18 | Stop reason: HOSPADM

## 2025-07-14 RX ORDER — LISINOPRIL 5 MG/1
2.5 TABLET ORAL DAILY
Status: DISCONTINUED | OUTPATIENT
Start: 2025-07-15 | End: 2025-07-18 | Stop reason: HOSPADM

## 2025-07-14 RX ORDER — BUDESONIDE AND FORMOTEROL FUMARATE DIHYDRATE 160; 4.5 UG/1; UG/1
2 AEROSOL RESPIRATORY (INHALATION) 2 TIMES DAILY
Status: DISCONTINUED | OUTPATIENT
Start: 2025-07-14 | End: 2025-07-18 | Stop reason: HOSPADM

## 2025-07-14 RX ORDER — ACETAMINOPHEN 325 MG/1
650 TABLET ORAL EVERY 6 HOURS PRN
Status: DISCONTINUED | OUTPATIENT
Start: 2025-07-14 | End: 2025-07-18 | Stop reason: HOSPADM

## 2025-07-14 RX ORDER — SODIUM CHLORIDE 9 MG/ML
INJECTION, SOLUTION INTRAVENOUS PRN
Status: DISCONTINUED | OUTPATIENT
Start: 2025-07-14 | End: 2025-07-18 | Stop reason: HOSPADM

## 2025-07-14 RX ORDER — SODIUM CHLORIDE 0.9 % (FLUSH) 0.9 %
5-40 SYRINGE (ML) INJECTION EVERY 12 HOURS SCHEDULED
Status: DISCONTINUED | OUTPATIENT
Start: 2025-07-14 | End: 2025-07-18 | Stop reason: HOSPADM

## 2025-07-14 RX ORDER — INSULIN LISPRO 100 [IU]/ML
0-4 INJECTION, SOLUTION INTRAVENOUS; SUBCUTANEOUS
Status: DISCONTINUED | OUTPATIENT
Start: 2025-07-14 | End: 2025-07-18 | Stop reason: HOSPADM

## 2025-07-14 RX ORDER — ONDANSETRON 4 MG/1
4 TABLET, ORALLY DISINTEGRATING ORAL EVERY 8 HOURS PRN
Status: DISCONTINUED | OUTPATIENT
Start: 2025-07-14 | End: 2025-07-18 | Stop reason: HOSPADM

## 2025-07-14 RX ORDER — ALBUTEROL SULFATE 0.83 MG/ML
SOLUTION RESPIRATORY (INHALATION)
Status: DISPENSED
Start: 2025-07-14 | End: 2025-07-15

## 2025-07-14 RX ORDER — GLUCAGON 1 MG/ML
1 KIT INJECTION PRN
Status: DISCONTINUED | OUTPATIENT
Start: 2025-07-14 | End: 2025-07-18 | Stop reason: HOSPADM

## 2025-07-14 RX ORDER — CARVEDILOL 6.25 MG/1
3.12 TABLET ORAL 2 TIMES DAILY WITH MEALS
Status: DISCONTINUED | OUTPATIENT
Start: 2025-07-15 | End: 2025-07-18 | Stop reason: HOSPADM

## 2025-07-14 RX ORDER — DIGOXIN 125 MCG
125 TABLET ORAL DAILY
Status: DISCONTINUED | OUTPATIENT
Start: 2025-07-15 | End: 2025-07-18 | Stop reason: HOSPADM

## 2025-07-14 RX ORDER — DEXTROSE MONOHYDRATE 100 MG/ML
INJECTION, SOLUTION INTRAVENOUS CONTINUOUS PRN
Status: DISCONTINUED | OUTPATIENT
Start: 2025-07-14 | End: 2025-07-18 | Stop reason: HOSPADM

## 2025-07-14 RX ORDER — MONTELUKAST SODIUM 10 MG/1
10 TABLET ORAL NIGHTLY
Status: DISCONTINUED | OUTPATIENT
Start: 2025-07-15 | End: 2025-07-18 | Stop reason: HOSPADM

## 2025-07-14 RX ORDER — ONDANSETRON 2 MG/ML
4 INJECTION INTRAMUSCULAR; INTRAVENOUS EVERY 6 HOURS PRN
Status: DISCONTINUED | OUTPATIENT
Start: 2025-07-14 | End: 2025-07-18 | Stop reason: HOSPADM

## 2025-07-14 RX ORDER — FUROSEMIDE 10 MG/ML
60 INJECTION INTRAMUSCULAR; INTRAVENOUS EVERY 8 HOURS
Status: DISCONTINUED | OUTPATIENT
Start: 2025-07-14 | End: 2025-07-17

## 2025-07-14 RX ADMIN — IPRATROPIUM BROMIDE AND ALBUTEROL SULFATE 1 DOSE: .5; 2.5 SOLUTION RESPIRATORY (INHALATION) at 21:05

## 2025-07-14 RX ADMIN — IPRATROPIUM BROMIDE AND ALBUTEROL SULFATE 1 DOSE: .5; 2.5 SOLUTION RESPIRATORY (INHALATION) at 21:06

## 2025-07-14 RX ADMIN — WATER 60 MG: 1 INJECTION INTRAMUSCULAR; INTRAVENOUS; SUBCUTANEOUS at 21:07

## 2025-07-15 LAB
ALBUMIN SERPL-MCNC: 3.2 G/DL (ref 3.4–5)
ALBUMIN/GLOB SERPL: 1.3 {RATIO} (ref 1.1–2.2)
ALP SERPL-CCNC: 74 U/L (ref 40–129)
ALT SERPL-CCNC: 17 U/L (ref 10–40)
ANION GAP SERPL CALCULATED.3IONS-SCNC: 10 MMOL/L (ref 9–17)
ARTERIAL PATENCY WRIST A: ABNORMAL
AST SERPL-CCNC: 13 U/L (ref 15–37)
BASOPHILS # BLD: 0.01 K/UL
BASOPHILS NFR BLD: 0 % (ref 0–1)
BILIRUB SERPL-MCNC: 0.5 MG/DL (ref 0–1)
BODY TEMPERATURE: 37
BUN SERPL-MCNC: 17 MG/DL (ref 7–20)
CALCIUM SERPL-MCNC: 8.9 MG/DL (ref 8.3–10.6)
CHLORIDE SERPL-SCNC: 88 MMOL/L (ref 99–110)
CO2 SERPL-SCNC: 41 MMOL/L (ref 21–32)
COHGB MFR BLD: 0.8 % (ref 0.5–1.5)
CREAT SERPL-MCNC: 0.6 MG/DL (ref 0.8–1.3)
DATE LAST DOSE: ABNORMAL
DIGOXIN DOSE TIME: ABNORMAL
DIGOXIN DOSE: ABNORMAL MG
DIGOXIN SERPL-MCNC: 0.7 NG/ML (ref 0.8–2)
EKG ATRIAL RATE: 111 BPM
EKG DIAGNOSIS: NORMAL
EKG Q-T INTERVAL: 326 MS
EKG QRS DURATION: 90 MS
EKG QTC CALCULATION (BAZETT): 427 MS
EKG R AXIS: -17 DEGREES
EKG T AXIS: 28 DEGREES
EKG VENTRICULAR RATE: 103 BPM
EOSINOPHIL # BLD: 0.01 K/UL
EOSINOPHILS RELATIVE PERCENT: 0 % (ref 0–3)
ERYTHROCYTE [DISTWIDTH] IN BLOOD BY AUTOMATED COUNT: 15.1 % (ref 11.7–14.9)
GAS FLOW.O2 O2 DELIVERY SYS: ABNORMAL L/MIN
GFR, ESTIMATED: >90 ML/MIN/1.73M2
GLUCOSE BLD-MCNC: 136 MG/DL (ref 74–99)
GLUCOSE BLD-MCNC: 138 MG/DL (ref 74–99)
GLUCOSE BLD-MCNC: 158 MG/DL (ref 74–99)
GLUCOSE BLD-MCNC: 161 MG/DL (ref 74–99)
GLUCOSE BLD-MCNC: 166 MG/DL (ref 74–99)
GLUCOSE SERPL-MCNC: 178 MG/DL (ref 74–99)
HCO3 VENOUS: 48.6 MMOL/L (ref 22–29)
HCT VFR BLD AUTO: 39 % (ref 42–52)
HGB BLD-MCNC: 12.4 G/DL (ref 13.5–18)
IMM GRANULOCYTES # BLD AUTO: 0.04 K/UL
IMM GRANULOCYTES NFR BLD: 1 %
INR PPP: 1.2
LYMPHOCYTES NFR BLD: 0.47 K/UL
LYMPHOCYTES RELATIVE PERCENT: 6 % (ref 24–44)
MCH RBC QN AUTO: 30.3 PG (ref 27–31)
MCHC RBC AUTO-ENTMCNC: 31.8 G/DL (ref 32–36)
MCV RBC AUTO: 95.4 FL (ref 78–100)
METHEMOGLOBIN: 0.4 % (ref 0.5–1.5)
MONOCYTES NFR BLD: 0.15 K/UL
MONOCYTES NFR BLD: 2 % (ref 0–5)
NEUTROPHILS NFR BLD: 91 % (ref 36–66)
NEUTS SEG NFR BLD: 6.93 K/UL
OXYHGB MFR BLD: 97.2 %
PCO2 VENOUS: 81.8 MM HG (ref 38–54)
PH VENOUS: 7.39 (ref 7.32–7.43)
PLATELET # BLD AUTO: 111 K/UL (ref 140–440)
PMV BLD AUTO: 10.2 FL (ref 7.5–11.1)
PO2 VENOUS: 144.6 MM HG (ref 23–48)
POSITIVE BASE EXCESS, VEN: 19 MMOL/L (ref 0–3)
POTASSIUM SERPL-SCNC: 3.9 MMOL/L (ref 3.5–5.1)
PROT SERPL-MCNC: 5.6 G/DL (ref 6.4–8.2)
PROTHROMBIN TIME: 15.6 SEC (ref 11.7–14.5)
RBC # BLD AUTO: 4.09 M/UL (ref 4.6–6.2)
SODIUM SERPL-SCNC: 139 MMOL/L (ref 136–145)
TEXT FOR RESPIRATORY: ABNORMAL
WBC OTHER # BLD: 7.6 K/UL (ref 4–10.5)

## 2025-07-15 PROCEDURE — 80162 ASSAY OF DIGOXIN TOTAL: CPT

## 2025-07-15 PROCEDURE — 2500000003 HC RX 250 WO HCPCS: Performed by: STUDENT IN AN ORGANIZED HEALTH CARE EDUCATION/TRAINING PROGRAM

## 2025-07-15 PROCEDURE — 6360000002 HC RX W HCPCS: Performed by: STUDENT IN AN ORGANIZED HEALTH CARE EDUCATION/TRAINING PROGRAM

## 2025-07-15 PROCEDURE — 2140000000 HC CCU INTERMEDIATE R&B

## 2025-07-15 PROCEDURE — 6370000000 HC RX 637 (ALT 250 FOR IP): Performed by: STUDENT IN AN ORGANIZED HEALTH CARE EDUCATION/TRAINING PROGRAM

## 2025-07-15 PROCEDURE — 82962 GLUCOSE BLOOD TEST: CPT

## 2025-07-15 PROCEDURE — 6360000002 HC RX W HCPCS: Performed by: EMERGENCY MEDICINE

## 2025-07-15 PROCEDURE — 2580000003 HC RX 258: Performed by: EMERGENCY MEDICINE

## 2025-07-15 PROCEDURE — 94660 CPAP INITIATION&MGMT: CPT

## 2025-07-15 PROCEDURE — 99223 1ST HOSP IP/OBS HIGH 75: CPT | Performed by: INTERNAL MEDICINE

## 2025-07-15 PROCEDURE — 80053 COMPREHEN METABOLIC PANEL: CPT

## 2025-07-15 PROCEDURE — 36415 COLL VENOUS BLD VENIPUNCTURE: CPT

## 2025-07-15 PROCEDURE — 82805 BLOOD GASES W/O2 SATURATION: CPT

## 2025-07-15 PROCEDURE — 85025 COMPLETE CBC W/AUTO DIFF WBC: CPT

## 2025-07-15 PROCEDURE — 93010 ELECTROCARDIOGRAM REPORT: CPT | Performed by: INTERNAL MEDICINE

## 2025-07-15 PROCEDURE — 2580000003 HC RX 258: Performed by: STUDENT IN AN ORGANIZED HEALTH CARE EDUCATION/TRAINING PROGRAM

## 2025-07-15 PROCEDURE — 94640 AIRWAY INHALATION TREATMENT: CPT

## 2025-07-15 PROCEDURE — 85610 PROTHROMBIN TIME: CPT

## 2025-07-15 RX ORDER — PREDNISONE 20 MG/1
40 TABLET ORAL DAILY
Status: DISCONTINUED | OUTPATIENT
Start: 2025-07-15 | End: 2025-07-15

## 2025-07-15 RX ADMIN — SODIUM CHLORIDE: 0.9 INJECTION, SOLUTION INTRAVENOUS at 01:01

## 2025-07-15 RX ADMIN — ATORVASTATIN CALCIUM 40 MG: 40 TABLET, FILM COATED ORAL at 20:51

## 2025-07-15 RX ADMIN — INSULIN LISPRO 5 UNITS: 100 INJECTION, SOLUTION INTRAVENOUS; SUBCUTANEOUS at 16:48

## 2025-07-15 RX ADMIN — BUDESONIDE AND FORMOTEROL FUMARATE DIHYDRATE 2 PUFF: 160; 4.5 AEROSOL RESPIRATORY (INHALATION) at 11:50

## 2025-07-15 RX ADMIN — INSULIN LISPRO 5 UNITS: 100 INJECTION, SOLUTION INTRAVENOUS; SUBCUTANEOUS at 13:43

## 2025-07-15 RX ADMIN — ROFLUMILAST 500 MCG: 500 TABLET ORAL at 10:01

## 2025-07-15 RX ADMIN — LISINOPRIL 2.5 MG: 5 TABLET ORAL at 09:59

## 2025-07-15 RX ADMIN — SODIUM CHLORIDE, PRESERVATIVE FREE 10 ML: 5 INJECTION INTRAVENOUS at 10:01

## 2025-07-15 RX ADMIN — IPRATROPIUM BROMIDE AND ALBUTEROL SULFATE 1 DOSE: .5; 2.5 SOLUTION RESPIRATORY (INHALATION) at 16:31

## 2025-07-15 RX ADMIN — FUROSEMIDE 60 MG: 10 INJECTION, SOLUTION INTRAMUSCULAR; INTRAVENOUS at 13:52

## 2025-07-15 RX ADMIN — IPRATROPIUM BROMIDE AND ALBUTEROL SULFATE 1 DOSE: .5; 2.5 SOLUTION RESPIRATORY (INHALATION) at 08:49

## 2025-07-15 RX ADMIN — SODIUM CHLORIDE, PRESERVATIVE FREE 10 ML: 5 INJECTION INTRAVENOUS at 00:52

## 2025-07-15 RX ADMIN — FUROSEMIDE 60 MG: 10 INJECTION, SOLUTION INTRAMUSCULAR; INTRAVENOUS at 20:51

## 2025-07-15 RX ADMIN — FUROSEMIDE 60 MG: 10 INJECTION, SOLUTION INTRAMUSCULAR; INTRAVENOUS at 00:51

## 2025-07-15 RX ADMIN — SODIUM CHLORIDE, PRESERVATIVE FREE 10 ML: 5 INJECTION INTRAVENOUS at 20:52

## 2025-07-15 RX ADMIN — INSULIN GLARGINE 20 UNITS: 100 INJECTION, SOLUTION SUBCUTANEOUS at 20:52

## 2025-07-15 RX ADMIN — CEFEPIME 2000 MG: 2 INJECTION, POWDER, FOR SOLUTION INTRAVENOUS at 01:02

## 2025-07-15 RX ADMIN — DIGOXIN 125 MCG: 125 TABLET ORAL at 09:59

## 2025-07-15 RX ADMIN — APIXABAN 5 MG: 5 TABLET, FILM COATED ORAL at 00:51

## 2025-07-15 RX ADMIN — INSULIN GLARGINE 20 UNITS: 100 INJECTION, SOLUTION SUBCUTANEOUS at 01:11

## 2025-07-15 RX ADMIN — IPRATROPIUM BROMIDE AND ALBUTEROL SULFATE 1 DOSE: .5; 2.5 SOLUTION RESPIRATORY (INHALATION) at 11:51

## 2025-07-15 RX ADMIN — ATORVASTATIN CALCIUM 40 MG: 40 TABLET, FILM COATED ORAL at 00:51

## 2025-07-15 RX ADMIN — CARVEDILOL 3.12 MG: 6.25 TABLET, FILM COATED ORAL at 10:00

## 2025-07-15 RX ADMIN — MONTELUKAST 10 MG: 10 TABLET, FILM COATED ORAL at 20:51

## 2025-07-15 ASSESSMENT — PAIN SCALES - GENERAL
PAINLEVEL_OUTOF10: 0

## 2025-07-15 NOTE — CARE COORDINATION
07/15/25 1458   Service Assessment   Patient Orientation Alert and Oriented   Cognition Alert   History Provided By Patient   Primary Caregiver Self   Support Systems Family Members   Patient's Healthcare Decision Maker is: Named in Scanned ACP Document   PCP Verified by CM Yes   Prior Functional Level Assistance with the following:;Mobility   Current Functional Level Assistance with the following:;Mobility   Can patient return to prior living arrangement Yes   Ability to make needs known: Good   Family able to assist with home care needs: No   Would you like for me to discuss the discharge plan with any other family members/significant others, and if so, who? No   Financial Resources Medicare;Medicaid   Community Resources ECF/Home Care;Hospice     CM in to see Pt to initiate discharge planning.  Pt is from [facility] and plans to return. Pt was active with [facility] and plans to resume at discharge.     CM met with [facility] [place] they remain able to accept Pt back with hospice.  Hospice will be resumed at discharge.    CM call to [facility], Pt can return to assisted living when medically ready.     CM following

## 2025-07-15 NOTE — ED NOTES
PT t oed with CO shortness of breath. Was recently admitted for copd exacerbation. Pt brought in on face mask, pt also has bilateral lower extremity edema and weeping.

## 2025-07-15 NOTE — ED NOTES
ED TO INPATIENT SBAR HANDOFF    Patient Name: Armando Mars   :  1955  69 y.o.   Preferred Name    Family/Caregiver Present no   Restraints no   C-SSRS:    Sitter no   Sepsis Risk Score Sepsis V2 Risk Score: 53.8    PLEASE NOTE--Encounter / Re-Admission Within 30 Days  This patient has had another encounter or admission within the last 30 days.      Readmission Risk Score: 25      Situation  Chief Complaint   Patient presents with    Shortness of Breath     Was rx admitted for copd exacerbation     Brief Description of Patient's Condition: Pt to ed with CO sob and weeping edema in bilateral lower extremities   Mental Status: alert  Arrived from: home    Imaging:   XR CHEST PORTABLE   Final Result        Abnormal labs:   Abnormal Labs Reviewed   CBC - Abnormal; Notable for the following components:       Result Value    RBC 4.36 (*)     Hemoglobin 13.1 (*)     Hematocrit 41.8 (*)     MCHC 31.3 (*)     Platelet, Fluorescence 137 (*)     All other components within normal limits   COMPREHENSIVE METABOLIC PANEL - Abnormal; Notable for the following components:    Chloride 88 (*)     CO2 44 (*)     Anion Gap 8 (*)     Glucose 129 (*)     Total Protein 6.0 (*)     All other components within normal limits   MAGNESIUM - Abnormal; Notable for the following components:    Magnesium 1.7 (*)     All other components within normal limits   BRAIN NATRIURETIC PEPTIDE - Abnormal; Notable for the following components:    NT Pro-BNP 4,319 (*)     All other components within normal limits   BLOOD GAS, VENOUS - Abnormal; Notable for the following components:    pH, Tylor 7.281 (*)     pCO2, Tylor 95.9 (*)     PO2, Tylor 21.8 (*)     HCO3, Venous 44.2 (*)     Positive Base Excess, Tylor 12.9 (*)     All other components within normal limits   TROPONIN - Abnormal; Notable for the following components:    Troponin, High Sensitivity 44 (*)     All other components within normal limits   BLOOD GAS, VENOUS - Abnormal; Notable for the    Peripheral IV 07/14/25 Distal;Right;Anterior Cephalic (Active)       Pertinent or High Risk Medications/Drips: no   If Yes, please provide details:       Blood Product Administration: no  If Yes, please provide details:     Recommendation    Incomplete orders inpt  Additional Comments:    If any further questions, please call Sending RN at [phone]    Electronically signed by: Electronically signed by Neftaly Lee RN on 7/14/2025 at 10:11 PM

## 2025-07-15 NOTE — PROGRESS NOTES
4 Eyes Skin Assessment     NAME:  Armando Mars  YOB: 1955  MEDICAL RECORD NUMBER:  [id number]    The patient is being assessed for  Admission    I agree that at least one RN has performed a thorough Head to Toe Skin Assessment on the patient. ALL assessment sites listed below have been assessed.      Areas assessed by both nurses:    Head, Face, Ears, Shoulders, Back, Chest, Arms, Elbows, Hands, Sacrum. Buttock, Coccyx, Ischium, Legs. Feet and Heels, and Under Medical Devices         Does the Patient have a Wound? Yes. Weeping lower legs, right heel.       Hector Prevention initiated by RN: Yes  Wound Care Orders initiated by RN: Yes    Pressure Injury (Stage 1,2,3,4, Unstageable, DTI, NWPT, and Complex wounds) if present, place Wound referral order by RN under : yes    New Ostomies, if present place, Ostomy referral order under : No     Nurse 1 eSignature: Electronically signed by Ariela Churchill RN on 7/15/25 at 1:38 AM EDT    **SHARE this note so that the co-signing nurse can place an eSignature**    Nurse 2 eSignature: Electronically signed by Leanna Moeller RN on 7/15/25 at 2:25 AM EDT

## 2025-07-15 NOTE — PLAN OF CARE
Problem: Chronic Conditions and Co-morbidities  Goal: Patient's chronic conditions and co-morbidity symptoms are monitored and maintained or improved  Outcome: Progressing     Problem: Discharge Planning  Goal: Discharge to home or other facility with appropriate resources  Outcome: Progressing     Problem: Safety - Adult  Goal: Free from fall injury  Outcome: Progressing     Problem: ABCDS Injury Assessment  Goal: Absence of physical injury  Outcome: Progressing     Problem: Respiratory - Adult  Goal: Achieves optimal ventilation and oxygenation  Outcome: Progressing     Problem: Metabolic/Fluid and Electrolytes - Adult  Goal: Electrolytes maintained within normal limits  Outcome: Progressing  Goal: Hemodynamic stability and optimal renal function maintained  Outcome: Progressing  Goal: Glucose maintained within prescribed range  Outcome: Progressing

## 2025-07-15 NOTE — CONSULTS
tablet Take 1 tablet by mouth nightly      lidocaine (LIDODERM) 5 % Place 1 patch onto the skin daily 12 hours on, 12 hours off.      Roflumilast (DALIRESP) 500 MCG tablet Take 1 tablet by mouth daily      apixaban (ELIQUIS) 5 MG TABS tablet Take 1 tablet by mouth 2 times daily 60 tablet 3    lisinopril (PRINIVIL;ZESTRIL) 2.5 MG tablet Take 1 tablet by mouth daily 60 tablet 3    carvedilol (COREG) 3.125 MG tablet Take 1 tablet by mouth 2 times daily (with meals) 90 tablet 3    furosemide (LASIX) 20 MG tablet Take 3 tablets by mouth in the morning and 3 tablets in the evening. 180 tablet 3    fluticasone-umeclidin-vilant (TRELEGY ELLIPTA) 100-62.5-25 MCG/ACT AEPB inhaler Inhale 1 puff into the lungs daily 1 each 5    OXYGEN Inhale 4 L into the lungs continuous 1 Units 0    albuterol (PROVENTIL) (2.5 MG/3ML) 0.083% nebulizer solution Take 3 mLs by nebulization in the morning, at noon, and at bedtime 120 each 3    albuterol sulfate HFA (PROVENTIL;VENTOLIN;PROAIR) 108 (90 Base) MCG/ACT inhaler Inhale 2 puffs into the lungs every 4 hours as needed for Wheezing 18 g 0    Menthol, Topical Analgesic, (BIOFREEZE) 4 % GEL Apply topically every 6 hours To arm      digoxin (LANOXIN) 125 MCG tablet Take 1 tablet by mouth daily 30 tablet 0    guaiFENesin (MUCINEX) 600 MG extended release tablet Take 1 tablet by mouth 2 times daily 60 tablet 0    nicotine (NICODERM CQ) 21 MG/24HR Place 1 patch onto the skin daily as needed (Nicotine craving) 30 patch 3    midodrine (PROAMATINE) 2.5 MG tablet Take 1 tablet by mouth 2 times daily      insulin detemir (LEVEMIR) 100 UNIT/ML injection vial Inject 30 Units into the skin every morning (before breakfast) 27 mL 0    atorvastatin (LIPITOR) 40 MG tablet Take 1 tablet by mouth nightly 30 tablet 0    insulin lispro, 1 Unit Dial, (HUMALOG KWIKPEN) 100 UNIT/ML SOPN Check glucose with meals and take insulin as below     0U  200-249 2U  250-299 4U  300-349 6U  >349 8U    At night time, check  cluster (Active)   Wound Image   07/15/25 0900   Wound Etiology Venous 07/15/25 0900   Dressing Status Clean;Dry 07/15/25 0900   Wound Cleansed Cleansed with saline 07/15/25 0900   Dressing/Treatment Open to air 07/15/25 0900   Wound Length (cm) 12 cm 07/15/25 0900   Wound Width (cm) 3 cm 07/15/25 0900   Wound Depth (cm) 0.1 cm 07/15/25 0900   Wound Surface Area (cm^2) 36 cm^2 07/15/25 0900   Change in Wound Size % (l*w) 34.43 07/15/25 0900   Wound Volume (cm^3) 3.6 cm^3 07/15/25 0900   Wound Healing % 34 07/15/25 0900   Distance Tunneling (cm) 0 cm 07/15/25 0900   Tunneling Position ___ O'Clock 0 07/15/25 0900   Undermining Starts ___ O'Clock 0 07/15/25 0900   Undermining Ends___ O'Clock 0 07/15/25 0900   Undermining Maxium Distance (cm) 0 07/15/25 0900   Wound Assessment Pink/red;Dry 07/15/25 0900   Drainage Amount None (dry) 07/15/25 0900   Drainage Description Thin;Serosanguinous 07/05/25 2345   Odor None 07/15/25 0900   Maru-wound Assessment Dry/flaky;Hyperpigmented 07/15/25 0900   Margins Attached edges 07/15/25 0900   Wound Thickness Description not for Pressure Injury Partial thickness 07/15/25 0900   Number of days: 22       Wound 06/23/25 Femoral Proximal;Right;Anterior (Active)   Wound Image   06/23/25 1030   Wound Etiology Venous 07/06/25 1145   Dressing Status Clean;Dry;Intact 07/06/25 1145   Wound Cleansed Soap and water 06/25/25 0410   Dressing/Treatment Interdry Ag/wicking fabric with Ag 06/25/25 0410   Wound Length (cm) 0 cm 07/15/25 0900   Wound Width (cm) 0 cm 07/15/25 0900   Wound Depth (cm) 0 cm 07/15/25 0900   Wound Surface Area (cm^2) 0 cm^2 07/15/25 0900   Change in Wound Size % (l*w) 100 07/15/25 0900   Wound Volume (cm^3) 0 cm^3 07/15/25 0900   Wound Healing % 100 07/15/25 0900   Distance Tunneling (cm) 0 cm 06/24/25 1600   Tunneling Position ___ O'Clock 0 06/24/25 1600   Undermining Starts ___ O'Clock 0 06/24/25 1600   Undermining Ends___ O'Clock 0 06/24/25 1600   Undermining Maxium

## 2025-07-15 NOTE — TELEPHONE ENCOUNTER
Erin Angelo called to update   Contact number for this patient's   Nurse for procedure instructions.

## 2025-07-15 NOTE — CONSULTS
CARDIOLOGY CONSULT NOTE   Reason for consultation:  CHF    Referring physician:  Skip Moss MD     Primary care physician: Rosalio Hedrick MD      Dear  Dr. Skip Moss MD   Thanks for the consult.    Chief Complaints :  Chief Complaint   Patient presents with    Shortness of Breath     Was rx admitted for copd exacerbation        History of present illness:Armando is a 69 y.o.year old who presents with worsening shortness of breath lower extremity swelling difficulty ambulating found to be in decompensated heart failure chest x-ray concerning for pleural effusion BNP is elevated.  He was actually discharged a week ago  He has had 2 admissions in last 1 month due to decompensated heart failure volume overload on 4 L of oxygen at discharge at baseline.  He is supposed to be on Lasix 60 twice daily but he has been putting on weight also has a permanent atrial fibrillation EF is reduced to 35% he has severe aortic stenosis concerning for possible low-flow low gradient aortic stenosis he was evaluated in outpatient by Dr. Rodriguez and was plan for left and right heart cath    Past medical history:    has a past medical history of A-fib ([facility]), Anxiety, Arthritis, COPD (chronic obstructive pulmonary disease) ([facility]), Depression, Diabetes mellitus ([facility]), Full dentures, H/O angiography, H/O Doppler ultrasound, H/O echocardiogram, Hx of colonoscopy, Hx of Doppler ultrasound, Hyperlipidemia, Hypertension, Macular degeneration, Obesity, On home oxygen therapy, PAD (peripheral artery disease), Panic attacks, Pneumonia, PTSD (post-traumatic stress disorder), Restless leg, Shortness of breath, Sleep apnea, and Wears glasses.  Past surgical history:   has a past surgical history that includes Atrial ablation surgery (05/23/2018); pr laparoscopy surg cholecystectomy (N/A, 11/12/2018); Colonoscopy (07/2011); eye surgery (Left, 2017); Dental surgery; Cardiac surgery (05/2018); Cholecystectomy, laparoscopic  continue statins   Discussed with primary team, hospitalist service ,bedside staff ,nursing staff, patient and family        Thank you  much for consult and giving us the opportunity in contributing in the care of this patient. Please feel free to call me for any questions.       Sayed Harsh Melendez MD, 7/15/2025 7:41 AM     The above note is prepared with the intention to serve as communication with trained medical care practitioners only. Some of the information is provided by secondary sources as well as from our patient and/or family member(s) recollection, thus inaccuracies may occur. In addition, other professionals integrate data into the electronic medical record, and the Heart Team MD and NPs are not responsible for these. Any errors will be corrected upon verification with documented reports.

## 2025-07-15 NOTE — PROGRESS NOTES
[facility] Medicine Progress Note  V 5.17      Date of Admission: 7/14/2025    Hospital Day: 2      Chief Admission Complaint:  shortness of breath      Subjective:      Patient up in the chair using BiPAP.  His gases good.  Will transition off BiPAP today and only use as needed and for overnight.    Presenting Admission History:       69 y.o. male with a PMHx as above who presented to the ED with worsening SOB, orthopnea and LE edema over past few days. Not using additional Lasix. Denied any fevers, chills, typical CP, N/V, abdominal pain, C/D or urinary changes. Denied any current tobacco or alcohol use.     Assessment/Plan:      # Acute decompensated systolic heart failure  # VHD  - Reported worsening SOB, orthopnea and LE edema over past few days. Not using additional Lasix. Last TTE in 6/2025 with LVEF of 35-40% with reduced RV systolic function, severe AS and moderate-to-severe MR.  - Clinically hypervolemic, pro-BNP >4.3k, CXR with pulmonary congestion. Has 45 lbs weight gain in past week alone per chart review.  - Continue GDMT with Coreg and lisinopril  - IV Lasix  - cardiology consulted, strict I/O's, daily weights, telemetry.       # Acute on chronic hypoxemic hypercapnic respiratory failure  - On 3-4L NC at baseline with BPAP qhs. No PFT on file.  - Requiring BPAP in ED, VBG 7.28/96 (expected CO2 of 72-76).   - Likely secondary to above, wean down as tolerated with diuresis, continue home inhalers.  -BiPAP as needed and supplemental oxygen      # Paroxysmal atrial fibrillation s/p ablation in 5/2018  # Chronic anticoagulant use   - Continue Eliquis, digoxin and Coreg.  - Keep K 4.0-4.5 and Mg 2.0-2.2.      # Non-MI troponin elevation  - Denied any typical CP. Had upcoming LHC scheduled for 7/25.  - Initial Tn mildly elevated, repeat flat, ECG without acute ischemic changes.  - Likely secondary to above, continue Eliquis, Lipitor and Coreg.     # Essential hypertension  - Continue lisinopril and

## 2025-07-15 NOTE — ED PROVIDER NOTES
Emergency Department Encounter    Patient: Armanod Mars  MRN: [id number]  : 1955  Date of Evaluation: 2025  ED Provider:  Ilana Blount DO    Triage Chief Complaint:   Shortness of Breath (Was rx admitted for copd exacerbation)    [place]:  Armando Mars is a 69 y.o. male with history of, congestive heart failure, COPD, peripheral vascular disease, ventricular tachycardia, morbid obesity, atrial fibrillation status post ablation, diabetes, anxiety, tobacco use, respiratory failure, macular degeneration, depression, 4 L oxygen nasal cannula around-the-clock, that presents to the emergency department via EMS from the nursing home for shortness of breath.  They state patient oxygen saturation on 4 L oxygen nasal cannula is 98% with a To the patient at the nursing home.  They state diminished breath sounds decreased air movement.  They state when patient tried to move over to the cot oxygen saturation was down to 68%.  Patient increased work of breathing at discharge started breathing treatment and route.  Patient states shortness of breath got worse today.  He states he was recently admitted to the hospital discharge.  Patient states he is also having bilateral lower extremity edema..  Patient states nonambulatory uses a wheelchair to get around.  Patient states his legs are weeping and seeping fluid.  Patient states the pain 6 out of 10 on pain scale.  Patient denies any chest pain abdominal pain.  Patient here for evaluation.    ROS - see HPI, below listed is current ROS at time of my eval:  10 systems reviewed and negative except as above.     Past Medical History:   Diagnosis Date    A-fib (HCC)     Resolved after ablation     Anxiety     Arthritis     \"Hips, Knees, Elbows And Fingers\"    COPD (chronic obstructive pulmonary disease) ([facility])     Sees Dr. Keen    Depression     Diabetes mellitus ([facility]) Dx     Full dentures     H/O angiography 2018    peripheral angio - LFA

## 2025-07-15 NOTE — PLAN OF CARE
Problem: Chronic Conditions and Co-morbidities  Goal: Patient's chronic conditions and co-morbidity symptoms are monitored and maintained or improved  7/15/2025 1503 by Harleen Gauthier RN  Outcome: Progressing  7/15/2025 0216 by Ariela Churchill RN  Outcome: Progressing     Problem: Discharge Planning  Goal: Discharge to home or other facility with appropriate resources  7/15/2025 1503 by Harleen Gauthier RN  Outcome: Progressing  7/15/2025 0216 by Ariela Churchill RN  Outcome: Progressing     Problem: Safety - Adult  Goal: Free from fall injury  7/15/2025 1503 by Harleen Gauthier RN  Outcome: Progressing  7/15/2025 0216 by Ariela Churchill RN  Outcome: Progressing     Problem: ABCDS Injury Assessment  Goal: Absence of physical injury  7/15/2025 0216 by Ariela Churchill RN  Outcome: Progressing     Problem: Respiratory - Adult  Goal: Achieves optimal ventilation and oxygenation  7/15/2025 1503 by Harleen Gauthier RN  Outcome: Progressing  7/15/2025 0216 by Ariela Churchill RN  Outcome: Progressing     Problem: Metabolic/Fluid and Electrolytes - Adult  Goal: Electrolytes maintained within normal limits  7/15/2025 0216 by Ariela Churchill RN  Outcome: Progressing  Goal: Hemodynamic stability and optimal renal function maintained  7/15/2025 1503 by Harleen Gauthier RN  Outcome: Progressing  7/15/2025 0216 by Ariela Churchill RN  Outcome: Progressing  Goal: Glucose maintained within prescribed range  7/15/2025 0216 by Ariela Churchill RN  Outcome: Progressing

## 2025-07-15 NOTE — H&P
History and Physical      Name:  Armando Mars /Age/Sex: 1955  (69 y.o. male)   MRN & CSN:  [id number] & [id number] Encounter Date/Time: 2025 9:53 PM   Location:  [place] [address] PCP: Rosalio Hedrick MD       Hospital Day: 1    Assessment and Plan:     Patient is a 69 y.o. male who presented with SOB.     # Acute decompensated systolic heart failure  # VHD  - Reported worsening SOB, orthopnea and LE edema over past few days. Not using additional Lasix. Last TTE in 2025 with LVEF of 35-40% with reduced RV systolic function, severe AS and moderate-to-severe MR.  - Clinically hypervolemic, pro-BNP >4.3k, CXR with pulmonary congestion. Has 45 lbs weight gain in past week alone per chart review.  - Continue GDMT with Coreg and lisinopril, started on IV diuresis, cardiology consulted, strict I/O's, daily weights, telemetry.      # Acute on chronic hypoxemic hypercapnic respiratory failure  - On 3-4L NC at baseline with BPAP qhs. No PFT on file.  - Requiring BPAP in ED, VBG 7. (expected CO2 of 72-76).   - Likely secondary to above, wean down as tolerated with diuresis, continue home inhalers.      # Paroxysmal atrial fibrillation s/p ablation in 2018  # Chronic anticoagulant use   - Continue Eliquis, digoxin and Coreg.  - Keep K 4.0-4.5 and Mg 2.0-2.2.     # Non-MI troponin elevation  - Denied any typical CP. Had upcoming LHC scheduled for .  - Initial Tn mildly elevated, repeat flat, ECG without acute ischemic changes.  - Likely secondary to above, continue Eliquis, Lipitor and Coreg.    # Essential hypertension  - Continue lisinopril and Coreg.    # Mixed hyperlipidemia  - Continue Lipitor.    # T2DM with hyperglycemia  - Last A1c 7.1% in 2025.   - Held metformin.  - Decrease to insulin Lantus 20 u qhs and Lispro 5 u TIDWM with LCSI.    # LINA  - Continue BPAP qhs.      # Class III obesity  - BMI 42.3.     Checklist:  Advanced care planning: full  Diet: NPO while on BPAP    Sugar:

## 2025-07-16 LAB
ANION GAP SERPL CALCULATED.3IONS-SCNC: 9 MMOL/L (ref 9–17)
BASOPHILS # BLD: 0.04 K/UL
BASOPHILS NFR BLD: 1 % (ref 0–1)
BUN SERPL-MCNC: 21 MG/DL (ref 7–20)
CALCIUM SERPL-MCNC: 9.7 MG/DL (ref 8.3–10.6)
CHLORIDE SERPL-SCNC: 87 MMOL/L (ref 99–110)
CO2 SERPL-SCNC: 44 MMOL/L (ref 21–32)
CREAT SERPL-MCNC: 0.7 MG/DL (ref 0.8–1.3)
EOSINOPHIL # BLD: 0.17 K/UL
EOSINOPHILS RELATIVE PERCENT: 2 % (ref 0–3)
ERYTHROCYTE [DISTWIDTH] IN BLOOD BY AUTOMATED COUNT: 15.4 % (ref 11.7–14.9)
GFR, ESTIMATED: >90 ML/MIN/1.73M2
GLUCOSE BLD-MCNC: 100 MG/DL (ref 74–99)
GLUCOSE BLD-MCNC: 120 MG/DL (ref 74–99)
GLUCOSE BLD-MCNC: 156 MG/DL (ref 74–99)
GLUCOSE BLD-MCNC: 176 MG/DL (ref 74–99)
GLUCOSE SERPL-MCNC: 181 MG/DL (ref 74–99)
HCT VFR BLD AUTO: 39.9 % (ref 42–52)
HGB BLD-MCNC: 12.5 G/DL (ref 13.5–18)
IMM GRANULOCYTES # BLD AUTO: 0.03 K/UL
IMM GRANULOCYTES NFR BLD: 0 %
LYMPHOCYTES NFR BLD: 1.23 K/UL
LYMPHOCYTES RELATIVE PERCENT: 16 % (ref 24–44)
MAGNESIUM SERPL-MCNC: 1.7 MG/DL (ref 1.8–2.4)
MCH RBC QN AUTO: 29.8 PG (ref 27–31)
MCHC RBC AUTO-ENTMCNC: 31.3 G/DL (ref 32–36)
MCV RBC AUTO: 95.2 FL (ref 78–100)
MONOCYTES NFR BLD: 0.45 K/UL
MONOCYTES NFR BLD: 6 % (ref 0–5)
NEUTROPHILS NFR BLD: 75 % (ref 36–66)
NEUTS SEG NFR BLD: 5.78 K/UL
PLATELET, FLUORESCENCE: 139 K/UL (ref 140–440)
PMV BLD AUTO: 9.9 FL (ref 7.5–11.1)
POTASSIUM SERPL-SCNC: 3.6 MMOL/L (ref 3.5–5.1)
RBC # BLD AUTO: 4.19 M/UL (ref 4.6–6.2)
SODIUM SERPL-SCNC: 140 MMOL/L (ref 136–145)
WBC OTHER # BLD: 7.7 K/UL (ref 4–10.5)

## 2025-07-16 PROCEDURE — 82962 GLUCOSE BLOOD TEST: CPT

## 2025-07-16 PROCEDURE — 80048 BASIC METABOLIC PNL TOTAL CA: CPT

## 2025-07-16 PROCEDURE — 36415 COLL VENOUS BLD VENIPUNCTURE: CPT

## 2025-07-16 PROCEDURE — 6360000002 HC RX W HCPCS: Performed by: STUDENT IN AN ORGANIZED HEALTH CARE EDUCATION/TRAINING PROGRAM

## 2025-07-16 PROCEDURE — 94640 AIRWAY INHALATION TREATMENT: CPT

## 2025-07-16 PROCEDURE — 6370000000 HC RX 637 (ALT 250 FOR IP): Performed by: STUDENT IN AN ORGANIZED HEALTH CARE EDUCATION/TRAINING PROGRAM

## 2025-07-16 PROCEDURE — 99233 SBSQ HOSP IP/OBS HIGH 50: CPT | Performed by: INTERNAL MEDICINE

## 2025-07-16 PROCEDURE — 2140000000 HC CCU INTERMEDIATE R&B

## 2025-07-16 PROCEDURE — 94761 N-INVAS EAR/PLS OXIMETRY MLT: CPT

## 2025-07-16 PROCEDURE — 2500000003 HC RX 250 WO HCPCS: Performed by: STUDENT IN AN ORGANIZED HEALTH CARE EDUCATION/TRAINING PROGRAM

## 2025-07-16 PROCEDURE — 2700000000 HC OXYGEN THERAPY PER DAY

## 2025-07-16 PROCEDURE — 85025 COMPLETE CBC W/AUTO DIFF WBC: CPT

## 2025-07-16 PROCEDURE — 83735 ASSAY OF MAGNESIUM: CPT

## 2025-07-16 RX ORDER — POTASSIUM CHLORIDE 1500 MG/1
40 TABLET, EXTENDED RELEASE ORAL ONCE
Status: COMPLETED | OUTPATIENT
Start: 2025-07-16 | End: 2025-07-16

## 2025-07-16 RX ORDER — MAGNESIUM SULFATE IN WATER 40 MG/ML
2000 INJECTION, SOLUTION INTRAVENOUS ONCE
Status: COMPLETED | OUTPATIENT
Start: 2025-07-16 | End: 2025-07-16

## 2025-07-16 RX ADMIN — ACETAMINOPHEN 650 MG: 325 TABLET ORAL at 08:42

## 2025-07-16 RX ADMIN — SODIUM CHLORIDE, PRESERVATIVE FREE 10 ML: 5 INJECTION INTRAVENOUS at 21:54

## 2025-07-16 RX ADMIN — SODIUM CHLORIDE, PRESERVATIVE FREE 10 ML: 5 INJECTION INTRAVENOUS at 08:45

## 2025-07-16 RX ADMIN — IPRATROPIUM BROMIDE AND ALBUTEROL SULFATE 1 DOSE: .5; 2.5 SOLUTION RESPIRATORY (INHALATION) at 08:20

## 2025-07-16 RX ADMIN — MAGNESIUM SULFATE HEPTAHYDRATE 2000 MG: 40 INJECTION, SOLUTION INTRAVENOUS at 11:56

## 2025-07-16 RX ADMIN — FUROSEMIDE 60 MG: 10 INJECTION, SOLUTION INTRAMUSCULAR; INTRAVENOUS at 15:08

## 2025-07-16 RX ADMIN — INSULIN LISPRO 5 UNITS: 100 INJECTION, SOLUTION INTRAVENOUS; SUBCUTANEOUS at 08:49

## 2025-07-16 RX ADMIN — ATORVASTATIN CALCIUM 40 MG: 40 TABLET, FILM COATED ORAL at 21:17

## 2025-07-16 RX ADMIN — FUROSEMIDE 60 MG: 10 INJECTION, SOLUTION INTRAMUSCULAR; INTRAVENOUS at 22:02

## 2025-07-16 RX ADMIN — FUROSEMIDE 60 MG: 10 INJECTION, SOLUTION INTRAMUSCULAR; INTRAVENOUS at 06:11

## 2025-07-16 RX ADMIN — BUDESONIDE AND FORMOTEROL FUMARATE DIHYDRATE 2 PUFF: 160; 4.5 AEROSOL RESPIRATORY (INHALATION) at 08:21

## 2025-07-16 RX ADMIN — POTASSIUM CHLORIDE 40 MEQ: 1500 TABLET, EXTENDED RELEASE ORAL at 11:59

## 2025-07-16 RX ADMIN — IPRATROPIUM BROMIDE AND ALBUTEROL SULFATE 1 DOSE: .5; 2.5 SOLUTION RESPIRATORY (INHALATION) at 15:58

## 2025-07-16 RX ADMIN — DIGOXIN 125 MCG: 125 TABLET ORAL at 08:42

## 2025-07-16 RX ADMIN — ACETAMINOPHEN 650 MG: 325 TABLET ORAL at 02:11

## 2025-07-16 RX ADMIN — ROFLUMILAST 500 MCG: 500 TABLET ORAL at 08:42

## 2025-07-16 RX ADMIN — MONTELUKAST 10 MG: 10 TABLET, FILM COATED ORAL at 21:17

## 2025-07-16 RX ADMIN — INSULIN LISPRO 5 UNITS: 100 INJECTION, SOLUTION INTRAVENOUS; SUBCUTANEOUS at 17:13

## 2025-07-16 RX ADMIN — CARVEDILOL 3.12 MG: 6.25 TABLET, FILM COATED ORAL at 08:42

## 2025-07-16 RX ADMIN — INSULIN GLARGINE 20 UNITS: 100 INJECTION, SOLUTION SUBCUTANEOUS at 21:17

## 2025-07-16 RX ADMIN — INSULIN LISPRO 5 UNITS: 100 INJECTION, SOLUTION INTRAVENOUS; SUBCUTANEOUS at 12:29

## 2025-07-16 ASSESSMENT — PAIN - FUNCTIONAL ASSESSMENT: PAIN_FUNCTIONAL_ASSESSMENT: PREVENTS OR INTERFERES SOME ACTIVE ACTIVITIES AND ADLS

## 2025-07-16 ASSESSMENT — PAIN DESCRIPTION - DESCRIPTORS
DESCRIPTORS: ACHING;THROBBING
DESCRIPTORS: THROBBING
DESCRIPTORS: THROBBING

## 2025-07-16 ASSESSMENT — PAIN SCALES - GENERAL
PAINLEVEL_OUTOF10: 4
PAINLEVEL_OUTOF10: 5
PAINLEVEL_OUTOF10: 4
PAINLEVEL_OUTOF10: 6
PAINLEVEL_OUTOF10: 6

## 2025-07-16 ASSESSMENT — PAIN DESCRIPTION - LOCATION
LOCATION: FOOT

## 2025-07-16 ASSESSMENT — PAIN DESCRIPTION - ORIENTATION
ORIENTATION: RIGHT

## 2025-07-16 ASSESSMENT — PAIN SCALES - WONG BAKER
WONGBAKER_NUMERICALRESPONSE: HURTS EVEN MORE
WONGBAKER_NUMERICALRESPONSE: NO HURT
WONGBAKER_NUMERICALRESPONSE: NO HURT

## 2025-07-16 NOTE — PROGRESS NOTES
Cardiology Progress Note     Admit Date:  7/14/2025    Consult reason/ Seen today for :       Subjective and  Overnight Events :  breathing is better       Chief complain on admission : 69 y.o.year old who is admitted for  Chief Complaint   Patient presents with    Shortness of Breath     Was rx admitted for copd exacerbation      Assessment / Plan:  Acute decompensated heart failure with volume overload and respiratory failure  CHF: Acute Systolic/ Diastolic decompensated heart failure. Appears to be volume overloaded . Plan IV  diuresis. We can try IV Lasix 40 mg BID. And  titrate diuretics accordingly.   Eventually plan is to be on [facility] recommended medical thearpy including beta blockers  Coreg/ Toprol XL  , ACE/ARB / Entresto ,SGLT2 inhibitors   and Aldactone 25 mg daily if renal function and  titrate up as BP allows , Strict Is and Os and Daily weights. chf nurse consult  Will plan left and right heart cath  HTN: stable, continue present medications   Atrial Fibrillation: S/p ablation in 2018 rate controlled on anticoagulation hold anticoagulation in case we proceed with cardiac cath  Continue BiPAP support  Diabetes as per primary team  DVT prophylaxis if no contraindication  6.   Dyslipidemia: continue statins  Discussed with primary team, hospitalist service, bedside nursing staff and family  Past medical history:    has a past medical history of A-fib (HCC), Anxiety, Arthritis, COPD (chronic obstructive pulmonary disease) (HCC), Depression, Diabetes mellitus (HCC), Full dentures, H/O angiography, H/O Doppler ultrasound, H/O echocardiogram, Hx of colonoscopy, Hx of Doppler ultrasound, Hyperlipidemia, Hypertension, Macular degeneration, Obesity, On home oxygen therapy, PAD (peripheral artery disease), Panic attacks, Pneumonia, PTSD (post-traumatic stress disorder), Restless leg, Shortness of breath, Sleep apnea, and Wears  Conjunctiva normal, No discharge.   Respiratory:  Normal breath sounds, No respiratory distress, No wheezing, No chest tenderness.   Cardiovascular:  Normal heart rate, Normal rhythm, No murmurs, No rubs, No gallops, JVP not elevated  Abdomen/GI:  Bowel sounds normal, Soft, No tenderness, No masses, No pulsatile masses.     Musculoskeletal:  No edema, No tenderness, No cyanosis, No clubbing. Good range of motion in all major joints. No tenderness to palpation   Integument:  Warm, Dry, No erythema, No rash.   Lymphatic:  No lymphadenopathy noted.   Neurologic:  Alert & oriented x 3, Normal motor function, Normal sensory function, No focal deficits noted.   Psychiatric:  Affect  and  Mood :no change    Medications:    furosemide  60 mg IntraVENous q8h    [Held by provider] apixaban  5 mg Oral BID    atorvastatin  40 mg Oral Nightly    carvedilol  3.125 mg Oral BID WC    digoxin  125 mcg Oral Daily    insulin glargine  20 Units SubCUTAneous Nightly    [Held by provider] lisinopril  2.5 mg Oral Daily    montelukast  10 mg Oral Nightly    Roflumilast  500 mcg Oral Daily    budesonide-formoterol  2 puff Inhalation BID    ipratropium 0.5 mg-albuterol 2.5 mg  1 Dose Inhalation 4x Daily RT    insulin lispro  0-4 Units SubCUTAneous 4x Daily AC & HS    sodium chloride flush  5-40 mL IntraVENous 2 times per day    insulin lispro  5 Units SubCUTAneous TID WC      dextrose      sodium chloride 10 mL/hr at 07/15/25 0101     glucose, dextrose bolus **OR** dextrose bolus, glucagon (rDNA), dextrose, sodium chloride flush, sodium chloride, ondansetron **OR** ondansetron, melatonin, polyethylene glycol, acetaminophen **OR** acetaminophen    Lab Data:  CBC:   Recent Labs     07/14/25  2031 07/15/25  0449 07/16/25  0835   WBC 9.6 7.6 7.7   HGB 13.1* 12.4* 12.5*   HCT 41.8* 39.0* 39.9*   MCV 95.9 95.4 95.2   PLT  --  111*  --      BMP:   Recent Labs     07/14/25  2031 07/15/25  0449 07/16/25  0835    139 140   K 4.1 3.9 3.6   CL 88*

## 2025-07-16 NOTE — PLAN OF CARE
Met with patient for heart failure education. Patient has severe aortic stenosis. He is scheduled for left and right heart cath on 7/17  Patient is established with the Heart failure clinic. Patient plans to discharge back to [facility].

## 2025-07-16 NOTE — PROGRESS NOTES
[facility] Medicine Progress Note  V 5.17      Date of Admission: 7/14/2025    Hospital Day: 3      Chief Admission Complaint:  shortness of breath      Subjective:      Patient in no acute distress today.  Improved a lot with IV diuresis.  Patient interested in going home soon and resuming hospice care.  Will discuss with cardiology his timeline for continued diuresis and discharge.    Presenting Admission History:       69 y.o. male with a PMHx as above who presented to the ED with worsening SOB, orthopnea and LE edema over past few days. Not using additional Lasix. Denied any fevers, chills, typical CP, N/V, abdominal pain, C/D or urinary changes. Denied any current tobacco or alcohol use.     Assessment/Plan:      # Acute decompensated systolic heart failure  # VHD  - Reported worsening SOB, orthopnea and LE edema over past few days. Not using additional Lasix. Last TTE in 6/2025 with LVEF of 35-40% with reduced RV systolic function, severe AS and moderate-to-severe MR.  - Clinically hypervolemic, pro-BNP >4.3k, CXR with pulmonary congestion. Has 45 lbs weight gain in past week alone per chart review.  - Continue GDMT with Coreg and lisinopril  - IV Lasix  - cardiology consulted, strict I/O's, daily weights, telemetry.       # Acute on chronic hypoxemic hypercapnic respiratory failure  - On 3-4L NC at baseline with BPAP qhs. No PFT on file.  - Requiring BPAP in ED, VBG 7.28/96 (expected CO2 of 72-76).   - Likely secondary to above, wean down as tolerated with diuresis, continue home inhalers.  -BiPAP as needed and supplemental oxygen      # Paroxysmal atrial fibrillation s/p ablation in 5/2018  # Chronic anticoagulant use   - Continue Eliquis, digoxin and Coreg.  - Keep K 4.0-4.5 and Mg 2.0-2.2.      # Non-MI troponin elevation  - Denied any typical CP. Had upcoming LHC scheduled for 7/25.  - Initial Tn mildly elevated, repeat flat, ECG without acute ischemic changes.  - Likely secondary to above, continue

## 2025-07-16 NOTE — PROGRESS NOTES
Discussed care with patient as well as internal medicine.    Patient is currently receiving care with hospice.    However at this time he is listed as a full code.    Discussed with the patient that he has severe aortic stenosis that needs further workup if he is to pursue all measures.  At this time patient is desiring to pursue all measures and desires to receive a valve replacement if he is a candidate.    Informed him that if he used to undergo valve replacement we need to perform left and right heart catheterization.  I explained the procedures in detail as well as the risk and benefits.  Patient voiced understanding of the procedures and was agreeable to undergo.    Consent was obtained.    Plan for left and right heart catheterization tomorrow.  N.p.o. after midnight.    Justin Aden PA-C 07/16/25 4:09 PM

## 2025-07-17 LAB
ANION GAP SERPL CALCULATED.3IONS-SCNC: 11 MMOL/L (ref 9–17)
BASOPHILS # BLD: 0.04 K/UL
BASOPHILS NFR BLD: 0 % (ref 0–1)
BUN SERPL-MCNC: 22 MG/DL (ref 7–20)
CALCIUM SERPL-MCNC: 9.1 MG/DL (ref 8.3–10.6)
CHLORIDE SERPL-SCNC: 87 MMOL/L (ref 99–110)
CO2 SERPL-SCNC: 38 MMOL/L (ref 21–32)
CREAT SERPL-MCNC: 0.7 MG/DL (ref 0.8–1.3)
ECHO BSA: 3.02 M2
EOSINOPHIL # BLD: 0.2 K/UL
EOSINOPHILS RELATIVE PERCENT: 2 % (ref 0–3)
ERYTHROCYTE [DISTWIDTH] IN BLOOD BY AUTOMATED COUNT: 15.5 % (ref 11.7–14.9)
GFR, ESTIMATED: >90 ML/MIN/1.73M2
GLUCOSE BLD-MCNC: 142 MG/DL (ref 74–99)
GLUCOSE BLD-MCNC: 164 MG/DL (ref 74–99)
GLUCOSE BLD-MCNC: 201 MG/DL (ref 74–99)
GLUCOSE SERPL-MCNC: 125 MG/DL (ref 74–99)
HCT VFR BLD AUTO: 39.9 % (ref 42–52)
HGB BLD-MCNC: 12.7 G/DL (ref 13.5–18)
IMM GRANULOCYTES # BLD AUTO: 0.06 K/UL
IMM GRANULOCYTES NFR BLD: 1 %
LYMPHOCYTES NFR BLD: 1.55 K/UL
LYMPHOCYTES RELATIVE PERCENT: 14 % (ref 24–44)
MAGNESIUM SERPL-MCNC: 2.2 MG/DL (ref 1.8–2.4)
MCH RBC QN AUTO: 29.8 PG (ref 27–31)
MCHC RBC AUTO-ENTMCNC: 31.8 G/DL (ref 32–36)
MCV RBC AUTO: 93.7 FL (ref 78–100)
MONOCYTES NFR BLD: 0.82 K/UL
MONOCYTES NFR BLD: 7 % (ref 0–5)
NEUTROPHILS NFR BLD: 76 % (ref 36–66)
NEUTS SEG NFR BLD: 8.57 K/UL
PLATELET # BLD AUTO: 139 K/UL (ref 140–440)
PMV BLD AUTO: 9.9 FL (ref 7.5–11.1)
POTASSIUM SERPL-SCNC: 3.8 MMOL/L (ref 3.5–5.1)
RBC # BLD AUTO: 4.26 M/UL (ref 4.6–6.2)
SODIUM SERPL-SCNC: 136 MMOL/L (ref 136–145)
WBC OTHER # BLD: 11.2 K/UL (ref 4–10.5)

## 2025-07-17 PROCEDURE — 6370000000 HC RX 637 (ALT 250 FOR IP)

## 2025-07-17 PROCEDURE — 99153 MOD SED SAME PHYS/QHP EA: CPT | Performed by: INTERNAL MEDICINE

## 2025-07-17 PROCEDURE — 2500000003 HC RX 250 WO HCPCS: Performed by: STUDENT IN AN ORGANIZED HEALTH CARE EDUCATION/TRAINING PROGRAM

## 2025-07-17 PROCEDURE — 2500000003 HC RX 250 WO HCPCS: Performed by: INTERNAL MEDICINE

## 2025-07-17 PROCEDURE — 36415 COLL VENOUS BLD VENIPUNCTURE: CPT

## 2025-07-17 PROCEDURE — 82962 GLUCOSE BLOOD TEST: CPT

## 2025-07-17 PROCEDURE — 94010 BREATHING CAPACITY TEST: CPT

## 2025-07-17 PROCEDURE — 6370000000 HC RX 637 (ALT 250 FOR IP): Performed by: STUDENT IN AN ORGANIZED HEALTH CARE EDUCATION/TRAINING PROGRAM

## 2025-07-17 PROCEDURE — 2709999900 HC NON-CHARGEABLE SUPPLY: Performed by: INTERNAL MEDICINE

## 2025-07-17 PROCEDURE — APPNB15 APP NON BILLABLE TIME 0-15 MINS

## 2025-07-17 PROCEDURE — B2111ZZ FLUOROSCOPY OF MULTIPLE CORONARY ARTERIES USING LOW OSMOLAR CONTRAST: ICD-10-PCS | Performed by: INTERNAL MEDICINE

## 2025-07-17 PROCEDURE — C1894 INTRO/SHEATH, NON-LASER: HCPCS | Performed by: INTERNAL MEDICINE

## 2025-07-17 PROCEDURE — 99233 SBSQ HOSP IP/OBS HIGH 50: CPT | Performed by: INTERNAL MEDICINE

## 2025-07-17 PROCEDURE — 6360000002 HC RX W HCPCS: Performed by: STUDENT IN AN ORGANIZED HEALTH CARE EDUCATION/TRAINING PROGRAM

## 2025-07-17 PROCEDURE — 94761 N-INVAS EAR/PLS OXIMETRY MLT: CPT

## 2025-07-17 PROCEDURE — 93460 R&L HRT ART/VENTRICLE ANGIO: CPT | Performed by: INTERNAL MEDICINE

## 2025-07-17 PROCEDURE — C1751 CATH, INF, PER/CENT/MIDLINE: HCPCS | Performed by: INTERNAL MEDICINE

## 2025-07-17 PROCEDURE — 6360000004 HC RX CONTRAST MEDICATION: Performed by: INTERNAL MEDICINE

## 2025-07-17 PROCEDURE — 99152 MOD SED SAME PHYS/QHP 5/>YRS: CPT | Performed by: INTERNAL MEDICINE

## 2025-07-17 PROCEDURE — 83735 ASSAY OF MAGNESIUM: CPT

## 2025-07-17 PROCEDURE — 2140000000 HC CCU INTERMEDIATE R&B

## 2025-07-17 PROCEDURE — 6360000002 HC RX W HCPCS: Performed by: INTERNAL MEDICINE

## 2025-07-17 PROCEDURE — 80048 BASIC METABOLIC PNL TOTAL CA: CPT

## 2025-07-17 PROCEDURE — 94640 AIRWAY INHALATION TREATMENT: CPT

## 2025-07-17 PROCEDURE — 85025 COMPLETE CBC W/AUTO DIFF WBC: CPT

## 2025-07-17 PROCEDURE — C1769 GUIDE WIRE: HCPCS | Performed by: INTERNAL MEDICINE

## 2025-07-17 PROCEDURE — 2700000000 HC OXYGEN THERAPY PER DAY

## 2025-07-17 PROCEDURE — 4A023N8 MEASUREMENT OF CARDIAC SAMPLING AND PRESSURE, BILATERAL, PERCUTANEOUS APPROACH: ICD-10-PCS | Performed by: INTERNAL MEDICINE

## 2025-07-17 RX ORDER — MIDAZOLAM HYDROCHLORIDE 1 MG/ML
INJECTION, SOLUTION INTRAMUSCULAR; INTRAVENOUS PRN
Status: DISCONTINUED | OUTPATIENT
Start: 2025-07-17 | End: 2025-07-17 | Stop reason: HOSPADM

## 2025-07-17 RX ORDER — SODIUM CHLORIDE 0.9 % (FLUSH) 0.9 %
5-40 SYRINGE (ML) INJECTION EVERY 12 HOURS SCHEDULED
Status: DISCONTINUED | OUTPATIENT
Start: 2025-07-17 | End: 2025-07-18 | Stop reason: HOSPADM

## 2025-07-17 RX ORDER — SODIUM CHLORIDE 9 MG/ML
INJECTION, SOLUTION INTRAVENOUS PRN
Status: DISCONTINUED | OUTPATIENT
Start: 2025-07-17 | End: 2025-07-18 | Stop reason: HOSPADM

## 2025-07-17 RX ORDER — ACETAMINOPHEN 325 MG/1
650 TABLET ORAL EVERY 4 HOURS PRN
Status: DISCONTINUED | OUTPATIENT
Start: 2025-07-17 | End: 2025-07-17 | Stop reason: SDUPTHER

## 2025-07-17 RX ORDER — FENTANYL CITRATE 50 UG/ML
INJECTION, SOLUTION INTRAMUSCULAR; INTRAVENOUS PRN
Status: DISCONTINUED | OUTPATIENT
Start: 2025-07-17 | End: 2025-07-17 | Stop reason: HOSPADM

## 2025-07-17 RX ORDER — IOPAMIDOL 755 MG/ML
INJECTION, SOLUTION INTRAVASCULAR PRN
Status: DISCONTINUED | OUTPATIENT
Start: 2025-07-17 | End: 2025-07-17 | Stop reason: HOSPADM

## 2025-07-17 RX ORDER — SODIUM CHLORIDE 0.9 % (FLUSH) 0.9 %
5-40 SYRINGE (ML) INJECTION PRN
Status: DISCONTINUED | OUTPATIENT
Start: 2025-07-17 | End: 2025-07-18 | Stop reason: HOSPADM

## 2025-07-17 RX ADMIN — CARVEDILOL 3.12 MG: 6.25 TABLET, FILM COATED ORAL at 18:06

## 2025-07-17 RX ADMIN — APIXABAN 5 MG: 5 TABLET, FILM COATED ORAL at 21:00

## 2025-07-17 RX ADMIN — IPRATROPIUM BROMIDE AND ALBUTEROL SULFATE 1 DOSE: .5; 2.5 SOLUTION RESPIRATORY (INHALATION) at 11:27

## 2025-07-17 RX ADMIN — IPRATROPIUM BROMIDE AND ALBUTEROL SULFATE 1 DOSE: .5; 2.5 SOLUTION RESPIRATORY (INHALATION) at 15:27

## 2025-07-17 RX ADMIN — INSULIN GLARGINE 20 UNITS: 100 INJECTION, SOLUTION SUBCUTANEOUS at 21:02

## 2025-07-17 RX ADMIN — INSULIN LISPRO 1 UNITS: 100 INJECTION, SOLUTION INTRAVENOUS; SUBCUTANEOUS at 18:58

## 2025-07-17 RX ADMIN — FUROSEMIDE 60 MG: 10 INJECTION, SOLUTION INTRAMUSCULAR; INTRAVENOUS at 06:07

## 2025-07-17 RX ADMIN — INSULIN LISPRO 5 UNITS: 100 INJECTION, SOLUTION INTRAVENOUS; SUBCUTANEOUS at 18:58

## 2025-07-17 RX ADMIN — ROFLUMILAST 500 MCG: 500 TABLET ORAL at 08:16

## 2025-07-17 RX ADMIN — CARVEDILOL 3.12 MG: 6.25 TABLET, FILM COATED ORAL at 08:16

## 2025-07-17 RX ADMIN — MONTELUKAST 10 MG: 10 TABLET, FILM COATED ORAL at 21:00

## 2025-07-17 RX ADMIN — FUROSEMIDE 60 MG: 40 TABLET ORAL at 18:05

## 2025-07-17 RX ADMIN — SODIUM CHLORIDE, PRESERVATIVE FREE 10 ML: 5 INJECTION INTRAVENOUS at 14:01

## 2025-07-17 RX ADMIN — ATORVASTATIN CALCIUM 40 MG: 40 TABLET, FILM COATED ORAL at 21:00

## 2025-07-17 RX ADMIN — SODIUM CHLORIDE, PRESERVATIVE FREE 10 ML: 5 INJECTION INTRAVENOUS at 21:03

## 2025-07-17 RX ADMIN — BUDESONIDE AND FORMOTEROL FUMARATE DIHYDRATE 2 PUFF: 160; 4.5 AEROSOL RESPIRATORY (INHALATION) at 07:44

## 2025-07-17 RX ADMIN — SODIUM CHLORIDE, PRESERVATIVE FREE 10 ML: 5 INJECTION INTRAVENOUS at 08:20

## 2025-07-17 RX ADMIN — IPRATROPIUM BROMIDE AND ALBUTEROL SULFATE 1 DOSE: .5; 2.5 SOLUTION RESPIRATORY (INHALATION) at 07:44

## 2025-07-17 RX ADMIN — DIGOXIN 125 MCG: 125 TABLET ORAL at 08:16

## 2025-07-17 RX ADMIN — FUROSEMIDE 60 MG: 10 INJECTION, SOLUTION INTRAMUSCULAR; INTRAVENOUS at 13:41

## 2025-07-17 ASSESSMENT — COPD QUESTIONNAIRES
QUESTION1_COUGHFREQUENCY: 3
QUESTION3_CHESTTIGHTNESS: 3
QUESTION5_HOMEACTIVITIES: 5
TOTAL_EXACERBATIONS_PASTYEAR: 3
GOLD_GRADE: 3
GOLD_GROUP: GROUP E
QUESTION2_CHESTPHLEGM: 3
QUESTION7_SLEEPQUALITY: 5
CAT_TOTALSCORE: 29
QUESTION8_ENERGYLEVEL: 5
QUESTION6_LEAVINGHOUSE: 5
QUESTION4_WALKINCLINE: 0

## 2025-07-17 ASSESSMENT — PULMONARY FUNCTION TESTS
FEV1 (%PREDICTED): 39
POST BRONCHODILATOR FEV1/FVC: 36
PIF_VALUE: 80

## 2025-07-17 NOTE — PROGRESS NOTES
Normocephalic, Atraumatic, Bilateral external ears normal, Oropharynx moist, No oral exudates, Nose normal. Neck-  Supple, No stridor.   Eyes:  PERRL, EOMI, Conjunctiva normal, No discharge.   Respiratory:  Normal breath sounds, No respiratory distress, No wheezing, No chest tenderness.   Cardiovascular:  Normal heart rate, Normal rhythm, No murmurs, No rubs, No gallops, JVP not elevated  Abdomen/GI:  Bowel sounds normal, Soft, No tenderness, No masses, No pulsatile masses.     Musculoskeletal:  No edema, No tenderness, No cyanosis, No clubbing. Good range of motion in all major joints. No tenderness to palpation   Integument:  Warm, Dry, No erythema, No rash.   Lymphatic:  No lymphadenopathy noted.   Neurologic:  Alert & oriented x 3, Normal motor function, Normal sensory function, No focal deficits noted.   Psychiatric:  Affect  and  Mood :no change    Medications:    sodium chloride flush  5-40 mL IntraVENous 2 times per day    furosemide  60 mg Oral BID    apixaban  5 mg Oral BID    atorvastatin  40 mg Oral Nightly    carvedilol  3.125 mg Oral BID WC    digoxin  125 mcg Oral Daily    insulin glargine  20 Units SubCUTAneous Nightly    lisinopril  2.5 mg Oral Daily    montelukast  10 mg Oral Nightly    Roflumilast  500 mcg Oral Daily    budesonide-formoterol  2 puff Inhalation BID    ipratropium 0.5 mg-albuterol 2.5 mg  1 Dose Inhalation 4x Daily RT    insulin lispro  0-4 Units SubCUTAneous 4x Daily AC & HS    sodium chloride flush  5-40 mL IntraVENous 2 times per day    insulin lispro  5 Units SubCUTAneous TID WC      sodium chloride      dextrose      sodium chloride 10 mL/hr at 07/15/25 0101     sodium chloride flush, sodium chloride, glucose, dextrose bolus **OR** dextrose bolus, glucagon (rDNA), dextrose, sodium chloride flush, sodium chloride, ondansetron **OR** ondansetron, melatonin, polyethylene glycol, acetaminophen **OR** acetaminophen    Lab Data:  CBC:   Recent Labs     07/15/25  0449 07/16/25  0835  sources as well as from our patient and/or family member(s) recollection, thus inaccuracies may occur. In addition, other professionals integrate data into the electronic medical record, and the Heart Team MD and NPs are not responsible for these. Any errors will be corrected upon verification with documented reports.

## 2025-07-17 NOTE — PROGRESS NOTES
[facility] Medicine Progress Note  V 5.17      Date of Admission: 7/14/2025    Hospital Day: 4      Chief Admission Complaint:  shortness of breath      Subjective:      Plan for left and right heart cath today.  No other acute events overnight.    Presenting Admission History:       69 y.o. male with a PMHx as above who presented to the ED with worsening SOB, orthopnea and LE edema over past few days. Not using additional Lasix. Denied any fevers, chills, typical CP, N/V, abdominal pain, C/D or urinary changes. Denied any current tobacco or alcohol use.     Assessment/Plan:      # Acute decompensated systolic heart failure  # VHD  - Reported worsening SOB, orthopnea and LE edema over past few days. Not using additional Lasix. Last TTE in 6/2025 with LVEF of 35-40% with reduced RV systolic function, severe AS and moderate-to-severe MR.  - Clinically hypervolemic, pro-BNP >4.3k, CXR with pulmonary congestion. Has 45 lbs weight gain in past week alone per chart review.  - Continue GDMT with Coreg and lisinopril  - IV Lasix  - cardiology consulted, strict I/O's, daily weights, telemetry.       # Acute on chronic hypoxemic hypercapnic respiratory failure  - On 3-4L NC at baseline with BPAP qhs. No PFT on file.  - Requiring BPAP in ED, VBG 7.28/96 (expected CO2 of 72-76).   - Likely secondary to above, wean down as tolerated with diuresis, continue home inhalers.  -BiPAP as needed and supplemental oxygen      # Paroxysmal atrial fibrillation s/p ablation in 5/2018  # Chronic anticoagulant use   - Continue Eliquis, digoxin and Coreg.  - Keep K 4.0-4.5 and Mg 2.0-2.2.      # Non-MI troponin elevation  - Denied any typical CP. Had upcoming LHC scheduled for 7/25.  - Initial Tn mildly elevated, repeat flat, ECG without acute ischemic changes.  - Likely secondary to above, continue Eliquis, Lipitor and Coreg.     # Essential hypertension  - Continue lisinopril and Coreg.     # Mixed hyperlipidemia  - Continue Lipitor.

## 2025-07-17 NOTE — CARE COORDINATION
CM in to see Pt to follow up on discharge planning.  Plan remains to return to [facility] living and resume services with [place] [id number] hospice.  Pt denies any needs at this time.

## 2025-07-17 NOTE — PROGRESS NOTES
Cardiology Progress Note      Assessment/Plan:  Acute on chronic heart failure with reduced ejection fraction  Chronic venous insufficiency  Severe aortic stenosis   moderate to severe mitral regurgitation  - Volume overload improving  -Strict I's and O's and daily weights.   - Echocardiogram revealing EF 35-40% which is new.  - CAMERON of 0.94 cm² and AV mean gradient 33 mmHg which may be underestimated  - Left heart catheterization today showing mild nonobstructive coronary artery disease.  Right heart catheterization showing elevated right and left heart filling pressures with reduced cardiac index  -Plan for further TAVR workup as outpatient  -GDMT: Continue carvedilol 3.125 mg twice daily.  Resume lisinopril 2.5 mg daily tomorrow  -Switch to oral Lasix 60 mg twice daily  Persistent atrial fibrillation s/p ablation 2018  - Heart rate is controlled at this time  -Goal potassium 4.0-4.5, magnesium 2.0-2.2. Replete per protocol.  - Continue digoxin 125 mcg daily  -Resume Eliquis 5 mg twice daily  Morbid obesity: BMI 42  Peripheral arterial disease s/p right redo femoral popliteal bypass  Sleep apnea: Encourage Bipap  COPD  type 2 diabetes mellitus:        Justin Aden PA-C 07/17/25 1:35 PM

## 2025-07-17 NOTE — PLAN OF CARE
Problem: Knowledge deficit  Goal: Knowledge - disease process  Description: Patient/caregiver will demonstrate adequate knowledge of COPD as evidenced by the ability to verbalize the signs and symptoms of disease, causes of disease, complications of disease and actions to take if exacerbation is suspected within 1 day(s).  Outcome: Completed

## 2025-07-17 NOTE — PROGRESS NOTES
Current GOLD classification       GOLD Stage: 3   Group:  E  Recorded domestic exacerbations past 12 months:  3  Current recorded COPD Assessment Tool (CAT) score of  29  Current eosinophil count: 0.4           Inhaler Device   Acceptable for Use   Respimat  Not Breath Actuated Yes   MDI  Not Breath Actuated Yes           DPI  Observed PIF   using  In-Check Meter   Optimal PIF   Acceptable for Use   HANDIHALER 80 >30 Y   Pressair 80 >45 Y   NEOHALER 80 >50 Y   Diskus 80 >60 Y   ELLIPTA 80 >60 Y     Records show this patient was using ALBUTEROL / TRELEGY / SPIRIVA / SYMBICORT maintenance therapy prior to admission.        LONG-ACTING (LABA)   Arformoterol (Brovana) NEBULIZER   Indacaterol (Arcapta) NEOHALER   Olodaterol (Striverdi) Respimat   Salmeterol (Serevent) MDI, DISKUS   LONG-ACTING (LAMA)   Aclidinium bromide (Tudorza) PRESSAIR   Glycopyrronium bromide (Seebri) NEOHALER   Tiotropium (Spiriva) Respimat, HANDIHALER   Umeclidinium (Incruse) ELLIPTA   (LABA/LAMA)   Formoterol/glycopyrronium (Bevespi) MDI   Indacaterol/glycopyrronium (Utibron) NEOHALER   Vilanterol/umeclidinium (Anoro) ELLIPTA   Olodaterol/tiotropium (Stiolto) Respimat   (LABA/ICS)   Formoterol/budesomide (Symbicort) MDI   Formoterol/mometasone (Dulera) MDI   Salmeterol/fluticasone (Advair) MDI, DISKUS   Vilanterol/fluticasone (Breo) ELLIPTA   (LABA/LAMA/ICS)   Fluticasone/umeclidinium/vilanterol (Trelegy) ELLIPTA   Budesonide/glycopyrrolate/formoterol fumarate (Beztri) aerosphere     COPD Spot Light and QR Code education given to patient. _____   07/17/25 9823   Spirometry Assessment   FEV1 (%PRED) 39   Post Bronchodilator FEV/FVC 36   COPD Exacerbations in last year 3   PIF 80 L/min   COPD Assessment (CAT Score)   Cough Assessment 3   Phlegm Assessment 3   Chest tightness 3   Walking on an incline 0   Home Activities 5   Confident Leaving The Home 5   Sleeping Soundly 5   Have Energy 5   Assessment Score 29   $RT COPD Assessment Yes   GOLD

## 2025-07-18 ENCOUNTER — TELEPHONE (OUTPATIENT)
Dept: CARDIOTHORACIC SURGERY | Age: 70
End: 2025-07-18

## 2025-07-18 VITALS
RESPIRATION RATE: 22 BRPM | OXYGEN SATURATION: 99 % | BODY MASS INDEX: 36.45 KG/M2 | SYSTOLIC BLOOD PRESSURE: 83 MMHG | DIASTOLIC BLOOD PRESSURE: 47 MMHG | TEMPERATURE: 98.6 F | HEART RATE: 96 BPM | WEIGHT: 315 LBS | HEIGHT: 78 IN

## 2025-07-18 LAB
ACB COMPLEX DNA BLD POS QL NAA+NON-PROBE: NOT DETECTED
ANION GAP SERPL CALCULATED.3IONS-SCNC: 9 MMOL/L (ref 9–17)
B FRAGILIS DNA BLD POS QL NAA+NON-PROBE: NOT DETECTED
BASOPHILS # BLD: 0.04 K/UL
BASOPHILS NFR BLD: 0 % (ref 0–1)
BUN SERPL-MCNC: 19 MG/DL (ref 7–20)
C ALBICANS DNA BLD POS QL NAA+NON-PROBE: NOT DETECTED
C AURIS DNA BLD POS QL NAA+NON-PROBE: NOT DETECTED
C GATTII+NEOFOR DNA BLD POS QL NAA+N-PRB: NOT DETECTED
C GLABRATA DNA BLD POS QL NAA+NON-PROBE: NOT DETECTED
C KRUSEI DNA BLD POS QL NAA+NON-PROBE: NOT DETECTED
C PARAP DNA BLD POS QL NAA+NON-PROBE: NOT DETECTED
C TROPICLS DNA BLD POS QL NAA+NON-PROBE: NOT DETECTED
CALCIUM SERPL-MCNC: 9.3 MG/DL (ref 8.3–10.6)
CHLORIDE SERPL-SCNC: 88 MMOL/L (ref 99–110)
CO2 SERPL-SCNC: 40 MMOL/L (ref 21–32)
CREAT SERPL-MCNC: 0.6 MG/DL (ref 0.8–1.3)
E CLOAC COMP DNA BLD POS NAA+NON-PROBE: NOT DETECTED
E COLI DNA BLD POS QL NAA+NON-PROBE: NOT DETECTED
E FAECALIS DNA BLD POS QL NAA+NON-PROBE: NOT DETECTED
E FAECIUM DNA BLD POS QL NAA+NON-PROBE: NOT DETECTED
ENTEROBACTERALES DNA BLD POS NAA+N-PRB: NOT DETECTED
EOSINOPHIL # BLD: 0.18 K/UL
EOSINOPHILS RELATIVE PERCENT: 2 % (ref 0–3)
ERYTHROCYTE [DISTWIDTH] IN BLOOD BY AUTOMATED COUNT: 15.6 % (ref 11.7–14.9)
GFR, ESTIMATED: >90 ML/MIN/1.73M2
GLUCOSE SERPL-MCNC: 135 MG/DL (ref 74–99)
GP B STREP DNA BLD POS QL NAA+NON-PROBE: NOT DETECTED
HAEM INFLU DNA BLD POS QL NAA+NON-PROBE: NOT DETECTED
HCT VFR BLD AUTO: 38 % (ref 42–52)
HGB BLD-MCNC: 12.1 G/DL (ref 13.5–18)
IMM GRANULOCYTES # BLD AUTO: 0.04 K/UL
IMM GRANULOCYTES NFR BLD: 0 %
K OXYTOCA DNA BLD POS QL NAA+NON-PROBE: NOT DETECTED
KLEBSIELLA SP DNA BLD POS QL NAA+NON-PRB: NOT DETECTED
KLEBSIELLA SP DNA BLD POS QL NAA+NON-PRB: NOT DETECTED
L MONOCYTOG DNA BLD POS QL NAA+NON-PROBE: NOT DETECTED
LYMPHOCYTES NFR BLD: 0.98 K/UL
LYMPHOCYTES RELATIVE PERCENT: 10 % (ref 24–44)
MAGNESIUM SERPL-MCNC: 2 MG/DL (ref 1.8–2.4)
MCH RBC QN AUTO: 30.2 PG (ref 27–31)
MCHC RBC AUTO-ENTMCNC: 31.8 G/DL (ref 32–36)
MCV RBC AUTO: 94.8 FL (ref 78–100)
MICROORGANISM SPEC CULT: ABNORMAL
MICROORGANISM SPEC CULT: NORMAL
MICROORGANISM/AGENT SPEC: ABNORMAL
MONOCYTES NFR BLD: 0.72 K/UL
MONOCYTES NFR BLD: 7 % (ref 0–5)
N MEN DNA BLD POS QL NAA+NON-PROBE: NOT DETECTED
NEUTROPHILS NFR BLD: 80 % (ref 36–66)
NEUTS SEG NFR BLD: 7.82 K/UL
P AERUGINOSA DNA BLD POS NAA+NON-PROBE: NOT DETECTED
PLATELET, FLUORESCENCE: 136 K/UL (ref 140–440)
PMV BLD AUTO: 10.1 FL (ref 7.5–11.1)
POTASSIUM SERPL-SCNC: 3.4 MMOL/L (ref 3.5–5.1)
PROTEUS SP DNA BLD POS QL NAA+NON-PROBE: NOT DETECTED
RBC # BLD AUTO: 4.01 M/UL (ref 4.6–6.2)
S AUREUS DNA BLD POS QL NAA+NON-PROBE: NOT DETECTED
S AUREUS+CONS DNA BLD POS NAA+NON-PROBE: NOT DETECTED
S EPIDERMIDIS DNA BLD POS QL NAA+NON-PRB: NOT DETECTED
S LUGDUNENSIS DNA BLD POS QL NAA+NON-PRB: NOT DETECTED
S MALTOPHILIA DNA BLD POS QL NAA+NON-PRB: NOT DETECTED
S MARCESCENS DNA BLD POS NAA+NON-PROBE: NOT DETECTED
S PNEUM DNA BLD POS QL NAA+NON-PROBE: NOT DETECTED
S PYO DNA BLD POS QL NAA+NON-PROBE: ABNORMAL
SALMONELLA DNA BLD POS QL NAA+NON-PROBE: NOT DETECTED
SERVICE CMNT-IMP: ABNORMAL
SERVICE CMNT-IMP: NORMAL
SODIUM SERPL-SCNC: 138 MMOL/L (ref 136–145)
SPECIMEN DESCRIPTION: ABNORMAL
SPECIMEN DESCRIPTION: NORMAL
STREPTOCOCCUS DNA BLD POS NAA+NON-PROBE: DETECTED
WBC OTHER # BLD: 9.8 K/UL (ref 4–10.5)

## 2025-07-18 PROCEDURE — 83735 ASSAY OF MAGNESIUM: CPT

## 2025-07-18 PROCEDURE — 6370000000 HC RX 637 (ALT 250 FOR IP): Performed by: STUDENT IN AN ORGANIZED HEALTH CARE EDUCATION/TRAINING PROGRAM

## 2025-07-18 PROCEDURE — 80048 BASIC METABOLIC PNL TOTAL CA: CPT

## 2025-07-18 PROCEDURE — 94761 N-INVAS EAR/PLS OXIMETRY MLT: CPT

## 2025-07-18 PROCEDURE — 94640 AIRWAY INHALATION TREATMENT: CPT

## 2025-07-18 PROCEDURE — 2700000000 HC OXYGEN THERAPY PER DAY

## 2025-07-18 PROCEDURE — 6370000000 HC RX 637 (ALT 250 FOR IP)

## 2025-07-18 PROCEDURE — 85025 COMPLETE CBC W/AUTO DIFF WBC: CPT

## 2025-07-18 PROCEDURE — 2500000003 HC RX 250 WO HCPCS: Performed by: INTERNAL MEDICINE

## 2025-07-18 PROCEDURE — 99233 SBSQ HOSP IP/OBS HIGH 50: CPT | Performed by: INTERNAL MEDICINE

## 2025-07-18 PROCEDURE — 36415 COLL VENOUS BLD VENIPUNCTURE: CPT

## 2025-07-18 RX ORDER — INSULIN DETEMIR 100 [IU]/ML
20 INJECTION, SOLUTION SUBCUTANEOUS
Status: ON HOLD | DISCHARGE
Start: 2025-07-18 | End: 2025-10-16

## 2025-07-18 RX ORDER — POTASSIUM CHLORIDE 1500 MG/1
40 TABLET, EXTENDED RELEASE ORAL EVERY 4 HOURS
Status: DISCONTINUED | OUTPATIENT
Start: 2025-07-18 | End: 2025-07-18 | Stop reason: HOSPADM

## 2025-07-18 RX ADMIN — SODIUM CHLORIDE, PRESERVATIVE FREE 10 ML: 5 INJECTION INTRAVENOUS at 08:32

## 2025-07-18 RX ADMIN — APIXABAN 5 MG: 5 TABLET, FILM COATED ORAL at 08:25

## 2025-07-18 RX ADMIN — ROFLUMILAST 500 MCG: 500 TABLET ORAL at 08:36

## 2025-07-18 RX ADMIN — FUROSEMIDE 60 MG: 40 TABLET ORAL at 08:25

## 2025-07-18 RX ADMIN — LISINOPRIL 2.5 MG: 5 TABLET ORAL at 08:26

## 2025-07-18 RX ADMIN — BUDESONIDE AND FORMOTEROL FUMARATE DIHYDRATE 2 PUFF: 160; 4.5 AEROSOL RESPIRATORY (INHALATION) at 07:45

## 2025-07-18 RX ADMIN — INSULIN LISPRO 5 UNITS: 100 INJECTION, SOLUTION INTRAVENOUS; SUBCUTANEOUS at 08:24

## 2025-07-18 RX ADMIN — DIGOXIN 125 MCG: 125 TABLET ORAL at 08:25

## 2025-07-18 RX ADMIN — CARVEDILOL 3.12 MG: 6.25 TABLET, FILM COATED ORAL at 08:25

## 2025-07-18 RX ADMIN — IPRATROPIUM BROMIDE AND ALBUTEROL SULFATE 1 DOSE: .5; 2.5 SOLUTION RESPIRATORY (INHALATION) at 07:45

## 2025-07-18 RX ADMIN — IPRATROPIUM BROMIDE AND ALBUTEROL SULFATE 1 DOSE: .5; 2.5 SOLUTION RESPIRATORY (INHALATION) at 11:20

## 2025-07-18 NOTE — CONSULTS
Cardiothoracic Surgery  IN-PATIENT SERVICE   [facility]    CONSULTATION / HISTORY AND PHYSICAL EXAMINATION            Date:   7/18/2025  Patient name:  Armando Mars  Date of admission:  7/14/2025  8:15 PM  MRN:   [id number]  Account:  [id number]  YOB: 1955  PCP:    Rosalio Hedrick MD  Room:   [address] [place], [place] [address]  Code Status:    Full Code    Physician Requesting Consult: Tex Donald,*    Reason for Consult:  AS    Chief Complaint:     SOB    History of Present Illness:     Armando is a 69 y.o.year old who presents with worsening shortness of breath lower extremity swelling difficulty ambulating found to be in decompensated heart failure chest x-ray concerning for pleural effusion BNP is elevated.  He was actually discharged a week ago.  He has had 2 admissions in last 1 month due to decompensated heart failure volume overload on 4 L of oxygen at discharge at baseline.  He is supposed to be on Lasix 60 twice daily but he has been putting on weight also has a permanent atrial fibrillation EF is reduced to 35% he has severe aortic stenosis concerning for possible low-flow low gradient aortic stenosis    LHC shows mild nonobstructive coronary artery disease. Right heart catheterization showing elevated right and left heart filling pressures with reduced cardiac index.    We were consulted for surgical evaluation    Past Medical History:     Past Medical History:   Diagnosis Date    A-fib ([facility])     Resolved after ablation 2018    Anxiety     Arthritis     \"Hips, Knees, Elbows And Fingers\"    COPD (chronic obstructive pulmonary disease) ([facility])     Sees Dr. Osmani Og     Diabetes mellitus ([facility]) Dx 1990's    Full dentures     H/O angiography 12/13/2018    peripheral angio - LFA occluded, filling collaterals, RSFA 90% stenosis-vasc surg consult    H/O Doppler ultrasound 09/05/2018    LE arterial - left mid and distal femoral artery occluded,

## 2025-07-18 NOTE — SIGNIFICANT EVENT
STS Risk Score:    Procedure Type: Isolated AVR   Perioperative Outcome Estimate %   Operative Mortality 10.1%   Morbidity & Mortality 33.7%   Stroke 1.86%   Renal Failure 11%   Reoperation 4.53%   Prolonged Ventilation 24.5%   Deep Sternal Wound Infection 0.484%   Long Hospital Stay (>14 days) 29.4%   Short Hospital Stay (<6 days)* 7.36%         Clinical Summary       Planned Surgery: Isolated AVR, Elective, First cardiovascular surgery   Demographics: 69 year old, male, 165kg, 198cm, BMI: 42.1 kg/m²   Lab Values: Creatinine: 0.6 mg/dL, Hematocrit: 38%, WBC Count: 9.8 10³/?L, Platelet Count: 336054 cells/?L   PreOp Medications: Insulin diabetes control   Risk Factors / Comorbidities: Insulin-dependent Diabetes Mellitus, Hypertension, Family Hx of CAD   Pulmonary RF: Severe CLD, Home O?, Sleep Apnea   Vascular RF: Peripheral Artery Disease   Cardiac Status: Acute and chronic heart failure, Ejection Fraction = 35%   Coronary Artery Disease: 2 vessels diseased   Valve Disease: Aortic Stenosis, Severe MR, Mild TR   Arrhythmia: Recent A-fib, Unknown

## 2025-07-18 NOTE — PROGRESS NOTES
Physician Progress Note      PATIENT:               KAROLINE HAGEN  CSN #:                  [id number]  :                       1955  ADMIT DATE:       2025 1:10 PM  DISCH DATE:        2025 1:22 PM  RESPONDING  PROVIDER #:        Shankar Downs MD          QUERY TEXT:    Elevated cardiac troponin (cTc) levels are documented in the medical record.   Patient was admitted with acute systolic CHF as per  H&P. Patient was noted   to have chest discomfort per cardiology  consult. Troponins elevated with   >20% change ranging from 46-32-38 with reference range of 0-22. - Patient was   treated with telemetry and cardiology consult  Please clarify the cause:    The clinical indicators include:  -Acute on chronic Systolic heart failure, Acute on chronic respiratory   failure, Severe AS and moderate to severe MR, AECOPD    - cardiology notes, \"Elevated troponin Flat rise Not ACS Type II demand   leak in the setting of respiratory failure/ CHF\"    -EKG  1325 Atrial fibrillation Inferior infarct (cited on or before   2017) Cannot rule out Anteroseptal infarct (cited on or before   2018). When compared with ECG of 2025 15:11, Questionable change   in initial forces of Lateral leads.  - EKG  0647 Atrial fibrillation Left anterior fascicular block. Inferior   infarct (cited on or before 2017) Cannot rule out Anteroseptal infarct   (cited on or before 2018) When compared with ECG of 2025 13:25,   QRS duration has increased    - CARDS consult, trop trending, telemetry, IV lasix for CHF,  Options provided:  -- Myocardial injury, non-ischemic (non-traumatic) related to acute CHF   exacerbation and respiratory failure  -- Type 2 myocardial infarction related to acute CHF exacerbation and   respiratory failure  -- Other - I will add my own diagnosis  -- Disagree - Not applicable / Not valid  -- Disagree - Clinically unable to determine / Unknown  -- Refer to

## 2025-07-18 NOTE — PROGRESS NOTES
Cardiothoracic Surgery  IN-PATIENT SERVICE   [facility]    CONSULTATION / HISTORY AND PHYSICAL EXAMINATION            Date:   7/18/2025  Patient name:  Armando Mars  Date of admission:  7/14/2025  8:15 PM  MRN:   [id number]  Account:  [id number]  YOB: 1955  PCP:    Rosalio Hedrick MD  Room:   [address] [place], [place] [address]  Code Status:    Full Code    Physician Requesting Consult: Tex Donald,*    Reason for Consult:  AS    Chief Complaint:     SOB    History of Present Illness:     Armando is a 69 y.o.year old who presents with worsening shortness of breath lower extremity swelling difficulty ambulating found to be in decompensated heart failure chest x-ray concerning for pleural effusion BNP is elevated.  He was actually discharged a week ago.  He has had 2 admissions in last 1 month due to decompensated heart failure volume overload on 4 L of oxygen at discharge at baseline.  He is supposed to be on Lasix 60 twice daily but he has been putting on weight also has a permanent atrial fibrillation EF is reduced to 35% he has severe aortic stenosis concerning for possible low-flow low gradient aortic stenosis    LHC shows mild nonobstructive coronary artery disease. Right heart catheterization showing elevated right and left heart filling pressures with reduced cardiac index.    We were consulted for surgical evaluation    Past Medical History:     Past Medical History:   Diagnosis Date    A-fib ([facility])     Resolved after ablation 2018    Anxiety     Arthritis     \"Hips, Knees, Elbows And Fingers\"    COPD (chronic obstructive pulmonary disease) ([facility])     Sees Dr. Osmani Og     Diabetes mellitus ([facility]) Dx 1990's    Full dentures     H/O angiography 12/13/2018    peripheral angio - LFA occluded, filling collaterals, RSFA 90% stenosis-vasc surg consult    H/O Doppler ultrasound 09/05/2018    LE arterial - left mid and distal femoral artery occluded,

## 2025-07-18 NOTE — DISCHARGE SUMMARY
Hospital Medicine Discharge Summary    Patient: Armando Mars   : 1955     Hospital:  [facility]  Admit Date: 2025   Discharge Date:   25  Disposition: Assisted living  Code status:  Full  Condition at Discharge: Stable  Primary Care Provider: Rosalio Hedrick MD    Admitting Provider: Skip Moss MD  Discharge Provider: Tex Donald MD     Discharge Diagnoses:      Active Hospital Problems    Diagnosis     Acute decompensated heart failure (HCC) [I50.9]        Presenting Admission History:      69 y.o. male with a PMHx as above who presented to the ED with worsening SOB, orthopnea and LE edema over past few days. Not using additional Lasix. Denied any fevers, chills, typical CP, N/V, abdominal pain, C/D or urinary changes. Denied any current tobacco or alcohol use.      Assessment/Plan:        # Acute decompensated systolic heart failure  # VHD  - Reported worsening SOB, orthopnea and LE edema over past few days. Not using additional Lasix. Last TTE in 2025 with LVEF of 35-40% with reduced RV systolic function, severe AS and moderate-to-severe MR.  - Clinically hypervolemic, pro-BNP >4.3k, CXR with pulmonary congestion. Has 45 lbs weight gain in past week alone per chart review.  - Continue GDMT with Coreg and lisinopril  - Patient improved with IV Lasix.  - cardiology consulted, strict I/O's, daily weights, telemetry.    - left/right cath on .  No obstructive disease.  -Plan for TAVR as outpatient.     # Acute on chronic hypoxemic hypercapnic respiratory failure  - On 3-4L NC at baseline with BPAP qhs. No PFT on file.  - Requiring BPAP in ED, VBG 7.28/96 (expected CO2 of 72-76).   - Improved with IV diuresis.  -Continue supplemental oxygen at discharge      # Paroxysmal atrial fibrillation s/p ablation in 2018  # Chronic anticoagulant use   - Continue Eliquis, digoxin and Coreg.  - Keep K 4.0-4.5 and Mg 2.0-2.2.      # Non-MI troponin  30-Dec-2021

## 2025-07-18 NOTE — CARE COORDINATION
"PSYCHIATRY DAILY INPATIENT PROGRESS NOTE  SUBSEQUENT HOSPITAL VISIT    ENCOUNTER DATE: 3/31/2020  [facility]    DATE OF ADMISSION: 3/24/2020 12:41 AM  LENGTH OF STAY: 7 days    HISTORY    CHIEF COMPLAINT   Chau Lovett is a 61 y.o. male, seen during daily contreras rounds on the inpatient unit.  Chau Lovett presents with the chief complaint of psychosis/hallucinations, "I have been falling."    HPI   (Elements: Location, Quality, Severity, Duration, Timing, Content, Modifying Factors, Associated Signs & Symptoms)    The patient was seen and examined. The chart was reviewed.     Reviewed notes by LPN, Speciality Services, LMSW, CTRS, and RN from the last 24 hours.    The patient's case was discussed with the treatment team and care providers today, including RNs. LMSW, CTRS, and Speciality Services.    Staff reports no behavioral or management issues.     The patient has been compliant with treatment. The patient denied any side effects.    The patient continues to lack insight into his illness. He denies all psychiatric symptoms, despite ongoing symtpoms of psychosis/craig which remain fx/bx impairing (some improvement noted). He is unable to provide a narrative as to why he was hospitalized.     His thought process is concrete but less distractible, which still limits treatment/therapy efficacy a this time.  He is pleasant and having no bx issues at this time. He is compliant with treatment.     Denied Symptoms of Depression: no diminished mood or loss of interest/anhedonia; no irritability, diminished energy, change in sleep, change in appetite, diminished concentration or cognition or indecisiveness, PMA/R, excessive guilt or hopelessness or worthlessness, or suicidal ideations     Continued but less Changes in Sleep: +trouble with initiation/maintenance, no early morning awakening with inability to return to sleep; +diminshed need for sleep; pt slept 7.5 hours last night     Denied " Reviewed medical record and met with patient at bedside to discuss plan. Pt is going to be discharging home to his [facility]. The plan is for him to get a valve replacement outpatient at a later time. Pt was active with [address] and thinks he would like to continue with them for now until he gets his procedure. CM called [place] [id number] hospice at this time to notify them that pt is discharging today.     1104: Called and talked to [address], [place] and she will follow up with pt.     1215: Transportation arranged with [facility] for pt with  time of 1300. Packet placed at nurses station. Pt notified.    Suicidal/Homicidal ideations: no active/passive ideations, organized plans, or future intentions     Continued but lessening Symptoms of psychosis: less hallucinations, no delusions, no disorganized thinking, no disorganized behavior or abnormal motor behavior, no negative symptoms      Continued but lessening Symptoms of craig or hypomania: no elevated, no expansive, or less irritable mood with less increased energy or activity; with no inflated self-esteem or grandiosity, less decreased need for sleep, no increased rate of speech, no FOI or racing thoughts, less distractibility, no increased goal directed activity or PMA, less risky/disinhibited behavior    Denied Symptoms of Anxiety: no excessive anxiety/worry/fear, no panic attacks; without agoraphobia    PSYCHOTHERAPY ADD-ON +[id number]   30 (16-37*) minutes    Time: 16 minutes  Participants: Met with patient    Therapeutic Intervention Type: behavior modifying psychotherapy, supportive psychotherapy  Why chosen therapy is appropriate versus another modality: relevant to diagnosis, patient responds to this modality, evidence based practice    Target symptoms: mood disorder, psychosis  Primary focus: psychosis, stressors, compliance  Psychotherapeutic techniques: supportive and psycho-dynamic techniques; psycho-education; motivational techniques for compliance; self-redirection; preventing recurrence and identifying discharge needs    Outcome monitoring methods: self-report, observation    Patient's response to intervention:  The patient's response to intervention is accepting.    Progress toward goals:  The patient's progress toward goals is limited.      ROS  General ROS: negative  Ophthalmic ROS: negative  ENT ROS: negative  Allergy and Immunology ROS: negative  Hematological and Lymphatic ROS: negative  Endocrine ROS: negative  Respiratory ROS: no cough, shortness of breath, or wheezing  Cardiovascular ROS: no chest pain or dyspnea on exertion  Gastrointestinal  ROS: no abdominal pain, change in bowel habits, or black or bloody stools  Genito-Urinary ROS: no dysuria, trouble voiding, or hematuria  Musculoskeletal ROS: negative  Neurological ROS: no TIA or stroke symptoms  Dermatological ROS: negative      PAST MEDICAL HISTORY   Past Medical History:   Diagnosis Date    Anxiety     Bipolar 1 disorder     Bradycardia     Depression     Diabetes mellitus     Hallucination     History of psychiatric hospitalization     [facility] 2/[facility] and [facility]    Hx of psychiatric care     Hypertension     Jacinta     Nuclear sclerosis of both eyes 10/22/2015    Psychiatric exam requested by authority     Psychiatric problem     Psychosis     Renal disorder     Schizoaffective disorder     Sleep difficulties     Therapy     Thyroid disease     possible           PSYCHOTROPIC MEDICATIONS   Scheduled Meds:   aspirin  81 mg Oral Daily    carBAMazepine  300 mg Oral BID    diphenhydrAMINE  50 mg Oral QHS    folic acid-vit B6-vit B12 2.5-25-2 mg  1 tablet Oral Daily    melatonin  6 mg Oral Nightly    metFORMIN  1,000 mg Oral BID WM    multivitamin  1 tablet Oral Daily    OLANZapine  25 mg Oral QHS    SAXagliptin  5 mg Oral Daily    simvastatin  20 mg Oral QHS     Continuous Infusions:  PRN Meds:.acetaminophen, aluminum-magnesium hydroxide-simethicone, docusate sodium, glucagon (human recombinant), glucose, glucose, glucose, hydrOXYzine pamoate, insulin aspart U-100, loperamide, OLANZapine **AND** OLANZapine        EXAMINATION    VITALS   Vitals:    03/31/20 0742   BP: 128/85   Pulse: 95   Resp: 18   Temp: 99.5 °F (37.5 °C)     Body mass index is 28.56 kg/m².      CONSTITUTIONAL  General Appearance: , in hospital garb, overweight; NAD     MUSCULOSKELETAL  Muscle Strength and Tone:  normal  Abnormal Involuntary Movements:  None- no hand tremor today  Gait and Station:  normal; mildly ataxic     PSYCHIATRIC   Level of Consciousness: awake, alert  Orientation:  "p/p/t/s  Grooming:  adequate to circumstances  Psychomotor Behavior: no PMA/R  Speech: nl r/t/v/s  Language:  English fluent  Mood: "alright"  Affect: less decreased range, less irritable, more pleasant/calmer  Thought Process: mostly linear and organized; racing thoughts at times; derailed at times  Associations:  intact; no CARLEY  Thought Content:  denied AVH/delusions; denied HI/SI  Memory:  intact to recent and remote events  Attention: mostly intact to conversation; lesst distractible   Fund of Knowledge:  age and education appropriate  Estimate if Intelligence:  average based on work/education history, vocabulary and mental status exam  Insight:  fair- partially seeks help, understands/accepts illness  Judgment:   good- no bx issues, compliant and cooperative        DIAGNOSTIC TESTING   Laboratory Results  Recent Results (from the past 24 hour(s))   POCT glucose    Collection Time: 03/30/20  4:41 PM   Result Value Ref Range    POCT Glucose 248 (H) 70 - 110 mg/dL   POCT glucose    Collection Time: 03/30/20  9:09 PM   Result Value Ref Range    POCT Glucose 162 (H) 70 - 110 mg/dL   POCT glucose    Collection Time: 03/31/20  7:59 AM   Result Value Ref Range    POCT Glucose 167 (H) 70 - 110 mg/dL         ASSESSMENT      IMPRESSION   Bipolar I Disorder MRE manic, severe with psychotic features  Insomnia secondary to a mental illness     EPS     Psychosocial stressors     Vitamin D insufficiency  B12 deficiency      Acute renal insufficiency  Hyponatremia  Abnormal TSH  Mixed hyperlipidemia  Uncontrolled type 2 diabetes mellitus with hyperglycemia, without long-term current use of insulin  Essential hypertension     MEDICAL DECISION MAKING       PROBLEM LIST AND MANAGEMENT PLANS; PRESCRIPTION DRUG MANAGEMENT  Compliance: yes  Side Effects: no  Regimen Adjustments:      Bipolar I Disorder mre manic, severe with psychotic features: pt counseled  -Start re-trial of Tegretol CR at 100 mg po BID- increase to 200 mg po BID " (last known stabilizing dosage)- increase to 300 mg po BID; check level in 2 days  -Start re-trial of Zyprexa 5 mg po q HS- increase to 10 mg po q HS- increase to 15 mg po q HS- increase to 20 mg po q HS- increase to 25 mg po q HS- increase to/continue 30 mg po q HS- decrease back to 25 mg po q HS- decrease back to 20 mg po q HS (no benefit derived form higher dosage)  -we discussed his medication options in depth; the above medications were the only ones he was willing to try at this time     Insomnia: pt counseled  -start melatonin 6 mg po q hs  -Started/conitnue Benadryl at 50 mg pop q HS    EPS: pt counseled  -benadryl as above     Psychosocial stressors: Pt counseled  -SW consulted and assised with resources     Vitamin D insufficiency: Pt counseled  -Started/continue Vitamin D3 50,000 units po q week x 8 weeks (1/8 given)     B12 deficiency:  Pt counseled  -Give B12 1000 mcg IM x 1 on 3/24/20  -Start/continue Folbic po q day     Acute renal insufficiency: Pt counseled  -was likely from poor PO intake   -improved/resolved     Hyponatremia: pt counseled  -rechecked- resolved     Abnormal TSH:pt counseled  -recheck and continue to monitor     Mixed hyperlipidemia: pt counseled  Resume/continue simvastatin 20 mg po q day     Uncontrolled type 2 diabetes mellitus with hyperglycemia, without long-term current use of insulin: pt counseled  -resumed/continue metformin 1000 mg po BID with meals and Saxagliptim 5 mg po q day     Essential hypertension: pt counseled  Hold lisinopril for now; monitor BP closely and resume if needed        DIAGNOSTIC TESTING  Labs reviewed with patient; follow up pending labs- check TSH, CBC, CMP, and Tegretol level on Thursday     Disposition:  -SW to assist with aftercare planning and collateral  -Once stable discharge home with outpatient follow up care and/or rehab  -Continue inpatient treatment under a PEC and/or CEC for grave disability as evident by fx/bx impairing mood and  psychotic symptoms    NEED FOR CONTINUED HOSPITALIZATION  Psychiatric illness continues to pose a potential threat to life or bodily function, of self or others, thereby requiring the need for continued inpatient psychiatric hospitalization: Yes    Protective inpatient pyschiatric hospitalization required while a safe disposition plan is enacted: Yes    Patient stabilized and ready for discharge from inpatient psychiatric unit: No      STAFF:   Bao Naranjo MD  Psychiatry

## 2025-07-18 NOTE — PROGRESS NOTES
Cardiology Progress Note     Admit Date:  7/14/2025    Consult reason/ Seen today for :       Subjective and  Overnight Events :  breathing is better   Cardiac reveals no significant obstructive CAD but has severe aortic stenosis      Chief complain on admission : 69 y.o.year old who is admitted for  Chief Complaint   Patient presents with    Shortness of Breath     Was rx admitted for copd exacerbation      Assessment / Plan:  Acute decompensated heart failure with volume overload and respiratory failure  CHF: Acute Systolic/ Diastolic decompensated heart failure. Appears to be volume overloaded .  Changed to p.o. Lasix 60 twice daily    Severe aortic stenosis will get surgical evaluation we will plan in workup for TAVR, refer to TAVR clinic CT surgery evaluation  HTN: stable, continue present medications   Atrial Fibrillation: S/p ablation in 2018 rate controlled on anticoagulation restart  Continue BiPAP support  Diabetes as per primary team  DVT prophylaxis if no contraindication  6.   Dyslipidemia: continue statins  Discussed with primary team, hospitalist service, bedside nursing staff and family  Past medical history:    has a past medical history of A-fib (HCC), Anxiety, Arthritis, COPD (chronic obstructive pulmonary disease) (HCC), Depression, Diabetes mellitus (HCC), Full dentures, H/O angiography, H/O Doppler ultrasound, H/O echocardiogram, Hx of colonoscopy, Hx of Doppler ultrasound, Hyperlipidemia, Hypertension, Macular degeneration, Obesity, On home oxygen therapy, PAD (peripheral artery disease), Panic attacks, Pneumonia, PTSD (post-traumatic stress disorder), Restless leg, Shortness of breath, Sleep apnea, and Wears glasses.  Past surgical history:   has a past surgical history that includes Atrial ablation surgery (05/23/2018); pr laparoscopy surg cholecystectomy (N/A, 11/12/2018); Colonoscopy (07/2011); eye surgery

## 2025-07-18 NOTE — TELEPHONE ENCOUNTER
Armando Mars (Legal Name)     69 y.o., 1955   Legal sex: Male   Marital status:    Race: White (non-)   Ethnicity: Non- / Non    Languages   English   [address]  [id number]  [place] [id number]   Employer: Disabled   MRN: [id number]      Needs outpatient consult appointment for a TAVR in 1-2 weeks      Appt scheduled 8-4-2025 @ 3:30 pm

## 2025-07-18 NOTE — PROGRESS NOTES
Discharge instructions and medications are reviewed with the patient  all questions are answered  Peripheral IV is removed  Patient denies any further needs  Patient is escorted off the floor by [facility] to return home to [place]

## 2025-07-21 ENCOUNTER — TELEPHONE (OUTPATIENT)
Dept: CARDIOLOGY CLINIC | Age: 70
End: 2025-07-21

## 2025-07-21 LAB
MICROORGANISM SPEC CULT: NORMAL
SERVICE CMNT-IMP: NORMAL
SPECIMEN DESCRIPTION: NORMAL

## 2025-07-24 ENCOUNTER — HOSPITAL ENCOUNTER (INPATIENT)
Age: 70
LOS: 7 days | Discharge: NURSING, REHAB OR LONG-TERM CARE | DRG: 306 | End: 2025-07-31
Attending: INTERNAL MEDICINE | Admitting: STUDENT IN AN ORGANIZED HEALTH CARE EDUCATION/TRAINING PROGRAM
Payer: MEDICARE

## 2025-07-24 PROBLEM — I50.9 DECOMPENSATED HEART FAILURE (HCC): Status: ACTIVE | Noted: 2025-07-24

## 2025-07-24 ASSESSMENT — PAIN SCALES - GENERAL
PAINLEVEL_OUTOF10: 4
PAINLEVEL_OUTOF10: 0

## 2025-07-24 ASSESSMENT — PAIN DESCRIPTION - DESCRIPTORS: DESCRIPTORS: ACHING

## 2025-07-24 ASSESSMENT — PAIN DESCRIPTION - ORIENTATION: ORIENTATION: RIGHT

## 2025-07-24 ASSESSMENT — PAIN DESCRIPTION - LOCATION: LOCATION: FOOT

## 2025-07-25 PROBLEM — I50.21 ACUTE SYSTOLIC CONGESTIVE HEART FAILURE (HCC): Status: ACTIVE | Noted: 2025-07-25

## 2025-07-25 ASSESSMENT — PAIN SCALES - GENERAL
PAINLEVEL_OUTOF10: 7
PAINLEVEL_OUTOF10: 0
PAINLEVEL_OUTOF10: 0

## 2025-07-25 ASSESSMENT — PAIN DESCRIPTION - LOCATION: LOCATION: LEG

## 2025-07-25 ASSESSMENT — PAIN DESCRIPTION - ORIENTATION: ORIENTATION: RIGHT;LEFT

## 2025-07-25 NOTE — PROGRESS NOTES
This pt came from the [facility]  The pt was wearing a Bipap while at [facility],  When I checked  the pt and placed him on the Bipap machine.  RRT set up the pt on Bipap 15/5, RR 16, and 40%.  But when I check the chart for orders the Doctor here at [facility] @ 0140 did not put orders in for the pt to be on the Bipap and what settings's.    The pt does wear a Bipap with 4lpm on the Bipap at home.

## 2025-07-25 NOTE — CONSULTS
[facility] Ostomy Continence Nurse  Consult Note       Armando Mars  AGE: 69 y.o.   GENDER: male  : 1955  TODAY'S DATE:  2025    Subjective:     Reason for Evaluation and Assessment: wound assessment       Armando Mars is a 69 y.o. male referred by:   [x] Physician  [] Nursing  [] Other:     Wound Identification:  Wound Type: venous, pressure, and MASD  Contributing Factors: edema, venous stasis, chronic pressure, decreased mobility, shear force, obesity, decreased tissue oxygenation, and poor hygiene        PAST MEDICAL HISTORY        Diagnosis Date    A-fib (HCC)     Resolved after ablation     Anxiety     Arthritis     \"Hips, Knees, Elbows And Fingers\"    COPD (chronic obstructive pulmonary disease) ([facility])     Sees Dr. Osmani Og     Diabetes mellitus ([facility]) Dx     Full dentures     H/O angiography 2018    peripheral angio - LFA occluded, filling collaterals, RSFA 90% stenosis-vasc surg consult    H/O Doppler ultrasound 2018    LE arterial - left mid and distal femoral artery occluded, lt FANI shows mild-mod PVD    H/O echocardiogram 2016    EF60% mild MR, TR, pulm HTN    Hx of colonoscopy      C-scope - benign polyp x1    Hx of Doppler ultrasound 2018    Lower extremity doppler  Normal study.    Hyperlipidemia     Hypertension     Macular degeneration     States is legally blind    Obesity     On home oxygen therapy     Continuous At 2 Liters Per Nasal Cannula    PAD (peripheral artery disease)     10/10 FANI mild PAD left superficial femoral s/p left fem-pop bypass    Panic attacks     Pneumonia Last Episode In     PTSD (post-traumatic stress disorder)     Restless leg     Shortness of breath     Sleep apnea     Uses BiPap - stopped using 2020    Wears glasses     To Read       PAST SURGICAL HISTORY    Past Surgical History:   Procedure Laterality Date    APPENDECTOMY  2003    ATRIAL ABLATION SURGERY  2018    A-fib ablation        Tunneling Position ___ O'Clock 0 07/15/25 0900   Undermining Starts ___ O'Clock 0 07/15/25 0900   Undermining Ends___ O'Clock 0 07/15/25 0900   Undermining Maxium Distance (cm) 0 07/15/25 0900   Wound Assessment Ruptured blister;Pink/red;Slough 07/15/25 0900   Drainage Amount Small (< 25%) 07/25/25 0405   Drainage Description Thin;Serous;Clear 07/15/25 0900   Odor None 07/15/25 0900   Maru-wound Assessment Fragile;Intact 07/15/25 0900   Margins Defined edges 07/15/25 0900   Wound Thickness Description not for Pressure Injury Full thickness 07/15/25 0900   Number of days: 20       Wound 07/05/25 Toe (Comment  which one) Left 5th toe blister (Active)   Wound Image   07/25/25 0930   Wound Etiology Venous 07/25/25 0930   Dressing Status New dressing applied 07/25/25 0930   Wound Cleansed Not Cleansed 07/15/25 0900   Dressing/Treatment Open to air 07/25/25 0930   Wound Length (cm) 0.9 cm 07/25/25 0930   Wound Width (cm) 0.9 cm 07/25/25 0930   Wound Depth (cm) 0.1 cm 07/25/25 0930   Wound Surface Area (cm^2) 0.81 cm^2 07/25/25 0930   Change in Wound Size % (l*w) 19 07/25/25 0930   Wound Volume (cm^3) 0.081 cm^3 07/25/25 0930   Distance Tunneling (cm) 0 cm 07/25/25 0930   Tunneling Position ___ O'Clock 0 07/25/25 0930   Undermining Starts ___ O'Clock 0 07/25/25 0930   Undermining Ends___ O'Clock 0 07/25/25 0930   Undermining Maxium Distance (cm) 0 07/25/25 0930   Wound Assessment Dry;Eschar dry 07/25/25 0930   Drainage Amount None (dry) 07/25/25 0930   Odor None 07/25/25 0930   Maru-wound Assessment Fragile;Intact 07/25/25 0930   Margins Attached edges 07/25/25 0930   Wound Thickness Description not for Pressure Injury Partial thickness 07/15/25 0900   Number of days: 19       Wound 07/15/25 Foot Right;Anterior (Active)   Wound Image   07/25/25 0930   Wound Etiology Venous 07/25/25 0930   Dressing Status New dressing applied 07/25/25 0930   Wound Cleansed Cleansed with saline 07/25/25 0930   Dressing/Treatment Silicone

## 2025-07-25 NOTE — PROGRESS NOTES
4 Eyes Skin Assessment     NAME:  Armando Mars  YOB: 1955  MEDICAL RECORD NUMBER:  [id number]    The patient is being assessed for  Admission    I agree that at least one RN has performed a thorough Head to Toe Skin Assessment on the patient. ALL assessment sites listed below have been assessed.      Areas assessed by both nurses:    Head, Face, Ears, Shoulders, Back, Chest, Arms, Elbows, Hands, Sacrum. Buttock, Coccyx, Ischium, Legs. Feet and Heels, and Under Medical Devices         Does the Patient have a Wound? Yes wound(s) were present on assessment. LDA wound assessment was Initiated and completed by RN       Hector Prevention initiated by RN: Yes  Wound Care Orders initiated by RN: Yes    For hospital-acquired stage 1 & 2 and ALL Stage 3,4, Unstageable, DTI, NWPT, and Complex wounds: place order “IP Wound Care/Ostomy Nurse Eval and Treat” by RN under : Yes    New Ostomies, if present place, Ostomy referral order under : No     Nurse 1 eSignature: Electronically signed by Maite Kanpp RN on 7/25/25 at 2:31 AM EDT    **SHARE this note so that the co-signing nurse can place an eSignature**    Nurse 2 eSignature: Electronically signed by Gilda Ojeda RN on 7/25/25 at 2:56 AM EDT

## 2025-07-25 NOTE — PROGRESS NOTES
V2.0    [facility] [place]      Name:  Armando Mars /Age/Sex: 1955  (69 y.o. male)   MRN & CSN:  [id number] & [id number] Encounter Date/Time: 2025 9:10 AM EDT   Location:  -A PCP: Rosalio Hedrick MD     Attending:Edi Mae MD       Hospital Day: 2    Assessment and Recommendations   Armando Mars is a 69 y.o. male     Acute decompensated heart failure in the setting of severe aortic stenosis  - TTE in 2025 showed an EF of 35 to 40% and severe AAS and other findings  - [facility]   - Recently discharged 6 days prior to admission for a similar presentation  - Continue GDMT medications: Carvedilol, and lisinopril  - Diuresis with furosemide 60 mg IV twice daily started upon admission    Chronic respiratory failure with hypoxia and hypercapnia  - Sleep apnea  - On oxygen 3 to 4 L at baseline  - Apparently on BiPAP at bedtime at baseline    Paroxysmal atrial fibrillation status post ablation in May 2018  - Continue digoxin and carvedilol    Secondary hypercoagulable state in the setting of atrial fibrillation  - Continue Eliquis    Diabetes mellitus type 2  - Last A1c was 7.1% in 2025  - Metformin held  - Insulin 20 units at bedtime, it appears that this is lower than his outpatient dose)  - Insulin correction scale    Essential hypertension  - On carvedilol and lisinopril    Mixed hyperlipidemia  - On atorvastatin      Diet Diet NPO Exceptions are: Ice Chips, Sips of Water with Meds   DVT Prophylaxis [] Lovenox, []  Heparin, [] SCDs, [] Ambulation,  [] Eliquis, [] Xarelto  [] Coumadin   Code Status Full Code   Disposition From:   Expected Disposition:   Estimated Date of Discharge:   Patient requires continued admission due to    Surrogate Decision Maker/ POA         Subjective:      [place] ED practitionerr note:     TRIAGE NOTE:    Patient to ER via [facility] from [place] for respiratory distress. Patient SPO2 72% on EMS

## 2025-07-25 NOTE — CARE COORDINATION
Reviewed medical record and met with patient at bedside to initiate discharge planning. Introduced self and role of CM. Pt is from [facility]. [facility] staff assist him with most ADL's. Pt was previously active with Hospice but they signed off last admission d/t pt planning to have heart surgery. Pt does not have home care at this time. Readmission assessment completed at this time. Plan is uncertain. CM following.      07/25/25 1110   Service Assessment   Patient Orientation Alert and Oriented   Cognition Alert   History Provided By Patient   Primary Caregiver Self   Accompanied By/Relationship N/A   Support Systems Family Members  (AL staff)   Patient's Healthcare Decision Maker is: Named in Scanned ACP Document   PCP Verified by CM Yes   Last Visit to PCP Within last 3 months   Prior Functional Level Assistance with the following:;Bathing;Dressing;Toileting;Cooking;Housework;Mobility   Current Functional Level Assistance with the following:;Bathing;Dressing;Toileting;Cooking;Housework;Mobility   Can patient return to prior living arrangement Unknown at present   Ability to make needs known: Good   Family able to assist with home care needs: No   Would you like for me to discuss the discharge plan with any other family members/significant others, and if so, who? No   Financial Resources Medicare   Community Resources Assisted Living   CM/SW Referral Other (see comment)  (Discharge planning assessment.)   Social/Functional History   Lives With Alone   Type of Home Assisted living   Prior Level of Assist for ADLs Needs assistance   Prior Level of Assist for Homemaking Needs assistance   Ambulation Assistance Needs assistance   Active  No

## 2025-07-25 NOTE — CONSULTS
CARDIOLOGY CONSULT NOTE   Reason for consultation:  RAVEN Carter aortic stenosis    Referring physician:  Skip Moss MD     Primary care physician: Rosalio Hedrick MD      Dear   Thanks for the consult.    History of present illness:Armando is a 69 y.o.year old who is transferred from [facility] for management of severe aortic stenosis, A-fib and shortness of breath with congestive heart failure, patient is a poor historian all information obtained after review medical record and discussion with the staff    Blood pressure, cholesterol, blood glucose and weight are well controlled.    Past medical history:    has a past medical history of A-fib ([facility]), Anxiety, Arthritis, COPD (chronic obstructive pulmonary disease) ([facility]), Depression, Diabetes mellitus ([facility]), Full dentures, H/O angiography, H/O Doppler ultrasound, H/O echocardiogram, Hx of colonoscopy, Hx of Doppler ultrasound, Hyperlipidemia, Hypertension, Macular degeneration, Obesity, On home oxygen therapy, PAD (peripheral artery disease), Panic attacks, Pneumonia, PTSD (post-traumatic stress disorder), Restless leg, Shortness of breath, Sleep apnea, and Wears glasses.  Past surgical history:   has a past surgical history that includes Atrial ablation surgery (05/23/2018); pr laparoscopy surg cholecystectomy (N/A, 11/12/2018); Colonoscopy (07/2011); eye surgery (Left, 2017); Dental surgery; Cardiac surgery (05/2018); Cholecystectomy, laparoscopic (11/12/2018); Appendectomy (2003); hernia repair (2003); femoral bypass (Left, 01/2011); Tonsillectomy (1960's); fracture surgery (Left, 1980's); fracture surgery (Right, 2000's); femoral bypass (Left, 1/9/2019); Cardiac procedure (N/A, 7/17/2025); and Cardiac procedure (N/A, 7/17/2025).  Social History:   reports that he quit smoking about 3 years ago. His smoking use included cigars and cigarettes. He started smoking about 52 years ago. He has a 12.2 pack-year smoking history. He quit smokeless tobacco use

## 2025-07-25 NOTE — PROGRESS NOTES
injury, non-ischemic (non-traumatic) related to CHF  -- Other - I will add my own diagnosis  -- Disagree - Not applicable / Not valid  -- Disagree - Clinically unable to determine / Unknown  -- Refer to Clinical Documentation Reviewer    PROVIDER RESPONSE TEXT:    This patient has myocardial injury, non-ischemic (non-traumatic) related to   CHF.    Query created by: Laila Choudhary on 7/22/2025 8:11 AM      Electronically signed by:  ALLISON GALO 7/25/2025 11:09 AM

## 2025-07-25 NOTE — PROGRESS NOTES
Spiritual Health Attempted Visit Note  [facility]      Room # 2014/2014-A    Name: Armando Mars           Age: 69 y.o.    Gender: male          MRN: [id number]  Judaism: Zoroastrian       Preferred Language: English      Date: 07/25/25  Visit Time: Begin Time: 1042 End Time : 1050 Complexity of Encounter: Low      Visit Summary:  attempted to meet with patient in response to ISO Loneliness/spiritual distress consult. Pt was very tired, expressed \"I'm worn down\" and said he would like to sleep. I left information with pt on how to reach chaplains and told him I would be happy to visit another time at his request.  Patient expressed gratitude, held out his hand to shake mine, then went back to sleep.  available for follow-up as needed.      Referral/Consult From: Rounding  Encounter Overview/Reason: Loneliness/Social Isolation  Encounter Code:     Crisis (if applicable):    Service Provided For: Patient     Patient was available.          Electronically signed by  alejandra Mendiola  [facility]  Spiritual Health [phone]  Viviana@"[employer]

## 2025-07-25 NOTE — H&P
History and Physical      Name:  Armando Mars /Age/Sex: 1955  (69 y.o. male)   MRN & CSN:  [id number] & [id number] Encounter Date/Time: 2025 10:37 PM   Location:  -A PCP: Rosalio Hedrick MD       Hospital Day: 1    Assessment and Plan:     Patient is a 69 y.o. male who presented with SOB.     # Acute decompensated systolic heart failure  # VHD including severe aortic stenosis  - Reported worsening SOB, orthopnea and LE edema over past few days. Last TTE in 2025 with LVEF of 35-40% with reduced RV systolic function, severe AS and moderate-to-severe MR. Transferred from [facility] for urgent cardiology input on AS. Recently discharged on  for similar presentation.  - Continue GDMT with Coreg and lisinopril, continue IV diuresis, cardiology consulted, strict I/O's, daily weights, telemetry.       # Chronic hypoxemic hypercapnic respiratory failure  # LINA  - On 3-4L NC at baseline with BPAP qhs. No PFT on file.  - Stable, continue home inhalers.      # Paroxysmal atrial fibrillation s/p ablation in 2018  # Chronic anticoagulant use   - Continue Eliquis, digoxin and Coreg.  - Keep K 4.0-4.5 and Mg 2.0-2.2.     # Essential hypertension  - Continue lisinopril and Coreg.    # Mixed hyperlipidemia  - Continue Lipitor.    # T2DM with hyperglycemia  - Last A1c 7.1% in 2025.   - Held metformin.  - Decrease to insulin Lantus 20 u qhs with LCSI.     # Class II obesity  - BMI 35.1.     Checklist:  Advanced care planning: full  Diet: NPO pending cardiology evaluation   Sugar: BG goal of 140-180 while inpatient  VTE ppx: Eliquis      Disposition: admit to inpatient.  Estimated discharge: 3-4 day(s).  Current living situation: nursing home.  Expected disposition: nursing home.    Spoke with ED provider who recommended admission for the patient and I agree with that plan.  Personally reviewed lab studies and imaging.  Imaging that was interpreted personally and results as stated

## 2025-07-25 NOTE — PROGRESS NOTES
07/24/25 3283   NIV Type   $NIV $Daily Charge   NIV Started/Stopped On   Equipment Type V-60   Mode Bilevel   Mask Type Full face mask   Mask Size Large   Bonnet size Large   Assessment   Respirations 18   SpO2 95 %   Level of Consciousness 0   Comfort Level Good   Using Accessory Muscles No   Mask Compliance Good   Settings/Measurements   PIP Observed 15 cm H20   IPAP 15 cmH20   CPAP/EPAP 5 cmH2O   Vt (Measured) 673 mL   FiO2  40 %   I Time/ I Time % 0.75 s   Minute Volume (L/min) 11.8 Liters   Mask Leak (lpm) 27 lpm   Patient's Home Machine No   Alarm Settings   Alarms On Y   Patient Observation   Patient Observations Pt was resting in bed.  SaO2 98% on 40%

## 2025-07-26 ENCOUNTER — APPOINTMENT (OUTPATIENT)
Dept: GENERAL RADIOLOGY | Age: 70
DRG: 306 | End: 2025-07-26
Attending: INTERNAL MEDICINE
Payer: MEDICARE

## 2025-07-26 ENCOUNTER — APPOINTMENT (OUTPATIENT)
Dept: CT IMAGING | Age: 70
DRG: 306 | End: 2025-07-26
Attending: INTERNAL MEDICINE
Payer: MEDICARE

## 2025-07-26 ASSESSMENT — PAIN SCALES - GENERAL: PAINLEVEL_OUTOF10: 0

## 2025-07-26 NOTE — PLAN OF CARE
Problem: Chronic Conditions and Co-morbidities  Goal: Patient's chronic conditions and co-morbidity symptoms are monitored and maintained or improved  7/26/2025 1022 by Vivien Cerna RN  Outcome: Progressing  7/26/2025 0416 by Saint Juste, Chedenine, RN  Outcome: Progressing     Problem: Discharge Planning  Goal: Discharge to home or other facility with appropriate resources  7/26/2025 1022 by Vivien Cenra RN  Outcome: Progressing  7/26/2025 0416 by [place] Juste, Chedenine, RN  Outcome: Progressing     Problem: Safety - Adult  Goal: Free from fall injury  7/26/2025 1022 by Vivien Cerna RN  Outcome: Progressing  7/26/2025 0416 by Saint Juste, Chedenine, RN  Outcome: Progressing     Problem: Skin/Tissue Integrity  Goal: Skin integrity remains intact  Description: 1.  Monitor for areas of redness and/or skin breakdown  2.  Assess vascular access sites hourly  3.  Every 4-6 hours minimum:  Change oxygen saturation probe site  4.  Every 4-6 hours:  If on nasal continuous positive airway pressure, respiratory therapy assess nares and determine need for appliance change or resting period  7/26/2025 1022 by Vivien Cerna RN  Outcome: Progressing  7/26/2025 0416 by Saint Juste, Chedenine, RN  Outcome: Progressing

## 2025-07-26 NOTE — PROGRESS NOTES
Cardiology Note    Admit Date:  7/24/2025    Admission diagnosis / Complaint :   Severe aortic stenosis  Shortness of breath  Congestive heart failure    Subjective:  Mr. Mars is resting in bed  Denies cardiac comlaints      Assessment and Plan:    Severe Aortic Stenosis  CHF- EF 35-40%  - on lasix IV . Negative 5.3L fluid balance  -monitor electrolytes  -work up for TAVR  -continue coreg, lisinopril, proamitine      Atrial fibrillation  -heart rate is controlled  -on digoxin  Hypercoagulable due to atrial fibrillation  - continue eliquis    Diabetes type 2  -on med. Per primary team    HLD  -continue on statin      Objective:   /67   Pulse 74   Temp 97 °F (36.1 °C) (Oral)   Resp 16   Ht 1.981 m (6' 6\")   Wt (!) 137.9 kg (304 lb 1.6 oz)   SpO2 98%   BMI 35.14 kg/m²     Intake/Output Summary (Last 24 hours) at 7/26/2025 1058  Last data filed at 7/26/2025 0957  Gross per 24 hour   Intake 720 ml   Output 5100 ml   Net -4380 ml       TELEMETRY: Atrial fibrillation

## 2025-07-26 NOTE — PROGRESS NOTES
It was reported to this nurse that patient has wore the bipap most of the night, but removed it every 10-15 minutes and desaturated to the 70's, night nurse tried to put him on the high flow nasal cannula and was unable to keep oxygen above 90%.  Dr. Mae notified and see if he wants blood gases, order received.

## 2025-07-26 NOTE — PLAN OF CARE
Problem: Chronic Conditions and Co-morbidities  Goal: Patient's chronic conditions and co-morbidity symptoms are monitored and maintained or improved  7/26/2025 0416 by Saint Juste, Chedenine, RN  Outcome: Progressing  7/25/2025 1444 by Pedro Starks RN  Outcome: Progressing     Problem: Discharge Planning  Goal: Discharge to home or other facility with appropriate resources  7/26/2025 0416 by Saint Juste, Chedenine, RN  Outcome: Progressing  7/25/2025 1444 by Pedro Starks RN  Outcome: Progressing     Problem: Safety - Adult  Goal: Free from fall injury  7/26/2025 0416 by Saint Juste, Chedenine, RN  Outcome: Progressing  7/25/2025 1444 by Pedro Starks RN  Outcome: Progressing     Problem: Skin/Tissue Integrity  Goal: Skin integrity remains intact  Description: 1.  Monitor for areas of redness and/or skin breakdown  2.  Assess vascular access sites hourly  3.  Every 4-6 hours minimum:  Change oxygen saturation probe site  4.  Every 4-6 hours:  If on nasal continuous positive airway pressure, respiratory therapy assess nares and determine need for appliance change or resting period  7/26/2025 0416 by Saint Juste, Chedenine, RN  Outcome: Progressing  7/25/2025 1444 by Pedro Starks RN  Outcome: Progressing

## 2025-07-26 NOTE — PROGRESS NOTES
V2.0    [facility] Progress Note      Name:  Armando Mars /Age/Sex: 1955  (69 y.o. male)   MRN & CSN:  [id number] & [id number] Encounter Date/Time: 2025 9:10 AM EDT   Location:  -A PCP: Rosalio Hedrick MD     Attending:Edi Mae MD       Hospital Day: 3    Assessment and Recommendations   Armando Mars is a 69 y.o. male       - Chest vest ordered by pulmonary, consult appreciated  - On BiPAP 15/5, FiO2 45% when I saw him  - Continue BiPAP  - Updated his brother over the phone    Acute decompensated heart failure in the setting of severe aortic stenosis  - TTE in 2025 showed an EF of 35 to 40% and severe AAS and other findings  - Transferred to [facility] from [facility]   - Recently discharged 6 days prior to admission for a similar presentation  - Continue GDMT medications: Carvedilol, and lisinopril  - Diuresis with furosemide 60 mg IV twice daily started upon admission    Chronic respiratory failure with hypoxia and hypercapnia  - Sleep apnea  - On oxygen 3 to 4 L at baseline  - Apparently on BiPAP at bedtime at baseline    Paroxysmal atrial fibrillation status post ablation in May 2018  - Continue digoxin and carvedilol    Secondary hypercoagulable state in the setting of atrial fibrillation  - Continue Eliquis    Diabetes mellitus type 2  - Last A1c was 7.1% in 2025  - Metformin held  - Insulin 20 units at bedtime, it appears that this is lower than his outpatient dose)  - Insulin correction scale    Essential hypertension  - On carvedilol and lisinopril    Mixed hyperlipidemia  - On atorvastatin      Diet ADULT DIET; Regular; 4 carb choices (60 gm/meal); No Added Salt (3-4 gm); 1800 ml   DVT Prophylaxis [] Lovenox, []  Heparin, [] SCDs, [] Ambulation,  [] Eliquis, [] Xarelto  [] Coumadin   Code Status Full Code   Disposition From:   Expected Disposition:   Estimated Date of Discharge:   Patient requires continued admission due to    Surrogate Decision Maker/

## 2025-07-26 NOTE — CONSULTS
[facility] [address] [address]                              CONSULTATION      PATIENT NAME: KAROLINE HAGEN             : 1955  MED REC NO: [id number]                      ROOM:   ACCOUNT NO: [id number]                       ADMIT DATE: 2025  PROVIDER: Julio Cesar Wan MD      CONSULT DATE: 2025    REFERRING PHYSICIAN:  LAURENCE MODI    HISTORY OF PRESENT ILLNESS:  The patient is a 69-year-old gentleman with multiple medical problems including COPD; chronic respiratory failure, on home oxygen and nocturnal BiPAP; hypertension; hyperlipidemia; obstructive sleep apnea, who was admitted through the emergency room with complaints of increasing shortness of breath, orthopnea, and swelling of the lower extremities.  He is also complaining of cough productive of thick whitish to yellow phlegm.  He denied any hemoptysis.  He denied any fever or chills.  He denied any nausea or vomiting.  He denied any chest pains.  He had a chest x-ray, which showed increasing left pleural effusion versus atelectasis.  He had venous blood gases, which showed a pH of 7.33, pCO2 of 97, pO2 of 12 with 9.3% saturation.  He was subsequently admitted to the hospital.  The patient was recently discharged from the hospital about 7 to 10 days ago.    PAST MEDICAL HISTORY:  Significant for COPD; chronic respiratory failure, on home oxygen and BiPAP; obstructive sleep apnea; hypertension; hyperlipidemia; depression; diabetes; arthritis.    PAST SURGICAL HISTORY:  Noncontributory.    FAMILY HISTORY:  Reveals that his sister has arthritis.    SOCIAL HISTORY:  Reveals that he quit smoking, but has history of heavy smoking in the past.  No history of alcohol or drug abuse.    MEDICATIONS:  Reviewed.    ALLERGIES:  HE IS ALLERGIC TO METOPROLOL.      REVIEW OF SYSTEMS:  10- to 14-point review of systems was reviewed and is negative except for what is

## 2025-07-26 NOTE — PROGRESS NOTES
CARDIOLOGY  NOTE        Name:  Armando Mars /Age/Sex: 1955  (69 y.o. male)   MRN & CSN:  [id number] & [id number] Admission Date/Time: 2025 10:24 PM   Location:  -A PCP: Rosalio Hedrick MD       Hospital Day: 3        PLAN FROM CARDIOLOGY FOR TODAY:   Continue treatment for congestive heart failure possible TAVR next week      - cardiology consult is for: Severe aortic stenosis and congestive heart failure    -  Interval history: Mild shortness of breath    ASSESSMENT/ PLAN:      Severe aortic stenosis cardiac cath done recently awaiting TAVR will discuss with CTS for further management    HFrEF continue with the diuresis patient is on Coreg lisinopril    Hypotension on Primatene    Atrial fibrillation on digoxin on Eliquis which will be needed to put on hold once TAVR is decided  Atrial fibrillation done in 2018    Type 2 diabetes on insulin coverage    Hyperlipidemia on Eliquis 5 twice daily    Severe PVD had left femoropopliteal bypass surgery hence we will have to discuss if you are accessing the left groin while doing TAVR    Anemia hemoglobin of 10.8 which is more reduced than          Subjective:      Todays complain: Patient mild shortness of breath    HPI:  Armando is a 69 y.o.year old who and presents with had no chief complaint listed for this encounter.  No chief complaint on file.          Objective: Temperature:  Current - Temp: 97.3 °F (36.3 °C); Max - Temp  Av.2 °F (36.2 °C)  Min: 96.8 °F (36 °C)  Max: 97.7 °F (36.5 °C)    Respiratory Rate : Resp  Av.3  Min: 15  Max: 20    Pulse Range: Pulse  Av.1  Min: 66  Max: 86    Blood Presuure Range:  Systolic (24hrs), Av , Min:93 , Max:107   ; Diastolic (24hrs), Av, Min:56, Max:75      Pulse ox Range: SpO2  Av.7 %  Min: 80 %  Max: 98 %    24hr I & O:    Intake/Output Summary (Last 24 hours) at 2025 1538  Last data filed at 2025

## 2025-07-27 ASSESSMENT — PAIN DESCRIPTION - LOCATION: LOCATION: FOOT

## 2025-07-27 ASSESSMENT — PAIN SCALES - GENERAL
PAINLEVEL_OUTOF10: 0
PAINLEVEL_OUTOF10: 8

## 2025-07-27 ASSESSMENT — PAIN DESCRIPTION - ORIENTATION: ORIENTATION: RIGHT;LEFT

## 2025-07-27 ASSESSMENT — PAIN DESCRIPTION - DESCRIPTORS: DESCRIPTORS: ACHING

## 2025-07-27 NOTE — PLAN OF CARE
Problem: Chronic Conditions and Co-morbidities  Goal: Patient's chronic conditions and co-morbidity symptoms are monitored and maintained or improved  7/27/2025 1020 by Archie Kate RN  Outcome: Progressing  7/27/2025 0317 by Maite Knapp RN  Outcome: Progressing     Problem: Discharge Planning  Goal: Discharge to home or other facility with appropriate resources  7/27/2025 1020 by Archie Kate RN  Outcome: Progressing  7/27/2025 0317 by Maite Knapp RN  Outcome: Progressing     Problem: Safety - Adult  Goal: Free from fall injury  7/27/2025 1020 by Archie Kate RN  Outcome: Progressing  7/27/2025 0317 by Maite Knapp RN  Outcome: Progressing

## 2025-07-27 NOTE — PROGRESS NOTES
CARDIOLOGY  NOTE        Name:  Armando Mars /Age/Sex: 1955  (69 y.o. male)   MRN & CSN:  [id number] & [id number] Admission Date/Time: 2025 10:24 PM   Location:  -A PCP: Rosalio Hedrick MD       Hospital Day: 4        PLAN FROM CARDIOLOGY FOR TODAY:   Continue treatment for congestive heart failure possible TAVR next week      - cardiology consult is for: Severe aortic stenosis and congestive heart failure    -  Interval history: Mild shortness of breath    ASSESSMENT/ PLAN:      Severe aortic stenosis cardiac cath done recently awaiting TAVR will discuss with CTS for further management    HFrEF continue with the diuresis patient is on Coreg lisinopril    Hypotension on Primatene    Atrial fibrillation on digoxin on Eliquis which will be needed to put on hold once TAVR is decided  Atrial fibrillation done in 2018    Type 2 diabetes on insulin coverage    Hyperlipidemia on Eliquis 5 twice daily    Severe PVD had left femoropopliteal bypass surgery hence we will have to discuss if you are accessing the left groin while doing TAVR    Anemia hemoglobin of 10.8 which is more reduced than          Subjective:      Todays complain: Patient mild shortness of breath    HPI:  Armando is a 69 y.o.year old who and presents with had no chief complaint listed for this encounter.  No chief complaint on file.          Objective: Temperature:  Current - Temp: 97.1 °F (36.2 °C); Max - Temp  Av.4 °F (36.3 °C)  Min: 97.1 °F (36.2 °C)  Max: 97.8 °F (36.6 °C)    Respiratory Rate : Resp  Av.1  Min: 15  Max: 21    Pulse Range: Pulse  Av.7  Min: 72  Max: 97    Blood Presuure Range:  Systolic (24hrs), Av , Min:102 , Max:121   ; Diastolic (24hrs), Av, Min:63, Max:78      Pulse ox Range: SpO2  Av.4 %  Min: 88 %  Max: 98 %    24hr I & O:    Intake/Output Summary (Last 24 hours) at 2025 1313  Last data filed at 2025

## 2025-07-27 NOTE — PROGRESS NOTES
Cardiology Note    Admit Date:  7/24/2025    Admission diagnosis / Complaint :   Severe aortic stenosis  Shortness of breath  Congestive heart failure    Subjective:  Mr. Mars is resting in bed  Denies cardiac comlaints      Assessment and Plan:    Severe Aortic Stenosis  CHF- EF 35-40%  - on lasix IV . Negative 5.3L fluid balance  -monitor electrolytes  -work up for TAVR  -continue coreg, lisinopril, proamitine      Atrial fibrillation  -heart rate is controlled  -on digoxin  Hypercoagulable due to atrial fibrillation  - continue eliquis    Diabetes type 2  -on med. Per primary team    HLD  -continue on statin      Objective:   /69   Pulse 91   Temp 97.1 °F (36.2 °C) (Bladder)   Resp 18   Ht 1.981 m (6' 6\")   Wt (!) 137.9 kg (304 lb 1.6 oz)   SpO2 97%   BMI 35.14 kg/m²     Intake/Output Summary (Last 24 hours) at 7/27/2025 1105  Last data filed at 7/27/2025 0900  Gross per 24 hour   Intake 920 ml   Output 4600 ml   Net -3680 ml       TELEMETRY: Atrial fibrillation
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
## 2025-07-27 NOTE — PROGRESS NOTES
Pulmonary and Critical Care  Progress Note    Subjective:   The patient has improved  Shortness of breath has improved  Chest pain none.  Addressing respiratory complaints Patient is negative for  hemoptysis and cyanosis  CONSTITUTIONAL:  negative for fevers and chills      Past Medical History:     has a past medical history of A-fib ([facility]), Anxiety, Arthritis, COPD (chronic obstructive pulmonary disease) ([facility]), Depression, Diabetes mellitus ([facility]), Full dentures, H/O angiography, H/O Doppler ultrasound, H/O echocardiogram, Hx of colonoscopy, Hx of Doppler ultrasound, Hyperlipidemia, Hypertension, Macular degeneration, Obesity, On home oxygen therapy, PAD (peripheral artery disease), Panic attacks, Pneumonia, PTSD (post-traumatic stress disorder), Restless leg, Shortness of breath, Sleep apnea, and Wears glasses.   has a past surgical history that includes Atrial ablation surgery (05/23/2018); pr laparoscopy surg cholecystectomy (N/A, 11/12/2018); Colonoscopy (07/2011); eye surgery (Left, 2017); Dental surgery; Cardiac surgery (05/2018); Cholecystectomy, laparoscopic (11/12/2018); Appendectomy (2003); hernia repair (2003); femoral bypass (Left, 01/2011); Tonsillectomy (1960's); fracture surgery (Left, 1980's); fracture surgery (Right, 2000's); femoral bypass (Left, 1/9/2019); Cardiac procedure (N/A, 7/17/2025); and Cardiac procedure (N/A, 7/17/2025).   reports that he quit smoking about 3 years ago. His smoking use included cigars and cigarettes. He started smoking about 52 years ago. He has a 12.2 pack-year smoking history. He quit smokeless tobacco use about 50 years ago.  His smokeless tobacco use included chew. He reports that he does not drink alcohol and does not use drugs.  Family history:  family history includes Allergy (Severe) in his brother; Arthritis in his sister; Diabetes in his sister; Early Death in his father and mother; Early Death (age of onset: 21) in his brother; Mental Illness in his sister;  Obesity in his brother.    Allergies   Allergen Reactions    Metoprolol      \"Chest Pain And Burning In The Chest\"     Social History:    Reviewed; no changes    Objective:   PHYSICAL EXAM:        VITALS:  /78   Pulse 86   Temp 97.1 °F (36.2 °C) (Bladder)   Resp 19   Ht 1.981 m (6' 6\")   Wt (!) 137.9 kg (304 lb 1.6 oz)   SpO2 98%   BMI 35.14 kg/m²     24HR INTAKE/OUTPUT:    Intake/Output Summary (Last 24 hours) at 7/27/2025 1220  Last data filed at 7/27/2025 1100  Gross per 24 hour   Intake 1160 ml   Output 4600 ml   Net -3440 ml       CONSTITUTIONAL:  awake, alert, cooperative, no apparent distress, and appears stated age  LUNGS:  decreased breath sounds Lside.  CARDIOVASCULAR:  normal S1 and S2 and negative JVD  ABD:Abdomen soft, non-tender. BS normal. No masses,  No organomegaly  NEURO:Alert.  DATA:    CBC:  Recent Labs     07/25/25  0532 07/26/25  0552 07/27/25  0808   WBC 11.9* 8.7 8.6   RBC 4.02* 3.50* 3.93*   HGB 12.0* 10.8* 11.7*   HCT 39.7* 34.2* 38.1*    186 194   MCV 98.8 97.7 96.9   MCH 29.9 30.9 29.8   MCHC 30.2* 31.6* 30.7*   RDW 16.0* 16.0* 15.9*      BMP:  Recent Labs     07/25/25  0532 07/26/25  0552 07/27/25  0808    139 139   K 5.2* 3.9 3.5   CL 92* 91* 88*   CO2 34* 40* 42*   BUN 25* 25* 17   CREATININE 0.8 0.6* 0.6*   CALCIUM 9.0 8.5 8.8   GLUCOSE 147* 109* 163*      ABG:  No results for input(s): \"PH\", \"PO2ART\", \"UJB4BGF\", \"HCO3\", \"BEART\", \"O2SAT\" in the last 72 hours.  Lab Results   Component Value Date    PROBNP 14,495 (H) 07/25/2025    PROBNP 4,319 (H) 07/14/2025    PROBNP 4,083 (H) 07/04/2025     No results found for: \"CULTRESP\"    Radiology Review:  Pertinent images / reports were reviewed as a part of this visit.    Assessment:     Patient Active Problem List   Diagnosis    Hypercholesteremia    Tobacco use    Anxiety    Controlled type 2 diabetes mellitus without complication, without long-term current use of insulin (HCC)    Benign essential HTN    Simple  chronic bronchitis (HCC)    Macular degeneration, dry    PVD (peripheral vascular disease)    VT (ventricular tachycardia) (HCC)    Left wrist sprain    Hypoxia    Morbid obesity (HCC)    History of atrial fibrillation    Swelling of lower extremity    S/P ablation of atrial fibrillation    LFT elevation    Cholecystitis    COPD, severe (HCC)    Type 2 diabetes mellitus with hyperglycemia    COVID-19    COPD exacerbation (HCC)    Non-compliance    Acute on chronic respiratory failure with hypoxia and hypercapnia (HCC)    Acute on chronic diastolic (congestive) heart failure (HCC)    PAF (paroxysmal atrial fibrillation) (HCC)    Acute respiratory failure with hypoxia and hypercapnia (HCC)    Acute on chronic heart failure, unspecified heart failure type (HCC)    Acute on chronic respiratory failure (HCC)    CAP (community acquired pneumonia)    Multifocal pneumonia    Acute hypoxic respiratory failure (HCC)    Acute hypoxemic respiratory failure (HCC)    Severe aortic stenosis    Acute decompensated heart failure (HCC)    Decompensated heart failure (HCC)    Acute systolic congestive heart failure (HCC)       Plan:   1. Overall the patient has improved.  2. L thoracentesis.  3. Dr Keen to follow from tomorrow.    Julio Cesar Wan MD MD  7/27/2025  12:20 PM

## 2025-07-28 ENCOUNTER — APPOINTMENT (OUTPATIENT)
Dept: INTERVENTIONAL RADIOLOGY/VASCULAR | Age: 70
DRG: 306 | End: 2025-07-28
Attending: INTERNAL MEDICINE
Payer: MEDICARE

## 2025-07-28 ASSESSMENT — PAIN DESCRIPTION - LOCATION
LOCATION: FOOT

## 2025-07-28 ASSESSMENT — PAIN SCALES - GENERAL
PAINLEVEL_OUTOF10: 8
PAINLEVEL_OUTOF10: 9
PAINLEVEL_OUTOF10: 5
PAINLEVEL_OUTOF10: 8
PAINLEVEL_OUTOF10: 5
PAINLEVEL_OUTOF10: 8
PAINLEVEL_OUTOF10: 6
PAINLEVEL_OUTOF10: 8
PAINLEVEL_OUTOF10: 8
PAINLEVEL_OUTOF10: 6
PAINLEVEL_OUTOF10: 8
PAINLEVEL_OUTOF10: 8
PAINLEVEL_OUTOF10: 0
PAINLEVEL_OUTOF10: 6
PAINLEVEL_OUTOF10: 8
PAINLEVEL_OUTOF10: 9
PAINLEVEL_OUTOF10: 8
PAINLEVEL_OUTOF10: 8
PAINLEVEL_OUTOF10: 6
PAINLEVEL_OUTOF10: 9
PAINLEVEL_OUTOF10: 8
PAINLEVEL_OUTOF10: 6

## 2025-07-28 ASSESSMENT — PAIN DESCRIPTION - ORIENTATION
ORIENTATION: RIGHT

## 2025-07-28 ASSESSMENT — PAIN - FUNCTIONAL ASSESSMENT
PAIN_FUNCTIONAL_ASSESSMENT: ACTIVITIES ARE NOT PREVENTED
PAIN_FUNCTIONAL_ASSESSMENT: PREVENTS OR INTERFERES SOME ACTIVE ACTIVITIES AND ADLS
PAIN_FUNCTIONAL_ASSESSMENT: ACTIVITIES ARE NOT PREVENTED

## 2025-07-28 ASSESSMENT — PAIN DESCRIPTION - DESCRIPTORS
DESCRIPTORS: ACHING

## 2025-07-28 ASSESSMENT — PAIN DESCRIPTION - PAIN TYPE: TYPE: ACUTE PAIN

## 2025-07-28 NOTE — PROGRESS NOTES
07/28/25 0744   NIV Type   NIV Started/Stopped On   Equipment Type v60   Mode Bilevel   Mask Type Full face mask   Mask Size Medium   Assessment   Pulse 78   Heart Rate Source Monitor   Respirations 15   SpO2 99 %   Level of Consciousness 0   Comfort Level Good   Using Accessory Muscles No   Mask Compliance Good   Breath Sounds   Respiratory Pattern Regular   Settings/Measurements   IPAP 15 cmH20   CPAP/EPAP 5 cmH2O   Vt (Measured) 581 mL   Rate Ordered 16   Insp Rise Time (%) 3 %   FiO2  45 %   I Time/ I Time % 0.75 s   Minute Volume (L/min) 13 Liters   Mask Leak (lpm) 1 lpm   Patient's Home Machine No   Alarm Settings   Alarms On Y   Low Pressure (cmH2O) 10 cmH2O   High Pressure (cmH2O) 20 cmH2O   Delay Alarm 20 sec(s)   Apnea (secs) 20 secs   RR Low (bpm) 14   RR High (bpm) 50 br/min

## 2025-07-28 NOTE — PROGRESS NOTES
Regarding Ir consult for thoracentesis, pt had eliquis this am, spoke with Dr. Wan he will arrange to hold eliquis for 24 hours and plan on performing 7/29

## 2025-07-28 NOTE — PROGRESS NOTES
pulmonary      SUBJECTIVE:  he feels better     OBJECTIVE    VITALS:  /72   Pulse 77   Temp 97.2 °F (36.2 °C) (Bladder)   Resp 20   Ht 1.981 m (6' 6\")   Wt (!) 137.9 kg (304 lb 1.6 oz)   SpO2 97%   BMI 35.14 kg/m²   HEAD AND FACE EXAM:  No throat injection, no active exudate,no thrush  NECK EXAM;No JVD, no masses, symmetrical  CHEST EXAM; Expansion equal and symmetrical, no masses  LUNG EXAM; Good breath sounds bilaterally. There are expiratory wheezes both lungs, there are crackles at both lung bases  CARDIOVASCULAR EXAM: Positive S1 and S2, no S3 or S4, no clicks ,no murmurs  RIGHT AND LEFT LOWER EXTRIMITY EXAM: No edema, no swelling, no inflamation  CNS EXAM: Alert and oriented X3          LABS   Lab Results   Component Value Date    WBC 8.8 07/28/2025    HGB 11.5 (L) 07/28/2025    HCT 37.5 (L) 07/28/2025    MCV 97.7 07/28/2025     07/28/2025     Lab Results   Component Value Date    CREATININE 0.5 (L) 07/28/2025    BUN 14 07/28/2025     07/28/2025    K 3.9 07/28/2025    CL 94 (L) 07/28/2025    CO2 39 (H) 07/28/2025     Lab Results   Component Value Date    INR 1.3 07/25/2025    PROTIME 16.5 (H) 07/25/2025          Lab Results   Component Value Date/Time    PHOS 3.3 07/28/2025 08:39 AM    PHOS 3.4 07/27/2025 08:08 AM    PHOS 2.6 07/26/2025 05:52 AM      No results for input(s): \"PH\", \"PO2ART\", \"TUY4IMN\", \"HCO3\", \"BEART\", \"O2SAT\" in the last 72 hours.      Wt Readings from Last 3 Encounters:   07/24/25 (!) 137.9 kg (304 lb 1.6 oz)   07/15/25 (!) 165.9 kg (365 lb 12.8 oz)   07/07/25 (!) 145.2 kg (320 lb)               ASSESMENT    Ac on ch resp failure  Ac copd  Rul nodu;e 1.2 cm  Poss lll pneumonia  Simba pl effusion      PLAN  Antibx  Bd rx  O2 adm  Bipap at night  Pet scan as outpt    7/28/2025  Neptali Keen MD, M.D.
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
## 2025-07-28 NOTE — PROGRESS NOTES
SLP Therapy  Facility/Department: [facility] ICU STEPDOWN   CLINICAL BEDSIDE SWALLOW EVALUATION    NAME: Armando Mars  : 1955  MRN: [id number]    ADMISSION DATE: 2025  ADMITTING DIAGNOSIS: has Hypercholesteremia; Tobacco use; Anxiety; Controlled type 2 diabetes mellitus without complication, without long-term current use of insulin (HCC); Benign essential HTN; Simple chronic bronchitis ([facility]); Macular degeneration, dry; PVD (peripheral vascular disease); VT (ventricular tachycardia) ([facility]); Left wrist sprain; Hypoxia; Morbid obesity ([facility]); History of atrial fibrillation; Swelling of lower extremity; S/P ablation of atrial fibrillation; LFT elevation; Cholecystitis; COPD, severe ([facility]); Type 2 diabetes mellitus with hyperglycemia; COVID-19; COPD exacerbation ([facility]); Non-compliance; Acute on chronic respiratory failure with hypoxia and hypercapnia ([facility]); Acute on chronic diastolic (congestive) heart failure ([facility]); PAF (paroxysmal atrial fibrillation) ([facility]); Acute respiratory failure with hypoxia and hypercapnia ([facility]); Acute on chronic heart failure, unspecified heart failure type ([facility]); Acute on chronic respiratory failure ([facility]); CAP (community acquired pneumonia); Multifocal pneumonia; Acute hypoxic respiratory failure ([facility]); Acute hypoxemic respiratory failure ([facility]); Severe aortic stenosis; Acute decompensated heart failure (HCC); Decompensated heart failure (HCC); and Acute systolic congestive heart failure ([facility]) on their problem list.      IMPRESSIONS: Armando Mars was seen for a bedside swallow evaluation following admission to [facility] for worsening shortness of breath. SLP team consulted following coughing episode during dinner last night. Pt reports that cough was not associate with intake and denies hx of difficulties swallowing. Pertinent medical history includes COPD; chronic respiratory failure, on home oxygen and nocturnal BiPAP; hypertension; hyperlipidemia; obstructive sleep apnea.    Pt was

## 2025-07-28 NOTE — PROGRESS NOTES
V2.0    [facility] Progress Note      Name:  Armando Mars /Age/Sex: 1955  (69 y.o. male)   MRN & CSN:  [id number] & [id number] Encounter Date/Time: 2025 9:10 AM EDT   Location:  -A PCP: Rosalio Hedrick MD     Attending:Edi Mae MD       Hospital Day: 4    Assessment and Recommendations   Armando Mars is a 69 y.o. male       - Patient reports that he is hungry, asked him what he would like, he replied he would like [employer]  - In the evening he started coughing a lot with eating and he was made n.p.o. and SLP was requested  - CT surgery consult appreciated  - He was previously enrolled with hospice, but they signed off last admission as a TAVR was being planned  - I spoke with his brother over the phone yesterday who is hoping that TAVR can be done but realizes that patient may not be well enough in the future to have it done  - CT chest done  shows a spiculated nodule concerning for a primary neoplasm, with differential diagnoses including infection or inflammation.  It measures 1.2 cm and is on the right upper lobe.  - IR consult is in place for moderate bilateral pleural effusions  - When I saw him today he was on 7 L oxygen via HFNC    Acute decompensated heart failure in the setting of severe aortic stenosis  - TTE in 2025 showed an EF of 35 to 40% and severe AAS and other findings  - Transferred to [facility] from [facility]   - Recently discharged 6 days prior to admission for a similar presentation  - Continue GDMT medications: Carvedilol, and lisinopril  - Diuresis with furosemide 60 mg IV twice daily started upon admission    Chronic respiratory failure with hypoxia and hypercapnia  - Sleep apnea  - On oxygen 3 to 4 L at baseline  - Apparently on BiPAP at bedtime at baseline    Paroxysmal atrial fibrillation status post ablation in May 2018  - Continue digoxin and carvedilol    Secondary hypercoagulable state in the setting of atrial fibrillation  -

## 2025-07-28 NOTE — PROGRESS NOTES
CARDIOLOGY  NOTE        Name:  Armando Mars /Age/Sex: 1955  (69 y.o. male)   MRN & CSN:  [id number] & [id number] Admission Date/Time: 2025 10:24 PM   Location:  -A PCP: Rosalio Hedrick MD       Hospital Day: 5        PLAN FROM CARDIOLOGY FOR TODAY: Appreciate CTS input will continue with TAVR workup  Will discuss in structural heart meeting on Wednesday      - cardiology consult is for: Severe aortic stenosis and congestive heart failure    -  Interval history: Mild shortness of breath    ASSESSMENT/ PLAN:      Severe aortic stenosis cardiac cath done recently awaiting TAVR will discuss with CTS for further management    HFrEF continue with the diuresis patient is on Coreg lisinopril    Hypotension on Primatene    Atrial fibrillation on digoxin on Eliquis which will be needed to put on hold once TAVR is decided  Atrial fibrillation done in     Type 2 diabetes on insulin coverage    Hyperlipidemia on Eliquis 5 twice daily    Severe PVD had left femoropopliteal bypass surgery hence we will have to discuss if you are accessing the left groin while doing TAVR    Anemia hemoglobin of 10.8 which is more reduced than          Subjective:      Todays complain: Patient mild shortness of breath    HPI:  Armando is a 69 y.o.year old who and presents with had no chief complaint listed for this encounter.  No chief complaint on file.          Objective: Temperature:  Current - Temp: 97.2 °F (36.2 °C); Max - Temp  Av.4 °F (36.3 °C)  Min: 96.8 °F (36 °C)  Max: 97.8 °F (36.6 °C)    Respiratory Rate : Resp  Av.1  Min: 15  Max: 23    Pulse Range: Pulse  Av.5  Min: 61  Max: 96    Blood Presuure Range:  Systolic (24hrs), Av , Min:96 , Max:112   ; Diastolic (24hrs), Av, Min:70, Max:72      Pulse ox Range: SpO2  Av.8 %  Min: 94 %  Max: 100 %    24hr I & O:    Intake/Output Summary (Last 24 hours) at 2025   1,000 mg IntraVENous Q24H    apixaban  5 mg Oral BID    atorvastatin  40 mg Oral Nightly    carvedilol  3.125 mg Oral BID WC    digoxin  125 mcg Oral Daily    furosemide  60 mg IntraVENous BID    lisinopril  2.5 mg Oral Daily    midodrine  2.5 mg Oral BID    montelukast  10 mg Oral Nightly    Roflumilast  500 mcg Oral Daily    budesonide-formoterol  2 puff Inhalation BID    insulin lispro  0-4 Units SubCUTAneous 4x Daily AC & HS    sodium chloride flush  5-40 mL IntraVENous 2 times per day    insulin glargine  20 Units SubCUTAneous Nightly      dextrose      sodium chloride       glucose, dextrose bolus **OR** dextrose bolus, glucagon (rDNA), dextrose, sodium chloride flush, sodium chloride, potassium chloride, magnesium sulfate, ondansetron **OR** ondansetron, melatonin, polyethylene glycol, acetaminophen **OR** acetaminophen    Lab Data:  CBC:   Recent Labs     07/26/25  0552 07/27/25  0808 07/28/25  0839   WBC 8.7 8.6 8.8   HGB 10.8* 11.7* 11.5*   HCT 34.2* 38.1* 37.5*   MCV 97.7 96.9 97.7    194 192     BMP:   Recent Labs     07/26/25  0552 07/27/25  0808 07/28/25  0839    139 137   K 3.9 3.5 3.9   CL 91* 88* 94*   CO2 40* 42* 39*   PHOS 2.6 3.4 3.3   BUN 25* 17 14   CREATININE 0.6* 0.6* 0.5*     LIVER PROFILE:   Recent Labs     07/26/25  0552 07/27/25  0808 07/28/25  0839   AST 12* 13* 12*   ALT 8* 13 11   BILITOT 0.4 0.5 0.4   ALKPHOS 71 83 85     PT/INR:   No results for input(s): \"PROTIME\", \"INR\" in the last 72 hours.    APTT: No results for input(s): \"APTT\" in the last 72 hours.  BNP:  No results for input(s): \"BNP\" in the last 72 hours.  TROPONIN: No results for input(s): \"TROPONINI\" in the last 72 hours. No results for input(s): \"TROPONINT\" in the last 72 hours.     Labs, consult, tests reviewed                    Diallo Rodriguez MD, PA-C 7/28/2025 12:30 PM     Please note this report has been partially produced using speech recognition software and may contain errors related to that system  including errors in grammar, punctuation, and spelling, as well as words and phrases that may be inappropriate. If there are any questions or concerns please feel free to contact the dictating provider for clarification.

## 2025-07-28 NOTE — PROGRESS NOTES
[place] [address]  Phone: [phone]    Fax [phone]                  Diallo Rodriguez MD, [facility]       Sergio Lucio MD, [facility]  Khadijah De La O MD, [facility]    MD Darci Perkins MD Tariq Rizvi, MD Bilal Alam, MD Dr. Waseem Sajjad MD Melissa Kellis, APRMARCO ANTONIO Griggs, APRMARCO ANTONIO Moody, APRMARCO ANTONIO Woody, APRN  Justin Aden PAOvidioC    CARDIOLOGY  NOTE      Name:  Armando Mars /Age/Sex: 1955  (69 y.o. male)   MRN & CSN:  [id number] & [id number] Admission Date/Time: 2025 10:24 PM   Location:  -A PCP: Rosalio Hedrick MD       Hospital Day: 5    - Cardiology consult is for:  CHF      ASSESSMENT/ PLAN:  Acute on chronic heart failure with reduced ejection fraction  Chronic venous insufficiency  Severe aortic stenosis   moderate to severe mitral regurgitation  - Volume overload improving  -Strict I's and O's and daily weights.   - Recent Echocardiogram revealing EF 35-40%  - CAMERON of 0.94 cm² and AV mean gradient 33 mmHg which may be underestimated  - No obstructive CAD on recent C.  Right heart catheterization showing elevated right and left heart filling pressures with reduced cardiac index  -Considering inpatient TAVR due to recurrent admissions  -CT surgery following  -GDMT: Continue carvedilol 3.125 mg twice daily, lisinopril 2.5 mg daily   - IV Lasix 60 mg twice daily  Persistent atrial fibrillation s/p ablation   - Heart rate is controlled at this time  -Goal potassium 4.0-4.5, magnesium 2.0-2.2. Replete per protocol.  - Continue digoxin 125 mcg daily  - Continue Eliquis 5 mg twice daily  Morbid obesity: BMI 42  Peripheral arterial disease s/p right redo femoral popliteal bypass  Sleep apnea: Encourage Bipap  COPD  type 2 diabetes mellitus:        Subjective:  Armando is a 69 y.o.year old     Patient is noncompliant with lifestyle

## 2025-07-29 ASSESSMENT — PAIN SCALES - GENERAL
PAINLEVEL_OUTOF10: 7
PAINLEVEL_OUTOF10: 10
PAINLEVEL_OUTOF10: 7

## 2025-07-29 ASSESSMENT — PAIN DESCRIPTION - ORIENTATION
ORIENTATION: RIGHT
ORIENTATION: RIGHT

## 2025-07-29 ASSESSMENT — PAIN DESCRIPTION - DESCRIPTORS
DESCRIPTORS: BURNING
DESCRIPTORS: ACHING

## 2025-07-29 ASSESSMENT — PAIN DESCRIPTION - LOCATION
LOCATION: FOOT
LOCATION: FOOT

## 2025-07-29 ASSESSMENT — PAIN SCALES - WONG BAKER: WONGBAKER_NUMERICALRESPONSE: NO HURT

## 2025-07-29 ASSESSMENT — PAIN - FUNCTIONAL ASSESSMENT: PAIN_FUNCTIONAL_ASSESSMENT: ACTIVITIES ARE NOT PREVENTED

## 2025-07-29 NOTE — PROGRESS NOTES
Regarding IR order for thoracentesis, spoke with Dr. Keen, eliiquis was not held, this is a 24 hour hold. He will arrange holding eliquis and plan will be to perform on 7/31

## 2025-07-29 NOTE — PROGRESS NOTES
MD PATY   montelukast (SINGULAIR) 10 MG tablet Take 1 tablet by mouth nightly    Win Noriega MD   lidocaine (LIDODERM) 5 % Place 1 patch onto the skin daily 12 hours on, 12 hours off.    Win Noriega MD   apixaban (ELIQUIS) 5 MG TABS tablet Take 1 tablet by mouth 2 times daily 6/29/25 10/27/25  Khushboo Castillo MD   lisinopril (PRINIVIL;ZESTRIL) 2.5 MG tablet Take 1 tablet by mouth daily 6/29/25   Khushboo Castillo MD   carvedilol (COREG) 3.125 MG tablet Take 1 tablet by mouth 2 times daily (with meals) 6/29/25   Khushboo Castillo MD   furosemide (LASIX) 20 MG tablet Take 3 tablets by mouth in the morning and 3 tablets in the evening. 6/29/25   Khushboo Castillo MD   OXYGEN Inhale 4 L into the lungs continuous 2/10/24   Edi Mae MD   albuterol (PROVENTIL) (2.5 MG/3ML) 0.083% nebulizer solution Take 3 mLs by nebulization in the morning, at noon, and at bedtime 2/10/24   Edi Mae MD   albuterol sulfate HFA (PROVENTIL;VENTOLIN;PROAIR) 108 (90 Base) MCG/ACT inhaler Inhale 2 puffs into the lungs every 4 hours as needed for Wheezing 2/10/24   Edi Mae MD   Menthol, Topical Analgesic, (BIOFREEZE) 4 % GEL Apply topically every 6 hours To arm    Win Noriega MD   digoxin (LANOXIN) 125 MCG tablet Take 1 tablet by mouth daily 2/11/24   Edi Mae MD   guaiFENesin (MUCINEX) 600 MG extended release tablet Take 1 tablet by mouth 2 times daily 2/10/24   Edi Mae MD   nicotine (NICODERM CQ) 21 MG/24HR Place 1 patch onto the skin daily as needed (Nicotine craving) 2/10/24   Edi Mae MD   midodrine (PROAMATINE) 2.5 MG tablet Take 1 tablet by mouth 2 times daily    Win Noriega MD   atorvastatin (LIPITOR) 40 MG tablet Take 1 tablet by mouth nightly 1/1/24   Edi Mae MD   insulin lispro, 1 Unit Dial, (HUMALOG KWIKPEN) 100 UNIT/ML SOPN Check glucose with meals and take insulin as below     0U  200-249  2U  250-299 4U  300-349 6U  >349 8U    At night time, check glucose and take insulin as below:   0  300-349 4U  >349 4U 1/1/24   Edi Mae MD   magnesium oxide (MAG-OX) 400 (240 Mg) MG tablet Take 1 tablet by mouth daily 1/1/24   Edi Mae MD   metFORMIN (GLUCOPHAGE) 1000 MG tablet Take 1 tablet by mouth 2 times daily (with meals) 1/1/24   Edi Mae MD   Roflumilast (DALIRESP) 500 MCG tablet Take 1 tablet by mouth daily 1/1/24 3/31/24  Edi Mae MD   SYMBICORT 160-4.5 MCG/ACT AERO Inhale 2 puffs into the lungs 2 times daily 1/1/24   Edi Mae MD   tiotropium (SPIRIVA HANDIHALER) 18 MCG inhalation capsule Inhale 1 capsule into the lungs daily 1/1/24   Edi Mae MD   blood glucose monitor strips Test once daily 11/12/23   Jarad Cox MD   glucose monitoring kit 1 kit by Does not apply route daily 11/12/23   Jarad Cox MD   Lancets MISC 1 each by Does not apply route 3 times daily 11/12/23   Jarad Cox MD   Respiratory Therapy Supplies (NEBULIZER/TUBING/MOUTHPIECE) KIT 1 kit by Does not apply route daily as needed (SOB, wheezing) 11/6/23   Rosalio Hedrick MD   blood glucose monitor kit and supplies Check sugar daily 8/30/23   Rosalio Hedrick MD   Nebulizers (COMPRESSOR/NEBULIZER) MISC Use qid 6/13/17   Rosalio Hedrick MD        Allergies:     Metoprolol    Social History:     Tobacco:    reports that he quit smoking about 3 years ago. His smoking use included cigars and cigarettes. He started smoking about 52 years ago. He has a 12.2 pack-year smoking history. He quit smokeless tobacco use about 50 years ago.  His smokeless tobacco use included chew.  Alcohol:      reports no history of alcohol use.  Drug Use:  reports no history of drug use.    Family History:     Family History   Problem Relation Age of Onset    Early Death Father         In His 30's, Suicide    Early Death Mother         In Her 30's, She Was  Murdered By     Early Death Brother 21        Automobile Accident    Allergy (Severe) Brother     Arthritis Sister     Mental Illness Sister     Diabetes Sister     Obesity Brother        Review of Systems:     Positive and Negative as described in HPI.    Physical Exam:     /64   Pulse 91   Temp 97.6 °F (36.4 °C) (Oral)   Resp 22   Ht 1.981 m (6' 6\")   Wt (!) 149.1 kg (328 lb 12.8 oz)   SpO2 99%   BMI 38.00 kg/m²   Temp (24hrs), Av.6 °F (36.4 °C), Min:97.4 °F (36.3 °C), Max:97.6 °F (36.4 °C)      Recent Labs     25  1652 253 25  0742 25  1138   POCGLU 164* 181* 246* 183*       Intake/Output Summary (Last 24 hours) at 2025 1504  Last data filed at 2025 1254  Gross per 24 hour   Intake 1280 ml   Output 1200 ml   Net 80 ml       General Appearance: Somnolent appearing, and in no acute distress  Mental status: oriented to person, place, and time with normal affect  Head:  normocephalic, atraumatic.  Eye: no icterus, redness, pupils equal and reactive, extraocular eye movements intact, conjunctiva clear  Ear: normal external ear, no discharge, hearing intact  Nose:  no drainage noted  Mouth: mucous membranes moist  Neck: supple, no carotid bruits  Lungs: Bilateral equal air entry, clear to ausculation, no wheezing, rales or rhonchi, normal effort  Cardiovascular: normal rate, regular rhythm, no murmur, gallop, rub.  Abdomen: Obese, Soft, nontender, nondistended, normal bowel sounds  Neurologic: There are no new focal motor or sensory deficits, normal muscle tone and bulk, no abnormal sensation, normal speech  Skin: Right foot wound with dressing intact  Extremities:  peripheral pulses palpable, no pedal edema or calf pain with palpation  Psych: normal affect    Investigations:      Laboratory Testing:  Recent Results (from the past 24 hours)   POCT Glucose    Collection Time: 25  4:52 PM   Result Value Ref Range    POC Glucose 164 (H) 74 - 99 mg/dL  below.    [email or web address]    Procedure Type: Isolated AVR   Perioperative Outcome Estimate %   Operative Mortality 6.46%   Morbidity & Mortality 23.7%   Stroke 3.05%   Renal Failure 5.15%   Reoperation 3.39%   Prolonged Ventilation 15.4%   Deep Sternal Wound Infection 0.322%   Long Hospital Stay (>14 days) 23%   Short Hospital Stay (<6 days) 9.07%     Clinical Summary       Planned Surgery: Isolated AVR, Urgent, First cardiovascular surgery   Demographics: 69 year old, male, 137kg, 198cm, BMI: 35 kg/m²   Lab Values: Creatinine: 0.6 mg/dL, Hematocrit: 38.1%, WBC Count: 8.6 10³/?L, Platelet Count: 610031 cells/?L   PreOp Medications: Insulin diabetes control   Substance Abuse: Former smoker   Risk Factors / Comorbidities: Insulin-dependent Diabetes Mellitus, Hypertension, Family Hx of CAD   Pulmonary RF: Moderate CLD, Home O?   Vascular RF: Peripheral Artery Disease   Cardiac Status: Acute and chronic heart failure, Ejection Fraction = 35%   Coronary Artery Disease: 2 vessel disease, None/Other   Valve Disease: Aortic Stenosis, Severe MR, Mild TR   Arrhythmia: Recent A-fib, Persistent         Assessment :      Acute on chronic heart failure  Aortic stenosis  Mitral regurgitation  Chronic respiratory failure on 3 to 4 L at home  PAF status post ablation in May 2018 on anticoagulation  Type 2 diabetes  Hypertension  Hyperlipidemia  COPD  L pleural effusion    Is the patient on a blood thinner? Eliquis  Most recent dental exam: Unknown  Significant allergies: Metoprolol  Beta-blocker started? Coreg  History of afib?Yes    Plan:     -Patient being worked up for TAVR from cardiology and CT surgery standpoint  - CT TAVR protocol.  -Timing to be discussed among CT surgery and cardiology teams.  -Will hold eliquis. Pt to have left pleural tap on Thursday and will restart after the procedure   -Patient seen and evaluated in conjunction with supervising physician.  Further   recommendations per

## 2025-07-29 NOTE — PROGRESS NOTES
pulmonary      SUBJECTIVE:  seen earlier and doing fair     OBJECTIVE    VITALS:  /78   Pulse 82   Temp 97.4 °F (36.3 °C) (Axillary)   Resp 15   Ht 1.981 m (6' 6\")   Wt (!) 149.1 kg (328 lb 12.8 oz)   SpO2 98%   BMI 38.00 kg/m²   HEAD AND FACE EXAM:  No throat injection, no active exudate,no thrush  NECK EXAM;No JVD, no masses, symmetrical  CHEST EXAM; Expansion equal and symmetrical, no masses  LUNG EXAM; Good breath sounds bilaterally. There are expiratory wheezes both lungs, there are crackles at both lung bases  CARDIOVASCULAR EXAM: Positive S1 and S2, no S3 or S4, no clicks ,no murmurs  RIGHT AND LEFT LOWER EXTRIMITY EXAM: No edema, no swelling, no inflamation  CNS EXAM: Alert and oriented X3          LABS   Lab Results   Component Value Date    WBC 8.8 07/28/2025    HGB 11.5 (L) 07/28/2025    HCT 37.5 (L) 07/28/2025    MCV 97.7 07/28/2025     07/28/2025     Lab Results   Component Value Date    CREATININE 0.5 (L) 07/28/2025    BUN 14 07/28/2025     07/28/2025    K 3.9 07/28/2025    CL 94 (L) 07/28/2025    CO2 39 (H) 07/28/2025     Lab Results   Component Value Date    INR 1.3 07/25/2025    PROTIME 16.5 (H) 07/25/2025          Lab Results   Component Value Date/Time    PHOS 3.3 07/28/2025 08:39 AM    PHOS 3.4 07/27/2025 08:08 AM    PHOS 2.6 07/26/2025 05:52 AM      No results for input(s): \"PH\", \"PO2ART\", \"XNT3RUU\", \"HCO3\", \"BEART\", \"O2SAT\" in the last 72 hours.      Wt Readings from Last 3 Encounters:   07/29/25 (!) 149.1 kg (328 lb 12.8 oz)   07/15/25 (!) 165.9 kg (365 lb 12.8 oz)   07/07/25 (!) 145.2 kg (320 lb)               ASSESMENT  Ac on ch resp failure  Ac copd  Simba pl effusion  Poss lll pneumonia        PLAN  Bd rx  O2 adm  Will hold eliquis. Pt to have left pleural tap on Thursday and will restart after the procedure  Abg shows ph 7.2 and pco2 103. Will start bipap at 18/8 and repeat venous blood gas in 2-3 hours    7/29/2025  Neptali Keen MD, M.D.

## 2025-07-29 NOTE — PROGRESS NOTES
V2.0    [facility] [place] Note      Name:  Armando Mars /Age/Sex: 1955  (69 y.o. male)   MRN & CSN:  [id number] & [id number] Encounter Date/Time: 2025 9:10 AM EDT   Location:  -A PCP: Rosalio Hedrick MD     Attending:Edi Mae MD       Hospital Day: 5    Assessment and Recommendations   Armando Mars is a 69 y.o. male       - Per Dr. Rodriguez, will discuss a potential TAVR at the structural heart meeting which will take place 2 days from now  - Cardiology is assessing as to whether or not an inpatient TAVR can be done as patient has had recurrent admissions-this is his fourth admission since   - CT surgery recommends a CT TAVR protocol (as far as I am aware, this has not been ordered so far)  - Pulmonary plans an outpatient PET scan as patient has a suspicious right upper lobe lung nodule  - Respiratory status seems improved, now down to 4 L, earlier this admission was on continuous BiPAP at times  - Gabapentin 400 mg p.o. x 1 given at 3 PM on  after I was called for leg pain.  PDMP report reviewed.  Gabapentin 400 mg #90 was dispensed 2025 and was apparently ordered by the physician at the facility.  This is a chronic medication.  It is currently not listed in the EHR as a home med.  This needs follow-up.    Assessment and plan    Severe aortic stenosis-TAVR evaluation in progress    Acute decompensated heart failure in the setting of severe aortic stenosis  - TTE in 2025 showed an EF of 35 to 40% and severe AAS and other findings  - Transferred to [facility] from [facility]   - Recently discharged 6 days prior to admission for a similar presentation  - Continue GDMT medications: Carvedilol, and lisinopril  - Diuresis with furosemide 60 mg IV twice daily started upon admission-continue    Chronic respiratory failure with hypoxia and hypercapnia  - Sleep apnea  - On oxygen 3 to 4 L at baseline  - Apparently on BiPAP at bedtime at baseline    Paroxysmal

## 2025-07-29 NOTE — PROGRESS NOTES
[place] [address]  Phone: [phone]    Fax [phone]                  Diallo Rodriguez MD, [facility]       Sergio Lucio MD, [facility]  Khadijah De La O MD, [facility]    MD Darci Perkins MD Tariq Rizvi, MD Bilal Alam, MD Dr. Waseem Sajjad MD Melissa Kellis, APRMARCO ANTONIO Griggs, APRMARCO ANTONIO Moody, APRMARCO ANTONIO Woody, APRN  Justin Aden PAOvidioC    CARDIOLOGY  NOTE      Name:  Armando Mars /Age/Sex: 1955  (69 y.o. male)   MRN & CSN:  [id number] & [id number] Admission Date/Time: 2025 10:24 PM   Location:  -A PCP: Rosalio Hedrick MD       Hospital Day: 6    - Cardiology consult is for:  CHF      ASSESSMENT/ PLAN:  Acute on chronic heart failure with reduced ejection fraction  Chronic venous insufficiency  Severe aortic stenosis   moderate to severe mitral regurgitation  - Volume overload improving  -Strict I's and O's and daily weights.   - Recent Echocardiogram revealing EF 35-40%  - CAMERON of 0.94 cm² and AV mean gradient 33 mmHg which may be underestimated  - No obstructive CAD on recent C.  Right heart catheterization showing elevated right and left heart filling pressures with reduced cardiac index  -Considering inpatient TAVR due to recurrent admissions  -CT surgery following  -GDMT: Continue carvedilol 3.125 mg twice daily, lisinopril 2.5 mg daily   - Switch to oral lasix 40 mg twice daily  Persistent atrial fibrillation s/p ablation   - Heart rate is controlled at this time  -Goal potassium 4.0-4.5, magnesium 2.0-2.2. Replete per protocol.  - Continue digoxin 125 mcg daily  - Continue Eliquis 5 mg twice daily  Morbid obesity: BMI 42  Peripheral arterial disease s/p right redo femoral popliteal bypass  Sleep apnea: Encourage Bipap  COPD  type 2 diabetes mellitus:      Cardiology will sign off, please call with any questions.  Patient to follow-up in

## 2025-07-29 NOTE — PROGRESS NOTES
Latest Reference Range & Units 07/29/25 08:18   Methemoglobin 0.5 - 1.5 % 2.2 (H)   Carboxyhemoglobin 0.5 - 1.5 % 0.6   Tong Test  NO   Oxyhemoglobin % 18.6   pH, Tylor 7.320 - 7.430  7.216 (L)   pCO2, Tylor 38 - 54 mm Hg 102.9 (H)   pO2, Tylor 23 - 48 mm Hg 18.2 (L)   Bicarbonate, Venous 22 - 29 mmol/L 40.8 (H)   Positive Base Excess, Tylor 0 - 3 mmol/L 9.0 (H)   Pt Temp  37.0   O2 Device/Flow/%  HEATED HIGH FLOW   Text for Respiratory  Called to RN on 07/29/2025 at 08:20   (H): Data is abnormally high  (L): Data is abnormally low    Results sent to Dr. Keen

## 2025-07-29 NOTE — CARE COORDINATION
MINH reviewed chart and noted the DC plan was uncertain. MINH spoke with ROSARIO Reilly, to ask if he had ambulated the pt. Tommie was in pts room. Pt stated to Tommie that pt stands and pivots and cannot walk with a walker. Pt has an electric wheelchair at [facility]  Pt reports doing therapy in the therapy room at [place]. Pt plans to return to [facility] at discharge.-gw

## 2025-07-29 NOTE — PLAN OF CARE
Problem: Chronic Conditions and Co-morbidities  Goal: Patient's chronic conditions and co-morbidity symptoms are monitored and maintained or improved  7/28/2025 2357 by Tova Colindres RN  Outcome: Progressing  7/28/2025 1046 by Tommie Gomez RN  Outcome: Progressing     Problem: Discharge Planning  Goal: Discharge to home or other facility with appropriate resources  7/28/2025 2357 by Tova Colindres RN  Outcome: Progressing  7/28/2025 1046 by Tommie Gomez RN  Outcome: Progressing     Problem: Safety - Adult  Goal: Free from fall injury  7/28/2025 2357 by Tova Colindres RN  Outcome: Progressing  7/28/2025 1046 by Tommie Gomez RN  Outcome: Progressing     Problem: Skin/Tissue Integrity  Goal: Skin integrity remains intact  Description: 1.  Monitor for areas of redness and/or skin breakdown  2.  Assess vascular access sites hourly  3.  Every 4-6 hours minimum:  Change oxygen saturation probe site  4.  Every 4-6 hours:  If on nasal continuous positive airway pressure, respiratory therapy assess nares and determine need for appliance change or resting period  7/28/2025 2357 by Tova Colindres RN  Outcome: Progressing  Flowsheets (Taken 7/28/2025 2017)  Skin Integrity Remains Intact: Monitor for areas of redness and/or skin breakdown  7/28/2025 1046 by Tommie Gomez RN  Outcome: Progressing

## 2025-07-29 NOTE — CONSULTS
[facility] ACUTE CARE OCCUPATIONAL THERAPY EVALUATION  Armando Mars, 1955, 2014/2014-A, 7/29/2025    Discharge Recommendation: return to [facility] with [facility]    History  [place]:  There were no encounter diagnoses.  Patient  has a past medical history of A-fib (HCC), Anxiety, Arthritis, COPD (chronic obstructive pulmonary disease) (HCC), Depression, Diabetes mellitus (HCC), Full dentures, H/O angiography, H/O Doppler ultrasound, H/O echocardiogram, Hx of colonoscopy, Hx of Doppler ultrasound, Hyperlipidemia, Hypertension, Macular degeneration, Obesity, On home oxygen therapy, PAD (peripheral artery disease), Panic attacks, Pneumonia, PTSD (post-traumatic stress disorder), Restless leg, Shortness of breath, Sleep apnea, and Wears glasses.  Patient  has a past surgical history that includes Atrial ablation surgery (05/23/2018); pr laparoscopy surg cholecystectomy (N/A, 11/12/2018); Colonoscopy (07/2011); eye surgery (Left, 2017); Dental surgery; Cardiac surgery (05/2018); Cholecystectomy, laparoscopic (11/12/2018); Appendectomy (2003); hernia repair (2003); femoral bypass (Left, 01/2011); Tonsillectomy (1960's); fracture surgery (Left, 1980's); fracture surgery (Right, 2000's); femoral bypass (Left, 1/9/2019); Cardiac procedure (N/A, 7/17/2025); and Cardiac procedure (N/A, 7/17/2025).    Subjective:  Patient states:  \"Why do they always send in the smallest two therapist in to help\"   Pain:  does not note pain.    Communication: RN, CM, co-eval with PT Jen for safety and activity tolerance   Restrictions: general precautions, fall risk    Home Setup/Prior level of function  Social/Functional History  Lives With: Alone  Type of Home: Assisted living  Home Layout: One level  Home Access: Level entry  Home Equipment: Wheelchair - Electric  Prior Level of Assist for ADLs: Needs assistance  Prior Level of Assist for Homemaking: Needs assistance  Prior Level of Assist for Ambulation:  (stand pivots to/from

## 2025-07-29 NOTE — CONSULTS
[facility] ACUTE CARE PHYSICAL THERAPY EVALUATION  Armando Mars, 1955, 2014/2014-A, 7/29/2025    History  [place]:  There were no encounter diagnoses.  Patient  has a past medical history of A-fib (HCC), Anxiety, Arthritis, COPD (chronic obstructive pulmonary disease) (HCC), Depression, Diabetes mellitus (HCC), Full dentures, H/O angiography, H/O Doppler ultrasound, H/O echocardiogram, Hx of colonoscopy, Hx of Doppler ultrasound, Hyperlipidemia, Hypertension, Macular degeneration, Obesity, On home oxygen therapy, PAD (peripheral artery disease), Panic attacks, Pneumonia, PTSD (post-traumatic stress disorder), Restless leg, Shortness of breath, Sleep apnea, and Wears glasses.  Patient  has a past surgical history that includes Atrial ablation surgery (05/23/2018); pr laparoscopy surg cholecystectomy (N/A, 11/12/2018); Colonoscopy (07/2011); eye surgery (Left, 2017); Dental surgery; Cardiac surgery (05/2018); Cholecystectomy, laparoscopic (11/12/2018); Appendectomy (2003); hernia repair (2003); femoral bypass (Left, 01/2011); Tonsillectomy (1960's); fracture surgery (Left, 1980's); fracture surgery (Right, 2000's); femoral bypass (Left, 1/9/2019); Cardiac procedure (N/A, 7/17/2025); and Cardiac procedure (N/A, 7/17/2025).    Discharge Recommendation: HH at [place]    Equipment: none, pt owns all necessary equipment    Subjective:    Patient states:  \"I don't know if I can do this\"      Pain:  pain in R foot at wound site, does not numerically rate.      Communication with other providers:  Handoff to RN, OT    Restrictions: general precautions, fall risk    Home Setup/Prior level of function  Social/Functional History  Lives With: Alone  Type of Home: Assisted living  Home Layout: One level  Home Access: Level entry  Home Equipment: Wheelchair - Electric  Prior Level of Assist for ADLs: Needs assistance  Prior Level of Assist for Homemaking: Needs assistance  Prior Level of Assist for Ambulation:  (stand  plan:  Bed mobility, transfers, balance, gait, TA, TX    Recommendations for NURSING mobility: stand pivot with gait belt    Time:   Time in: 1412  Time out: 1431  Timed treatment minutes: 9  Total time: 19    Electronically signed by:    Erna Alexandre, PT  7/29/2025, 3:42 PM

## 2025-07-29 NOTE — PROGRESS NOTES
07/29/25 1558   NIV Type   NIV Started/Stopped On   Equipment Type v60   Mode Bilevel   Mask Type Full face mask   Mask Size Medium   Assessment   Pulse 70   Heart Rate Source Monitor   Respirations 17   SpO2 100 %   Level of Consciousness 0   Comfort Level Good   Using Accessory Muscles No   Mask Compliance Good   Breath Sounds   Respiratory Pattern Regular   Settings/Measurements   IPAP 15 cmH20  (changed IPAP to 18)   CPAP/EPAP 5 cmH2O   Vt (Measured) 810 mL   Rate Ordered 16   Insp Rise Time (%) 3 %   FiO2  45 %   I Time/ I Time % 0.75 s   Minute Volume (L/min) 23.2 Liters   Mask Leak (lpm) 0 lpm   Patient's Home Machine No   Alarm Settings   Alarms On Y   Low Pressure (cmH2O) 10 cmH2O   High Pressure (cmH2O) 20 cmH2O   Delay Alarm 20 sec(s)   Apnea (secs) 20 secs   RR Low (bpm) 14   RR High (bpm) 50 br/min

## 2025-07-29 NOTE — PROGRESS NOTES
V2.0    [facility] Progress Note      Name:  Armando Mars /Age/Sex: 1955  (69 y.o. male)   MRN & CSN:  [id number] & [id number] Encounter Date/Time: 2025 9:10 AM EDT   Location:  -A PCP: Rosalio Hedrick MD     Attending:Vanessa Amezquita MD       Hospital Day: 6    Assessment and Recommendations   Armando Mars is a 69 y.o. male     Assessment and plan    Acute decompensated heart failure in the setting of severe aortic stenosis  - TTE in 2025 showed an EF of 35 to 40% and severe AAS and other findings  - Transferred to [facility] from [facility]   - Recently discharged 6 days prior to admission for a similar presentation  - Continue GDMT medications: Carvedilol, and lisinopril  - Diuresis with furosemide 60 mg IV twice daily started upon admission  - Patient being evaluated for TAVR, inpatient versus outpatient  - He was previously enrolled with hospice, but they signed off last admission as a TAVR was being planned  - IR consult is in place for moderate bilateral pleural effusions. Eliquis held for procedure.     Chronic respiratory failure with hypoxia and hypercapnia  - Sleep apnea  - On oxygen 3 to 4 L at baseline  - Apparently on BiPAP at bedtime at baseline  - Respiratory status seems improved, now down to 4 L, earlier this admission was on continuous BiPAP at times    Paroxysmal atrial fibrillation status post ablation in May 2018  - Continue digoxin and carvedilol    Secondary hypercoagulable state in the setting of atrial fibrillation  - Continue Eliquis  - Eliquis held for thoracentesis on     Diabetes mellitus type 2  - Last A1c was 7.1% in 2025  - Metformin held  - Insulin 20 units at bedtime, it appears that this is lower than his outpatient dose)  - Insulin correction scale    Essential hypertension  - On carvedilol and lisinopril    Mixed hyperlipidemia  - On atorvastatin    Pulmonary nodule  - CT chest done  shows a spiculated nodule concerning for a

## 2025-07-30 ASSESSMENT — PAIN DESCRIPTION - ORIENTATION
ORIENTATION: RIGHT

## 2025-07-30 ASSESSMENT — PAIN SCALES - GENERAL
PAINLEVEL_OUTOF10: 6
PAINLEVEL_OUTOF10: 7
PAINLEVEL_OUTOF10: 6
PAINLEVEL_OUTOF10: 6
PAINLEVEL_OUTOF10: 8
PAINLEVEL_OUTOF10: 9
PAINLEVEL_OUTOF10: 7
PAINLEVEL_OUTOF10: 6
PAINLEVEL_OUTOF10: 8
PAINLEVEL_OUTOF10: 6
PAINLEVEL_OUTOF10: 8

## 2025-07-30 ASSESSMENT — PAIN DESCRIPTION - DESCRIPTORS
DESCRIPTORS: ACHING;DISCOMFORT
DESCRIPTORS: ACHING;DISCOMFORT
DESCRIPTORS: ACHING
DESCRIPTORS: ACHING

## 2025-07-30 ASSESSMENT — PAIN - FUNCTIONAL ASSESSMENT
PAIN_FUNCTIONAL_ASSESSMENT: ACTIVITIES ARE NOT PREVENTED

## 2025-07-30 ASSESSMENT — PAIN DESCRIPTION - FREQUENCY
FREQUENCY: CONTINUOUS
FREQUENCY: CONTINUOUS

## 2025-07-30 ASSESSMENT — PAIN DESCRIPTION - ONSET
ONSET: ON-GOING
ONSET: ON-GOING

## 2025-07-30 ASSESSMENT — PAIN DESCRIPTION - LOCATION
LOCATION: FOOT

## 2025-07-30 ASSESSMENT — PAIN DESCRIPTION - PAIN TYPE
TYPE: ACUTE PAIN
TYPE: ACUTE PAIN

## 2025-07-30 NOTE — PROGRESS NOTES
Cardiothoracic Surgery  IN-PATIENT SERVICE   [facility]    Daily Note            Date:   7/30/2025  Patient name:  Armando Mars  Date of admission:  7/24/2025 10:24 PM  MRN:   [id number]  Account:  [id number]  YOB: 1955  PCP:    Rosalio Hedrick MD  Room:   2014/2014-A  Code Status:    Full Code    Physician Requesting Consult: Vanessa Amezquita MD    Reason for Consult: Severe aortic valve disease    Chief Complaint:     Shortness of breath and lower extremity edema    History of Present Illness:     Patient is a 69 y.o. male with a PMHx as above who presented to the ED with worsening SOB, orthopnea and LE edema over past few days. Denied any fevers, chills, typical CP, N/V, abdominal pain, C/D or urinary changes. Denied any current tobacco or alcohol use. Transferred from [facility] for urgent cardiology evaluation.  Patient has CHF, severe aortic stenosis, and history of atrial fibrillation.  Patient states he is wheelchair-bound.  He does not ambulate or drive a car.  He has a history of left femoropopliteal bypass.  CT surgery team consulted in regards to possible TAVR intervention.    Past Medical History:     Past Medical History:   Diagnosis Date    A-fib (HCC)     Resolved after ablation 2018    Anxiety     Arthritis     \"Hips, Knees, Elbows And Fingers\"    COPD (chronic obstructive pulmonary disease) ([facility])     Sees Dr. Osmani Og     Diabetes mellitus ([facility]) Dx 1990's    Full dentures     H/O angiography 12/13/2018    peripheral angio - LFA occluded, filling collaterals, RSFA 90% stenosis-vasc surg consult    H/O Doppler ultrasound 09/05/2018    LE arterial - left mid and distal femoral artery occluded, lt FANI shows mild-mod PVD    H/O echocardiogram 01/06/2016    EF60% mild MR, TR, pulm HTN    Hx of colonoscopy     7/11 C-scope - benign polyp x1    Hx of Doppler ultrasound 02/20/2018    Lower extremity doppler  Normal study.     DVT in the visualized bilateral lower extremities.  2. Partially visualized calf veins demonstrate no evidence of thrombus.        Hx of:  Afib: Yes  PCI: None  Chest radiation: None      Society of Thor[employer] Sur[employer] (STS) Short-Term Operative Risk Calculator Evaluation    Armando Mars is being worked up for open heart surgery. STS Operative Risk calculation complete with the results as shown below.    [email or web address]    Procedure Type: Isolated AVR   Perioperative Outcome Estimate %   Operative Mortality 6.46%   Morbidity & Mortality 23.7%   Stroke 3.05%   Renal Failure 5.15%   Reoperation 3.39%   Prolonged Ventilation 15.4%   Deep Sternal Wound Infection 0.322%   Long Hospital Stay (>14 days) 23%   Short Hospital Stay (<6 days) 9.07%     Clinical Summary       Planned Surgery: Isolated AVR, Urgent, First cardiovascular surgery   Demographics: 69 year old, male, 137kg, 198cm, BMI: 35 kg/m²   Lab Values: Creatinine: 0.6 mg/dL, Hematocrit: 38.1%, WBC Count: 8.6 10³/?L, Platelet Count: 386569 cells/?L   PreOp Medications: Insulin diabetes control   Substance Abuse: Former smoker   Risk Factors / Comorbidities: Insulin-dependent Diabetes Mellitus, Hypertension, Family Hx of CAD   Pulmonary RF: Moderate CLD, Home O?   Vascular RF: Peripheral Artery Disease   Cardiac Status: Acute and chronic heart failure, Ejection Fraction = 35%   Coronary Artery Disease: 2 vessel disease, None/Other   Valve Disease: Aortic Stenosis, Severe MR, Mild TR   Arrhythmia: Recent A-fib, Persistent         Assessment :      Acute on chronic heart failure  Aortic stenosis  Mitral regurgitation  Chronic respiratory failure on 3 to 4 L at home  PAF status post ablation in May 2018 on anticoagulation  Type 2 diabetes  Hypertension  Hyperlipidemia  COPD  Morbid obesity: BMI 42   Peripheral arterial disease s/p right redo femoral popliteal bypass   L pleural effusion    Is the patient on a blood thinner? Eliquis  Most

## 2025-07-30 NOTE — PLAN OF CARE
Problem: Chronic Conditions and Co-morbidities  Goal: Patient's chronic conditions and co-morbidity symptoms are monitored and maintained or improved  7/29/2025 2301 by Tova Colindres RN  Outcome: Progressing  7/29/2025 1046 by Tommie Gomez RN  Outcome: Progressing     Problem: Discharge Planning  Goal: Discharge to home or other facility with appropriate resources  7/29/2025 2301 by Tova Colindres RN  Outcome: Progressing  7/29/2025 1046 by Tommie Gomez RN  Outcome: Progressing     Problem: Safety - Adult  Goal: Free from fall injury  7/29/2025 2301 by Tova Colindres RN  Outcome: Progressing  7/29/2025 1046 by Tommie Gomez RN  Outcome: Progressing     Problem: Skin/Tissue Integrity  Goal: Skin integrity remains intact  Description: 1.  Monitor for areas of redness and/or skin breakdown  2.  Assess vascular access sites hourly  3.  Every 4-6 hours minimum:  Change oxygen saturation probe site  4.  Every 4-6 hours:  If on nasal continuous positive airway pressure, respiratory therapy assess nares and determine need for appliance change or resting period  7/29/2025 2301 by Tova Colindres RN  Outcome: Progressing  Flowsheets (Taken 7/29/2025 2119)  Skin Integrity Remains Intact: Monitor for areas of redness and/or skin breakdown  7/29/2025 1046 by Tommie Gomez RN  Outcome: Progressing     Problem: Pain  Goal: Verbalizes/displays adequate comfort level or baseline comfort level  7/29/2025 2301 by Tova Colindres RN  Outcome: Progressing  7/29/2025 1046 by Tommie Gomez RN  Outcome: Progressing

## 2025-07-30 NOTE — CONSULTS
[facility] Contin[facility] Nurse  Consult Note       Armando Mars  AGE: 69 y.o.   GENDER: male  : 1955  TODAY'S DATE:  2025    Subjective:     Reason for CWOCN Evaluation and Assessment: wound reassessment      Armando Mars is a 69 y.o. male referred by:   [x] Physician  [] Nursing  [] Other:     Wound Identification:  Wound Type: venous, diabetic, and traumatic  Contributing Factors: edema, venous stasis, diabetes, chronic pressure, decreased mobility, obesity, arterial insufficiency, decreased tissue oxygenation, anticoagulation therapy, and incontinence of urine        PAST MEDICAL HISTORY        Diagnosis Date    A-fib (HCC)     Resolved after ablation     Anxiety     Arthritis     \"Hips, Knees, Elbows And Fingers\"    COPD (chronic obstructive pulmonary disease) (Former[facility])     Sees Dr. Osmani Og     Diabetes mellitus ([facility]) Dx     Full dentures     H/O angiography 2018    peripheral angio - LFA occluded, filling collaterals, RSFA 90% stenosis-vasc surg consult    H/O Doppler ultrasound 2018    LE arterial - left mid and distal femoral artery occluded, lt FANI shows mild-mod PVD    H/O echocardiogram 2016    EF60% mild MR, TR, pulm HTN    Hx of colonoscopy      C-scope - benign polyp x1    Hx of Doppler ultrasound 2018    Lower extremity doppler  Normal study.    Hyperlipidemia     Hypertension     Macular degeneration     States is legally blind    Obesity     On home oxygen therapy     Continuous At 2 Liters Per Nasal Cannula    PAD (peripheral artery disease)     10/10 FANI mild PAD left superficial femoral s/p left fem-pop bypass    Panic attacks     Pneumonia Last Episode In     PTSD (post-traumatic stress disorder)     Restless leg     Shortness of breath     Sleep apnea     Uses BiPap - stopped using 2020    Wears glasses     To Read       PAST SURGICAL HISTORY    Past Surgical History:   Procedure Laterality Date    APPENDECTOMY

## 2025-07-30 NOTE — CARE COORDINATION
CM reviewed chart and discussed in IDR. Pt will have a procedure tomorrow. When discharged pt will return to [facility]

## 2025-07-30 NOTE — PROGRESS NOTES
pulmonary      SUBJECTIVE:  seen a little earlier and on pap rx     OBJECTIVE    VITALS:  BP (!) 93/55   Pulse 77   Temp 97.5 °F (36.4 °C) (Oral)   Resp 16   Ht 1.981 m (6' 6\")   Wt (!) 145.7 kg (321 lb 1.6 oz)   SpO2 100%   BMI 37.11 kg/m²   HEAD AND FACE EXAM:  No throat injection, no active exudate,no thrush  NECK EXAM;No JVD, no masses, symmetrical  CHEST EXAM; Expansion equal and symmetrical, no masses  LUNG EXAM; Good breath sounds bilaterally. There are expiratory wheezes both lungs, there are crackles at both lung bases  CARDIOVASCULAR EXAM: Positive S1 and S2, no S3 or S4, no clicks ,no murmurs  RIGHT AND LEFT LOWER EXTRIMITY EXAM: No edema, no swelling, no inflamation  CNS EXAM: Alert and oriented X3          LABS   Lab Results   Component Value Date    WBC 8.8 07/29/2025    HGB 12.3 (L) 07/29/2025    HCT 40.2 (L) 07/29/2025    MCV 96.6 07/29/2025     07/29/2025     Lab Results   Component Value Date    CREATININE 0.7 (L) 07/29/2025    BUN 18 07/29/2025     07/29/2025    K 3.8 07/29/2025    CL 90 (L) 07/29/2025    CO2 41 (H) 07/29/2025     Lab Results   Component Value Date    INR 1.3 07/25/2025    PROTIME 16.5 (H) 07/25/2025          Lab Results   Component Value Date/Time    PHOS 3.3 07/28/2025 08:39 AM    PHOS 3.4 07/27/2025 08:08 AM    PHOS 2.6 07/26/2025 05:52 AM      No results for input(s): \"PH\", \"PO2ART\", \"TVP7VDG\", \"HCO3\", \"BEART\", \"O2SAT\" in the last 72 hours.      Wt Readings from Last 3 Encounters:   07/30/25 (!) 145.7 kg (321 lb 1.6 oz)   07/15/25 (!) 165.9 kg (365 lb 12.8 oz)   07/07/25 (!) 145.2 kg (320 lb)               ASSESMENT  Ac on ch resp failure  Simba pl effusion  Ac copd  Rul 1.2 cm nodule        PLAN  Cont bd rx  Bipap  Pl tap tomorrow  Will repeat venous aBG    7/30/2025  Neptali Keen MD, M.D.

## 2025-07-30 NOTE — PROGRESS NOTES
V2.0    [facility] [place] Note      Name:  Armando Mars /Age/Sex: 1955  (69 y.o. male)   MRN & CSN:  [id number] & [id number] Encounter Date/Time: 2025 9:10 AM EDT   Location:  -A PCP: Rosalio Hedrick MD     Attending:Vanessa Amezquita MD       Hospital Day: 7    Assessment and Recommendations   Armando Mars is a 69 y.o. male     Assessment and plan    Acute decompensated heart failure in the setting of severe aortic stenosis  - TTE in 2025 showed an EF of 35 to 40% and severe AAS and other findings  - Transferred to [facility] from [facility]   - Recently discharged 6 days prior to admission for a similar presentation  - Continue GDMT medications: Carvedilol, and lisinopril  - Diuresis with furosemide 60 mg IV twice daily started upon admission  - Patient being evaluated for TAVR, inpatient versus outpatient  - He was previously enrolled with hospice, but they signed off last admission as a TAVR was being planned  - IR consult is in place for moderate bilateral pleural effusions. Eliquis held for procedure.  No IR available today and procedure tentatively plan for tomorrow.     Chronic respiratory failure with hypoxia and hypercapnia  - Sleep apnea  - On oxygen 3 to 4 L at baseline  - Apparently on BiPAP at bedtime at baseline  - Respiratory status seems improved, now down to 4 L, earlier this admission was on continuous BiPAP at times    Paroxysmal atrial fibrillation status post ablation in May 2018  - Continue digoxin and carvedilol    Secondary hypercoagulable state in the setting of atrial fibrillation  - Continue Eliquis  - Eliquis held for thoracentesis on     Diabetes mellitus type 2  - Last A1c was 7.1% in 2025  - Metformin held  - Insulin 20 units at bedtime, it appears that this is lower than his outpatient dose)  - Insulin correction scale    Essential hypertension  - On carvedilol and lisinopril    Mixed hyperlipidemia  - On atorvastatin    Pulmonary nodule  -

## 2025-07-31 ENCOUNTER — APPOINTMENT (OUTPATIENT)
Dept: GENERAL RADIOLOGY | Age: 70
DRG: 306 | End: 2025-07-31
Attending: INTERNAL MEDICINE
Payer: MEDICARE

## 2025-07-31 ENCOUNTER — APPOINTMENT (OUTPATIENT)
Dept: GENERAL RADIOLOGY | Age: 70
DRG: 306 | End: 2025-07-31
Payer: MEDICARE

## 2025-07-31 ENCOUNTER — APPOINTMENT (OUTPATIENT)
Dept: INTERVENTIONAL RADIOLOGY/VASCULAR | Age: 70
DRG: 306 | End: 2025-07-31
Attending: INTERNAL MEDICINE
Payer: MEDICARE

## 2025-07-31 ENCOUNTER — HOSPITAL ENCOUNTER (INPATIENT)
Age: 70
LOS: 5 days | Discharge: ANOTHER HOSPITAL | DRG: 306 | End: 2025-08-05
Attending: EMERGENCY MEDICINE | Admitting: STUDENT IN AN ORGANIZED HEALTH CARE EDUCATION/TRAINING PROGRAM
Payer: MEDICARE

## 2025-07-31 DIAGNOSIS — I35.0 SEVERE AORTIC STENOSIS: ICD-10-CM

## 2025-07-31 DIAGNOSIS — J96.22 ACUTE ON CHRONIC RESPIRATORY FAILURE WITH HYPERCAPNIA (HCC): Primary | ICD-10-CM

## 2025-07-31 DIAGNOSIS — R93.89 ABNORMAL CHEST X-RAY: ICD-10-CM

## 2025-07-31 PROBLEM — J96.21 ACUTE ON CHRONIC HYPOXIC RESPIRATORY FAILURE (HCC): Status: ACTIVE | Noted: 2025-07-31

## 2025-07-31 LAB
ANION GAP SERPL CALCULATED.3IONS-SCNC: 6 MMOL/L (ref 9–17)
ARTERIAL PATENCY WRIST A: ABNORMAL
ARTERIAL PATENCY WRIST A: ABNORMAL
B PARAP IS1001 DNA NPH QL NAA+NON-PROBE: NOT DETECTED
B PERT DNA SPEC QL NAA+PROBE: NOT DETECTED
BASOPHILS # BLD: 0.03 K/UL
BASOPHILS NFR BLD: 0 % (ref 0–1)
BNP SERPL-MCNC: 4032 PG/ML (ref 0–125)
BODY TEMPERATURE: 37
BODY TEMPERATURE: 37
BUN SERPL-MCNC: 15 MG/DL (ref 7–20)
C PNEUM DNA NPH QL NAA+NON-PROBE: NOT DETECTED
CALCIUM SERPL-MCNC: 8.9 MG/DL (ref 8.3–10.6)
CHLORIDE SERPL-SCNC: 92 MMOL/L (ref 99–110)
CO2 SERPL-SCNC: 41 MMOL/L (ref 21–32)
COHGB MFR BLD: 1 % (ref 0.5–1.5)
COHGB MFR BLD: 1.4 % (ref 0.5–1.5)
CREAT SERPL-MCNC: 0.7 MG/DL (ref 0.8–1.3)
EOSINOPHIL # BLD: 0.15 K/UL
EOSINOPHILS RELATIVE PERCENT: 2 % (ref 0–3)
ERYTHROCYTE [DISTWIDTH] IN BLOOD BY AUTOMATED COUNT: 16 % (ref 11.7–14.9)
FLUAV RNA NPH QL NAA+NON-PROBE: NOT DETECTED
FLUBV RNA NPH QL NAA+NON-PROBE: NOT DETECTED
GAS FLOW.O2 O2 DELIVERY SYS: ABNORMAL L/MIN
GFR, ESTIMATED: >90 ML/MIN/1.73M2
GLUCOSE SERPL-MCNC: 189 MG/DL (ref 74–99)
HADV DNA NPH QL NAA+NON-PROBE: NOT DETECTED
HCO3 VENOUS: 42.5 MMOL/L (ref 22–29)
HCO3 VENOUS: 50.5 MMOL/L (ref 22–29)
HCOV 229E RNA NPH QL NAA+NON-PROBE: NOT DETECTED
HCOV HKU1 RNA NPH QL NAA+NON-PROBE: NOT DETECTED
HCOV NL63 RNA NPH QL NAA+NON-PROBE: NOT DETECTED
HCOV OC43 RNA NPH QL NAA+NON-PROBE: NOT DETECTED
HCT VFR BLD AUTO: 38.6 % (ref 42–52)
HGB BLD-MCNC: 12.1 G/DL (ref 13.5–18)
HMPV RNA NPH QL NAA+NON-PROBE: NOT DETECTED
HPIV1 RNA NPH QL NAA+NON-PROBE: NOT DETECTED
HPIV2 RNA NPH QL NAA+NON-PROBE: NOT DETECTED
HPIV3 RNA NPH QL NAA+NON-PROBE: NOT DETECTED
HPIV4 RNA NPH QL NAA+NON-PROBE: NOT DETECTED
IMM GRANULOCYTES # BLD AUTO: 0.04 K/UL
IMM GRANULOCYTES NFR BLD: 0 %
LYMPHOCYTES NFR BLD: 1.05 K/UL
LYMPHOCYTES RELATIVE PERCENT: 11 % (ref 24–44)
M PNEUMO DNA NPH QL NAA+NON-PROBE: NOT DETECTED
MCH RBC QN AUTO: 30.3 PG (ref 27–31)
MCHC RBC AUTO-ENTMCNC: 31.3 G/DL (ref 32–36)
MCV RBC AUTO: 96.5 FL (ref 78–100)
METHEMOGLOBIN: 0.2 % (ref 0.5–1.5)
METHEMOGLOBIN: 0.3 % (ref 0.5–1.5)
MONOCYTES NFR BLD: 0.69 K/UL
MONOCYTES NFR BLD: 8 % (ref 0–5)
NEUTROPHILS NFR BLD: 79 % (ref 36–66)
NEUTS SEG NFR BLD: 7.24 K/UL
OXYHGB MFR BLD: 30.1 %
OXYHGB MFR BLD: 58.1 %
PCO2 VENOUS: 122.4 MM HG (ref 38–54)
PCO2 VENOUS: 88.5 MM HG (ref 38–54)
PH VENOUS: 7.23 (ref 7.32–7.43)
PH VENOUS: 7.3 (ref 7.32–7.43)
PLATELET # BLD AUTO: 190 K/UL (ref 140–440)
PMV BLD AUTO: 9.8 FL (ref 7.5–11.1)
PO2 VENOUS: 22.5 MM HG (ref 23–48)
PO2 VENOUS: 35.4 MM HG (ref 23–48)
POSITIVE BASE EXCESS, VEN: 12.4 MMOL/L (ref 0–3)
POSITIVE BASE EXCESS, VEN: 15.8 MMOL/L (ref 0–3)
POTASSIUM SERPL-SCNC: 4.1 MMOL/L (ref 3.5–5.1)
RBC # BLD AUTO: 4 M/UL (ref 4.6–6.2)
RSV RNA NPH QL NAA+NON-PROBE: NOT DETECTED
RV+EV RNA NPH QL NAA+NON-PROBE: NOT DETECTED
SARS-COV-2 RNA NPH QL NAA+NON-PROBE: NOT DETECTED
SODIUM SERPL-SCNC: 139 MMOL/L (ref 136–145)
SPECIMEN DESCRIPTION: NORMAL
TEXT FOR RESPIRATORY: ABNORMAL
TEXT FOR RESPIRATORY: ABNORMAL
WBC OTHER # BLD: 9.2 K/UL (ref 4–10.5)

## 2025-07-31 PROCEDURE — 36415 COLL VENOUS BLD VENIPUNCTURE: CPT

## 2025-07-31 PROCEDURE — 99285 EMERGENCY DEPT VISIT HI MDM: CPT

## 2025-07-31 PROCEDURE — 80048 BASIC METABOLIC PNL TOTAL CA: CPT

## 2025-07-31 PROCEDURE — 82805 BLOOD GASES W/O2 SATURATION: CPT

## 2025-07-31 PROCEDURE — 0202U NFCT DS 22 TRGT SARS-COV-2: CPT

## 2025-07-31 PROCEDURE — 2060000000 HC ICU INTERMEDIATE R&B

## 2025-07-31 PROCEDURE — 85025 COMPLETE CBC W/AUTO DIFF WBC: CPT

## 2025-07-31 PROCEDURE — 83880 ASSAY OF NATRIURETIC PEPTIDE: CPT

## 2025-07-31 PROCEDURE — 93005 ELECTROCARDIOGRAM TRACING: CPT | Performed by: PHYSICIAN ASSISTANT

## 2025-07-31 PROCEDURE — 71045 X-RAY EXAM CHEST 1 VIEW: CPT

## 2025-07-31 PROCEDURE — 6370000000 HC RX 637 (ALT 250 FOR IP): Performed by: PHYSICIAN ASSISTANT

## 2025-07-31 PROCEDURE — 94660 CPAP INITIATION&MGMT: CPT

## 2025-07-31 RX ORDER — FUROSEMIDE 10 MG/ML
40 INJECTION INTRAMUSCULAR; INTRAVENOUS ONCE
Status: DISCONTINUED | OUTPATIENT
Start: 2025-07-31 | End: 2025-07-31

## 2025-07-31 RX ORDER — ALBUTEROL SULFATE 0.83 MG/ML
SOLUTION RESPIRATORY (INHALATION)
Status: DISPENSED
Start: 2025-07-31 | End: 2025-08-01

## 2025-07-31 RX ORDER — HYDROCODONE BITARTRATE AND ACETAMINOPHEN 5; 325 MG/1; MG/1
1 TABLET ORAL ONCE
Status: COMPLETED | OUTPATIENT
Start: 2025-07-31 | End: 2025-07-31

## 2025-07-31 RX ORDER — FUROSEMIDE 10 MG/ML
40 INJECTION INTRAMUSCULAR; INTRAVENOUS 3 TIMES DAILY
Status: DISCONTINUED | OUTPATIENT
Start: 2025-08-01 | End: 2025-08-01

## 2025-07-31 RX ADMIN — HYDROCODONE BITARTRATE AND ACETAMINOPHEN 1 TABLET: 5; 325 TABLET ORAL at 20:25

## 2025-07-31 ASSESSMENT — PAIN DESCRIPTION - LOCATION: LOCATION: FOOT

## 2025-07-31 ASSESSMENT — PAIN DESCRIPTION - DESCRIPTORS: DESCRIPTORS: ACHING

## 2025-07-31 ASSESSMENT — PAIN - FUNCTIONAL ASSESSMENT: PAIN_FUNCTIONAL_ASSESSMENT: ACTIVITIES ARE NOT PREVENTED

## 2025-07-31 ASSESSMENT — PAIN SCALES - GENERAL
PAINLEVEL_OUTOF10: 8
PAINLEVEL_OUTOF10: 6
PAINLEVEL_OUTOF10: 7

## 2025-07-31 ASSESSMENT — PAIN DESCRIPTION - ORIENTATION: ORIENTATION: RIGHT

## 2025-07-31 NOTE — BRIEF OP NOTE
Department of Interventional Radiology Post-Procedure Note      Date: 7/31/2025     Interventional Radiologist: Leonardo    Procedure Performed:  left thoracentesis    H&P Status: H&P was reviewed, the patient was examined and no change has occurred in patient's condition since H&P was completed.    Pre-procedure Diagnosis:  left pleural effusion    Post-procedure Diagnosis:  Same    Sedation:  none    Contrast used:  none    Estimated blood loss:  Minimal    Preliminary Findings:  Successful    Complications:  none    Full Report to Follow.    Electronically signed by Enio Patterson MD on 7/31/2025 at 11:00 AM

## 2025-07-31 NOTE — DISCHARGE SUMMARY
V2.0  Discharge Summary    Name:  Armando Mars /Age/Sex: 1955 (69 y.o. male)   Admit Date: 2025  Discharge Date: 25    MRN & CSN:  [id number] & [id number] Encounter Date and Time 25 8:15 AM EDT    Attending:  Vaenssa Amezquita MD Discharging Provider: Vanessa Amezquita MD       Hospital Course:     Brief HPI: Armando Mars is a 69 y.o. male who presented with shortness of breath and diagnosed with acute decompensated heart failure likely secondary to severe aortic stenosis.  Patient has had recurrent admissions in the past for similar etiology.  Cardiology consulted and patient aggressively diuresed.  Patient underwent thoracentesis on  with 1400 cc pleural fluid removed.  Postprocedure CXR nonacute.     Ideally patient requires TAVR and this was discussed between cardiology and cardiothoracic surgery but cannot be done inpatient due to insurance issues.  Plan for patient to be discharged to [facility] and for patient to come back hopefully next week for TAVR.     Brief Problem Based Course:   Acute decompensated heart failure in the setting of severe aortic stenosis  - TTE in 2025 showed an EF of 35 to 40% and severe AAS and other findings  - Transferred to [facility] from [facility]   - Recently discharged 6 days prior to admission for a similar presentation  - Continue GDMT medications: Carvedilol, and lisinopril  - Diuresis with furosemide 60 mg IV twice daily started upon admission  - Patient being evaluated for TAVR, inpatient versus outpatient  - He was previously enrolled with hospice, but they signed off last admission as a TAVR was being planned  - IR consult is in place for moderate bilateral pleural effusions. Eliquis held for procedure.  No IR available today and procedure tentatively plan for tomorrow.      Chronic respiratory failure with hypoxia and hypercapnia  - Sleep apnea  - On oxygen 3 to 4 L at baseline  - Apparently on BiPAP at bedtime at baseline  - Respiratory  PRINIVIL;ZESTRIL  Take 1 tablet by mouth daily     magnesium oxide 400 (240 Mg) MG tablet  Commonly known as: MAG-OX  Take 1 tablet by mouth daily     metFORMIN 1000 MG tablet  Commonly known as: GLUCOPHAGE  Take 1 tablet by mouth 2 times daily (with meals)     midodrine 2.5 MG tablet  Commonly known as: PROAMATINE     montelukast 10 MG tablet  Commonly known as: SINGULAIR     Nebulizer/Tubing/Mouthpiece Kit  1 kit by Does not apply route daily as needed (SOB, wheezing)     nicotine 21 MG/24HR  Commonly known as: NICODERM CQ  Place 1 patch onto the skin daily as needed (Nicotine craving)     OXYGEN  Inhale 4 L into the lungs continuous     Roflumilast 500 MCG tablet  Commonly known as: DALIRESP  Take 1 tablet by mouth daily     Symbicort 160-4.5 MCG/ACT Aero  Generic drug: budesonide-formoterol  Inhale 2 puffs into the lungs 2 times daily     tiotropium 18 MCG inhalation capsule  Commonly known as: Spiriva HandiHaler  Inhale 1 capsule into the lungs daily           * This list has 4 medication(s) that are the same as other medications prescribed for you. Read the directions carefully, and ask your doctor or other care provider to review them with you.                   Where to Get Your Medications        Information about where to get these medications is not yet available    Ask your nurse or doctor about these medications  melatonin 3 MG Tabs tablet        Objective Findings at Discharge:   BP 95/76   Pulse 86   Temp 97.6 °F (36.4 °C) (Oral)   Resp 18   Ht 1.981 m (6' 6\")   Wt (!) 146.5 kg (323 lb)   SpO2 100%   BMI 37.33 kg/m²       Physical Exam:   Physical Exam  Vitals and nursing note reviewed.   Constitutional:       General: He is not in acute distress.     Appearance: He is obese. He is ill-appearing.   HENT:      Head: Normocephalic and atraumatic.      Mouth/Throat:      Mouth: Mucous membranes are moist.   Eyes:      Extraocular Movements: Extraocular movements intact.      Pupils: Pupils are

## 2025-07-31 NOTE — DISCHARGE INSTR - COC
Continuity of Care Form    Patient Name: Armando Mars   :  1955  MRN:  [id number]    Admit date:  2025  Discharge date:  2025    Code Status Order: Full Code   Advance Directives:     Admitting Physician:  Skip Moss MD  PCP: Rosalio Hedrick MD    Discharging Nurse: Tommie Gomez  Discharging Hospital Unit/Room#: A  Discharging Unit Phone Number: [phone]    Emergency Contact:   Extended Emergency Contact Information  Primary Emergency Contact: John Mars  Home Phone: [phone]  Mobile Phone: [phone]  Relation: Brother/Sister   needed? No  Secondary Emergency Contact: Nico Mars  Home Phone: [phone]  Mobile Phone: [phone]  Relation: Brother/Sister  Preferred language: English   needed? No    Past Surgical History:  Past Surgical History:   Procedure Laterality Date    APPENDECTOMY      ATRIAL ABLATION SURGERY  2018    A-fib ablation     CARDIAC PROCEDURE N/A 2025    Left heart cath / coronary angiography performed by Tino Stark MD at [facility] CARDIAC CATH LAB    CARDIAC PROCEDURE N/A 2025    Right heart cath performed by Tino Stark MD at [facility] CARDIAC CATH LAB    CARDIAC SURGERY  2018    \"Heart Ablation Done Twice\"    CHOLECYSTECTOMY, LAPAROSCOPIC  2018    COLONOSCOPY  2011    Polyp Removed    DENTAL SURGERY      All Teeth Extracted In Past    EYE SURGERY Left     \"For Macular Degeneration\"    FEMORAL BYPASS Left 2011    FEMORAL BYPASS Left 2019    FEMORAL POPLITEAL BYPASS LEFT, REDO performed by Ramon Cooper MD at [facility] OR    FRACTURE SURGERY Left     Broken Left Wrist , No Hardware    FRACTURE SURGERY Right 's    Broken Right Wrist With Hardware, Hardware Later Removed    HERNIA REPAIR      Umbilical Hernia    MI LAPAROSCOPY SURG CHOLECYSTECTOMY N/A 2018    CHOLECYSTECTOMY LAPAROSCOPIC performed by Sandor Mendosa MD at [facility] OR    TONSILLECTOMY      Drainage Description Thin;Serosanguinous 07/31/25 0740   Odor None 07/31/25 0740   Maru-wound Assessment Fragile;Intact 07/31/25 0740   Margins Defined edges 07/31/25 0740   Wound Thickness Description not for Pressure Injury Full thickness 07/31/25 0740   Number of days: 15       Wound 07/15/25 Toe (Comment  which one) Right;Anterior 3rd (Active)   Wound Image   07/30/25 1015   Wound Etiology Venous 07/31/25 0740   Dressing Status Other (Comment) 07/30/25 1522   Wound Cleansed Cleansed with saline 07/30/25 1015   Dressing/Treatment Open to air 07/31/25 0740   Wound Length (cm) 1.3 cm 07/30/25 1015   Wound Width (cm) 1 cm 07/30/25 1015   Wound Depth (cm) 0.1 cm 07/30/25 1015   Wound Surface Area (cm^2) 1.3 cm^2 07/30/25 1015   Change in Wound Size % (l*w) 85.56 07/30/25 1015   Wound Volume (cm^3) 0.13 cm^3 07/30/25 1015   Wound Healing % 86 07/30/25 1015   Distance Tunneling (cm) 0 cm 07/30/25 1015   Tunneling Position ___ O'Clock 0 07/30/25 1015   Undermining Starts ___ O'Clock 0 07/30/25 1015   Undermining Ends___ O'Clock 0 07/30/25 1015   Undermining Maxium Distance (cm) 0 07/30/25 1015   Wound Assessment Eschar dry 07/31/25 0740   Drainage Amount None (dry) 07/31/25 0740   Drainage Description Thin;Serosanguinous 07/31/25 0740   Odor None 07/31/25 0740   Maru-wound Assessment Intact;Fragile 07/31/25 0740   Margins Attached edges 07/31/25 0740   Wound Thickness Description not for Pressure Injury Full thickness 07/31/25 0740   Number of days: 15       Wound 07/15/25 Toe (Comment  which one) Left;Anterior 3rd (Active)   Wound Image   07/30/25 1015   Wound Etiology Traumatic 07/31/25 0740   Dressing Status Other (Comment) 07/30/25 0800   Wound Cleansed Cleansed with saline 07/30/25 1015   Dressing/Treatment Open to air 07/31/25 0740   Wound Length (cm) 1 cm 07/30/25 1015   Wound Width (cm) 1.2 cm 07/30/25 1015   Wound Depth (cm) 0.1 cm 07/30/25 1015   Wound Surface Area (cm^2) 1.2 cm^2 07/30/25 1015   Change in

## 2025-07-31 NOTE — PROGRESS NOTES
TRANSFER - OUT REPORT:    Verbal report given to ROSARIO Reilly (name) on Armando Mars being transferred to ICU Stepdown(unit) for routine post-op. Left sided thoracentesis performed by Dr. Patterson.  Vitals WNL.  1400 cc clear serosanguinous pleural fluid removed.  Dressing WNL.  Chest x-ray ordered.        Report consisted of patient's Situation, Background, Assessment and   Recommendations(SBAR).     Information from the following report(s) Nurse Handoff Report was reviewed with the receiving nurse.    Opportunity for questions and clarification was provided.      Patient transported with:   Monitor  O2 @ 4 liters

## 2025-07-31 NOTE — PROGRESS NOTES
pulmonary      SUBJECTIVE:  seen early am and now has left pl tap done     OBJECTIVE    VITALS:  BP (!) 111/54   Pulse (!) 106   Temp 97.6 °F (36.4 °C) (Oral)   Resp 22   Ht 1.981 m (6' 6\")   Wt (!) 146.5 kg (323 lb)   SpO2 93%   BMI 37.33 kg/m²   HEAD AND FACE EXAM:  No throat injection, no active exudate,no thrush  NECK EXAM;No JVD, no masses, symmetrical  CHEST EXAM; Expansion equal and symmetrical, no masses  LUNG EXAM; Good breath sounds bilaterally. There are expiratory wheezes both lungs, there are crackles at both lung bases  CARDIOVASCULAR EXAM: Positive S1 and S2, no S3 or S4, no clicks ,no murmurs  RIGHT AND LEFT LOWER EXTRIMITY EXAM: No edema, no swelling, no inflamation  CNS EXAM: Alert and oriented X3          LABS   Lab Results   Component Value Date    WBC 8.8 07/29/2025    HGB 12.3 (L) 07/29/2025    HCT 40.2 (L) 07/29/2025    MCV 96.6 07/29/2025     07/29/2025     Lab Results   Component Value Date    CREATININE 0.7 (L) 07/29/2025    BUN 18 07/29/2025     07/29/2025    K 3.8 07/29/2025    CL 90 (L) 07/29/2025    CO2 41 (H) 07/29/2025     Lab Results   Component Value Date    INR 1.3 07/25/2025    PROTIME 16.5 (H) 07/25/2025          Lab Results   Component Value Date/Time    PHOS 3.3 07/28/2025 08:39 AM    PHOS 3.4 07/27/2025 08:08 AM    PHOS 2.6 07/26/2025 05:52 AM      No results for input(s): \"PH\", \"PO2ART\", \"XZM8XBW\", \"HCO3\", \"BEART\", \"O2SAT\" in the last 72 hours.      Wt Readings from Last 3 Encounters:   07/31/25 (!) 146.5 kg (323 lb)   07/15/25 (!) 165.9 kg (365 lb 12.8 oz)   07/07/25 (!) 145.2 kg (320 lb)               ASSESMENT  Ac on ch resp failure  Ac copd  Simba pl effusion,s/p left pl tap        PLAN  Op2 adm  Bipap  Dujanet qid    7/31/2025  Neptali Keen MD, M.D.

## 2025-07-31 NOTE — CARE COORDINATION
Pt is being discharged to:    [facility]     Call report to [phone]     Complete & sign the DANNY then fax to [phone]    Place AVS & any scripts in the envelope that is in the soft chart.  This is to go with the pt to the facility    [facility],  at 13:00   [phone]  After 5 pm & weekends - [phone]    LUIS FERNANDO/John resendez, notified of discharge    Facility & ROSARIO Reilly aware of discharge

## 2025-07-31 NOTE — CARE COORDINATION
MINH reviewed chart. Pt is discharging today back to [facility]. MINH spoke with pts brother/MPOA to inform of discharge and ask about transportation. All of pts siblings are either out of state or two hours away. [facility] will be needed for transport back to [place]. Kirk  Discussed with pt recs from OT/PT for home care. Pt agrees and chose [facility]. PS to Dr Amezquita asking for a [facility] order. Emailed required paperwork to [facility]. kirk

## 2025-07-31 NOTE — PLAN OF CARE
Problem: Chronic Conditions and Co-morbidities  Goal: Patient's chronic conditions and co-morbidity symptoms are monitored and maintained or improved  Outcome: Progressing     Problem: Discharge Planning  Goal: Discharge to home or other facility with appropriate resources  Outcome: Progressing     Problem: Safety - Adult  Goal: Free from fall injury  Outcome: Progressing     Problem: Skin/Tissue Integrity  Goal: Skin integrity remains intact  Description: 1.  Monitor for areas of redness and/or skin breakdown  2.  Assess vascular access sites hourly  3.  Every 4-6 hours minimum:  Change oxygen saturation probe site  4.  Every 4-6 hours:  If on nasal continuous positive airway pressure, respiratory therapy assess nares and determine need for appliance change or resting period  Outcome: Progressing     Problem: Respiratory - Adult  Goal: Achieves optimal ventilation and oxygenation  Outcome: Progressing     Problem: Cardiovascular - Adult  Goal: Maintains optimal cardiac output and hemodynamic stability  Outcome: Progressing  Goal: Absence of cardiac dysrhythmias or at baseline  Outcome: Progressing     Problem: Skin/Tissue Integrity - Adult  Goal: Skin integrity remains intact  Description: 1.  Monitor for areas of redness and/or skin breakdown  2.  Assess vascular access sites hourly  3.  Every 4-6 hours minimum:  Change oxygen saturation probe site  4.  Every 4-6 hours:  If on nasal continuous positive airway pressure, respiratory therapy assess nares and determine need for appliance change or resting period  Outcome: Progressing  Goal: Incisions, wounds, or drain sites healing without S/S of infection  Outcome: Progressing  Goal: Oral mucous membranes remain intact  Outcome: Progressing     Problem: Musculoskeletal - Adult  Goal: Return mobility to safest level of function  Outcome: Progressing  Goal: Maintain proper alignment of affected body part  Outcome: Progressing  Goal: Return ADL status to a safe level

## 2025-07-31 NOTE — CONSULTS
Consult Interventional Radiology    Date:2025  Name:Armando Mars   :1955   MR#:[id number]    SEX:male     Planned procedure:  thoracentesis  Indication: pleural effusoin left    H&P Status: H&P was reviewed, the patient was examined and no change has occurred in patient's condition since H&P was completed.    Past Medical History:  Past Medical History:   Diagnosis Date    A-fib (HCC)     Resolved after ablation     Anxiety     Arthritis     \"Hips, Knees, Elbows And Fingers\"    COPD (chronic obstructive pulmonary disease) ([facility])     Sees Dr. Keen    Depression     Diabetes mellitus ([facility]) Dx     Full dentures     H/O angiography 2018    peripheral angio - LFA occluded, filling collaterals, RSFA 90% stenosis-vasc surg consult    H/O Doppler ultrasound 2018    LE arterial - left mid and distal femoral artery occluded, lt FANI shows mild-mod PVD    H/O echocardiogram 2016    EF60% mild MR, TR, pulm HTN    Hx of colonoscopy      C-scope - benign polyp x1    Hx of Doppler ultrasound 2018    Lower extremity doppler  Normal study.    Hyperlipidemia     Hypertension     Macular degeneration     States is legally blind    Obesity     On home oxygen therapy     Continuous At 2 Liters Per Nasal Cannula    PAD (peripheral artery disease)     10/10 FANI mild PAD left superficial femoral s/p left fem-pop bypass    Panic attacks     Pneumonia Last Episode In     PTSD (post-traumatic stress disorder)     Restless leg     Shortness of breath     Sleep apnea     Uses BiPap - stopped using 2020    Wears glasses     To Read        Past Surgical History:  Past Surgical History:   Procedure Laterality Date    APPENDECTOMY  2003    ATRIAL ABLATION SURGERY  2018    A-fib ablation     CARDIAC PROCEDURE N/A 2025    Left heart cath / coronary angiography performed by Tino Stark MD at [facility] CARDIAC CATH LAB    CARDIAC PROCEDURE N/A 2025    Right heart cath  Reactions    Metoprolol      \"Chest Pain And Burning In The Chest\"       Medications:  No current facility-administered medications on file prior to encounter.     Current Outpatient Medications on File Prior to Encounter   Medication Sig Dispense Refill    insulin detemir (LEVEMIR) 100 UNIT/ML injection vial Inject 20 Units into the skin every morning (before breakfast)      montelukast (SINGULAIR) 10 MG tablet Take 1 tablet by mouth nightly      lidocaine (LIDODERM) 5 % Place 1 patch onto the skin daily 12 hours on, 12 hours off.      apixaban (ELIQUIS) 5 MG TABS tablet Take 1 tablet by mouth 2 times daily 60 tablet 3    lisinopril (PRINIVIL;ZESTRIL) 2.5 MG tablet Take 1 tablet by mouth daily 60 tablet 3    carvedilol (COREG) 3.125 MG tablet Take 1 tablet by mouth 2 times daily (with meals) 90 tablet 3    furosemide (LASIX) 20 MG tablet Take 3 tablets by mouth in the morning and 3 tablets in the evening. 180 tablet 3    OXYGEN Inhale 4 L into the lungs continuous 1 Units 0    albuterol (PROVENTIL) (2.5 MG/3ML) 0.083% nebulizer solution Take 3 mLs by nebulization in the morning, at noon, and at bedtime 120 each 3    albuterol sulfate HFA (PROVENTIL;VENTOLIN;PROAIR) 108 (90 Base) MCG/ACT inhaler Inhale 2 puffs into the lungs every 4 hours as needed for Wheezing 18 g 0    Menthol, Topical Analgesic, (BIOFREEZE) 4 % GEL Apply topically every 6 hours To arm      digoxin (LANOXIN) 125 MCG tablet Take 1 tablet by mouth daily 30 tablet 0    guaiFENesin (MUCINEX) 600 MG extended release tablet Take 1 tablet by mouth 2 times daily 60 tablet 0    nicotine (NICODERM CQ) 21 MG/24HR Place 1 patch onto the skin daily as needed (Nicotine craving) 30 patch 3    midodrine (PROAMATINE) 2.5 MG tablet Take 1 tablet by mouth 2 times daily      atorvastatin (LIPITOR) 40 MG tablet Take 1 tablet by mouth nightly 30 tablet 0    insulin lispro, 1 Unit Dial, (HUMALOG KWIKPEN) 100 UNIT/ML SOPN Check glucose with meals and take insulin as  below     0U  200-249 2U  250-299 4U  300-349 6U  >349 8U    At night time, check glucose and take insulin as below:   0  300-349 4U  >349 4U 15 mL 0    magnesium oxide (MAG-OX) 400 (240 Mg) MG tablet Take 1 tablet by mouth daily 30 tablet 0    metFORMIN (GLUCOPHAGE) 1000 MG tablet Take 1 tablet by mouth 2 times daily (with meals) 60 tablet 0    Roflumilast (DALIRESP) 500 MCG tablet Take 1 tablet by mouth daily 30 tablet 0    SYMBICORT 160-4.5 MCG/ACT AERO Inhale 2 puffs into the lungs 2 times daily 30.6 g 0    tiotropium (SPIRIVA HANDIHALER) 18 MCG inhalation capsule Inhale 1 capsule into the lungs daily 90 capsule 0    blood glucose monitor strips Test once daily 300 strip 3    glucose monitoring kit 1 kit by Does not apply route daily 1 kit 0    Lancets MISC 1 each by Does not apply route 3 times daily 200 each 0    Respiratory Therapy Supplies (NEBULIZER/TUBING/MOUTHPIECE) KIT 1 kit by Does not apply route daily as needed (SOB, wheezing) 1 kit 0    blood glucose monitor kit and supplies Check sugar daily 1 kit 0    Nebulizers (COMPRESSOR/NEBULIZER) MISC Use qid 1 each 3       Review of Systems:  A 10 point review of systems was conducted and was negative with the exception of what is noted above.     Vital Signs:  Vitals:    07/31/25 0845 07/31/25 0850 07/31/25 0850 07/31/25 0956   BP: (!) 111/54 (!) 111/54  (!) 111/54   Pulse:  92  (!) 106   Resp: 16 22     Temp:       TempSrc:       SpO2:   93%    Weight:       Height:            Laboratory:  Recent Labs     07/29/25  0812   WBC 8.8      CL 90*   CO2 41*   BUN 18   CREATININE 0.7*   GLUCOSE 162*       Medications reviewed: No contraindications for coagulation or sedation plans    IMAGING DATA   None available for review    ASSESSMENT AND PLAN     Pt is a good candidate for left thoracentesis    Procedure, RBA explained to patient and available family. Informed consent obtained    Electronically signed by Enio Patterson MD on 7/31/2025 at 10:00

## 2025-08-01 ENCOUNTER — HOSPITAL ENCOUNTER (INPATIENT)
Dept: PULMONOLOGY | Age: 70
Discharge: HOME | DRG: 306 | End: 2025-08-01
Payer: MEDICARE

## 2025-08-01 ENCOUNTER — APPOINTMENT (OUTPATIENT)
Dept: CT IMAGING | Age: 70
DRG: 306 | End: 2025-08-01
Payer: MEDICARE

## 2025-08-01 ENCOUNTER — HOSPITAL ENCOUNTER (OUTPATIENT)
Age: 70
Discharge: HOME | End: 2025-08-01

## 2025-08-01 ENCOUNTER — APPOINTMENT (OUTPATIENT)
Dept: ULTRASOUND IMAGING | Age: 70
DRG: 306 | End: 2025-08-01
Payer: MEDICARE

## 2025-08-01 DIAGNOSIS — I35.0 SEVERE AORTIC STENOSIS: Primary | ICD-10-CM

## 2025-08-01 LAB
ABO + RH BLD: NORMAL
ALBUMIN SERPL-MCNC: 3.2 G/DL (ref 3.4–5)
ALBUMIN SERPL-MCNC: 3.3 G/DL (ref 3.4–5)
ALBUMIN/GLOB SERPL: 1.2 {RATIO} (ref 1.1–2.2)
ALBUMIN/GLOB SERPL: 1.2 {RATIO} (ref 1.1–2.2)
ALP SERPL-CCNC: 79 U/L (ref 40–129)
ALP SERPL-CCNC: 84 U/L (ref 40–129)
ALT SERPL-CCNC: 15 U/L (ref 10–40)
ALT SERPL-CCNC: 17 U/L (ref 10–40)
ANION GAP SERPL CALCULATED.3IONS-SCNC: 5 MMOL/L (ref 9–17)
ANION GAP SERPL CALCULATED.3IONS-SCNC: 8 MMOL/L (ref 9–17)
AST SERPL-CCNC: 11 U/L (ref 15–37)
AST SERPL-CCNC: 15 U/L (ref 15–37)
BASOPHILS # BLD: 0.04 K/UL
BASOPHILS # BLD: 0.04 K/UL
BASOPHILS NFR BLD: 0 % (ref 0–1)
BASOPHILS NFR BLD: 0 % (ref 0–1)
BILIRUB SERPL-MCNC: 0.4 MG/DL (ref 0–1)
BILIRUB SERPL-MCNC: 0.4 MG/DL (ref 0–1)
BLOOD BANK SAMPLE EXPIRATION: NORMAL
BLOOD GROUP ANTIBODIES SERPL: NEGATIVE
BUN SERPL-MCNC: 13 MG/DL (ref 7–20)
BUN SERPL-MCNC: 14 MG/DL (ref 7–20)
CALCIUM SERPL-MCNC: 9 MG/DL (ref 8.3–10.6)
CALCIUM SERPL-MCNC: 9 MG/DL (ref 8.3–10.6)
CHLORIDE SERPL-SCNC: 92 MMOL/L (ref 99–110)
CHLORIDE SERPL-SCNC: 94 MMOL/L (ref 99–110)
CO2 SERPL-SCNC: 38 MMOL/L (ref 21–32)
CO2 SERPL-SCNC: 43 MMOL/L (ref 21–32)
CREAT SERPL-MCNC: 0.5 MG/DL (ref 0.8–1.3)
CREAT SERPL-MCNC: 0.6 MG/DL (ref 0.8–1.3)
EKG DIAGNOSIS: NORMAL
EKG DIAGNOSIS: NORMAL
EKG Q-T INTERVAL: 344 MS
EKG Q-T INTERVAL: 370 MS
EKG QRS DURATION: 100 MS
EKG QRS DURATION: 108 MS
EKG QTC CALCULATION (BAZETT): 423 MS
EKG QTC CALCULATION (BAZETT): 437 MS
EKG R AXIS: -49 DEGREES
EKG R AXIS: -82 DEGREES
EKG T AXIS: 49 DEGREES
EKG T AXIS: 89 DEGREES
EKG VENTRICULAR RATE: 84 BPM
EKG VENTRICULAR RATE: 91 BPM
EOSINOPHIL # BLD: 0.14 K/UL
EOSINOPHIL # BLD: 0.15 K/UL
EOSINOPHILS RELATIVE PERCENT: 2 % (ref 0–3)
EOSINOPHILS RELATIVE PERCENT: 2 % (ref 0–3)
ERYTHROCYTE [DISTWIDTH] IN BLOOD BY AUTOMATED COUNT: 16.2 % (ref 11.7–14.9)
ERYTHROCYTE [DISTWIDTH] IN BLOOD BY AUTOMATED COUNT: 16.2 % (ref 11.7–14.9)
GFR, ESTIMATED: >90 ML/MIN/1.73M2
GFR, ESTIMATED: >90 ML/MIN/1.73M2
GLUCOSE BLD-MCNC: 107 MG/DL (ref 74–99)
GLUCOSE BLD-MCNC: 131 MG/DL (ref 74–99)
GLUCOSE BLD-MCNC: 146 MG/DL (ref 74–99)
GLUCOSE BLD-MCNC: 176 MG/DL (ref 74–99)
GLUCOSE BLD-MCNC: 202 MG/DL (ref 74–99)
GLUCOSE SERPL-MCNC: 131 MG/DL (ref 74–99)
GLUCOSE SERPL-MCNC: 145 MG/DL (ref 74–99)
HCT VFR BLD AUTO: 36.8 % (ref 42–52)
HCT VFR BLD AUTO: 37.7 % (ref 42–52)
HGB BLD-MCNC: 11.3 G/DL (ref 13.5–18)
HGB BLD-MCNC: 11.8 G/DL (ref 13.5–18)
IMM GRANULOCYTES # BLD AUTO: 0.05 K/UL
IMM GRANULOCYTES # BLD AUTO: 0.06 K/UL
IMM GRANULOCYTES NFR BLD: 1 %
IMM GRANULOCYTES NFR BLD: 1 %
INR PPP: 1.1
LYMPHOCYTES NFR BLD: 0.97 K/UL
LYMPHOCYTES NFR BLD: 1.02 K/UL
LYMPHOCYTES RELATIVE PERCENT: 11 % (ref 24–44)
LYMPHOCYTES RELATIVE PERCENT: 12 % (ref 24–44)
MCH RBC QN AUTO: 29.7 PG (ref 27–31)
MCH RBC QN AUTO: 29.9 PG (ref 27–31)
MCHC RBC AUTO-ENTMCNC: 30.7 G/DL (ref 32–36)
MCHC RBC AUTO-ENTMCNC: 31.3 G/DL (ref 32–36)
MCV RBC AUTO: 95.7 FL (ref 78–100)
MCV RBC AUTO: 96.6 FL (ref 78–100)
MONOCYTES NFR BLD: 0.7 K/UL
MONOCYTES NFR BLD: 0.75 K/UL
MONOCYTES NFR BLD: 8 % (ref 0–5)
MONOCYTES NFR BLD: 8 % (ref 0–5)
NEUTROPHILS NFR BLD: 77 % (ref 36–66)
NEUTROPHILS NFR BLD: 79 % (ref 36–66)
NEUTS SEG NFR BLD: 6.89 K/UL
NEUTS SEG NFR BLD: 6.99 K/UL
PLATELET # BLD AUTO: 145 K/UL (ref 140–440)
PLATELET # BLD AUTO: 156 K/UL (ref 140–440)
PMV BLD AUTO: 9.6 FL (ref 7.5–11.1)
PMV BLD AUTO: 9.6 FL (ref 7.5–11.1)
POTASSIUM SERPL-SCNC: 3.7 MMOL/L (ref 3.5–5.1)
POTASSIUM SERPL-SCNC: 3.9 MMOL/L (ref 3.5–5.1)
PROCALCITONIN SERPL-MCNC: 0.11 NG/ML
PROT SERPL-MCNC: 5.8 G/DL (ref 6.4–8.2)
PROT SERPL-MCNC: 5.9 G/DL (ref 6.4–8.2)
PROTHROMBIN TIME: 15.2 SEC (ref 11.7–14.5)
RBC # BLD AUTO: 3.81 M/UL (ref 4.6–6.2)
RBC # BLD AUTO: 3.94 M/UL (ref 4.6–6.2)
SODIUM SERPL-SCNC: 140 MMOL/L (ref 136–145)
SODIUM SERPL-SCNC: 140 MMOL/L (ref 136–145)
TROPONIN I SERPL HS-MCNC: 48 NG/L (ref 0–22)
WBC OTHER # BLD: 8.9 K/UL (ref 4–10.5)
WBC OTHER # BLD: 8.9 K/UL (ref 4–10.5)

## 2025-08-01 PROCEDURE — 82805 BLOOD GASES W/O2 SATURATION: CPT

## 2025-08-01 PROCEDURE — 86900 BLOOD TYPING SEROLOGIC ABO: CPT

## 2025-08-01 PROCEDURE — 75574 CT ANGIO HRT W/3D IMAGE: CPT

## 2025-08-01 PROCEDURE — 6370000000 HC RX 637 (ALT 250 FOR IP): Performed by: STUDENT IN AN ORGANIZED HEALTH CARE EDUCATION/TRAINING PROGRAM

## 2025-08-01 PROCEDURE — 84145 PROCALCITONIN (PCT): CPT

## 2025-08-01 PROCEDURE — 6360000002 HC RX W HCPCS: Performed by: STUDENT IN AN ORGANIZED HEALTH CARE EDUCATION/TRAINING PROGRAM

## 2025-08-01 PROCEDURE — 80053 COMPREHEN METABOLIC PANEL: CPT

## 2025-08-01 PROCEDURE — 75574 CT ANGIO HRT W/3D IMAGE: CPT | Performed by: INTERNAL MEDICINE

## 2025-08-01 PROCEDURE — 93005 ELECTROCARDIOGRAM TRACING: CPT | Performed by: INTERNAL MEDICINE

## 2025-08-01 PROCEDURE — 85025 COMPLETE CBC W/AUTO DIFF WBC: CPT

## 2025-08-01 PROCEDURE — 86850 RBC ANTIBODY SCREEN: CPT

## 2025-08-01 PROCEDURE — 94761 N-INVAS EAR/PLS OXIMETRY MLT: CPT

## 2025-08-01 PROCEDURE — 2700000000 HC OXYGEN THERAPY PER DAY

## 2025-08-01 PROCEDURE — 99223 1ST HOSP IP/OBS HIGH 75: CPT | Performed by: INTERNAL MEDICINE

## 2025-08-01 PROCEDURE — 36415 COLL VENOUS BLD VENIPUNCTURE: CPT

## 2025-08-01 PROCEDURE — 93005 ELECTROCARDIOGRAM TRACING: CPT

## 2025-08-01 PROCEDURE — 94010 BREATHING CAPACITY TEST: CPT

## 2025-08-01 PROCEDURE — 94640 AIRWAY INHALATION TREATMENT: CPT

## 2025-08-01 PROCEDURE — 82962 GLUCOSE BLOOD TEST: CPT

## 2025-08-01 PROCEDURE — 84484 ASSAY OF TROPONIN QUANT: CPT

## 2025-08-01 PROCEDURE — 6360000004 HC RX CONTRAST MEDICATION: Performed by: INTERNAL MEDICINE

## 2025-08-01 PROCEDURE — 74174 CTA ABD&PLVS W/CONTRAST: CPT

## 2025-08-01 PROCEDURE — 2500000003 HC RX 250 WO HCPCS: Performed by: STUDENT IN AN ORGANIZED HEALTH CARE EDUCATION/TRAINING PROGRAM

## 2025-08-01 PROCEDURE — 93010 ELECTROCARDIOGRAM REPORT: CPT | Performed by: INTERNAL MEDICINE

## 2025-08-01 PROCEDURE — 85610 PROTHROMBIN TIME: CPT

## 2025-08-01 PROCEDURE — 2060000000 HC ICU INTERMEDIATE R&B

## 2025-08-01 PROCEDURE — 86901 BLOOD TYPING SEROLOGIC RH(D): CPT

## 2025-08-01 RX ORDER — CARVEDILOL 6.25 MG/1
3.12 TABLET ORAL 2 TIMES DAILY WITH MEALS
Status: DISCONTINUED | OUTPATIENT
Start: 2025-08-01 | End: 2025-08-05 | Stop reason: HOSPADM

## 2025-08-01 RX ORDER — MAGNESIUM SULFATE IN WATER 40 MG/ML
2000 INJECTION, SOLUTION INTRAVENOUS PRN
Status: DISCONTINUED | OUTPATIENT
Start: 2025-08-01 | End: 2025-08-05 | Stop reason: HOSPADM

## 2025-08-01 RX ORDER — NICOTINE 21 MG/24HR
1 PATCH, TRANSDERMAL 24 HOURS TRANSDERMAL DAILY PRN
Status: DISCONTINUED | OUTPATIENT
Start: 2025-08-01 | End: 2025-08-05 | Stop reason: HOSPADM

## 2025-08-01 RX ORDER — ACETAMINOPHEN 325 MG/1
650 TABLET ORAL EVERY 6 HOURS PRN
Status: DISCONTINUED | OUTPATIENT
Start: 2025-08-01 | End: 2025-08-05 | Stop reason: HOSPADM

## 2025-08-01 RX ORDER — ACETAMINOPHEN 650 MG/1
650 SUPPOSITORY RECTAL EVERY 6 HOURS PRN
Status: DISCONTINUED | OUTPATIENT
Start: 2025-08-01 | End: 2025-08-05 | Stop reason: HOSPADM

## 2025-08-01 RX ORDER — ATORVASTATIN CALCIUM 40 MG/1
40 TABLET, FILM COATED ORAL NIGHTLY
Status: DISCONTINUED | OUTPATIENT
Start: 2025-08-01 | End: 2025-08-05 | Stop reason: HOSPADM

## 2025-08-01 RX ORDER — POLYETHYLENE GLYCOL 3350 17 G/17G
17 POWDER, FOR SOLUTION ORAL DAILY PRN
Status: DISCONTINUED | OUTPATIENT
Start: 2025-08-01 | End: 2025-08-05 | Stop reason: HOSPADM

## 2025-08-01 RX ORDER — MIDODRINE HYDROCHLORIDE 5 MG/1
2.5 TABLET ORAL 2 TIMES DAILY PRN
Status: DISCONTINUED | OUTPATIENT
Start: 2025-08-01 | End: 2025-08-05 | Stop reason: HOSPADM

## 2025-08-01 RX ORDER — ONDANSETRON 2 MG/ML
4 INJECTION INTRAMUSCULAR; INTRAVENOUS EVERY 6 HOURS PRN
Status: DISCONTINUED | OUTPATIENT
Start: 2025-08-01 | End: 2025-08-05 | Stop reason: HOSPADM

## 2025-08-01 RX ORDER — ROFLUMILAST 500 UG/1
500 TABLET ORAL DAILY
Status: DISCONTINUED | OUTPATIENT
Start: 2025-08-01 | End: 2025-08-05 | Stop reason: HOSPADM

## 2025-08-01 RX ORDER — SODIUM CHLORIDE 0.9 % (FLUSH) 0.9 %
5-40 SYRINGE (ML) INJECTION PRN
Status: DISCONTINUED | OUTPATIENT
Start: 2025-08-01 | End: 2025-08-05 | Stop reason: HOSPADM

## 2025-08-01 RX ORDER — LANOLIN ALCOHOL/MO/W.PET/CERES
400 CREAM (GRAM) TOPICAL DAILY
Status: DISCONTINUED | OUTPATIENT
Start: 2025-08-01 | End: 2025-08-05 | Stop reason: HOSPADM

## 2025-08-01 RX ORDER — IPRATROPIUM BROMIDE AND ALBUTEROL SULFATE 2.5; .5 MG/3ML; MG/3ML
1 SOLUTION RESPIRATORY (INHALATION)
Status: DISCONTINUED | OUTPATIENT
Start: 2025-08-01 | End: 2025-08-05 | Stop reason: HOSPADM

## 2025-08-01 RX ORDER — LISINOPRIL 5 MG/1
2.5 TABLET ORAL DAILY
Status: DISCONTINUED | OUTPATIENT
Start: 2025-08-01 | End: 2025-08-05 | Stop reason: HOSPADM

## 2025-08-01 RX ORDER — FUROSEMIDE 10 MG/ML
60 INJECTION INTRAMUSCULAR; INTRAVENOUS 2 TIMES DAILY
Status: DISCONTINUED | OUTPATIENT
Start: 2025-08-01 | End: 2025-08-04

## 2025-08-01 RX ORDER — DEXTROSE MONOHYDRATE 100 MG/ML
INJECTION, SOLUTION INTRAVENOUS CONTINUOUS PRN
Status: DISCONTINUED | OUTPATIENT
Start: 2025-08-01 | End: 2025-08-05 | Stop reason: HOSPADM

## 2025-08-01 RX ORDER — INSULIN GLARGINE 100 [IU]/ML
20 INJECTION, SOLUTION SUBCUTANEOUS NIGHTLY
Status: DISCONTINUED | OUTPATIENT
Start: 2025-08-01 | End: 2025-08-05 | Stop reason: HOSPADM

## 2025-08-01 RX ORDER — INSULIN LISPRO 100 [IU]/ML
0-4 INJECTION, SOLUTION INTRAVENOUS; SUBCUTANEOUS
Status: DISCONTINUED | OUTPATIENT
Start: 2025-08-01 | End: 2025-08-05 | Stop reason: HOSPADM

## 2025-08-01 RX ORDER — ONDANSETRON 4 MG/1
4 TABLET, ORALLY DISINTEGRATING ORAL EVERY 8 HOURS PRN
Status: DISCONTINUED | OUTPATIENT
Start: 2025-08-01 | End: 2025-08-05 | Stop reason: HOSPADM

## 2025-08-01 RX ORDER — IOPAMIDOL 755 MG/ML
145 INJECTION, SOLUTION INTRAVASCULAR
Status: COMPLETED | OUTPATIENT
Start: 2025-08-01 | End: 2025-08-01

## 2025-08-01 RX ORDER — GUAIFENESIN 600 MG/1
600 TABLET, EXTENDED RELEASE ORAL 2 TIMES DAILY
Status: DISCONTINUED | OUTPATIENT
Start: 2025-08-01 | End: 2025-08-05 | Stop reason: HOSPADM

## 2025-08-01 RX ORDER — MONTELUKAST SODIUM 10 MG/1
10 TABLET ORAL NIGHTLY
Status: DISCONTINUED | OUTPATIENT
Start: 2025-08-01 | End: 2025-08-05 | Stop reason: HOSPADM

## 2025-08-01 RX ORDER — GLUCAGON 1 MG/ML
1 KIT INJECTION PRN
Status: DISCONTINUED | OUTPATIENT
Start: 2025-08-01 | End: 2025-08-05 | Stop reason: HOSPADM

## 2025-08-01 RX ORDER — DIGOXIN 125 MCG
125 TABLET ORAL DAILY
Status: DISCONTINUED | OUTPATIENT
Start: 2025-08-01 | End: 2025-08-05 | Stop reason: HOSPADM

## 2025-08-01 RX ORDER — SODIUM CHLORIDE 9 MG/ML
INJECTION, SOLUTION INTRAVENOUS PRN
Status: DISCONTINUED | OUTPATIENT
Start: 2025-08-01 | End: 2025-08-05 | Stop reason: HOSPADM

## 2025-08-01 RX ORDER — BUDESONIDE AND FORMOTEROL FUMARATE DIHYDRATE 160; 4.5 UG/1; UG/1
2 AEROSOL RESPIRATORY (INHALATION) 2 TIMES DAILY
Status: DISCONTINUED | OUTPATIENT
Start: 2025-08-01 | End: 2025-08-05 | Stop reason: HOSPADM

## 2025-08-01 RX ORDER — SODIUM CHLORIDE 0.9 % (FLUSH) 0.9 %
5-40 SYRINGE (ML) INJECTION EVERY 12 HOURS SCHEDULED
Status: DISCONTINUED | OUTPATIENT
Start: 2025-08-01 | End: 2025-08-05 | Stop reason: HOSPADM

## 2025-08-01 RX ORDER — POTASSIUM CHLORIDE 7.45 MG/ML
10 INJECTION INTRAVENOUS PRN
Status: DISCONTINUED | OUTPATIENT
Start: 2025-08-01 | End: 2025-08-05 | Stop reason: HOSPADM

## 2025-08-01 RX ORDER — KETOROLAC TROMETHAMINE 30 MG/ML
15 INJECTION, SOLUTION INTRAMUSCULAR; INTRAVENOUS
Status: COMPLETED | OUTPATIENT
Start: 2025-08-01 | End: 2025-08-02

## 2025-08-01 RX ADMIN — IPRATROPIUM BROMIDE AND ALBUTEROL SULFATE 1 DOSE: .5; 3 SOLUTION RESPIRATORY (INHALATION) at 08:20

## 2025-08-01 RX ADMIN — ATORVASTATIN CALCIUM 40 MG: 40 TABLET, FILM COATED ORAL at 20:43

## 2025-08-01 RX ADMIN — INSULIN GLARGINE 20 UNITS: 100 INJECTION, SOLUTION SUBCUTANEOUS at 01:36

## 2025-08-01 RX ADMIN — INSULIN LISPRO 1 UNITS: 100 INJECTION, SOLUTION INTRAVENOUS; SUBCUTANEOUS at 20:42

## 2025-08-01 RX ADMIN — GUAIFENESIN 600 MG: 600 TABLET ORAL at 20:43

## 2025-08-01 RX ADMIN — MONTELUKAST 10 MG: 10 TABLET, FILM COATED ORAL at 20:43

## 2025-08-01 RX ADMIN — SODIUM CHLORIDE, PRESERVATIVE FREE 10 ML: 5 INJECTION INTRAVENOUS at 20:44

## 2025-08-01 RX ADMIN — MIDODRINE HYDROCHLORIDE 2.5 MG: 5 TABLET ORAL at 01:56

## 2025-08-01 RX ADMIN — IPRATROPIUM BROMIDE AND ALBUTEROL SULFATE 1 DOSE: .5; 3 SOLUTION RESPIRATORY (INHALATION) at 15:25

## 2025-08-01 RX ADMIN — IPRATROPIUM BROMIDE AND ALBUTEROL SULFATE 1 DOSE: .5; 3 SOLUTION RESPIRATORY (INHALATION) at 20:36

## 2025-08-01 RX ADMIN — APIXABAN 5 MG: 5 TABLET, FILM COATED ORAL at 20:43

## 2025-08-01 RX ADMIN — IOPAMIDOL 145 ML: 755 INJECTION, SOLUTION INTRAVENOUS at 09:25

## 2025-08-01 RX ADMIN — ACETAMINOPHEN 650 MG: 325 TABLET ORAL at 20:42

## 2025-08-01 RX ADMIN — FUROSEMIDE 60 MG: 10 INJECTION, SOLUTION INTRAMUSCULAR; INTRAVENOUS at 11:19

## 2025-08-01 RX ADMIN — GUAIFENESIN 600 MG: 600 TABLET ORAL at 01:35

## 2025-08-01 RX ADMIN — BUDESONIDE AND FORMOTEROL FUMARATE DIHYDRATE 2 PUFF: 160; 4.5 AEROSOL RESPIRATORY (INHALATION) at 20:37

## 2025-08-01 RX ADMIN — Medication 400 MG: at 11:19

## 2025-08-01 RX ADMIN — FUROSEMIDE 60 MG: 10 INJECTION, SOLUTION INTRAMUSCULAR; INTRAVENOUS at 01:47

## 2025-08-01 RX ADMIN — INSULIN GLARGINE 20 UNITS: 100 INJECTION, SOLUTION SUBCUTANEOUS at 20:41

## 2025-08-01 RX ADMIN — DIGOXIN 125 MCG: 125 TABLET ORAL at 11:19

## 2025-08-01 RX ADMIN — BUDESONIDE AND FORMOTEROL FUMARATE DIHYDRATE 2 PUFF: 160; 4.5 AEROSOL RESPIRATORY (INHALATION) at 08:21

## 2025-08-01 RX ADMIN — IPRATROPIUM BROMIDE AND ALBUTEROL SULFATE 1 DOSE: .5; 3 SOLUTION RESPIRATORY (INHALATION) at 01:39

## 2025-08-01 RX ADMIN — MONTELUKAST 10 MG: 10 TABLET, FILM COATED ORAL at 01:36

## 2025-08-01 RX ADMIN — APIXABAN 5 MG: 5 TABLET, FILM COATED ORAL at 11:19

## 2025-08-01 RX ADMIN — ROFLUMILAST 500 MCG: 500 TABLET ORAL at 11:23

## 2025-08-01 RX ADMIN — ATORVASTATIN CALCIUM 40 MG: 40 TABLET, FILM COATED ORAL at 01:36

## 2025-08-01 RX ADMIN — APIXABAN 5 MG: 5 TABLET, FILM COATED ORAL at 01:36

## 2025-08-01 RX ADMIN — GUAIFENESIN 600 MG: 600 TABLET ORAL at 11:19

## 2025-08-01 ASSESSMENT — PAIN SCALES - GENERAL: PAINLEVEL_OUTOF10: 8

## 2025-08-01 ASSESSMENT — PAIN - FUNCTIONAL ASSESSMENT: PAIN_FUNCTIONAL_ASSESSMENT: ACTIVITIES ARE NOT PREVENTED

## 2025-08-01 ASSESSMENT — PAIN DESCRIPTION - ORIENTATION: ORIENTATION: RIGHT

## 2025-08-01 ASSESSMENT — PAIN DESCRIPTION - DESCRIPTORS: DESCRIPTORS: ACHING;DISCOMFORT;POUNDING;THROBBING

## 2025-08-01 ASSESSMENT — PAIN DESCRIPTION - LOCATION: LOCATION: FOOT

## 2025-08-02 LAB
ANION GAP SERPL CALCULATED.3IONS-SCNC: 8 MMOL/L (ref 9–17)
BASOPHILS # BLD: 0.04 K/UL
BASOPHILS NFR BLD: 1 % (ref 0–1)
BUN SERPL-MCNC: 16 MG/DL (ref 7–20)
CALCIUM SERPL-MCNC: 8.9 MG/DL (ref 8.3–10.6)
CHLORIDE SERPL-SCNC: 91 MMOL/L (ref 99–110)
CO2 SERPL-SCNC: 39 MMOL/L (ref 21–32)
CREAT SERPL-MCNC: 0.5 MG/DL (ref 0.8–1.3)
EOSINOPHIL # BLD: 0.13 K/UL
EOSINOPHILS RELATIVE PERCENT: 2 % (ref 0–3)
ERYTHROCYTE [DISTWIDTH] IN BLOOD BY AUTOMATED COUNT: 16.2 % (ref 11.7–14.9)
GFR, ESTIMATED: >90 ML/MIN/1.73M2
GLUCOSE BLD-MCNC: 139 MG/DL (ref 74–99)
GLUCOSE BLD-MCNC: 185 MG/DL (ref 74–99)
GLUCOSE BLD-MCNC: 193 MG/DL (ref 74–99)
GLUCOSE BLD-MCNC: 246 MG/DL (ref 74–99)
GLUCOSE SERPL-MCNC: 191 MG/DL (ref 74–99)
HCT VFR BLD AUTO: 37.4 % (ref 42–52)
HGB BLD-MCNC: 11.9 G/DL (ref 13.5–18)
IMM GRANULOCYTES # BLD AUTO: 0.05 K/UL
IMM GRANULOCYTES NFR BLD: 1 %
LYMPHOCYTES NFR BLD: 0.81 K/UL
LYMPHOCYTES RELATIVE PERCENT: 9 % (ref 24–44)
MCH RBC QN AUTO: 30.4 PG (ref 27–31)
MCHC RBC AUTO-ENTMCNC: 31.8 G/DL (ref 32–36)
MCV RBC AUTO: 95.4 FL (ref 78–100)
MONOCYTES NFR BLD: 0.58 K/UL
MONOCYTES NFR BLD: 7 % (ref 0–5)
NEUTROPHILS NFR BLD: 81 % (ref 36–66)
NEUTS SEG NFR BLD: 7.1 K/UL
PLATELET # BLD AUTO: 142 K/UL (ref 140–440)
PMV BLD AUTO: 10 FL (ref 7.5–11.1)
POTASSIUM SERPL-SCNC: 4.2 MMOL/L (ref 3.5–5.1)
RBC # BLD AUTO: 3.92 M/UL (ref 4.6–6.2)
SODIUM SERPL-SCNC: 138 MMOL/L (ref 136–145)
WBC OTHER # BLD: 8.7 K/UL (ref 4–10.5)

## 2025-08-02 PROCEDURE — 2700000000 HC OXYGEN THERAPY PER DAY

## 2025-08-02 PROCEDURE — 82962 GLUCOSE BLOOD TEST: CPT

## 2025-08-02 PROCEDURE — 80048 BASIC METABOLIC PNL TOTAL CA: CPT

## 2025-08-02 PROCEDURE — 6370000000 HC RX 637 (ALT 250 FOR IP): Performed by: STUDENT IN AN ORGANIZED HEALTH CARE EDUCATION/TRAINING PROGRAM

## 2025-08-02 PROCEDURE — 94761 N-INVAS EAR/PLS OXIMETRY MLT: CPT

## 2025-08-02 PROCEDURE — 2500000003 HC RX 250 WO HCPCS: Performed by: STUDENT IN AN ORGANIZED HEALTH CARE EDUCATION/TRAINING PROGRAM

## 2025-08-02 PROCEDURE — 36415 COLL VENOUS BLD VENIPUNCTURE: CPT

## 2025-08-02 PROCEDURE — APPNB15 APP NON BILLABLE TIME 0-15 MINS

## 2025-08-02 PROCEDURE — 6360000002 HC RX W HCPCS

## 2025-08-02 PROCEDURE — 6360000002 HC RX W HCPCS: Performed by: STUDENT IN AN ORGANIZED HEALTH CARE EDUCATION/TRAINING PROGRAM

## 2025-08-02 PROCEDURE — 2060000000 HC ICU INTERMEDIATE R&B

## 2025-08-02 PROCEDURE — 85025 COMPLETE CBC W/AUTO DIFF WBC: CPT

## 2025-08-02 PROCEDURE — 94640 AIRWAY INHALATION TREATMENT: CPT

## 2025-08-02 PROCEDURE — 99233 SBSQ HOSP IP/OBS HIGH 50: CPT | Performed by: INTERNAL MEDICINE

## 2025-08-02 PROCEDURE — 93005 ELECTROCARDIOGRAM TRACING: CPT | Performed by: INTERNAL MEDICINE

## 2025-08-02 RX ADMIN — APIXABAN 5 MG: 5 TABLET, FILM COATED ORAL at 10:55

## 2025-08-02 RX ADMIN — ROFLUMILAST 500 MCG: 500 TABLET ORAL at 11:04

## 2025-08-02 RX ADMIN — LISINOPRIL 2.5 MG: 5 TABLET ORAL at 10:54

## 2025-08-02 RX ADMIN — MONTELUKAST 10 MG: 10 TABLET, FILM COATED ORAL at 20:32

## 2025-08-02 RX ADMIN — GUAIFENESIN 600 MG: 600 TABLET ORAL at 20:32

## 2025-08-02 RX ADMIN — DIGOXIN 125 MCG: 125 TABLET ORAL at 10:55

## 2025-08-02 RX ADMIN — IPRATROPIUM BROMIDE AND ALBUTEROL SULFATE 1 DOSE: .5; 3 SOLUTION RESPIRATORY (INHALATION) at 14:58

## 2025-08-02 RX ADMIN — INSULIN LISPRO 1 UNITS: 100 INJECTION, SOLUTION INTRAVENOUS; SUBCUTANEOUS at 13:24

## 2025-08-02 RX ADMIN — APIXABAN 5 MG: 5 TABLET, FILM COATED ORAL at 20:32

## 2025-08-02 RX ADMIN — Medication 400 MG: at 10:55

## 2025-08-02 RX ADMIN — INSULIN GLARGINE 20 UNITS: 100 INJECTION, SOLUTION SUBCUTANEOUS at 20:32

## 2025-08-02 RX ADMIN — GUAIFENESIN 600 MG: 600 TABLET ORAL at 10:55

## 2025-08-02 RX ADMIN — INSULIN LISPRO 1 UNITS: 100 INJECTION, SOLUTION INTRAVENOUS; SUBCUTANEOUS at 17:45

## 2025-08-02 RX ADMIN — CARVEDILOL 3.12 MG: 6.25 TABLET, FILM COATED ORAL at 11:04

## 2025-08-02 RX ADMIN — BUDESONIDE AND FORMOTEROL FUMARATE DIHYDRATE 2 PUFF: 160; 4.5 AEROSOL RESPIRATORY (INHALATION) at 07:06

## 2025-08-02 RX ADMIN — FUROSEMIDE 60 MG: 10 INJECTION, SOLUTION INTRAMUSCULAR; INTRAVENOUS at 17:45

## 2025-08-02 RX ADMIN — IPRATROPIUM BROMIDE AND ALBUTEROL SULFATE 1 DOSE: .5; 3 SOLUTION RESPIRATORY (INHALATION) at 21:36

## 2025-08-02 RX ADMIN — BUDESONIDE AND FORMOTEROL FUMARATE DIHYDRATE 2 PUFF: 160; 4.5 AEROSOL RESPIRATORY (INHALATION) at 21:36

## 2025-08-02 RX ADMIN — SODIUM CHLORIDE, PRESERVATIVE FREE 10 ML: 5 INJECTION INTRAVENOUS at 20:32

## 2025-08-02 RX ADMIN — IPRATROPIUM BROMIDE AND ALBUTEROL SULFATE 1 DOSE: .5; 3 SOLUTION RESPIRATORY (INHALATION) at 07:06

## 2025-08-02 RX ADMIN — INSULIN LISPRO 1 UNITS: 100 INJECTION, SOLUTION INTRAVENOUS; SUBCUTANEOUS at 20:32

## 2025-08-02 RX ADMIN — SODIUM CHLORIDE, PRESERVATIVE FREE 10 ML: 5 INJECTION INTRAVENOUS at 10:55

## 2025-08-02 RX ADMIN — ATORVASTATIN CALCIUM 40 MG: 40 TABLET, FILM COATED ORAL at 20:32

## 2025-08-02 RX ADMIN — FUROSEMIDE 60 MG: 10 INJECTION, SOLUTION INTRAMUSCULAR; INTRAVENOUS at 10:54

## 2025-08-02 RX ADMIN — CARVEDILOL 3.12 MG: 6.25 TABLET, FILM COATED ORAL at 17:45

## 2025-08-02 RX ADMIN — KETOROLAC TROMETHAMINE 15 MG: 30 INJECTION, SOLUTION INTRAMUSCULAR; INTRAVENOUS at 02:41

## 2025-08-02 ASSESSMENT — PAIN DESCRIPTION - FREQUENCY
FREQUENCY: CONTINUOUS

## 2025-08-02 ASSESSMENT — PAIN DESCRIPTION - DESCRIPTORS
DESCRIPTORS: ACHING;DISCOMFORT
DESCRIPTORS: ACHING;DISCOMFORT;GNAWING
DESCRIPTORS: ACHING;DISCOMFORT;NAGGING

## 2025-08-02 ASSESSMENT — PAIN DESCRIPTION - ONSET
ONSET: ON-GOING

## 2025-08-02 ASSESSMENT — PAIN DESCRIPTION - PAIN TYPE
TYPE: ACUTE PAIN

## 2025-08-02 ASSESSMENT — PAIN DESCRIPTION - ORIENTATION
ORIENTATION: RIGHT

## 2025-08-02 ASSESSMENT — PAIN SCALES - GENERAL
PAINLEVEL_OUTOF10: 5
PAINLEVEL_OUTOF10: 8
PAINLEVEL_OUTOF10: 7
PAINLEVEL_OUTOF10: 5

## 2025-08-02 ASSESSMENT — PAIN - FUNCTIONAL ASSESSMENT
PAIN_FUNCTIONAL_ASSESSMENT: ACTIVITIES ARE NOT PREVENTED

## 2025-08-02 ASSESSMENT — PAIN SCALES - WONG BAKER
WONGBAKER_NUMERICALRESPONSE: HURTS A LITTLE BIT
WONGBAKER_NUMERICALRESPONSE: NO HURT

## 2025-08-02 ASSESSMENT — PAIN DESCRIPTION - LOCATION
LOCATION: FOOT

## 2025-08-03 LAB
ANION GAP SERPL CALCULATED.3IONS-SCNC: 7 MMOL/L (ref 9–17)
BASOPHILS # BLD: 0.03 K/UL
BASOPHILS NFR BLD: 0 % (ref 0–1)
BUN SERPL-MCNC: 11 MG/DL (ref 7–20)
CALCIUM SERPL-MCNC: 7.5 MG/DL (ref 8.3–10.6)
CHLORIDE SERPL-SCNC: 95 MMOL/L (ref 99–110)
CO2 SERPL-SCNC: 36 MMOL/L (ref 21–32)
CREAT SERPL-MCNC: 0.4 MG/DL (ref 0.8–1.3)
EOSINOPHIL # BLD: 0.11 K/UL
EOSINOPHILS RELATIVE PERCENT: 1 % (ref 0–3)
ERYTHROCYTE [DISTWIDTH] IN BLOOD BY AUTOMATED COUNT: 15.9 % (ref 11.7–14.9)
GFR, ESTIMATED: >90 ML/MIN/1.73M2
GLUCOSE BLD-MCNC: 146 MG/DL (ref 74–99)
GLUCOSE BLD-MCNC: 152 MG/DL (ref 74–99)
GLUCOSE BLD-MCNC: 209 MG/DL (ref 74–99)
GLUCOSE BLD-MCNC: 220 MG/DL (ref 74–99)
GLUCOSE SERPL-MCNC: 144 MG/DL (ref 74–99)
HCT VFR BLD AUTO: 34.7 % (ref 42–52)
HGB BLD-MCNC: 10.8 G/DL (ref 13.5–18)
IMM GRANULOCYTES # BLD AUTO: 0.04 K/UL
IMM GRANULOCYTES NFR BLD: 0 %
LYMPHOCYTES NFR BLD: 0.83 K/UL
LYMPHOCYTES RELATIVE PERCENT: 9 % (ref 24–44)
MCH RBC QN AUTO: 29.8 PG (ref 27–31)
MCHC RBC AUTO-ENTMCNC: 31.1 G/DL (ref 32–36)
MCV RBC AUTO: 95.9 FL (ref 78–100)
MONOCYTES NFR BLD: 0.83 K/UL
MONOCYTES NFR BLD: 9 % (ref 0–5)
NEUTROPHILS NFR BLD: 80 % (ref 36–66)
NEUTS SEG NFR BLD: 7.17 K/UL
PLATELET, FLUORESCENCE: 144 K/UL (ref 140–440)
PMV BLD AUTO: 9.9 FL (ref 7.5–11.1)
POTASSIUM SERPL-SCNC: 3.5 MMOL/L (ref 3.5–5.1)
RBC # BLD AUTO: 3.62 M/UL (ref 4.6–6.2)
SODIUM SERPL-SCNC: 139 MMOL/L (ref 136–145)
WBC OTHER # BLD: 9 K/UL (ref 4–10.5)

## 2025-08-03 PROCEDURE — 6370000000 HC RX 637 (ALT 250 FOR IP): Performed by: STUDENT IN AN ORGANIZED HEALTH CARE EDUCATION/TRAINING PROGRAM

## 2025-08-03 PROCEDURE — 6370000000 HC RX 637 (ALT 250 FOR IP)

## 2025-08-03 PROCEDURE — 99233 SBSQ HOSP IP/OBS HIGH 50: CPT | Performed by: INTERNAL MEDICINE

## 2025-08-03 PROCEDURE — 80048 BASIC METABOLIC PNL TOTAL CA: CPT

## 2025-08-03 PROCEDURE — 82962 GLUCOSE BLOOD TEST: CPT

## 2025-08-03 PROCEDURE — 2700000000 HC OXYGEN THERAPY PER DAY

## 2025-08-03 PROCEDURE — 85025 COMPLETE CBC W/AUTO DIFF WBC: CPT

## 2025-08-03 PROCEDURE — 2060000000 HC ICU INTERMEDIATE R&B

## 2025-08-03 PROCEDURE — 94640 AIRWAY INHALATION TREATMENT: CPT

## 2025-08-03 PROCEDURE — APPNB15 APP NON BILLABLE TIME 0-15 MINS

## 2025-08-03 PROCEDURE — 2500000003 HC RX 250 WO HCPCS: Performed by: STUDENT IN AN ORGANIZED HEALTH CARE EDUCATION/TRAINING PROGRAM

## 2025-08-03 RX ADMIN — INSULIN LISPRO 1 UNITS: 100 INJECTION, SOLUTION INTRAVENOUS; SUBCUTANEOUS at 20:11

## 2025-08-03 RX ADMIN — ATORVASTATIN CALCIUM 40 MG: 40 TABLET, FILM COATED ORAL at 20:11

## 2025-08-03 RX ADMIN — APIXABAN 5 MG: 5 TABLET, FILM COATED ORAL at 20:11

## 2025-08-03 RX ADMIN — IPRATROPIUM BROMIDE AND ALBUTEROL SULFATE 1 DOSE: .5; 3 SOLUTION RESPIRATORY (INHALATION) at 15:34

## 2025-08-03 RX ADMIN — GUAIFENESIN 600 MG: 600 TABLET ORAL at 20:11

## 2025-08-03 RX ADMIN — DIGOXIN 125 MCG: 125 TABLET ORAL at 09:00

## 2025-08-03 RX ADMIN — IPRATROPIUM BROMIDE AND ALBUTEROL SULFATE 1 DOSE: .5; 3 SOLUTION RESPIRATORY (INHALATION) at 20:09

## 2025-08-03 RX ADMIN — ACETAMINOPHEN 650 MG: 325 TABLET ORAL at 06:45

## 2025-08-03 RX ADMIN — INSULIN GLARGINE 20 UNITS: 100 INJECTION, SOLUTION SUBCUTANEOUS at 20:12

## 2025-08-03 RX ADMIN — INSULIN LISPRO 1 UNITS: 100 INJECTION, SOLUTION INTRAVENOUS; SUBCUTANEOUS at 17:51

## 2025-08-03 RX ADMIN — ROFLUMILAST 500 MCG: 500 TABLET ORAL at 09:00

## 2025-08-03 RX ADMIN — GUAIFENESIN 600 MG: 600 TABLET ORAL at 09:00

## 2025-08-03 RX ADMIN — IPRATROPIUM BROMIDE AND ALBUTEROL SULFATE 1 DOSE: .5; 3 SOLUTION RESPIRATORY (INHALATION) at 06:59

## 2025-08-03 RX ADMIN — CARVEDILOL 3.12 MG: 6.25 TABLET, FILM COATED ORAL at 16:25

## 2025-08-03 RX ADMIN — APIXABAN 5 MG: 5 TABLET, FILM COATED ORAL at 09:00

## 2025-08-03 RX ADMIN — BUDESONIDE AND FORMOTEROL FUMARATE DIHYDRATE 2 PUFF: 160; 4.5 AEROSOL RESPIRATORY (INHALATION) at 20:10

## 2025-08-03 RX ADMIN — SODIUM CHLORIDE, PRESERVATIVE FREE 5 ML: 5 INJECTION INTRAVENOUS at 09:03

## 2025-08-03 RX ADMIN — MONTELUKAST 10 MG: 10 TABLET, FILM COATED ORAL at 20:11

## 2025-08-03 RX ADMIN — IPRATROPIUM BROMIDE AND ALBUTEROL SULFATE 1 DOSE: .5; 3 SOLUTION RESPIRATORY (INHALATION) at 12:37

## 2025-08-03 RX ADMIN — SODIUM CHLORIDE, PRESERVATIVE FREE 10 ML: 5 INJECTION INTRAVENOUS at 20:12

## 2025-08-03 RX ADMIN — BUDESONIDE AND FORMOTEROL FUMARATE DIHYDRATE 2 PUFF: 160; 4.5 AEROSOL RESPIRATORY (INHALATION) at 06:59

## 2025-08-03 RX ADMIN — Medication 400 MG: at 09:00

## 2025-08-03 ASSESSMENT — PAIN DESCRIPTION - PAIN TYPE: TYPE: ACUTE PAIN

## 2025-08-03 ASSESSMENT — PAIN DESCRIPTION - ORIENTATION: ORIENTATION: RIGHT

## 2025-08-03 ASSESSMENT — PAIN DESCRIPTION - DESCRIPTORS: DESCRIPTORS: ACHING;BURNING;CRUSHING;DISCOMFORT

## 2025-08-03 ASSESSMENT — PAIN DESCRIPTION - ONSET: ONSET: ON-GOING

## 2025-08-03 ASSESSMENT — PAIN - FUNCTIONAL ASSESSMENT: PAIN_FUNCTIONAL_ASSESSMENT: ACTIVITIES ARE NOT PREVENTED

## 2025-08-03 ASSESSMENT — PAIN SCALES - GENERAL: PAINLEVEL_OUTOF10: 7

## 2025-08-03 ASSESSMENT — PAIN DESCRIPTION - LOCATION: LOCATION: FOOT

## 2025-08-03 ASSESSMENT — PAIN DESCRIPTION - FREQUENCY: FREQUENCY: CONTINUOUS

## 2025-08-03 ASSESSMENT — PAIN SCALES - WONG BAKER: WONGBAKER_NUMERICALRESPONSE: HURTS A LITTLE BIT

## 2025-08-04 ENCOUNTER — APPOINTMENT (OUTPATIENT)
Dept: GENERAL RADIOLOGY | Age: 70
DRG: 306 | End: 2025-08-04
Payer: MEDICARE

## 2025-08-04 VITALS
BODY MASS INDEX: 35.46 KG/M2 | HEIGHT: 78 IN | HEART RATE: 89 BPM | OXYGEN SATURATION: 97 % | WEIGHT: 306.44 LBS | TEMPERATURE: 98.4 F | RESPIRATION RATE: 21 BRPM | SYSTOLIC BLOOD PRESSURE: 79 MMHG | DIASTOLIC BLOOD PRESSURE: 57 MMHG

## 2025-08-04 LAB
ANION GAP SERPL CALCULATED.3IONS-SCNC: 7 MMOL/L (ref 9–17)
BASOPHILS # BLD: 0.04 K/UL
BASOPHILS NFR BLD: 0 % (ref 0–1)
BUN SERPL-MCNC: 10 MG/DL (ref 7–20)
CALCIUM SERPL-MCNC: 8.9 MG/DL (ref 8.3–10.6)
CHLORIDE SERPL-SCNC: 92 MMOL/L (ref 99–110)
CO2 SERPL-SCNC: 38 MMOL/L (ref 21–32)
CREAT SERPL-MCNC: 0.5 MG/DL (ref 0.8–1.3)
EKG ATRIAL RATE: 75 BPM
EKG ATRIAL RATE: 92 BPM
EKG DIAGNOSIS: NORMAL
EKG DIAGNOSIS: NORMAL
EKG Q-T INTERVAL: 334 MS
EKG Q-T INTERVAL: 382 MS
EKG QRS DURATION: 112 MS
EKG QRS DURATION: 96 MS
EKG QTC CALCULATION (BAZETT): 439 MS
EKG QTC CALCULATION (BAZETT): 443 MS
EKG R AXIS: -39 DEGREES
EKG R AXIS: -73 DEGREES
EKG T AXIS: 34 DEGREES
EKG T AXIS: 82 DEGREES
EKG VENTRICULAR RATE: 104 BPM
EKG VENTRICULAR RATE: 81 BPM
EOSINOPHIL # BLD: 0.13 K/UL
EOSINOPHILS RELATIVE PERCENT: 1 % (ref 0–3)
ERYTHROCYTE [DISTWIDTH] IN BLOOD BY AUTOMATED COUNT: 15.9 % (ref 11.7–14.9)
GFR, ESTIMATED: >90 ML/MIN/1.73M2
GLUCOSE BLD-MCNC: 129 MG/DL (ref 74–99)
GLUCOSE BLD-MCNC: 132 MG/DL (ref 74–99)
GLUCOSE BLD-MCNC: 155 MG/DL (ref 74–99)
GLUCOSE BLD-MCNC: 169 MG/DL (ref 74–99)
GLUCOSE SERPL-MCNC: 134 MG/DL (ref 74–99)
HCT VFR BLD AUTO: 36.6 % (ref 42–52)
HGB BLD-MCNC: 11.1 G/DL (ref 13.5–18)
IMM GRANULOCYTES # BLD AUTO: 0.06 K/UL
IMM GRANULOCYTES NFR BLD: 1 %
LYMPHOCYTES NFR BLD: 0.8 K/UL
LYMPHOCYTES RELATIVE PERCENT: 7 % (ref 24–44)
MAGNESIUM SERPL-MCNC: 2 MG/DL (ref 1.8–2.4)
MCH RBC QN AUTO: 29.9 PG (ref 27–31)
MCHC RBC AUTO-ENTMCNC: 30.3 G/DL (ref 32–36)
MCV RBC AUTO: 98.7 FL (ref 78–100)
MONOCYTES NFR BLD: 1 K/UL
MONOCYTES NFR BLD: 9 % (ref 0–5)
NEUTROPHILS NFR BLD: 81 % (ref 36–66)
NEUTS SEG NFR BLD: 8.82 K/UL
PLATELET # BLD AUTO: 153 K/UL (ref 140–440)
PMV BLD AUTO: 10.4 FL (ref 7.5–11.1)
POTASSIUM SERPL-SCNC: 4.2 MMOL/L (ref 3.5–5.1)
RBC # BLD AUTO: 3.71 M/UL (ref 4.6–6.2)
SODIUM SERPL-SCNC: 137 MMOL/L (ref 136–145)
WBC OTHER # BLD: 10.9 K/UL (ref 4–10.5)

## 2025-08-04 PROCEDURE — 82962 GLUCOSE BLOOD TEST: CPT

## 2025-08-04 PROCEDURE — 36415 COLL VENOUS BLD VENIPUNCTURE: CPT

## 2025-08-04 PROCEDURE — 85025 COMPLETE CBC W/AUTO DIFF WBC: CPT

## 2025-08-04 PROCEDURE — 6370000000 HC RX 637 (ALT 250 FOR IP)

## 2025-08-04 PROCEDURE — 6370000000 HC RX 637 (ALT 250 FOR IP): Performed by: STUDENT IN AN ORGANIZED HEALTH CARE EDUCATION/TRAINING PROGRAM

## 2025-08-04 PROCEDURE — 94761 N-INVAS EAR/PLS OXIMETRY MLT: CPT

## 2025-08-04 PROCEDURE — 80048 BASIC METABOLIC PNL TOTAL CA: CPT

## 2025-08-04 PROCEDURE — 2500000003 HC RX 250 WO HCPCS: Performed by: STUDENT IN AN ORGANIZED HEALTH CARE EDUCATION/TRAINING PROGRAM

## 2025-08-04 PROCEDURE — 6360000002 HC RX W HCPCS: Performed by: INTERNAL MEDICINE

## 2025-08-04 PROCEDURE — 83735 ASSAY OF MAGNESIUM: CPT

## 2025-08-04 PROCEDURE — 93010 ELECTROCARDIOGRAM REPORT: CPT | Performed by: INTERNAL MEDICINE

## 2025-08-04 PROCEDURE — 6360000002 HC RX W HCPCS: Performed by: NURSE PRACTITIONER

## 2025-08-04 PROCEDURE — 94640 AIRWAY INHALATION TREATMENT: CPT

## 2025-08-04 PROCEDURE — APPNB30 APP NON BILLABLE TIME 0-30 MINS: Performed by: NURSE PRACTITIONER

## 2025-08-04 PROCEDURE — 99291 CRITICAL CARE FIRST HOUR: CPT | Performed by: INTERNAL MEDICINE

## 2025-08-04 PROCEDURE — 99233 SBSQ HOSP IP/OBS HIGH 50: CPT | Performed by: THORACIC SURGERY (CARDIOTHORACIC VASCULAR SURGERY)

## 2025-08-04 PROCEDURE — 2700000000 HC OXYGEN THERAPY PER DAY

## 2025-08-04 PROCEDURE — 71045 X-RAY EXAM CHEST 1 VIEW: CPT

## 2025-08-04 PROCEDURE — 2060000000 HC ICU INTERMEDIATE R&B

## 2025-08-04 RX ORDER — FUROSEMIDE 10 MG/ML
40 INJECTION INTRAMUSCULAR; INTRAVENOUS 2 TIMES DAILY
Status: DISCONTINUED | OUTPATIENT
Start: 2025-08-04 | End: 2025-08-05 | Stop reason: HOSPADM

## 2025-08-04 RX ORDER — MORPHINE SULFATE 2 MG/ML
1 INJECTION, SOLUTION INTRAMUSCULAR; INTRAVENOUS ONCE
Status: COMPLETED | OUTPATIENT
Start: 2025-08-04 | End: 2025-08-04

## 2025-08-04 RX ADMIN — SODIUM CHLORIDE, PRESERVATIVE FREE 10 ML: 5 INJECTION INTRAVENOUS at 21:18

## 2025-08-04 RX ADMIN — LISINOPRIL 2.5 MG: 5 TABLET ORAL at 09:57

## 2025-08-04 RX ADMIN — IPRATROPIUM BROMIDE AND ALBUTEROL SULFATE 1 DOSE: .5; 3 SOLUTION RESPIRATORY (INHALATION) at 11:27

## 2025-08-04 RX ADMIN — GUAIFENESIN 600 MG: 600 TABLET ORAL at 21:17

## 2025-08-04 RX ADMIN — MONTELUKAST 10 MG: 10 TABLET, FILM COATED ORAL at 21:17

## 2025-08-04 RX ADMIN — INSULIN GLARGINE 20 UNITS: 100 INJECTION, SOLUTION SUBCUTANEOUS at 21:17

## 2025-08-04 RX ADMIN — CARVEDILOL 3.12 MG: 6.25 TABLET, FILM COATED ORAL at 09:59

## 2025-08-04 RX ADMIN — BUDESONIDE AND FORMOTEROL FUMARATE DIHYDRATE 2 PUFF: 160; 4.5 AEROSOL RESPIRATORY (INHALATION) at 19:25

## 2025-08-04 RX ADMIN — Medication 400 MG: at 09:59

## 2025-08-04 RX ADMIN — IPRATROPIUM BROMIDE AND ALBUTEROL SULFATE 1 DOSE: .5; 3 SOLUTION RESPIRATORY (INHALATION) at 15:31

## 2025-08-04 RX ADMIN — ATORVASTATIN CALCIUM 40 MG: 40 TABLET, FILM COATED ORAL at 21:17

## 2025-08-04 RX ADMIN — DIGOXIN 125 MCG: 125 TABLET ORAL at 09:58

## 2025-08-04 RX ADMIN — MORPHINE SULFATE 1 MG: 2 INJECTION, SOLUTION INTRAMUSCULAR; INTRAVENOUS at 11:31

## 2025-08-04 RX ADMIN — ACETAMINOPHEN 650 MG: 325 TABLET ORAL at 04:28

## 2025-08-04 RX ADMIN — FUROSEMIDE 40 MG: 10 INJECTION, SOLUTION INTRAMUSCULAR; INTRAVENOUS at 18:56

## 2025-08-04 RX ADMIN — APIXABAN 5 MG: 5 TABLET, FILM COATED ORAL at 21:17

## 2025-08-04 RX ADMIN — SODIUM CHLORIDE, PRESERVATIVE FREE 10 ML: 5 INJECTION INTRAVENOUS at 10:02

## 2025-08-04 RX ADMIN — IPRATROPIUM BROMIDE AND ALBUTEROL SULFATE 1 DOSE: .5; 3 SOLUTION RESPIRATORY (INHALATION) at 19:24

## 2025-08-04 RX ADMIN — CARVEDILOL 3.12 MG: 6.25 TABLET, FILM COATED ORAL at 18:55

## 2025-08-04 RX ADMIN — APIXABAN 5 MG: 5 TABLET, FILM COATED ORAL at 09:58

## 2025-08-04 RX ADMIN — GUAIFENESIN 600 MG: 600 TABLET ORAL at 09:59

## 2025-08-04 RX ADMIN — ROFLUMILAST 500 MCG: 500 TABLET ORAL at 10:01

## 2025-08-04 ASSESSMENT — PAIN - FUNCTIONAL ASSESSMENT: PAIN_FUNCTIONAL_ASSESSMENT: ACTIVITIES ARE NOT PREVENTED

## 2025-08-04 ASSESSMENT — PAIN SCALES - GENERAL
PAINLEVEL_OUTOF10: 0
PAINLEVEL_OUTOF10: 6

## 2025-08-04 ASSESSMENT — PAIN DESCRIPTION - ONSET: ONSET: ON-GOING

## 2025-08-04 ASSESSMENT — PAIN SCALES - WONG BAKER: WONGBAKER_NUMERICALRESPONSE: NO HURT

## 2025-08-04 ASSESSMENT — PAIN DESCRIPTION - DESCRIPTORS: DESCRIPTORS: ACHING;DISCOMFORT

## 2025-08-04 ASSESSMENT — PAIN DESCRIPTION - ORIENTATION: ORIENTATION: RIGHT

## 2025-08-04 ASSESSMENT — PAIN DESCRIPTION - PAIN TYPE: TYPE: ACUTE PAIN

## 2025-08-04 ASSESSMENT — PAIN DESCRIPTION - FREQUENCY: FREQUENCY: CONTINUOUS

## 2025-08-04 ASSESSMENT — PAIN DESCRIPTION - LOCATION: LOCATION: FOOT

## 2025-08-05 ENCOUNTER — TELEPHONE (OUTPATIENT)
Age: 70
End: 2025-08-05

## 2025-08-08 ENCOUNTER — TELEPHONE (OUTPATIENT)
Dept: CARDIOTHORACIC SURGERY | Age: 70
End: 2025-08-08

## 2025-08-15 ENCOUNTER — APPOINTMENT (OUTPATIENT)
Dept: GENERAL RADIOLOGY | Age: 70
End: 2025-08-15
Payer: MEDICARE

## 2025-08-15 ENCOUNTER — HOSPITAL ENCOUNTER (INPATIENT)
Age: 70
LOS: 7 days | Discharge: NURSING, REHAB OR LONG-TERM CARE | End: 2025-08-22
Attending: EMERGENCY MEDICINE | Admitting: STUDENT IN AN ORGANIZED HEALTH CARE EDUCATION/TRAINING PROGRAM
Payer: MEDICARE

## 2025-08-15 ENCOUNTER — APPOINTMENT (OUTPATIENT)
Dept: GENERAL RADIOLOGY | Age: 70
End: 2025-08-15
Attending: STUDENT IN AN ORGANIZED HEALTH CARE EDUCATION/TRAINING PROGRAM
Payer: MEDICARE

## 2025-08-15 ENCOUNTER — APPOINTMENT (OUTPATIENT)
Dept: CT IMAGING | Age: 70
End: 2025-08-15
Attending: EMERGENCY MEDICINE
Payer: MEDICARE

## 2025-08-15 DIAGNOSIS — J18.9 PNEUMONIA DUE TO INFECTIOUS ORGANISM, UNSPECIFIED LATERALITY, UNSPECIFIED PART OF LUNG: ICD-10-CM

## 2025-08-15 DIAGNOSIS — R06.00 DYSPNEA AND RESPIRATORY ABNORMALITIES: ICD-10-CM

## 2025-08-15 DIAGNOSIS — S92.354A CLOSED NONDISPLACED FRACTURE OF FIFTH METATARSAL BONE OF RIGHT FOOT, INITIAL ENCOUNTER: ICD-10-CM

## 2025-08-15 DIAGNOSIS — I72.4 PSEUDOANEURYSM OF FEMORAL ARTERY: ICD-10-CM

## 2025-08-15 DIAGNOSIS — M25.571 RIGHT ANKLE PAIN, UNSPECIFIED CHRONICITY: ICD-10-CM

## 2025-08-15 DIAGNOSIS — R52 BODY ACHES: Primary | ICD-10-CM

## 2025-08-15 DIAGNOSIS — R07.9 CHEST PAIN, UNSPECIFIED TYPE: ICD-10-CM

## 2025-08-15 DIAGNOSIS — R10.9 ABDOMINAL PAIN, UNSPECIFIED ABDOMINAL LOCATION: ICD-10-CM

## 2025-08-15 DIAGNOSIS — I72.9 PSEUDOANEURYSM: ICD-10-CM

## 2025-08-15 DIAGNOSIS — R06.89 DYSPNEA AND RESPIRATORY ABNORMALITIES: ICD-10-CM

## 2025-08-15 DIAGNOSIS — I50.33 ACUTE ON CHRONIC DIASTOLIC (CONGESTIVE) HEART FAILURE (HCC): ICD-10-CM

## 2025-08-15 LAB
ALBUMIN SERPL-MCNC: 3.1 G/DL (ref 3.4–5)
ALBUMIN/GLOB SERPL: 1.2 {RATIO} (ref 1.1–2.2)
ALP SERPL-CCNC: 107 U/L (ref 40–129)
ALT SERPL-CCNC: 10 U/L (ref 10–40)
AMMONIA PLAS-SCNC: 27 UMOL/L (ref 16–60)
ANION GAP SERPL CALCULATED.3IONS-SCNC: 10 MMOL/L (ref 9–17)
ARTERIAL PATENCY WRIST A: ABNORMAL
AST SERPL-CCNC: 20 U/L (ref 15–37)
BASOPHILS # BLD: 0.05 K/UL
BASOPHILS NFR BLD: 1 % (ref 0–1)
BILIRUB SERPL-MCNC: 0.8 MG/DL (ref 0–1)
BILIRUB UR QL STRIP: NEGATIVE
BNP SERPL-MCNC: 3228 PG/ML (ref 0–125)
BODY TEMPERATURE: 37
BUN SERPL-MCNC: 13 MG/DL (ref 7–20)
CALCIUM SERPL-MCNC: 8.9 MG/DL (ref 8.3–10.6)
CHLORIDE SERPL-SCNC: 88 MMOL/L (ref 99–110)
CLARITY UR: CLEAR
CO2 SERPL-SCNC: 39 MMOL/L (ref 21–32)
COHGB MFR BLD: 2.3 % (ref 0.5–1.5)
COLOR UR: YELLOW
COMMENT: ABNORMAL
CREAT SERPL-MCNC: 0.6 MG/DL (ref 0.8–1.3)
CRP SERPL HS-MCNC: 87.4 MG/L (ref 0–5)
EKG ATRIAL RATE: 67 BPM
EKG DIAGNOSIS: NORMAL
EKG Q-T INTERVAL: 394 MS
EKG QRS DURATION: 98 MS
EKG QTC CALCULATION (BAZETT): 418 MS
EKG R AXIS: -84 DEGREES
EKG T AXIS: 28 DEGREES
EKG VENTRICULAR RATE: 68 BPM
EOSINOPHIL # BLD: 0.15 K/UL
EOSINOPHILS RELATIVE PERCENT: 2 % (ref 0–3)
ERYTHROCYTE [DISTWIDTH] IN BLOOD BY AUTOMATED COUNT: 16.8 % (ref 11.7–14.9)
GFR, ESTIMATED: >90 ML/MIN/1.73M2
GLUCOSE SERPL-MCNC: 131 MG/DL (ref 74–99)
GLUCOSE UR STRIP-MCNC: NEGATIVE MG/DL
HCO3 VENOUS: 42.6 MMOL/L (ref 22–29)
HCT VFR BLD AUTO: 33.6 % (ref 42–52)
HGB BLD-MCNC: 10.7 G/DL (ref 13.5–18)
HGB UR QL STRIP.AUTO: NEGATIVE
IMM GRANULOCYTES # BLD AUTO: 0.06 K/UL
IMM GRANULOCYTES NFR BLD: 1 %
INR PPP: 1.5
KETONES UR STRIP-MCNC: NEGATIVE MG/DL
LACTATE BLDV-SCNC: 2 MMOL/L (ref 0.4–2)
LACTATE BLDV-SCNC: 2.4 MMOL/L (ref 0.4–2)
LEUKOCYTE ESTERASE UR QL STRIP: NEGATIVE
LIPASE SERPL-CCNC: 31 U/L (ref 13–60)
LYMPHOCYTES NFR BLD: 1.1 K/UL
LYMPHOCYTES RELATIVE PERCENT: 11 % (ref 24–44)
MCH RBC QN AUTO: 30.1 PG (ref 27–31)
MCHC RBC AUTO-ENTMCNC: 31.8 G/DL (ref 32–36)
MCV RBC AUTO: 94.4 FL (ref 78–100)
METHEMOGLOBIN: 0.1 % (ref 0.5–1.5)
MONOCYTES NFR BLD: 0.87 K/UL
MONOCYTES NFR BLD: 9 % (ref 0–5)
NEUTROPHILS NFR BLD: 77 % (ref 36–66)
NEUTS SEG NFR BLD: 7.52 K/UL
NITRITE UR QL STRIP: NEGATIVE
OXYHGB MFR BLD: 52.9 %
PARTIAL THROMBOPLASTIN TIME: 32.4 SEC (ref 25.1–37.1)
PCO2 VENOUS: 72.7 MM HG (ref 38–54)
PH UR STRIP: 7.5 [PH] (ref 5–8)
PH VENOUS: 7.39 (ref 7.32–7.43)
PLATELET # BLD AUTO: 195 K/UL (ref 140–440)
PMV BLD AUTO: 9.8 FL (ref 7.5–11.1)
PO2 VENOUS: 31.7 MM HG (ref 23–48)
POSITIVE BASE EXCESS, VEN: 14.7 MMOL/L (ref 0–3)
POTASSIUM SERPL-SCNC: 4.4 MMOL/L (ref 3.5–5.1)
PROCALCITONIN SERPL-MCNC: 0.27 NG/ML
PROT SERPL-MCNC: 5.6 G/DL (ref 6.4–8.2)
PROT UR STRIP-MCNC: NEGATIVE MG/DL
PROTHROMBIN TIME: 18.6 SEC (ref 11.7–14.5)
RBC # BLD AUTO: 3.56 M/UL (ref 4.6–6.2)
SODIUM SERPL-SCNC: 137 MMOL/L (ref 136–145)
SP GR UR STRIP: <1.005 (ref 1–1.03)
TEXT FOR RESPIRATORY: ABNORMAL
TROPONIN I SERPL HS-MCNC: 57 NG/L (ref 0–22)
TROPONIN I SERPL HS-MCNC: 61 NG/L (ref 0–22)
TROPONIN I SERPL HS-MCNC: 63 NG/L (ref 0–22)
UROBILINOGEN UR STRIP-ACNC: 1 EU/DL (ref 0–1)
WBC OTHER # BLD: 9.8 K/UL (ref 4–10.5)

## 2025-08-15 PROCEDURE — 83690 ASSAY OF LIPASE: CPT

## 2025-08-15 PROCEDURE — 87040 BLOOD CULTURE FOR BACTERIA: CPT

## 2025-08-15 PROCEDURE — 83605 ASSAY OF LACTIC ACID: CPT

## 2025-08-15 PROCEDURE — 96367 TX/PROPH/DG ADDL SEQ IV INF: CPT

## 2025-08-15 PROCEDURE — 84484 ASSAY OF TROPONIN QUANT: CPT

## 2025-08-15 PROCEDURE — 1200000000 HC SEMI PRIVATE

## 2025-08-15 PROCEDURE — 93005 ELECTROCARDIOGRAM TRACING: CPT | Performed by: EMERGENCY MEDICINE

## 2025-08-15 PROCEDURE — 2700000000 HC OXYGEN THERAPY PER DAY

## 2025-08-15 PROCEDURE — 2580000003 HC RX 258: Performed by: EMERGENCY MEDICINE

## 2025-08-15 PROCEDURE — 94640 AIRWAY INHALATION TREATMENT: CPT

## 2025-08-15 PROCEDURE — 85652 RBC SED RATE AUTOMATED: CPT

## 2025-08-15 PROCEDURE — 96365 THER/PROPH/DIAG IV INF INIT: CPT

## 2025-08-15 PROCEDURE — 82805 BLOOD GASES W/O2 SATURATION: CPT

## 2025-08-15 PROCEDURE — 99285 EMERGENCY DEPT VISIT HI MDM: CPT

## 2025-08-15 PROCEDURE — 93010 ELECTROCARDIOGRAM REPORT: CPT | Performed by: INTERNAL MEDICINE

## 2025-08-15 PROCEDURE — 81003 URINALYSIS AUTO W/O SCOPE: CPT

## 2025-08-15 PROCEDURE — 82140 ASSAY OF AMMONIA: CPT

## 2025-08-15 PROCEDURE — 73630 X-RAY EXAM OF FOOT: CPT

## 2025-08-15 PROCEDURE — 84145 PROCALCITONIN (PCT): CPT

## 2025-08-15 PROCEDURE — 6370000000 HC RX 637 (ALT 250 FOR IP)

## 2025-08-15 PROCEDURE — 86140 C-REACTIVE PROTEIN: CPT

## 2025-08-15 PROCEDURE — 6360000002 HC RX W HCPCS: Performed by: EMERGENCY MEDICINE

## 2025-08-15 PROCEDURE — 80053 COMPREHEN METABOLIC PANEL: CPT

## 2025-08-15 PROCEDURE — 73562 X-RAY EXAM OF KNEE 3: CPT

## 2025-08-15 PROCEDURE — 87449 NOS EACH ORGANISM AG IA: CPT

## 2025-08-15 PROCEDURE — 87086 URINE CULTURE/COLONY COUNT: CPT

## 2025-08-15 PROCEDURE — 36415 COLL VENOUS BLD VENIPUNCTURE: CPT

## 2025-08-15 PROCEDURE — 2500000003 HC RX 250 WO HCPCS: Performed by: STUDENT IN AN ORGANIZED HEALTH CARE EDUCATION/TRAINING PROGRAM

## 2025-08-15 PROCEDURE — 85730 THROMBOPLASTIN TIME PARTIAL: CPT

## 2025-08-15 PROCEDURE — 73610 X-RAY EXAM OF ANKLE: CPT

## 2025-08-15 PROCEDURE — 85025 COMPLETE CBC W/AUTO DIFF WBC: CPT

## 2025-08-15 PROCEDURE — 83880 ASSAY OF NATRIURETIC PEPTIDE: CPT

## 2025-08-15 PROCEDURE — 6360000004 HC RX CONTRAST MEDICATION: Performed by: EMERGENCY MEDICINE

## 2025-08-15 PROCEDURE — 6370000000 HC RX 637 (ALT 250 FOR IP): Performed by: EMERGENCY MEDICINE

## 2025-08-15 PROCEDURE — 71045 X-RAY EXAM CHEST 1 VIEW: CPT

## 2025-08-15 PROCEDURE — 74177 CT ABD & PELVIS W/CONTRAST: CPT

## 2025-08-15 PROCEDURE — 96375 TX/PRO/DX INJ NEW DRUG ADDON: CPT

## 2025-08-15 PROCEDURE — 85610 PROTHROMBIN TIME: CPT

## 2025-08-15 RX ORDER — MAGNESIUM SULFATE IN WATER 40 MG/ML
2000 INJECTION, SOLUTION INTRAVENOUS PRN
Status: DISCONTINUED | OUTPATIENT
Start: 2025-08-15 | End: 2025-08-22 | Stop reason: HOSPADM

## 2025-08-15 RX ORDER — SODIUM CHLORIDE 0.9 % (FLUSH) 0.9 %
5-40 SYRINGE (ML) INJECTION PRN
Status: DISCONTINUED | OUTPATIENT
Start: 2025-08-15 | End: 2025-08-22 | Stop reason: HOSPADM

## 2025-08-15 RX ORDER — POLYETHYLENE GLYCOL 3350 17 G/17G
17 POWDER, FOR SOLUTION ORAL DAILY PRN
Status: DISCONTINUED | OUTPATIENT
Start: 2025-08-15 | End: 2025-08-17

## 2025-08-15 RX ORDER — BISOPROLOL FUMARATE 2.5 MG/1
2.5 TABLET, FILM COATED ORAL DAILY
COMMUNITY

## 2025-08-15 RX ORDER — SODIUM CHLORIDE 0.9 % (FLUSH) 0.9 %
5-40 SYRINGE (ML) INJECTION EVERY 12 HOURS SCHEDULED
Status: DISCONTINUED | OUTPATIENT
Start: 2025-08-15 | End: 2025-08-22 | Stop reason: HOSPADM

## 2025-08-15 RX ORDER — 0.9 % SODIUM CHLORIDE 0.9 %
1000 INTRAVENOUS SOLUTION INTRAVENOUS ONCE
Status: COMPLETED | OUTPATIENT
Start: 2025-08-15 | End: 2025-08-15

## 2025-08-15 RX ORDER — POTASSIUM CHLORIDE 7.45 MG/ML
10 INJECTION INTRAVENOUS PRN
Status: DISCONTINUED | OUTPATIENT
Start: 2025-08-15 | End: 2025-08-22 | Stop reason: HOSPADM

## 2025-08-15 RX ORDER — SODIUM CHLORIDE 9 MG/ML
INJECTION, SOLUTION INTRAVENOUS PRN
Status: DISCONTINUED | OUTPATIENT
Start: 2025-08-15 | End: 2025-08-22 | Stop reason: HOSPADM

## 2025-08-15 RX ORDER — HYDROCODONE BITARTRATE AND ACETAMINOPHEN 5; 325 MG/1; MG/1
1 TABLET ORAL EVERY 8 HOURS PRN
COMMUNITY

## 2025-08-15 RX ORDER — ONDANSETRON 2 MG/ML
4 INJECTION INTRAMUSCULAR; INTRAVENOUS EVERY 6 HOURS PRN
Status: DISCONTINUED | OUTPATIENT
Start: 2025-08-15 | End: 2025-08-22 | Stop reason: HOSPADM

## 2025-08-15 RX ORDER — HYDROXYZINE HYDROCHLORIDE 25 MG/1
25 TABLET, FILM COATED ORAL EVERY 6 HOURS PRN
COMMUNITY

## 2025-08-15 RX ORDER — ASPIRIN 81 MG/1
81 TABLET, CHEWABLE ORAL DAILY
COMMUNITY

## 2025-08-15 RX ORDER — ONDANSETRON 2 MG/ML
4 INJECTION INTRAMUSCULAR; INTRAVENOUS EVERY 30 MIN PRN
Status: DISCONTINUED | OUTPATIENT
Start: 2025-08-15 | End: 2025-08-15

## 2025-08-15 RX ORDER — ACETAMINOPHEN 325 MG/1
650 TABLET ORAL EVERY 6 HOURS PRN
Status: DISCONTINUED | OUTPATIENT
Start: 2025-08-15 | End: 2025-08-22 | Stop reason: HOSPADM

## 2025-08-15 RX ORDER — UMECLIDINIUM 62.5 UG/1
1 AEROSOL, POWDER ORAL DAILY
COMMUNITY

## 2025-08-15 RX ORDER — POTASSIUM CHLORIDE 1500 MG/1
40 TABLET, EXTENDED RELEASE ORAL PRN
Status: DISCONTINUED | OUTPATIENT
Start: 2025-08-15 | End: 2025-08-22 | Stop reason: HOSPADM

## 2025-08-15 RX ORDER — 0.9 % SODIUM CHLORIDE 0.9 %
500 INTRAVENOUS SOLUTION INTRAVENOUS ONCE
Status: COMPLETED | OUTPATIENT
Start: 2025-08-15 | End: 2025-08-15

## 2025-08-15 RX ORDER — ONDANSETRON 4 MG/1
4 TABLET, ORALLY DISINTEGRATING ORAL EVERY 8 HOURS PRN
Status: DISCONTINUED | OUTPATIENT
Start: 2025-08-15 | End: 2025-08-22 | Stop reason: HOSPADM

## 2025-08-15 RX ORDER — IPRATROPIUM BROMIDE AND ALBUTEROL SULFATE 2.5; .5 MG/3ML; MG/3ML
1 SOLUTION RESPIRATORY (INHALATION) ONCE
Status: COMPLETED | OUTPATIENT
Start: 2025-08-15 | End: 2025-08-15

## 2025-08-15 RX ORDER — MORPHINE SULFATE 4 MG/ML
4 INJECTION, SOLUTION INTRAMUSCULAR; INTRAVENOUS EVERY 30 MIN PRN
Refills: 0 | Status: DISCONTINUED | OUTPATIENT
Start: 2025-08-15 | End: 2025-08-15

## 2025-08-15 RX ORDER — IOPAMIDOL 755 MG/ML
75 INJECTION, SOLUTION INTRAVASCULAR
Status: COMPLETED | OUTPATIENT
Start: 2025-08-15 | End: 2025-08-15

## 2025-08-15 RX ORDER — HYDROCODONE BITARTRATE AND ACETAMINOPHEN 5; 325 MG/1; MG/1
1 TABLET ORAL
Status: COMPLETED | OUTPATIENT
Start: 2025-08-15 | End: 2025-08-15

## 2025-08-15 RX ORDER — ACETAMINOPHEN 650 MG/1
650 SUPPOSITORY RECTAL EVERY 6 HOURS PRN
Status: DISCONTINUED | OUTPATIENT
Start: 2025-08-15 | End: 2025-08-22 | Stop reason: HOSPADM

## 2025-08-15 RX ADMIN — SODIUM CHLORIDE 500 ML: 9 INJECTION, SOLUTION INTRAVENOUS at 12:36

## 2025-08-15 RX ADMIN — MORPHINE SULFATE 4 MG: 4 INJECTION, SOLUTION INTRAMUSCULAR; INTRAVENOUS at 11:54

## 2025-08-15 RX ADMIN — ONDANSETRON 4 MG: 2 INJECTION INTRAMUSCULAR; INTRAVENOUS at 11:54

## 2025-08-15 RX ADMIN — VANCOMYCIN HYDROCHLORIDE 2500 MG: 500 INJECTION, POWDER, LYOPHILIZED, FOR SOLUTION INTRAVENOUS at 17:07

## 2025-08-15 RX ADMIN — SODIUM CHLORIDE 1000 ML: 0.9 INJECTION, SOLUTION INTRAVENOUS at 11:52

## 2025-08-15 RX ADMIN — SODIUM CHLORIDE, PRESERVATIVE FREE 10 ML: 5 INJECTION INTRAVENOUS at 22:07

## 2025-08-15 RX ADMIN — HYDROCODONE BITARTRATE AND ACETAMINOPHEN 1 TABLET: 5; 325 TABLET ORAL at 23:38

## 2025-08-15 RX ADMIN — CEFEPIME 2000 MG: 2 INJECTION, POWDER, FOR SOLUTION INTRAVENOUS at 16:17

## 2025-08-15 RX ADMIN — IOPAMIDOL 75 ML: 755 INJECTION, SOLUTION INTRAVENOUS at 15:43

## 2025-08-15 RX ADMIN — IPRATROPIUM BROMIDE AND ALBUTEROL SULFATE 1 DOSE: .5; 2.5 SOLUTION RESPIRATORY (INHALATION) at 15:34

## 2025-08-15 ASSESSMENT — PAIN DESCRIPTION - ORIENTATION: ORIENTATION: LEFT

## 2025-08-15 ASSESSMENT — PAIN DESCRIPTION - DESCRIPTORS
DESCRIPTORS: ACHING
DESCRIPTORS: ACHING

## 2025-08-15 ASSESSMENT — PAIN - FUNCTIONAL ASSESSMENT
PAIN_FUNCTIONAL_ASSESSMENT: 0-10
PAIN_FUNCTIONAL_ASSESSMENT: PREVENTS OR INTERFERES SOME ACTIVE ACTIVITIES AND ADLS
PAIN_FUNCTIONAL_ASSESSMENT: 0-10

## 2025-08-15 ASSESSMENT — PAIN DESCRIPTION - LOCATION: LOCATION: GROIN

## 2025-08-15 ASSESSMENT — PAIN DESCRIPTION - PAIN TYPE: TYPE: CHRONIC PAIN;ACUTE PAIN

## 2025-08-15 ASSESSMENT — PAIN SCALES - GENERAL
PAINLEVEL_OUTOF10: 7
PAINLEVEL_OUTOF10: 6
PAINLEVEL_OUTOF10: 8

## 2025-08-16 PROBLEM — I72.4 PSEUDOANEURYSM OF FEMORAL ARTERY: Status: ACTIVE | Noted: 2025-08-16

## 2025-08-16 LAB
ANION GAP SERPL CALCULATED.3IONS-SCNC: 6 MMOL/L (ref 9–17)
BASOPHILS # BLD: 0.05 K/UL
BASOPHILS NFR BLD: 1 % (ref 0–1)
BNP SERPL-MCNC: 2857 PG/ML (ref 0–125)
BUN SERPL-MCNC: 12 MG/DL (ref 7–20)
CALCIUM SERPL-MCNC: 8.8 MG/DL (ref 8.3–10.6)
CHLORIDE SERPL-SCNC: 92 MMOL/L (ref 99–110)
CO2 SERPL-SCNC: 38 MMOL/L (ref 21–32)
CREAT SERPL-MCNC: 0.6 MG/DL (ref 0.8–1.3)
EOSINOPHIL # BLD: 0.22 K/UL
EOSINOPHILS RELATIVE PERCENT: 2 % (ref 0–3)
ERYTHROCYTE [DISTWIDTH] IN BLOOD BY AUTOMATED COUNT: 16.9 % (ref 11.7–14.9)
ERYTHROCYTE [SEDIMENTATION RATE] IN BLOOD BY WESTERGREN METHOD: 71 MM/HR (ref 0–20)
GFR, ESTIMATED: >90 ML/MIN/1.73M2
GLUCOSE BLD-MCNC: 135 MG/DL (ref 74–99)
GLUCOSE BLD-MCNC: 138 MG/DL (ref 74–99)
GLUCOSE BLD-MCNC: 159 MG/DL (ref 74–99)
GLUCOSE BLD-MCNC: 161 MG/DL (ref 74–99)
GLUCOSE BLD-MCNC: 192 MG/DL (ref 74–99)
GLUCOSE SERPL-MCNC: 137 MG/DL (ref 74–99)
HCT VFR BLD AUTO: 30.6 % (ref 42–52)
HGB BLD-MCNC: 9.7 G/DL (ref 13.5–18)
IMM GRANULOCYTES # BLD AUTO: 0.07 K/UL
IMM GRANULOCYTES NFR BLD: 1 %
LYMPHOCYTES NFR BLD: 0.97 K/UL
LYMPHOCYTES RELATIVE PERCENT: 10 % (ref 24–44)
MCH RBC QN AUTO: 30.4 PG (ref 27–31)
MCHC RBC AUTO-ENTMCNC: 31.7 G/DL (ref 32–36)
MCV RBC AUTO: 95.9 FL (ref 78–100)
MICROORGANISM SPEC CULT: NORMAL
MONOCYTES NFR BLD: 0.89 K/UL
MONOCYTES NFR BLD: 9 % (ref 0–5)
NEUTROPHILS NFR BLD: 77 % (ref 36–66)
NEUTS SEG NFR BLD: 7.52 K/UL
PLATELET # BLD AUTO: 167 K/UL (ref 140–440)
PMV BLD AUTO: 9.8 FL (ref 7.5–11.1)
POTASSIUM SERPL-SCNC: 4.3 MMOL/L (ref 3.5–5.1)
PROCALCITONIN SERPL-MCNC: 0.17 NG/ML
RBC # BLD AUTO: 3.19 M/UL (ref 4.6–6.2)
S PNEUM AG SPEC QL: NEGATIVE
SERVICE CMNT-IMP: NORMAL
SODIUM SERPL-SCNC: 136 MMOL/L (ref 136–145)
SPECIMEN DESCRIPTION: NORMAL
SPECIMEN SOURCE: NORMAL
URATE SERPL-MCNC: 7 MG/DL (ref 3.5–7.2)
WBC OTHER # BLD: 9.7 K/UL (ref 4–10.5)

## 2025-08-16 PROCEDURE — 3E053GC INTRODUCTION OF OTHER THERAPEUTIC SUBSTANCE INTO PERIPHERAL ARTERY, PERCUTANEOUS APPROACH: ICD-10-PCS | Performed by: THORACIC SURGERY (CARDIOTHORACIC VASCULAR SURGERY)

## 2025-08-16 PROCEDURE — 2500000003 HC RX 250 WO HCPCS: Performed by: STUDENT IN AN ORGANIZED HEALTH CARE EDUCATION/TRAINING PROGRAM

## 2025-08-16 PROCEDURE — 6360000002 HC RX W HCPCS: Performed by: STUDENT IN AN ORGANIZED HEALTH CARE EDUCATION/TRAINING PROGRAM

## 2025-08-16 PROCEDURE — 6370000000 HC RX 637 (ALT 250 FOR IP)

## 2025-08-16 PROCEDURE — 94664 DEMO&/EVAL PT USE INHALER: CPT

## 2025-08-16 PROCEDURE — 36002 PSEUDOANEURYSM INJECTION TRT: CPT | Performed by: THORACIC SURGERY (CARDIOTHORACIC VASCULAR SURGERY)

## 2025-08-16 PROCEDURE — 85025 COMPLETE CBC W/AUTO DIFF WBC: CPT

## 2025-08-16 PROCEDURE — 99221 1ST HOSP IP/OBS SF/LOW 40: CPT | Performed by: THORACIC SURGERY (CARDIOTHORACIC VASCULAR SURGERY)

## 2025-08-16 PROCEDURE — 83880 ASSAY OF NATRIURETIC PEPTIDE: CPT

## 2025-08-16 PROCEDURE — 84145 PROCALCITONIN (PCT): CPT

## 2025-08-16 PROCEDURE — 87205 SMEAR GRAM STAIN: CPT

## 2025-08-16 PROCEDURE — 6370000000 HC RX 637 (ALT 250 FOR IP): Performed by: STUDENT IN AN ORGANIZED HEALTH CARE EDUCATION/TRAINING PROGRAM

## 2025-08-16 PROCEDURE — 87899 AGENT NOS ASSAY W/OPTIC: CPT

## 2025-08-16 PROCEDURE — 2580000003 HC RX 258: Performed by: STUDENT IN AN ORGANIZED HEALTH CARE EDUCATION/TRAINING PROGRAM

## 2025-08-16 PROCEDURE — 2700000000 HC OXYGEN THERAPY PER DAY

## 2025-08-16 PROCEDURE — 2580000003 HC RX 258

## 2025-08-16 PROCEDURE — 82962 GLUCOSE BLOOD TEST: CPT

## 2025-08-16 PROCEDURE — 87641 MR-STAPH DNA AMP PROBE: CPT

## 2025-08-16 PROCEDURE — 6360000002 HC RX W HCPCS

## 2025-08-16 PROCEDURE — 36415 COLL VENOUS BLD VENIPUNCTURE: CPT

## 2025-08-16 PROCEDURE — 94640 AIRWAY INHALATION TREATMENT: CPT

## 2025-08-16 PROCEDURE — 87070 CULTURE OTHR SPECIMN AEROBIC: CPT

## 2025-08-16 PROCEDURE — 84550 ASSAY OF BLOOD/URIC ACID: CPT

## 2025-08-16 PROCEDURE — 1200000000 HC SEMI PRIVATE

## 2025-08-16 PROCEDURE — 80048 BASIC METABOLIC PNL TOTAL CA: CPT

## 2025-08-16 RX ORDER — DIGOXIN 125 MCG
125 TABLET ORAL DAILY
Status: DISCONTINUED | OUTPATIENT
Start: 2025-08-16 | End: 2025-08-22 | Stop reason: HOSPADM

## 2025-08-16 RX ORDER — BUPIVACAINE HYDROCHLORIDE 2.5 MG/ML
30 INJECTION, SOLUTION EPIDURAL; INFILTRATION; INTRACAUDAL; PERINEURAL ONCE
Status: COMPLETED | OUTPATIENT
Start: 2025-08-16 | End: 2025-08-17

## 2025-08-16 RX ORDER — ROFLUMILAST 500 UG/1
500 TABLET ORAL DAILY
Status: DISCONTINUED | OUTPATIENT
Start: 2025-08-16 | End: 2025-08-22 | Stop reason: HOSPADM

## 2025-08-16 RX ORDER — HYDROXYZINE HYDROCHLORIDE 10 MG/1
10 TABLET, FILM COATED ORAL 4 TIMES DAILY PRN
Status: DISCONTINUED | OUTPATIENT
Start: 2025-08-16 | End: 2025-08-22 | Stop reason: HOSPADM

## 2025-08-16 RX ORDER — INSULIN GLARGINE 100 [IU]/ML
15 INJECTION, SOLUTION SUBCUTANEOUS NIGHTLY
Status: DISCONTINUED | OUTPATIENT
Start: 2025-08-16 | End: 2025-08-17

## 2025-08-16 RX ORDER — HYDROCODONE BITARTRATE AND ACETAMINOPHEN 5; 325 MG/1; MG/1
1 TABLET ORAL EVERY 6 HOURS PRN
Refills: 0 | Status: DISCONTINUED | OUTPATIENT
Start: 2025-08-16 | End: 2025-08-22 | Stop reason: HOSPADM

## 2025-08-16 RX ORDER — MIDODRINE HYDROCHLORIDE 5 MG/1
2.5 TABLET ORAL 2 TIMES DAILY
Status: DISCONTINUED | OUTPATIENT
Start: 2025-08-16 | End: 2025-08-22 | Stop reason: HOSPADM

## 2025-08-16 RX ORDER — ATORVASTATIN CALCIUM 40 MG/1
40 TABLET, FILM COATED ORAL NIGHTLY
Status: DISCONTINUED | OUTPATIENT
Start: 2025-08-16 | End: 2025-08-22 | Stop reason: HOSPADM

## 2025-08-16 RX ORDER — BISOPROLOL FUMARATE 5 MG/1
2.5 TABLET, FILM COATED ORAL DAILY
Status: DISCONTINUED | OUTPATIENT
Start: 2025-08-17 | End: 2025-08-22 | Stop reason: HOSPADM

## 2025-08-16 RX ORDER — HYDROXYZINE HYDROCHLORIDE 25 MG/1
25 TABLET, FILM COATED ORAL
Status: COMPLETED | OUTPATIENT
Start: 2025-08-16 | End: 2025-08-16

## 2025-08-16 RX ORDER — IPRATROPIUM BROMIDE AND ALBUTEROL SULFATE 2.5; .5 MG/3ML; MG/3ML
1 SOLUTION RESPIRATORY (INHALATION)
Status: DISCONTINUED | OUTPATIENT
Start: 2025-08-16 | End: 2025-08-17

## 2025-08-16 RX ORDER — INSULIN LISPRO 100 [IU]/ML
0-8 INJECTION, SOLUTION INTRAVENOUS; SUBCUTANEOUS
Status: DISCONTINUED | OUTPATIENT
Start: 2025-08-16 | End: 2025-08-22 | Stop reason: HOSPADM

## 2025-08-16 RX ORDER — DEXAMETHASONE SODIUM PHOSPHATE 4 MG/ML
4 INJECTION, SOLUTION INTRA-ARTICULAR; INTRALESIONAL; INTRAMUSCULAR; INTRAVENOUS; SOFT TISSUE ONCE
Status: COMPLETED | OUTPATIENT
Start: 2025-08-16 | End: 2025-08-17

## 2025-08-16 RX ORDER — DEXTROSE MONOHYDRATE 100 MG/ML
INJECTION, SOLUTION INTRAVENOUS CONTINUOUS PRN
Status: DISCONTINUED | OUTPATIENT
Start: 2025-08-16 | End: 2025-08-22 | Stop reason: HOSPADM

## 2025-08-16 RX ORDER — MONTELUKAST SODIUM 10 MG/1
10 TABLET ORAL NIGHTLY
Status: DISCONTINUED | OUTPATIENT
Start: 2025-08-16 | End: 2025-08-22 | Stop reason: HOSPADM

## 2025-08-16 RX ORDER — BUDESONIDE AND FORMOTEROL FUMARATE DIHYDRATE 160; 4.5 UG/1; UG/1
2 AEROSOL RESPIRATORY (INHALATION) 2 TIMES DAILY
Status: DISCONTINUED | OUTPATIENT
Start: 2025-08-16 | End: 2025-08-22 | Stop reason: HOSPADM

## 2025-08-16 RX ORDER — GLUCAGON 1 MG/ML
1 KIT INJECTION PRN
Status: DISCONTINUED | OUTPATIENT
Start: 2025-08-16 | End: 2025-08-22 | Stop reason: HOSPADM

## 2025-08-16 RX ORDER — TRIAMCINOLONE ACETONIDE 40 MG/ML
40 INJECTION, SUSPENSION INTRA-ARTICULAR; INTRAMUSCULAR ONCE
Status: DISCONTINUED | OUTPATIENT
Start: 2025-08-16 | End: 2025-08-16

## 2025-08-16 RX ORDER — ASPIRIN 81 MG/1
81 TABLET, CHEWABLE ORAL DAILY
Status: DISCONTINUED | OUTPATIENT
Start: 2025-08-16 | End: 2025-08-22 | Stop reason: HOSPADM

## 2025-08-16 RX ADMIN — INSULIN LISPRO 2 UNITS: 100 INJECTION, SOLUTION INTRAVENOUS; SUBCUTANEOUS at 22:25

## 2025-08-16 RX ADMIN — SODIUM CHLORIDE, PRESERVATIVE FREE 10 ML: 5 INJECTION INTRAVENOUS at 23:09

## 2025-08-16 RX ADMIN — BUDESONIDE AND FORMOTEROL FUMARATE DIHYDRATE 2 PUFF: 160; 4.5 AEROSOL RESPIRATORY (INHALATION) at 21:00

## 2025-08-16 RX ADMIN — SODIUM CHLORIDE 1500 MG: 0.9 INJECTION, SOLUTION INTRAVENOUS at 16:05

## 2025-08-16 RX ADMIN — MIDODRINE HYDROCHLORIDE 2.5 MG: 5 TABLET ORAL at 08:47

## 2025-08-16 RX ADMIN — SODIUM CHLORIDE, PRESERVATIVE FREE 10 ML: 5 INJECTION INTRAVENOUS at 08:48

## 2025-08-16 RX ADMIN — HYDROCODONE BITARTRATE AND ACETAMINOPHEN 1 TABLET: 5; 325 TABLET ORAL at 22:24

## 2025-08-16 RX ADMIN — ROFLUMILAST 500 MCG: 500 TABLET ORAL at 08:47

## 2025-08-16 RX ADMIN — BUDESONIDE AND FORMOTEROL FUMARATE DIHYDRATE 2 PUFF: 160; 4.5 AEROSOL RESPIRATORY (INHALATION) at 08:17

## 2025-08-16 RX ADMIN — INSULIN GLARGINE 15 UNITS: 100 INJECTION, SOLUTION SUBCUTANEOUS at 22:24

## 2025-08-16 RX ADMIN — CEFEPIME 2000 MG: 2 INJECTION, POWDER, FOR SOLUTION INTRAVENOUS at 16:06

## 2025-08-16 RX ADMIN — CEFEPIME 2000 MG: 2 INJECTION, POWDER, FOR SOLUTION INTRAVENOUS at 23:08

## 2025-08-16 RX ADMIN — IPRATROPIUM BROMIDE AND ALBUTEROL SULFATE 1 DOSE: .5; 2.5 SOLUTION RESPIRATORY (INHALATION) at 20:57

## 2025-08-16 RX ADMIN — DIGOXIN 125 MCG: 125 TABLET ORAL at 17:10

## 2025-08-16 RX ADMIN — ATORVASTATIN CALCIUM 40 MG: 40 TABLET, FILM COATED ORAL at 22:23

## 2025-08-16 RX ADMIN — IPRATROPIUM BROMIDE AND ALBUTEROL SULFATE 1 DOSE: .5; 2.5 SOLUTION RESPIRATORY (INHALATION) at 16:00

## 2025-08-16 RX ADMIN — HYDROCODONE BITARTRATE AND ACETAMINOPHEN 1 TABLET: 5; 325 TABLET ORAL at 06:26

## 2025-08-16 RX ADMIN — HYDROXYZINE HYDROCHLORIDE 25 MG: 25 TABLET ORAL at 02:41

## 2025-08-16 RX ADMIN — MONTELUKAST 10 MG: 10 TABLET, FILM COATED ORAL at 22:23

## 2025-08-16 RX ADMIN — HYDROXYZINE HYDROCHLORIDE 10 MG: 10 TABLET, FILM COATED ORAL at 15:43

## 2025-08-16 RX ADMIN — MIDODRINE HYDROCHLORIDE 2.5 MG: 5 TABLET ORAL at 22:24

## 2025-08-16 RX ADMIN — ACETAMINOPHEN 650 MG: 325 TABLET ORAL at 08:47

## 2025-08-16 ASSESSMENT — PAIN DESCRIPTION - LOCATION
LOCATION: GROIN
LOCATION: KNEE
LOCATION: KNEE

## 2025-08-16 ASSESSMENT — PAIN SCALES - GENERAL
PAINLEVEL_OUTOF10: 8
PAINLEVEL_OUTOF10: 9
PAINLEVEL_OUTOF10: 4
PAINLEVEL_OUTOF10: 0
PAINLEVEL_OUTOF10: 6

## 2025-08-16 ASSESSMENT — PAIN - FUNCTIONAL ASSESSMENT
PAIN_FUNCTIONAL_ASSESSMENT: WONG-BAKER FACES
PAIN_FUNCTIONAL_ASSESSMENT: 0-10
PAIN_FUNCTIONAL_ASSESSMENT: ACTIVITIES ARE NOT PREVENTED
PAIN_FUNCTIONAL_ASSESSMENT: 0-10
PAIN_FUNCTIONAL_ASSESSMENT: PREVENTS OR INTERFERES SOME ACTIVE ACTIVITIES AND ADLS
PAIN_FUNCTIONAL_ASSESSMENT: PREVENTS OR INTERFERES WITH MANY ACTIVE NOT PASSIVE ACTIVITIES

## 2025-08-16 ASSESSMENT — PAIN DESCRIPTION - DESCRIPTORS
DESCRIPTORS: ACHING

## 2025-08-16 ASSESSMENT — PAIN SCALES - WONG BAKER: WONGBAKER_NUMERICALRESPONSE: NO HURT

## 2025-08-16 ASSESSMENT — PAIN DESCRIPTION - ORIENTATION
ORIENTATION: RIGHT
ORIENTATION: LEFT
ORIENTATION: RIGHT

## 2025-08-17 ENCOUNTER — APPOINTMENT (OUTPATIENT)
Dept: ULTRASOUND IMAGING | Age: 70
End: 2025-08-17
Payer: MEDICARE

## 2025-08-17 LAB
GLUCOSE BLD-MCNC: 238 MG/DL (ref 74–99)
GLUCOSE BLD-MCNC: 260 MG/DL (ref 74–99)
GLUCOSE BLD-MCNC: 262 MG/DL (ref 74–99)
L PNEUMO1 AG UR QL IA.RAPID: NEGATIVE
MRSA, DNA, NASAL: NOT DETECTED
SPECIMEN DESCRIPTION: NORMAL

## 2025-08-17 PROCEDURE — APPSS60 APP SPLIT SHARED TIME 46-60 MINUTES: Performed by: NURSE PRACTITIONER

## 2025-08-17 PROCEDURE — 2500000003 HC RX 250 WO HCPCS: Performed by: STUDENT IN AN ORGANIZED HEALTH CARE EDUCATION/TRAINING PROGRAM

## 2025-08-17 PROCEDURE — 0S9D3ZZ DRAINAGE OF LEFT KNEE JOINT, PERCUTANEOUS APPROACH: ICD-10-PCS | Performed by: UROLOGY

## 2025-08-17 PROCEDURE — 2580000003 HC RX 258: Performed by: STUDENT IN AN ORGANIZED HEALTH CARE EDUCATION/TRAINING PROGRAM

## 2025-08-17 PROCEDURE — 6370000000 HC RX 637 (ALT 250 FOR IP): Performed by: NURSE PRACTITIONER

## 2025-08-17 PROCEDURE — 6360000002 HC RX W HCPCS

## 2025-08-17 PROCEDURE — 6360000002 HC RX W HCPCS: Performed by: STUDENT IN AN ORGANIZED HEALTH CARE EDUCATION/TRAINING PROGRAM

## 2025-08-17 PROCEDURE — 94640 AIRWAY INHALATION TREATMENT: CPT

## 2025-08-17 PROCEDURE — 94761 N-INVAS EAR/PLS OXIMETRY MLT: CPT

## 2025-08-17 PROCEDURE — 1200000000 HC SEMI PRIVATE

## 2025-08-17 PROCEDURE — 6370000000 HC RX 637 (ALT 250 FOR IP): Performed by: STUDENT IN AN ORGANIZED HEALTH CARE EDUCATION/TRAINING PROGRAM

## 2025-08-17 PROCEDURE — 82962 GLUCOSE BLOOD TEST: CPT

## 2025-08-17 PROCEDURE — 99233 SBSQ HOSP IP/OBS HIGH 50: CPT | Performed by: THORACIC SURGERY (CARDIOTHORACIC VASCULAR SURGERY)

## 2025-08-17 PROCEDURE — 2700000000 HC OXYGEN THERAPY PER DAY

## 2025-08-17 PROCEDURE — 6360000002 HC RX W HCPCS: Performed by: ORTHOPAEDIC SURGERY

## 2025-08-17 PROCEDURE — 2580000003 HC RX 258

## 2025-08-17 RX ORDER — SENNOSIDES 8.6 MG/1
1 TABLET ORAL 2 TIMES DAILY
Status: DISCONTINUED | OUTPATIENT
Start: 2025-08-17 | End: 2025-08-22 | Stop reason: HOSPADM

## 2025-08-17 RX ORDER — MORPHINE SULFATE 4 MG/ML
4 INJECTION, SOLUTION INTRAMUSCULAR; INTRAVENOUS EVERY 4 HOURS PRN
Status: DISPENSED | OUTPATIENT
Start: 2025-08-17 | End: 2025-08-18

## 2025-08-17 RX ORDER — IPRATROPIUM BROMIDE AND ALBUTEROL SULFATE 2.5; .5 MG/3ML; MG/3ML
1 SOLUTION RESPIRATORY (INHALATION) EVERY 4 HOURS PRN
Status: DISCONTINUED | OUTPATIENT
Start: 2025-08-17 | End: 2025-08-22 | Stop reason: HOSPADM

## 2025-08-17 RX ORDER — PROCHLORPERAZINE EDISYLATE 5 MG/ML
5 INJECTION INTRAMUSCULAR; INTRAVENOUS EVERY 6 HOURS PRN
Status: DISCONTINUED | OUTPATIENT
Start: 2025-08-17 | End: 2025-08-22 | Stop reason: HOSPADM

## 2025-08-17 RX ORDER — POLYETHYLENE GLYCOL 3350 17 G/17G
17 POWDER, FOR SOLUTION ORAL 2 TIMES DAILY
Status: DISCONTINUED | OUTPATIENT
Start: 2025-08-17 | End: 2025-08-22 | Stop reason: HOSPADM

## 2025-08-17 RX ORDER — IPRATROPIUM BROMIDE AND ALBUTEROL SULFATE 2.5; .5 MG/3ML; MG/3ML
1 SOLUTION RESPIRATORY (INHALATION)
Status: DISCONTINUED | OUTPATIENT
Start: 2025-08-17 | End: 2025-08-22 | Stop reason: HOSPADM

## 2025-08-17 RX ORDER — INSULIN GLARGINE 100 [IU]/ML
20 INJECTION, SOLUTION SUBCUTANEOUS NIGHTLY
Status: DISCONTINUED | OUTPATIENT
Start: 2025-08-17 | End: 2025-08-18

## 2025-08-17 RX ORDER — POLYETHYLENE GLYCOL 3350 17 G/17G
17 POWDER, FOR SOLUTION ORAL DAILY
Status: DISCONTINUED | OUTPATIENT
Start: 2025-08-17 | End: 2025-08-17

## 2025-08-17 RX ADMIN — HYDROCODONE BITARTRATE AND ACETAMINOPHEN 1 TABLET: 5; 325 TABLET ORAL at 16:49

## 2025-08-17 RX ADMIN — BUPIVACAINE HYDROCHLORIDE 75 MG: 2.5 INJECTION, SOLUTION EPIDURAL; INFILTRATION; INTRACAUDAL; PERINEURAL at 09:59

## 2025-08-17 RX ADMIN — POLYETHYLENE GLYCOL (3350) 17 G: 17 POWDER, FOR SOLUTION ORAL at 11:16

## 2025-08-17 RX ADMIN — CEFEPIME 2000 MG: 2 INJECTION, POWDER, FOR SOLUTION INTRAVENOUS at 06:24

## 2025-08-17 RX ADMIN — INSULIN LISPRO 4 UNITS: 100 INJECTION, SOLUTION INTRAVENOUS; SUBCUTANEOUS at 22:03

## 2025-08-17 RX ADMIN — DEXAMETHASONE SODIUM PHOSPHATE 4 MG: 4 INJECTION, SOLUTION INTRAMUSCULAR; INTRAVENOUS at 10:13

## 2025-08-17 RX ADMIN — SENNOSIDES 8.6 MG: 8.6 TABLET, FILM COATED ORAL at 11:16

## 2025-08-17 RX ADMIN — BISOPROLOL FUMARATE 2.5 MG: 5 TABLET, FILM COATED ORAL at 10:08

## 2025-08-17 RX ADMIN — MORPHINE SULFATE 4 MG: 4 INJECTION, SOLUTION INTRAMUSCULAR; INTRAVENOUS at 10:00

## 2025-08-17 RX ADMIN — IPRATROPIUM BROMIDE AND ALBUTEROL SULFATE 1 DOSE: .5; 2.5 SOLUTION RESPIRATORY (INHALATION) at 07:15

## 2025-08-17 RX ADMIN — BUDESONIDE AND FORMOTEROL FUMARATE DIHYDRATE 2 PUFF: 160; 4.5 AEROSOL RESPIRATORY (INHALATION) at 20:12

## 2025-08-17 RX ADMIN — CEFEPIME 2000 MG: 2 INJECTION, POWDER, FOR SOLUTION INTRAVENOUS at 22:35

## 2025-08-17 RX ADMIN — ONDANSETRON 4 MG: 2 INJECTION INTRAMUSCULAR; INTRAVENOUS at 10:06

## 2025-08-17 RX ADMIN — IPRATROPIUM BROMIDE AND ALBUTEROL SULFATE 1 DOSE: .5; 2.5 SOLUTION RESPIRATORY (INHALATION) at 16:20

## 2025-08-17 RX ADMIN — ROFLUMILAST 500 MCG: 500 TABLET ORAL at 10:08

## 2025-08-17 RX ADMIN — INSULIN LISPRO 4 UNITS: 100 INJECTION, SOLUTION INTRAVENOUS; SUBCUTANEOUS at 16:50

## 2025-08-17 RX ADMIN — HYDROXYZINE HYDROCHLORIDE 10 MG: 10 TABLET, FILM COATED ORAL at 00:52

## 2025-08-17 RX ADMIN — MIDODRINE HYDROCHLORIDE 2.5 MG: 5 TABLET ORAL at 21:55

## 2025-08-17 RX ADMIN — HYDROCODONE BITARTRATE AND ACETAMINOPHEN 1 TABLET: 5; 325 TABLET ORAL at 23:04

## 2025-08-17 RX ADMIN — MIDODRINE HYDROCHLORIDE 2.5 MG: 5 TABLET ORAL at 10:08

## 2025-08-17 RX ADMIN — IPRATROPIUM BROMIDE AND ALBUTEROL SULFATE 1 DOSE: .5; 2.5 SOLUTION RESPIRATORY (INHALATION) at 11:23

## 2025-08-17 RX ADMIN — SODIUM CHLORIDE, PRESERVATIVE FREE 10 ML: 5 INJECTION INTRAVENOUS at 10:09

## 2025-08-17 RX ADMIN — MONTELUKAST 10 MG: 10 TABLET, FILM COATED ORAL at 21:55

## 2025-08-17 RX ADMIN — APIXABAN 5 MG: 5 TABLET, FILM COATED ORAL at 21:55

## 2025-08-17 RX ADMIN — ATORVASTATIN CALCIUM 40 MG: 40 TABLET, FILM COATED ORAL at 21:55

## 2025-08-17 RX ADMIN — CEFEPIME 2000 MG: 2 INJECTION, POWDER, FOR SOLUTION INTRAVENOUS at 14:59

## 2025-08-17 RX ADMIN — PROCHLORPERAZINE EDISYLATE 5 MG: 5 INJECTION INTRAMUSCULAR; INTRAVENOUS at 11:16

## 2025-08-17 RX ADMIN — IPRATROPIUM BROMIDE AND ALBUTEROL SULFATE 1 DOSE: .5; 2.5 SOLUTION RESPIRATORY (INHALATION) at 20:12

## 2025-08-17 RX ADMIN — HYDROCODONE BITARTRATE AND ACETAMINOPHEN 1 TABLET: 5; 325 TABLET ORAL at 06:52

## 2025-08-17 RX ADMIN — SODIUM CHLORIDE, PRESERVATIVE FREE 10 ML: 5 INJECTION INTRAVENOUS at 22:37

## 2025-08-17 RX ADMIN — SENNOSIDES 8.6 MG: 8.6 TABLET, FILM COATED ORAL at 21:55

## 2025-08-17 RX ADMIN — MELATONIN TAB 3 MG 3 MG: 3 TAB at 01:47

## 2025-08-17 RX ADMIN — DIGOXIN 125 MCG: 125 TABLET ORAL at 10:09

## 2025-08-17 RX ADMIN — INSULIN GLARGINE 20 UNITS: 100 INJECTION, SOLUTION SUBCUTANEOUS at 21:56

## 2025-08-17 RX ADMIN — POLYETHYLENE GLYCOL (3350) 17 G: 17 POWDER, FOR SOLUTION ORAL at 22:26

## 2025-08-17 RX ADMIN — BUDESONIDE AND FORMOTEROL FUMARATE DIHYDRATE 2 PUFF: 160; 4.5 AEROSOL RESPIRATORY (INHALATION) at 07:13

## 2025-08-17 RX ADMIN — SODIUM CHLORIDE 1500 MG: 0.9 INJECTION, SOLUTION INTRAVENOUS at 03:20

## 2025-08-17 ASSESSMENT — PAIN DESCRIPTION - ORIENTATION
ORIENTATION: LEFT
ORIENTATION: RIGHT

## 2025-08-17 ASSESSMENT — PAIN DESCRIPTION - FREQUENCY
FREQUENCY: CONTINUOUS

## 2025-08-17 ASSESSMENT — PAIN SCALES - GENERAL
PAINLEVEL_OUTOF10: 9
PAINLEVEL_OUTOF10: 7
PAINLEVEL_OUTOF10: 9
PAINLEVEL_OUTOF10: 3
PAINLEVEL_OUTOF10: 9
PAINLEVEL_OUTOF10: 7
PAINLEVEL_OUTOF10: 9

## 2025-08-17 ASSESSMENT — PAIN DESCRIPTION - LOCATION
LOCATION: KNEE
LOCATION: KNEE
LOCATION: KNEE;LEG
LOCATION: KNEE;LEG

## 2025-08-17 ASSESSMENT — PAIN - FUNCTIONAL ASSESSMENT
PAIN_FUNCTIONAL_ASSESSMENT: 0-10
PAIN_FUNCTIONAL_ASSESSMENT: PREVENTS OR INTERFERES SOME ACTIVE ACTIVITIES AND ADLS
PAIN_FUNCTIONAL_ASSESSMENT: 0-10
PAIN_FUNCTIONAL_ASSESSMENT: ACTIVITIES ARE NOT PREVENTED
PAIN_FUNCTIONAL_ASSESSMENT: ACTIVITIES ARE NOT PREVENTED
PAIN_FUNCTIONAL_ASSESSMENT: 0-10
PAIN_FUNCTIONAL_ASSESSMENT: PREVENTS OR INTERFERES WITH ALL ACTIVE AND SOME PASSIVE ACTIVITIES
PAIN_FUNCTIONAL_ASSESSMENT: 0-10

## 2025-08-17 ASSESSMENT — PAIN DESCRIPTION - DESCRIPTORS
DESCRIPTORS: STABBING
DESCRIPTORS: ACHING

## 2025-08-17 ASSESSMENT — PAIN DESCRIPTION - ONSET
ONSET: ON-GOING

## 2025-08-17 ASSESSMENT — PAIN DESCRIPTION - PAIN TYPE
TYPE: CHRONIC PAIN;ACUTE PAIN

## 2025-08-18 ENCOUNTER — APPOINTMENT (OUTPATIENT)
Dept: NON INVASIVE DIAGNOSTICS | Age: 70
End: 2025-08-18
Attending: INTERNAL MEDICINE
Payer: MEDICARE

## 2025-08-18 ENCOUNTER — APPOINTMENT (OUTPATIENT)
Dept: GENERAL RADIOLOGY | Age: 70
End: 2025-08-18
Payer: MEDICARE

## 2025-08-18 LAB
ANION GAP SERPL CALCULATED.3IONS-SCNC: 5 MMOL/L (ref 9–17)
BASOPHILS # BLD: 0.04 K/UL
BASOPHILS NFR BLD: 0 % (ref 0–1)
BUN SERPL-MCNC: 9 MG/DL (ref 7–20)
CALCIUM SERPL-MCNC: 9.1 MG/DL (ref 8.3–10.6)
CHLORIDE SERPL-SCNC: 91 MMOL/L (ref 99–110)
CO2 SERPL-SCNC: 40 MMOL/L (ref 21–32)
CREAT SERPL-MCNC: 0.5 MG/DL (ref 0.8–1.3)
ECHO AV AREA PEAK VELOCITY: 2.1 CM2
ECHO AV AREA VTI: 2.4 CM2
ECHO AV AREA/BSA PEAK VELOCITY: 0.7 CM2/M2
ECHO AV AREA/BSA VTI: 0.9 CM2/M2
ECHO AV MEAN GRADIENT: 12 MMHG
ECHO AV MEAN VELOCITY: 1.6 M/S
ECHO AV PEAK GRADIENT: 22 MMHG
ECHO AV PEAK VELOCITY: 2.3 M/S
ECHO AV VELOCITY RATIO: 0.48
ECHO AV VTI: 42 CM
ECHO BSA: 2.62 M2
ECHO EST RA PRESSURE: 8 MMHG
ECHO IVC PROX: 3.3 CM
ECHO LA AREA 4C: 19.3 CM2
ECHO LA DIAMETER INDEX: 1.71 CM/M2
ECHO LA DIAMETER: 4.8 CM
ECHO LA MAJOR AXIS: 5.6 CM
ECHO LA VOL MOD A4C: 53 ML (ref 18–58)
ECHO LA VOLUME INDEX MOD A4C: 19 ML/M2 (ref 16–34)
ECHO LV EDV A4C: 157 ML
ECHO LV EDV INDEX A4C: 56 ML/M2
ECHO LV EF PHYSICIAN: 35 %
ECHO LV EJECTION FRACTION A4C: 20 %
ECHO LV ESV A4C: 126 ML
ECHO LV ESV INDEX A4C: 45 ML/M2
ECHO LV FRACTIONAL SHORTENING: 23 % (ref 28–44)
ECHO LV INTERNAL DIMENSION DIASTOLE INDEX: 2.14 CM/M2
ECHO LV INTERNAL DIMENSION DIASTOLIC: 6 CM (ref 4.2–5.9)
ECHO LV INTERNAL DIMENSION SYSTOLIC INDEX: 1.64 CM/M2
ECHO LV INTERNAL DIMENSION SYSTOLIC: 4.6 CM
ECHO LV IVSD: 1.3 CM (ref 0.6–1)
ECHO LV MASS 2D: 314 G (ref 88–224)
ECHO LV MASS INDEX 2D: 111.8 G/M2 (ref 49–115)
ECHO LV POSTERIOR WALL DIASTOLIC: 1.1 CM (ref 0.6–1)
ECHO LV RELATIVE WALL THICKNESS RATIO: 0.37
ECHO LVOT AREA: 4.5 CM2
ECHO LVOT AV VTI INDEX: 0.52
ECHO LVOT DIAM: 2.4 CM
ECHO LVOT MEAN GRADIENT: 3 MMHG
ECHO LVOT PEAK GRADIENT: 5 MMHG
ECHO LVOT PEAK VELOCITY: 1.1 M/S
ECHO LVOT STROKE VOLUME INDEX: 35.1 ML/M2
ECHO LVOT SV: 98.6 ML
ECHO LVOT VTI: 21.8 CM
ECHO MV REGURGITANT PEAK GRADIENT: 81 MMHG
ECHO MV REGURGITANT PEAK VELOCITY: 4.5 M/S
ECHO MV REGURGITANT VTIA: 129 CM
ECHO RV MID DIMENSION: 4.1 CM
EOSINOPHIL # BLD: 0.08 K/UL
EOSINOPHILS RELATIVE PERCENT: 1 % (ref 0–3)
ERYTHROCYTE [DISTWIDTH] IN BLOOD BY AUTOMATED COUNT: 17 % (ref 11.7–14.9)
GFR, ESTIMATED: >90 ML/MIN/1.73M2
GLUCOSE BLD-MCNC: 142 MG/DL (ref 74–99)
GLUCOSE BLD-MCNC: 160 MG/DL (ref 74–99)
GLUCOSE BLD-MCNC: 161 MG/DL (ref 74–99)
GLUCOSE BLD-MCNC: 209 MG/DL (ref 74–99)
GLUCOSE SERPL-MCNC: 151 MG/DL (ref 74–99)
HCT VFR BLD AUTO: 33.1 % (ref 42–52)
HGB BLD-MCNC: 10.3 G/DL (ref 13.5–18)
IMM GRANULOCYTES # BLD AUTO: 0.11 K/UL
IMM GRANULOCYTES NFR BLD: 1 %
LYMPHOCYTES NFR BLD: 0.84 K/UL
LYMPHOCYTES RELATIVE PERCENT: 9 % (ref 24–44)
MCH RBC QN AUTO: 30.1 PG (ref 27–31)
MCHC RBC AUTO-ENTMCNC: 31.1 G/DL (ref 32–36)
MCV RBC AUTO: 96.8 FL (ref 78–100)
MICROORGANISM SPEC CULT: ABNORMAL
MICROORGANISM/AGENT SPEC: ABNORMAL
MONOCYTES NFR BLD: 0.7 K/UL
MONOCYTES NFR BLD: 8 % (ref 0–5)
NEUTROPHILS NFR BLD: 81 % (ref 36–66)
NEUTS SEG NFR BLD: 7.54 K/UL
PLATELET # BLD AUTO: 180 K/UL (ref 140–440)
PMV BLD AUTO: 9.5 FL (ref 7.5–11.1)
POTASSIUM SERPL-SCNC: 4.2 MMOL/L (ref 3.5–5.1)
RBC # BLD AUTO: 3.42 M/UL (ref 4.6–6.2)
SODIUM SERPL-SCNC: 136 MMOL/L (ref 136–145)
SPECIMEN DESCRIPTION: ABNORMAL
WBC OTHER # BLD: 9.3 K/UL (ref 4–10.5)

## 2025-08-18 PROCEDURE — 94640 AIRWAY INHALATION TREATMENT: CPT

## 2025-08-18 PROCEDURE — 6370000000 HC RX 637 (ALT 250 FOR IP): Performed by: NURSE PRACTITIONER

## 2025-08-18 PROCEDURE — 93306 TTE W/DOPPLER COMPLETE: CPT

## 2025-08-18 PROCEDURE — 6370000000 HC RX 637 (ALT 250 FOR IP): Performed by: STUDENT IN AN ORGANIZED HEALTH CARE EDUCATION/TRAINING PROGRAM

## 2025-08-18 PROCEDURE — 2500000003 HC RX 250 WO HCPCS: Performed by: STUDENT IN AN ORGANIZED HEALTH CARE EDUCATION/TRAINING PROGRAM

## 2025-08-18 PROCEDURE — 6360000002 HC RX W HCPCS: Performed by: STUDENT IN AN ORGANIZED HEALTH CARE EDUCATION/TRAINING PROGRAM

## 2025-08-18 PROCEDURE — 80048 BASIC METABOLIC PNL TOTAL CA: CPT

## 2025-08-18 PROCEDURE — 94761 N-INVAS EAR/PLS OXIMETRY MLT: CPT

## 2025-08-18 PROCEDURE — 82805 BLOOD GASES W/O2 SATURATION: CPT

## 2025-08-18 PROCEDURE — 82962 GLUCOSE BLOOD TEST: CPT

## 2025-08-18 PROCEDURE — 97166 OT EVAL MOD COMPLEX 45 MIN: CPT

## 2025-08-18 PROCEDURE — 2700000000 HC OXYGEN THERAPY PER DAY

## 2025-08-18 PROCEDURE — 97162 PT EVAL MOD COMPLEX 30 MIN: CPT

## 2025-08-18 PROCEDURE — 97530 THERAPEUTIC ACTIVITIES: CPT

## 2025-08-18 PROCEDURE — 94660 CPAP INITIATION&MGMT: CPT

## 2025-08-18 PROCEDURE — 6360000002 HC RX W HCPCS: Performed by: INTERNAL MEDICINE

## 2025-08-18 PROCEDURE — 2060000000 HC ICU INTERMEDIATE R&B

## 2025-08-18 PROCEDURE — 2580000003 HC RX 258: Performed by: STUDENT IN AN ORGANIZED HEALTH CARE EDUCATION/TRAINING PROGRAM

## 2025-08-18 PROCEDURE — 93306 TTE W/DOPPLER COMPLETE: CPT | Performed by: INTERNAL MEDICINE

## 2025-08-18 PROCEDURE — 36415 COLL VENOUS BLD VENIPUNCTURE: CPT

## 2025-08-18 PROCEDURE — 6370000000 HC RX 637 (ALT 250 FOR IP): Performed by: PHYSICIAN ASSISTANT

## 2025-08-18 PROCEDURE — 97112 NEUROMUSCULAR REEDUCATION: CPT

## 2025-08-18 PROCEDURE — 99232 SBSQ HOSP IP/OBS MODERATE 35: CPT | Performed by: THORACIC SURGERY (CARDIOTHORACIC VASCULAR SURGERY)

## 2025-08-18 PROCEDURE — 71045 X-RAY EXAM CHEST 1 VIEW: CPT

## 2025-08-18 PROCEDURE — 85025 COMPLETE CBC W/AUTO DIFF WBC: CPT

## 2025-08-18 RX ORDER — CALCIUM CARBONATE 500 MG/1
1000 TABLET, CHEWABLE ORAL ONCE
Status: COMPLETED | OUTPATIENT
Start: 2025-08-18 | End: 2025-08-18

## 2025-08-18 RX ORDER — FUROSEMIDE 10 MG/ML
20 INJECTION INTRAMUSCULAR; INTRAVENOUS ONCE
Status: DISCONTINUED | OUTPATIENT
Start: 2025-08-18 | End: 2025-08-18

## 2025-08-18 RX ORDER — FUROSEMIDE 10 MG/ML
40 INJECTION INTRAMUSCULAR; INTRAVENOUS ONCE
Status: COMPLETED | OUTPATIENT
Start: 2025-08-18 | End: 2025-08-18

## 2025-08-18 RX ORDER — INSULIN GLARGINE 100 [IU]/ML
20 INJECTION, SOLUTION SUBCUTANEOUS DAILY
Status: DISCONTINUED | OUTPATIENT
Start: 2025-08-19 | End: 2025-08-22 | Stop reason: HOSPADM

## 2025-08-18 RX ADMIN — IPRATROPIUM BROMIDE AND ALBUTEROL SULFATE 1 DOSE: .5; 2.5 SOLUTION RESPIRATORY (INHALATION) at 20:25

## 2025-08-18 RX ADMIN — CEFEPIME 2000 MG: 2 INJECTION, POWDER, FOR SOLUTION INTRAVENOUS at 17:08

## 2025-08-18 RX ADMIN — MELATONIN TAB 3 MG 3 MG: 3 TAB at 20:57

## 2025-08-18 RX ADMIN — CALCIUM CARBONATE 1000 MG: 500 TABLET, CHEWABLE ORAL at 05:57

## 2025-08-18 RX ADMIN — ROFLUMILAST 500 MCG: 500 TABLET ORAL at 10:10

## 2025-08-18 RX ADMIN — FUROSEMIDE 40 MG: 10 INJECTION, SOLUTION INTRAMUSCULAR; INTRAVENOUS at 18:14

## 2025-08-18 RX ADMIN — MONTELUKAST 10 MG: 10 TABLET, FILM COATED ORAL at 20:45

## 2025-08-18 RX ADMIN — ASPIRIN 81 MG 81 MG: 81 TABLET ORAL at 10:10

## 2025-08-18 RX ADMIN — CEFEPIME 2000 MG: 2 INJECTION, POWDER, FOR SOLUTION INTRAVENOUS at 22:40

## 2025-08-18 RX ADMIN — POLYETHYLENE GLYCOL (3350) 17 G: 17 POWDER, FOR SOLUTION ORAL at 10:11

## 2025-08-18 RX ADMIN — MIDODRINE HYDROCHLORIDE 2.5 MG: 5 TABLET ORAL at 20:45

## 2025-08-18 RX ADMIN — SENNOSIDES 8.6 MG: 8.6 TABLET, FILM COATED ORAL at 10:11

## 2025-08-18 RX ADMIN — SODIUM CHLORIDE, PRESERVATIVE FREE 10 ML: 5 INJECTION INTRAVENOUS at 20:46

## 2025-08-18 RX ADMIN — ATORVASTATIN CALCIUM 40 MG: 40 TABLET, FILM COATED ORAL at 20:45

## 2025-08-18 RX ADMIN — APIXABAN 5 MG: 5 TABLET, FILM COATED ORAL at 20:45

## 2025-08-18 RX ADMIN — BUDESONIDE AND FORMOTEROL FUMARATE DIHYDRATE 2 PUFF: 160; 4.5 AEROSOL RESPIRATORY (INHALATION) at 09:12

## 2025-08-18 RX ADMIN — BUDESONIDE AND FORMOTEROL FUMARATE DIHYDRATE 2 PUFF: 160; 4.5 AEROSOL RESPIRATORY (INHALATION) at 20:26

## 2025-08-18 RX ADMIN — APIXABAN 5 MG: 5 TABLET, FILM COATED ORAL at 10:10

## 2025-08-18 RX ADMIN — MORPHINE SULFATE 4 MG: 4 INJECTION, SOLUTION INTRAMUSCULAR; INTRAVENOUS at 01:44

## 2025-08-18 RX ADMIN — CEFEPIME 2000 MG: 2 INJECTION, POWDER, FOR SOLUTION INTRAVENOUS at 06:26

## 2025-08-18 RX ADMIN — DIGOXIN 125 MCG: 125 TABLET ORAL at 10:10

## 2025-08-18 RX ADMIN — BISOPROLOL FUMARATE 2.5 MG: 5 TABLET, FILM COATED ORAL at 10:11

## 2025-08-18 RX ADMIN — IPRATROPIUM BROMIDE AND ALBUTEROL SULFATE 1 DOSE: .5; 2.5 SOLUTION RESPIRATORY (INHALATION) at 09:10

## 2025-08-18 RX ADMIN — HYDROXYZINE HYDROCHLORIDE 10 MG: 10 TABLET, FILM COATED ORAL at 03:56

## 2025-08-18 ASSESSMENT — PAIN - FUNCTIONAL ASSESSMENT
PAIN_FUNCTIONAL_ASSESSMENT: 0-10
PAIN_FUNCTIONAL_ASSESSMENT: 0-10

## 2025-08-18 ASSESSMENT — PAIN DESCRIPTION - ORIENTATION: ORIENTATION: RIGHT

## 2025-08-18 ASSESSMENT — PAIN SCALES - GENERAL
PAINLEVEL_OUTOF10: 9
PAINLEVEL_OUTOF10: 0
PAINLEVEL_OUTOF10: 10

## 2025-08-18 ASSESSMENT — PAIN DESCRIPTION - DESCRIPTORS: DESCRIPTORS: ACHING;SHOOTING

## 2025-08-19 ENCOUNTER — APPOINTMENT (OUTPATIENT)
Dept: CT IMAGING | Age: 70
End: 2025-08-19
Payer: MEDICARE

## 2025-08-19 ENCOUNTER — APPOINTMENT (OUTPATIENT)
Dept: ULTRASOUND IMAGING | Age: 70
End: 2025-08-19
Payer: MEDICARE

## 2025-08-19 ENCOUNTER — APPOINTMENT (OUTPATIENT)
Dept: INTERVENTIONAL RADIOLOGY/VASCULAR | Age: 70
End: 2025-08-19
Attending: INTERNAL MEDICINE
Payer: MEDICARE

## 2025-08-19 LAB
ANION GAP SERPL CALCULATED.3IONS-SCNC: 4 MMOL/L (ref 9–17)
ARTERIAL PATENCY WRIST A: ABNORMAL
BASOPHILS # BLD: 0.06 K/UL
BASOPHILS NFR BLD: 1 % (ref 0–1)
BODY TEMPERATURE: 37
BUN SERPL-MCNC: 10 MG/DL (ref 7–20)
CALCIUM SERPL-MCNC: 9.1 MG/DL (ref 8.3–10.6)
CHLORIDE SERPL-SCNC: 93 MMOL/L (ref 99–110)
CO2 SERPL-SCNC: 39 MMOL/L (ref 21–32)
COHGB MFR BLD: 1.3 % (ref 0.5–1.5)
CREAT SERPL-MCNC: 0.5 MG/DL (ref 0.8–1.3)
EOSINOPHIL # BLD: 0.2 K/UL
EOSINOPHILS RELATIVE PERCENT: 2 % (ref 0–3)
ERYTHROCYTE [DISTWIDTH] IN BLOOD BY AUTOMATED COUNT: 17.2 % (ref 11.7–14.9)
GFR, ESTIMATED: >90 ML/MIN/1.73M2
GLUCOSE BLD-MCNC: 136 MG/DL (ref 74–99)
GLUCOSE BLD-MCNC: 150 MG/DL (ref 74–99)
GLUCOSE BLD-MCNC: 211 MG/DL (ref 74–99)
GLUCOSE SERPL-MCNC: 146 MG/DL (ref 74–99)
HCO3 VENOUS: 48.7 MMOL/L (ref 22–29)
HCT VFR BLD AUTO: 35.1 % (ref 42–52)
HGB BLD-MCNC: 10.7 G/DL (ref 13.5–18)
IMM GRANULOCYTES # BLD AUTO: 0.09 K/UL
IMM GRANULOCYTES NFR BLD: 1 %
LYMPHOCYTES NFR BLD: 1.13 K/UL
LYMPHOCYTES RELATIVE PERCENT: 11 % (ref 24–44)
MCH RBC QN AUTO: 30 PG (ref 27–31)
MCHC RBC AUTO-ENTMCNC: 30.5 G/DL (ref 32–36)
MCV RBC AUTO: 98.3 FL (ref 78–100)
METHEMOGLOBIN: 0.2 % (ref 0.5–1.5)
MONOCYTES NFR BLD: 0.95 K/UL
MONOCYTES NFR BLD: 9 % (ref 0–5)
NEUTROPHILS NFR BLD: 76 % (ref 36–66)
NEUTS SEG NFR BLD: 7.69 K/UL
OXYHGB MFR BLD: 58.6 %
PCO2 VENOUS: 117.1 MM HG (ref 38–54)
PH VENOUS: 7.24 (ref 7.32–7.43)
PLATELET # BLD AUTO: 191 K/UL (ref 140–440)
PMV BLD AUTO: 9.7 FL (ref 7.5–11.1)
PO2 VENOUS: 36.6 MM HG (ref 23–48)
POSITIVE BASE EXCESS, VEN: 16.5 MMOL/L (ref 0–3)
POTASSIUM SERPL-SCNC: 4.1 MMOL/L (ref 3.5–5.1)
RBC # BLD AUTO: 3.57 M/UL (ref 4.6–6.2)
SODIUM SERPL-SCNC: 136 MMOL/L (ref 136–145)
TEXT FOR RESPIRATORY: ABNORMAL
WBC OTHER # BLD: 10.1 K/UL (ref 4–10.5)

## 2025-08-19 PROCEDURE — 6360000002 HC RX W HCPCS: Performed by: INTERNAL MEDICINE

## 2025-08-19 PROCEDURE — 2700000000 HC OXYGEN THERAPY PER DAY

## 2025-08-19 PROCEDURE — 76937 US GUIDE VASCULAR ACCESS: CPT

## 2025-08-19 PROCEDURE — 93971 EXTREMITY STUDY: CPT | Performed by: RADIOLOGY

## 2025-08-19 PROCEDURE — APPNB15 APP NON BILLABLE TIME 0-15 MINS

## 2025-08-19 PROCEDURE — 6370000000 HC RX 637 (ALT 250 FOR IP): Performed by: STUDENT IN AN ORGANIZED HEALTH CARE EDUCATION/TRAINING PROGRAM

## 2025-08-19 PROCEDURE — 93926 LOWER EXTREMITY STUDY: CPT

## 2025-08-19 PROCEDURE — 6370000000 HC RX 637 (ALT 250 FOR IP): Performed by: NURSE PRACTITIONER

## 2025-08-19 PROCEDURE — 76705 ECHO EXAM OF ABDOMEN: CPT

## 2025-08-19 PROCEDURE — 82962 GLUCOSE BLOOD TEST: CPT

## 2025-08-19 PROCEDURE — 94761 N-INVAS EAR/PLS OXIMETRY MLT: CPT

## 2025-08-19 PROCEDURE — 85025 COMPLETE CBC W/AUTO DIFF WBC: CPT

## 2025-08-19 PROCEDURE — 94660 CPAP INITIATION&MGMT: CPT

## 2025-08-19 PROCEDURE — 82805 BLOOD GASES W/O2 SATURATION: CPT

## 2025-08-19 PROCEDURE — 2500000003 HC RX 250 WO HCPCS: Performed by: STUDENT IN AN ORGANIZED HEALTH CARE EDUCATION/TRAINING PROGRAM

## 2025-08-19 PROCEDURE — 2709999900 IR INJ PSEUDOANEURYSM PERC

## 2025-08-19 PROCEDURE — 6360000002 HC RX W HCPCS: Performed by: STUDENT IN AN ORGANIZED HEALTH CARE EDUCATION/TRAINING PROGRAM

## 2025-08-19 PROCEDURE — 2500000003 HC RX 250 WO HCPCS

## 2025-08-19 PROCEDURE — 6360000004 HC RX CONTRAST MEDICATION: Performed by: NURSE PRACTITIONER

## 2025-08-19 PROCEDURE — 99232 SBSQ HOSP IP/OBS MODERATE 35: CPT | Performed by: THORACIC SURGERY (CARDIOTHORACIC VASCULAR SURGERY)

## 2025-08-19 PROCEDURE — 80048 BASIC METABOLIC PNL TOTAL CA: CPT

## 2025-08-19 PROCEDURE — 5A09357 ASSISTANCE WITH RESPIRATORY VENTILATION, LESS THAN 24 CONSECUTIVE HOURS, CONTINUOUS POSITIVE AIRWAY PRESSURE: ICD-10-PCS | Performed by: INTERNAL MEDICINE

## 2025-08-19 PROCEDURE — 36415 COLL VENOUS BLD VENIPUNCTURE: CPT

## 2025-08-19 PROCEDURE — 2060000000 HC ICU INTERMEDIATE R&B

## 2025-08-19 PROCEDURE — 2580000003 HC RX 258: Performed by: STUDENT IN AN ORGANIZED HEALTH CARE EDUCATION/TRAINING PROGRAM

## 2025-08-19 PROCEDURE — 94640 AIRWAY INHALATION TREATMENT: CPT

## 2025-08-19 PROCEDURE — 74174 CTA ABD&PLVS W/CONTRAST: CPT

## 2025-08-19 RX ORDER — IOPAMIDOL 755 MG/ML
80 INJECTION, SOLUTION INTRAVASCULAR
Status: COMPLETED | OUTPATIENT
Start: 2025-08-19 | End: 2025-08-19

## 2025-08-19 RX ORDER — FUROSEMIDE 10 MG/ML
40 INJECTION INTRAMUSCULAR; INTRAVENOUS 2 TIMES DAILY
Status: DISCONTINUED | OUTPATIENT
Start: 2025-08-19 | End: 2025-08-21

## 2025-08-19 RX ORDER — DIAZEPAM 10 MG/2ML
2.5 INJECTION, SOLUTION INTRAMUSCULAR; INTRAVENOUS EVERY 6 HOURS PRN
Status: DISCONTINUED | OUTPATIENT
Start: 2025-08-19 | End: 2025-08-22 | Stop reason: HOSPADM

## 2025-08-19 RX ADMIN — DIAZEPAM 2.5 MG: 5 INJECTION, SOLUTION INTRAMUSCULAR; INTRAVENOUS at 21:36

## 2025-08-19 RX ADMIN — SODIUM CHLORIDE, PRESERVATIVE FREE 10 ML: 5 INJECTION INTRAVENOUS at 14:52

## 2025-08-19 RX ADMIN — CEFEPIME 2000 MG: 2 INJECTION, POWDER, FOR SOLUTION INTRAVENOUS at 22:43

## 2025-08-19 RX ADMIN — ROFLUMILAST 500 MCG: 500 TABLET ORAL at 08:54

## 2025-08-19 RX ADMIN — INSULIN LISPRO 2 UNITS: 100 INJECTION, SOLUTION INTRAVENOUS; SUBCUTANEOUS at 20:26

## 2025-08-19 RX ADMIN — POLYETHYLENE GLYCOL (3350) 17 G: 17 POWDER, FOR SOLUTION ORAL at 20:25

## 2025-08-19 RX ADMIN — HYDROCODONE BITARTRATE AND ACETAMINOPHEN 1 TABLET: 5; 325 TABLET ORAL at 00:57

## 2025-08-19 RX ADMIN — CEFEPIME 2000 MG: 2 INJECTION, POWDER, FOR SOLUTION INTRAVENOUS at 14:54

## 2025-08-19 RX ADMIN — FUROSEMIDE 40 MG: 10 INJECTION, SOLUTION INTRAMUSCULAR; INTRAVENOUS at 08:53

## 2025-08-19 RX ADMIN — SODIUM CHLORIDE: 0.9 INJECTION, SOLUTION INTRAVENOUS at 22:39

## 2025-08-19 RX ADMIN — DIGOXIN 125 MCG: 125 TABLET ORAL at 08:55

## 2025-08-19 RX ADMIN — CEFEPIME 2000 MG: 2 INJECTION, POWDER, FOR SOLUTION INTRAVENOUS at 06:48

## 2025-08-19 RX ADMIN — SENNOSIDES 8.6 MG: 8.6 TABLET, FILM COATED ORAL at 20:26

## 2025-08-19 RX ADMIN — MELATONIN TAB 3 MG 3 MG: 3 TAB at 21:36

## 2025-08-19 RX ADMIN — APIXABAN 5 MG: 5 TABLET, FILM COATED ORAL at 08:55

## 2025-08-19 RX ADMIN — HYDROXYZINE HYDROCHLORIDE 10 MG: 10 TABLET, FILM COATED ORAL at 08:54

## 2025-08-19 RX ADMIN — FUROSEMIDE 40 MG: 10 INJECTION, SOLUTION INTRAMUSCULAR; INTRAVENOUS at 18:11

## 2025-08-19 RX ADMIN — ATORVASTATIN CALCIUM 40 MG: 40 TABLET, FILM COATED ORAL at 20:26

## 2025-08-19 RX ADMIN — SODIUM CHLORIDE, PRESERVATIVE FREE 10 ML: 5 INJECTION INTRAVENOUS at 20:26

## 2025-08-19 RX ADMIN — IPRATROPIUM BROMIDE AND ALBUTEROL SULFATE 1 DOSE: .5; 2.5 SOLUTION RESPIRATORY (INHALATION) at 19:36

## 2025-08-19 RX ADMIN — IPRATROPIUM BROMIDE AND ALBUTEROL SULFATE 1 DOSE: .5; 2.5 SOLUTION RESPIRATORY (INHALATION) at 08:22

## 2025-08-19 RX ADMIN — ASPIRIN 81 MG 81 MG: 81 TABLET ORAL at 08:53

## 2025-08-19 RX ADMIN — SODIUM CHLORIDE, PRESERVATIVE FREE 10 ML: 5 INJECTION INTRAVENOUS at 18:12

## 2025-08-19 RX ADMIN — APIXABAN 5 MG: 5 TABLET, FILM COATED ORAL at 20:26

## 2025-08-19 RX ADMIN — BISOPROLOL FUMARATE 2.5 MG: 5 TABLET, FILM COATED ORAL at 08:54

## 2025-08-19 RX ADMIN — BUDESONIDE AND FORMOTEROL FUMARATE DIHYDRATE 2 PUFF: 160; 4.5 AEROSOL RESPIRATORY (INHALATION) at 19:37

## 2025-08-19 RX ADMIN — SENNOSIDES 8.6 MG: 8.6 TABLET, FILM COATED ORAL at 08:54

## 2025-08-19 RX ADMIN — SODIUM CHLORIDE, PRESERVATIVE FREE 10 ML: 5 INJECTION INTRAVENOUS at 08:56

## 2025-08-19 RX ADMIN — IOPAMIDOL 80 ML: 755 INJECTION, SOLUTION INTRAVENOUS at 11:25

## 2025-08-19 RX ADMIN — MONTELUKAST 10 MG: 10 TABLET, FILM COATED ORAL at 20:26

## 2025-08-19 RX ADMIN — SODIUM CHLORIDE: 0.9 INJECTION, SOLUTION INTRAVENOUS at 14:54

## 2025-08-19 RX ADMIN — HYDROXYZINE HYDROCHLORIDE 10 MG: 10 TABLET, FILM COATED ORAL at 00:57

## 2025-08-19 RX ADMIN — MIDODRINE HYDROCHLORIDE 2.5 MG: 5 TABLET ORAL at 20:26

## 2025-08-19 ASSESSMENT — PAIN DESCRIPTION - ORIENTATION
ORIENTATION: RIGHT
ORIENTATION: LOWER

## 2025-08-19 ASSESSMENT — PAIN DESCRIPTION - ONSET: ONSET: ON-GOING

## 2025-08-19 ASSESSMENT — PAIN - FUNCTIONAL ASSESSMENT
PAIN_FUNCTIONAL_ASSESSMENT: 0-10
PAIN_FUNCTIONAL_ASSESSMENT: PREVENTS OR INTERFERES SOME ACTIVE ACTIVITIES AND ADLS
PAIN_FUNCTIONAL_ASSESSMENT: 0-10
PAIN_FUNCTIONAL_ASSESSMENT: 0-10

## 2025-08-19 ASSESSMENT — PAIN DESCRIPTION - LOCATION: LOCATION: ABDOMEN

## 2025-08-19 ASSESSMENT — PAIN SCALES - GENERAL
PAINLEVEL_OUTOF10: 5
PAINLEVEL_OUTOF10: 7
PAINLEVEL_OUTOF10: 5
PAINLEVEL_OUTOF10: 8

## 2025-08-19 ASSESSMENT — PAIN DESCRIPTION - FREQUENCY: FREQUENCY: CONTINUOUS

## 2025-08-19 ASSESSMENT — PAIN DESCRIPTION - PAIN TYPE: TYPE: CHRONIC PAIN

## 2025-08-19 ASSESSMENT — PAIN DESCRIPTION - DESCRIPTORS: DESCRIPTORS: ACHING

## 2025-08-20 ENCOUNTER — APPOINTMENT (OUTPATIENT)
Dept: GENERAL RADIOLOGY | Age: 70
End: 2025-08-20
Payer: MEDICARE

## 2025-08-20 LAB
ANION GAP SERPL CALCULATED.3IONS-SCNC: 7 MMOL/L (ref 9–17)
ANION GAP SERPL CALCULATED.3IONS-SCNC: 8 MMOL/L (ref 9–17)
ARTERIAL PATENCY WRIST A: ABNORMAL
BASOPHILS # BLD: 0.06 K/UL
BASOPHILS NFR BLD: 1 % (ref 0–1)
BODY TEMPERATURE: 37
BUN SERPL-MCNC: 12 MG/DL (ref 7–20)
BUN SERPL-MCNC: 13 MG/DL (ref 7–20)
CALCIUM SERPL-MCNC: 9 MG/DL (ref 8.3–10.6)
CALCIUM SERPL-MCNC: 9.1 MG/DL (ref 8.3–10.6)
CHLORIDE SERPL-SCNC: 93 MMOL/L (ref 99–110)
CHLORIDE SERPL-SCNC: 95 MMOL/L (ref 99–110)
CO2 SERPL-SCNC: 36 MMOL/L (ref 21–32)
CO2 SERPL-SCNC: 37 MMOL/L (ref 21–32)
COHGB MFR BLD: 1.7 % (ref 0.5–1.5)
CREAT SERPL-MCNC: 0.4 MG/DL (ref 0.8–1.3)
CREAT SERPL-MCNC: 0.5 MG/DL (ref 0.8–1.3)
EOSINOPHIL # BLD: 0.15 K/UL
EOSINOPHILS RELATIVE PERCENT: 2 % (ref 0–3)
ERYTHROCYTE [DISTWIDTH] IN BLOOD BY AUTOMATED COUNT: 17.1 % (ref 11.7–14.9)
GFR, ESTIMATED: >90 ML/MIN/1.73M2
GFR, ESTIMATED: >90 ML/MIN/1.73M2
GLUCOSE BLD-MCNC: 127 MG/DL (ref 74–99)
GLUCOSE BLD-MCNC: 145 MG/DL (ref 74–99)
GLUCOSE BLD-MCNC: 169 MG/DL (ref 74–99)
GLUCOSE BLD-MCNC: 187 MG/DL (ref 74–99)
GLUCOSE SERPL-MCNC: 109 MG/DL (ref 74–99)
GLUCOSE SERPL-MCNC: 120 MG/DL (ref 74–99)
HCO3 VENOUS: 47.8 MMOL/L (ref 22–29)
HCT VFR BLD AUTO: 33 % (ref 42–52)
HGB BLD-MCNC: 10.2 G/DL (ref 13.5–18)
IMM GRANULOCYTES # BLD AUTO: 0.07 K/UL
IMM GRANULOCYTES NFR BLD: 1 %
LYMPHOCYTES NFR BLD: 0.85 K/UL
LYMPHOCYTES RELATIVE PERCENT: 9 % (ref 24–44)
MCH RBC QN AUTO: 30.6 PG (ref 27–31)
MCHC RBC AUTO-ENTMCNC: 30.9 G/DL (ref 32–36)
MCV RBC AUTO: 99.1 FL (ref 78–100)
METHEMOGLOBIN: 0.1 % (ref 0.5–1.5)
MONOCYTES NFR BLD: 0.7 K/UL
MONOCYTES NFR BLD: 8 % (ref 0–5)
NEUTROPHILS NFR BLD: 80 % (ref 36–66)
NEUTS SEG NFR BLD: 7.3 K/UL
OXYHGB MFR BLD: 40 %
PCO2 VENOUS: 94.6 MM HG (ref 38–54)
PH VENOUS: 7.32 (ref 7.32–7.43)
PLATELET # BLD AUTO: 182 K/UL (ref 140–440)
PMV BLD AUTO: 9.8 FL (ref 7.5–11.1)
PO2 VENOUS: 26.4 MM HG (ref 23–48)
POSITIVE BASE EXCESS, VEN: 17.5 MMOL/L (ref 0–3)
POTASSIUM SERPL-SCNC: 4 MMOL/L (ref 3.5–5.1)
POTASSIUM SERPL-SCNC: 4.2 MMOL/L (ref 3.5–5.1)
RBC # BLD AUTO: 3.33 M/UL (ref 4.6–6.2)
SODIUM SERPL-SCNC: 136 MMOL/L (ref 136–145)
SODIUM SERPL-SCNC: 140 MMOL/L (ref 136–145)
TEXT FOR RESPIRATORY: ABNORMAL
WBC OTHER # BLD: 9.1 K/UL (ref 4–10.5)

## 2025-08-20 PROCEDURE — 94640 AIRWAY INHALATION TREATMENT: CPT

## 2025-08-20 PROCEDURE — 99232 SBSQ HOSP IP/OBS MODERATE 35: CPT | Performed by: THORACIC SURGERY (CARDIOTHORACIC VASCULAR SURGERY)

## 2025-08-20 PROCEDURE — 82805 BLOOD GASES W/O2 SATURATION: CPT

## 2025-08-20 PROCEDURE — 97535 SELF CARE MNGMENT TRAINING: CPT

## 2025-08-20 PROCEDURE — 82962 GLUCOSE BLOOD TEST: CPT

## 2025-08-20 PROCEDURE — 36415 COLL VENOUS BLD VENIPUNCTURE: CPT

## 2025-08-20 PROCEDURE — 94761 N-INVAS EAR/PLS OXIMETRY MLT: CPT

## 2025-08-20 PROCEDURE — 2060000000 HC ICU INTERMEDIATE R&B

## 2025-08-20 PROCEDURE — 2580000003 HC RX 258: Performed by: STUDENT IN AN ORGANIZED HEALTH CARE EDUCATION/TRAINING PROGRAM

## 2025-08-20 PROCEDURE — 2580000003 HC RX 258: Performed by: INTERNAL MEDICINE

## 2025-08-20 PROCEDURE — 80048 BASIC METABOLIC PNL TOTAL CA: CPT

## 2025-08-20 PROCEDURE — 71045 X-RAY EXAM CHEST 1 VIEW: CPT

## 2025-08-20 PROCEDURE — 6370000000 HC RX 637 (ALT 250 FOR IP): Performed by: STUDENT IN AN ORGANIZED HEALTH CARE EDUCATION/TRAINING PROGRAM

## 2025-08-20 PROCEDURE — 6360000002 HC RX W HCPCS: Performed by: STUDENT IN AN ORGANIZED HEALTH CARE EDUCATION/TRAINING PROGRAM

## 2025-08-20 PROCEDURE — 85025 COMPLETE CBC W/AUTO DIFF WBC: CPT

## 2025-08-20 PROCEDURE — 5A0935A ASSISTANCE WITH RESPIRATORY VENTILATION, LESS THAN 24 CONSECUTIVE HOURS, HIGH NASAL FLOW/VELOCITY: ICD-10-PCS | Performed by: INTERNAL MEDICINE

## 2025-08-20 PROCEDURE — 6370000000 HC RX 637 (ALT 250 FOR IP): Performed by: NURSE PRACTITIONER

## 2025-08-20 PROCEDURE — 6360000002 HC RX W HCPCS: Performed by: INTERNAL MEDICINE

## 2025-08-20 PROCEDURE — 2500000003 HC RX 250 WO HCPCS: Performed by: STUDENT IN AN ORGANIZED HEALTH CARE EDUCATION/TRAINING PROGRAM

## 2025-08-20 PROCEDURE — 2700000000 HC OXYGEN THERAPY PER DAY

## 2025-08-20 RX ADMIN — MELATONIN TAB 3 MG 3 MG: 3 TAB at 21:10

## 2025-08-20 RX ADMIN — IPRATROPIUM BROMIDE AND ALBUTEROL SULFATE 1 DOSE: .5; 2.5 SOLUTION RESPIRATORY (INHALATION) at 08:50

## 2025-08-20 RX ADMIN — CEFEPIME 2000 MG: 2 INJECTION, POWDER, FOR SOLUTION INTRAVENOUS at 21:30

## 2025-08-20 RX ADMIN — INSULIN LISPRO 2 UNITS: 100 INJECTION, SOLUTION INTRAVENOUS; SUBCUTANEOUS at 12:39

## 2025-08-20 RX ADMIN — CHLOROTHIAZIDE SODIUM 500 MG: 500 INJECTION, POWDER, LYOPHILIZED, FOR SOLUTION INTRAVENOUS at 17:26

## 2025-08-20 RX ADMIN — INSULIN GLARGINE 20 UNITS: 100 INJECTION, SOLUTION SUBCUTANEOUS at 09:53

## 2025-08-20 RX ADMIN — POLYETHYLENE GLYCOL (3350) 17 G: 17 POWDER, FOR SOLUTION ORAL at 09:53

## 2025-08-20 RX ADMIN — IPRATROPIUM BROMIDE AND ALBUTEROL SULFATE 1 DOSE: .5; 2.5 SOLUTION RESPIRATORY (INHALATION) at 12:12

## 2025-08-20 RX ADMIN — SODIUM CHLORIDE, PRESERVATIVE FREE 10 ML: 5 INJECTION INTRAVENOUS at 21:12

## 2025-08-20 RX ADMIN — HYDROCODONE BITARTRATE AND ACETAMINOPHEN 1 TABLET: 5; 325 TABLET ORAL at 21:10

## 2025-08-20 RX ADMIN — DIGOXIN 125 MCG: 125 TABLET ORAL at 09:52

## 2025-08-20 RX ADMIN — MIDODRINE HYDROCHLORIDE 2.5 MG: 5 TABLET ORAL at 21:10

## 2025-08-20 RX ADMIN — SODIUM CHLORIDE: 0.9 INJECTION, SOLUTION INTRAVENOUS at 06:49

## 2025-08-20 RX ADMIN — MIDODRINE HYDROCHLORIDE 2.5 MG: 5 TABLET ORAL at 09:52

## 2025-08-20 RX ADMIN — ROFLUMILAST 500 MCG: 500 TABLET ORAL at 09:53

## 2025-08-20 RX ADMIN — APIXABAN 5 MG: 5 TABLET, FILM COATED ORAL at 21:10

## 2025-08-20 RX ADMIN — FUROSEMIDE 40 MG: 10 INJECTION, SOLUTION INTRAMUSCULAR; INTRAVENOUS at 17:41

## 2025-08-20 RX ADMIN — BISOPROLOL FUMARATE 2.5 MG: 5 TABLET, FILM COATED ORAL at 09:53

## 2025-08-20 RX ADMIN — BUDESONIDE AND FORMOTEROL FUMARATE DIHYDRATE 2 PUFF: 160; 4.5 AEROSOL RESPIRATORY (INHALATION) at 08:50

## 2025-08-20 RX ADMIN — ASPIRIN 81 MG 81 MG: 81 TABLET ORAL at 09:52

## 2025-08-20 RX ADMIN — APIXABAN 5 MG: 5 TABLET, FILM COATED ORAL at 09:52

## 2025-08-20 RX ADMIN — FUROSEMIDE 40 MG: 10 INJECTION, SOLUTION INTRAMUSCULAR; INTRAVENOUS at 09:52

## 2025-08-20 RX ADMIN — SODIUM CHLORIDE, PRESERVATIVE FREE 10 ML: 5 INJECTION INTRAVENOUS at 09:55

## 2025-08-20 RX ADMIN — CEFEPIME 2000 MG: 2 INJECTION, POWDER, FOR SOLUTION INTRAVENOUS at 06:50

## 2025-08-20 RX ADMIN — MONTELUKAST 10 MG: 10 TABLET, FILM COATED ORAL at 21:10

## 2025-08-20 RX ADMIN — ATORVASTATIN CALCIUM 40 MG: 40 TABLET, FILM COATED ORAL at 21:10

## 2025-08-20 ASSESSMENT — PAIN DESCRIPTION - ONSET
ONSET: PROGRESSIVE
ONSET: ON-GOING
ONSET: ON-GOING

## 2025-08-20 ASSESSMENT — PAIN SCALES - GENERAL
PAINLEVEL_OUTOF10: 0
PAINLEVEL_OUTOF10: 4
PAINLEVEL_OUTOF10: 0

## 2025-08-20 ASSESSMENT — PAIN - FUNCTIONAL ASSESSMENT
PAIN_FUNCTIONAL_ASSESSMENT: PREVENTS OR INTERFERES SOME ACTIVE ACTIVITIES AND ADLS
PAIN_FUNCTIONAL_ASSESSMENT: 0-10
PAIN_FUNCTIONAL_ASSESSMENT: ACTIVITIES ARE NOT PREVENTED

## 2025-08-20 ASSESSMENT — PAIN DESCRIPTION - FREQUENCY
FREQUENCY: CONTINUOUS

## 2025-08-20 ASSESSMENT — PAIN DESCRIPTION - LOCATION
LOCATION: BACK
LOCATION: BACK
LOCATION: ABDOMEN
LOCATION: ANKLE

## 2025-08-20 ASSESSMENT — PAIN SCALES - WONG BAKER
WONGBAKER_NUMERICALRESPONSE: HURTS A LITTLE BIT
WONGBAKER_NUMERICALRESPONSE: HURTS A LITTLE BIT
WONGBAKER_NUMERICALRESPONSE: NO HURT
WONGBAKER_NUMERICALRESPONSE: HURTS A LITTLE BIT

## 2025-08-20 ASSESSMENT — PAIN DESCRIPTION - DESCRIPTORS
DESCRIPTORS: ACHING
DESCRIPTORS: ACHING;DISCOMFORT
DESCRIPTORS: ACHING
DESCRIPTORS: ACHING

## 2025-08-20 ASSESSMENT — PAIN DESCRIPTION - ORIENTATION
ORIENTATION: RIGHT;LEFT
ORIENTATION: RIGHT;LEFT
ORIENTATION: RIGHT
ORIENTATION: LEFT;RIGHT

## 2025-08-20 ASSESSMENT — PAIN DESCRIPTION - PAIN TYPE
TYPE: CHRONIC PAIN

## 2025-08-21 ENCOUNTER — APPOINTMENT (OUTPATIENT)
Dept: CT IMAGING | Age: 70
End: 2025-08-21
Payer: MEDICARE

## 2025-08-21 ENCOUNTER — APPOINTMENT (OUTPATIENT)
Dept: GENERAL RADIOLOGY | Age: 70
End: 2025-08-21
Payer: MEDICARE

## 2025-08-21 LAB
ANION GAP SERPL CALCULATED.3IONS-SCNC: 4 MMOL/L (ref 9–17)
ARTERIAL PATENCY WRIST A: ABNORMAL
BODY TEMPERATURE: 37
BUN SERPL-MCNC: 13 MG/DL (ref 7–20)
CALCIUM SERPL-MCNC: 9.4 MG/DL (ref 8.3–10.6)
CHLORIDE SERPL-SCNC: 89 MMOL/L (ref 99–110)
CO2 SERPL-SCNC: 45 MMOL/L (ref 21–32)
COHGB MFR BLD: 0.9 % (ref 0.5–1.5)
CREAT SERPL-MCNC: 0.6 MG/DL (ref 0.8–1.3)
GFR, ESTIMATED: >90 ML/MIN/1.73M2
GLUCOSE BLD-MCNC: 130 MG/DL (ref 74–99)
GLUCOSE BLD-MCNC: 201 MG/DL (ref 74–99)
GLUCOSE BLD-MCNC: 224 MG/DL (ref 74–99)
GLUCOSE SERPL-MCNC: 135 MG/DL (ref 74–99)
HCO3 VENOUS: 44.3 MMOL/L (ref 22–29)
METHEMOGLOBIN: 1.8 % (ref 0.5–1.5)
OXYHGB MFR BLD: 20 %
PCO2 VENOUS: 97.7 MM HG (ref 38–54)
PH VENOUS: 7.27 (ref 7.32–7.43)
PO2 VENOUS: 19.6 MM HG (ref 23–48)
POSITIVE BASE EXCESS, VEN: 13.7 MMOL/L (ref 0–3)
POTASSIUM SERPL-SCNC: 4 MMOL/L (ref 3.5–5.1)
SODIUM SERPL-SCNC: 137 MMOL/L (ref 136–145)
TEXT FOR RESPIRATORY: ABNORMAL

## 2025-08-21 PROCEDURE — 36415 COLL VENOUS BLD VENIPUNCTURE: CPT

## 2025-08-21 PROCEDURE — 2580000003 HC RX 258: Performed by: STUDENT IN AN ORGANIZED HEALTH CARE EDUCATION/TRAINING PROGRAM

## 2025-08-21 PROCEDURE — 94761 N-INVAS EAR/PLS OXIMETRY MLT: CPT

## 2025-08-21 PROCEDURE — 6360000002 HC RX W HCPCS: Performed by: STUDENT IN AN ORGANIZED HEALTH CARE EDUCATION/TRAINING PROGRAM

## 2025-08-21 PROCEDURE — 6370000000 HC RX 637 (ALT 250 FOR IP): Performed by: NURSE PRACTITIONER

## 2025-08-21 PROCEDURE — 2060000000 HC ICU INTERMEDIATE R&B

## 2025-08-21 PROCEDURE — 2500000003 HC RX 250 WO HCPCS: Performed by: INTERNAL MEDICINE

## 2025-08-21 PROCEDURE — 2700000000 HC OXYGEN THERAPY PER DAY

## 2025-08-21 PROCEDURE — 71250 CT THORAX DX C-: CPT

## 2025-08-21 PROCEDURE — 71045 X-RAY EXAM CHEST 1 VIEW: CPT

## 2025-08-21 PROCEDURE — 6370000000 HC RX 637 (ALT 250 FOR IP): Performed by: STUDENT IN AN ORGANIZED HEALTH CARE EDUCATION/TRAINING PROGRAM

## 2025-08-21 PROCEDURE — 6360000002 HC RX W HCPCS: Performed by: INTERNAL MEDICINE

## 2025-08-21 PROCEDURE — 2500000003 HC RX 250 WO HCPCS: Performed by: STUDENT IN AN ORGANIZED HEALTH CARE EDUCATION/TRAINING PROGRAM

## 2025-08-21 PROCEDURE — 6370000000 HC RX 637 (ALT 250 FOR IP): Performed by: INTERNAL MEDICINE

## 2025-08-21 PROCEDURE — 80048 BASIC METABOLIC PNL TOTAL CA: CPT

## 2025-08-21 PROCEDURE — 6360000002 HC RX W HCPCS

## 2025-08-21 PROCEDURE — 94660 CPAP INITIATION&MGMT: CPT

## 2025-08-21 PROCEDURE — 82962 GLUCOSE BLOOD TEST: CPT

## 2025-08-21 PROCEDURE — 94640 AIRWAY INHALATION TREATMENT: CPT

## 2025-08-21 PROCEDURE — APPNB15 APP NON BILLABLE TIME 0-15 MINS

## 2025-08-21 PROCEDURE — 82805 BLOOD GASES W/O2 SATURATION: CPT

## 2025-08-21 RX ORDER — METOLAZONE 5 MG/1
2.5 TABLET ORAL DAILY
Status: DISCONTINUED | OUTPATIENT
Start: 2025-08-21 | End: 2025-08-22 | Stop reason: HOSPADM

## 2025-08-21 RX ORDER — FUROSEMIDE 10 MG/ML
80 INJECTION INTRAMUSCULAR; INTRAVENOUS 2 TIMES DAILY
Status: DISCONTINUED | OUTPATIENT
Start: 2025-08-21 | End: 2025-08-22 | Stop reason: HOSPADM

## 2025-08-21 RX ADMIN — INSULIN LISPRO 2 UNITS: 100 INJECTION, SOLUTION INTRAVENOUS; SUBCUTANEOUS at 17:52

## 2025-08-21 RX ADMIN — MIDODRINE HYDROCHLORIDE 2.5 MG: 5 TABLET ORAL at 21:00

## 2025-08-21 RX ADMIN — SENNOSIDES 8.6 MG: 8.6 TABLET, FILM COATED ORAL at 08:53

## 2025-08-21 RX ADMIN — HYDROCODONE BITARTRATE AND ACETAMINOPHEN 1 TABLET: 5; 325 TABLET ORAL at 04:11

## 2025-08-21 RX ADMIN — IPRATROPIUM BROMIDE AND ALBUTEROL SULFATE 1 DOSE: .5; 2.5 SOLUTION RESPIRATORY (INHALATION) at 09:21

## 2025-08-21 RX ADMIN — MIDODRINE HYDROCHLORIDE 2.5 MG: 5 TABLET ORAL at 08:52

## 2025-08-21 RX ADMIN — DIAZEPAM 2.5 MG: 5 INJECTION, SOLUTION INTRAMUSCULAR; INTRAVENOUS at 05:32

## 2025-08-21 RX ADMIN — ROFLUMILAST 500 MCG: 500 TABLET ORAL at 08:52

## 2025-08-21 RX ADMIN — METOLAZONE 2.5 MG: 5 TABLET ORAL at 09:32

## 2025-08-21 RX ADMIN — HYDROCODONE BITARTRATE AND ACETAMINOPHEN 1 TABLET: 5; 325 TABLET ORAL at 12:32

## 2025-08-21 RX ADMIN — WATER 40 MG: 1 INJECTION INTRAMUSCULAR; INTRAVENOUS; SUBCUTANEOUS at 15:46

## 2025-08-21 RX ADMIN — FUROSEMIDE 40 MG: 10 INJECTION, SOLUTION INTRAMUSCULAR; INTRAVENOUS at 08:51

## 2025-08-21 RX ADMIN — DIGOXIN 125 MCG: 125 TABLET ORAL at 08:52

## 2025-08-21 RX ADMIN — ATORVASTATIN CALCIUM 40 MG: 40 TABLET, FILM COATED ORAL at 21:00

## 2025-08-21 RX ADMIN — INSULIN LISPRO 2 UNITS: 100 INJECTION, SOLUTION INTRAVENOUS; SUBCUTANEOUS at 20:59

## 2025-08-21 RX ADMIN — WATER 40 MG: 1 INJECTION INTRAMUSCULAR; INTRAVENOUS; SUBCUTANEOUS at 21:00

## 2025-08-21 RX ADMIN — INSULIN GLARGINE 20 UNITS: 100 INJECTION, SOLUTION SUBCUTANEOUS at 08:53

## 2025-08-21 RX ADMIN — CEFEPIME 2000 MG: 2 INJECTION, POWDER, FOR SOLUTION INTRAVENOUS at 05:57

## 2025-08-21 RX ADMIN — IPRATROPIUM BROMIDE AND ALBUTEROL SULFATE 1 DOSE: .5; 2.5 SOLUTION RESPIRATORY (INHALATION) at 20:08

## 2025-08-21 RX ADMIN — FUROSEMIDE 80 MG: 10 INJECTION, SOLUTION INTRAMUSCULAR; INTRAVENOUS at 17:52

## 2025-08-21 RX ADMIN — SODIUM CHLORIDE, PRESERVATIVE FREE 10 ML: 5 INJECTION INTRAVENOUS at 08:54

## 2025-08-21 RX ADMIN — APIXABAN 5 MG: 5 TABLET, FILM COATED ORAL at 08:53

## 2025-08-21 RX ADMIN — HYDROXYZINE HYDROCHLORIDE 10 MG: 10 TABLET, FILM COATED ORAL at 08:52

## 2025-08-21 RX ADMIN — ASPIRIN 81 MG 81 MG: 81 TABLET ORAL at 08:53

## 2025-08-21 RX ADMIN — BISOPROLOL FUMARATE 2.5 MG: 5 TABLET, FILM COATED ORAL at 08:52

## 2025-08-21 RX ADMIN — BUDESONIDE AND FORMOTEROL FUMARATE DIHYDRATE 2 PUFF: 160; 4.5 AEROSOL RESPIRATORY (INHALATION) at 09:55

## 2025-08-21 RX ADMIN — SODIUM CHLORIDE, PRESERVATIVE FREE 10 ML: 5 INJECTION INTRAVENOUS at 21:00

## 2025-08-21 RX ADMIN — BUDESONIDE AND FORMOTEROL FUMARATE DIHYDRATE 2 PUFF: 160; 4.5 AEROSOL RESPIRATORY (INHALATION) at 20:09

## 2025-08-21 RX ADMIN — MONTELUKAST 10 MG: 10 TABLET, FILM COATED ORAL at 21:00

## 2025-08-21 RX ADMIN — HYDROXYZINE HYDROCHLORIDE 10 MG: 10 TABLET, FILM COATED ORAL at 04:11

## 2025-08-21 ASSESSMENT — PAIN - FUNCTIONAL ASSESSMENT
PAIN_FUNCTIONAL_ASSESSMENT: 0-10
PAIN_FUNCTIONAL_ASSESSMENT: 0-10
PAIN_FUNCTIONAL_ASSESSMENT: ACTIVITIES ARE NOT PREVENTED

## 2025-08-21 ASSESSMENT — PAIN SCALES - GENERAL
PAINLEVEL_OUTOF10: 6
PAINLEVEL_OUTOF10: 4
PAINLEVEL_OUTOF10: 6
PAINLEVEL_OUTOF10: 0

## 2025-08-21 ASSESSMENT — PAIN DESCRIPTION - LOCATION
LOCATION: KNEE
LOCATION: KNEE;BACK
LOCATION: HAND

## 2025-08-21 ASSESSMENT — PAIN DESCRIPTION - ORIENTATION
ORIENTATION: LEFT
ORIENTATION: LEFT;RIGHT

## 2025-08-21 ASSESSMENT — PAIN DESCRIPTION - PAIN TYPE: TYPE: CHRONIC PAIN

## 2025-08-21 ASSESSMENT — PAIN DESCRIPTION - FREQUENCY: FREQUENCY: CONTINUOUS

## 2025-08-21 ASSESSMENT — PAIN SCALES - WONG BAKER
WONGBAKER_NUMERICALRESPONSE: NO HURT

## 2025-08-21 ASSESSMENT — PAIN DESCRIPTION - DESCRIPTORS: DESCRIPTORS: ACHING;DISCOMFORT

## 2025-08-22 ENCOUNTER — APPOINTMENT (OUTPATIENT)
Dept: GENERAL RADIOLOGY | Age: 70
End: 2025-08-22
Payer: MEDICARE

## 2025-08-22 ENCOUNTER — APPOINTMENT (OUTPATIENT)
Dept: INTERVENTIONAL RADIOLOGY/VASCULAR | Age: 70
End: 2025-08-22
Attending: INTERNAL MEDICINE
Payer: MEDICARE

## 2025-08-22 VITALS
HEART RATE: 78 BPM | HEIGHT: 78 IN | RESPIRATION RATE: 18 BRPM | BODY MASS INDEX: 36.31 KG/M2 | OXYGEN SATURATION: 96 % | SYSTOLIC BLOOD PRESSURE: 107 MMHG | DIASTOLIC BLOOD PRESSURE: 51 MMHG | TEMPERATURE: 97.7 F | WEIGHT: 313.8 LBS

## 2025-08-22 LAB
ANION GAP SERPL CALCULATED.3IONS-SCNC: 8 MMOL/L (ref 9–17)
ARTERIAL PATENCY WRIST A: ABNORMAL
BODY TEMPERATURE: 37
BUN SERPL-MCNC: 17 MG/DL (ref 7–20)
CALCIUM SERPL-MCNC: 9 MG/DL (ref 8.3–10.6)
CHLORIDE SERPL-SCNC: 87 MMOL/L (ref 99–110)
CO2 SERPL-SCNC: 41 MMOL/L (ref 21–32)
COHGB MFR BLD: 1.3 % (ref 0.5–1.5)
CREAT SERPL-MCNC: 0.5 MG/DL (ref 0.8–1.3)
GFR, ESTIMATED: >90 ML/MIN/1.73M2
GLUCOSE BLD-MCNC: 237 MG/DL (ref 74–99)
GLUCOSE BLD-MCNC: 271 MG/DL (ref 74–99)
GLUCOSE BLD-MCNC: 284 MG/DL (ref 74–99)
GLUCOSE BLD-MCNC: 293 MG/DL (ref 74–99)
GLUCOSE SERPL-MCNC: 255 MG/DL (ref 74–99)
HCO3 VENOUS: 45.5 MMOL/L (ref 22–29)
METHEMOGLOBIN: 0.5 % (ref 0.5–1.5)
MICROORGANISM SPEC CULT: NORMAL
MICROORGANISM SPEC CULT: NORMAL
OXYHGB MFR BLD: 96.7 %
PCO2 VENOUS: 76.9 MM HG (ref 38–54)
PH VENOUS: 7.39 (ref 7.32–7.43)
PO2 VENOUS: 139.7 MM HG (ref 23–48)
POSITIVE BASE EXCESS, VEN: 16.1 MMOL/L (ref 0–3)
POTASSIUM SERPL-SCNC: 4.2 MMOL/L (ref 3.5–5.1)
SERVICE CMNT-IMP: NORMAL
SERVICE CMNT-IMP: NORMAL
SODIUM SERPL-SCNC: 136 MMOL/L (ref 136–145)
SPECIMEN DESCRIPTION: NORMAL
SPECIMEN DESCRIPTION: NORMAL
TEXT FOR RESPIRATORY: ABNORMAL

## 2025-08-22 PROCEDURE — APPNB15 APP NON BILLABLE TIME 0-15 MINS

## 2025-08-22 PROCEDURE — 94640 AIRWAY INHALATION TREATMENT: CPT

## 2025-08-22 PROCEDURE — 6370000000 HC RX 637 (ALT 250 FOR IP): Performed by: NURSE PRACTITIONER

## 2025-08-22 PROCEDURE — 6370000000 HC RX 637 (ALT 250 FOR IP): Performed by: INTERNAL MEDICINE

## 2025-08-22 PROCEDURE — 36415 COLL VENOUS BLD VENIPUNCTURE: CPT

## 2025-08-22 PROCEDURE — 71045 X-RAY EXAM CHEST 1 VIEW: CPT

## 2025-08-22 PROCEDURE — 94761 N-INVAS EAR/PLS OXIMETRY MLT: CPT

## 2025-08-22 PROCEDURE — 82805 BLOOD GASES W/O2 SATURATION: CPT

## 2025-08-22 PROCEDURE — C1729 CATH, DRAINAGE: HCPCS

## 2025-08-22 PROCEDURE — 2700000000 HC OXYGEN THERAPY PER DAY

## 2025-08-22 PROCEDURE — 6360000002 HC RX W HCPCS: Performed by: INTERNAL MEDICINE

## 2025-08-22 PROCEDURE — 6360000002 HC RX W HCPCS

## 2025-08-22 PROCEDURE — 6370000000 HC RX 637 (ALT 250 FOR IP): Performed by: STUDENT IN AN ORGANIZED HEALTH CARE EDUCATION/TRAINING PROGRAM

## 2025-08-22 PROCEDURE — 80048 BASIC METABOLIC PNL TOTAL CA: CPT

## 2025-08-22 PROCEDURE — 0W9B3ZZ DRAINAGE OF LEFT PLEURAL CAVITY, PERCUTANEOUS APPROACH: ICD-10-PCS | Performed by: RADIOLOGY

## 2025-08-22 PROCEDURE — 99233 SBSQ HOSP IP/OBS HIGH 50: CPT | Performed by: INTERNAL MEDICINE

## 2025-08-22 PROCEDURE — 0W993ZZ DRAINAGE OF RIGHT PLEURAL CAVITY, PERCUTANEOUS APPROACH: ICD-10-PCS | Performed by: RADIOLOGY

## 2025-08-22 PROCEDURE — 32555 ASPIRATE PLEURA W/ IMAGING: CPT | Performed by: RADIOLOGY

## 2025-08-22 PROCEDURE — 2500000003 HC RX 250 WO HCPCS: Performed by: INTERNAL MEDICINE

## 2025-08-22 PROCEDURE — 6360000002 HC RX W HCPCS: Performed by: RADIOLOGY

## 2025-08-22 PROCEDURE — 82962 GLUCOSE BLOOD TEST: CPT

## 2025-08-22 PROCEDURE — 32555 ASPIRATE PLEURA W/ IMAGING: CPT

## 2025-08-22 PROCEDURE — 2500000003 HC RX 250 WO HCPCS: Performed by: STUDENT IN AN ORGANIZED HEALTH CARE EDUCATION/TRAINING PROGRAM

## 2025-08-22 RX ORDER — FUROSEMIDE 80 MG/1
80 TABLET ORAL 2 TIMES DAILY
DISCHARGE
Start: 2025-08-22 | End: 2025-08-22

## 2025-08-22 RX ORDER — LIDOCAINE HYDROCHLORIDE 10 MG/ML
INJECTION, SOLUTION EPIDURAL; INFILTRATION; INTRACAUDAL; PERINEURAL PRN
Status: COMPLETED | OUTPATIENT
Start: 2025-08-22 | End: 2025-08-22

## 2025-08-22 RX ORDER — HYDROCODONE BITARTRATE AND ACETAMINOPHEN 5; 325 MG/1; MG/1
1 TABLET ORAL EVERY 8 HOURS PRN
Qty: 9 TABLET | Refills: 0 | Status: SHIPPED | OUTPATIENT
Start: 2025-08-22 | End: 2025-08-25

## 2025-08-22 RX ORDER — FUROSEMIDE 20 MG/1
60 TABLET ORAL 2 TIMES DAILY
Qty: 180 TABLET | Refills: 1
Start: 2025-08-22

## 2025-08-22 RX ADMIN — INSULIN LISPRO 2 UNITS: 100 INJECTION, SOLUTION INTRAVENOUS; SUBCUTANEOUS at 17:37

## 2025-08-22 RX ADMIN — BUDESONIDE AND FORMOTEROL FUMARATE DIHYDRATE 2 PUFF: 160; 4.5 AEROSOL RESPIRATORY (INHALATION) at 12:59

## 2025-08-22 RX ADMIN — METOLAZONE 2.5 MG: 5 TABLET ORAL at 10:44

## 2025-08-22 RX ADMIN — INSULIN GLARGINE 20 UNITS: 100 INJECTION, SOLUTION SUBCUTANEOUS at 10:42

## 2025-08-22 RX ADMIN — BISOPROLOL FUMARATE 2.5 MG: 5 TABLET, FILM COATED ORAL at 10:43

## 2025-08-22 RX ADMIN — LIDOCAINE HYDROCHLORIDE 20 ML: 10 INJECTION, SOLUTION EPIDURAL; INFILTRATION; INTRACAUDAL; PERINEURAL at 08:47

## 2025-08-22 RX ADMIN — SENNOSIDES 8.6 MG: 8.6 TABLET, FILM COATED ORAL at 10:44

## 2025-08-22 RX ADMIN — DIGOXIN 125 MCG: 125 TABLET ORAL at 10:45

## 2025-08-22 RX ADMIN — MONTELUKAST 10 MG: 10 TABLET, FILM COATED ORAL at 20:23

## 2025-08-22 RX ADMIN — SENNOSIDES 8.6 MG: 8.6 TABLET, FILM COATED ORAL at 20:23

## 2025-08-22 RX ADMIN — POLYETHYLENE GLYCOL (3350) 17 G: 17 POWDER, FOR SOLUTION ORAL at 10:46

## 2025-08-22 RX ADMIN — IPRATROPIUM BROMIDE AND ALBUTEROL SULFATE 1 DOSE: .5; 2.5 SOLUTION RESPIRATORY (INHALATION) at 09:34

## 2025-08-22 RX ADMIN — INSULIN LISPRO 4 UNITS: 100 INJECTION, SOLUTION INTRAVENOUS; SUBCUTANEOUS at 20:23

## 2025-08-22 RX ADMIN — ATORVASTATIN CALCIUM 40 MG: 40 TABLET, FILM COATED ORAL at 20:23

## 2025-08-22 RX ADMIN — SODIUM CHLORIDE, PRESERVATIVE FREE 10 ML: 5 INJECTION INTRAVENOUS at 10:46

## 2025-08-22 RX ADMIN — MIDODRINE HYDROCHLORIDE 2.5 MG: 5 TABLET ORAL at 20:23

## 2025-08-22 RX ADMIN — WATER 40 MG: 1 INJECTION INTRAMUSCULAR; INTRAVENOUS; SUBCUTANEOUS at 05:39

## 2025-08-22 RX ADMIN — MELATONIN TAB 3 MG 3 MG: 3 TAB at 01:57

## 2025-08-22 RX ADMIN — WATER 40 MG: 1 INJECTION INTRAMUSCULAR; INTRAVENOUS; SUBCUTANEOUS at 15:59

## 2025-08-22 RX ADMIN — ROFLUMILAST 500 MCG: 500 TABLET ORAL at 10:44

## 2025-08-22 RX ADMIN — FUROSEMIDE 80 MG: 10 INJECTION, SOLUTION INTRAMUSCULAR; INTRAVENOUS at 10:42

## 2025-08-22 RX ADMIN — INSULIN LISPRO 4 UNITS: 100 INJECTION, SOLUTION INTRAVENOUS; SUBCUTANEOUS at 10:42

## 2025-08-22 RX ADMIN — INSULIN LISPRO 4 UNITS: 100 INJECTION, SOLUTION INTRAVENOUS; SUBCUTANEOUS at 12:52

## 2025-08-22 RX ADMIN — MIDODRINE HYDROCHLORIDE 2.5 MG: 5 TABLET ORAL at 10:44

## 2025-08-22 RX ADMIN — ASPIRIN 81 MG 81 MG: 81 TABLET ORAL at 10:45

## 2025-08-22 ASSESSMENT — PAIN SCALES - GENERAL
PAINLEVEL_OUTOF10: 0

## 2025-08-22 ASSESSMENT — PAIN SCALES - WONG BAKER
WONGBAKER_NUMERICALRESPONSE: NO HURT
WONGBAKER_NUMERICALRESPONSE: NO HURT

## 2025-08-26 ENCOUNTER — APPOINTMENT (OUTPATIENT)
Dept: GENERAL RADIOLOGY | Age: 70
End: 2025-08-26
Payer: MEDICARE

## 2025-08-26 ENCOUNTER — HOSPITAL ENCOUNTER (EMERGENCY)
Age: 70
Discharge: HOME | End: 2025-08-27
Attending: EMERGENCY MEDICINE
Payer: MEDICARE

## 2025-08-26 ENCOUNTER — HOSPITAL ENCOUNTER (EMERGENCY)
Age: 70
Discharge: HOME | End: 2025-08-26
Attending: EMERGENCY MEDICINE
Payer: MEDICARE

## 2025-08-26 VITALS
RESPIRATION RATE: 19 BRPM | HEIGHT: 78 IN | DIASTOLIC BLOOD PRESSURE: 82 MMHG | BODY MASS INDEX: 36.3 KG/M2 | TEMPERATURE: 97.7 F | WEIGHT: 313.71 LBS | OXYGEN SATURATION: 97 % | HEART RATE: 72 BPM | SYSTOLIC BLOOD PRESSURE: 120 MMHG

## 2025-08-26 DIAGNOSIS — Z79.01 CHRONIC ANTICOAGULATION: ICD-10-CM

## 2025-08-26 DIAGNOSIS — Z86.79 HISTORY OF CHF (CONGESTIVE HEART FAILURE): ICD-10-CM

## 2025-08-26 DIAGNOSIS — I48.91 ATRIAL FIBRILLATION, UNSPECIFIED TYPE (HCC): ICD-10-CM

## 2025-08-26 DIAGNOSIS — E87.6 HYPOKALEMIA: ICD-10-CM

## 2025-08-26 DIAGNOSIS — R42 LIGHTHEADEDNESS: Primary | ICD-10-CM

## 2025-08-26 DIAGNOSIS — J96.11 CHRONIC RESPIRATORY FAILURE WITH HYPOXIA (HCC): ICD-10-CM

## 2025-08-26 DIAGNOSIS — R06.02 SHORTNESS OF BREATH: Primary | ICD-10-CM

## 2025-08-26 DIAGNOSIS — E83.42 HYPOMAGNESEMIA: ICD-10-CM

## 2025-08-26 LAB
ALBUMIN SERPL-MCNC: 3.7 G/DL (ref 3.4–5)
ALBUMIN/GLOB SERPL: 1.2 {RATIO} (ref 1.1–2.2)
ALP SERPL-CCNC: 97 U/L (ref 40–129)
ALT SERPL-CCNC: 17 U/L (ref 10–40)
ANION GAP SERPL CALCULATED.3IONS-SCNC: 12 MMOL/L (ref 9–17)
ANION GAP SERPL CALCULATED.3IONS-SCNC: 13 MMOL/L (ref 9–17)
ARTERIAL PATENCY WRIST A: ABNORMAL
ARTERIAL PATENCY WRIST A: ABNORMAL
AST SERPL-CCNC: 31 U/L (ref 15–37)
BASOPHILS # BLD: 0.04 K/UL
BASOPHILS NFR BLD: 0 % (ref 0–1)
BILIRUB SERPL-MCNC: 0.9 MG/DL (ref 0–1)
BNP SERPL-MCNC: 2755 PG/ML (ref 0–125)
BNP SERPL-MCNC: 3408 PG/ML (ref 0–125)
BODY TEMPERATURE: 37
BODY TEMPERATURE: 37
BUN SERPL-MCNC: 22 MG/DL (ref 7–20)
BUN SERPL-MCNC: 24 MG/DL (ref 7–20)
CALCIUM SERPL-MCNC: 9 MG/DL (ref 8.3–10.6)
CALCIUM SERPL-MCNC: 9.1 MG/DL (ref 8.3–10.6)
CHLORIDE SERPL-SCNC: 85 MMOL/L (ref 99–110)
CHLORIDE SERPL-SCNC: 88 MMOL/L (ref 99–110)
CO2 SERPL-SCNC: 37 MMOL/L (ref 21–32)
CO2 SERPL-SCNC: 40 MMOL/L (ref 21–32)
COHGB MFR BLD: 1.1 % (ref 0.5–1.5)
COHGB MFR BLD: 1.1 % (ref 0.5–1.5)
CREAT SERPL-MCNC: 0.8 MG/DL (ref 0.8–1.3)
CREAT SERPL-MCNC: 0.8 MG/DL (ref 0.8–1.3)
DATE LAST DOSE: ABNORMAL
DIGOXIN DOSE TIME: ABNORMAL
DIGOXIN DOSE: ABNORMAL MG
DIGOXIN SERPL-MCNC: 0.6 NG/ML (ref 0.8–2)
EKG DIAGNOSIS: NORMAL
EKG Q-T INTERVAL: 404 MS
EKG QRS DURATION: 110 MS
EKG QTC CALCULATION (BAZETT): 472 MS
EKG R AXIS: -76 DEGREES
EKG T AXIS: 84 DEGREES
EKG VENTRICULAR RATE: 82 BPM
EOSINOPHIL # BLD: 0.3 K/UL
EOSINOPHILS RELATIVE PERCENT: 3 % (ref 0–3)
ERYTHROCYTE [DISTWIDTH] IN BLOOD BY AUTOMATED COUNT: 16.4 % (ref 11.7–14.9)
GFR, ESTIMATED: >90 ML/MIN/1.73M2
GFR, ESTIMATED: >90 ML/MIN/1.73M2
GLUCOSE SERPL-MCNC: 105 MG/DL (ref 74–99)
GLUCOSE SERPL-MCNC: 96 MG/DL (ref 74–99)
HCO3 VENOUS: 42.3 MMOL/L (ref 22–29)
HCO3 VENOUS: 44.6 MMOL/L (ref 22–29)
HCT VFR BLD AUTO: 40.7 % (ref 42–52)
HGB BLD-MCNC: 13.1 G/DL (ref 13.5–18)
IMM GRANULOCYTES # BLD AUTO: 0.08 K/UL
IMM GRANULOCYTES NFR BLD: 1 %
INR PPP: 1.2
LYMPHOCYTES NFR BLD: 1.41 K/UL
LYMPHOCYTES RELATIVE PERCENT: 12 % (ref 24–44)
MAGNESIUM SERPL-MCNC: 1.6 MG/DL (ref 1.8–2.4)
MAGNESIUM SERPL-MCNC: 1.6 MG/DL (ref 1.8–2.4)
MCH RBC QN AUTO: 30 PG (ref 27–31)
MCHC RBC AUTO-ENTMCNC: 32.2 G/DL (ref 32–36)
MCV RBC AUTO: 93.3 FL (ref 78–100)
METHEMOGLOBIN: 0.4 % (ref 0.5–1.5)
METHEMOGLOBIN: 2 % (ref 0.5–1.5)
MONOCYTES NFR BLD: 0.77 K/UL
MONOCYTES NFR BLD: 6 % (ref 0–5)
NEUTROPHILS NFR BLD: 79 % (ref 36–66)
NEUTS SEG NFR BLD: 9.59 K/UL
OXYHGB MFR BLD: 10.8 %
OXYHGB MFR BLD: 35.9 %
PARTIAL THROMBOPLASTIN TIME: 34.4 SEC (ref 25.1–37.1)
PCO2 VENOUS: 69.9 MM HG (ref 38–54)
PCO2 VENOUS: 73.8 MM HG (ref 38–54)
PH VENOUS: 7.38 (ref 7.32–7.43)
PH VENOUS: 7.42 (ref 7.32–7.43)
PLATELET # BLD AUTO: 184 K/UL (ref 140–440)
PMV BLD AUTO: 10 FL (ref 7.5–11.1)
PO2 VENOUS: 13.1 MM HG (ref 23–48)
PO2 VENOUS: 24.2 MM HG (ref 23–48)
POSITIVE BASE EXCESS, VEN: 13.6 MMOL/L (ref 0–3)
POSITIVE BASE EXCESS, VEN: 16.4 MMOL/L (ref 0–3)
POTASSIUM SERPL-SCNC: 3 MMOL/L (ref 3.5–5.1)
POTASSIUM SERPL-SCNC: 3.3 MMOL/L (ref 3.5–5.1)
PROT SERPL-MCNC: 6.7 G/DL (ref 6.4–8.2)
PROTHROMBIN TIME: 15.5 SEC (ref 11.7–14.5)
RBC # BLD AUTO: 4.36 M/UL (ref 4.6–6.2)
SODIUM SERPL-SCNC: 137 MMOL/L (ref 136–145)
SODIUM SERPL-SCNC: 138 MMOL/L (ref 136–145)
TEXT FOR RESPIRATORY: ABNORMAL
TEXT FOR RESPIRATORY: ABNORMAL
TROPONIN I SERPL HS-MCNC: 58 NG/L (ref 0–22)
TROPONIN I SERPL HS-MCNC: 61 NG/L (ref 0–22)
WBC OTHER # BLD: 12.2 K/UL (ref 4–10.5)

## 2025-08-26 PROCEDURE — 6370000000 HC RX 637 (ALT 250 FOR IP): Performed by: PHYSICIAN ASSISTANT

## 2025-08-26 PROCEDURE — 6360000002 HC RX W HCPCS: Performed by: PHYSICIAN ASSISTANT

## 2025-08-26 PROCEDURE — 85610 PROTHROMBIN TIME: CPT

## 2025-08-26 PROCEDURE — 93005 ELECTROCARDIOGRAM TRACING: CPT | Performed by: EMERGENCY MEDICINE

## 2025-08-26 PROCEDURE — 96375 TX/PRO/DX INJ NEW DRUG ADDON: CPT

## 2025-08-26 PROCEDURE — 83880 ASSAY OF NATRIURETIC PEPTIDE: CPT

## 2025-08-26 PROCEDURE — 83735 ASSAY OF MAGNESIUM: CPT

## 2025-08-26 PROCEDURE — 80048 BASIC METABOLIC PNL TOTAL CA: CPT

## 2025-08-26 PROCEDURE — 93005 ELECTROCARDIOGRAM TRACING: CPT | Performed by: PHYSICIAN ASSISTANT

## 2025-08-26 PROCEDURE — 82805 BLOOD GASES W/O2 SATURATION: CPT

## 2025-08-26 PROCEDURE — 36415 COLL VENOUS BLD VENIPUNCTURE: CPT

## 2025-08-26 PROCEDURE — 6370000000 HC RX 637 (ALT 250 FOR IP): Performed by: EMERGENCY MEDICINE

## 2025-08-26 PROCEDURE — 99285 EMERGENCY DEPT VISIT HI MDM: CPT

## 2025-08-26 PROCEDURE — 85730 THROMBOPLASTIN TIME PARTIAL: CPT

## 2025-08-26 PROCEDURE — 85025 COMPLETE CBC W/AUTO DIFF WBC: CPT

## 2025-08-26 PROCEDURE — 6360000002 HC RX W HCPCS: Performed by: EMERGENCY MEDICINE

## 2025-08-26 PROCEDURE — 71045 X-RAY EXAM CHEST 1 VIEW: CPT

## 2025-08-26 PROCEDURE — 80162 ASSAY OF DIGOXIN TOTAL: CPT

## 2025-08-26 PROCEDURE — 84484 ASSAY OF TROPONIN QUANT: CPT

## 2025-08-26 PROCEDURE — 93010 ELECTROCARDIOGRAM REPORT: CPT | Performed by: INTERNAL MEDICINE

## 2025-08-26 PROCEDURE — 96365 THER/PROPH/DIAG IV INF INIT: CPT

## 2025-08-26 PROCEDURE — 80053 COMPREHEN METABOLIC PANEL: CPT

## 2025-08-26 PROCEDURE — 2580000003 HC RX 258: Performed by: EMERGENCY MEDICINE

## 2025-08-26 RX ORDER — LANOLIN ALCOHOL/MO/W.PET/CERES
400 CREAM (GRAM) TOPICAL ONCE
Status: COMPLETED | OUTPATIENT
Start: 2025-08-26 | End: 2025-08-26

## 2025-08-26 RX ORDER — FUROSEMIDE 10 MG/ML
40 INJECTION INTRAMUSCULAR; INTRAVENOUS ONCE
Status: COMPLETED | OUTPATIENT
Start: 2025-08-26 | End: 2025-08-26

## 2025-08-26 RX ORDER — ACETAMINOPHEN 500 MG
1000 TABLET ORAL
Status: DISCONTINUED | OUTPATIENT
Start: 2025-08-26 | End: 2025-08-27 | Stop reason: HOSPADM

## 2025-08-26 RX ORDER — SODIUM CHLORIDE 9 MG/ML
INJECTION, SOLUTION INTRAVENOUS CONTINUOUS
Status: DISCONTINUED | OUTPATIENT
Start: 2025-08-26 | End: 2025-08-26 | Stop reason: HOSPADM

## 2025-08-26 RX ORDER — MAGNESIUM SULFATE IN WATER 40 MG/ML
2000 INJECTION, SOLUTION INTRAVENOUS ONCE
Status: COMPLETED | OUTPATIENT
Start: 2025-08-26 | End: 2025-08-27

## 2025-08-26 RX ORDER — POTASSIUM CHLORIDE 1500 MG/1
40 TABLET, EXTENDED RELEASE ORAL ONCE
Status: COMPLETED | OUTPATIENT
Start: 2025-08-26 | End: 2025-08-26

## 2025-08-26 RX ORDER — LANOLIN ALCOHOL/MO/W.PET/CERES
800 CREAM (GRAM) TOPICAL ONCE
Status: COMPLETED | OUTPATIENT
Start: 2025-08-26 | End: 2025-08-26

## 2025-08-26 RX ORDER — POTASSIUM CHLORIDE 7.45 MG/ML
10 INJECTION INTRAVENOUS ONCE
Status: COMPLETED | OUTPATIENT
Start: 2025-08-26 | End: 2025-08-26

## 2025-08-26 RX ADMIN — POTASSIUM CHLORIDE 10 MEQ: 7.46 INJECTION, SOLUTION INTRAVENOUS at 01:45

## 2025-08-26 RX ADMIN — MAGNESIUM SULFATE HEPTAHYDRATE 2000 MG: 40 INJECTION, SOLUTION INTRAVENOUS at 23:03

## 2025-08-26 RX ADMIN — FUROSEMIDE 40 MG: 10 INJECTION, SOLUTION INTRAMUSCULAR; INTRAVENOUS at 23:02

## 2025-08-26 RX ADMIN — SODIUM CHLORIDE: 0.9 INJECTION, SOLUTION INTRAVENOUS at 01:40

## 2025-08-26 RX ADMIN — Medication 400 MG: at 01:41

## 2025-08-26 RX ADMIN — POTASSIUM CHLORIDE 40 MEQ: 1500 TABLET, EXTENDED RELEASE ORAL at 01:41

## 2025-08-26 RX ADMIN — FUROSEMIDE 40 MG: 10 INJECTION, SOLUTION INTRAMUSCULAR; INTRAVENOUS at 01:40

## 2025-08-26 RX ADMIN — Medication 800 MG: at 23:03

## 2025-08-26 RX ADMIN — POTASSIUM CHLORIDE 40 MEQ: 1500 TABLET, EXTENDED RELEASE ORAL at 23:03

## 2025-08-26 ASSESSMENT — PAIN - FUNCTIONAL ASSESSMENT
PAIN_FUNCTIONAL_ASSESSMENT: 0-10

## 2025-08-26 ASSESSMENT — PAIN SCALES - GENERAL
PAINLEVEL_OUTOF10: 8
PAINLEVEL_OUTOF10: 5
PAINLEVEL_OUTOF10: 8

## 2025-08-26 ASSESSMENT — PAIN DESCRIPTION - LOCATION
LOCATION: ANKLE
LOCATION: GROIN

## 2025-08-27 VITALS
TEMPERATURE: 97.9 F | HEIGHT: 78 IN | OXYGEN SATURATION: 96 % | BODY MASS INDEX: 36.21 KG/M2 | WEIGHT: 313 LBS | RESPIRATION RATE: 24 BRPM | DIASTOLIC BLOOD PRESSURE: 79 MMHG | HEART RATE: 87 BPM | SYSTOLIC BLOOD PRESSURE: 100 MMHG

## 2025-08-27 LAB
BASOPHILS # BLD: 0.04 K/UL (ref 0–0.2)
BASOPHILS NFR BLD: 0 % (ref 0–1)
EKG DIAGNOSIS: NORMAL
EKG Q-T INTERVAL: 370 MS
EKG QRS DURATION: 96 MS
EKG QTC CALCULATION (BAZETT): 469 MS
EKG R AXIS: -72 DEGREES
EKG T AXIS: 28 DEGREES
EKG VENTRICULAR RATE: 97 BPM
EOSINOPHIL # BLD: 0.23 K/UL
EOSINOPHILS RELATIVE PERCENT: 2 % (ref 0–3)
ERYTHROCYTE [DISTWIDTH] IN BLOOD BY AUTOMATED COUNT: 16.6 % (ref 11.7–14.9)
HCT VFR BLD AUTO: 41.2 % (ref 42–52)
HGB BLD-MCNC: 12.9 G/DL (ref 13.5–18)
IMM GRANULOCYTES # BLD AUTO: 0.07 K/UL
IMM GRANULOCYTES NFR BLD: 1 %
LYMPHOCYTES NFR BLD: 1.44 K/UL
LYMPHOCYTES RELATIVE PERCENT: 11 % (ref 24–44)
MCH RBC QN AUTO: 29.9 PG (ref 27–31)
MCHC RBC AUTO-ENTMCNC: 31.3 G/DL (ref 32–36)
MCV RBC AUTO: 95.6 FL (ref 78–100)
MONOCYTES NFR BLD: 0.79 K/UL
MONOCYTES NFR BLD: 6 % (ref 0–5)
NEUTROPHILS NFR BLD: 80 % (ref 36–66)
NEUTS SEG NFR BLD: 10.33 K/UL
PLATELET, FLUORESCENCE: 185 K/UL (ref 140–440)
PMV BLD AUTO: 10.3 FL (ref 7.5–11.1)
RBC # BLD AUTO: 4.31 M/UL (ref 4.6–6.2)
WBC OTHER # BLD: 12.9 K/UL (ref 4–10.5)

## 2025-08-27 PROCEDURE — 93010 ELECTROCARDIOGRAM REPORT: CPT | Performed by: INTERNAL MEDICINE

## 2025-08-27 PROCEDURE — 96366 THER/PROPH/DIAG IV INF ADDON: CPT

## 2025-08-27 ASSESSMENT — PAIN SCALES - GENERAL: PAINLEVEL_OUTOF10: 1

## 2025-08-27 ASSESSMENT — PAIN - FUNCTIONAL ASSESSMENT: PAIN_FUNCTIONAL_ASSESSMENT: 0-10

## (undated) DEVICE — 20 ML SYRINGE LUER-LOCK TIP: Brand: MONOJECT

## (undated) DEVICE — TROCAR ENDOSCP L100MM DIA5MM BLDELSS STBL SL THRD OPT VW

## (undated) DEVICE — DRESSING TRNSPAR W5XL4.5IN FLM SHT SEMIPERMEABLE WIND

## (undated) DEVICE — TELFA NON-ADHERENT ABSORBENT DRESSING: Brand: TELFA

## (undated) DEVICE — TUBING SUCT 9 11FR L475IN RIG SHFT MINI SUC TIP DLP

## (undated) DEVICE — TUBING, SUCTION, 3/16" X 10', STRAIGHT: Brand: MEDLINE

## (undated) DEVICE — TUBING, SUCTION, 9/32" X 10', STRAIGHT: Brand: MEDLINE

## (undated) DEVICE — SUTURE SZ 0 27IN 5/8 CIR UR-6  TAPER PT VIOLET ABSRB VICRYL J603H

## (undated) DEVICE — RADIFOCUS GLIDEWIRE: Brand: GLIDEWIRE

## (undated) DEVICE — DRAPE SHEET ULTRAGARD: Brand: MEDLINE

## (undated) DEVICE — TROCAR ENDOSCP L100MM DIA5MM BLDELSS STBL SL OBT RADLUC

## (undated) DEVICE — KIT MICROINTRODUCER 4FR ECHOGENIC NDL L7CM 21GA STIFF COAX

## (undated) DEVICE — CATHETER DIAG 4FR L100CM COR NYL 3 DRC W/O SIDE H RADPQ

## (undated) DEVICE — SOLUTION IV IRRIG WATER 1000ML POUR BRL 2F7114

## (undated) DEVICE — Device

## (undated) DEVICE — PACK,BASIC,IX: Brand: MEDLINE

## (undated) DEVICE — GLOVE ORANGE PI 7   MSG9070

## (undated) DEVICE — SUTURE V-LOC 180 SZ 3-0 L12IN ABSRB GRN V-20 L26MM 1/2 CIR VLOCL0614

## (undated) DEVICE — LINER SUCT CANSTR 1500CC SEMI RIG W/ POR HYDROPHOBIC SHUT

## (undated) DEVICE — GLOVE SURG SZ 65 L12IN FNGR THK87MIL WHT LTX FREE

## (undated) DEVICE — SAFESECURE,SECUREMENT,FOLEY CATH,STERILE: Brand: MEDLINE

## (undated) DEVICE — GUIDEWIRE VASC L260CM DIA0.035IN RAD 3MM J TIP L7CM PTFE

## (undated) DEVICE — ANGIOGRAPHY KIT CUST MANIFOLD

## (undated) DEVICE — CLIP INT SM WIDE RED TI TRNSVRS GRV CHEVRON SHP W/ PRECIS

## (undated) DEVICE — GAUZE,SPONGE,4"X4",16PLY,XRAY,STRL,LF: Brand: MEDLINE

## (undated) DEVICE — LOOP VES W25MM THK1MM MAXI RED SIL FLD REPELLENT 100 PER

## (undated) DEVICE — Z INACTIVE USE 2535480 CLIP LIG M BLU TI HRT SHP WIRE HORZ 180 PER BX

## (undated) DEVICE — 34" SINGLE PATIENT USE HOVERMATT BREATHABLE: Brand: SINGLE PATIENT USE HOVERMATT

## (undated) DEVICE — VESSEL LOOPS,MAXI, RED: Brand: DEVON

## (undated) DEVICE — INTRODUCER SHTH THN WALLED 5 FRX10 CM 22 GA ANGLED RAIN SHTH

## (undated) DEVICE — APPLIER CLP M L L11.4IN DIA10MM ENDOSCP ROT MULT FOR LIG

## (undated) DEVICE — SUTURE MCRYL SZ 3-0 L27IN ABSRB UD L24MM PS-1 3/8 CIR PRIM Y936H

## (undated) DEVICE — SUTURE VCRL 3-0 L36IN ABSRB VLT CT-1 L36MM 1/2 CIR J344H

## (undated) DEVICE — SUTURE PERMAHAND SZ 2-0 L17X18IN NONABSORBABLE BLK SILK SA65H

## (undated) DEVICE — SPONGE GZ W4XL8IN COT WVN 12 PLY

## (undated) DEVICE — AMBU ASCOPE 3 SLIM - VIDEO BRONCHIOSCOPE SINGLE-USE, FLEXIBLE AND STERILE. INSERTION CORD DIA 3.8 MM, CHANNEL WIDTH OF 1.2 MM. BENDING ANGLE OF 130°/130° MIN. ORDERING 5 PCS. = 1 BOX.: Brand: ASCOPE™ 3 SLIM  3.8/1.2FLEXIBLE VIDEOSCOPE - SINGLE USE

## (undated) DEVICE — SKIN AFFIX SURG ADHESIVE 72/CS 0.55ML: Brand: MEDLINE

## (undated) DEVICE — GLOVE SURG SZ 6 L12IN FNGR THK75MIL WHT LTX POLYMER BEAD

## (undated) DEVICE — SOLUTION IV 1000ML 0.9% SOD CHL FOR IRRIG PLAS CONT

## (undated) DEVICE — SUTURE VCRL SZ 1 L27IN ABSRB VLT L26MM CT-2 1/2 CIR J335H

## (undated) DEVICE — CATHETER ANGIO 4FR L100CM S STL NYL JL5 3 SEG BRAID SFT

## (undated) DEVICE — SUTURE ABSORBABLE BRAIDED 4-0 FS-2 18 IN VIO SLV VICRYL J392H

## (undated) DEVICE — GLOVE SURG SZ 65 L12IN FNGR THK87MIL DK GRN LTX POLYMER W

## (undated) DEVICE — SUTURE PROL SZ 6-0 L24IN NONABSORBABLE BLU L13MM C-1 3/8 8726H

## (undated) DEVICE — TISSUE RETRIEVAL SYSTEM: Brand: INZII RETRIEVAL SYSTEM

## (undated) DEVICE — SKIN MARKER REGULAR TIP WITH RULER CAP AND LABELS: Brand: DEVON

## (undated) DEVICE — SET TBNG DISP TIP FOR AHTO

## (undated) DEVICE — CATHETER ANGIO 4FR L100CM S STL NYL JL4 3 SEG BRAID SFT

## (undated) DEVICE — CORD ES L15FT PT RET REUSE VALLEYLAB REM

## (undated) DEVICE — CATHETER ADAPTER: Brand: ADDTO

## (undated) DEVICE — AGENT HEMSTAT W2XL4IN OXIDIZED REGENERATED CELOS ABSRB

## (undated) DEVICE — SYRINGE MED 20ML STD CLR PLAS LUERLOCK TIP N CTRL DISP

## (undated) DEVICE — DUAL LUMEN STOMACH TUBE: Brand: SALEM SUMP

## (undated) DEVICE — PENCIL ES CRD L10FT HND SWCHING ROCK SWCH W/ EDGE COAT BLDE

## (undated) DEVICE — SUTURE PERMAHAND SZ 2-0 L18IN NONABSORBABLE BLK L26MM FS 685G

## (undated) DEVICE — GLOVE SURG SZ 6 THK91MIL LTX FREE SYN POLYISOPRENE ANTI

## (undated) DEVICE — SYRINGE BLB 50CC IRRIG PLIABLE FNGR FLNG GRAD FLSK DISP

## (undated) DEVICE — CATHETER DIAG AD 4FR L100CM STD NYL JUDKINS R 4 TRULUMEN

## (undated) DEVICE — BAG SPEC REM 224ML W4XL6IN DIA10MM 1 HND GYN DISP ENDOPCH

## (undated) DEVICE — STRIP SKIN CLSR W0.25XL4IN WHT SPUNBOUND FBR NYL HI ADH

## (undated) DEVICE — ELECTRODE ES L4IN PTFE INSUL BLDE W/ SFTY SL DISP EDGE

## (undated) DEVICE — [HIGH FLOW HEATED INSUFFLATOR TUBING,  DO NOT USE IF PACKAGE IS DAMAGED]

## (undated) DEVICE — SUTURE VCRL 2-0 L36IN ABSRB UD CTX L48MM 1/2 CIR TAPERPOINT J979H

## (undated) DEVICE — SUTURE VCRL SZ 4-0 L18IN ABSRB UD L19MM PS-2 3/8 CIR PRIM J496H

## (undated) DEVICE — 1315 FOAM STRIP NEEDLE COUNTER: Brand: DEVON

## (undated) DEVICE — SUTURE NONABSORBABLE MONOFILAMENT 5-0 C-1 1X24 IN PROLENE 8725H

## (undated) DEVICE — 3M™ STERI-STRIP™ REINFORCED ADHESIVE SKIN CLOSURES, R1546, 1/4 IN X 4 IN (6 MM X 100 MM), 10 STRIPS/ENVELOPE: Brand: 3M™ STERI-STRIP™

## (undated) DEVICE — FOGARTY - HYDRAGRIP SURGICAL - CLAMP INSERTS: Brand: FOGARTY SOFTJAW

## (undated) DEVICE — CIRCUIT BREATHING SINGLE LIMB AD 72 IN 3 LT 2 FILTER LIMBO

## (undated) DEVICE — DRESSING TRNSPAR W4XL10IN FLM MIC POR SURESITE 123

## (undated) DEVICE — GEL US 20GM NONIRRITATING OVERWRAPPED FILE PCH TRNSMIT

## (undated) DEVICE — GOWN,SIRUS,FABRNF,RAGLAN,L,ST,30/CS: Brand: MEDLINE

## (undated) DEVICE — INTENDED FOR TISSUE SEPARATION, AND OTHER PROCEDURES THAT REQUIRE A SHARP SURGICAL BLADE TO PUNCTURE OR CUT.: Brand: BARD-PARKER ® STAINLESS STEEL BLADES

## (undated) DEVICE — COUNTER NDL 60 COUNT FOAM STRP SGL MAG

## (undated) DEVICE — DRESSING TRNSPAR W6XL8IN FLM SURESITE 123

## (undated) DEVICE — SYRINGE IRRIG 60ML SFT PLIABLE BLB EZ TO GRP 1 HND USE W/

## (undated) DEVICE — CHLORAPREP 26ML ORANGE

## (undated) DEVICE — SUTURE PROL SZ 5-0 L18IN NONABSORBABLE BLU C-1 L13MM 3/8 8717H

## (undated) DEVICE — LINER,SEMI-RIGID,3000CC,50EA/CS: Brand: MEDLINE

## (undated) DEVICE — YANKAUER,FLEXIBLE HANDLE,REGLR CAPACITY: Brand: MEDLINE INDUSTRIES, INC.

## (undated) DEVICE — BLADE CLIPPER GEN PURP NS

## (undated) DEVICE — STANDARD HYPODERMIC NEEDLE,POLYPROPYLENE HUB: Brand: MONOJECT

## (undated) DEVICE — TOWEL,OR,DSP,ST,BLUE,STD,6/PK,12PK/CS: Brand: MEDLINE

## (undated) DEVICE — TROCAR ENDOSCP L100MM DIA11MM STBL SL BLDELSS DISP ENDOPATH

## (undated) DEVICE — Z INACTIVE USE 2641839 CLIP INT M L POLYMER LOK LIG HEM O LOK

## (undated) DEVICE — PACK SURG LAP CHOLE

## (undated) DEVICE — ELECTRODE ES AD CRDLSS PT RET REM POLYHESIVE

## (undated) DEVICE — BANDAGE ADH W2XL4IN NITRL FAB STRP CURAD

## (undated) DEVICE — MARKER,SKIN,WI/RULER AND LABELS: Brand: MEDLINE

## (undated) DEVICE — DECANTER FLD 9IN ST BG FOR ASEP TRNSF OF FLD

## (undated) DEVICE — TOWEL,OR,DSP,ST,WHITE,DLX,XR,4/PK,20PK/C: Brand: MEDLINE

## (undated) DEVICE — SPONGE LAP W18XL18IN WHT COT 4 PLY FLD STRUNG RADPQ DISP ST

## (undated) DEVICE — DRESSING TRNSPAR W2XL2.75IN FLM SHT SEMIPERMEABLE WIND

## (undated) DEVICE — GLOVE SURG SZ 65 THK91MIL LTX FREE SYN POLYISOPRENE

## (undated) DEVICE — Z INACTIVE PER PHARM APPLIER INT CLP ENDOSCP 20 MM ROTATING 20

## (undated) DEVICE — SOLUTION IV IRRIG POUR BRL 0.9% SODIUM CHL 2F7124

## (undated) DEVICE — GLOVE SURG SZ 7 L12IN FNGR THK87MIL WHT LTX FREE

## (undated) DEVICE — DRAPE,UTILITY,XL,4/PK,STERILE: Brand: MEDLINE

## (undated) DEVICE — 3M™ IOBAN™ 2 ANTIMICROBIAL INCISE DRAPE 6650EZ: Brand: IOBAN™ 2

## (undated) DEVICE — FIRST ENTRY KIOS THRD 5X100MM ST

## (undated) DEVICE — GLIDESHEATH SLENDER ACCESS KIT: Brand: GLIDESHEATH SLENDER

## (undated) DEVICE — SUTURE PROL SZ 6-0 L18IN NONABSORBABLE BLU L13MM C-1 3/8 8718H

## (undated) DEVICE — CLIP SM RED INTERN HMOCLP TITAN LIGATING

## (undated) DEVICE — TOTAL TRAY, DB, 100% SILI FOLEY, 16FR 10: Brand: MEDLINE

## (undated) DEVICE — GLOVE SURG SZ 7 L12IN THK7.5MIL DK GRN LTX FREE MSG6570] MEDLINE INDUSTRIES INC]

## (undated) DEVICE — ADAPTER,CATHETER/SYRINGE/LUER,STERILE: Brand: MEDLINE

## (undated) DEVICE — SHEET, T, LAPAROTOMY, STERILE: Brand: MEDLINE

## (undated) DEVICE — Z INACTIVE USE 2641837 CLIP LIG M BLU TI HRT SHP WIRE HORZ 600 PER BX

## (undated) DEVICE — PINNACLE INTRODUCER SHEATH: Brand: PINNACLE

## (undated) DEVICE — GOWN,SIRUS,POLYRNF,BRTHSLV,XLN/XL,20/CS: Brand: MEDLINE

## (undated) DEVICE — ADHESIVE SKIN CLSR 0.7ML SGL PEEL PCH GEL WTRPRF FOR WOUNDS

## (undated) DEVICE — SWAN-GANZ TRUE SIZE THERMODULTION CATHETER, 5F: Brand: SWAN-GANZ TRUE SIZE

## (undated) DEVICE — TRAY PREP DRY W/ PREM GLV 2 APPL 6 SPNG 2 UNDPD 1 OVERWRAP